# Patient Record
Sex: FEMALE | Race: WHITE | NOT HISPANIC OR LATINO | Employment: OTHER | URBAN - METROPOLITAN AREA
[De-identification: names, ages, dates, MRNs, and addresses within clinical notes are randomized per-mention and may not be internally consistent; named-entity substitution may affect disease eponyms.]

---

## 2017-01-02 DIAGNOSIS — Z79.01 LONG TERM (CURRENT) USE OF ANTICOAGULANTS: ICD-10-CM

## 2017-01-02 LAB
INR PPP: 0.9 (ref 0.86–1.14)
PLATELET # BLD AUTO: 458 10E9/L (ref 150–450)

## 2017-01-13 ENCOUNTER — PRE VISIT (OUTPATIENT)
Dept: ANESTHESIOLOGY | Facility: CLINIC | Age: 82
End: 2017-01-13

## 2017-01-13 NOTE — TELEPHONE ENCOUNTER
1.  Date/reason for appt:1/31/17, Lumbar radiculopathy displacement of lumbar intervertebral   2.  Referring provider:Self  3.  Call to patient (Yes / No - short description):No, records are in epic.   4.  Previous care at / records requested from:    IM- office notes are in [facility].     Ortho- office notes and imaging are in epic.

## 2017-01-18 DIAGNOSIS — I10 ESSENTIAL HYPERTENSION: Primary | ICD-10-CM

## 2017-01-18 RX ORDER — AMLODIPINE BESYLATE 10 MG/1
10 TABLET ORAL DAILY
Qty: 90 TABLET | Refills: 1 | Status: SHIPPED | OUTPATIENT
Start: 2017-01-18 | End: 2017-10-17

## 2017-01-18 NOTE — TELEPHONE ENCOUNTER
amLODIPine (NORVASC) 10 MG tablet  Last Written Prescription Date:  6/20/16  Last Fill Quantity: 90,   # refills: 1  Last Office Visit with G, P or [facility] prescribing provider: 12/21/16  Future Office visit:   4/13/17    BP Readings from Last 3 Encounters:   01/02/17 170/69   12/21/16 129/65   12/20/16 144/69       Routing refill request to provider for review/approval because:   amLODIPine (NORVASC) 10 MG tablet.. Bp> 140/90

## 2017-01-22 ENCOUNTER — HOSPITAL ENCOUNTER (INPATIENT)
Facility: CLINIC | Age: 82
LOS: 1 days | Discharge: HOME | DRG: 390 | End: 2017-01-24
Attending: EMERGENCY MEDICINE | Admitting: INTERNAL MEDICINE
Payer: MEDICARE

## 2017-01-22 DIAGNOSIS — K56.600 PARTIAL BOWEL OBSTRUCTION (H): ICD-10-CM

## 2017-01-22 LAB
ALBUMIN SERPL-MCNC: 4.1 G/DL (ref 3.4–5)
ALP SERPL-CCNC: 82 U/L (ref 40–150)
ALT SERPL W P-5'-P-CCNC: 26 U/L (ref 0–50)
ANION GAP SERPL CALCULATED.3IONS-SCNC: 10 MMOL/L (ref 3–14)
AST SERPL W P-5'-P-CCNC: 21 U/L (ref 0–45)
BASOPHILS # BLD AUTO: 0 10E9/L (ref 0–0.2)
BASOPHILS NFR BLD AUTO: 0.1 %
BILIRUB SERPL-MCNC: 0.6 MG/DL (ref 0.2–1.3)
BUN SERPL-MCNC: 18 MG/DL (ref 7–30)
CALCIUM SERPL-MCNC: 11 MG/DL (ref 8.5–10.1)
CHLORIDE SERPL-SCNC: 97 MMOL/L (ref 94–109)
CO2 BLDCOV-SCNC: 25 MMOL/L (ref 21–28)
CO2 SERPL-SCNC: 26 MMOL/L (ref 20–32)
CREAT SERPL-MCNC: 0.69 MG/DL (ref 0.52–1.04)
DIFFERENTIAL METHOD BLD: ABNORMAL
EOSINOPHIL # BLD AUTO: 0 10E9/L (ref 0–0.7)
EOSINOPHIL NFR BLD AUTO: 0.1 %
ERYTHROCYTE [DISTWIDTH] IN BLOOD BY AUTOMATED COUNT: 14.8 % (ref 10–15)
GFR SERPL CREATININE-BSD FRML MDRD: 82 ML/MIN/1.7M2
GLUCOSE SERPL-MCNC: 146 MG/DL (ref 70–99)
HCT VFR BLD AUTO: 40.7 % (ref 35–47)
HGB BLD-MCNC: 13.7 G/DL (ref 11.7–15.7)
IMM GRANULOCYTES # BLD: 0.1 10E9/L (ref 0–0.4)
IMM GRANULOCYTES NFR BLD: 0.3 %
INR PPP: 0.91 (ref 0.86–1.14)
LACTATE BLD-SCNC: 1.4 MMOL/L (ref 0.7–2.1)
LYMPHOCYTES # BLD AUTO: 0.5 10E9/L (ref 0.8–5.3)
LYMPHOCYTES NFR BLD AUTO: 3.3 %
MCH RBC QN AUTO: 30.4 PG (ref 26.5–33)
MCHC RBC AUTO-ENTMCNC: 33.7 G/DL (ref 31.5–36.5)
MCV RBC AUTO: 90 FL (ref 78–100)
MONOCYTES # BLD AUTO: 0.7 10E9/L (ref 0–1.3)
MONOCYTES NFR BLD AUTO: 4.4 %
NEUTROPHILS # BLD AUTO: 14.9 10E9/L (ref 1.6–8.3)
NEUTROPHILS NFR BLD AUTO: 91.8 %
NRBC # BLD AUTO: 0 10*3/UL
NRBC BLD AUTO-RTO: 0 /100
PCO2 BLDV: 33 MM HG (ref 40–50)
PH BLDV: 7.49 PH (ref 7.32–7.43)
PLATELET # BLD AUTO: 499 10E9/L (ref 150–450)
PO2 BLDV: 23 MM HG (ref 25–47)
POTASSIUM SERPL-SCNC: 3.4 MMOL/L (ref 3.4–5.3)
PROT SERPL-MCNC: 7.2 G/DL (ref 6.8–8.8)
RBC # BLD AUTO: 4.51 10E12/L (ref 3.8–5.2)
SAO2 % BLDV FROM PO2: 47 %
SODIUM SERPL-SCNC: 134 MMOL/L (ref 133–144)
TROPONIN I SERPL-MCNC: 0.04 UG/L (ref 0–0.04)
WBC # BLD AUTO: 16.3 10E9/L (ref 4–11)

## 2017-01-22 PROCEDURE — 83605 ASSAY OF LACTIC ACID: CPT

## 2017-01-22 PROCEDURE — 85610 PROTHROMBIN TIME: CPT | Performed by: EMERGENCY MEDICINE

## 2017-01-22 PROCEDURE — 80053 COMPREHEN METABOLIC PANEL: CPT | Performed by: EMERGENCY MEDICINE

## 2017-01-22 PROCEDURE — 93010 ELECTROCARDIOGRAM REPORT: CPT | Mod: Z6 | Performed by: EMERGENCY MEDICINE

## 2017-01-22 PROCEDURE — 96375 TX/PRO/DX INJ NEW DRUG ADDON: CPT | Performed by: EMERGENCY MEDICINE

## 2017-01-22 PROCEDURE — 93005 ELECTROCARDIOGRAM TRACING: CPT | Performed by: EMERGENCY MEDICINE

## 2017-01-22 PROCEDURE — 99285 EMERGENCY DEPT VISIT HI MDM: CPT | Mod: 25 | Performed by: EMERGENCY MEDICINE

## 2017-01-22 PROCEDURE — 85025 COMPLETE CBC W/AUTO DIFF WBC: CPT | Performed by: EMERGENCY MEDICINE

## 2017-01-22 PROCEDURE — 84484 ASSAY OF TROPONIN QUANT: CPT | Performed by: EMERGENCY MEDICINE

## 2017-01-22 PROCEDURE — 25000125 ZZHC RX 250: Performed by: EMERGENCY MEDICINE

## 2017-01-22 PROCEDURE — 82803 BLOOD GASES ANY COMBINATION: CPT

## 2017-01-22 PROCEDURE — 99285 EMERGENCY DEPT VISIT HI MDM: CPT | Performed by: EMERGENCY MEDICINE

## 2017-01-22 RX ORDER — FENTANYL CITRATE 50 UG/ML
25 INJECTION, SOLUTION INTRAMUSCULAR; INTRAVENOUS ONCE
Status: COMPLETED | OUTPATIENT
Start: 2017-01-22 | End: 2017-01-22

## 2017-01-22 RX ADMIN — FENTANYL CITRATE 25 MCG: 50 INJECTION, SOLUTION INTRAMUSCULAR; INTRAVENOUS at 23:30

## 2017-01-22 ASSESSMENT — ENCOUNTER SYMPTOMS
EYE PAIN: 0
BACK PAIN: 0
BLOOD IN STOOL: 0
TROUBLE SWALLOWING: 0
DYSURIA: 0
NAUSEA: 1
DIZZINESS: 1
VOMITING: 1
COLOR CHANGE: 0
WEAKNESS: 0
SORE THROAT: 0
FREQUENCY: 0
PALPITATIONS: 0
CHILLS: 0
SHORTNESS OF BREATH: 0
POLYDIPSIA: 0
DIARRHEA: 0
NECK PAIN: 0
CONSTIPATION: 0
FEVER: 0
HEADACHES: 0
COUGH: 0
HEMATURIA: 0
ABDOMINAL PAIN: 1

## 2017-01-22 NOTE — LETTER
Transition Communication Hand-off for Care Transitions to Next Level of Care Provider    Name: Lucie Stockton  MRN #: [id number]  Primary Care Provider: Marii Montgomery     Primary Clinic:  [facility] [address] [place] [address]  [facility] [address]     Reason for Hospitalization:  Partial bowel obstruction (H) [K56.69]  Admit Date/Time: 1/22/2017 10:33 PM  Discharge Date: 1/24/2017  Payor Source: Payor: MEDICARE / Plan: MEDICARE / Product Type: Medicare /            Reason for Communication Hand-off Referral: Other continuity of care    Concern for non-adherence with plan of care:   Y/N no      Follow-up plan:  Future Appointments  Date Time Provider Department Center   4/13/2017 8:20 AM Marii Montgomery MD [facility]   6/20/2017 12:45 PM Iona Velez MD [facility]         Anna Pena, RNCC  [phone]    AVS/Discharge Summary is the source of truth; this is a helpful guide for improved communication of patient story

## 2017-01-22 NOTE — IP AVS SNAPSHOT
MRN:[id number]                      After Visit Summary   1/22/2017    Lucie Stockton    MRN: [id number]           Thank you!     Thank you for choosing [facility] for your care. Our goal is always to provide you with excellent care. Hearing back from our patients is one way we can continue to improve our services. Please take a few minutes to complete the written survey that you may receive in the mail after you visit with us. Thank you!        Patient Information     Date Of Birth          1935        About your hospital stay     You were admitted on:  January 23, 2017 You last received care in the:  [address] [facility]    You were discharged on:  January 24, 2017        Reason for your hospital stay       You were admitted with a partial small bowel obstruction.  After managing pain and nausea and correcting dehydration with IV fluids your symptoms improved and you were able to resume a regular diet.                  Who to Call     For medical emergencies, please call 911.  For non-urgent questions about your medical care, please call your primary care provider or clinic, [phone]          Attending Provider     Provider    Nupur Almanza MD Pham, MD Jesse De La Paz Mumtaz, MD Nixon, Hebert Alvarez MD       Primary Care Provider Office Phone # Fax #    Marii MITCHELL Montgomeyr [phone]        [facility] [address] [place] [address] [address] [place] [address]        After Care Instructions     Activity       Your activity upon discharge: activity as tolerated            Diet       Follow this diet upon discharge: Orders Placed This Encounter  Advance Diet as Tolerated: Regular Diet Adult            Monitor and record       If your symptoms of nausea, vomiting return or you have abdominal pain or stop passing gas you should return to the emergency room.                  Follow-up Appointments     Adult [facility] Follow-up and recommended labs and tests       Follow  up with primary care provider, Marii Montgomery, within 7 days to evaluate after recent hospitalization.  No follow up labs or test are needed.      Appointments on [place] and/or [facility] (with [facility] or [facility] provider or service). Call [phone] if you haven't heard regarding these appointments within 7 days of discharge.                  Your next 10 appointments already scheduled     Apr 13, 2017  8:20 AM   (Arrive by 8:05 AM)   Return Visit with Marii Montgomery MD   [facility] ([facility])    [address] [place] [address]  [address] [facility] [address]   [phone]            Jun 20, 2017 12:45 PM   (Arrive by 12:30 PM)   Return Vascular Visit with Iona Velez MD   [facility] ([facility])    [address] [place] [address]  [address] [facility] [address]   [phone]              Pending Results     Date and Time Order Name Status Description    1/22/2017 2311 POC US ABDOMEN LIMITED In process             Statement of Approval     Ordered          01/24/17 1430  I have reviewed and agree with all the recommendations and orders detailed in this document.   EFFECTIVE NOW     Approved and electronically signed by:  Hebert Hunter MD             Admission Information        Provider Department Dept Phone    1/22/2017 Hebert Hunter MD [facility] [phone]      Your Vitals Were     Blood Pressure Pulse Temperature    134/44 mmHg 89 97.6  F (36.4  C) (Oral)    Respirations Height Weight    16 1.524 m (5') 49.351 kg (108 lb 12.8 oz)    BMI (Body Mass Index) Pulse Oximetry       21.25 kg/m2 95%       MyChart Information     [facility] gives you secure access to your electronic health record. If you see a primary care provider, you can also send messages to your care team and make appointments. If you have questions, please call your primary care clinic.  If you do not have a primary care provider, please call  [phone] and they will assist you.        Care EveryWhere ID     This is your Care EveryWhere ID. This could be used by other organizations to access your [facility] medical records  [id number]           Review of your medicines      CONTINUE these medicines which have NOT CHANGED        Dose / Directions    albuterol 108 (90 BASE) MCG/ACT Inhaler   Commonly known as:  VENTOLIN HFA   Used for:  Cough        Dose:  2 puff   Inhale 2 puffs into the lungs every 4 hours as needed for shortness of breath / dyspnea or wheezing   Quantity:  18 Inhaler   Refills:  1       amLODIPine 10 MG tablet   Commonly known as:  NORVASC   Used for:  Essential hypertension        Dose:  10 mg   Take 1 tablet (10 mg) by mouth daily For blood pressure   Quantity:  90 tablet   Refills:  1       atorvastatin 40 MG tablet   Commonly known as:  LIPITOR   Used for:  Hyperlipidemia, unspecified hyperlipidemia type        Dose:  40 mg   Take 1 tablet (40 mg) by mouth daily   Quantity:  90 tablet   Refills:  3       CALCIUM 600 + D PO        Dose:  1 tablet   Take 1 tablet by mouth daily.   Refills:  0       clopidogrel 75 MG tablet   Commonly known as:  PLAVIX   Used for:  Other chronic pulmonary embolism (H), Venous thrombosis of extremity        Dose:  75 mg   Take 1 tablet (75 mg) by mouth daily   Quantity:  90 tablet   Refills:  3       ferrous sulfate 325 (65 FE) MG tablet   Commonly known as:  IRON   Used for:  Other iron deficiency anemias        Dose:  325 mg   Take 1 tablet (325 mg) by mouth daily (with breakfast)   Quantity:  90 tablet   Refills:  3       gabapentin 100 MG capsule   Commonly known as:  NEURONTIN   Used for:  Lumbar radiculopathy        Take 3 capsules at bedtime, also 1 capsule in the morning and mid-day   Quantity:  150 capsule   Refills:  1       hydrochlorothiazide 50 MG tablet   Commonly known as:  HYDRODIURIL   Used for:  Benign essential hypertension        Dose:  50 mg   Take 1 tablet (50 mg) by mouth daily    Quantity:  90 tablet   Refills:  3       levothyroxine 50 MCG tablet   Commonly known as:  SYNTHROID/LEVOTHROID   Used for:  Hypothyroidism, unspecified hypothyroidism type        Dose:  50 mcg   Take 1 tablet (50 mcg) by mouth daily   Quantity:  90 tablet   Refills:  3       lisinopril 40 MG tablet   Commonly known as:  PRINIVIL/ZESTRIL   Used for:  Essential hypertension        Dose:  40 mg   Take 1 tablet (40 mg) by mouth daily For blood pressure   Quantity:  90 tablet   Refills:  3       potassium chloride SA 20 MEQ CR tablet   Commonly known as:  K-DUR/KLOR-CON M   Used for:  Hypopotassemia        Dose:  20 mEq   Take 1 tablet (20 mEq) by mouth daily   Quantity:  90 tablet   Refills:  3       ranitidine 150 MG tablet   Commonly known as:  ZANTAC   Used for:  Gastritis        Dose:  150 mg   Take 1 tablet (150 mg) by mouth At Bedtime   Quantity:  90 tablet   Refills:  3       traMADol 50 MG tablet   Commonly known as:  ULTRAM   Used for:  Foot pain, right        Dose:  50 mg   Take 1 tablet (50 mg) by mouth every 6 hours as needed for moderate pain or severe pain Maximum 270 tablets in 3 months   Quantity:  270 tablet   Refills:  1                Protect others around you: Learn how to safely use, store and throw away your medicines at www.disposemymeds.org.             Medication List: This is a list of all your medications and when to take them. Check marks below indicate your daily home schedule. Keep this list as a reference.      Medications           Morning Afternoon Evening Bedtime As Needed    albuterol 108 (90 BASE) MCG/ACT Inhaler   Commonly known as:  VENTOLIN HFA   Inhale 2 puffs into the lungs every 4 hours as needed for shortness of breath / dyspnea or wheezing                                amLODIPine 10 MG tablet   Commonly known as:  NORVASC   Take 1 tablet (10 mg) by mouth daily For blood pressure   Last time this was given:  10 mg on 1/24/2017  7:55 AM                                 atorvastatin 40 MG tablet   Commonly known as:  LIPITOR   Take 1 tablet (40 mg) by mouth daily   Last time this was given:  40 mg on 1/24/2017  7:55 AM                                CALCIUM 600 + D PO   Take 1 tablet by mouth daily.                                clopidogrel 75 MG tablet   Commonly known as:  PLAVIX   Take 1 tablet (75 mg) by mouth daily   Last time this was given:  75 mg on 1/24/2017  7:55 AM                                ferrous sulfate 325 (65 FE) MG tablet   Commonly known as:  IRON   Take 1 tablet (325 mg) by mouth daily (with breakfast)                                gabapentin 100 MG capsule   Commonly known as:  NEURONTIN   Take 3 capsules at bedtime, also 1 capsule in the morning and mid-day   Last time this was given:  100 mg on 1/23/2017  6:36 PM                                hydrochlorothiazide 50 MG tablet   Commonly known as:  HYDRODIURIL   Take 1 tablet (50 mg) by mouth daily                                levothyroxine 50 MCG tablet   Commonly known as:  SYNTHROID/LEVOTHROID   Take 1 tablet (50 mcg) by mouth daily   Last time this was given:  50 mcg on 1/24/2017  7:55 AM                                lisinopril 40 MG tablet   Commonly known as:  PRINIVIL/ZESTRIL   Take 1 tablet (40 mg) by mouth daily For blood pressure   Last time this was given:  40 mg on 1/24/2017  7:55 AM                                potassium chloride SA 20 MEQ CR tablet   Commonly known as:  K-DUR/KLOR-CON M   Take 1 tablet (20 mEq) by mouth daily                                ranitidine 150 MG tablet   Commonly known as:  ZANTAC   Take 1 tablet (150 mg) by mouth At Bedtime   Last time this was given:  150 mg on 1/23/2017 11:10 PM                                traMADol 50 MG tablet   Commonly known as:  ULTRAM   Take 1 tablet (50 mg) by mouth every 6 hours as needed for moderate pain or severe pain Maximum 270 tablets in 3 months

## 2017-01-22 NOTE — IP AVS SNAPSHOT
[address] [address]    Phone:  [phone]                                       After Visit Summary   1/22/2017    Lucie Stockton    MRN: [id number]           After Visit Summary Signature Page     I have received my discharge instructions, and my questions have been answered. I have discussed any challenges I see with this plan with the nurse or doctor.    ..........................................................................................................................................  Patient/Patient Representative Signature      ..........................................................................................................................................  Patient Representative Print Name and Relationship to Patient    ..................................................               ................................................  Date                                            Time    ..........................................................................................................................................  Reviewed by Signature/Title    ...................................................              ..............................................  Date                                                            Time

## 2017-01-23 ENCOUNTER — APPOINTMENT (OUTPATIENT)
Dept: GENERAL RADIOLOGY | Facility: CLINIC | Age: 82
DRG: 390 | End: 2017-01-23
Attending: INTERNAL MEDICINE
Payer: MEDICARE

## 2017-01-23 ENCOUNTER — APPOINTMENT (OUTPATIENT)
Dept: CT IMAGING | Facility: CLINIC | Age: 82
DRG: 390 | End: 2017-01-23
Attending: EMERGENCY MEDICINE
Payer: MEDICARE

## 2017-01-23 ENCOUNTER — APPOINTMENT (OUTPATIENT)
Dept: GENERAL RADIOLOGY | Facility: CLINIC | Age: 82
DRG: 390 | End: 2017-01-23
Attending: EMERGENCY MEDICINE
Payer: MEDICARE

## 2017-01-23 PROBLEM — K56.609 SBO (SMALL BOWEL OBSTRUCTION) (H): Status: ACTIVE | Noted: 2017-01-23

## 2017-01-23 LAB
ALBUMIN UR-MCNC: NEGATIVE MG/DL
AMORPH CRY #/AREA URNS HPF: ABNORMAL /HPF
APPEARANCE UR: ABNORMAL
BILIRUB UR QL STRIP: NEGATIVE
COLOR UR AUTO: YELLOW
CREAT SERPL-MCNC: 0.59 MG/DL (ref 0.52–1.04)
ERYTHROCYTE [DISTWIDTH] IN BLOOD BY AUTOMATED COUNT: 14.7 % (ref 10–15)
GFR SERPL CREATININE-BSD FRML MDRD: NORMAL ML/MIN/1.7M2
GLUCOSE UR STRIP-MCNC: NEGATIVE MG/DL
HCT VFR BLD AUTO: 35.9 % (ref 35–47)
HGB BLD-MCNC: 11.9 G/DL (ref 11.7–15.7)
HGB UR QL STRIP: NEGATIVE
INTERPRETATION ECG - MUSE: NORMAL
KETONES UR STRIP-MCNC: 10 MG/DL
LEUKOCYTE ESTERASE UR QL STRIP: NEGATIVE
MCH RBC QN AUTO: 29.8 PG (ref 26.5–33)
MCHC RBC AUTO-ENTMCNC: 33.1 G/DL (ref 31.5–36.5)
MCV RBC AUTO: 90 FL (ref 78–100)
MUCOUS THREADS #/AREA URNS LPF: PRESENT /LPF
NITRATE UR QL: NEGATIVE
PH UR STRIP: 7 PH (ref 5–7)
PLATELET # BLD AUTO: 444 10E9/L (ref 150–450)
RADIOLOGIST FLAGS: ABNORMAL
RBC # BLD AUTO: 4 10E12/L (ref 3.8–5.2)
RBC #/AREA URNS AUTO: 0 /HPF (ref 0–2)
SP GR UR STRIP: 1.04 (ref 1–1.03)
SQUAMOUS #/AREA URNS AUTO: 1 /HPF (ref 0–1)
TRANS CELLS #/AREA URNS HPF: <1 /HPF (ref 0–1)
TROPONIN I SERPL-MCNC: 0.03 UG/L (ref 0–0.04)
URN SPEC COLLECT METH UR: ABNORMAL
UROBILINOGEN UR STRIP-MCNC: NORMAL MG/DL (ref 0–2)
WBC # BLD AUTO: 13.6 10E9/L (ref 4–11)
WBC #/AREA URNS AUTO: 1 /HPF (ref 0–2)

## 2017-01-23 PROCEDURE — 96365 THER/PROPH/DIAG IV INF INIT: CPT | Performed by: EMERGENCY MEDICINE

## 2017-01-23 PROCEDURE — 25000132 ZZH RX MED GY IP 250 OP 250 PS 637: Mod: GY | Performed by: INTERNAL MEDICINE

## 2017-01-23 PROCEDURE — 25000125 ZZHC RX 250: Performed by: INTERNAL MEDICINE

## 2017-01-23 PROCEDURE — 82565 ASSAY OF CREATININE: CPT | Performed by: EMERGENCY MEDICINE

## 2017-01-23 PROCEDURE — 85027 COMPLETE CBC AUTOMATED: CPT | Performed by: EMERGENCY MEDICINE

## 2017-01-23 PROCEDURE — A9270 NON-COVERED ITEM OR SERVICE: HCPCS | Mod: GY | Performed by: INTERNAL MEDICINE

## 2017-01-23 PROCEDURE — 74177 CT ABD & PELVIS W/CONTRAST: CPT

## 2017-01-23 PROCEDURE — 84484 ASSAY OF TROPONIN QUANT: CPT | Performed by: EMERGENCY MEDICINE

## 2017-01-23 PROCEDURE — 25500064 ZZH RX 255 OP 636: Performed by: EMERGENCY MEDICINE

## 2017-01-23 PROCEDURE — 40000940 XR ABDOMEN PORT F1 VW

## 2017-01-23 PROCEDURE — 25800025 ZZH RX 258: Performed by: INTERNAL MEDICINE

## 2017-01-23 PROCEDURE — 25000125 ZZHC RX 250: Performed by: EMERGENCY MEDICINE

## 2017-01-23 PROCEDURE — 96366 THER/PROPH/DIAG IV INF ADDON: CPT | Performed by: EMERGENCY MEDICINE

## 2017-01-23 PROCEDURE — 81001 URINALYSIS AUTO W/SCOPE: CPT | Performed by: EMERGENCY MEDICINE

## 2017-01-23 PROCEDURE — 12000001 ZZH R&B MED SURG/OB UMMC

## 2017-01-23 PROCEDURE — 36415 COLL VENOUS BLD VENIPUNCTURE: CPT | Performed by: EMERGENCY MEDICINE

## 2017-01-23 PROCEDURE — 99223 1ST HOSP IP/OBS HIGH 75: CPT | Mod: AI | Performed by: INTERNAL MEDICINE

## 2017-01-23 PROCEDURE — 25800025 ZZH RX 258: Performed by: STUDENT IN AN ORGANIZED HEALTH CARE EDUCATION/TRAINING PROGRAM

## 2017-01-23 PROCEDURE — 25000128 H RX IP 250 OP 636: Performed by: INTERNAL MEDICINE

## 2017-01-23 PROCEDURE — 96376 TX/PRO/DX INJ SAME DRUG ADON: CPT | Performed by: EMERGENCY MEDICINE

## 2017-01-23 RX ORDER — GABAPENTIN 250 MG/5ML
100 SOLUTION ORAL 2 TIMES DAILY
Status: DISCONTINUED | OUTPATIENT
Start: 2017-01-24 | End: 2017-01-24 | Stop reason: HOSPADM

## 2017-01-23 RX ORDER — GABAPENTIN 100 MG/1
100 CAPSULE ORAL 2 TIMES DAILY
Status: DISCONTINUED | OUTPATIENT
Start: 2017-01-23 | End: 2017-01-23

## 2017-01-23 RX ORDER — FENTANYL CITRATE 50 UG/ML
25 INJECTION, SOLUTION INTRAMUSCULAR; INTRAVENOUS ONCE
Status: COMPLETED | OUTPATIENT
Start: 2017-01-23 | End: 2017-01-23

## 2017-01-23 RX ORDER — TRAMADOL HYDROCHLORIDE 50 MG/1
50 TABLET ORAL EVERY 6 HOURS PRN
Status: DISCONTINUED | OUTPATIENT
Start: 2017-01-23 | End: 2017-01-24 | Stop reason: HOSPADM

## 2017-01-23 RX ORDER — FENTANYL CITRATE 50 UG/ML
25 INJECTION, SOLUTION INTRAMUSCULAR; INTRAVENOUS
Status: DISCONTINUED | OUTPATIENT
Start: 2017-01-23 | End: 2017-01-24 | Stop reason: HOSPADM

## 2017-01-23 RX ORDER — LISINOPRIL 20 MG/1
40 TABLET ORAL DAILY
Status: DISCONTINUED | OUTPATIENT
Start: 2017-01-23 | End: 2017-01-24 | Stop reason: HOSPADM

## 2017-01-23 RX ORDER — PROCHLORPERAZINE 25 MG
12.5 SUPPOSITORY, RECTAL RECTAL EVERY 12 HOURS PRN
Status: DISCONTINUED | OUTPATIENT
Start: 2017-01-23 | End: 2017-01-24 | Stop reason: HOSPADM

## 2017-01-23 RX ORDER — ONDANSETRON 4 MG/1
4 TABLET, ORALLY DISINTEGRATING ORAL EVERY 6 HOURS PRN
Status: DISCONTINUED | OUTPATIENT
Start: 2017-01-23 | End: 2017-01-24 | Stop reason: HOSPADM

## 2017-01-23 RX ORDER — GABAPENTIN 300 MG/1
300 CAPSULE ORAL AT BEDTIME
Status: DISCONTINUED | OUTPATIENT
Start: 2017-01-23 | End: 2017-01-23

## 2017-01-23 RX ORDER — ALBUTEROL SULFATE 90 UG/1
2 AEROSOL, METERED RESPIRATORY (INHALATION) EVERY 4 HOURS PRN
Status: DISCONTINUED | OUTPATIENT
Start: 2017-01-23 | End: 2017-01-24 | Stop reason: HOSPADM

## 2017-01-23 RX ORDER — GABAPENTIN 250 MG/5ML
300 SOLUTION ORAL AT BEDTIME
Status: DISCONTINUED | OUTPATIENT
Start: 2017-01-23 | End: 2017-01-24 | Stop reason: HOSPADM

## 2017-01-23 RX ORDER — IOPAMIDOL 755 MG/ML
68 INJECTION, SOLUTION INTRAVASCULAR ONCE
Status: COMPLETED | OUTPATIENT
Start: 2017-01-23 | End: 2017-01-23

## 2017-01-23 RX ORDER — POTASSIUM CHLORIDE 20MEQ/15ML
20 LIQUID (ML) ORAL DAILY
Status: DISCONTINUED | OUTPATIENT
Start: 2017-01-24 | End: 2017-01-24 | Stop reason: HOSPADM

## 2017-01-23 RX ORDER — LIDOCAINE HYDROCHLORIDE 10 MG/ML
INJECTION, SOLUTION INFILTRATION; PERINEURAL
Status: DISCONTINUED
Start: 2017-01-23 | End: 2017-01-23 | Stop reason: HOSPADM

## 2017-01-23 RX ORDER — LEVOTHYROXINE SODIUM 50 UG/1
50 TABLET ORAL DAILY
Status: DISCONTINUED | OUTPATIENT
Start: 2017-01-23 | End: 2017-01-24 | Stop reason: HOSPADM

## 2017-01-23 RX ORDER — POTASSIUM CHLORIDE 750 MG/1
20 TABLET, EXTENDED RELEASE ORAL DAILY
Status: DISCONTINUED | OUTPATIENT
Start: 2017-01-23 | End: 2017-01-23

## 2017-01-23 RX ORDER — POTASSIUM CHLORIDE 7.45 MG/ML
10 INJECTION INTRAVENOUS ONCE
Status: COMPLETED | OUTPATIENT
Start: 2017-01-23 | End: 2017-01-23

## 2017-01-23 RX ORDER — NALOXONE HYDROCHLORIDE 0.4 MG/ML
.1-.4 INJECTION, SOLUTION INTRAMUSCULAR; INTRAVENOUS; SUBCUTANEOUS
Status: DISCONTINUED | OUTPATIENT
Start: 2017-01-23 | End: 2017-01-24 | Stop reason: HOSPADM

## 2017-01-23 RX ORDER — AMLODIPINE BESYLATE 10 MG/1
10 TABLET ORAL DAILY
Status: DISCONTINUED | OUTPATIENT
Start: 2017-01-23 | End: 2017-01-24 | Stop reason: HOSPADM

## 2017-01-23 RX ORDER — PROCHLORPERAZINE MALEATE 5 MG
5 TABLET ORAL EVERY 6 HOURS PRN
Status: DISCONTINUED | OUTPATIENT
Start: 2017-01-23 | End: 2017-01-24 | Stop reason: HOSPADM

## 2017-01-23 RX ORDER — ONDANSETRON 2 MG/ML
4 INJECTION INTRAMUSCULAR; INTRAVENOUS EVERY 6 HOURS PRN
Status: DISCONTINUED | OUTPATIENT
Start: 2017-01-23 | End: 2017-01-24 | Stop reason: HOSPADM

## 2017-01-23 RX ORDER — ATORVASTATIN CALCIUM 40 MG/1
40 TABLET, FILM COATED ORAL DAILY
Status: DISCONTINUED | OUTPATIENT
Start: 2017-01-23 | End: 2017-01-24 | Stop reason: HOSPADM

## 2017-01-23 RX ORDER — CLOPIDOGREL BISULFATE 75 MG/1
75 TABLET ORAL DAILY
Status: DISCONTINUED | OUTPATIENT
Start: 2017-01-23 | End: 2017-01-24 | Stop reason: HOSPADM

## 2017-01-23 RX ADMIN — RANITIDINE HYDROCHLORIDE 150 MG: 150 TABLET, FILM COATED ORAL at 23:10

## 2017-01-23 RX ADMIN — SODIUM CHLORIDE, POTASSIUM CHLORIDE, SODIUM LACTATE AND CALCIUM CHLORIDE 1500 ML: 600; 310; 30; 20 INJECTION, SOLUTION INTRAVENOUS at 05:17

## 2017-01-23 RX ADMIN — GABAPENTIN 300 MG: 250 SOLUTION ORAL at 23:10

## 2017-01-23 RX ADMIN — IOPAMIDOL 68 ML: 755 INJECTION, SOLUTION INTRAVENOUS at 00:41

## 2017-01-23 RX ADMIN — POTASSIUM CHLORIDE 10 MEQ: 7.46 INJECTION, SOLUTION INTRAVENOUS at 05:22

## 2017-01-23 RX ADMIN — CLOPIDOGREL BISULFATE 75 MG: 75 TABLET, FILM COATED ORAL at 10:23

## 2017-01-23 RX ADMIN — SODIUM CHLORIDE 1000 ML: 4.5 INJECTION, SOLUTION INTRAVENOUS at 13:48

## 2017-01-23 RX ADMIN — GABAPENTIN 100 MG: 100 CAPSULE ORAL at 18:36

## 2017-01-23 RX ADMIN — GABAPENTIN 100 MG: 100 CAPSULE ORAL at 10:22

## 2017-01-23 RX ADMIN — LEVOTHYROXINE SODIUM 50 MCG: 50 TABLET ORAL at 10:22

## 2017-01-23 RX ADMIN — ATORVASTATIN CALCIUM 40 MG: 40 TABLET, FILM COATED ORAL at 10:23

## 2017-01-23 RX ADMIN — ENOXAPARIN SODIUM 40 MG: 40 INJECTION SUBCUTANEOUS at 10:22

## 2017-01-23 RX ADMIN — SODIUM CHLORIDE, PRESERVATIVE FREE 68 ML: 5 INJECTION INTRAVENOUS at 00:41

## 2017-01-23 RX ADMIN — AMLODIPINE BESYLATE 10 MG: 10 TABLET ORAL at 10:23

## 2017-01-23 RX ADMIN — POTASSIUM CHLORIDE 10 MEQ: 7.46 INJECTION, SOLUTION INTRAVENOUS at 07:06

## 2017-01-23 RX ADMIN — FENTANYL CITRATE 25 MCG: 50 INJECTION, SOLUTION INTRAMUSCULAR; INTRAVENOUS at 00:16

## 2017-01-23 RX ADMIN — FENTANYL CITRATE 25 MCG: 50 INJECTION, SOLUTION INTRAMUSCULAR; INTRAVENOUS at 03:01

## 2017-01-23 NOTE — H&P
[facility]   General Medicine Service H & P      Patient Name: Lucie Stockton   Date of Admission: 1/22/2017            Summary:     Patient is an 81 year old female with PMH significant for ovarian malignancy s/p TAHBSO 2011, bilateral PE 2010, severe atherosclerosis of aorta (complete occlusion of infrarenal aorta with collaterals from hypogastric artery reconstituting distal flow) who was admitted to internal medicine service on 01/23/2017 with SBO.          Assessment and Plan:     SBO:  Confirmed on CT. Given benign exam and reassuring laboratory evaluation (with lactate of 1.4), will continue with conservative mgmt for now, surgery following along.  -NPO  -NG tube placed 01/23/2017   -Fluid resuscitated (1.5 liters)  -Hold home diuretic  -Surgery following, appreciate recs    Leukocytosis:  Likely stress response, related to SBO. Low suspicion for infection/infarction.  -Repeat this am, if continues to rise, would consider further workup    Vascular disease: clopidogrel  Hypothyroidism: levothyroxine  Asthma: Albuterol PRN  HTN: amlodipine, lisinopril, hold HCTZ  Chronic Pain: Gabapentin, tramadol    Code Status: Full Code  FEN: NPO  PPx: [facility] SQ  Dispo: General Medicine    Antonio Gorman MD  Internal Medicine, PGY-3  Pager: [phone]         HPI:     Patient is an 81 year old female with PMH significant for ovarian malignancy s/p TAHBSO 2011, bilateral PE 2010, severe atherosclerosis of aorta (complete occlusion of infrarenal aorta with collaterals from hypogastric artery reconstituting distal flow) who was admitted to internal medicine service on 01/23/2017 with SBO.    Patient reports developing abdominal pain centered over gastrium fairly abruptly on day of admission. Can identify no clear inciting factors. Tried to lay down on couch over course of afternoon but pain only worsened. Started experiencing severe nausea, vomiting. Denies any diarrhea. Most recent bowel movement was day prior.  "Denies any hematemesis, BRBPR, melena.    Presented to ED for further evaluation. Lab studies essentially all within normal limits. CT, however, demonstrated small bowel obstruction (multiple dilated loops of small bowel with relative decompression of large bowel).          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     Pulmonary embolism (H)      5/24/10 bilateral     Anemia      Grave's disease      Aortic stenosis      abdominal     Atherosclerosis of aorta (H)      \"extensive disease with near occlusion below level of renal arteries\" on CT     Atherosclerosis of arteries of extremities (H)      Hypertension, essential      Intermittent claudication (H)      Iron deficiency      Osteoarthritis      ankle,foot, knee     Osteoporosis      DVT of lower extremity, bilateral (H)      5/24/10 left distal femoral and great saphenous, 7/7/10 left:  extending to cfv, iliac , pop and post tibial, peronial, right:  distal fem and peroneal      Varicose veins      Knee pain      Hypothyroidism      Hyperlipidaemia LDL goal < 70      Insomnia      Back pain      severe multilevel DJD, moderate spinal canal stenosis L4-5, severe L3-4     Degenerative joint disease      Lumbar stenosis with neurogenic claudication               Past Surgical History:        Past Surgical History   Procedure Laterality Date     Hysterectomy total abdominal, bilateral salpingo-oophorectomy, node dissection, combined  2/17/11     SANGEETHA, EMILIA, LN bx, omentectomy, resection of evrian malignancy Dr. JONO Goncalves - Returned BENIGN     Bunionectomy       Colonoscopy       11/10/10 angioectasia     Upper gi endoscopy       11/10/10 erythematous gastropathy, angioectasia     C stomach surgery procedure unlisted       Please see chart              Social History:     Social History   Substance " Use Topics     Smoking status: Former Smoker -- 0.50 packs/day for 15 years     Quit date: 09/13/1993     Smokeless tobacco: Never Used     Alcohol Use: Yes      Comment: social              Family History:     Family History   Problem Relation Age of Onset     Breast Cancer Maternal Aunt 80     C.A.D. Father 54              Immunizations:     Immunization History   Administered Date(s) Administered     Hepatitis A Vac Ped/Adol-2 Dose 10/06/2003, 01/19/2007     IPV 10/06/2003     Influenza (H1N1) 01/09/2010     Influenza (High Dose) 3 valent vaccine 08/30/2015, 09/03/2016     Influenza (IIV3) 10/26/2002, 11/05/2004, 10/11/2005, 11/07/2006, 09/05/2010, 09/04/2011, 08/20/2012, 09/02/2013, 09/19/2014     Pneumococcal (PCV 13) 12/09/2015     Pneumococcal 23 valent 12/31/2003, 02/20/2011     TD (ADULT, 7+) 10/06/2003     TDAP (BOOSTRIX AGES 10-64) 01/06/2014     Typhoid IM 10/06/2003, 01/19/2007     Zoster vaccine, live 02/28/2011              Allergies:     Allergies   Allergen Reactions     Trazodone Fatigue     Felt too groggy              Medications:       lactated ringers  1,500 mL Intravenous Once              Review of Systems:     10 point Review of Systems is negative except as noted in the HPI          Physical Exam:     /72 mmHg  Pulse 89  Temp(Src) 97.9  F (36.6  C) (Oral)  Resp 18  Ht 1.524 m (5')  SpO2 94%    No intake or output data in the 24 hours ending 01/23/17 0359  General: Pleasant, sitting up in bed, appears uncomfortable but no acute distress  HEENT: Neck supple, no adenopathy, no JVD  CV: RRR no MRG  Resp: CTAB  Abdomen: Soft, mild tenderness with deep palpation on epigastrium, non-distended  Extremities: Warm, well perfused, no edema  Skin: No rash, no petechiae  Neuro: Alert and oriented, responds to questions appropriately, follows commands, moving all extremities, no gross cranial nerve deficits          Data:     Results for orders placed or performed during the hospital encounter  of 01/22/17 (from the past 24 hour(s))   CBC with platelets differential   Result Value Ref Range    WBC 16.3 (H) 4.0 - 11.0 10e9/L    RBC Count 4.51 3.8 - 5.2 10e12/L    Hemoglobin 13.7 11.7 - 15.7 g/dL    Hematocrit 40.7 35.0 - 47.0 %    MCV 90 78 - 100 fl    MCH 30.4 26.5 - 33.0 pg    MCHC 33.7 31.5 - 36.5 g/dL    RDW 14.8 10.0 - 15.0 %    Platelet Count 499 (H) 150 - 450 10e9/L    Diff Method Automated Method     % Neutrophils 91.8 %    % Lymphocytes 3.3 %    % Monocytes 4.4 %    % Eosinophils 0.1 %    % Basophils 0.1 %    % Immature Granulocytes 0.3 %    Nucleated RBCs 0 0 /100    Absolute Neutrophil 14.9 (H) 1.6 - 8.3 10e9/L    Absolute Lymphocytes 0.5 (L) 0.8 - 5.3 10e9/L    Absolute Monocytes 0.7 0.0 - 1.3 10e9/L    Absolute Eosinophils 0.0 0.0 - 0.7 10e9/L    Absolute Basophils 0.0 0.0 - 0.2 10e9/L    Abs Immature Granulocytes 0.1 0 - 0.4 10e9/L    Absolute Nucleated RBC 0.0    INR   Result Value Ref Range    INR 0.91 0.86 - 1.14   Comprehensive metabolic panel   Result Value Ref Range    Sodium 134 133 - 144 mmol/L    Potassium 3.4 3.4 - 5.3 mmol/L    Chloride 97 94 - 109 mmol/L    Carbon Dioxide 26 20 - 32 mmol/L    Anion Gap 10 3 - 14 mmol/L    Glucose 146 (H) 70 - 99 mg/dL    Urea Nitrogen 18 7 - 30 mg/dL    Creatinine 0.69 0.52 - 1.04 mg/dL    GFR Estimate 82 >60 mL/min/1.7m2    GFR Estimate If Black >90   GFR Calc   >60 mL/min/1.7m2    Calcium 11.0 (H) 8.5 - 10.1 mg/dL    Bilirubin Total 0.6 0.2 - 1.3 mg/dL    Albumin 4.1 3.4 - 5.0 g/dL    Protein Total 7.2 6.8 - 8.8 g/dL    Alkaline Phosphatase 82 40 - 150 U/L    ALT 26 0 - 50 U/L    AST 21 0 - 45 U/L   Troponin I   Result Value Ref Range    Troponin I ES 0.038 0.000 - 0.045 ug/L   EKG 12-lead, tracing only   Result Value Ref Range    Interpretation ECG Click View Image link to view waveform and result    ISTAT gases lactate vishal POCT   Result Value Ref Range    Ph Venous 7.49 (H) 7.32 - 7.43 pH    PCO2 Venous 33 (L) 40 - 50 mm Hg     PO2 Venous 23 (L) 25 - 47 mm Hg    Bicarbonate Venous 25 21 - 28 mmol/L    O2 Sat Venous 47 %    Lactic Acid 1.4 0.7 - 2.1 mmol/L   CT Abdomen Pelvis w Contrast   Result Value Ref Range    Radiologist flags Small bowel obstruction (Urgent)     Narrative    1. Multiple dilated loops of small bowel with relative decompression  of the large bowel, concerning for at least partial small bowel  obstruction.   2. No significant change in the occlusive infrarenal aortic and iliac  disease with distal reconstitution. No free air in the abdomen.  3. 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 Recommend  follow-up with MRI.    [Urgent Result: Small bowel obstruction]    Finding was identified on 1/23/2017 1:07 AM.     Dr. Almanza was contacted by Dr. Daley at 1/23/2017 1:10 AM and  verbalized understanding of the urgent finding.     *[facility] recommendations for asymptomatic pancreatic  cysts, modified from international consensus guidelines*   Size of largest cyst:   Less than 1 cm: Follow-up imaging in 6 months to 1 year, then lengthen  interval to 2-3 years if no change.  1-2 cm: Follow-up imaging at 6 months, then yearly for 2 years, then  lengthen interval if no change.   The optimal initial study or first follow-up exam is MRI performed  with intravenous gadolinium contrast to allow full cyst  characterization. Once characterized, future follow-up MRIs can be  performed without contrast. If MRI is contraindicated, CT with  contrast is recommended.   GI consultation for possible endoscopic ultrasound is recommended for  cysts with the following features: Size > 2 cm, >20% growth on  followup, wall thickening or enhancement, mural nodule, duct > 5 mm,  or abrupt change in duct caliber with distal atrophy. GI or surgical  consultation is also recommended for symptomatic cysts.   *Reference: International Consensus Guidelines for Management  of IPMN  and MCN of the pancreas. Pancreatology: 12:(2012); 183-197.     XR Abdomen Port 1 View    Narrative    XR ABDOMEN PORT F1 VW  1/23/2017 3:06 AM      HISTORY: ng placement    COMPARISON: 1/23/2017    FINDINGS: Upright frontal view of the abdomen. Gastric tube tip and  side port projecting over the stomach. Partially visualized dilated  gas-filled loops of small bowel. No portal venous gas or free air.  Lung bases are relatively clear.      Impression    IMPRESSION: Gastric tube tip and side port projecting over the  stomach.

## 2017-01-23 NOTE — ED NOTES
Presents with c/o severe epigastric abdominal pain and nausea and vomiting that started early afternoon. Reports normal BM.

## 2017-01-23 NOTE — ED PROVIDER NOTES
"  History     Chief Complaint   Patient presents with     Abdominal Pain     HPI  Lucie Stockton is a 81 year old female who presents to the ED today with abdominal pain. Patient reports onset of severe epigastric abdominal pain at 2 PM this afternoon. She states the pain got progressively worse through out the day. She has not been passing gas very much at all.  She has had looser stools but no blood.  She endorses nausea and vomiting (nonbloody). She states she hasn't been able to keep anything down and it's worse if she tries to take anything by mouth, nothing seems to improve symptoms. She denies any changes in urinary habits. Patient notes she was dizzy this afternoon but denies loss of consciousness.  No respiratory or chest symptoms.  No extremity symptoms.  No rashes or skin changes.  No fever/chills.  Reports feels lightheaded/dizzy with degrees of pain but then improves.  No vertigo.  No syncope or near syncope.  No other new symptoms or complaints.  Please see ROS for further details.    I have reviewed the Medications, Allergies, Past Medical and Surgical History, and Social History in the [facility] system.    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     Pulmonary embolism (H)      5/24/10 bilateral     Anemia      Grave's disease      Aortic stenosis      abdominal     Atherosclerosis of aorta (H)      \"extensive disease with near occlusion below level of renal arteries\" on CT     Atherosclerosis of arteries of extremities (H)      Hypertension, essential      Intermittent claudication (H)      Iron deficiency      Osteoarthritis      ankle,foot, knee     Osteoporosis      DVT of lower extremity, bilateral (H)      5/24/10 left distal femoral and great saphenous, 7/7/10 left:  extending to cfv, iliac , pop and post tibial, peronial, right:  " distal fem and peroneal      Varicose veins      Knee pain      Hypothyroidism      Hyperlipidaemia LDL goal < 70      Insomnia      Back pain      severe multilevel DJD, moderate spinal canal stenosis L4-5, severe L3-4     Degenerative joint disease      Lumbar stenosis with neurogenic claudication        PAST SURGICAL HISTORY:   Past Surgical History   Procedure Laterality Date     Hysterectomy total abdominal, bilateral salpingo-oophorectomy, node dissection, combined  2/17/11     SANGEETHA, EMILIA, LN bx, omentectomy, resection of evrian malignancy Dr. JONO Goncalves - Returned BENIGN     Bunionectomy       Colonoscopy       11/10/10 angioectasia     Upper gi endoscopy       11/10/10 erythematous gastropathy, angioectasia     C stomach surgery procedure unlisted       Please see chart       FAMILY HISTORY:   Family History   Problem Relation Age of Onset     Breast Cancer Maternal Aunt 80     C.A.D. Father 54       SOCIAL HISTORY:   Social History   Substance Use Topics     Smoking status: Former Smoker -- 0.50 packs/day for 15 years     Quit date: 09/13/1993     Smokeless tobacco: Never Used     Alcohol Use: Yes      Comment: social       No current facility-administered medications for this encounter.     Current Outpatient Prescriptions   Medication     amLODIPine (NORVASC) 10 MG tablet     atorvastatin (LIPITOR) 40 MG tablet     gabapentin (NEURONTIN) 100 MG capsule     traMADol (ULTRAM) 50 MG tablet     albuterol (VENTOLIN HFA) 108 (90 BASE) MCG/ACT inhaler     potassium chloride SA (K-DUR,KLOR-CON M) 20 MEQ tablet     levothyroxine (SYNTHROID, LEVOTHROID) 50 MCG tablet     clopidogrel (PLAVIX) 75 MG tablet     hydrochlorothiazide (HYDRODIURIL) 50 MG tablet     ranitidine (ZANTAC) 150 MG tablet     lisinopril (PRINIVIL,ZESTRIL) 40 MG tablet     ferrous sulfate (IRON) 325 (65 FE) MG tablet     [DISCONTINUED] tolterodine (DETROL) 1 MG tablet     Calcium Carbonate-Vitamin D (CALCIUM 600 + D OR)        Allergies   Allergen  Reactions     Trazodone Fatigue     Felt too groggy         Review of Systems   Constitutional: Negative for fever and chills.   HENT: Negative for sore throat and trouble swallowing.    Eyes: Negative for pain and visual disturbance.   Respiratory: Negative for cough and shortness of breath.    Cardiovascular: Negative for chest pain, palpitations and leg swelling.   Gastrointestinal: Positive for nausea, vomiting and abdominal pain. Negative for diarrhea, constipation and blood in stool.   Endocrine: Negative for polydipsia and polyuria.   Genitourinary: Negative for dysuria, frequency and hematuria.   Musculoskeletal: Negative for back pain and neck pain.   Skin: Negative for color change and rash.   Allergic/Immunologic: Negative for food allergies and immunocompromised state.   Neurological: Positive for dizziness. Negative for weakness and headaches.       Physical Exam   BP: 163/72 mmHg  Pulse: 89  Temp: 97.9  F (36.6  C)  Resp: 18  Height: 152.4 cm (5')  SpO2: 94 %  Physical Exam      CONSTITUTIONAL: Well-developed and well-nourished. Awake and alert. Non-toxic appearance. No acute distress.   HENT:   - Head: Normocephalic and atraumatic.   - Ears: Hearing and external ear grossly normal.   - Nose: Nose normal. No rhinorrhea. No epistaxis.   - Mouth/Throat: Oropharynx is clear and moist and mucous membranes are normal.   EYES: Conjunctivae and lids are normal. No scleral icterus.   NECK: Normal range of motion and phonation normal. Neck supple. No tracheal deviation, no stridor. No edema or erythema noted.  CARDIOVASCULAR: Normal rate, regular rhythm   PULMONARY/CHEST: Effort normal. No accessory muscle usage or stridor. No respiratory distress.  No appreciable abnormal breath sounds.  ABDOMEN: Soft, not significantly distended.  In the midabdomen I'm easily able to palpate what I believe to be loops of bowel that are not directly tender to palpation, no overlying skin changes, and this is diffuse enough or  that the likelihood of hernia to be of lower likelihood.. No rigidity, rebound or guarding.   MUSCULOSKELETAL: Extremities warm and seemingly well perfused. No edema or calf tenderness.  NEUROLOGIC: Awake, alert. Not disoriented.  No seizure activity. Coordination normal. GCS 15  SKIN: Skin is warm and dry. No rash noted. No diaphoresis. No pallor.   PSYCHIATRIC: Normal mood and affect. Speech and behavior normal. Thought processes linear. Cognition and memory are normal.     ED Course     Procedures       11:05 PM  The patient was seen and examined by Dr. Almanza in Room 19.          EKG Interpretation:      Interpreted by Dr. Nupur Almanza  Time reviewed: 22:50:09  Symptoms at time of EKG: abdominal pain  Rhythm: normal sinus rhythm with sinus arrythmia  Rate: 79 bpm  Axis: Normal  Ectopy: none  Conduction: normal  ST Segments/ T Waves: Non-specific T wave changes  Q Waves:prolonged QT waves  (QTc 497)  Comparison to prior: KS and QTc are both somewhat increased from previous (200 ms and 497 ms from 184 ms and 452 ms respectively) on 09/2016.   Clinical Impression: No significant changes in morphology              Labs Ordered and Resulted from Time of ED Arrival Up to the Time of Departure from the ED   ISTAT  GASES LACTATE ALBERTO POCT - Abnormal; Notable for the following:     Ph Venous 7.49 (*)     PCO2 Venous 33 (*)     PO2 Venous 23 (*)     All other components within normal limits   CBC WITH PLATELETS DIFFERENTIAL   INR   COMPREHENSIVE METABOLIC PANEL   TROPONIN I   ISTAT CG4 GASES LACTATE ALBERTO NURSING POCT       Assessments & Plan (with Medical Decision Making)       ________________________________________________________________________    IMPRESSION: 81-year-old female, PMH is listed above, presents today for evaluation of abdominal discomfort since 2 PM associated with nausea, nonbloody emesis and some loose stools.  Clinically she appears uncomfortable but nontoxic.  I'm easily able to palpate loops of  bowel but they're so diffuse I think it likely just a thin abdominal wall as opposed to hernia.  No brian peritoneal findings.    DDX includes but is not limited to partial bowel obstruction, infectious enteritis/gastroenteritis, diverticulitis, metabolic derangement, et al.     PLAN: Labs, CT scan, symptomatic management    RESULTS:  - Labs: Leukocytosis with WBC 16.3, normal lactate, troponin 0.038  - Imaging: Called by Radiology with urgent finding of partial SBO, formal report pending    INTERVENTIONS:   - IV fluid, IV fentanyl  - NGT placement ordered but not yet performed.     RE-EVALUATION:  - The patient's symptoms were improved with the doses of fentanyl.    DISCUSSIONS:  - With surgery: They will follow along but as would likely not need immediate surgical management recommend admission to IM.  - With IM: They will admit for further evaluation and management.    DISPOSITION/PLANNING:  - FINAL IMPRESSION: Partial bowel obstruction  - DISPOSITION: Admit to internal medicine  --- Pending: NG tube placement, surgical consult, formal imaging results      ______________________________________________________________________________  - I discussed the care of the patient with IM, surgery, radiology, overnight EM MD should patient have any additional needs before moving to her inpatient service  - I have reviewed the nursing notes.  - I have reviewed the findings, diagnosis, plan with the patient and her . They expressed understanding and agreement with this plan. All questions answered to the best of our ability at this time.           New Prescriptions    No medications on file       Final diagnoses:   None     Aixa BAUER , am serving as a trained medical scribe to document services personally performed by Nupur Almanza MD, based on the provider's statements to me.   Nupur BAUER MD, was physically present and have reviewed and verified the accuracy of this note documented by Aixa SCHWARTZ  Mervat.    1/22/2017   [facility], [place], EMERGENCY DEPARTMENT      Nupur Almanza MD  01/24/17 8262

## 2017-01-23 NOTE — CONSULTS
"[facility] Surgery Consultation    Lucie Stockton MRN# [id number]   Age: 81 year old YOB: 1935     Date of Admission: 1/22/2017    Date of Consult:  01/23/2017    Reason for consult: Small bowel obstruction       Requesting service: ED                   Assessment and Plan:   Assessment:   81 year old woman with PSH of SANGEETHA-BSO, presenting with nausea, vomiting, and abdominal pain of one day's duration. Likely mechanical SBO from adhesive disease. Trial of non-operative management.      Plan:   -Admit to medicine  -NG tube to LIS  -NPO  -Electrolyte optimization  -Surgery will follow    Discussed with staff, Dr. Alvarez            Chief Complaint:   Abdominal pain         History of Present Illness:   81 year old woman who began to have abdominal pain one day ago. This has been associated with intermittent nausea and vomiting. She has been passing stool but not much gas, states that her bowel movements have been looser than normal. She denies fever or chills. She has a past surgical history of SANGEETHA-BSO. PMH significant for prior DVT/PE, for which she takes clopidogrel. She does not take other anticoagulant medications. States that she has a trip to [place] planned for next week.          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     Pulmonary embolism (H)      5/24/10 bilateral     Anemia      Grave's disease      Aortic stenosis      abdominal     Atherosclerosis of aorta (H)      \"extensive disease with near occlusion below level of renal arteries\" on CT     Atherosclerosis of arteries of extremities (H)      Hypertension, essential      Intermittent claudication (H)      Iron deficiency      Osteoarthritis      ankle,foot, knee     Osteoporosis      DVT of lower extremity, bilateral (H)      5/24/10 left distal " "femoral and great saphenous, 7/7/10 left:  extending to cfv, iliac , pop and post tibial, peronial, right:  distal fem and peroneal      Varicose veins      Knee pain      Hypothyroidism      Hyperlipidaemia LDL goal < 70      Insomnia      Back pain      severe multilevel DJD, moderate spinal canal stenosis L4-5, severe L3-4     Degenerative joint disease      Lumbar stenosis with neurogenic claudication              Past Surgical History:   Total abdominal hysterectomy with BSO  Orthopedic procedures         Social History:   Tobacco: denies  EtOH: rare          Family History:     Family History   Problem Relation Age of Onset     Breast Cancer Maternal Aunt 80     C.A.D. Father 54             Allergies:   \"Trazodone\"          Medications:     Current Facility-Administered Medications   Medication     fentaNYL Citrate (PF) (SUBLIMAZE) injection 25 mcg     Current Outpatient Prescriptions   Medication Sig     amLODIPine (NORVASC) 10 MG tablet Take 1 tablet (10 mg) by mouth daily For blood pressure     atorvastatin (LIPITOR) 40 MG tablet Take 1 tablet (40 mg) by mouth daily     gabapentin (NEURONTIN) 100 MG capsule Take 3 capsules at bedtime, also 1 capsule in the morning and mid-day     traMADol (ULTRAM) 50 MG tablet Take 1 tablet (50 mg) by mouth every 6 hours as needed for moderate pain or severe pain Maximum 270 tablets in 3 months     albuterol (VENTOLIN HFA) 108 (90 BASE) MCG/ACT inhaler Inhale 2 puffs into the lungs every 4 hours as needed for shortness of breath / dyspnea or wheezing     potassium chloride SA (K-DUR,KLOR-CON M) 20 MEQ tablet Take 1 tablet (20 mEq) by mouth daily     levothyroxine (SYNTHROID, LEVOTHROID) 50 MCG tablet Take 1 tablet (50 mcg) by mouth daily     clopidogrel (PLAVIX) 75 MG tablet Take 1 tablet (75 mg) by mouth daily     hydrochlorothiazide (HYDRODIURIL) 50 MG tablet Take 1 tablet (50 mg) by mouth daily     ranitidine (ZANTAC) 150 MG tablet Take 1 tablet (150 mg) by mouth At " Bedtime     lisinopril (PRINIVIL,ZESTRIL) 40 MG tablet Take 1 tablet (40 mg) by mouth daily For blood pressure     ferrous sulfate (IRON) 325 (65 FE) MG tablet Take 1 tablet (325 mg) by mouth daily (with breakfast)     [DISCONTINUED] tolterodine (DETROL) 1 MG tablet Take 1-2 tablets by mouth At Bedtime.     Calcium Carbonate-Vitamin D (CALCIUM 600 + D OR) Take 1 tablet by mouth daily.             Review of Systems:   No recent weight loss, fevers, or chills  No jaundice  No headaches or light-headedness, no dizziness  No cough, chest pain, shortness of breath  No pain or burning with urination  No numbness or tingling of the extremities          Physical Exam:   All vitals have been reviewed  Temp:  [97.9  F (36.6  C)] 97.9  F (36.6  C)  Pulse:  [89] 89  Resp:  [18] 18  BP: (163)/(72) 163/72 mmHg  SpO2:  [94 %] 94 %  No intake or output data in the 24 hours ending 01/23/17 0324  Physical Exam:  General: awake, alert, pleasant  HEENT: no neck swelling or tenderness  CV: RRR  Pulm: non-labored breathing on room air  Abd: soft, non-tender, distended, tympanitic, no peritonitis  Neuro: moving all four extremities, speech clear  Extremities: no peripheral edema  Vascular: palpable pulses peripherally  Skin: no rashes or jaundice       Data:   All laboratory data reviewed    Results:  BMP    Recent Labs  Lab 01/22/17 2248      POTASSIUM 3.4   CHLORIDE 97   CO2 26   BUN 18   CR 0.69   *     CBC    Recent Labs  Lab 01/22/17 2248   WBC 16.3*   HGB 13.7   *     LFT    Recent Labs  Lab 01/22/17 2248   AST 21   ALT 26   ALKPHOS 82   BILITOTAL 0.6   ALBUMIN 4.1   INR 0.91       Recent Labs  Lab 01/22/17 2248   *       Imaging: CT scan reviewed. Dilated loops of small bowel down to the pelvis where there is likely adhesive disease from prior surgery. Consistent with mechanical SBO.    Santosh Dawson MD

## 2017-01-23 NOTE — PLAN OF CARE
Problem: Bowel Obstruction (Adult)  Goal: Signs and Symptoms of Listed Potential Problems Will be Absent or Manageable (Bowel Obstruction)  Signs and symptoms of listed potential problems will be absent or manageable by discharge/transition of care (reference Bowel Obstruction (Adult) CPG).  Patient AVSS. Oxygen desats on room air to 88% 2L nasal cannula sats 95-97% Lungs diminished in bases. Denies pain. NGT to LIWS. [place]chran tinged output. UA sent. Patient voided. Hypo bowel sounds. Patient states she feels exhausted, in ER all night, didn't sleep well.      Daughter here. Patient ambulated in hallway. Tolerated good. Daily meds crushed and given down ngt. Gastrograffin contrast 100cc given down NGT, mixed in 50 cc water @ 1430. Clamped for 2 hours. Portable xray ordered for later evening. Patient has had 2 loose brown bm's this afternoon.

## 2017-01-23 NOTE — PROGRESS NOTES
General Surgery Interval note    Patient seen this AM  Minimal pain.  Abdomen soft, minimally tender in lower abdomen    Please obtain gastrograffin/ water soluble contrast study as below:    Please give 100cc of gastrograffin with 50cc water now (1200) through NGT. Clamp NG for 2 hours after giving contrast. Return NG to suction after. Single abdominal Xray to be obtained in 8-24 hours (2000)      Umer Brooks   Surgery PGY2  [phone]

## 2017-01-24 VITALS
BODY MASS INDEX: 21.36 KG/M2 | RESPIRATION RATE: 16 BRPM | HEIGHT: 60 IN | WEIGHT: 108.8 LBS | DIASTOLIC BLOOD PRESSURE: 44 MMHG | SYSTOLIC BLOOD PRESSURE: 134 MMHG | HEART RATE: 89 BPM | OXYGEN SATURATION: 95 % | TEMPERATURE: 97.6 F

## 2017-01-24 PROBLEM — K56.609 SMALL BOWEL OBSTRUCTION (H): Status: ACTIVE | Noted: 2017-01-23

## 2017-01-24 LAB
ANION GAP SERPL CALCULATED.3IONS-SCNC: 8 MMOL/L (ref 3–14)
BUN SERPL-MCNC: 16 MG/DL (ref 7–30)
CALCIUM SERPL-MCNC: 9 MG/DL (ref 8.5–10.1)
CHLORIDE SERPL-SCNC: 104 MMOL/L (ref 94–109)
CO2 SERPL-SCNC: 28 MMOL/L (ref 20–32)
CREAT SERPL-MCNC: 0.56 MG/DL (ref 0.52–1.04)
GFR SERPL CREATININE-BSD FRML MDRD: ABNORMAL ML/MIN/1.7M2
GLUCOSE SERPL-MCNC: 82 MG/DL (ref 70–99)
MAGNESIUM SERPL-MCNC: 2.1 MG/DL (ref 1.6–2.3)
POTASSIUM SERPL-SCNC: 3.1 MMOL/L (ref 3.4–5.3)
SODIUM SERPL-SCNC: 141 MMOL/L (ref 133–144)

## 2017-01-24 PROCEDURE — 80048 BASIC METABOLIC PNL TOTAL CA: CPT | Performed by: SURGERY

## 2017-01-24 PROCEDURE — 25000132 ZZH RX MED GY IP 250 OP 250 PS 637: Mod: GY | Performed by: INTERNAL MEDICINE

## 2017-01-24 PROCEDURE — 36415 COLL VENOUS BLD VENIPUNCTURE: CPT | Performed by: SURGERY

## 2017-01-24 PROCEDURE — A9270 NON-COVERED ITEM OR SERVICE: HCPCS | Mod: GY | Performed by: INTERNAL MEDICINE

## 2017-01-24 PROCEDURE — 83735 ASSAY OF MAGNESIUM: CPT | Performed by: SURGERY

## 2017-01-24 PROCEDURE — 99239 HOSP IP/OBS DSCHRG MGMT >30: CPT | Performed by: INTERNAL MEDICINE

## 2017-01-24 RX ADMIN — Medication 20 MEQ: at 07:54

## 2017-01-24 RX ADMIN — LISINOPRIL 40 MG: 20 TABLET ORAL at 07:55

## 2017-01-24 RX ADMIN — LEVOTHYROXINE SODIUM 50 MCG: 50 TABLET ORAL at 07:55

## 2017-01-24 RX ADMIN — GABAPENTIN 100 MG: 250 SOLUTION ORAL at 08:01

## 2017-01-24 RX ADMIN — CLOPIDOGREL BISULFATE 75 MG: 75 TABLET, FILM COATED ORAL at 07:55

## 2017-01-24 RX ADMIN — AMLODIPINE BESYLATE 10 MG: 10 TABLET ORAL at 07:55

## 2017-01-24 RX ADMIN — ATORVASTATIN CALCIUM 40 MG: 40 TABLET, FILM COATED ORAL at 07:55

## 2017-01-24 RX ADMIN — GABAPENTIN 100 MG: 250 SOLUTION ORAL at 12:40

## 2017-01-24 NOTE — DISCHARGE SUMMARY
[facility] - Internal Medicine Discharge Summary   Date of Service: 1/24/2017    Lucie Stockton MRN# [id number]   YOB: 1935 Age: 81 year old     Date of Admission:  1/22/2017  Date of Discharge:  1/24/2017  Admitting Physician:  Gold Duff MD  Discharge Physician:  Dale  Discharging Service:  Internal Medicine, [place] [address]     Primary Provider: Marii Montgomery         Reason for Admission:   Partial Small Bowel Obstruction          Discharge Diagnosis:   Partial Small Bowel Obstruction - resolved         Procedures & Significant Findings:            Consultations:   General Surgery         Hospital Course by Problem:      Patient is an 81 year old female with PMH significant for ovarian malignancy s/p TAHBSO 2011, bilateral PE 2010, severe atherosclerosis of aorta (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 Given benign exam and reassuring laboratory evaluation (with lactate of 1.4), was started on conservative management with NPO, NG tube placement and IVF.  Initially given 1.5 L NS and then maintenance.  General surgery followed along.  On hospital day 2, IVF were dc'd and pain resolved.  Diet was advanced and she tolerated regular diet.  Gen surg team and primary team assessed and she was dc home on usual diet and cares without any change in meds.      Physical Exam on day of Discharge:  Blood pressure 134/44, pulse 89, temperature 97.6  F (36.4  C), temperature source Oral, resp. rate 16, height 1.524 m (5'), weight 49.351 kg (108 lb 12.8 oz), SpO2 95 %, not currently breastfeeding.  General: well appearing comfortable  Neck: supple NT  Respiratory: CTAB  Heart/CV: RRR without M  Abdomen/GI: soft +BS, NT, ND  Extremities/MSK: No edema  Skin: no rash  Neuro: alert and oriented X 4  Psych: normal mood and affect      Lines/Tubes/Drains:   Peripheral IV 01/22/17 Right Upper forearm (Active)   Site Assessment WDL 1/24/2017  8:03 AM   Line Status Infusing 1/24/2017  8:03 AM   Phlebitis Scale 0-->no symptoms 1/24/2017  8:03 AM   Infiltration Scale 0 1/24/2017  8:03 AM   Number of days:2              Pending Results:   none         Discharge Medications:     Current Discharge Medication List      CONTINUE these medications which have NOT CHANGED    Details   amLODIPine (NORVASC) 10 MG tablet Take 1 tablet (10 mg) by mouth daily For blood pressure  Qty: 90 tablet, Refills: 1    Associated Diagnoses: Essential hypertension      atorvastatin (LIPITOR) 40 MG tablet Take 1 tablet (40 mg) by mouth daily  Qty: 90 tablet, Refills: 3    Associated Diagnoses: Hyperlipidemia, unspecified hyperlipidemia type      gabapentin (NEURONTIN) 100 MG capsule Take 3 capsules at bedtime, also 1 capsule in the morning and mid-day  Qty: 150 capsule, Refills: 1    Associated Diagnoses: Lumbar radiculopathy      traMADol (ULTRAM) 50 MG tablet Take 1 tablet (50 mg) by mouth every 6 hours as needed for moderate pain or severe pain Maximum 270 tablets in 3 months  Qty: 270 tablet, Refills: 1    Associated Diagnoses: Foot pain, right      albuterol (VENTOLIN HFA) 108 (90 BASE) MCG/ACT inhaler Inhale 2 puffs into the lungs every 4 hours as needed for shortness of breath / dyspnea or wheezing  Qty: 18 Inhaler, Refills: 1    Associated Diagnoses: Cough      potassium chloride SA (K-DUR,KLOR-CON M) 20 MEQ tablet Take 1 tablet (20 mEq) by mouth daily  Qty: 90 tablet, Refills: 3    Associated Diagnoses: Hypopotassemia      levothyroxine (SYNTHROID, LEVOTHROID) 50 MCG tablet Take 1 tablet (50 mcg) by mouth daily  Qty: 90 tablet, Refills: 3    Associated Diagnoses: Hypothyroidism, unspecified hypothyroidism type      clopidogrel (PLAVIX) 75 MG tablet Take 1 tablet (75 mg) by mouth daily  Qty: 90 tablet, Refills: 3    Associated Diagnoses: Other chronic pulmonary  embolism (H); Venous thrombosis of extremity      hydrochlorothiazide (HYDRODIURIL) 50 MG tablet Take 1 tablet (50 mg) by mouth daily  Qty: 90 tablet, Refills: 3    Associated Diagnoses: Benign essential hypertension      ranitidine (ZANTAC) 150 MG tablet Take 1 tablet (150 mg) by mouth At Bedtime  Qty: 90 tablet, Refills: 3    Associated Diagnoses: Gastritis      lisinopril (PRINIVIL,ZESTRIL) 40 MG tablet Take 1 tablet (40 mg) by mouth daily For blood pressure  Qty: 90 tablet, Refills: 3    Associated Diagnoses: Essential hypertension      ferrous sulfate (IRON) 325 (65 FE) MG tablet Take 1 tablet (325 mg) by mouth daily (with breakfast)  Qty: 90 tablet, Refills: 3    Associated Diagnoses: Other iron deficiency anemias      Calcium Carbonate-Vitamin D (CALCIUM 600 + D OR) Take 1 tablet by mouth daily.                  Discharge Instructions and Follow-Up:     Discharge Procedure Orders  Reason for your hospital stay   Order Comments: You were admitted with a partial small bowel obstruction.  After managing pain and nausea and correcting dehydration with IV fluids your symptoms improved and you were able to resume a regular diet.     Monitor and record   Order Comments: If your symptoms of nausea, vomiting return or you have abdominal pain or stop passing gas you should return to the emergency room.     Activity   Order Comments: Your activity upon discharge: activity as tolerated   Order Specific Question Answer Comments   Is discharge order? Yes      Adult [facility] Follow-up and recommended labs and tests   Order Comments: Follow up with primary care provider, Marii Montgomery, within 7 days to evaluate after recent hospitalization.  No follow up labs or test are needed.      Appointments [facility] and/or [facility] (with [facility] or [facility] provider or service). Call [phone] if you haven't heard regarding these appointments within 7 days of discharge.     Full Code     Diet   Order Comments: Follow this  diet upon discharge: Orders Placed This Encounter  Advance Diet as Tolerated: Regular Diet Adult   Order Specific Question Answer Comments   Is discharge order? Yes                  Discharge Disposition:   Home         Condition on Discharge:   Discharge condition: Good   Code status on discharge: Full Code        Date of service: 1/24/2017     45 minutes spent in discharge, including >50% in counseling and coordination of care, medication review and plan of care recommended on follow up. Questions were answeredLesraheel Hunter  Internal Medicine Hospitalist & Staff Physician  [facility]

## 2017-01-24 NOTE — PLAN OF CARE
Problem: Goal Outcome Summary  Goal: Goal Outcome Summary  Outcome: Adequate for Discharge Date Met:  01/24/17  Filed Vitals:     01/23/17 2052 01/23/17 2218 01/24/17 0441 01/24/17 0718   BP:   133/47 130/49 134/44   Pulse:           Temp:   96.7  F (35.9  C) 98.7  F (37.1  C) 97.6  F (36.4  C)   TempSrc:   Oral Oral Oral   Resp:   16 16 16   Height:           Weight: 49.351 kg (108 lb 12.8 oz)         SpO2:   94% 98% 95%     AVSS, pt A&O x 4, no complaints of pain. NG clamped around 0800. Pt diet increased to regular, pt tolerating. NG removed this am by MD. Pt Adequate for discharge, instructions given and signed. PIV removed from right arm. Pt discharging to home with spouse.

## 2017-01-24 NOTE — PLAN OF CARE
Problem: Goal Outcome Summary  Goal: Goal Outcome Summary  Outcome: No Change  /49 mmHg  Pulse 89  Temp(Src) 98.7  F (37.1  C) (Oral)  Resp 16  Ht 1.524 m (5')  Wt 49.351 kg (108 lb 12.8 oz)  BMI 21.25 kg/m2  SpO2 98%     Per 12 hours, patient afebrile. VSS. O2 sats high 90s on 2 L/min NC. Patient de-sats to 90-91% on RA. Lung sounds diminished. Encouraged IS and deep breathing and coughing. Reached 750 on IS. MD saw patient, changed orders for NG to gravity. Denies nausea, denies pain. Up to bathroom with SBA, steady gait. Patient had two loose brown BMs. Patient voids, continues to miss hat. Patient hopes to discharge today. Continue POC.

## 2017-01-24 NOTE — PROGRESS NOTES
Surgery Progress note    S:  Patient had 2 BMs after GG challenge. Denies further nausea. Tolerating clears. AXR should contrast passing to the large bowel     O:  Filed Vitals:    01/23/17 2052 01/23/17 2218 01/24/17 0441 01/24/17 0718   BP:  133/47 130/49 134/44   Pulse:       Temp:  96.7  F (35.9  C) 98.7  F (37.1  C) 97.6  F (36.4  C)   TempSrc:  Oral Oral Oral   Resp:  16 16 16   Height:       Weight: 49.351 kg (108 lb 12.8 oz)      SpO2:  94% 98% 95%       A&Ox3, NAD  Breathing non-labored  Soft, NDNT        BMP  Recent Labs  Lab 01/24/17  0632 01/23/17  0638 01/22/17 2248     --  134   POTASSIUM 3.1*  --  3.4   CHLORIDE 104  --  97   CANDIDA 9.0  --  11.0*   CO2 28  --  26   BUN 16  --  18   CR 0.56 0.59 0.69   GLC 82  --  146*     CBC  Recent Labs  Lab 01/23/17  0638 01/22/17 2248   WBC 13.6* 16.3*   RBC 4.00 4.51   HGB 11.9 13.7   HCT 35.9 40.7   MCV 90 90   MCH 29.8 30.4   MCHC 33.1 33.7   RDW 14.7 14.8    499*     INR  Recent Labs  Lab 01/22/17 2248   INR 0.91        Most Recent Imaging Studies Reviewed  AXR: Decreased gaseous distention of the small bowel oral  contrast seen throughout the colon. Findings are most compatible with  resolving/partial small bowel obstruction versus resolving adynamic  ileus.     A/P: Lucie Stockton is a 81 year old woman with PSH of SANGEETHA-BSO. SBO resolved and patient has no further n/v.   ADAT  DC NGT  Stop IVF  Ok for discharge from surgery prospective if tolerating diet with no nausea     Patient seen and discussed with staff, Dr. James Madden MD  PGY-1, General Surgery  [phone]

## 2017-01-25 ENCOUNTER — CARE COORDINATION (OUTPATIENT)
Dept: SURGERY | Facility: CLINIC | Age: 82
End: 2017-01-25

## 2017-01-25 ENCOUNTER — CARE COORDINATION (OUTPATIENT)
Dept: CARDIOLOGY | Facility: CLINIC | Age: 82
End: 2017-01-25

## 2017-01-25 NOTE — PROGRESS NOTES
"[facility]  \"Hello, my name is Christy Fregoso , and I am calling from the [facility].  I want to check in and see how you are doing, after leaving the hospital.  You may also receive a call from your Care Coordinator (care team), but I want to make sure you don t have any urgent needs.  I have a couple questions to review with you:     Post-Discharge Outreach                                                    Lucie Stockton is a 81 year old female     Date of Admission:                    1/22/2017  Date of Discharge:                    1/24/2017  Admitting Physician:                  Gold Duff MD  Discharge Physician:                 Dale  Discharging Service:                  Internal Medicine, [place] [address]     Primary Provider: Marii Montgomery           Reason for Admission:    Partial Small Bowel Obstruction           Discharge Diagnosis:    Partial Small Bowel Obstruction - resolved            Follow-up Appointments      Adult [facility] Follow-up and recommended labs and tests        Follow up with primary care provider, Marii Montgomery, within 7 days to evaluate after recent hospitalization.  No follow up labs or test are needed.        Appointments on [place] and/or [facility] (with [facility] or [facility] provider or service). Call [phone] if you haven't heard regarding these appointments within 7 days of discharge.                       Your next 10 appointments already scheduled      Apr 13, 2017  8:20 AM   (Arrive by 8:05 AM)   Return Visit with Marii Montgomery MD   [facility] ([facility])      [address] [place] [id number]   [phone]               Jun 20, 2017 12:45 PM   (Arrive by 12:30 PM)   Return Vascular Visit with Iona Velez MD   [facility] ([facility] [place])                Care Team:    Patient Care Team       Relationship " Specialty Notifications Start End    Marii Montgomery MD PCP - General Internal Medicine  8/16/11     Phone: [phone] Fax: [phone]          [facility] [address] [address] [facility] [address]    Dalila Carrillo RN Nurse Coordinator Cardiology Admissions 9/24/14     Comment:  ph. [phone], pg. [phone]    Pager: [phone]          -[facility] [[id number] Cardiac Cath Lab [id number]    Marii Montgomery MD MD Internal Medicine  8/3/15     Phone: [phone] Fax: [phone]          [facility] [address] [address] [facility] [address]    Alexandru Fox DO Resident Student in organized health care education/training program  12/1/15     Phone: [phone] Fax: [phone]         [facility] [address] [address]            Transition of Care Review                                                      Candis Mohr, JANNET Signed Candis Mohr RN 1/25/2017 10:03 AM       Progress Notes        Lucie Stockton is a patient of our service who was discharged with a resolved partial small bowel obstruction 1/24.  She is in contact with the clinic at this time for a status update. The patient reports that she is up ad bartolo, tolerating  Po and denies any nausea or vomiting. She denies any fevers or abdominal pain at this time. Lucie Stockton is passing gas and reports having normal bowel movements with normal urination.     All of her questions were answered. She is scheduled for follow up with her PCP Marii Montgomery 1/28 at 8 AM.  She will call this office if she has any further questions and/or concerns.      Pathology reviewed with patient:  N/A                     Plan                                                      Thanks for your time.  Your Care Coordinator will follow-up within the next couple days.  In the meantime if you have questions, concerns or problems call your care team.        Christy Fregoso

## 2017-01-25 NOTE — PROGRESS NOTES
Lucie Stockton is a patient of our service who was discharged with a resolved partial small bowel obstruction 1/24.  She is in contact with the clinic at this time for a status update. The patient reports that she is up ad bartolo, tolerating  Po and denies any nausea or vomiting. She denies any fevers or abdominal pain at this time. Lucie Stockton is passing gas and reports having normal bowel movements with normal urination.     All of her questions were answered. She is scheduled for follow up with her PCP Marii Montgomery 1/28 at 8 AM.  She will call this office if she has any further questions and/or concerns.      Pathology reviewed with patient:  N/A

## 2017-01-28 ENCOUNTER — OFFICE VISIT (OUTPATIENT)
Dept: INTERNAL MEDICINE | Facility: CLINIC | Age: 82
End: 2017-01-28

## 2017-01-28 VITALS
DIASTOLIC BLOOD PRESSURE: 75 MMHG | HEART RATE: 78 BPM | SYSTOLIC BLOOD PRESSURE: 150 MMHG | WEIGHT: 108.1 LBS | BODY MASS INDEX: 21.11 KG/M2

## 2017-01-28 DIAGNOSIS — H57.8A9 SENSATION OF FOREIGN BODY IN EYE: ICD-10-CM

## 2017-01-28 DIAGNOSIS — E61.1 IRON DEFICIENCY: ICD-10-CM

## 2017-01-28 DIAGNOSIS — K59.00 CONSTIPATION, UNSPECIFIED CONSTIPATION TYPE: ICD-10-CM

## 2017-01-28 DIAGNOSIS — G47.00 INSOMNIA, UNSPECIFIED TYPE: ICD-10-CM

## 2017-01-28 DIAGNOSIS — M54.16 LUMBAR RADICULOPATHY: ICD-10-CM

## 2017-01-28 DIAGNOSIS — K56.609 SMALL BOWEL OBSTRUCTION (H): Primary | ICD-10-CM

## 2017-01-28 RX ORDER — NORTRIPTYLINE HCL 10 MG
20 CAPSULE ORAL AT BEDTIME
Qty: 180 CAPSULE | Refills: 1 | Status: SHIPPED | OUTPATIENT
Start: 2017-01-28 | End: 2017-01-30

## 2017-01-28 RX ORDER — FERROUS SULFATE 325(65) MG
1 TABLET ORAL 2 TIMES DAILY
Qty: 90 TABLET | Refills: 1 | Status: ON HOLD | COMMUNITY
Start: 2017-01-28 | End: 2018-01-30

## 2017-01-28 RX ORDER — GABAPENTIN 100 MG/1
CAPSULE ORAL
Qty: 540 CAPSULE | Refills: 2 | Status: SHIPPED | OUTPATIENT
Start: 2017-01-28 | End: 2017-07-05

## 2017-01-28 RX ORDER — POLYETHYLENE GLYCOL 3350 17 G/17G
POWDER, FOR SOLUTION ORAL
Qty: 119 G | Refills: 5 | Status: SHIPPED | OUTPATIENT
Start: 2017-01-28 | End: 2018-08-17

## 2017-01-28 ASSESSMENT — PAIN SCALES - GENERAL: PAINLEVEL: NO PAIN (0)

## 2017-01-28 NOTE — PATIENT INSTRUCTIONS
Primary Care Center Medication Refill Request Information:  * Please contact your pharmacy regarding ANY request for medication refills.  ** [facility] Prescription Fax = [phone]  * Please allow 3 business days for routine medication refills.  * Please allow 5 business days for controlled substance medication refills.     Primary Care Center Test Result notification information:  *You will be notified with in 7-10 days of your appointment day regarding the results of your test.  If you are on MyChart you will be notified as soon as the provider has reviewed the results and signed off on them.      Drink plenty of fluids, at least 8 cups per day  I sent two prescriptions to your pharmacy  Tear Natural, preservative free either in a bottle or individual packets  Go to the eye doctor if you have a sensation that something is in your eye for more than one day, same for a more acute vision loss  Also go to the doctor promptly if you are having abdominal pain  Miralax 1/2 scoop in any liquid you are drinking, once a day (stop if your stools get too loose)  Eat bland foods, small amounts throughout the day  Have fun on your trip to the [facility]!  Dr. Montgomery

## 2017-01-28 NOTE — MR AVS SNAPSHOT
After Visit Summary   1/28/2017    Lucie Stockton    MRN: [id number]           Patient Information     Date Of Birth          1935        Visit Information        Provider Department      1/28/2017 8:00 AM Marii Montgomery MD [facility]        Today's Diagnoses     Lumbar radiculopathy    -  1     Insomnia, unspecified type         Constipation, unspecified constipation type         Iron deficiency           Care Instructions    Primary Care Center Medication Refill Request Information:  * Please contact your pharmacy regarding ANY request for medication refills.  ** [facility] Prescription Fax = [phone]  * Please allow 3 business days for routine medication refills.  * Please allow 5 business days for controlled substance medication refills.     Primary Care Center Test Result notification information:  *You will be notified with in 7-10 days of your appointment day regarding the results of your test.  If you are on MyChart you will be notified as soon as the provider has reviewed the results and signed off on them.      Drink plenty of fluids, at least 8 cups per day  I sent two prescriptions to your pharmacy  Tear Natural, preservative free either in a bottle or individual packets  Go to the eye doctor if you have a sensation that something is in your eye for more than one day, same for a more acute vision loss  Also go to the doctor promptly if you are having abdominal pain  Miralax 1/2 scoop in any liquid you are drinking, once a day (stop if your stools get too loose)  Eat bland foods, small amounts throughout the day  Have fun!  Dr. Montgomery          Follow-ups after your visit        Follow-up notes from your care team     Return for keep upcoming appt with me in April.      Your next 10 appointments already scheduled     Apr 13, 2017  8:20 AM   (Arrive by 8:05 AM)   Return Visit with Marii Montgomery MD   [facility] ([facility])     [address]  [address]  [facility] [id number] [phone]            Jun 20, 2017 12:45 PM   (Arrive by 12:30 PM)   Return Vascular Visit with Iona Velez MD   [facility] ([facility] [place])    [address] [facility] [id number] [phone]              Who to contact     Please call your clinic at [phone] to:    Ask questions about your health    Make or cancel appointments    Discuss your medicines    Learn about your test results    Speak to your doctor   If you have compliments or concerns about an experience at your clinic, or if you wish to file a complaint, please contact [facility] Patient Relations at [phone] or email us at [email or web address]         Additional Information About Your Visit        MyChart Information     [employer] gives you secure access to your electronic health record. If you see a primary care provider, you can also send messages to your care team and make appointments. If you have questions, please call your primary care clinic.  If you do not have a primary care provider, please call [phone] and they will assist you.      [employer] is an electronic gateway that provides easy, online access to your medical records. With [employer], you can request a clinic appointment, read your test results, renew a prescription or communicate with your care team.     To access your existing account, please contact your [facility] Clinic or call [phone] for assistance.        Care EveryWhere ID     This is your Care EveryWhere ID. This could be used by other organizations to access your [place] medical records  [id number]        Your Vitals Were     Pulse Breastfeeding?                78 No           Blood Pressure from Last 3 Encounters:   01/28/17 150/75   01/24/17 134/44   01/02/17 170/69    Weight from Last 3 Encounters:   01/28/17  49.034 kg (108 lb 1.6 oz)   01/23/17 49.351 kg (108 lb 12.8 oz)   12/21/16 50.395 kg (111 lb 1.6 oz)              Today, you had the following     No orders found for display         Today's Medication Changes          These changes are accurate as of: 1/28/17  8:54 AM.  If you have any questions, ask your nurse or doctor.               Start taking these medicines.        Dose/Directions    polyethylene glycol powder   Commonly known as:  MIRALAX   Used for:  Constipation, unspecified constipation type   Started by:  Marii Montgomery MD        Take one-half to one scoop once a day for maintaining soft stools   Quantity:  119 g   Refills:  5         These medicines have changed or have updated prescriptions.        Dose/Directions    gabapentin 100 MG capsule   Commonly known as:  NEURONTIN   This may have changed:  additional instructions   Used for:  Lumbar radiculopathy   Changed by:  Marii Montgomery MD        Take 4 capsules at bedtime, also 1 capsule in the morning and mid-day for pain prevention   Quantity:  540 capsule   Refills:  2       nortriptyline 10 MG capsule   Commonly known as:  PAMELOR   This may have changed:    - how much to take  - additional instructions   Used for:  Insomnia, unspecified type   Changed by:  Marii Montgomery MD        Dose:  20 mg   Take 2 capsules (20 mg) by mouth At Bedtime Take one capsule at bedtime for sleep and pain prevention   Quantity:  180 capsule   Refills:  1            Where to get your medicines      These medications were sent to [id number] - [place], [place] - [address] [place] [address] [address]VE [address] AT [place] [address]  [facility], [facility] [place] [address]     Phone:  [phone]    - gabapentin 100 MG capsule  - nortriptyline 10 MG capsule  - polyethylene glycol powder             Primary Care Provider Office Phone # Fax #    Marii Montgomery [phone]        [facility] [address] [place] [address]  [facility] [address]        Thank  you!     Thank you for choosing [facility]  for your care. Our goal is always to provide you with excellent care. Hearing back from our patients is one way we can continue to improve our services. Please take a few minutes to complete the written survey that you may receive in the mail after your visit with us. Thank you!             Your Updated Medication List - Protect others around you: Learn how to safely use, store and throw away your medicines at www.disposemymeds.org.          This list is accurate as of: 1/28/17  8:54 AM.  Always use your most recent med list.                   Brand Name Dispense Instructions for use    albuterol 108 (90 BASE) MCG/ACT Inhaler    VENTOLIN HFA    18 Inhaler    Inhale 2 puffs into the lungs every 4 hours as needed for shortness of breath / dyspnea or wheezing       amLODIPine 10 MG tablet    NORVASC    90 tablet    Take 1 tablet (10 mg) by mouth daily For blood pressure       atorvastatin 40 MG tablet    LIPITOR    90 tablet    Take 1 tablet (40 mg) by mouth daily       CALCIUM 600 + D PO      Take 1 tablet by mouth daily.       clopidogrel 75 MG tablet    PLAVIX    90 tablet    Take 1 tablet (75 mg) by mouth daily       * ferrous sulfate 325 (65 FE) MG tablet    IRON    90 tablet    Take 1 tablet (325 mg) by mouth daily (with breakfast)       * ferrous sulfate 325 (65 FE) MG tablet    IRON    90 tablet    Take 1 tablet (325 mg) by mouth 2 times daily To maintain iron levels       gabapentin 100 MG capsule    NEURONTIN    540 capsule    Take 4 capsules at bedtime, also 1 capsule in the morning and mid-day for pain prevention       hydrochlorothiazide 50 MG tablet    HYDRODIURIL    90 tablet    Take 1 tablet (50 mg) by mouth daily       levothyroxine 50 MCG tablet    SYNTHROID/LEVOTHROID    90 tablet    Take 1 tablet (50 mcg) by mouth daily       lisinopril 40 MG tablet    PRINIVIL/ZESTRIL    90 tablet    Take 1 tablet (40 mg) by mouth daily For blood  pressure       nortriptyline 10 MG capsule    PAMELOR    180 capsule    Take 2 capsules (20 mg) by mouth At Bedtime Take one capsule at bedtime for sleep and pain prevention       polyethylene glycol powder    MIRALAX    119 g    Take one-half to one scoop once a day for maintaining soft stools       potassium chloride SA 20 MEQ CR tablet    K-DUR/KLOR-CON M    90 tablet    Take 1 tablet (20 mEq) by mouth daily       ranitidine 150 MG tablet    ZANTAC    90 tablet    Take 1 tablet (150 mg) by mouth At Bedtime       traMADol 50 MG tablet    ULTRAM    270 tablet    Take 1 tablet (50 mg) by mouth every 6 hours as needed for moderate pain or severe pain Maximum 270 tablets in 3 months       * Notice:  This list has 2 medication(s) that are the same as other medications prescribed for you. Read the directions carefully, and ask your doctor or other care provider to review them with you.

## 2017-01-28 NOTE — PROGRESS NOTES
CC:  F/u hospitalization for small bowel obstruction    HPI:  Lucie was admitted from 1/22-1/24/17 at [facility] for a spontaneous SBO.  No clear trigger.  Managed conservatively with IVF and NG feedings.  Since that time her pain has resolved and her appetite has slowly improved.  Initially post discharge she was only able to tolerate cereal.  Last night she was able to eat their traditional Friday night shrimp and dogs.  For the first time she was able to get up promptly from bed and has near normal energy today.  She has been drinking 4 cups of fluids on average, preferring tea.  No nausea, vomiting, fevers, chills.  BM's are soft and darker brown but not black, maroon nor bloody.  We talked about the potential etiologies for her SBO including idiopathic, but also adhesions (h/o abdominal surgeries in past), mesenteric ischemia, tumors, constipation/feces.  Last colonoscopy and EGD 2010  We reviewed her medications in detail which took about 30 minutes of her 40 min visit today.  She and her  are travelling to the [place] in two days for a 10 day trip.    For one day she had a sensation of something in her left eye associated with decreased vision.  This spontaneously resolved.  No eye pain or redness, no photophobia.  Is anticipating sheduling a cataract surgery soon.  We also talked about her other chronic arthritis related pain.  Well controlled with Tramadol.  Sometimes she alternates the tramadol with acetaminophen, but her pain responds better to the Tramadol.  She has actually been taking her nortriptyline chronically and so we added this back to the list.  I cautioned her about combining medications and reminded her to limit alcohol.  She will have maximum 1 glass of wine on occasion.    Patient Active Problem List   Diagnosis     Benign ovarian tumor     Hypothyroidism     Peripheral vascular disease (H)     Knee pain     Osteoarthritis     Cervicalgia     Hyperlipidemia with target LDL less than  70     Pulmonary embolism (H)     Anemia     Aortic stenosis     Atherosclerosis of aorta (H)     Atherosclerosis of arteries of extremities (H)     Intermittent claudication (H)     Iron deficiency     Osteoporosis     DVT of lower extremity, bilateral (H)     Varicose veins     Gluteal pain     Insomnia     Back pain     Vitamin D deficiency     Tobacco use disorder     Personal history of other drug therapy     Right knee pain     Other chronic pulmonary embolism (H)     Venous thrombosis of extremity     Benign essential hypertension     Gastritis     Cataract     Acute left-sided low back pain with left-sided sciatica     Lumbar stenosis with neurogenic claudication     SBO (small bowel obstruction) (H)     Small bowel obstruction (H)     Past Surgical History   Procedure Laterality Date     Hysterectomy total abdominal, bilateral salpingo-oophorectomy, node dissection, combined  2/17/11     SANGEETHA, EMILIA, LN bx, omentectomy, resection of evrian malignancy Dr. JONO Goncalves - Returned BENIGN     Bunionectomy       Colonoscopy       11/10/10 angioectasia     Upper gi endoscopy       11/10/10 erythematous gastropathy, angioectasia     C stomach surgery procedure unlisted       Please see chart     Current Outpatient Prescriptions   Medication Sig Dispense Refill     gabapentin (NEURONTIN) 100 MG capsule Take 4 capsules at bedtime, also 1 capsule in the morning and mid-day for pain prevention 540 capsule 2     nortriptyline (PAMELOR) 10 MG capsule Take 2 capsules (20 mg) by mouth At Bedtime Take one capsule at bedtime for sleep and pain prevention 180 capsule 1     polyethylene glycol (MIRALAX) powder Take one-half to one scoop once a day for maintaining soft stools 119 g 5     ferrous sulfate (IRON) 325 (65 FE) MG tablet Take 1 tablet (325 mg) by mouth 2 times daily To maintain iron levels 90 tablet 1     amLODIPine (NORVASC) 10 MG tablet Take 1 tablet (10 mg) by mouth daily For blood pressure 90 tablet 1     atorvastatin  (LIPITOR) 40 MG tablet Take 1 tablet (40 mg) by mouth daily 90 tablet 3     traMADol (ULTRAM) 50 MG tablet Take 1 tablet (50 mg) by mouth every 6 hours as needed for moderate pain or severe pain Maximum 270 tablets in 3 months 270 tablet 1     albuterol (VENTOLIN HFA) 108 (90 BASE) MCG/ACT inhaler Inhale 2 puffs into the lungs every 4 hours as needed for shortness of breath / dyspnea or wheezing 18 Inhaler 1     potassium chloride SA (K-DUR,KLOR-CON M) 20 MEQ tablet Take 1 tablet (20 mEq) by mouth daily 90 tablet 3     levothyroxine (SYNTHROID, LEVOTHROID) 50 MCG tablet Take 1 tablet (50 mcg) by mouth daily 90 tablet 3     clopidogrel (PLAVIX) 75 MG tablet Take 1 tablet (75 mg) by mouth daily 90 tablet 3     hydrochlorothiazide (HYDRODIURIL) 50 MG tablet Take 1 tablet (50 mg) by mouth daily 90 tablet 3     ranitidine (ZANTAC) 150 MG tablet Take 1 tablet (150 mg) by mouth At Bedtime 90 tablet 3     lisinopril (PRINIVIL,ZESTRIL) 40 MG tablet Take 1 tablet (40 mg) by mouth daily For blood pressure 90 tablet 3     ferrous sulfate (IRON) 325 (65 FE) MG tablet Take 1 tablet (325 mg) by mouth daily (with breakfast) 90 tablet 3     [DISCONTINUED] nortriptyline (PAMELOR) 10 MG capsule Take 1-2 capsules (10-20 mg) by mouth At Bedtime Take one capsule at bedtime 180 capsule 3     Calcium Carbonate-Vitamin D (CALCIUM 600 + D OR) Take 1 tablet by mouth daily.       [DISCONTINUED] gabapentin (NEURONTIN) 100 MG capsule Take 3 capsules at bedtime, also 1 capsule in the morning and mid-day 150 capsule 1     [DISCONTINUED] tolterodine (DETROL) 1 MG tablet Take 1-2 tablets by mouth At Bedtime. 180 tablet 3     Allergies   Allergen Reactions     Trazodone Fatigue     Felt too groggy     /75 mmHg  Pulse 78  Wt 49.034 kg (108 lb 1.6 oz)  Breastfeeding? No  BP Readings from Last 6 Encounters:   01/28/17 150/75   01/24/17 134/44   01/02/17 170/69   12/21/16 129/65   12/20/16 144/69   12/19/16 178/63     Wt Readings from Last 5  Encounters:   01/28/17 49.034 kg (108 lb 1.6 oz)   01/23/17 49.351 kg (108 lb 12.8 oz)   12/21/16 50.395 kg (111 lb 1.6 oz)   12/19/16 50.077 kg (110 lb 6.4 oz)   12/01/16 48.988 kg (108 lb)     Physical Examination:  General:  Pleasant, alert, no acute distress  Has kyphosis  Eyes:  No injection, lid swelling  Lungs:  Clear to auscultation throughout, no wheezes, rhonchi or rales.  C/V:  Regular rate and rhythm, no murmurs, rubs or gallops.  No JVD, no carotid bruits.  No peripheral edema.   Abdomen:  Not distended.  Bowel sounds active.  No tenderness, no hepatosplenomegaly or masses.  No CVA tenderness or masses.  Neuro:  Alert and oriented, face symmetric. No tremor.  Walks with caution, but no gait abnormalities otherwise.    Skin:   No rashes or jaundice.  Normal moisture, good skin turgor.  Psych:  Broad range affect.  Not psychomotor slowed.  No signs of anxiety or agitation.    Lucie was seen today for hospital f/u.    Diagnoses and all orders for this visit:    Small bowel obstruction (H), resolved.  We talked about the potential etiologies for her SBO including idiopathic, but also adhesions (h/o abdominal surgeries in past), mesenteric ischemia, tumors, constipation/feces.      Lumbar radiculopathy  -     Refill gabapentin (NEURONTIN) 100 MG capsule; Take 4 capsules at bedtime, also 1 capsule in the morning and mid-day for pain prevention    Insomnia, unspecified type  -    refill nortriptyline (PAMELOR) 10 MG capsule; Take 2 capsules (20 mg) by mouth At Bedtime Take one capsule at bedtime for sleep and pain prevention    Constipation, unspecified constipation type  -     Add polyethylene glycol (MIRALAX) powder; Take one-half to one scoop once a day for maintaining soft stools    Iron deficiency  Added her OTC iron tabs to her medication list    Foreign body sensation left eye, resolved.    The following written instructions were given to the patient:  Drink plenty of fluids, at least 8 cups per day  I  sent two prescriptions to your pharmacy  Tears Natural, preservative free either in a bottle or individual packets  Go to the eye doctor if you have a sensation that something is in your eye for more than one day, same for a more acute vision loss  Also go to the doctor promptly if you are having abdominal pain  Miralax 1/2 scoop in any liquid you are drinking, once a day (stop if your stools get too loose)  Eat bland foods, small amounts throughout the day  Have fun on your trip to the [facility]!  Dr. Montgomery    Keep f/u appt with me in April    Total time spent 40 minutes.  More than 50% of the time spent with Ms. Stockton on counseling / coordinating her care      Marii Montgomery M.D.  Internal Medicine  [facility] Center   pager [phone]

## 2017-01-28 NOTE — NURSING NOTE
Chief Complaint   Patient presents with     Hospital F/U     Patient here for hosptial follow up for 1/22 through 1/24.      Charlotte Manriquez CMA at 7:57 AM on 1/28/2017.

## 2017-01-30 DIAGNOSIS — G47.00 INSOMNIA, UNSPECIFIED TYPE: Primary | ICD-10-CM

## 2017-01-30 RX ORDER — NORTRIPTYLINE HCL 10 MG
20 CAPSULE ORAL AT BEDTIME
Qty: 180 CAPSULE | Refills: 1 | Status: SHIPPED | OUTPATIENT
Start: 2017-01-30 | End: 2017-07-05

## 2017-01-30 NOTE — TELEPHONE ENCOUNTER
Rx corrected and resent to pharmacy. SB            Previous Messages       ----- Message -----      From: Kristal Jurado RN      Sent: 1/30/2017   2:42 PM        To: Marii Montgomery MD   Subject: FW: Nortriptyline clarification                   Requesting clarification on direction for Nortripyline?   Kristal Haro   ----- Message -----      From: Margarita Guerrero RN      Sent: 1/30/2017   1:33 PM        To: Kristal Jurado RN   Subject: Nortriptyline clarification                       Anna from [facility] pharmacy called to get clarification on Rx Nortriptyline direction.\   Anna can be reached at the above #.   Margarita Guerrero RN

## 2017-02-14 DIAGNOSIS — I10 ESSENTIAL HYPERTENSION: ICD-10-CM

## 2017-02-14 NOTE — TELEPHONE ENCOUNTER
lisinopril (PRINIVIL,ZESTRIL) 40 MG tablet      Last Written Prescription Date:  12/19/2015  Last Fill Quantity: 90,   # refills: 3  Last Office Visit : 1/28/2017  Future Office visit:  4/13/2017    Potassium   Date Value Ref Range Status   01/24/2017 3.1 (L) 3.4 - 5.3 mmol/L Final   ]  Creatinine   Date Value Ref Range Status   01/24/2017 0.56 0.52 - 1.04 mg/dL Final   ]  BP Readings from Last 1 Encounters:   01/28/17 150/75       Routing refill request to provider for review/approval because:  BP above standing order parameters.  Also abnormal potassium result done 1/24/2017 = 3.1 low (3.4-5.3)

## 2017-02-16 RX ORDER — LISINOPRIL 40 MG/1
40 TABLET ORAL DAILY
Qty: 90 TABLET | Refills: 3 | Status: SHIPPED | OUTPATIENT
Start: 2017-02-16 | End: 2018-06-02

## 2017-02-16 NOTE — TELEPHONE ENCOUNTER
Okay for refill, however, please have the patient come in for a BMP (lab only) within the next few weeks.  I believe she is out of town at the moment, please leave a message in case she does not answer.  Thank you.  SB    Pt called and plan of care relayed over the phone. Clinic numbers given for question or concerns.  Kristal Jurado RN 11:35 AM on 2/20/2017.

## 2017-03-10 ENCOUNTER — OFFICE VISIT (OUTPATIENT)
Dept: INTERNAL MEDICINE | Facility: CLINIC | Age: 82
End: 2017-03-10

## 2017-03-10 ENCOUNTER — TELEPHONE (OUTPATIENT)
Dept: INTERNAL MEDICINE | Facility: CLINIC | Age: 82
End: 2017-03-10

## 2017-03-10 VITALS
HEART RATE: 66 BPM | RESPIRATION RATE: 16 BRPM | WEIGHT: 114 LBS | DIASTOLIC BLOOD PRESSURE: 65 MMHG | BODY MASS INDEX: 22.26 KG/M2 | SYSTOLIC BLOOD PRESSURE: 150 MMHG

## 2017-03-10 DIAGNOSIS — M25.561 RIGHT KNEE PAIN, UNSPECIFIED CHRONICITY: Primary | ICD-10-CM

## 2017-03-10 DIAGNOSIS — I10 ESSENTIAL HYPERTENSION: ICD-10-CM

## 2017-03-10 DIAGNOSIS — K29.70 GASTRITIS WITHOUT BLEEDING, UNSPECIFIED CHRONICITY, UNSPECIFIED GASTRITIS TYPE: ICD-10-CM

## 2017-03-10 DIAGNOSIS — I10 BENIGN ESSENTIAL HYPERTENSION: ICD-10-CM

## 2017-03-10 DIAGNOSIS — I27.82 OTHER CHRONIC PULMONARY EMBOLISM: ICD-10-CM

## 2017-03-10 DIAGNOSIS — E03.9 HYPOTHYROIDISM, UNSPECIFIED TYPE: ICD-10-CM

## 2017-03-10 DIAGNOSIS — I82.90 VENOUS THROMBOSIS OF EXTREMITY: ICD-10-CM

## 2017-03-10 LAB
ANION GAP SERPL CALCULATED.3IONS-SCNC: 9 MMOL/L (ref 3–14)
BUN SERPL-MCNC: 18 MG/DL (ref 7–30)
CALCIUM SERPL-MCNC: 9.6 MG/DL (ref 8.5–10.1)
CHLORIDE SERPL-SCNC: 102 MMOL/L (ref 94–109)
CO2 SERPL-SCNC: 28 MMOL/L (ref 20–32)
CREAT SERPL-MCNC: 0.58 MG/DL (ref 0.52–1.04)
GFR SERPL CREATININE-BSD FRML MDRD: NORMAL ML/MIN/1.7M2
GLUCOSE SERPL-MCNC: 86 MG/DL (ref 70–99)
POTASSIUM SERPL-SCNC: 4.5 MMOL/L (ref 3.4–5.3)
SODIUM SERPL-SCNC: 139 MMOL/L (ref 133–144)

## 2017-03-10 RX ORDER — LEVOTHYROXINE SODIUM 50 UG/1
50 TABLET ORAL DAILY
Qty: 90 TABLET | Refills: 3 | Status: SHIPPED | OUTPATIENT
Start: 2017-03-10 | End: 2018-06-02

## 2017-03-10 RX ORDER — HYDROCHLOROTHIAZIDE 50 MG/1
50 TABLET ORAL DAILY
Qty: 90 TABLET | Refills: 3 | Status: SHIPPED | OUTPATIENT
Start: 2017-03-10 | End: 2018-06-02

## 2017-03-10 RX ORDER — TRIAMCINOLONE ACETONIDE 40 MG/ML
40 INJECTION, SUSPENSION INTRA-ARTICULAR; INTRAMUSCULAR ONCE
Qty: 1 ML | Refills: 0 | Status: SHIPPED | OUTPATIENT
Start: 2017-03-10 | End: 2017-03-10

## 2017-03-10 RX ORDER — CLOPIDOGREL BISULFATE 75 MG/1
75 TABLET ORAL DAILY
Qty: 90 TABLET | Refills: 3 | Status: SHIPPED | OUTPATIENT
Start: 2017-03-10 | End: 2018-06-02

## 2017-03-10 ASSESSMENT — PAIN SCALES - GENERAL
PAINLEVEL: MODERATE PAIN (5)
PAINLEVEL: SEVERE PAIN (7)

## 2017-03-10 NOTE — PATIENT INSTRUCTIONS
Primary Care Center Medication Refill Request Information:  * Please contact your pharmacy regarding ANY request for medication refills.  ** [facility] Prescription Fax = [phone]  * Please allow 3 business days for routine medication refills.  * Please allow 5 business days for controlled substance medication refills.     Primary [facility] Center Test Result notification information:  *You will be notified with in 7-10 days of your appointment day regarding the results of your test.  If you are on MyChart you will be notified as soon as the provider has reviewed the results and signed off on them.

## 2017-03-10 NOTE — TELEPHONE ENCOUNTER
----- Message from Marii Montgomery MD sent at 3/10/2017  1:20 PM CST -----  Regarding: elevated BP  Kristal, please call Lucie.  Her BP was a bit elevated today which might reflect the fact that she was having significant knee pain.  Have her check some home blood pressures (e.g. Three times a week) and call me in a month with the results.  Thanks.  SB

## 2017-03-10 NOTE — MR AVS SNAPSHOT
After Visit Summary   3/10/2017    Lucie Stockton    MRN: [id number]           Patient Information     Date Of Birth          1935        Visit Information        Provider Department      3/10/2017 8:20 AM Marii Montgomery MD [facility]        Today's Diagnoses     Right knee pain, unspecified chronicity    -  1    Other chronic pulmonary embolism (H)        Venous thrombosis of extremity        Benign essential hypertension        Gastritis without bleeding, unspecified chronicity, unspecified gastritis type        Hypothyroidism, unspecified type        Essential hypertension          Care Instructions    [facility] Center Medication Refill Request Information:  * Please contact your pharmacy regarding ANY request for medication refills.  ** [facility] Prescription Fax = [phone]  * Please allow 3 business days for routine medication refills.  * Please allow 5 business days for controlled substance medication refills.     [facility] Center Test Result notification information:  *You will be notified with in 7-10 days of your appointment day regarding the results of your test.  If you are on MyChart you will be notified as soon as the provider has reviewed the results and signed off on them.          Follow-ups after your visit        Follow-up notes from your care team     Return if symptoms worsen or fail to improve, for preop cataract.      Your next 10 appointments already scheduled     Mar 10, 2017  9:15 AM CST   LAB with  LAB    [facility] ([facility])    [address] [place] [id number] [phone]           Patient must bring picture ID.  Patient should be prepared to give a urine specimen  Please do not eat 10-12 hours before your appointment if you are coming in fasting for labs on lipids, cholesterol, or glucose (sugar).  Pregnant women should follow their Care Team instructions. Water with medications is  okay. Do not drink coffee or other fluids.   If you have concerns about taking  your medications, please ask at office or if scheduling via [employer], send a message by clicking on Secure Messaging, Message Your Care Team.            Mar 30, 2017  7:30 AM CDT   LAB with  LAB   [facility] ([facility])    [address] [place] [id number] [phone]           Patient must bring picture ID.  Patient should be prepared to give a urine specimen  Please do not eat 10-12 hours before your appointment if you are coming in fasting for labs on lipids, cholesterol, or glucose (sugar).  Pregnant women should follow their Care Team instructions. Water with medications is okay. Do not drink coffee or other fluids.   If you have concerns about taking  your medications, please ask at office or if scheduling via [employer], send a message by clicking on Secure Messaging, Message Your Care Team.            Mar 30, 2017  8:00 AM CDT   (Arrive by 7:45 AM)   PRE-OP with Marii Montgomery MD   [facility] ([facility])    [address] [place] [id number] [phone]            Apr 13, 2017  8:20 AM CDT   (Arrive by 8:05 AM)   Return Visit with Marii Montgomery MD   [facility] ([facility])    [address] [place] [id number] [phone]            Jun 20, 2017 12:45 PM CDT   (Arrive by 12:30 PM)   Return Vascular Visit with Iona Velez MD   [facility] ([facility])    [address] [place] [id number] [phone]              Who to contact     Please call your clinic at [phone] to:    Ask questions about your health    Make or cancel appointments    Discuss your medicines    Learn about your test results    Speak to your doctor   If you have compliments or concerns  about an experience at your clinic, or if you wish to file a complaint, please contact [facility] Patient Relations at [phone] or email us at [email or web address]         Additional Information About Your Visit        [employer] Information     [employer] gives you secure access to your electronic health record. If you see a primary care provider, you can also send messages to your care team and make appointments. If you have questions, please call your primary care clinic.  If you do not have a primary care provider, please call [phone] and they will assist you.      [employer] is an electronic gateway that provides easy, online access to your medical records. With [employer], you can request a clinic appointment, read your test results, renew a prescription or communicate with your care team.     To access your existing account, please contact your [facility] or call [phone] for assistance.        Care EveryWhere ID     This is your Care EveryWhere ID. This could be used by other organizations to access your [facility] medical records  [id number]        Your Vitals Were     Pulse Respirations Breastfeeding? BMI (Body Mass Index)          66 16 No 22.26 kg/m2         Blood Pressure from Last 3 Encounters:   03/10/17 150/65   01/28/17 150/75   01/24/17 134/44    Weight from Last 3 Encounters:   03/10/17 51.7 kg (114 lb)   01/28/17 49 kg (108 lb 1.6 oz)   01/23/17 49.4 kg (108 lb 12.8 oz)              We Performed the Following     Basic metabolic panel          Today's Medication Changes          These changes are accurate as of: 3/10/17  9:13 AM.  If you have any questions, ask your nurse or doctor.               Start taking these medicines.        Dose/Directions    lidocaine 1 % Soln   Used for:  Right knee pain, unspecified chronicity   Started by:  Marii Montgomery MD        Dose:  2 mL   2 mLs by INTRA-ARTICULAR route once for 1 dose    Quantity:  2 mL   Refills:  0       triamcinolone acetonide 40 MG/ML injection   Commonly known as:  KENALOG-40   Used for:  Right knee pain, unspecified chronicity   Started by:  Marii Montgomery MD        Dose:  40 mg   1 mL (40 mg) by INTRA-ARTICULAR route once for 1 dose   Quantity:  1 mL   Refills:  0            Where to get your medicines      These medications were sent to [id number] - [place], [place] - [facility] [facility] [address], [facility] [address]     Phone:  [phone]     clopidogrel 75 MG tablet    hydrochlorothiazide 50 MG tablet    levothyroxine 50 MCG tablet    lidocaine 1 % Soln    ranitidine 150 MG tablet    triamcinolone acetonide 40 MG/ML injection                Primary Care Provider Office Phone # Fax #    Marii Montgomery [phone]        [facility] [address] [facility] [address]  [facility] [address]        Thank you!     Thank you for choosing [facility]  for your care. Our goal is always to provide you with excellent care. Hearing back from our patients is one way we can continue to improve our services. Please take a few minutes to complete the written survey that you may receive in the mail after your visit with us. Thank you!             Your Updated Medication List - Protect others around you: Learn how to safely use, store and throw away your medicines at www.disposemymeds.org.          This list is accurate as of: 3/10/17  9:13 AM.  Always use your most recent med list.                   Brand Name Dispense Instructions for use    albuterol 108 (90 BASE) MCG/ACT Inhaler    VENTOLIN HFA    18 Inhaler    Inhale 2 puffs into the lungs every 4 hours as needed for shortness of breath / dyspnea or wheezing       amLODIPine 10 MG tablet    NORVASC    90 tablet    Take 1 tablet (10 mg) by mouth daily For blood pressure       atorvastatin 40 MG tablet    LIPITOR    90 tablet    Take 1 tablet (40 mg) by  mouth daily       CALCIUM 600 + D PO      Take 1 tablet by mouth daily.       clopidogrel 75 MG tablet    PLAVIX    90 tablet    Take 1 tablet (75 mg) by mouth daily       * ferrous sulfate 325 (65 FE) MG tablet    IRON    90 tablet    Take 1 tablet (325 mg) by mouth daily (with breakfast)       * ferrous sulfate 325 (65 FE) MG tablet    IRON    90 tablet    Take 1 tablet (325 mg) by mouth 2 times daily To maintain iron levels       gabapentin 100 MG capsule    NEURONTIN    540 capsule    Take 4 capsules at bedtime, also 1 capsule in the morning and mid-day for pain prevention       hydrochlorothiazide 50 MG tablet    HYDRODIURIL    90 tablet    Take 1 tablet (50 mg) by mouth daily       levothyroxine 50 MCG tablet    SYNTHROID/LEVOTHROID    90 tablet    Take 1 tablet (50 mcg) by mouth daily       lidocaine 1 % Soln     2 mL    2 mLs by INTRA-ARTICULAR route once for 1 dose       lisinopril 40 MG tablet    PRINIVIL/ZESTRIL    90 tablet    Take 1 tablet (40 mg) by mouth daily For blood pressure       nortriptyline 10 MG capsule    PAMELOR    180 capsule    Take 2 capsules (20 mg) by mouth At Bedtime for sleep and pain prevention       polyethylene glycol powder    MIRALAX    119 g    Take one-half to one scoop once a day for maintaining soft stools       potassium chloride SA 20 MEQ CR tablet    K-DUR/KLOR-CON M    90 tablet    Take 1 tablet (20 mEq) by mouth daily       ranitidine 150 MG tablet    ZANTAC    90 tablet    Take 1 tablet (150 mg) by mouth At Bedtime       traMADol 50 MG tablet    ULTRAM    270 tablet    Take 1 tablet (50 mg) by mouth every 6 hours as needed for moderate pain or severe pain Maximum 270 tablets in 3 months       triamcinolone acetonide 40 MG/ML injection    KENALOG-40    1 mL    1 mL (40 mg) by INTRA-ARTICULAR route once for 1 dose       * Notice:  This list has 2 medication(s) that are the same as other medications prescribed for you. Read the directions carefully, and ask your doctor or  other care provider to review them with you.

## 2017-03-10 NOTE — PROGRESS NOTES
CC:  Right knee pain    S:  Lucie is here today for evaluation of right knee pain.  Started shortly after returning from a trip to the [place].  Pain is nagging, deep type pain, moderate on arising from bed also with walking.  Interferes with walking. Better with heating pad, but this does not resolve the problem.  No relief with acupuncture.  No fall/injury, swelling, redness, warmth, bruising.  Has known chronic OA including l spine with radiculopathy.  This is stable.  No new numbness, tingling, weakness of LE's.    I reviewed her medications and provided refills for those due for refills.    Patient Active Problem List   Diagnosis     Benign ovarian tumor     Hypothyroidism     Peripheral vascular disease (H)     Knee pain     Osteoarthritis     Cervicalgia     Hyperlipidemia with target LDL less than 70     Pulmonary embolism (H)     Anemia     Aortic stenosis     Atherosclerosis of aorta (H)     Atherosclerosis of arteries of extremities (H)     Intermittent claudication (H)     Iron deficiency     Osteoporosis     DVT of lower extremity, bilateral (H)     Varicose veins     Gluteal pain     Insomnia     Back pain     Vitamin D deficiency     Tobacco use disorder     Personal history of other drug therapy     Right knee pain     Other chronic pulmonary embolism (H)     Venous thrombosis of extremity     Benign essential hypertension     Gastritis     Cataract     Acute left-sided low back pain with left-sided sciatica     Lumbar stenosis with neurogenic claudication     SBO (small bowel obstruction) (H)     Small bowel obstruction (H)     Current Outpatient Prescriptions   Medication Sig Dispense Refill     lidocaine 1 % SOLN 2 mLs by INTRA-ARTICULAR route once for 1 dose 2 mL 0     triamcinolone acetonide (KENALOG-40) 40 MG/ML injection 1 mL (40 mg) by INTRA-ARTICULAR route once for 1 dose 1 mL 0     levothyroxine (SYNTHROID/LEVOTHROID) 50 MCG tablet Take 1 tablet (50 mcg) by mouth daily 90 tablet 3      clopidogrel (PLAVIX) 75 MG tablet Take 1 tablet (75 mg) by mouth daily 90 tablet 3     hydrochlorothiazide (HYDRODIURIL) 50 MG tablet Take 1 tablet (50 mg) by mouth daily 90 tablet 3     ranitidine (ZANTAC) 150 MG tablet Take 1 tablet (150 mg) by mouth At Bedtime 90 tablet 3     lisinopril (PRINIVIL/ZESTRIL) 40 MG tablet Take 1 tablet (40 mg) by mouth daily For blood pressure 90 tablet 3     nortriptyline (PAMELOR) 10 MG capsule Take 2 capsules (20 mg) by mouth At Bedtime for sleep and pain prevention 180 capsule 1     gabapentin (NEURONTIN) 100 MG capsule Take 4 capsules at bedtime, also 1 capsule in the morning and mid-day for pain prevention 540 capsule 2     polyethylene glycol (MIRALAX) powder Take one-half to one scoop once a day for maintaining soft stools 119 g 5     ferrous sulfate (IRON) 325 (65 FE) MG tablet Take 1 tablet (325 mg) by mouth 2 times daily To maintain iron levels 90 tablet 1     amLODIPine (NORVASC) 10 MG tablet Take 1 tablet (10 mg) by mouth daily For blood pressure 90 tablet 1     atorvastatin (LIPITOR) 40 MG tablet Take 1 tablet (40 mg) by mouth daily 90 tablet 3     traMADol (ULTRAM) 50 MG tablet Take 1 tablet (50 mg) by mouth every 6 hours as needed for moderate pain or severe pain Maximum 270 tablets in 3 months 270 tablet 1     albuterol (VENTOLIN HFA) 108 (90 BASE) MCG/ACT inhaler Inhale 2 puffs into the lungs every 4 hours as needed for shortness of breath / dyspnea or wheezing 18 Inhaler 1     potassium chloride SA (K-DUR,KLOR-CON M) 20 MEQ tablet Take 1 tablet (20 mEq) by mouth daily 90 tablet 3     ferrous sulfate (IRON) 325 (65 FE) MG tablet Take 1 tablet (325 mg) by mouth daily (with breakfast) 90 tablet 3     Calcium Carbonate-Vitamin D (CALCIUM 600 + D OR) Take 1 tablet by mouth daily.       [DISCONTINUED] levothyroxine (SYNTHROID, LEVOTHROID) 50 MCG tablet Take 1 tablet (50 mcg) by mouth daily 90 tablet 3     [DISCONTINUED] clopidogrel (PLAVIX) 75 MG tablet Take 1 tablet  (75 mg) by mouth daily 90 tablet 3     [DISCONTINUED] hydrochlorothiazide (HYDRODIURIL) 50 MG tablet Take 1 tablet (50 mg) by mouth daily 90 tablet 3     [DISCONTINUED] tolterodine (DETROL) 1 MG tablet Take 1-2 tablets by mouth At Bedtime. 180 tablet 3     Allergies   Allergen Reactions     Trazodone Fatigue     Felt too groggy     /65 (BP Location: Right arm, Patient Position: Chair, Cuff Size: Adult Regular)  Pulse 66  Resp 16  Wt 51.7 kg (114 lb)  Breastfeeding? No  BMI 22.26 kg/m2  BP Readings from Last 6 Encounters:   03/10/17 150/65   01/28/17 150/75   01/24/17 134/44   01/02/17 170/69   12/21/16 129/65   12/20/16 144/69     Gen:  NAD  Right knee:  No bruising, swelling, redness, warmth.  Has a superficial varicosity patch inferior to patella, but no varicose veins identified otherwise.  No Baker's cyst.  No tenderness superior to nor over patella.  No lateral joint line tenderness nor tenderness at MCL, LCL attachment sites.  She does have tenderness in the infrapatellar area and milder tenderness extending down lateral anterior lower leg mm's.  Varus, valgus stress and drawer tests negative.    Procedure note:  Name of procedure: steroid injection.  Location: right knee  Indication: shoulder pain.      Patient's consent for the procedure was obtained both verbally and in writing by me.  I conducted the Time Out and re-confirmed the patient s name, procedure, and site/side. (The Joint Commission universal protocol was followed.). Discussed alternatives, potential benefits and risks.  With alcohol prep and no anesthesia, a mixture of 2 cc 1% Lidocaine and 1 cc of 40 mg/cc Kenalog was injected into the knee, below the patella off to right side approach (in area of infrapatellar bursa). Excellent landmarks.      The patient tolerated the procedure well and experienced relief post procedure.  No complications.  Discussed aftercare.    Lucie was seen today for knee pain.    Diagnoses and all orders for this  visit:    Right knee pain.  Most likely infrapatellar bursitis, possibly aggravated by recent trip to the [place] where she was doing a lot of walking.  Bursa injected today with good relief.  -     lidocaine 1 % SOLN; 2 mLs by INTRA-ARTICULAR route once for 1 dose  -     triamcinolone acetonide (KENALOG-40) 40 MG/ML injection; 1 mL (40 mg) by INTRA-ARTICULAR route once for 1 dose    Other chronic pulmonary embolism (H), DVT  -     Refill clopidogrel (PLAVIX) 75 MG tablet; Take 1 tablet (75 mg) by mouth daily    Benign essential hypertension.  Not ideally controlled. Asymptomatic. Difficult to assess in the context of pain today  -     Refill hydrochlorothiazide (HYDRODIURIL) 50 MG tablet; Take 1 tablet (50 mg) by mouth daily  -     Follow home BP's for now, call me with results over the next 4 weeks.    Gastritis without bleeding, unspecified chronicity, unspecified gastritis type  -     Refill ranitidine (ZANTAC) 150 MG tablet; Take 1 tablet (150 mg) by mouth At Bedtime    Hypothyroidism, unspecified type  -    Refill  levothyroxine (SYNTHROID/LEVOTHROID) 50 MCG tablet; Take 1 tablet (50 mcg) by mouth daily    Essential hypertension  -     Basic metabolic panel    Marii Montgomery M.D.  Internal Medicine  [facility]   pager [phone]

## 2017-03-10 NOTE — NURSING NOTE
Chief Complaint   Patient presents with     Knee Pain     Patient is here to discuss right knee pain     Aislinn Clayton CMA 8:25 AM on 3/10/2017.

## 2017-03-21 ENCOUNTER — OFFICE VISIT (OUTPATIENT)
Dept: INTERNAL MEDICINE | Facility: CLINIC | Age: 82
End: 2017-03-21

## 2017-03-21 VITALS
WEIGHT: 109.4 LBS | BODY MASS INDEX: 21.37 KG/M2 | SYSTOLIC BLOOD PRESSURE: 153 MMHG | HEART RATE: 77 BPM | DIASTOLIC BLOOD PRESSURE: 69 MMHG

## 2017-03-21 DIAGNOSIS — R07.81 RIB PAIN ON LEFT SIDE: Primary | ICD-10-CM

## 2017-03-21 DIAGNOSIS — I10 BENIGN ESSENTIAL HYPERTENSION: ICD-10-CM

## 2017-03-21 DIAGNOSIS — K59.00 CONSTIPATION, UNSPECIFIED CONSTIPATION TYPE: ICD-10-CM

## 2017-03-21 DIAGNOSIS — D64.9 ANEMIA, UNSPECIFIED TYPE: ICD-10-CM

## 2017-03-21 RX ORDER — OXYCODONE HYDROCHLORIDE 5 MG/1
5 TABLET ORAL EVERY 6 HOURS PRN
Qty: 10 TABLET | Refills: 0 | Status: SHIPPED | OUTPATIENT
Start: 2017-03-21 | End: 2017-06-01

## 2017-03-21 RX ORDER — BISACODYL 10 MG
10 SUPPOSITORY, RECTAL RECTAL DAILY PRN
Qty: 90 SUPPOSITORY | Refills: 3 | Status: SHIPPED | OUTPATIENT
Start: 2017-03-21 | End: 2018-12-04

## 2017-03-21 ASSESSMENT — PAIN SCALES - GENERAL: PAINLEVEL: SEVERE PAIN (7)

## 2017-03-21 NOTE — PROGRESS NOTES
"Internal Medicine [facility] Progress Note   03/21/2017    Lucie Stockton MRN# [id number]   Age: 81 year old YOB: 1935          Assessment and Plan:       Lucie Stockton was seen today for pain.    Diagnoses and all orders for this visit:    Rib pain on left side: mechanical fall one week ago and hit left side of rib cage on kitchen counter. Pain has been getting gradually better but still fairly bad at times. On exam, is tender, but no step off. Lungs sounds b/l. Will hold on imaging at this time as will likely not . Have patient RTC if pain does not continue to improve.   -     oxyCODONE (ROXICODONE) 5 MG IR tablet; Take 1 tablet (5 mg) by mouth every 6 hours as needed for moderate to severe pain    Anemia, unspecified type  -     Hemoglobin; Future    Benign essential hypertension  -     Basic metabolic panel; Future    Constipation, unspecified constipation type  -     bisacodyl (DULCOLAX) 10 MG Suppository; Place 1 suppository (10 mg) rectally daily as needed for constipation      Patient was seen and discussed with Dr. Ulises Russo  PGY-3    [phone]     Subjective:     Lucie Stockton is a 81 year old y/o F with h/o HTN, DVT/PE, atherosclerosis, hypothyroidism; who presents today for rib pain.     Patient fell one week ago today as she was walking from into the kitchen and fell into the granite counter top hitting the left side of her ribcage. Fall was mechanical in nature as edison changed from carpet to hardwood. Did not faint or pass out. Had \"excrutiating pain on left side.\" No bruising or swelling. Hurts to touch her left side. Has had trouble sleeping. Hurts when coughing, sneezing, or laughing. No shortness of breath. Has been taking ibuprofen and tramadol; which have helped a little bit. Pain has improved overall since last week.     Also, has upcoming pre-op exam in April and wondering if she should make a lab appt as well.     ROS: 4 system ROS was done. " Pertinent positive and negatives noted above.         Physical Exam:   Vitals:  Pulse:  [77] 77  BP: (153)/(69) 153/69      Wt Readings from Last 4 Encounters:   03/21/17 49.6 kg (109 lb 6.4 oz)   03/10/17 51.7 kg (114 lb)   01/28/17 49 kg (108 lb 1.6 oz)   01/23/17 49.4 kg (108 lb 12.8 oz)       Gen: alert, pleasant, elderly female, sitting up in chair; NAD  HEENT: nc/at, no conjunctival icterus  Resp: clear lungs bilaterally, no wheezing or crackles  CV: RRR; nml s1/s2, no m/r/g appreciated  Tender over 5th left anterior rib extending to mid axillary line. No step off. No crepitus. No ecchymoses or swelling  Neurological: alert and oriented x4           Laboratory & Imaging Data:     None    Teaching Physician Note:     I discussed the case with the resident, verified the history with the patient and examined the patient. I agree with the findings and plan of care as documented in the resident's note.    Additional comments:  None.    Marii Montgomery M.D.  Internal Medicine   pager [phone]

## 2017-03-21 NOTE — MR AVS SNAPSHOT
After Visit Summary   3/21/2017    Lucie Stockton    MRN: [id number]           Patient Information     Date Of Birth          1935        Visit Information        Provider Department      3/21/2017 8:45 AM Bella Russo MD [facility]        Today's Diagnoses     Rib pain on left side    -  1    Anemia, unspecified type        Benign essential hypertension        Constipation, unspecified constipation type          Care Instructions    [facility] Center Medication Refill Request Information:  * Please contact your pharmacy regarding ANY request for medication refills.  ** [facility] Prescription Fax = [phone]  * Please allow 3 business days for routine medication refills.  * Please allow 5 business days for controlled substance medication refills.     [facility] Test Result notification information:  *You will be notified with in 7-10 days of your appointment day regarding the results of your test.  If you are on MyChart you will be notified as soon as the provider has reviewed the results and signed off on them.      Primary Care Center   [facility]  [address] [place] [address] (4th Floor )   [place] [address] [phone]  [phone]          Follow-ups after your visit        Your next 10 appointments already scheduled     Apr 14, 2017  7:45 AM CDT   LAB with  LAB   [facility] ([facility])    [address] [place] [id number] [phone]           Patient must bring picture ID.  Patient should be prepared to give a urine specimen  Please do not eat 10-12 hours before your appointment if you are coming in fasting for labs on lipids, cholesterol, or glucose (sugar).  Pregnant women should follow their Care Team instructions. Water with medications is okay. Do not drink coffee or other fluids.   If you have concerns about taking  your medications, please ask at office or if scheduling via [employer], send a  message by clicking on Secure Messaging, Message Your Care Team.            Apr 14, 2017  8:20 AM CDT   (Arrive by 8:05 AM)   PRE-OP with Mairi Montgomery MD   [facility] ([facility])    [address] [place] [address]  4th [facility] [id number] [phone]            Jun 20, 2017 12:45 PM CDT   (Arrive by 12:30 PM)   Return Vascular Visit with Iona Velez MD   [facility] ([facility])    [address] [place] [address]  3rd [facility] [id number] [phone]              Future tests that were ordered for you today     Open Future Orders        Priority Expected Expires Ordered    Hemoglobin Routine 3/21/2017 3/21/2018 3/21/2017    Basic metabolic panel Routine 3/21/2017 4/4/2017 3/21/2017            Who to contact     Please call your clinic at [phone] to:    Ask questions about your health    Make or cancel appointments    Discuss your medicines    Learn about your test results    Speak to your doctor   If you have compliments or concerns about an experience at your clinic, or if you wish to file a complaint, please contact [facility] Patient Relations at [phone] or email us at [email or web address]         Additional Information About Your Visit        [employer] Information     [employer] gives you secure access to your electronic health record. If you see a primary care provider, you can also send messages to your care team and make appointments. If you have questions, please call your primary care clinic.  If you do not have a primary care provider, please call [phone] and they will assist you.      [employer] is an electronic gateway that provides easy, online access to your medical records. With [employer], you can request a clinic appointment, read your test results, renew a prescription or communicate with your care team.     To access your existing account, please  contact your [facility] or call [phone] for assistance.        Care EveryWhere ID     This is your Care EveryWhere ID. This could be used by other organizations to access your [facility] medical records  [id number]        Your Vitals Were     Pulse BMI (Body Mass Index)                77 21.37 kg/m2           Blood Pressure from Last 3 Encounters:   03/21/17 153/69   03/10/17 150/65   01/28/17 150/75    Weight from Last 3 Encounters:   03/21/17 49.6 kg (109 lb 6.4 oz)   03/10/17 51.7 kg (114 lb)   01/28/17 49 kg (108 lb 1.6 oz)                 Today's Medication Changes          These changes are accurate as of: 3/21/17  9:10 AM.  If you have any questions, ask your nurse or doctor.               Start taking these medicines.        Dose/Directions    bisacodyl 10 MG Suppository   Commonly known as:  DULCOLAX   Used for:  Constipation, unspecified constipation type   Started by:  Bella Russo MD        Dose:  10 mg   Place 1 suppository (10 mg) rectally daily as needed for constipation   Quantity:  90 suppository   Refills:  3       oxyCODONE 5 MG IR tablet   Commonly known as:  ROXICODONE   Used for:  Rib pain on left side   Started by:  Bella Russo MD        Dose:  5 mg   Take 1 tablet (5 mg) by mouth every 6 hours as needed for moderate to severe pain   Quantity:  10 tablet   Refills:  0            Where to get your medicines      These medications were sent to [facility] [address] [place] [address] AVE [address] AT [place] [id number], [facility] [address]     Phone:  [phone]     bisacodyl 10 MG Suppository         Some of these will need a paper prescription and others can be bought over the counter.  Ask your nurse if you have questions.     Bring a paper prescription for each of these medications     oxyCODONE 5 MG IR tablet                Primary Care Provider Office Phone # Fax #    Marii Montgomery MD  [phone]        [facility] [address] [facility] [address]  [facility] [address]        Thank you!     Thank you for choosing [facility]  for your care. Our goal is always to provide you with excellent care. Hearing back from our patients is one way we can continue to improve our services. Please take a few minutes to complete the written survey that you may receive in the mail after your visit with us. Thank you!             Your Updated Medication List - Protect others around you: Learn how to safely use, store and throw away your medicines at www.disposemymeds.org.          This list is accurate as of: 3/21/17  9:10 AM.  Always use your most recent med list.                   Brand Name Dispense Instructions for use    albuterol 108 (90 BASE) MCG/ACT Inhaler    VENTOLIN HFA    18 Inhaler    Inhale 2 puffs into the lungs every 4 hours as needed for shortness of breath / dyspnea or wheezing       amLODIPine 10 MG tablet    NORVASC    90 tablet    Take 1 tablet (10 mg) by mouth daily For blood pressure       atorvastatin 40 MG tablet    LIPITOR    90 tablet    Take 1 tablet (40 mg) by mouth daily       bisacodyl 10 MG Suppository    DULCOLAX    90 suppository    Place 1 suppository (10 mg) rectally daily as needed for constipation       CALCIUM 600 + D PO      Take 1 tablet by mouth daily.       clopidogrel 75 MG tablet    PLAVIX    90 tablet    Take 1 tablet (75 mg) by mouth daily       * ferrous sulfate 325 (65 FE) MG tablet    IRON    90 tablet    Take 1 tablet (325 mg) by mouth daily (with breakfast)       * ferrous sulfate 325 (65 FE) MG tablet    IRON    90 tablet    Take 1 tablet (325 mg) by mouth 2 times daily To maintain iron levels       gabapentin 100 MG capsule    NEURONTIN    540 capsule    Take 4 capsules at bedtime, also 1 capsule in the morning and mid-day for pain prevention       hydrochlorothiazide 50 MG tablet    HYDRODIURIL    90 tablet    Take 1 tablet (50 mg) by  mouth daily       levothyroxine 50 MCG tablet    SYNTHROID/LEVOTHROID    90 tablet    Take 1 tablet (50 mcg) by mouth daily       lisinopril 40 MG tablet    PRINIVIL/ZESTRIL    90 tablet    Take 1 tablet (40 mg) by mouth daily For blood pressure       nortriptyline 10 MG capsule    PAMELOR    180 capsule    Take 2 capsules (20 mg) by mouth At Bedtime for sleep and pain prevention       oxyCODONE 5 MG IR tablet    ROXICODONE    10 tablet    Take 1 tablet (5 mg) by mouth every 6 hours as needed for moderate to severe pain       polyethylene glycol powder    MIRALAX    119 g    Take one-half to one scoop once a day for maintaining soft stools       potassium chloride SA 20 MEQ CR tablet    K-DUR/KLOR-CON M    90 tablet    Take 1 tablet (20 mEq) by mouth daily       ranitidine 150 MG tablet    ZANTAC    90 tablet    Take 1 tablet (150 mg) by mouth At Bedtime       traMADol 50 MG tablet    ULTRAM    270 tablet    Take 1 tablet (50 mg) by mouth every 6 hours as needed for moderate pain or severe pain Maximum 270 tablets in 3 months       * Notice:  This list has 2 medication(s) that are the same as other medications prescribed for you. Read the directions carefully, and ask your doctor or other care provider to review them with you.

## 2017-03-21 NOTE — NURSING NOTE
Chief Complaint   Patient presents with     Pain     pt states she fell against granite counter top a week ago today     Mallika Diaz CMA at 8:24 AM on 3/21/2017.

## 2017-03-21 NOTE — PATIENT INSTRUCTIONS
Primary Care Center Medication Refill Request Information:  * Please contact your pharmacy regarding ANY request for medication refills.  ** [facility] Prescription Fax = [phone]  * Please allow 3 business days for routine medication refills.  * Please allow 5 business days for controlled substance medication refills.     [facility] Center Test Result notification information:  *You will be notified with in 7-10 days of your appointment day regarding the results of your test.  If you are on MyChart you will be notified as soon as the provider has reviewed the results and signed off on them.      Primary Care Center   [facility]  [address] [place] [address] (4th Floor )   [place] [address] [phone]  [phone]

## 2017-04-14 ENCOUNTER — OFFICE VISIT (OUTPATIENT)
Dept: INTERNAL MEDICINE | Facility: CLINIC | Age: 82
End: 2017-04-14

## 2017-04-14 VITALS
OXYGEN SATURATION: 98 % | WEIGHT: 108.5 LBS | TEMPERATURE: 97.7 F | HEIGHT: 59 IN | RESPIRATION RATE: 16 BRPM | BODY MASS INDEX: 21.87 KG/M2 | SYSTOLIC BLOOD PRESSURE: 149 MMHG | HEART RATE: 72 BPM | DIASTOLIC BLOOD PRESSURE: 66 MMHG

## 2017-04-14 DIAGNOSIS — L98.9 SKIN LESION: ICD-10-CM

## 2017-04-14 DIAGNOSIS — M54.40 CHRONIC BILATERAL LOW BACK PAIN WITH SCIATICA, SCIATICA LATERALITY UNSPECIFIED: ICD-10-CM

## 2017-04-14 DIAGNOSIS — G89.29 CHRONIC BILATERAL LOW BACK PAIN WITH SCIATICA, SCIATICA LATERALITY UNSPECIFIED: ICD-10-CM

## 2017-04-14 DIAGNOSIS — Z01.810 PRE-OPERATIVE CARDIOVASCULAR EXAMINATION: Primary | ICD-10-CM

## 2017-04-14 DIAGNOSIS — H25.9: ICD-10-CM

## 2017-04-14 DIAGNOSIS — I10 BENIGN ESSENTIAL HYPERTENSION: ICD-10-CM

## 2017-04-14 DIAGNOSIS — M79.671 FOOT PAIN, RIGHT: ICD-10-CM

## 2017-04-14 DIAGNOSIS — Z79.01 LONG TERM CURRENT USE OF ANTICOAGULANT THERAPY: ICD-10-CM

## 2017-04-14 DIAGNOSIS — D64.9 ANEMIA, UNSPECIFIED TYPE: ICD-10-CM

## 2017-04-14 DIAGNOSIS — Z86.711 HISTORY OF PULMONARY EMBOLISM: ICD-10-CM

## 2017-04-14 LAB
ANION GAP SERPL CALCULATED.3IONS-SCNC: 6 MMOL/L (ref 3–14)
BUN SERPL-MCNC: 17 MG/DL (ref 7–30)
CALCIUM SERPL-MCNC: 9.8 MG/DL (ref 8.5–10.1)
CHLORIDE SERPL-SCNC: 104 MMOL/L (ref 94–109)
CO2 SERPL-SCNC: 30 MMOL/L (ref 20–32)
CREAT SERPL-MCNC: 0.64 MG/DL (ref 0.52–1.04)
GFR SERPL CREATININE-BSD FRML MDRD: 89 ML/MIN/1.7M2
GLUCOSE SERPL-MCNC: 96 MG/DL (ref 70–99)
HGB BLD-MCNC: 13.7 G/DL (ref 11.7–15.7)
POTASSIUM SERPL-SCNC: 3.8 MMOL/L (ref 3.4–5.3)
SODIUM SERPL-SCNC: 140 MMOL/L (ref 133–144)

## 2017-04-14 RX ORDER — TRAMADOL HYDROCHLORIDE 50 MG/1
50 TABLET ORAL EVERY 6 HOURS PRN
Qty: 270 TABLET | Refills: 1 | Status: SHIPPED | OUTPATIENT
Start: 2017-05-22 | End: 2017-08-18

## 2017-04-14 ASSESSMENT — PAIN SCALES - GENERAL: PAINLEVEL: MILD PAIN (3)

## 2017-04-14 NOTE — LETTER
"4/14/2017      RE: Lucie Stockton  [address] [facility] [address]       Surgeon (please enter first and last name):  Dr. Blas Ashby  Fax number for Preop Evaluation:  [phone]  Location of Surgery: [facility]  Date of Surgery:  4/25/17  Procedure:  Cataracts left eye  History of reaction to anesthesia?  No        CC:  Preop    HPI:  I am seeing Lucie Stockton at the request of Dr. Blas Ashby for preoperative cardiopulmonary risk assessment.  Lucie had a successful right eye cataract surgery and is not pursuing left eye cataract surgery.  Has had gradual decreased vision.  No eye pain, photophobia, redness, swelling, foreign body sensation.  No headaches or floaters.      Cardiac risks: age  Pulmonary risks: age, has history of COPD but does not require inhalers.  Exercise tolerance: can walk at a strolling pace up to 1.5 miles with a few breaks to rest  Personal history of bleeding concerns/disorders: None, however, she is on Plavix for hx of PE.  Family history of bleeding disorders: None  Personal history of adverse anesthesia reactions:  None  Family history of adverse anesthesia reactions:  None  Personal history of surgical complications: None    ROS:  Although she stated no mental health problems below, she did bring up a concern that her  had regarding possible depression.  Lucie experienced a few weeks where she was not as peppy as her usual self.  Spent more time on the couch, reading and napping.  Felt a bit fatigued.  We reviewed all the criteria for depression and otherwise this was negative.  On a scale from 1-10 she rated her potential depression symptoms as a 3/10.  I asked her at what level would she be more concerned and she said a 4-5/10.  I advised her to return for re-evaluation if this happens and we discussed \"red flags\" regarding when to go to the ED.  She is not suicidal.  She is getting out more now, e.g.played bridge with her friends.  " Looks forward to getting outdoors more.  Lucie is due for a refill of her Tramadol (due 5/22/17).  I gave her a post-dated prescription.  Pain is well managed.  Because of her skin lesions (see below) I reviewed skin cancer prevention with her.  She wears hats and sunscreen.  No family history of melanoma.  Home BP's reviewed.  For the last 2 weeks, measuring 3 times a week, all BP's were at goal except for 159/72, 153/69 and 151/73.  Asymptomatic.    General Symptoms: No     Skin Symptoms: No    HENT Symptoms: No    EYE SYMPTOMS: No    HEART SYMPTOMS: No    LUNG SYMPTOMS: No    INTESTINAL SYMPTOMS: No    URINARY SYMPTOMS: No    GYNECOLOGIC SYMPTOMS: No    BREAST SYMPTOMS: No    SKELETAL SYMPTOMS: No    BLOOD SYMPTOMS: No    NERVOUS SYSTEM SYMPTOMS: No    MENTAL HEALTH SYMPTOMS: No     Patient Active Problem List   Diagnosis     Benign ovarian tumor     Hypothyroidism     Peripheral vascular disease (H)     Knee pain     Osteoarthritis     Cervicalgia     Hyperlipidemia with target LDL less than 70     Pulmonary embolism (H)     Anemia     Aortic stenosis     Atherosclerosis of aorta (H)     Atherosclerosis of arteries of extremities (H)     Intermittent claudication (H)     Iron deficiency     Osteoporosis     DVT of lower extremity, bilateral (H)     Varicose veins     Gluteal pain     Insomnia     Back pain     Vitamin D deficiency     Tobacco use disorder     Personal history of other drug therapy     Right knee pain     Other chronic pulmonary embolism (H)     Venous thrombosis of extremity     Benign essential hypertension     Gastritis     Cataract     Acute left-sided low back pain with left-sided sciatica     Lumbar stenosis with neurogenic claudication     SBO (small bowel obstruction) (H)     Small bowel obstruction (H)     Past Surgical History:   Procedure Laterality Date     BUNIONECTOMY       C STOMACH SURGERY PROCEDURE UNLISTED      Please see chart     COLONOSCOPY      11/10/10 angioectasia      HYSTERECTOMY TOTAL ABDOMINAL, BILATERAL SALPINGO-OOPHORECTOMY, NODE DISSECTION, COMBINED  2/17/11    SANGEETHA, EMILIA, LN bx, omentectomy, resection of evrian malignancy Dr. JONO Goncalves - Returned BENIGN     UPPER GI ENDOSCOPY      11/10/10 erythematous gastropathy, angioectasia     Family History   Problem Relation Age of Onset     Breast Cancer Maternal Aunt 80     C.A.D. Father 54     Social History     Social History     Marital status:      Spouse name: N/A     Number of children: N/A     Years of education: N/A     Occupational History     Not on file.     Social History Main Topics     Smoking status: Former Smoker     Packs/day: 0.50     Years: 15.00     Quit date: 9/13/1993     Smokeless tobacco: Never Used     Alcohol use Yes      Comment: social     Drug use: No     Sexual activity: Yes     Partners: Male     Other Topics Concern     Not on file     Social History Narrative     for 52 years. Retired from [employer]. Frequent world travel.     Current Outpatient Prescriptions   Medication Sig Dispense Refill     oxyCODONE (ROXICODONE) 5 MG IR tablet Take 1 tablet (5 mg) by mouth every 6 hours as needed for moderate to severe pain 10 tablet 0     bisacodyl (DULCOLAX) 10 MG Suppository Place 1 suppository (10 mg) rectally daily as needed for constipation 90 suppository 3     levothyroxine (SYNTHROID/LEVOTHROID) 50 MCG tablet Take 1 tablet (50 mcg) by mouth daily 90 tablet 3     clopidogrel (PLAVIX) 75 MG tablet Take 1 tablet (75 mg) by mouth daily 90 tablet 3     hydrochlorothiazide (HYDRODIURIL) 50 MG tablet Take 1 tablet (50 mg) by mouth daily 90 tablet 3     ranitidine (ZANTAC) 150 MG tablet Take 1 tablet (150 mg) by mouth At Bedtime 90 tablet 3     lisinopril (PRINIVIL/ZESTRIL) 40 MG tablet Take 1 tablet (40 mg) by mouth daily For blood pressure 90 tablet 3     nortriptyline (PAMELOR) 10 MG capsule Take 2 capsules (20 mg) by mouth At Bedtime for sleep and pain prevention 180 capsule 1      "gabapentin (NEURONTIN) 100 MG capsule Take 4 capsules at bedtime, also 1 capsule in the morning and mid-day for pain prevention 540 capsule 2     polyethylene glycol (MIRALAX) powder Take one-half to one scoop once a day for maintaining soft stools 119 g 5     ferrous sulfate (IRON) 325 (65 FE) MG tablet Take 1 tablet (325 mg) by mouth 2 times daily To maintain iron levels 90 tablet 1     amLODIPine (NORVASC) 10 MG tablet Take 1 tablet (10 mg) by mouth daily For blood pressure 90 tablet 1     atorvastatin (LIPITOR) 40 MG tablet Take 1 tablet (40 mg) by mouth daily 90 tablet 3     traMADol (ULTRAM) 50 MG tablet Take 1 tablet (50 mg) by mouth every 6 hours as needed for moderate pain or severe pain Maximum 270 tablets in 3 months 270 tablet 1     albuterol (VENTOLIN HFA) 108 (90 BASE) MCG/ACT inhaler Inhale 2 puffs into the lungs every 4 hours as needed for shortness of breath / dyspnea or wheezing 18 Inhaler 1     potassium chloride SA (K-DUR,KLOR-CON M) 20 MEQ tablet Take 1 tablet (20 mEq) by mouth daily 90 tablet 3     ferrous sulfate (IRON) 325 (65 FE) MG tablet Take 1 tablet (325 mg) by mouth daily (with breakfast) 90 tablet 3     Calcium Carbonate-Vitamin D (CALCIUM 600 + D OR) Take 1 tablet by mouth daily.       [DISCONTINUED] tolterodine (DETROL) 1 MG tablet Take 1-2 tablets by mouth At Bedtime. 180 tablet 3     Allergies   Allergen Reactions     Trazodone Fatigue     Felt too groggy     /66  Pulse 72  Temp 97.7  F (36.5  C) (Oral)  Resp 16  Ht 1.51 m (4' 11.45\")  Wt 49.2 kg (108 lb 8 oz)  SpO2 98%  Breastfeeding? No  BMI 21.58 kg/m2  Physical Examination:  General:  Pleasant, alert, no acute distress  Eyes:  Pupils 2-3 mm, sclera white, EOM's full.    Ears:  TM's normal, EAC's patent, clear of cerumen  Nose:  Nasal passages clear, turbinates not swollen  Throat/Mouth:  No pharyngeal erythema or exudate, oral mucosa and tongue moist, no suspicious oral lesions  Neck:  Full AROM, supple, thyroid " smooth, symmetric, not enlarged, no nodules  Lungs:  Clear to auscultation throughout, no wheezes, rhonchi or rales.  C/V:  Regular rate and rhythm, no murmurs, rubs or gallops.  No JVD, no carotid bruits.  No peripheral edema.   Abdomen:  Not distended.  Bowel sounds active.  No tenderness, no hepatosplenomegaly or masses.  No CVA tenderness or masses.  Lymph:  No cervical lymph nodes.  Neuro:  Alert and oriented, face symmetric. No tremor.  M/S:   No joint deformities noted.  No joint swelling.  Skin:   No rashes or jaundice.  Normal moisture, good skin turgor.  Has an 8 mm oval, soft, slightly raised lesion with darker brown stippling on a pink background.  No irregular borders, scale, heaped up borders.  Also a sandpaper texture lesion approx. 3-4 mm on the left bridge of her nose c/w AK.  Psych:  Broad range affect.  Not psychomotor slowed.  No signs of anxiety or agitation.    Lucie was seen today for pre-op exam.    Diagnoses and all orders for this visit:    Pre-operative cardiovascular examination    Senile cataract, left    History of pulmonary embolism, remote, stable    Long term current use of anticoagulant therapy Plavix    Benign essential hypertension, controlled.  Home BP's reviewed and within goal range.    Chronic bilateral low back pain with sciatica, sciatica laterality unspecified, stable  -     Refill traMADol (ULTRAM) 50 MG tablet; Take 1 tablet (50 mg) by mouth every 6 hours as needed for moderate pain or severe pain Maximum 270 tablets in 3 months    Skin lesion  -     DERMATOLOGY REFERRAL; Future    Routine f/u 6 months, sooner as needed.    Marii Montgomery M.D.  Internal Medicine  Primary Care Center   pager [phone]              Marii Mnotgomery MD

## 2017-04-14 NOTE — MR AVS SNAPSHOT
After Visit Summary   4/14/2017    Lucie Stockton    MRN: [id number]           Patient Information     Date Of Birth          1935        Visit Information        Provider Department      4/14/2017 8:20 AM Marii Montgomery MD [facility]        Today's Diagnoses     Pre-operative cardiovascular examination    -  1    Senile cataract, unspecified laterality        History of pulmonary embolism        Long term current use of anticoagulant therapy        Benign essential hypertension        Chronic bilateral low back pain with sciatica, sciatica laterality unspecified        Foot pain, right        Skin lesion          Care Instructions    Primary Care Center Medication Refill Request Information:  * Please contact your pharmacy regarding ANY request for medication refills.  ** [facility] Prescription Fax = [phone]  * Please allow 3 business days for routine medication refills.  * Please allow 5 business days for controlled substance medication refills.     [facility] Center Test Result notification information:  *You will be notified with in 7-10 days of your appointment day regarding the results of your test.  If you are on MyChart you will be notified as soon as the provider has reviewed the results and signed off on them.    Primary Care Center Phone Number [phone] (Follow up in 6 months)    Dermatology [phone] (3rd Floor [facility]           Follow-ups after your visit        Additional Services     DERMATOLOGY REFERRAL       Evaluate and treat suspicious lesions: left nose, upper abdomen  Skin survey as well.                  Follow-up notes from your care team     Return in about 6 months (around 10/14/2017) for Routine Visit.      Your next 10 appointments already scheduled     Apr 14, 2017  9:30 AM CDT   LAB with  LAB   [facility] ([facility])    [address] [place] [address]  1st Floor  [facility] [address] [phone]            Patient must bring picture ID.  Patient should be prepared to give a urine specimen  Please do not eat 10-12 hours before your appointment if you are coming in fasting for labs on lipids, cholesterol, or glucose (sugar).  Pregnant women should follow their Care Team instructions. Water with medications is okay. Do not drink coffee or other fluids.   If you have concerns about taking  your medications, please ask at office or if scheduling via Always Pre[employer], send a message by clicking on Secure Messaging, Message Your Care Team.            Apr 21, 2017  8:20 AM CDT   (Arrive by 8:05 AM)   PRE-OP with Marii Montgomery MD   [facility] ([facility])    [address] [place] [address]  4th [facility] [address] [phone]            Jun 26, 2017  3:00 PM CDT   (Arrive by 2:45 PM)   Return Vascular Visit with Iona Velez MD   [facility] ([facility])    [address] [place] [address]  3rd [facility] [address] [phone]              Future tests that were ordered for you today     Open Future Orders        Priority Expected Expires Ordered    DERMATOLOGY REFERRAL Routine  4/14/2018 4/14/2017            Who to contact     Please call your clinic at [phone] to:    Ask questions about your health    Make or cancel appointments    Discuss your medicines    Learn about your test results    Speak to your doctor   If you have compliments or concerns about an experience at your clinic, or if you wish to file a complaint, please contact [facility] Patient Relations at [phone] or email us at [email or web address]         Additional Information About Your Visit        GoHomehart Information     Always Prepped gives you secure access to your electronic health record. If you see a primary care provider, you can also send messages to your care team and make appointments. If you have questions, please call  "your primary care clinic.  If you do not have a primary care provider, please call [phone] and they will assist you.      Offerama is an electronic gateway that provides easy, online access to your medical records. With Offerama, you can request a clinic appointment, read your test results, renew a prescription or communicate with your care team.     To access your existing account, please contact your [facility] or call [phone] for assistance.        Care EveryWhere ID     This is your Care EveryWhere ID. This could be used by other organizations to access your [facility] medical records  [id number]        Your Vitals Were     Pulse Temperature Respirations Height Pulse Oximetry Breastfeeding?    72 97.7  F (36.5  C) (Oral) 16 1.51 m (4' 11.45\") 98% No    BMI (Body Mass Index)                   21.58 kg/m2            Blood Pressure from Last 3 Encounters:   04/14/17 149/66   03/21/17 153/69   03/10/17 150/65    Weight from Last 3 Encounters:   04/14/17 49.2 kg (108 lb 8 oz)   03/21/17 49.6 kg (109 lb 6.4 oz)   03/10/17 51.7 kg (114 lb)                 Where to get your medicines      Some of these will need a paper prescription and others can be bought over the counter.  Ask your nurse if you have questions.     Bring a paper prescription for each of these medications     traMADol 50 MG tablet          Primary Care Provider Office Phone # Fax #    Marii Montgomery [phone]        PH[facility]IC[facility]ANS [address] [place] [address]  [facility] [address]        Thank you!     Thank you for choosing [facility]  for your care. Our goal is always to provide you with excellent care. Hearing back from our patients is one way we can continue to improve our services. Please take a few minutes to complete the written survey that you may receive in the mail after your visit with us. Thank you!             Your Updated Medication List - Protect others around you: " Learn how to safely use, store and throw away your medicines at www.disposemymeds.org.          This list is accurate as of: 4/14/17  9:21 AM.  Always use your most recent med list.                   Brand Name Dispense Instructions for use    albuterol 108 (90 BASE) MCG/ACT Inhaler    VENTOLIN HFA    18 Inhaler    Inhale 2 puffs into the lungs every 4 hours as needed for shortness of breath / dyspnea or wheezing       amLODIPine 10 MG tablet    NORVASC    90 tablet    Take 1 tablet (10 mg) by mouth daily For blood pressure       atorvastatin 40 MG tablet    LIPITOR    90 tablet    Take 1 tablet (40 mg) by mouth daily       bisacodyl 10 MG Suppository    DULCOLAX    90 suppository    Place 1 suppository (10 mg) rectally daily as needed for constipation       CALCIUM 600 + D PO      Take 1 tablet by mouth daily.       clopidogrel 75 MG tablet    PLAVIX    90 tablet    Take 1 tablet (75 mg) by mouth daily       * ferrous sulfate 325 (65 FE) MG tablet    IRON    90 tablet    Take 1 tablet (325 mg) by mouth daily (with breakfast)       * ferrous sulfate 325 (65 FE) MG tablet    IRON    90 tablet    Take 1 tablet (325 mg) by mouth 2 times daily To maintain iron levels       gabapentin 100 MG capsule    NEURONTIN    540 capsule    Take 4 capsules at bedtime, also 1 capsule in the morning and mid-day for pain prevention       hydrochlorothiazide 50 MG tablet    HYDRODIURIL    90 tablet    Take 1 tablet (50 mg) by mouth daily       levothyroxine 50 MCG tablet    SYNTHROID/LEVOTHROID    90 tablet    Take 1 tablet (50 mcg) by mouth daily       lisinopril 40 MG tablet    PRINIVIL/ZESTRIL    90 tablet    Take 1 tablet (40 mg) by mouth daily For blood pressure       nortriptyline 10 MG capsule    PAMELOR    180 capsule    Take 2 capsules (20 mg) by mouth At Bedtime for sleep and pain prevention       oxyCODONE 5 MG IR tablet    ROXICODONE    10 tablet    Take 1 tablet (5 mg) by mouth every 6 hours as needed for moderate to  severe pain       polyethylene glycol powder    MIRALAX    119 g    Take one-half to one scoop once a day for maintaining soft stools       potassium chloride SA 20 MEQ CR tablet    K-DUR/KLOR-CON M    90 tablet    Take 1 tablet (20 mEq) by mouth daily       ranitidine 150 MG tablet    ZANTAC    90 tablet    Take 1 tablet (150 mg) by mouth At Bedtime       traMADol 50 MG tablet   Start taking on:  5/22/2017    ULTRAM    270 tablet    Take 1 tablet (50 mg) by mouth every 6 hours as needed for moderate pain or severe pain Maximum 270 tablets in 3 months       * Notice:  This list has 2 medication(s) that are the same as other medications prescribed for you. Read the directions carefully, and ask your doctor or other care provider to review them with you.

## 2017-04-14 NOTE — PATIENT INSTRUCTIONS
Primary Care Center Medication Refill Request Information:  * Please contact your pharmacy regarding ANY request for medication refills.  ** [facility] Prescription Fax = [phone]  * Please allow 3 business days for routine medication refills.  * Please allow 5 business days for controlled substance medication refills.     Primary Care Center Test Result notification information:  *You will be notified with in 7-10 days of your appointment day regarding the results of your test.  If you are on MyChart you will be notified as soon as the provider has reviewed the results and signed off on them.    Primary Care Center Phone Number [phone] (Follow up in 6 months)    Dermatology [phone] (3rd Floor [facility])

## 2017-04-14 NOTE — PROGRESS NOTES
"    CC:  Preop    HPI:  I am seeing Lucie Stockton at the request of Dr. Blas Ashby for preoperative cardiopulmonary risk assessment.  Lucie had a successful right eye cataract surgery and is not pursuing left eye cataract surgery.  Has had gradual decreased vision.  No eye pain, photophobia, redness, swelling, foreign body sensation.  No headaches or floaters.      Cardiac risks: age  Pulmonary risks: age, has history of COPD but does not require inhalers.  Exercise tolerance: can walk at a strolling pace up to 1.5 miles with a few breaks to rest  Personal history of bleeding concerns/disorders: None, however, she is on Plavix for hx of PE.  Family history of bleeding disorders: None  Personal history of adverse anesthesia reactions:  None  Family history of adverse anesthesia reactions:  None  Personal history of surgical complications: None    ROS:  Although she stated no mental health problems below, she did bring up a concern that her  had regarding possible depression.  Lucie experienced a few weeks where she was not as peppy as her usual self.  Spent more time on the couch, reading and napping.  Felt a bit fatigued.  We reviewed all the criteria for depression and otherwise this was negative.  On a scale from 1-10 she rated her potential depression symptoms as a 3/10.  I asked her at what level would she be more concerned and she said a 4-5/10.  I advised her to return for re-evaluation if this happens and we discussed \"red flags\" regarding when to go to the ED.  She is not suicidal.  She is getting out more now, e.g.played bridge with her friends.  Looks forward to getting outdoors more.  Lucie is due for a refill of her Tramadol (due 5/22/17).  I gave her a post-dated prescription.  Pain is well managed.  Because of her skin lesions (see below) I reviewed skin cancer prevention with her.  She wears hats and sunscreen.  No family history of melanoma.  Home BP's reviewed.  For the last 2 weeks, " measuring 3 times a week, all BP's were at goal except for 159/72, 153/69 and 151/73.  Asymptomatic.    General Symptoms: No     Skin Symptoms: No    HENT Symptoms: No    EYE SYMPTOMS: No    HEART SYMPTOMS: No    LUNG SYMPTOMS: No    INTESTINAL SYMPTOMS: No    URINARY SYMPTOMS: No    GYNECOLOGIC SYMPTOMS: No    BREAST SYMPTOMS: No    SKELETAL SYMPTOMS: No    BLOOD SYMPTOMS: No    NERVOUS SYSTEM SYMPTOMS: No    MENTAL HEALTH SYMPTOMS: No     Patient Active Problem List   Diagnosis     Benign ovarian tumor     Hypothyroidism     Peripheral vascular disease (H)     Knee pain     Osteoarthritis     Cervicalgia     Hyperlipidemia with target LDL less than 70     Pulmonary embolism (H)     Anemia     Aortic stenosis     Atherosclerosis of aorta (H)     Atherosclerosis of arteries of extremities (H)     Intermittent claudication (H)     Iron deficiency     Osteoporosis     DVT of lower extremity, bilateral (H)     Varicose veins     Gluteal pain     Insomnia     Back pain     Vitamin D deficiency     Tobacco use disorder     Personal history of other drug therapy     Right knee pain     Other chronic pulmonary embolism (H)     Venous thrombosis of extremity     Benign essential hypertension     Gastritis     Cataract     Acute left-sided low back pain with left-sided sciatica     Lumbar stenosis with neurogenic claudication     SBO (small bowel obstruction) (H)     Small bowel obstruction (H)     Past Surgical History:   Procedure Laterality Date     BUNIONECTOMY       C STOMACH SURGERY PROCEDURE UNLISTED      Please see chart     COLONOSCOPY      11/10/10 angioectasia     HYSTERECTOMY TOTAL ABDOMINAL, BILATERAL SALPINGO-OOPHORECTOMY, NODE DISSECTION, COMBINED  2/17/11    SANGEETHA, EMILIA, LN bx, omentectomy, resection of evrian malignancy Dr. JONO Goncalves - Returned BENIGN     UPPER GI ENDOSCOPY      11/10/10 erythematous gastropathy, angioectasia     Family History   Problem Relation Age of Onset     Breast Cancer Maternal Aunt 80      C.A.D. Father 54     Social History     Social History     Marital status:      Spouse name: N/A     Number of children: N/A     Years of education: N/A     Occupational History     Not on file.     Social History Main Topics     Smoking status: Former Smoker     Packs/day: 0.50     Years: 15.00     Quit date: 9/13/1993     Smokeless tobacco: Never Used     Alcohol use Yes      Comment: social     Drug use: No     Sexual activity: Yes     Partners: Male     Other Topics Concern     Not on file     Social History Narrative     for 52 years. Retired from [employer]. Frequent world travel.     Current Outpatient Prescriptions   Medication Sig Dispense Refill     oxyCODONE (ROXICODONE) 5 MG IR tablet Take 1 tablet (5 mg) by mouth every 6 hours as needed for moderate to severe pain 10 tablet 0     bisacodyl (DULCOLAX) 10 MG Suppository Place 1 suppository (10 mg) rectally daily as needed for constipation 90 suppository 3     levothyroxine (SYNTHROID/LEVOTHROID) 50 MCG tablet Take 1 tablet (50 mcg) by mouth daily 90 tablet 3     clopidogrel (PLAVIX) 75 MG tablet Take 1 tablet (75 mg) by mouth daily 90 tablet 3     hydrochlorothiazide (HYDRODIURIL) 50 MG tablet Take 1 tablet (50 mg) by mouth daily 90 tablet 3     ranitidine (ZANTAC) 150 MG tablet Take 1 tablet (150 mg) by mouth At Bedtime 90 tablet 3     lisinopril (PRINIVIL/ZESTRIL) 40 MG tablet Take 1 tablet (40 mg) by mouth daily For blood pressure 90 tablet 3     nortriptyline (PAMELOR) 10 MG capsule Take 2 capsules (20 mg) by mouth At Bedtime for sleep and pain prevention 180 capsule 1     gabapentin (NEURONTIN) 100 MG capsule Take 4 capsules at bedtime, also 1 capsule in the morning and mid-day for pain prevention 540 capsule 2     polyethylene glycol (MIRALAX) powder Take one-half to one scoop once a day for maintaining soft stools 119 g 5     ferrous sulfate (IRON) 325 (65 FE) MG tablet Take 1 tablet (325 mg) by mouth 2 times daily To  "maintain iron levels 90 tablet 1     amLODIPine (NORVASC) 10 MG tablet Take 1 tablet (10 mg) by mouth daily For blood pressure 90 tablet 1     atorvastatin (LIPITOR) 40 MG tablet Take 1 tablet (40 mg) by mouth daily 90 tablet 3     traMADol (ULTRAM) 50 MG tablet Take 1 tablet (50 mg) by mouth every 6 hours as needed for moderate pain or severe pain Maximum 270 tablets in 3 months 270 tablet 1     albuterol (VENTOLIN HFA) 108 (90 BASE) MCG/ACT inhaler Inhale 2 puffs into the lungs every 4 hours as needed for shortness of breath / dyspnea or wheezing 18 Inhaler 1     potassium chloride SA (K-DUR,KLOR-CON M) 20 MEQ tablet Take 1 tablet (20 mEq) by mouth daily 90 tablet 3     ferrous sulfate (IRON) 325 (65 FE) MG tablet Take 1 tablet (325 mg) by mouth daily (with breakfast) 90 tablet 3     Calcium Carbonate-Vitamin D (CALCIUM 600 + D OR) Take 1 tablet by mouth daily.       [DISCONTINUED] tolterodine (DETROL) 1 MG tablet Take 1-2 tablets by mouth At Bedtime. 180 tablet 3     Allergies   Allergen Reactions     Trazodone Fatigue     Felt too groggy     /66  Pulse 72  Temp 97.7  F (36.5  C) (Oral)  Resp 16  Ht 1.51 m (4' 11.45\")  Wt 49.2 kg (108 lb 8 oz)  SpO2 98%  Breastfeeding? No  BMI 21.58 kg/m2  Physical Examination:  General:  Pleasant, alert, no acute distress  Eyes:  Pupils 2-3 mm, sclera white, EOM's full.    Ears:  TM's normal, EAC's patent, clear of cerumen  Nose:  Nasal passages clear, turbinates not swollen  Throat/Mouth:  No pharyngeal erythema or exudate, oral mucosa and tongue moist, no suspicious oral lesions  Neck:  Full AROM, supple, thyroid smooth, symmetric, not enlarged, no nodules  Lungs:  Clear to auscultation throughout, no wheezes, rhonchi or rales.  C/V:  Regular rate and rhythm, no murmurs, rubs or gallops.  No JVD, no carotid bruits.  No peripheral edema.   Abdomen:  Not distended.  Bowel sounds active.  No tenderness, no hepatosplenomegaly or masses.  No CVA tenderness or " masses.  Lymph:  No cervical lymph nodes.  Neuro:  Alert and oriented, face symmetric. No tremor.  M/S:   No joint deformities noted.  No joint swelling.  Skin:   No rashes or jaundice.  Normal moisture, good skin turgor.  Has an 8 mm oval, soft, slightly raised lesion with darker brown stippling on a pink background.  No irregular borders, scale, heaped up borders.  Also a sandpaper texture lesion approx. 3-4 mm on the left bridge of her nose c/w AK.  Psych:  Broad range affect.  Not psychomotor slowed.  No signs of anxiety or agitation.    Lucie was seen today for pre-op exam.    Diagnoses and all orders for this visit:    Pre-operative cardiovascular examination    Senile cataract, left    History of pulmonary embolism, remote, stable    Long term current use of anticoagulant therapy Plavix    Benign essential hypertension, controlled.  Home BP's reviewed and within goal range.    Chronic bilateral low back pain with sciatica, sciatica laterality unspecified, stable  -     Refill traMADol (ULTRAM) 50 MG tablet; Take 1 tablet (50 mg) by mouth every 6 hours as needed for moderate pain or severe pain Maximum 270 tablets in 3 months    Skin lesion  -     DERMATOLOGY REFERRAL; Future    Routine f/u 6 months, sooner as needed.    Marii Montgomery M.D.  Internal Medicine  [facility]   pager [phone]

## 2017-04-14 NOTE — NURSING NOTE
Chief Complaint   Patient presents with     Pre-Op Exam     Patient is here for a pre-op exam.      Betty Prakash LPN at 8:37 AM on 4/14/2017.

## 2017-04-14 NOTE — PROGRESS NOTES
Surgeon (please enter first and last name):  Dr. Blas Ashby  Fax number for Preop Evaluation:  [phone]  Location of Surgery: [facility]  Date of Surgery:  4/25/17  Procedure:  Cataracts left eye  History of reaction to anesthesia?  No

## 2017-04-17 ENCOUNTER — TELEPHONE (OUTPATIENT)
Dept: DERMATOLOGY | Facility: CLINIC | Age: 82
End: 2017-04-17

## 2017-04-17 NOTE — TELEPHONE ENCOUNTER
----- Message from Dominique Parnell sent at 4/17/2017 10:16 AM CDT -----  Regarding: lesion scheduling- new pt  Contact: [phone]  Pt has new lesions with no hx of lesions. No openings within call center timeframe guidelines.    Pt can be reached at number above.    Thanks!- lel    Please DO NOT send this message and/or reply back to sender.  Call Center Representatives DO NOT respond to messages.

## 2017-04-29 ENCOUNTER — HOSPITAL ENCOUNTER (EMERGENCY)
Facility: CLINIC | Age: 82
Discharge: HOME | End: 2017-04-29
Attending: EMERGENCY MEDICINE | Admitting: EMERGENCY MEDICINE
Payer: MEDICARE

## 2017-04-29 VITALS
HEIGHT: 59 IN | DIASTOLIC BLOOD PRESSURE: 51 MMHG | RESPIRATION RATE: 19 BRPM | OXYGEN SATURATION: 98 % | SYSTOLIC BLOOD PRESSURE: 140 MMHG | TEMPERATURE: 98.1 F

## 2017-04-29 DIAGNOSIS — I44.0 FIRST DEGREE ATRIOVENTRICULAR BLOCK: ICD-10-CM

## 2017-04-29 DIAGNOSIS — R11.10 VOMITING AND DIARRHEA: ICD-10-CM

## 2017-04-29 DIAGNOSIS — M62.81 GENERALIZED MUSCLE WEAKNESS: ICD-10-CM

## 2017-04-29 DIAGNOSIS — R19.7 VOMITING AND DIARRHEA: ICD-10-CM

## 2017-04-29 DIAGNOSIS — E87.6 HYPOKALEMIA: ICD-10-CM

## 2017-04-29 DIAGNOSIS — E86.0 DEHYDRATION: ICD-10-CM

## 2017-04-29 LAB
ALBUMIN SERPL-MCNC: 3.4 G/DL (ref 3.4–5)
ALBUMIN UR-MCNC: 30 MG/DL
ALP SERPL-CCNC: 67 U/L (ref 40–150)
ALT SERPL W P-5'-P-CCNC: 25 U/L (ref 0–50)
ANION GAP SERPL CALCULATED.3IONS-SCNC: 9 MMOL/L (ref 3–14)
APPEARANCE UR: CLEAR
AST SERPL W P-5'-P-CCNC: 28 U/L (ref 0–45)
BASOPHILS # BLD AUTO: 0 10E9/L (ref 0–0.2)
BASOPHILS NFR BLD AUTO: 0.5 %
BILIRUB SERPL-MCNC: 0.3 MG/DL (ref 0.2–1.3)
BILIRUB UR QL STRIP: NEGATIVE
BUN SERPL-MCNC: 38 MG/DL (ref 7–30)
CALCIUM SERPL-MCNC: 9.5 MG/DL (ref 8.5–10.1)
CHLORIDE SERPL-SCNC: 105 MMOL/L (ref 94–109)
CO2 SERPL-SCNC: 23 MMOL/L (ref 20–32)
COLOR UR AUTO: YELLOW
CREAT SERPL-MCNC: 0.69 MG/DL (ref 0.52–1.04)
DIFFERENTIAL METHOD BLD: ABNORMAL
EOSINOPHIL # BLD AUTO: 0 10E9/L (ref 0–0.7)
EOSINOPHIL NFR BLD AUTO: 0.5 %
ERYTHROCYTE [DISTWIDTH] IN BLOOD BY AUTOMATED COUNT: 14.1 % (ref 10–15)
GFR SERPL CREATININE-BSD FRML MDRD: 81 ML/MIN/1.7M2
GLUCOSE SERPL-MCNC: 82 MG/DL (ref 70–99)
GLUCOSE UR STRIP-MCNC: NEGATIVE MG/DL
HCT VFR BLD AUTO: 42.4 % (ref 35–47)
HGB BLD-MCNC: 14.5 G/DL (ref 11.7–15.7)
HGB UR QL STRIP: ABNORMAL
HYALINE CASTS #/AREA URNS LPF: 2 /LPF (ref 0–2)
IMM GRANULOCYTES # BLD: 0 10E9/L (ref 0–0.4)
IMM GRANULOCYTES NFR BLD: 0 %
KETONES UR STRIP-MCNC: 5 MG/DL
LACTATE BLD-SCNC: 0.9 MMOL/L (ref 0.7–2.1)
LEUKOCYTE ESTERASE UR QL STRIP: ABNORMAL
LYMPHOCYTES # BLD AUTO: 1 10E9/L (ref 0.8–5.3)
LYMPHOCYTES NFR BLD AUTO: 25.8 %
MCH RBC QN AUTO: 30.7 PG (ref 26.5–33)
MCHC RBC AUTO-ENTMCNC: 34.2 G/DL (ref 31.5–36.5)
MCV RBC AUTO: 90 FL (ref 78–100)
MONOCYTES # BLD AUTO: 0.9 10E9/L (ref 0–1.3)
MONOCYTES NFR BLD AUTO: 24 %
MUCOUS THREADS #/AREA URNS LPF: PRESENT /LPF
NEUTROPHILS # BLD AUTO: 1.9 10E9/L (ref 1.6–8.3)
NEUTROPHILS NFR BLD AUTO: 49.2 %
NITRATE UR QL: NEGATIVE
NRBC # BLD AUTO: 0 10*3/UL
NRBC BLD AUTO-RTO: 0 /100
PH UR STRIP: 6 PH (ref 5–7)
PLATELET # BLD AUTO: 332 10E9/L (ref 150–450)
POTASSIUM SERPL-SCNC: 3 MMOL/L (ref 3.4–5.3)
PROT SERPL-MCNC: 6.7 G/DL (ref 6.8–8.8)
RBC # BLD AUTO: 4.72 10E12/L (ref 3.8–5.2)
RBC #/AREA URNS AUTO: 14 /HPF (ref 0–2)
SODIUM SERPL-SCNC: 138 MMOL/L (ref 133–144)
SP GR UR STRIP: 1.02 (ref 1–1.03)
TRANS CELLS #/AREA URNS HPF: <1 /HPF (ref 0–1)
TROPONIN I BLD-MCNC: 0.02 UG/L (ref 0–0.1)
TROPONIN I SERPL-MCNC: 0.04 UG/L (ref 0–0.04)
URN SPEC COLLECT METH UR: ABNORMAL
UROBILINOGEN UR STRIP-MCNC: NORMAL MG/DL (ref 0–2)
WBC # BLD AUTO: 3.8 10E9/L (ref 4–11)
WBC #/AREA URNS AUTO: 9 /HPF (ref 0–2)

## 2017-04-29 PROCEDURE — 80053 COMPREHEN METABOLIC PANEL: CPT | Performed by: EMERGENCY MEDICINE

## 2017-04-29 PROCEDURE — 25000128 H RX IP 250 OP 636: Performed by: EMERGENCY MEDICINE

## 2017-04-29 PROCEDURE — 93005 ELECTROCARDIOGRAM TRACING: CPT | Performed by: EMERGENCY MEDICINE

## 2017-04-29 PROCEDURE — 96360 HYDRATION IV INFUSION INIT: CPT | Performed by: EMERGENCY MEDICINE

## 2017-04-29 PROCEDURE — 84484 ASSAY OF TROPONIN QUANT: CPT | Mod: 91

## 2017-04-29 PROCEDURE — A9270 NON-COVERED ITEM OR SERVICE: HCPCS | Mod: GY | Performed by: FAMILY MEDICINE

## 2017-04-29 PROCEDURE — 25000132 ZZH RX MED GY IP 250 OP 250 PS 637: Mod: GY | Performed by: FAMILY MEDICINE

## 2017-04-29 PROCEDURE — 87086 URINE CULTURE/COLONY COUNT: CPT | Performed by: EMERGENCY MEDICINE

## 2017-04-29 PROCEDURE — 81001 URINALYSIS AUTO W/SCOPE: CPT | Performed by: EMERGENCY MEDICINE

## 2017-04-29 PROCEDURE — 96361 HYDRATE IV INFUSION ADD-ON: CPT | Performed by: EMERGENCY MEDICINE

## 2017-04-29 PROCEDURE — 84484 ASSAY OF TROPONIN QUANT: CPT | Performed by: EMERGENCY MEDICINE

## 2017-04-29 PROCEDURE — 99285 EMERGENCY DEPT VISIT HI MDM: CPT | Mod: 25 | Performed by: EMERGENCY MEDICINE

## 2017-04-29 PROCEDURE — 83605 ASSAY OF LACTIC ACID: CPT | Performed by: EMERGENCY MEDICINE

## 2017-04-29 PROCEDURE — 93010 ELECTROCARDIOGRAM REPORT: CPT | Mod: Z6 | Performed by: EMERGENCY MEDICINE

## 2017-04-29 PROCEDURE — 85025 COMPLETE CBC W/AUTO DIFF WBC: CPT | Performed by: EMERGENCY MEDICINE

## 2017-04-29 PROCEDURE — 99284 EMERGENCY DEPT VISIT MOD MDM: CPT | Mod: 25 | Performed by: EMERGENCY MEDICINE

## 2017-04-29 RX ORDER — ONDANSETRON 4 MG/1
4 TABLET, ORALLY DISINTEGRATING ORAL EVERY 6 HOURS PRN
Qty: 10 TABLET | Refills: 0 | Status: SHIPPED | OUTPATIENT
Start: 2017-04-29 | End: 2017-05-01

## 2017-04-29 RX ORDER — MOXIFLOXACIN 5 MG/ML
1 SOLUTION/ DROPS OPHTHALMIC 4 TIMES DAILY
COMMUNITY
End: 2018-02-15

## 2017-04-29 RX ORDER — KETOROLAC TROMETHAMINE 5 MG/ML
1 SOLUTION OPHTHALMIC 4 TIMES DAILY
COMMUNITY
End: 2018-02-15

## 2017-04-29 RX ORDER — PREDNISOLONE ACETATE 10 MG/ML
1 SUSPENSION/ DROPS OPHTHALMIC 4 TIMES DAILY
COMMUNITY
End: 2018-02-15

## 2017-04-29 RX ORDER — POTASSIUM CHLORIDE 20MEQ/15ML
40 LIQUID (ML) ORAL ONCE
Status: COMPLETED | OUTPATIENT
Start: 2017-04-29 | End: 2017-04-29

## 2017-04-29 RX ORDER — ONDANSETRON 2 MG/ML
4 INJECTION INTRAMUSCULAR; INTRAVENOUS EVERY 30 MIN PRN
Status: DISCONTINUED | OUTPATIENT
Start: 2017-04-29 | End: 2017-04-29 | Stop reason: HOSPADM

## 2017-04-29 RX ADMIN — Medication 40 MEQ: at 18:12

## 2017-04-29 RX ADMIN — SODIUM CHLORIDE 1000 ML: 9 INJECTION, SOLUTION INTRAVENOUS at 16:48

## 2017-04-29 ASSESSMENT — ENCOUNTER SYMPTOMS
FATIGUE: 1
PALPITATIONS: 0
SPEECH DIFFICULTY: 0
EYE REDNESS: 0
UNEXPECTED WEIGHT CHANGE: 0
DIARRHEA: 1
APPETITE CHANGE: 1
EYE DISCHARGE: 0
ACTIVITY CHANGE: 0
FACIAL ASYMMETRY: 0
VOMITING: 1
COUGH: 0
MYALGIAS: 0
DIAPHORESIS: 0
CONSTIPATION: 0
NUMBNESS: 0
NAUSEA: 1
ABDOMINAL PAIN: 1
DYSURIA: 0
TROUBLE SWALLOWING: 0
SORE THROAT: 1
ABDOMINAL DISTENTION: 0
FEVER: 0
HEADACHES: 1
HEMATURIA: 0
CHILLS: 0
WHEEZING: 0
WEAKNESS: 1
EYE PAIN: 0
LIGHT-HEADEDNESS: 1
SHORTNESS OF BREATH: 0
BLOOD IN STOOL: 0
FLANK PAIN: 0

## 2017-04-29 NOTE — DISCHARGE INSTRUCTIONS
Take zofran every 6 hours as needed for nausea    Keep drinking fluids and juices.  Stay hydrated as best you can    Take all your medications as directed.    If you are not feeling any better in 24 hours, return to the emergency department.

## 2017-04-29 NOTE — ED AVS SNAPSHOT
[facility], [place], Emergency Department    [address] [place] [id number]    Phone:  [phone]                                       Lucie Stockton   MRN: [id number]    Department:  [facility], Emergency Department   Date of Visit:  4/29/2017           After Visit Summary Signature Page     I have received my discharge instructions, and my questions have been answered. I have discussed any challenges I see with this plan with the nurse or doctor.    ..........................................................................................................................................  Patient/Patient Representative Signature      ..........................................................................................................................................  Patient Representative Print Name and Relationship to Patient    ..................................................               ................................................  Date                                            Time    ..........................................................................................................................................  Reviewed by Signature/Title    ...................................................              ..............................................  Date                                                            Time

## 2017-04-29 NOTE — ED PROVIDER NOTES
"  History     Chief Complaint   Patient presents with     Generalized Weakness     Post-op Problem     left eye cataract surgery on Tuesday     Nausea, Vomiting, & Diarrhea     HPI  Lucie Stockton is a 81 year old female with a h/o hypothyroid, HLD, HTN, gastritis, h/o SBO 1/2017 medically managed and recent left cataract surgery 4/25/17 who presents to the ED with 4 day h/o of nausea, vomiting and diarrhea. She reports associated intermittent headaches and mild, cramping abdominal pain. No acute vision changes, eye pain, hematuria, blood in stool. She states that she has been feeling weak with some lightheadedness when sitting or standing today. Her appetite has been decreased and she has not taken any of her medications in the last 24 hours due to nausea and vomiting. She has peed three times today, made small amount of urine each time.  No CP/SOB.      At baseline, she is able to do all her ADLs, lives with her  and is a very active 82yo.     11/2010 EGD (normal esophagus, Non-bleeding erythematous gastropathy, AVMs in gastrum and duodenum)    11/2010 Colonoscopy (A single non-bleeding colonic angioectasia and internal hemorrhoids).     Past Medical History:   Diagnosis Date     Anemia      Aortic stenosis     abdominal     Atherosclerosis of aorta (H)     \"extensive disease with near occlusion below level of renal arteries\" on CT     Atherosclerosis of arteries of extremities (H)      Back pain     severe multilevel DJD, moderate spinal canal stenosis L4-5, severe L3-4     Degenerative joint disease      DVT of lower extremity, bilateral (H)     5/24/10 left distal femoral and great saphenous, 7/7/10 left:  extending to cfv, iliac , pop and post tibial, peronial, right:  distal fem and peroneal      Grave's disease      Hyperlipidaemia LDL goal < 70      Hypertension, essential      Hypothyroidism      Insomnia      Intermittent claudication (H)      Iron deficiency      Knee pain      Lumbar stenosis with " neurogenic claudication      Osteoarthritis     ankle,foot, knee     Osteoporosis      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 JONO Goncalves - Returned BENIGN     UPPER GI ENDOSCOPY      11/10/10 erythematous gastropathy, angioectasia       Family History   Problem Relation Age of Onset     Breast Cancer Maternal Aunt 80     C.A.D. Father 54       Social History   Substance Use Topics     Smoking status: Former Smoker     Packs/day: 0.50     Years: 15.00     Quit date: 9/13/1993     Smokeless tobacco: Never Used     Alcohol use Yes      Comment: social       I have reviewed the Medications, Allergies, Past Medical and Surgical History, and Social History in the Epic system.    Review of Systems   Constitutional: Positive for appetite change (decreased) and fatigue. Negative for activity change, chills, diaphoresis, fever and unexpected weight change.   HENT: Positive for sore throat (from vomiting). Negative for trouble swallowing.    Eyes: Negative for pain, discharge, redness and visual disturbance.        Left eyelid swelling   Respiratory: Negative for cough, shortness of breath and wheezing.    Cardiovascular: Negative for chest pain, palpitations and leg swelling.   Gastrointestinal: Positive for abdominal pain (diffuse, mild, cramping), diarrhea, nausea and vomiting. Negative for abdominal distention, blood in stool and constipation.   Genitourinary: Negative  "for dysuria, flank pain and hematuria.   Musculoskeletal: Negative for myalgias.   Skin: Negative for pallor and rash.   Neurological: Positive for weakness (generalized), light-headedness and headaches (intermittent, none today). Negative for facial asymmetry, speech difficulty and numbness.   All other systems reviewed and are negative.      Physical Exam   BP: 140/51  Heart Rate: 74  Temp: 98.1  F (36.7  C)  Resp: 19  Height: 149.9 cm (4' 11\")  SpO2: 98 %  Physical Exam   Constitutional: She is oriented to person, place, and time. She appears well-developed and well-nourished. No distress.   Pleasant, cooperative, able to converse easily.    HENT:   Head: Normocephalic and atraumatic.   Nose: Nose normal.   Mouth/Throat: No oropharyngeal exudate.   Mucous membranes tacky.   Eyes: Conjunctivae and EOM are normal. Pupils are equal, round, and reactive to light. Right eye exhibits no discharge. Left eye exhibits no discharge. No scleral icterus.       Neck: Normal range of motion. Neck supple.   Cardiovascular: Normal rate, regular rhythm and normal heart sounds.  Exam reveals no gallop and no friction rub.    No murmur heard.  Pulmonary/Chest: Effort normal and breath sounds normal. No respiratory distress. She has no wheezes. She has no rales.   Abdominal: Soft. Bowel sounds are normal. She exhibits no distension and no mass. There is no tenderness. There is no rebound and no guarding.   Musculoskeletal: She exhibits no edema.   Lymphadenopathy:     She has no cervical adenopathy.   Neurological: She is alert and oriented to person, place, and time.   Skin: Skin is warm. No rash noted. She is not diaphoretic.   Psychiatric: She has a normal mood and affect.       ED Course     ED Course   Value Comment By Time    Assessed patient. Labs ordered. IVF started. Dalila Jackman MD 04/29 1651    EKG sinus kelby with 1st AV block Dalila Jackman MD 04/29 1725   Potassium: (!) 3.0 Ordered 40mEq of PO K " Dalila Jackman MD 04/29 1747    Patient has not taken PTA PO K in last 2 days. Dalila Jackman MD 04/29 1748   MCH: 30.7 (Reviewed) Dalila Jackman MD 04/29 1814    Patient feeling much better and wants to go home. Advised to receive 1 more liter of LR, patient declined. Dalila Jackman MD 04/29 1850     Procedures             EKG Interpretation:      Interpreted by Dalila Jackman  Time reviewed: 1720  Symptoms at time of EKG: weakness   Rhythm: sinus bradycardia and 1 degree AV block  Rate: 50-60  Axis: Normal  Ectopy: none  Conduction: 1st degree AV block  ST Segments/ T Waves: No ST-T wave changes and No acute ischemic changes  Q Waves: none  Comparison to prior: previous EKG 1/22/17 showed sinus rhythm with prolonged Qtc    Clinical Impression: sinus bradycardia      Critical Care time:  none       Labs Ordered and Resulted from Time of ED Arrival Up to the Time of Departure from the ED   CBC WITH PLATELETS DIFFERENTIAL - Abnormal; Notable for the following:        Result Value    WBC 3.8 (*)     All other components within normal limits   COMPREHENSIVE METABOLIC PANEL - Abnormal; Notable for the following:     Potassium 3.0 (*)     Urea Nitrogen 38 (*)     Protein Total 6.7 (*)     All other components within normal limits   LACTIC ACID WHOLE BLOOD   ISTAT TROPONIN NURSING POCT   TROPONIN POCT            Assessments & Plan (with Medical Decision Making)   Lucie Stoctkon is a 81 year old female with a h/o hypothyroid, HLD, HTN gastritis, h/o SBO 1/2017 medically managed and recent left cataract surgery 4/25/17 who presents to the ED with 4 day h/o of nausea, vomiting and diarrhea. DDx include infectious, SBO, intestional ischemia, gastritis, PE, vs cardiac. Sent CMP, CBC, UA/UC, lactic acid, troponin and given 1L IVF bolus.  Doubt SBO as her abdomen is nontender.  Pt also states these symptoms are different than her previous obstruction earlier this year.  No ischemic EKG changes,  normal troponin, doubt firm SOB/CP symptoms doubt atypical ACS. Normal lactate, no abdominal tenderness, doubt mesenteric ischemia.  Unclear what initially caused her nausea, vomiting, and loose stools though may be a viral gastroenteritis.   Labs show elevated BUN, suggestive of dehydration. Her Hgb is also elevated compared to baseline, further suggested dehydration. She has mild hypokalemia, which I think is from GI losses. Repleted in the ED. Her UA is without firm signs of infection, she has no urinary symptoms either. Patient was feeling better after a liter of fluids. She was up ambulated around the emergency department and wanted to go home.  I explained to patient I would feel better if she received one more liter and was reassessed.  I wanted to make sure patient was voiding regularly before discharge.  Patient and  were insistent in leaving despite repeated encouragement to stay to complete another 1L of LR.  They also left before UA results (which were unremarkable)   She was then discharged to home with zofran for nausea and confirmed contact information for pending lab results.   Return to ED precautions/instructions discussed with the patient and/or family.  The patient and/or family verbalized understanding.        I have reviewed the nursing notes.     I have reviewed the findings, diagnosis, plan and need for follow up with the patient.    Discharge Medication List as of 4/29/2017  6:48 PM      START taking these medications    Details   ondansetron (ZOFRAN ODT) 4 MG ODT tab Take 1 tablet (4 mg) by mouth every 6 hours as needed for nausea, Disp-10 tablet, R-0, Local Print             Final diagnoses:   Dehydration   Vomiting and diarrhea   Generalized muscle weakness   Hypokalemia       4/29/2017   [facility], [place], EMERGENCY DEPARTMENT    Dalila Jackman MD  [facility]  [phone]    This data collected with the Resident working in the Emergency Department.  Patient was seen and  evaluated by myself and I repeated the history and physical exam with the patient.  The plan of care was discussed with them.  The key portions of the note including the entire assessment and plan reflect my documentation.       Farhat Edwards MD  04/30/17 0001       Farhat Edwards MD  04/30/17 0001

## 2017-04-29 NOTE — ED NOTES
Pt up and dressed, standing at nursing station and requesting to D/C. MD aware. Plan is to call her if UA is abnormal. Pt declined D/C vitals or instructions. Declined to sign D/C paperwork. Discharged with spouse.

## 2017-04-29 NOTE — ED NOTES
Generalized weakness. Had cataract surgery on the left eye on Tuesday and [facility]. Nausea, vomiting, and diarrhea started on Wednesday. States she has lost 15 pounds since Tuesday. Was able to eat a small amount today without vomiting.

## 2017-04-29 NOTE — ED AVS SNAPSHOT
[facility], Emergency Department    [address] [facility] [address]    Phone:  [phone]                                       Lucie Stockton   MRN: [id number]    Department:  [facility], Emergency Department   Date of Visit:  4/29/2017           Patient Information     Date Of Birth          1935        Your diagnoses for this visit were:     Dehydration     Vomiting and diarrhea     Generalized muscle weakness     Hypokalemia        You were seen by Farhat Edwards MD.      Follow-up Information     Schedule an appointment as soon as possible for a visit with Marii Montgomery MD.    Specialty:  Internal Medicine    Contact information:     PHYSICIANS  [address]  [facility] [address] [phone]          Discharge Instructions       Take zofran every 6 hours as needed for nausea    Keep drinking fluids and juices.  Stay hydrated as best you can    Take all your medications as directed.    If you are not feeling any better in 24 hours, return to the emergency department.    Discharge References/Attachments     VOMITING AND DIARRHEA, SELF-CARE FOR (ENGLISH)      Future Appointments        Provider Department Dept Phone Center    5/10/2017 2:30 PM Sarah Joseph PA-C [facility] [phone] [facility]    6/26/2017 3:00 PM Iona Velez MD [facility] [phone] [facility]      24 Hour Appointment Hotline       To make an appointment at any [facility], call [phone] ([phone]). If you don't have a family doctor or clinic, we will help you find one. [facility] are conveniently located to serve the needs of you and your family.             Review of your medicines      START taking        Dose / Directions Last dose taken    ondansetron 4 MG ODT tab   Commonly known as:  ZOFRAN ODT   Dose:  4 mg   Quantity:  10 tablet        Take 1 tablet (4 mg) by mouth every 6 hours as needed for nausea   Refills:  0          Our records  show that you are taking the medicines listed below. If these are incorrect, please call your family doctor or clinic.        Dose / Directions Last dose taken    albuterol 108 (90 BASE) MCG/ACT Inhaler   Commonly known as:  VENTOLIN HFA   Dose:  2 puff   Quantity:  18 Inhaler        Inhale 2 puffs into the lungs every 4 hours as needed for shortness of breath / dyspnea or wheezing   Refills:  1        amLODIPine 10 MG tablet   Commonly known as:  NORVASC   Dose:  10 mg   Quantity:  90 tablet        Take 1 tablet (10 mg) by mouth daily For blood pressure   Refills:  1        atorvastatin 40 MG tablet   Commonly known as:  LIPITOR   Dose:  40 mg   Quantity:  90 tablet        Take 1 tablet (40 mg) by mouth daily   Refills:  3        bisacodyl 10 MG Suppository   Commonly known as:  DULCOLAX   Dose:  10 mg   Quantity:  90 suppository        Place 1 suppository (10 mg) rectally daily as needed for constipation   Refills:  3        CALCIUM 600 + D PO   Dose:  1 tablet        Take 1 tablet by mouth daily.   Refills:  0        clopidogrel 75 MG tablet   Commonly known as:  PLAVIX   Dose:  75 mg   Quantity:  90 tablet        Take 1 tablet (75 mg) by mouth daily   Refills:  3        * ferrous sulfate 325 (65 FE) MG tablet   Commonly known as:  IRON   Dose:  325 mg   Quantity:  90 tablet        Take 1 tablet (325 mg) by mouth daily (with breakfast)   Refills:  3        * ferrous sulfate 325 (65 FE) MG tablet   Commonly known as:  IRON   Dose:  1 tablet   Quantity:  90 tablet        Take 1 tablet (325 mg) by mouth 2 times daily To maintain iron levels   Refills:  1        gabapentin 100 MG capsule   Commonly known as:  NEURONTIN   Quantity:  540 capsule        Take 4 capsules at bedtime, also 1 capsule in the morning and mid-day for pain prevention   Refills:  2        hydrochlorothiazide 50 MG tablet   Commonly known as:  HYDRODIURIL   Dose:  50 mg   Quantity:  90 tablet        Take 1 tablet (50 mg) by mouth daily   Refills:   3        ketorolac 0.5 % ophthalmic solution   Commonly known as:  ACULAR   Dose:  1 drop        Place 1 drop Into the left eye 4 times daily   Refills:  0        levothyroxine 50 MCG tablet   Commonly known as:  SYNTHROID/LEVOTHROID   Dose:  50 mcg   Quantity:  90 tablet        Take 1 tablet (50 mcg) by mouth daily   Refills:  3        lisinopril 40 MG tablet   Commonly known as:  PRINIVIL/ZESTRIL   Dose:  40 mg   Quantity:  90 tablet        Take 1 tablet (40 mg) by mouth daily For blood pressure   Refills:  3        moxifloxacin 0.5 % ophthalmic solution   Commonly known as:  VIGAMOX   Dose:  1 drop        Place 1 drop Into the left eye 4 times daily   Refills:  0        nortriptyline 10 MG capsule   Commonly known as:  PAMELOR   Dose:  20 mg   Quantity:  180 capsule        Take 2 capsules (20 mg) by mouth At Bedtime for sleep and pain prevention   Refills:  1        oxyCODONE 5 MG IR tablet   Commonly known as:  ROXICODONE   Dose:  5 mg   Quantity:  10 tablet        Take 1 tablet (5 mg) by mouth every 6 hours as needed for moderate to severe pain   Refills:  0        polyethylene glycol powder   Commonly known as:  MIRALAX   Quantity:  119 g        Take one-half to one scoop once a day for maintaining soft stools   Refills:  5        potassium chloride SA 20 MEQ CR tablet   Commonly known as:  K-DUR/KLOR-CON M   Dose:  20 mEq   Quantity:  90 tablet        Take 1 tablet (20 mEq) by mouth daily   Refills:  3        prednisoLONE acetate 1 % ophthalmic susp   Commonly known as:  PRED FORTE   Dose:  1 drop        Place 1 drop Into the left eye 4 times daily   Refills:  0        ranitidine 150 MG tablet   Commonly known as:  ZANTAC   Dose:  150 mg   Quantity:  90 tablet        Take 1 tablet (150 mg) by mouth At Bedtime   Refills:  3        traMADol 50 MG tablet   Commonly known as:  ULTRAM   Dose:  50 mg   Quantity:  270 tablet   Start taking on:  5/22/2017        Take 1 tablet (50 mg) by mouth every 6 hours as needed  for moderate pain or severe pain Maximum 270 tablets in 3 months   Refills:  1        * Notice:  This list has 2 medication(s) that are the same as other medications prescribed for you. Read the directions carefully, and ask your doctor or other care provider to review them with you.            Prescriptions were sent or printed at these locations (1 Prescription)                   Other Prescriptions                Printed at Department/Unit printer (1 of 1)         ondansetron (ZOFRAN ODT) 4 MG ODT tab                Procedures and tests performed during your visit     CBC with platelets differential    Comprehensive metabolic panel    EKG 12-lead, tracing only    ISTAT troponin nursing POCT    Lactic acid    Troponin I    Troponin POCT    UA with Microscopic    Urine Culture      Orders Needing Specimen Collection     None      Pending Results     Date and Time Order Name Status Description    4/29/2017 1659 EKG 12-lead, tracing only Preliminary     4/29/2017 1655 Troponin I In process             Pending Culture Results     No orders found from 4/27/2017 to 4/30/2017.            Thank you for choosing [facility]       Thank you for choosing [facility] for your care. Our goal is always to provide you with excellent care. Hearing back from our patients is one way we can continue to improve our services. Please take a few minutes to complete the written survey that you may receive in the mail after you visit with us. Thank you!        [facility] Information     [facility] gives you secure access to your electronic health record. If you see a primary care provider, you can also send messages to your care team and make appointments. If you have questions, please call your primary care clinic.  If you do not have a primary care provider, please call [phone] and they will assist you.        Care EveryWhere ID     This is your Care EveryWhere ID. This could be used by other organizations to access your [facility]  records  [id number]        After Visit Summary       This is your record. Keep this with you and show to your community pharmacist(s) and doctor(s) at your next visit.

## 2017-04-30 LAB
BACTERIA SPEC CULT: NORMAL
Lab: NORMAL
MICRO REPORT STATUS: NORMAL
SPECIMEN SOURCE: NORMAL

## 2017-05-01 ENCOUNTER — TELEPHONE (OUTPATIENT)
Dept: INTERNAL MEDICINE | Facility: CLINIC | Age: 82
End: 2017-05-01

## 2017-05-01 DIAGNOSIS — R11.0 NAUSEA: Primary | ICD-10-CM

## 2017-05-01 RX ORDER — ONDANSETRON 4 MG/1
4 TABLET, ORALLY DISINTEGRATING ORAL EVERY 6 HOURS PRN
Qty: 10 TABLET | Refills: 0 | Status: SHIPPED | OUTPATIENT
Start: 2017-05-01 | End: 2017-05-22

## 2017-05-01 NOTE — TELEPHONE ENCOUNTER
----- Message from Kirby Steel sent at 5/1/2017  2:44 PM CDT -----  Regarding: Flu like symptoms  Contact: [phone]  Patient states she is still not feeling well and is requesting a call back from her PCP or nurse.     She reports continued upset stomach, headache and flu like symptoms.     Pt states she feels terrible stomach ache headaches. No fever. No diarrhea.   Pt has not tried Zofran. Pt to keep on bland diet, increase fluids, monitor her fluids and her output. Pt to call this writer in morning to report how she feels.  Appt with Elaina Hackett at 2 pm 05/02/2017  Kristal Jurado RN 4:16 PM on 5/1/2017.

## 2017-05-02 LAB — INTERPRETATION ECG - MUSE: NORMAL

## 2017-05-10 ENCOUNTER — TELEPHONE (OUTPATIENT)
Dept: INTERNAL MEDICINE | Facility: CLINIC | Age: 82
End: 2017-05-10

## 2017-05-10 NOTE — TELEPHONE ENCOUNTER
----- Message from Yuli Green sent at 5/10/2017 11:22 AM CDT -----  Regarding: not feeling well  Contact: [phone]  Please call patient, she is not feeling well    Pt called with headache, stomach ache, chills and no fever. Denies congestion or rhinitis. No nausea or vomiting. Off and on for a few days.  Taking fluids and ate oatmeal with out problems. Just feels awful.    Spoke to Dr Montgomery -pt to rest, increase her fluid intake, Zofran for nausea, Ensure as directed. Pt to call back tomorrow if not feeling better. Pt to the ER if she is getting worse. Pt understood this plan of care.  Kristal Jurado RN 1:48 PM on 5/10/2017.

## 2017-05-11 ENCOUNTER — APPOINTMENT (OUTPATIENT)
Dept: GENERAL RADIOLOGY | Facility: CLINIC | Age: 82
End: 2017-05-11
Attending: EMERGENCY MEDICINE
Payer: MEDICARE

## 2017-05-11 ENCOUNTER — APPOINTMENT (OUTPATIENT)
Dept: CT IMAGING | Facility: CLINIC | Age: 82
End: 2017-05-11
Attending: EMERGENCY MEDICINE
Payer: MEDICARE

## 2017-05-11 ENCOUNTER — HOSPITAL ENCOUNTER (EMERGENCY)
Facility: CLINIC | Age: 82
Discharge: HOME | End: 2017-05-11
Attending: EMERGENCY MEDICINE | Admitting: EMERGENCY MEDICINE
Payer: MEDICARE

## 2017-05-11 ENCOUNTER — TELEPHONE (OUTPATIENT)
Dept: INTERNAL MEDICINE | Facility: CLINIC | Age: 82
End: 2017-05-11

## 2017-05-11 VITALS
BODY MASS INDEX: 22.22 KG/M2 | RESPIRATION RATE: 16 BRPM | TEMPERATURE: 98.6 F | DIASTOLIC BLOOD PRESSURE: 66 MMHG | OXYGEN SATURATION: 95 % | SYSTOLIC BLOOD PRESSURE: 155 MMHG | WEIGHT: 110 LBS

## 2017-05-11 DIAGNOSIS — R53.1 WEAKNESS GENERALIZED: ICD-10-CM

## 2017-05-11 DIAGNOSIS — Z86.718 PERSONAL HISTORY OF VENOUS THROMBOSIS AND EMBOLISM: ICD-10-CM

## 2017-05-11 DIAGNOSIS — Z79.01 LONG TERM (CURRENT) USE OF ANTICOAGULANTS: ICD-10-CM

## 2017-05-11 DIAGNOSIS — R53.83 FATIGUE, UNSPECIFIED TYPE: ICD-10-CM

## 2017-05-11 DIAGNOSIS — R51.9 FACIAL PAIN: ICD-10-CM

## 2017-05-11 LAB
ALBUMIN SERPL-MCNC: 3.2 G/DL (ref 3.4–5)
ALBUMIN UR-MCNC: NEGATIVE MG/DL
ALP SERPL-CCNC: 63 U/L (ref 40–150)
ALT SERPL W P-5'-P-CCNC: 16 U/L (ref 0–50)
ANION GAP SERPL CALCULATED.3IONS-SCNC: 7 MMOL/L (ref 3–14)
APPEARANCE UR: CLEAR
AST SERPL W P-5'-P-CCNC: 16 U/L (ref 0–45)
BACTERIA #/AREA URNS HPF: ABNORMAL /HPF
BASOPHILS # BLD AUTO: 0 10E9/L (ref 0–0.2)
BASOPHILS NFR BLD AUTO: 0.4 %
BILIRUB SERPL-MCNC: 0.4 MG/DL (ref 0.2–1.3)
BILIRUB UR QL STRIP: NEGATIVE
BUN SERPL-MCNC: 10 MG/DL (ref 7–30)
CALCIUM SERPL-MCNC: 9.1 MG/DL (ref 8.5–10.1)
CHLORIDE SERPL-SCNC: 106 MMOL/L (ref 94–109)
CO2 SERPL-SCNC: 26 MMOL/L (ref 20–32)
COLOR UR AUTO: ABNORMAL
CREAT SERPL-MCNC: 0.67 MG/DL (ref 0.52–1.04)
DIFFERENTIAL METHOD BLD: NORMAL
EOSINOPHIL # BLD AUTO: 0 10E9/L (ref 0–0.7)
EOSINOPHIL NFR BLD AUTO: 0.5 %
ERYTHROCYTE [DISTWIDTH] IN BLOOD BY AUTOMATED COUNT: 14.1 % (ref 10–15)
GFR SERPL CREATININE-BSD FRML MDRD: 84 ML/MIN/1.7M2
GLUCOSE SERPL-MCNC: 88 MG/DL (ref 70–99)
GLUCOSE UR STRIP-MCNC: NEGATIVE MG/DL
HCT VFR BLD AUTO: 37.5 % (ref 35–47)
HGB BLD-MCNC: 12.2 G/DL (ref 11.7–15.7)
HGB UR QL STRIP: NEGATIVE
HYALINE CASTS #/AREA URNS LPF: 1 /LPF (ref 0–2)
IMM GRANULOCYTES # BLD: 0 10E9/L (ref 0–0.4)
IMM GRANULOCYTES NFR BLD: 0.2 %
KETONES UR STRIP-MCNC: NEGATIVE MG/DL
LEUKOCYTE ESTERASE UR QL STRIP: NEGATIVE
LIPASE SERPL-CCNC: 258 U/L (ref 73–393)
LYMPHOCYTES # BLD AUTO: 1.1 10E9/L (ref 0.8–5.3)
LYMPHOCYTES NFR BLD AUTO: 13.3 %
MCH RBC QN AUTO: 29.9 PG (ref 26.5–33)
MCHC RBC AUTO-ENTMCNC: 32.5 G/DL (ref 31.5–36.5)
MCV RBC AUTO: 92 FL (ref 78–100)
MONOCYTES # BLD AUTO: 0.6 10E9/L (ref 0–1.3)
MONOCYTES NFR BLD AUTO: 8 %
NEUTROPHILS # BLD AUTO: 6.2 10E9/L (ref 1.6–8.3)
NEUTROPHILS NFR BLD AUTO: 77.6 %
NITRATE UR QL: NEGATIVE
NRBC # BLD AUTO: 0 10*3/UL
NRBC BLD AUTO-RTO: 0 /100
PH UR STRIP: 7 PH (ref 5–7)
PLATELET # BLD AUTO: 401 10E9/L (ref 150–450)
POTASSIUM SERPL-SCNC: 3.8 MMOL/L (ref 3.4–5.3)
PROT SERPL-MCNC: 6.2 G/DL (ref 6.8–8.8)
RBC # BLD AUTO: 4.08 10E12/L (ref 3.8–5.2)
RBC #/AREA URNS AUTO: 0 /HPF (ref 0–2)
SODIUM SERPL-SCNC: 139 MMOL/L (ref 133–144)
SP GR UR STRIP: 1 (ref 1–1.03)
URN SPEC COLLECT METH UR: ABNORMAL
UROBILINOGEN UR STRIP-MCNC: NORMAL MG/DL (ref 0–2)
WBC # BLD AUTO: 8 10E9/L (ref 4–11)
WBC #/AREA URNS AUTO: 1 /HPF (ref 0–2)

## 2017-05-11 PROCEDURE — 25800025 ZZH RX 258: Performed by: EMERGENCY MEDICINE

## 2017-05-11 PROCEDURE — 93005 ELECTROCARDIOGRAM TRACING: CPT | Performed by: EMERGENCY MEDICINE

## 2017-05-11 PROCEDURE — 83690 ASSAY OF LIPASE: CPT | Performed by: EMERGENCY MEDICINE

## 2017-05-11 PROCEDURE — 96360 HYDRATION IV INFUSION INIT: CPT | Performed by: EMERGENCY MEDICINE

## 2017-05-11 PROCEDURE — 99285 EMERGENCY DEPT VISIT HI MDM: CPT | Mod: 25 | Performed by: EMERGENCY MEDICINE

## 2017-05-11 PROCEDURE — 71020 XR CHEST 2 VW: CPT

## 2017-05-11 PROCEDURE — 81001 URINALYSIS AUTO W/SCOPE: CPT | Performed by: EMERGENCY MEDICINE

## 2017-05-11 PROCEDURE — 80053 COMPREHEN METABOLIC PANEL: CPT | Performed by: EMERGENCY MEDICINE

## 2017-05-11 PROCEDURE — 85025 COMPLETE CBC W/AUTO DIFF WBC: CPT | Performed by: EMERGENCY MEDICINE

## 2017-05-11 PROCEDURE — 99284 EMERGENCY DEPT VISIT MOD MDM: CPT | Mod: 25 | Performed by: EMERGENCY MEDICINE

## 2017-05-11 PROCEDURE — 93010 ELECTROCARDIOGRAM REPORT: CPT | Mod: Z6 | Performed by: EMERGENCY MEDICINE

## 2017-05-11 PROCEDURE — 70450 CT HEAD/BRAIN W/O DYE: CPT

## 2017-05-11 RX ORDER — SODIUM CHLORIDE, SODIUM LACTATE, POTASSIUM CHLORIDE, CALCIUM CHLORIDE 600; 310; 30; 20 MG/100ML; MG/100ML; MG/100ML; MG/100ML
INJECTION, SOLUTION INTRAVENOUS CONTINUOUS
Status: DISCONTINUED | OUTPATIENT
Start: 2017-05-11 | End: 2017-05-11 | Stop reason: HOSPADM

## 2017-05-11 RX ORDER — ONDANSETRON 2 MG/ML
4 INJECTION INTRAMUSCULAR; INTRAVENOUS EVERY 30 MIN PRN
Status: DISCONTINUED | OUTPATIENT
Start: 2017-05-11 | End: 2017-05-11 | Stop reason: HOSPADM

## 2017-05-11 RX ADMIN — SODIUM CHLORIDE, POTASSIUM CHLORIDE, SODIUM LACTATE AND CALCIUM CHLORIDE: 600; 310; 30; 20 INJECTION, SOLUTION INTRAVENOUS at 14:39

## 2017-05-11 ASSESSMENT — ENCOUNTER SYMPTOMS
DIARRHEA: 1
ABDOMINAL PAIN: 1
FEVER: 0
WEAKNESS: 1
NAUSEA: 1
VOMITING: 0
HEADACHES: 1
SHORTNESS OF BREATH: 0

## 2017-05-11 NOTE — ED PROVIDER NOTES
"  History     Chief Complaint   Patient presents with     Shortness of Breath     Nausea     HPI  Lucie Stockton is a 81 year old female with a history of SBO, DVT, aortic stenosis, hypertension, hyperlipidemia, and hypothyroidism who presents for evaluation of generalized weakness. The patient reports that she underwent cataract surgery about 3 weeks ago, and since then she has felt unwell. She describes vague symptoms of generalized weakness and \"rumbling stomach.\" She does endorse some diarrhea, intermittent nausea, and earlier she did have an episode of abdominal discomfort that is since improved. The patient endorses a mild headache, though she states that headaches a very atypical for her. The patient believes that she underwent a cholecystectomy previously.    I have reviewed the Medications, Allergies, Past Medical and Surgical History, and Social History in the Epic system.    Current Facility-Administered Medications   Medication     lactated ringers infusion     ondansetron (ZOFRAN) injection 4 mg     Current Outpatient Prescriptions   Medication     levothyroxine (SYNTHROID/LEVOTHROID) 50 MCG tablet     clopidogrel (PLAVIX) 75 MG tablet     hydrochlorothiazide (HYDRODIURIL) 50 MG tablet     lisinopril (PRINIVIL/ZESTRIL) 40 MG tablet     gabapentin (NEURONTIN) 100 MG capsule     amLODIPine (NORVASC) 10 MG tablet     atorvastatin (LIPITOR) 40 MG tablet     albuterol (VENTOLIN HFA) 108 (90 BASE) MCG/ACT inhaler     ondansetron (ZOFRAN ODT) 4 MG ODT tab     moxifloxacin (VIGAMOX) 0.5 % ophthalmic solution     prednisoLONE acetate (PRED FORTE) 1 % ophthalmic susp     ketorolac (ACULAR) 0.5 % ophthalmic solution     [START ON 5/22/2017] traMADol (ULTRAM) 50 MG tablet     oxyCODONE (ROXICODONE) 5 MG IR tablet     bisacodyl (DULCOLAX) 10 MG Suppository     ranitidine (ZANTAC) 150 MG tablet     nortriptyline (PAMELOR) 10 MG capsule     polyethylene glycol (MIRALAX) powder     ferrous sulfate (IRON) 325 (65 FE) MG " "tablet     potassium chloride SA (K-DUR,KLOR-CON M) 20 MEQ tablet     ferrous sulfate (IRON) 325 (65 FE) MG tablet     [DISCONTINUED] tolterodine (DETROL) 1 MG tablet     Calcium Carbonate-Vitamin D (CALCIUM 600 + D OR)     Past Medical History:   Diagnosis Date     Anemia      Aortic stenosis     abdominal     Atherosclerosis of aorta (H)     \"extensive disease with near occlusion below level of renal arteries\" on CT     Atherosclerosis of arteries of extremities (H)      Back pain     severe multilevel DJD, moderate spinal canal stenosis L4-5, severe L3-4     Degenerative joint disease      DVT of lower extremity, bilateral (H)     5/24/10 left distal femoral and great saphenous, 7/7/10 left:  extending to cfv, iliac , pop and post tibial, peronial, right:  distal fem and peroneal      Grave's disease      Hyperlipidaemia LDL goal < 70      Hypertension, essential      Hypothyroidism      Insomnia      Intermittent claudication (H)      Iron deficiency      Knee pain      Lumbar stenosis with neurogenic claudication      Osteoarthritis     ankle,foot, knee     Osteoporosis      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 JONO Goncalves - Returned BENIGN     UPPER GI ENDOSCOPY      11/10/10 erythematous gastropathy, angioectasia       Family History   Problem Relation Age of Onset     Breast Cancer Maternal Aunt 80     C.A.D. Father 54       Social History   Substance " Use Topics     Smoking status: Former Smoker     Packs/day: 0.50     Years: 15.00     Quit date: 9/13/1993     Smokeless tobacco: Never Used     Alcohol use Yes      Comment: social        Allergies   Allergen Reactions     Trazodone Fatigue     Felt too groggy       Review of Systems   Constitutional: Negative for fever.   Respiratory: Negative for shortness of breath.    Cardiovascular: Negative for chest pain.   Gastrointestinal: Positive for abdominal pain, diarrhea and nausea. Negative for vomiting.   Skin: Negative for rash.   Neurological: Positive for weakness (generalized) and headaches (mild).   All other systems reviewed and are negative.      Physical Exam   BP: 159/69  Heart Rate: 77  Temp: 98.6  F (37  C)  Resp: 16  Weight: 49.9 kg (110 lb)  SpO2: 93 %  Physical Exam   Constitutional: She is oriented to person, place, and time. She appears well-developed and well-nourished. No distress.   HENT:   Head: Normocephalic and atraumatic.   Eyes: Conjunctivae and EOM are normal. Pupils are equal, round, and reactive to light.   Neck: Neck supple.   Cardiovascular: Normal rate, regular rhythm and normal heart sounds.    Pulmonary/Chest: Effort normal and breath sounds normal. No respiratory distress. She has no wheezes.   Abdominal: Soft. There is no tenderness.   Neurological: She is alert and oriented to person, place, and time.   Skin: Skin is warm and dry.   Psychiatric: She has a normal mood and affect. Her behavior is normal.   Nursing note and vitals reviewed.      ED Course     ED Course     Procedures             EKG Interpretation:      Interpreted by Blas Winslow MD  Time reviewed: 1320  Symptoms at time of EKG: shortness of breath   Rhythm: normal sinus   Rate: normal  Axis: normal  Ectopy: none  Conduction: normal  ST Segments/ T Waves: No ST-T wave changes  Q Waves: none  Comparison to prior: Unchanged     Clinical Impression: normal sinus rhythm               Labs Ordered and Resulted from  Time of ED Arrival Up to the Time of Departure from the ED   COMPREHENSIVE METABOLIC PANEL - Abnormal; Notable for the following:        Result Value    Albumin 3.2 (*)     Protein Total 6.2 (*)     All other components within normal limits   ROUTINE UA WITH MICROSCOPIC - Abnormal; Notable for the following:     Specific Gravity Urine 1.002 (*)     Bacteria Urine Few (*)     All other components within normal limits   CBC WITH PLATELETS DIFFERENTIAL   LIPASE            Assessments & Plan (with Medical Decision Making)   81-year-old female who lives independently with her . She had a clinic appointment today for evaluation of subacute symptoms of fatigue and weakness that have  been going on for couple of weeks. No focal signs or symptoms present.  Here she had complained of a headache so head CT was done which was normal. She had complained of fever and chest x-ray and urinalysis were both negative. She has a normal white count, normal renal function and normal electrolytes. She was given some IV fluids and so she feels much better and would like to be discharged home.  We didn t find any evidence for infection. She has a normal examination. No abdominal pain or tenderness and she ll be discharged home to follow up with her primary care provider or the Emergency department if she is not doing better.  This part of the document was transcribed by Aixa Crowell, Medical Scribe.       I have reviewed the nursing notes.    I have reviewed the findings, diagnosis, plan and need for follow up with the patient.    New Prescriptions    No medications on file       Final diagnoses:   Weakness generalized   Fatigue, unspecified type     I, Paddy Nicholson, am serving as a trained medical scribe to document services personally performed by Blas Winslow MD, based on the provider's statements to me.      I, Blas Winslow MD, was physically present and have reviewed and verified the accuracy of this note  documented by Paddy Nicholson.    5/11/2017   [facility], [place], EMERGENCY DEPARTMENT     Blas Winslow MD  05/11/17 9302

## 2017-05-11 NOTE — ED AVS SNAPSHOT
[facility], [place], Emergency Department    [address] [place] [address]    Phone:  [phone]                                       Lucie Stockton   MRN: [id number]    Department:  [facility], Emergency Department   Date of Visit:  5/11/2017           After Visit Summary Signature Page     I have received my discharge instructions, and my questions have been answered. I have discussed any challenges I see with this plan with the nurse or doctor.    ..........................................................................................................................................  Patient/Patient Representative Signature      ..........................................................................................................................................  Patient Representative Print Name and Relationship to Patient    ..................................................               ................................................  Date                                            Time    ..........................................................................................................................................  Reviewed by Signature/Title    ...................................................              ..............................................  Date                                                            Time

## 2017-05-11 NOTE — ED NOTES
Pt comes in with 2 weeks of ongoing sob, nausea, and diarrhea. Pt has clinic appt at 2:30pm today to discuss these symptoms, but felt that she could not wait for that appt. Pt describes as trouble taking a full breath in, not worse with movement. Pt has tried zofran for nausea with some relief. Alert and oriented, vss.

## 2017-05-11 NOTE — DISCHARGE INSTRUCTIONS
All of your tests are normal  Return to the Emergency Department if you are not doing well, otherwise follow up with your primary care provider

## 2017-05-11 NOTE — ED AVS SNAPSHOT
[facility], Emergency Department    [address] [facility] [address]    Phone:  [phone]                                       Lucie Stockton   MRN: [id number]    Department:  [facility], Emergency Department   Date of Visit:  5/11/2017           Patient Information     Date Of Birth          1935        Your diagnoses for this visit were:     Weakness generalized     Fatigue, unspecified type        You were seen by Blas Winslow MD.        Discharge Instructions       All of your tests are normal  Return to the Emergency Department if you are not doing well, otherwise follow up with your primary care provider    Future Appointments        Provider Department Dept Phone Center    6/26/2017 3:00 PM Iona Velez MD [facility] [phone] [facility]      24 Hour Appointment Hotline       To make an appointment at any [facility], call [phone] ([phone]). If you don't have a family doctor or clinic, we will help you find one. [facility] clinics are conveniently located to serve the needs of you and your family.             Review of your medicines      Our records show that you are taking the medicines listed below. If these are incorrect, please call your family doctor or clinic.        Dose / Directions Last dose taken    albuterol 108 (90 BASE) MCG/ACT Inhaler   Commonly known as:  VENTOLIN HFA   Dose:  2 puff   Quantity:  18 Inhaler        Inhale 2 puffs into the lungs every 4 hours as needed for shortness of breath / dyspnea or wheezing   Refills:  1        amLODIPine 10 MG tablet   Commonly known as:  NORVASC   Dose:  10 mg   Quantity:  90 tablet        Take 1 tablet (10 mg) by mouth daily For blood pressure   Refills:  1        atorvastatin 40 MG tablet   Commonly known as:  LIPITOR   Dose:  40 mg   Quantity:  90 tablet        Take 1 tablet (40 mg) by mouth daily   Refills:  3        bisacodyl 10 MG Suppository   Commonly known as:  DULCOLAX    Dose:  10 mg   Quantity:  90 suppository        Place 1 suppository (10 mg) rectally daily as needed for constipation   Refills:  3        CALCIUM 600 + D PO   Dose:  1 tablet        Take 1 tablet by mouth daily.   Refills:  0        clopidogrel 75 MG tablet   Commonly known as:  PLAVIX   Dose:  75 mg   Quantity:  90 tablet        Take 1 tablet (75 mg) by mouth daily   Refills:  3        * ferrous sulfate 325 (65 FE) MG tablet   Commonly known as:  IRON   Dose:  325 mg   Quantity:  90 tablet        Take 1 tablet (325 mg) by mouth daily (with breakfast)   Refills:  3        * ferrous sulfate 325 (65 FE) MG tablet   Commonly known as:  IRON   Dose:  1 tablet   Quantity:  90 tablet        Take 1 tablet (325 mg) by mouth 2 times daily To maintain iron levels   Refills:  1        gabapentin 100 MG capsule   Commonly known as:  NEURONTIN   Quantity:  540 capsule        Take 4 capsules at bedtime, also 1 capsule in the morning and mid-day for pain prevention   Refills:  2        hydrochlorothiazide 50 MG tablet   Commonly known as:  HYDRODIURIL   Dose:  50 mg   Quantity:  90 tablet        Take 1 tablet (50 mg) by mouth daily   Refills:  3        ketorolac 0.5 % ophthalmic solution   Commonly known as:  ACULAR   Dose:  1 drop        Place 1 drop Into the left eye 4 times daily   Refills:  0        levothyroxine 50 MCG tablet   Commonly known as:  SYNTHROID/LEVOTHROID   Dose:  50 mcg   Quantity:  90 tablet        Take 1 tablet (50 mcg) by mouth daily   Refills:  3        lisinopril 40 MG tablet   Commonly known as:  PRINIVIL/ZESTRIL   Dose:  40 mg   Quantity:  90 tablet        Take 1 tablet (40 mg) by mouth daily For blood pressure   Refills:  3        moxifloxacin 0.5 % ophthalmic solution   Commonly known as:  VIGAMOX   Dose:  1 drop        Place 1 drop Into the left eye 4 times daily   Refills:  0        nortriptyline 10 MG capsule   Commonly known as:  PAMELOR   Dose:  20 mg   Quantity:  180 capsule        Take 2  capsules (20 mg) by mouth At Bedtime for sleep and pain prevention   Refills:  1        ondansetron 4 MG ODT tab   Commonly known as:  ZOFRAN ODT   Dose:  4 mg   Quantity:  10 tablet        Take 1 tablet (4 mg) by mouth every 6 hours as needed for nausea   Refills:  0        oxyCODONE 5 MG IR tablet   Commonly known as:  ROXICODONE   Dose:  5 mg   Quantity:  10 tablet        Take 1 tablet (5 mg) by mouth every 6 hours as needed for moderate to severe pain   Refills:  0        polyethylene glycol powder   Commonly known as:  MIRALAX   Quantity:  119 g        Take one-half to one scoop once a day for maintaining soft stools   Refills:  5        potassium chloride SA 20 MEQ CR tablet   Commonly known as:  K-DUR/KLOR-CON M   Dose:  20 mEq   Quantity:  90 tablet        Take 1 tablet (20 mEq) by mouth daily   Refills:  3        prednisoLONE acetate 1 % ophthalmic susp   Commonly known as:  PRED FORTE   Dose:  1 drop        Place 1 drop Into the left eye 4 times daily   Refills:  0        ranitidine 150 MG tablet   Commonly known as:  ZANTAC   Dose:  150 mg   Quantity:  90 tablet        Take 1 tablet (150 mg) by mouth At Bedtime   Refills:  3        traMADol 50 MG tablet   Commonly known as:  ULTRAM   Dose:  50 mg   Quantity:  270 tablet   Start taking on:  5/22/2017        Take 1 tablet (50 mg) by mouth every 6 hours as needed for moderate pain or severe pain Maximum 270 tablets in 3 months   Refills:  1        * Notice:  This list has 2 medication(s) that are the same as other medications prescribed for you. Read the directions carefully, and ask your doctor or other care provider to review them with you.            Procedures and tests performed during your visit     Procedure/Test Number of Times Performed    CBC with platelets differential 1    CT Head w/o Contrast 1    Comprehensive metabolic panel 1    EKG 12-lead, tracing only 2    Lipase 1    UA with Microscopic 1    XR Chest 2 Views 1      Orders Needing Specimen  Collection     None      Pending Results     Date and Time Order Name Status Description    5/11/2017 1324 XR Chest 2 Views In process     5/11/2017 1313 EKG 12-lead, tracing only Preliminary             Pending Culture Results     No orders found from 5/9/2017 to 5/12/2017.            Pending Results Instructions     If you had any lab results that were not finalized at the time of your Discharge, you can call the ED Lab Result RN at [phone]. You will be contacted by this team for any positive Lab results or changes in treatment. The nurses are available 7 days a week from 10A to 6:30P.  You can leave a message 24 hours per day and they will return your call.        Thank you for choosing [facility]       Thank you for choosing [facility] for your care. Our goal is always to provide you with excellent care. Hearing back from our patients is one way we can continue to improve our services. Please take a few minutes to complete the written survey that you may receive in the mail after you visit with us. Thank you!        [employer] Information     [employer] gives you secure access to your electronic health record. If you see a primary care provider, you can also send messages to your care team and make appointments. If you have questions, please call your primary care clinic.  If you do not have a primary care provider, please call [phone] and they will assist you.        Care EveryWhere ID     This is your Care EveryWhere ID. This could be used by other organizations to access your [facility] medical records  [id number]        After Visit Summary       This is your record. Keep this with you and show to your community pharmacist(s) and doctor(s) at your next visit.

## 2017-05-11 NOTE — TELEPHONE ENCOUNTER
Pt calling and reporting that she is still very nausea. Denies vomiting or diarrhea. Pt has felt ill for 5-7 days. Intake of fluids minimal. Taking Zofran -not helping.  Appt 2:35PM with Dr Baron today.  Kristal Jurado RN 10:19 AM on 5/11/2017.

## 2017-05-11 NOTE — ED NOTES
Pt took zofran at home prior to coming to the ED.  Pt is not complaining of nausea at this time.  Pt also states that she has not had diarrhea for at least a week.

## 2017-05-12 LAB — INTERPRETATION ECG - MUSE: NORMAL

## 2017-05-18 ENCOUNTER — TELEPHONE (OUTPATIENT)
Dept: INTERNAL MEDICINE | Facility: CLINIC | Age: 82
End: 2017-05-18

## 2017-05-18 DIAGNOSIS — R11.0 NAUSEA: ICD-10-CM

## 2017-05-18 NOTE — TELEPHONE ENCOUNTER
PA Initiation    Medication: ondansetron 4 mg ODT  Insurance Company: [employer] - Phone [phone] Fax [phone]  Pharmacy Filling the Rx: [facility] [address] [place] [address] [address] AT [facility] OF [address] [facility]  Filling Pharmacy Phone: [phone]  Filling Pharmacy Fax: [phone]  Start Date: 5/18/2017

## 2017-05-22 RX ORDER — ONDANSETRON 4 MG/1
4 TABLET, ORALLY DISINTEGRATING ORAL EVERY 6 HOURS PRN
Qty: 10 TABLET | Refills: 0 | Status: SHIPPED | OUTPATIENT
Start: 2017-05-22 | End: 2017-08-18

## 2017-05-22 NOTE — TELEPHONE ENCOUNTER
[facility] Prior Authorization Team   Phone: [phone]  Fax: [phone]      PA NOT NEEDED    Medication: ondansetron 4 mg ODT- pa not needed  Insurance Company: [employer] - Phone [phone] Fax [phone]  Pharmacy Filling the Rx: [facility] [address] [place] [address] [address] AT [facility] OF [address] & [facility]  Filling Pharmacy Phone: [phone]  Filling Pharmacy Fax: [phone]  Start Date: 5/18/2017

## 2017-06-01 ENCOUNTER — OFFICE VISIT (OUTPATIENT)
Dept: INTERNAL MEDICINE | Facility: CLINIC | Age: 82
End: 2017-06-01

## 2017-06-01 VITALS
RESPIRATION RATE: 16 BRPM | BODY MASS INDEX: 21.41 KG/M2 | DIASTOLIC BLOOD PRESSURE: 71 MMHG | HEART RATE: 75 BPM | SYSTOLIC BLOOD PRESSURE: 114 MMHG | OXYGEN SATURATION: 97 % | WEIGHT: 106 LBS | TEMPERATURE: 97.8 F

## 2017-06-01 DIAGNOSIS — R51.9 HEADACHE, UNSPECIFIED HEADACHE TYPE: Primary | ICD-10-CM

## 2017-06-01 DIAGNOSIS — R10.9 STOMACH DISCOMFORT: ICD-10-CM

## 2017-06-01 DIAGNOSIS — D50.8 OTHER IRON DEFICIENCY ANEMIAS: ICD-10-CM

## 2017-06-01 DIAGNOSIS — R07.81 RIB PAIN ON LEFT SIDE: ICD-10-CM

## 2017-06-01 RX ORDER — OXYCODONE HYDROCHLORIDE 5 MG/1
5 TABLET ORAL EVERY 6 HOURS PRN
Qty: 10 TABLET | Refills: 0 | Status: SHIPPED | OUTPATIENT
Start: 2017-06-01 | End: 2017-07-29

## 2017-06-01 RX ORDER — PANTOPRAZOLE SODIUM 40 MG/1
40 TABLET, DELAYED RELEASE ORAL DAILY
Qty: 90 TABLET | Refills: 3 | Status: SHIPPED | OUTPATIENT
Start: 2017-06-01 | End: 2018-06-02

## 2017-06-01 ASSESSMENT — PAIN SCALES - GENERAL: PAINLEVEL: SEVERE PAIN (7)

## 2017-06-01 NOTE — MR AVS SNAPSHOT
"              After Visit Summary   6/1/2017    Lucie Stockton    MRN: [id number]           Patient Information     Date Of Birth          1935        Visit Information        Provider Department      6/1/2017 9:00 AM Marii Montgomery MD [facility]        Today's Diagnoses     Other iron deficiency anemias        Rib pain on left side          Care Instructions    Primary Care Center Medication Refill Request Information:  * Please contact your pharmacy regarding ANY request for medication refills.  ** [facility] Prescription Fax = [phone]  * Please allow 3 business days for routine medication refills.  * Please allow 5 business days for controlled substance medication refills.     Primary Care Center Test Result notification information:  *You will be notified with in 7-10 days of your appointment day regarding the results of your test.  If you are on MyChart you will be notified as soon as the provider has reviewed the results and signed off on them.    [facility] [phone]     * Tension Headache    Muscle Tension Headache (also called \"stress headache\") is a very common cause of head pain. Under stress, some people tense the muscles of their shoulder, neck and scalp without knowing it. If this lasts long enough, a headache can occur. These headaches can be very painful and last for hours or even days.  Home Care:    If you were given pain medicine for this headache, do not drive yourself home. Arrange for a ride, instead. When you get home, try to sleep. You should feel much better when you wake up.    Heat to the back of your neck may relieve neck spasm.    Drink only clear liquids or eat a very light diet to avoid nausea/vomiting until symptoms improve.  Preventing Future Headaches    Identify the sources of stress in your life. These may not be obvious! Learn new ways to handle your stress, such as regular exercise, biofeedback, self-hypnosis and meditation. For more " information about this, consult your doctor or go to a local bookstore and review the many books and tapes on this subject.    At the first sign of a tension headache, take time out if possible. Remove yourself from the stressful situation, find a quiet comfortable place to sit or lie down and let yourself relax. Heat and deep massage of the tight areas in the neck and shoulders may help reduce muscle spasm. Medicine, such as ibuprofen (Advil or Motrin) or a prescribed muscle relaxant may be helpful at this point.  Follow Up with your doctor if the headache is not better within the next 24 hours. If you have frequent headaches you should discuss a treatment plan with your primary care doctor. Ask if you can have medicine to take at home the next time you get a bad headache. This may avoid the need for a visit to the emergency department in the future. Poorly controlled chronic headaches may require a referral to a neurologist (headache specialist).  Call your Doctor Or Get Prompt Medical Attention if any of the following occur:    Worsening of your head pain or no improvement within 24 hours    Repeated vomiting (unable to keep liquids down)    Fever of 100.4 F (38.0 C) or higher, or as directed by your healthcare provider    Stiff neck    Extreme drowsiness, confusion or fainting    Dizziness, vertigo (dizziness with spinning sensation)    Weakness of an arm or leg or one side of the face    Difficulty with speech or vision    1758-3905 [place] [address]. All rights reserved. This information is not intended as a substitute for professional medical care. Always follow your healthcare professional's instructions.                Follow-ups after your visit        Follow-up notes from your care team     Return in about 6 weeks (around 7/13/2017) for headache, stomachache.      Your next 10 appointments already scheduled     Jun 26, 2017  3:00 PM CDT   (Arrive by 2:45 PM)    Return Vascular Visit with Iona Velez MD   [facility] ([facility])    [address]  [address] [facility] [address] [phone]              Who to contact     Please call your clinic at [phone] to:    Ask questions about your health    Make or cancel appointments    Discuss your medicines    Learn about your test results    Speak to your doctor   If you have compliments or concerns about an experience at your clinic, or if you wish to file a complaint, please contact [facility] Patient Relations at [phone] or email us at [email or web address]         Additional Information About Your Visit        [employer] Information     [employer] gives you secure access to your electronic health record. If you see a primary care provider, you can also send messages to your care team and make appointments. If you have questions, please call your primary care clinic.  If you do not have a primary care provider, please call [phone] and they will assist you.      [employer] is an electronic gateway that provides easy, online access to your medical records. With [employer], you can request a clinic appointment, read your test results, renew a prescription or communicate with your care team.     To access your existing account, please contact your [facility] or call [phone] for assistance.        Care EveryWhere ID     This is your Care EveryWhere ID. This could be used by other organizations to access your [facility] medical records  [id number]        Your Vitals Were     Pulse Temperature Respirations Pulse Oximetry Breastfeeding? BMI (Body Mass Index)    75 97.8  F (36.6  C) (Oral) 16 97% No 21.41 kg/m2       Blood Pressure from Last 3 Encounters:   06/01/17 114/71   05/11/17 155/66   04/29/17 140/51    Weight from Last 3 Encounters:   06/01/17 48.1 kg (106 lb)   05/11/17 49.9 kg (110 lb)    04/14/17 49.2 kg (108 lb 8 oz)              Today, you had the following     No orders found for display         Today's Medication Changes          These changes are accurate as of: 6/1/17  9:56 AM.  If you have any questions, ask your nurse or doctor.               Start taking these medicines.        Dose/Directions    pantoprazole 40 MG EC tablet   Commonly known as:  PROTONIX   Used for:  Other iron deficiency anemias   Started by:  Marii Montgomery MD        Dose:  40 mg   Take 1 tablet (40 mg) by mouth daily   Quantity:  90 tablet   Refills:  3         These medicines have changed or have updated prescriptions.        Dose/Directions    oxyCODONE 5 MG IR tablet   Commonly known as:  ROXICODONE   This may have changed:  reasons to take this   Used for:  Rib pain on left side   Changed by:  Marii Montgomery MD        Dose:  5 mg   Take 1 tablet (5 mg) by mouth every 6 hours as needed for severe pain   Quantity:  10 tablet   Refills:  0         Stop taking these medicines if you haven't already. Please contact your care team if you have questions.     ranitidine 150 MG tablet   Commonly known as:  ZANTAC   Stopped by:  Marii Montgomery MD                Where to get your medicines      These medications were sent to [facility] [address] [place], [place] [address] AT [place] [address], [facility] [address]     Phone:  [phone]     pantoprazole 40 MG EC tablet         Some of these will need a paper prescription and others can be bought over the counter.  Ask your nurse if you have questions.     Bring a paper prescription for each of these medications     oxyCODONE 5 MG IR tablet                Primary Care Provider Office Phone # Fax #    Marii Montgomery [phone]        [facility] [address] [facility] [address]  [facility] [address]        Thank you!     Thank you for choosing [facility]  for your care. Our goal is always to  provide you with excellent care. Hearing back from our patients is one way we can continue to improve our services. Please take a few minutes to complete the written survey that you may receive in the mail after your visit with us. Thank you!             Your Updated Medication List - Protect others around you: Learn how to safely use, store and throw away your medicines at www.disposemymeds.org.          This list is accurate as of: 6/1/17  9:56 AM.  Always use your most recent med list.                   Brand Name Dispense Instructions for use    albuterol 108 (90 BASE) MCG/ACT Inhaler    VENTOLIN HFA    18 Inhaler    Inhale 2 puffs into the lungs every 4 hours as needed for shortness of breath / dyspnea or wheezing       amLODIPine 10 MG tablet    NORVASC    90 tablet    Take 1 tablet (10 mg) by mouth daily For blood pressure       atorvastatin 40 MG tablet    LIPITOR    90 tablet    Take 1 tablet (40 mg) by mouth daily       bisacodyl 10 MG Suppository    DULCOLAX    90 suppository    Place 1 suppository (10 mg) rectally daily as needed for constipation       CALCIUM 600 + D PO      Take 1 tablet by mouth daily.       clopidogrel 75 MG tablet    PLAVIX    90 tablet    Take 1 tablet (75 mg) by mouth daily       * ferrous sulfate 325 (65 FE) MG tablet    IRON    90 tablet    Take 1 tablet (325 mg) by mouth daily (with breakfast)       * ferrous sulfate 325 (65 FE) MG tablet    IRON    90 tablet    Take 1 tablet (325 mg) by mouth 2 times daily To maintain iron levels       gabapentin 100 MG capsule    NEURONTIN    540 capsule    Take 4 capsules at bedtime, also 1 capsule in the morning and mid-day for pain prevention       hydrochlorothiazide 50 MG tablet    HYDRODIURIL    90 tablet    Take 1 tablet (50 mg) by mouth daily       ketorolac 0.5 % ophthalmic solution    ACULAR     Place 1 drop Into the left eye 4 times daily       levothyroxine 50 MCG tablet    SYNTHROID/LEVOTHROID    90 tablet    Take 1 tablet (50  mcg) by mouth daily       lisinopril 40 MG tablet    PRINIVIL/ZESTRIL    90 tablet    Take 1 tablet (40 mg) by mouth daily For blood pressure       moxifloxacin 0.5 % ophthalmic solution    VIGAMOX     Place 1 drop Into the left eye 4 times daily       nortriptyline 10 MG capsule    PAMELOR    180 capsule    Take 2 capsules (20 mg) by mouth At Bedtime for sleep and pain prevention       ondansetron 4 MG ODT tab    ZOFRAN ODT    10 tablet    Take 1 tablet (4 mg) by mouth every 6 hours as needed for nausea       oxyCODONE 5 MG IR tablet    ROXICODONE    10 tablet    Take 1 tablet (5 mg) by mouth every 6 hours as needed for severe pain       pantoprazole 40 MG EC tablet    PROTONIX    90 tablet    Take 1 tablet (40 mg) by mouth daily       polyethylene glycol powder    MIRALAX    119 g    Take one-half to one scoop once a day for maintaining soft stools       potassium chloride SA 20 MEQ CR tablet    K-DUR/KLOR-CON M    90 tablet    Take 1 tablet (20 mEq) by mouth daily       prednisoLONE acetate 1 % ophthalmic susp    PRED FORTE     Place 1 drop Into the left eye 4 times daily       traMADol 50 MG tablet    ULTRAM    270 tablet    Take 1 tablet (50 mg) by mouth every 6 hours as needed for moderate pain or severe pain Maximum 270 tablets in 3 months       * Notice:  This list has 2 medication(s) that are the same as other medications prescribed for you. Read the directions carefully, and ask your doctor or other care provider to review them with you.

## 2017-06-01 NOTE — PATIENT INSTRUCTIONS
"[facility] Medication Refill Request Information:  * Please contact your pharmacy regarding ANY request for medication refills.  ** [facility] Prescription Fax = [phone]  * Please allow 3 business days for routine medication refills.  * Please allow 5 business days for controlled substance medication refills.     [facility] Test Result notification information:  *You will be notified with in 7-10 days of your appointment day regarding the results of your test.  If you are on MyChart you will be notified as soon as the provider has reviewed the results and signed off on them.    [facility] [phone]     * Tension Headache    Muscle Tension Headache (also called \"stress headache\") is a very common cause of head pain. Under stress, some people tense the muscles of their shoulder, neck and scalp without knowing it. If this lasts long enough, a headache can occur. These headaches can be very painful and last for hours or even days.  Home Care:    If you were given pain medicine for this headache, do not drive yourself home. Arrange for a ride, instead. When you get home, try to sleep. You should feel much better when you wake up.    Heat to the back of your neck may relieve neck spasm.    Drink only clear liquids or eat a very light diet to avoid nausea/vomiting until symptoms improve.  Preventing Future Headaches    Identify the sources of stress in your life. These may not be obvious! Learn new ways to handle your stress, such as regular exercise, biofeedback, self-hypnosis and meditation. For more information about this, consult your doctor or go to a local bookstore and review the many books and tapes on this subject.    At the first sign of a tension headache, take time out if possible. Remove yourself from the stressful situation, find a quiet comfortable place to sit or lie down and let yourself relax. Heat and deep massage of the tight areas in the neck and shoulders may help reduce " muscle spasm. Medicine, such as ibuprofen (Advil or Motrin) or a prescribed muscle relaxant may be helpful at this point.  Follow Up with your doctor if the headache is not better within the next 24 hours. If you have frequent headaches you should discuss a treatment plan with your primary care doctor. Ask if you can have medicine to take at home the next time you get a bad headache. This may avoid the need for a visit to the emergency department in the future. Poorly controlled chronic headaches may require a referral to a neurologist (headache specialist).  Call your Doctor Or Get Prompt Medical Attention if any of the following occur:    Worsening of your head pain or no improvement within 24 hours    Repeated vomiting (unable to keep liquids down)    Fever of 100.4 F (38.0 C) or higher, or as directed by your healthcare provider    Stiff neck    Extreme drowsiness, confusion or fainting    Dizziness, vertigo (dizziness with spinning sensation)    Weakness of an arm or leg or one side of the face    Difficulty with speech or vision    [id number] [address], [place] [address]. All rights reserved. This information is not intended as a substitute for professional medical care. Always follow your healthcare professional's instructions.

## 2017-06-01 NOTE — PROGRESS NOTES
CC:  Headache, abdominal discomfort    CC:  C/O HA off and on since cataract surgery 4/2017  Vague sensation, across forehead, severity up to 6-7/10 intensity, tylenol helps temporarily, lasts several hours, ebbs and flows, gone when wakes up in the morning then returns throughout the day  Does not find anything that makes it worse  No other neurologic symptoms, no sinus symptoms, no fevers,, neck stiffness, no new tinnitus, dizziness, imbalance, hearing loss, etc.  Uses oxycodone one tab bid chronically, no other rebound headache potential.       Continues with some generalized stomach ache, no diarrhea, mainly grumbling, decreased appetite, no vomiting, has some nausea, no dysphagia/odynophagia, no black/maroon/bloody stools,except has occasional small amount of blood when constipated, uses stool softener on occasion (chronic). Discomfort is not related to meals.  Has been off pantoprazole for a while now, takes ranitidine at night.  Weight slowly decreasing  Results of CT, MRI, colonoscopy, EGD reviewed.  States had capsule endoscopy in the past but I could not find results.  No urinary symptoms, no vaginal d/c or bleeding  We did discuss the possibility of ischemic bowel and recurrent SBO and discussed warning signs that would prompt her to go directly to  ED.    Patient Active Problem List   Diagnosis     Benign ovarian tumor     Hypothyroidism     Peripheral vascular disease (H)     Knee pain     Osteoarthritis     Cervicalgia     Hyperlipidemia with target LDL less than 70     Pulmonary embolism (H)     Anemia     Aortic stenosis     Atherosclerosis of aorta (H)     Atherosclerosis of arteries of extremities (H)     Intermittent claudication (H)     Iron deficiency     Osteoporosis     DVT of lower extremity, bilateral (H)     Varicose veins     Gluteal pain     Insomnia     Back pain     Vitamin D deficiency     Tobacco use disorder     Personal history of other drug therapy     Right knee pain     Other  chronic pulmonary embolism (H)     Venous thrombosis of extremity     Benign essential hypertension     Gastritis     Cataract     Acute left-sided low back pain with left-sided sciatica     Lumbar stenosis with neurogenic claudication     SBO (small bowel obstruction) (H)     Small bowel obstruction (H)     Long term current use of anticoagulant therapy     Past Surgical History:   Procedure Laterality Date     BUNIONECTOMY       C STOMACH SURGERY PROCEDURE UNLISTED      Please see chart     COLONOSCOPY      11/10/10 angioectasia     HYSTERECTOMY TOTAL ABDOMINAL, BILATERAL SALPINGO-OOPHORECTOMY, NODE DISSECTION, COMBINED  2/17/11    SANGEETHA, EMILIA, LN bx, omentectomy, resection of evrian malignancy Dr. JONO Goncalves - Returned BENIGN     UPPER GI ENDOSCOPY      11/10/10 erythematous gastropathy, angioectasia     Current Outpatient Prescriptions   Medication Sig Dispense Refill     pantoprazole (PROTONIX) 40 MG EC tablet Take 1 tablet (40 mg) by mouth daily 90 tablet 3     oxyCODONE (ROXICODONE) 5 MG IR tablet Take 1 tablet (5 mg) by mouth every 6 hours as needed for severe pain 10 tablet 0     ondansetron (ZOFRAN ODT) 4 MG ODT tab Take 1 tablet (4 mg) by mouth every 6 hours as needed for nausea 10 tablet 0     moxifloxacin (VIGAMOX) 0.5 % ophthalmic solution Place 1 drop Into the left eye 4 times daily       prednisoLONE acetate (PRED FORTE) 1 % ophthalmic susp Place 1 drop Into the left eye 4 times daily       ketorolac (ACULAR) 0.5 % ophthalmic solution Place 1 drop Into the left eye 4 times daily       traMADol (ULTRAM) 50 MG tablet Take 1 tablet (50 mg) by mouth every 6 hours as needed for moderate pain or severe pain Maximum 270 tablets in 3 months 270 tablet 1     bisacodyl (DULCOLAX) 10 MG Suppository Place 1 suppository (10 mg) rectally daily as needed for constipation 90 suppository 3     levothyroxine (SYNTHROID/LEVOTHROID) 50 MCG tablet Take 1 tablet (50 mcg) by mouth daily 90 tablet 3     clopidogrel (PLAVIX) 75 MG  tablet Take 1 tablet (75 mg) by mouth daily 90 tablet 3     hydrochlorothiazide (HYDRODIURIL) 50 MG tablet Take 1 tablet (50 mg) by mouth daily 90 tablet 3     lisinopril (PRINIVIL/ZESTRIL) 40 MG tablet Take 1 tablet (40 mg) by mouth daily For blood pressure 90 tablet 3     nortriptyline (PAMELOR) 10 MG capsule Take 2 capsules (20 mg) by mouth At Bedtime for sleep and pain prevention 180 capsule 1     gabapentin (NEURONTIN) 100 MG capsule Take 4 capsules at bedtime, also 1 capsule in the morning and mid-day for pain prevention 540 capsule 2     polyethylene glycol (MIRALAX) powder Take one-half to one scoop once a day for maintaining soft stools 119 g 5     ferrous sulfate (IRON) 325 (65 FE) MG tablet Take 1 tablet (325 mg) by mouth 2 times daily To maintain iron levels 90 tablet 1     amLODIPine (NORVASC) 10 MG tablet Take 1 tablet (10 mg) by mouth daily For blood pressure 90 tablet 1     atorvastatin (LIPITOR) 40 MG tablet Take 1 tablet (40 mg) by mouth daily 90 tablet 3     albuterol (VENTOLIN HFA) 108 (90 BASE) MCG/ACT inhaler Inhale 2 puffs into the lungs every 4 hours as needed for shortness of breath / dyspnea or wheezing 18 Inhaler 1     potassium chloride SA (K-DUR,KLOR-CON M) 20 MEQ tablet Take 1 tablet (20 mEq) by mouth daily 90 tablet 3     ferrous sulfate (IRON) 325 (65 FE) MG tablet Take 1 tablet (325 mg) by mouth daily (with breakfast) 90 tablet 3     Calcium Carbonate-Vitamin D (CALCIUM 600 + D OR) Take 1 tablet by mouth daily.       [DISCONTINUED] tolterodine (DETROL) 1 MG tablet Take 1-2 tablets by mouth At Bedtime. 180 tablet 3     Allergies   Allergen Reactions     Trazodone Fatigue     Felt too groggy     /71 (BP Location: Left arm, Patient Position: Chair, Cuff Size: Adult Regular)  Pulse 75  Temp 97.8  F (36.6  C) (Oral)  Resp 16  Wt 48.1 kg (106 lb)  SpO2 97%  Breastfeeding? No  BMI 21.41 kg/m2  Gen:  NAD, in good spirits  Neuro:  CN 2-12 intact, upper and lower ext with normal  strength, no light touch sensation deficits, reflexes 2+ biceps, triceps, BR, patella. Ankle jerks were 1+.  No problems with coordination.  Lungs CTA  C/V:  RRR  Abd:  Normal BS's, not distended. Abd soft, non tender, no organomegaly nor masses were palpated. Liver span normal, no palpable splenomegaly  Skin:  Normal moisture, no rashes  Extrem:  No edema, no rashes.  Radial pulses equal and regular.  Endo:  No thyromegaly or nodules    Lucie was seen today for headache.    Diagnoses and all orders for this visit:    Headache, unspecified headache type.  Suspect tension type headache.  In an 81 year old woman with vascular disease, differential includes stroke, also tumor.  Rebound headache a possibility given chronic analgesic use.    We decided to manage her headaches conservatively for now with stretching, massage, local ice/heat.     Rib pain on left side (chronic)  -     Refill oxyCODONE (ROXICODONE) 5 MG IR tablet; Take 1 tablet (5 mg) by mouth every 6 hours as needed for severe pain    Stomach discomfort.  Has had quite a bit of work up to date.  We did discuss the possibility of ischemic bowel and recurrent SBO and discussed warning signs that would prompt her to go directly to  ED.  Again, we decided to take a conservative approach for now and restart her PPI  -     Discontinue ranitidine, restart pantoprazole (PROTONIX) 40 MG EC tablet; Take 1 tablet (40 mg) by mouth daily    See me in 6 weeks.    Total time spent 40 minutes.  More than 50% of the time spent with Ms. Stockton on counseling / coordinating her care        Marii Montgomery M.D.  Internal Medicine  [facility]   pager [phone]

## 2017-06-01 NOTE — NURSING NOTE
Chief Complaint   Patient presents with     Headache     Patient is here to discuss ongoing abdominal pain and headache for 3 weeks     Aislinn Clayton CMA 9:11 AM on 6/1/2017.

## 2017-06-26 ENCOUNTER — OFFICE VISIT (OUTPATIENT)
Dept: VASCULAR SURGERY | Facility: CLINIC | Age: 82
End: 2017-06-26

## 2017-06-26 VITALS
HEART RATE: 75 BPM | OXYGEN SATURATION: 96 % | DIASTOLIC BLOOD PRESSURE: 61 MMHG | SYSTOLIC BLOOD PRESSURE: 123 MMHG | RESPIRATION RATE: 16 BRPM

## 2017-06-26 DIAGNOSIS — I73.9 PERIPHERAL ARTERY DISEASE (H): Primary | ICD-10-CM

## 2017-06-26 DIAGNOSIS — E78.5 HYPERLIPIDEMIA, UNSPECIFIED HYPERLIPIDEMIA TYPE: ICD-10-CM

## 2017-06-26 RX ORDER — ATORVASTATIN CALCIUM 40 MG/1
80 TABLET, FILM COATED ORAL DAILY
Qty: 90 TABLET | Refills: 3 | Status: SHIPPED | OUTPATIENT
Start: 2017-06-26 | End: 2018-06-02

## 2017-06-26 ASSESSMENT — ENCOUNTER SYMPTOMS
SEIZURES: 0
EYE REDNESS: 0
DOUBLE VISION: 0
LOSS OF CONSCIOUSNESS: 0
EYE WATERING: 0
DIZZINESS: 0
SPEECH CHANGE: 0
TREMORS: 0
HEADACHES: 1
DISTURBANCES IN COORDINATION: 0
MEMORY LOSS: 0
EYE PAIN: 0
NUMBNESS: 0
WEAKNESS: 0
TINGLING: 0
PARALYSIS: 0
EYE IRRITATION: 1

## 2017-06-26 ASSESSMENT — PAIN SCALES - GENERAL: PAINLEVEL: SEVERE PAIN (6)

## 2017-06-26 NOTE — PROGRESS NOTES
VASCULAR SURGERY CLINIC ESTABLISHED PATIENT NOTE    HPI:  Mrs. Stockton is an 81- year old female who presents today for routine follow-up for aorto-biiliac occlusive disease. She was most recently evaluated by Dr. Velez in December 2016 with recommended exercise. She has never had a peripheral intervention.    SUBJECTIVE: Endorses doing well. She states she ismael walk about 2- blocks prior to stopping secondary to leg fatigue. She specifically denies rest pain and tissue loss. She and her  are able to travel extensively despite her claudication.    OBJECTIVE:  Vital signs:  123/61  75  16    Prior to Admission medications    Medication Sig Start Date End Date Taking? Authorizing Provider   atorvastatin (LIPITOR) 40 MG tablet Take 2 tablets (80 mg) by mouth daily 6/26/17  Yes Maria Elena Naik, APRN CNP   pantoprazole (PROTONIX) 40 MG EC tablet Take 1 tablet (40 mg) by mouth daily 6/1/17   Marii Montgomery MD   oxyCODONE (ROXICODONE) 5 MG IR tablet Take 1 tablet (5 mg) by mouth every 6 hours as needed for severe pain 6/1/17   Marii Montgomery MD   ondansetron (ZOFRAN ODT) 4 MG ODT tab Take 1 tablet (4 mg) by mouth every 6 hours as needed for nausea 5/22/17   Marii Montgomery MD   moxifloxacin (VIGAMOX) 0.5 % ophthalmic solution Place 1 drop Into the left eye 4 times daily    Reported, Patient   prednisoLONE acetate (PRED FORTE) 1 % ophthalmic susp Place 1 drop Into the left eye 4 times daily    Reported, Patient   ketorolac (ACULAR) 0.5 % ophthalmic solution Place 1 drop Into the left eye 4 times daily    Reported, Patient   traMADol (ULTRAM) 50 MG tablet Take 1 tablet (50 mg) by mouth every 6 hours as needed for moderate pain or severe pain Maximum 270 tablets in 3 months 5/22/17   Marii Montgomery MD   bisacodyl (DULCOLAX) 10 MG Suppository Place 1 suppository (10 mg) rectally daily as needed for constipation 3/21/17   Marii Montgomery MD   levothyroxine (SYNTHROID/LEVOTHROID) 50 MCG tablet Take 1  tablet (50 mcg) by mouth daily 3/10/17   Marii Montgomery MD   clopidogrel (PLAVIX) 75 MG tablet Take 1 tablet (75 mg) by mouth daily 3/10/17   Marii Montgomery MD   hydrochlorothiazide (HYDRODIURIL) 50 MG tablet Take 1 tablet (50 mg) by mouth daily 3/10/17   Marii Montgomery MD   lisinopril (PRINIVIL/ZESTRIL) 40 MG tablet Take 1 tablet (40 mg) by mouth daily For blood pressure 2/16/17   Marii Montgomery MD   nortriptyline (PAMELOR) 10 MG capsule Take 2 capsules (20 mg) by mouth At Bedtime for sleep and pain prevention 1/30/17   Marii Montgomery MD   gabapentin (NEURONTIN) 100 MG capsule Take 4 capsules at bedtime, also 1 capsule in the morning and mid-day for pain prevention 1/28/17   Marii Montgomery MD   polyethylene glycol (MIRALAX) powder Take one-half to one scoop once a day for maintaining soft stools 1/28/17   Marii Montgomery MD   ferrous sulfate (IRON) 325 (65 FE) MG tablet Take 1 tablet (325 mg) by mouth 2 times daily To maintain iron levels 1/28/17   Marii Montgomery MD   amLODIPine (NORVASC) 10 MG tablet Take 1 tablet (10 mg) by mouth daily For blood pressure 1/18/17   Marii Montgomery MD   albuterol (VENTOLIN HFA) 108 (90 BASE) MCG/ACT inhaler Inhale 2 puffs into the lungs every 4 hours as needed for shortness of breath / dyspnea or wheezing 10/13/16   Marii Montgomery MD   potassium chloride SA (K-DUR,KLOR-CON M) 20 MEQ tablet Take 1 tablet (20 mEq) by mouth daily 10/13/16   Marii Montgomery MD   ferrous sulfate (IRON) 325 (65 FE) MG tablet Take 1 tablet (325 mg) by mouth daily (with breakfast) 8/7/15   Marii Montgomery MD   Calcium Carbonate-Vitamin D (CALCIUM 600 + D OR) Take 1 tablet by mouth daily.    Reported, Patient       PHYSICAL EXAM:  NEURO/PSYCH: The patient is alert and oriented.  Appropriate.  Moves all extremities.   SKIN: Color appropriate for race, warm, dry.  PULMONARY: Does not require supplemental oxygen; no acute distress.   EXTREMITIES: Bilateral lower  extremities are warm and well-perfused. Non-edematous.     12/21/2016 SUMAYA:  Findings:     Right:    Arm: 119 mmHg   PT at ankle: 54 mmHg   DP at foot: 56 mmHg   SUMAYA: 0.46   TBI: Not measured     Right pulse volume recordings or VPR:    High thigh: Mildly abnormal   Lower thigh: Mildly abnormal   Proximal calf: Mildly abnormal   Ankle: Mildly abnormal     Left:   Arm: 123 mmHg   PT at ankle: 60 mmHg   DP at foot: 56 mmHg   SUMAAY: 0.49   TBI: Not measured     Left pulse volume recordings or VPR:    High thigh: Mildly abnormal   Lower thigh: Mildly abnormal   Proximal calf: Mildly abnormal   Ankle: Mildly abnormal    ASSESSMENT/PLAN:  #1 Peripheral artery disease, with short-distance claudication, secondary to atherosclerosis   - PAD rehab referral; she is agreeable to be contacted regarding [facility] studies  - increase atorvastatin from 40 mg daily to 80 mg daily; inbox message sent to Dr. Montgomery, PCP  - highly encouraged to increase walking prior to beginning PAD rehab  - follow-up with Dr. Velez in 6- months with non-invasive studies with exercise prior to the appointment    ANTI-PLATELET: Plavix  ANTI-COAGULANT: Not warranted  STATIN: Atorvastatin- increase from 40 mg to 80 mg  DIABETES MEDICATIONS: Not diabetic  TOBACCO CESSATION: Quit 10 years ago    Please contact Dr. Velez's Vascular Surgery Service with questions or concerns.    ALEKSEY Burkett, CNP  Division of Vascular Surgery  [facility]  Pager: [phone]    I personally examined the patient and agree with the findings above.  I discussed the patient with the resident / fellow and care team, and agree with the assessment and plan of care as documented in the note.  Vascular surgery staff  Has some mild claudication now, whereas before she was asymptomatic  Is still traveling quite a bit, but is happy to try the supervised walking regimen.  Follow up in 6 months with SUMAYA.  Iona Velez MD

## 2017-06-26 NOTE — MR AVS SNAPSHOT
After Visit Summary   6/26/2017    Lucie Stockton    MRN: [id number]           Patient Information     Date Of Birth          1935        Visit Information        Provider Department      6/26/2017 3:00 PM Iona Velez MD [facility]        Today's Diagnoses     Peripheral artery disease (H)    -  1    Hyperlipidemia, unspecified hyperlipidemia type           Follow-ups after your visit        Additional Services     PAD REHAB REFERRAL       Your provider has referred you to: [facility]: [facility] [phone]   [email or web address]    Exercise is limited by claudication: Yes  Any other exercise limitations?  No                  Your next 10 appointments already scheduled     Jul 13, 2017 10:00 AM CDT   (Arrive by 9:45 AM)   New Patient Visit with Luis Khalil MD   [facility] ([facility])    [address] [place] [id number] [phone]            Dec 18, 2017 12:30 PM CST   US SUMAYA DOPPLER WITH EXERCISE with UCUSV1   [facility] US ([facility])    [address] [place] [id number] [phone]           Please bring a list of your medicines (including vitamins, minerals and over-the-counter drugs). Also, tell your doctor about any allergies you may have. Wear comfortable clothes and leave your valuables at home.  No caffeine or tobacco for 1 hour prior to exam.  Please call the Imaging Department at your exam site with any questions.            Dec 18, 2017  1:45 PM CST   (Arrive by 1:30 PM)   Return Vascular Visit with Iona Velez MD   [facility] ([facility])    [address] [place] [id number] [phone]              Future tests that were ordered for you today     Open Future  Orders        Priority Expected Expires Ordered    US SUMAYA Doppler with Exercise Routine 6/26/2017 6/26/2018 6/26/2017            Who to contact     Please call your clinic at [phone] to:    Ask questions about your health    Make or cancel appointments    Discuss your medicines    Learn about your test results    Speak to your doctor   If you have compliments or concerns about an experience at your clinic, or if you wish to file a complaint, please contact [facility] Patient Relations at [phone] or email us at [email or web address]         Additional Information About Your Visit        [employer] Information     [employer] gives you secure access to your electronic health record. If you see a primary care provider, you can also send messages to your care team and make appointments. If you have questions, please call your primary care clinic.  If you do not have a primary care provider, please call [phone] and they will assist you.      [employer] is an electronic gateway that provides easy, online access to your medical records. With [employer], you can request a clinic appointment, read your test results, renew a prescription or communicate with your care team.     To access your existing account, please contact your [facility] or call [phone] for assistance.        Care EveryWhere ID     This is your Care EveryWhere ID. This could be used by other organizations to access your [facility] medical records  [id number]        Your Vitals Were     Pulse Respirations Pulse Oximetry             75 16 96%          Blood Pressure from Last 3 Encounters:   06/26/17 123/61   06/01/17 114/71   05/11/17 155/66    Weight from Last 3 Encounters:   06/01/17 106 lb   05/11/17 110 lb   04/14/17 108 lb 8 oz              We Performed the Following     PAD REHAB REFERRAL          Today's Medication Changes          These changes are accurate as of: 6/26/17   3:43 PM.  If you have any questions, ask your nurse or doctor.               These medicines have changed or have updated prescriptions.        Dose/Directions    atorvastatin 40 MG tablet   Commonly known as:  LIPITOR   This may have changed:  how much to take   Used for:  Hyperlipidemia, unspecified hyperlipidemia type   Changed by:  Iona Velez MD        Dose:  80 mg   Take 2 tablets (80 mg) by mouth daily   Quantity:  90 tablet   Refills:  3            Where to get your medicines      Some of these will need a paper prescription and others can be bought over the counter.  Ask your nurse if you have questions.     Bring a paper prescription for each of these medications     atorvastatin 40 MG tablet                Primary Care Provider Office Phone # Fax #    Marii Montgomery [phone]        [facility] [address] [place] [address] [address] [place] [address]        Equal Access to Services     PAL SALGADO : [employer] page Soalysia, waaxda luqadaha, qaybta kaalmada adeegyabrielle, jackeline bernal . So [facility] [phone].    ATENCIÓN: Si habla español, tiene a schwartz disposición servicios gratuitos de asistencia lingüística. Llame al [phone].    We comply with applicable federal civil rights laws and [place] laws. We do not discriminate on the basis of race, color, national origin, age, disability sex, sexual orientation or gender identity.            Thank you!     Thank you for choosing [facility]  for your care. Our goal is always to provide you with excellent care. Hearing back from our patients is one way we can continue to improve our services. Please take a few minutes to complete the written survey that you may receive in the mail after your visit with us. Thank you!             Your Updated Medication List - Protect others around you: Learn how to safely use, store and throw away your medicines at www.disposemymeds.org.          This list is  accurate as of: 6/26/17  3:43 PM.  Always use your most recent med list.                   Brand Name Dispense Instructions for use Diagnosis    albuterol 108 (90 BASE) MCG/ACT Inhaler    VENTOLIN HFA    18 Inhaler    Inhale 2 puffs into the lungs every 4 hours as needed for shortness of breath / dyspnea or wheezing    Cough       amLODIPine 10 MG tablet    NORVASC    90 tablet    Take 1 tablet (10 mg) by mouth daily For blood pressure    Essential hypertension       atorvastatin 40 MG tablet    LIPITOR    90 tablet    Take 2 tablets (80 mg) by mouth daily    Hyperlipidemia, unspecified hyperlipidemia type       bisacodyl 10 MG Suppository    DULCOLAX    90 suppository    Place 1 suppository (10 mg) rectally daily as needed for constipation    Constipation, unspecified constipation type       CALCIUM 600 + D PO      Take 1 tablet by mouth daily.        clopidogrel 75 MG tablet    PLAVIX    90 tablet    Take 1 tablet (75 mg) by mouth daily    Other chronic pulmonary embolism (H), Venous thrombosis of extremity       * ferrous sulfate 325 (65 FE) MG tablet    IRON    90 tablet    Take 1 tablet (325 mg) by mouth daily (with breakfast)    Other iron deficiency anemias       * ferrous sulfate 325 (65 FE) MG tablet    IRON    90 tablet    Take 1 tablet (325 mg) by mouth 2 times daily To maintain iron levels    Iron deficiency       gabapentin 100 MG capsule    NEURONTIN    540 capsule    Take 4 capsules at bedtime, also 1 capsule in the morning and mid-day for pain prevention    Lumbar radiculopathy       hydrochlorothiazide 50 MG tablet    HYDRODIURIL    90 tablet    Take 1 tablet (50 mg) by mouth daily    Benign essential hypertension       ketorolac 0.5 % ophthalmic solution    ACULAR     Place 1 drop Into the left eye 4 times daily        levothyroxine 50 MCG tablet    SYNTHROID/LEVOTHROID    90 tablet    Take 1 tablet (50 mcg) by mouth daily    Hypothyroidism, unspecified type       lisinopril 40 MG tablet     PRINIVIL/ZESTRIL    90 tablet    Take 1 tablet (40 mg) by mouth daily For blood pressure    Essential hypertension       moxifloxacin 0.5 % ophthalmic solution    VIGAMOX     Place 1 drop Into the left eye 4 times daily        nortriptyline 10 MG capsule    PAMELOR    180 capsule    Take 2 capsules (20 mg) by mouth At Bedtime for sleep and pain prevention    Insomnia, unspecified type       ondansetron 4 MG ODT tab    ZOFRAN ODT    10 tablet    Take 1 tablet (4 mg) by mouth every 6 hours as needed for nausea    Nausea       oxyCODONE 5 MG IR tablet    ROXICODONE    10 tablet    Take 1 tablet (5 mg) by mouth every 6 hours as needed for severe pain    Rib pain on left side       pantoprazole 40 MG EC tablet    PROTONIX    90 tablet    Take 1 tablet (40 mg) by mouth daily    Other iron deficiency anemias       polyethylene glycol powder    MIRALAX    119 g    Take one-half to one scoop once a day for maintaining soft stools    Constipation, unspecified constipation type       potassium chloride SA 20 MEQ CR tablet    K-DUR/KLOR-CON M    90 tablet    Take 1 tablet (20 mEq) by mouth daily    Hypopotassemia       prednisoLONE acetate 1 % ophthalmic susp    PRED FORTE     Place 1 drop Into the left eye 4 times daily        traMADol 50 MG tablet    ULTRAM    270 tablet    Take 1 tablet (50 mg) by mouth every 6 hours as needed for moderate pain or severe pain Maximum 270 tablets in 3 months    Foot pain, right       * Notice:  This list has 2 medication(s) that are the same as other medications prescribed for you. Read the directions carefully, and ask your doctor or other care provider to review them with you.

## 2017-06-26 NOTE — NURSING NOTE
Chief Complaint   Patient presents with     RECHECK     Follow up leg pain/ PAD       Vitals:    06/26/17 1504   BP: 123/61   BP Location: Left arm   Pulse: 75   Resp: 16   SpO2: 96%       There is no height or weight on file to calculate BMI.              Kelly Beauchamp LPN

## 2017-06-26 NOTE — LETTER
6/26/2017       RE: Lucie Stockton  [address] [facility] [address]     Dear Colleague,    Thank you for referring your patient, Lucie Stockton, to the [facility] V[facility]ULAR [facility] at [facility]. Please see a copy of my visit note below.    VASCULAR SURGERY CLINIC ESTABLISHED PATIENT NOTE    HPI:  Mrs. Stockton is an 81- year old female who presents today for routine follow-up for aorto-biiliac occlusive disease. She was most recently evaluated by Dr. Velez in December 2016 with recommended exercise. She has never had a peripheral intervention.    SUBJECTIVE: Endorses doing well. She states she ismael walk about 2- blocks prior to stopping secondary to leg fatigue. She specifically denies rest pain and tissue loss. She and her  are able to travel extensively despite her claudication.    OBJECTIVE:  Vital signs:  123/61  75  16    Prior to Admission medications    Medication Sig Start Date End Date Taking? Authorizing Provider   atorvastatin (LIPITOR) 40 MG tablet Take 2 tablets (80 mg) by mouth daily 6/26/17  Yes Maria Elena Naik APRN CNP   pantoprazole (PROTONIX) 40 MG EC tablet Take 1 tablet (40 mg) by mouth daily 6/1/17   Marii Montgomery MD   oxyCODONE (ROXICODONE) 5 MG IR tablet Take 1 tablet (5 mg) by mouth every 6 hours as needed for severe pain 6/1/17   Marii Montgomery MD   ondansetron (ZOFRAN ODT) 4 MG ODT tab Take 1 tablet (4 mg) by mouth every 6 hours as needed for nausea 5/22/17   Marii Montgomery MD   moxifloxacin (VIGAMOX) 0.5 % ophthalmic solution Place 1 drop Into the left eye 4 times daily    Reported, Patient   prednisoLONE acetate (PRED FORTE) 1 % ophthalmic susp Place 1 drop Into the left eye 4 times daily    Reported, Patient   ketorolac (ACULAR) 0.5 % ophthalmic solution Place 1 drop Into the left eye 4 times daily    Reported, Patient   traMADol (ULTRAM) 50 MG tablet Take 1 tablet (50 mg) by mouth every 6 hours as needed for moderate  pain or severe pain Maximum 270 tablets in 3 months 5/22/17   Marii Montgomery MD   bisacodyl (DULCOLAX) 10 MG Suppository Place 1 suppository (10 mg) rectally daily as needed for constipation 3/21/17   Marii Montgomery MD   levothyroxine (SYNTHROID/LEVOTHROID) 50 MCG tablet Take 1 tablet (50 mcg) by mouth daily 3/10/17   Marii Montgomery MD   clopidogrel (PLAVIX) 75 MG tablet Take 1 tablet (75 mg) by mouth daily 3/10/17   Marii Montgomery MD   hydrochlorothiazide (HYDRODIURIL) 50 MG tablet Take 1 tablet (50 mg) by mouth daily 3/10/17   Marii Montgomery MD   lisinopril (PRINIVIL/ZESTRIL) 40 MG tablet Take 1 tablet (40 mg) by mouth daily For blood pressure 2/16/17   Marii Montgomery MD   nortriptyline (PAMELOR) 10 MG capsule Take 2 capsules (20 mg) by mouth At Bedtime for sleep and pain prevention 1/30/17   Marii Montgomery MD   gabapentin (NEURONTIN) 100 MG capsule Take 4 capsules at bedtime, also 1 capsule in the morning and mid-day for pain prevention 1/28/17   Marii Montgomery MD   polyethylene glycol (MIRALAX) powder Take one-half to one scoop once a day for maintaining soft stools 1/28/17   Marii Montgomery MD   ferrous sulfate (IRON) 325 (65 FE) MG tablet Take 1 tablet (325 mg) by mouth 2 times daily To maintain iron levels 1/28/17   Marii Montgomery MD   amLODIPine (NORVASC) 10 MG tablet Take 1 tablet (10 mg) by mouth daily For blood pressure 1/18/17   Marii Montgomery MD   albuterol (VENTOLIN HFA) 108 (90 BASE) MCG/ACT inhaler Inhale 2 puffs into the lungs every 4 hours as needed for shortness of breath / dyspnea or wheezing 10/13/16   Marii Montgomery MD   potassium chloride SA (K-DUR,KLOR-CON M) 20 MEQ tablet Take 1 tablet (20 mEq) by mouth daily 10/13/16   Marii Montgomery MD   ferrous sulfate (IRON) 325 (65 FE) MG tablet Take 1 tablet (325 mg) by mouth daily (with breakfast) 8/7/15   Marii Montgomery MD   Calcium Carbonate-Vitamin D (CALCIUM 600 + D OR) Take 1 tablet by mouth  daily.    Reported, Patient       PHYSICAL EXAM:  NEURO/PSYCH: The patient is alert and oriented.  Appropriate.  Moves all extremities.   SKIN: Color appropriate for race, warm, dry.  PULMONARY: Does not require supplemental oxygen; no acute distress.   EXTREMITIES: Bilateral lower extremities are warm and well-perfused. Non-edematous.   12/21/2016 SUMAYA:  Findings:     Right:    Arm: 119 mmHg   PT at ankle: 54 mmHg   DP at foot: 56 mmHg   SUMAYA: 0.46   TBI: Not measured     Right pulse volume recordings or VPR:    High thigh: Mildly abnormal   Lower thigh: Mildly abnormal   Proximal calf: Mildly abnormal   Ankle: Mildly abnormal     Left:   Arm: 123 mmHg   PT at ankle: 60 mmHg   DP at foot: 56 mmHg   SUMAYA: 0.49   TBI: Not measured     Left pulse volume recordings or VPR:    High thigh: Mildly abnormal   Lower thigh: Mildly abnormal   Proximal calf: Mildly abnormal   Ankle: Mildly abnormal    ASSESSMENT/PLAN:  #1 Peripheral artery disease, with short-distance claudication, secondary to atherosclerosis   - PAD rehab referral; she is agreeable to be contacted regarding [facility] studies  - increase atorvastatin from 40 mg daily to 80 mg daily; inbox message sent to Dr. Montgomery, PCP  - highly encouraged to increase walking prior to beginning PAD rehab  - follow-up with Dr. Velez in 6- months with non-invasive studies with exercise prior to the appointment    ANTI-PLATELET: Plavix  ANTI-COAGULANT: Not warranted  STATIN: Atorvastatin- increase from 40 mg to 80 mg  DIABETES MEDICATIONS: Not diabetic  TOBACCO CESSATION: Quit 10 years ago    Please contact Dr. Velez's Vascular Surgery Service with questions or concerns.    ALEKSEY Burkett, CNP  Division of Vascular Surgery  [facility]  Pager: [phone]    I personally examined the patient and agree with the findings above.  I discussed the patient with the resident / fellow and care team, and agree with the assessment and plan of care as  documented in the note.  Vascular surgery staff  Has some mild claudication now, whereas before she was asymptomatic  Is still traveling quite a bit, but is happy to try the supervised walking regimen.  Follow up in 6 months with SUMAYA.  Iona Velez MD

## 2017-07-05 ENCOUNTER — OFFICE VISIT (OUTPATIENT)
Dept: INTERNAL MEDICINE | Facility: CLINIC | Age: 82
End: 2017-07-05

## 2017-07-05 VITALS
DIASTOLIC BLOOD PRESSURE: 71 MMHG | WEIGHT: 101.4 LBS | SYSTOLIC BLOOD PRESSURE: 156 MMHG | HEART RATE: 80 BPM | BODY MASS INDEX: 20.48 KG/M2 | RESPIRATION RATE: 16 BRPM

## 2017-07-05 DIAGNOSIS — R45.82 FEELING WORRIED: ICD-10-CM

## 2017-07-05 DIAGNOSIS — M54.16 LUMBAR RADICULOPATHY: ICD-10-CM

## 2017-07-05 DIAGNOSIS — M79.606 PAIN OF LOWER EXTREMITY, UNSPECIFIED LATERALITY: Primary | ICD-10-CM

## 2017-07-05 RX ORDER — GABAPENTIN 100 MG/1
CAPSULE ORAL
Qty: 240 CAPSULE | Refills: 1 | Status: SHIPPED | OUTPATIENT
Start: 2017-07-05 | End: 2017-08-18

## 2017-07-05 RX ORDER — CITALOPRAM HYDROBROMIDE 10 MG/1
10 TABLET ORAL DAILY
Qty: 30 TABLET | Refills: 1 | Status: SHIPPED | OUTPATIENT
Start: 2017-07-05 | End: 2017-08-03

## 2017-07-05 ASSESSMENT — PAIN SCALES - GENERAL: PAINLEVEL: EXTREME PAIN (8)

## 2017-07-05 NOTE — NURSING NOTE
Chief Complaint   Patient presents with     Musculoskeletal Problem     Here for left leg pain x 1 week; no fall or injury     Stanislav Carias CMA at 7:15 AM on 7/5/2017

## 2017-07-05 NOTE — PROGRESS NOTES
CC: left leg pain    S:  Here with .  C/O left leg pain x one week, unable to describe quality exactly (ache? Crampy), up to 8/10 if tries to walk, 2/10 with rest/application of heating pad.  Includes calf, knee, thigh, radiates to left hip area. Pain jumps around.  No fall, injury.  Does have history of PVD and spinal stenosis.  No numbness, tingling, weakness. Tramadol and oxycodone do not help the pain, and she is on gabapentin.  Reports no dizziness or tiredness.   She started but did not complete her PT in Nov.  We talked about the potential causes of her leg pain at length.   mentions, and Lucie agrees, that she has been worrying more over the last few months.  We talked about his at length.  Worries about her first daughter who has been single until recently.  Lucie does not want her to be alone. Also worries if her daughters don't call her every day.  She worries that they are not okay unless they call.  She has been focusing on her own health symptoms as well prompting 2 ED visits and several visits with me. She does think it would be a good idea if she were to take something to help her with anxiety.  We talked about how anxiety can amplify bodily symptoms and vice versa.    Patient Active Problem List   Diagnosis     Benign ovarian tumor     Hypothyroidism     Peripheral vascular disease (H)     Knee pain     Osteoarthritis     Cervicalgia     Hyperlipidemia with target LDL less than 70     Pulmonary embolism (H)     Anemia     Aortic stenosis     Atherosclerosis of aorta (H)     Atherosclerosis of arteries of extremities (H)     Intermittent claudication (H)     Iron deficiency     Osteoporosis     DVT of lower extremity, bilateral (H)     Varicose veins     Gluteal pain     Insomnia     Back pain     Vitamin D deficiency     Tobacco use disorder     Personal history of other drug therapy     Right knee pain     Other chronic pulmonary embolism (H)     Venous thrombosis of extremity      Benign essential hypertension     Gastritis     Cataract     Acute left-sided low back pain with left-sided sciatica     Lumbar stenosis with neurogenic claudication     SBO (small bowel obstruction) (H)     Small bowel obstruction (H)     Long term current use of anticoagulant therapy     Current Outpatient Prescriptions   Medication Sig Dispense Refill     gabapentin (NEURONTIN) 100 MG capsule Take 4 capsules at bedtime, also 2 capsule in the morning and mid-day for pain prevention 240 capsule 1     citalopram (CELEXA) 10 MG tablet Take 1 tablet (10 mg) by mouth daily 30 tablet 1     atorvastatin (LIPITOR) 40 MG tablet Take 2 tablets (80 mg) by mouth daily 90 tablet 3     pantoprazole (PROTONIX) 40 MG EC tablet Take 1 tablet (40 mg) by mouth daily 90 tablet 3     oxyCODONE (ROXICODONE) 5 MG IR tablet Take 1 tablet (5 mg) by mouth every 6 hours as needed for severe pain 10 tablet 0     ondansetron (ZOFRAN ODT) 4 MG ODT tab Take 1 tablet (4 mg) by mouth every 6 hours as needed for nausea 10 tablet 0     moxifloxacin (VIGAMOX) 0.5 % ophthalmic solution Place 1 drop Into the left eye 4 times daily       prednisoLONE acetate (PRED FORTE) 1 % ophthalmic susp Place 1 drop Into the left eye 4 times daily       ketorolac (ACULAR) 0.5 % ophthalmic solution Place 1 drop Into the left eye 4 times daily       traMADol (ULTRAM) 50 MG tablet Take 1 tablet (50 mg) by mouth every 6 hours as needed for moderate pain or severe pain Maximum 270 tablets in 3 months 270 tablet 1     bisacodyl (DULCOLAX) 10 MG Suppository Place 1 suppository (10 mg) rectally daily as needed for constipation 90 suppository 3     levothyroxine (SYNTHROID/LEVOTHROID) 50 MCG tablet Take 1 tablet (50 mcg) by mouth daily 90 tablet 3     clopidogrel (PLAVIX) 75 MG tablet Take 1 tablet (75 mg) by mouth daily 90 tablet 3     hydrochlorothiazide (HYDRODIURIL) 50 MG tablet Take 1 tablet (50 mg) by mouth daily 90 tablet 3     lisinopril (PRINIVIL/ZESTRIL) 40 MG  tablet Take 1 tablet (40 mg) by mouth daily For blood pressure 90 tablet 3     polyethylene glycol (MIRALAX) powder Take one-half to one scoop once a day for maintaining soft stools 119 g 5     ferrous sulfate (IRON) 325 (65 FE) MG tablet Take 1 tablet (325 mg) by mouth 2 times daily To maintain iron levels 90 tablet 1     amLODIPine (NORVASC) 10 MG tablet Take 1 tablet (10 mg) by mouth daily For blood pressure 90 tablet 1     albuterol (VENTOLIN HFA) 108 (90 BASE) MCG/ACT inhaler Inhale 2 puffs into the lungs every 4 hours as needed for shortness of breath / dyspnea or wheezing 18 Inhaler 1     potassium chloride SA (K-DUR,KLOR-CON M) 20 MEQ tablet Take 1 tablet (20 mEq) by mouth daily 90 tablet 3     ferrous sulfate (IRON) 325 (65 FE) MG tablet Take 1 tablet (325 mg) by mouth daily (with breakfast) 90 tablet 3     Calcium Carbonate-Vitamin D (CALCIUM 600 + D OR) Take 1 tablet by mouth daily.       [DISCONTINUED] gabapentin (NEURONTIN) 100 MG capsule Take 4 capsules at bedtime, also 1 capsule in the morning and mid-day for pain prevention 540 capsule 2     [DISCONTINUED] tolterodine (DETROL) 1 MG tablet Take 1-2 tablets by mouth At Bedtime. 180 tablet 3     Allergies   Allergen Reactions     Trazodone Fatigue     Felt too groggy     /71 (BP Location: Right arm, Patient Position: Chair, Cuff Size: Adult Small)  Pulse 80  Resp 16  Wt 46 kg (101 lb 6.4 oz)  Breastfeeding? No  BMI 20.48 kg/m2  BP Readings from Last 6 Encounters:   07/05/17 156/71   06/26/17 123/61   06/01/17 114/71   05/11/17 155/66   04/29/17 140/51   04/14/17 149/66   Gen:  Appears anxious, frequently fidgeting  Gait slightly antalgic.  ROM:  Forward flexion, extension, twisting, right bending all fine.  Bending to the left aggravates pain.  Able to stand on toes and heels.  Tender sciatic notch/buttock area, some posterior thigh and calf  Hyperrefexic patella, 1+ ankle jerks.  Strength 5/5 with flex/ext foot, knee as well as leg abd and  adduction.  Hip flexion aggravates pain.  Light touch sensation intact.  SLR negative.  Hip ROM intact.  Hip flexion and internal rotation aggravates pain and pulling sensation posterior thigh    Lucie was seen today for musculoskeletal problem.    Diagnoses and all orders for this visit:    Pain of lower extremity, unspecified laterality suspect secondary to lumbar radiculopathy.  Unlikely PAD, DVT  -     PHYSICAL THERAPY REFERRAL; Future  -     Increase gabapentin to 200 mg morning and afternoon, 400 mg at hs.  -     Stop Nortriptyline     Feeling worried (anxiety)  -     citalopram (CELEXA) 10 MG tablet; Take 1 tablet (10 mg) by mouth daily    Total time spent 40 minutes.  More than 50% of the time spent with Ms. Stockton on counseling / coordinating her care            Follow up in one month.    Marii Montgomery M.D.  Internal Medicine  [facility]   pager [phone]

## 2017-07-05 NOTE — PATIENT INSTRUCTIONS
[facility] Medication Refill Request Information:  * Please contact your pharmacy regarding ANY request for medication refills.  ** [facility] Prescription Fax = [phone]  * Please allow 3 business days for routine medication refills.  * Please allow 5 business days for controlled substance medication refills.     [facility] Test Result notification information:  *You will be notified with in 7-10 days of your appointment day regarding the results of your test.  If you are on MyChart you will be notified as soon as the provider has reviewed the results and signed off on them.    [facility] [phone]     Physical Therapy [phone] ([facility] [place] [address], [address])

## 2017-07-05 NOTE — MR AVS SNAPSHOT
After Visit Summary   7/5/2017    Lucie Stockton    MRN: [id number]           Patient Information     Date Of Birth          1935        Visit Information        Provider Department      7/5/2017 7:20 AM Marii Montgomery MD [facility]        Today's Diagnoses     Pain of lower extremity, unspecified laterality    -  1    Lumbar radiculopathy        Feeling worried          Care Instructions    Primary Care Center Medication Refill Request Information:  * Please contact your pharmacy regarding ANY request for medication refills.  ** [facility] Prescription Fax = [phone]  * Please allow 3 business days for routine medication refills.  * Please allow 5 business days for controlled substance medication refills.     Primary Care Center Test Result notification information:  *You will be notified with in 7-10 days of your appointment day regarding the results of your test.  If you are on MyChart you will be notified as soon as the provider has reviewed the results and signed off on them.    Primary Care Center [phone]     Physical Therapy [phone] ([address] [place] [address], [address])             Follow-ups after your visit        Additional Services     PHYSICAL THERAPY REFERRAL                 Follow-up notes from your care team     Return in about 4 weeks (around 8/2/2017) for pain.      Your next 10 appointments already scheduled     Jul 13, 2017 10:00 AM CDT   (Arrive by 9:45 AM)   New Patient Visit with Luis Khalil MD   [facility] Dermatology ([facility])    [address] [place] [address] [phone]            Dec 18, 2017 12:30 PM CST   US SUMAYA DOPPLER WITH EXERCISE with UCUSV1   [facility] Imaging Center US ([facility])    [address] [place] [address] [phone]           Please bring a list of your medicines (including vitamins, minerals and  over-the-counter drugs). Also, tell your doctor about any allergies you may have. Wear comfortable clothes and leave your valuables at home.  No caffeine or tobacco for 1 hour prior to exam.  Please call the Imaging Department at your exam site with any questions.            Dec 18, 2017  1:45 PM CST   (Arrive by 1:30 PM)   Return Vascular Visit with Iona Velez MD   [facility] ([facility] [place])    [address] [facility]  [address]rd [facility] [address] [phone]              Future tests that were ordered for you today     Open Future Orders        Priority Expected Expires Ordered    PHYSICAL THERAPY REFERRAL Routine  7/5/2018 7/5/2017            Who to contact     Please call your clinic at [phone] to:    Ask questions about your health    Make or cancel appointments    Discuss your medicines    Learn about your test results    Speak to your doctor   If you have compliments or concerns about an experience at your clinic, or if you wish to file a complaint, please contact [facility] Patient Relations at [phone] or email us at [email or web address]         Additional Information About Your Visit        Librettohart Information     [employer] gives you secure access to your electronic health record. If you see a primary care provider, you can also send messages to your care team and make appointments. If you have questions, please call your primary care clinic.  If you do not have a primary care provider, please call [phone] and they will assist you.      [employer] is an electronic gateway that provides easy, online access to your medical records. With [employer], you can request a clinic appointment, read your test results, renew a prescription or communicate with your care team.     To access your existing account, please contact your [facility] Clinic or call [phone] for assistance.        Care  EveryWhere ID     This is your Care EveryWhere ID. This could be used by other organizations to access your [place] medical records  [id number]        Your Vitals Were     Pulse Respirations Breastfeeding? BMI (Body Mass Index)          80 16 No 20.48 kg/m2         Blood Pressure from Last 3 Encounters:   07/05/17 156/71   06/26/17 123/61   06/01/17 114/71    Weight from Last 3 Encounters:   07/05/17 46 kg (101 lb 6.4 oz)   06/01/17 48.1 kg (106 lb)   05/11/17 49.9 kg (110 lb)                 Today's Medication Changes          These changes are accurate as of: 7/5/17  8:02 AM.  If you have any questions, ask your nurse or doctor.               Start taking these medicines.        Dose/Directions    citalopram 10 MG tablet   Commonly known as:  celeXA   Used for:  Feeling worried   Started by:  Marii Montgomery MD        Dose:  10 mg   Take 1 tablet (10 mg) by mouth daily   Quantity:  30 tablet   Refills:  1         These medicines have changed or have updated prescriptions.        Dose/Directions    gabapentin 100 MG capsule   Commonly known as:  NEURONTIN   This may have changed:  additional instructions   Used for:  Lumbar radiculopathy   Changed by:  Marii Montgomery MD        Take 4 capsules at bedtime, also 2 capsule in the morning and mid-day for pain prevention   Quantity:  240 capsule   Refills:  1         Stop taking these medicines if you haven't already. Please contact your care team if you have questions.     nortriptyline 10 MG capsule   Commonly known as:  PAMELOR   Stopped by:  Marii Montgomery MD                Where to get your medicines      These medications were sent to [facility] [address] [facility] [address] AT [place] [address], [facility] [address]     Phone:  [phone]     citalopram 10 MG tablet    gabapentin 100 MG capsule                Primary Care Provider Office Phone # Fax #    Marii Montgomery [phone]         [facility] [address] [place] [address] [phone]  [facility] [address]        Equal Access to Services     PAL SALGADO : Hadii aad ku hadcorielvin Soalysia, wakyleighda krishna, qaleonelta chulasheilabrielle kirby, jackeline cobbreginataryn izquierdo. So [facility] [phone].    ATENCIÓN: Si habla español, tiene a schwartz disposición servicios gratuitos de asistencia lingüística. Llame al [phone].    We comply with applicable federal civil rights laws and [place] laws. We do not discriminate on the basis of race, color, national origin, age, disability sex, sexual orientation or gender identity.            Thank you!     Thank you for choosing [facility]  for your care. Our goal is always to provide you with excellent care. Hearing back from our patients is one way we can continue to improve our services. Please take a few minutes to complete the written survey that you may receive in the mail after your visit with us. Thank you!             Your Updated Medication List - Protect others around you: Learn how to safely use, store and throw away your medicines at www.disposemymeds.org.          This list is accurate as of: 7/5/17  8:02 AM.  Always use your most recent med list.                   Brand Name Dispense Instructions for use Diagnosis    albuterol 108 (90 BASE) MCG/ACT Inhaler    VENTOLIN HFA    18 Inhaler    Inhale 2 puffs into the lungs every 4 hours as needed for shortness of breath / dyspnea or wheezing    Cough       amLODIPine 10 MG tablet    NORVASC    90 tablet    Take 1 tablet (10 mg) by mouth daily For blood pressure    Essential hypertension       atorvastatin 40 MG tablet    LIPITOR    90 tablet    Take 2 tablets (80 mg) by mouth daily    Hyperlipidemia, unspecified hyperlipidemia type       bisacodyl 10 MG Suppository    DULCOLAX    90 suppository    Place 1 suppository (10 mg) rectally daily as needed for constipation    Constipation, unspecified constipation type       CALCIUM 600 + D PO       Take 1 tablet by mouth daily.        citalopram 10 MG tablet    celeXA    30 tablet    Take 1 tablet (10 mg) by mouth daily    Feeling worried       clopidogrel 75 MG tablet    PLAVIX    90 tablet    Take 1 tablet (75 mg) by mouth daily    Other chronic pulmonary embolism (H), Venous thrombosis of extremity       * ferrous sulfate 325 (65 FE) MG tablet    IRON    90 tablet    Take 1 tablet (325 mg) by mouth daily (with breakfast)    Other iron deficiency anemias       * ferrous sulfate 325 (65 FE) MG tablet    IRON    90 tablet    Take 1 tablet (325 mg) by mouth 2 times daily To maintain iron levels    Iron deficiency       gabapentin 100 MG capsule    NEURONTIN    240 capsule    Take 4 capsules at bedtime, also 2 capsule in the morning and mid-day for pain prevention    Lumbar radiculopathy       hydrochlorothiazide 50 MG tablet    HYDRODIURIL    90 tablet    Take 1 tablet (50 mg) by mouth daily    Benign essential hypertension       ketorolac 0.5 % ophthalmic solution    ACULAR     Place 1 drop Into the left eye 4 times daily        levothyroxine 50 MCG tablet    SYNTHROID/LEVOTHROID    90 tablet    Take 1 tablet (50 mcg) by mouth daily    Hypothyroidism, unspecified type       lisinopril 40 MG tablet    PRINIVIL/ZESTRIL    90 tablet    Take 1 tablet (40 mg) by mouth daily For blood pressure    Essential hypertension       moxifloxacin 0.5 % ophthalmic solution    VIGAMOX     Place 1 drop Into the left eye 4 times daily        ondansetron 4 MG ODT tab    ZOFRAN ODT    10 tablet    Take 1 tablet (4 mg) by mouth every 6 hours as needed for nausea    Nausea       oxyCODONE 5 MG IR tablet    ROXICODONE    10 tablet    Take 1 tablet (5 mg) by mouth every 6 hours as needed for severe pain    Rib pain on left side       pantoprazole 40 MG EC tablet    PROTONIX    90 tablet    Take 1 tablet (40 mg) by mouth daily    Other iron deficiency anemias       polyethylene glycol powder    MIRALAX    119 g    Take one-half to one  scoop once a day for maintaining soft stools    Constipation, unspecified constipation type       potassium chloride SA 20 MEQ CR tablet    K-DUR/KLOR-CON M    90 tablet    Take 1 tablet (20 mEq) by mouth daily    Hypopotassemia       prednisoLONE acetate 1 % ophthalmic susp    PRED FORTE     Place 1 drop Into the left eye 4 times daily        traMADol 50 MG tablet    ULTRAM    270 tablet    Take 1 tablet (50 mg) by mouth every 6 hours as needed for moderate pain or severe pain Maximum 270 tablets in 3 months    Foot pain, right       * Notice:  This list has 2 medication(s) that are the same as other medications prescribed for you. Read the directions carefully, and ask your doctor or other care provider to review them with you.

## 2017-07-06 ENCOUNTER — THERAPY VISIT (OUTPATIENT)
Dept: PHYSICAL THERAPY | Facility: CLINIC | Age: 82
End: 2017-07-06
Payer: MEDICARE

## 2017-07-06 DIAGNOSIS — M54.42 LEFT-SIDED LOW BACK PAIN WITH LEFT-SIDED SCIATICA: Primary | ICD-10-CM

## 2017-07-06 PROCEDURE — 97530 THERAPEUTIC ACTIVITIES: CPT | Mod: GP | Performed by: PHYSICAL THERAPIST

## 2017-07-06 PROCEDURE — 97110 THERAPEUTIC EXERCISES: CPT | Mod: GP | Performed by: PHYSICAL THERAPIST

## 2017-07-06 PROCEDURE — 97161 PT EVAL LOW COMPLEX 20 MIN: CPT | Mod: GP | Performed by: PHYSICAL THERAPIST

## 2017-07-06 PROCEDURE — G8978 MOBILITY CURRENT STATUS: HCPCS | Mod: GP | Performed by: PHYSICAL THERAPIST

## 2017-07-06 PROCEDURE — G8979 MOBILITY GOAL STATUS: HCPCS | Mod: GP | Performed by: PHYSICAL THERAPIST

## 2017-07-06 NOTE — LETTER
DEPARTMENT OF HEALTH AND HUMAN SERVICES  CENTERS FOR MEDICARE & MEDICAID SERVICES    PLAN/UPDATED PLAN OF PROGRESS FOR OUTPATIENT REHABILITATION    PATIENTS NAME:  Lucie Stockton   : 1935    PROVIDER NUMBER:    [id number]  [id number]     PROVIDER NAME: [facility]  MEDICAL RECORD NUMBER: [id number]     START OF CARE DATE:  SOC Date: 17   TYPE:  PT    PRIMARY/TREATMENT DIAGNOSIS: (Pertinent Medical Diagnosis)  Left-sided low back pain with left-sided sciatica    VISITS FROM START OF CARE:    1     [place] [facility] Initial Evaluation -- Lumbar  Date: 2017  Lucie Stockton is a 81 year old female with a L leg paincondition.   Referral: GP  Work mechanical stresses:    Employment status:    Leisure mechanical stresses:   Functional disability score (NEENA/STarT Back):  na  VAS score (0-10): 7-10/10  Patient goals/expectations: walking     HISTORY:  Present symptoms: constant L sacrum post leg pain to heel mostly to mid calf  Pain quality (sharp/shooting/stabbing/aching/burning/cramping):  Dull to  strong  Paresthesia (yes/no): no  Present since (onset date): 7-10 days ago flared- she woke with it MD order 2017  Symptoms (improving/unchanging/worsening): unchanged   Symptoms commenced as a result of: unknown  Condition occurred in the following environment:      Symptoms at onset (back/thigh/leg): LS/sacrum  Constant symptoms (back/thigh/leg): all Sx  Intermittent symptoms (back/thigh/leg):   Symptoms are made worse with the following: Always Rising, Sometimes Standing, Always Walking, Time of day - No effect and Always On the move   Symptoms are made better with the following: Always Sitting, Sometimes Lying and Sometimes When still  Disturbed sleep (yes/no):  Trouble is getting to sleep which can take 1.5 hrs    Sleeping postures (prone/sup/side R/L): R SL  Previous episodes (0/1-5/6-10/11+):  She doesn't recall   Year of first  episode: Rx dates back to  here  Previous history:   Previous treatments: PT 2016 ext responder    PATIENTS NAME:  Lucie Stockton   : 1935  PRIMARY/TREATMENT DIAGNOSIS: (Pertinent Medical Diagnosis)  Left-sided low back pain with left-sided sciatica    Specific Questions:  Cough/Sneeze/Strain (pos/neg): unknown  Bowel/Bladder (normal/abnormal): no  Gait (normal/abnormal): abnormal  Medications (nil/NSAIDS/analg/steroids/anticoag/other):  Narcotics/Opiods and Other - Gabapentin  Medical allergies:  See chart  General health (excellent/good/fair/poor):  good  Pertinent medical history:  Osteoarthritis, Osteoporosis and Anemia  Imaging (NA/Xray/MRI):  Spinal stenosis  Recent or major surgery (yes/no):  no  Night pain (yes/no): no  Accidents (yes/no): no  Unexplained weight loss (yes/no): currently 101#  normally 115-120#  loss of appetite since 2017  Barriers at home: stairs 1 flight that she does 10 x per day  Other red flags: none    EXAMINATION    Posture:   Sitting (good/fair/poor): poor  Standing (good/fair/poor):poor  Lordosis (red/acc/normal): dec  Correction of posture (better/worse/no effect): sitting erecbracing herself t without L roll legs uncrossed is the best  Lateral Shift (right/left/nil): no  Relevant (yes/no):    Other Observations: she gets relief with walking by bending forwards from waist, bracing with hands on thighs    Neurological:  Motor deficit:  No, pain P. With resisted HS curl  Reflexes:  na  Sensory deficit:  no      Dural signs:  wnl    Movement Loss:   Elias Mod Min Nil Pain   Flexion    + fingertips to floor    Extension  +      Side Gliding R  +   L sacral pain post thigh to knee   Side Gliding L    +    Test Movements:   During: produces, abolishes, increases, decreases, no effect, centralizing, peripheralizing   After: better, worse, no better, no worse, no effect, centralized, peripheralized  PATIENTS NAME:  Lucie Stockton   : 1935  PRIMARY/TREATMENT DIAGNOSIS:  (Pertinent Medical Diagnosis)  Left-sided low back pain with left-sided sciatica    Pretest symptoms standing: L sacral pain to post leg to heel   Symptoms During Symptoms After ROM increased ROM decreased No Effect   FIS No Effect leg pain dec sacral pain    No Effect         Rep FIS No Effect   leg pain dec sacral pain No Effect      +   EIS Decreases   leg pain No Better         Rep EIS Decreases leg pain  Better, walking, standing, SG R and inc ROM SG R ext    +     Pretest symptoms lying: na   Symptoms During Symptoms After ROM increased ROM decreased No Effect   SALOME          Rep SALOME          EIL          Rep EIL          If required, pretest symptoms:    Symptoms During Symptoms After ROM increased ROM decreased No Effect   SGIS - R          Rep SGIS - R          SGIS - L          Rep SGIS - L            Static Tests:  Sitting slouched:    Sitting erect:    Standing slouched   Standing erect:    Lying prone in extension:   Long sitting:      Other Tests:     Provisional Classification:  Derangement - Asymmetrical, unilateral, symptoms below knee    Principle of Management:  Education:  Centralization and how flexion helps back but not leg     Equipment provided:    Mechanical therapy (Y/N):  Y   Extension principle:  EIS with forearms on wall 10 x q 2 hrs sit erect with legs uncrossed    Lateral Principle:    Flexion principle:      Other:    PATIENTS NAME:  Lucie Stockton   : 1935  PRIMARY/TREATMENT DIAGNOSIS: (Pertinent Medical Diagnosis)  Left-sided low back pain with left-sided sciatica    ASSESSMENT/PLAN:  Patient is a 81 year old female with lumbar complaints.    Patient has the following significant findings with corresponding treatment plan.                Diagnosis 1:  LBP L leg pain  Pain -  self management, education, directional preference exercise and home program  Decreased ROM/flexibility - therapeutic exercise, therapeutic activity and home program  Decreased joint mobility - therapeutic  exercise, therapeutic activity and home program  Impaired gait - home program  Decreased function - therapeutic activities and home program  Impaired posture - neuro re-education, therapeutic activities and home program    Therapy Evaluation Codes:   1) History comprised of:   Personal factors that impact the plan of care:      Age.    Comorbidity factors that impact the plan of care are:      Osteoarthritis and Smoking.     Medications impacting care: Pain.  2) Examination of Body Systems comprised of:   Body structures and functions that impact the plan of care:      Lumbar spine.   Activity limitations that impact the plan of care are:      Standing and Walking.  3) Clinical presentation characteristics are:   Stable/Uncomplicated.  4) Decision-Making    Low complexity using standardized patient assessment instrument and/or measureable assessment of functional outcome.  Cumulative Therapy Evaluation is: Low complexity.    Previous and current functional limitations:  (See Goal Flow Sheet for this information)    Short term and Long term goals: (See Goal Flow Sheet for this information)   Communication ability:  Patient appears to be able to clearly communicate and understand verbal and written communication and follow directions correctly.  Treatment Explanation - The following has been discussed with the patient:   RX ordered/plan of care, Anticipated outcomes, Possible risks and side effects                          PATIENTS NAME:  Lucie Stockton   : 1935  PRIMARY/TREATMENT DIAGNOSIS: (Pertinent Medical Diagnosis)  Left-sided low back pain with left-sided sciatica    This patient would benefit from PT intervention to resume normal activities.   Rehab potential is good.  Frequency:  1 X week, once daily  Duration:  for 6 weeks  Discharge Plan:  Achieve all LTG.  Independent in home treatment program.  Reach maximal therapeutic benefit.              Caregiver Signature/Credentials  "_____________________________ Date ________         Samantha Agarwal, PT,  Cert MDT     I have reviewed and certified the need for these services and plan of treatment while under my care.        PHYSICIAN'S SIGNATURE:   _________________________________________    Date___________   Marii Montgomery    Certification period:  Beginning of Cert date period: 07/06/17 to  10/04/17     Functional Level Progress Report: Please see attached \"Goal Flow sheet for Functional level.\"    ____X____ Continue Services or       ________ DC Services                Service dates: From  SOC Date: 07/06/17  to present                         "

## 2017-07-06 NOTE — PROGRESS NOTES
[facility] Initial Evaluation -- Lumbar    Date: July 6, 2017  Lucie Stockton is a 81 year old female with a L leg paincondition.   Referral: GP  Work mechanical stresses:    Employment status:    Leisure mechanical stresses:   Functional disability score (NEENA/STarT Back):  na  VAS score (0-10): 7-10/10  Patient goals/expectations: walking     HISTORY:    Present symptoms: constant L sacrum post leg pain to heel mostly to mid calf  Pain quality (sharp/shooting/stabbing/aching/burning/cramping):  Dull to  strong  Paresthesia (yes/no): no    Present since (onset date): 7-10 days ago flared- she woke with it MD order 7/5/2017  Symptoms (improving/unchanging/worsening): unchanged   Symptoms commenced as a result of: unknown  Condition occurred in the following environment:        Symptoms at onset (back/thigh/leg): LS/sacrum  Constant symptoms (back/thigh/leg): all Sx  Intermittent symptoms (back/thigh/leg):     Symptoms are made worse with the following: Always Rising, Sometimes Standing, Always Walking, Time of day - No effect and Always On the move   Symptoms are made better with the following: Always Sitting, Sometimes Lying and Sometimes When still    Disturbed sleep (yes/no):  Trouble is getting to sleep which can take 1.5 hrs   Sleeping postures (prone/sup/side R/L): R SL    Previous episodes (0/1-5/6-10/11+):  She doesn't recall Year of first episode: Rx dates back to 2015 here    Previous history:   Previous treatments: PT 11/2016 ext responder      Specific Questions:  Cough/Sneeze/Strain (pos/neg): unknown  Bowel/Bladder (normal/abnormal): no  Gait (normal/abnormal): abnormal  Medications (nil/NSAIDS/analg/steroids/anticoag/other):  Narcotics/Opiods and Other - Gabapentin  Medical allergies:  See chart  General health (excellent/good/fair/poor):  good  Pertinent medical history:  Osteoarthritis, Osteoporosis and Anemia  Imaging (NA/Xray/MRI):  Spinal stenosis  Recent or major surgery  (yes/no):  no  Night pain (yes/no): no  Accidents (yes/no): no  Unexplained weight loss (yes/no): currently 101#  normally 115-120#  loss of appetite since 1/2017  Barriers at home: stairs 1 flight that she does 10 x per day  Other red flags: none    EXAMINATION    Posture:   Sitting (good/fair/poor): poor  Standing (good/fair/poor):poor  Lordosis (red/acc/normal): dec  Correction of posture (better/worse/no effect): sitting erecbracing herself t without L roll legs uncrossed is the best    Lateral Shift (right/left/nil): no  Relevant (yes/no):    Other Observations: she gets relief with walking by bending forwards from waist, bracing with hands on thighs    Neurological:    Motor deficit:  No, pain P. With resisted HS curl  Reflexes:  na  Sensory deficit:  no  Dural signs:  wnl    Movement Loss:   Elias Mod Min Nil Pain   Flexion    + fingertips to floor    Extension  +      Side Gliding R  +   L sacral pain post thigh to knee   Side Gliding L    +      Test Movements:   During: produces, abolishes, increases, decreases, no effect, centralizing, peripheralizing   After: better, worse, no better, no worse, no effect, centralized, peripheralized    Pretest symptoms standing: L sacral pain to post leg to heel   Symptoms During Symptoms After ROM increased ROM decreased No Effect   FIS No Effect leg pain dec sacral pain    No Effect         Rep FIS No Effect   leg pain dec sacral pain No Effect      +   EIS Decreases   leg pain No Better         Rep EIS Decreases leg pain  Better, walking, standing, SG R and inc ROM SG R ext    +     Pretest symptoms lying: na   Symptoms During Symptoms After ROM increased ROM decreased No Effect   SALOME          Rep SALOME          EIL          Rep EIL          If required, pretest symptoms:    Symptoms During Symptoms After ROM increased ROM decreased No Effect   SGIS - R          Rep SGIS - R          SGIS - L          Rep SGIS - L            Static Tests:  Sitting slouched:    Sitting  erect:    Standing slouched   Standing erect:    Lying prone in extension:   Long sitting:      Other Tests:     Provisional Classification:  Derangement - Asymmetrical, unilateral, symptoms below knee    Principle of Management:  Education:  Centralization and how flexion helps back but not leg    Equipment provided:    Mechanical therapy (Y/N):  Y   Extension principle:  EIS with forearms on wall 10 x q 2 hrs sit erect with legs uncrossed  Lateral Principle:    Flexion principle:    Other:      ASSESSMENT/PLAN:    Patient is a 81 year old female with lumbar complaints.    Patient has the following significant findings with corresponding treatment plan.                Diagnosis 1:  LBP L leg pain  Pain -  self management, education, directional preference exercise and home program  Decreased ROM/flexibility - therapeutic exercise, therapeutic activity and home program  Decreased joint mobility - therapeutic exercise, therapeutic activity and home program  Impaired gait - home program  Decreased function - therapeutic activities and home program  Impaired posture - neuro re-education, therapeutic activities and home program    Therapy Evaluation Codes:   1) History comprised of:   Personal factors that impact the plan of care:      Age.    Comorbidity factors that impact the plan of care are:      Osteoarthritis and Smoking.     Medications impacting care: Pain.  2) Examination of Body Systems comprised of:   Body structures and functions that impact the plan of care:      Lumbar spine.   Activity limitations that impact the plan of care are:      Standing and Walking.  3) Clinical presentation characteristics are:   Stable/Uncomplicated.  4) Decision-Making    Low complexity using standardized patient assessment instrument and/or measureable assessment of functional outcome.  Cumulative Therapy Evaluation is: Low complexity.    Previous and current functional limitations:  (See Goal Flow Sheet for this information)     Short term and Long term goals: (See Goal Flow Sheet for this information)     Communication ability:  Patient appears to be able to clearly communicate and understand verbal and written communication and follow directions correctly.  Treatment Explanation - The following has been discussed with the patient:   RX ordered/plan of care  Anticipated outcomes  Possible risks and side effects  This patient would benefit from PT intervention to resume normal activities.   Rehab potential is good.    Frequency:  1 X week, once daily  Duration:  for 6 weeks  Discharge Plan:  Achieve all LTG.  Independent in home treatment program.  Reach maximal therapeutic benefit.    Please refer to the daily flowsheet for treatment today, total treatment time and time spent performing 1:1 timed codes.       Subjective:    HPI                    Objective:    System    Physical Exam    General     ROS

## 2017-07-07 PROBLEM — M54.42 LEFT-SIDED LOW BACK PAIN WITH LEFT-SIDED SCIATICA: Status: ACTIVE | Noted: 2017-07-07

## 2017-07-10 ENCOUNTER — THERAPY VISIT (OUTPATIENT)
Dept: PHYSICAL THERAPY | Facility: CLINIC | Age: 82
End: 2017-07-10
Payer: MEDICARE

## 2017-07-10 DIAGNOSIS — G89.29 CHRONIC LEFT-SIDED LOW BACK PAIN WITH LEFT-SIDED SCIATICA: ICD-10-CM

## 2017-07-10 DIAGNOSIS — M54.42 CHRONIC LEFT-SIDED LOW BACK PAIN WITH LEFT-SIDED SCIATICA: ICD-10-CM

## 2017-07-10 PROCEDURE — 97110 THERAPEUTIC EXERCISES: CPT | Mod: GP | Performed by: PHYSICAL THERAPIST

## 2017-07-10 PROCEDURE — 97530 THERAPEUTIC ACTIVITIES: CPT | Mod: GP | Performed by: PHYSICAL THERAPIST

## 2017-07-12 ENCOUNTER — TELEPHONE (OUTPATIENT)
Dept: INTERNAL MEDICINE | Facility: CLINIC | Age: 82
End: 2017-07-12

## 2017-07-12 DIAGNOSIS — F41.9 ANXIETY: ICD-10-CM

## 2017-07-12 DIAGNOSIS — F32.1 MODERATE MAJOR DEPRESSION (H): Primary | ICD-10-CM

## 2017-07-12 NOTE — TELEPHONE ENCOUNTER
----- Message from Cindy Genao sent at 7/12/2017 12:39 PM CDT -----  Regarding: Dr. Montgomery, Pt. Spouse request call to discuss depression medication  Hi Kristal,    Patient  Vernon called in regards to his wife and her depression meidcation. He would like a call back to discuss to see if they can increase the dosage. Please call either numbers depending on time of day will be at home phone [phone] for the next 30 minutes and after 1:30 PM will be at work phone [phone], OK to leave a message.     Pt lethargic not eating or sleeping.  requesting increase in her citalopram. Pt on 10 mg at this time.  Kristal Jurado RN 2:51 PM on 7/12/2017.

## 2017-07-13 NOTE — TELEPHONE ENCOUNTER
I am not in favor of adjusting her dose at this time.  She started the rx on 7/5/17. I would recommend that she see one of our health psychologists and will place an order.  If she is having thoughts of self harm, ending her life, is suicidal, etc., she should go to the ER.  This could be a side effect of the rx.  Have her make an appointment with me within one week.  Please call her.  Thanks!  SB    Pt called and she states she is getting better. Not sleeping as much. Plan of care discussed with pt and she is in agreement to plan. Will get pt scheduled with health psychologist.  Clinic numbers given for questions or concerns.  Kristal Jurado RN 12:28 PM on 7/13/2017.

## 2017-07-18 ENCOUNTER — THERAPY VISIT (OUTPATIENT)
Dept: PHYSICAL THERAPY | Facility: CLINIC | Age: 82
End: 2017-07-18
Payer: MEDICARE

## 2017-07-18 DIAGNOSIS — M54.42 CHRONIC LEFT-SIDED LOW BACK PAIN WITH LEFT-SIDED SCIATICA: ICD-10-CM

## 2017-07-18 DIAGNOSIS — G89.29 CHRONIC LEFT-SIDED LOW BACK PAIN WITH LEFT-SIDED SCIATICA: ICD-10-CM

## 2017-07-18 PROCEDURE — 97110 THERAPEUTIC EXERCISES: CPT | Mod: GP | Performed by: PHYSICAL THERAPIST

## 2017-07-20 ENCOUNTER — THERAPY VISIT (OUTPATIENT)
Dept: PHYSICAL THERAPY | Facility: CLINIC | Age: 82
End: 2017-07-20
Payer: MEDICARE

## 2017-07-20 DIAGNOSIS — G89.29 CHRONIC LEFT-SIDED LOW BACK PAIN WITH LEFT-SIDED SCIATICA: ICD-10-CM

## 2017-07-20 DIAGNOSIS — M54.42 CHRONIC LEFT-SIDED LOW BACK PAIN WITH LEFT-SIDED SCIATICA: ICD-10-CM

## 2017-07-20 PROCEDURE — 97110 THERAPEUTIC EXERCISES: CPT | Mod: GP | Performed by: PHYSICAL THERAPIST

## 2017-07-24 ENCOUNTER — THERAPY VISIT (OUTPATIENT)
Dept: PHYSICAL THERAPY | Facility: CLINIC | Age: 82
End: 2017-07-24
Payer: MEDICARE

## 2017-07-24 DIAGNOSIS — M54.42 CHRONIC LEFT-SIDED LOW BACK PAIN WITH LEFT-SIDED SCIATICA: ICD-10-CM

## 2017-07-24 DIAGNOSIS — G89.29 CHRONIC LEFT-SIDED LOW BACK PAIN WITH LEFT-SIDED SCIATICA: ICD-10-CM

## 2017-07-24 PROCEDURE — 97110 THERAPEUTIC EXERCISES: CPT | Mod: GP | Performed by: PHYSICAL THERAPIST

## 2017-07-24 PROCEDURE — 97530 THERAPEUTIC ACTIVITIES: CPT | Mod: GP | Performed by: PHYSICAL THERAPIST

## 2017-07-25 ENCOUNTER — TELEPHONE (OUTPATIENT)
Dept: INTERNAL MEDICINE | Facility: CLINIC | Age: 82
End: 2017-07-25

## 2017-07-25 DIAGNOSIS — M54.42 CHRONIC LEFT-SIDED LOW BACK PAIN WITH LEFT-SIDED SCIATICA: ICD-10-CM

## 2017-07-25 DIAGNOSIS — M79.662 PAIN OF LEFT LOWER LEG: Primary | ICD-10-CM

## 2017-07-25 DIAGNOSIS — G89.29 CHRONIC LEFT-SIDED LOW BACK PAIN WITH LEFT-SIDED SCIATICA: ICD-10-CM

## 2017-07-25 NOTE — TELEPHONE ENCOUNTER
----- Message from Yuli Green sent at 7/24/2017  4:22 PM CDT -----  Regarding: More Pt Visit  Contact: [phone]  Would like to get 6 more visits added to her PT at [facility] [phone].  Samantha is the therapist.  Please ismael patient also  [facility] referral written.  Kristal Jurado RN 11:58 AM on 7/25/2017.

## 2017-07-25 NOTE — TELEPHONE ENCOUNTER
Pt called as I had asked her to and states her foot pain is better, knee hurts but knee pain not as bad as ankle pain. Reminded her to avoid bending, ankle exercise and sitting with legs out straight.

## 2017-07-25 NOTE — TELEPHONE ENCOUNTER
----- Message from Yuli Green sent at 7/24/2017  4:22 PM CDT -----  Regarding: More Pt Visit  Contact: [phone]  Would like to get 6 more visits added to her PT at [facility] [phone].  Samantha is the therapist.  Please ismael patient also  Referral written.  Kristal Jurado RN 10:29 AM on 7/25/2017.

## 2017-07-29 ENCOUNTER — APPOINTMENT (OUTPATIENT)
Dept: GENERAL RADIOLOGY | Facility: CLINIC | Age: 82
End: 2017-07-29
Attending: INTERNAL MEDICINE
Payer: MEDICARE

## 2017-07-29 ENCOUNTER — HOSPITAL ENCOUNTER (EMERGENCY)
Facility: CLINIC | Age: 82
Discharge: HOME | End: 2017-07-29
Attending: INTERNAL MEDICINE | Admitting: INTERNAL MEDICINE
Payer: MEDICARE

## 2017-07-29 VITALS
BODY MASS INDEX: 20.53 KG/M2 | WEIGHT: 101.63 LBS | OXYGEN SATURATION: 95 % | TEMPERATURE: 97.7 F | SYSTOLIC BLOOD PRESSURE: 146 MMHG | HEART RATE: 82 BPM | RESPIRATION RATE: 16 BRPM | DIASTOLIC BLOOD PRESSURE: 85 MMHG

## 2017-07-29 DIAGNOSIS — Z79.01 CHRONIC ANTICOAGULATION: ICD-10-CM

## 2017-07-29 DIAGNOSIS — M25.572 ACUTE LEFT ANKLE PAIN: ICD-10-CM

## 2017-07-29 PROCEDURE — 99283 EMERGENCY DEPT VISIT LOW MDM: CPT

## 2017-07-29 PROCEDURE — 73610 X-RAY EXAM OF ANKLE: CPT | Mod: LT

## 2017-07-29 PROCEDURE — 25000132 ZZH RX MED GY IP 250 OP 250 PS 637: Mod: GY | Performed by: INTERNAL MEDICINE

## 2017-07-29 PROCEDURE — 99284 EMERGENCY DEPT VISIT MOD MDM: CPT | Mod: Z6 | Performed by: INTERNAL MEDICINE

## 2017-07-29 PROCEDURE — A9270 NON-COVERED ITEM OR SERVICE: HCPCS | Mod: GY | Performed by: INTERNAL MEDICINE

## 2017-07-29 RX ORDER — OXYCODONE HYDROCHLORIDE 5 MG/1
5 TABLET ORAL ONCE
Status: COMPLETED | OUTPATIENT
Start: 2017-07-29 | End: 2017-07-29

## 2017-07-29 RX ORDER — OXYCODONE HYDROCHLORIDE 5 MG/1
5 TABLET ORAL EVERY 4 HOURS PRN
Qty: 10 TABLET | Refills: 0 | Status: SHIPPED | OUTPATIENT
Start: 2017-07-29 | End: 2017-08-18

## 2017-07-29 RX ADMIN — OXYCODONE HYDROCHLORIDE 5 MG: 5 TABLET ORAL at 12:47

## 2017-07-29 ASSESSMENT — ENCOUNTER SYMPTOMS
JOINT SWELLING: 0
ARTHRALGIAS: 1
COLOR CHANGE: 1

## 2017-07-29 NOTE — ED NOTES
Arrived to ED d/t c/o left ankle, denies any recent injury, pain has been getting worse over the past week, CMS intact, VSS

## 2017-07-29 NOTE — ED AVS SNAPSHOT
[facility], [place], Emergency Department    [address] [place] [address]    Phone:  [phone]                                       Lucie Stockton   MRN: [id number]    Department:  [facility], Emergency Department   Date of Visit:  7/29/2017           After Visit Summary Signature Page     I have received my discharge instructions, and my questions have been answered. I have discussed any challenges I see with this plan with the nurse or doctor.    ..........................................................................................................................................  Patient/Patient Representative Signature      ..........................................................................................................................................  Patient Representative Print Name and Relationship to Patient    ..................................................               ................................................  Date                                            Time    ..........................................................................................................................................  Reviewed by Signature/Title    ...................................................              ..............................................  Date                                                            Time

## 2017-07-29 NOTE — ED AVS SNAPSHOT
[facility], Emergency Department    [address] [address]    Phone:  [phone]                                       Lucie Stockton   MRN: [id number]    Department:  [facility], Emergency Department   Date of Visit:  7/29/2017           Patient Information     Date Of Birth          1935        Your diagnoses for this visit were:     Acute left ankle pain     Chronic anticoagulation        You were seen by Esha Naylor MD.        Discharge Instructions         Treating Ankle Sprains  Treatment will depend on how bad your sprain is. For a severe sprain, healing may take 3 months or more.  Right after your injury: Use R.I.C.E.    Rest: At first, keep weight off the ankle as much as you can. You may be given crutches to help you walk without putting weight on the ankle.    Ice: Put an ice pack on the ankle for 15 minutes. Remove the pack and wait at least 30 minutes. Repeat for up to 3 days. This helps reduce swelling.    Compression: To reduce swelling and keep the joint stable, you may need to wrap the ankle with an elastic bandage. For more severe sprains, you may need an ankle brace or a cast.    Elevation: To reduce swelling, keep your ankle raised above your heart when you sit or lie down.  Medicine  Your healthcare provider may suggest oral non-steroidal anti-inflammatory medicine (NSAIDs), such as ibuprofen. This relieves the pain and helps reduce any swelling. Be sure to take your medicine as directed.  Contrast baths  After 3 days, soak your ankle in warm water for 30 seconds, then in cool water for 30 seconds. Go back and forth for 5 minutes. Doing this every 2 hours will help keep the swelling down.  Exercises    After about 2 to 3 weeks, you may be given exercises to strengthen the ligaments and muscles in the ankle. Doing these exercises will help prevent another ankle sprain. Exercises may include standing on your toes and then on your heels and doing ankle curls.  Ankle  curls    Sit on the edge of a sturdy table or lie on your back.    Pull your toes toward you. Then point them away from you. Repeat for 2 to 3 minutes.   Date Last Reviewed: 9/28/2015 2000-2017 [employer]. [address] [place] [id number]. All rights reserved. This information is not intended as a substitute for professional medical care. Always follow your healthcare professional's instructions.      Please make an appointment to follow up with Your Primary Care Provider as soon as possible even if entirely better.     Future Appointments        Provider Department Dept Phone Center    8/2/2017 3:00 PM Rambo Mello PT [facility] [phone] [facility]    8/3/2017 8:00 AM Marii Montgomery MD [facility] [place] [address] [phone] [facility]    8/17/2017 9:00 AM Jesenia Clay, PhD [facility] [place] [address] [phone] [facility]    12/18/2017 12:30 PM [facility] VASCULAR ULTRASOUND ROOM 1 [facility] [phone] [facility]    12/18/2017 1:45 PM Iona Velez MD [facility] [phone] [facility]      24 Hour Appointment Hotline       To make an appointment at any [facility], call [phone] ([phone]). If you don't have a family doctor or clinic, we will help you find one. [facility] are conveniently located to serve the needs of you and your family.          ED Discharge Orders     ACE WRAP                    Review of your medicines      CONTINUE these medicines which may have CHANGED, or have new prescriptions. If we are uncertain of the size of tablets/capsules you have at home, strength may be listed as something that might have changed.        Dose / Directions Last dose taken    oxyCODONE 5 MG IR tablet   Commonly known as:  ROXICODONE   Dose:  5 mg   What changed:    - when to take this  - reasons to take this   Quantity:  10 tablet        Take 1 tablet (5 mg) by  mouth every 4 hours as needed for moderate to severe pain   Refills:  0          Our records show that you are taking the medicines listed below. If these are incorrect, please call your family doctor or clinic.        Dose / Directions Last dose taken    albuterol 108 (90 BASE) MCG/ACT Inhaler   Commonly known as:  VENTOLIN HFA   Dose:  2 puff   Quantity:  18 Inhaler        Inhale 2 puffs into the lungs every 4 hours as needed for shortness of breath / dyspnea or wheezing   Refills:  1        amLODIPine 10 MG tablet   Commonly known as:  NORVASC   Dose:  10 mg   Quantity:  90 tablet        Take 1 tablet (10 mg) by mouth daily For blood pressure   Refills:  1        atorvastatin 40 MG tablet   Commonly known as:  LIPITOR   Dose:  80 mg   Quantity:  90 tablet        Take 2 tablets (80 mg) by mouth daily   Refills:  3        bisacodyl 10 MG Suppository   Commonly known as:  DULCOLAX   Dose:  10 mg   Quantity:  90 suppository        Place 1 suppository (10 mg) rectally daily as needed for constipation   Refills:  3        CALCIUM 600 + D PO   Dose:  1 tablet        Take 1 tablet by mouth daily.   Refills:  0        citalopram 10 MG tablet   Commonly known as:  celeXA   Dose:  10 mg   Quantity:  30 tablet        Take 1 tablet (10 mg) by mouth daily   Refills:  1        clopidogrel 75 MG tablet   Commonly known as:  PLAVIX   Dose:  75 mg   Quantity:  90 tablet        Take 1 tablet (75 mg) by mouth daily   Refills:  3        * ferrous sulfate 325 (65 FE) MG tablet   Commonly known as:  IRON   Dose:  325 mg   Quantity:  90 tablet        Take 1 tablet (325 mg) by mouth daily (with breakfast)   Refills:  3        * ferrous sulfate 325 (65 FE) MG tablet   Commonly known as:  IRON   Dose:  1 tablet   Quantity:  90 tablet        Take 1 tablet (325 mg) by mouth 2 times daily To maintain iron levels   Refills:  1        gabapentin 100 MG capsule   Commonly known as:  NEURONTIN   Quantity:  240 capsule        Take 4 capsules at  bedtime, also 2 capsule in the morning and mid-day for pain prevention   Refills:  1        hydrochlorothiazide 50 MG tablet   Commonly known as:  HYDRODIURIL   Dose:  50 mg   Quantity:  90 tablet        Take 1 tablet (50 mg) by mouth daily   Refills:  3        ketorolac 0.5 % ophthalmic solution   Commonly known as:  ACULAR   Dose:  1 drop        Place 1 drop Into the left eye 4 times daily   Refills:  0        levothyroxine 50 MCG tablet   Commonly known as:  SYNTHROID/LEVOTHROID   Dose:  50 mcg   Quantity:  90 tablet        Take 1 tablet (50 mcg) by mouth daily   Refills:  3        lisinopril 40 MG tablet   Commonly known as:  PRINIVIL/ZESTRIL   Dose:  40 mg   Quantity:  90 tablet        Take 1 tablet (40 mg) by mouth daily For blood pressure   Refills:  3        moxifloxacin 0.5 % ophthalmic solution   Commonly known as:  VIGAMOX   Dose:  1 drop        Place 1 drop Into the left eye 4 times daily   Refills:  0        ondansetron 4 MG ODT tab   Commonly known as:  ZOFRAN ODT   Dose:  4 mg   Quantity:  10 tablet        Take 1 tablet (4 mg) by mouth every 6 hours as needed for nausea   Refills:  0        pantoprazole 40 MG EC tablet   Commonly known as:  PROTONIX   Dose:  40 mg   Quantity:  90 tablet        Take 1 tablet (40 mg) by mouth daily   Refills:  3        polyethylene glycol powder   Commonly known as:  MIRALAX   Quantity:  119 g        Take one-half to one scoop once a day for maintaining soft stools   Refills:  5        potassium chloride SA 20 MEQ CR tablet   Commonly known as:  K-DUR/KLOR-CON M   Dose:  20 mEq   Quantity:  90 tablet        Take 1 tablet (20 mEq) by mouth daily   Refills:  3        prednisoLONE acetate 1 % ophthalmic susp   Commonly known as:  PRED FORTE   Dose:  1 drop        Place 1 drop Into the left eye 4 times daily   Refills:  0        traMADol 50 MG tablet   Commonly known as:  ULTRAM   Dose:  50 mg   Quantity:  270 tablet        Take 1 tablet (50 mg) by mouth every 6 hours as  needed for moderate pain or severe pain Maximum 270 tablets in 3 months   Refills:  1        * Notice:  This list has 2 medication(s) that are the same as other medications prescribed for you. Read the directions carefully, and ask your doctor or other care provider to review them with you.            Prescriptions were sent or printed at these locations (1 Prescription)                   Other Prescriptions                Printed at Department/Unit printer (1 of 1)         oxyCODONE (ROXICODONE) 5 MG IR tablet                Procedures and tests performed during your visit     Ankle XR, G/E 3 views, left      Orders Needing Specimen Collection     None      Pending Results     No orders found from 7/27/2017 to 7/30/2017.            Pending Culture Results     No orders found from 7/27/2017 to 7/30/2017.            Pending Results Instructions     If you had any lab results that were not finalized at the time of your Discharge, you can call the ED Lab Result RN at [phone]. You will be contacted by this team for any positive Lab results or changes in treatment. The nurses are available 7 days a week from 10A to 6:30P.  You can leave a message 24 hours per day and they will return your call.        Thank you for choosing [facility]       Thank you for choosing [facility] for your care. Our goal is always to provide you with excellent care. Hearing back from our patients is one way we can continue to improve our services. Please take a few minutes to complete the written survey that you may receive in the mail after you visit with us. Thank you!        [email or web address] Information     [facility] gives you secure access to your electronic health record. If you see a primary care provider, you can also send messages to your care team and make appointments. If you have questions, please call your primary care clinic.  If you do not have a primary care provider, please call [phone] and they will assist you.        Care  EveryWhere ID     This is your Care EveryWhere ID. This could be used by other organizations to access your [facility] medical records  [id number]        Equal Access to Services     PAL SALGADO : Ascencion Gamboa, anu keller, matt kirby, jackeline izquierdo. So [facility] [phone].    ATENCIÓN: Si habla español, tiene a schwartz disposición servicios gratuitos de asistencia lingüística. Llame al [phone].    We comply with applicable federal civil rights laws and [place] laws. We do not discriminate on the basis of race, color, national origin, age, disability sex, sexual orientation or gender identity.            After Visit Summary       This is your record. Keep this with you and show to your community pharmacist(s) and doctor(s) at your next visit.

## 2017-07-29 NOTE — ED PROVIDER NOTES
"  History     Chief Complaint   Patient presents with     Ankle Pain     HPI  Lucie Stockton is a 82 year old female with a history of bilateral PE (2010), DVT (2010), anemia, Grave's disease, HTN, osteoarthritis, and osteoporosis who presents to the Emergency Department for evaluation of left ankle pain. Patient reports that she has had a very sore left ankle which has been worsening over the past 1 week. She is able to ambulate. She does not recall a specific injury causing this pain. She also denies swelling, but has some redness of the left lateral heel extending distally. She describes the pain to be concentrated around the left lateral malleolus. She is anticoagulated on Plavix, but has not taken this today. She did take Tramadol today. She denies knee, hip, or back pain. She denies any recent changes in medications.    Past Medical History:   Diagnosis Date     Anemia      Aortic stenosis     abdominal     Atherosclerosis of aorta (H)     \"extensive disease with near occlusion below level of renal arteries\" on CT     Atherosclerosis of arteries of extremities (H)      Back pain     severe multilevel DJD, moderate spinal canal stenosis L4-5, severe L3-4     Degenerative joint disease      DVT of lower extremity, bilateral (H)     5/24/10 left distal femoral and great saphenous, 7/7/10 left:  extending to cfv, iliac , pop and post tibial, peronial, right:  distal fem and peroneal      Grave's disease      Hyperlipidaemia LDL goal < 70      Hypertension, essential      Hypothyroidism      Insomnia      Intermittent claudication (H)      Iron deficiency      Knee pain      Lumbar stenosis with neurogenic claudication      Osteoarthritis     ankle,foot, knee     Osteoporosis      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 JONO Goncalves - Returned BENIGN     UPPER GI ENDOSCOPY      11/10/10 erythematous gastropathy, angioectasia       Family History   Problem Relation Age of Onset     Breast Cancer Maternal Aunt 80     C.A.D. Father 54       Social History   Substance Use Topics     Smoking status: Former Smoker     Packs/day: 0.50     Years: 15.00     Quit date: 9/13/1993     Smokeless tobacco: Never Used     Alcohol use Yes      Comment: social       No current facility-administered medications for this encounter.      Current Outpatient Prescriptions   Medication     oxyCODONE (ROXICODONE) 5 MG IR tablet     gabapentin (NEURONTIN) 100 MG capsule     citalopram (CELEXA) 10 MG tablet     atorvastatin (LIPITOR) 40 MG tablet     pantoprazole (PROTONIX) 40 MG EC tablet     ondansetron (ZOFRAN ODT) 4 MG ODT tab     moxifloxacin (VIGAMOX) 0.5 % ophthalmic solution     prednisoLONE acetate (PRED FORTE) 1 % ophthalmic susp     ketorolac (ACULAR) 0.5 % ophthalmic solution     traMADol (ULTRAM) 50 MG tablet     bisacodyl (DULCOLAX) 10 MG Suppository     levothyroxine (SYNTHROID/LEVOTHROID) 50 MCG tablet     clopidogrel (PLAVIX) 75 MG tablet     hydrochlorothiazide (HYDRODIURIL) 50 MG tablet     lisinopril (PRINIVIL/ZESTRIL) 40 MG tablet     polyethylene glycol (MIRALAX) powder     ferrous sulfate (IRON) 325 (65 FE) MG tablet     amLODIPine (NORVASC) 10 MG tablet     albuterol (VENTOLIN HFA) 108 (90 BASE) MCG/ACT inhaler     potassium chloride SA (K-DUR,KLOR-CON M) 20 MEQ tablet     ferrous sulfate (IRON) 325 (65 FE) MG tablet     [DISCONTINUED] tolterodine (DETROL) 1 MG tablet      Calcium Carbonate-Vitamin D (CALCIUM 600 + D OR)        Allergies   Allergen Reactions     Trazodone Fatigue     Felt too groggy       I have reviewed the Medications, Allergies, Past Medical and Surgical History, and Social History in the Epic system.    Review of Systems   Musculoskeletal: Positive for arthralgias. Negative for joint swelling.   Skin: Positive for color change (red left ankle).       Physical Exam   BP: 143/67  Pulse: 82  Temp: 97.7  F (36.5  C)  Resp: 16  Weight: 46.1 kg (101 lb 10.1 oz)  SpO2: 94 %  Physical Exam   Constitutional: No distress.   HENT:   Head: Atraumatic.   Mouth/Throat: Oropharynx is clear and moist. No oropharyngeal exudate.   Eyes: Pupils are equal, round, and reactive to light. No scleral icterus.   Cardiovascular: Normal heart sounds and intact distal pulses.    Pulmonary/Chest: Breath sounds normal. No respiratory distress.   Abdominal: Soft. Bowel sounds are normal. There is no tenderness.   Musculoskeletal: She exhibits no edema.        Left ankle: She exhibits decreased range of motion, swelling and ecchymosis. She exhibits no deformity, no laceration and normal pulse. Tenderness. Lateral malleolus and AITFL tenderness found. No medial malleolus, no CF ligament, no posterior TFL, no head of 5th metatarsal and no proximal fibula tenderness found. Achilles tendon normal.        Feet:    Skin: Skin is warm. No rash noted. She is not diaphoretic.       ED Course   12:04 PM  The patient was seen and examined by Esha Naylor MD in Room 7.     ED Course     Procedures        Results for orders placed or performed during the hospital encounter of 07/29/17 (from the past 24 hour(s))   Ankle XR, G/E 3 views, left     Status: None    Collection Time: 07/29/17  1:20 PM    Narrative    Exam: XR ANKLE LT G/E 3 VW  7/29/2017 1:20 PM      History: pain    Comparison: CT lower extremity 1/17/2011    Findings: AP, oblique, and lateral views of the left ankle. Screws  within the proximal  first and second metatarsals. Surgical hardware  appears intact, with no evidence of significant loosening. There is no  acute fracture. Bony alignment is normal. Joint spaces are preserved.  No suspicious lytic or sclerotic bony lesions.      Impression    Impression: No acute fracture.    I have personally reviewed the examination and initial interpretation  and I agree with the findings.    MARCOS DELACRUZ MD             Labs Ordered and Resulted from Time of ED Arrival Up to the Time of Departure from the ED - No data to display         Assessments & Plan (with Medical Decision Making)  Acute left ankle pain in the pt on chronic plavix-not clear what this is for per pt or chart last two years, no sig trauma, XR neg, did ok holding plavix two years ago before Vascular Surg appointment, no suggestion on arterial insufficiency on exam-warm, good capillary refill, will DC with holding plavix, ace wrap, oxycodon prn and follow up with her PMD in one week.       I have reviewed the nursing notes.    I have reviewed the findings, diagnosis, plan and need for follow up with the patient.    Discharge Medication List as of 7/29/2017  2:08 PM          Final diagnoses:   Acute left ankle pain   Chronic anticoagulation   IHeaven, am serving as a trained medical scribe to document services personally performed by Esha Naylor MD, based on the provider's statements to me.      IEsha MD, was physically present and have reviewed and verified the accuracy of this note documented by Heaven Alexandra.       7/29/2017   [facility], [place], EMERGENCY DEPARTMENT     Esha Naylor MD  07/29/17 [id number]

## 2017-07-29 NOTE — DISCHARGE INSTRUCTIONS
Treating Ankle Sprains  Treatment will depend on how bad your sprain is. For a severe sprain, healing may take 3 months or more.  Right after your injury: Use R.I.C.E.    Rest: At first, keep weight off the ankle as much as you can. You may be given crutches to help you walk without putting weight on the ankle.    Ice: Put an ice pack on the ankle for 15 minutes. Remove the pack and wait at least 30 minutes. Repeat for up to 3 days. This helps reduce swelling.    Compression: To reduce swelling and keep the joint stable, you may need to wrap the ankle with an elastic bandage. For more severe sprains, you may need an ankle brace or a cast.    Elevation: To reduce swelling, keep your ankle raised above your heart when you sit or lie down.  Medicine  Your healthcare provider may suggest oral non-steroidal anti-inflammatory medicine (NSAIDs), such as ibuprofen. This relieves the pain and helps reduce any swelling. Be sure to take your medicine as directed.  Contrast baths  After 3 days, soak your ankle in warm water for 30 seconds, then in cool water for 30 seconds. Go back and forth for 5 minutes. Doing this every 2 hours will help keep the swelling down.  Exercises    After about 2 to 3 weeks, you may be given exercises to strengthen the ligaments and muscles in the ankle. Doing these exercises will help prevent another ankle sprain. Exercises may include standing on your toes and then on your heels and doing ankle curls.  Ankle curls    Sit on the edge of a sturdy table or lie on your back.    Pull your toes toward you. Then point them away from you. Repeat for 2 to 3 minutes.   Date Last Reviewed: 9/28/2015 2000-2017 [employer]. [address] [place] [address], [place] [address]. All rights reserved. This information is not intended as a substitute for professional medical care. Always follow your healthcare professional's instructions.      Please make an appointment to follow up with Your Primary Care  Provider as soon as possible even if entirely better.

## 2017-08-02 ENCOUNTER — THERAPY VISIT (OUTPATIENT)
Dept: PHYSICAL THERAPY | Facility: CLINIC | Age: 82
End: 2017-08-02
Payer: MEDICARE

## 2017-08-02 DIAGNOSIS — M54.42 CHRONIC LEFT-SIDED LOW BACK PAIN WITH LEFT-SIDED SCIATICA: ICD-10-CM

## 2017-08-02 DIAGNOSIS — G89.29 CHRONIC LEFT-SIDED LOW BACK PAIN WITH LEFT-SIDED SCIATICA: ICD-10-CM

## 2017-08-02 PROCEDURE — 97110 THERAPEUTIC EXERCISES: CPT | Mod: GP | Performed by: PHYSICAL THERAPIST

## 2017-08-03 ENCOUNTER — OFFICE VISIT (OUTPATIENT)
Dept: INTERNAL MEDICINE | Facility: CLINIC | Age: 82
End: 2017-08-03

## 2017-08-03 VITALS — DIASTOLIC BLOOD PRESSURE: 57 MMHG | HEART RATE: 67 BPM | SYSTOLIC BLOOD PRESSURE: 131 MMHG

## 2017-08-03 DIAGNOSIS — M54.10 BACK PAIN WITH LEFT-SIDED RADICULOPATHY: Primary | ICD-10-CM

## 2017-08-03 DIAGNOSIS — R45.82 FEELING WORRIED: ICD-10-CM

## 2017-08-03 RX ORDER — CITALOPRAM HYDROBROMIDE 10 MG/1
20 TABLET ORAL DAILY
Qty: 60 TABLET | Refills: 1 | Status: SHIPPED | OUTPATIENT
Start: 2017-08-03 | End: 2017-08-18

## 2017-08-03 ASSESSMENT — PAIN SCALES - GENERAL: PAINLEVEL: SEVERE PAIN (6)

## 2017-08-03 NOTE — PROGRESS NOTES
CC:  F/u chronic LBP with  Left leg radicular pain, depression/anxiety  S:  Lucie continues to complain about the above.  We reviewed her most recent ED visit and prior sports ortho notes.  Also most recent MRI and SI joint injection reports.  Restarting PT as of this month.  Analgesics not helpful.  Discussed conservative measures to manage her symptoms.  She is tolerating her citalopram and agrees with increasing the dose.  No escalation of symptoms, no suicidal thoughts/intetions.  Does express her frustration with not being able to physically keep up with her usual activities including interactions with grandchildren.    Patient Active Problem List   Diagnosis     Benign ovarian tumor     Hypothyroidism     Peripheral vascular disease (H)     Knee pain     Osteoarthritis     Cervicalgia     Hyperlipidemia with target LDL less than 70     Pulmonary embolism (H)     Anemia     Aortic stenosis     Atherosclerosis of aorta (H)     Atherosclerosis of arteries of extremities (H)     Intermittent claudication (H)     Iron deficiency     Osteoporosis     DVT of lower extremity, bilateral (H)     Varicose veins     Gluteal pain     Insomnia     Back pain     Vitamin D deficiency     Tobacco use disorder     Personal history of other drug therapy     Right knee pain     Other chronic pulmonary embolism (H)     Venous thrombosis of extremity     Benign essential hypertension     Gastritis     Cataract     Acute left-sided low back pain with left-sided sciatica     Lumbar stenosis with neurogenic claudication     SBO (small bowel obstruction) (H)     Small bowel obstruction (H)     Long term current use of anticoagulant therapy     Left-sided low back pain with left-sided sciatica     Current Outpatient Prescriptions   Medication Sig Dispense Refill     citalopram (CELEXA) 10 MG tablet Take 2 tablets (20 mg) by mouth daily 60 tablet 1     oxyCODONE (ROXICODONE) 5 MG IR tablet Take 1 tablet (5 mg) by mouth every 4 hours as  needed for moderate to severe pain 10 tablet 0     gabapentin (NEURONTIN) 100 MG capsule Take 4 capsules at bedtime, also 2 capsule in the morning and mid-day for pain prevention 240 capsule 1     atorvastatin (LIPITOR) 40 MG tablet Take 2 tablets (80 mg) by mouth daily 90 tablet 3     pantoprazole (PROTONIX) 40 MG EC tablet Take 1 tablet (40 mg) by mouth daily 90 tablet 3     ondansetron (ZOFRAN ODT) 4 MG ODT tab Take 1 tablet (4 mg) by mouth every 6 hours as needed for nausea 10 tablet 0     moxifloxacin (VIGAMOX) 0.5 % ophthalmic solution Place 1 drop Into the left eye 4 times daily       prednisoLONE acetate (PRED FORTE) 1 % ophthalmic susp Place 1 drop Into the left eye 4 times daily       ketorolac (ACULAR) 0.5 % ophthalmic solution Place 1 drop Into the left eye 4 times daily       traMADol (ULTRAM) 50 MG tablet Take 1 tablet (50 mg) by mouth every 6 hours as needed for moderate pain or severe pain Maximum 270 tablets in 3 months 270 tablet 1     bisacodyl (DULCOLAX) 10 MG Suppository Place 1 suppository (10 mg) rectally daily as needed for constipation 90 suppository 3     levothyroxine (SYNTHROID/LEVOTHROID) 50 MCG tablet Take 1 tablet (50 mcg) by mouth daily 90 tablet 3     clopidogrel (PLAVIX) 75 MG tablet Take 1 tablet (75 mg) by mouth daily 90 tablet 3     hydrochlorothiazide (HYDRODIURIL) 50 MG tablet Take 1 tablet (50 mg) by mouth daily 90 tablet 3     lisinopril (PRINIVIL/ZESTRIL) 40 MG tablet Take 1 tablet (40 mg) by mouth daily For blood pressure 90 tablet 3     polyethylene glycol (MIRALAX) powder Take one-half to one scoop once a day for maintaining soft stools 119 g 5     ferrous sulfate (IRON) 325 (65 FE) MG tablet Take 1 tablet (325 mg) by mouth 2 times daily To maintain iron levels 90 tablet 1     amLODIPine (NORVASC) 10 MG tablet Take 1 tablet (10 mg) by mouth daily For blood pressure 90 tablet 1     albuterol (VENTOLIN HFA) 108 (90 BASE) MCG/ACT inhaler Inhale 2 puffs into the lungs every 4  hours as needed for shortness of breath / dyspnea or wheezing 18 Inhaler 1     potassium chloride SA (K-DUR,KLOR-CON M) 20 MEQ tablet Take 1 tablet (20 mEq) by mouth daily 90 tablet 3     ferrous sulfate (IRON) 325 (65 FE) MG tablet Take 1 tablet (325 mg) by mouth daily (with breakfast) 90 tablet 3     Calcium Carbonate-Vitamin D (CALCIUM 600 + D OR) Take 1 tablet by mouth daily.       [DISCONTINUED] citalopram (CELEXA) 10 MG tablet Take 1 tablet (10 mg) by mouth daily 30 tablet 1     [DISCONTINUED] tolterodine (DETROL) 1 MG tablet Take 1-2 tablets by mouth At Bedtime. 180 tablet 3     Allergies   Allergen Reactions     Trazodone Fatigue     Felt too groggy     /57  Pulse 67  Breastfeeding? No      Lucie was seen today for musculoskeletal problem.    Diagnoses and all orders for this visit:    Back pain with left-sided radiculopathy  See  For f/u, consider repeat injection, continue PT, stay active, I also told her that over the next few months we will begin tapering off tramadol as it has not been helpful and may be contributing to central sensitization to pain.    Feeling worried  -     citalopram (CELEXA) 10 MG tablet; increase to 2 tablets (20 mg) by mouth daily    Total time spent 15 minutes.  More than 50% of the time spent with Ms. Stockton on counseling / coordinating her care        See me one month.    Marii Montgomery M.D.  Internal Medicine  [facility]   pager [phone]          .

## 2017-08-03 NOTE — NURSING NOTE
Chief Complaint   Patient presents with     Musculoskeletal Problem     Patient here to check on back problems     Savi Romero LPN at 8:09 AM on 8/3/2017.

## 2017-08-03 NOTE — MR AVS SNAPSHOT
After Visit Summary   8/3/2017    Lucie Stockton    MRN: [id number]           Patient Information     Date Of Birth          1935        Visit Information        Provider Department      8/3/2017 8:00 AM Marii Montgomery MD [facility]        Today's Diagnoses     Feeling worried           Follow-ups after your visit        Follow-up notes from your care team     Return in about 4 weeks (around 8/31/2017), or see me 4 weeks, also needs f/u appt with Dr. Huynh /sports ortho for radicular back pain.      Your next 10 appointments already scheduled     Aug 10, 2017  2:30 PM CDT   (Arrive by 2:15 PM)   Return Visit with Zafar Huynh MD   [facility] ([facility])    [address] [place] [address]  5th [facility] [address]   [phone]            Aug 17, 2017  9:00 AM CDT   (Arrive by 8:45 AM)   New Patient Visit with Jesenia Clay PhD   [facility] ([facility])    [address] [place] [address]  3rd [facility] [address]   [phone]            Aug 31, 2017  8:00 AM CDT   (Arrive by 7:45 AM)   Return Visit with Marii Montgomery MD   [facility] ([facility])    [address] [place] [address]  4th Floor  [facility] [address]   [phone]            Dec 18, 2017 12:30 PM CST   US SUMAYA DOPPLER WITH EXERCISE with UCUSV1   [facility] [place] [facility] ([facility])    [address] [place] [address]  1st Floor  [facility] [address] [phone]           Please bring a list of your medicines (including vitamins, minerals and over-the-counter drugs). Also, tell your doctor about any allergies you may have. Wear comfortable clothes and leave your valuables at home.  No caffeine or tobacco for 1 hour prior to exam.  Please call the Imaging Department at your exam site with any questions.            Dec 18, 2017  1:45 PM CST   (Arrive by  1:30 PM)   Return Vascular Visit with Iona Velez MD   [facility] ([facility])    [address] [facility]  3rd [facility] [address] [phone]              Who to contact     Please call your clinic at [phone] to:    Ask questions about your health    Make or cancel appointments    Discuss your medicines    Learn about your test results    Speak to your doctor   If you have compliments or concerns about an experience at your clinic, or if you wish to file a complaint, please contact [facility] Patient Relations at [phone] or email us at [email or web address]         Additional Information About Your Visit        [employer] Information     [employer] gives you secure access to your electronic health record. If you see a primary care provider, you can also send messages to your care team and make appointments. If you have questions, please call your primary care clinic.  If you do not have a primary care provider, please call [phone] and they will assist you.      [employer] is an electronic gateway that provides easy, online access to your medical records. With [employer], you can request a clinic appointment, read your test results, renew a prescription or communicate with your care team.     To access your existing account, please contact your [facility] or call [phone] for assistance.        Care EveryWhere ID     This is your Care EveryWhere ID. This could be used by other organizations to access your [facility] medical records  [id number]        Your Vitals Were     Pulse Breastfeeding?                67 No           Blood Pressure from Last 3 Encounters:   08/03/17 131/57   07/29/17 146/85   07/05/17 156/71    Weight from Last 3 Encounters:   07/29/17 46.1 kg (101 lb 10.1 oz)   07/05/17 46 kg (101 lb 6.4 oz)   06/01/17 48.1 kg (106 lb)              Today, you had the following      No orders found for display         Today's Medication Changes          These changes are accurate as of: 8/3/17  9:13 AM.  If you have any questions, ask your nurse or doctor.               These medicines have changed or have updated prescriptions.        Dose/Directions    citalopram 10 MG tablet   Commonly known as:  celeXA   This may have changed:  how much to take   Used for:  Feeling worried   Changed by:  Marii Montgomery MD        Dose:  20 mg   Take 2 tablets (20 mg) by mouth daily   Quantity:  60 tablet   Refills:  1            Where to get your medicines      These medications were sent to [facility] - [place] [address] AT [place] [address]  [address], [facility] [address]     Phone:  [phone]     citalopram 10 MG tablet                Primary Care Provider Office Phone # Fax #    Marii Montgomery [phone]        [facility] [address] [place] [address] [address]  [facility] [address]        Equal Access to Services     PAL SALGADO AH: Hadii aad ku hadasho Soomaali, waaxda luqadaha, qaybta kaalmada adeegyada, waxay idiin hayaan allen bernal . So [facility] [phone].    ATENCIÓN: Si habla español, tiene a schwartz disposición servicios gratuitos de asistencia lingüística. [facility] [phone].    We comply with applicable federal civil rights laws and [place] laws. We do not discriminate on the basis of race, color, national origin, age, disability sex, sexual orientation or gender identity.            Thank you!     Thank you for choosing [facility]  for your care. Our goal is always to provide you with excellent care. Hearing back from our patients is one way we can continue to improve our services. Please take a few minutes to complete the written survey that you may receive in the mail after your visit with us. Thank you!             Your Updated Medication List - Protect others around you: Learn how to safely use,  store and throw away your medicines at www.disposemymeds.org.          This list is accurate as of: 8/3/17  9:13 AM.  Always use your most recent med list.                   Brand Name Dispense Instructions for use Diagnosis    albuterol 108 (90 BASE) MCG/ACT Inhaler    VENTOLIN HFA    18 Inhaler    Inhale 2 puffs into the lungs every 4 hours as needed for shortness of breath / dyspnea or wheezing    Cough       amLODIPine 10 MG tablet    NORVASC    90 tablet    Take 1 tablet (10 mg) by mouth daily For blood pressure    Essential hypertension       atorvastatin 40 MG tablet    LIPITOR    90 tablet    Take 2 tablets (80 mg) by mouth daily    Hyperlipidemia, unspecified hyperlipidemia type       bisacodyl 10 MG Suppository    DULCOLAX    90 suppository    Place 1 suppository (10 mg) rectally daily as needed for constipation    Constipation, unspecified constipation type       CALCIUM 600 + D PO      Take 1 tablet by mouth daily.        citalopram 10 MG tablet    celeXA    60 tablet    Take 2 tablets (20 mg) by mouth daily    Feeling worried       clopidogrel 75 MG tablet    PLAVIX    90 tablet    Take 1 tablet (75 mg) by mouth daily    Other chronic pulmonary embolism (H), Venous thrombosis of extremity       * ferrous sulfate 325 (65 FE) MG tablet    IRON    90 tablet    Take 1 tablet (325 mg) by mouth daily (with breakfast)    Other iron deficiency anemias       * ferrous sulfate 325 (65 FE) MG tablet    IRON    90 tablet    Take 1 tablet (325 mg) by mouth 2 times daily To maintain iron levels    Iron deficiency       gabapentin 100 MG capsule    NEURONTIN    240 capsule    Take 4 capsules at bedtime, also 2 capsule in the morning and mid-day for pain prevention    Lumbar radiculopathy       hydrochlorothiazide 50 MG tablet    HYDRODIURIL    90 tablet    Take 1 tablet (50 mg) by mouth daily    Benign essential hypertension       ketorolac 0.5 % ophthalmic solution    ACULAR     Place 1 drop Into the left eye 4 times  daily        levothyroxine 50 MCG tablet    SYNTHROID/LEVOTHROID    90 tablet    Take 1 tablet (50 mcg) by mouth daily    Hypothyroidism, unspecified type       lisinopril 40 MG tablet    PRINIVIL/ZESTRIL    90 tablet    Take 1 tablet (40 mg) by mouth daily For blood pressure    Essential hypertension       moxifloxacin 0.5 % ophthalmic solution    VIGAMOX     Place 1 drop Into the left eye 4 times daily        ondansetron 4 MG ODT tab    ZOFRAN ODT    10 tablet    Take 1 tablet (4 mg) by mouth every 6 hours as needed for nausea    Nausea       oxyCODONE 5 MG IR tablet    ROXICODONE    10 tablet    Take 1 tablet (5 mg) by mouth every 4 hours as needed for moderate to severe pain        pantoprazole 40 MG EC tablet    PROTONIX    90 tablet    Take 1 tablet (40 mg) by mouth daily    Other iron deficiency anemias       polyethylene glycol powder    MIRALAX    119 g    Take one-half to one scoop once a day for maintaining soft stools    Constipation, unspecified constipation type       potassium chloride SA 20 MEQ CR tablet    K-DUR/KLOR-CON M    90 tablet    Take 1 tablet (20 mEq) by mouth daily    Hypopotassemia       prednisoLONE acetate 1 % ophthalmic susp    PRED FORTE     Place 1 drop Into the left eye 4 times daily        traMADol 50 MG tablet    ULTRAM    270 tablet    Take 1 tablet (50 mg) by mouth every 6 hours as needed for moderate pain or severe pain Maximum 270 tablets in 3 months    Foot pain, right       * Notice:  This list has 2 medication(s) that are the same as other medications prescribed for you. Read the directions carefully, and ask your doctor or other care provider to review them with you.

## 2017-08-03 NOTE — PROGRESS NOTES
Subjective:    HPI                    Objective:    System    Physical Exam    General     ROS    Assessment/Plan:      PROGRESS  REPORT    Progress reporting period is from 7.6.17 to 8.3.17.       SUBJECTIVE  Subjective changes noted by patient:  Pt reports still noticing pain in low back and shin/ankle on (L) side.  Has been doing prone lying in extension 4-5 x daily for 5 min and does feel like it relieves leg sx''s for short period afterwards.  Was walking in grocery store today prior to appt and noticed leg pain increasing and difficult to straighten up.      Current Pain level: 7/10.     Initial Pain level: 10/10 (walking constant ant ankle).   Changes in function:  Yes (See Goal flowsheet attached for changes in current functional level)  Adverse reaction to treatment or activity: None    OBJECTIVE  Gait:  SPC flexed posture, pain shin/ant ankle.  L/S AROM:  Flex WNL; Ext mod-hal loss; SG Mod loss (B).       ASSESSMENT/PLAN  Updated problem list and treatment plan: Diagnosis 1:  LBP    Pain -  self management, education, directional preference exercise and home program  Decreased ROM/flexibility - manual therapy, therapeutic exercise and home program  Decreased joint mobility - manual therapy, therapeutic exercise and home program  Impaired gait - gait training and home program  Impaired muscle performance - neuro re-education and home program  Decreased function - therapeutic activities and home program  Impaired posture - neuro re-education and home program  STG/LTGs have been met or progress has been made towards goals:  Yes (See Goal flow sheet completed today.)  Assessment of Progress: The patient's progress has slowed.  Self Management Plans:  Patient has been instructed in a home treatment program.  Patient  has been instructed in self management of symptoms.  I have re-evaluated this patient and find that the nature, scope, duration and intensity of the therapy is appropriate for the medical condition  of the patient.  Lucie continues to require the following intervention to meet STG and LTG's:  PT    Recommendations:  This patient would benefit from continued therapy.     Frequency:  1 X week, once daily  Duration:  for 6 weeks          Please refer to the daily flowsheet for treatment today, total treatment time and time spent performing 1:1 timed codes.

## 2017-08-10 ENCOUNTER — OFFICE VISIT (OUTPATIENT)
Dept: ORTHOPEDICS | Facility: CLINIC | Age: 82
End: 2017-08-10

## 2017-08-10 VITALS — WEIGHT: 101 LBS | BODY MASS INDEX: 20.36 KG/M2 | RESPIRATION RATE: 16 BRPM | HEIGHT: 59 IN

## 2017-08-10 DIAGNOSIS — G89.29 CHRONIC LEFT-SIDED LOW BACK PAIN WITHOUT SCIATICA: ICD-10-CM

## 2017-08-10 DIAGNOSIS — I70.209 ATHEROSCLEROSIS OF ARTERIES OF EXTREMITIES (H): ICD-10-CM

## 2017-08-10 DIAGNOSIS — M25.572 PAIN IN JOINT, ANKLE AND FOOT, LEFT: ICD-10-CM

## 2017-08-10 DIAGNOSIS — G89.29 CHRONIC PAIN OF LEFT KNEE: Primary | ICD-10-CM

## 2017-08-10 DIAGNOSIS — M25.562 CHRONIC PAIN OF LEFT KNEE: Primary | ICD-10-CM

## 2017-08-10 DIAGNOSIS — M54.50 CHRONIC LEFT-SIDED LOW BACK PAIN WITHOUT SCIATICA: ICD-10-CM

## 2017-08-10 NOTE — MR AVS SNAPSHOT
After Visit Summary   8/10/2017    Lucie Stockton    MRN: [id number]           Patient Information     Date Of Birth          1935        Visit Information        Provider Department      8/10/2017 2:30 PM Zafar Huynh MD [facility]        Today's Diagnoses     Chronic pain of left knee    -  1    Pain in joint, ankle and foot, left        Chronic left-sided low back pain without sciatica        Atherosclerosis of arteries of extremities (H)          Care Instructions    Low Back pain  --continue PT  L knee pain  --trial repeat injection today  --trial rolling the L IT band    L ankle pain  --question if related to vasc dissease?  --not improving with injection in back or PT.  --pain not reproduced on exam  --trial lace up ankle brace    Zafar Huynh MD CAQ            Follow-ups after your visit        Your next 10 appointments already scheduled     Aug 17, 2017  9:00 AM CDT   (Arrive by 8:45 AM)   New Patient Visit with Jesenia Clay, PhD   [facility] ([facility])    [address] [place] [address]  [address] [facility] [address]   [phone]            Aug 31, 2017  8:00 AM CDT   (Arrive by 7:45 AM)   Return Visit with Marii Montgomery MD   [facility] ([facility])    [address] [place] [address]  [address] [facility] [id number] [phone]            Dec 18, 2017 12:30 PM CST   US SUMAYA DOPPLER WITH EXERCISE with UCUSV1   [facility] ([facility])    [address] [place] [id number] [phone]           Please bring a list of your medicines (including vitamins, minerals and over-the-counter drugs). Also, tell your doctor about any allergies you may have. Wear comfortable clothes and leave your valuables at home.  No caffeine or tobacco for 1 hour prior to exam.  Please call the Imaging Department at your exam site  "with any questions.            Dec 18, 2017  1:45 PM CST   (Arrive by 1:30 PM)   Return Vascular Visit with Iona Velez MD   [facility] ([facility])    [address] [address] [phone]              Who to contact     Please call your clinic at [phone] to:    Ask questions about your health    Make or cancel appointments    Discuss your medicines    Learn about your test results    Speak to your doctor   If you have compliments or concerns about an experience at your clinic, or if you wish to file a complaint, please contact [facility] Patient Relations at [phone] or email us at [email or web address]         Additional Information About Your Visit        [employer] Information     [employer] gives you secure access to your electronic health record. If you see a primary care provider, you can also send messages to your care team and make appointments. If you have questions, please call your primary care clinic.  If you do not have a primary care provider, please call [phone] and they will assist you.      [employer] is an electronic gateway that provides easy, online access to your medical records. With [employer], you can request a clinic appointment, read your test results, renew a prescription or communicate with your care team.     To access your existing account, please contact your [facility] or call [phone] for assistance.        Care EveryWhere ID     This is your Care EveryWhere ID. This could be used by other organizations to access your [facility] medical records  [id number]        Your Vitals Were     Respirations Height BMI (Body Mass Index)             16 4' 11\" (1.499 m) 20.4 kg/m2          Blood Pressure from Last 3 Encounters:   08/03/17 131/57   07/29/17 146/85   07/05/17 156/71    Weight from Last 3 Encounters:   08/10/17 101 lb (45.8 " kg)   07/29/17 101 lb 10.1 oz (46.1 kg)   07/05/17 101 lb 6.4 oz (46 kg)               Primary Care Provider Office Phone # Fax #    Marii Montgomery [phone]       [address] [place] [address] 7[address]  [facility] [address]        Equal Access to Services     PAL SALGADO : Hadii aad ku hadasho Soomaali, waaxda luqadaha, qaybta kaalmada adeegyada, waxay idiin hayaan adeeg rezareginataryn laflorecita . So [facility] [phone].    ATENCIÓN: Si habla español, tiene a schwartz disposición servicios gratuitos de asistencia lingüística. Llame al [phone].    We comply with applicable federal civil rights laws and [place] laws. We do not discriminate on the basis of race, color, national origin, age, disability sex, sexual orientation or gender identity.            Thank you!     Thank you for choosing [facility]  for your care. Our goal is always to provide you with excellent care. Hearing back from our patients is one way we can continue to improve our services. Please take a few minutes to complete the written survey that you may receive in the mail after your visit with us. Thank you!             Your Updated Medication List - Protect others around you: Learn how to safely use, store and throw away your medicines at www.disposemymeds.org.          This list is accurate as of: 8/10/17  4:11 PM.  Always use your most recent med list.                   Brand Name Dispense Instructions for use Diagnosis    albuterol 108 (90 BASE) MCG/ACT Inhaler    VENTOLIN HFA    18 Inhaler    Inhale 2 puffs into the lungs every 4 hours as needed for shortness of breath / dyspnea or wheezing    Cough       amLODIPine 10 MG tablet    NORVASC    90 tablet    Take 1 tablet (10 mg) by mouth daily For blood pressure    Essential hypertension       atorvastatin 40 MG tablet    LIPITOR    90 tablet    Take 2 tablets (80 mg) by mouth daily    Hyperlipidemia, unspecified hyperlipidemia type       bisacodyl 10 MG Suppository    DULCOLAX     90 suppository    Place 1 suppository (10 mg) rectally daily as needed for constipation    Constipation, unspecified constipation type       CALCIUM 600 + D PO      Take 1 tablet by mouth daily.        citalopram 10 MG tablet    celeXA    60 tablet    Take 2 tablets (20 mg) by mouth daily    Feeling worried       clopidogrel 75 MG tablet    PLAVIX    90 tablet    Take 1 tablet (75 mg) by mouth daily    Other chronic pulmonary embolism (H), Venous thrombosis of extremity       * ferrous sulfate 325 (65 FE) MG tablet    IRON    90 tablet    Take 1 tablet (325 mg) by mouth daily (with breakfast)    Other iron deficiency anemias       * ferrous sulfate 325 (65 FE) MG tablet    IRON    90 tablet    Take 1 tablet (325 mg) by mouth 2 times daily To maintain iron levels    Iron deficiency       gabapentin 100 MG capsule    NEURONTIN    240 capsule    Take 4 capsules at bedtime, also 2 capsule in the morning and mid-day for pain prevention    Lumbar radiculopathy       hydrochlorothiazide 50 MG tablet    HYDRODIURIL    90 tablet    Take 1 tablet (50 mg) by mouth daily    Benign essential hypertension       ketorolac 0.5 % ophthalmic solution    ACULAR     Place 1 drop Into the left eye 4 times daily        levothyroxine 50 MCG tablet    SYNTHROID/LEVOTHROID    90 tablet    Take 1 tablet (50 mcg) by mouth daily    Hypothyroidism, unspecified type       lisinopril 40 MG tablet    PRINIVIL/ZESTRIL    90 tablet    Take 1 tablet (40 mg) by mouth daily For blood pressure    Essential hypertension       moxifloxacin 0.5 % ophthalmic solution    VIGAMOX     Place 1 drop Into the left eye 4 times daily        ondansetron 4 MG ODT tab    ZOFRAN ODT    10 tablet    Take 1 tablet (4 mg) by mouth every 6 hours as needed for nausea    Nausea       oxyCODONE 5 MG IR tablet    ROXICODONE    10 tablet    Take 1 tablet (5 mg) by mouth every 4 hours as needed for moderate to severe pain        pantoprazole 40 MG EC tablet    PROTONIX    90  tablet    Take 1 tablet (40 mg) by mouth daily    Other iron deficiency anemias       polyethylene glycol powder    MIRALAX    119 g    Take one-half to one scoop once a day for maintaining soft stools    Constipation, unspecified constipation type       potassium chloride SA 20 MEQ CR tablet    K-DUR/KLOR-CON M    90 tablet    Take 1 tablet (20 mEq) by mouth daily    Hypopotassemia       prednisoLONE acetate 1 % ophthalmic susp    PRED FORTE     Place 1 drop Into the left eye 4 times daily        traMADol 50 MG tablet    ULTRAM    270 tablet    Take 1 tablet (50 mg) by mouth every 6 hours as needed for moderate pain or severe pain Maximum 270 tablets in 3 months    Foot pain, right       * Notice:  This list has 2 medication(s) that are the same as other medications prescribed for you. Read the directions carefully, and ask your doctor or other care provider to review them with you.

## 2017-08-10 NOTE — PATIENT INSTRUCTIONS
Low Back pain  --continue PT  L knee pain  --trial repeat injection today  --trial rolling the L IT band    L ankle pain  --question if related to vasc dissease?  --not improving with injection in back or PT.  --pain not reproduced on exam  --trial lace up ankle brace    Zafar Huynh MD CAQ

## 2017-08-10 NOTE — PROGRESS NOTES
Subjective:   Lucie Stockton is a 82 year old female with Mod-Severe PVD, history of chronic back pain, knee osteoarthritis, tobacco use, ,  who is here following up on left sided low back pain  Who is here for chronic pain in the leg. She has been gradually having issues with gait, but no acute injury.    Regarding her back pain, she has chronic pain in the low back.  We reviewed this in Dec with question if some could be radicular pain. We reviewed again today and pain in the back and the calf and ankle, as well as the knee. No real numbness or tingling per se, no electric quality to the pain. Also trial of Lumbar SHAE in Jan 2017 helped her low back pain but not her leg pain even temporarily.      Today she had pain in the back and leg for 6 weeks and started PT through her PCP.  This is limited by fatigue for her.    Regarding leg pain, she has pain on the outside of the L ankle that feels like pressure which is better with elevation. No change with sitting. Some pain in the calves with walking.    Also additional  Achier pain in the outside of the L knee and ankle also the low back, but no pain in the thigh. No numbness or tingling in the leg but some tingling at the bottom of the L foot.       Date of injury: NA  Date last seen: Visit date not found  Following Therapeutic Plan: Yes PT on 8/2/17 has 5 visits left  Pain: Unchanged  Function: Unchanged  Interval History:      SUMAYA in Nov 2016  R leg 0.46 and Left 0.49    Ankle Xray 7/29/17 unremarkable       I have personally reviewed the examination and initial interpretation  and I agree with the findings.     RENEE VILLAGOMEZ MD    PAST MEDICAL, SOCIAL, SURGICAL AND FAMILY HISTORY: She  has a past medical history of Anemia; Aortic stenosis; Atherosclerosis of aorta (H); Atherosclerosis of arteries of extremities (H); Back pain; Degenerative joint disease; DVT of lower extremity, bilateral (H); Grave's disease; Hyperlipidaemia LDL goal < 70; Hypertension,  "essential; Hypothyroidism; Insomnia; Intermittent claudication (H); Iron deficiency; Knee pain; Lumbar stenosis with neurogenic claudication; Osteoarthritis; Osteoporosis; 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  She  has a past surgical history that includes Hysterectomy total abdominal, bilateral salpingo-oophorectomy, node dissection, combined (2/17/11); Bunionectomy; colonoscopy; upper gi endoscopy; and STOMACH SURGERY PROCEDURE UNLISTED.  Her family history includes Breast Cancer (age of onset: 80) in her maternal aunt; C.A.D. (age of onset: 54) in her father.  She reports that she quit smoking about 23 years ago. She has a 7.50 pack-year smoking history. She has never used smokeless tobacco. She reports that she drinks alcohol. She reports that she does not use illicit drugs.      ALLERGIES: She is allergic to trazodone.    CURRENT MEDICATIONS: She has a current medication list which includes the following prescription(s): citalopram, oxycodone, gabapentin, atorvastatin, pantoprazole, ondansetron, moxifloxacin, prednisolone acetate, ketorolac, tramadol, bisacodyl, levothyroxine, clopidogrel, hydrochlorothiazide, lisinopril, polyethylene glycol, ferrous sulfate, amlodipine, albuterol, potassium chloride sa, ferrous sulfate, and calcium carb-cholecalciferol.     REVIEW OF SYSTEMS: 3 point review of systems is negative except as noted above.     Exam:   Resp 16  Ht 4' 11\" (1.499 m)  Wt 101 lb (45.8 kg)  BMI 20.4 kg/m2           CONSTITUTIONAL: healthy, alert and no distress  HEAD: Normocephalic. No masses, lesions, tenderness or abnormalities  SKIN: no suspicious lesions or rashes  GAIT: normal  NEUROLOGIC: Non-focal  PSYCHIATRIC: affect normal/bright and mentation appears normal.    MUSCULOSKELETAL:     Low back is tender Bilateral paralumbar. Also SI joint.  Neg SLR, neg gerson  1+ patellar and trace achilles " reflexes B  Nl plantar response.    Left Knee Exam  No joint effusion, No redness;Tenderness medial and  lateral joint line some symptoms with palpation distal it band ;ROM unremarkable with some pain; Strength 4+/5 ext, 4+/5 flexion;No pain or opening with varus or valgus stress test; Neg Lachmans; mildly painful McMurrays; Neg patellar apprehension test, Neg pseudocompression test, hip IR/ER did not cause pain         Assessment/Plan:   L leg pain likely multifactorial including  1. L lateral knee pain--IT friction syndrome  2. Moderate Medial Compartment OA Left knee  3. Back pain--without obvious radicular symptoms today and symptoms in leg not changed with previous LOUISE.  4. Mod-Sever PVD Bilateral  5. L lateral ankle pain  6. Sarcopenia vs. Acute deconditioning    --L knee injection discussed to trial to help with pain and she would like to retry this as previous back injection louise did not help leg symptoms.  -- she is contiuing PT and her back is improving with this. Continue back and ankle exercises  -- can trial rolling IT band  --trial lace up ankle brace L ankle.  --consider vascular f/u if calf pain symptoms with activity become prominent.    A steroid injection was performed at L knee using 4cc 1% plain Lidocaine and 20 mg of Kenalog. This was well tolerated.  No immediate improvement will await result      Zafar Huynh MD CAQ

## 2017-08-10 NOTE — NURSING NOTE
[address] [address]  Dept: [phone]  ______________________________________________________________________________    Patient: Lucie Stockton   : 1935   MRN: [id number]   August 10, 2017    INVASIVE PROCEDURE SAFETY CHECKLIST    Date: 2017   Procedure:Left knee kenalog injection  Patient Name: Lucie Stockton  MRN: [id number]  YOB: 1935    Action: Complete sections as appropriate. Any discrepancy results in a HARD COPY until resolved.     PRE PROCEDURE:  Patient ID verified with 2 identifiers (name and  or MRN): Yes  Procedure and site verified with patient/designee (when able): Yes  Accurate consent documentation in medical record: Yes  H&P (or appropriate assessment) documented in medical record: Yes  H&P must be up to 20 days prior to procedure and updates within 24 hours of procedure as applicable: NA  Relevant diagnostic and radiology test results appropriately labeled and displayed as applicable: Yes  Procedure site(s) marked with provider initials: NA    TIMEOUT:  Time-Out performed immediately prior to starting procedure, including verbal and active participation of all team members addressing the following:Yes  * Correct patient identify  * Confirmed that the correct side and site are marked  * An accurate procedure consent form  * Agreement on the procedure to be done  * Correct patient position  * Relevant images and results are properly labeled and appropriately displayed  * The need to administer antibiotics or fluids for irrigation purposes during the procedure as applicable   * Safety precautions based on patient history or medication use    DURING PROCEDURE: Verification of correct person, site, and procedures any time the responsibility for care of the patient is transferred to another member of the care team.     The following medication was given:     MEDICATION:  Kenalog 40 mg; 4 ml of 1%  lidocaine  ROUTE: Intra-Articular  SITE: Left knee  DOSE: Kenalog 40 mg; 4 ml of 1% lidocaine  LOT #: Kenalog:[id number]; Lidocaine: [id number]  :Kenalog: [employer] Lidocaine: [employer]  EXPIRATION DATE: [place]/19; Lidocaine: 3/21  NDC#: [id number]; Lidocaine: 79336-415-23  Was there drug waste? Yes  Amount of drug waste (mL): 16.  Reason for waste:  As per MD William Tucker, ATC  August 10, 2017

## 2017-08-10 NOTE — LETTER
8/10/2017      RE: Lucie Stockton  [address] [facility] [address]        Subjective:   Lucie Stockton is a 82 year old female with Mod-Severe PVD, history of chronic back pain, knee osteoarthritis, tobacco use, ,  who is here following up on left sided low back pain  Who is here for chronic pain in the leg. She has been gradually having issues with gait, but no acute injury.    Regarding her back pain, she has chronic pain in the low back.  We reviewed this in Dec with question if some could be radicular pain. We reviewed again today and pain in the back and the calf and ankle, as well as the knee. No real numbness or tingling per se, no electric quality to the pain. Also trial of Lumbar SHAE in Jan 2017 helped her low back pain but not her leg pain even temporarily.      Today she had pain in the back and leg for 6 weeks and started PT through her PCP.  This is limited by fatigue for her.    Regarding leg pain, she has pain on the outside of the L ankle that feels like pressure which is better with elevation. No change with sitting. Some pain in the calves with walking.    Also additional  Achier pain in the outside of the L knee and ankle also the low back, but no pain in the thigh. No numbness or tingling in the leg but some tingling at the bottom of the L foot.       Date of injury: NA  Date last seen: Visit date not found  Following Therapeutic Plan: Yes PT on 8/2/17 has 5 visits left  Pain: Unchanged  Function: Unchanged  Interval History:      SUMAYA in Nov 2016  R leg 0.46 and Left 0.49    Ankle Xray 7/29/17 unremarkable       I have personally reviewed the examination and initial interpretation  and I agree with the findings.     RENEE VILLAGOMEZ MD    PAST MEDICAL, SOCIAL, SURGICAL AND FAMILY HISTORY: She  has a past medical history of Anemia; Aortic stenosis; Atherosclerosis of aorta (H); Atherosclerosis of arteries of extremities (H); Back pain; Degenerative joint disease; DVT of lower  "extremity, bilateral (H); Grave's disease; Hyperlipidaemia LDL goal < 70; Hypertension, essential; Hypothyroidism; Insomnia; Intermittent claudication (H); Iron deficiency; Knee pain; Lumbar stenosis with neurogenic claudication; Osteoarthritis; Osteoporosis; 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  She  has a past surgical history that includes Hysterectomy total abdominal, bilateral salpingo-oophorectomy, node dissection, combined (2/17/11); Bunionectomy; colonoscopy; upper gi endoscopy; and STOMACH SURGERY PROCEDURE UNLISTED.  Her family history includes Breast Cancer (age of onset: 80) in her maternal aunt; C.A.D. (age of onset: 54) in her father.  She reports that she quit smoking about 23 years ago. She has a 7.50 pack-year smoking history. She has never used smokeless tobacco. She reports that she drinks alcohol. She reports that she does not use illicit drugs.      ALLERGIES: She is allergic to trazodone.    CURRENT MEDICATIONS: She has a current medication list which includes the following prescription(s): citalopram, oxycodone, gabapentin, atorvastatin, pantoprazole, ondansetron, moxifloxacin, prednisolone acetate, ketorolac, tramadol, bisacodyl, levothyroxine, clopidogrel, hydrochlorothiazide, lisinopril, polyethylene glycol, ferrous sulfate, amlodipine, albuterol, potassium chloride sa, ferrous sulfate, and calcium carb-cholecalciferol.     REVIEW OF SYSTEMS: 3 point review of systems is negative except as noted above.     Exam:   Resp 16  Ht 4' 11\" (1.499 m)  Wt 101 lb (45.8 kg)  BMI 20.4 kg/m2           CONSTITUTIONAL: healthy, alert and no distress  HEAD: Normocephalic. No masses, lesions, tenderness or abnormalities  SKIN: no suspicious lesions or rashes  GAIT: normal  NEUROLOGIC: Non-focal  PSYCHIATRIC: affect normal/bright and mentation appears normal.    MUSCULOSKELETAL:     Low back is tender " Bilateral paralumbar. Also SI joint.  Neg SLR, neg gerson  1+ patellar and trace achilles reflexes B  Nl plantar response.    Left Knee Exam  No joint effusion, No redness;Tenderness medial and  lateral joint line some symptoms with palpation distal it band ;ROM unremarkable with some pain; Strength 4+/5 ext, 4+/5 flexion;No pain or opening with varus or valgus stress test; Neg Lachmans; mildly painful McMurrays; Neg patellar apprehension test, Neg pseudocompression test, hip IR/ER did not cause pain         Assessment/Plan:   L leg pain likely multifactorial including  1. L lateral knee pain--IT friction syndrome  2. Moderate Medial Compartment OA Left knee  3. Back pain--without obvious radicular symptoms today and symptoms in leg not changed with previous LOUISE.  4. Mod-Sever PVD Bilateral  5. L lateral ankle pain  6. Sarcopenia vs. Acute deconditioning    --L knee injection discussed to trial to help with pain and she would like to retry this as previous back injection louise did not help leg symptoms.  -- she is contiuing PT and her back is improving with this. Continue back and ankle exercises  -- can trial rolling IT band  --trial lace up ankle brace L ankle.  --consider vascular f/u if calf pain symptoms with activity become prominent.    A steroid injection was performed at L knee using 4cc 1% plain Lidocaine and 20 mg of Kenalog. This was well tolerated.  No immediate improvement will await result      Zafar Huynh MD CAQ

## 2017-08-14 ENCOUNTER — THERAPY VISIT (OUTPATIENT)
Dept: PHYSICAL THERAPY | Facility: CLINIC | Age: 82
End: 2017-08-14
Payer: MEDICARE

## 2017-08-14 DIAGNOSIS — M54.42 CHRONIC LEFT-SIDED LOW BACK PAIN WITH LEFT-SIDED SCIATICA: ICD-10-CM

## 2017-08-14 DIAGNOSIS — G89.29 CHRONIC LEFT-SIDED LOW BACK PAIN WITH LEFT-SIDED SCIATICA: ICD-10-CM

## 2017-08-14 PROCEDURE — G8979 MOBILITY GOAL STATUS: HCPCS | Mod: GP | Performed by: PHYSICAL THERAPIST

## 2017-08-14 PROCEDURE — 97110 THERAPEUTIC EXERCISES: CPT | Mod: GP | Performed by: PHYSICAL THERAPIST

## 2017-08-14 PROCEDURE — 97140 MANUAL THERAPY 1/> REGIONS: CPT | Mod: GP | Performed by: PHYSICAL THERAPIST

## 2017-08-14 PROCEDURE — G8980 MOBILITY D/C STATUS: HCPCS | Mod: GP | Performed by: PHYSICAL THERAPIST

## 2017-08-14 PROCEDURE — 97530 THERAPEUTIC ACTIVITIES: CPT | Mod: GP | Performed by: PHYSICAL THERAPIST

## 2017-08-14 RX ORDER — TRIAMCINOLONE ACETONIDE 40 MG/ML
40 INJECTION, SUSPENSION INTRA-ARTICULAR; INTRAMUSCULAR ONCE
Qty: 1 ML | Refills: 0 | OUTPATIENT
Start: 2017-08-14 | End: 2017-08-14

## 2017-08-14 NOTE — PROGRESS NOTES
Subjective:    HPI                    Objective:    System    Physical Exam    General     ROS    Assessment/Plan:      DISCHARGE REPORT    Progress reporting period is from 7/1/2017 to 8/14/2017.       SUBJECTIVE  Subjective changes noted by patient:  Pt states her LBP is better from the exercise but leg the same. her leg IS better with bending forwards while walking with L hand on thigh and while sitting with L foot on chair. Tramadol is barely cutting it so not sleeping well and unable to walk without cane in upright fashion.    Current pain level is 10/10 Current Pain level: 10/10.     Previous pain level was  10/10 Initial Pain level: 10/10 (butt let leg to ankle, back is 5/10).   Changes in function:  None  Adverse reaction to treatment or activity: None    OBJECTIVE  Changes noted in objective findings:  None  Objective: gait: amb with SEC flexed from trunk  PRx: L butt lat leg to foot tingling she has to sit with L foot on chair to be comfortable in office today LSROM: flex with inc ankle pain ext max loss with ankle pain SG B produces ankle pain unable to make her leg Sx better in PT she has agrred to test rep flexion in step standing for 3 days to assess the outcome     ASSESSMENT/PLAN  Updated problem list and treatment plan: Diagnosis 1:  mechancically inconclusive radiculopathy ??? stenosis  Pain -  self management, education, directional preference exercise and home program  Decreased ROM/flexibility - home program  Decreased joint mobility - home program  Impaired gait - home program  Decreased function - home program  STG/LTGs have been met or progress has been made towards goals:  None  Assessment of Progress: The patient's condition is unchanged.  Self Management Plans:  Patient is independent in a home treatment program.  Patient is independent in self management of symptoms.    Lucie continues to require the following intervention to meet STG and LTG's:  PT intervention is no longer required to  meet STG/LTG.    Recommendations:  This patient would benefit from further evaluation. Recommend she see a specialist.    Please refer to the daily flowsheet for treatment today, total treatment time and time spent performing 1:1 timed codes.

## 2017-08-15 ENCOUNTER — TELEPHONE (OUTPATIENT)
Dept: INTERNAL MEDICINE | Facility: CLINIC | Age: 82
End: 2017-08-15

## 2017-08-15 NOTE — TELEPHONE ENCOUNTER
Physical therapist appt. today 8/14/17 Pt is in a lot of pain -  needs sooner appt. than 8/31/17 per Pt - will see if the nurse can find some squeezing room for sooner apptointment - Please call Pt to confirm if possible [phone]   Pt called and is fine with the appt on the 31st of August.  Kristal Jurado RN 11:22 AM on 8/15/2017.

## 2017-08-17 ENCOUNTER — OFFICE VISIT (OUTPATIENT)
Dept: PSYCHOLOGY | Facility: CLINIC | Age: 82
End: 2017-08-17

## 2017-08-17 DIAGNOSIS — F43.23 ADJUSTMENT DISORDER WITH MIXED ANXIETY AND DEPRESSED MOOD: Primary | ICD-10-CM

## 2017-08-17 NOTE — MR AVS SNAPSHOT
After Visit Summary   8/17/2017    Lucie Stockton    MRN: [id number]           Patient Information     Date Of Birth          1935        Visit Information        Provider Department      8/17/2017 9:00 AM Jesenia Clay, PhD [facility]        Today's Diagnoses     Adjustment disorder with mixed anxiety and depressed mood    -  1       Follow-ups after your visit        Your next 10 appointments already scheduled     Aug 18, 2017  9:00 AM CDT   (Arrive by 8:45 AM)   ACUTE/CHRONIC SINGLE CONDITION with Marii Montgomery MD   [facility] ([facility])    [address] [place] [id number]   [phone]            Aug 31, 2017  8:00 AM CDT   (Arrive by 7:45 AM)   Return Visit with Marii Montgomery MD   [facility] ([facility])    [address] [place] [id number]   [phone]            Sep 01, 2017  8:00 AM CDT   (Arrive by 7:45 AM)   Return Visit with Jesenia Clay, PhD   [facility] ([facility])    [address] [place] [id number]   [phone]            Sep 15, 2017  8:00 AM CDT   (Arrive by 7:45 AM)   Return Visit with Jesenia Clay, PhD   [facility] ([facility])    [address] [place] [phone]   [phone]            Dec 18, 2017 12:30 PM CST   US SUMAYA DOPPLER WITH EXERCISE with [id number]   [facility] [place] [facility] ([facility])    [address] [place] [id number]   [phone]           Please bring a list of your medicines (including vitamins, minerals and over-the-counter drugs). Also, tell your doctor about any allergies you may have. Wear comfortable clothes and leave your valuables at home.  No caffeine or tobacco for 1 hour prior to exam.   Please call the Imaging Department at your exam site with any questions.            Dec 18, 2017  1:45 PM CST   (Arrive by 1:30 PM)   Return Vascular Visit with Iona Velez MD   [facility] ([facility])    [address] [id number] [phone]              Who to contact     Please call your clinic at [phone] to:    Ask questions about your health    Make or cancel appointments    Discuss your medicines    Learn about your test results    Speak to your doctor   If you have compliments or concerns about an experience at your clinic, or if you wish to file a complaint, please contact [facility] Patient Relations at [phone] or email us at [email or web address]         Additional Information About Your Visit        MyChart Information     [employer] gives you secure access to your electronic health record. If you see a primary care provider, you can also send messages to your care team and make appointments. If you have questions, please call your primary care clinic.  If you do not have a primary care provider, please call [phone] and they will assist you.      CRESCEL is an electronic gateway that provides easy, online access to your medical records. With CRESCEL, you can request a clinic appointment, read your test results, renew a prescription or communicate with your care team.     To access your existing account, please contact your [facility] or call [phone] for assistance.        Care EveryWhere ID     This is your Care EveryWhere ID. This could be used by other organizations to access your [facility] medical records  [id number]         Blood Pressure from Last 3 Encounters:   08/03/17 131/57   07/29/17 146/85   07/05/17 156/71    Weight from Last 3 Encounters:   08/10/17 45.8 kg (101 lb)   07/29/17 46.1 kg (101 lb 10.1 oz)   07/05/17 46 kg (101  lb 6.4 oz)              We Performed the Following     PSYCHOLOGY ([facility] HEALTH PSYCHOLOGY) REFERRAL        Primary Care Provider Office Phone # Fax #    Marii Montgomery [phone]       [address] [place] [id number]        Equal Access to Services     WILLIERHYS DAMIAN : Hadii aad ku hadcorio Soomaali, waaxda luqadaha, qaybta kaalmada adeegyada, waxkrystian ririin haycharlan adejamel alvarado lahaleycelia izquierdo. So [facility] [phone].    ATENCIÓN: Si habla español, tiene a schwartz disposición servicios gratuitos de asistencia lingüística. Llame al [phone].    We comply with applicable federal civil rights laws and [place] laws. We do not discriminate on the basis of race, color, national origin, age, disability sex, sexual orientation or gender identity.            Thank you!     Thank you for choosing [facility]  for your care. Our goal is always to provide you with excellent care. Hearing back from our patients is one way we can continue to improve our services. Please take a few minutes to complete the written survey that you may receive in the mail after your visit with us. Thank you!             Your Updated Medication List - Protect others around you: Learn how to safely use, store and throw away your medicines at www.disposemymeds.org.          This list is accurate as of: 8/17/17  2:15 PM.  Always use your most recent med list.                   Brand Name Dispense Instructions for use Diagnosis    albuterol 108 (90 BASE) MCG/ACT Inhaler    VENTOLIN HFA    18 Inhaler    Inhale 2 puffs into the lungs every 4 hours as needed for shortness of breath / dyspnea or wheezing    Cough       amLODIPine 10 MG tablet    NORVASC    90 tablet    Take 1 tablet (10 mg) by mouth daily For blood pressure    Essential hypertension       atorvastatin 40 MG tablet    LIPITOR    90 tablet    Take 2 tablets (80 mg) by mouth daily    Hyperlipidemia, unspecified hyperlipidemia type       bisacodyl 10 MG  Suppository    DULCOLAX    90 suppository    Place 1 suppository (10 mg) rectally daily as needed for constipation    Constipation, unspecified constipation type       CALCIUM 600 + D PO      Take 1 tablet by mouth daily.        citalopram 10 MG tablet    celeXA    60 tablet    Take 2 tablets (20 mg) by mouth daily    Feeling worried       clopidogrel 75 MG tablet    PLAVIX    90 tablet    Take 1 tablet (75 mg) by mouth daily    Other chronic pulmonary embolism (H), Venous thrombosis of extremity       * ferrous sulfate 325 (65 FE) MG tablet    IRON    90 tablet    Take 1 tablet (325 mg) by mouth daily (with breakfast)    Other iron deficiency anemias       * ferrous sulfate 325 (65 FE) MG tablet    IRON    90 tablet    Take 1 tablet (325 mg) by mouth 2 times daily To maintain iron levels    Iron deficiency       gabapentin 100 MG capsule    NEURONTIN    240 capsule    Take 4 capsules at bedtime, also 2 capsule in the morning and mid-day for pain prevention    Lumbar radiculopathy       hydrochlorothiazide 50 MG tablet    HYDRODIURIL    90 tablet    Take 1 tablet (50 mg) by mouth daily    Benign essential hypertension       ketorolac 0.5 % ophthalmic solution    ACULAR     Place 1 drop Into the left eye 4 times daily        levothyroxine 50 MCG tablet    SYNTHROID/LEVOTHROID    90 tablet    Take 1 tablet (50 mcg) by mouth daily    Hypothyroidism, unspecified type       lisinopril 40 MG tablet    PRINIVIL/ZESTRIL    90 tablet    Take 1 tablet (40 mg) by mouth daily For blood pressure    Essential hypertension       moxifloxacin 0.5 % ophthalmic solution    VIGAMOX     Place 1 drop Into the left eye 4 times daily        ondansetron 4 MG ODT tab    ZOFRAN ODT    10 tablet    Take 1 tablet (4 mg) by mouth every 6 hours as needed for nausea    Nausea       oxyCODONE 5 MG IR tablet    ROXICODONE    10 tablet    Take 1 tablet (5 mg) by mouth every 4 hours as needed for moderate to severe pain        pantoprazole 40 MG EC  tablet    PROTONIX    90 tablet    Take 1 tablet (40 mg) by mouth daily    Other iron deficiency anemias       polyethylene glycol powder    MIRALAX    119 g    Take one-half to one scoop once a day for maintaining soft stools    Constipation, unspecified constipation type       potassium chloride SA 20 MEQ CR tablet    K-DUR/KLOR-CON M    90 tablet    Take 1 tablet (20 mEq) by mouth daily    Hypopotassemia       prednisoLONE acetate 1 % ophthalmic susp    PRED FORTE     Place 1 drop Into the left eye 4 times daily        traMADol 50 MG tablet    ULTRAM    270 tablet    Take 1 tablet (50 mg) by mouth every 6 hours as needed for moderate pain or severe pain Maximum 270 tablets in 3 months    Foot pain, right       * Notice:  This list has 2 medication(s) that are the same as other medications prescribed for you. Read the directions carefully, and ask your doctor or other care provider to review them with you.

## 2017-08-17 NOTE — PROGRESS NOTES
"  Health Psychology                  Clinic    Department of Medicine  Lilliana Bartholomew, PhD, LP [phone]                          Clinics and Surgery Center  [facility] Zana Atwood, PhD, LP [phone]                  3rd Floor  [place] Mail Code 741   Garret Munguia, PhD, ABPP, LP [phone]     [address] [facility], [address] Jesenia Clay,  PhD, LP [phone]            [place] [address] Jesica Wagoner, PhD, LP [phone]     Confidential Summary of Standard Psychodiagnostic Evaluation*    REFERRAL:  Marii Montgomery MD.        REASON FOR REFERRAL:  Symptoms of depression and anxiety and coping with pain.      SOURCES OF INFORMATION:  Patient was seen for a psychodiagnostic evaluation.  Information was obtained from a clinical interview with the patient, administration of psychological assessments, and review of medical record.        PAST MEDICAL HISTORY:  Lucie Stockton is an 82-year-old female presenting with ongoing left leg pain due to unknown etiology that has been present since the end of 01/2017.  She stated she is growing impatient and is at times frustrated with difficulty to diagnosis the cause of this pain as well as experience relief in pain or improvement.  She described the intensity of the pain as a 6 out of 10 (10 is high) and described it as an ache but not a sharp pain.  She stated she has experienced difficulty adapting to this injury.  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  For example, she stated the family was recently in town and she felt quite frustrated when she had difficulty keeping up with her family on outings.  She stated her mood is depressed \"sometimes\" and specifically when she " "is unable to do things.  She described the process of feeling frustrated and also noticing statements in which she believes she should be better by now or not having so much trouble.  She also endorsed difficulty falling asleep, which has been present for 6 months, fatigue, and poor appetite.      PAST MEDICAL HISTORY:  See below list for current medical problems, current medications and past surgical history.  Regarding current health behaviors she has little formal exercise.  She ambulates using a cane at home and for outings.  She endorsed a history of tobacco use, quitting 10 years ago.  She endorsed occasional caffeine use such as having a cup of coffee when out with friends.  She endorsed rare alcohol use, such as a half glass of wine in social settings infrequently.  She denied use of recreational drugs.     Past Medical History:   Diagnosis Date     Anemia      Aortic stenosis     abdominal     Atherosclerosis of aorta (H)     \"extensive disease with near occlusion below level of renal arteries\" on CT     Atherosclerosis of arteries of extremities (H)      Back pain     severe multilevel DJD, moderate spinal canal stenosis L4-5, severe L3-4     Degenerative joint disease      DVT of lower extremity, bilateral (H)     5/24/10 left distal femoral and great saphenous, 7/7/10 left:  extending to cfv, iliac , pop and post tibial, peronial, right:  distal fem and peroneal      Grave's disease      Hyperlipidaemia LDL goal < 70      Hypertension, essential      Hypothyroidism      Insomnia      Intermittent claudication (H)      Iron deficiency      Knee pain      Lumbar stenosis with neurogenic claudication      Osteoarthritis     ankle,foot, knee     Osteoporosis      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 JONO Goncalves - Returned BENIGN     UPPER GI ENDOSCOPY      11/10/10 erythematous gastropathy, angioectasia     Current Outpatient Prescriptions   Medication     citalopram (CELEXA) 10 MG tablet     oxyCODONE (ROXICODONE) 5 MG IR tablet     gabapentin (NEURONTIN) 100 MG capsule     atorvastatin (LIPITOR) 40 MG tablet     pantoprazole (PROTONIX) 40 MG EC tablet     ondansetron (ZOFRAN ODT) 4 MG ODT tab     moxifloxacin (VIGAMOX) 0.5 % ophthalmic solution     prednisoLONE acetate (PRED FORTE) 1 % ophthalmic susp     ketorolac (ACULAR) 0.5 % ophthalmic solution     traMADol (ULTRAM) 50 MG tablet     bisacodyl (DULCOLAX) 10 MG Suppository     levothyroxine (SYNTHROID/LEVOTHROID) 50 MCG tablet     clopidogrel (PLAVIX) 75 MG tablet     hydrochlorothiazide (HYDRODIURIL) 50 MG tablet     lisinopril (PRINIVIL/ZESTRIL) 40 MG tablet     polyethylene glycol (MIRALAX) powder     ferrous sulfate (IRON) 325 (65 FE) MG tablet     amLODIPine (NORVASC) 10 MG tablet     albuterol (VENTOLIN HFA) 108 (90 BASE) MCG/ACT inhaler     potassium chloride SA (K-DUR,KLOR-CON M) 20 MEQ tablet     ferrous sulfate (IRON) 325 (65 FE) MG tablet     [DISCONTINUED] tolterodine (DETROL) 1 MG tablet     Calcium Carbonate-Vitamin D (CALCIUM 600 + D OR)     No current facility-administered medications for this visit.         PSYCHIATRIC HISTORY:  She denied previous history of diagnosis of psychiatric illness, hospitalization, or psychiatric medication management.  She denied a family history of psychiatric illness.  She denied current or past history of suicidal ideation, self-directed violent behaviors or  suicide attempt.      SOCIAL HISTORY:  The patient was born in [place] and raised in [place], [place].  She was the oldest of 3 children.  She is currently  to her  of 55 years, Pradeep, and they live together in [place].  They have 2 children and 2 grandchildren, stating relationships with her family are positive.  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  Her educational history includes 2 master's degrees in English and education.  She denied a history of trauma or abuse.      PSYCHOLOGICAL ASSESSMENT:  The patient completed several self-report questionnaires at this session.  Her score on the WHODAS 2.0 - 12 item was a 25.  She reported a 4 on the Generalized Anxiety Disorder-7 screener indicating minimal symptoms of anxiety.  Her score on the Patient Health Questionnaire-9 was a 10 indicating moderate symptoms of depression, predominantly somatic symptoms of difficulty sleeping, fatigue, and changes in appetite.  She denied all items on the CAGE-AID, indicating no self-reported concerns related to substance use.  She denied all items on the Suicide Behaviors Questionnaire-Revised, indicating no concerns with suicidal thoughts or behaviors.      MENTAL STATUS EXAMINATION:  The patient arrived early, maintained good eye contact and was neatly groomed and dressed.  She was in a wheelchair for this visit due to left leg pain.  She was alert and oriented to person, place, time and situation.  She appeared to respond honestly to all questions about psychosocial functioning and was deemed a reliable historian.  Mood appeared euthymic, although she indicated frustration related to her health conditions.  Speech was clear, coherent and of normal rate, rhythm and volume.  Thoughts were overall logical and organized.  Thought content was appropriate to interview and situation.  Insight and judgment were both good.  She denied  current suicidal or assaultive ideation, plan or intent.      IMPRESSION:  Lucie Stockton is an 82-year-old female experiencing adjustment difficulties following an ongoing left leg injury that has disrupted engagement in physical activities, emotional well-being, and self-care routines.  It also appears that the physical and emotional changes have impacted sleep activity and eating patterns.  She presents with adequate support from family and friends, yet denied the presence of robust internal coping strategies to navigate change.  It also appears that worry and frustration with her health condition exacerbates her feeling and experience of it.      DIAGNOSIS:  Adjustment disorder with mixed anxiety and depressed mood.      RECOMMENDATION AND PLAN:  Recommended brief cognitive behavioral interventions to facilitate patient's adjustment to change in health status.  Provided psychoeducation to the patient regarding rationale and course of care.  We briefly discussed cognitive interventions, encouraging her to recognize thought patterns that contribute to worsening of mood, challenge and appropriately change them.  It also appears she would benefit from cognitive behavioral interventions focused on coping with pain, such as activity pacing or cognitive restructuring related to her leg injury.  It also appears she would benefit from a discussion related to strategies to better facilitate her sleep, as fatigue, some daytime napping, and less activity was reported to make it harder for her to fall asleep at night.  Encouraged the patient to return to clinic within 2-4 weeks.  A treatment plan will be completed at the next session.     Jesenia Clay, PhD,   Clinical Health Psychologist    *In accordance with the Rules of the [place] Board of [employer], it is noted that psychological descriptions and scientific procedures underlying psychological evaluations have limitations.  Absolute predictions cannot be made based on  information in this report.

## 2017-08-18 ENCOUNTER — OFFICE VISIT (OUTPATIENT)
Dept: INTERNAL MEDICINE | Facility: CLINIC | Age: 82
End: 2017-08-18

## 2017-08-18 ENCOUNTER — TELEPHONE (OUTPATIENT)
Dept: INTERNAL MEDICINE | Facility: CLINIC | Age: 82
End: 2017-08-18

## 2017-08-18 VITALS — SYSTOLIC BLOOD PRESSURE: 123 MMHG | RESPIRATION RATE: 16 BRPM | DIASTOLIC BLOOD PRESSURE: 66 MMHG | HEART RATE: 94 BPM

## 2017-08-18 DIAGNOSIS — F32.A DEPRESSION, UNSPECIFIED DEPRESSION TYPE: ICD-10-CM

## 2017-08-18 DIAGNOSIS — G89.29 CHRONIC LOW BACK PAIN, UNSPECIFIED BACK PAIN LATERALITY, WITH SCIATICA PRESENCE UNSPECIFIED: Primary | ICD-10-CM

## 2017-08-18 DIAGNOSIS — M79.662 PAIN OF LEFT LOWER LEG: ICD-10-CM

## 2017-08-18 DIAGNOSIS — M54.5 CHRONIC LOW BACK PAIN, UNSPECIFIED BACK PAIN LATERALITY, WITH SCIATICA PRESENCE UNSPECIFIED: Primary | ICD-10-CM

## 2017-08-18 DIAGNOSIS — I73.9 PERIPHERAL VASCULAR DISEASE (H): ICD-10-CM

## 2017-08-18 RX ORDER — CITALOPRAM HYDROBROMIDE 40 MG/1
40 TABLET ORAL DAILY
Qty: 30 TABLET | Refills: 2 | Status: SHIPPED | OUTPATIENT
Start: 2017-08-18 | End: 2017-08-19

## 2017-08-18 RX ORDER — OXYCODONE HYDROCHLORIDE 5 MG/1
5 TABLET ORAL EVERY 6 HOURS PRN
Qty: 10 TABLET | Refills: 0 | Status: ON HOLD | OUTPATIENT
Start: 2017-08-18 | End: 2018-01-30

## 2017-08-18 RX ORDER — COVID-19 ANTIGEN TEST
220 KIT MISCELLANEOUS 2 TIMES DAILY WITH MEALS
Qty: 60 CAPSULE | Refills: 2 | Status: SHIPPED | OUTPATIENT
Start: 2017-08-18 | End: 2019-10-16

## 2017-08-18 ASSESSMENT — PAIN SCALES - GENERAL: PAINLEVEL: NO PAIN (0)

## 2017-08-18 NOTE — PROGRESS NOTES
"CC:  F/u leg pain    S:  Here for f/u.  Her  Darren did not accompany her today.  Reviewed PT and vascular notes together  Not sure why, but she has not started PAD rehab.    We reviewed her medication list and discussed discontinuing regular use of tramadol and any other meds that have not been helpful and/or put her at risk for excess sedation, dizziness, falls.  She is not sure if she has been taking her citalopram and if so, how many tabs/day.  Did not bring her medication bottles with her today, so we'll contact her later at home so she can report what she is taking and when  I suggested she use a pill box-type organizer.  She is satisfied with her current methods of remembering when to take her meds   Lucie reports frustration with not being able to be as active as she would like to be.  Starting to not care much about e.g. Whether or not she keeps up the washing dishes.  Has hired a .  She states she is coming to terms with the fact that \"I am 82 years old\".  We discussed goals of pain and depression/anxiety management.  Admits that memory can be a problem and requests (as I always have) that I write down all instructions for her today    Patient Active Problem List   Diagnosis     Benign ovarian tumor     Hypothyroidism     Peripheral vascular disease (H)     Knee pain     Osteoarthritis     Cervicalgia     Hyperlipidemia with target LDL less than 70     Pulmonary embolism (H)     Anemia     Aortic stenosis     Atherosclerosis of aorta (H)     Atherosclerosis of arteries of extremities (H)     Intermittent claudication (H)     Iron deficiency     Osteoporosis     DVT of lower extremity, bilateral (H)     Varicose veins     Gluteal pain     Insomnia     Back pain     Vitamin D deficiency     Tobacco use disorder     Personal history of other drug therapy     Right knee pain     Other chronic pulmonary embolism (H)     Venous thrombosis of extremity     Benign essential hypertension     " Gastritis     Cataract     Acute left-sided low back pain with left-sided sciatica     Lumbar stenosis with neurogenic claudication     SBO (small bowel obstruction) (H)     Small bowel obstruction (H)     Long term current use of anticoagulant therapy     Left-sided low back pain with left-sided sciatica     Moderate major depression (H)     Anxiety     Current Outpatient Prescriptions   Medication Sig Dispense Refill     naproxen sodium 220 MG capsule Take 220 mg by mouth 2 times daily (with meals) 60 capsule 2     citalopram (CELEXA) 40 MG tablet Take 1 tablet (40 mg) by mouth daily 30 tablet 2     oxyCODONE (ROXICODONE) 5 MG IR tablet Take 1 tablet (5 mg) by mouth every 6 hours as needed for moderate to severe pain 10 tablet 0     atorvastatin (LIPITOR) 40 MG tablet Take 2 tablets (80 mg) by mouth daily 90 tablet 3     pantoprazole (PROTONIX) 40 MG EC tablet Take 1 tablet (40 mg) by mouth daily 90 tablet 3     moxifloxacin (VIGAMOX) 0.5 % ophthalmic solution Place 1 drop Into the left eye 4 times daily       prednisoLONE acetate (PRED FORTE) 1 % ophthalmic susp Place 1 drop Into the left eye 4 times daily       ketorolac (ACULAR) 0.5 % ophthalmic solution Place 1 drop Into the left eye 4 times daily       bisacodyl (DULCOLAX) 10 MG Suppository Place 1 suppository (10 mg) rectally daily as needed for constipation 90 suppository 3     levothyroxine (SYNTHROID/LEVOTHROID) 50 MCG tablet Take 1 tablet (50 mcg) by mouth daily 90 tablet 3     clopidogrel (PLAVIX) 75 MG tablet Take 1 tablet (75 mg) by mouth daily 90 tablet 3     hydrochlorothiazide (HYDRODIURIL) 50 MG tablet Take 1 tablet (50 mg) by mouth daily 90 tablet 3     lisinopril (PRINIVIL/ZESTRIL) 40 MG tablet Take 1 tablet (40 mg) by mouth daily For blood pressure 90 tablet 3     polyethylene glycol (MIRALAX) powder Take one-half to one scoop once a day for maintaining soft stools 119 g 5     ferrous sulfate (IRON) 325 (65 FE) MG tablet Take 1 tablet (325 mg)  by mouth 2 times daily To maintain iron levels 90 tablet 1     amLODIPine (NORVASC) 10 MG tablet Take 1 tablet (10 mg) by mouth daily For blood pressure 90 tablet 1     albuterol (VENTOLIN HFA) 108 (90 BASE) MCG/ACT inhaler Inhale 2 puffs into the lungs every 4 hours as needed for shortness of breath / dyspnea or wheezing 18 Inhaler 1     potassium chloride SA (K-DUR,KLOR-CON M) 20 MEQ tablet Take 1 tablet (20 mEq) by mouth daily 90 tablet 3     ferrous sulfate (IRON) 325 (65 FE) MG tablet Take 1 tablet (325 mg) by mouth daily (with breakfast) 90 tablet 3     Calcium Carbonate-Vitamin D (CALCIUM 600 + D OR) Take 1 tablet by mouth daily.       [DISCONTINUED] citalopram (CELEXA) 10 MG tablet Take 2 tablets (20 mg) by mouth daily 60 tablet 1     [DISCONTINUED] tolterodine (DETROL) 1 MG tablet Take 1-2 tablets by mouth At Bedtime. 180 tablet 3     Allergies   Allergen Reactions     Trazodone Fatigue     Felt too groggy     /66  Pulse 94  Resp 16  Gen:  RAS Faulkner was seen today for musculoskeletal problem.    Diagnoses and all orders for this visit:    Chronic low back pain, unspecified back pain laterality, with sciatica presence unspecified, left leg pain  -     naproxen sodium 220 MG capsule; Take 220 mg by mouth 2 times daily (with meals)  -     oxyCODONE (ROXICODONE) 5 MG IR tablet; Take 1 tablet (5 mg) by mouth every 6 hours as needed for moderate to severe pain    Peripheral vascular disease (H)    Depression, unspecified depression type  -     Change citalopram 2 x 20 mg daily to citalopram (CELEXA) 40 MG tablet; Take 1 tablet (40 mg) by mouth daily    The following written and verbal instructions were given to the patient:    Plans for today:  1.  We will contact you regarding getting started on a vascular rehab program  2.  You may stop going to [facility].  Continue doing  Your exercises at home.  3.  We will contact you today or Monday to find out if you have been taking one or  "two of your Citalopram (\"Vitamin C\")  4.  I sent a prescription for Aleve (naproxen) to your pharmacy.  Take one tablet up to twice daily as needed for pain.  If you develop any abdominal pain, black stools, bloody stools, stop the Aleve (naproxen)  5.  Continue the pantoprazole medication (it is an antacid).  You do not need to take Ranitidine (Zantac).  6.  Stop Tramadol.  7.  You may take one tablet of oxycodone every 6 hours, but only as needed for severe pain.  No more than 1 or 2 a week.  I printed out a prescription for this medication today for  You to take to your pharmacy.  8.  Stop gabapentin  9.  I took ondansetron off your medication list because you are not needing/taking it currently.  10.  It is okay to continue using the ace-wrap/bandage.  You may stop trying to wear the brace.  11.  Throw out any old pill bottles and their contents.  12.  See me in approximately 6 weeks, after you have completed 4-6 weeks of vascular rehab    Total time spent 30 minutes.  More than 50% of the time spent with Ms. Stockton on counseling / coordinating her care      Marii Montgomery M.D.  Internal Medicine  [facility]   pager [phone]            "

## 2017-08-18 NOTE — TELEPHONE ENCOUNTER
Kristal,        Please call Lucie.  She did not bring her pill bottles in with her today and could not tell me whether or not she has been taking her citalopram.  I made several changes to her medications today and printed out my instructions and the updated med list.  Please make sure she has the correct pill bottles and instructions.  Also, let me know if she has been taking 1 or 2 tabs (or none!) citalopram daily.  Thanks.  YVONNE    08/18/2017 2:51 PM  Called patient per Dr. Montgomery's instruction. Patient reporting that she was taking citalopram 10 mg per day. She has a new bottle of 40 mg tablets that she picked up today. Reviewed the following changes with the patient:    Pantoprazole    Ranitidine    Tramadol    Gabapentin    Ondansetron     Patient has a discard pile with all medication she is not currently taking and will discard accordingly.     Encounter to Dr. Montgomery regarding the findings.

## 2017-08-18 NOTE — MR AVS SNAPSHOT
"              After Visit Summary   8/18/2017    Lucie Stockton    MRN: [id number]           Patient Information     Date Of Birth          1935        Visit Information        Provider Department      8/18/2017 9:00 AM Marii Montgomery MD [facility]        Today's Diagnoses     Chronic low back pain, unspecified back pain laterality, with sciatica presence unspecified    -  1    Pain of left lower leg        Peripheral vascular disease (H)        Depression, unspecified depression type          Care Instructions    [facility] Medication Refill Request Information:  * Please contact your pharmacy regarding ANY request for medication refills.  ** [facility] Prescription Fax = [phone]  * Please allow 3 business days for routine medication refills.  * Please allow 5 business days for controlled substance medication refills.     [facility] Test Result notification information:  *You will be notified with in 7-10 days of your appointment day regarding the results of your test.  If you are on MyChart you will be notified as soon as the provider has reviewed the results and signed off on them.    [facility] Center [phone]     Plans for today:  1.  We will contact you regarding getting started on a vascular rehab program  2.  You may stop going to [facility].  Continue doing  Your exercises at home.  3.  We will contact you today or Monday to find out if you have been taking one or two of your Citalopram (\"Vitamin C\")  4.  I sent a prescription for Aleve (naproxen) to your pharmacy.  Take one tablet up to twice daily as needed for pain.  If you develop any abdominal pain, black stools, bloody stools, stop the Aleve (naproxen)  5.  Continue the pantoprazole medication (it is an antacid).  You do not need to take Ranitidine (Zantac).  6.  Stop Tramadol.  7.  You may take one tablet of oxycodone every 6 hours, but only as needed for severe pain.  No more than 1 " or 2 a week.  I printed out a prescription for this medication today for  You to take to your pharmacy.  8.  Stop gabapentin  9.  I took ondansetron off your medication list because you are not needing/taking it currently.  10.  It is okay to continue using the ace-wrap/bandage.  You may stop trying to wear the brace.  11.  Throw out any old pill bottles and their contents.  12.  See me in approximately 6 weeks, after you have completed 4-6 weeks of vascular rehab            Follow-ups after your visit        Follow-up notes from your care team     Return in about 6 weeks (around 9/29/2017) for see me one month after you have started vascular rehabilitation.      Your next 10 appointments already scheduled     Aug 31, 2017  8:00 AM CDT   (Arrive by 7:45 AM)   Return Visit with Marii Montgomery MD   [facility] ([facility]    [address] [place] [id number]   [phone]            Sep 01, 2017  8:00 AM CDT   (Arrive by 7:45 AM)   Return Visit with Jesenia Clay, PhD   [facility] ([facility])    [address] [place] [address]   [phone]            Sep 15, 2017  8:00 AM CDT   (Arrive by 7:45 AM)   Return Visit with Jesenia Clay, PhD   [facility] ([facility])    [address] [place] [address]   [phone]            Dec 18, 2017 12:30 PM CST   US SUMAYA DOPPLER WITH EXERCISE with [id number]   [facility]  ([facility])    [address] [place] [address]   [phone]           Please bring a list of your medicines (including vitamins, minerals and over-the-counter drugs). Also, tell your doctor about any allergies you may have. Wear comfortable clothes and leave your valuables at home.  No caffeine or tobacco for 1 hour prior to exam.  Please  call the Imaging Department at your exam site with any questions.            Dec 18, 2017  1:45 PM CST   (Arrive by 1:30 PM)   Return Vascular Visit with Iona Velez MD   [facility] ([facility])    [address] [id number] [phone]              Who to contact     Please call your clinic at [phone] to:    Ask questions about your health    Make or cancel appointments    Discuss your medicines    Learn about your test results    Speak to your doctor   If you have compliments or concerns about an experience at your clinic, or if you wish to file a complaint, please contact [facility] Patient Relations at [phone] or email us at [email or web address]         Additional Information About Your Visit        MyChart Information     [employer] gives you secure access to your electronic health record. If you see a primary care provider, you can also send messages to your care team and make appointments. If you have questions, please call your primary care clinic.  If you do not have a primary care provider, please call [phone] and they will assist you.      [employer] is an electronic gateway that provides easy, online access to your medical records. With [employer], you can request a clinic appointment, read your test results, renew a prescription or communicate with your care team.     To access your existing account, please contact your [facility] or call [phone] for assistance.        Care EveryWhere ID     This is your Care EveryWhere ID. This could be used by other organizations to access your [place] medical records  [id number]        Your Vitals Were     Pulse Respirations                94 16           Blood Pressure from Last 3 Encounters:   08/18/17 123/66   08/03/17 131/57   07/29/17 146/85    Weight from Last 3 Encounters:   08/10/17 45.8 kg (101  lb)   07/29/17 46.1 kg (101 lb 10.1 oz)   07/05/17 46 kg (101 lb 6.4 oz)              Today, you had the following     No orders found for display         Today's Medication Changes          These changes are accurate as of: 8/18/17 10:23 AM.  If you have any questions, ask your nurse or doctor.               Start taking these medicines.        Dose/Directions    naproxen sodium 220 MG capsule   Used for:  Chronic low back pain, unspecified back pain laterality, with sciatica presence unspecified, Pain of left lower leg   Started by:  Marii Montgomery MD        Dose:  220 mg   Take 220 mg by mouth 2 times daily (with meals)   Quantity:  60 capsule   Refills:  2         These medicines have changed or have updated prescriptions.        Dose/Directions    citalopram 40 MG tablet   Commonly known as:  celeXA   This may have changed:    - medication strength  - how much to take   Used for:  Depression, unspecified depression type   Changed by:  Marii Montgomery MD        Dose:  40 mg   Take 1 tablet (40 mg) by mouth daily   Quantity:  30 tablet   Refills:  2       oxyCODONE 5 MG IR tablet   Commonly known as:  ROXICODONE   This may have changed:  when to take this   Used for:  Chronic low back pain, unspecified back pain laterality, with sciatica presence unspecified, Pain of left lower leg   Changed by:  Marii Montgomery MD        Dose:  5 mg   Take 1 tablet (5 mg) by mouth every 6 hours as needed for moderate to severe pain   Quantity:  10 tablet   Refills:  0         Stop taking these medicines if you haven't already. Please contact your care team if you have questions.     gabapentin 100 MG capsule   Commonly known as:  NEURONTIN   Stopped by:  Marii Montgomery MD           ondansetron 4 MG ODT tab   Commonly known as:  ZOFRAN ODT   Stopped by:  Marii Montgomery MD           traMADol 50 MG tablet   Commonly known as:  ULTRAM   Stopped by:  Marii Montgomery MD                Where to get your medicines       These medications were sent to [id number] - [place], [place] - [address] AT [facility] [place] [place] [address], [place] [address]     Phone:  [phone]     citalopram 40 MG tablet    naproxen sodium 220 MG capsule         Some of these will need a paper prescription and others can be bought over the counter.  Ask your nurse if you have questions.     Bring a paper prescription for each of these medications     oxyCODONE 5 MG IR tablet                Primary Care Provider Office Phone # Fax #    Marii Montgomery [phone]       [address] [place] [address] [phone]  [facility] [place] [address]        Equal Access to Services     PAL SALGADO : Ascencion nevarezo Soalysia, waaxda luqadaha, qaybta kaalmada adejamelyabrielle, jackeline izquierdo. So [facility] [phone].    ATENCIÓN: Si habla español, tiene a schwartz disposición servicios gratuitos de asistencia lingüística. Llame al [phone].    We comply with applicable federal civil rights laws and [place] laws. We do not discriminate on the basis of race, color, national origin, age, disability sex, sexual orientation or gender identity.            Thank you!     Thank you for choosing [facility]  for your care. Our goal is always to provide you with excellent care. Hearing back from our patients is one way we can continue to improve our services. Please take a few minutes to complete the written survey that you may receive in the mail after your visit with us. Thank you!             Your Updated Medication List - Protect others around you: Learn how to safely use, store and throw away your medicines at www.disposemymeds.org.          This list is accurate as of: 8/18/17 10:23 AM.  Always use your most recent med list.                   Brand Name Dispense Instructions for use Diagnosis    albuterol 108 (90 BASE) MCG/ACT Inhaler    VENTOLIN HFA    18 Inhaler    Inhale 2 puffs into the lungs  every 4 hours as needed for shortness of breath / dyspnea or wheezing    Cough       amLODIPine 10 MG tablet    NORVASC    90 tablet    Take 1 tablet (10 mg) by mouth daily For blood pressure    Essential hypertension       atorvastatin 40 MG tablet    LIPITOR    90 tablet    Take 2 tablets (80 mg) by mouth daily    Hyperlipidemia, unspecified hyperlipidemia type       bisacodyl 10 MG Suppository    DULCOLAX    90 suppository    Place 1 suppository (10 mg) rectally daily as needed for constipation    Constipation, unspecified constipation type       CALCIUM 600 + D PO      Take 1 tablet by mouth daily.        citalopram 40 MG tablet    celeXA    30 tablet    Take 1 tablet (40 mg) by mouth daily    Depression, unspecified depression type       clopidogrel 75 MG tablet    PLAVIX    90 tablet    Take 1 tablet (75 mg) by mouth daily    Other chronic pulmonary embolism (H), Venous thrombosis of extremity       * ferrous sulfate 325 (65 FE) MG tablet    IRON    90 tablet    Take 1 tablet (325 mg) by mouth daily (with breakfast)    Other iron deficiency anemias       * ferrous sulfate 325 (65 FE) MG tablet    IRON    90 tablet    Take 1 tablet (325 mg) by mouth 2 times daily To maintain iron levels    Iron deficiency       hydrochlorothiazide 50 MG tablet    HYDRODIURIL    90 tablet    Take 1 tablet (50 mg) by mouth daily    Benign essential hypertension       ketorolac 0.5 % ophthalmic solution    ACULAR     Place 1 drop Into the left eye 4 times daily        levothyroxine 50 MCG tablet    SYNTHROID/LEVOTHROID    90 tablet    Take 1 tablet (50 mcg) by mouth daily    Hypothyroidism, unspecified type       lisinopril 40 MG tablet    PRINIVIL/ZESTRIL    90 tablet    Take 1 tablet (40 mg) by mouth daily For blood pressure    Essential hypertension       moxifloxacin 0.5 % ophthalmic solution    VIGAMOX     Place 1 drop Into the left eye 4 times daily        naproxen sodium 220 MG capsule     60 capsule    Take 220 mg by mouth  2 times daily (with meals)    Chronic low back pain, unspecified back pain laterality, with sciatica presence unspecified, Pain of left lower leg       oxyCODONE 5 MG IR tablet    ROXICODONE    10 tablet    Take 1 tablet (5 mg) by mouth every 6 hours as needed for moderate to severe pain    Chronic low back pain, unspecified back pain laterality, with sciatica presence unspecified, Pain of left lower leg       pantoprazole 40 MG EC tablet    PROTONIX    90 tablet    Take 1 tablet (40 mg) by mouth daily    Other iron deficiency anemias       polyethylene glycol powder    MIRALAX    119 g    Take one-half to one scoop once a day for maintaining soft stools    Constipation, unspecified constipation type       potassium chloride SA 20 MEQ CR tablet    K-DUR/KLOR-CON M    90 tablet    Take 1 tablet (20 mEq) by mouth daily    Hypopotassemia       prednisoLONE acetate 1 % ophthalmic susp    PRED FORTE     Place 1 drop Into the left eye 4 times daily        * Notice:  This list has 2 medication(s) that are the same as other medications prescribed for you. Read the directions carefully, and ask your doctor or other care provider to review them with you.

## 2017-08-18 NOTE — TELEPHONE ENCOUNTER
Thanks.  Please have her take one-half of the 40 mg citalopram tablet that she received today, ie 20 mg daily.  Thanks!  SB

## 2017-08-18 NOTE — PATIENT INSTRUCTIONS
"[facility] Medication Refill Request Information:  * Please contact your pharmacy regarding ANY request for medication refills.  ** [facility] Prescription Fax = [phone]  * Please allow 3 business days for routine medication refills.  * Please allow 5 business days for controlled substance medication refills.     [facility] Test Result notification information:  *You will be notified with in 7-10 days of your appointment day regarding the results of your test.  If you are on MyChart you will be notified as soon as the provider has reviewed the results and signed off on them.    [facility] [phone]     Plans for today:  1.  We will contact you regarding getting started on a vascular rehab program  2.  You may stop going to [facility].  Continue doing  Your exercises at home.  3.  We will contact you today or Monday to find out if you have been taking one or two of your Citalopram (\"Vitamin C\")  4.  I sent a prescription for Aleve (naproxen) to your pharmacy.  Take one tablet up to twice daily as needed for pain.  If you develop any abdominal pain, black stools, bloody stools, stop the Aleve (naproxen)  5.  Continue the pantoprazole medication (it is an antacid).  You do not need to take Ranitidine (Zantac).  6.  Stop Tramadol.  7.  You may take one tablet of oxycodone every 6 hours, but only as needed for severe pain.  No more than 1 or 2 a week.  I printed out a prescription for this medication today for  You to take to your pharmacy.  8.  Stop gabapentin  9.  I took ondansetron off your medication list because you are not needing/taking it currently.  10.  It is okay to continue using the ace-wrap/bandage.  You may stop trying to wear the brace.  11.  Throw out any old pill bottles and their contents.  12.  See me in approximately 6 weeks, after you have completed 4-6 weeks of vascular rehab    "

## 2017-08-19 RX ORDER — CITALOPRAM HYDROBROMIDE 40 MG/1
20 TABLET ORAL DAILY
Qty: 30 TABLET | Refills: 2
Start: 2017-08-19 | End: 2017-11-27

## 2017-08-19 NOTE — TELEPHONE ENCOUNTER
I spoke to Lucie today and informed her to only take 20mg of citalopram daily by cutting her current 40mg pill in half. She stated she will david her bottle and take the updated dose of only 20mg daily. No further questions at this time. Rx updated in MAR to reflect new dose.    Yola Carpenter RN

## 2017-08-21 ASSESSMENT — ANXIETY QUESTIONNAIRES
5. BEING SO RESTLESS THAT IT IS HARD TO SIT STILL: NOT AT ALL
3. WORRYING TOO MUCH ABOUT DIFFERENT THINGS: NOT AT ALL
GAD7 TOTAL SCORE: 0
2. NOT BEING ABLE TO STOP OR CONTROL WORRYING: NOT AT ALL
7. FEELING AFRAID AS IF SOMETHING AWFUL MIGHT HAPPEN: NOT AT ALL
6. BECOMING EASILY ANNOYED OR IRRITABLE: NOT AT ALL
1. FEELING NERVOUS, ANXIOUS, OR ON EDGE: NOT AT ALL

## 2017-08-21 ASSESSMENT — PATIENT HEALTH QUESTIONNAIRE - PHQ9
SUM OF ALL RESPONSES TO PHQ QUESTIONS 1-9: 9
5. POOR APPETITE OR OVEREATING: NOT AT ALL

## 2017-08-22 ASSESSMENT — ANXIETY QUESTIONNAIRES: GAD7 TOTAL SCORE: 0

## 2017-08-25 ENCOUNTER — RADIANT APPOINTMENT (OUTPATIENT)
Dept: GENERAL RADIOLOGY | Facility: CLINIC | Age: 82
End: 2017-08-25
Attending: INTERNAL MEDICINE
Payer: MEDICARE

## 2017-08-25 ENCOUNTER — OFFICE VISIT (OUTPATIENT)
Dept: FAMILY MEDICINE | Facility: CLINIC | Age: 82
End: 2017-08-25
Payer: MEDICARE

## 2017-08-25 VITALS
DIASTOLIC BLOOD PRESSURE: 71 MMHG | HEART RATE: 78 BPM | SYSTOLIC BLOOD PRESSURE: 136 MMHG | OXYGEN SATURATION: 98 % | TEMPERATURE: 97 F | WEIGHT: 98 LBS | BODY MASS INDEX: 19.79 KG/M2

## 2017-08-25 DIAGNOSIS — M25.572 ACUTE LEFT ANKLE PAIN: ICD-10-CM

## 2017-08-25 DIAGNOSIS — M25.572 ACUTE LEFT ANKLE PAIN: Primary | ICD-10-CM

## 2017-08-25 PROCEDURE — 73610 X-RAY EXAM OF ANKLE: CPT | Mod: LT

## 2017-08-25 PROCEDURE — 99213 OFFICE O/P EST LOW 20 MIN: CPT | Performed by: INTERNAL MEDICINE

## 2017-08-25 NOTE — MR AVS SNAPSHOT
After Visit Summary   8/25/2017    Lucie Stockton    MRN: [id number]           Patient Information     Date Of Birth          1935        Visit Information        Provider Department      8/25/2017 10:20 AM [facility]        Today's Diagnoses     Acute left ankle pain    -  1      Care Instructions    Rest, elevation, ice, tylenol    Change footwear to have more arch support      Therapy will call you  Podiatry will call you              Follow-ups after your visit        Additional Services     [facility] PT, HAND, AND CHIROPRACTIC REFERRAL       **This order will print in the [facility] Scheduling Office**    Physical Therapy, Hand Therapy and Chiropractic Care are available through:    *[facility]  *[facility]  *[facility]    Call one number to schedule at any of the above locations: [phone].    Your provider has referred you to: Physical Therapy at [facility] or [facility]    Indication/Reason for Referral: Ankle Pain  Onset of Illness: 2 days ago  Therapy Orders: Evaluate and Treat  Special Programs: None  Special Request: None    Toy Yeager      Additional Comments for the Therapist or Chiropractor:   Patient xray negative for fx.  There is hardware in left foot from a previous injury, patient does not remember what this was from.     Please be aware that coverage of these services is subject to the terms and limitations of your health insurance plan.  Call member services at your health plan with any benefit or coverage questions.      Please bring the following to your appointment:    *Your personal calendar for scheduling future appointments  *Comfortable clothing            ORTHO  REFERRAL       [facility] is referring you to the Orthopedic  Services at [facility].       The  Representative will assist you in the coordination of your  Orthopedic and Musculoskeletal Care as prescribed by your physician.    The  Representative will call you within 1 business day to help schedule your appointment, or you may contact the  Representative at:    All areas ~ [phone]     Type of Referral : [facility] / Foot & Ankle Surgery       Timeframe requested: 3 - 5 days    Coverage of these services is subject to the terms and limitations of your health insurance plan.  Please call member services at your health plan with any benefit or coverage questions.      If X-rays, CT or MRI's have been performed, please contact the facility where they were done to arrange for , prior to your scheduled appointment.  Please bring this referral request to your appointment and present it to your specialist.                  Follow-up notes from your care team     Return if symptoms worsen or fail to improve.      Your next 10 appointments already scheduled     Aug 28, 2017  8:30 AM CDT   (Arrive by 8:15 AM)   Return Visit with Zafar Huynh MD   [facility] ([facility])    [address] [place] [address]  5th Floor  [facility] [address]   [phone]            Aug 31, 2017  8:00 AM CDT   (Arrive by 7:45 AM)   Return Visit with Marii Montgomery MD   [facility] ([facility])    [address] [place] [address]  4th Floor  [facility] [address]   [phone]            Sep 01, 2017  8:00 AM CDT   (Arrive by 7:45 AM)   Return Visit with Jesenia Clay, PhD   [facility] ([facility])    [address] [place] [address]  3rd [facility] [id number] [phone]            Sep 15, 2017  8:00 AM CDT   (Arrive by 7:45 AM)   Return Visit with Jesenia Clay, PhD   [facility] ([facility])    [address] [place] [address]  3rd [facility] [id number] [phone]            Nov 16, 2017   3:00 PM CST   (Arrive by 2:45 PM)   New Patient Visit with Lorenzo Pena MD   [facility] D[facility]atology ([facility])    [address] [place], [place] [address] [phone]            Dec 18, 2017 12:30 PM CST   US SUMAYA DOPPLER WITH EXERCISE with [id number]   [facility] [place] [facility] ([facility])    [address] [place], [place] [address] [phone]           Please bring a list of your medicines (including vitamins, minerals and over-the-counter drugs). Also, tell your doctor about any allergies you may have. Wear comfortable clothes and leave your valuables at home.  No caffeine or tobacco for 1 hour prior to exam.  Please call the Imaging Department at your exam site with any questions.            Dec 18, 2017  1:45 PM CST   (Arrive by 1:30 PM)   Return Vascular Visit with Iona Velez MD   [facility] ([facility])    [address] [place] [id number] [phone]              Who to contact     If you have questions or need follow up information about today's clinic visit or your schedule please contact [facility] directly at [phone].  Normal or non-critical lab and imaging results will be communicated to you by [employer]hart, letter or phone within 4 business days after the clinic has received the results. If you do not hear from us within 7 days, please contact the clinic through MyChart or phone. If you have a critical or abnormal lab result, we will notify you by phone as soon as possible.  Submit refill requests through [employer] or call your pharmacy and they will forward the refill request to us. Please allow 3 business days for your refill to be completed.          Additional Information About Your Visit        [employer] Information     [employer] gives you secure access to your electronic health record. If you see a primary care  provider, you can also send messages to your care team and make appointments. If you have questions, please call your primary care clinic.  If you do not have a primary care provider, please call [phone] and they will assist you.        Care EveryWhere ID     This is your Care EveryWhere ID. This could be used by other organizations to access your [facility] medical records  [id number]        Your Vitals Were     Pulse Temperature Pulse Oximetry BMI (Body Mass Index)          78 97  F (36.1  C) (Oral) 98% 19.79 kg/m2         Blood Pressure from Last 3 Encounters:   08/25/17 136/71   08/18/17 123/66   08/03/17 131/57    Weight from Last 3 Encounters:   08/25/17 98 lb (44.5 kg)   08/10/17 101 lb (45.8 kg)   07/29/17 101 lb 10.1 oz (46.1 kg)              We Performed the Following     [facility] PT, HAND, AND CHIROPRACTIC REFERRAL     ORTHO  REFERRAL        Primary Care Provider Office Phone # Fax #    Marii Montgomery [phone]       [address] [place] [address] 7[address]  [facility] [address]        Equal Access to Services     [facility] HospitalFUENTES : Hadii aad ku hadasho Soomaali, waaxda luqadaha, qaybta kaalmada adeegyada, jackeline bobbyn allen bernal ah. So [facility] [phone].    ATENCIÓN: Si habla español, tiene a schwartz disposición servicios gratuitos de asistencia lingüística. Llame al [phone].    We comply with applicable federal civil rights laws and [place] laws. We do not discriminate on the basis of race, color, national origin, age, disability sex, sexual orientation or gender identity.            Thank you!     Thank you for choosing [facility]  for your care. Our goal is always to provide you with excellent care. Hearing back from our patients is one way we can continue to improve our services. Please take a few minutes to complete the written survey that you may receive in the mail after your visit with us. Thank you!             Your Updated Medication List -  Protect others around you: Learn how to safely use, store and throw away your medicines at www.disposemymeds.org.          This list is accurate as of: 8/25/17 10:56 AM.  Always use your most recent med list.                   Brand Name Dispense Instructions for use Diagnosis    albuterol 108 (90 BASE) MCG/ACT Inhaler    VENTOLIN HFA    18 Inhaler    Inhale 2 puffs into the lungs every 4 hours as needed for shortness of breath / dyspnea or wheezing    Cough       amLODIPine 10 MG tablet    NORVASC    90 tablet    Take 1 tablet (10 mg) by mouth daily For blood pressure    Essential hypertension       atorvastatin 40 MG tablet    LIPITOR    90 tablet    Take 2 tablets (80 mg) by mouth daily    Hyperlipidemia, unspecified hyperlipidemia type       bisacodyl 10 MG Suppository    DULCOLAX    90 suppository    Place 1 suppository (10 mg) rectally daily as needed for constipation    Constipation, unspecified constipation type       CALCIUM 600 + D PO      Take 1 tablet by mouth daily.        citalopram 40 MG tablet    celeXA    30 tablet    Take 0.5 tablets (20 mg) by mouth daily    Depression, unspecified depression type       clopidogrel 75 MG tablet    PLAVIX    90 tablet    Take 1 tablet (75 mg) by mouth daily    Other chronic pulmonary embolism (H), Venous thrombosis of extremity       * ferrous sulfate 325 (65 FE) MG tablet    IRON    90 tablet    Take 1 tablet (325 mg) by mouth daily (with breakfast)    Other iron deficiency anemias       * ferrous sulfate 325 (65 FE) MG tablet    IRON    90 tablet    Take 1 tablet (325 mg) by mouth 2 times daily To maintain iron levels    Iron deficiency       hydrochlorothiazide 50 MG tablet    HYDRODIURIL    90 tablet    Take 1 tablet (50 mg) by mouth daily    Benign essential hypertension       ketorolac 0.5 % ophthalmic solution    ACULAR     Place 1 drop Into the left eye 4 times daily        levothyroxine 50 MCG tablet    SYNTHROID/LEVOTHROID    90 tablet    Take 1 tablet  (50 mcg) by mouth daily    Hypothyroidism, unspecified type       lisinopril 40 MG tablet    PRINIVIL/ZESTRIL    90 tablet    Take 1 tablet (40 mg) by mouth daily For blood pressure    Essential hypertension       moxifloxacin 0.5 % ophthalmic solution    VIGAMOX     Place 1 drop Into the left eye 4 times daily        naproxen sodium 220 MG capsule     60 capsule    Take 220 mg by mouth 2 times daily (with meals)    Chronic low back pain, unspecified back pain laterality, with sciatica presence unspecified, Pain of left lower leg       oxyCODONE 5 MG IR tablet    ROXICODONE    10 tablet    Take 1 tablet (5 mg) by mouth every 6 hours as needed for moderate to severe pain    Chronic low back pain, unspecified back pain laterality, with sciatica presence unspecified, Pain of left lower leg       pantoprazole 40 MG EC tablet    PROTONIX    90 tablet    Take 1 tablet (40 mg) by mouth daily    Other iron deficiency anemias       polyethylene glycol powder    MIRALAX    119 g    Take one-half to one scoop once a day for maintaining soft stools    Constipation, unspecified constipation type       potassium chloride SA 20 MEQ CR tablet    K-DUR/KLOR-CON M    90 tablet    Take 1 tablet (20 mEq) by mouth daily    Hypopotassemia       prednisoLONE acetate 1 % ophthalmic susp    PRED FORTE     Place 1 drop Into the left eye 4 times daily        * Notice:  This list has 2 medication(s) that are the same as other medications prescribed for you. Read the directions carefully, and ask your doctor or other care provider to review them with you.

## 2017-08-25 NOTE — PROGRESS NOTES
SUBJECTIVE:   Lucie Stockton is a 82 year old female who presents to clinic today for the following health issues:      Joint Pain    Onset: 1 day - awoke with it....     Description:   Location: left ankle, lateral, just below lateral malleolus.  No edema.  No new activities. No known trauma.    Character: Dull ache    Intensity: moderate    Progression of Symptoms: same    Accompanying Signs & Symptoms:  Other symptoms: none    History:   Previous similar pain: no       Precipitating factors:   Trauma or overuse: no     Alleviating factors:  Improved by: nothing    Therapies Tried and outcome: elevating her ankle                   Problem list and histories reviewed & adjusted, as indicated.  Additional history: as documented    Patient Active Problem List   Diagnosis     Benign ovarian tumor     Hypothyroidism     Peripheral vascular disease (H)     Knee pain     Osteoarthritis     Cervicalgia     Hyperlipidemia with target LDL less than 70     Pulmonary embolism (H)     Anemia     Aortic stenosis     Atherosclerosis of aorta (H)     Atherosclerosis of arteries of extremities (H)     Intermittent claudication (H)     Iron deficiency     Osteoporosis     DVT of lower extremity, bilateral (H)     Varicose veins     Gluteal pain     Insomnia     Back pain     Vitamin D deficiency     Tobacco use disorder     Personal history of other drug therapy     Right knee pain     Venous thrombosis of extremity     Benign essential hypertension     Gastritis     Cataract     Acute left-sided low back pain with left-sided sciatica     Lumbar stenosis with neurogenic claudication     SBO (small bowel obstruction) (H)     Small bowel obstruction (H)     Long term current use of anticoagulant therapy     Left-sided low back pain with left-sided sciatica     Moderate major depression (H)     Anxiety     Past Surgical History:   Procedure Laterality Date     BUNIONECTOMY       C STOMACH SURGERY PROCEDURE UNLISTED      Please see  chart     COLONOSCOPY      11/10/10 angioectasia     HYSTERECTOMY TOTAL ABDOMINAL, BILATERAL SALPINGO-OOPHORECTOMY, NODE DISSECTION, COMBINED  2/17/11    SANGEETHA, EMILIA, LN bx, omentectomy, resection of evrian malignancy Dr. JONO Goncalves - Returned BENIGN     UPPER GI ENDOSCOPY      11/10/10 erythematous gastropathy, angioectasia       Social History   Substance Use Topics     Smoking status: Former Smoker     Packs/day: 0.50     Years: 15.00     Quit date: 9/13/1993     Smokeless tobacco: Never Used     Alcohol use Yes      Comment: social     Family History   Problem Relation Age of Onset     Breast Cancer Maternal Aunt 80     C.A.D. Father 54         Current Outpatient Prescriptions   Medication Sig Dispense Refill     citalopram (CELEXA) 40 MG tablet Take 0.5 tablets (20 mg) by mouth daily 30 tablet 2     naproxen sodium 220 MG capsule Take 220 mg by mouth 2 times daily (with meals) 60 capsule 2     oxyCODONE (ROXICODONE) 5 MG IR tablet Take 1 tablet (5 mg) by mouth every 6 hours as needed for moderate to severe pain 10 tablet 0     atorvastatin (LIPITOR) 40 MG tablet Take 2 tablets (80 mg) by mouth daily 90 tablet 3     pantoprazole (PROTONIX) 40 MG EC tablet Take 1 tablet (40 mg) by mouth daily 90 tablet 3     moxifloxacin (VIGAMOX) 0.5 % ophthalmic solution Place 1 drop Into the left eye 4 times daily       prednisoLONE acetate (PRED FORTE) 1 % ophthalmic susp Place 1 drop Into the left eye 4 times daily       ketorolac (ACULAR) 0.5 % ophthalmic solution Place 1 drop Into the left eye 4 times daily       bisacodyl (DULCOLAX) 10 MG Suppository Place 1 suppository (10 mg) rectally daily as needed for constipation 90 suppository 3     levothyroxine (SYNTHROID/LEVOTHROID) 50 MCG tablet Take 1 tablet (50 mcg) by mouth daily 90 tablet 3     clopidogrel (PLAVIX) 75 MG tablet Take 1 tablet (75 mg) by mouth daily 90 tablet 3     hydrochlorothiazide (HYDRODIURIL) 50 MG tablet Take 1 tablet (50 mg) by mouth daily 90 tablet 3      lisinopril (PRINIVIL/ZESTRIL) 40 MG tablet Take 1 tablet (40 mg) by mouth daily For blood pressure 90 tablet 3     polyethylene glycol (MIRALAX) powder Take one-half to one scoop once a day for maintaining soft stools 119 g 5     ferrous sulfate (IRON) 325 (65 FE) MG tablet Take 1 tablet (325 mg) by mouth 2 times daily To maintain iron levels 90 tablet 1     amLODIPine (NORVASC) 10 MG tablet Take 1 tablet (10 mg) by mouth daily For blood pressure 90 tablet 1     albuterol (VENTOLIN HFA) 108 (90 BASE) MCG/ACT inhaler Inhale 2 puffs into the lungs every 4 hours as needed for shortness of breath / dyspnea or wheezing 18 Inhaler 1     potassium chloride SA (K-DUR,KLOR-CON M) 20 MEQ tablet Take 1 tablet (20 mEq) by mouth daily 90 tablet 3     ferrous sulfate (IRON) 325 (65 FE) MG tablet Take 1 tablet (325 mg) by mouth daily (with breakfast) 90 tablet 3     Calcium Carbonate-Vitamin D (CALCIUM 600 + D OR) Take 1 tablet by mouth daily.       [DISCONTINUED] tolterodine (DETROL) 1 MG tablet Take 1-2 tablets by mouth At Bedtime. 180 tablet 3     Allergies   Allergen Reactions     Trazodone Fatigue     Felt too groggy     Recent Labs   Lab Test  05/11/17   1340  04/29/17   1655   01/22/17   2248   10/04/16   0839  09/14/16   1137   08/07/15   1053   03/12/14   0938   LDL   --    --    --    --    --   52   --    --   56   --   56   HDL   --    --    --    --    --   80   --    --   75   --   71   TRIG   --    --    --    --    --   66   --    --   69   --   90   ALT  16  25   --   26   --    --   21   --   17   < >   --    CR  0.67  0.69   < >  0.69   < >   --   0.66   < >  0.63   < >   --    GFRESTIMATED  84  81   < >  82   < >   --   86   < >  >90  Non  GFR Calc     < >   --    GFRESTBLACK  >90   GFR Calc    >90   GFR Calc     < >  >90   GFR Calc     < >   --   >90   GFR Calc     < >  >90   GFR Calc     < >   --    POTASSIUM  3.8   3.0*   < >  3.4   < >   --   4.0   < >  3.8   < >   --    TSH   --    --    --    --    --    --   5.10*   --   4.07*   < >   --     < > = values in this interval not displayed.      BP Readings from Last 3 Encounters:   08/25/17 136/71   08/18/17 123/66   08/03/17 131/57    Wt Readings from Last 3 Encounters:   08/25/17 98 lb (44.5 kg)   08/10/17 101 lb (45.8 kg)   07/29/17 101 lb 10.1 oz (46.1 kg)                  Labs reviewed in EPIC          Reviewed and updated as needed this visit by clinical staffTobacco  Allergies  Med Hx  Surg Hx  Fam Hx  Soc Hx      Reviewed and updated as needed this visit by Provider         ROS:  Constitutional, HEENT, cardiovascular, pulmonary, gi and gu systems are negative, except as otherwise noted.      OBJECTIVE:   /71 (BP Location: Right arm, Patient Position: Chair, Cuff Size: Adult Regular)  Pulse 78  Temp 97  F (36.1  C) (Oral)  Wt 98 lb (44.5 kg)  SpO2 98%  BMI 19.79 kg/m2  Body mass index is 19.79 kg/(m^2).  GENERAL: healthy, alert and no distress  EYES: Eyes grossly normal to inspection, PERRL and conjunctivae and sclerae normal  MS: no gross musculoskeletal defects noted, no edema  MS: point tenderness just below and anterior to left lateral malleolar region. No swelling, redness or crepitus  SKIN: no suspicious lesions or rashes  NEURO: Normal strength and tone, mentation intact and speech normal    Diagnostic Test Results:  Xray left ankle : no hairline fx identified. She has hardware in midfoot, does not remember why...    No results found for this or any previous visit (from the past 24 hour(s)).    ASSESSMENT/PLAN:        ICD-10-CM    1. Acute left ankle pain M25.572 XR Ankle Left G/E 3 Views     ORTHO  REFERRAL     [facility] PT, HAND, AND CHIROPRACTIC REFERRAL        xray negative for fx.  There is hardware in left foot from a previous injury, patient does not remember what this was from.    Conservative treatment without NSAIDs (on  plavix)    Patient Instructions   Rest, elevation, ice, tylenol    Change footwear to have more arch support      Therapy will call you  Podiatry will call you         Rosa Lopez MD  [facility]

## 2017-08-25 NOTE — LETTER
[facility]  [address] [place]  [address]  [phone]        August 25, 2017    Lucie Stockton  [address] [facility] [address]        Dear Lucie,    Here is a copy of your official xray reading.  No fractures identified.     Results for orders placed or performed in visit on 08/25/17   XR Ankle Left G/E 3 Views    Narrative    XR ANKLE LT G/E 3 VW 8/25/2017 10:48 AM    HISTORY: Left ankle pain.    COMPARISON: 7/29/2017    FINDINGS: The ankle mortise joint is congruent. No acute fracture or  malalignment. Postoperative change in the medial foot appears stable.      Impression    IMPRESSION: No specific finding to explain pain.    RADHA MAIER MD       If you have any questions please call the clinic at [phone].    Sincerely,    Rosa ESTRADA

## 2017-08-25 NOTE — PROGRESS NOTES
Lucie Stockton    Here is a copy of your official xray reading.  No fractures identified.     Sincerely,     SIERRA BECERRA M.D.     Send note

## 2017-08-25 NOTE — NURSING NOTE
"Chief Complaint   Patient presents with     Musculoskeletal Problem     left ankle pain      Health Maintenance     fall risk        Initial /71 (BP Location: Right arm, Patient Position: Chair, Cuff Size: Adult Regular)  Pulse 78  Temp 97  F (36.1  C) (Oral)  Wt 98 lb (44.5 kg)  SpO2 98%  BMI 19.79 kg/m2 Estimated body mass index is 19.79 kg/(m^2) as calculated from the following:    Height as of 8/10/17: 4' 11\" (1.499 m).    Weight as of this encounter: 98 lb (44.5 kg).  Medication Reconciliation: complete   Tiffanie Tuttle ma      "

## 2017-08-28 ENCOUNTER — OFFICE VISIT (OUTPATIENT)
Dept: ORTHOPEDICS | Facility: CLINIC | Age: 82
End: 2017-08-28

## 2017-08-28 VITALS
SYSTOLIC BLOOD PRESSURE: 130 MMHG | BODY MASS INDEX: 19.79 KG/M2 | WEIGHT: 98 LBS | DIASTOLIC BLOOD PRESSURE: 49 MMHG | HEART RATE: 70 BPM

## 2017-08-28 DIAGNOSIS — M25.572 PAIN IN JOINT, ANKLE AND FOOT, LEFT: Primary | ICD-10-CM

## 2017-08-28 NOTE — MR AVS SNAPSHOT
After Visit Summary   8/28/2017    Lucie Stockton    MRN: [id number]           Patient Information     Date Of Birth          1935        Visit Information        Provider Department      8/28/2017 8:30 AM Zafar Huynh MD [facility]        Care Instructions      Ankle Dorsiflexion/Plantarflexion (Flexibility)    1. Sit on the floor or in bed with your legs straight in front of you.  2. Point both feet. Then flex both feet.  3. Do this 10 to 30 times in a row.  4. Repeat this exercise 2 times a day, or as instructed.  Date Last Reviewed: 5/1/2016 2000-2017 [employer]. [address] [place] [address]. All rights reserved. This information is not intended as a substitute for professional medical care. Always follow your healthcare professional's instructions.        Ankle Dorsiflexion (Strength)                                   This exercise is for your right ankle. Switch sides for your left ankle.  5. Tie an elastic exercise band or tubing to the bottom part of a table. Tie the other end to your right foot.  6. Sit on the floor with your right leg straight. Sit far enough away from the table so that the elastic band or tubing is pulled tight between your foot and the table leg.  7. Slowly flex your right foot and pull your toes back toward you. Keep your right leg straight. Hold for 5 seconds.  8. Relax your foot.  9. Repeat this exercise 10 times.  Date Last Reviewed: 5/1/2016 2000-2017 [employer]. [address] [place] [address]. All rights reserved. This information is not intended as a substitute for professional medical care. Always follow your healthcare professional's instructions.                Follow-ups after your visit        Your next 10 appointments already scheduled     Aug 31, 2017  8:00 AM CDT   (Arrive by 7:45 AM)   Return Visit with Marii Montgomery MD   [facility] ([facility]  [facility])    [address] [place] [address]  [address] [facility] [address]   [phone]            Sep 01, 2017  8:00 AM CDT   (Arrive by 7:45 AM)   Return Visit with Jesenia Clay, PhD   [facility] ([facility])    [address] [place] [address]  [address] [facility] [address]   [phone]            Sep 15, 2017  8:00 AM CDT   (Arrive by 7:45 AM)   Return Visit with Jesenia Clay, PhD   [facility] ([facility])    [address] [place] [address]   [phone]            Nov 16, 2017  3:00 PM CST   (Arrive by 2:45 PM)   New Patient Visit with Lorenzo Pena MD   [facility] Dermatology ([facility])    [address] [place] [address]   [phone]            Dec 18, 2017 12:30 PM CST   US SUMAYA DOPPLER WITH EXERCISE with [id number]   [facility] [place] [facility] ([facility])    [address] [place] [id number] [phone]           Please bring a list of your medicines (including vitamins, minerals and over-the-counter drugs). Also, tell your doctor about any allergies you may have. Wear comfortable clothes and leave your valuables at home.  No caffeine or tobacco for 1 hour prior to exam.  Please call the Imaging Department at your exam site with any questions.            Dec 18, 2017  1:45 PM CST   (Arrive by 1:30 PM)   Return Vascular Visit with Iona Velez MD   [facility] ([facility])    [address] [place] [id number] [phone]              Who to contact     Please call your clinic at [phone] to:    Ask questions about your health    Make or cancel appointments    Discuss your medicines    Learn about your test results    Speak to your doctor   If you have compliments or concerns about an experience at  your clinic, or if you wish to file a complaint, please contact [facility] Patient Relations at [phone] or email us at [email or web address]         Additional Information About Your Visit        [employer] Information     [employer] gives you secure access to your electronic health record. If you see a primary care provider, you can also send messages to your care team and make appointments. If you have questions, please call your primary care clinic.  If you do not have a primary care provider, please call [phone] and they will assist you.      [employer] is an electronic gateway that provides easy, online access to your medical records. With [employer], you can request a clinic appointment, read your test results, renew a prescription or communicate with your care team.     To access your existing account, please contact your [facility] or call [phone] for assistance.        Care EveryWhere ID     This is your Care EveryWhere ID. This could be used by other organizations to access your [facility] medical records  [id number]        Your Vitals Were     Pulse BMI (Body Mass Index)                70 19.79 kg/m2           Blood Pressure from Last 3 Encounters:   08/28/17 130/49   08/25/17 136/71   08/18/17 123/66    Weight from Last 3 Encounters:   08/28/17 98 lb (44.5 kg)   08/25/17 98 lb (44.5 kg)   08/10/17 101 lb (45.8 kg)              Today, you had the following     No orders found for display       Primary Care Provider Office Phone # Fax #    Marii Montgomery [phone]       [address] [place] [id number]        Equal Access to Services     PAL SALGADO : Hadii reyna Gamboa, wage keller, matt kaaljackeline reynoso. So [facility] [phone].    ATENCIÓN: Si habla español, tiene a schwartz disposición servicios gratuitos de asistencia lingüística. Llame al  [phone].    We comply with applicable federal civil rights laws and [place] laws. We do not discriminate on the basis of race, color, national origin, age, disability sex, sexual orientation or gender identity.            Thank you!     Thank you for choosing [facility] hospital  for your care. Our goal is always to provide you with excellent care. Hearing back from our patients is one way we can continue to improve our services. Please take a few minutes to complete the written survey that you may receive in the mail after your visit with us. Thank you!             Your Updated Medication List - Protect others around you: Learn how to safely use, store and throw away your medicines at www.disposemymeds.org.          This list is accurate as of: 8/28/17  8:50 AM.  Always use your most recent med list.                   Brand Name Dispense Instructions for use Diagnosis    albuterol 108 (90 BASE) MCG/ACT Inhaler    VENTOLIN HFA    18 Inhaler    Inhale 2 puffs into the lungs every 4 hours as needed for shortness of breath / dyspnea or wheezing    Cough       amLODIPine 10 MG tablet    NORVASC    90 tablet    Take 1 tablet (10 mg) by mouth daily For blood pressure    Essential hypertension       atorvastatin 40 MG tablet    LIPITOR    90 tablet    Take 2 tablets (80 mg) by mouth daily    Hyperlipidemia, unspecified hyperlipidemia type       bisacodyl 10 MG Suppository    DULCOLAX    90 suppository    Place 1 suppository (10 mg) rectally daily as needed for constipation    Constipation, unspecified constipation type       CALCIUM 600 + D PO      Take 1 tablet by mouth daily.        citalopram 40 MG tablet    celeXA    30 tablet    Take 0.5 tablets (20 mg) by mouth daily    Depression, unspecified depression type       clopidogrel 75 MG tablet    PLAVIX    90 tablet    Take 1 tablet (75 mg) by mouth daily    Other chronic pulmonary embolism (H), Venous thrombosis of extremity       * ferrous sulfate 325 (65  FE) MG tablet    IRON    90 tablet    Take 1 tablet (325 mg) by mouth daily (with breakfast)    Other iron deficiency anemias       * ferrous sulfate 325 (65 FE) MG tablet    IRON    90 tablet    Take 1 tablet (325 mg) by mouth 2 times daily To maintain iron levels    Iron deficiency       hydrochlorothiazide 50 MG tablet    HYDRODIURIL    90 tablet    Take 1 tablet (50 mg) by mouth daily    Benign essential hypertension       ketorolac 0.5 % ophthalmic solution    ACULAR     Place 1 drop Into the left eye 4 times daily        levothyroxine 50 MCG tablet    SYNTHROID/LEVOTHROID    90 tablet    Take 1 tablet (50 mcg) by mouth daily    Hypothyroidism, unspecified type       lisinopril 40 MG tablet    PRINIVIL/ZESTRIL    90 tablet    Take 1 tablet (40 mg) by mouth daily For blood pressure    Essential hypertension       moxifloxacin 0.5 % ophthalmic solution    VIGAMOX     Place 1 drop Into the left eye 4 times daily        naproxen sodium 220 MG capsule     60 capsule    Take 220 mg by mouth 2 times daily (with meals)    Chronic low back pain, unspecified back pain laterality, with sciatica presence unspecified, Pain of left lower leg       oxyCODONE 5 MG IR tablet    ROXICODONE    10 tablet    Take 1 tablet (5 mg) by mouth every 6 hours as needed for moderate to severe pain    Chronic low back pain, unspecified back pain laterality, with sciatica presence unspecified, Pain of left lower leg       pantoprazole 40 MG EC tablet    PROTONIX    90 tablet    Take 1 tablet (40 mg) by mouth daily    Other iron deficiency anemias       polyethylene glycol powder    MIRALAX    119 g    Take one-half to one scoop once a day for maintaining soft stools    Constipation, unspecified constipation type       potassium chloride SA 20 MEQ CR tablet    K-DUR/KLOR-CON M    90 tablet    Take 1 tablet (20 mEq) by mouth daily    Hypopotassemia       prednisoLONE acetate 1 % ophthalmic susp    PRED FORTE     Place 1 drop Into the left eye 4  times daily        * Notice:  This list has 2 medication(s) that are the same as other medications prescribed for you. Read the directions carefully, and ask your doctor or other care provider to review them with you.

## 2017-08-28 NOTE — PROGRESS NOTES
Subjective:   Lucie Stockton is a 82 year old female who is here complaining of left ankle pain with awakening. Pain is tough to describe but feels like pressure.  No swelling. No particular movement aggravates it, but walking in general aggravates it.    No injury.  Better with elevation of her leg. More pain with walking.   She is not limited but is hesitant with stairs.      XR L ankle 8/25/17 by Dr. Lopez shows no lateral pathology to explain her symptoms, medial midfoot postsurgical changes noted.    Patient Active Problem List   Diagnosis     Benign ovarian tumor     Hypothyroidism     Peripheral vascular disease (H)     Knee pain     Osteoarthritis     Cervicalgia     Hyperlipidemia with target LDL less than 70     Pulmonary embolism (H)     Anemia     Aortic stenosis     Atherosclerosis of aorta (H)     Atherosclerosis of arteries of extremities (H)     Intermittent claudication (H)     Iron deficiency     Osteoporosis     DVT of lower extremity, bilateral (H)     Varicose veins     Gluteal pain     Insomnia     Back pain     Vitamin D deficiency     Tobacco use disorder     Personal history of other drug therapy     Right knee pain     Venous thrombosis of extremity     Benign essential hypertension     Gastritis     Cataract     Acute left-sided low back pain with left-sided sciatica     Lumbar stenosis with neurogenic claudication     SBO (small bowel obstruction) (H)     Small bowel obstruction (H)     Long term current use of anticoagulant therapy     Left-sided low back pain with left-sided sciatica     Moderate major depression (H)     Anxiety         Current Outpatient Prescriptions   Medication Sig Dispense Refill     citalopram (CELEXA) 40 MG tablet Take 0.5 tablets (20 mg) by mouth daily 30 tablet 2     naproxen sodium 220 MG capsule Take 220 mg by mouth 2 times daily (with meals) 60 capsule 2     oxyCODONE (ROXICODONE) 5 MG IR tablet Take 1 tablet (5 mg) by mouth every 6 hours as needed for  moderate to severe pain 10 tablet 0     atorvastatin (LIPITOR) 40 MG tablet Take 2 tablets (80 mg) by mouth daily 90 tablet 3     pantoprazole (PROTONIX) 40 MG EC tablet Take 1 tablet (40 mg) by mouth daily 90 tablet 3     moxifloxacin (VIGAMOX) 0.5 % ophthalmic solution Place 1 drop Into the left eye 4 times daily       prednisoLONE acetate (PRED FORTE) 1 % ophthalmic susp Place 1 drop Into the left eye 4 times daily       ketorolac (ACULAR) 0.5 % ophthalmic solution Place 1 drop Into the left eye 4 times daily       bisacodyl (DULCOLAX) 10 MG Suppository Place 1 suppository (10 mg) rectally daily as needed for constipation 90 suppository 3     levothyroxine (SYNTHROID/LEVOTHROID) 50 MCG tablet Take 1 tablet (50 mcg) by mouth daily 90 tablet 3     clopidogrel (PLAVIX) 75 MG tablet Take 1 tablet (75 mg) by mouth daily 90 tablet 3     hydrochlorothiazide (HYDRODIURIL) 50 MG tablet Take 1 tablet (50 mg) by mouth daily 90 tablet 3     lisinopril (PRINIVIL/ZESTRIL) 40 MG tablet Take 1 tablet (40 mg) by mouth daily For blood pressure 90 tablet 3     polyethylene glycol (MIRALAX) powder Take one-half to one scoop once a day for maintaining soft stools 119 g 5     ferrous sulfate (IRON) 325 (65 FE) MG tablet Take 1 tablet (325 mg) by mouth 2 times daily To maintain iron levels 90 tablet 1     amLODIPine (NORVASC) 10 MG tablet Take 1 tablet (10 mg) by mouth daily For blood pressure 90 tablet 1     albuterol (VENTOLIN HFA) 108 (90 BASE) MCG/ACT inhaler Inhale 2 puffs into the lungs every 4 hours as needed for shortness of breath / dyspnea or wheezing 18 Inhaler 1     potassium chloride SA (K-DUR,KLOR-CON M) 20 MEQ tablet Take 1 tablet (20 mEq) by mouth daily 90 tablet 3     ferrous sulfate (IRON) 325 (65 FE) MG tablet Take 1 tablet (325 mg) by mouth daily (with breakfast) 90 tablet 3     Calcium Carbonate-Vitamin D (CALCIUM 600 + D OR) Take 1 tablet by mouth daily.       [DISCONTINUED] tolterodine (DETROL) 1 MG tablet Take  1-2 tablets by mouth At Bedtime. 180 tablet 3         Background:   Date of injury: NA   Duration of symptoms: 2 weeks  Mechanism of Injury: Insidious Onset; Unknown   Aggravating factors: Walking  Relieving Factors: rest and elevation  Prior Evaluation: X-rays on 8/25/17    PAST MEDICAL, SOCIAL, SURGICAL AND FAMILY HISTORY: She  has a past medical history of Anemia; Aortic stenosis; Atherosclerosis of aorta (H); Atherosclerosis of arteries of extremities (H); Back pain; Degenerative joint disease; DVT of lower extremity, bilateral (H); Grave's disease; Hyperlipidaemia LDL goal < 70; Hypertension, essential; Hypothyroidism; Insomnia; Intermittent claudication (H); Iron deficiency; Knee pain; Lumbar stenosis with neurogenic claudication; Osteoarthritis; Osteoporosis; 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  She  has a past surgical history that includes Hysterectomy total abdominal, bilateral salpingo-oophorectomy, node dissection, combined (2/17/11); Bunionectomy; colonoscopy; upper gi endoscopy; and STOMACH SURGERY PROCEDURE UNLISTED.  Her family history includes Breast Cancer (age of onset: 80) in her maternal aunt; C.A.D. (age of onset: 54) in her father.  She reports that she quit smoking about 23 years ago. She has a 7.50 pack-year smoking history. She has never used smokeless tobacco. She reports that she drinks alcohol. She reports that she does not use illicit drugs.      ALLERGIES: She is allergic to trazodone.    CURRENT MEDICATIONS: She has a current medication list which includes the following prescription(s): citalopram, naproxen sodium, oxycodone, atorvastatin, pantoprazole, moxifloxacin, prednisolone acetate, ketorolac, bisacodyl, levothyroxine, clopidogrel, hydrochlorothiazide, lisinopril, polyethylene glycol, ferrous sulfate, amlodipine, albuterol, potassium chloride sa, ferrous sulfate, and calcium  carb-cholecalciferol.     REVIEW OF SYSTEMS: 3 point review of systems is negative except as noted above.     Exam:   /49 (BP Location: Right arm, Patient Position: Sitting, Cuff Size: Adult Regular)  Pulse 70  Wt 98 lb (44.5 kg)  BMI 19.79 kg/m2       CONSTITUTIONAL: healthy, alert and no distress  HEAD: Normocephalic. No masses, lesions, tenderness or abnormalities  SKIN: no suspicious lesions or rashes  GAIT: normal  NEUROLOGIC: Non-focal  PSYCHIATRIC: affect normal/bright and mentation appears normal.    MUSCULOSKELETAL:   L ankle  --no swelling, no redness  --mild tenderness over ATFL reproduces some but not all of her symptoms, more diffuse ankle and calf pain described.  AROM full without pain  reisisted inversion, eversion, dorsiflexion and plantarflexion are full without pain  Mild pain with talar tilt, neg anterior drawer, neg squeeze and ER stress test  Skin is warm, cap refill at 2 second. No skin changes noted       Assessment/Plan:   Atypical leg pain including L ankle pain without injury  --some tenderness over the ATFL, may suggest lateral ankle sprain.  --some features of pain with activity that is diffuse and tough to describe may be related to her PAD.    Plan  --avoid constriction, so light compression with tubigrip, acetaminophen, AROM and strengthening demonstrated, if not improving in 2-4 weeks f/u.   If foot more cold, ulceration, or continued vague pain with activity then f/u with PCP or Vascular team.    Zafar Huynh MD CA[facility]

## 2017-08-28 NOTE — PATIENT INSTRUCTIONS
Ankle Dorsiflexion/Plantarflexion (Flexibility)    1. Sit on the floor or in bed with your legs straight in front of you.  2. Point both feet. Then flex both feet.  3. Do this 10 to 30 times in a row.  4. Repeat this exercise 2 times a day, or as instructed.  Date Last Reviewed: 5/1/2016 2000-2017 [employer]. [address] [place] [address]. All rights reserved. This information is not intended as a substitute for professional medical care. Always follow your healthcare professional's instructions.        Ankle Dorsiflexion (Strength)                                   This exercise is for your right ankle. Switch sides for your left ankle.  5. Tie an elastic exercise band or tubing to the bottom part of a table. Tie the other end to your right foot.  6. Sit on the floor with your right leg straight. Sit far enough away from the table so that the elastic band or tubing is pulled tight between your foot and the table leg.  7. Slowly flex your right foot and pull your toes back toward you. Keep your right leg straight. Hold for 5 seconds.  8. Relax your foot.  9. Repeat this exercise 10 times.  Date Last Reviewed: 5/1/2016 2000-2017 [employer]. [address] [place] [address]. All rights reserved. This information is not intended as a substitute for professional medical care. Always follow your healthcare professional's instructions.

## 2017-08-28 NOTE — LETTER
8/28/2017      RE: Lucie Stockton  [address] [facility] [address]        Subjective:   Lucie Stockton is a 82 year old female who is here complaining of left ankle pain with awakening. Pain is tough to describe but feels like pressure.  No swelling. No particular movement aggravates it, but walking in general aggravates it.    No injury.  Better with elevation of her leg. More pain with walking.   She is not limited but is hesitant with stairs.      XR L ankle 8/25/17 by Dr. Lopez shows no lateral pathology to explain her symptoms, medial midfoot postsurgical changes noted.    Patient Active Problem List   Diagnosis     Benign ovarian tumor     Hypothyroidism     Peripheral vascular disease (H)     Knee pain     Osteoarthritis     Cervicalgia     Hyperlipidemia with target LDL less than 70     Pulmonary embolism (H)     Anemia     Aortic stenosis     Atherosclerosis of aorta (H)     Atherosclerosis of arteries of extremities (H)     Intermittent claudication (H)     Iron deficiency     Osteoporosis     DVT of lower extremity, bilateral (H)     Varicose veins     Gluteal pain     Insomnia     Back pain     Vitamin D deficiency     Tobacco use disorder     Personal history of other drug therapy     Right knee pain     Venous thrombosis of extremity     Benign essential hypertension     Gastritis     Cataract     Acute left-sided low back pain with left-sided sciatica     Lumbar stenosis with neurogenic claudication     SBO (small bowel obstruction) (H)     Small bowel obstruction (H)     Long term current use of anticoagulant therapy     Left-sided low back pain with left-sided sciatica     Moderate major depression (H)     Anxiety         Current Outpatient Prescriptions   Medication Sig Dispense Refill     citalopram (CELEXA) 40 MG tablet Take 0.5 tablets (20 mg) by mouth daily 30 tablet 2     naproxen sodium 220 MG capsule Take 220 mg by mouth 2 times daily (with meals) 60 capsule 2      oxyCODONE (ROXICODONE) 5 MG IR tablet Take 1 tablet (5 mg) by mouth every 6 hours as needed for moderate to severe pain 10 tablet 0     atorvastatin (LIPITOR) 40 MG tablet Take 2 tablets (80 mg) by mouth daily 90 tablet 3     pantoprazole (PROTONIX) 40 MG EC tablet Take 1 tablet (40 mg) by mouth daily 90 tablet 3     moxifloxacin (VIGAMOX) 0.5 % ophthalmic solution Place 1 drop Into the left eye 4 times daily       prednisoLONE acetate (PRED FORTE) 1 % ophthalmic susp Place 1 drop Into the left eye 4 times daily       ketorolac (ACULAR) 0.5 % ophthalmic solution Place 1 drop Into the left eye 4 times daily       bisacodyl (DULCOLAX) 10 MG Suppository Place 1 suppository (10 mg) rectally daily as needed for constipation 90 suppository 3     levothyroxine (SYNTHROID/LEVOTHROID) 50 MCG tablet Take 1 tablet (50 mcg) by mouth daily 90 tablet 3     clopidogrel (PLAVIX) 75 MG tablet Take 1 tablet (75 mg) by mouth daily 90 tablet 3     hydrochlorothiazide (HYDRODIURIL) 50 MG tablet Take 1 tablet (50 mg) by mouth daily 90 tablet 3     lisinopril (PRINIVIL/ZESTRIL) 40 MG tablet Take 1 tablet (40 mg) by mouth daily For blood pressure 90 tablet 3     polyethylene glycol (MIRALAX) powder Take one-half to one scoop once a day for maintaining soft stools 119 g 5     ferrous sulfate (IRON) 325 (65 FE) MG tablet Take 1 tablet (325 mg) by mouth 2 times daily To maintain iron levels 90 tablet 1     amLODIPine (NORVASC) 10 MG tablet Take 1 tablet (10 mg) by mouth daily For blood pressure 90 tablet 1     albuterol (VENTOLIN HFA) 108 (90 BASE) MCG/ACT inhaler Inhale 2 puffs into the lungs every 4 hours as needed for shortness of breath / dyspnea or wheezing 18 Inhaler 1     potassium chloride SA (K-DUR,KLOR-CON M) 20 MEQ tablet Take 1 tablet (20 mEq) by mouth daily 90 tablet 3     ferrous sulfate (IRON) 325 (65 FE) MG tablet Take 1 tablet (325 mg) by mouth daily (with breakfast) 90 tablet 3     Calcium Carbonate-Vitamin D (CALCIUM 600 +  D OR) Take 1 tablet by mouth daily.       [DISCONTINUED] tolterodine (DETROL) 1 MG tablet Take 1-2 tablets by mouth At Bedtime. 180 tablet 3         Background:   Date of injury: NA   Duration of symptoms: 2 weeks  Mechanism of Injury: Insidious Onset; Unknown   Aggravating factors: Walking  Relieving Factors: rest and elevation  Prior Evaluation: X-rays on 8/25/17    PAST MEDICAL, SOCIAL, SURGICAL AND FAMILY HISTORY: She  has a past medical history of Anemia; Aortic stenosis; Atherosclerosis of aorta (H); Atherosclerosis of arteries of extremities (H); Back pain; Degenerative joint disease; DVT of lower extremity, bilateral (H); Grave's disease; Hyperlipidaemia LDL goal < 70; Hypertension, essential; Hypothyroidism; Insomnia; Intermittent claudication (H); Iron deficiency; Knee pain; Lumbar stenosis with neurogenic claudication; Osteoarthritis; Osteoporosis; 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  She  has a past surgical history that includes Hysterectomy total abdominal, bilateral salpingo-oophorectomy, node dissection, combined (2/17/11); Bunionectomy; colonoscopy; upper gi endoscopy; and STOMACH SURGERY PROCEDURE UNLISTED.  Her family history includes Breast Cancer (age of onset: 80) in her maternal aunt; C.A.D. (age of onset: 54) in her father.  She reports that she quit smoking about 23 years ago. She has a 7.50 pack-year smoking history. She has never used smokeless tobacco. She reports that she drinks alcohol. She reports that she does not use illicit drugs.      ALLERGIES: She is allergic to trazodone.    CURRENT MEDICATIONS: She has a current medication list which includes the following prescription(s): citalopram, naproxen sodium, oxycodone, atorvastatin, pantoprazole, moxifloxacin, prednisolone acetate, ketorolac, bisacodyl, levothyroxine, clopidogrel, hydrochlorothiazide, lisinopril, polyethylene glycol,  ferrous sulfate, amlodipine, albuterol, potassium chloride sa, ferrous sulfate, and calcium carb-cholecalciferol.     REVIEW OF SYSTEMS: 3 point review of systems is negative except as noted above.     Exam:   /49 (BP Location: Right arm, Patient Position: Sitting, Cuff Size: Adult Regular)  Pulse 70  Wt 98 lb (44.5 kg)  BMI 19.79 kg/m2       CONSTITUTIONAL: healthy, alert and no distress  HEAD: Normocephalic. No masses, lesions, tenderness or abnormalities  SKIN: no suspicious lesions or rashes  GAIT: normal  NEUROLOGIC: Non-focal  PSYCHIATRIC: affect normal/bright and mentation appears normal.    MUSCULOSKELETAL:   L ankle  --no swelling, no redness  --mild tenderness over ATFL reproduces some but not all of her symptoms, more diffuse ankle and calf pain described.  AROM full without pain  reisisted inversion, eversion, dorsiflexion and plantarflexion are full without pain  Mild pain with talar tilt, neg anterior drawer, neg squeeze and ER stress test  Skin is warm, cap refill at 2 second. No skin changes noted       Assessment/Plan:   Atypical leg pain including L ankle pain without injury  --some tenderness over the ATFL, may suggest lateral ankle sprain.  --some features of pain with activity that is diffuse and tough to describe may be related to her PAD.    Plan  --avoid constriction, so light compression with tubigrip, acetaminophen, AROM and strengthening demonstrated, if not improving in 2-4 weeks f/u.   If foot more cold, ulceration, or continued vague pain with activity then f/u with PCP or Vascular team.    Zafar Huynh MD CAQ    \

## 2017-08-31 ENCOUNTER — OFFICE VISIT (OUTPATIENT)
Dept: INTERNAL MEDICINE | Facility: CLINIC | Age: 82
End: 2017-08-31

## 2017-08-31 VITALS
RESPIRATION RATE: 20 BRPM | HEART RATE: 50 BPM | OXYGEN SATURATION: 93 % | SYSTOLIC BLOOD PRESSURE: 124 MMHG | DIASTOLIC BLOOD PRESSURE: 63 MMHG

## 2017-08-31 DIAGNOSIS — M79.662 PAIN OF LEFT LOWER LEG: Primary | ICD-10-CM

## 2017-08-31 DIAGNOSIS — M15.9 OSTEOARTHRITIS OF MULTIPLE JOINTS, UNSPECIFIED OSTEOARTHRITIS TYPE: ICD-10-CM

## 2017-08-31 DIAGNOSIS — R41.3 MEMORY LOSS: ICD-10-CM

## 2017-08-31 DIAGNOSIS — I73.9 PERIPHERAL VASCULAR DISEASE (H): ICD-10-CM

## 2017-08-31 DIAGNOSIS — F41.9 ANXIETY: ICD-10-CM

## 2017-08-31 ASSESSMENT — PAIN SCALES - GENERAL: PAINLEVEL: SEVERE PAIN (6)

## 2017-08-31 NOTE — PATIENT INSTRUCTIONS
[facility] Medication Refill Request Information:  * Please contact your pharmacy regarding ANY request for medication refills.  ** [facility] Prescription Fax = [phone]  * Please allow 3 business days for routine medication refills.  * Please allow 5 business days for controlled substance medication refills.     [facility] Test Result notification information:  *You will be notified with in 7-10 days of your appointment day regarding the results of your test.  If you are on MyChart you will be notified as soon as the provider has reviewed the results and signed off on them.    [facility] [phone]

## 2017-08-31 NOTE — NURSING NOTE
"Chief Complaint   Patient presents with     Pain     follow up pain in left ankle \"It's still painful\"   Sherice Beauchamp LPN 7:54 AM on 8/31/2017  "

## 2017-08-31 NOTE — PROGRESS NOTES
"CC:  F/u multiple issues, left ankle pain    S:       Plans from last visit (8/18/17) , with today's comments about whether or not she actually followed instructions:    1.  We will contact you regarding getting started on a vascular rehab program--not sure why this hasn't happened yet, I contact vascular about his (sent message today)    2.  You may stop going to [facility].  Continue doing  Your exercises at home.--See Ortho/Dr. Huynh from 8/25/17..  Note reviewed and appreciated.  However, I think she only did this because she ended up seeing Dr. Lopez 8/25/17 as an urgent visit and she also referred Lucie to ortho.  Lucie wanted to know today if she should still go to [facility] and I reminded her to not do this, based on Dr. Huynh's assessment and my impression that her pain is more likely vascular in nature.    3.  We will contact you today or Monday to find out if you have been taking one or two of your Citalopram (\"Vitamin C\")--called her, she has been taking 20 mg daily.  I reinforced this today, will take for another month and follow up with me.    4.  If you have abdominal pain, bloody stools, stop the Aleve (naproxen)--Has been taking aleve bid and has not had these symptoms.    5.  Continue the pantoprazole medication (it is an antacid).  You do not need to take Ranitidine (Zantac).--we instructed her to do this by phone as well and she has.    6.  Stop Tramadol--we instructed her to do this by phone as well, however, she has been taking it anyhow.  I discarded her Tramadol bottle today.    7.  You may take one tablet of oxycodone every 6 hours, but only as needed for severe pain.  No more than 1 or 2 a week.  I printed out a prescription for this medication today for  You to take to your pharmacy.--we instructed her to do this by phone as well. She occasional takes the oxycodone.    8.  Stop gabapentin--we instructed her to do this by phone as well and she has.    9.  I took " "ondansetron off your medication list because you are not needing/taking it currently.--we instructed her to do this by phone as well and she has    10.  It is okay to continue using the ace-wrap/bandage.  You may stop trying to wear the brace. --she has been doing this.    11.  Throw out any old pill bottles and their contents.--we instructed her to do this by phone as well, though she has not done this yet.  I again advised using a pill box and she reluctantly agreed to do this today.    12.  See me in approximately 6 weeks, after you have completed 4-6 weeks of vascular rehab--however, she has an early appointment with me today secondary to ankle pain.  We looked back at when she started the Citalopram.  He has been on it for a little less than one month.  She does not have a future appointment with me and wishes to call us back today to arrange an appointment in one month.     She did not bring her pill bottles with her today and I asked her to do this every time she sees me.    Taking nutrition shakes bid, like chocolate, only eats dinner at night.  Occasionally has lunch with friends.  Keeps fruits/vegetables and deli meat around but that's about it.  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  I encouraged her to give herself permission to eat out more as this is more appealing to her.    Did not bring her  Darren with her today.  She states he said \"I would only bring up things that she wouldn't want me to\"    Bruises on forearms/hands if bumps them.    Travelling with Darren at the end of this month.  She does use a wheelchair as needed with travelling.  Does not endorse feeling anxious about travelling.    Patient Active Problem List   Diagnosis     Benign ovarian tumor     Hypothyroidism     Peripheral vascular disease (H)     Knee pain     Osteoarthritis     Cervicalgia     Hyperlipidemia with target LDL less than 70     Pulmonary embolism (H)     " Anemia     Aortic stenosis     Atherosclerosis of aorta (H)     Atherosclerosis of arteries of extremities (H)     Intermittent claudication (H)     Iron deficiency     Osteoporosis     DVT of lower extremity, bilateral (H)     Varicose veins     Gluteal pain     Insomnia     Back pain     Vitamin D deficiency     Tobacco use disorder     Personal history of other drug therapy     Right knee pain     Venous thrombosis of extremity     Benign essential hypertension     Gastritis     Cataract     Acute left-sided low back pain with left-sided sciatica     Lumbar stenosis with neurogenic claudication     SBO (small bowel obstruction) (H)     Small bowel obstruction (H)     Long term current use of anticoagulant therapy     Left-sided low back pain with left-sided sciatica     Moderate major depression (H)     Anxiety     Current Outpatient Prescriptions   Medication Sig Dispense Refill     citalopram (CELEXA) 40 MG tablet Take 0.5 tablets (20 mg) by mouth daily 30 tablet 2     naproxen sodium 220 MG capsule Take 220 mg by mouth 2 times daily (with meals) 60 capsule 2     oxyCODONE (ROXICODONE) 5 MG IR tablet Take 1 tablet (5 mg) by mouth every 6 hours as needed for moderate to severe pain 10 tablet 0     atorvastatin (LIPITOR) 40 MG tablet Take 2 tablets (80 mg) by mouth daily 90 tablet 3     pantoprazole (PROTONIX) 40 MG EC tablet Take 1 tablet (40 mg) by mouth daily 90 tablet 3     moxifloxacin (VIGAMOX) 0.5 % ophthalmic solution Place 1 drop Into the left eye 4 times daily       prednisoLONE acetate (PRED FORTE) 1 % ophthalmic susp Place 1 drop Into the left eye 4 times daily       ketorolac (ACULAR) 0.5 % ophthalmic solution Place 1 drop Into the left eye 4 times daily       bisacodyl (DULCOLAX) 10 MG Suppository Place 1 suppository (10 mg) rectally daily as needed for constipation 90 suppository 3     levothyroxine (SYNTHROID/LEVOTHROID) 50 MCG tablet Take 1 tablet (50 mcg) by mouth daily 90 tablet 3      clopidogrel (PLAVIX) 75 MG tablet Take 1 tablet (75 mg) by mouth daily 90 tablet 3     hydrochlorothiazide (HYDRODIURIL) 50 MG tablet Take 1 tablet (50 mg) by mouth daily 90 tablet 3     lisinopril (PRINIVIL/ZESTRIL) 40 MG tablet Take 1 tablet (40 mg) by mouth daily For blood pressure 90 tablet 3     polyethylene glycol (MIRALAX) powder Take one-half to one scoop once a day for maintaining soft stools 119 g 5     ferrous sulfate (IRON) 325 (65 FE) MG tablet Take 1 tablet (325 mg) by mouth 2 times daily To maintain iron levels 90 tablet 1     amLODIPine (NORVASC) 10 MG tablet Take 1 tablet (10 mg) by mouth daily For blood pressure 90 tablet 1     albuterol (VENTOLIN HFA) 108 (90 BASE) MCG/ACT inhaler Inhale 2 puffs into the lungs every 4 hours as needed for shortness of breath / dyspnea or wheezing 18 Inhaler 1     potassium chloride SA (K-DUR,KLOR-CON M) 20 MEQ tablet Take 1 tablet (20 mEq) by mouth daily 90 tablet 3     ferrous sulfate (IRON) 325 (65 FE) MG tablet Take 1 tablet (325 mg) by mouth daily (with breakfast) 90 tablet 3     Calcium Carbonate-Vitamin D (CALCIUM 600 + D OR) Take 1 tablet by mouth daily.       [DISCONTINUED] tolterodine (DETROL) 1 MG tablet Take 1-2 tablets by mouth At Bedtime. 180 tablet 3     Allergies   Allergen Reactions     Trazodone Fatigue     Felt too groggy     /63 (BP Location: Right arm, Patient Position: Chair, Cuff Size: Adult Regular)  Pulse 50  Resp 20  SpO2 93%  Gen:  In wheelchair  Superficial patches of bruising, one on right forearm, one on each dorsal hand.  Right forearm one has a scab from bumping/scratching on a hard surface last week.  No signs of ulceration or infection.    Lucie was seen today for pain.    Diagnoses and all orders for this visit:    Pain of left lower leg.  PVD vs OA vs radiculopathy.  Favor PVD at this time.    Peripheral vascular disease (H)    Osteoarthritis of multiple joints, unspecified osteoarthritis type    Anxiety    Memory  loss    See HPI for details of discussion of issues addressed today and related counselling    Total time spent 30 minutes.  More than 50% of the time spent with Ms. Stockton on counseling / coordinating her care    F/U in one month      Marii Montgomery M.D.  [facility]   pager [phone]

## 2017-08-31 NOTE — MR AVS SNAPSHOT
After Visit Summary   8/31/2017    Lucie Stockton    MRN: [id number]           Patient Information     Date Of Birth          1935        Visit Information        Provider Department      8/31/2017 8:00 AM Marii Montgomery MD [facility]        Care Instructions    Primary [facility] Medication Refill Request Information:  * Please contact your pharmacy regarding ANY request for medication refills.  ** [facility] Prescription Fax = [phone]  * Please allow 3 business days for routine medication refills.  * Please allow 5 business days for controlled substance medication refills.     Primary Care Center Test Result notification information:  *You will be notified with in 7-10 days of your appointment day regarding the results of your test.  If you are on MyChart you will be notified as soon as the provider has reviewed the results and signed off on them.    [facility] [phone]           Follow-ups after your visit        Your next 10 appointments already scheduled     Sep 01, 2017  8:00 AM CDT   (Arrive by 7:45 AM)   Return Visit with Jesenia Clay PhD   [facility] ([facility])    [address] [place] [id number] [phone]            Sep 15, 2017  8:00 AM CDT   (Arrive by 7:45 AM)   Return Visit with Jesenia Clay, PhD   [facility] ([facility])    [address] [place] [id number] [phone]            Nov 16, 2017  3:00 PM CST   (Arrive by 2:45 PM)   New Patient Visit with Lorenzo Pena MD   [facility] ([facility])    [address] [place] [address]   [phone]            Dec 18, 2017 12:30 PM CST   US SUMAYA DOPPLER WITH EXERCISE with UCUSV1   [facility] ([facility])    [address] [place] [address]  [address] [phone]           Please bring a list of your medicines (including vitamins, minerals and over-the-counter drugs). Also, tell your doctor about any allergies you may have. Wear comfortable clothes and leave your valuables at home.  No caffeine or tobacco for 1 hour prior to exam.  Please call the Imaging Department at your exam site with any questions.            Dec 18, 2017  1:45 PM CST   (Arrive by 1:30 PM)   Return Vascular Visit with Iona Velez MD   [facility] ([facility])    [address] [address] [phone]              Who to contact     Please call your clinic at [phone] to:    Ask questions about your health    Make or cancel appointments    Discuss your medicines    Learn about your test results    Speak to your doctor   If you have compliments or concerns about an experience at your clinic, or if you wish to file a complaint, please contact [facility] Patient Relations at [phone] or email us at [email or web address]         Additional Information About Your Visit        ReadyharMetaforic Information     Wistiat gives you secure access to your electronic health record. If you see a primary care provider, you can also send messages to your care team and make appointments. If you have questions, please call your primary care clinic.  If you do not have a primary care provider, please call [phone] and they will assist you.      Wistiat is an electronic gateway that provides easy, online access to your medical records. With Understory, you can request a clinic appointment, read your test results, renew a prescription or communicate with your care team.     To access your existing account, please contact your [facility] or call [phone] for assistance.        Care EveryWhere ID     This is your Care EveryWhere ID. This could be used  by other organizations to access your [facility] medical records  [id number]        Your Vitals Were     Pulse Respirations Pulse Oximetry             50 20 93%          Blood Pressure from Last 3 Encounters:   08/31/17 124/63   08/28/17 130/49   08/25/17 136/71    Weight from Last 3 Encounters:   08/28/17 44.5 kg (98 lb)   08/25/17 44.5 kg (98 lb)   08/10/17 45.8 kg (101 lb)              Today, you had the following     No orders found for display       Primary Care Provider Office Phone # Fax #    Marii Montgomery [phone]       [address] [place] [address] [address] [place] [id number]        Equal Access to Services     PAL SALGADO : Hadii reyna nevarezo Soalysia, waaxda luqadaha, qaybta kaalmada adeegyada, jackeline bernal . So [facility] [phone].    ATENCIÓN: Si habla español, tiene a schwartz disposición servicios gratuitos de asistencia lingüística. [facility] [phone].    We comply with applicable federal civil rights laws and [place] laws. We do not discriminate on the basis of race, color, national origin, age, disability sex, sexual orientation or gender identity.            Thank you!     Thank you for choosing [facility]  for your care. Our goal is always to provide you with excellent care. Hearing back from our patients is one way we can continue to improve our services. Please take a few minutes to complete the written survey that you may receive in the mail after your visit with us. Thank you!             Your Updated Medication List - Protect others around you: Learn how to safely use, store and throw away your medicines at www.disposemymeds.org.          This list is accurate as of: 8/31/17  8:30 AM.  Always use your most recent med list.                   Brand Name Dispense Instructions for use Diagnosis    albuterol 108 (90 BASE) MCG/ACT Inhaler    VENTOLIN HFA    18 Inhaler    Inhale 2 puffs into the lungs every 4 hours as needed for shortness of  breath / dyspnea or wheezing    Cough       amLODIPine 10 MG tablet    NORVASC    90 tablet    Take 1 tablet (10 mg) by mouth daily For blood pressure    Essential hypertension       atorvastatin 40 MG tablet    LIPITOR    90 tablet    Take 2 tablets (80 mg) by mouth daily    Hyperlipidemia, unspecified hyperlipidemia type       bisacodyl 10 MG Suppository    DULCOLAX    90 suppository    Place 1 suppository (10 mg) rectally daily as needed for constipation    Constipation, unspecified constipation type       CALCIUM 600 + D PO      Take 1 tablet by mouth daily.        citalopram 40 MG tablet    celeXA    30 tablet    Take 0.5 tablets (20 mg) by mouth daily    Depression, unspecified depression type       clopidogrel 75 MG tablet    PLAVIX    90 tablet    Take 1 tablet (75 mg) by mouth daily    Other chronic pulmonary embolism (H), Venous thrombosis of extremity       * ferrous sulfate 325 (65 FE) MG tablet    IRON    90 tablet    Take 1 tablet (325 mg) by mouth daily (with breakfast)    Other iron deficiency anemias       * ferrous sulfate 325 (65 FE) MG tablet    IRON    90 tablet    Take 1 tablet (325 mg) by mouth 2 times daily To maintain iron levels    Iron deficiency       hydrochlorothiazide 50 MG tablet    HYDRODIURIL    90 tablet    Take 1 tablet (50 mg) by mouth daily    Benign essential hypertension       ketorolac 0.5 % ophthalmic solution    ACULAR     Place 1 drop Into the left eye 4 times daily        levothyroxine 50 MCG tablet    SYNTHROID/LEVOTHROID    90 tablet    Take 1 tablet (50 mcg) by mouth daily    Hypothyroidism, unspecified type       lisinopril 40 MG tablet    PRINIVIL/ZESTRIL    90 tablet    Take 1 tablet (40 mg) by mouth daily For blood pressure    Essential hypertension       moxifloxacin 0.5 % ophthalmic solution    VIGAMOX     Place 1 drop Into the left eye 4 times daily        naproxen sodium 220 MG capsule     60 capsule    Take 220 mg by mouth 2 times daily (with meals)    Chronic  low back pain, unspecified back pain laterality, with sciatica presence unspecified, Pain of left lower leg       oxyCODONE 5 MG IR tablet    ROXICODONE    10 tablet    Take 1 tablet (5 mg) by mouth every 6 hours as needed for moderate to severe pain    Chronic low back pain, unspecified back pain laterality, with sciatica presence unspecified, Pain of left lower leg       pantoprazole 40 MG EC tablet    PROTONIX    90 tablet    Take 1 tablet (40 mg) by mouth daily    Other iron deficiency anemias       polyethylene glycol powder    MIRALAX    119 g    Take one-half to one scoop once a day for maintaining soft stools    Constipation, unspecified constipation type       potassium chloride SA 20 MEQ CR tablet    K-DUR/KLOR-CON M    90 tablet    Take 1 tablet (20 mEq) by mouth daily    Hypopotassemia       prednisoLONE acetate 1 % ophthalmic susp    PRED FORTE     Place 1 drop Into the left eye 4 times daily        * Notice:  This list has 2 medication(s) that are the same as other medications prescribed for you. Read the directions carefully, and ask your doctor or other care provider to review them with you.

## 2017-09-05 ENCOUNTER — OFFICE VISIT (OUTPATIENT)
Dept: ORTHOPEDICS | Facility: CLINIC | Age: 82
End: 2017-09-05

## 2017-09-05 VITALS — DIASTOLIC BLOOD PRESSURE: 63 MMHG | WEIGHT: 98 LBS | BODY MASS INDEX: 19.79 KG/M2 | SYSTOLIC BLOOD PRESSURE: 124 MMHG

## 2017-09-05 DIAGNOSIS — M25.572 PAIN IN JOINT, ANKLE AND FOOT, LEFT: Primary | ICD-10-CM

## 2017-09-05 DIAGNOSIS — I70.209 ATHEROSCLEROSIS OF ARTERIES OF EXTREMITIES (H): ICD-10-CM

## 2017-09-05 NOTE — PROGRESS NOTES
Subjective:   Lucie Stockton is a 82 year old female who complains of left ankle pain. She last saw Dr. Huynh last week. She states that approximately 2 weeks ago she had onset of left lateral ankle discomfort. She states that it's aggravated by walking and improved with rest. She states that when the foot is in the dependent position, other than during exercise, it feels better. She is on Plavix. She is not taking her naproxen. She is taking Tylenol for pain relief and states that she feels it does not provide any significant relief. She denies any history of trauma. She is here today with a specific question regarding an injection since she previously had an injection of her knee and that made her knee feel much better. She has seen Dr. Huynh as recently as the past 10 days for this problem. She is also seen Dr. Lopez and Dr. Montgomery. She is planning to follow-up with vascular regarding her peripheral arterial disease.    Background:   Date of injury: None   Duration of symptoms: 2 weeks  Mechanism of Injury: Insidious Onset; Unknown   Aggravating factors: Walking   Relieving Factors: Exercise, ice, ibuprofen   Prior Evaluation: Prior Physician Evalutation:  and X-rays    PAST MEDICAL, SOCIAL, SURGICAL AND FAMILY HISTORY: She  has a past medical history of Anemia; Aortic stenosis; Atherosclerosis of aorta (H); Atherosclerosis of arteries of extremities (H); Back pain; Degenerative joint disease; DVT of lower extremity, bilateral (H); Grave's disease; Hyperlipidaemia LDL goal < 70; Hypertension, essential; Hypothyroidism; Insomnia; Intermittent claudication (H); Iron deficiency; Knee pain; Lumbar stenosis with neurogenic claudication; Osteoarthritis; Osteoporosis; 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  She  has a past surgical history that includes Hysterectomy total abdominal, bilateral  salpingo-oophorectomy, node dissection, combined (2/17/11); Bunionectomy; colonoscopy; upper gi endoscopy; and STOMACH SURGERY PROCEDURE UNLISTED.  Her family history includes Breast Cancer (age of onset: 80) in her maternal aunt; C.A.D. (age of onset: 54) in her father.  She reports that she quit smoking about 23 years ago. She has a 7.50 pack-year smoking history. She has never used smokeless tobacco. She reports that she drinks alcohol. She reports that she does not use illicit drugs.    ALLERGIES: She is allergic to trazodone.    CURRENT MEDICATIONS: She has a current medication list which includes the following prescription(s): citalopram, naproxen sodium, oxycodone, atorvastatin, pantoprazole, moxifloxacin, prednisolone acetate, ketorolac, bisacodyl, levothyroxine, clopidogrel, hydrochlorothiazide, lisinopril, polyethylene glycol, ferrous sulfate, amlodipine, albuterol, potassium chloride sa, ferrous sulfate, and calcium carb-cholecalciferol.     REVIEW OF SYSTEMS: 10 point review of systems is negative except as noted above.     Exam:   /63  Wt 98 lb (44.5 kg)  BMI 19.79 kg/m2      CONSTITUTIONAL: healthy, alert and no distress  HEAD: Normocephalic. No masses, lesions, tenderness or abnormalities  SKIN: no suspicious lesions or rashes  GAIT: normal  NEUROLOGIC: Non-focal  PSYCHIATRIC: mentation appears normal.    MUSCULOSKELETAL:   LEFT ANKLE: no swelling, no skin changes, no deformity. FROM in the ankle. Nontender over the deltoid lig, ATFL, or CFL. Achilles intact and nontender. Pulses not palpable. Cap refill 2 sec. No wounds or ulcerations noted. Mildly ttp at the posterior distal fibula (not bone tenderness) in the region of the peroneal tendons but no pain with full passive dorsiflexion or active resisted plantarflexion or eversion. Distal neurological intact.        Assessment/Plan:   Lucie is an 82-year-old female with unclear etiology to    this left ankle discomfort. It does not appear to be  bone or joint related. It is not related to any of the surrounding ligaments. It's possible that she has a small focal peroneal tendinitis however it's quite atypical that a full passive stretch or active contraction are not aggravating to her. The fact that the extremity feels better in a dependent position and worse with exertion certainly does fit with her peripheral arterial disease. I've advised her that there is no indication for an injection. I have offered her a short cam walker which she can utilize for 1 week to determine if this improves her symptoms or not. She should remove it whenever she is non-weight-bearing so that she can inspect the foot and ankle. If she has marked improvement then that certainly would lend some suggestion to the fact that this may be tenderness in origin. She'll follow-up with Dr. Huynh.

## 2017-09-05 NOTE — MR AVS SNAPSHOT
After Visit Summary   9/5/2017    Lucie Stockton    MRN: [id number]           Patient Information     Date Of Birth          1935        Visit Information        Provider Department      9/5/2017 1:00 PM Sage Denney MD [facility]        Today's Diagnoses     Pain in joint, ankle and foot, left    -  1    Atherosclerosis of arteries of extremities (H)           Follow-ups after your visit        Your next 10 appointments already scheduled     Sep 14, 2017  8:30 AM CDT   (Arrive by 8:15 AM)   Return Visit with Zafar Huynh MD   [facility] ([facility])    [address] [place] [address]  [address] [facility] [address]   [phone]            Sep 15, 2017  8:00 AM CDT   (Arrive by 7:45 AM)   Return Visit with Jesenia Clay, PhD   [facility] ([facility])    [address] [place] [address]  [address] [facility] [address]   [phone]            Nov 16, 2017  3:00 PM CST   (Arrive by 2:45 PM)   New Patient Visit with Lorenzo Pena MD   [facility] ([facility])    [address] [place] [address]  [address] [facility] [id number] [phone]            Dec 18, 2017 12:30 PM CST   US SUMAYA DOPPLER WITH EXERCISE with UCUSV1   [facility] [place] ([facility])    [address] [place] [id number] [phone]           Please bring a list of your medicines (including vitamins, minerals and over-the-counter drugs). Also, tell your doctor about any allergies you may have. Wear comfortable clothes and leave your valuables at home.  No caffeine or tobacco for 1 hour prior to exam.  Please call the Imaging Department at your exam site with any questions.            Dec 18, 2017  1:45 PM CST   (Arrive by 1:30 PM)   Return Vascular Visit with Iona Velez MD   [facility] ([facility])    [address] [address] [phone]              Who to contact     Please call your clinic at [phone] to:    Ask questions about your health    Make or cancel appointments    Discuss your medicines    Learn about your test results    Speak to your doctor   If you have compliments or concerns about an experience at your clinic, or if you wish to file a complaint, please contact [facility] Patient Relations at [phone] or email us at [email or web address]         Additional Information About Your Visit        Thuuzhart Information     Quantif[employer]t gives you secure access to your electronic health record. If you see a primary care provider, you can also send messages to your care team and make appointments. If you have questions, please call your primary care clinic.  If you do not have a primary care provider, please call [phone] and they will assist you.      [email or web address] is an electronic gateway that provides easy, online access to your medical records. With [email or web address], you can request a clinic appointment, read your test results, renew a prescription or communicate with your care team.     To access your existing account, please contact your [facility] or call [phone] for assistance.        Care EveryWhere ID     This is your Care EveryWhere ID. This could be used by other organizations to access your [place] medical records  [id number]        Your Vitals Were     BMI (Body Mass Index)                   19.79 kg/m2            Blood Pressure from Last 3 Encounters:   09/05/17 124/63   08/31/17 124/63   08/28/17 130/49    Weight from Last 3 Encounters:   09/05/17 98 lb (44.5 kg)   08/28/17 98 lb (44.5 kg)   08/25/17 98 lb (44.5 kg)              Today, you had the following     No orders found for display       Primary Care Provider Office Phone # Fax #    Marii Montgomery MD  [phone]       [address] [place] [address]  [facility] [address]        Equal Access to Services     PAL SALGADO : Hadii reyna blevins kaylee Gamboa, wakyleighda krishna, qaleonelta kasheilada mirta, jackeline diaz So [facility] [phone].    ATENCIÓN: Si habla español, tiene a schwartz disposición servicios gratuitos de asistencia lingüística. Henny al [phone].    We comply with applicable federal civil rights laws and [place] laws. We do not discriminate on the basis of race, color, national origin, age, disability sex, sexual orientation or gender identity.            Thank you!     Thank you for choosing [facility]  for your care. Our goal is always to provide you with excellent care. Hearing back from our patients is one way we can continue to improve our services. Please take a few minutes to complete the written survey that you may receive in the mail after your visit with us. Thank you!             Your Updated Medication List - Protect others around you: Learn how to safely use, store and throw away your medicines at www.disposemymeds.org.          This list is accurate as of: 9/5/17  1:36 PM.  Always use your most recent med list.                   Brand Name Dispense Instructions for use Diagnosis    albuterol 108 (90 BASE) MCG/ACT Inhaler    VENTOLIN HFA    18 Inhaler    Inhale 2 puffs into the lungs every 4 hours as needed for shortness of breath / dyspnea or wheezing    Cough       amLODIPine 10 MG tablet    NORVASC    90 tablet    Take 1 tablet (10 mg) by mouth daily For blood pressure    Essential hypertension       atorvastatin 40 MG tablet    LIPITOR    90 tablet    Take 2 tablets (80 mg) by mouth daily    Hyperlipidemia, unspecified hyperlipidemia type       bisacodyl 10 MG Suppository    DULCOLAX    90 suppository    Place 1 suppository (10 mg) rectally daily as needed for constipation    Constipation, unspecified constipation type       CALCIUM 600 + D  PO      Take 1 tablet by mouth daily.        citalopram 40 MG tablet    celeXA    30 tablet    Take 0.5 tablets (20 mg) by mouth daily    Depression, unspecified depression type       clopidogrel 75 MG tablet    PLAVIX    90 tablet    Take 1 tablet (75 mg) by mouth daily    Other chronic pulmonary embolism (H), Venous thrombosis of extremity       * ferrous sulfate 325 (65 FE) MG tablet    IRON    90 tablet    Take 1 tablet (325 mg) by mouth daily (with breakfast)    Other iron deficiency anemias       * ferrous sulfate 325 (65 FE) MG tablet    IRON    90 tablet    Take 1 tablet (325 mg) by mouth 2 times daily To maintain iron levels    Iron deficiency       hydrochlorothiazide 50 MG tablet    HYDRODIURIL    90 tablet    Take 1 tablet (50 mg) by mouth daily    Benign essential hypertension       ketorolac 0.5 % ophthalmic solution    ACULAR     Place 1 drop Into the left eye 4 times daily        levothyroxine 50 MCG tablet    SYNTHROID/LEVOTHROID    90 tablet    Take 1 tablet (50 mcg) by mouth daily    Hypothyroidism, unspecified type       lisinopril 40 MG tablet    PRINIVIL/ZESTRIL    90 tablet    Take 1 tablet (40 mg) by mouth daily For blood pressure    Essential hypertension       moxifloxacin 0.5 % ophthalmic solution    VIGAMOX     Place 1 drop Into the left eye 4 times daily        naproxen sodium 220 MG capsule     60 capsule    Take 220 mg by mouth 2 times daily (with meals)    Chronic low back pain, unspecified back pain laterality, with sciatica presence unspecified, Pain of left lower leg       oxyCODONE 5 MG IR tablet    ROXICODONE    10 tablet    Take 1 tablet (5 mg) by mouth every 6 hours as needed for moderate to severe pain    Chronic low back pain, unspecified back pain laterality, with sciatica presence unspecified, Pain of left lower leg       pantoprazole 40 MG EC tablet    PROTONIX    90 tablet    Take 1 tablet (40 mg) by mouth daily    Other iron deficiency anemias       polyethylene glycol  powder    MIRALAX    119 g    Take one-half to one scoop once a day for maintaining soft stools    Constipation, unspecified constipation type       potassium chloride SA 20 MEQ CR tablet    K-DUR/KLOR-CON M    90 tablet    Take 1 tablet (20 mEq) by mouth daily    Hypopotassemia       prednisoLONE acetate 1 % ophthalmic susp    PRED FORTE     Place 1 drop Into the left eye 4 times daily        * Notice:  This list has 2 medication(s) that are the same as other medications prescribed for you. Read the directions carefully, and ask your doctor or other care provider to review them with you.

## 2017-09-05 NOTE — LETTER
9/5/2017      RE: Lucie Stockton  [address] [facility] [address]        Subjective:   Lucie Stockton is a 82 year old female who complains of left ankle pain. She last saw Dr. Huynh last week. She states that approximately 2 weeks ago she had onset of left lateral ankle discomfort. She states that it's aggravated by walking and improved with rest. She states that when the foot is in the dependent position, other than during exercise, it feels better. She is on Plavix. She is not taking her naproxen. She is taking Tylenol for pain relief and states that she feels it does not provide any significant relief. She denies any history of trauma. She is here today with a specific question regarding an injection since she previously had an injection of her knee and that made her knee feel much better. She has seen Dr. Huynh as recently as the past 10 days for this problem. She is also seen Dr. Lopez and Dr. Montgomery. She is planning to follow-up with vascular regarding her peripheral arterial disease.    Background:   Date of injury: None   Duration of symptoms: 2 weeks  Mechanism of Injury: Insidious Onset; Unknown   Aggravating factors: Walking   Relieving Factors: Exercise, ice, ibuprofen   Prior Evaluation: Prior Physician Evalutation:  and X-rays    PAST MEDICAL, SOCIAL, SURGICAL AND FAMILY HISTORY: She  has a past medical history of Anemia; Aortic stenosis; Atherosclerosis of aorta (H); Atherosclerosis of arteries of extremities (H); Back pain; Degenerative joint disease; DVT of lower extremity, bilateral (H); Grave's disease; Hyperlipidaemia LDL goal < 70; Hypertension, essential; Hypothyroidism; Insomnia; Intermittent claudication (H); Iron deficiency; Knee pain; Lumbar stenosis with neurogenic claudication; Osteoarthritis; Osteoporosis; 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  She  has a  past surgical history that includes Hysterectomy total abdominal, bilateral salpingo-oophorectomy, node dissection, combined (2/17/11); Bunionectomy; colonoscopy; upper gi endoscopy; and STOMACH SURGERY PROCEDURE UNLISTED.  Her family history includes Breast Cancer (age of onset: 80) in her maternal aunt; C.A.D. (age of onset: 54) in her father.  She reports that she quit smoking about 23 years ago. She has a 7.50 pack-year smoking history. She has never used smokeless tobacco. She reports that she drinks alcohol. She reports that she does not use illicit drugs.    ALLERGIES: She is allergic to trazodone.    CURRENT MEDICATIONS: She has a current medication list which includes the following prescription(s): citalopram, naproxen sodium, oxycodone, atorvastatin, pantoprazole, moxifloxacin, prednisolone acetate, ketorolac, bisacodyl, levothyroxine, clopidogrel, hydrochlorothiazide, lisinopril, polyethylene glycol, ferrous sulfate, amlodipine, albuterol, potassium chloride sa, ferrous sulfate, and calcium carb-cholecalciferol.     REVIEW OF SYSTEMS: 10 point review of systems is negative except as noted above.     Exam:   /63  Wt 98 lb (44.5 kg)  BMI 19.79 kg/m2      CONSTITUTIONAL: healthy, alert and no distress  HEAD: Normocephalic. No masses, lesions, tenderness or abnormalities  SKIN: no suspicious lesions or rashes  GAIT: normal  NEUROLOGIC: Non-focal  PSYCHIATRIC: mentation appears normal.    MUSCULOSKELETAL:   LEFT ANKLE: no swelling, no skin changes, no deformity. FROM in the ankle. Nontender over the deltoid lig, ATFL, or CFL. Achilles intact and nontender. Pulses not palpable. Cap refill 2 sec. No wounds or ulcerations noted. Mildly ttp at the posterior distal fibula (not bone tenderness) in the region of the peroneal tendons but no pain with full passive dorsiflexion or active resisted plantarflexion or eversion. Distal neurological intact.        Assessment/Plan:   Lucie is an 82-year-old female with  unclear etiology to    this left ankle discomfort. It does not appear to be bone or joint related. It is not related to any of the surrounding ligaments. It's possible that she has a small focal peroneal tendinitis however it's quite atypical that a full passive stretch or active contraction are not aggravating to her. The fact that the extremity feels better in a dependent position and worse with exertion certainly does fit with her peripheral arterial disease. I've advised her that there is no indication for an injection. I have offered her a short cam walker which she can utilize for 1 week to determine if this improves her symptoms or not. She should remove it whenever she is non-weight-bearing so that she can inspect the foot and ankle. If she has marked improvement then that certainly would lend some suggestion to the fact that this may be tenderness in origin. She'll follow-up with Dr. Huynh.    Sage Denney MD

## 2017-09-06 DIAGNOSIS — I35.0 AORTIC VALVE STENOSIS, UNSPECIFIED ETIOLOGY: Primary | ICD-10-CM

## 2017-09-06 DIAGNOSIS — I70.0 ATHEROSCLEROSIS OF AORTA (H): ICD-10-CM

## 2017-09-11 ENCOUNTER — THERAPY VISIT (OUTPATIENT)
Dept: PHYSICAL THERAPY | Facility: CLINIC | Age: 82
End: 2017-09-11
Payer: MEDICARE

## 2017-09-11 DIAGNOSIS — M54.42 CHRONIC LEFT-SIDED LOW BACK PAIN WITH LEFT-SIDED SCIATICA: ICD-10-CM

## 2017-09-11 DIAGNOSIS — G89.29 CHRONIC LEFT-SIDED LOW BACK PAIN WITH LEFT-SIDED SCIATICA: ICD-10-CM

## 2017-09-11 DIAGNOSIS — M54.42 BILATERAL LOW BACK PAIN WITH LEFT-SIDED SCIATICA: Primary | ICD-10-CM

## 2017-09-11 PROCEDURE — 97530 THERAPEUTIC ACTIVITIES: CPT | Mod: GP | Performed by: PHYSICAL THERAPIST

## 2017-09-11 PROCEDURE — G8980 MOBILITY D/C STATUS: HCPCS | Mod: GP | Performed by: PHYSICAL THERAPIST

## 2017-09-11 PROCEDURE — G8979 MOBILITY GOAL STATUS: HCPCS | Mod: GP | Performed by: PHYSICAL THERAPIST

## 2017-09-11 PROCEDURE — 97110 THERAPEUTIC EXERCISES: CPT | Mod: GP | Performed by: PHYSICAL THERAPIST

## 2017-09-11 NOTE — PROGRESS NOTES
Subjective:    HPI                    Objective:    System    Physical Exam    General     ROS    Assessment/Plan:      PROGRESS  REPORT    Progress reporting period is from 8/14/2017 to 9/11/2017.       SUBJECTIVE  Subjective changes noted by patient:  Lucie's pain is getting worse in her ankle. She has been referred back by Dr Denney for ankle pain. MD recommended she use CAM for 1 week and she was complaint with 3 days without effect. She was given ankle exercises by MD and they have had no effect. Her  thinks her pain is coming from her back. She would like to attend a conference out of town in 2 weeks if her ankle gets better. Her pain is 7/10 buttocks lat ankle- constant. Walking, sitting always make her pain worse.       Current pain level is 7/10  .     Previous pain level was  7/10  .   Changes in function:  None  Adverse reaction to treatment or activity: None    OBJECTIVE  Changes noted in objective findings:  None. LSROM: flexion to kness buttocks PDM produced lat knee pain. Extension max loss ERP lat thigh ankle.HOWARD on balance pad 2.5 inches- ankle and thigh better, ankle better N/T as compared to pain. Sitting posture is poor with L knee to chest ER ar hip.        ASSESSMENT/PLAN  Updated problem list and treatment plan: Diagnosis 1:  Suspect LS radiculopathy  Pain -  self management, education, directional preference exercise and home program  Decreased ROM/flexibility - therapeutic exercise, therapeutic activity and home program  Decreased joint mobility - therapeutic exercise, therapeutic activity and home program  Impaired gait - gait training, assistive devices and home program  Decreased function - therapeutic activities and home program  Impaired posture - neuro re-education, therapeutic activities and home program  STG/LTGs have been met or progress has been made towards goals:  None  Assessment of Progress: The patient's condition is unchanged.  Self Management Plans:  Patient is  independent in a home treatment program.  Patient is independent in self management of symptoms.    Lucie continues to require the following intervention to meet STG and LTG's:  PT intervention is no longer required to meet STG/LTG.    Recommendations:  This patient is ready to be discharged from therapy and continue their home treatment program.    Please refer to the daily flowsheet for treatment today, total treatment time and time spent performing 1:1 timed codes.

## 2017-09-11 NOTE — LETTER
DEPARTMENT OF HEALTH AND HUMAN SERVICES  CENTERS FOR MEDICARE & MEDICAID SERVICES    PLAN/UPDATED PLAN OF PROGRESS FOR OUTPATIENT REHABILITATION    PATIENTS NAME:  Lucie Stockton   : 1935    PR[facility] NUMBER:    [id number]  [id number]     PROVIDER NAME: [facility]  MEDICAL RECORD NUMBER: [id number]     START OF CARE DATE:  SOC Date: 17   TYPE:  PT    PRIMARY/TREATMENT DIAGNOSIS: (Pertinent Medical Diagnosis)  Bilateral low back pain with left-sided sciatica  Chronic left-sided low back pain with left-sided sciatica    VISITS FROM START OF CARE:   1    [facility] Initial Evaluation -- Ankle  Evaluation Date: 2018  Lucie Stockton is a 82 year old female with a L ankle condition.   Referral: GP  Work mechanical stresses:    Employment status:   Leisure mechanical stresses:   Functional disability score (NDI):    VAS score (0-10): 7  Patient goals/expectations:  Sit through a meal without pain.    HISTORY:  Present symptoms:  L lat ankle pain- constant..L LS post leg to calf- constant.  Pain quality (sharp/shooting/stabbing/aching/burning/cramping):   Sharp, burning..  Paresthesia (yes/no):  no  Present since (onset date): 2017. MD order 2017.   Symptoms (improving/unchanging/worsening):  Unchanged..  Symptoms commenced as a result of: unknown   Condition occurred in the following environment:    Symptoms at onset (neck/arm/forearm/headache): LS  Constant symptoms (neck/arm/forearm/headache): yes for all  Intermittent symptoms (neck/arm/forearm/headache):   Symptoms are made worse with the following: Always Rising and sometimes standing, always walking, always on the move always sitting.  Symptoms are made better with the following: Always Lying  Disturbed sleep (yes/no): no    Number of pillows:   Sleeping postures (prone/sup/side R/L): R  SL  Previous episodes (0/1-5/6-10/11+): 2   Year of first episode:    Previous history:  and 2016  Previous treatments: extension responder  PATIENTS NAME:  Lucie Stockton   : 1935  PRIMARY/TREATMENT DIAGNOSIS: (Pertinent Medical Diagnosis)    Specific Questions: (as reported by the patient)  Dizziness/Tinnitus/Nausea/Swallowing (pos/neg): no  Gait/Upper Limbs (normal/abnormal): abnormal  Medications (nil/NSAIDS/anlag/steroids/anticoag/other):  Narcotics/Opiods and Other - Gabbapentin  Medical allergies:  See chart  General health (excellent/good/fair/poor):  good  Pertinent medical history:  Osteoarthritis, Osteoporosis and Anemia  Imaging (None/Xray/MRI/Other):  stenosis  Recent or major surgery (yes/no): no  Night pain (yes/no): no  Accidents (yes/no): no  Unexplained weight loss (yes/no): yes, due to pain?  Barriers at home: none  Other red flags: none    EXAMINATION    Posture:   Sitting (good/fair/poor): poor, with knee flexed into chest    Standing (good/fair/poor): poor   Correction of posture(better/worse/no effect): no effect  Other observations:      Neurological:  Motor Deficit:     Reflexes:    Sensory Deficit:     Dural signs:      Movement Loss:   Elias Mod Min Nil Pain   Protrusion        Flexion        Retraction        Extension        Lateral flexion R        Lateral flexion L        Rotation R        Rotation L          Test Movements:   During: produces, abolishes, increases, decreases, no effect, centralizing, peripheralizing  After: better, worse, no better, no worse, no effect, centralized, peripheralized              PATIENTS NAME:  Lucie Stockton   : 1935  PRIMARY/TREATMENT DIAGNOSIS: (Pertinent Medical Diagnosis)      Pretest symptoms sitting:    Symptoms During Symptoms After ROM increased ROM decreased No Effect   PRO          Rep PRO          RET          Rep RET          RET EXT          Rep RET EXT          Pretest symptoms lying:     Symptoms During Symptoms After ROM increased ROM decreased No Effect   RET          Rep RET           RET EXT          Rep RET EXT          If required, pretest symptoms sitting:      Symptoms During Symptoms After ROM increased ROM decreased No Effect   LF-R          Rep LF-R          LF-L          Rep LF-L          ROT-R          Rep ROT-R          ROT-L          Rep ROT-L          FLEX          Rep FLEX          Other Tests: L ankle AROM/ PROM: wnl unable to reproduce pain. MMT: L ankle wnl, pain free. LSROM: flexion to knees, buttocks, lat ankle pain. Extension max loss pain lat thigh, ankle. HOWARD with forearms elevated 2.5 inches - thigh and ankle pain decreased, better.   Provisional Classification:  Derangement - LS assymetrical below knee, no lateral component   Principle of Management:  Education:  LS producing radicular pain in ankle DP centralization of pain proper sitting posture with L roll      Equipment provided:    Mechanical therapy (Y/N):  Y   Extension principle:  HOWARD elevated 2.5 inches up to 5 min q 2-3 hrs   Lateral principle:    Flexion principle:       Other:          PATIENTS NAME:  Lucie Stockton   : 1935  PRIMARY/TREATMENT DIAGNOSIS: (Pertinent Medical Diagnosis)    ASSESSMENT/PLAN:  Patient is a 82 year old female with lumbar complaints.    Patient has the following significant findings with corresponding treatment plan.                Diagnosis 1:  LS radiculaopathy  Pain -  self management, education, directional preference exercise and home program  Decreased ROM/flexibility - therapeutic exercise, therapeutic activity and home program  Decreased joint mobility - therapeutic exercise, therapeutic activity and home program  Impaired gait - home program  Decreased function - therapeutic activities and home program  Impaired posture - neuro re-education, therapeutic activities and home program    Therapy Evaluation Codes:   1) History comprised of:   Personal factors that impact the plan of care:      None.    Comorbidity factors that impact the plan of care are:      High blood  "pressure and Osteoarthritis.     Medications impacting care: Pain.  2) Examination of Body Systems comprised of:   Body structures and functions that impact the plan of care:      Lumbar spine.   Activity limitations that impact the plan of care are:      Sitting, Standing and Walking.  3) Clinical presentation characteristics are:   Stable/Uncomplicated.  4) Decision-Making    Low complexity using standardized patient assessment instrument and/or measureable assessment of functional outcome.  Cumulative Therapy Evaluation is: Low complexity.    Previous and current functional limitations:  (See Goal Flow Sheet for this information)    Short term and Long term goals: (See Goal Flow Sheet for this information)   Communication ability:  Patient appears to be able to clearly communicate and understand verbal and written communication and follow directions correctly.  Treatment Explanation - The following has been discussed with the patient:   RX ordered/plan of care, Anticipated outcomes, Possible risks and side effects                            PATIENTS NAME:  Lucie Stockton   : 1935  PRIMARY/TREATMENT DIAGNOSIS: (Pertinent Medical Diagnosis)      This patient would benefit from PT intervention to resume normal activities.   Rehab potential is good.  Frequency:  1 X week, once daily  Duration:  for 6 weeks  Discharge Plan:  Achieve all LTG.  Independent in home treatment program.  Reach maximal therapeutic benefit.        Caregiver Signature/Credentials _____________________________ Date ________         Samantha Agarwal, PT, Cert MDT     I have reviewed and certified the need for these services and plan of treatment while under my care.        PHYSICIAN'S SIGNATURE:   _________________________________________      Date___________   Rosa Lopez MD    Certification period:  Beginning of Cert date period  2017 to 2017    Functional Level Progress Report: Please see attached \"Goal Flow sheet for " "Functional level.\"    ____X____ Continue Services or       ________ DC Services                Service dates: From  SOC Date: 9/11/2017  to present                         "

## 2017-09-11 NOTE — LETTER
DEPARTMENT OF HEALTH AND HUMAN SERVICES  CENTERS FOR MEDICARE & MEDICAID SERVICES    PLAN/UPDATED PLAN OF PROGRESS FOR OUTPATIENT REHABILITATION    PATIENTS NAME:  Lucie Stockton   : 1935    PROVIDER NUMBER:    [id number]  [facility]:  [id number]     PROVIDER NAME: [facility]  MEDICAL RECORD NUMBER: [id number]     START OF CARE DATE:  SOC Date: 17   TYPE:  PT    PRIMARY/TREATMENT DIAGNOSIS: (Pertinent Medical Diagnosis)  Ankle pain - Left    VISITS FROM START OF CARE:    1     [facility] Initial Evaluation -- Ankle  Evaluation Date: 2017  Lucie Stockton is a 82 year old female with a L ankle condition.   Referral: GP  Work mechanical stresses:    Employment status:   Leisure mechanical stresses:   Functional disability score (NDI):    VAS score (0-10): 7  Patient goals/expectations:  Sit through a meal without pain.    HISTORY:  Present symptoms:  L lat ankle pain- constant..L LS post leg to calf- constant.  Pain quality (sharp/shooting/stabbing/aching/burning/cramping):   Sharp, burning..  Paresthesia (yes/no):  no  Present since (onset date): 2017. MD order 2017.   Symptoms (improving/unchanging/worsening):  Unchanged..  Symptoms commenced as a result of: unknown   Condition occurred in the following environment:    Symptoms at onset (neck/arm/forearm/headache): LS  Constant symptoms (neck/arm/forearm/headache): yes for all  Intermittent symptoms (neck/arm/forearm/headache):   Symptoms are made worse with the following: Always Rising and sometimes standing, always walking, always on the move always sitting.  Symptoms are made better with the following: Always Lying  Disturbed sleep (yes/no): no    Number of pillows:   Sleeping postures (prone/sup/side R/L): R  SL  Previous episodes (0/1-5/6-10/11+): 2   Year of first episode:   Previous history:  and 2016  Previous treatments: extension responder    PATIENTS NAME:   Lucie Stockton   : 1935  PRIMARY/TREATMENT DIAGNOSIS: (Pertinent Medical Diagnosis)  Ankle pain - Left    Specific Questions: (as reported by the patient)  Dizziness/Tinnitus/Nausea/Swallowing (pos/neg): no  Gait/Upper Limbs (normal/abnormal): abnormal  Medications (nil/NSAIDS/anlag/steroids/anticoag/other):  Narcotics/Opiods and Other - Gabbapentin  Medical allergies:  See chart  General health (excellent/good/fair/poor):  good  Pertinent medical history:  Osteoarthritis, Osteoporosis and Anemia  Imaging (None/Xray/MRI/Other):  stenosis  Recent or major surgery (yes/no): no  Night pain (yes/no): no  Accidents (yes/no): no  Unexplained weight loss (yes/no): yes, due to pain?  Barriers at home: none  Other red flags: none    EXAMINATION    Posture:   Sitting (good/fair/poor): poor, with knee flexed into chest    Standing (good/fair/poor): poor   Correction of posture(better/worse/no effect): no effect  Other observations:      Neurological:  Motor Deficit:     Reflexes:    Sensory Deficit:     Dural signs:    Movement Loss:   Elias Mod Min Nil Pain   Protrusion        Flexion        Retraction        Extension        Lateral flexion R        Lateral flexion L        Rotation R        Rotation L          Test Movements:   During: produces, abolishes, increases, decreases, no effect, centralizing, peripheralizing  After: better, worse, no better, no worse, no effect, centralized, peripheralized              PATIENTS NAME:  Lucie Stockton   : 1935  PRIMARY/TREATMENT DIAGNOSIS: (Pertinent Medical Diagnosis)  Ankle pain - Left    Pretest symptoms sitting:    Symptoms During Symptoms After ROM increased ROM decreased No Effect   PRO          Rep PRO          RET          Rep RET          RET EXT          Rep RET EXT          Pretest symptoms lying:     Symptoms During Symptoms After ROM increased ROM decreased No Effect   RET          Rep RET          RET EXT          Rep RET EXT          If required, pretest  symptoms sitting:      Symptoms During Symptoms After ROM increased ROM decreased No Effect   LF-R          Rep LF-R          LF-L          Rep LF-L          ROT-R          Rep ROT-R          ROT-L          Rep ROT-L          FLEX          Rep FLEX          Other Tests: L ankle AROM/ PROM: wnl unable to reproduce pain. MMT: L ankle wnl, pain free. LSROM: flexion to knees, buttocks, lat ankle pain. Extension max loss pain lat thigh, ankle. HOWARD with forearms elevated 2.5 inches - thigh and ankle pain decreased, better.   Provisional Classification:  Derangement - LS assymetrical below knee, no lateral component   Principle of Management:  Education:  LS producing radicular pain in ankle DP centralization of pain proper sitting posture with L roll      Equipment provided:    Mechanical therapy (Y/N):  Y   Extension principle:  HOWARD elevated 2.5 inches up to 5 min q 2-3 hrs   Lateral principle:    Flexion principle:       Other:          PATIENTS NAME:  Lucie Stockton   : 1935  PRIMARY/TREATMENT DIAGNOSIS: (Pertinent Medical Diagnosis)  Ankle pain - Left    ASSESSMENT/PLAN:  Patient is a 82 year old female with ankle pain complaints.    Patient has the following significant findings with corresponding treatment plan.                Diagnosis 1:  LS radiculopathy  Pain -  self management, education, directional preference exercise and home program  Decreased ROM/flexibility - therapeutic exercise, therapeutic activity and home program  Decreased joint mobility - therapeutic exercise, therapeutic activity and home program  Impaired gait - home program  Decreased function - therapeutic activities and home program  Impaired posture - neuro re-education, therapeutic activities and home program    Therapy Evaluation Codes:   1) History comprised of:   Personal factors that impact the plan of care:      None.    Comorbidity factors that impact the plan of care are:      High blood pressure and Osteoarthritis.   "   Medications impacting care: Pain.  2) Examination of Body Systems comprised of:   Body structures and functions that impact the plan of care:      Lumbar spine.   Activity limitations that impact the plan of care are:      Sitting, Standing and Walking.  3) Clinical presentation characteristics are:   Stable/Uncomplicated.  4) Decision-Making    Low complexity using standardized patient assessment instrument and/or measureable assessment of functional outcome.  Cumulative Therapy Evaluation is: Low complexity.    Previous and current functional limitations:  (See Goal Flow Sheet for this information)    Short term and Long term goals: (See Goal Flow Sheet for this information)   Communication ability:  Patient appears to be able to clearly communicate and understand verbal and written communication and follow directions correctly.  Treatment Explanation - The following has been discussed with the patient:   RX ordered/plan of care, Anticipated outcomes, Possible risks and side effects                          PATIENTS NAME:  Lucie Stockton   : 1935  PRIMARY/TREATMENT DIAGNOSIS: (Pertinent Medical Diagnosis)  Ankle pain - Left    This patient would benefit from PT intervention to resume normal activities.   Rehab potential is good.  Frequency:  1 X week, once daily  Duration:  for 6 weeks  Discharge Plan:  Achieve all LTG.  Independent in home treatment program.  Reach maximal therapeutic benefit.            Caregiver Signature/Credentials _____________________________ Date ________         Samantha Agarwal, PT,  Cert MDT     I have reviewed and certified the need for these services and plan of treatment while under my care.        PHYSICIAN'S SIGNATURE:   _________________________________________    Date___________   Rosa Lopez MD    Certification period:  Beginning of Cert date period: 17 to   12/10/17     Functional Level Progress Report: Please see attached \"Goal Flow sheet for Functional " "level.\"    ____X____ Continue Services or       ________ DC Services                Service dates: From  SOC Date: 09/11/17   to present                         "

## 2017-09-11 NOTE — LETTER
DEPARTMENT OF HEALTH AND HUMAN SERVICES  CENTERS FOR MEDICARE & MEDICAID SERVICES    PLAN/UPDATED PLAN OF PROGRESS FOR OUTPATIENT REHABILITATION    PATIENTS NAME:  Lucie Stockton   : 1935    PROVIDER NUMBER:    [id number]  [facility]  [id number]     PROVIDER NAME: [facility]  MEDICAL RECORD NUMBER: [id number]     START OF CARE DATE:  SOC Date: 17   TYPE:  PT    PRIMARY/TREATMENT DIAGNOSIS: (Pertinent Medical Diagnosis)  Bilateral low back pain with left-sided sciatica  Chronic left-sided low back pain with left-sided sciatica    VISITS FROM START OF CARE: 1     [facility] Initial Evaluation -- Ankle  Evaluation Date: 2018  Lucie Stockton is a 82 year old female with a L ankle condition.   Referral: GP  Work mechanical stresses:    Employment status:   Leisure mechanical stresses:   Functional disability score (NDI):    VAS score (0-10): 7  Patient goals/expectations:  Sit through a meal without pain.    HISTORY:  Present symptoms:  L lat ankle pain- constant..L LS post leg to calf- constant.  Pain quality (sharp/shooting/stabbing/aching/burning/cramping):   Sharp, burning..  Paresthesia (yes/no):  no  Present since (onset date): 2017. MD order 2017.   Symptoms (improving/unchanging/worsening):  Unchanged..  Symptoms commenced as a result of: unknown   Condition occurred in the following environment:    Symptoms at onset (neck/arm/forearm/headache): LS  Constant symptoms (neck/arm/forearm/headache): yes for all  Intermittent symptoms (neck/arm/forearm/headache):   Symptoms are made worse with the following: Always Rising and sometimes standing, always walking, always on the move always sitting.  Symptoms are made better with the following: Always Lying  Disturbed sleep (yes/no): no    Number of pillows:   Sleeping postures (prone/sup/side R/L): R  SL  Previous episodes (0/1-5/6-10/11+): 2   Year of first episode:    Previous history: 2015 and 2016  Previous treatments: extension responder  PATIENTS NAME:  Lucie Stockton   : 1935  PRIMARY/TREATMENT DIAGNOSIS: (Pertinent Medical Diagnosis)  Bilateral low back pain with left-sided sciatica  Chronic left-sided low back pain with left-sided sciatica    Specific Questions: (as reported by the patient)  Dizziness/Tinnitus/Nausea/Swallowing (pos/neg): no  Gait/Upper Limbs (normal/abnormal): abnormal  Medications (nil/NSAIDS/anlag/steroids/anticoag/other):  Narcotics/Opiods and Other - Gabbapentin  Medical allergies:  See chart  General health (excellent/good/fair/poor):  good  Pertinent medical history:  Osteoarthritis, Osteoporosis and Anemia  Imaging (None/Xray/MRI/Other):  stenosis  Recent or major surgery (yes/no): no  Night pain (yes/no): no  Accidents (yes/no): no  Unexplained weight loss (yes/no): yes, due to pain?  Barriers at home: none  Other red flags: none    EXAMINATION    Posture:   Sitting (good/fair/poor): poor, with knee flexed into chest    Standing (good/fair/poor): poor   Correction of posture(better/worse/no effect): no effect  Other observations:      Neurological:  Motor Deficit:     Reflexes:    Sensory Deficit:     Dural signs:      Movement Loss:   Elias Mod Min Nil Pain   Protrusion        Flexion        Retraction        Extension        Lateral flexion R        Lateral flexion L        Rotation R        Rotation L          Test Movements:   During: produces, abolishes, increases, decreases, no effect, centralizing, peripheralizing  After: better, worse, no better, no worse, no effect, centralized, peripheralized          PATIENTS NAME:  Lucie Stockton   : 1935  PRIMARY/TREATMENT DIAGNOSIS: (Pertinent Medical Diagnosis)  Bilateral low back pain with left-sided sciatica  Chronic left-sided low back pain with left-sided sciatica    Pretest symptoms sitting:    Symptoms During Symptoms After ROM increased ROM decreased No Effect   PRO           Rep PRO          RET          Rep RET          RET EXT          Rep RET EXT          Pretest symptoms lying:     Symptoms During Symptoms After ROM increased ROM decreased No Effect   RET          Rep RET          RET EXT          Rep RET EXT          If required, pretest symptoms sitting:      Symptoms During Symptoms After ROM increased ROM decreased No Effect   LF-R          Rep LF-R          LF-L          Rep LF-L          ROT-R          Rep ROT-R          ROT-L          Rep ROT-L          FLEX          Rep FLEX              Other Tests: L ankle AROM/ PROM: wnl unable to reproduce pain. MMT: L ankle wnl, pain free. LSROM: flexion to knees, buttocks, lat ankle pain. Extension max loss pain lat thigh, ankle. HOWARD with forearms elevated 2.5 inches - thigh and ankle pain decreased, better.   Provisional Classification:  Derangement - LS assymetrical below knee, no lateral component   Principle of Management:  Education:  LS producing radicular pain in ankle DP centralization of pain proper sitting posture with L roll      Equipment provided:    Mechanical therapy (Y/N):  Y   Extension principle:  HOWARD elevated 2.5 inches up to 5 min q 2-3 hrs   Lateral principle:    Flexion principle:       Other:    PATIENTS NAME:  Lucie Stockton   : 1935  PRIMARY/TREATMENT DIAGNOSIS: (Pertinent Medical Diagnosis)  Bilateral low back pain with left-sided sciatica  Chronic left-sided low back pain with left-sided sciatica    ASSESSMENT/PLAN:  Patient is a 82 year old female with ankle pain complaints.    Patient has the following significant findings with corresponding treatment plan.                Diagnosis 1:  LS radiculopathy  Pain -  self management, education, directional preference exercise and home program  Decreased ROM/flexibility - therapeutic exercise, therapeutic activity and home program  Decreased joint mobility - therapeutic exercise, therapeutic activity and home program  Impaired gait - home  program  Decreased function - therapeutic activities and home program  Impaired posture - neuro re-education, therapeutic activities and home program    Therapy Evaluation Codes:   1) History comprised of:   Personal factors that impact the plan of care:      None.    Comorbidity factors that impact the plan of care are:      High blood pressure and Osteoarthritis.     Medications impacting care: Pain.  2) Examination of Body Systems comprised of:   Body structures and functions that impact the plan of care:      Lumbar spine.   Activity limitations that impact the plan of care are:      Sitting, Standing and Walking.  3) Clinical presentation characteristics are:   Stable/Uncomplicated.  4) Decision-Making    Low complexity using standardized patient assessment instrument and/or measureable assessment of functional outcome.  Cumulative Therapy Evaluation is: Low complexity.    Previous and current functional limitations:  (See Goal Flow Sheet for this information)    Short term and Long term goals: (See Goal Flow Sheet for this information)   Communication ability:  Patient appears to be able to clearly communicate and understand verbal and written communication and follow directions correctly.  Treatment Explanation - The following has been discussed with the patient:   RX ordered/plan of care, Anticipated outcomes, Possible risks and side effects                          PATIENTS NAME:  Lucie Stockton   : 1935  PRIMARY/TREATMENT DIAGNOSIS: (Pertinent Medical Diagnosis)  Bilateral low back pain with left-sided sciatica  Chronic left-sided low back pain with left-sided sciatica      This patient would benefit from PT intervention to resume normal activities.   Rehab potential is good.  Frequency:  1 X week, once daily  Duration:  for 6 weeks  Discharge Plan:  Achieve all LTG.  Independent in home treatment program.  Reach maximal therapeutic benefit.      Caregiver Signature/Credentials  "_____________________________ Date ________         Samantha Agarwal PT,  Cert MDT     I have reviewed and certified the need for these services and plan of treatment while under my care.        PHYSICIAN'S SIGNATURE:   _________________________________________    Date___________   Rosa Lopez MD    Certification period:  Beginning of Cert date period: 09/11/17 to   12/10/17     Functional Level Progress Report: Please see attached \"Goal Flow sheet for Functional level.\"    ____X____ Continue Services or       ________ DC Services                Service dates: From  SOC Date: 09/11/17  to present                         "

## 2017-09-11 NOTE — LETTER
DEPARTMENT OF HEALTH AND HUMAN SERVICES  CENTERS FOR MEDICARE & MEDICAID SERVICES    PLAN/UPDATED PLAN OF PROGRESS FOR OUTPATIENT REHABILITATION    PATIENTS NAME:  Lucie Stockton   : 1935    PROVIDER NUMBER:    [id number]  [id number]     [facility]  MEDICAL RECORD NUMBER: [id number]     START OF CARE DATE:  SOC Date: 2017  TYPE:  PT    PRIMARY/TREATMENT DIAGNOSIS: (Pertinent Medical Diagnosis)  Chronic left-sided low back pain with left-sided sciatica    VISITS FROM START OF CARE:  1    SUBJECTIVE  Subjective changes noted by patient:  Lucie's pain is getting worse in her ankle. She has been referred back by Dr Denney for ankle pain. MD recommended she use CAM for 1 week and she was complaint with 3 days without effect. She was given ankle exercises by MD and they have had no effect. Her  thinks her pain is coming from her back. She would like to attend a conference out of town in 2 weeks if her ankle gets better. Her pain is 7/10 buttocks lat ankle- constant. Walking, sitting always make her pain worse.       Current pain level is 7/10  .     Previous pain level was  7/10  .   Changes in function:  None  Adverse reaction to treatment or activity: None    OBJECTIVE  Changes noted in objective findings:  None. LSROM: flexion to kness buttocks PDM produced lat knee pain. Extension max loss ERP lat thigh ankle.HOWARD on balance pad 2.5 inches- ankle and thigh better, ankle better N/T as compared to pain. Sitting posture is poor with L knee to chest ER ar hip.      ASSESSMENT/PLAN  Updated problem list and treatment plan: Diagnosis 1:  Suspect LS radiculopathy  Pain -  self management, education, directional preference exercise and home program  Decreased ROM/flexibility - therapeutic exercise, therapeutic activity and home program  Decreased joint mobility - therapeutic exercise, therapeutic activity and home program  Impaired gait -  gait training, assistive devices and home program  Decreased function - therapeutic activities and home program  Impaired posture - neuro re-education, therapeutic activities and home program  STG/LTGs have been met or progress has been made towards goals:  None  Assessment of Progress: The patient's condition is unchanged.  Self Management Plans:  Patient is independent in a home treatment program.  Patient is independent in self management of symptoms.  Lucie continues to require the following intervention to meet STG and LTG's:  PT intervention is no longer required to meet STG/LTG.    PATIENTS NAME:  Lucie Stockton   : 1935  PRIMARY/TREATMENT DIAGNOSIS: (Pertinent Medical Diagnosis)  Chronic left-sided low back pain with left-sided sciatica      Recommendations:  This patient is ready to be discharged from therapy and continue their home treatment program.        Caregiver Signature/Credentials _____________________________ Date ________          Samantha Agarwal PT, Cert MDT     I have reviewed and certified the need for these services and plan of treatment while under my care.        PHYSICIAN'S SIGNATURE:   _________________________________________    Date___________   Rosa Lopez MD    Certification period:  Beginning of Cert date period:2017 to 12/10/2017      ________ Continue Services or       _____X___ DC Services                Service dates: From  SOC Date: 17 date to present

## 2017-09-13 ENCOUNTER — THERAPY VISIT (OUTPATIENT)
Dept: PHYSICAL THERAPY | Facility: CLINIC | Age: 82
End: 2017-09-13
Payer: MEDICARE

## 2017-09-13 DIAGNOSIS — M54.42 CHRONIC BILATERAL LOW BACK PAIN WITH LEFT-SIDED SCIATICA: Primary | ICD-10-CM

## 2017-09-13 DIAGNOSIS — G89.29 CHRONIC BILATERAL LOW BACK PAIN WITH LEFT-SIDED SCIATICA: Primary | ICD-10-CM

## 2017-09-13 PROCEDURE — 97530 THERAPEUTIC ACTIVITIES: CPT | Mod: GP | Performed by: PHYSICAL THERAPIST

## 2017-09-13 PROCEDURE — 97110 THERAPEUTIC EXERCISES: CPT | Mod: GP | Performed by: PHYSICAL THERAPIST

## 2017-09-14 ENCOUNTER — OFFICE VISIT (OUTPATIENT)
Dept: ORTHOPEDICS | Facility: CLINIC | Age: 82
End: 2017-09-14

## 2017-09-14 VITALS
WEIGHT: 102 LBS | SYSTOLIC BLOOD PRESSURE: 139 MMHG | HEIGHT: 60 IN | OXYGEN SATURATION: 94 % | RESPIRATION RATE: 18 BRPM | HEART RATE: 83 BPM | BODY MASS INDEX: 20.03 KG/M2 | DIASTOLIC BLOOD PRESSURE: 67 MMHG

## 2017-09-14 DIAGNOSIS — M25.562 ARTHRALGIA OF LEFT LOWER LEG: Primary | ICD-10-CM

## 2017-09-14 RX ORDER — TROLAMINE SALICYLATE 10 G/100G
1 CREAM TOPICAL 3 TIMES DAILY
Qty: 170 G | Refills: 11 | Status: SHIPPED | OUTPATIENT
Start: 2017-09-14 | End: 2018-08-17

## 2017-09-14 NOTE — LETTER
9/14/2017      RE: Lucie Stockton  [address] [facility] [id number]        Subjective:   Lucie Stockton is a 82 year old female who presents with continued left ankle pain.  She states that she has been applying cold and elevating the limb while reading which provides relief.  Ms. Stockton is planning to travel next week to a convention in [place], [place] which will require a great deal of walking.      Date of injury: none  Date last seen: 9/5/2017  Following Therapeutic Plan: Patient tried CAM boot for 4-5 days without noticing an improvement.  She has returned to wrapping the ankle with a compression wrap.  Patient is continuing in physical therapy without benefit.  Pain: Improving  Function: Unchanged  Interval History: unchanged     PAST MEDICAL, SOCIAL, SURGICAL AND FAMILY HISTORY: She  has a past medical history of Anemia; Aortic stenosis; Atherosclerosis of aorta (H); Atherosclerosis of arteries of extremities (H); Back pain; Degenerative joint disease; DVT of lower extremity, bilateral (H); Grave's disease; Hyperlipidaemia LDL goal < 70; Hypertension, essential; Hypothyroidism; Insomnia; Intermittent claudication (H); Iron deficiency; Knee pain; Lumbar stenosis with neurogenic claudication; Osteoarthritis; Osteoporosis; 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  She  has a past surgical history that includes Hysterectomy total abdominal, bilateral salpingo-oophorectomy, node dissection, combined (2/17/11); Bunionectomy; colonoscopy; upper gi endoscopy; and STOMACH SURGERY PROCEDURE UNLISTED.  Her family history includes Breast Cancer (age of onset: 80) in her maternal aunt; C.A.D. (age of onset: 54) in her father.  She reports that she quit smoking about 24 years ago. She has a 7.50 pack-year smoking history. She has never used smokeless tobacco. She reports that she drinks alcohol. She reports that she  does not use illicit drugs.      ALLERGIES: She is allergic to trazodone.    CURRENT MEDICATIONS: She has a current medication list which includes the following prescription(s): citalopram, naproxen sodium, oxycodone, atorvastatin, pantoprazole, moxifloxacin, prednisolone acetate, ketorolac, bisacodyl, levothyroxine, clopidogrel, hydrochlorothiazide, lisinopril, polyethylene glycol, ferrous sulfate, amlodipine, albuterol, potassium chloride sa, ferrous sulfate, and calcium carb-cholecalciferol.     REVIEW OF SYSTEMS: 3 point review of systems is negative except as noted above.     Exam:   /67 (BP Location: Right arm, Patient Position: Chair, Cuff Size: Adult Regular)  Pulse 83  Resp 18  Ht 1.524 m (5')  Wt 46.3 kg (102 lb)  SpO2 94%  BMI 19.92 kg/m2     CONSTITUTIONAL: healthy, alert and no distress. Sitting in w/c    MUSCULOSKELETAL:   L lower leg diffuse tenderness along the lateral lower leg, just lateral to the tibia. Not affected by AROM, PROM or percussion.  AROM knee and hip without pain.  Neg SLR  Cap refill is > 2 sec foot is warm, no ulcerations. Nonpalpable DP pulse     Assessment/Plan:   Atypical L lower leg pain  --unclear etiology, not consistent with tendinopathy. Not improved with cam boot or therapy. Provocative maneuvers for radciular symptoms are negative.--better with stockings.  Goals are improved walking for a conference.  --we discussed MRI LLE to evaluate for occult causes of pain, she declined this for now and would like to trial some topical medication. She will continue pt exercises at home, wear stockings.  --I recommended f/u PCP to discuss additional potential causes of pain outside of the [facility]sk system  --recheck with me 1 month  --if she changes her mind, she can call for noncontrast MRI L tibia to look for stress reaction or occult cause of pain.      Zafar Huynh MD CAQ

## 2017-09-14 NOTE — PATIENT INSTRUCTIONS
Continue the exercises  Wear the compression stockings  Apply trolamine cream three times daily  We discussed MRI L leg today, if not improved, we could obtain to look for stress reaction or other causes as we do not have a clear diagnosis, but treatment with cam boot boot previously did not help.  Fu 1 month  F/u pcp.    Zafar Huynh MD CAQ

## 2017-09-14 NOTE — MR AVS SNAPSHOT
After Visit Summary   9/14/2017    Lucie Stockton    MRN: [id number]           Patient Information     Date Of Birth          1935        Visit Information        Provider Department      9/14/2017 8:30 AM Zafar Huynh MD [facility]        Today's Diagnoses     Arthralgia of left lower leg    -  1      Care Instructions    Continue the exercises  Wear the compression stockings  Apply trolamine cream three times daily  We discussed MRI L leg today, if not improved, we could obtain to look for stress reaction or other causes as we do not have a clear diagnosis, but treatment with cam boot boot previously did not help.  Fu 1 month  F/u pcp.    Zafar Huynh MD CAQ            Follow-ups after your visit        Your next 10 appointments already scheduled     Sep 15, 2017  8:00 AM CDT   (Arrive by 7:45 AM)   Return Visit with Jesenia Clay, PhD   [facility] ([facility])    [address] [place] [id number]   [phone]            Sep 18, 2017  3:20 PM CDT   KATEY Extremity with Samantha Agarwal, PT   [facility] ([facility] [place])    [address]  [place] [address]   [phone]            Sep 22, 2017  8:50 AM CDT   KATEY Extremity with Rambo Mello, PT   [facility] ([facility])    [address]  [place] [address]   [phone]            Sep 26, 2017 12:50 PM CDT   KATEY Extremity with Rambo Mello, PT   [facility] ([facility])    [address]  [place] [address]   [phone]            Sep 29, 2017  9:30 AM CDT   KATEY Extremity with Samantha Agarwal, PT   [facility] ([facility] [place])    [address]  [place] [address]   [phone]            Nov  16, 2017  3:00 PM CST   (Arrive by 2:45 PM)   New Patient Visit with Lorenzo Pena MD   [facility] ([facility])    [address] [place] [address] [phone]            Dec 18, 2017 12:30 PM CST   US SUMAYA DOPPLER WITH EXERCISE with UCUSV1   [facility] ([facility])    [address] [place], [place] [address] [phone]           Please bring a list of your medicines (including vitamins, minerals and over-the-counter drugs). Also, tell your doctor about any allergies you may have. Wear comfortable clothes and leave your valuables at home.  No caffeine or tobacco for 1 hour prior to exam.  Please call the Imaging Department at your exam site with any questions.            Dec 18, 2017  1:45 PM CST   (Arrive by 1:30 PM)   Return Vascular Visit with Iona Velez MD   [facility] ([facility])    [address] [place] [address] [phone]              Who to contact     Please call your clinic at [phone] to:    Ask questions about your health    Make or cancel appointments    Discuss your medicines    Learn about your test results    Speak to your doctor   If you have compliments or concerns about an experience at your clinic, or if you wish to file a complaint, please contact [facility] Patient Relations at [phone] or email us at [email or web address]         Additional Information About Your Visit        Dinahart Information     TagTagCityt gives you secure access to your electronic health record. If you see a primary care provider, you can also send messages to your care team and make appointments. If you have questions, please call your primary care clinic.  If you do not have a primary care provider, please call [phone] and they will assist you.      Soumya is an  electronic gateway that provides easy, online access to your medical records. With YouAppi, you can request a clinic appointment, read your test results, renew a prescription or communicate with your care team.     To access your existing account, please contact your [facility] or call [phone] for assistance.        Care EveryWhere ID     This is your Care EveryWhere ID. This could be used by other organizations to access your [facility] medical records  [id number]        Your Vitals Were     Pulse Respirations Height Pulse Oximetry BMI (Body Mass Index)       83 18 1.524 m (5') 94% 19.92 kg/m2        Blood Pressure from Last 3 Encounters:   09/14/17 139/67   09/05/17 124/63   08/31/17 124/63    Weight from Last 3 Encounters:   09/14/17 46.3 kg (102 lb)   09/05/17 44.5 kg (98 lb)   08/28/17 44.5 kg (98 lb)              Today, you had the following     No orders found for display         Today's Medication Changes          These changes are accurate as of: 9/14/17  8:41 AM.  If you have any questions, ask your nurse or doctor.               Start taking these medicines.        Dose/Directions    trolamine salicylate 10 % cream   Commonly known as:  ASPERCREME   Used for:  Arthralgia of left lower leg        Dose:  1 applicator   Apply 1 g topically 3 times daily   Quantity:  170 g   Refills:  11            Where to get your medicines      These medications were sent to [facility] [address] [place], [place] [address]E [place] AT [address], [facility] [address]     Phone:  [phone]     trolamine salicylate 10 % cream                Primary Care Provider Office Phone # Fax #    Marii Montgomery [phone]       [address] [place] [address] [address] [place] [address]        Equal Access to Services     PAL SALGADO AH: Ascencion nevarezo Soalysia, waaxda luqadaha, qaybta kaalmada mirta, jackeline izquierdo.  So [facility] [place] [phone].    ATENCIÓN: Si shannan briscoe, tiene a schwartz disposición servicios gratuitos de asistencia lingüística. Henny nogueira [phone].    We comply with applicable federal civil rights laws and [place] laws. We do not discriminate on the basis of race, color, national origin, age, disability sex, sexual orientation or gender identity.            Thank you!     Thank you for choosing [facility]  for your care. Our goal is always to provide you with excellent care. Hearing back from our patients is one way we can continue to improve our services. Please take a few minutes to complete the written survey that you may receive in the mail after your visit with us. Thank you!             Your Updated Medication List - Protect others around you: Learn how to safely use, store and throw away your medicines at www.disposemymeds.org.          This list is accurate as of: 9/14/17  8:41 AM.  Always use your most recent med list.                   Brand Name Dispense Instructions for use Diagnosis    albuterol 108 (90 BASE) MCG/ACT Inhaler    VENTOLIN HFA    18 Inhaler    Inhale 2 puffs into the lungs every 4 hours as needed for shortness of breath / dyspnea or wheezing    Cough       amLODIPine 10 MG tablet    NORVASC    90 tablet    Take 1 tablet (10 mg) by mouth daily For blood pressure    Essential hypertension       atorvastatin 40 MG tablet    LIPITOR    90 tablet    Take 2 tablets (80 mg) by mouth daily    Hyperlipidemia, unspecified hyperlipidemia type       bisacodyl 10 MG Suppository    DULCOLAX    90 suppository    Place 1 suppository (10 mg) rectally daily as needed for constipation    Constipation, unspecified constipation type       CALCIUM 600 + D PO      Take 1 tablet by mouth daily.        citalopram 40 MG tablet    celeXA    30 tablet    Take 0.5 tablets (20 mg) by mouth daily    Depression, unspecified depression type       clopidogrel 75 MG tablet    PLAVIX    90 tablet    Take 1  tablet (75 mg) by mouth daily    Other chronic pulmonary embolism (H), Venous thrombosis of extremity       * ferrous sulfate 325 (65 FE) MG tablet    IRON    90 tablet    Take 1 tablet (325 mg) by mouth daily (with breakfast)    Other iron deficiency anemias       * ferrous sulfate 325 (65 FE) MG tablet    IRON    90 tablet    Take 1 tablet (325 mg) by mouth 2 times daily To maintain iron levels    Iron deficiency       hydrochlorothiazide 50 MG tablet    HYDRODIURIL    90 tablet    Take 1 tablet (50 mg) by mouth daily    Benign essential hypertension       ketorolac 0.5 % ophthalmic solution    ACULAR     Place 1 drop Into the left eye 4 times daily        levothyroxine 50 MCG tablet    SYNTHROID/LEVOTHROID    90 tablet    Take 1 tablet (50 mcg) by mouth daily    Hypothyroidism, unspecified type       lisinopril 40 MG tablet    PRINIVIL/ZESTRIL    90 tablet    Take 1 tablet (40 mg) by mouth daily For blood pressure    Essential hypertension       moxifloxacin 0.5 % ophthalmic solution    VIGAMOX     Place 1 drop Into the left eye 4 times daily        naproxen sodium 220 MG capsule     60 capsule    Take 220 mg by mouth 2 times daily (with meals)    Chronic low back pain, unspecified back pain laterality, with sciatica presence unspecified, Pain of left lower leg       oxyCODONE 5 MG IR tablet    ROXICODONE    10 tablet    Take 1 tablet (5 mg) by mouth every 6 hours as needed for moderate to severe pain    Chronic low back pain, unspecified back pain laterality, with sciatica presence unspecified, Pain of left lower leg       pantoprazole 40 MG EC tablet    PROTONIX    90 tablet    Take 1 tablet (40 mg) by mouth daily    Other iron deficiency anemias       polyethylene glycol powder    MIRALAX    119 g    Take one-half to one scoop once a day for maintaining soft stools    Constipation, unspecified constipation type       potassium chloride SA 20 MEQ CR tablet    K-DUR/KLOR-CON M    90 tablet    Take 1 tablet (20  mEq) by mouth daily    Hypopotassemia       prednisoLONE acetate 1 % ophthalmic susp    PRED FORTE     Place 1 drop Into the left eye 4 times daily        trolamine salicylate 10 % cream    ASPERCREME    170 g    Apply 1 g topically 3 times daily    Arthralgia of left lower leg       * Notice:  This list has 2 medication(s) that are the same as other medications prescribed for you. Read the directions carefully, and ask your doctor or other care provider to review them with you.

## 2017-09-14 NOTE — PROGRESS NOTES
Subjective:   Lucie Stockton is a 82 year old female who presents with continued left ankle pain.  She states that she has been applying cold and elevating the limb while reading which provides relief.  Ms. Stockton is planning to travel next week to a convention in [place], [place] which will require a great deal of walking.      Date of injury: none  Date last seen: 9/5/2017  Following Therapeutic Plan: Patient tried CAM boot for 4-5 days without noticing an improvement.  She has returned to wrapping the ankle with a compression wrap.  Patient is continuing in physical therapy without benefit.  Pain: Improving  Function: Unchanged  Interval History: unchanged     PAST MEDICAL, SOCIAL, SURGICAL AND FAMILY HISTORY: She  has a past medical history of Anemia; Aortic stenosis; Atherosclerosis of aorta (H); Atherosclerosis of arteries of extremities (H); Back pain; Degenerative joint disease; DVT of lower extremity, bilateral (H); Grave's disease; Hyperlipidaemia LDL goal < 70; Hypertension, essential; Hypothyroidism; Insomnia; Intermittent claudication (H); Iron deficiency; Knee pain; Lumbar stenosis with neurogenic claudication; Osteoarthritis; Osteoporosis; 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  She  has a past surgical history that includes Hysterectomy total abdominal, bilateral salpingo-oophorectomy, node dissection, combined (2/17/11); Bunionectomy; colonoscopy; upper gi endoscopy; and STOMACH SURGERY PROCEDURE UNLISTED.  Her family history includes Breast Cancer (age of onset: 80) in her maternal aunt; C.A.D. (age of onset: 54) in her father.  She reports that she quit smoking about 24 years ago. She has a 7.50 pack-year smoking history. She has never used smokeless tobacco. She reports that she drinks alcohol. She reports that she does not use illicit drugs.      ALLERGIES: She is allergic to trazodone.    CURRENT  MEDICATIONS: She has a current medication list which includes the following prescription(s): citalopram, naproxen sodium, oxycodone, atorvastatin, pantoprazole, moxifloxacin, prednisolone acetate, ketorolac, bisacodyl, levothyroxine, clopidogrel, hydrochlorothiazide, lisinopril, polyethylene glycol, ferrous sulfate, amlodipine, albuterol, potassium chloride sa, ferrous sulfate, and calcium carb-cholecalciferol.     REVIEW OF SYSTEMS: 3 point review of systems is negative except as noted above.     Exam:   /67 (BP Location: Right arm, Patient Position: Chair, Cuff Size: Adult Regular)  Pulse 83  Resp 18  Ht 1.524 m (5')  Wt 46.3 kg (102 lb)  SpO2 94%  BMI 19.92 kg/m2     CONSTITUTIONAL: healthy, alert and no distress. Sitting in w/c    MUSCULOSKELETAL:   L lower leg diffuse tenderness along the lateral lower leg, just lateral to the tibia. Not affected by AROM, PROM or percussion.  AROM knee and hip without pain.  Neg SLR  Cap refill is > 2 sec foot is warm, no ulcerations. Nonpalpable DP pulse     Assessment/Plan:   Atypical L lower leg pain  --unclear etiology, not consistent with tendinopathy. Not improved with cam boot or therapy. Provocative maneuvers for radciular symptoms are negative.--better with stockings.  Goals are improved walking for a conference.  --we discussed MRI LLE to evaluate for occult causes of pain, she declined this for now and would like to trial some topical medication. She will continue pt exercises at home, wear stockings.  --I recommended f/u PCP to discuss additional potential causes of pain outside of the msk system  --recheck with me 1 month  --if she changes her mind, she can call for noncontrast MRI L tibia to look for stress reaction or occult cause of pain.      Zafar Huynh MD CAQ

## 2017-09-15 ENCOUNTER — TELEPHONE (OUTPATIENT)
Dept: PSYCHOLOGY | Facility: CLINIC | Age: 82
End: 2017-09-15

## 2017-09-15 NOTE — TELEPHONE ENCOUNTER
"Mrs. Stockton called to reschedule a follow-up appt with me. As she has last cancelled the last 2 appointments, I explained that if she was unable to keep this 3rd appointment, I would refer to receive services elsewhere. She agreed to this and was appreciative of the option to reschedule. She stated her \" is making me\" but that she promises to keep the appointment as she wants to give therapy \"a try.\"    Jesenia Clay, PhD,   Clinical Health Psychologist    "

## 2017-09-18 ENCOUNTER — THERAPY VISIT (OUTPATIENT)
Dept: PHYSICAL THERAPY | Facility: CLINIC | Age: 82
End: 2017-09-18
Payer: MEDICARE

## 2017-09-18 DIAGNOSIS — M54.42 CHRONIC BILATERAL LOW BACK PAIN WITH LEFT-SIDED SCIATICA: Primary | ICD-10-CM

## 2017-09-18 DIAGNOSIS — G89.29 CHRONIC BILATERAL LOW BACK PAIN WITH LEFT-SIDED SCIATICA: Primary | ICD-10-CM

## 2017-09-18 PROCEDURE — 97530 THERAPEUTIC ACTIVITIES: CPT | Mod: GP | Performed by: PHYSICAL THERAPIST

## 2017-09-18 PROCEDURE — 97110 THERAPEUTIC EXERCISES: CPT | Mod: GP | Performed by: PHYSICAL THERAPIST

## 2017-09-19 ENCOUNTER — TELEPHONE (OUTPATIENT)
Dept: INTERNAL MEDICINE | Facility: CLINIC | Age: 82
End: 2017-09-19

## 2017-09-19 NOTE — TELEPHONE ENCOUNTER
----- Message from Yoana Brown sent at 9/19/2017 11:54 AM CDT -----  Regarding: Pt would like a referral for the pain clinic - Pt of Dr. Montgomery  Contact: [phone]  Pt would like a referral for the pain clinic - Pt of Dr. Montgomery. Pt can be reached at [phone].    Physical Therapy would like pt seen in pain clinic for low back and left leg.  Kristal Jurado RN 2:22 PM on 9/19/2017.

## 2017-09-21 ENCOUNTER — OFFICE VISIT (OUTPATIENT)
Dept: PSYCHOLOGY | Facility: CLINIC | Age: 82
End: 2017-09-21

## 2017-09-21 DIAGNOSIS — F43.23 ADJUSTMENT DISORDER WITH MIXED ANXIETY AND DEPRESSED MOOD: Primary | ICD-10-CM

## 2017-09-21 DIAGNOSIS — I73.9 PVD (PERIPHERAL VASCULAR DISEASE) (H): ICD-10-CM

## 2017-09-21 DIAGNOSIS — M79.606 PAIN OF LOWER EXTREMITY, UNSPECIFIED LATERALITY: Primary | ICD-10-CM

## 2017-09-21 DIAGNOSIS — M54.5 LOW BACK PAIN, UNSPECIFIED BACK PAIN LATERALITY, UNSPECIFIED CHRONICITY, WITH SCIATICA PRESENCE UNSPECIFIED: ICD-10-CM

## 2017-09-21 NOTE — MR AVS SNAPSHOT
After Visit Summary   9/21/2017    Lucie Stockton    MRN: [id number]           Patient Information     Date Of Birth          1935        Visit Information        Provider Department      9/21/2017 9:00 AM Jesenia Clay, PhD [facility]        Today's Diagnoses     Adjustment disorder with mixed anxiety and depressed mood    -  1       Follow-ups after your visit        Your next 10 appointments already scheduled     Sep 22, 2017  8:50 AM CDT   KATEY Extremity with Rambo Mello, PT   [facility] [place] [facility] ([facility] [place])    [address]  [place] [address]   [phone]            Sep 26, 2017 12:50 PM CDT   KATEY Extremity with Rambo Mello, PT   [facility] ([facility] [place])    [address]  [place] [address]   [phone]            Sep 29, 2017  9:30 AM CDT   KATEY Extremity with Samantha Agarwal, PT   [facility] ([facility] [place])    [address]  [place] [address]   [phone]            Oct 05, 2017 12:00 PM CDT   (Arrive by 11:45 AM)   Return Visit with Jesenia Clay, PhD   [facility] ([facility])    [address] [place] [address]  [address] [facility] [address] [phone]            Oct 12, 2017  9:00 AM CDT   (Arrive by 8:45 AM)   Return Visit with Zafar Huynh MD   [facility] ([facility])    [address] [place] [address]  [address] [facility] [address] [phone]            Nov 16, 2017  3:00 PM CST   (Arrive by 2:45 PM)   New Patient Visit with Lorenzo Pena MD   [facility] D[facility]atology ([facility])    [address] [place] [address]  [address] [facility] [address]   [phone]            Dec 18, 2017 12:30 PM CST   US SUMAYA DOPPLER WITH EXERCISE with UCUSV1   [facility]  ([facility] [place])    [address]  1st Floor  [facility] [id number] [phone]           Please bring a list of your medicines (including vitamins, minerals and over-the-counter drugs). Also, tell your doctor about any allergies you may have. Wear comfortable clothes and leave your valuables at home.  No caffeine or tobacco for 1 hour prior to exam.  Please call the Imaging Department at your exam site with any questions.            Dec 18, 2017  1:45 PM CST   (Arrive by 1:30 PM)   Return Vascular Visit with Iona Velez MD   [facility] ([facility])    [address]  3rd Floor  [facility] [address] [phone]              Future tests that were ordered for you today     Open Future Orders        Priority Expected Expires Ordered    [facility] REFERRAL Routine  9/21/2018 9/21/2017            Who to contact     Please call your clinic at [phone] to:    Ask questions about your health    Make or cancel appointments    Discuss your medicines    Learn about your test results    Speak to your doctor   If you have compliments or concerns about an experience at your clinic, or if you wish to file a complaint, please contact [facility] Patient Relations at [phone] or email us at [email or web address]         Additional Information About Your Visit        MyChart Information     [email or web address] gives you secure access to your electronic health record. If you see a primary care provider, you can also send messages to your care team and make appointments. If you have questions, please call your primary care clinic.  If you do not have a primary care provider, please call [phone] and they will assist you.      [email or web address] is an electronic gateway that provides easy, online access to your medical records. With [email or web address], you can request a clinic appointment, read  your test results, renew a prescription or communicate with your care team.     To access your existing account, please contact your [facility] or call [phone] for assistance.        Care EveryWhere ID     This is your Care EveryWhere ID. This could be used by other organizations to access your [place] medical records  [id number]         Blood Pressure from Last 3 Encounters:   09/14/17 139/67   09/05/17 124/63   08/31/17 124/63    Weight from Last 3 Encounters:   09/14/17 46.3 kg (102 lb)   09/05/17 44.5 kg (98 lb)   08/28/17 44.5 kg (98 lb)              Today, you had the following     No orders found for display       Primary Care Provider Office Phone # Fax #    Marii Montgomery [phone]       [address] [place] [address] 741  [facility] [address]        Equal Access to Services     [facility] HospitalFUENTES : Hadii aad ku hadasho Soalysia, waaxda luqadaha, qaybta kaalmada adeegyada, jackeline lux hayaries bernal . So [facility] [phone].    ATENCIÓN: Si habla español, tiene a schwartz disposición servicios gratuitos de asistencia lingüística. Llame al [phone].    We comply with applicable federal civil rights laws and [place] laws. We do not discriminate on the basis of race, color, national origin, age, disability sex, sexual orientation or gender identity.            Thank you!     Thank you for choosing [facility]  for your care. Our goal is always to provide you with excellent care. Hearing back from our patients is one way we can continue to improve our services. Please take a few minutes to complete the written survey that you may receive in the mail after your visit with us. Thank you!             Your Updated Medication List - Protect others around you: Learn how to safely use, store and throw away your medicines at www.disposemymeds.org.          This list is accurate as of: 9/21/17 10:04 AM.  Always use your most recent med list.                    Brand Name Dispense Instructions for use Diagnosis    albuterol 108 (90 BASE) MCG/ACT Inhaler    VENTOLIN HFA    18 Inhaler    Inhale 2 puffs into the lungs every 4 hours as needed for shortness of breath / dyspnea or wheezing    Cough       amLODIPine 10 MG tablet    NORVASC    90 tablet    Take 1 tablet (10 mg) by mouth daily For blood pressure    Essential hypertension       atorvastatin 40 MG tablet    LIPITOR    90 tablet    Take 2 tablets (80 mg) by mouth daily    Hyperlipidemia, unspecified hyperlipidemia type       bisacodyl 10 MG Suppository    DULCOLAX    90 suppository    Place 1 suppository (10 mg) rectally daily as needed for constipation    Constipation, unspecified constipation type       CALCIUM 600 + D PO      Take 1 tablet by mouth daily.        citalopram 40 MG tablet    celeXA    30 tablet    Take 0.5 tablets (20 mg) by mouth daily    Depression, unspecified depression type       clopidogrel 75 MG tablet    PLAVIX    90 tablet    Take 1 tablet (75 mg) by mouth daily    Other chronic pulmonary embolism (H), Venous thrombosis of extremity       * ferrous sulfate 325 (65 FE) MG tablet    IRON    90 tablet    Take 1 tablet (325 mg) by mouth daily (with breakfast)    Other iron deficiency anemias       * ferrous sulfate 325 (65 FE) MG tablet    IRON    90 tablet    Take 1 tablet (325 mg) by mouth 2 times daily To maintain iron levels    Iron deficiency       hydrochlorothiazide 50 MG tablet    HYDRODIURIL    90 tablet    Take 1 tablet (50 mg) by mouth daily    Benign essential hypertension       ketorolac 0.5 % ophthalmic solution    ACULAR     Place 1 drop Into the left eye 4 times daily        levothyroxine 50 MCG tablet    SYNTHROID/LEVOTHROID    90 tablet    Take 1 tablet (50 mcg) by mouth daily    Hypothyroidism, unspecified type       lisinopril 40 MG tablet    PRINIVIL/ZESTRIL    90 tablet    Take 1 tablet (40 mg) by mouth daily For blood pressure    Essential hypertension        moxifloxacin 0.5 % ophthalmic solution    VIGAMOX     Place 1 drop Into the left eye 4 times daily        naproxen sodium 220 MG capsule     60 capsule    Take 220 mg by mouth 2 times daily (with meals)    Chronic low back pain, unspecified back pain laterality, with sciatica presence unspecified, Pain of left lower leg       oxyCODONE 5 MG IR tablet    ROXICODONE    10 tablet    Take 1 tablet (5 mg) by mouth every 6 hours as needed for moderate to severe pain    Chronic low back pain, unspecified back pain laterality, with sciatica presence unspecified, Pain of left lower leg       pantoprazole 40 MG EC tablet    PROTONIX    90 tablet    Take 1 tablet (40 mg) by mouth daily    Other iron deficiency anemias       polyethylene glycol powder    MIRALAX    119 g    Take one-half to one scoop once a day for maintaining soft stools    Constipation, unspecified constipation type       potassium chloride SA 20 MEQ CR tablet    K-DUR/KLOR-CON M    90 tablet    Take 1 tablet (20 mEq) by mouth daily    Hypopotassemia       prednisoLONE acetate 1 % ophthalmic susp    PRED FORTE     Place 1 drop Into the left eye 4 times daily        trolamine salicylate 10 % cream    ASPERCREME    170 g    Apply 1 g topically 3 times daily    Arthralgia of left lower leg       * Notice:  This list has 2 medication(s) that are the same as other medications prescribed for you. Read the directions carefully, and ask your doctor or other care provider to review them with you.

## 2017-09-21 NOTE — TELEPHONE ENCOUNTER
Referral order done.  Please call her back.  SB    Pt called with phone number to pain clinic.Clinic numbers given for questions or concerns.  Kristal Jurado RN 9:29 AM on 9/21/2017.

## 2017-09-21 NOTE — PROGRESS NOTES
"  Health Psychology                  Clinic    Department of Medicine  Lilliana Bartholomew, PhD, LP [phone]                          Clinics and Surgery Center  [facility] Zana Atwood, PhD, LP [phone]                  3rd Floor  [place] Mail Code 741   Garret Munguia, PhD, ABPP, LP [phone] [address] [facility], [address],  Jesenia Clay,  PhD, LP [phone]            [place] [address] Jesica Wagoner, PhD, LP [phone]    Health Psychology Follow-Up Note    SUBJECTIVE:  Lucie Stockton is an 82-year-old female who was seen for individual psychotherapy for adjustment disorder. A treatment plan was completed in collaboration with the patient in this session. Reported ongoing pain in her left lower leg, which is mitigated by rest, elevation, ice, and physical therapy. She stated that pain worsens when she is trying to do strenuous activities such as taking groceries, frustrated, or feeling helpless. She also indicated boredom negatively impacts pain. Goal for session today was to improve management of pain. Provided psychoeducation about acute versus chronic pain, the gate control theory of pain, and a discussion of the factors that minimize and or enhance her experience of pain. She stated that distraction in reading helps minimize pain. Also encouraged reframing of the belief \"this will never end\" and encouraged her to recognize that while she has not identified the most helpful strategies yet, that there are behavioral and cognitive strategies that can help manage her pain experience. She also plans to pursue intervention at the pain clinic per recommendation of her physical therapist. Homework is for her to create a list of the factors that enhance and minimize her pain experience.    OBJECTIVE:  The patient arrived early, maintained good eye contact and was neatly groomed and dressed.  She was in a wheelchair for this visit due to left " leg pain.  She was alert and oriented to person, place, time and situation.  She appeared to respond honestly to all questions about psychosocial functioning and was deemed a reliable historian.  Mood appeared euthymic, although she indicated frustration related to her health conditions.  Speech was clear, coherent and of normal rate, rhythm and volume.  Thoughts were overall logical and organized.  Thought content was appropriate to interview and situation.  Insight and judgment were both good.  She denied current suicidal or assaultive ideation, plan or intent.     ASSESSMENT:  Lucie Stockton is an 82-year-old female experiencing adjustment difficulties following an ongoing left leg injury that has disrupted engagement in physical activities, emotional well-being, and self-care routines.  It also appears that the physical and emotional changes have impacted sleep activity and eating patterns.  She presents with adequate support from family and friends, yet denied the presence of robust internal coping strategies to navigate change.  It also appears that worry and frustration with her health condition exacerbates her feeling and experience of it.       DIAGNOSIS:  Adjustment disorder with mixed anxiety and depressed mood.     PLAN:  RTC approximately every 2-4 weeks for continuing psychotherapy.     Time in: 9:01  Time out: 9:55  Extended session due to complexity of case and length of interval.    Jesenia Clay, PhD,   Clinical Health Psychologist    Tx plan completed: 9/21/17  Tx plan due:  12/21/17

## 2017-09-26 ENCOUNTER — THERAPY VISIT (OUTPATIENT)
Dept: PHYSICAL THERAPY | Facility: CLINIC | Age: 82
End: 2017-09-26
Payer: MEDICARE

## 2017-09-26 DIAGNOSIS — G89.29 CHRONIC BILATERAL LOW BACK PAIN WITH LEFT-SIDED SCIATICA: Primary | ICD-10-CM

## 2017-09-26 DIAGNOSIS — M54.42 CHRONIC BILATERAL LOW BACK PAIN WITH LEFT-SIDED SCIATICA: Primary | ICD-10-CM

## 2017-09-26 PROCEDURE — 97530 THERAPEUTIC ACTIVITIES: CPT | Mod: GP | Performed by: PHYSICAL THERAPIST

## 2017-09-26 PROCEDURE — 97110 THERAPEUTIC EXERCISES: CPT | Mod: GP | Performed by: PHYSICAL THERAPIST

## 2017-09-29 ENCOUNTER — THERAPY VISIT (OUTPATIENT)
Dept: PHYSICAL THERAPY | Facility: CLINIC | Age: 82
End: 2017-09-29
Payer: MEDICARE

## 2017-09-29 DIAGNOSIS — M54.42 CHRONIC BILATERAL LOW BACK PAIN WITH LEFT-SIDED SCIATICA: ICD-10-CM

## 2017-09-29 DIAGNOSIS — G89.29 CHRONIC BILATERAL LOW BACK PAIN WITH LEFT-SIDED SCIATICA: ICD-10-CM

## 2017-09-29 PROCEDURE — 97530 THERAPEUTIC ACTIVITIES: CPT | Mod: GP | Performed by: PHYSICAL THERAPIST

## 2017-09-29 PROCEDURE — 97110 THERAPEUTIC EXERCISES: CPT | Mod: GP | Performed by: PHYSICAL THERAPIST

## 2017-10-10 ENCOUNTER — THERAPY VISIT (OUTPATIENT)
Dept: PHYSICAL THERAPY | Facility: CLINIC | Age: 82
End: 2017-10-10
Payer: MEDICARE

## 2017-10-10 DIAGNOSIS — M54.42 CHRONIC BILATERAL LOW BACK PAIN WITH LEFT-SIDED SCIATICA: ICD-10-CM

## 2017-10-10 DIAGNOSIS — G89.29 CHRONIC BILATERAL LOW BACK PAIN WITH LEFT-SIDED SCIATICA: ICD-10-CM

## 2017-10-10 PROCEDURE — 97110 THERAPEUTIC EXERCISES: CPT | Mod: GP | Performed by: PHYSICAL THERAPIST

## 2017-10-10 PROCEDURE — 97530 THERAPEUTIC ACTIVITIES: CPT | Mod: GP | Performed by: PHYSICAL THERAPIST

## 2017-10-16 NOTE — TELEPHONE ENCOUNTER
"APPT INFORMATION    Date /Time: 10/20/17 8:05AM    Reason for Appt: chronic leg pain with low back pain    Ref Provider/Clinic: Ulises PRAKASH    Patient Contact (Y/N) & Call Details: No, pt was referred.    Action: None      RECORDS CLINIC NAME  (\"None\" if no records ) RECEIVED RECS & IMG? Y/N   (may include other helpful notes)   Internal Clinics: [facility] Ulises PRAKASH (Referring)  Recs are in Epic / Images in PACS   Referral 9/21/17       [facility]  Pt Notes in Epic     [facility] Sports Medicine Amina PRAKASH  Recs are in Epic /Images in PACS     [facility]  ED Note Kenroy PRAKASH    External Clinics: None             "

## 2017-10-17 DIAGNOSIS — R52 PAIN: Primary | ICD-10-CM

## 2017-10-17 DIAGNOSIS — I10 ESSENTIAL HYPERTENSION: ICD-10-CM

## 2017-10-17 RX ORDER — AMLODIPINE BESYLATE 10 MG/1
10 TABLET ORAL DAILY
Qty: 90 TABLET | Refills: 2 | Status: SHIPPED | OUTPATIENT
Start: 2017-10-17 | End: 2018-06-02

## 2017-10-17 RX ORDER — TRAMADOL HYDROCHLORIDE 50 MG/1
50 TABLET ORAL EVERY 6 HOURS PRN
Qty: 270 TABLET | Refills: 1 | Status: ON HOLD | OUTPATIENT
Start: 2017-10-17 | End: 2018-01-30

## 2017-10-17 NOTE — TELEPHONE ENCOUNTER
Amlodipine 10 mg tabs      Last Written Prescription Date: 1-18-17  Last Fill Quantity: 90, # refills: 1  Last Office Visit : 8-31-17  Next Clinic Visit: none       BP Readings from Last 3 Encounters:   09/14/17 139/67   09/05/17 124/63   08/31/17 124/63     Kathleen M Doege RN

## 2017-10-19 ENCOUNTER — OFFICE VISIT (OUTPATIENT)
Dept: PSYCHOLOGY | Facility: CLINIC | Age: 82
End: 2017-10-19

## 2017-10-19 DIAGNOSIS — F43.23 ADJUSTMENT DISORDER WITH MIXED ANXIETY AND DEPRESSED MOOD: Primary | ICD-10-CM

## 2017-10-19 NOTE — PROGRESS NOTES
Health Psychology                  Clinic    Department of Medicine  Lilliana Bartholomew, PhD, LP [phone]                          Clinics and Surgery Center  [facility] Zana Atwood, PhD, LP [phone]                  3rd Floor  [place] Mail Code 741   Garret Munguia, PhD, ABPP, LP [phone]     [address] [facility],   [address] [facility] Jesenia Clay,  PhD, LP [phone]            [place] [address] Jesica Wagoner, PhD, LP [phone]    Health Psychology Follow-Up Note    SUBJECTIVE:  Lucie Stockton is an 82-year-old female who was seen for individual psychotherapy for adjustment disorder. She reported improvement in ability to ambulate, which has improved her mood. She presented without a wheelchair today. She reported that back and leg pain since the peak of her pain. She also reported benefit physically and emotionally from returning to volunteer activities. She endorsed continued work with PT, with high adherence in order to best prepare for an upcoming international trip.  I provided praise for her strong adherence to her physical therapy exercises, and encouraged her to acknowledge that her active work is helping her to better manage her chronic back pain. She reported increased acceptance about her chronic back pain, and is learning to continue to participate in activities and adapted ways. Discussed activity pacing and modifying expectations for our session content. Provided psychoeducation about activity pacing, encouraging shorter activity and rest cycles as a way to prevent a cycle of activity and long periods of resting. She reported she is already doing this, and we created a plan for how to consider applying this skill for her upcoming vacation. Discussed the use of cognitive reframing in order to manage expectations, including challenging shoulds statements as well as predicting the future beliefs. Encouraged her to continue to set goals  for tasks you would like to increase strength for, as this is helping her immensely become quite active in her rehabilitation.    OBJECTIVE:  The patient arrived early, maintained good eye contact and was neatly groomed and dressed.  She was in a wheelchair for this visit due to left leg pain.  She was alert and oriented to person, place, time and situation.  She appeared to respond honestly to all questions about psychosocial functioning and was deemed a reliable historian.  Mood appeared euthymic, although she indicated frustration related to her health conditions.  Speech was clear, coherent and of normal rate, rhythm and volume.  Thoughts were overall logical and organized.  Thought content was appropriate to interview and situation.  Insight and judgment were both good.  She denied current suicidal or assaultive ideation, plan or intent.     ASSESSMENT:  Lucie Stockton is an 82-year-old female experiencing adjustment difficulties following an ongoing left leg injury that has disrupted engagement in physical activities, emotional well-being, and self-care routines.  It also appears that the physical and emotional changes have impacted sleep activity and eating patterns.  She presents with adequate support from family and friends, yet denied the presence of robust internal coping strategies to navigate change.  It also appears that worry and frustration with her health condition exacerbates her feeling and experience of it. Appears to be benefiting from cognitive behavioral strategies for management of pain and adjustment to change and health status.      DIAGNOSIS:  Adjustment disorder with mixed anxiety and depressed mood.     PLAN:  RTC approximately every 2-4 weeks for continuing psychotherapy.     Time in: 9:00  Time out: 9:58  Extended session due to complexity of case and length of interval.    Jesenia Clay, PhD, LP  Clinical Health Psychologist    Tx plan completed: 9/21/17  Tx plan due:  12/21/17

## 2017-10-19 NOTE — MR AVS SNAPSHOT
After Visit Summary   10/19/2017    Lucie Stockton    MRN: [id number]           Patient Information     Date Of Birth          1935        Visit Information        Provider Department      10/19/2017 9:00 AM Jesenia Clay, PhD [facility]        Today's Diagnoses     Adjustment disorder with mixed anxiety and depressed mood    -  1       Follow-ups after your visit        Your next 10 appointments already scheduled     Oct 20, 2017  8:20 AM CDT   (Arrive by 8:05 AM)   New Patient Visit with Zafar Pena MD   [facility] ([facility])    [address] [place] [address]  4th Floor  [facility] [address]   [phone]            Oct 24, 2017 10:10 AM CDT   KATEY Extremity with Samantha Agarwal, PT   [facility] ([facility])    [address]  [facility] [address]   [phone]            Nov 16, 2017  3:00 PM CST   (Arrive by 2:45 PM)   New Patient Visit with Lorenzo Pena MD   [facility] ([facility])    [address] [place] [address]  3rd Floor  [facility] [address] [phone]            Dec 18, 2017 12:30 PM CST   US SUMAYA DOPPLER WITH EXERCISE BILATERAL with UCUSV1   [facility] [place] [facility] ([facility])    [address] [place] [address]  [address]st [facility] [address] [phone]           Please bring a list of your medicines (including vitamins, minerals and over-the-counter drugs). Also, tell your doctor about any allergies you may have. Wear comfortable clothes and leave your valuables at home.  No caffeine or tobacco for 1 hour prior to exam.  Please call the Imaging Department at your exam site with any questions.            Dec 18, 2017  1:45 PM CST   (Arrive by 1:30 PM)   Return Vascular Visit with Iona Velez MD   [facility] ([facility]  [place])    [address]  3rd Floor  [facility] [address] [phone]              Who to contact     Please call your clinic at [phone] to:    Ask questions about your health    Make or cancel appointments    Discuss your medicines    Learn about your test results    Speak to your doctor   If you have compliments or concerns about an experience at your clinic, or if you wish to file a complaint, please contact [facility] Patient Relations at [phone] or email us at [email or web address]         Additional Information About Your Visit        [employer] Information     [employer] gives you secure access to your electronic health record. If you see a primary care provider, you can also send messages to your care team and make appointments. If you have questions, please call your primary care clinic.  If you do not have a primary care provider, please call [phone] and they will assist you.      [employer] is an electronic gateway that provides easy, online access to your medical records. With [employer], you can request a clinic appointment, read your test results, renew a prescription or communicate with your care team.     To access your existing account, please contact your [facility] or call [phone] for assistance.        Care EveryWhere ID     This is your Care EveryWhere ID. This could be used by other organizations to access your [facility] medical records  [id number]         Blood Pressure from Last 3 Encounters:   09/14/17 139/67   09/05/17 124/63   08/31/17 124/63    Weight from Last 3 Encounters:   09/14/17 46.3 kg (102 lb)   09/05/17 44.5 kg (98 lb)   08/28/17 44.5 kg (98 lb)              Today, you had the following     No orders found for display       Primary Care Provider Office Phone # Fax #    Marii Montgomery [phone]       [address] [place] [address]  [facility] [address]        Equal Access to  Services     [facility]: Hadii reyna blevins kaylee Reeseali, waaxda luqadaha, qaybta kaalmada itzeladielbrielle, jackeline cobbreginataryn bernal . So [facility] [phone].    ATENCIÓN: Si habla luis felipe, tiene a schwartz disposición servicios gratuitos de asistencia lingüística. Llame al [phone].    We comply with applicable federal civil rights laws and [place] laws. We do not discriminate on the basis of race, color, national origin, age, disability, sex, sexual orientation, or gender identity.            Thank you!     Thank you for choosing [facility]  for your care. Our goal is always to provide you with excellent care. Hearing back from our patients is one way we can continue to improve our services. Please take a few minutes to complete the written survey that you may receive in the mail after your visit with us. Thank you!             Your Updated Medication List - Protect others around you: Learn how to safely use, store and throw away your medicines at www.disposemymeds.org.          This list is accurate as of: 10/19/17 10:07 AM.  Always use your most recent med list.                   Brand Name Dispense Instructions for use Diagnosis    albuterol 108 (90 BASE) MCG/ACT Inhaler    VENTOLIN HFA    18 Inhaler    Inhale 2 puffs into the lungs every 4 hours as needed for shortness of breath / dyspnea or wheezing    Cough       amLODIPine 10 MG tablet    NORVASC    90 tablet    Take 1 tablet (10 mg) by mouth daily For blood pressure    Essential hypertension       atorvastatin 40 MG tablet    LIPITOR    90 tablet    Take 2 tablets (80 mg) by mouth daily    Hyperlipidemia, unspecified hyperlipidemia type       bisacodyl 10 MG Suppository    DULCOLAX    90 suppository    Place 1 suppository (10 mg) rectally daily as needed for constipation    Constipation, unspecified constipation type       CALCIUM 600 + D PO      Take 1 tablet by mouth daily.        citalopram 40 MG tablet    celeXA    30 tablet     Take 0.5 tablets (20 mg) by mouth daily    Depression, unspecified depression type       clopidogrel 75 MG tablet    PLAVIX    90 tablet    Take 1 tablet (75 mg) by mouth daily    Other chronic pulmonary embolism, Venous thrombosis of extremity       * ferrous sulfate 325 (65 FE) MG tablet    IRON    90 tablet    Take 1 tablet (325 mg) by mouth daily (with breakfast)    Other iron deficiency anemias       * ferrous sulfate 325 (65 FE) MG tablet    IRON    90 tablet    Take 1 tablet (325 mg) by mouth 2 times daily To maintain iron levels    Iron deficiency       hydrochlorothiazide 50 MG tablet    HYDRODIURIL    90 tablet    Take 1 tablet (50 mg) by mouth daily    Benign essential hypertension       ketorolac 0.5 % ophthalmic solution    ACULAR     Place 1 drop Into the left eye 4 times daily        levothyroxine 50 MCG tablet    SYNTHROID/LEVOTHROID    90 tablet    Take 1 tablet (50 mcg) by mouth daily    Hypothyroidism, unspecified type       lisinopril 40 MG tablet    PRINIVIL/ZESTRIL    90 tablet    Take 1 tablet (40 mg) by mouth daily For blood pressure    Essential hypertension       moxifloxacin 0.5 % ophthalmic solution    VIGAMOX     Place 1 drop Into the left eye 4 times daily        naproxen sodium 220 MG capsule     60 capsule    Take 220 mg by mouth 2 times daily (with meals)    Chronic low back pain, unspecified back pain laterality, with sciatica presence unspecified, Pain of left lower leg       oxyCODONE 5 MG IR tablet    ROXICODONE    10 tablet    Take 1 tablet (5 mg) by mouth every 6 hours as needed for moderate to severe pain    Chronic low back pain, unspecified back pain laterality, with sciatica presence unspecified, Pain of left lower leg       pantoprazole 40 MG EC tablet    PROTONIX    90 tablet    Take 1 tablet (40 mg) by mouth daily    Other iron deficiency anemias       polyethylene glycol powder    MIRALAX    119 g    Take one-half to one scoop once a day for maintaining soft stools     Constipation, unspecified constipation type       potassium chloride SA 20 MEQ CR tablet    K-DUR/KLOR-CON M    90 tablet    Take 1 tablet (20 mEq) by mouth daily    Hypopotassemia       prednisoLONE acetate 1 % ophthalmic susp    PRED FORTE     Place 1 drop Into the left eye 4 times daily        traMADol 50 MG tablet    ULTRAM    270 tablet    Take 1 tablet (50 mg) by mouth every 6 hours as needed for moderate pain (Take 1 tablet (50 mg) by mouth every 6 hours as needed for moderate pain or severe pain Maximum 270 tablets in 3 months)    Pain       trolamine salicylate 10 % cream    ASPERCREME    170 g    Apply 1 g topically 3 times daily    Arthralgia of left lower leg       * Notice:  This list has 2 medication(s) that are the same as other medications prescribed for you. Read the directions carefully, and ask your doctor or other care provider to review them with you.

## 2017-10-20 ENCOUNTER — PRE VISIT (OUTPATIENT)
Dept: ANESTHESIOLOGY | Facility: CLINIC | Age: 82
End: 2017-10-20

## 2017-10-20 ENCOUNTER — OFFICE VISIT (OUTPATIENT)
Dept: ANESTHESIOLOGY | Facility: CLINIC | Age: 82
End: 2017-10-20

## 2017-10-20 VITALS
BODY MASS INDEX: 20.62 KG/M2 | OXYGEN SATURATION: 92 % | WEIGHT: 105 LBS | HEIGHT: 60 IN | DIASTOLIC BLOOD PRESSURE: 62 MMHG | HEART RATE: 75 BPM | SYSTOLIC BLOOD PRESSURE: 144 MMHG

## 2017-10-20 DIAGNOSIS — M54.5 LOW BACK PAIN, UNSPECIFIED BACK PAIN LATERALITY, UNSPECIFIED CHRONICITY, WITH SCIATICA PRESENCE UNSPECIFIED: ICD-10-CM

## 2017-10-20 DIAGNOSIS — M54.16 LUMBAR RADICULOPATHY: ICD-10-CM

## 2017-10-20 DIAGNOSIS — M47.816 LUMBAR SPONDYLOSIS: ICD-10-CM

## 2017-10-20 DIAGNOSIS — I73.9 PVD (PERIPHERAL VASCULAR DISEASE) (H): ICD-10-CM

## 2017-10-20 DIAGNOSIS — M46.1 SACROILIITIS (H): ICD-10-CM

## 2017-10-20 DIAGNOSIS — M48.062 SPINAL STENOSIS OF LUMBAR REGION WITH NEUROGENIC CLAUDICATION: Primary | ICD-10-CM

## 2017-10-20 DIAGNOSIS — M79.606 PAIN OF LOWER EXTREMITY, UNSPECIFIED LATERALITY: ICD-10-CM

## 2017-10-20 ASSESSMENT — ENCOUNTER SYMPTOMS
SEIZURES: 0
STIFFNESS: 0
DIZZINESS: 0
DECREASED CONCENTRATION: 0
INSOMNIA: 1
HEMATURIA: 0
DEPRESSION: 0
ALTERED TEMPERATURE REGULATION: 1
WEAKNESS: 1
TINGLING: 0
SPEECH CHANGE: 0
MYALGIAS: 1
JOINT SWELLING: 0
HEADACHES: 0
DECREASED APPETITE: 1
DYSURIA: 0
NERVOUS/ANXIOUS: 0
WEIGHT LOSS: 1
BACK PAIN: 1
MEMORY LOSS: 0
FLANK PAIN: 0
TREMORS: 0
WEIGHT GAIN: 0
PANIC: 0
NIGHT SWEATS: 0
POLYDIPSIA: 0
FEVER: 0
ARTHRALGIAS: 1
NECK PAIN: 0
POLYPHAGIA: 0
CHILLS: 0
DISTURBANCES IN COORDINATION: 0
FATIGUE: 1
DIFFICULTY URINATING: 0
HALLUCINATIONS: 0
NUMBNESS: 0
MUSCLE CRAMPS: 0
INCREASED ENERGY: 1
LOSS OF CONSCIOUSNESS: 0
MUSCLE WEAKNESS: 0
PARALYSIS: 0

## 2017-10-20 ASSESSMENT — ANXIETY QUESTIONNAIRES
6. BECOMING EASILY ANNOYED OR IRRITABLE: NOT AT ALL
5. BEING SO RESTLESS THAT IT IS HARD TO SIT STILL: NOT AT ALL
3. WORRYING TOO MUCH ABOUT DIFFERENT THINGS: NOT AT ALL
7. FEELING AFRAID AS IF SOMETHING AWFUL MIGHT HAPPEN: NOT AT ALL
GAD7 TOTAL SCORE: 0
1. FEELING NERVOUS, ANXIOUS, OR ON EDGE: NOT AT ALL
2. NOT BEING ABLE TO STOP OR CONTROL WORRYING: NOT AT ALL
GAD7 TOTAL SCORE: 0
4. TROUBLE RELAXING: NOT AT ALL
GAD7 TOTAL SCORE: 0
7. FEELING AFRAID AS IF SOMETHING AWFUL MIGHT HAPPEN: NOT AT ALL

## 2017-10-20 ASSESSMENT — PAIN SCALES - GENERAL: PAINLEVEL: SEVERE PAIN (6)

## 2017-10-20 NOTE — MR AVS SNAPSHOT
After Visit Summary   10/20/2017    Lucie Stockton    MRN: [id number]           Patient Information     Date Of Birth          1935        Visit Information        Provider Department      10/20/2017 8:20 AM Zafar Pena MD [facility]        Today's Diagnoses     Spinal stenosis of lumbar region with neurogenic claudication    -  1    Pain of lower extremity, unspecified laterality        Low back pain, unspecified back pain laterality, unspecified chronicity, with sciatica presence unspecified        PVD (peripheral vascular disease) (H)        Lumbar radiculopathy        Lumbar spondylosis        Sacroiliitis (H)          Care Instructions    1. Please Schedule your Lumbar X Ray.    IMAGING SERVICES HOURS:    All imaging modalities are available from 7 a.m. - 9 p.m. Monday through Friday  X-ray, CT, MRI, and General Ultrasound appointments are available from 7 a.m. -3:30 p.m. on Saturdays  X-ray, CT and MRI appointments are available from 8 a.m. - 4:30 p.m. on Sundays  Please call [phone] to schedule imaging exams    2. Clinic staff will reach out to Dr. Montgomery, and ask if they are agreeable to write orders for you to hold your Plavix.     3. Continue your other medications as prescribed by your Primary Doctor.     4. Schedule your Procedure with Dr. Pena.     Please call Chanda at [phone] to schedule your procedure. She is available Monday - Friday from 9-5:30pm, if she is unavailable to take your call, please leave her a voice message with your name, birth date, and phone number and she will call you back.    Your procedure: Interlaminar Lumbar Epidural Steroid Injection.     On the day of the procedure  1. Arrive 1 hour earlier than your scheduled time, to the Clinics and Surgery Center  Address: [address] [place] [id number]  2. Check in on the 5th floor for your procedure    A nurse will call you 2 weeks  after your procedure to follow up.    If you must reschedule your procedure more than two times, you must follow up in clinic before rescheduling again.      Patient Pre-Procedure Instructions    CAUTION - FAILURE TO FOLLOW THESE PRE-PROCEDURE INSTRUCTIONS WILL RESULT IN YOUR PROCEDURE BEING RESCHEDULED.      Pregnancy  If you are pregnant, or think that you may be pregnant, please notify our staff. This may or may not affect the ability to perform the procedure.     You MUST have a  TO TAKE YOU HOME after your procedure. Transportation by Taxi or Para-transit must have a responsible adult accompany you home (other than the ). Travel by bus or light rail is not acceptable transportation. You must provide your 's full name and contact number at time of check in.   Fasting Protocol You may have NOTHING SOLID TO EAT FOR 8 HOURS prior to arrival at the procedure area.   Broth and candy are considered solid food and require an eight hour fast.   You may have CLEAR LIQUIDS UP TO 2 HOURS prior to arrival at the clinic.   Clear liquids include water, clear fruit juice (no pulp) carbonated beverages, ice, black coffee, black tea (no milk or cream), chewing gum (un-swallowed), and/or clear jello (no fruit or milk). No alcohol containing beverages.   Medications If you take any medications,  DO NOT STOP. Take your medications as usual the morning of your procedure with a sip of water AT LEAST 2 HOURS PRIOR TO ARRIVAL.    Antibiotics If you are currently taking antibiotics, you must complete the entire dose 7 days prior to your scheduled procedure. You must be clear of any signs or symptoms of infection. If you begin antibiotics, please contact our clinic for instructions.   Fever, Chills, or Rash If you experience a fever of higher than 100 degrees, chills, rash, or open wounds during the one week prior to your procedure, please call the clinic.         Medication Hold List  **Patients under  Cardiology/Neurology care should consult their provider prior to the pain procedure to verify pre-procedure medication instructions. The information below contains general guidelines.**    Blood Thinners If you are taking daily ASPIRIN, PLAVIX, OR OTHER BLOOD THINNERS SUCH AS COUMADIN/WARFARIN, we will need your prescribing doctor to sign a release permitting you to stop these medications. Once approved by your prescribing doctor - STOP ALL BLOOD THINNERS BASED ON THE TIME TABLE BELOW PRIOR TO YOUR PROCEDURE. If you have been instructed to stop WARFARIN(COUMADIN), you must have an INR lab drawn the day before your procedure. . Your INR must be within normal limits before we can perform your injection. MEDICATIONS CAN BE RESTARTED AFTER YOUR PROCEDURE.    14 DAY HOLD  Ticlid (ticlopidine)    10 DAY HOLD  Effient (Prasugel)    3 DAY HOLD  Xarelto (rivaroxaban) 7 DAY HOLD  Anacin, Bufferin, Ecotrin, Excedrin, Aggrenox (Aspirin)  Brilinta (ticagrelor)  Coumadin (Warfarin)  Pradexa (Dabigatran)  Elmiron (Pentosan)  Plavix (Clopidogrel Bisulfate)  Pletal (Cilostazol)    24 DAY HOLD  Lovenox (enoxaparin)  Agrylin (Anagrelide)        Non-steroidal Anti-inflammatories (NSAIDs) DO NOT TAKE any non-steroidal anti-inflammatory medications (NSAIDs) listed on the table below. MEDICATIONS CAN BE RESTARTED AFTER YOUR PROCEDURE. Celebrex is OK to take and does not need to be discontinued.     Medications to stop:  3 DAY HOLD  Advil, Motrin (Ibuprofen)  Arthrotec (diciofenac sodium/misoprostol)  Clinoril (Sulindac)  Indocin (Indomethacin)  Lodine (Etodolac)  Toradol (Ketorolac)  Vicoprofen (Hydrocodone and Ibuprofen)  Voltaren (Diclotenac)    14 DAY HOLD  Daypro (Oxaprozin)  Feldene (Piroxicam)   7 DAY HOLD  Aleve (Naproxen sodium)  Darvon compound (contains aspirin)  Naprosyn (Naproxen)  Norgesic Forte (contains aspirin)  Mobic (Meloxicam)  Oruvall (Ketoprofen)  Percodan (contains aspirin)  Relafen  (Nabumetone)  Salsalate  Trilisate  Vitamin E (more than 400 mg per day)  Any medication containing aspirin                To speak with a nurse, schedule/reschedule/cancel a clinic appointment, or request a medication refill call: [phone]     You can also reach us by [employer]: [email or web address]      For refills, please call on Monday, 1 week before your medication runs out. The doctors are not always in clinic, so this gives us time to get your prescriptions ready.  Please let us know the name of the medication you are requesting a refill of.                                     Follow-ups after your visit        Your next 10 appointments already scheduled     Oct 24, 2017 10:10 AM CDT   KATEY Extremity with Samantha Agarwal, PT   [facility] ([facility])    [address]  [facility] [address]   [phone]            Nov 16, 2017  3:00 PM CST   (Arrive by 2:45 PM)   New Patient Visit with Lorenzo Pena MD   [facility] ([facility])    [address] [place] [address] [id number] [phone]            Dec 07, 2017  2:00 PM CST   (Arrive by 1:45 PM)   Return Visit with Jesenia Clay, PhD   [facility] ([facility])    [address] [place] [address] [id number] [phone]            Dec 18, 2017 12:30 PM CST   US SUMAYA DOPPLER WITH EXERCISE BILATERAL with [id number]   [facility] ([facility])    [address] [place] [address] [id number] [phone]           Please bring a list of your medicines (including vitamins, minerals and over-the-counter drugs). Also, tell your doctor about any allergies you may have. Wear comfortable clothes and leave your valuables at home.  No caffeine or tobacco for 1 hour prior to exam.  Please call the Imaging Department at your exam  site with any questions.            Dec 18, 2017  1:45 PM CST   (Arrive by 1:30 PM)   Return Vascular Visit with Iona Velez MD   [facility] ([facility])    [address] [address] [phone]              Future tests that were ordered for you today     Open Future Orders        Priority Expected Expires Ordered    XR Lumbar Spine G/E 4 Views Routine 10/20/2017 10/20/2018 10/20/2017            Who to contact     Please call your clinic at [phone] to:    Ask questions about your health    Make or cancel appointments    Discuss your medicines    Learn about your test results    Speak to your doctor   If you have compliments or concerns about an experience at your clinic, or if you wish to file a complaint, please contact [facility] Patient Relations at [phone] or email us at [email or web address]         Additional Information About Your Visit        [employer] [facility] Information     [employer] gives you secure access to your electronic health record. If you see a primary care provider, you can also send messages to your care team and make appointments. If you have questions, please call your primary care clinic.  If you do not have a primary care provider, please call [phone] and they will assist you.      [employer] is an electronic gateway that provides easy, online access to your medical records. With [employer], you can request a clinic appointment, read your test results, renew a prescription or communicate with your care team.     To access your existing account, please contact your [facility] or call [phone] for assistance.        Care EveryWhere ID     This is your Care EveryWhere ID. This could be used by other organizations to access your [facility] medical records  [id number]        Your Vitals Were     Pulse Height Pulse Oximetry BMI (Body Mass Index)           75 1.524 m (5') 92% 20.51 kg/m2         Blood Pressure from Last 3 Encounters:   10/20/17 144/62   09/14/17 139/67   09/05/17 124/63    Weight from Last 3 Encounters:   10/20/17 47.6 kg (105 lb)   09/14/17 46.3 kg (102 lb)   09/05/17 44.5 kg (98 lb)              We Performed the Following     PAIN MANAGEMENT CENTER ([facility]) REFERRAL     [facility]andra-Operative Worksheet - Lumbar Epidural Steroid Injection        Primary Care Provider Office Phone # Fax #    Marii Montgomery [phone]       [address] [place] [address] [address] [place] [address]        Equal Access to Services     PAL SALGADO : Hadced Gamboa, wage keller, qaybta kaalmada adedeepali, jackeline izquierdo. So [facility] [phone].    ATENCIÓN: Si habla español, tiene a schwartz disposición servicios gratuitos de asistencia lingüística. Llame al [phone].    We comply with applicable federal civil rights laws and [place] laws. We do not discriminate on the basis of race, color, national origin, age, disability, sex, sexual orientation, or gender identity.            Thank you!     Thank you for choosing [facility]  for your care. Our goal is always to provide you with excellent care. Hearing back from our patients is one way we can continue to improve our services. Please take a few minutes to complete the written survey that you may receive in the mail after your visit with us. Thank you!             Your Updated Medication List - Protect others around you: Learn how to safely use, store and throw away your medicines at www.disposemymeds.org.          This list is accurate as of: 10/20/17 10:16 AM.  Always use your most recent med list.                   Brand Name Dispense Instructions for use Diagnosis    albuterol 108 (90 BASE) MCG/ACT Inhaler    VENTOLIN HFA    18 Inhaler    Inhale 2 puffs into the lungs every 4 hours as needed for shortness of breath / dyspnea  or wheezing    Cough       amLODIPine 10 MG tablet    NORVASC    90 tablet    Take 1 tablet (10 mg) by mouth daily For blood pressure    Essential hypertension       atorvastatin 40 MG tablet    LIPITOR    90 tablet    Take 2 tablets (80 mg) by mouth daily    Hyperlipidemia, unspecified hyperlipidemia type       bisacodyl 10 MG Suppository    DULCOLAX    90 suppository    Place 1 suppository (10 mg) rectally daily as needed for constipation    Constipation, unspecified constipation type       CALCIUM 600 + D PO      Take 1 tablet by mouth daily.        citalopram 40 MG tablet    celeXA    30 tablet    Take 0.5 tablets (20 mg) by mouth daily    Depression, unspecified depression type       clopidogrel 75 MG tablet    PLAVIX    90 tablet    Take 1 tablet (75 mg) by mouth daily    Other chronic pulmonary embolism, Venous thrombosis of extremity       * ferrous sulfate 325 (65 FE) MG tablet    IRON    90 tablet    Take 1 tablet (325 mg) by mouth daily (with breakfast)    Other iron deficiency anemias       * ferrous sulfate 325 (65 FE) MG tablet    IRON    90 tablet    Take 1 tablet (325 mg) by mouth 2 times daily To maintain iron levels    Iron deficiency       hydrochlorothiazide 50 MG tablet    HYDRODIURIL    90 tablet    Take 1 tablet (50 mg) by mouth daily    Benign essential hypertension       ketorolac 0.5 % ophthalmic solution    ACULAR     Place 1 drop Into the left eye 4 times daily        levothyroxine 50 MCG tablet    SYNTHROID/LEVOTHROID    90 tablet    Take 1 tablet (50 mcg) by mouth daily    Hypothyroidism, unspecified type       lisinopril 40 MG tablet    PRINIVIL/ZESTRIL    90 tablet    Take 1 tablet (40 mg) by mouth daily For blood pressure    Essential hypertension       moxifloxacin 0.5 % ophthalmic solution    VIGAMOX     Place 1 drop Into the left eye 4 times daily        naproxen sodium 220 MG capsule     60 capsule    Take 220 mg by mouth 2 times daily (with meals)    Chronic low back pain,  unspecified back pain laterality, with sciatica presence unspecified, Pain of left lower leg       oxyCODONE 5 MG IR tablet    ROXICODONE    10 tablet    Take 1 tablet (5 mg) by mouth every 6 hours as needed for moderate to severe pain    Chronic low back pain, unspecified back pain laterality, with sciatica presence unspecified, Pain of left lower leg       pantoprazole 40 MG EC tablet    PROTONIX    90 tablet    Take 1 tablet (40 mg) by mouth daily    Other iron deficiency anemias       polyethylene glycol powder    MIRALAX    119 g    Take one-half to one scoop once a day for maintaining soft stools    Constipation, unspecified constipation type       potassium chloride SA 20 MEQ CR tablet    K-DUR/KLOR-CON M    90 tablet    Take 1 tablet (20 mEq) by mouth daily    Hypopotassemia       prednisoLONE acetate 1 % ophthalmic susp    PRED FORTE     Place 1 drop Into the left eye 4 times daily        traMADol 50 MG tablet    ULTRAM    270 tablet    Take 1 tablet (50 mg) by mouth every 6 hours as needed for moderate pain (Take 1 tablet (50 mg) by mouth every 6 hours as needed for moderate pain or severe pain Maximum 270 tablets in 3 months)    Pain       trolamine salicylate 10 % cream    ASPERCREME    170 g    Apply 1 g topically 3 times daily    Arthralgia of left lower leg       * Notice:  This list has 2 medication(s) that are the same as other medications prescribed for you. Read the directions carefully, and ask your doctor or other care provider to review them with you.

## 2017-10-20 NOTE — PROGRESS NOTES
"Subjective:    82 year old female with past medical history of anemia, anxiety, aortic stenosis, back pain, DVT, HLD, hypothyroidism, depression, iron deficiency, osteoarthritis, osteoporosis, pulmonary embolism, tobacco use and knee pain  presents for evaluation of low back pain and left lower extremity pain. Pain has been present for many years; however, over the past 2 months, the pain has gotten much worse. Pain has gotten worse since that time.     The pain is achy, sharp with movement. The pain is constant. Pain can be severe at times, but waxes and wanes in severity. Average pain is 7/10. Pain is better with rest, medications. Worse with prolonged activity.     Positive shopping cart sign. She can currently walk 3 blocks before her back and legs start hurting her.    The patient denies focal lower extremity weakness. No lower extremity sensory changes. No incontinence of bowel or bladder.      Current treatments include:  Tramadol 50mg Q6H, Naproxen 220mg BID    Previous medication treatments included:    Opioids: oxycodone    Other treatments have included:    Lucie Stockton has been seen at a pain clinic in the past.  physical therapy: yes  Acupuncture: yes  Injections: no    Past Medical History:   Diagnosis Date     Anemia      Aortic stenosis     abdominal     Atherosclerosis of aorta (H)     \"extensive disease with near occlusion below level of renal arteries\" on CT     Atherosclerosis of arteries of extremities (H)      Back pain     severe multilevel DJD, moderate spinal canal stenosis L4-5, severe L3-4     Degenerative joint disease      DVT of lower extremity, bilateral (H)     5/24/10 left distal femoral and great saphenous, 7/7/10 left:  extending to cfv, iliac , pop and post tibial, peronial, right:  distal fem and peroneal      Grave's disease      Hyperlipidaemia LDL goal < 70      Hypertension, essential      Hypothyroidism      Insomnia      Intermittent claudication (H)      Iron deficiency  "     Knee pain      Lumbar stenosis with neurogenic claudication      Osteoarthritis     ankle,foot, knee     Osteoporosis      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   Past Surgical History:   Procedure Laterality Date     BUNIONECTOMY       C STOMACH SURGERY PROCEDURE UNLISTED      Please see chart     COLONOSCOPY      11/10/10 angioectasia     HYSTERECTOMY TOTAL ABDOMINAL, BILATERAL SALPINGO-OOPHORECTOMY, NODE DISSECTION, COMBINED  2/17/11    SANGEETHA, EMILIA, LN bx, omentectomy, resection of evrian malignancy Dr. JONO Goncalves - Returned BENIGN     UPPER GI ENDOSCOPY      11/10/10 erythematous gastropathy, angioectasia    past surgical history reviewed with patient.   Medications:  Current Outpatient Prescriptions   Medication     traMADol (ULTRAM) 50 MG tablet     amLODIPine (NORVASC) 10 MG tablet     trolamine salicylate (ASPERCREME) 10 % cream     citalopram (CELEXA) 40 MG tablet     naproxen sodium 220 MG capsule     oxyCODONE (ROXICODONE) 5 MG IR tablet     atorvastatin (LIPITOR) 40 MG tablet     pantoprazole (PROTONIX) 40 MG EC tablet     moxifloxacin (VIGAMOX) 0.5 % ophthalmic solution     prednisoLONE acetate (PRED FORTE) 1 % ophthalmic susp     ketorolac (ACULAR) 0.5 % ophthalmic solution     bisacodyl (DULCOLAX) 10 MG Suppository     levothyroxine (SYNTHROID/LEVOTHROID) 50 MCG tablet     clopidogrel (PLAVIX) 75 MG tablet     hydrochlorothiazide (HYDRODIURIL) 50 MG tablet     lisinopril (PRINIVIL/ZESTRIL) 40 MG tablet     polyethylene glycol (MIRALAX) powder     ferrous sulfate (IRON) 325 (65 FE) MG tablet     albuterol (VENTOLIN HFA) 108 (90 BASE) MCG/ACT inhaler     potassium chloride SA (K-DUR,KLOR-CON M) 20 MEQ tablet     ferrous sulfate (IRON) 325 (65 FE) MG  tablet     Calcium Carbonate-Vitamin D (CALCIUM 600 + D OR)     [DISCONTINUED] tolterodine (DETROL) 1 MG tablet     No current facility-administered medications for this visit.      [place] and [place] Prescription Monitoring Program reviewed    Allergies:   No Known Allergies  Family History:  family history includes Breast Cancer (age of onset: 80) in her maternal aunt; C.A.D. (age of onset: 54) in her father.    Social history: Smoking: none, quit 2007, 1/2 ppd for 40 years. Alcohol: none. Street drugs: none.   Review Of Systems     14 point ROS negative except per HPI    Objective:   /62  Pulse 75  Ht 1.524 m (5')  Wt 47.6 kg (105 lb)  SpO2 92%  BMI 20.51 kg/m2  Body mass index is 20.51 kg/(m^2).  General: In no apparent distress  Mental status: Normal affect, pleasant  Head: Atraumatic, normocephalic  Eyes: Extra-ocular movements grossly intact, no scleral icterus  Cardiovascular: Regular rate  Respiratory: No respiratory distress  Abdomen: soft, non-distended  Msk: Bilateral hips, knees have normal range of motion. Pain with extension and rotation of lumbar spine  Neuro: AAOx3.   CN II-XII are grossly intact.   Back: tenderness to palpation over the left SI joint, negative facet loading bilaterally, negative SLR bilaterally, positive tenderness to palpation over the lumbar paraspinous musculature  Left Ankle: full ROM, tenderness to palpation over the lateral aspect of the ankle  Skin: No rashes or lesions noted on exposed areas of skin  Lymph: no supraclavicular lymphadenopathy    Imaging: MR LUMBAR SPINE W/O CONTRAST 12/1/2016 7:35 AM     History: Radiculopathy, lumbar region, Other intervertebral disc  displacement, lumbar region, Lesion of sciatic nerve, left lower limb     Comparison: CT lumbar spine 11/17/2016. MRI lumbar spine 6/24/2013.     Technique: Sagittal T1-weighted, sagittal T2-weighted, sagittal STIR,  sagittal diffusion-weighted, axial T2-weighted, and axial gradient  echo images of the  lumbar spine were obtained without the  administration of intravenous contrast.     Findings: Regarding numbering convention, there are 5 lumbar-type  vertebrae assumed for the purposes of this dictation.  The tip of the  conus medullaris is at L1-2. Partial osseous fusion across the  vertebral bodies at T11-12 and multiple bridging syndesmophytes. Grade  1 retrolisthesis of L2 on L3, unchanged. Grade 1 anterolisthesis of L4  on L5, also unchanged. Diffuse disc desiccation. Severe loss of disc  height at at L1-2 and L2-3. Moderate loss of disc height at L3-4. Mild  loss of disc height at L4-5. Multilevel next Modic degenerative  endplate changes including edema most pronounced at T12-L1, L1-2, and  L3-4. Convex left curvature of the lumbar spine with apex at L3-4.  Multiple synovial cysts associated with facet joints on both sides,  none of which extend into the neural foramina or spinal canal.     On a level by level basis:     T12-L1: Mild disc bulge and facet hypertrophy. Mild spinal canal  narrowing. Moderate left neural foraminal narrowing. No right neural  foraminal stenosis.     L1-2: Circumferential disc osteophyte complex and bilateral facet  hypertrophy. Mild spinal canal narrowing. Moderate bilateral neural  foraminal stenosis.     L2-3: Grade 1 retrolisthesis. Circumferential disc osteophyte complex  and bilateral facet hypertrophy. Mild spinal canal narrowing. Severe  right and moderate left neural foraminal stenosis.     L3-4: Posterior disc bulge and bilateral facet hypertrophy. Mild  spinal canal narrowing. Moderate bilateral neural foraminal stenosis.     L4-5: Grade 1 anterolisthesis. Marked bilateral facet hypertrophy.  Bilateral facet joint effusions. Ligamentum flavum thickening. Severe  spinal canal stenosis. Moderate bilateral neural foraminal stenosis.     L5-S1: Posterior disc bulge and bilateral facet hypertrophy. Mild  spinal canal narrowing. Mild right and moderate left neural  foraminal  narrowing.     Paraspinous tissues are within normal limits.         Impression:   Extensive multilevel lumbar degenerative changes including unchanged  severe spinal canal stenosis at L4-5, unchanged since 6/24/2013.  Severe right neural foraminal stenosis at L2-3. Additional multilevel  moderate neural foraminal stenosis.    Assessment:    Lucie is an 82-year-old female who presents to the pain clinic as a new patient, referred by her primary care physician. She has had low back pain for the past 15 years, however, her back pain has worsened over the past 2 months. She is currently undergoing physical therapy, and her physical therapist recommended she be evaluated by pain physician. Based on history, physical exam, review the medical record, laboratory studies and radiographs, the most likely diagnoses are lumbar spinal stenosis, lumbar spondylosis, lumbar radiculopathy, lumbar degenerative disc disease and deconditioning. Therefore, we will obtain updated lumbar flexion and extension x-rays to assess for spondylolisthesis, as well as, I will schedule Lucie for an interlaminar lumbar epidural steroid injection.    Plan:     1. Obtain lumbar flexion and extension x-rays  2. Schedule interlaminar L4-L5 LESI  3. Consider left SI joint injection in the future  4. Consider intra-articular lumbar facet injections with effusion aspiration in the future  5. Unlikely a MILD candidate due to marked facet hypertrophy; however, cannot be completely ruled out.  6. Hold Plavix for 7 days  7. Continue Tramadol 50mg TID and Naproxen 220mg BID

## 2017-10-20 NOTE — NURSING NOTE
Procedure consent signed and sent to scanning.   LPN reviewed AVS with Pt includin. Please Schedule your Lumbar X Ray.    IMAGING SERVICES HOURS:    All imaging modalities are available from 7 a.m. - 9 p.m. Monday through Friday  X-ray, CT, MRI, and General Ultrasound appointments are available from 7 a.m. -3:30 p.m. on   X-ray, CT and MRI appointments are available from 8 a.m. - 4:30 p.m. on Sundays  Please call [phone] to schedule imaging exams    2. Clinic staff will reach out to Dr. Montgomery, and ask if they are agreeable to write orders for you to hold your Plavix.     3. Continue your other medications as prescribed by your Primary Doctor.     4. Schedule your Procedure with Dr. Pena.     Please call Chanda at [phone] to schedule your procedure. She is available Monday - Friday from 9-5:30pm, if she is unavailable to take your call, please leave her a voice message with your name, birth date, and phone number and she will call you back.    Your procedure: Interlaminar Lumbar Epidural Steroid Injection.     LPN read through the Pre-procedure instructions with pt.   Pt verbalized understanding to holding appropriate medication per protocol, and was agreeable to NPO policy and needing a .    Pt verbalized an understanding of information, and was asked to contact clinic with questions.    Rukhsana Aguirre LPN

## 2017-10-20 NOTE — PATIENT INSTRUCTIONS
1. Please Schedule your Lumbar X Ray.    IMAGING SERVICES HOURS:    All imaging modalities are available from 7 a.m. - 9 p.m. Monday through Friday  X-ray, CT, MRI, and General Ultrasound appointments are available from 7 a.m. -3:30 p.m. on Saturdays  X-ray, CT and MRI appointments are available from 8 a.m. - 4:30 p.m. on Sundays  Please call [phone] to schedule imaging exams    2. Clinic staff will reach out to Dr. Montgomery, and ask if they are agreeable to write orders for you to hold your Plavix.     3. Continue your other medications as prescribed by your Primary Doctor.     4. Schedule your Procedure with Dr. Pena.     Please call Chanda at [phone] to schedule your procedure. She is available Monday - Friday from 9-5:30pm, if she is unavailable to take your call, please leave her a voice message with your name, birth date, and phone number and she will call you back.    Your procedure: Interlaminar Lumbar Epidural Steroid Injection.     On the day of the procedure  1. Arrive 1 hour earlier than your scheduled time, to the [facility]  Address: [address] [place] [id number]  2. Check in on the 5th floor for your procedure    A nurse will call you 2 weeks after your procedure to follow up.    If you must reschedule your procedure more than two times, you must follow up in clinic before rescheduling again.      Patient Pre-Procedure Instructions    CAUTION - FAILURE TO FOLLOW THESE PRE-PROCEDURE INSTRUCTIONS WILL RESULT IN YOUR PROCEDURE BEING RESCHEDULED.      Pregnancy  If you are pregnant, or think that you may be pregnant, please notify our staff. This may or may not affect the ability to perform the procedure.     You MUST have a  TO TAKE YOU HOME after your procedure. Transportation by Taxi or Para-transit must have a responsible adult accompany you home (other than the ). Travel by bus or light rail is not acceptable transportation. You must  provide your 's full name and contact number at time of check in.   Fasting Protocol You may have NOTHING SOLID TO EAT FOR 8 HOURS prior to arrival at the procedure area.   Broth and candy are considered solid food and require an eight hour fast.   You may have CLEAR LIQUIDS UP TO 2 HOURS prior to arrival at the clinic.   Clear liquids include water, clear fruit juice (no pulp) carbonated beverages, ice, black coffee, black tea (no milk or cream), chewing gum (un-swallowed), and/or clear jello (no fruit or milk). No alcohol containing beverages.   Medications If you take any medications,  DO NOT STOP. Take your medications as usual the morning of your procedure with a sip of water AT LEAST 2 HOURS PRIOR TO ARRIVAL.    Antibiotics If you are currently taking antibiotics, you must complete the entire dose 7 days prior to your scheduled procedure. You must be clear of any signs or symptoms of infection. If you begin antibiotics, please contact our clinic for instructions.   Fever, Chills, or Rash If you experience a fever of higher than 100 degrees, chills, rash, or open wounds during the one week prior to your procedure, please call the clinic.         Medication Hold List  **Patients under Cardiology/Neurology care should consult their provider prior to the pain procedure to verify pre-procedure medication instructions. The information below contains general guidelines.**    Blood Thinners If you are taking daily ASPIRIN, PLAVIX, OR OTHER BLOOD THINNERS SUCH AS COUMADIN/WARFARIN, we will need your prescribing doctor to sign a release permitting you to stop these medications. Once approved by your prescribing doctor - STOP ALL BLOOD THINNERS BASED ON THE TIME TABLE BELOW PRIOR TO YOUR PROCEDURE. If you have been instructed to stop WARFARIN(COUMADIN), you must have an INR lab drawn the day before your procedure. . Your INR must be within normal limits before we can perform your injection. MEDICATIONS CAN BE  RESTARTED AFTER YOUR PROCEDURE.    14 DAY HOLD  Ticlid (ticlopidine)    10 DAY HOLD  Effient (Prasugel)    3 DAY HOLD  Xarelto (rivaroxaban) 7 DAY HOLD  Anacin, Bufferin, Ecotrin, Excedrin, Aggrenox (Aspirin)  Brilinta (ticagrelor)  Coumadin (Warfarin)  Pradexa (Dabigatran)  Elmiron (Pentosan)  Plavix (Clopidogrel Bisulfate)  Pletal (Cilostazol)    24 DAY HOLD  Lovenox (enoxaparin)  Agrylin (Anagrelide)        Non-steroidal Anti-inflammatories (NSAIDs) DO NOT TAKE any non-steroidal anti-inflammatory medications (NSAIDs) listed on the table below. MEDICATIONS CAN BE RESTARTED AFTER YOUR PROCEDURE. Celebrex is OK to take and does not need to be discontinued.     Medications to stop:  3 DAY HOLD  Advil, Motrin (Ibuprofen)  Arthrotec (diciofenac sodium/misoprostol)  Clinoril (Sulindac)  Indocin (Indomethacin)  Lodine (Etodolac)  Toradol (Ketorolac)  Vicoprofen (Hydrocodone and Ibuprofen)  Voltaren (Diclotenac)    14 DAY HOLD  Daypro (Oxaprozin)  Feldene (Piroxicam)   7 DAY HOLD  Aleve (Naproxen sodium)  Darvon compound (contains aspirin)  Naprosyn (Naproxen)  Norgesic Forte (contains aspirin)  Mobic (Meloxicam)  Oruvall (Ketoprofen)  Percodan (contains aspirin)  Relafen (Nabumetone)  Salsalate  Trilisate  Vitamin E (more than 400 mg per day)  Any medication containing aspirin                To speak with a nurse, schedule/reschedule/cancel a clinic appointment, or request a medication refill call: [phone]     You can also reach us by [employer]: [email or web address]      For refills, please call on Monday, 1 week before your medication runs out. The doctors are not always in clinic, so this gives us time to get your prescriptions ready.  Please let us know the name of the medication you are requesting a refill of.

## 2017-10-20 NOTE — LETTER
"10/20/2017       RE: Lucie Stockton  [address] [facility] [address]     Dear Colleague,    Thank you for referring your patient, Lucie Stockton, to the [facility] at [facility]. Please see a copy of my visit note below.    Subjective:    82 year old female with past medical history of anemia, anxiety, aortic stenosis, back pain, DVT, HLD, hypothyroidism, depression, iron deficiency, osteoarthritis, osteoporosis, pulmonary embolism, tobacco use and knee pain  presents for evaluation of low back pain and left lower extremity pain. Pain has been present for many years; however, over the past 2 months, the pain has gotten much worse. Pain has gotten worse since that time.     The pain is achy, sharp with movement. The pain is constant. Pain can be severe at times, but waxes and wanes in severity. Average pain is 7/10. Pain is better with rest, medications. Worse with prolonged activity.     Positive shopping cart sign. She can currently walk 3 blocks before her back and legs start hurting her.    The patient denies focal lower extremity weakness. No lower extremity sensory changes. No incontinence of bowel or bladder.      Current treatments include:  Tramadol 50mg Q6H, Naproxen 220mg BID    Previous medication treatments included:    Opioids: oxycodone    Other treatments have included:    Lucie Stockton has been seen at a pain clinic in the past.  physical therapy: yes  Acupuncture: yes  Injections: no    Past Medical History:   Diagnosis Date     Anemia      Aortic stenosis     abdominal     Atherosclerosis of aorta (H)     \"extensive disease with near occlusion below level of renal arteries\" on CT     Atherosclerosis of arteries of extremities (H)      Back pain     severe multilevel DJD, moderate spinal canal stenosis L4-5, severe L3-4     Degenerative joint disease      DVT of lower extremity, bilateral (H)     5/24/10 left " distal femoral and great saphenous, 7/7/10 left:  extending to cfv, iliac , pop and post tibial, peronial, right:  distal fem and peroneal      Grave's disease      Hyperlipidaemia LDL goal < 70      Hypertension, essential      Hypothyroidism      Insomnia      Intermittent claudication (H)      Iron deficiency      Knee pain      Lumbar stenosis with neurogenic claudication      Osteoarthritis     ankle,foot, knee     Osteoporosis      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   Past Surgical History:   Procedure Laterality Date     BUNIONECTOMY       C STOMACH SURGERY PROCEDURE UNLISTED      Please see chart     COLONOSCOPY      11/10/10 angioectasia     HYSTERECTOMY TOTAL ABDOMINAL, BILATERAL SALPINGO-OOPHORECTOMY, NODE DISSECTION, COMBINED  2/17/11    SANGEETHA, EMILIA, LN bx, omentectomy, resection of evrian malignancy Dr. JONO Goncalves - Returned BENIGN     UPPER GI ENDOSCOPY      11/10/10 erythematous gastropathy, angioectasia    past surgical history reviewed with patient.   Medications:  Current Outpatient Prescriptions   Medication     traMADol (ULTRAM) 50 MG tablet     amLODIPine (NORVASC) 10 MG tablet     trolamine salicylate (ASPERCREME) 10 % cream     citalopram (CELEXA) 40 MG tablet     naproxen sodium 220 MG capsule     oxyCODONE (ROXICODONE) 5 MG IR tablet     atorvastatin (LIPITOR) 40 MG tablet     pantoprazole (PROTONIX) 40 MG EC tablet     moxifloxacin (VIGAMOX) 0.5 % ophthalmic solution     prednisoLONE acetate (PRED FORTE) 1 % ophthalmic susp     ketorolac (ACULAR) 0.5 % ophthalmic solution     bisacodyl (DULCOLAX) 10 MG Suppository     levothyroxine (SYNTHROID/LEVOTHROID) 50 MCG tablet     clopidogrel (PLAVIX) 75 MG tablet     hydrochlorothiazide  (HYDRODIURIL) 50 MG tablet     lisinopril (PRINIVIL/ZESTRIL) 40 MG tablet     polyethylene glycol (MIRALAX) powder     ferrous sulfate (IRON) 325 (65 FE) MG tablet     albuterol (VENTOLIN HFA) 108 (90 BASE) MCG/ACT inhaler     potassium chloride SA (K-DUR,KLOR-CON M) 20 MEQ tablet     ferrous sulfate (IRON) 325 (65 FE) MG tablet     Calcium Carbonate-Vitamin D (CALCIUM 600 + D OR)     [DISCONTINUED] tolterodine (DETROL) 1 MG tablet     No current facility-administered medications for this visit.      [place] and [place] Prescription Monitoring Program reviewed    Allergies:   No Known Allergies  Family History:  family history includes Breast Cancer (age of onset: 80) in her maternal aunt; C.A.D. (age of onset: 54) in her father.    Social history: Smoking: none, quit 2007, 1/2 ppd for 40 years. Alcohol: none. Street drugs: none.   Review Of Systems     14 point ROS negative except per HPI    Objective:   /62  Pulse 75  Ht 1.524 m (5')  Wt 47.6 kg (105 lb)  SpO2 92%  BMI 20.51 kg/m2  Body mass index is 20.51 kg/(m^2).  General: In no apparent distress  Mental status: Normal affect, pleasant  Head: Atraumatic, normocephalic  Eyes: Extra-ocular movements grossly intact, no scleral icterus  Cardiovascular: Regular rate  Respiratory: No respiratory distress  Abdomen: soft, non-distended  Msk: Bilateral hips, knees have normal range of motion. Pain with extension and rotation of lumbar spine  Neuro: AAOx3.   CN II-XII are grossly intact.   Back: tenderness to palpation over the left SI joint, negative facet loading bilaterally, negative SLR bilaterally, positive tenderness to palpation over the lumbar paraspinous musculature  Left Ankle: full ROM, tenderness to palpation over the lateral aspect of the ankle  Skin: No rashes or lesions noted on exposed areas of skin  Lymph: no supraclavicular lymphadenopathy    Imaging: MR LUMBAR SPINE W/O CONTRAST 12/1/2016 7:35 AM     History: Radiculopathy, lumbar region, Other  intervertebral disc  displacement, lumbar region, Lesion of sciatic nerve, left lower limb     Comparison: CT lumbar spine 11/17/2016. MRI lumbar spine 6/24/2013.     Technique: Sagittal T1-weighted, sagittal T2-weighted, sagittal STIR,  sagittal diffusion-weighted, axial T2-weighted, and axial gradient  echo images of the lumbar spine were obtained without the  administration of intravenous contrast.     Findings: Regarding numbering convention, there are 5 lumbar-type  vertebrae assumed for the purposes of this dictation.  The tip of the  conus medullaris is at L1-2. Partial osseous fusion across the  vertebral bodies at T11-12 and multiple bridging syndesmophytes. Grade  1 retrolisthesis of L2 on L3, unchanged. Grade 1 anterolisthesis of L4  on L5, also unchanged. Diffuse disc desiccation. Severe loss of disc  height at at L1-2 and L2-3. Moderate loss of disc height at L3-4. Mild  loss of disc height at L4-5. Multilevel next Modic degenerative  endplate changes including edema most pronounced at T12-L1, L1-2, and  L3-4. Convex left curvature of the lumbar spine with apex at L3-4.  Multiple synovial cysts associated with facet joints on both sides,  none of which extend into the neural foramina or spinal canal.     On a level by level basis:     T12-L1: Mild disc bulge and facet hypertrophy. Mild spinal canal  narrowing. Moderate left neural foraminal narrowing. No right neural  foraminal stenosis.     L1-2: Circumferential disc osteophyte complex and bilateral facet  hypertrophy. Mild spinal canal narrowing. Moderate bilateral neural  foraminal stenosis.     L2-3: Grade 1 retrolisthesis. Circumferential disc osteophyte complex  and bilateral facet hypertrophy. Mild spinal canal narrowing. Severe  right and moderate left neural foraminal stenosis.     L3-4: Posterior disc bulge and bilateral facet hypertrophy. Mild  spinal canal narrowing. Moderate bilateral neural foraminal stenosis.     L4-5: Grade 1  anterolisthesis. Marked bilateral facet hypertrophy.  Bilateral facet joint effusions. Ligamentum flavum thickening. Severe  spinal canal stenosis. Moderate bilateral neural foraminal stenosis.     L5-S1: Posterior disc bulge and bilateral facet hypertrophy. Mild  spinal canal narrowing. Mild right and moderate left neural foraminal  narrowing.     Paraspinous tissues are within normal limits.         Impression:   Extensive multilevel lumbar degenerative changes including unchanged  severe spinal canal stenosis at L4-5, unchanged since 6/24/2013.  Severe right neural foraminal stenosis at L2-3. Additional multilevel  moderate neural foraminal stenosis.    Assessment:    Lucie is an 82-year-old female who presents to the pain clinic as a new patient, referred by her primary care physician. She has had low back pain for the past 15 years, however, her back pain has worsened over the past 2 months. She is currently undergoing physical therapy, and her physical therapist recommended she be evaluated by pain physician. Based on history, physical exam, review the medical record, laboratory studies and radiographs, the most likely diagnoses are lumbar spinal stenosis, lumbar spondylosis, lumbar radiculopathy, lumbar degenerative disc disease and deconditioning. Therefore, we will obtain updated lumbar flexion and extension x-rays to assess for spondylolisthesis, as well as, I will schedule Lucie for an interlaminar lumbar epidural steroid injection.    Plan:     1. Obtain lumbar flexion and extension x-rays  2. Schedule interlaminar L4-L5 LESI  3. Consider left SI joint injection in the future  4. Consider intra-articular lumbar facet injections with effusion aspiration in the future  5. Unlikely a MILD candidate due to marked facet hypertrophy; however, cannot be completely ruled out.  6. Hold Plavix for 7 days  7. Continue Tramadol 50mg TID and Naproxen 220mg BID      Again, thank you for allowing me to participate in  the care of your patient.      Sincerely,    Zafar Pena MD

## 2017-10-21 ASSESSMENT — ANXIETY QUESTIONNAIRES: GAD7 TOTAL SCORE: 0

## 2017-10-30 ENCOUNTER — THERAPY VISIT (OUTPATIENT)
Dept: PHYSICAL THERAPY | Facility: CLINIC | Age: 82
End: 2017-10-30
Payer: MEDICARE

## 2017-10-30 DIAGNOSIS — M54.42 CHRONIC BILATERAL LOW BACK PAIN WITH LEFT-SIDED SCIATICA: ICD-10-CM

## 2017-10-30 DIAGNOSIS — G89.29 CHRONIC BILATERAL LOW BACK PAIN WITH LEFT-SIDED SCIATICA: ICD-10-CM

## 2017-10-30 PROCEDURE — 97530 THERAPEUTIC ACTIVITIES: CPT | Mod: GP | Performed by: PHYSICAL THERAPIST

## 2017-10-30 PROCEDURE — G8980 MOBILITY D/C STATUS: HCPCS | Mod: GP | Performed by: PHYSICAL THERAPIST

## 2017-10-30 PROCEDURE — G8979 MOBILITY GOAL STATUS: HCPCS | Mod: GP | Performed by: PHYSICAL THERAPIST

## 2017-10-30 PROCEDURE — 97110 THERAPEUTIC EXERCISES: CPT | Mod: GP | Performed by: PHYSICAL THERAPIST

## 2017-10-30 NOTE — PROGRESS NOTES
Subjective:    HPI                    Objective:    System    Physical Exam    General     ROS    Assessment/Plan:      DISCHARGE REPORT    Progress reporting period is from 7/20/2017 to 10/30/2017.       SUBJECTIVE  Subjective changes noted by patient:  she had an SHAE and back is some better 25% leg pain 75% better. upon waking she has some lat ankle pain  but then it works its way out with movement. [place] leaves tomorrow for [place].  she dreads the flight 6-7 hrs.     Current pain level is 2/10  .     Previous pain level was  10/10 Initial Pain level: 10/10 (butt let leg to ankle, back is 5/10).   Changes in function:  Yes (See Goal flowsheet attached for changes in current functional level)  Adverse reaction to treatment or activity: None    OBJECTIVE  Changes noted in objective findings:  Yes, LS flexion wnl extension mod loss.   Objective:  She performs the exercise 3 x day 2 x 3 min HOWARD with forearms on 1 balance pad. Posture is erect now as compared to flexed from waist.    ASSESSMENT/PLAN  Updated problem list and treatment plan: Diagnosis 1:  stenosis  Pain -  self management, education, directional preference exercise and home program  Decreased ROM/flexibility - home program  Decreased joint mobility - home program  STG/LTGs have been met or progress has been made towards goals:  Yes (See Goal flow sheet completed today.)  Assessment of Progress: The patient has met all of their long term goals.  Self Management Plans:  Patient is independent in a home treatment program.  Patient is independent in self management of symptoms.    Lucie continues to require the following intervention to meet STG and LTG's:  PT intervention is no longer required to meet STG/LTG.    Recommendations:  This patient is ready to be discharged from therapy and continue their home treatment program.    Please refer to the daily flowsheet for treatment today, total treatment time and time spent performing 1:1 timed  codes.

## 2017-11-08 DIAGNOSIS — M54.16 LUMBAR RADICULOPATHY: ICD-10-CM

## 2017-11-08 RX ORDER — GABAPENTIN 100 MG/1
CAPSULE ORAL
Qty: 240 CAPSULE | Refills: 1 | Status: CANCELLED | OUTPATIENT
Start: 2017-11-08

## 2017-11-08 NOTE — TELEPHONE ENCOUNTER
gabapentin   Last Written Prescription Date:  Med was dc'd from EMR list  Last Fill Quantity: 240   # refills: 9/28/17 last refill per pharmacy request  Last Office Visit : 8/31/17  Future Office visit:  No future appt    Routing refill request to clinic RN for review/approval because:  Drug not active on patient's medication list  Pt contacted and states she is currently taking this medication. This had been dc'd from med list. Pt is requesting refill

## 2017-11-10 NOTE — TELEPHONE ENCOUNTER
Another refill request was received today and prior to seeing this note a refusal was sent to the pharmacy and a staff message to the provider.    Kathleen M Doege RN

## 2017-11-14 ENCOUNTER — TELEPHONE (OUTPATIENT)
Dept: INTERNAL MEDICINE | Facility: CLINIC | Age: 82
End: 2017-11-14

## 2017-11-14 NOTE — TELEPHONE ENCOUNTER
----- Message from Marii Montgomery MD sent at 11/13/2017  4:03 PM CST -----  Regarding: RE: FYI - Gabapentin (and Tramadol)  I did discontinue them and agree with declining the request for refill. It looks like Ariadna Pablo ok'd the last one.   Please remind her that I told her (including in writing) to discontinue them.  Thank you.  SB    ----- Message -----     From: Doege, Kathleen M, RN     Sent: 11/10/2017  10:00 AM       To: Marii Montgomery MD  Subject: FYI - Gabapentin (and Tramadol)                  FYI - I received a refill request for Gabapentin today - I refused as I see it was discontinued on 8-18-17 along with the Tramadol and other medications.    However the faxed request indicates the patient refilled the gabapentin on 9-28-17 for 240 tabs.  She was also given a refill of her Tramadol on 10-17-17.    I am just wondering if there has been a change in condition since she was last seen in August that she is now back on these medications.    Thanks,  Ariadna Quinn message for pt to call back.  rKistal Jurado RN 10:43 AM on 11/14/2017.

## 2017-11-15 NOTE — TELEPHONE ENCOUNTER
----- Message from Kirby Steel sent at 11/14/2017  3:45 PM CST -----  Regarding: Return call   Contact: [phone]  Pt states she returned your call. Would like a call back.   Pt called and informed she is not to receive Tramadol or Gabapentin in the future.  Kristal Jurado RN 8:58 AM on 11/15/2017.

## 2017-11-16 ENCOUNTER — OFFICE VISIT (OUTPATIENT)
Dept: DERMATOLOGY | Facility: CLINIC | Age: 82
End: 2017-11-16

## 2017-11-16 DIAGNOSIS — D18.01 CHERRY ANGIOMA: ICD-10-CM

## 2017-11-16 DIAGNOSIS — D48.5 NEOPLASM OF UNCERTAIN BEHAVIOR OF SKIN: Primary | ICD-10-CM

## 2017-11-16 DIAGNOSIS — L98.9 SKIN LESION: ICD-10-CM

## 2017-11-16 PROCEDURE — 88305 TISSUE EXAM BY PATHOLOGIST: CPT | Performed by: DERMATOLOGY

## 2017-11-16 RX ORDER — LIDOCAINE HYDROCHLORIDE AND EPINEPHRINE 10; 10 MG/ML; UG/ML
3 INJECTION, SOLUTION INFILTRATION; PERINEURAL ONCE
Qty: 3 ML | Refills: 0 | OUTPATIENT
Start: 2017-11-16 | End: 2017-11-16

## 2017-11-16 ASSESSMENT — PAIN SCALES - GENERAL
PAINLEVEL: NO PAIN (0)
PAINLEVEL: NO PAIN (0)

## 2017-11-16 NOTE — PROGRESS NOTES
CHIEF COMPLAINT:  Dark spot on abdomen.      HISTORY OF PRESENT ILLNESS:  Lucie is a very pleasant 82-year-old female who is a new patient to our clinic, presenting with concerns about 2 dark spots on her abdomen.  Each of these were noticed by her primary care provider.  She is unsure how long they have been there and they are entirely asymptomatic, without itch, pain or bleeding.  She has no personal history of skin cancer.  She has no other specific lesions of concern today.     REVIEW OF SYSTEMS:  No recent fevers.      PHYSICAL EXAMINATION:   GENERAL:  This is a well-appearing, well-nourished, older female with a normal mood and affect who is oriented x3.   SKIN:  A cutaneous exam of the head, neck, bilateral upper extremities and abdomen was performed.  On the left upper abdomen, there is a 9 x 6 mm pink-brown papule with dotted black globular pigment on dermoscopy.  On the upper mid abdomen, there is a violaceous, uniformly purple on dermoscopy, vascular-appearing domed papule.  Exam of her face and shoulders demonstrates scattered benign-appearing lentigines, but no lesions concerning for skin cancer.      ASSESSMENT AND PLAN:   1.  Neoplasm of uncertain behavior on the left abdomen.  The differential diagnosis includes pigmented BCC, melanoma or an irritated seborrheic keratosis.  A biopsy was performed today.  Please see the procedure note below.   2.  Cherry angioma of the abdomen.  Reassurance was provided to the benign nature of this lesion.   3.  She will follow up in our clinic on an as-needed basis pending the result of the biopsy.      PROCEDURE NOTE:  After signed informed consent, the affected area was swabbed with an alcohol pad and injected with 1% lidocaine with epinephrine.  Biopsy via shave technique was taken and sent for histopathology.  Hemostasis was achieved with aluminum chloride 20%, and the defect was covered with petrolatum and a bandage.  The patient tolerated this without  complication.       Lorenzo Pena MD  Dermatology Attending

## 2017-11-16 NOTE — LETTER
11/16/2017       RE: Lucie Stockton  [address] [facility] [address]     Dear Colleague,    Thank you for referring your patient, Lucie Stockton, to the [facility] at [facility]. Please see a copy of my visit note below.    CHIEF COMPLAINT:  Dark spot on abdomen.      HISTORY OF PRESENT ILLNESS:  Lucie is a very pleasant 82-year-old female who is a new patient to our clinic, presenting with concerns about 2 dark spots on her abdomen.  Each of these were noticed by her primary care provider.  She is unsure how long they have been there and they are entirely asymptomatic, without itch, pain or bleeding.  She has no personal history of skin cancer.  She has no other specific lesions of concern today.     REVIEW OF SYSTEMS:  No recent fevers.      PHYSICAL EXAMINATION:   GENERAL:  This is a well-appearing, well-nourished, older female with a normal mood and affect who is oriented x3.   SKIN:  A cutaneous exam of the head, neck, bilateral upper extremities and abdomen was performed.  On the left upper abdomen, there is a 9 x 6 mm pink-brown papule with dotted black globular pigment on dermoscopy.  On the upper mid abdomen, there is a violaceous, uniformly purple on dermoscopy, vascular-appearing domed papule.  Exam of her face and shoulders demonstrates scattered benign-appearing lentigines, but no lesions concerning for skin cancer.      ASSESSMENT AND PLAN:   1.  Neoplasm of uncertain behavior on the left abdomen.  The differential diagnosis includes pigmented BCC, melanoma or an irritated seborrheic keratosis.  A biopsy was performed today.  Please see the procedure note below.   2.  Cherry angioma of the abdomen.  Reassurance was provided to the benign nature of this lesion.   3.  She will follow up in our clinic on an as-needed basis pending the result of the biopsy.      PROCEDURE NOTE:  After signed informed consent, the affected area was swabbed with an  alcohol pad and injected with 1% lidocaine with epinephrine.  Biopsy via shave technique was taken and sent for histopathology.  Hemostasis was achieved with aluminum chloride 20%, and the defect was covered with petrolatum and a bandage.  The patient tolerated this without complication.       Lorenzo Pena MD  Dermatology Attending                  Pictures were placed in Pt's chart today for future reference.

## 2017-11-16 NOTE — NURSING NOTE
"Dermatology Rooming Note    Lucie Stockton's goals for this visit include:   Chief Complaint   Patient presents with     Derm Problem     Lucie is here today for a skin check. Has concerns of \"black spots\" on her abdomen.         Jesenia Howard, KITN  "

## 2017-11-16 NOTE — NURSING NOTE
Lidocaine-1 % injection   1mL once for one use, starting 11/16/2017 ending 11/16/2017,  2mL disp, R-0, injection  Injected by Jesenia Howard LPN

## 2017-11-16 NOTE — PATIENT INSTRUCTIONS

## 2017-11-16 NOTE — MR AVS SNAPSHOT
After Visit Summary   11/16/2017    Lucie Stockton    MRN: [id number]           Patient Information     Date Of Birth          1935        Visit Information        Provider Department      11/16/2017 3:00 PM Lorenzo Pena MD [facility] Dermatology        Today's Diagnoses     Neoplasm of uncertain behavior of skin    -  1    Skin lesion          Care Instructions    Wound Care After a Biopsy    What is a skin biopsy?  A skin biopsy allows the doctor to examine a very small piece of tissue under the microscope to determine the diagnosis and the best treatment for the skin condition. A local anesthetic (numbing medicine)  is injected with a very small needle into the skin area to be tested. A small piece of skin is taken from the area. Sometimes a suture (stitch) is used.     What are the risks of a skin biopsy?  I will experience scar, bleeding, swelling, pain, crusting and redness. I may experience incomplete removal or recurrence. Risks of this procedure are excessive bleeding, bruising, infection, nerve damage, numbness, thick (hypertrophic or keloidal) scar and non-diagnostic biopsy.    How should I care for my wound for the first 24 hours?    Keep the wound dry and covered for 24 hours    If it bleeds, hold direct pressure on the area for 15 minutes. If bleeding does not stop then go to the emergency room    Avoid strenuous exercise the first 1-2 days or as your doctor instructs you    How should I care for the wound after 24 hours?    After 24 hours, remove the bandage    You may bathe or shower as normal    If you had a scalp biopsy, you can shampoo as usual and can use shower water to clean the biopsy site daily    Clean the wound twice a day with gentle soap and water    Do not scrub, be gentle    Apply white petroleum/Vaseline after cleaning the wound with a cotton swab or a clean finger, and keep the site covered with a Bandaid /bandage. Bandages are not necessary with a scalp  biopsy    If you are unable to cover the site with a Bandaid /bandage, re-apply ointment 2-3 times a day to keep the site moist. Moisture will help with healing    Avoid strenuous activity for first 1-2 days    Avoid lakes, rivers, pools, and oceans until the stitches are removed or the site is healed    How do I clean my wound?    Wash hands thoroughly with soap or use hand  before all wound care    Clean the wound with gentle soap and water    Apply white petroleum/Vaseline  to wound after it is clean    Replace the Bandaid /bandage to keep the wound covered for the first few days or as instructed by your doctor    If you had a scalp biopsy, warm shower water to the area on a daily basis should suffice    What should I use to clean my wound?     Cotton-tipped applicators (Qtips )    White petroleum jelly (Vaseline ). Use a clean new container and use Q-tips to apply.    Bandaids   as needed    Gentle soap     How should I care for my wound long term?    Do not get your wound dirty    Keep up with wound care for one week or until the area is healed.    A small scab will form and fall off by itself when the area is completely healed. The area will be red and will become pink in color as it heals. Sun protection is very important for how your scar will turn out. Sunscreen with an SPF 30 or greater is recommended once the area is healed.    If you have stitches, stitches need to be removed in 14 days. You may return to our clinic for this or you may have it done locally at your doctor s office.    You should have some soreness but it should be mild and slowly go away over several days. Talk to your doctor about using tylenol for pain,    When should I call my doctor?  If you have increased:     Pain or swelling    Pus or drainage (clear or slightly yellow drainage is ok)    Temperature over 100F    Spreading redness or warmth around wound    When will I hear about my results?  The biopsy results can take 2-3  weeks to come back. The clinic will call you with the results, send you a mychart message, or have you schedule a follow-up clinic or phone time to discuss the results. Contact our clinics if you do not hear from us in 3 weeks.     Who should I call with questions?    [facility]: [phone]     [facility]: [phone]    For urgent needs outside of business hours call the [facility] at [phone] and ask for the dermatology resident on call              Follow-ups after your visit        Your next 10 appointments already scheduled     Nov 28, 2017   Procedure with Zafar Pena MD   [facility] ([facility] [place])    [address] [place] [address]  5th [facility] [address] [phone]           Located in the Clinics and Surgery Center at [address] [place] [address].   parking is very convenient and highly recommended.  is a $6 flat rate fee.  Both  and self parkers should enter the main arrival plaza from [facility]; parking attendants will direct you based on your parking preference.            Dec 07, 2017  2:00 PM CST   (Arrive by 1:45 PM)   Return Visit with Jesenia Clay, PhD   [facility] ([facility])    [address] [place] [address]  3rd [facility] [address] [phone]            Dec 18, 2017 12:30 PM CST   US SUMAYA DOPPLER WITH EXERCISE BILATERAL with UCUSV1   [facility] [place] US ([facility] [place])    [address] [place] [address]  1st [facility] [address] [phone]           Please bring a list of your medicines (including vitamins, minerals and over-the-counter drugs). Also, tell your doctor about any allergies you may have. Wear comfortable clothes and leave your valuables at home.  No caffeine or tobacco for 1 hour prior to exam.  Please call the  Imaging Department at your exam site with any questions.            Dec 18, 2017  1:45 PM CST   (Arrive by 1:30 PM)   Return Vascular Visit with Iona Velez MD   [facility] ([facility] [place])    [address] [id number] [phone]              Who to contact     Please call your clinic at [phone] to:    Ask questions about your health    Make or cancel appointments    Discuss your medicines    Learn about your test results    Speak to your doctor   If you have compliments or concerns about an experience at your clinic, or if you wish to file a complaint, please contact [facility] Patient Relations at [phone] or email us at [email or web address]         Additional Information About Your Visit        Meepshart Information     [employer] gives you secure access to your electronic health record. If you see a primary care provider, you can also send messages to your care team and make appointments. If you have questions, please call your primary care clinic.  If you do not have a primary care provider, please call [phone] and they will assist you.      [employer] is an electronic gateway that provides easy, online access to your medical records. With [employer], you can request a clinic appointment, read your test results, renew a prescription or communicate with your care team.     To access your existing account, please contact your [facility] or call [phone] for assistance.        Care EveryWhere ID     This is your Care EveryWhere ID. This could be used by other organizations to access your [place] medical records  [id number]         Blood Pressure from Last 3 Encounters:   10/20/17 144/62   09/14/17 139/67   09/05/17 124/63    Weight from Last 3 Encounters:   10/20/17 47.6 kg (105 lb)   09/14/17 46.3 kg (102 lb)   09/05/17 44.5 kg (98 lb)              We  Performed the Following     BIOPSY SKIN/SUBQ/MUC MEM, SINGLE LESION     DERMATOLOGY REFERRAL     Surgical pathology exam          Today's Medication Changes          These changes are accurate as of: 11/16/17  3:42 PM.  If you have any questions, ask your nurse or doctor.               Start taking these medicines.        Dose/Directions    lidocaine 1% with EPINEPHrine 1:100,000 1 %-1:346986 injection   Used for:  Neoplasm of uncertain behavior of skin   Started by:  Lorenzo Pena MD        Dose:  3 mL   Inject 3 mLs into the skin once for 1 dose   Quantity:  3 mL   Refills:  0            Where to get your medicines      Some of these will need a paper prescription and others can be bought over the counter.  Ask your nurse if you have questions.     You don't need a prescription for these medications     lidocaine 1% with EPINEPHrine 1:100,000 1 %-1:578747 injection                Primary Care Provider Office Phone # Fax #    Marii H MD Ulises [phone]       [address] [place] [address] 741  [facility] [address]        Equal Access to Services     RHYS [facility] GrenadaFUENTES AH: Hadii aad ku hadasho Soomaali, waaxda luqadaha, qaybta kaalmada adeegyada, waxay idiin haycharlan allen bernal . So [facility] [phone].    ATENCIÓN: Si habla español, tiene a schwartz disposición servicios gratuitos de asistencia lingüística. Llame al [phone].    We comply with applicable federal civil rights laws and [place] laws. We do not discriminate on the basis of race, color, national origin, age, disability, sex, sexual orientation, or gender identity.            Thank you!     Thank you for choosing [facility] DERMATOLOGY  for your care. Our goal is always to provide you with excellent care. Hearing back from our patients is one way we can continue to improve our services. Please take a few minutes to complete the written survey that you may receive in the mail after your visit with us. Thank you!             Your Updated  Medication List - Protect others around you: Learn how to safely use, store and throw away your medicines at www.disposemymeds.org.          This list is accurate as of: 11/16/17  3:42 PM.  Always use your most recent med list.                   Brand Name Dispense Instructions for use Diagnosis    albuterol 108 (90 BASE) MCG/ACT Inhaler    VENTOLIN HFA    18 Inhaler    Inhale 2 puffs into the lungs every 4 hours as needed for shortness of breath / dyspnea or wheezing    Cough       amLODIPine 10 MG tablet    NORVASC    90 tablet    Take 1 tablet (10 mg) by mouth daily For blood pressure    Essential hypertension       atorvastatin 40 MG tablet    LIPITOR    90 tablet    Take 2 tablets (80 mg) by mouth daily    Hyperlipidemia, unspecified hyperlipidemia type       bisacodyl 10 MG Suppository    DULCOLAX    90 suppository    Place 1 suppository (10 mg) rectally daily as needed for constipation    Constipation, unspecified constipation type       CALCIUM 600 + D PO      Take 1 tablet by mouth daily.        citalopram 40 MG tablet    celeXA    30 tablet    Take 0.5 tablets (20 mg) by mouth daily    Depression, unspecified depression type       clopidogrel 75 MG tablet    PLAVIX    90 tablet    Take 1 tablet (75 mg) by mouth daily    Other chronic pulmonary embolism, Venous thrombosis of extremity       * ferrous sulfate 325 (65 FE) MG tablet    IRON    90 tablet    Take 1 tablet (325 mg) by mouth daily (with breakfast)    Other iron deficiency anemias       * ferrous sulfate 325 (65 FE) MG tablet    IRON    90 tablet    Take 1 tablet (325 mg) by mouth 2 times daily To maintain iron levels    Iron deficiency       hydrochlorothiazide 50 MG tablet    HYDRODIURIL    90 tablet    Take 1 tablet (50 mg) by mouth daily    Benign essential hypertension       ketorolac 0.5 % ophthalmic solution    ACULAR     Place 1 drop Into the left eye 4 times daily        levothyroxine 50 MCG tablet    SYNTHROID/LEVOTHROID    90 tablet     Take 1 tablet (50 mcg) by mouth daily    Hypothyroidism, unspecified type       lidocaine 1% with EPINEPHrine 1:100,000 1 %-1:430637 injection     3 mL    Inject 3 mLs into the skin once for 1 dose    Neoplasm of uncertain behavior of skin       lisinopril 40 MG tablet    PRINIVIL/ZESTRIL    90 tablet    Take 1 tablet (40 mg) by mouth daily For blood pressure    Essential hypertension       moxifloxacin 0.5 % ophthalmic solution    VIGAMOX     Place 1 drop Into the left eye 4 times daily        naproxen sodium 220 MG capsule     60 capsule    Take 220 mg by mouth 2 times daily (with meals)    Chronic low back pain, unspecified back pain laterality, with sciatica presence unspecified, Pain of left lower leg       oxyCODONE IR 5 MG tablet    ROXICODONE    10 tablet    Take 1 tablet (5 mg) by mouth every 6 hours as needed for moderate to severe pain    Chronic low back pain, unspecified back pain laterality, with sciatica presence unspecified, Pain of left lower leg       pantoprazole 40 MG EC tablet    PROTONIX    90 tablet    Take 1 tablet (40 mg) by mouth daily    Other iron deficiency anemias       polyethylene glycol powder    MIRALAX    119 g    Take one-half to one scoop once a day for maintaining soft stools    Constipation, unspecified constipation type       potassium chloride SA 20 MEQ CR tablet    K-DUR/KLOR-CON M    90 tablet    Take 1 tablet (20 mEq) by mouth daily    Hypopotassemia       prednisoLONE acetate 1 % ophthalmic susp    PRED FORTE     Place 1 drop Into the left eye 4 times daily        traMADol 50 MG tablet    ULTRAM    270 tablet    Take 1 tablet (50 mg) by mouth every 6 hours as needed for moderate pain (Take 1 tablet (50 mg) by mouth every 6 hours as needed for moderate pain or severe pain Maximum 270 tablets in 3 months)    Pain       trolamine salicylate 10 % cream    ASPERCREME    170 g    Apply 1 g topically 3 times daily    Arthralgia of left lower leg       * Notice:  This list has 2  medication(s) that are the same as other medications prescribed for you. Read the directions carefully, and ask your doctor or other care provider to review them with you.

## 2017-11-20 LAB — COPATH REPORT: NORMAL

## 2017-11-26 PROBLEM — D48.5 NEOPLASM OF UNCERTAIN BEHAVIOR OF SKIN: Status: ACTIVE | Noted: 2017-11-26

## 2017-11-27 DIAGNOSIS — F32.A DEPRESSION, UNSPECIFIED DEPRESSION TYPE: ICD-10-CM

## 2017-11-27 DIAGNOSIS — E87.6 HYPOPOTASSEMIA: ICD-10-CM

## 2017-11-27 RX ORDER — POTASSIUM CHLORIDE 1500 MG/1
20 TABLET, EXTENDED RELEASE ORAL DAILY
Qty: 90 TABLET | Refills: 1 | Status: SHIPPED | OUTPATIENT
Start: 2017-11-27 | End: 2018-06-02

## 2017-11-27 NOTE — TELEPHONE ENCOUNTER
celexa   Last Written Prescription Date:  8/19/17  Last Fill Quantity: 30,   # refills: 2  kcl     Last Written Prescription Date:  10/13/16  Last Fill Quantity: 90,   # refills: 3  Last Office Visit : 8/31/17  Future Office visit:  No future appt  Potassium   Date Value Ref Range Status   05/11/2017 3.8 3.4 - 5.3 mmol/L Final     Routing refill request to provider for review/approval because:  celexa failed protocol due to score, unable to locate

## 2017-11-28 ENCOUNTER — HOSPITAL ENCOUNTER (OUTPATIENT)
Facility: AMBULATORY SURGERY CENTER | Age: 82
End: 2017-11-28
Attending: ANESTHESIOLOGY

## 2017-11-28 ENCOUNTER — SURGERY (OUTPATIENT)
Age: 82
End: 2017-11-28

## 2017-11-28 VITALS
SYSTOLIC BLOOD PRESSURE: 146 MMHG | DIASTOLIC BLOOD PRESSURE: 68 MMHG | TEMPERATURE: 98.3 F | WEIGHT: 100 LBS | HEIGHT: 60 IN | RESPIRATION RATE: 16 BRPM | BODY MASS INDEX: 19.63 KG/M2 | OXYGEN SATURATION: 96 %

## 2017-11-28 RX ORDER — CITALOPRAM HYDROBROMIDE 40 MG/1
20 TABLET ORAL DAILY
Qty: 45 TABLET | Refills: 1 | Status: SHIPPED | OUTPATIENT
Start: 2017-11-28 | End: 2018-06-02

## 2017-12-07 ENCOUNTER — OFFICE VISIT (OUTPATIENT)
Dept: PSYCHOLOGY | Facility: CLINIC | Age: 82
End: 2017-12-07

## 2017-12-07 DIAGNOSIS — F43.23 ADJUSTMENT DISORDER WITH MIXED ANXIETY AND DEPRESSED MOOD: Primary | ICD-10-CM

## 2017-12-07 NOTE — MR AVS SNAPSHOT
After Visit Summary   12/7/2017    Lucie Stockton    MRN: [id number]           Patient Information     Date Of Birth          1935        Visit Information        Provider Department      12/7/2017 2:00 PM Jesenia Clay, PhD [facility]        Today's Diagnoses     Adjustment disorder with mixed anxiety and depressed mood    -  1       Follow-ups after your visit        Your next 10 appointments already scheduled     Jan 05, 2018   Procedure with Jaylin Valadez MD   [facility] ([facility] [place])    [address] [place] [address]  5th [facility] [id number] [phone]           Located in the Clinics and Surgery Center at [address] [place] [address].   parking is very convenient and highly recommended.  is a $6 flat rate fee.  Both  and self parkers should enter the main arrival plaza from [facility]; parking attendants will direct you based on your parking preference.            Jan 09, 2018 11:00 AM CST   US SUMAYA DOPPLER WITH EXERCISE BILATERAL with UCUSV2   [facility] US ([facility])    [address] [place] [address]  1st [facility] [id number] [phone]           Please bring a list of your medicines (including vitamins, minerals and over-the-counter drugs). Also, tell your doctor about any allergies you may have. Wear comfortable clothes and leave your valuables at home.  No caffeine or tobacco for 1 hour prior to exam.  Please call the Imaging Department at your exam site with any questions.            Jan 09, 2018 12:00 PM CST   (Arrive by 11:45 AM)   Return Vascular Visit with ALEKSEY Ramirez   [facility] Vascular Clinic ([facility] [place])    [address] [place] [address]  3rd [facility] [id number] [phone]            Jan 16, 2018  9:15 AM CST   (Arrive by 9:00 AM)   Return Visit with Lorenzo Pena MD    [facility] Dermatology ([facility] [place])    [address] [address] [phone]              Who to contact     Please call your clinic at [phone] to:    Ask questions about your health    Make or cancel appointments    Discuss your medicines    Learn about your test results    Speak to your doctor   If you have compliments or concerns about an experience at your clinic, or if you wish to file a complaint, please contact [facility] Patient Relations at [phone] or email us at [email or web address]         Additional Information About Your Visit        Keaton [employer]rance Information     [employer] is an electronic gateway that provides easy, online access to your medical records. With [employer], you can request a clinic appointment, read your test results, renew a prescription or communicate with your care team.     To sign up for [employer] visit the website at [email or web address]   You will be asked to enter the access code listed below, as well as some personal information. Please follow the directions to create your username and password.     Your access code is: [email or web address]  Expires: 3/4/2018  6:30 AM     Your access code will  in 90 days. If you need help or a new code, please contact your [facility] or call [phone] for assistance.        Care EveryWhere ID     This is your Care EveryWhere ID. This could be used by other organizations to access your [place] medical records  [id number]         Blood Pressure from Last 3 Encounters:   17 146/68   10/20/17 144/62   17 139/67    Weight from Last 3 Encounters:   17 45.4 kg (100 lb)   10/20/17 47.6 kg (105 lb)   17 46.3 kg (102 lb)              Today, you had the following     No orders found for display       Primary Care Provider Office Phone # Fax #    Marii Montgomery [phone]        [address] [facility] 741  [facility] [address]        Equal Access to Services     PAL SALGADO : Hadii aad ku hadcorielvin Mary Annali, wakyleighda lushlainijustinha, qaleonelta kasheilabrielle villanickibrielle, jackeline cobbreginataryn izquierdo. So [facility] [phone].    ATENCIÓN: Si habla español, tiene a schwartz disposición servicios gratuitos de asistencia lingüística. LlBarnesville Hospital [phone].    We comply with applicable federal civil rights laws and [place] laws. We do not discriminate on the basis of race, color, national origin, age, disability, sex, sexual orientation, or gender identity.            Thank you!     Thank you for choosing [facility] PRIMARY CARE CLINIC  for your care. Our goal is always to provide you with excellent care. Hearing back from our patients is one way we can continue to improve our services. Please take a few minutes to complete the written survey that you may receive in the mail after your visit with us. Thank you!             Your Updated Medication List - Protect others around you: Learn how to safely use, store and throw away your medicines at www.disposemymeds.org.          This list is accurate as of: 12/7/17  2:59 PM.  Always use your most recent med list.                   Brand Name Dispense Instructions for use Diagnosis    albuterol 108 (90 BASE) MCG/ACT Inhaler    VENTOLIN HFA    18 Inhaler    Inhale 2 puffs into the lungs every 4 hours as needed for shortness of breath / dyspnea or wheezing    Cough       amLODIPine 10 MG tablet    NORVASC    90 tablet    Take 1 tablet (10 mg) by mouth daily For blood pressure    Essential hypertension       atorvastatin 40 MG tablet    LIPITOR    90 tablet    Take 2 tablets (80 mg) by mouth daily    Hyperlipidemia, unspecified hyperlipidemia type       bisacodyl 10 MG Suppository    DULCOLAX    90 suppository    Place 1 suppository (10 mg) rectally daily as needed for constipation    Constipation, unspecified constipation type       CALCIUM 600 + D PO      Take 1 tablet by  mouth daily.        citalopram 40 MG tablet    celeXA    45 tablet    Take 0.5 tablets (20 mg) by mouth daily    Depression, unspecified depression type       clopidogrel 75 MG tablet    PLAVIX    90 tablet    Take 1 tablet (75 mg) by mouth daily    Other chronic pulmonary embolism, Venous thrombosis of extremity       * ferrous sulfate 325 (65 FE) MG tablet    IRON    90 tablet    Take 1 tablet (325 mg) by mouth daily (with breakfast)    Other iron deficiency anemias       * ferrous sulfate 325 (65 FE) MG tablet    IRON    90 tablet    Take 1 tablet (325 mg) by mouth 2 times daily To maintain iron levels    Iron deficiency       hydrochlorothiazide 50 MG tablet    HYDRODIURIL    90 tablet    Take 1 tablet (50 mg) by mouth daily    Benign essential hypertension       ketorolac 0.5 % ophthalmic solution    ACULAR     Place 1 drop Into the left eye 4 times daily        levothyroxine 50 MCG tablet    SYNTHROID/LEVOTHROID    90 tablet    Take 1 tablet (50 mcg) by mouth daily    Hypothyroidism, unspecified type       lisinopril 40 MG tablet    PRINIVIL/ZESTRIL    90 tablet    Take 1 tablet (40 mg) by mouth daily For blood pressure    Essential hypertension       moxifloxacin 0.5 % ophthalmic solution    VIGAMOX     Place 1 drop Into the left eye 4 times daily        naproxen sodium 220 MG capsule     60 capsule    Take 220 mg by mouth 2 times daily (with meals)    Chronic low back pain, unspecified back pain laterality, with sciatica presence unspecified, Pain of left lower leg       oxyCODONE IR 5 MG tablet    ROXICODONE    10 tablet    Take 1 tablet (5 mg) by mouth every 6 hours as needed for moderate to severe pain    Chronic low back pain, unspecified back pain laterality, with sciatica presence unspecified, Pain of left lower leg       pantoprazole 40 MG EC tablet    PROTONIX    90 tablet    Take 1 tablet (40 mg) by mouth daily    Other iron deficiency anemias       polyethylene glycol powder    MIRALAX    119 g     Take one-half to one scoop once a day for maintaining soft stools    Constipation, unspecified constipation type       potassium chloride SA 20 MEQ CR tablet    K-DUR/KLOR-CON M    90 tablet    Take 1 tablet (20 mEq) by mouth daily    Hypopotassemia       prednisoLONE acetate 1 % ophthalmic susp    PRED FORTE     Place 1 drop Into the left eye 4 times daily        traMADol 50 MG tablet    ULTRAM    270 tablet    Take 1 tablet (50 mg) by mouth every 6 hours as needed for moderate pain (Take 1 tablet (50 mg) by mouth every 6 hours as needed for moderate pain or severe pain Maximum 270 tablets in 3 months)    Pain       trolamine salicylate 10 % cream    ASPERCREME    170 g    Apply 1 g topically 3 times daily    Arthralgia of left lower leg       * Notice:  This list has 2 medication(s) that are the same as other medications prescribed for you. Read the directions carefully, and ask your doctor or other care provider to review them with you.

## 2017-12-07 NOTE — PROGRESS NOTES
Health Psychology                  Clinic    Department of Medicine  Lilliana Bartholomew, PhD, LP [phone]                          Clinics and Surgery Center  [facility] Zana Atwood, PhD, LP [phone]                  3rd Floor  [place] Mail Code 741   Garret Munguia, PhD, ABPP, LP [phone]     [address] [facility],   [address] [facility],  Jesenia Clay,  PhD, LP [phone]            [place] [address] Jesica Wagoner, PhD, LP [phone]    Health Psychology Follow-Up Note    SUBJECTIVE:  Lucie Stockton is an 82-year-old female who was seen for individual psychotherapy for adjustment disorder. The patient reported continued effective management of mood, pain, and activity. She stated she was able to travel to [place] for a 10 day vacation, and was mindful of activity levels, pacing, rest, and enjoyed this vacation even with moments of less activity than usual on vacation. She reported that her  has also commented that he believes she is more effectively managing her emotional reactions to hurt pain and change in health status. Discussed what she feels has been helpful coping strategies, which include activity pacing, acceptance, flexibility, re-engagement in meaningful activities, and adjusting expectations. She stated she is experiencing stress related to preparing for the holidays, but has lessened her holiday preparation plans. She and fearing disappointing family when being unable to do think she was able to do in the past, and encouraged her to express these feelings as they come up to others. Also encouraged emotional exploration related to loss of functioning. She reported believing she was coping effectively and no longer felt psychotherapy to be needed. Discussed and agreed on termination with her to return to the clinic as needed in the future.    OBJECTIVE:  The patient arrived early, maintained good eye contact and was neatly groomed and  dressed.  She was in a wheelchair for this visit due to left leg pain.  She was alert and oriented to person, place, time and situation.  She appeared to respond honestly to all questions about psychosocial functioning and was deemed a reliable historian.  Mood appeared euthymic, although she indicated frustration related to her health conditions.  Speech was clear, coherent and of normal rate, rhythm and volume.  Thoughts were overall logical and organized.  Thought content was appropriate to interview and situation.  Insight and judgment were both good.  She denied current suicidal or assaultive ideation, plan or intent.     ASSESSMENT:  Lucie Stockton is an 82-year-old female experiencing adjustment difficulties following an ongoing left leg injury that has disrupted engagement in physical activities, emotional well-being, and self-care routines.  It also appears that the physical and emotional changes have impacted sleep activity and eating patterns.  She presents with adequate support from family and friends, yet denied the presence of robust internal coping strategies to navigate change.  It also appears that worry and frustration with her health condition exacerbates her feeling and experience of it. Appears to be benefiting from cognitive behavioral strategies for management of pain and adjustment to change and health status.      DIAGNOSIS:  Adjustment disorder with mixed anxiety and depressed mood.     PLAN:  RTC as needed for psychotherapy.     Time in: 2:12  Time out: 2:52  Extended session due to complexity of case and length of interval.    Jesenia Clay, PhD,   Clinical Health Psychologist    Tx plan completed: 9/21/17  Tx plan due:  12/21/17

## 2018-01-05 ENCOUNTER — SURGERY (OUTPATIENT)
Age: 83
End: 2018-01-05

## 2018-01-05 ENCOUNTER — HOSPITAL ENCOUNTER (OUTPATIENT)
Facility: AMBULATORY SURGERY CENTER | Age: 83
End: 2018-01-05
Attending: ANESTHESIOLOGY
Payer: MEDICARE

## 2018-01-05 ENCOUNTER — RADIANT APPOINTMENT (OUTPATIENT)
Dept: RADIOLOGY | Facility: AMBULATORY SURGERY CENTER | Age: 83
End: 2018-01-05
Payer: MEDICARE

## 2018-01-05 VITALS
WEIGHT: 100 LBS | HEART RATE: 74 BPM | OXYGEN SATURATION: 94 % | BODY MASS INDEX: 19.63 KG/M2 | SYSTOLIC BLOOD PRESSURE: 163 MMHG | DIASTOLIC BLOOD PRESSURE: 72 MMHG | RESPIRATION RATE: 16 BRPM | TEMPERATURE: 97.7 F | HEIGHT: 60 IN

## 2018-01-05 DIAGNOSIS — R52 PAIN: ICD-10-CM

## 2018-01-05 RX ORDER — IOPAMIDOL 408 MG/ML
INJECTION, SOLUTION INTRATHECAL PRN
Status: DISCONTINUED | OUTPATIENT
Start: 2018-01-05 | End: 2018-01-05 | Stop reason: HOSPADM

## 2018-01-05 RX ORDER — LIDOCAINE HYDROCHLORIDE 10 MG/ML
INJECTION, SOLUTION EPIDURAL; INFILTRATION; INTRACAUDAL; PERINEURAL PRN
Status: DISCONTINUED | OUTPATIENT
Start: 2018-01-05 | End: 2018-01-05 | Stop reason: HOSPADM

## 2018-01-05 RX ORDER — TRIAMCINOLONE ACETONIDE 40 MG/ML
INJECTION, SUSPENSION INTRA-ARTICULAR; INTRAMUSCULAR PRN
Status: DISCONTINUED | OUTPATIENT
Start: 2018-01-05 | End: 2018-01-05 | Stop reason: HOSPADM

## 2018-01-05 RX ADMIN — IOPAMIDOL 1 ML: 408 INJECTION, SOLUTION INTRATHECAL at 08:08

## 2018-01-05 RX ADMIN — TRIAMCINOLONE ACETONIDE 40 MG: 40 INJECTION, SUSPENSION INTRA-ARTICULAR; INTRAMUSCULAR at 08:10

## 2018-01-05 RX ADMIN — LIDOCAINE HYDROCHLORIDE 2 ML: 10 INJECTION, SOLUTION EPIDURAL; INFILTRATION; INTRACAUDAL; PERINEURAL at 08:08

## 2018-01-05 NOTE — PROCEDURES
Patient: Lucie Stockton Age: 82 year old   MRN: [id number] Attending: Dr. Valadez     Date of Visit: January 5, 2018      PAIN MEDICINE CLINIC PROCEDURE NOTE    ATTENDING CLINICIAN:    Dr. Allyson MD    ASSISTANT CLINICIAN:  Dr. Bryan DO    PREPROCEDURE DIAGNOSES:  1.  Lumbar radiculopathy  2.  Chronic low back pain     POSTPROCEDURE DIAGNOSES:  1.  Lumbar radiculopathy  2.  Chronic low back pain       PROCEDURE(S) PERFORMED:  1.  Interlaminar lumbar epidural steroid injection   2.  Fluoroscopic guidance for the above-named procedure(s)      ANESTHESIA:  Local.    BLOOD LOSS:  Minimal.    DRAINS AND SPECIMENS:  None.    COMPLICATIONS:  None.    INDICATIONS:  Lucie Stockton is a 82 year old female with a history of  chronic low back and leg pain secondary to lumbosacral radiculopathy .  The patient stated that the patient was in their usual state of health and denied recent anticoagulant use or recent infections.  She had stopped taking her plavix 7 days prior to the procedure. Therefore, the plan is to perform above mentioned procedures.     Procedure Details:  The patient was met in the procedure room, where the patient was identified by name, medical record number and date of birth.  All of the patient s last minute questions were answered. Written informed consent was obtained and saved in the electronic medical record, after the risks, benefits, and alternatives were discussed with the patient.      A formal time-out procedure was performed, as per protocol, including patient name, title of procedure, and site of procedure, and all in the room concurred.  Routine monitors were applied.      The patient was placed in the prone position on the procedure room table.  All pressure points were checked and comfortably padded.  Routine monitors were placed.  Vital signs were stable.    A chlorhexidine prep was completed followed by sterile draping per standard procedure.     The AP fluoroscopic view was optimized  for approach at  L5-S1 interspace.  The skin over the interspace was infiltrated with 2 mL of 1% Lidocaine using a 25 gauge, 1.5 inch needle.  A 20-gauge 3-1/2 inch Tuohy needle was advanced under fluoroscopic guidance with right paramedial approach. Mutiple AP and lateral fluoroscopic images at this time  are taken as Tuohy needle was advanced to the epidural space. The epidural space was identified, without evidence of blood, cerebrospinal fluid, or parasthesia throughout. Needle tip placement within the epidural space was further confirmed with 1 mL of nonionic contrast agent, with the epidural space visualized in the AP and lateral fluoroscopic view(s) with appropriate spread of the agent with fat vacuolization and no intravascular or intrathecal spread noted. Next, 4mL of a 5mL treatment solution containing 4 mL of preservative free normal saline and 1 mg of Kenalog 40 mg/ml . The needle was withdrawn.      Light pressure was held at the puncture site(s) to prevent ecchymosis and oozing.  The patient's skin was cleansed, and hemostasis was confirmed.  Band-aids were applied to the needle injection site(s).      Condition:    The patient remained awake and alert throughout the procedure.  The patient tolerated the procedure well and was monitored for approximately 15 minutes afterward in the post procedure area.  There were no immediate post procedure complications noted.  The patient was then discharged to home as per protocol.      Pre-procedure pain score: 6/10  Post-procedure pain score: 3/10        I was present for the entirety of the procedure and assisted with or performed all major elements as needed.  Jaylin Valadez MD

## 2018-01-05 NOTE — IP AVS SNAPSHOT
MRN:[id number]                      After Visit Summary   1/5/2018    Lucie Stockton    MRN: [id number]           Thank you!     Thank you for choosing [facility] for your care. Our goal is always to provide you with excellent care. Hearing back from our patients is one way we can continue to improve our services. Please take a few minutes to complete the written survey that you may receive in the mail after you visit with us. Thank you!        Patient Information     Date Of Birth          1935        About your hospital stay     You were admitted on:  January 5, 2018 You last received care in the:  [facility]    You were discharged on:  January 5, 2018       Who to Call     For medical emergencies, please call 911.  For non-urgent questions about your medical care, please call your primary care provider or clinic, [phone]  For questions related to your surgery, please call your surgery clinic        Attending Provider     Provider Zafar Dougherty MD Anesthesiology       Primary Care Provider Office Phone # Fax #    Marii Montgomery [phone]      Your next 10 appointments already scheduled     Jan 09, 2018 11:00 AM CST   US SUMAYA DOPPLER WITH EXERCISE BILATERAL with [id number]   [facility] ([facility])    [address] [address] [phone]           Please bring a list of your medicines (including vitamins, minerals and over-the-counter drugs). Also, tell your doctor about any allergies you may have. Wear comfortable clothes and leave your valuables at home.  No caffeine or tobacco for 1 hour prior to exam.  Please call the Imaging Department at your exam site with any questions.            Jan 09, 2018 12:00 PM CST   (Arrive by 11:45 AM)   Return Vascular Visit with ALEKSEY Ramirez [facility] ([facility]  [facility] [place])    [address] [facility]  [address] [facility] [address]   [phone]            Jan 11, 2018  7:40 AM CST   (Arrive by 7:25 AM)   Return Visit with Marii Montgomery MD   [facility] ([facility])    [address] [place] [address]  [address] [facility] [address]   [phone]            Jan 16, 2018  9:15 AM CST   (Arrive by 9:00 AM)   Return Visit with Lorenzo Pena MD   [facility] ([facility])    [address] [place] [address]  [address]rd [facility] [address]   [phone]              Further instructions from your care team       Home Care Instructions after an Epidural Steroid Pain Injection    A lumbar epidural steroid injection delivers steroid medication directly into the area that may be causing your lower back pain and/or leg pain. A cervical or thoracic epidural steroid injection delivers steroids into the epidural space surrounding spinal nerve roots to help alleviate pain in the upper spine/neck.    Activity  -Rest today  -Do not work today  -Resume normal activity tomorrow  -DO NOT shower for 24 hours  -DO NOT remove bandaid for 24 hours    Pain  -You may experience soreness at the injection site for one or two days  -You may use an ice pack for 20 minutes every 2 hours for the first 24 hours  -You may use a heating pad after the first 24 hours  -You may use Tylenol (acetaminophen) every 4 hours or other pain medicines as     directed by your physician    You may experience numbness radiating into your legs or arms (depending on the procedure location). This numbness may last several hours. Until sensation returns to normal; please use caution in walking, climbing stairs, and stepping out of your vehicle, etc.    DID YOU RECEIVE SEDATION TODAY?  No    Safety  Sedation medicine, if given, may remain active for many hours. It is important for the next 24 hours that you do not:  -Drive a  car  -Operate machines or power tools  -Consume alcohol, including beer  -Sign any important papers or legal documents      Common side effects of steroids:  Not everyone will experience corticosteroid side effects. If side effects are experienced, they will gradually subside in the 7-10 day period following an injection. Most common side effects include:  -Flushed face and/or chest  -Feeling of warmth, particularly in the face but could be an overall feeling of warmth  -Increased blood sugar in diabetic patients  -Menstrual irregularities my occur. If taking hormone-based birth control an alternate method of birth control is recommended  -Sleep disturbances and/or mood swings are possible  -Leg cramps    Please contact us if you have:  -Severe pain  -Fever more than 101.5 degrees Fahrenheit  -Signs of infection at the injection site (redness, swelling, or drainage)    If you have questions, please contact our office at [phone] between the hours of 7:00 am and 3:00 pm Monday through Friday. After office hours you can contact the on call provider by dialing [phone]. If you need immediate attention, we recommend that you go to a hospital emergency room or dial 121.      Pending Results     No orders found from 1/3/2018 to 1/6/2018.            Admission Information     Date & Time Provider Department Dept. Phone    1/5/2018 Zafar Pena MD [facility] [phone]      Your Vitals Were     Blood Pressure Pulse Temperature Respirations Height Weight    143/73 74 97.7  F (36.5  C) (Temporal) 16 1.524 m (5') 45.4 kg (100 lb)    Pulse Oximetry BMI (Body Mass Index)                97% 19.53 kg/m2          [employer] Information     [employer] is an electronic gateway that provides easy, online access to your medical records. With [employer], you can request a clinic appointment, read your test results, renew a prescription or communicate with your care team.     To sign up for  Ana Paulat visit the website at [email or web address]   You will be asked to enter the access code listed below, as well as some personal information. Please follow the directions to create your username and password.     Your access code is: [email or web address]  Expires: 3/4/2018  6:30 AM     Your access code will  in 90 days. If you need help or a new code, please contact your [facility] or call [phone] for assistance.        Care EveryWhere ID     This is your Care EveryWhere ID. This could be used by other organizations to access your [facility] medical records  [id number]        Equal Access to Services     PAL SALGADO : Ascencion Gamboa, anu keller, matt kirby, jackeline izquierdo. So [facility] [phone].    ATENCIÓN: Si habla español, tiene a schwartz disposición servicios gratuitos de asistencia lingüística. Llame al [phone].    We comply with applicable federal civil rights laws and [place] laws. We do not discriminate on the basis of race, color, national origin, age, disability, sex, sexual orientation, or gender identity.               Review of your medicines      UNREVIEWED medicines. Ask your doctor about these medicines        Dose / Directions    albuterol 108 (90 BASE) MCG/ACT Inhaler   Commonly known as:  VENTOLIN HFA   Used for:  Cough        Dose:  2 puff   Inhale 2 puffs into the lungs every 4 hours as needed for shortness of breath / dyspnea or wheezing   Quantity:  18 Inhaler   Refills:  1       amLODIPine 10 MG tablet   Commonly known as:  NORVASC   Used for:  Essential hypertension        Dose:  10 mg   Take 1 tablet (10 mg) by mouth daily For blood pressure   Quantity:  90 tablet   Refills:  2       atorvastatin 40 MG tablet   Commonly known as:  LIPITOR   Used for:  Hyperlipidemia, unspecified hyperlipidemia type        Dose:  80 mg   Take 2 tablets (80 mg) by mouth daily   Quantity:  90 tablet    Refills:  3       bisacodyl 10 MG Suppository   Commonly known as:  DULCOLAX   Used for:  Constipation, unspecified constipation type        Dose:  10 mg   Place 1 suppository (10 mg) rectally daily as needed for constipation   Quantity:  90 suppository   Refills:  3       CALCIUM 600 + D PO        Dose:  1 tablet   Take 1 tablet by mouth daily.   Refills:  0       citalopram 40 MG tablet   Commonly known as:  celeXA   Used for:  Depression, unspecified depression type        Dose:  20 mg   Take 0.5 tablets (20 mg) by mouth daily   Quantity:  45 tablet   Refills:  1       clopidogrel 75 MG tablet   Commonly known as:  PLAVIX   Used for:  Other chronic pulmonary embolism, Venous thrombosis of extremity        Dose:  75 mg   Take 1 tablet (75 mg) by mouth daily   Quantity:  90 tablet   Refills:  3       * ferrous sulfate 325 (65 FE) MG tablet   Commonly known as:  IRON   Used for:  Other iron deficiency anemias        Dose:  325 mg   Take 1 tablet (325 mg) by mouth daily (with breakfast)   Quantity:  90 tablet   Refills:  3       * ferrous sulfate 325 (65 FE) MG tablet   Commonly known as:  IRON   Used for:  Iron deficiency        Dose:  1 tablet   Take 1 tablet (325 mg) by mouth 2 times daily To maintain iron levels   Quantity:  90 tablet   Refills:  1       hydrochlorothiazide 50 MG tablet   Commonly known as:  HYDRODIURIL   Used for:  Benign essential hypertension        Dose:  50 mg   Take 1 tablet (50 mg) by mouth daily   Quantity:  90 tablet   Refills:  3       ketorolac 0.5 % ophthalmic solution   Commonly known as:  ACULAR        Dose:  1 drop   Place 1 drop Into the left eye 4 times daily   Refills:  0       levothyroxine 50 MCG tablet   Commonly known as:  SYNTHROID/LEVOTHROID   Used for:  Hypothyroidism, unspecified type        Dose:  50 mcg   Take 1 tablet (50 mcg) by mouth daily   Quantity:  90 tablet   Refills:  3       lisinopril 40 MG tablet   Commonly known as:  PRINIVIL/ZESTRIL   Used for:   Essential hypertension        Dose:  40 mg   Take 1 tablet (40 mg) by mouth daily For blood pressure   Quantity:  90 tablet   Refills:  3       moxifloxacin 0.5 % ophthalmic solution   Commonly known as:  VIGAMOX        Dose:  1 drop   Place 1 drop Into the left eye 4 times daily   Refills:  0       naproxen sodium 220 MG capsule   Used for:  Chronic low back pain, unspecified back pain laterality, with sciatica presence unspecified, Pain of left lower leg        Dose:  220 mg   Take 220 mg by mouth 2 times daily (with meals)   Quantity:  60 capsule   Refills:  2       oxyCODONE IR 5 MG tablet   Commonly known as:  ROXICODONE   Used for:  Chronic low back pain, unspecified back pain laterality, with sciatica presence unspecified, Pain of left lower leg        Dose:  5 mg   Take 1 tablet (5 mg) by mouth every 6 hours as needed for moderate to severe pain   Quantity:  10 tablet   Refills:  0       pantoprazole 40 MG EC tablet   Commonly known as:  PROTONIX   Used for:  Other iron deficiency anemias        Dose:  40 mg   Take 1 tablet (40 mg) by mouth daily   Quantity:  90 tablet   Refills:  3       polyethylene glycol powder   Commonly known as:  MIRALAX   Used for:  Constipation, unspecified constipation type        Take one-half to one scoop once a day for maintaining soft stools   Quantity:  119 g   Refills:  5       potassium chloride SA 20 MEQ CR tablet   Commonly known as:  K-DUR/KLOR-CON M   Used for:  Hypopotassemia        Dose:  20 mEq   Take 1 tablet (20 mEq) by mouth daily   Quantity:  90 tablet   Refills:  1       prednisoLONE acetate 1 % ophthalmic susp   Commonly known as:  PRED FORTE        Dose:  1 drop   Place 1 drop Into the left eye 4 times daily   Refills:  0       traMADol 50 MG tablet   Commonly known as:  ULTRAM   Used for:  Pain        Dose:  50 mg   Take 1 tablet (50 mg) by mouth every 6 hours as needed for moderate pain (Take 1 tablet (50 mg) by mouth every 6 hours as needed for moderate pain  or severe pain Maximum 270 tablets in 3 months)   Quantity:  270 tablet   Refills:  1       trolamine salicylate 10 % cream   Commonly known as:  ASPERCREME   Used for:  Arthralgia of left lower leg        Dose:  1 applicator   Apply 1 g topically 3 times daily   Quantity:  170 g   Refills:  11       * Notice:  This list has 2 medication(s) that are the same as other medications prescribed for you. Read the directions carefully, and ask your doctor or other care provider to review them with you.             Protect others around you: Learn how to safely use, store and throw away your medicines at www.disposemymeds.org.             Medication List: This is a list of all your medications and when to take them. Check marks below indicate your daily home schedule. Keep this list as a reference.      Medications           Morning Afternoon Evening Bedtime As Needed    albuterol 108 (90 BASE) MCG/ACT Inhaler   Commonly known as:  VENTOLIN HFA   Inhale 2 puffs into the lungs every 4 hours as needed for shortness of breath / dyspnea or wheezing                                amLODIPine 10 MG tablet   Commonly known as:  NORVASC   Take 1 tablet (10 mg) by mouth daily For blood pressure                                atorvastatin 40 MG tablet   Commonly known as:  LIPITOR   Take 2 tablets (80 mg) by mouth daily                                bisacodyl 10 MG Suppository   Commonly known as:  DULCOLAX   Place 1 suppository (10 mg) rectally daily as needed for constipation                                CALCIUM 600 + D PO   Take 1 tablet by mouth daily.                                citalopram 40 MG tablet   Commonly known as:  celeXA   Take 0.5 tablets (20 mg) by mouth daily                                clopidogrel 75 MG tablet   Commonly known as:  PLAVIX   Take 1 tablet (75 mg) by mouth daily                                * ferrous sulfate 325 (65 FE) MG tablet   Commonly known as:  IRON   Take 1 tablet (325 mg) by mouth  daily (with breakfast)                                * ferrous sulfate 325 (65 FE) MG tablet   Commonly known as:  IRON   Take 1 tablet (325 mg) by mouth 2 times daily To maintain iron levels                                hydrochlorothiazide 50 MG tablet   Commonly known as:  HYDRODIURIL   Take 1 tablet (50 mg) by mouth daily                                ketorolac 0.5 % ophthalmic solution   Commonly known as:  ACULAR   Place 1 drop Into the left eye 4 times daily                                levothyroxine 50 MCG tablet   Commonly known as:  SYNTHROID/LEVOTHROID   Take 1 tablet (50 mcg) by mouth daily                                lisinopril 40 MG tablet   Commonly known as:  PRINIVIL/ZESTRIL   Take 1 tablet (40 mg) by mouth daily For blood pressure                                moxifloxacin 0.5 % ophthalmic solution   Commonly known as:  VIGAMOX   Place 1 drop Into the left eye 4 times daily                                naproxen sodium 220 MG capsule   Take 220 mg by mouth 2 times daily (with meals)                                oxyCODONE IR 5 MG tablet   Commonly known as:  ROXICODONE   Take 1 tablet (5 mg) by mouth every 6 hours as needed for moderate to severe pain                                pantoprazole 40 MG EC tablet   Commonly known as:  PROTONIX   Take 1 tablet (40 mg) by mouth daily                                polyethylene glycol powder   Commonly known as:  MIRALAX   Take one-half to one scoop once a day for maintaining soft stools                                potassium chloride SA 20 MEQ CR tablet   Commonly known as:  K-DUR/KLOR-CON M   Take 1 tablet (20 mEq) by mouth daily                                prednisoLONE acetate 1 % ophthalmic susp   Commonly known as:  PRED FORTE   Place 1 drop Into the left eye 4 times daily                                traMADol 50 MG tablet   Commonly known as:  ULTRAM   Take 1 tablet (50 mg) by mouth every 6 hours as needed for moderate pain (Take  1 tablet (50 mg) by mouth every 6 hours as needed for moderate pain or severe pain Maximum 270 tablets in 3 months)                                trolamine salicylate 10 % cream   Commonly known as:  ASPERCREME   Apply 1 g topically 3 times daily                                * Notice:  This list has 2 medication(s) that are the same as other medications prescribed for you. Read the directions carefully, and ask your doctor or other care provider to review them with you.

## 2018-01-05 NOTE — IP AVS SNAPSHOT
[facility]    [address] [place] [address]    Phone:  [phone]    Fax:  [phone]                                       After Visit Summary   1/5/2018    Lucie Stockton    MRN: [id number]           After Visit Summary Signature Page     I have received my discharge instructions, and my questions have been answered. I have discussed any challenges I see with this plan with the nurse or doctor.    ..........................................................................................................................................  Patient/Patient Representative Signature      ..........................................................................................................................................  Patient Representative Print Name and Relationship to Patient    ..................................................               ................................................  Date                                            Time    ..........................................................................................................................................  Reviewed by Signature/Title    ...................................................              ..............................................  Date                                                            Time

## 2018-01-05 NOTE — DISCHARGE INSTRUCTIONS
Home Care Instructions after an Epidural Steroid Pain Injection    A lumbar epidural steroid injection delivers steroid medication directly into the area that may be causing your lower back pain and/or leg pain. A cervical or thoracic epidural steroid injection delivers steroids into the epidural space surrounding spinal nerve roots to help alleviate pain in the upper spine/neck.    Activity  -Rest today  -Do not work today  -Resume normal activity tomorrow  -DO NOT shower for 24 hours  -DO NOT remove bandaid for 24 hours    Pain  -You may experience soreness at the injection site for one or two days  -You may use an ice pack for 20 minutes every 2 hours for the first 24 hours  -You may use a heating pad after the first 24 hours  -You may use Tylenol (acetaminophen) every 4 hours or other pain medicines as     directed by your physician    You may experience numbness radiating into your legs or arms (depending on the procedure location). This numbness may last several hours. Until sensation returns to normal; please use caution in walking, climbing stairs, and stepping out of your vehicle, etc.    DID YOU RECEIVE SEDATION TODAY?  No    Safety  Sedation medicine, if given, may remain active for many hours. It is important for the next 24 hours that you do not:  -Drive a car  -Operate machines or power tools  -Consume alcohol, including beer  -Sign any important papers or legal documents      Common side effects of steroids:  Not everyone will experience corticosteroid side effects. If side effects are experienced, they will gradually subside in the 7-10 day period following an injection. Most common side effects include:  -Flushed face and/or chest  -Feeling of warmth, particularly in the face but could be an overall feeling of warmth  -Increased blood sugar in diabetic patients  -Menstrual irregularities my occur. If taking hormone-based birth control an alternate method of birth control is recommended  -Sleep  disturbances and/or mood swings are possible  -Leg cramps    Please contact us if you have:  -Severe pain  -Fever more than 101.5 degrees Fahrenheit  -Signs of infection at the injection site (redness, swelling, or drainage)    If you have questions, please contact our office at [phone] between the hours of 7:00 am and 3:00 pm Monday through Friday. After office hours you can contact the on call provider by dialing [phone]. If you need immediate attention, we recommend that you go to a hospital emergency room or dial [phone].

## 2018-01-09 ENCOUNTER — OFFICE VISIT (OUTPATIENT)
Dept: VASCULAR SURGERY | Facility: CLINIC | Age: 83
End: 2018-01-09
Payer: MEDICARE

## 2018-01-09 ENCOUNTER — RADIANT APPOINTMENT (OUTPATIENT)
Dept: ULTRASOUND IMAGING | Facility: CLINIC | Age: 83
End: 2018-01-09
Attending: NURSE PRACTITIONER
Payer: MEDICARE

## 2018-01-09 VITALS
SYSTOLIC BLOOD PRESSURE: 137 MMHG | DIASTOLIC BLOOD PRESSURE: 65 MMHG | HEART RATE: 67 BPM | OXYGEN SATURATION: 63 % | RESPIRATION RATE: 16 BRPM

## 2018-01-09 DIAGNOSIS — I73.9 PERIPHERAL ARTERY DISEASE (H): ICD-10-CM

## 2018-01-09 DIAGNOSIS — I73.9 PERIPHERAL ARTERY DISEASE (H): Primary | ICD-10-CM

## 2018-01-09 ASSESSMENT — PAIN SCALES - GENERAL: PAINLEVEL: NO PAIN (0)

## 2018-01-09 NOTE — MR AVS SNAPSHOT
After Visit Summary   1/9/2018    Lucie Stockton    MRN: [id number]           Patient Information     Date Of Birth          1935        Visit Information        Provider Department      1/9/2018 12:00 PM Cristal Maki APRN [facility]        Today's Diagnoses     Peripheral artery disease (H)    -  1      Care Instructions    Follow up ABIs and vascular CARLOS in 6 months    With questions, concerns, or to request an appointment, please call either:    Brenda Lowry, Care Coordinator RN, Vascular Surgery  [phone]    Vascular Call Center  [phone]    To contact someone after 5 pm, on a weekend, or on a Holiday, please call:  [facility]  [phone], option 4 to have a member of the Vascular Surgery Service paged.          Follow-ups after your visit        Follow-up notes from your care team     Return in about 6 months (around 7/9/2018).      Your next 10 appointments already scheduled     Jan 11, 2018  7:40 AM CST   (Arrive by 7:25 AM)   Return Visit with Marii Montgomery MD   [facility] ([facility])    [address] [place] [address] [phone]            Jan 16, 2018  9:15 AM CST   (Arrive by 9:00 AM)   Return Visit with Lorenzo Pena MD   [facility] ([facility])    [address] [place] [id number] [phone]              Who to contact     Please call your clinic at [phone] to:    Ask questions about your health    Make or cancel appointments    Discuss your medicines    Learn about your test results    Speak to your doctor   If you have compliments or concerns about an experience at your clinic, or if you wish to file a complaint, please contact [facility] Patient Relations at [phone] or email us at [email or web address]         Additional  Information About Your Visit        Solio Information     Solio is an electronic gateway that provides easy, online access to your medical records. With Solio, you can request a clinic appointment, read your test results, renew a prescription or communicate with your care team.     To sign up for Solio visit the website at [email or web address]   You will be asked to enter the access code listed below, as well as some personal information. Please follow the directions to create your username and password.     Your access code is: [email or web address]  Expires: 3/4/2018  6:30 AM     Your access code will  in 90 days. If you need help or a new code, please contact your [facility] or call [phone] for assistance.        Care EveryWhere ID     This is your Care EveryWhere ID. This could be used by other organizations to access your [place] medical records  [id number]        Your Vitals Were     Pulse Respirations Pulse Oximetry Breastfeeding?          67 16 63% No         Blood Pressure from Last 3 Encounters:   18 137/65   18 163/72   17 146/68    Weight from Last 3 Encounters:   18 45.4 kg (100 lb)   17 45.4 kg (100 lb)   10/20/17 47.6 kg (105 lb)              Today, you had the following     No orders found for display       Primary Care Provider Office Phone # Fax #    Marii Montgomery [phone]       [address] [place] [address]41  [facility] [address]        Equal Access to Services     PAL SALGADO AH: Hadii aad ku hadasho Soomaali, waaxda luqadaha, qaybta kaalmada adeegyada, waxay maci hayaries bernal . So [facility] [phone].    ATENCIÓN: Si habla español, tiene a schwartz disposición servicios gratuitos de asistencia lingüística. Llame al [phone].    We comply with applicable federal civil rights laws and [place] laws. We do not discriminate on the basis of race, color, national origin, age, disability, sex,  sexual orientation, or gender identity.            Thank you!     Thank you for choosing [facility]  for your care. Our goal is always to provide you with excellent care. Hearing back from our patients is one way we can continue to improve our services. Please take a few minutes to complete the written survey that you may receive in the mail after your visit with us. Thank you!             Your Updated Medication List - Protect others around you: Learn how to safely use, store and throw away your medicines at www.disposemymeds.org.          This list is accurate as of: 1/9/18 12:16 PM.  Always use your most recent med list.                   Brand Name Dispense Instructions for use Diagnosis    albuterol 108 (90 BASE) MCG/ACT Inhaler    VENTOLIN HFA    18 Inhaler    Inhale 2 puffs into the lungs every 4 hours as needed for shortness of breath / dyspnea or wheezing    Cough       amLODIPine 10 MG tablet    NORVASC    90 tablet    Take 1 tablet (10 mg) by mouth daily For blood pressure    Essential hypertension       atorvastatin 40 MG tablet    LIPITOR    90 tablet    Take 2 tablets (80 mg) by mouth daily    Hyperlipidemia, unspecified hyperlipidemia type       bisacodyl 10 MG Suppository    DULCOLAX    90 suppository    Place 1 suppository (10 mg) rectally daily as needed for constipation    Constipation, unspecified constipation type       CALCIUM 600 + D PO      Take 1 tablet by mouth daily.        citalopram 40 MG tablet    celeXA    45 tablet    Take 0.5 tablets (20 mg) by mouth daily    Depression, unspecified depression type       clopidogrel 75 MG tablet    PLAVIX    90 tablet    Take 1 tablet (75 mg) by mouth daily    Other chronic pulmonary embolism, Venous thrombosis of extremity       * ferrous sulfate 325 (65 FE) MG tablet    IRON    90 tablet    Take 1 tablet (325 mg) by mouth daily (with breakfast)    Other iron deficiency anemias       * ferrous sulfate 325 (65 FE) MG tablet    IRON     90 tablet    Take 1 tablet (325 mg) by mouth 2 times daily To maintain iron levels    Iron deficiency       hydrochlorothiazide 50 MG tablet    HYDRODIURIL    90 tablet    Take 1 tablet (50 mg) by mouth daily    Benign essential hypertension       ketorolac 0.5 % ophthalmic solution    ACULAR     Place 1 drop Into the left eye 4 times daily        levothyroxine 50 MCG tablet    SYNTHROID/LEVOTHROID    90 tablet    Take 1 tablet (50 mcg) by mouth daily    Hypothyroidism, unspecified type       lisinopril 40 MG tablet    PRINIVIL/ZESTRIL    90 tablet    Take 1 tablet (40 mg) by mouth daily For blood pressure    Essential hypertension       moxifloxacin 0.5 % ophthalmic solution    VIGAMOX     Place 1 drop Into the left eye 4 times daily        naproxen sodium 220 MG capsule     60 capsule    Take 220 mg by mouth 2 times daily (with meals)    Chronic low back pain, unspecified back pain laterality, with sciatica presence unspecified, Pain of left lower leg       oxyCODONE IR 5 MG tablet    ROXICODONE    10 tablet    Take 1 tablet (5 mg) by mouth every 6 hours as needed for moderate to severe pain    Chronic low back pain, unspecified back pain laterality, with sciatica presence unspecified, Pain of left lower leg       pantoprazole 40 MG EC tablet    PROTONIX    90 tablet    Take 1 tablet (40 mg) by mouth daily    Other iron deficiency anemias       polyethylene glycol powder    MIRALAX    119 g    Take one-half to one scoop once a day for maintaining soft stools    Constipation, unspecified constipation type       potassium chloride SA 20 MEQ CR tablet    K-DUR/KLOR-CON M    90 tablet    Take 1 tablet (20 mEq) by mouth daily    Hypopotassemia       prednisoLONE acetate 1 % ophthalmic susp    PRED FORTE     Place 1 drop Into the left eye 4 times daily        traMADol 50 MG tablet    ULTRAM    270 tablet    Take 1 tablet (50 mg) by mouth every 6 hours as needed for moderate pain (Take 1 tablet (50 mg) by mouth every 6  hours as needed for moderate pain or severe pain Maximum 270 tablets in 3 months)    Pain       trolamine salicylate 10 % cream    ASPERCREME    170 g    Apply 1 g topically 3 times daily    Arthralgia of left lower leg       * Notice:  This list has 2 medication(s) that are the same as other medications prescribed for you. Read the directions carefully, and ask your doctor or other care provider to review them with you.

## 2018-01-09 NOTE — LETTER
1/9/2018       RE: Lucie Stockton  [address] [place] [address]     Dear Colleague,    Thank you for referring your patient, Lucie Stockton, to the [facility] at [facility]. Please see a copy of my visit note below.    VASCULAR SURGERY CLINIC ESTABLISHED PATIENT NOTE    HPI:    Mrs. Stockton is an 82 year old female who presents today for routine follow-up for aorto-biiliac occlusive disease. She was most recently evaluated by Dr. Velez in June 2016 with recommended exercise. She has never had a peripheral intervention.  She returns to clinic for PAD surveillance.       SUBJECTIVE:  Lucie reports being able to ambulate 2 blocks before needing to rest due to bilateral leg claudication.  Had an epidural injection last week which has greatly helped her back and leg pain.       OBJECTIVE:  Vital signs:  /65 (BP Location: Left arm)  Pulse 67  Resp 16  SpO2 (!) 63%  Breastfeeding? No      Prior to Admission medications    Medication Sig Start Date End Date Taking? Authorizing Provider   citalopram (CELEXA) 40 MG tablet Take 0.5 tablets (20 mg) by mouth daily 11/28/17   Marii Montgomery MD   potassium chloride SA (K-DUR/KLOR-CON M) 20 MEQ CR tablet Take 1 tablet (20 mEq) by mouth daily 11/27/17   Marii Montgomery MD   traMADol (ULTRAM) 50 MG tablet Take 1 tablet (50 mg) by mouth every 6 hours as needed for moderate pain (Take 1 tablet (50 mg) by mouth every 6 hours as needed for moderate pain or severe pain Maximum 270 tablets in 3 months) 10/17/17   Veronica Shah MD   amLODIPine (NORVASC) 10 MG tablet Take 1 tablet (10 mg) by mouth daily For blood pressure 10/17/17   Marii Montgomery MD   trolamine salicylate (ASPERCREME) 10 % cream Apply 1 g topically 3 times daily 9/14/17   Zafar Huynh MD   naproxen sodium 220 MG capsule Take 220 mg by mouth 2 times daily (with meals) 8/18/17   Marii Montgomery MD   oxyCODONE  (ROXICODONE) 5 MG IR tablet Take 1 tablet (5 mg) by mouth every 6 hours as needed for moderate to severe pain 8/18/17   Marii Montgomery MD   atorvastatin (LIPITOR) 40 MG tablet Take 2 tablets (80 mg) by mouth daily 6/26/17   Maria Elena Naik APRN CNP   pantoprazole (PROTONIX) 40 MG EC tablet Take 1 tablet (40 mg) by mouth daily 6/1/17   Marii Montgomery MD   moxifloxacin (VIGAMOX) 0.5 % ophthalmic solution Place 1 drop Into the left eye 4 times daily    Reported, Patient   prednisoLONE acetate (PRED FORTE) 1 % ophthalmic susp Place 1 drop Into the left eye 4 times daily    Reported, Patient   ketorolac (ACULAR) 0.5 % ophthalmic solution Place 1 drop Into the left eye 4 times daily    Reported, Patient   bisacodyl (DULCOLAX) 10 MG Suppository Place 1 suppository (10 mg) rectally daily as needed for constipation 3/21/17   Marii Montgomery MD   levothyroxine (SYNTHROID/LEVOTHROID) 50 MCG tablet Take 1 tablet (50 mcg) by mouth daily 3/10/17   Marii Montgomery MD   clopidogrel (PLAVIX) 75 MG tablet Take 1 tablet (75 mg) by mouth daily 3/10/17   Marii Montgomery MD   hydrochlorothiazide (HYDRODIURIL) 50 MG tablet Take 1 tablet (50 mg) by mouth daily 3/10/17   Marii Montgomery MD   lisinopril (PRINIVIL/ZESTRIL) 40 MG tablet Take 1 tablet (40 mg) by mouth daily For blood pressure 2/16/17   Marii Montgomery MD   polyethylene glycol (MIRALAX) powder Take one-half to one scoop once a day for maintaining soft stools 1/28/17   Marii Montgomery MD   ferrous sulfate (IRON) 325 (65 FE) MG tablet Take 1 tablet (325 mg) by mouth 2 times daily To maintain iron levels 1/28/17   Marii Montgomery MD   albuterol (VENTOLIN HFA) 108 (90 BASE) MCG/ACT inhaler Inhale 2 puffs into the lungs every 4 hours as needed for shortness of breath / dyspnea or wheezing 10/13/16   Marii Montgomery MD   ferrous sulfate (IRON) 325 (65 FE) MG tablet Take 1 tablet (325 mg) by mouth daily (with breakfast) 8/7/15   Marii Montgomery MD    Calcium Carbonate-Vitamin D (CALCIUM 600 + D OR) Take 1 tablet by mouth daily.    Reported, Patient       PHYSICAL EXAM:  NEURO/PSYCH: The patient is alert and oriented.  Appropriate.  Moves all extremities.   SKIN: warm and dry.  PULMONARY: non-labored breathing  EXTREMITIES:  Warm and well perfused, doppler bilateral PT    SUMAYA RESULTS:  Right le.54, previously 0.46 on 16  Left le.58, previously 0.49 on 16    ASSESSMENT:  Patient Active Problem List   Diagnosis     Benign ovarian tumor     Hypothyroidism     Peripheral vascular disease (H)     Knee pain     Osteoarthritis     Cervicalgia     Hyperlipidemia with target LDL less than 70     Pulmonary embolism (H)     Anemia     Aortic stenosis     Atherosclerosis of aorta (H)     Atherosclerosis of arteries of extremities (H)     Intermittent claudication (H)     Iron deficiency     Osteoporosis     DVT of lower extremity, bilateral (H)     Varicose veins     Gluteal pain     Insomnia     Back pain     Vitamin D deficiency     Tobacco use disorder     Personal history of other drug therapy     Right knee pain     Venous thrombosis of extremity     Benign essential hypertension     Gastritis     Cataract     Acute left-sided low back pain with left-sided sciatica     Lumbar stenosis with neurogenic claudication     SBO (small bowel obstruction)     Small bowel obstruction     Long term current use of anticoagulant therapy     Left-sided low back pain with left-sided sciatica     Moderate major depression (H)     Anxiety     Neoplasm of uncertain behavior of skin     Cherry angioma           PLAN:  - follow up ABIs and office visit with vascular APRN in 6 months  - continue Plavix and Lipitor   - encouraged continued walking      Cristal RYDER, CNS  Division of Vascular Surgery  [facility]  Pager [phone]

## 2018-01-09 NOTE — PATIENT INSTRUCTIONS
Follow up ABIs and vascular CARLOS in 6 months    With questions, concerns, or to request an appointment, please call either:    Brenda Lowry, Care Coordinator RN, Vascular Surgery  [phone]    Vascular Call Center  [phone]    To contact someone after 5 pm, on a weekend, or on a Holiday, please call:  [facility]  [phone], option 4 to have a member of the Vascular Surgery Service paged.

## 2018-01-09 NOTE — NURSING NOTE
Chief Complaint   Patient presents with     RECHECK     Follow up PAD        Vitals:    01/09/18 1200   BP: 137/65   BP Location: Left arm   Pulse: 67   Resp: 16   SpO2: (!) 63%       There is no height or weight on file to calculate BMI.           Kelly Beauchamp LPN

## 2018-01-09 NOTE — PROGRESS NOTES
VASCULAR SURGERY CLINIC ESTABLISHED PATIENT NOTE    HPI:    Mrs. Stockton is an 82 year old female who presents today for routine follow-up for aorto-biiliac occlusive disease. She was most recently evaluated by Dr. Velez in June 2016 with recommended exercise. She has never had a peripheral intervention.  She returns to clinic for PAD surveillance.       SUBJECTIVE:  Lucie reports being able to ambulate 2 blocks before needing to rest due to bilateral leg claudication.  Had an epidural injection last week which has greatly helped her back and leg pain.       OBJECTIVE:  Vital signs:  /65 (BP Location: Left arm)  Pulse 67  Resp 16  SpO2 (!) 63%  Breastfeeding? No      Prior to Admission medications    Medication Sig Start Date End Date Taking? Authorizing Provider   citalopram (CELEXA) 40 MG tablet Take 0.5 tablets (20 mg) by mouth daily 11/28/17   Marii Montgomery MD   potassium chloride SA (K-DUR/KLOR-CON M) 20 MEQ CR tablet Take 1 tablet (20 mEq) by mouth daily 11/27/17   Marii Montgomery MD   traMADol (ULTRAM) 50 MG tablet Take 1 tablet (50 mg) by mouth every 6 hours as needed for moderate pain (Take 1 tablet (50 mg) by mouth every 6 hours as needed for moderate pain or severe pain Maximum 270 tablets in 3 months) 10/17/17   Veronica Shah MD   amLODIPine (NORVASC) 10 MG tablet Take 1 tablet (10 mg) by mouth daily For blood pressure 10/17/17   Marii Montgomery MD   trolamine salicylate (ASPERCREME) 10 % cream Apply 1 g topically 3 times daily 9/14/17   Zafar Huynh MD   naproxen sodium 220 MG capsule Take 220 mg by mouth 2 times daily (with meals) 8/18/17   Marii Montgomery MD   oxyCODONE (ROXICODONE) 5 MG IR tablet Take 1 tablet (5 mg) by mouth every 6 hours as needed for moderate to severe pain 8/18/17   Marii Montgomery MD   atorvastatin (LIPITOR) 40 MG tablet Take 2 tablets (80 mg) by mouth daily 6/26/17   Maria Elena Naik APRN CNP   pantoprazole (PROTONIX) 40 MG EC tablet  Take 1 tablet (40 mg) by mouth daily 6/1/17   Marii Montgomery MD   moxifloxacin (VIGAMOX) 0.5 % ophthalmic solution Place 1 drop Into the left eye 4 times daily    Reported, Patient   prednisoLONE acetate (PRED FORTE) 1 % ophthalmic susp Place 1 drop Into the left eye 4 times daily    Reported, Patient   ketorolac (ACULAR) 0.5 % ophthalmic solution Place 1 drop Into the left eye 4 times daily    Reported, Patient   bisacodyl (DULCOLAX) 10 MG Suppository Place 1 suppository (10 mg) rectally daily as needed for constipation 3/21/17   Marii Montgomery MD   levothyroxine (SYNTHROID/LEVOTHROID) 50 MCG tablet Take 1 tablet (50 mcg) by mouth daily 3/10/17   Marii Montgomery MD   clopidogrel (PLAVIX) 75 MG tablet Take 1 tablet (75 mg) by mouth daily 3/10/17   Marii Montgomery MD   hydrochlorothiazide (HYDRODIURIL) 50 MG tablet Take 1 tablet (50 mg) by mouth daily 3/10/17   Marii Montgomery MD   lisinopril (PRINIVIL/ZESTRIL) 40 MG tablet Take 1 tablet (40 mg) by mouth daily For blood pressure 2/16/17   Marii Montgomery MD   polyethylene glycol (MIRALAX) powder Take one-half to one scoop once a day for maintaining soft stools 1/28/17   Marii Montgomery MD   ferrous sulfate (IRON) 325 (65 FE) MG tablet Take 1 tablet (325 mg) by mouth 2 times daily To maintain iron levels 1/28/17   Marii Montgomery MD   albuterol (VENTOLIN HFA) 108 (90 BASE) MCG/ACT inhaler Inhale 2 puffs into the lungs every 4 hours as needed for shortness of breath / dyspnea or wheezing 10/13/16   Marii Montgomery MD   ferrous sulfate (IRON) 325 (65 FE) MG tablet Take 1 tablet (325 mg) by mouth daily (with breakfast) 8/7/15   Marii Montgomery MD   Calcium Carbonate-Vitamin D (CALCIUM 600 + D OR) Take 1 tablet by mouth daily.    Reported, Patient       PHYSICAL EXAM:  NEURO/PSYCH: The patient is alert and oriented.  Appropriate.  Moves all extremities.   SKIN: warm and dry.  PULMONARY: non-labored breathing  EXTREMITIES:  Warm and well  perfused, doppler bilateral PT    SUMAYA RESULTS:  Right le.54, previously 0.46 on 16  Left le.58, previously 0.49 on 16    ASSESSMENT:  Patient Active Problem List   Diagnosis     Benign ovarian tumor     Hypothyroidism     Peripheral vascular disease (H)     Knee pain     Osteoarthritis     Cervicalgia     Hyperlipidemia with target LDL less than 70     Pulmonary embolism (H)     Anemia     Aortic stenosis     Atherosclerosis of aorta (H)     Atherosclerosis of arteries of extremities (H)     Intermittent claudication (H)     Iron deficiency     Osteoporosis     DVT of lower extremity, bilateral (H)     Varicose veins     Gluteal pain     Insomnia     Back pain     Vitamin D deficiency     Tobacco use disorder     Personal history of other drug therapy     Right knee pain     Venous thrombosis of extremity     Benign essential hypertension     Gastritis     Cataract     Acute left-sided low back pain with left-sided sciatica     Lumbar stenosis with neurogenic claudication     SBO (small bowel obstruction)     Small bowel obstruction     Long term current use of anticoagulant therapy     Left-sided low back pain with left-sided sciatica     Moderate major depression (H)     Anxiety     Neoplasm of uncertain behavior of skin     Cherry angioma           PLAN:  - follow up ABIs and office visit with vascular APRN in 6 months  - continue Plavix and Lipitor   - encouraged continued walking      Cristal RYDER, CNS  Division of Vascular Surgery  [facility]  Pager [phone]

## 2018-01-16 ENCOUNTER — OFFICE VISIT (OUTPATIENT)
Dept: DERMATOLOGY | Facility: CLINIC | Age: 83
End: 2018-01-16
Payer: MEDICARE

## 2018-01-16 VITALS
HEART RATE: 72 BPM | DIASTOLIC BLOOD PRESSURE: 71 MMHG | RESPIRATION RATE: 18 BRPM | SYSTOLIC BLOOD PRESSURE: 180 MMHG | OXYGEN SATURATION: 98 %

## 2018-01-16 DIAGNOSIS — C44.519 BCC (BASAL CELL CARCINOMA), TRUNK: Primary | ICD-10-CM

## 2018-01-16 ASSESSMENT — PAIN SCALES - GENERAL
PAINLEVEL: NO PAIN (1)
PAINLEVEL: NO PAIN (0)

## 2018-01-16 NOTE — NURSING NOTE
Dermatology Rooming Note    Lucie Stockton's goals for this visit include:   Chief Complaint   Patient presents with     Derm Problem     Pan is here today for an ED&C.      Patricia Oakley MA

## 2018-01-16 NOTE — LETTER
1/16/2018       RE: Lucie Stockton  [address] [address]     Dear Colleague,    Thank you for referring your patient, Lucie Stockton, to the [facility] DERMATOLOGY at [facility]. Please see a copy of my visit note below.    CHIEF COMPLAINT:  Follow up on biopsy.      SUBJECTIVE:  Lucie is a very pleasant 82-year-old female who we initially met on 11/16/2017 at which time she presented with a pigmented papule of her abdomen.  A biopsy of this area demonstrated a pigmented basal cell carcinoma, fibroepithelioma of Pinkus type, which focally extended to the lateral margins.  She is here today to discuss treatment options.  The biopsy site has healed well and she has no problems with this area.      REVIEW OF SYSTEMS:  No recent fevers.      PHYSICAL EXAMINATION:   GENERAL:  This is a well-appearing, well-nourished, older female with a normal mood and affect who is oriented x3.   SKIN:  A limited cutaneous exam of the abdomen was performed and demonstrates an oval 7 x 7 mm pink scar on the left mid abdomen.      ASSESSMENT AND PLAN:  Pigmented basal cell carcinoma of the abdomen, proven by biopsy on 11/16/2017.  Today we discussed treatment options including a complete surgical excision, topical therapy such as imiquimod or electrodessication and curettage.  After discussion of the risks and benefits of each, she and I were both in agreement that EDC appears to be the most appropriate treatment, especially given that clinically there is no apparent residuum.  This treatment was performed today.  Please see the procedure note below.  Otherwise, we asked her to follow up in 6 months' time for full body skin exam.      PROCEDURE NOTE:  After signed informed consent, the affected area was swabbed with an alcohol pad and injected with 1% lidocaine.  A #3 curette was used to abrade the lesional tissue to shallow ulcer with a clinical margin of 3 mm circumferentially.   Electrodessication at a level of 14 was then applied to achieve hemostasis.  This process was repeated twice and the lesion was covered with petrolatum and a bandage.  Final defect size was 8 x 8 mm.  The patient tolerated this without complication.         Lorenzo Pena MD  Dermatology Attending

## 2018-01-16 NOTE — PROGRESS NOTES
CHIEF COMPLAINT:  Follow up on biopsy.      SUBJECTIVE:  Lucei is a very pleasant 82-year-old female who we initially met on 11/16/2017 at which time she presented with a pigmented papule of her abdomen.  A biopsy of this area demonstrated a pigmented basal cell carcinoma, fibroepithelioma of Pinkus type, which focally extended to the lateral margins.  She is here today to discuss treatment options.  The biopsy site has healed well and she has no problems with this area.      REVIEW OF SYSTEMS:  No recent fevers.      PHYSICAL EXAMINATION:   GENERAL:  This is a well-appearing, well-nourished, older female with a normal mood and affect who is oriented x3.   SKIN:  A limited cutaneous exam of the abdomen was performed and demonstrates an oval 7 x 7 mm pink scar on the left mid abdomen.      ASSESSMENT AND PLAN:  Pigmented basal cell carcinoma of the abdomen, proven by biopsy on 11/16/2017.  Today we discussed treatment options including a complete surgical excision, topical therapy such as imiquimod or electrodessication and curettage.  After discussion of the risks and benefits of each, she and I were both in agreement that EDC appears to be the most appropriate treatment, especially given that clinically there is no apparent residuum.  This treatment was performed today.  Please see the procedure note below.  Otherwise, we asked her to follow up in 6 months' time for full body skin exam.      PROCEDURE NOTE:  After signed informed consent, the affected area was swabbed with an alcohol pad and injected with 1% lidocaine.  A #3 curette was used to abrade the lesional tissue to shallow ulcer with a clinical margin of 3 mm circumferentially.  Electrodessication at a level of 14 was then applied to achieve hemostasis.  This process was repeated twice and the lesion was covered with petrolatum and a bandage.  Final defect size was 8 x 8 mm.  The patient tolerated this without complication.         Lorenzo Pena,  MD  Dermatology Attending

## 2018-01-16 NOTE — MR AVS SNAPSHOT
After Visit Summary   1/16/2018    Lucie Stockton    MRN: [id number]           Patient Information     Date Of Birth          1935        Visit Information        Provider Department      1/16/2018 9:15 AM Lorenzo Pena MD [facility] Dermatology        Care Instructions    Wound Care:  Electrodesiccation and Curettage     I will experience scar, altered skin color, bleeding, swelling, pain, crusting and redness. I may experience altered sensation. Risks are excessive bleeding, infection, muscle weakness, thick (hypertrophic or keloidal) scar, and recurrence,. A second procedure may be recommended to obtain the best cosmetic or functional result.    What is electrodesiccation and curettage ?    Scraping off tissue (curettage)    Destroy tissue using electric current or cautery (electrodessication)    How do I perform wound care?    Keep dressing in place for two days. You may shower with the dressing in place(do not get wet)    After 2 days, wash hands and remove dressing. Clean wound with cotton-swab  to remove drainage and crust    Put on a thick layer of Vaseline on the wound using a cotton-swab     Cover the wound with a Band-AidTM to protect from dust and tight clothing    If wound is draining before two days, change your dressing as described above sooner    During wound care, do not allow the area to dry out or form a scab    What do I need?        Cotton-swabs     Vaseline or petroleum jelly     Band-AidsTM or dressing supplies as needed     When should I call the doctor?  [facility]: [phone]  [facility]: [phone]  For urgent needs outside of business hours call the [facility] at [phone] and ask for the dermatology resident on call            Follow-ups after your visit        Follow-up notes from your care team     Return in about 6 months (around 7/16/2018).      Who to contact     Please call your  clinic at [phone] to:    Ask questions about your health    Make or cancel appointments    Discuss your medicines    Learn about your test results    Speak to your doctor   If you have compliments or concerns about an experience at your clinic, or if you wish to file a complaint, please contact [facility] Patient Relations at [phone] or email us at [email or web address]         Additional Information About Your Visit        TissuetechharCREATIV Information     TrackDuck is an electronic gateway that provides easy, online access to your medical records. With TrackDuck, you can request a clinic appointment, read your test results, renew a prescription or communicate with your care team.     To sign up for TrackDuck visit the website at [email or web address]   You will be asked to enter the access code listed below, as well as some personal information. Please follow the directions to create your username and password.     Your access code is: [email or web address]  Expires: 3/4/2018  6:30 AM     Your access code will  in 90 days. If you need help or a new code, please contact your [facility] Clinic or call [phone] for assistance.        Care EveryWhere ID     This is your Care EveryWhere ID. This could be used by other organizations to access your [facility] medical records  [id number]        Your Vitals Were     Pulse Respirations Pulse Oximetry             72 18 98%          Blood Pressure from Last 3 Encounters:   18 180/71   18 137/65   18 163/72    Weight from Last 3 Encounters:   18 45.4 kg (100 lb)   17 45.4 kg (100 lb)   10/20/17 47.6 kg (105 lb)              Today, you had the following     No orders found for display       Primary Care Provider Office Phone # Fax #    Marii Montgomery [phone]       [address] [place] [address] [address]  [facility] [address]        Equal Access to Services     PAL SALGADO AH:  Hadii aad ku hadcorio Somarthaali, waaxda luqadaha, qaybta kaalmada mirta, jackeline izquierdo. So [facility] [phone].    ATENCIÓN: Si shannan briscoe, tiene a schwartz disposición servicios gratuitos de asistencia lingüística. Llame al [phone].    We comply with applicable federal civil rights laws and [place] laws. We do not discriminate on the basis of race, color, national origin, age, disability, sex, sexual orientation, or gender identity.            Thank you!     Thank you for choosing [facility] DERMATOLOGY  for your care. Our goal is always to provide you with excellent care. Hearing back from our patients is one way we can continue to improve our services. Please take a few minutes to complete the written survey that you may receive in the mail after your visit with us. Thank you!             Your Updated Medication List - Protect others around you: Learn how to safely use, store and throw away your medicines at www.disposemymeds.org.          This list is accurate as of: 1/16/18  9:57 AM.  Always use your most recent med list.                   Brand Name Dispense Instructions for use Diagnosis    albuterol 108 (90 BASE) MCG/ACT Inhaler    VENTOLIN HFA    18 Inhaler    Inhale 2 puffs into the lungs every 4 hours as needed for shortness of breath / dyspnea or wheezing    Cough       amLODIPine 10 MG tablet    NORVASC    90 tablet    Take 1 tablet (10 mg) by mouth daily For blood pressure    Essential hypertension       atorvastatin 40 MG tablet    LIPITOR    90 tablet    Take 2 tablets (80 mg) by mouth daily    Hyperlipidemia, unspecified hyperlipidemia type       bisacodyl 10 MG Suppository    DULCOLAX    90 suppository    Place 1 suppository (10 mg) rectally daily as needed for constipation    Constipation, unspecified constipation type       CALCIUM 600 + D PO      Take 1 tablet by mouth daily.        citalopram 40 MG tablet    celeXA    45 tablet    Take 0.5 tablets (20 mg) by mouth  daily    Depression, unspecified depression type       clopidogrel 75 MG tablet    PLAVIX    90 tablet    Take 1 tablet (75 mg) by mouth daily    Other chronic pulmonary embolism, Venous thrombosis of extremity       * ferrous sulfate 325 (65 FE) MG tablet    IRON    90 tablet    Take 1 tablet (325 mg) by mouth daily (with breakfast)    Other iron deficiency anemias       * ferrous sulfate 325 (65 FE) MG tablet    IRON    90 tablet    Take 1 tablet (325 mg) by mouth 2 times daily To maintain iron levels    Iron deficiency       hydrochlorothiazide 50 MG tablet    HYDRODIURIL    90 tablet    Take 1 tablet (50 mg) by mouth daily    Benign essential hypertension       ketorolac 0.5 % ophthalmic solution    ACULAR     Place 1 drop Into the left eye 4 times daily        levothyroxine 50 MCG tablet    SYNTHROID/LEVOTHROID    90 tablet    Take 1 tablet (50 mcg) by mouth daily    Hypothyroidism, unspecified type       lisinopril 40 MG tablet    PRINIVIL/ZESTRIL    90 tablet    Take 1 tablet (40 mg) by mouth daily For blood pressure    Essential hypertension       moxifloxacin 0.5 % ophthalmic solution    VIGAMOX     Place 1 drop Into the left eye 4 times daily        naproxen sodium 220 MG capsule     60 capsule    Take 220 mg by mouth 2 times daily (with meals)    Chronic low back pain, unspecified back pain laterality, with sciatica presence unspecified, Pain of left lower leg       oxyCODONE IR 5 MG tablet    ROXICODONE    10 tablet    Take 1 tablet (5 mg) by mouth every 6 hours as needed for moderate to severe pain    Chronic low back pain, unspecified back pain laterality, with sciatica presence unspecified, Pain of left lower leg       pantoprazole 40 MG EC tablet    PROTONIX    90 tablet    Take 1 tablet (40 mg) by mouth daily    Other iron deficiency anemias       polyethylene glycol powder    MIRALAX    119 g    Take one-half to one scoop once a day for maintaining soft stools    Constipation, unspecified  constipation type       potassium chloride SA 20 MEQ CR tablet    K-DUR/KLOR-CON M    90 tablet    Take 1 tablet (20 mEq) by mouth daily    Hypopotassemia       prednisoLONE acetate 1 % ophthalmic susp    PRED FORTE     Place 1 drop Into the left eye 4 times daily        traMADol 50 MG tablet    ULTRAM    270 tablet    Take 1 tablet (50 mg) by mouth every 6 hours as needed for moderate pain (Take 1 tablet (50 mg) by mouth every 6 hours as needed for moderate pain or severe pain Maximum 270 tablets in 3 months)    Pain       trolamine salicylate 10 % cream    ASPERCREME    170 g    Apply 1 g topically 3 times daily    Arthralgia of left lower leg       * Notice:  This list has 2 medication(s) that are the same as other medications prescribed for you. Read the directions carefully, and ask your doctor or other care provider to review them with you.

## 2018-01-16 NOTE — PATIENT INSTRUCTIONS
Wound Care:  Electrodesiccation and Curettage     I will experience scar, altered skin color, bleeding, swelling, pain, crusting and redness. I may experience altered sensation. Risks are excessive bleeding, infection, muscle weakness, thick (hypertrophic or keloidal) scar, and recurrence,. A second procedure may be recommended to obtain the best cosmetic or functional result.    What is electrodesiccation and curettage ?    Scraping off tissue (curettage)    Destroy tissue using electric current or cautery (electrodessication)    How do I perform wound care?    Keep dressing in place for two days. You may shower with the dressing in place(do not get wet)    After 2 days, wash hands and remove dressing. Clean wound with cotton-swab  to remove drainage and crust    Put on a thick layer of Vaseline on the wound using a cotton-swab     Cover the wound with a Band-AidTM to protect from dust and tight clothing    If wound is draining before two days, change your dressing as described above sooner    During wound care, do not allow the area to dry out or form a scab    What do I need?        Cotton-swabs     Vaseline or petroleum jelly     Band-AidsTM or dressing supplies as needed     When should I call the doctor?  [facility]: [phone]  [facility]: [phone]  For urgent needs outside of business hours call the [facility] at [phone] and ask for the dermatology resident on call

## 2018-01-17 PROBLEM — C44.519 BCC (BASAL CELL CARCINOMA), TRUNK: Status: ACTIVE | Noted: 2018-01-17

## 2018-01-23 ENCOUNTER — TELEPHONE (OUTPATIENT)
Dept: INTERNAL MEDICINE | Facility: CLINIC | Age: 83
End: 2018-01-23

## 2018-01-23 NOTE — TELEPHONE ENCOUNTER
----- Message from Teri Porter sent at 1/23/2018  1:35 PM CST -----  Regarding: Ulises // Patient needs to schedule post hospital appt sooner than first available  Good afternoon,    Patient requests to schedule appointment sooner than first available (Feb 1st) with Dr Montgomery only.  She was recently in [place] and hospitalized for 2 nights for what they thought was broken ribs, but ending up only being bruised ribs/area.Please follow up with patient.    Thanks so much!    Appt with Dr Montgomery at 8:40am on the 26th of January. Pt to cough and deep breathe prn while awake.  Kristal Jurado RN 2:04 PM on 1/23/2018.

## 2018-01-23 NOTE — PROGRESS NOTES
[facility] Initial Evaluation -- Ankle    Evaluation Date: 9/11/2017  Lucie Stockton is a 82 year old female with a L ankle condition.   Referral: GP  Work mechanical stresses:    Employment status:   Leisure mechanical stresses:   Functional disability score (NDI):    VAS score (0-10): 7  Patient goals/expectations:  Sit through a meal without pain.    HISTORY:    Present symptoms:  L lat ankle pain- constant..L LS post leg to calf- constant.  Pain quality (sharp/shooting/stabbing/aching/burning/cramping):   Sharp, burning..  Paresthesia (yes/no):  no    Present since (onset date): 7/2017. MD order 8/25/2017.   Symptoms (improving/unchanging/worsening):  Unchanged..    Symptoms commenced as a result of: unknown   Condition occurred in the following environment:      Symptoms at onset (neck/arm/forearm/headache): LS  Constant symptoms (neck/arm/forearm/headache): yes for all  Intermittent symptoms (neck/arm/forearm/headache):     Symptoms are made worse with the following: Always Rising and sometimes standing, always walking, always on the move always sitting.  Symptoms are made better with the following: Always Lying    Disturbed sleep (yes/no): no  Number of pillows:   Sleeping postures (prone/sup/side R/L): R  SL    Previous episodes (0/1-5/6-10/11+): 2 Year of first episode: 2015    Previous history: 2015 and 2016  Previous treatments: extension responder    Specific Questions: (as reported by the patient)  Dizziness/Tinnitus/Nausea/Swallowing (pos/neg): no  Gait/Upper Limbs (normal/abnormal): abnormal  Medications (nil/NSAIDS/anlag/steroids/anticoag/other):  Narcotics/Opiods and Other - Gabbapentin  Medical allergies:  See chart  General health (excellent/good/fair/poor):  good  Pertinent medical history:  Osteoarthritis, Osteoporosis and Anemia  Imaging (None/Xray/MRI/Other):  stenosis  Recent or major surgery (yes/no): no  Night pain (yes/no): no  Accidents (yes/no): no  Unexplained weight  loss (yes/no): yes, due to pain?  Barriers at home: none  Other red flags: none    EXAMINATION    Posture:   Sitting (good/fair/poor): poor, with knee flexed into chest  Standing (good/fair/poor): poor     Correction of posture(better/worse/no effect): no effect  Other observations:      Neurological:    Motor Deficit:     Reflexes:    Sensory Deficit:     Dural signs:      Movement Loss:   Elias Mod Min Nil Pain   Protrusion        Flexion        Retraction        Extension        Lateral flexion R        Lateral flexion L        Rotation R        Rotation L          Test Movements:   During: produces, abolishes, increases, decreases, no effect, centralizing, peripheralizing  After: better, worse, no better, no worse, no effect, centralized, peripheralized    Pretest symptoms sitting:    Symptoms During Symptoms After ROM increased ROM decreased No Effect   PRO          Rep PRO          RET          Rep RET          RET EXT          Rep RET EXT          Pretest symptoms lying:     Symptoms During Symptoms After ROM increased ROM decreased No Effect   RET          Rep RET          RET EXT          Rep RET EXT          If required, pretest symptoms sitting:      Symptoms During Symptoms After ROM increased ROM decreased No Effect   LF-R          Rep LF-R          LF-L          Rep LF-L          ROT-R          Rep ROT-R          ROT-L          Rep ROT-L          FLEX          Rep FLEX              Other Tests: L ankle AROM/ PROM: wnl unable to reproduce pain. MMT: L ankle wnl, pain free. LSROM: flexion to knees, buttocks, lat ankle pain. Extension max loss pain lat thigh, ankle. HOWARD with forearms elevated 2.5 inches - thigh and ankle pain decreased, better.        Provisional Classification:  Derangement - LS assymetrical below knee, no lateral component     Principle of Management:  Education:  LS producing radicular pain in ankle DP centralization of pain proper sitting posture with L roll    Equipment provided:     Mechanical therapy (Y/N):  Y   Extension principle:  HOWARD elevated 2.5 inches up to 5 min q 2-3 hrs   Lateral principle:    Flexion principle:     Other:      ASSESSMENT/PLAN:    Patient is a 82 year old female with ankle pain complaints.    Patient has the following significant findings with corresponding treatment plan.                Diagnosis 1:  LS radiculopathy  Pain -  self management, education, directional preference exercise and home program  Decreased ROM/flexibility - therapeutic exercise, therapeutic activity and home program  Decreased joint mobility - therapeutic exercise, therapeutic activity and home program  Impaired gait - home program  Decreased function - therapeutic activities and home program  Impaired posture - neuro re-education, therapeutic activities and home program    Therapy Evaluation Codes:   1) History comprised of:   Personal factors that impact the plan of care:      None.    Comorbidity factors that impact the plan of care are:      High blood pressure and Osteoarthritis.     Medications impacting care: Pain.  2) Examination of Body Systems comprised of:   Body structures and functions that impact the plan of care:      Lumbar spine.   Activity limitations that impact the plan of care are:      Sitting, Standing and Walking.  3) Clinical presentation characteristics are:   Stable/Uncomplicated.  4) Decision-Making    Low complexity using standardized patient assessment instrument and/or measureable assessment of functional outcome.  Cumulative Therapy Evaluation is: Low complexity.    Previous and current functional limitations:  (See Goal Flow Sheet for this information)    Short term and Long term goals: (See Goal Flow Sheet for this information)     Communication ability:  Patient appears to be able to clearly communicate and understand verbal and written communication and follow directions correctly.  Treatment Explanation - The following has been discussed with the patient:    RX ordered/plan of care  Anticipated outcomes  Possible risks and side effects  This patient would benefit from PT intervention to resume normal activities.   Rehab potential is good.    Frequency:  1 X week, once daily  Duration:  for 6 weeks  Discharge Plan:  Achieve all LTG.  Independent in home treatment program.  Reach maximal therapeutic benefit.    Please refer to the daily flowsheet for treatment today, total treatment time and time spent performing 1:1 timed codes.    [facility] Initial Evaluation  Subjective:  HPI                    Objective:  System    Physical Exam    General     ROS

## 2018-01-26 ENCOUNTER — OFFICE VISIT (OUTPATIENT)
Dept: INTERNAL MEDICINE | Facility: CLINIC | Age: 83
End: 2018-01-26
Payer: MEDICARE

## 2018-01-26 ENCOUNTER — APPOINTMENT (OUTPATIENT)
Dept: GENERAL RADIOLOGY | Facility: CLINIC | Age: 83
DRG: 193 | End: 2018-01-26
Attending: EMERGENCY MEDICINE
Payer: MEDICARE

## 2018-01-26 ENCOUNTER — HOSPITAL ENCOUNTER (INPATIENT)
Facility: CLINIC | Age: 83
LOS: 3 days | Discharge: NURSING, REHAB OR LONG-TERM CARE | DRG: 193 | End: 2018-01-30
Attending: EMERGENCY MEDICINE | Admitting: INTERNAL MEDICINE
Payer: MEDICARE

## 2018-01-26 ENCOUNTER — TELEPHONE (OUTPATIENT)
Dept: INTERNAL MEDICINE | Facility: CLINIC | Age: 83
End: 2018-01-26

## 2018-01-26 ENCOUNTER — APPOINTMENT (OUTPATIENT)
Dept: ULTRASOUND IMAGING | Facility: CLINIC | Age: 83
DRG: 193 | End: 2018-01-26
Attending: EMERGENCY MEDICINE
Payer: MEDICARE

## 2018-01-26 VITALS
RESPIRATION RATE: 20 BRPM | SYSTOLIC BLOOD PRESSURE: 155 MMHG | HEART RATE: 77 BPM | DIASTOLIC BLOOD PRESSURE: 67 MMHG | TEMPERATURE: 97.9 F | BODY MASS INDEX: 19.53 KG/M2 | WEIGHT: 100 LBS | OXYGEN SATURATION: 94 %

## 2018-01-26 DIAGNOSIS — R13.19 ESOPHAGEAL DYSPHAGIA: ICD-10-CM

## 2018-01-26 DIAGNOSIS — G89.29 CHRONIC LOW BACK PAIN, UNSPECIFIED BACK PAIN LATERALITY, WITH SCIATICA PRESENCE UNSPECIFIED: ICD-10-CM

## 2018-01-26 DIAGNOSIS — E87.6 HYPOKALEMIA: ICD-10-CM

## 2018-01-26 DIAGNOSIS — R07.89 RIGHT-SIDED CHEST WALL PAIN: Primary | ICD-10-CM

## 2018-01-26 DIAGNOSIS — M79.662 PAIN OF LEFT LOWER LEG: ICD-10-CM

## 2018-01-26 DIAGNOSIS — S80.01XA CONTUSION OF RIGHT KNEE, INITIAL ENCOUNTER: ICD-10-CM

## 2018-01-26 DIAGNOSIS — J06.9 UPPER RESPIRATORY TRACT INFECTION, UNSPECIFIED TYPE: ICD-10-CM

## 2018-01-26 DIAGNOSIS — M54.5 CHRONIC LOW BACK PAIN, UNSPECIFIED BACK PAIN LATERALITY, WITH SCIATICA PRESENCE UNSPECIFIED: ICD-10-CM

## 2018-01-26 DIAGNOSIS — J10.1 INFLUENZA A: Primary | ICD-10-CM

## 2018-01-26 DIAGNOSIS — S70.01XA CONTUSION OF RIGHT HIP, INITIAL ENCOUNTER: ICD-10-CM

## 2018-01-26 DIAGNOSIS — I10 UNCONTROLLED HYPERTENSION: ICD-10-CM

## 2018-01-26 DIAGNOSIS — I73.9 PVD (PERIPHERAL VASCULAR DISEASE) (H): ICD-10-CM

## 2018-01-26 LAB
ALBUMIN SERPL-MCNC: 3.4 G/DL (ref 3.4–5)
ALP SERPL-CCNC: 69 U/L (ref 40–150)
ALT SERPL W P-5'-P-CCNC: 18 U/L (ref 0–50)
ANION GAP SERPL CALCULATED.3IONS-SCNC: 8 MMOL/L (ref 3–14)
ANION GAP SERPL CALCULATED.3IONS-SCNC: 9 MMOL/L (ref 3–14)
APTT PPP: 35 SEC (ref 22–37)
AST SERPL W P-5'-P-CCNC: 16 U/L (ref 0–45)
BASOPHILS # BLD AUTO: 0 10E9/L (ref 0–0.2)
BASOPHILS NFR BLD AUTO: 0.2 %
BILIRUB SERPL-MCNC: 0.4 MG/DL (ref 0.2–1.3)
BUN SERPL-MCNC: 12 MG/DL (ref 7–30)
BUN SERPL-MCNC: 14 MG/DL (ref 7–30)
CALCIUM SERPL-MCNC: 9.6 MG/DL (ref 8.5–10.1)
CALCIUM SERPL-MCNC: 9.7 MG/DL (ref 8.5–10.1)
CHLORIDE SERPL-SCNC: 100 MMOL/L (ref 94–109)
CHLORIDE SERPL-SCNC: 101 MMOL/L (ref 94–109)
CO2 SERPL-SCNC: 26 MMOL/L (ref 20–32)
CO2 SERPL-SCNC: 27 MMOL/L (ref 20–32)
CREAT SERPL-MCNC: 0.52 MG/DL (ref 0.52–1.04)
CREAT SERPL-MCNC: 0.54 MG/DL (ref 0.52–1.04)
D DIMER PPP FEU-MCNC: 1.3 UG/ML FEU (ref 0–0.5)
DIFFERENTIAL METHOD BLD: ABNORMAL
EOSINOPHIL # BLD AUTO: 0.1 10E9/L (ref 0–0.7)
EOSINOPHIL NFR BLD AUTO: 1.1 %
ERYTHROCYTE [DISTWIDTH] IN BLOOD BY AUTOMATED COUNT: 15.5 % (ref 10–15)
GFR SERPL CREATININE-BSD FRML MDRD: >90 ML/MIN/1.7M2
GFR SERPL CREATININE-BSD FRML MDRD: >90 ML/MIN/1.7M2
GLUCOSE SERPL-MCNC: 82 MG/DL (ref 70–99)
GLUCOSE SERPL-MCNC: 91 MG/DL (ref 70–99)
HCT VFR BLD AUTO: 38.8 % (ref 35–47)
HGB BLD-MCNC: 12.7 G/DL (ref 11.7–15.7)
IMM GRANULOCYTES # BLD: 0 10E9/L (ref 0–0.4)
IMM GRANULOCYTES NFR BLD: 0.2 %
INR PPP: 0.99 (ref 0.86–1.14)
LYMPHOCYTES # BLD AUTO: 1 10E9/L (ref 0.8–5.3)
LYMPHOCYTES NFR BLD AUTO: 10.4 %
MCH RBC QN AUTO: 30 PG (ref 26.5–33)
MCHC RBC AUTO-ENTMCNC: 32.7 G/DL (ref 31.5–36.5)
MCV RBC AUTO: 92 FL (ref 78–100)
MONOCYTES # BLD AUTO: 0.6 10E9/L (ref 0–1.3)
MONOCYTES NFR BLD AUTO: 5.7 %
NEUTROPHILS # BLD AUTO: 8 10E9/L (ref 1.6–8.3)
NEUTROPHILS NFR BLD AUTO: 82.4 %
NRBC # BLD AUTO: 0 10*3/UL
NRBC BLD AUTO-RTO: 0 /100
PLATELET # BLD AUTO: 422 10E9/L (ref 150–450)
POTASSIUM SERPL-SCNC: 3.7 MMOL/L (ref 3.4–5.3)
POTASSIUM SERPL-SCNC: 3.9 MMOL/L (ref 3.4–5.3)
PROT SERPL-MCNC: 6.6 G/DL (ref 6.8–8.8)
RBC # BLD AUTO: 4.23 10E12/L (ref 3.8–5.2)
SODIUM SERPL-SCNC: 136 MMOL/L (ref 133–144)
SODIUM SERPL-SCNC: 136 MMOL/L (ref 133–144)
WBC # BLD AUTO: 9.8 10E9/L (ref 4–11)

## 2018-01-26 PROCEDURE — A9270 NON-COVERED ITEM OR SERVICE: HCPCS | Mod: GY | Performed by: EMERGENCY MEDICINE

## 2018-01-26 PROCEDURE — 93005 ELECTROCARDIOGRAM TRACING: CPT

## 2018-01-26 PROCEDURE — 85025 COMPLETE CBC W/AUTO DIFF WBC: CPT | Performed by: EMERGENCY MEDICINE

## 2018-01-26 PROCEDURE — 85610 PROTHROMBIN TIME: CPT | Performed by: EMERGENCY MEDICINE

## 2018-01-26 PROCEDURE — 25000132 ZZH RX MED GY IP 250 OP 250 PS 637: Mod: GY | Performed by: EMERGENCY MEDICINE

## 2018-01-26 PROCEDURE — 73502 X-RAY EXAM HIP UNI 2-3 VIEWS: CPT

## 2018-01-26 PROCEDURE — 99219 ZZC INITIAL OBSERVATION CARE,LEVL II: CPT | Mod: Z6 | Performed by: PHYSICIAN ASSISTANT

## 2018-01-26 PROCEDURE — 73560 X-RAY EXAM OF KNEE 1 OR 2: CPT | Mod: RT

## 2018-01-26 PROCEDURE — 99285 EMERGENCY DEPT VISIT HI MDM: CPT | Mod: 25

## 2018-01-26 PROCEDURE — 85730 THROMBOPLASTIN TIME PARTIAL: CPT | Performed by: EMERGENCY MEDICINE

## 2018-01-26 PROCEDURE — 80053 COMPREHEN METABOLIC PANEL: CPT | Performed by: EMERGENCY MEDICINE

## 2018-01-26 PROCEDURE — 85379 FIBRIN DEGRADATION QUANT: CPT | Performed by: EMERGENCY MEDICINE

## 2018-01-26 PROCEDURE — 93971 EXTREMITY STUDY: CPT | Mod: RT

## 2018-01-26 RX ORDER — TRAMADOL HYDROCHLORIDE 50 MG/1
50 TABLET ORAL EVERY 6 HOURS PRN
Status: DISCONTINUED | OUTPATIENT
Start: 2018-01-26 | End: 2018-01-30 | Stop reason: HOSPADM

## 2018-01-26 RX ORDER — FERROUS SULFATE 325(65) MG
325 TABLET ORAL
Status: DISCONTINUED | OUTPATIENT
Start: 2018-01-27 | End: 2018-01-30 | Stop reason: HOSPADM

## 2018-01-26 RX ORDER — METOCLOPRAMIDE HYDROCHLORIDE 5 MG/ML
5 INJECTION INTRAMUSCULAR; INTRAVENOUS EVERY 6 HOURS PRN
Status: DISCONTINUED | OUTPATIENT
Start: 2018-01-26 | End: 2018-01-30 | Stop reason: HOSPADM

## 2018-01-26 RX ORDER — CITALOPRAM HYDROBROMIDE 10 MG/1
20 TABLET ORAL DAILY
Status: DISCONTINUED | OUTPATIENT
Start: 2018-01-27 | End: 2018-01-30 | Stop reason: HOSPADM

## 2018-01-26 RX ORDER — NAPROXEN 250 MG/1
250 TABLET ORAL 2 TIMES DAILY WITH MEALS
Status: DISCONTINUED | OUTPATIENT
Start: 2018-01-27 | End: 2018-01-30 | Stop reason: HOSPADM

## 2018-01-26 RX ORDER — PANTOPRAZOLE SODIUM 40 MG/1
40 TABLET, DELAYED RELEASE ORAL
Status: DISCONTINUED | OUTPATIENT
Start: 2018-01-27 | End: 2018-01-30 | Stop reason: HOSPADM

## 2018-01-26 RX ORDER — ACETAMINOPHEN 325 MG/1
650 TABLET ORAL EVERY 4 HOURS PRN
Status: DISCONTINUED | OUTPATIENT
Start: 2018-01-26 | End: 2018-01-30 | Stop reason: HOSPADM

## 2018-01-26 RX ORDER — LEVOTHYROXINE SODIUM 25 UG/1
50 TABLET ORAL
Status: DISCONTINUED | OUTPATIENT
Start: 2018-01-27 | End: 2018-01-30 | Stop reason: HOSPADM

## 2018-01-26 RX ORDER — LIDOCAINE 40 MG/G
CREAM TOPICAL
Status: DISCONTINUED | OUTPATIENT
Start: 2018-01-26 | End: 2018-01-30 | Stop reason: HOSPADM

## 2018-01-26 RX ORDER — ONDANSETRON 2 MG/ML
4 INJECTION INTRAMUSCULAR; INTRAVENOUS EVERY 6 HOURS PRN
Status: DISCONTINUED | OUTPATIENT
Start: 2018-01-26 | End: 2018-01-30 | Stop reason: HOSPADM

## 2018-01-26 RX ORDER — POTASSIUM CHLORIDE 750 MG/1
20 TABLET, EXTENDED RELEASE ORAL DAILY
Status: DISCONTINUED | OUTPATIENT
Start: 2018-01-27 | End: 2018-01-27

## 2018-01-26 RX ORDER — POLYETHYLENE GLYCOL 3350 17 G/17G
17 POWDER, FOR SOLUTION ORAL DAILY PRN
Status: DISCONTINUED | OUTPATIENT
Start: 2018-01-26 | End: 2018-01-30 | Stop reason: HOSPADM

## 2018-01-26 RX ORDER — ATORVASTATIN CALCIUM 40 MG/1
80 TABLET, FILM COATED ORAL AT BEDTIME
Status: DISCONTINUED | OUTPATIENT
Start: 2018-01-26 | End: 2018-01-30 | Stop reason: HOSPADM

## 2018-01-26 RX ORDER — OXYCODONE HYDROCHLORIDE 5 MG/1
5 TABLET ORAL EVERY 6 HOURS PRN
Status: DISCONTINUED | OUTPATIENT
Start: 2018-01-26 | End: 2018-01-30 | Stop reason: HOSPADM

## 2018-01-26 RX ORDER — NALOXONE HYDROCHLORIDE 0.4 MG/ML
.1-.4 INJECTION, SOLUTION INTRAMUSCULAR; INTRAVENOUS; SUBCUTANEOUS
Status: DISCONTINUED | OUTPATIENT
Start: 2018-01-26 | End: 2018-01-30 | Stop reason: HOSPADM

## 2018-01-26 RX ORDER — CLOPIDOGREL BISULFATE 75 MG/1
75 TABLET ORAL DAILY
Status: DISCONTINUED | OUTPATIENT
Start: 2018-01-27 | End: 2018-01-30 | Stop reason: HOSPADM

## 2018-01-26 RX ORDER — OXYCODONE HYDROCHLORIDE 5 MG/1
5 TABLET ORAL ONCE
Status: DISCONTINUED | OUTPATIENT
Start: 2018-01-26 | End: 2018-01-26

## 2018-01-26 RX ORDER — OXYCODONE HYDROCHLORIDE 5 MG/1
5 TABLET ORAL ONCE
Status: COMPLETED | OUTPATIENT
Start: 2018-01-26 | End: 2018-01-26

## 2018-01-26 RX ORDER — AMLODIPINE BESYLATE 10 MG/1
10 TABLET ORAL DAILY
Status: DISCONTINUED | OUTPATIENT
Start: 2018-01-27 | End: 2018-01-30 | Stop reason: HOSPADM

## 2018-01-26 RX ORDER — HYDROCHLOROTHIAZIDE 12.5 MG/1
50 TABLET ORAL DAILY
Status: DISCONTINUED | OUTPATIENT
Start: 2018-01-27 | End: 2018-01-30 | Stop reason: HOSPADM

## 2018-01-26 RX ORDER — LISINOPRIL 20 MG/1
40 TABLET ORAL DAILY
Status: DISCONTINUED | OUTPATIENT
Start: 2018-01-27 | End: 2018-01-30 | Stop reason: HOSPADM

## 2018-01-26 RX ADMIN — OXYCODONE HYDROCHLORIDE 5 MG: 5 TABLET ORAL at 22:39

## 2018-01-26 ASSESSMENT — ENCOUNTER SYMPTOMS
NECK STIFFNESS: 0
HEMATURIA: 0
HEADACHES: 0
CONFUSION: 0
DYSURIA: 0
ABDOMINAL PAIN: 0
EYE REDNESS: 0
NECK PAIN: 0
DIFFICULTY URINATING: 0
SHORTNESS OF BREATH: 0
FEVER: 0
BACK PAIN: 0
ARTHRALGIAS: 1
COLOR CHANGE: 0

## 2018-01-26 ASSESSMENT — ACTIVITIES OF DAILY LIVING (ADL): WHICH_OF_THE_ABOVE_FUNCTIONAL_RISKS_HAD_A_RECENT_ONSET_OR_CHANGE?: AMBULATION;TRANSFERRING;TOILETING

## 2018-01-26 ASSESSMENT — PAIN SCALES - GENERAL: PAINLEVEL: MODERATE PAIN (5)

## 2018-01-26 NOTE — NURSING NOTE
"Chief Complaint   Patient presents with     Hospital F/U     Follow up hospitalization in [place] (1/20/18-1/21/18) had fallen, hit right side of head and right side, \" rib pain\"   Sherice Beauchamp LPN 8:47 AM on 1/26/2018    Rooming Note  Blood Pressure   BP Readings from Last 1 Encounters:   01/26/18 155/67    Single BP recheck started, 8:50 AM (4 minutes)  Sherice Beauchamp LPN 8:50 AM on 1/26/2018      "

## 2018-01-26 NOTE — ED NOTES
Pt arrives to ED for a complaint of R knee pain. PT has a history of DVT and flew back from [place] on Monday, but pt states that this does not feel like she has a DVT. No complaints of shortness of breath

## 2018-01-26 NOTE — TELEPHONE ENCOUNTER
Call from patient on the triage line. She reports that she saw Dr. Montgomery this morning for some right leg pain. Dr. Montgomery did some walking with her, and said that it should be monitored, and she should call if symptoms worsen. She is calling back at this time because now she is unable to stand or walk at all. Whenever she tries to bear weight on that leg, it mik. She denies any swelling noted of extremity. Advised patient to go to ER for evaluation due to significant worsening of symptoms, and inability to bear weight. She verbalized understanding, and is in agreement with going to ER.

## 2018-01-26 NOTE — IP AVS SNAPSHOT
` `     UNIT 6D OBSERVATION [facility]: [phone]                 INTERAGENCY TRANSFER FORM - NOTES (H&P, Discharge Summary, Consults, Procedures, Therapies)   2018                    Hospital Admission Date: 2018  ISAURO GUZMAN   : 1935  Sex: Female        Patient PCP Information     Provider PCP Type    Marii Montgomery MD General         History & Physicals      H&P by Chantell Sanders PA at 2018 12:06 AM     Author:  Chantell Sanders PA Service:  Emergency Medicine Author Type:  Physician Assistant - C    Filed:  2018  4:26 AM Date of Service:  2018 12:06 AM Creation Time:  2018 12:06 AM    Status:  Cosign Needed :  Chantell Sanders PA (Physician Assistant - C)    Cosign Required:  Yes             [facility] ED Observation Admission Note    Chief Complaint   Patient presents with     Leg Pain       Assessment/Plan:  1.[SB1.1] Left Knee Pain: p/t ED with  because patient apparently had right knee buckle on her earlier today and then could not weight-bear on it.  Fell a week ago, and had been ambulatory since that time, but today had her right knee buckle and complains of pain in her right knee and right hip. In ED, HR 70's-80's, 's-160's/30's-70's, RR 18-28, SaO2 92-94% on RA. She was then placed on NC O2 at 3L, Temp 98.9. Labs show normal CMP and CBC. INR normal. D-dimer 1.3. LE US was negative for DVT. Xray of the right hip and knee are both negative for fracture. In ED patient was helped out off bed was able to bear some weight on her right lower extremity but was unable to ambulate. This was then attempted with a walker and the patient was unable to ambulate even with a walker secondary to right knee pain. She is being admitted to the observation unit for PT OT consult in the morning and possible TCU placement. In the ED the patient was given oxycodone 5mg po x 1.   - MRI in AM  - Continue with home oxycodone and tramadol  -  Lidoderm patch to left knee  - PT and SW consult[SB1.2]    2.[SB1.1] Hypoxia: In ED patient was placed on 3L NC but this was not reported. On arrival to the unit, O2 was turned off however continue to desat to high 80's and O2 was placed again up to 3L to maintain sats.  - ABG stat  - CT Chest to r/o PE and PNA or rib fx  - IS    3. Confusion: appears to have intermittent memory problems with basic neuro exam questions.  - CT Head stat  - UA     Chronic Medical Problems:  ## Vascular disease: - Continue with PTA clopidogrel  ## Hypothyroidism: - Continue with PTA levothyroxine  ## Asthma: - Continue with PTA Albuterol PRN  ## HTN: - Continue with PTA amlodipine, lisinopril, hold HCTZ  ## Chronic Pain: - Continue with PTA  Tramadol and oxycodone    Addendum: Was called by Radiologist that CT Chest showed a stable chronic rightt PE; some ground glass opacities that could be an upcoming or improving infection. New mildly displaced right sided rib fractures. ABG unremarkable. UA negative for infection but 10 ketones.   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  The patient apparently fell a week ago, and had been ambulatory since that time, but today had her right knee buckle and complains of pain in her right knee and right hip. She states she feels more weakness than pain. Her history is somewhat inconsistent. I attempted reaching her  without an answer.     In the ED, HR 70's-80's, 's-160's/30's-70's, RR 18-28, SaO2 92-94% on RA. She was then placed  "on NC O2 at 3L, Temp 98.9. Labs show normal CMP and CBC. INR normal. D-dimer 1.3. LE US was negative for DVT. Xray of the right hip and knee are both negative for fracture. Patient was helped out off bed was able to bear some weight on her right lower extremity but was unable to ambulate. This was then attempted with a walker and the patient was unable to ambulate even with a walker secondary to right knee pain. She is being admitted to the observation unit for PT OT consult in the morning and possible TCU placement. In the ED the patient was given oxycodone 5mg po x 1.[SB1.2]     On admission to the observation unit the patient was stable[SB1.1] with still some left hip and knee pain.[SB1.2]     History:    Past Medical History:   Diagnosis Date     Anemia      Aortic stenosis     abdominal     Atherosclerosis of aorta (H)     \"extensive disease with near occlusion below level of renal arteries\" on CT     Atherosclerosis of arteries of extremities (H)      Back pain     severe multilevel DJD, moderate spinal canal stenosis L4-5, severe L3-4     Degenerative joint disease      DVT of lower extremity, bilateral (H)     5/24/10 left distal femoral and great saphenous, 7/7/10 left:  extending to cfv, iliac , pop and post tibial, peronial, right:  distal fem and peroneal      Grave's disease      Hyperlipidaemia LDL goal < 70      Hypertension, essential      Hypothyroidism      Insomnia      Intermittent claudication (H)      Iron deficiency      Knee pain      Lumbar stenosis with neurogenic claudication      Osteoarthritis     ankle,foot, knee     Osteoporosis      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 JONO Goncalves - Returned BENIGN     INJECT EPIDURAL LUMBAR / SACRAL SINGLE N/A 1/5/2018    Procedure: INJECT EPIDURAL LUMBAR / SACRAL SINGLE;  lumbar interlaminar epidural steroid injection;  Surgeon: Jaylin Valadez MD;  Location: [facility] OR     UPPER GI ENDOSCOPY      11/10/10 erythematous gastropathy, angioectasia       Family History   Problem Relation Age of Onset     Breast Cancer Maternal Aunt 80     C.A.D. Father 54       Social History     Social History     Marital status:      Spouse name: N/A     Number of children: N/A     Years of education: N/A     Occupational History     Not on file.     Social History Main Topics     Smoking status: Former Smoker     Packs/day: 0.50     Years: 15.00     Quit date: 9/13/1993     Smokeless tobacco: Never Used     Alcohol use Yes      Comment: social     Drug use: No     Sexual activity: No     Other Topics Concern     Not on file     Social History Narrative     for 52 years. Retired from [employer]. Frequent world travel.         No current facility-administered medications on file prior to encounter.   Current Outpatient Prescriptions on File Prior to Encounter:  citalopram (CELEXA) 40 MG tablet Take 0.5 tablets (20 mg) by mouth daily   potassium chloride SA (K-DUR/KLOR-CON M) 20 MEQ CR tablet Take 1 tablet (20 mEq) by mouth daily   traMADol (ULTRAM) 50 MG tablet Take 1 tablet (50 mg) by mouth every 6 hours as needed for moderate pain (Take 1 tablet (50 mg) by mouth every 6 hours as needed for moderate pain or severe pain Maximum 270 tablets in 3 months)   amLODIPine (NORVASC) 10 MG tablet Take 1 tablet (10 mg) by mouth daily For blood pressure   trolamine salicylate (ASPERCREME) 10 % cream Apply 1 g  topically 3 times daily   naproxen sodium 220 MG capsule Take 220 mg by mouth 2 times daily (with meals)   oxyCODONE (ROXICODONE) 5 MG IR tablet Take 1 tablet (5 mg) by mouth every 6 hours as needed for moderate to severe pain   atorvastatin (LIPITOR) 40 MG tablet Take 2 tablets (80 mg) by mouth daily   pantoprazole (PROTONIX) 40 MG EC tablet Take 1 tablet (40 mg) by mouth daily   moxifloxacin (VIGAMOX) 0.5 % ophthalmic solution Place 1 drop Into the left eye 4 times daily   prednisoLONE acetate (PRED FORTE) 1 % ophthalmic susp Place 1 drop Into the left eye 4 times daily   ketorolac (ACULAR) 0.5 % ophthalmic solution Place 1 drop Into the left eye 4 times daily   bisacodyl (DULCOLAX) 10 MG Suppository Place 1 suppository (10 mg) rectally daily as needed for constipation   levothyroxine (SYNTHROID/LEVOTHROID) 50 MCG tablet Take 1 tablet (50 mcg) by mouth daily   clopidogrel (PLAVIX) 75 MG tablet Take 1 tablet (75 mg) by mouth daily   hydrochlorothiazide (HYDRODIURIL) 50 MG tablet Take 1 tablet (50 mg) by mouth daily   lisinopril (PRINIVIL/ZESTRIL) 40 MG tablet Take 1 tablet (40 mg) by mouth daily For blood pressure   polyethylene glycol (MIRALAX) powder Take one-half to one scoop once a day for maintaining soft stools   ferrous sulfate (IRON) 325 (65 FE) MG tablet Take 1 tablet (325 mg) by mouth 2 times daily To maintain iron levels   albuterol (VENTOLIN HFA) 108 (90 BASE) MCG/ACT inhaler Inhale 2 puffs into the lungs every 4 hours as needed for shortness of breath / dyspnea or wheezing   ferrous sulfate (IRON) 325 (65 FE) MG tablet Take 1 tablet (325 mg) by mouth daily (with breakfast)   [DISCONTINUED] tolterodine (DETROL) 1 MG tablet Take 1-2 tablets by mouth At Bedtime.   Calcium Carbonate-Vitamin D (CALCIUM 600 + D OR) Take 1 tablet by mouth daily.       Data:    Results for orders placed or performed during the hospital encounter of 01/26/18   XR Pelvis w Hip Right G/E 2 Views    Narrative    Exam:  XR PELVIS  AND HIP RIGHT 2 VIEWS, 1/26/2018 8:45 PM    History: fall;     Comparison:  Abdominal radiograph 1/23/2017    Findings:  AP and frog-leg views of the right hip. AP view of the  pelvis. No acute osseous abnormality. Mild degenerative changes of the  hips. Degenerative changes of the visualized thoracic spine and  sacroiliac joints. Bones appear osteopenic. Pelvic phleboliths. No  dilated loops of bowel. Surgical clips over the pelvis and abdomen.      Impression    Impression:  No acute osseous abnormality.    I have personally reviewed the examination and initial interpretation  and I agree with the findings.    ARNOLD HOOKS MD   Knee XR, 1-2 views, right    Narrative    Exam:  XR KNEE RT 1 /2 VW, 1/26/2018 8:48 PM    History: fall;     Comparison:  8/10/2017    Findings:  AP and lateral views of the right knee. No acute osseous  abnormality. Bones appear osteopenic. Moderate medial and lateral  joint space loss. Small joint effusion. Calcification posterior to the  femur was not seen on prior exams and may represent prior ligamentous  injury, vascular calcification, or soft tissue calcification. Soft  tissues are otherwise unremarkable.      Impression    Impression:  No acute osseous abnormality. Moderate degenerative  changes of the knee.    I have personally reviewed the examination and initial interpretation  and I agree with the findings.    ARNOLD HOOKS MD   US Lower Extremity Venous Duplex Right    Narrative    EXAMINATION: DOPPLER VENOUS ULTRASOUND OF THE RIGHT LOWER EXTREMITY,  1/26/2018 9:01 PM     COMPARISON: Ultrasound on 12/6/2010    HISTORY: calf and thigh pain after recent air travel    TECHNIQUE:  Gray-scale evaluation with compression, spectral flow, and  color Doppler assessment of the deep venous system of the right leg  from groin to knee, and then at the ankle.    FINDINGS:  In the right lower extremity, the common femoral, femoral, popliteal  and posterior tibial veins  demonstrate normal compressibility and  blood flow.      Impression    IMPRESSION:  No evidence of right lower extremity deep venous thrombosis.    I have personally reviewed the examination and initial interpretation  and I agree with the findings.    ARNOLD HOOKS MD   D dimer quantitative   Result Value Ref Range    D Dimer 1.3 (H) 0.0 - 0.50 ug/ml FEU   INR   Result Value Ref Range    INR 0.99 0.86 - 1.14   Partial thromboplastin time   Result Value Ref Range    PTT 35 22 - 37 sec   CBC with platelets differential   Result Value Ref Range    WBC 9.8 4.0 - 11.0 10e9/L    RBC Count 4.23 3.8 - 5.2 10e12/L    Hemoglobin 12.7 11.7 - 15.7 g/dL    Hematocrit 38.8 35.0 - 47.0 %    MCV 92 78 - 100 fl    MCH 30.0 26.5 - 33.0 pg    MCHC 32.7 31.5 - 36.5 g/dL    RDW 15.5 (H) 10.0 - 15.0 %    Platelet Count 422 150 - 450 10e9/L    Diff Method Automated Method     % Neutrophils 82.4 %    % Lymphocytes 10.4 %    % Monocytes 5.7 %    % Eosinophils 1.1 %    % Basophils 0.2 %    % Immature Granulocytes 0.2 %    Nucleated RBCs 0 0 /100    Absolute Neutrophil 8.0 1.6 - 8.3 10e9/L    Absolute Lymphocytes 1.0 0.8 - 5.3 10e9/L    Absolute Monocytes 0.6 0.0 - 1.3 10e9/L    Absolute Eosinophils 0.1 0.0 - 0.7 10e9/L    Absolute Basophils 0.0 0.0 - 0.2 10e9/L    Abs Immature Granulocytes 0.0 0 - 0.4 10e9/L    Absolute Nucleated RBC 0.0    Comprehensive metabolic panel   Result Value Ref Range    Sodium 136 133 - 144 mmol/L    Potassium 3.7 3.4 - 5.3 mmol/L    Chloride 100 94 - 109 mmol/L    Carbon Dioxide 26 20 - 32 mmol/L    Anion Gap 9 3 - 14 mmol/L    Glucose 91 70 - 99 mg/dL    Urea Nitrogen 14 7 - 30 mg/dL    Creatinine 0.52 0.52 - 1.04 mg/dL    GFR Estimate >90 >60 mL/min/1.7m2    GFR Estimate If Black >90 >60 mL/min/1.7m2    Calcium 9.7 8.5 - 10.1 mg/dL    Bilirubin Total 0.4 0.2 - 1.3 mg/dL    Albumin 3.4 3.4 - 5.0 g/dL    Protein Total 6.6 (L) 6.8 - 8.8 g/dL    Alkaline Phosphatase 69 40 - 150 U/L    ALT 18 0 - 50 U/L     AST 16 0 - 45 U/L   EKG 12-lead, tracing only   Result Value Ref Range    Interpretation ECG Click View Image link to view waveform and result       ROS:    Review Of Systems  Skin:[SB1.1] negative[SB1.2]  Eyes:[SB1.1] negative for, visual blurring, double vision[SB1.2]  Ears/Nose/Throat:[SB1.1] negative for, nasal congestion, purulent rhinorrhea, earache, tinnitus[SB1.2]  Respiratory:[SB1.1] Cough- mild, non production x few weeks. No shortness of breath, No dyspnea on exertion, No cough and No hemoptysis[SB1.2]  Cardiovascular:[SB1.1] negative[SB1.2]  Gastrointestinal:[SB1.1] negative[SB1.2]  Genitourinary:[SB1.1] negative[SB1.2]  Musculoskeletal:[SB1.1] joint pain[SB1.2]  Neurologic:[SB1.1] negative for, syncope, stroke, seizures, paralysis, local weakness, numbness or tingling of hands, involuntary movements, speech problems and tremor[SB1.2]  Psychiatric:[SB1.1] negative[SB1.2]  Hematologic/Lymphatic/Immunologic:[SB1.1] negative[SB1.2]  Endocrine:[SB1.1] negative[SB1.2]  PCP:[SB1.1] Dr. Montgomery[SB1.2]    10 point ROS negative other than the symptoms noted above.      Exam:  Vitals:  B/P: 147/69, T: 98.1, P: Data Unavailable, R: 18    Constitutional:[SB1.1] healthy, alert, no distress, cooperative and smiling[SB1.2]  Head:[SB1.1] Normocephalic. No masses, lesions, tenderness or abnormalities[SB1.2]  Neck:[SB1.1] Neck supple. No adenopathy. Thyroid symmetric, normal size,, Carotids without bruits.[SB1.2]  ENT:[SB1.1] ENT exam normal, no neck nodes or sinus tenderness[SB1.2]  Cardiovascular:[SB1.1] PMI normal. No lifts, heaves, or thrills. RRR. No murmurs, clicks gallops or rub[SB1.2]  Respiratory:[SB1.1] mild cough recently intermittently. negative, Percussion normal. Good diaphragmatic excursion. Lungs clear[SB1.2]  Gastrointestinal:[SB1.1] Abdomen soft, non-tender. BS normal. No masses, organomegaly[SB1.2]  :[SB1.1] Deferred[SB1.2]  Musculoskeletal:[SB1.1] Mild tenderness on right knee mostly lateral aspect.  Full ROM present. Able to bear weight but not able to amblate . Good strength bilaterally throughout.[SB1.2]   Skin:[SB1.1] no suspicious lesions or rashes[SB1.2]  Neurologic[SB1.1]: Sensation intact bilaterally throughout. Alert to person, place and partial time. CN II- XII intact.[SB1.2]   Psychiatric:[SB1.1] appears to have some memory loss but unclear if this is new or old[SB1.2]  Hematologic/Lymphatic/Immunologic:[SB1.1] normal ant/post cervical, axillary, supraclavicular and inguinal nodes[SB1.2]    Consults:[SB1.1] none[SB1.2]  FEN:[SB1.1] ADAT to regular diet[SB1.2]  DVT prophylaxis:[SB1.1] Lovenox[SB1.2]  GI prophylaxis:[SB1.1] protonix[SB1.2]  BM prophylaxis:[SB1.1] pericolace[SB1.2]  Code Status:[SB1.1] full code[SB1.2]  Disposition:[SB1.1] home once patient returns to baseline. PT to evaluate and determine disposition status[SB1.2]    Signed:[SB1.1]  Chantell Sanders PA-C[SB1.2]   January 27, 2018 at 12:06 AM[SB1.1]       Revision History        User Key Date/Time User Provider Type Action    > SB1.2 1/27/2018  4:26 AM Chantell Sanders PA Physician Assistant - JEM Sign     SB1.1 1/27/2018 12:06 AM Chantell Sanders PA Physician Assistant - C                      Discharge Summaries      Discharge Summaries by Maria Elena Xie PA-C at 1/27/2018  7:58 AM     Author:  Maria Elena Xie PA-C Service:  Blood and Marrow Transplantation Author Type:  Physician Assistant    Filed:  1/27/2018  8:11 AM Date of Service:  1/27/2018  7:58 AM Creation Time:  1/27/2018  7:57 AM    Status:  Attested :  Maria Elena Xie PA-C (Physician Assistant)    Cosigner:  Del Chakraborty MD at 1/27/2018  3:38 PM        Attestation signed by Del Chakraborty MD at 1/27/2018  3:38 PM        Attestation:  Physician Attestation   I, Del Chakraborty, have reviewed and discussed with the advanced practice provider their discharge plan for Lucie Stockton. I did not participate in a shared visit by  interviewing or examining the patient and this should be billed as an advanced practice provider only discharge.    Del Chakraborty  Date of Service (when I saw the patient): I did not personally see this patient today.                               Patient ID:  Lucie Stockton  MRN: [id number]  82 year old  YOB: 1935    Observation Admit Date: 1/26/2018    ED Admitting Attending: Adrian Aveyr MD    Transfer Date and Time: January 27, 2018 at 7:59 AM     Transferring Observation Provider: Maria Elena Xie PA-C    Admission Diagnoses:     1. Contusion of right knee, initial encounter    2. Contusion of right hip, initial encounter        Transfer Diagnoses:  New hypoxia, requiring 3-4L per nc to maintain sats > 92%. DDX includes [place] (age indeterminate from CT chest) vs PNA    Emergency Department and Observation Course:   Patient was admitted from home after sustaining a fall. Had a fall approximately one week ago while in [place]. Then again yesterday, her knees buckled. Xray in ED showed no acute osseous abnormality and she was admitted to ED observation for knee pain and possible placement at TCU. Upon arrival the the floor, she was found to be newly hypoxia requiring 3-4L per nc with no prior history of O2 requirements. No mention of hypoxia in the ED prior to admission to observation unit. A CT scan showed age indeterminate PE and GGO.  She does endorse cough, but no dyspnea. She was given a dose of rocephin. Noted to be somewhat confused overnight, CT scan was negative, didn't appreciate and cognitive deficits this morning, answering questions appropriately. Will admit to medicine service due to hypoxia.     At this time the patient has failed observation management due to hypoxia and will be transferred to inpatient status.    Consults: none    DATA:    CT chest  IMPRESSION:   1. Age-indeterminate, but possibly chronic pulmonary emboli in the  right lower lobe.  2. Mild groundglass opacities  in the posterior right upper lobe,  likely infectious/inflammatory.  3. Moderate apically predominant centrilobular emphysema.  4. Scattered pulmonary nodules measuring up to 5 mm. Recommend  follow-up chest CT in one year per the Fleischner Society criteria.  5. Minimally displaced lateral right seventh and eighth rib fractures.  6. Possible mild distal esophageal wall thickening, suggestive of  esophagitis.  7. Calcifications in the pancreatic head with a rounded associated  cystic focus likely representing a pseudocyst, findings likely related  to chronic pancreatitis, though evaluation is limited. Consider  further dedicated imaging of the pancreas as clinically indicated.       CT head  Impression:   1. No acute intracranial pathology.  2. Changes of chronic small vessel ischemic disease and moderate  generalized parenchymal volume loss.  3. Findings of chronic left maxillary sinusitis.      Transfer Exam:    /63 (BP Location: Right arm)  Temp 98  F (36.7  C) (Oral)  Resp 16  SpO2 93%   Gen: alert, pleasant, NAD  Pulm: On 4L per nc, CTA bilaterally  CV: RRR  Extremities: No edema  Neuro: A&O x 3, answering questions appropriately. No gross cognitive deficits      Current Medications:    Current Outpatient Prescriptions   Medication Sig Dispense Refill     [DISCONTINUED] tolterodine (DETROL) 1 MG tablet Take 1-2 tablets by mouth At Bedtime. 180 tablet 3       Medications Prior to Admission:    Prescriptions Prior to Admission   Medication Sig Dispense Refill Last Dose     citalopram (CELEXA) 40 MG tablet Take 0.5 tablets (20 mg) by mouth daily 45 tablet 1 Taking     potassium chloride SA (K-DUR/KLOR-CON M) 20 MEQ CR tablet Take 1 tablet (20 mEq) by mouth daily 90 tablet 1 Taking     traMADol (ULTRAM) 50 MG tablet Take 1 tablet (50 mg) by mouth every 6 hours as needed for moderate pain (Take 1 tablet (50 mg) by mouth every 6 hours as needed for moderate pain or severe pain Maximum 270 tablets in 3  months) 270 tablet 1 Taking     amLODIPine (NORVASC) 10 MG tablet Take 1 tablet (10 mg) by mouth daily For blood pressure 90 tablet 2 Taking     trolamine salicylate (ASPERCREME) 10 % cream Apply 1 g topically 3 times daily 170 g 11 Taking     naproxen sodium 220 MG capsule Take 220 mg by mouth 2 times daily (with meals) 60 capsule 2 Taking     oxyCODONE (ROXICODONE) 5 MG IR tablet Take 1 tablet (5 mg) by mouth every 6 hours as needed for moderate to severe pain 10 tablet 0 Taking     atorvastatin (LIPITOR) 40 MG tablet Take 2 tablets (80 mg) by mouth daily 90 tablet 3 Taking     pantoprazole (PROTONIX) 40 MG EC tablet Take 1 tablet (40 mg) by mouth daily 90 tablet 3 Taking     moxifloxacin (VIGAMOX) 0.5 % ophthalmic solution Place 1 drop Into the left eye 4 times daily   Taking     prednisoLONE acetate (PRED FORTE) 1 % ophthalmic susp Place 1 drop Into the left eye 4 times daily   Taking     ketorolac (ACULAR) 0.5 % ophthalmic solution Place 1 drop Into the left eye 4 times daily   Taking     bisacodyl (DULCOLAX) 10 MG Suppository Place 1 suppository (10 mg) rectally daily as needed for constipation 90 suppository 3 Taking     levothyroxine (SYNTHROID/LEVOTHROID) 50 MCG tablet Take 1 tablet (50 mcg) by mouth daily 90 tablet 3 Taking     clopidogrel (PLAVIX) 75 MG tablet Take 1 tablet (75 mg) by mouth daily 90 tablet 3 Taking     hydrochlorothiazide (HYDRODIURIL) 50 MG tablet Take 1 tablet (50 mg) by mouth daily 90 tablet 3 Taking     lisinopril (PRINIVIL/ZESTRIL) 40 MG tablet Take 1 tablet (40 mg) by mouth daily For blood pressure 90 tablet 3 Taking     polyethylene glycol (MIRALAX) powder Take one-half to one scoop once a day for maintaining soft stools 119 g 5 Taking     ferrous sulfate (IRON) 325 (65 FE) MG tablet Take 1 tablet (325 mg) by mouth 2 times daily To maintain iron levels 90 tablet 1 Taking     albuterol (VENTOLIN HFA) 108 (90 BASE) MCG/ACT inhaler Inhale 2 puffs into the lungs every 4 hours as needed  for shortness of breath / dyspnea or wheezing 18 Inhaler 1 Taking     ferrous sulfate (IRON) 325 (65 FE) MG tablet Take 1 tablet (325 mg) by mouth daily (with breakfast) 90 tablet 3 Taking     Calcium Carbonate-Vitamin D (CALCIUM 600 + D OR) Take 1 tablet by mouth daily.   Taking       Significant Diagnostic Studies:     Results for orders placed or performed during the hospital encounter of 01/26/18   XR Pelvis w Hip Right G/E 2 Views    Narrative    Exam:  XR PELVIS AND HIP RIGHT 2 VIEWS, 1/26/2018 8:45 PM    History: fall;     Comparison:  Abdominal radiograph 1/23/2017    Findings:  AP and frog-leg views of the right hip. AP view of the  pelvis. No acute osseous abnormality. Mild degenerative changes of the  hips. Degenerative changes of the visualized thoracic spine and  sacroiliac joints. Bones appear osteopenic. Pelvic phleboliths. No  dilated loops of bowel. Surgical clips over the pelvis and abdomen.      Impression    Impression:  No acute osseous abnormality.    I have personally reviewed the examination and initial interpretation  and I agree with the findings.    ARNOLD HOOKS MD   Knee XR, 1-2 views, right    Narrative    Exam:  XR KNEE RT 1 /2 VW, 1/26/2018 8:48 PM    History: fall;     Comparison:  8/10/2017    Findings:  AP and lateral views of the right knee. No acute osseous  abnormality. Bones appear osteopenic. Moderate medial and lateral  joint space loss. Small joint effusion. Calcification posterior to the  femur was not seen on prior exams and may represent prior ligamentous  injury, vascular calcification, or soft tissue calcification. Soft  tissues are otherwise unremarkable.      Impression    Impression:  No acute osseous abnormality. Moderate degenerative  changes of the knee.    I have personally reviewed the examination and initial interpretation  and I agree with the findings.    ARNOLD HOOKS MD   US Lower Extremity Venous Duplex Right    Narrative    EXAMINATION:  DOPPLER VENOUS ULTRASOUND OF THE RIGHT LOWER EXTREMITY,  1/26/2018 9:01 PM     COMPARISON: Ultrasound on 12/6/2010    HISTORY: calf and thigh pain after recent air travel    TECHNIQUE:  Gray-scale evaluation with compression, spectral flow, and  color Doppler assessment of the deep venous system of the right leg  from groin to knee, and then at the ankle.    FINDINGS:  In the right lower extremity, the common femoral, femoral, popliteal  and posterior tibial veins demonstrate normal compressibility and  blood flow.      Impression    IMPRESSION:  No evidence of right lower extremity deep venous thrombosis.    I have personally reviewed the examination and initial interpretation  and I agree with the findings.    ARNOLD HOOKS MD   CT Chest Pulmonary Embolism w Contrast   Result Value Ref Range    Radiologist flags Pulmonary nodules     Narrative    Examination:  CT CHEST PULMONARY EMBOLISM W CONTRAST 1/27/2018 1:46 AM      Comparison: 5/11/2017 chest radiographs; 1/23/2017 CT abdomen/pelvis;  2/20/2011 CT chest    History: hypoxia;     TECHNIQUE: Volumetric helical acquisition of CT images of the chest  from the lung apices to the kidneys were acquired in arterial phase  after the uncomplicated administration of iopamidol (ISOVUE-370)  solution 46 mL   . Three-dimensional (3D) post-processed angiographic  images were reconstructed, archived to PACS and used in the  interpretation of this study.    FINDINGS:  There is adequate opacification of the main and lobar pulmonary  arteries. Eccentric linear filling defects in the posterior right  lower lobe segmental arteries (series 8, images 152 through 163).     Lung parenchyma:   Subpleural consolidative/groundglass opacities in the posterior right  lower lobe atelectasis. Linear atelectasis versus scarring in the left  lung base, lingula, right middle lobe, and lateral right upper lobe.  Adherent secretions in the distal trachea. Subtle groundglass opacity  in  the posterior right upper lobe. Moderate apically predominant  centrilobular emphysema. Diffuse central bronchial wall thickening,  greatest in the lower lobes. Scattered pulmonary nodules, for example  in the medial right upper lobe measuring 5 mm average axial dimension  (series 7, image 53) with mild adjacent focal groundglass opacity  superiorly, and 4 mm nodule in the posterior left upper lobe (series  7, image 44).    Chest:   Cardiomegaly. No pericardial effusion. Normal caliber ascending aorta.  Borderline dilated main pulmonary artery, measuring up to 3.2 cm.  Atherosclerotic calcification of the coronary arteries and thoracic  aorta. Aberrant retroesophageal right subclavian artery. No thoracic  lymphadenopathy. Increased size of a nodule arising from the thyroid  isthmus, now measuring 13 mm, previously 9 mm on 2/20/2011. Mildly  patulous esophagus with possible distal esophageal wall thickening.    Limited evaluation of the upper abdomen. Calcifications in the  pancreatic head with a rounded associated cystic focus, likely a  pseudocyst related to prior pancreatitis. Extensive atherosclerotic  calcification throughout the abdominal aorta. No suspicious bony  lesion. Severe degenerative change of the thoracic spine with  intervertebral fusion from T10 through T12. Minimally displaced  lateral right seventh and eighth rib fractures.      Impression    IMPRESSION:   1. Age-indeterminate, but possibly chronic pulmonary emboli in the  right lower lobe.  2. Mild groundglass opacities in the posterior right upper lobe,  likely infectious/inflammatory.  3. Moderate apically predominant centrilobular emphysema.  4. Scattered pulmonary nodules measuring up to 5 mm. Recommend  follow-up chest CT in one year per the Fleischner Society criteria.  5. Minimally displaced lateral right seventh and eighth rib fractures.  6. Possible mild distal esophageal wall thickening, suggestive of  esophagitis.  7. Calcifications in  the pancreatic head with a rounded associated  cystic focus likely representing a pseudocyst, findings likely related  to chronic pancreatitis, though evaluation is limited. Consider  further dedicated imaging of the pancreas as clinically indicated.    [Consider Follow Up: Pulmonary nodules]    This report will be copied to the [facility] to ensure a  provider acknowledges the finding.     I have personally reviewed the examination and initial interpretation  and I agree with the findings.    NONI CONNELLY   CT Head w/o Contrast    Impression    Impression:   1. No acute intracranial pathology.  2. Changes of chronic small vessel ischemic disease and moderate  generalized parenchymal volume loss.  3. Findings of chronic left maxillary sinusitis.   D dimer quantitative   Result Value Ref Range    D Dimer 1.3 (H) 0.0 - 0.50 ug/ml FEU   INR   Result Value Ref Range    INR 0.99 0.86 - 1.14   Partial thromboplastin time   Result Value Ref Range    PTT 35 22 - 37 sec   CBC with platelets differential   Result Value Ref Range    WBC 9.8 4.0 - 11.0 10e9/L    RBC Count 4.23 3.8 - 5.2 10e12/L    Hemoglobin 12.7 11.7 - 15.7 g/dL    Hematocrit 38.8 35.0 - 47.0 %    MCV 92 78 - 100 fl    MCH 30.0 26.5 - 33.0 pg    MCHC 32.7 31.5 - 36.5 g/dL    RDW 15.5 (H) 10.0 - 15.0 %    Platelet Count 422 150 - 450 10e9/L    Diff Method Automated Method     % Neutrophils 82.4 %    % Lymphocytes 10.4 %    % Monocytes 5.7 %    % Eosinophils 1.1 %    % Basophils 0.2 %    % Immature Granulocytes 0.2 %    Nucleated RBCs 0 0 /100    Absolute Neutrophil 8.0 1.6 - 8.3 10e9/L    Absolute Lymphocytes 1.0 0.8 - 5.3 10e9/L    Absolute Monocytes 0.6 0.0 - 1.3 10e9/L    Absolute Eosinophils 0.1 0.0 - 0.7 10e9/L    Absolute Basophils 0.0 0.0 - 0.2 10e9/L    Abs Immature Granulocytes 0.0 0 - 0.4 10e9/L    Absolute Nucleated RBC 0.0    Comprehensive metabolic panel   Result Value Ref Range    Sodium 136 133 - 144 mmol/L    Potassium 3.7 3.4 -  5.3 mmol/L    Chloride 100 94 - 109 mmol/L    Carbon Dioxide 26 20 - 32 mmol/L    Anion Gap 9 3 - 14 mmol/L    Glucose 91 70 - 99 mg/dL    Urea Nitrogen 14 7 - 30 mg/dL    Creatinine 0.52 0.52 - 1.04 mg/dL    GFR Estimate >90 >60 mL/min/1.7m2    GFR Estimate If Black >90 >60 mL/min/1.7m2    Calcium 9.7 8.5 - 10.1 mg/dL    Bilirubin Total 0.4 0.2 - 1.3 mg/dL    Albumin 3.4 3.4 - 5.0 g/dL    Protein Total 6.6 (L) 6.8 - 8.8 g/dL    Alkaline Phosphatase 69 40 - 150 U/L    ALT 18 0 - 50 U/L    AST 16 0 - 45 U/L   Blood gas arterial   Result Value Ref Range    pH Arterial 7.42 7.35 - 7.45 pH    pCO2 Arterial 42 35 - 45 mm Hg    pO2 Arterial 71 (L) 80 - 105 mm Hg    Bicarbonate Arterial 27 21 - 28 mmol/L    Base Excess Art 2.5 mmol/L    FIO2 3L    UA reflex to Microscopic and Culture   Result Value Ref Range    Color Urine Yellow     Appearance Urine Clear     Glucose Urine Negative NEG^Negative mg/dL    Bilirubin Urine Negative NEG^Negative    Ketones Urine 10 (A) NEG^Negative mg/dL    Specific Gravity Urine 1.028 1.003 - 1.035    Blood Urine Negative NEG^Negative    pH Urine 6.5 5.0 - 7.0 pH    Protein Albumin Urine Negative NEG^Negative mg/dL    Urobilinogen mg/dL Normal 0.0 - 2.0 mg/dL    Nitrite Urine Negative NEG^Negative    Leukocyte Esterase Urine Negative NEG^Negative    Source Midstream Urine    Glucose by meter   Result Value Ref Range    Glucose 82 70 - 99 mg/dL   EKG 12-lead, tracing only   Result Value Ref Range    Interpretation ECG Click View Image link to view waveform and result        Signed:  Maria Elena Xie  January 27, 2018 at 7:59 AM[SW1.1]                 Revision History        User Key Date/Time User Provider Type Action    > SW1.1 1/27/2018  8:11 AM Maria Elena Xie PA-C Physician Assistant Sign                     Consult Notes      Consults by Cristel Dennis MSW at 1/29/2018  2:16 PM     Author:  Cristel Dennis MSW Service:  Social Work Author Type:       Filed:  2018  2:16 PM Date of Service:  2018  2:16 PM Creation Time:  2018  2:11 PM    Status:  Signed :  Cristel Dennis MSW ()     Consult Orders:    1. Social Work IP Consult [[id number]] ordered by Chantell Sanders PA at 18 [id number]                Social Work: Assessment with Discharge Plan    Patient Name:  Lucie Stockton  :  1935  Age:  82 year old  MRN:  [id number]  Risk/Complexity Score:  Filed Complexity Screen Score: 7  Completed assessment with:  Pt, daughter    Presenting Information   Reason for Referral:  Discharge plan  Date of Intake:  2018  Referral Source:  Chart Review  Decision Maker:  Pt when able  Alternate Decision Maker:  Per NOK policy Spouse Pradeep is NOK decision maker  Health Care Directive:  Will bring in copy  Living Situation:  House  Previous Functional Status:  Independent  Patient and family understanding of hospitalization:  Pt states that she is needing rehab placement after the hospital.  Cultural/Language/Spiritual Considerations:  No needs reported this encounter.  Adjustment to Illness:  Pt very pleasant and open to assessment with SW.     Physical Health  Reason for Admission:    1. Contusion of right knee, initial encounter    2. Contusion of right hip, initial encounter      Services Needed/Recommended:  TCU    Mental Health/Chemical Dependency  Diagnosis:  None   Support/Services in Place:  None  Services Needed/Recommended:  None    Support System  Significant relationship at present time:  Daughter at bedside.  Spouse lives with pt at home.  Family of origin is available for support:  Yes  Other support available:  Yes  Gaps in support system:  Pt needing TCU rehab  Patient is caregiver to:  None     Provider Information   Primary Care Physician:  Marii Montgomery   [phone]   Clinic:  [address] [place] [address] 741 / [facility] [address]      :  None    Financial   Income Source:   Retired  Financial Concerns:  Pt does not repot any concerns.  Insurance:    Payor/Plan Subscriber Name Rel Member # Group #   MEDICARE - MEDICARE ISAURO GUZMAN  [id number]       ATTN CLAIMS, [address]       Discharge Plan   Patient and family discharge goal:  TCU  Provided education on discharge plan:  YES  Patient agreeable to discharge plan:  YES  A list of Medicare Certified Facilities was provided to the patient and/or family to encourage patient choice. Patient's choices for facility are:    [facility]  PH: [phone]  F: [phone]    [facility]  PH: [phone]  F: [phone]    [facility]  PH: [phone]  F: [phone]    Will NH provide Skilled rehabilitation or complex medical:  YES  General information regarding anticipated insurance coverage and possible out of pocket cost was discussed. Patient and patient's family are aware patient may incur the cost of transportation to the facility, pending insurance payment: YES  Barriers to discharge:  Medical stability: TCU requires 48 hours on Tamiflu prior to admission (Tamiflu first dose given yesterday 1/28/18 at 9:00 am.  48 hour david is tomorrow 1/30/18 at 9:00 am).    Discharge Recommendations   Anticipated Disposition:  Facility:  TCU  Transportation Needs:  Family:  As able  Name of Transportation Company and Phone:  As able    Additional comments   JIMMY met with pt and family to introduce self and role in consult.  JIMMY provided pt/family with After Your Hospital Stay education brochure detailing optional levels of care/rehab after surgery.  Pt and daughter are in agreement with TCU placement after this hospitalization.  JIMMY started a referral to [facility] as this facility reports openings tomorrow.  Facility reports they are needing pt to be on 48 hours of Tamiflu before pt can be admitted to the facility.  RNCC and MD team updated.  Will follow up with facility if can be accepted  tomorrow.  Pt and family in agreement with the plan.    AURY Daniels APSW  [address]   Phone: [phone]  Pager: [phone]  Unit: [phone][1.1]           Revision History        User Key Date/Time User Provider Type Action    > LH1.1 1/29/2018  2:16 PM Cristel Dennis MSW  Sign                     Progress Notes - Physician (Notes from 01/27/18 through 01/30/18)      Progress Notes by Cristel Dennis MSW at 1/29/2018  4:00 PM     Author:  Cristel Dennis MSW Service:  Social Work Author Type:      Filed:  1/30/2018  8:10 AM Date of Service:  1/29/2018  4:00 PM Creation Time:  1/30/2018  8:08 AM    Status:  Signed :  Cristel Dennis MSW ()         Social Work Services Progress Note     Hospital Day: 2  Date of Initial Social Work Evaluation:  1/29/18  Collaborated with:  Pt, daughter SNF admissions     Data: Pt is a 82 year old female being followed by [facility] for placement to TCU.     Intervention:  SW received a call from [facility] that the pt is accepted for TCU for tomorrow.  Pt will have completed her 48 hour Tamiflu course required by TCU at 9:00 am on 1/30/18.  Pt states her spouse will drive her at 11 am tomorrow.  Will hand off to unit SW in the morning.     - Referrals:    [facility]  PH: [phone]  F: [phone]     Assessment: Pt and daughter updated on discharge disposition.     Plan:    Anticipated Disposition: Facility:  TCU    Barriers to d/c plan: 48 hour coverage with Tamiflu    Follow Up: SW to coordinate with unit SW discharge tomorrow morning with family.     AURY Daniels APSW  [address] Unit   Phone: [phone]  Pager: [phone]  Unit: [phone][LH1.1]       Revision History        User Key Date/Time User Provider Type Action    > LH1.1 1/30/2018  8:10 AM Hartenberg, Cristel I, MSW  Sign            Progress Notes by Tatiana Flores, RN  at 1/30/2018  6:13 AM     Author:  Tatiana Flores RN Service:  (none) Author Type:  Registered Nurse    Filed:  1/30/2018  6:15 AM Date of Service:  1/30/2018  6:13 AM Creation Time:  1/30/2018  6:13 AM    Status:  Signed :  Tatiana Flores RN (Registered Nurse)         Patient a & o x4. VSS. Patient denies pain throughout shift. Patient ambulating in room and to bathroom with walker and stand by assist. Patient continues to require 2L O2 via NC; SaO2 90-95% while sleeping. Patient is tolerating a low sat fat, 2400mg Na diet with no complaints of nausea. Patient is voiding spontaneously. Patient has not had a bowel movement this shift; needs sample sent to lab. Plan to continue to monitor and transfer to TCU today.[KE1.1]     Revision History        User Key Date/Time User Provider Type Action    > KE1.1 1/30/2018  6:15 AM Tatiana Flores RN Registered Nurse Sign            Progress Notes by Jose Em RN at 1/29/2018  6:47 PM     Author:  Jose Em RN Service:  (none) Author Type:  Registered Nurse    Filed:  1/29/2018  6:50 PM Date of Service:  1/29/2018  6:47 PM Creation Time:  1/29/2018  6:47 PM    Status:  Signed :  Jose Em RN (Registered Nurse)         Pt A&Ox4 and VSS. Pain controled with lidocaine patch to right thigh. Pt ambulating STA with walker, per pt improving from yesterday with mobility. Shower this morning with OT. Continues to require NC 2L while resting, SaO2 %. Tolerating 2g sodium diet and voiding spontaneously. No stools today, need sample for c.dif and enteric labs. Will continue to monitor with transfer to TCU tomorrow.[AO1.1]     Revision History        User Key Date/Time User Provider Type Action    > AO1.1 1/29/2018  6:50 PM Jose Em RN Registered Nurse Sign            Progress Notes by Amira Jiang RN at 1/29/2018  3:30 PM     Author:  Amira Jiang RN Service:  (none) Author Type:  Registered Nurse    Filed:  1/29/2018  3:35 PM Date of  Service:  1/29/2018  3:30 PM Creation Time:  1/29/2018  3:30 PM    Status:  Signed :  Amira Jiang RN (Registered Nurse)         Pt is A&Ox4, VSS, and tolerating a low sodium diet. Pt has a R PIV that is saline locked and flushes without pain at the site. Pt ambulates with a walker and an assist of one. Pt is on droplet precautions for positive flu and is also on enteric precautions while we wait for a stool sample. Pt had 2 liquid stools per previous nurse, but had no BM on day shift today. Pain is well managed with lidocaine patch, and denies presence of other pain. Denies SOB, numbness, tingling, N/V, or dizziness upon standing. SW to work on d/c to TCU. Nurse will continue to monitor.[CM1.1]      Revision History        User Key Date/Time User Provider Type Action    > CM1.1 1/29/2018  3:35 PM Amira Jiang RN Registered Nurse Sign            Progress Notes by Kurt Carey RN at 1/29/2018  6:00 AM     Author:  Kurt Carey RN Service:  (none) Author Type:  Registered Nurse    Filed:  1/29/2018  6:50 AM Date of Service:  1/29/2018  6:00 AM Creation Time:  1/29/2018  5:58 AM    Status:  Addendum :  Kurt Carey RN (Registered Nurse)           Admitted for p[HA1.1]ain of the right hip and knee s/p mechanical fall and positive for influenza A. A&O, AVSS. Assist x 1with ambulation and walker. R knee pain well controlled with lidocaine patch. No PO analgesics needed during shift.Minimally displaced lateral R 7th and 8th rib fractures. Denies R side ribcage pain. On 2LPM overnight, SAT ~95%. Independently voiding.[HA1.2] On Tamiflu x5 days.[HA1.1] 2 episode[HA1.2]s[HA1.1] of[HA1.2] liquid stool[HA1.1] noted. Denies abd pain.Team to be notfied for possible sto[HA1.2]o[HA1.1]l cu[HA1.2]lture .[HA1.1]SW to work on d/c to TCU[HA1.2].[HA1.1]  /78 (BP Location: Right arm)  Temp 98.4  F (36.9  C) (Oral)  Resp 16  Wt 44.4 kg (97 lb 12.8 oz)  SpO2 95%  BMI 19.1 kg/m2[HA1.3]        Revision History        User Key Date/Time User Provider Type Action    > [N/A] 1/29/2018  6:50 AM Kurt Carey RN Registered Nurse Addend     HA1.3 1/29/2018  6:50 AM Kurt Carey RN Registered Nurse Sign     HA1.1 1/29/2018  6:48 AM Kurt Carey RN Registered Nurse      HA1.2 1/29/2018  5:58 AM Kurt Carey RN Registered Nurse             Progress Notes by Cecilia Cruz RN at 1/28/2018  7:14 PM     Author:  Cecilia Cruz RN Service:  (none) Author Type:  Registered Nurse    Filed:  1/28/2018  7:14 PM Date of Service:  1/28/2018  7:14 PM Creation Time:  1/28/2018  2:16 PM    Status:  Signed :  Cecilia Cruz RN (Registered Nurse)         Patient A&O, AVSS with exception of O2 Sats decreased occasionally to 85-89%, primarily maintained % on 4L per NC[KT1.1]; weaned to 2L per NC with sats 94-96% at rest,[KT1.2] will attempt to wean as able. Patient tolerating low fat / low sodium diet.  Paint reported to right knee, lidocaine patch applied and remains in place[KT1.1], no further prn pain medications needed this shift[KT1.2]. MD round[KT1.1]ed this am, answered patient's questions[KT1.2] and informed[KT1.1] pt[KT1.2] of 2 rib rib fractures on right side, will allow to heal on their own. OT assisted with am cares. Patient has been up in chair for breakfast. PT walked with patient in hallway, PT to continue further therapy tomorrow.[KT1.1] Patient u[KT1.2]p with assist x 1 with walker and gait belt, ambulating to bathroom and voiding spontaneously.[KT1.1] Patient u[KT1.2]p with assist x 1 with walker and gait belt, ambulating to bathroom and voiding spontaneously. SW to see patient in am.  at bedside[KT1.1] and paster visited this afternoon.[KT1.2] Patient placed on Droplet isolation, positive for Influenza A.[KT1.1]     Revision History        User Key Date/Time User Provider Type Action    > KT1.2 1/28/2018  7:14 PM Cecilia Cruz, RN Registered Nurse Sign     KT1.1  1/28/2018  2:16 PM Cecilia Cruz RN Registered Nurse             Progress Notes by Avril Hoover MD at 1/27/2018  8:06 AM     Author:  Avril Hoover MD Service:  General Medicine Author Type:  Resident    Filed:  1/27/2018  4:38 PM Date of Service:  1/27/2018  8:06 AM Creation Time:  1/27/2018  8:06 AM    Status:  Attested :  Avril Hoover MD (Resident)    Cosigner:  Marcio Mcneil MD at 1/28/2018 12:48 PM        Attestation signed by Marcio Mcneil MD at 1/28/2018 12:48 PM        Attestation:  Physician Attestation   I, Marcio Mcneil, saw this patient with the resident and agree with the resident s findings and plan of care as documented in the resident s note.      I personally reviewed vital signs, medications, labs and imaging.    Key findings: 82 year old female with new hypoxic respiratory failure found to be influenza A positive and recent falls concerning for deconditioning.  Treating flu and PT/OT for placement recommendations.    Marcio Mcneil  Date of Service (when I saw the patient): 1/27/18                               [facility], [place]    Internal Medicine Progress Note - [facility] Service    Main Plans for Today[JS1.1]   Transferred to medicine, admitted to inpatient  Ceftriaxone/azithromycin for CAP  PT/OT  Wean oxygen as ablve  Resp viral panel[JS1.2]    Assessment & Plan   Lucie Stockton is a 82 year old female[JS1.1] with hx of ovarian CA s/p TAHBSO 2011, bilateral provoked PE (off warfarin since 2010 when Pt had GIB while on it), athresclerosis of aorta, HTN, HLD, and hypothyroidism who presented to the ED on 1/26 with ongoing pain of the right hip and knee s/p mechanical fall approx 1 week prior, admitted for pain management and found to be newly hypoxic with no signs of acute PE, but possible pneumonia. Transferred to medicine and switched to inpatient on 1/27.[JS1.2]     #[JS1.1] Acute  "Right Knee and Hip Pain s/p Mechanical Fall   Pt had a mechanical fall 1 week prior to admission while on vacation in [place]. Went to a local hospital there, with no evidence of fractures or displacement. Continued to have pain, but able to ambulate until day of admission, when she felt her \"knee buckle.\" Repeat XRs of the right hip and knee negative for acute osseous changes. Pain persisted and Pt unable to ambulate 2/2 the pain, so she was admitted to observation for pain management and PT eval.  - Continue PTA[JS1.2] naproxen BID,[JS1.3] oxycodone and tramadol[JS1.2] PRN with bowel regimen[JS1.3]  - Lidocaine patch to knee[JS1.2]  - PT/OT, up with assist only for now  - Hold off on further imaging of joints for now    # Acute Hypoxia[JS1.4]  # Acute R 7th and 8th Minimally Displaced Rib Fractures[JS1.3]  Pt found to be hypoxic to high 80s on RA in the ED, started on 3L NC. Pt denies any current symptoms of SOB, but does report mild cough over the last several weeks. Pt with hx of PE in 2010, provoked, has not been on warfarin since 2010 2/2 GIB. ABG showed mild hypoxemia to 72 on 3L NC.[JS1.4] RLE US without signs of DVT.[JS1.5] CT PE on admission shows age-indeterminate, but possible chronic PE in the RLL. Mild GGO in RUL. Mod centrilobular emphysema. Minimally displaced lateral R 7th and 8th rib fractures. Scattered pulmonary nodules up to 5mm. Pt previous smoker, but quit 10+ years ago. Afebrile. No leukocytosis.[JS1.4] Most likely mild resp infection +/- atelectasis from recent rib fractures and shallow breathing.[JS1.5]  - Procal negative, ordered Resp viral panel[JS1.4], droplet precautions[JS1.5]  - Given dose of ceftriaxone and azithromycin prior to transfer[JS1.4], switched to levofloxacin  - No signs of acute PE, will not start AC for this  - Duonebs Q4H PRN, outpt PCP f/u to discuss PFTs and/or possible starting COPD medications  - Will need PCP f/u of incidental findings of pulmonary nodules, mild distal " esophageal wall thickening, and calcifications of the pancreatic head  - Wean oxygen as able to maintain sats >90%  - APAP PRN for pain for rib fractures  - IS 10 times/hr while awake    #[JS1.5]#[JS1.3] Chronic Medical Conditions  # HTN: continue PTA[JS1.5] amlodipine 10 mg daily, HCTZ 50 mg daily, lisinopril 40 mg daily  # HLD: continue PTA atorvastatin 80 mg QHS  # PVD: continue PTA plavix  # Hypothyroidism: continue PTA levothyroxine  # GERD: continue PTA pantoprazole  # Chronic Iron Deficiency Anemia: continue PTA ferrous sulfate[JS1.3]    Diet: Low Saturated Fat Na <2400 mg  Fluids:[JS1.1] none[JS1.2]  DVT Prophylaxis:[JS1.1] Enoxaparin (Lovenox) SQ[JS1.2]  Code Status:[JS1.1] Full Code[JS1.2]    Disposition Plan   Expected discharge:[JS1.1] 2 - 3 days[JS1.2], recommended to[JS1.1] TBD[JS1.2] once[JS1.1] stable on room air, pain well controlled, and evaluated by PT/OT.[JS1.2].     Entered: Avril Hoover 01/27/2018, 8:06 AM   Information in the above section will display in the discharge planner report.      The patient's care was discussed with the[JS1.1] staff attending, Dr. Mcneil, who agrees with the assessment and plan.[JS1.2]    Avril Hoover[JS1.1] , MD  PGY-3, Internal Medicine[JS1.2]  Pager:[JS1.1] [phone][JS1.2]  Please see sticky note for cross cover information    Interval History[JS1.1]   Admitted to the obs unit overnight, but requiring 3-4 L oxygen to maintain sats >92%. Pt still has some right knee and hip pain, but denies any shortness of breath, chest pain, rib pain, abdominal pain, or dizziness/lightheadedness. Pt otherwise has no complaints this AM, and interested in working with PT/OT.    A 4-point ROS was negative except as above.[JS1.2]    Physical Exam   Vital Signs: Temp: 98  F (36.7  C) Temp src: Oral BP: 163/63   Heart Rate: 84 Resp: 16 SpO2: 93 % O2 Device: Nasal cannula Oxygen Delivery: 4 LPM  Weight: 0 lbs 0 oz  General Appearance:[JS1.1] alert, interactive,  NAD[JS1.2]  Respiratory:[JS1.1] non-labored, diminished towards the bases, but no wheezes or crackles  Chest: no tenderness to palpation along entire rib-cage[JS1.2]  Cardiovascular:[JS1.1] RRR, no m/r/g[JS1.2]  GI:[JS1.1] soft, NT, ND, +BS[JS1.2]  Skin:[JS1.1] warm and dry, no rashes on exposed surfaces  MSK[JS1.2]:[JS1.1] mild TTP along right knee, no swelling or erythema of the joint, full ROM, no tenderness of right hip joint, left-sided and RUE without any issues, no LE edema, 2+ peripheral pulses[JS1.2]    Data[JS1.1]   All images, labs, and meds reviewed in Epic.[JS1.2]     Revision History        User Key Date/Time User Provider Type Action    > JS1.3 1/27/2018  4:38 PM Avril Hoover MD Resident Sign     JS1.5 1/27/2018  3:37 PM Avril Hoover MD Resident      JS1.4 1/27/2018  2:56 PM Avril Hoover MD Resident      JS1.2 1/27/2018 12:19 PM Avril Hoover MD Resident      JS1.1 1/27/2018  8:06 AM Avril Hoover MD Resident             Progress Notes by Ceci Santa OT at 1/28/2018 12:05 PM     Author:  Ceci Santa OT Service:  Acute IP Rehab Author Type:  Occupational Therapist    Filed:  1/28/2018 12:05 PM Date of Service:  1/28/2018 12:05 PM Creation Time:  1/28/2018 12:05 PM    Status:  Signed :  Ceci Santa OT (Occupational Therapist)          01/28/18 1100   Quick Adds   Type of Visit Initial Occupational Therapy Evaluation   Living Environment   Lives With spouse   Living Arrangements house   Home Accessibility stairs within home;bed and bath are not on the first floor   Number of Stairs to Enter Home 0   Number of Stairs Within Home 14   Stair Railings at Home inside, present on right side   Transportation Available car;family or friend will provide   Living Environment Comment pt lives with her spouse in a house with all living space on second floor. Pt reports she has a tub and a separate walk-in shower that she uses. Pt reports spouse  "works full-time.   Self-Care   Usual Activity Tolerance good   Current Activity Tolerance fair   Equipment Currently Used at Home none   Activity/Exercise/Self-Care Comment Pt IND at baseline, reports having a cane from a prior surgery   Functional Level Prior   Ambulation 0-->independent   Transferring 0-->independent   Toileting 0-->independent   Bathing 0-->independent   Dressing 0-->independent   Eating 0-->independent   Communication 0-->understands/communicates without difficulty   Swallowing 0-->swallows foods/liquids without difficulty   Cognition 0 - no cognition issues reported   Fall history within last six months yes   Number of times patient has fallen within last six months 1   Which of the above functional risks had a recent onset or change? ambulation;transferring;toileting;bathing;fall history   Prior Functional Level Comment Pt previously independent with all ADLs/IADLs   General Information   Onset of Illness/Injury or Date of Surgery - Date 01/26/18   Referring Physician Avril Hoover MD   Patient/Family Goals Statement To \"feel better\"   Additional Occupational Profile Info/Pertinent History of Current Problem Lucie Stockton is a 82 year old female with hx of ovarian CA s/p TAHBSO 2011, bilateral provoked PE (off warfarin since 2010 when Pt had GIB while on it), athresclerosis of aorta, HTN, HLD, and hypothyroidism who presented to the ED on 1/26 with ongoing pain of the right hip and knee s/p mechanical fall approx 1 week prior, admitted for pain management and found to be newly hypoxic with no signs of acute PE, but found to be Influenza A positive. Transferred to medicine and switched to inpatient on 1/27.    Precautions/Limitations fall precautions   Weight-Bearing Status - LUE full weight-bearing   Weight-Bearing Status - RUE full weight-bearing   Weight-Bearing Status - LLE full weight-bearing   Weight-Bearing Status - RLE full weight-bearing   General Observations Pt supine upon " arrival on 3LO2, pt reporting she is not on oxygen at home   General Info Comments Up with assist   Cognitive Status Examination   Orientation orientation to person, place and time   Level of Consciousness alert   Able to Follow Commands WNL/WFL   Personal Safety (Cognitive) WNL/WFL   Memory intact   Attention No deficits were identified   Visual Perception   Visual Perception No deficits were identified;Wears glasses   Sensory Examination   Sensory Quick Adds No deficits were identified   Pain Assessment   Patient Currently in Pain Yes, see Vital Sign flowsheet   Integumentary/Edema   Integumentary/Edema no deficits were identifed   Posture   Posture forward head position;kyphosis   Range of Motion (ROM)   ROM Quick Adds No deficits were identified   Strength   Strength Comments Pt with decreased strength/activity tolerance pt with increased difficulty with transfers and mobility   Hand Strength   Hand Strength Comments WFL   Muscle Tone Assessment   Muscle Tone Quick Adds No deficits were identified   Coordination   Upper Extremity Coordination No deficits were identified   Mobility   Bed Mobility Comments Min A with increased time due to pain   Transfer Skill: Bed to Chair/Chair to Bed   Level of [place]: Bed to Chair moderate assist (50% patients effort)   Physical Assist/Nonphysical Assist: Bed to Chair set-up required;1 person assist   Weight-Bearing Restrictions full weight-bearing   Transfer Skill: Sit to Stand   Level of [place]: Sit/Stand minimum assist (75% patients effort)   Physical Assist/Nonphysical Assist: Sit/Stand 1 person assist   Upper Body Dressing   Level of [place]: Dress Upper Body minimum assist (75% patients effort)   Grooming   Level of [place]: Grooming stand-by assist   Instrumental Activities of Daily Living (IADL)   Previous Responsibilities meal prep;housekeeping;laundry;shopping;medication management;finances;driving   Activities of Daily Living Analysis  "  Impairments Contributing to Impaired Activities of Daily Living balance impaired;strength decreased;pain   General Therapy Interventions   Planned Therapy Interventions ADL retraining;IADL retraining;ROM;strengthening;transfer training;home program guidelines;progressive activity/exercise;risk factor education   Clinical Impression   Criteria for Skilled Therapeutic Interventions Met yes, treatment indicated   OT Diagnosis Decreased independence in ADLs/IADLs   Influenced by the following impairments pain, decreased strength/activity tolerance, deconditioning, balance deficits   Assessment of Occupational Performance 3-5 Performance Deficits   Identified Performance Deficits bathing, dressing, toilet transfers, shopping   Clinical Decision Making (Complexity) Low complexity   Therapy Frequency 5 times/wk   Predicted Duration of Therapy Intervention (days/wks) 2 weeks   Anticipated Equipment Needs at Discharge (TBD)   Anticipated Discharge Disposition Transitional Care Facility   Risks and Benefits of Treatment have been explained. Yes   Patient, Family & other staff in agreement with plan of care Yes   [facility] TM \"6 Clicks\"   2016, Trustees of [facility], under license to [employer].  All rights reserved.   6 Clicks Short Forms Daily Activity Inpatient Short Form   [facility]  \"6 Clicks\" Daily Activity Inpatient Short Form   1. Putting on and taking off regular lower body clothing? 2 - A Lot   2. Bathing (including washing, rinsing, drying)? 2 - A Lot   3. Toileting, which includes using toilet, bedpan or urinal? 2 - A Lot   4. Putting on and taking off regular upper body clothing? 3 - A Little   5. Taking care of personal grooming such as brushing teeth? 4 - None   6. Eating meals? 4 - None   Daily Activity Raw Score (Score out of 24.Lower scores equate to lower levels of function) 17   Total Evaluation Time   Total Evaluation Time (Minutes) 6[SH1.1]        Revision History "        User Key Date/Time User Provider Type Action    > SH1.1 1/28/2018 12:05 PM Ceci Santa OT Occupational Therapist Sign            Progress Notes by Cecilia Cruz RN at 1/27/2018  7:00 PM     Author:  Cecilia Cruz RN Service:  (none) Author Type:  Registered Nurse    Filed:  1/27/2018  7:54 PM Date of Service:  1/27/2018  7:00 PM Creation Time:  1/27/2018  4:47 PM    Status:  Addendum :  Cecilia Cruz RN (Registered Nurse)         Patient A&O, AVSS with exception of O2 Sats decreased occasionally to 82-89%, primarily maintained 90-96% on 4L per NC.[KT1.1] /58  Temp 97.7  F (36.5  C) (Oral)  Resp 20  SpO2 92%[KT1.2]  Patient tolerating low fat / low sodium diet, voiding per bedpan. Paint reported to right knee, lidocaine patch applied and remains in place. MD rounds and informed of right rib fx. Patient has been in bed most of shift with exception of PT eval this afternoon, pt tolerated standing and took a few steps with walker, PT to continue further therapy tomorrow. SW to see patient in am.  at bedside this afternoon[KT1.1], daughter at bedside at this time.   Patient placed on Droplet isolation, positive for Influenza A.[KT1.3]     Revision History        User Key Date/Time User Provider Type Action    > [N/A] 1/27/2018  7:54 PM Cecilia Cruz RN Registered Nurse Addend     KT1.3 1/27/2018  7:51 PM Cecilia Cruz, RN Registered Nurse Sign     KT1.2 1/27/2018  4:49 PM Cecilia Cruz, RN Registered Nurse      KT1.1 1/27/2018  4:47 PM Cecilia Cruz, RN Registered Nurse             Progress Notes by Rachel Gamino, PT at 1/27/2018  5:05 PM     Author:  Rachel Gamino, PT Service:  (none) Author Type:  Physical Therapist    Filed:  1/27/2018  5:05 PM Date of Service:  1/27/2018  5:05 PM Creation Time:  1/27/2018  5:05 PM    Status:  Signed :  Rachel Gamino, PT (Physical Therapist)          01/27/18 1543   Quick Adds   Type of Visit Initial  PT Evaluation   Living Environment   Lives With spouse   Living Arrangements house   Home Accessibility stairs within home;bed and bath are not on the first floor   Number of Stairs to Enter Home 0   Number of Stairs Within Home 14   Stair Railings at Home inside, present on right side   Transportation Available car;family or friend will provide   Living Environment Comment pt lives with her spouse in a house with all living space on second floor    Self-Care   Usual Activity Tolerance good   Current Activity Tolerance fair   Equipment Currently Used at Home none   Activity/Exercise/Self-Care Comment Pt IND at baseline, reports having a cane from a prior surgery   Functional Level Prior   Ambulation 0-->independent   Transferring 0-->independent   Toileting 0-->independent   Bathing 0-->independent   Dressing 0-->independent   Eating 0-->independent   Communication 0-->understands/communicates without difficulty   Swallowing 0-->swallows foods/liquids without difficulty   Cognition 0 - no cognition issues reported   Fall history within last six months yes   Number of times patient has fallen within last six months 1   Which of the above functional risks had a recent onset or change? 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  The patient apparently fell a week ago, and had been ambulatory since that time, but today had her right knee buckle and complains of pain in her right knee and right hip   Precautions/Limitations fall precautions;oxygen therapy device and L/min   Weight-Bearing Status - RLE weight-bearing as tolerated   Heart Disease Risk Factors Medical history;Age   General Observations Pt alert, agreeable   General Info Comments Activity orders: up with assist    Cognitive Status Examination   Orientation orientation to person, place and time   Level of Consciousness alert   Follows Commands and Answers Questions 100% of the time   Personal Safety and Judgment intact   Memory intact   Pain Assessment   Patient Currently in Pain Yes, see Vital Sign flowsheet   Posture    Posture Kyphosis   Range of Motion (ROM)   ROM Comment WFL   Strength   Strength Comments not overtly tested due to pain    Bed Mobility   Bed Mobility Comments IND with bed rails    Transfer Skills   Transfer Comments min assist for sit to stand wtih FWW   Gait   Gait Comments able to take a few steps in room with min assist and FWW   Balance   Balance Comments needs UE support for balance due to RLE pain    Sensory Examination   Sensory Perception no deficits were identified   General Therapy Interventions   Planned Therapy Interventions balance training;progressive activity/exercise;home program guidelines;risk factor education;transfer training;strengthening;gait training   Clinical Impression   Criteria for Skilled Therapeutic Intervention yes, treatment indicated   PT Diagnosis impaired functional mobility    Influenced by the following impairments impaired gait, balance    Functional limitations due to impairments impaired functional mobility    Clinical Presentation Evolving/Changing   Clinical Presentation Rationale pt with unclear symptoms and progression    Clinical Decision Making (Complexity) Moderate complexity  "  Therapy Frequency` 5 times/week   Predicted Duration of Therapy Intervention (days/wks) 1 week    Anticipated Discharge Disposition Transitional Care Facility   Risk & Benefits of therapy have been explained Yes   Patient, Family & other staff in agreement with plan of care Yes   Clinical Impression Comments Pt with below baseline mobility, currently unable to ambulate    [facility] TM \"6 Clicks\"   2016, Trustees of [facility], under license to [employer].  All rights reserved.   6 Clicks Short Forms Basic Mobility Inpatient Short Form   [facility]  \"6 Clicks\" V.2 Basic Mobility Inpatient Short Form   1. Turning from your back to your side while in a flat bed without using bedrails? 4 - None   2. Moving from lying on your back to sitting on the side of a flat bed without using bedrails? 4 - None   3. Moving to and from a bed to a chair (including a wheelchair)? 2 - A Lot   4. Standing up from a chair using your arms (e.g., wheelchair, or bedside chair)? 3 - A Little   5. To walk in hospital room? 2 - A Lot   6. Climbing 3-5 steps with a railing? 1 - Total   Basic Mobility Raw Score (Score out of 24.Lower scores equate to lower levels of function) 16   Total Evaluation Time   Total Evaluation Time (Minutes) 12[KD1.1]        Revision History        User Key Date/Time User Provider Type Action    > KD1.1 1/27/2018  5:05 PM Rachel Gamino, PT Physical Therapist Sign                  Procedure Notes     No notes of this type exist for this encounter.         Progress Notes - Therapies (Notes from 01/27/18 through 01/30/18)      Progress Notes by Ceci Santa OT at 1/28/2018 12:05 PM     Author:  Ceci Santa OT Service:  Acute IP Rehab Author Type:  Occupational Therapist    Filed:  1/28/2018 12:05 PM Date of Service:  1/28/2018 12:05 PM Creation Time:  1/28/2018 12:05 PM    Status:  Signed :  Ceci Santa OT (Occupational Therapist)          01/28/18 1100   Quick Adds " "  Type of Visit Initial Occupational Therapy Evaluation   Living Environment   Lives With spouse   Living Arrangements house   Home Accessibility stairs within home;bed and bath are not on the first floor   Number of Stairs to Enter Home 0   Number of Stairs Within Home 14   Stair Railings at Home inside, present on right side   Transportation Available car;family or friend will provide   Living Environment Comment pt lives with her spouse in a house with all living space on second floor. Pt reports she has a tub and a separate walk-in shower that she uses. Pt reports spouse works full-time.   Self-Care   Usual Activity Tolerance good   Current Activity Tolerance fair   Equipment Currently Used at Home none   Activity/Exercise/Self-Care Comment Pt IND at baseline, reports having a cane from a prior surgery   Functional Level Prior   Ambulation 0-->independent   Transferring 0-->independent   Toileting 0-->independent   Bathing 0-->independent   Dressing 0-->independent   Eating 0-->independent   Communication 0-->understands/communicates without difficulty   Swallowing 0-->swallows foods/liquids without difficulty   Cognition 0 - no cognition issues reported   Fall history within last six months yes   Number of times patient has fallen within last six months 1   Which of the above functional risks had a recent onset or change? ambulation;transferring;toileting;bathing;fall history   Prior Functional Level Comment Pt previously independent with all ADLs/IADLs   General Information   Onset of Illness/Injury or Date of Surgery - Date 01/26/18   Referring Physician Avril Hoover MD   Patient/Family Goals Statement To \"feel better\"   Additional Occupational Profile Info/Pertinent History of Current Problem Lucie Stockton is a 82 year old female with hx of ovarian CA s/p TAHBSO 2011, bilateral provoked PE (off warfarin since 2010 when Pt had GIB while on it), athresclerosis of aorta, HTN, HLD, and hypothyroidism " who presented to the ED on 1/26 with ongoing pain of the right hip and knee s/p mechanical fall approx 1 week prior, admitted for pain management and found to be newly hypoxic with no signs of acute PE, but found to be Influenza A positive. Transferred to medicine and switched to inpatient on 1/27.    Precautions/Limitations fall precautions   Weight-Bearing Status - LUE full weight-bearing   Weight-Bearing Status - RUE full weight-bearing   Weight-Bearing Status - LLE full weight-bearing   Weight-Bearing Status - RLE full weight-bearing   General Observations Pt supine upon arrival on 3LO2, pt reporting she is not on oxygen at home   General Info Comments Up with assist   Cognitive Status Examination   Orientation orientation to person, place and time   Level of Consciousness alert   Able to Follow Commands WNL/WFL   Personal Safety (Cognitive) WNL/WFL   Memory intact   Attention No deficits were identified   Visual Perception   Visual Perception No deficits were identified;Wears glasses   Sensory Examination   Sensory Quick Adds No deficits were identified   Pain Assessment   Patient Currently in Pain Yes, see Vital Sign flowsheet   Integumentary/Edema   Integumentary/Edema no deficits were identifed   Posture   Posture forward head position;kyphosis   Range of Motion (ROM)   ROM Quick Adds No deficits were identified   Strength   Strength Comments Pt with decreased strength/activity tolerance pt with increased difficulty with transfers and mobility   Hand Strength   Hand Strength Comments WFL   Muscle Tone Assessment   Muscle Tone Quick Adds No deficits were identified   Coordination   Upper Extremity Coordination No deficits were identified   Mobility   Bed Mobility Comments Min A with increased time due to pain   Transfer Skill: Bed to Chair/Chair to Bed   Level of [place]: Bed to Chair moderate assist (50% patients effort)   Physical Assist/Nonphysical Assist: Bed to Chair set-up required;1 person assist  "  Weight-Bearing Restrictions full weight-bearing   Transfer Skill: Sit to Stand   Level of [place]: Sit/Stand minimum assist (75% patients effort)   Physical Assist/Nonphysical Assist: Sit/Stand 1 person assist   Upper Body Dressing   Level of [place]: Dress Upper Body minimum assist (75% patients effort)   Grooming   Level of [place]: Grooming stand-by assist   Instrumental Activities of Daily Living (IADL)   Previous Responsibilities meal prep;housekeeping;laundry;shopping;medication management;finances;driving   Activities of Daily Living Analysis   Impairments Contributing to Impaired Activities of Daily Living balance impaired;strength decreased;pain   General Therapy Interventions   Planned Therapy Interventions ADL retraining;IADL retraining;ROM;strengthening;transfer training;home program guidelines;progressive activity/exercise;risk factor education   Clinical Impression   Criteria for Skilled Therapeutic Interventions Met yes, treatment indicated   OT Diagnosis Decreased independence in ADLs/IADLs   Influenced by the following impairments pain, decreased strength/activity tolerance, deconditioning, balance deficits   Assessment of Occupational Performance 3-5 Performance Deficits   Identified Performance Deficits bathing, dressing, toilet transfers, shopping   Clinical Decision Making (Complexity) Low complexity   Therapy Frequency 5 times/wk   Predicted Duration of Therapy Intervention (days/wks) 2 weeks   Anticipated Equipment Needs at Discharge (TBD)   Anticipated Discharge Disposition Transitional Care Facility   Risks and Benefits of Treatment have been explained. Yes   Patient, Family & other staff in agreement with plan of care Yes   [facility] TM \"6 Clicks\"   2016, Trustees of [facility], under license to [employer].  All rights reserved.   6 Clicks Short Forms Daily Activity Inpatient Short Form   [facility]  \"6 Clicks\" Daily Activity Inpatient " Short Form   1. Putting on and taking off regular lower body clothing? 2 - A Lot   2. Bathing (including washing, rinsing, drying)? 2 - A Lot   3. Toileting, which includes using toilet, bedpan or urinal? 2 - A Lot   4. Putting on and taking off regular upper body clothing? 3 - A Little   5. Taking care of personal grooming such as brushing teeth? 4 - None   6. Eating meals? 4 - None   Daily Activity Raw Score (Score out of 24.Lower scores equate to lower levels of function) 17   Total Evaluation Time   Total Evaluation Time (Minutes) 6[SH1.1]        Revision History        User Key Date/Time User Provider Type Action    > SH1.1 1/28/2018 12:05 PM Ceci Santa OT Occupational Therapist Sign            Progress Notes by Rachel Gamino PT at 1/27/2018  5:05 PM     Author:  Rachel Gamino PT Service:  (none) Author Type:  Physical Therapist    Filed:  1/27/2018  5:05 PM Date of Service:  1/27/2018  5:05 PM Creation Time:  1/27/2018  5:05 PM    Status:  Signed :  Rachel Gamino PT (Physical Therapist)          01/27/18 1543   Quick Adds   Type of Visit Initial PT Evaluation   Living Environment   Lives With spouse   Living Arrangements house   Home Accessibility stairs within home;bed and bath are not on the first floor   Number of Stairs to Enter Home 0   Number of Stairs Within Home 14   Stair Railings at Home inside, present on right side   Transportation Available car;family or friend will provide   Living Environment Comment pt lives with her spouse in a house with all living space on second floor    Self-Care   Usual Activity Tolerance good   Current Activity Tolerance fair   Equipment Currently Used at Home none   Activity/Exercise/Self-Care Comment Pt IND at baseline, reports having a cane from a prior surgery   Functional Level Prior   Ambulation 0-->independent   Transferring 0-->independent   Toileting 0-->independent   Bathing 0-->independent   Dressing 0-->independent   Eating  0-->independent   Communication 0-->understands/communicates without difficulty   Swallowing 0-->swallows foods/liquids without difficulty   Cognition 0 - no cognition issues reported   Fall history within last six months yes   Number of times patient has fallen within last six months 1   Which of the above functional risks had a recent onset or change? 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  The patient apparently fell a week ago, and had been ambulatory since that time, but today had her right knee buckle and complains of pain in her right knee and right hip   Precautions/Limitations fall precautions;oxygen therapy device and L/min   Weight-Bearing Status - RLE weight-bearing as tolerated   Heart Disease Risk Factors Medical history;Age   General Observations Pt alert, agreeable   General Info Comments Activity orders: up with assist    Cognitive Status Examination   Orientation orientation to person, place and time   Level of Consciousness alert   Follows Commands and Answers Questions 100% of the time   Personal Safety and Judgment intact   Memory intact   Pain Assessment   Patient Currently in Pain Yes, see Vital Sign flowsheet   Posture    Posture Kyphosis   Range of Motion (ROM)  "  ROM Comment WFL   Strength   Strength Comments not overtly tested due to pain    Bed Mobility   Bed Mobility Comments IND with bed rails    Transfer Skills   Transfer Comments min assist for sit to stand wtih FWW   Gait   Gait Comments able to take a few steps in room with min assist and FWW   Balance   Balance Comments needs UE support for balance due to RLE pain    Sensory Examination   Sensory Perception no deficits were identified   General Therapy Interventions   Planned Therapy Interventions balance training;progressive activity/exercise;home program guidelines;risk factor education;transfer training;strengthening;gait training   Clinical Impression   Criteria for Skilled Therapeutic Intervention yes, treatment indicated   PT Diagnosis impaired functional mobility    Influenced by the following impairments impaired gait, balance    Functional limitations due to impairments impaired functional mobility    Clinical Presentation Evolving/Changing   Clinical Presentation Rationale pt with unclear symptoms and progression    Clinical Decision Making (Complexity) Moderate complexity   Therapy Frequency` 5 times/week   Predicted Duration of Therapy Intervention (days/wks) 1 week    Anticipated Discharge Disposition Transitional Care Facility   Risk & Benefits of therapy have been explained Yes   Patient, Family & other staff in agreement with plan of care Yes   Clinical Impression Comments Pt with below baseline mobility, currently unable to ambulate    [facility] TM \"6 Clicks\"   2016, Trustees of [facility], under license to [employer].  All rights reserved.   6 Clicks Short Forms Basic Mobility Inpatient Short Form   [facility] AM-PAC  \"6 Clicks\" V.2 Basic Mobility Inpatient Short Form   1. Turning from your back to your side while in a flat bed without using bedrails? 4 - None   2. Moving from lying on your back to sitting on the side of a flat bed without using bedrails? 4 - None "   3. Moving to and from a bed to a chair (including a wheelchair)? 2 - A Lot   4. Standing up from a chair using your arms (e.g., wheelchair, or bedside chair)? 3 - A Little   5. To walk in hospital room? 2 - A Lot   6. Climbing 3-5 steps with a railing? 1 - Total   Basic Mobility Raw Score (Score out of 24.Lower scores equate to lower levels of function) 16   Total Evaluation Time   Total Evaluation Time (Minutes) 12[KD1.1]        Revision History        User Key Date/Time User Provider Type Action    > KD1.1 1/27/2018  5:05 PM Rachel Gamino, PT Physical Therapist Sign

## 2018-01-26 NOTE — IP AVS SNAPSHOT
` `     UNIT 6D OBSERVATION [facility] BANK: [phone]            Medication Administration Report for Lucie Stockton as of 01/30/18 1128   Legend:    Given Hold Not Given Due Canceled Entry Other Actions    Time Time (Time) Time  Time-Action       Inactive    Active    Linked        Medications 01/24/18 01/25/18 01/26/18 01/27/18 01/28/18 01/29/18 01/30/18    acetaminophen (TYLENOL) tablet 650 mg  Dose: 650 mg  Freq: EVERY 4 HOURS PRN Route: PO  PRN Reason: mild pain  Start: 01/26/18 2327   Admin Instructions: Alternate ibuprofen (if ordered) with acetaminophen  Maximum acetaminophen dose from all sources = 75 mg/kg/day not to exceed 4 grams/day.               amLODIPine (NORVASC) tablet 10 mg  Dose: 10 mg  Freq: DAILY Route: PO  Start: 01/27/18 0800       0814 (10 mg)-Given        0823 (10 mg)-Given        0802 (10 mg)-Given        0859 (10 mg)-Given           atorvastatin (LIPITOR) tablet 80 mg  Dose: 80 mg  Freq: AT BEDTIME Route: PO  Start: 01/26/18 2345       0035 (80 mg)-Given       2055 (80 mg)-Given               2309 (80 mg)-Given        2221 (80 mg)-Given        [ ] 2200           citalopram (celeXA) tablet 20 mg  Dose: 20 mg  Freq: DAILY Route: PO  Start: 01/27/18 0800       0814 (20 mg)-Given        0823 (20 mg)-Given        0801 (20 mg)-Given        0859 (20 mg)-Given           clopidogrel (PLAVIX) tablet 75 mg  Dose: 75 mg  Freq: DAILY Route: PO  Start: 01/27/18 0800       0814 (75 mg)-Given        0824 (75 mg)-Given        0801 (75 mg)-Given        0909 (75 mg)-Given           ferrous sulfate (IRON) tablet 325 mg  Dose: 325 mg  Freq: DAILY WITH BREAKFAST Route: PO  Start: 01/27/18 0800   Admin Instructions: Absorbed best on an empty stomach. If stomach upset occurs, can take with meals.        0814 (325 mg)-Given        0823 (325 mg)-Given        0801 (325 mg)-Given        0859 (325 mg)-Given           hydrochlorothiazide tablet 50 mg  Dose: 50 mg  Freq: DAILY Route: PO  Start: 01/27/18 0800        "0815 (50 mg)-Given        0822 (50 mg)-Given        0801 (50 mg)-Given        0859 (50 mg)-Given           ipratropium - albuterol 0.5 mg/2.5 mg/3 mL (DUONEB) neb solution 3 mL  Dose: 3 mL  Freq: 4 TIMES DAILY PRN Route: NEBULIZATION  PRN Reason: wheezing  Start: 01/27/18 1745        1203 (3 mL)-Given             levothyroxine (SYNTHROID/LEVOTHROID) tablet 50 mcg  Dose: 50 mcg  Freq: EVERY MORNING BEFORE BREAKFAST Route: PO  Start: 01/27/18 0730   Admin Instructions: Separate oral administration of iron- or calcium-containing products and levothyroxine by at least 4 hours.        0812 (50 mcg)-Given        0815 (50 mcg)-Given        0802 (50 mcg)-Given        0859 (50 mcg)-Given           Lidocaine (LIDOCARE) 4 % Patch 1 patch  Dose: 1 patch  Freq: EVERY 24 HOURS 0800 Route: TD  Start: 01/27/18 0800   Admin Instructions: Apply patch(s) to right knee. To prevent lidocaine toxicity, patient should be patch free for 12 hrs daily. Patches may be cut to smaller size prior to removing release liner.  NEVER APPLY HEAT OVER PATCH which increases absorption and risk of local anesthetic toxicity. Do not apply over area where liposomal bupivacaine was injected for 96 hours post injection.        0818 (1 patch)-Given        0820 (1 patch)-Given        0759 (1 patch)-Given        0902 (1 patch)-Given           lidocaine (LMX4) kit  Freq: EVERY 1 HOUR PRN Route: Top  PRN Reason: pain  PRN Comment: with VAD insertion or accessing implanted port.  Start: 01/26/18 2327   Admin Instructions: Do NOT give if patient has a history of allergy to any local anesthetic or any \"ar\" product.  Apply 30 minutes prior to VAD insertion or port access. MAX Dose: 2.5 g (  of 5 g tube).               lidocaine 1 % 1 mL  Dose: 1 mL  Freq: EVERY 1 HOUR PRN Route: OTHER  PRN Comment: mild pain with VAD insertion or accessing implanted port  Start: 01/26/18 2327   Admin Instructions: Do NOT give if patient has a history of allergy to any local " "anesthetic or any \"ar\" product. MAX dose 1 mL subcutaneous OR intradermal in divided doses.               lidocaine patch in PLACE  Freq: EVERY 8 HOURS Route: TD  Start: 01/27/18 0800   Admin Instructions: Chart every shift, confirming that patch is still in place on patient (no barcode scan needed). See patch order for dose information.  NEVER APPLY HEAT OVER PATCH which will increase absorption and may lead to risk of local anesthetic toxicity. Do not apply over area where liposomal bupivacaine injected for 96 hours.        0822 ( )-Patch in Place       1625 ( )-Patch in Place        0012 ( )-Patch Removed       0824 ( )-Patch in Place       1634 ( )-Patch in Place       2311 ( )-Patch Removed        (0810)-Not Given [C]       1610 ( )-Negative        (0025)-Not Given [C]       0908 ( )-Patch in Place       [ ] 1600           lidocaine patch REMOVAL  Freq: EVERY 24 HOURS 2000 Route: TD  Start: 01/27/18 2000   Admin Instructions: Patient should have a 12 hour patch free interval        2000 ( )-Patch Removed        2009 ( )-Patch Removed        2150 ( )-Patch Removed        [ ] 2000           lisinopril (PRINIVIL/ZESTRIL) tablet 40 mg  Dose: 40 mg  Freq: DAILY Route: PO  Start: 01/27/18 0800       0814 (40 mg)-Given        0823 (40 mg)-Given        0802 (40 mg)-Given        0859 (40 mg)-Given           metoclopramide (REGLAN) injection 5 mg  Dose: 5 mg  Freq: EVERY 6 HOURS PRN Route: IV  PRN Comment: nausea and vomiting  Start: 01/26/18 8762   Admin Instructions: This is Step 3 of nausea and vomiting protocol.  Give if nausea not resolve 15 minutes after giving prochlorperazine.  Avoid use if patient has full bowel obstruction or perforation. Irritant. For ordered doses up to 10 mg, give IV Push undiluted over 2 minutes.               naloxone (NARCAN) injection 0.1-0.4 mg  Dose: 0.1-0.4 mg  Freq: EVERY 2 MIN PRN Route: IV  PRN Reason: opioid reversal  Start: 01/26/18 2420   Admin Instructions: For respiratory " rate LESS than or EQUAL to 8.  Partial reversal dose:  0.1 mg titrated q 2 minutes for Analgesia Side Effects Monitoring Sedation Level of 3 (frequently drowsy, arousable, drifts to sleep during conversation).Full reversal dose:  0.4 mg bolus for Analgesia Side Effects Monitoring Sedation Level of 4 (somnolent, minimal or no response to stimulation).  For ordered doses up to 2mg give IVP. Give each 0.4mg over 15 seconds in emergency situations. For non-emergent situations further dilute in 9mL of NS to facilitate titration of response.               naproxen (NAPROSYN) tablet 250 mg  Dose: 250 mg  Freq: 2 TIMES DAILY WITH MEALS Route: PO  Start: 01/27/18 0800   Admin Instructions: Automatic Substitution for naproxen 220 mg per [place] [employer]<br>        0826 (250 mg)-Given       1830 (250 mg)-Given        0859 (250 mg)-Given       1839 (250 mg)-Given        0801 (250 mg)-Given       1809 (250 mg)-Given        1013 (250 mg)-Given       [ ] 1800           ondansetron (ZOFRAN) injection 4 mg  Dose: 4 mg  Freq: EVERY 6 HOURS PRN Route: IV  PRN Reasons: nausea,vomiting  Start: 01/26/18 2327   Admin Instructions: This is Step 1 of nausea and vomiting protocol.  If nausea not resolved in 15 minutes, go to Step 2 (Prochlorperazine).  Irritant. For ordered doses up to 4 mg, give IV Push undiluted over 2-5 minutes.               oseltamivir (TAMIFLU) capsule 75 mg  Dose: 75 mg  Freq: 2 TIMES DAILY Route: PO  Indications of Use: INFLUENZA  Start: 01/28/18 0800        0859 (75 mg)-Given       1948 (75 mg)-Given        0801 (75 mg)-Given       1944 (75 mg)-Given        0900 (75 mg)-Given       [ ] 2000           oxyCODONE IR (ROXICODONE) tablet 5 mg  Dose: 5 mg  Freq: EVERY 6 HOURS PRN Route: PO  PRN Reason: moderate to severe pain  Start: 01/26/18 2326       0035 (5 mg)-Given       0600 (5 mg)-Given       1245 (5 mg)-Given              pantoprazole (PROTONIX) EC tablet 40 mg  Dose: 40 mg  Freq: EVERY MORNING BEFORE  BREAKFAST Route: PO  Start: 01/27/18 0730   Admin Instructions: DO NOT CRUSH.        0812 (40 mg)-Given        0815 (40 mg)-Given        0802 (40 mg)-Given        0859 (40 mg)-Given           polyethylene glycol (MIRALAX/GLYCOLAX) Packet 17 g  Dose: 17 g  Freq: DAILY PRN Route: PO  PRN Reason: constipation  Start: 01/26/18 2326   Admin Instructions: 1 Packet = 17 grams. Mixed prescribed dose in 8 ounces of water.  1 Packet = 17 grams. Mixed prescribed dose in 8 ounces of water. Follow with 8 oz. of water.               potassium chloride SA (K-DUR/KLOR-CON M) CR tablet 20 mEq  Dose: 20 mEq  Freq: DAILY Route: PO  Start: 01/28/18 1200   Admin Instructions: DO NOT CRUSH.         1407 (20 mEq)-Given        0801 (20 mEq)-Given        0859 (20 mEq)-Given           prochlorperazine (COMPAZINE) injection 5 mg  Dose: 5 mg  Freq: EVERY 6 HOURS PRN Route: IV  PRN Reasons: nausea,vomiting  Start: 01/26/18 2327   Admin Instructions: This is Step 2 of the nausea and vomiting protocol.   If nausea not resolved in 15 minutes, give Metoclopramide if ordered (step 3 of nausea and vomiting protocol)    For ordered doses up to 10 mg, give IV Push undiluted. Each 5mg over 1 minute.               senna-docusate (SENOKOT-S;PERICOLACE) 8.6-50 MG per tablet 2 tablet  Dose: 2 tablet  Freq: 2 TIMES DAILY PRN Route: PO  PRN Reason: constipation  Start: 01/27/18 0340              sodium chloride (PF) 0.9% PF flush 3 mL  Dose: 3 mL  Freq: EVERY 8 HOURS Route: IK  Start: 01/26/18 2345   Admin Instructions: And Q1H PRN, to lock peripheral IV dormant line.        0039 (3 mL)-Given       (0816)-Not Given       1759 (3 mL)-Given        (0012)-Not Given       0818 (3 mL)-Given       1634 (3 mL)-Given       2311 (3 mL)-Given        (0912)-Not Given       1611 (3 mL)-Given        (0024)-Not Given       (1000)-Not Given [C]       [ ] 1545       [ ] 0208           sodium chloride (PF) 0.9% PF flush 3 mL  Dose: 3 mL  Freq: EVERY 1 HOUR PRN Route: IK  PRN  Reason: line flush  Start: 01/26/18 2327   Admin Instructions: for peripheral IV flush post IV meds               traMADol (ULTRAM) tablet 50 mg  Dose: 50 mg  Freq: EVERY 6 HOURS PRN Route: PO  PRN Reason: moderate pain  PRN Comment: Take 1 tablet (50 mg) by mouth every 6 hours as needed for moderate pain or severe pain  Start: 01/26/18 2327             Discontinued Medications  Medications 01/24/18 01/25/18 01/26/18 01/27/18 01/28/18 01/29/18 01/30/18         Rate: 50 mL/hr   Freq: CONTINUOUS Route: IV  Last Dose: Stopped (01/27/18 1242)  Start: 01/27/18 0345   End: 01/27/18 1230       0407 ( )-New Bag       0525 ( )-Rate/Dose Verify       0600 ( )-Rate/Dose Verify       0808 ( )-Rate/Dose Change       0900 ( )-Rate/Dose Verify       1107 ( )-Rate/Dose Verify       1230-Med Discontinued  1242-Stopped                Dose: 500 mg  Freq: DAILY Route: PO  Indications of Use: COMMUNITY ACQUIRED PNEUMONIA  Start: 01/27/18 0815   End: 01/27/18 1231       0916 (500 mg)-Given       1231-Med Discontinued            Dose: 2 g  Freq: EVERY 24 HOURS Route: IV  Indications of Use: COMMUNITY ACQUIRED PNEUMONIA  Start: 01/27/18 0345   End: 01/27/18 1231   Admin Instructions: Give IVP over 3 minutes        0414 (2 g)-Given       1231-Med Discontinued            Dose: 3 mL  Freq: 4 TIMES DAILY Route: NEBULIZATION  Start: 01/27/18 1715   End: 01/27/18 1730       1730-Med Discontinued  (1731)-Not Given                Dose: 3 mL  Freq: EVERY 4 HOURS PRN Route: NEBULIZATION  PRN Reasons: wheezing,shortness of breath / dyspnea  Start: 01/27/18 1236   End: 01/27/18 1706       1706-Med Discontinued            Dose: 500 mg  Freq: DAILY Route: PO  Indications of Use: COMMUNITY ACQUIRED PNEUMONIA  Start: 01/28/18 0800   End: 01/28/18 0708   Admin Instructions: Administer at least 2 hrs before or 4 hrs after aluminum, calcium, iron, zinc or magnesium containing products.         0708-Med Discontinued           Dose: 20 mEq  Freq: DAILY Route:  PO  Start: 01/27/18 0800   End: 01/27/18 1633   Admin Instructions: DO NOT CRUSH.        0821 (20 mEq)-Given       1633-Med Discontinued       Medications 01/24/18 01/25/18 01/26/18 01/27/18 01/28/18 01/29/18 01/30/18

## 2018-01-26 NOTE — IP AVS SNAPSHOT
Unit 6D Observation [address] [address]    Phone:  [phone]    Fax:  [phone]                                       After Visit Summary   1/26/2018    Lucie Stockton    MRN: [id number]           After Visit Summary Signature Page     I have received my discharge instructions, and my questions have been answered. I have discussed any challenges I see with this plan with the nurse or doctor.    ..........................................................................................................................................  Patient/Patient Representative Signature      ..........................................................................................................................................  Patient Representative Print Name and Relationship to Patient    ..................................................               ................................................  Date                                            Time    ..........................................................................................................................................  Reviewed by Signature/Title    ...................................................              ..............................................  Date                                                            Time

## 2018-01-26 NOTE — IP AVS SNAPSHOT
` ` Patient Information     Patient Name Sex     Lucie Stockton ([id number]) Female 1935       Room Bed    [facility] [address] [address]      Patient Demographics     Address Phone E-mail Address    [address] [facility] [address] [phone] (Home)  [phone] (Mobile) [email or web address]      Patient Ethnicity & Race     Ethnic Group Patient Race    American White      Emergency Contact(s)     Name Relation Home Work Mobile    Pradeep Stockton Spouse [phone]      Documents on File        Status Date Received Description       Documents for the Patient    Insurance Card Received () 12 Medicare & BCBS    Consent Form  06     Waiver - Payment  06     Privacy Notice - [place] Received () 11     Face Sheet Received () 05/24/10     KATEY Face Sheet Received () 10/15/10     Waiver - Payment  10/15/10     Privacy Notice - [place] Received 10/15/10     External Medication Information Consent Accepted 17     Consent for Services - Hospital/Clinic Received () 11     Face Sheet Received () 10/15/10     External Medication Information Consent Accepted () 10/25/11     Consent for Services - Hospital/Clinic Received () 12     Consent for Services - Hospital/Clinic  () 12 GENERAL CONSENT FORM - ENGLISH 12    HIM NOAH Authorization - File Only  12 MYCHART ACCESS    HIM NOAH Authorization - File Only  12 MYCHART ACCESS    HIM NOAH Authorization  07/10/12     Consent for EHR Access  13 Copied from existing Consent for services - C/HOD collected on 2012    [facility] Specified Other       Consent for Services - Hospital/Clinic Received () 13     Consent for Services - [facility] Received 13 [facility] CONSENT    Consent for Services - Hospital/Clinic Received () 13 CONSENT FOR SERVICE (SIGNED ON 13)    Consent for Services - [facility]   13 GENERAL CONSENT FORM: SHARED EHR - ENGLISH, 13    HIM NOAH Authorization  14     HIM NOAH Authorization  14 DR MEL BATRES    HIM NOAH Authorization - File Only  14 DR.SALLY BATRES    [facility] NOAH Authorization - File Only  14 DR. GEOFF BATRES    Consent for Services - Hospital/Clinic Received () 14 CONSENT FOR SERVICE    Physical Therapy Certification Received 14 to 14    Insurance Card Received 14 Medicare    Insurance Card Received () 14 [facility]    Business/Insurance/Care Coordination/Health Form - Patient  01/13/15 MEDICAREBLUE, PRIOR AUTHORIZATION APPROVAL- [place]OL[place], 14    Patient ID Received 17 ON FILE    Insurance Card Received 03/02/15 [facility]    Consent for Services - Hospital/Clinic Received () 06/10/15     Consent for Services - Hospital/Clinic  () 06/11/15 CONSENT FOR SERVICE    Consent for Services/Privacy Notice - Hospital/Clinic Received () 16     Consent for Services/Privacy Notice - Hospital/Clinic-[facility] NOAH Authorization  16 [facility]    Consent for Services/Privacy Notice - Hospital/Clinic Received () 17     Care Everywhere Prospective Auth Received 10/19/17     Insurance Card Received 17 [facility]    Patient ID Received (Deleted) 12     CMS IM for Patient Signature  (Deleted)      Consent for Services - Hospital/Clinic Received (Deleted) 14        Documents for the Encounter    CMS IM for Patient Signature Received 18       Admission Information     Attending Provider Admitting Provider Admission Type Admission Date/Time    David Olson MD Andrisani, Demetrios T, MD Emergency 18  1912    Discharge Date Hospital Service Auth/Cert Status Service Area     Internal Medicine [facility]    Unit Room/Bed Admission Status       UU U6D OBSERVATION 6511/6511-01 Admission (Confirmed)        Admission     Complaint    Knee pain, Weakness      Hospital Account     Name Acct ID Class Status Primary Coverage    JuvenalKayce colonitzel WOMACK [id number] Inpatient Open MEDICARE - MEDICARE            Guarantor Account (for Hospital Account #[id number])     Name Relation to Pt Service Area Active? Acct Type    Lucie Stockton  FCS Yes Personal/Family    Address Phone          [address] [place] [address] [phone](H)              Coverage Information (for Hospital Account #[id number])     F/O Payor/Plan Precert #    MEDICARE/MEDICARE     Subscriber Subscriber #    Lucie Stockton VU [id number]    Address Phone    ATTN CLAIMS  [address]  [facility] [place] [address] [phone]

## 2018-01-26 NOTE — IP AVS SNAPSHOT
MRN:[id number]                      After Visit Summary   1/26/2018    Lucie Stockton    MRN: [id number]           Thank you!     Thank you for choosing [facility] for your care. Our goal is always to provide you with excellent care. Hearing back from our patients is one way we can continue to improve our services. Please take a few minutes to complete the written survey that you may receive in the mail after you visit with us. Thank you!        Patient Information     Date Of Birth          1935        Designated Caregiver       Most Recent Value    Caregiver    Will someone help with your care after discharge? yes    Name of designated caregiver Darren    Phone number of caregiver 76    Caregiver address [phone]      About your hospital stay     You were admitted on:  January 26, 2018 You last received care in the:  [facility]    You were discharged on:  January 30, 2018        Reason for your hospital stay       You were admitted for pain control after a fall. In the hospital, you also were found to have influenza and were treated with medicine to help you.                  Who to Call     For medical emergencies, please call 911.  For non-urgent questions about your medical care, please call your primary care provider or clinic, [phone]          Attending Provider     Provider Specialty    Amado Sharma MD Emergency Medicine    Bennie, Adrian Azar MD Emergency Medicine    Marcio Mcneil MD Internal Medicine    David Olson MD Internal Medicine       Primary Care Provider Office Phone # Fax #    Marii Montgomery [phone]      After Care Instructions     Activity - Up with nursing assistance           Advance Diet as Tolerated       Follow this diet upon discharge: Regular            Fall precautions           General info for SNF       Length of Stay Estimate: Short Term Care: Estimated # of Days  "<30  Condition at Discharge: Improving  Level of care:skilled   Rehabilitation Potential: Good  Admission H&P remains valid and up-to-date: Yes  Recent Chemotherapy: N/A  Use Nursing Home Standing Orders: N/A            Mantoux instructions       Give two-step Mantoux (PPD) Per Facility Policy Yes            Oxygen - Nasal cannula       2 Lpm by nasal cannula to keep O2 sats 92% or greater.                  Follow-up Appointments     Follow Up and recommended labs and tests       Follow up with TCU physician  Will need CT scan lungs in 1 year for nodule f/u                  Additional Services     Occupational Therapy Adult Consult       Evaluate and treat as clinically indicated.    Reason:  Deconditioning            Physical Therapy Adult Consult       Evaluate and treat as clinically indicated.    Reason:  Deconditioning                  Future tests that were ordered for you     Droplet Isolation                 Pending Results     Date and Time Order Name Status Description    1/26/2018 2321 EKG 12-lead, tracing only Preliminary             Statement of Approval     Ordered          01/30/18 1117  I have reviewed and agree with all the recommendations and orders detailed in this document.  EFFECTIVE NOW     Approved and electronically signed by:  Del Hamilton MD             Admission Information     Date & Time Provider Department Dept. Phone    1/26/2018 David Olson MD Unit [facility] Observation [facility] [phone]      Your Vitals Were     Blood Pressure Pulse Temperature Respirations Weight Pulse Oximetry    127/63 85 98.2  F (36.8  C) (Oral) 16 44.4 kg (97 lb 12.8 oz) 94%    BMI (Body Mass Index)                   19.1 kg/m2           MyChart Information     [employer] lets you send messages to your doctor, view your test results, renew your prescriptions, schedule appointments and more. To sign up, go to [email or web address] . Click on \"Log in\" on the left side of the screen, which will " "take you to the Welcome page. Then click on \"Sign up Now\" on the right side of the page.     You will be asked to enter the access code listed below, as well as some personal information. Please follow the directions to create your username and password.     Your access code is: [email or web address]  Expires: 3/4/2018  6:30 AM     Your access code will  in 90 days. If you need help or a new code, please call your [place] clinic or [phone].        Care EveryWhere ID     This is your Care EveryWhere ID. This could be used by other organizations to access your [place] medical records  [id number]        Equal Access to Services     RHYS SALGADO : Ascencion Gamboa, anu keller, matt kirby, jackeline izquierdo. So [facility] [phone].    ATENCIÓN: Si habla español, tiene a schwartz disposición servicios gratuitos de asistencia lingüística. Llame al [phone].    We comply with applicable federal civil rights laws and [place] laws. We do not discriminate on the basis of race, color, national origin, age, disability, sex, sexual orientation, or gender identity.               Review of your medicines      START taking        Dose / Directions    acetaminophen 325 MG tablet   Commonly known as:  TYLENOL   Used for:  Chronic low back pain, unspecified back pain laterality, with sciatica presence unspecified, Contusion of right hip, initial encounter, Contusion of right knee, initial encounter        Dose:  650 mg   Take 2 tablets (650 mg) by mouth every 4 hours as needed for mild pain   Quantity:  100 tablet   Refills:  0       oseltamivir 75 MG capsule   Commonly known as:  TAMIFLU   Indication:  Flu   Used for:  Influenza A        Dose:  75 mg   Take 1 capsule (75 mg) by mouth 2 times daily   Quantity:  10 capsule   Refills:  0         CONTINUE these medicines which may have CHANGED, or have new prescriptions. If we are uncertain of the size of tablets/capsules you " have at home, strength may be listed as something that might have changed.        Dose / Directions    ferrous sulfate 325 (65 FE) MG tablet   Commonly known as:  IRON   This may have changed:  Another medication with the same name was removed. Continue taking this medication, and follow the directions you see here.   Used for:  Other iron deficiency anemias        Dose:  325 mg   Take 1 tablet (325 mg) by mouth daily (with breakfast)   Quantity:  90 tablet   Refills:  3       * oxyCODONE IR 5 MG tablet   Commonly known as:  ROXICODONE   This may have changed:  Another medication with the same name was added. Make sure you understand how and when to take each.   Used for:  Chronic low back pain, unspecified back pain laterality, with sciatica presence unspecified, Pain of left lower leg        Dose:  5 mg   Take 1 tablet (5 mg) by mouth every 6 hours as needed for moderate to severe pain   Quantity:  10 tablet   Refills:  0       * oxyCODONE IR 5 MG tablet   Commonly known as:  ROXICODONE   This may have changed:  You were already taking a medication with the same name, and this prescription was added. Make sure you understand how and when to take each.   Used for:  Chronic low back pain, unspecified back pain laterality, with sciatica presence unspecified, Contusion of right knee, initial encounter, Contusion of right hip, initial encounter        Dose:  5 mg   Take 1 tablet (5 mg) by mouth every 6 hours as needed for moderate to severe pain   Quantity:  10 tablet   Refills:  0       traMADol 50 MG tablet   Commonly known as:  ULTRAM   This may have changed:  reasons to take this   Used for:  Contusion of right knee, initial encounter, Chronic low back pain, unspecified back pain laterality, with sciatica presence unspecified        Dose:  50 mg   Take 1 tablet (50 mg) by mouth every 6 hours as needed for moderate pain (Take 1 tablet (50 mg) by mouth every 6 hours as needed for moderate pain or severe pain)    Quantity:  10 tablet   Refills:  0       * Notice:  This list has 2 medication(s) that are the same as other medications prescribed for you. Read the directions carefully, and ask your doctor or other care provider to review them with you.      CONTINUE these medicines which have NOT CHANGED        Dose / Directions    albuterol 108 (90 BASE) MCG/ACT Inhaler   Commonly known as:  VENTOLIN HFA   Used for:  Cough        Dose:  2 puff   Inhale 2 puffs into the lungs every 4 hours as needed for shortness of breath / dyspnea or wheezing   Quantity:  18 Inhaler   Refills:  1       amLODIPine 10 MG tablet   Commonly known as:  NORVASC   Used for:  Essential hypertension        Dose:  10 mg   Take 1 tablet (10 mg) by mouth daily For blood pressure   Quantity:  90 tablet   Refills:  2       atorvastatin 40 MG tablet   Commonly known as:  LIPITOR   Used for:  Hyperlipidemia, unspecified hyperlipidemia type        Dose:  80 mg   Take 2 tablets (80 mg) by mouth daily   Quantity:  90 tablet   Refills:  3       bisacodyl 10 MG Suppository   Commonly known as:  DULCOLAX   Used for:  Constipation, unspecified constipation type        Dose:  10 mg   Place 1 suppository (10 mg) rectally daily as needed for constipation   Quantity:  90 suppository   Refills:  3       CALCIUM 600 + D PO        Dose:  1 tablet   Take 1 tablet by mouth daily.   Refills:  0       citalopram 40 MG tablet   Commonly known as:  celeXA   Used for:  Depression, unspecified depression type        Dose:  20 mg   Take 0.5 tablets (20 mg) by mouth daily   Quantity:  45 tablet   Refills:  1       clopidogrel 75 MG tablet   Commonly known as:  PLAVIX   Used for:  Other chronic pulmonary embolism, Venous thrombosis of extremity        Dose:  75 mg   Take 1 tablet (75 mg) by mouth daily   Quantity:  90 tablet   Refills:  3       hydrochlorothiazide 50 MG tablet   Commonly known as:  HYDRODIURIL   Used for:  Benign essential hypertension        Dose:  50 mg   Take 1  tablet (50 mg) by mouth daily   Quantity:  90 tablet   Refills:  3       ketorolac 0.5 % ophthalmic solution   Commonly known as:  ACULAR        Dose:  1 drop   Place 1 drop Into the left eye 4 times daily   Refills:  0       levothyroxine 50 MCG tablet   Commonly known as:  SYNTHROID/LEVOTHROID   Used for:  Hypothyroidism, unspecified type        Dose:  50 mcg   Take 1 tablet (50 mcg) by mouth daily   Quantity:  90 tablet   Refills:  3       lisinopril 40 MG tablet   Commonly known as:  PRINIVIL/ZESTRIL   Used for:  Essential hypertension        Dose:  40 mg   Take 1 tablet (40 mg) by mouth daily For blood pressure   Quantity:  90 tablet   Refills:  3       moxifloxacin 0.5 % ophthalmic solution   Commonly known as:  VIGAMOX        Dose:  1 drop   Place 1 drop Into the left eye 4 times daily   Refills:  0       naproxen sodium 220 MG capsule   Used for:  Chronic low back pain, unspecified back pain laterality, with sciatica presence unspecified, Pain of left lower leg        Dose:  220 mg   Take 220 mg by mouth 2 times daily (with meals)   Quantity:  60 capsule   Refills:  2       pantoprazole 40 MG EC tablet   Commonly known as:  PROTONIX   Used for:  Other iron deficiency anemias        Dose:  40 mg   Take 1 tablet (40 mg) by mouth daily   Quantity:  90 tablet   Refills:  3       polyethylene glycol powder   Commonly known as:  MIRALAX   Used for:  Constipation, unspecified constipation type        Take one-half to one scoop once a day for maintaining soft stools   Quantity:  119 g   Refills:  5       potassium chloride SA 20 MEQ CR tablet   Commonly known as:  K-DUR/KLOR-CON M   Used for:  Hypopotassemia        Dose:  20 mEq   Take 1 tablet (20 mEq) by mouth daily   Quantity:  90 tablet   Refills:  1       prednisoLONE acetate 1 % ophthalmic susp   Commonly known as:  PRED FORTE        Dose:  1 drop   Place 1 drop Into the left eye 4 times daily   Refills:  0       trolamine salicylate 10 % cream   Commonly  known as:  ASPERCREME   Used for:  Arthralgia of left lower leg        Dose:  1 applicator   Apply 1 g topically 3 times daily   Quantity:  170 g   Refills:  11            Where to get your medicines      Some of these will need a paper prescription and others can be bought over the counter. Ask your nurse if you have questions.     Bring a paper prescription for each of these medications     oxyCODONE IR 5 MG tablet    oxyCODONE IR 5 MG tablet    traMADol 50 MG tablet       You don't need a prescription for these medications     acetaminophen 325 MG tablet    oseltamivir 75 MG capsule                Protect others around you: Learn how to safely use, store and throw away your medicines at www.disposemymeds.org.        ANTIBIOTIC INSTRUCTION     You've Been Prescribed an Antibiotic - Now What?  Your healthcare team thinks that you or your loved one might have an infection. Some infections can be treated with antibiotics, which are powerful, life-saving drugs. Like all medications, antibiotics have side effects and should only be used when necessary. There are some important things you should know about your antibiotic treatment.      Your healthcare team may run tests before you start taking an antibiotic.    Your team may take samples (e.g., from your blood, urine or other areas) to run tests to look for bacteria. These test can be important to determine if you need an antibiotic at all and, if you do, which antibiotic will work best.      Within a few days, your healthcare team might change or even stop your antibiotic.    Your team may start you on an antibiotic while they are working to find out what is making you sick.    Your team might change your antibiotic because test results show that a different antibiotic would be better to treat your infection.    In some cases, once your team has more information, they learn that you do not need an antibiotic at all. They may find out that you don't have an infection,  or that the antibiotic you're taking won't work against your infection. For example, an infection caused by a virus can't be treated with antibiotics. Staying on an antibiotic when you don't need it is more likely to be harmful than helpful.      You may experience side effects from your antibiotic.    Like all medications, antibiotics have side effects. Some of these can be serious.    Let you healthcare team know if you have any known allergies when you are admitted to the hospital.    One significant side effect of nearly all antibiotics is the risk of severe and sometimes deadly diarrhea caused by Clostridium difficile (C. Difficile). This occurs when a person takes antibiotics because some good germs are destroyed. Antibiotic use allows C. diificile to take over, putting patients at high risk for this serious infection.    As a patient or caregiver, it is important to understand your or your loved one's antibiotic treatment. It is especially important for caregivers to speak up when patients can't speak for themselves. Here are some important questions to ask your healthcare team.    What infection is this antibiotic treating and how do you know I have that infection?    What side effects might occur from this antibiotic?    How long will I need to take this antibiotic?    Is it safe to take this antibiotic with other medications or supplements (e.g., vitamins) that I am taking?     Are there any special directions I need to know about taking this antibiotic? For example, should I take it with food?    How will I be monitored to know whether my infection is responding to the antibiotic?    What tests may help to make sure the right antibiotic is prescribed for me?      Information provided by:  www.cdc.gov/getsmart  U.S. Department of Health and Human Services  Centers for disease Control and Prevention  National Center for Emerging and Zoonotic Infectious Diseases  Division of Healthcare Quality Promotion         Information about OPIOIDS     PRESCRIPTION OPIOIDS: WHAT YOU NEED TO KNOW    Prescription opioids can be used to help relieve moderate to severe pain and are often prescribed following a surgery or injury, or for certain health conditions. These medications can be an important part of treatment but also come with serious risks. It is important to work with your health care provider to make sure you are getting the safest, most effective care.    WHAT ARE THE RISKS AND SIDE EFFECTS OF OPIOID USE?  Prescription opioids carry serious risks of addiction and overdose, especially with prolonged use. An opioid overdose, often marked by slowed breathing can cause sudden death. The use of prescription opioids can have a number of side effects as well, even when taken as directed:      Tolerance - meaning you might need to take more of a medication for the same pain relief    Physical dependence - meaning you have symptoms of withdrawal when a medication is stopped    Increased sensitivity to pain    Constipation    Nausea, vomiting, and dry mouth    Sleepiness and dizziness    Confusion    Depression    Low levels of testosterone that can result in lower sex drive, energy, and strength    Itching and sweating    RISKS ARE GREATER WITH:    History of drug misuse, substance use disorder, or overdose    Mental health conditions (such as depression or anxiety)    Sleep apnea    Older age (65 years or older)    Pregnancy    Avoid alcohol while taking prescription opioids.   Also, unless specifically advised by your health care provider, medications to avoid include:    Benzodiazepines (such as Xanax or Valium)    Muscle relaxants (such as Soma or Flexeril)    Hypnotics (such as Ambien or Lunesta)    Other prescription opioids    KNOW YOUR OPTIONS:  Talk to your health care provider about ways to manage your pain that do not involve prescription opioids. Some of these options may actually work better and have fewer risks and  side effects:    Pain relievers such as acetaminophen, ibuprofen, and naproxen    Some medications that are also used for depression or seizures    Physical therapy and exercise    Cognitive behavioral therapy, a psychological, goal-directed approach, in which patients learn how to modify physical, behavioral, and emotional triggers of pain and stress    IF YOU ARE PRESCRIBED OPIOIDS FOR PAIN:    Never take opioids in greater amounts or more often than prescribed    Follow up with your primary health care provider and work together to create a plan on how to manage your pain.    Talk about ways to help manage your pain that do not involve prescription opioids    Talk about all concerns and side effects    Help prevent misuse and abuse    Never sell or share prescription opioids    Never use another person's prescription opioids    Store prescription opioids in a secure place and out of reach of others (this may include visitors, children, friends, and family)    Visit www.cdc.gov/drugoverdose to learn about risks of opioid abuse and overdose    If you believe you may be struggling with addiction, tell your health care provider and ask for guidance or call [facility]'s National Helpline at 5-092-136-HELP    LEARN MORE / www.cdc.gov/drugoverdose/prescribing/guideline.html    Safely dispose of unused prescription opioids: Find your local drug take-back programs and more information about the importance of safe disposal at www.doseofreality.mn.gov             Medication List: This is a list of all your medications and when to take them. Check marks below indicate your daily home schedule. Keep this list as a reference.      Medications           Morning Afternoon Evening Bedtime As Needed    acetaminophen 325 MG tablet   Commonly known as:  TYLENOL   Take 2 tablets (650 mg) by mouth every 4 hours as needed for mild pain                                albuterol 108 (90 BASE) MCG/ACT Inhaler   Commonly known as:  VENTOLIN HFA    Inhale 2 puffs into the lungs every 4 hours as needed for shortness of breath / dyspnea or wheezing                                amLODIPine 10 MG tablet   Commonly known as:  NORVASC   Take 1 tablet (10 mg) by mouth daily For blood pressure   Last time this was given:  10 mg on 1/30/2018  8:59 AM                                atorvastatin 40 MG tablet   Commonly known as:  LIPITOR   Take 2 tablets (80 mg) by mouth daily   Last time this was given:  80 mg on 1/29/2018 10:21 PM                                bisacodyl 10 MG Suppository   Commonly known as:  DULCOLAX   Place 1 suppository (10 mg) rectally daily as needed for constipation                                CALCIUM 600 + D PO   Take 1 tablet by mouth daily.                                citalopram 40 MG tablet   Commonly known as:  celeXA   Take 0.5 tablets (20 mg) by mouth daily   Last time this was given:  20 mg on 1/30/2018  8:59 AM                                clopidogrel 75 MG tablet   Commonly known as:  PLAVIX   Take 1 tablet (75 mg) by mouth daily   Last time this was given:  75 mg on 1/30/2018  9:09 AM                                ferrous sulfate 325 (65 FE) MG tablet   Commonly known as:  IRON   Take 1 tablet (325 mg) by mouth daily (with breakfast)   Last time this was given:  325 mg on 1/30/2018  8:59 AM                                hydrochlorothiazide 50 MG tablet   Commonly known as:  HYDRODIURIL   Take 1 tablet (50 mg) by mouth daily   Last time this was given:  50 mg on 1/30/2018  8:59 AM                                ketorolac 0.5 % ophthalmic solution   Commonly known as:  ACULAR   Place 1 drop Into the left eye 4 times daily                                levothyroxine 50 MCG tablet   Commonly known as:  SYNTHROID/LEVOTHROID   Take 1 tablet (50 mcg) by mouth daily   Last time this was given:  50 mcg on 1/30/2018  8:59 AM                                lisinopril 40 MG tablet   Commonly known as:  PRINIVIL/ZESTRIL   Take 1  tablet (40 mg) by mouth daily For blood pressure   Last time this was given:  40 mg on 1/30/2018  8:59 AM                                moxifloxacin 0.5 % ophthalmic solution   Commonly known as:  VIGAMOX   Place 1 drop Into the left eye 4 times daily                                naproxen sodium 220 MG capsule   Take 220 mg by mouth 2 times daily (with meals)                                oseltamivir 75 MG capsule   Commonly known as:  TAMIFLU   Take 1 capsule (75 mg) by mouth 2 times daily   Last time this was given:  75 mg on 1/30/2018  9:00 AM                                * oxyCODONE IR 5 MG tablet   Commonly known as:  ROXICODONE   Take 1 tablet (5 mg) by mouth every 6 hours as needed for moderate to severe pain   Last time this was given:  5 mg on 1/27/2018 12:45 PM                                * oxyCODONE IR 5 MG tablet   Commonly known as:  ROXICODONE   Take 1 tablet (5 mg) by mouth every 6 hours as needed for moderate to severe pain   Last time this was given:  5 mg on 1/27/2018 12:45 PM                                pantoprazole 40 MG EC tablet   Commonly known as:  PROTONIX   Take 1 tablet (40 mg) by mouth daily   Last time this was given:  40 mg on 1/30/2018  8:59 AM                                polyethylene glycol powder   Commonly known as:  MIRALAX   Take one-half to one scoop once a day for maintaining soft stools                                potassium chloride SA 20 MEQ CR tablet   Commonly known as:  K-DUR/KLOR-CON M   Take 1 tablet (20 mEq) by mouth daily   Last time this was given:  20 mEq on 1/30/2018  8:59 AM                                prednisoLONE acetate 1 % ophthalmic susp   Commonly known as:  PRED FORTE   Place 1 drop Into the left eye 4 times daily                                traMADol 50 MG tablet   Commonly known as:  ULTRAM   Take 1 tablet (50 mg) by mouth every 6 hours as needed for moderate pain (Take 1 tablet (50 mg) by mouth every 6 hours as needed for moderate  pain or severe pain)                                trolamine salicylate 10 % cream   Commonly known as:  ASPERCREME   Apply 1 g topically 3 times daily                                * Notice:  This list has 2 medication(s) that are the same as other medications prescribed for you. Read the directions carefully, and ask your doctor or other care provider to review them with you.

## 2018-01-26 NOTE — PATIENT INSTRUCTIONS
[facility]: [phone]     [facility] Medication Refill Request Information:  * Please contact your pharmacy regarding ANY request for medication refills.  ** [facility] Prescription Fax = [phone]  * Please allow 3 business days for routine medication refills.  * Please allow 5 business days for controlled substance medication refills.     [facility] Test Result notification information:  *You will be notified with in 7-10 days of your appointment day regarding the results of your test.  If you are on MyChart you will be notified as soon as the provider has reviewed the results and signed off on them.    Metamucil

## 2018-01-26 NOTE — PROGRESS NOTES
CC:  Right chest wall pain, cough, dysphagia    HPI  While in [place] one week ago, Lucie tripped on a bedspread that had fallen off bed, fell onto right side, hit head, got scrape on forehead/temple area  Went to the ER and subsequently was hospitalized for low oxygen, low potassium, IVF, pain management, did do some PT  Since home she has been steadily improving, still has some pain at right waist area,   (as an aside, Lucie reports her Epidural injection in back helped a lot), got earlier this this month, she was was able to ambulatory before her fall)  Also c/o URI started around 4 days ago, NP for the most part, some clear-whit phlegm, better in the daytime, worse at night  No fevers, chills, sweats, headache, dizziness, vertigo, stiff neck, sinus pain/drainage, ear pain, sore throat, SOB, wheezing, chest pain, nausea, vomiting, abd pain, diarrhea, rashes.  Also reports that she had an episode of dysphagia (?last week) when she tried to swallow, choked, (toast, eggs, st).  Stopped eating and waited for food to move down her esophagus.  She could feel the discomfort as she followed the food moving down.  No reflux, regurgitation, trouble swallowing liquids, unexplained weight loss, abd pain, change in bowels, black stools, bloody stools, oral thrush or ulcers.  Nnow cutting foods smaller portions, chewing more and has not had a recurrence.  She has had a few other dysphagia episodes over the last few months.  Reviewed her last EGD (done for iron def anemia) 2010, no esophageal abnormalities.  We discussed potential etiologies including dysmotility and obstruction.  Agrees to conservative approach with swallowing study/esophagram, then EGD if necessary.    ROS:  6 point ROS otherwise negative (see HPI)  Patient Active Problem List   Diagnosis     Benign ovarian tumor     Hypothyroidism     Peripheral vascular disease (H)     Knee pain     Osteoarthritis     Cervicalgia     Hyperlipidemia with target LDL less  than 70     Pulmonary embolism (H)     Anemia     Aortic stenosis     Atherosclerosis of aorta (H)     Atherosclerosis of arteries of extremities (H)     Intermittent claudication (H)     Iron deficiency     Osteoporosis     DVT of lower extremity, bilateral (H)     Varicose veins     Gluteal pain     Insomnia     Back pain     Vitamin D deficiency     Tobacco use disorder     Personal history of other drug therapy     Right knee pain     Venous thrombosis of extremity     Benign essential hypertension     Gastritis     Cataract     Acute left-sided low back pain with left-sided sciatica     Lumbar stenosis with neurogenic claudication     SBO (small bowel obstruction)     Small bowel obstruction     Long term current use of anticoagulant therapy     Left-sided low back pain with left-sided sciatica     Moderate major depression (H)     Anxiety     Neoplasm of uncertain behavior of skin     Cherry angioma     BCC (basal cell carcinoma), trunk     Past Surgical History:   Procedure Laterality Date     BUNIONECTOMY       C STOMACH SURGERY PROCEDURE UNLISTED      Please see chart     COLONOSCOPY      11/10/10 angioectasia     HYSTERECTOMY TOTAL ABDOMINAL, BILATERAL SALPINGO-OOPHORECTOMY, NODE DISSECTION, COMBINED  2/17/11    SANGEETHA, EMILIA, LN bx, omentectomy, resection of evrian malignancy Dr. JONO Goncalves - Returned BENIGN     INJECT EPIDURAL LUMBAR / SACRAL SINGLE N/A 1/5/2018    Procedure: INJECT EPIDURAL LUMBAR / SACRAL SINGLE;  lumbar interlaminar epidural steroid injection;  Surgeon: Jaylin Valadez MD;  Location: [facility] OR     UPPER GI ENDOSCOPY      11/10/10 erythematous gastropathy, angioectasia     .  Family History   Problem Relation Age of Onset     Breast Cancer Maternal Aunt 80     C.A.D. Father 54     Current Outpatient Prescriptions   Medication Sig Dispense Refill     citalopram (CELEXA) 40 MG tablet Take 0.5 tablets (20 mg) by mouth daily 45 tablet 1     potassium chloride SA (K-DUR/KLOR-CON M) 20 MEQ CR  tablet Take 1 tablet (20 mEq) by mouth daily 90 tablet 1     traMADol (ULTRAM) 50 MG tablet Take 1 tablet (50 mg) by mouth every 6 hours as needed for moderate pain (Take 1 tablet (50 mg) by mouth every 6 hours as needed for moderate pain or severe pain Maximum 270 tablets in 3 months) 270 tablet 1     amLODIPine (NORVASC) 10 MG tablet Take 1 tablet (10 mg) by mouth daily For blood pressure 90 tablet 2     trolamine salicylate (ASPERCREME) 10 % cream Apply 1 g topically 3 times daily 170 g 11     naproxen sodium 220 MG capsule Take 220 mg by mouth 2 times daily (with meals) 60 capsule 2     oxyCODONE (ROXICODONE) 5 MG IR tablet Take 1 tablet (5 mg) by mouth every 6 hours as needed for moderate to severe pain 10 tablet 0     atorvastatin (LIPITOR) 40 MG tablet Take 2 tablets (80 mg) by mouth daily 90 tablet 3     pantoprazole (PROTONIX) 40 MG EC tablet Take 1 tablet (40 mg) by mouth daily 90 tablet 3     moxifloxacin (VIGAMOX) 0.5 % ophthalmic solution Place 1 drop Into the left eye 4 times daily       prednisoLONE acetate (PRED FORTE) 1 % ophthalmic susp Place 1 drop Into the left eye 4 times daily       ketorolac (ACULAR) 0.5 % ophthalmic solution Place 1 drop Into the left eye 4 times daily       bisacodyl (DULCOLAX) 10 MG Suppository Place 1 suppository (10 mg) rectally daily as needed for constipation 90 suppository 3     levothyroxine (SYNTHROID/LEVOTHROID) 50 MCG tablet Take 1 tablet (50 mcg) by mouth daily 90 tablet 3     clopidogrel (PLAVIX) 75 MG tablet Take 1 tablet (75 mg) by mouth daily 90 tablet 3     hydrochlorothiazide (HYDRODIURIL) 50 MG tablet Take 1 tablet (50 mg) by mouth daily 90 tablet 3     lisinopril (PRINIVIL/ZESTRIL) 40 MG tablet Take 1 tablet (40 mg) by mouth daily For blood pressure 90 tablet 3     polyethylene glycol (MIRALAX) powder Take one-half to one scoop once a day for maintaining soft stools 119 g 5     ferrous sulfate (IRON) 325 (65 FE) MG tablet Take 1 tablet (325 mg) by mouth 2  times daily To maintain iron levels 90 tablet 1     albuterol (VENTOLIN HFA) 108 (90 BASE) MCG/ACT inhaler Inhale 2 puffs into the lungs every 4 hours as needed for shortness of breath / dyspnea or wheezing 18 Inhaler 1     ferrous sulfate (IRON) 325 (65 FE) MG tablet Take 1 tablet (325 mg) by mouth daily (with breakfast) 90 tablet 3     Calcium Carbonate-Vitamin D (CALCIUM 600 + D OR) Take 1 tablet by mouth daily.       [DISCONTINUED] tolterodine (DETROL) 1 MG tablet Take 1-2 tablets by mouth At Bedtime. 180 tablet 3     No Known Allergies  /67 (BP Location: Right arm, Patient Position: Sitting, Cuff Size: Adult Regular)  Pulse 77  Temp 97.9  F (36.6  C) (Oral)  Resp 20  Wt 45.4 kg (100 lb)  SpO2 94%  BMI 19.53 kg/m2  BP Readings from Last 6 Encounters:   01/26/18 155/67   01/16/18 180/71   01/09/18 137/65   01/05/18 163/72   11/28/17 146/68   10/20/17 144/62     Wt Readings from Last 5 Encounters:   01/26/18 45.4 kg (100 lb)   01/05/18 45.4 kg (100 lb)   11/28/17 45.4 kg (100 lb)   10/20/17 47.6 kg (105 lb)   09/14/17 46.3 kg (102 lb)     Physical Examination:  General:  Pleasant, alert, no acute distress  Eyes:  Pupils 2-3 mm, sclera white, EOM's full.    Ears:  TM's normal, EAC's patent, clear of cerumen  Nose:  Nasal passages clear, turbinates not swollen  Throat/Mouth:  No pharyngeal erythema or exudate, oral mucosa and tongue moist, no suspicious oral lesions  Neck:  Full AROM, supple, thyroid smooth, symmetric, not enlarged, no nodules  Lungs:  Clear to auscultation throughout, no wheezes, rhonchi or rales. Minor tenderness present right latera mid-thorax   C/V:  Regular rate and rhythm, no murmurs, rubs or gallops.  No peripheral edema.   Abdomen:  Not distended.  Bowel sounds active.  No tenderness, no hepatosplenomegaly or masses.  No CVA tenderness or masses.  Lymph:  No cervical lymph nodes.  Neuro:  Alert and oriented, face symmetric. No tremor.  Takes tentative steps, required assistance  of 1 person to get on/off table.  Skin:   No rashes or jaundice.  Normal moisture, good skin turgor. Has well healing 2 cm horizontal laceration present on right face/temple area.  Mild yellow/resolving bruising preauricular area.  No other bruising including over the chest wall.  Psych:  Broad range affect.  Not psychomotor slowed.  No signs of anxiety or agitation.    Lucie was seen today for hospital f/u.    Diagnoses and all orders for this visit:    Right-sided chest wall pain post fall, resolving  Advised to walk, practice deep breathing, topical e.g. IcyHot, lidocaine, acetaminophen prn    Upper respiratory tract infection, unspecified type  Mucinex and supportive cares    Hypokalemia while hospitalized last week  -     Basic metabolic panel; Future    Esophageal dysphagia  -     XR Video Swallow w Esophagram; Future    Uncontrolled hypertension despite 3 drug regimen, hx PVD--will refer to cardiology    F/U 3-4 months and as needed.    Marii Montgomery M.D.  Internal Medicine  Primary Care Center   pager [phone]

## 2018-01-26 NOTE — MR AVS SNAPSHOT
After Visit Summary   1/26/2018    Lucie Stockton    MRN: [id number]           Patient Information     Date Of Birth          1935        Visit Information        Provider Department      1/26/2018 8:40 AM Marii Montgomery MD [facility]        Today's Diagnoses     Right-sided chest wall pain    -  1    Upper respiratory tract infection, unspecified type        Hypokalemia        Esophageal dysphagia          Care Instructions    [facility] Center: [phone]     [facility] Medication Refill Request Information:  * Please contact your pharmacy regarding ANY request for medication refills.  ** [facility] Prescription Fax = [phone]  * Please allow 3 business days for routine medication refills.  * Please allow 5 business days for controlled substance medication refills.     [facility] Test Result notification information:  *You will be notified with in 7-10 days of your appointment day regarding the results of your test.  If you are on MyChart you will be notified as soon as the provider has reviewed the results and signed off on them.    Metamucil          Follow-ups after your visit        Follow-up notes from your care team     Return in about 4 months (around 5/26/2018) for Routine Visit.      Your next 10 appointments already scheduled     Jan 26, 2018 10:45 AM CST   LAB with  LAB   [facility] ([facility])    [address] [place] [id number] [phone]           Please do not eat 10-12 hours before your appointment if you are coming in fasting for labs on lipids, cholesterol, or glucose (sugar). This does not apply to pregnant women. Water, hot tea and black coffee (with nothing added) are okay. Do not drink other fluids, diet soda or chew gum.              Future tests that were ordered for you today     Open Future Orders        Priority Expected Expires Ordered    XR Video Swallow w Esophagram  Routine 2018    Basic metabolic panel Routine 2018            Who to contact     Please call your clinic at [phone] to:    Ask questions about your health    Make or cancel appointments    Discuss your medicines    Learn about your test results    Speak to your doctor   If you have compliments or concerns about an experience at your clinic, or if you wish to file a complaint, please contact [facility] Patient Relations at [phone] or email us at [email or web address]         Additional Information About Your Visit        [facility] Information     [employer] is an electronic gateway that provides easy, online access to your medical records. With [employer], you can request a clinic appointment, read your test results, renew a prescription or communicate with your care team.     To sign up for [employer] visit the website at [email or web address]   You will be asked to enter the access code listed below, as well as some personal information. Please follow the directions to create your username and password.     Your access code is: [email or web address]  Expires: 3/4/2018  6:30 AM     Your access code will  in 90 days. If you need help or a new code, please contact your [facility] or call [phone] for assistance.        Care EveryWhere ID     This is your Care EveryWhere ID. This could be used by other organizations to access your [facility] medical records  [id number]        Your Vitals Were     Pulse Temperature Respirations Pulse Oximetry BMI (Body Mass Index)       77 97.9  F (36.6  C) (Oral) 20 94% 19.53 kg/m2        Blood Pressure from Last 3 Encounters:   18 155/67   18 180/71   18 137/65    Weight from Last 3 Encounters:   18 45.4 kg (100 lb)   18 45.4 kg (100 lb)   17 45.4 kg (100 lb)               Primary Care Provider Office Phone # Fax #    Marii MEDRANO  MD [place] [phone]       [address] [place] [address] [phone]  [facility] [address]        Equal Access to Services     PAL SALGADO : Hadii aad ku hadcorielvin Gamboa, cambrielle montañojustinha, esaucarlee quiroschely kirby, jackeline alvarado laReinaaries izquierdo. So [facility] [phone].    ATENCIÓN: Si habla español, tiene a schwartz disposición servicios gratuitos de asistencia lingüística. Llame al [phone].    We comply with applicable federal civil rights laws and [place] laws. We do not discriminate on the basis of race, color, national origin, age, disability, sex, sexual orientation, or gender identity.            Thank you!     Thank you for choosing [facility]  for your care. Our goal is always to provide you with excellent care. Hearing back from our patients is one way we can continue to improve our services. Please take a few minutes to complete the written survey that you may receive in the mail after your visit with us. Thank you!             Your Updated Medication List - Protect others around you: Learn how to safely use, store and throw away your medicines at www.disposemymeds.org.          This list is accurate as of 1/26/18  9:55 AM.  Always use your most recent med list.                   Brand Name Dispense Instructions for use Diagnosis    albuterol 108 (90 BASE) MCG/ACT Inhaler    VENTOLIN HFA    18 Inhaler    Inhale 2 puffs into the lungs every 4 hours as needed for shortness of breath / dyspnea or wheezing    Cough       amLODIPine 10 MG tablet    NORVASC    90 tablet    Take 1 tablet (10 mg) by mouth daily For blood pressure    Essential hypertension       atorvastatin 40 MG tablet    LIPITOR    90 tablet    Take 2 tablets (80 mg) by mouth daily    Hyperlipidemia, unspecified hyperlipidemia type       bisacodyl 10 MG Suppository    DULCOLAX    90 suppository    Place 1 suppository (10 mg) rectally daily as needed for constipation    Constipation, unspecified constipation type        CALCIUM 600 + D PO      Take 1 tablet by mouth daily.        citalopram 40 MG tablet    celeXA    45 tablet    Take 0.5 tablets (20 mg) by mouth daily    Depression, unspecified depression type       clopidogrel 75 MG tablet    PLAVIX    90 tablet    Take 1 tablet (75 mg) by mouth daily    Other chronic pulmonary embolism, Venous thrombosis of extremity       * ferrous sulfate 325 (65 FE) MG tablet    IRON    90 tablet    Take 1 tablet (325 mg) by mouth daily (with breakfast)    Other iron deficiency anemias       * ferrous sulfate 325 (65 FE) MG tablet    IRON    90 tablet    Take 1 tablet (325 mg) by mouth 2 times daily To maintain iron levels    Iron deficiency       hydrochlorothiazide 50 MG tablet    HYDRODIURIL    90 tablet    Take 1 tablet (50 mg) by mouth daily    Benign essential hypertension       ketorolac 0.5 % ophthalmic solution    ACULAR     Place 1 drop Into the left eye 4 times daily        levothyroxine 50 MCG tablet    SYNTHROID/LEVOTHROID    90 tablet    Take 1 tablet (50 mcg) by mouth daily    Hypothyroidism, unspecified type       lisinopril 40 MG tablet    PRINIVIL/ZESTRIL    90 tablet    Take 1 tablet (40 mg) by mouth daily For blood pressure    Essential hypertension       moxifloxacin 0.5 % ophthalmic solution    VIGAMOX     Place 1 drop Into the left eye 4 times daily        naproxen sodium 220 MG capsule     60 capsule    Take 220 mg by mouth 2 times daily (with meals)    Chronic low back pain, unspecified back pain laterality, with sciatica presence unspecified, Pain of left lower leg       oxyCODONE IR 5 MG tablet    ROXICODONE    10 tablet    Take 1 tablet (5 mg) by mouth every 6 hours as needed for moderate to severe pain    Chronic low back pain, unspecified back pain laterality, with sciatica presence unspecified, Pain of left lower leg       pantoprazole 40 MG EC tablet    PROTONIX    90 tablet    Take 1 tablet (40 mg) by mouth daily    Other iron deficiency anemias        polyethylene glycol powder    MIRALAX    119 g    Take one-half to one scoop once a day for maintaining soft stools    Constipation, unspecified constipation type       potassium chloride SA 20 MEQ CR tablet    K-DUR/KLOR-CON M    90 tablet    Take 1 tablet (20 mEq) by mouth daily    Hypopotassemia       prednisoLONE acetate 1 % ophthalmic susp    PRED FORTE     Place 1 drop Into the left eye 4 times daily        traMADol 50 MG tablet    ULTRAM    270 tablet    Take 1 tablet (50 mg) by mouth every 6 hours as needed for moderate pain (Take 1 tablet (50 mg) by mouth every 6 hours as needed for moderate pain or severe pain Maximum 270 tablets in 3 months)    Pain       trolamine salicylate 10 % cream    ASPERCREME    170 g    Apply 1 g topically 3 times daily    Arthralgia of left lower leg       * Notice:  This list has 2 medication(s) that are the same as other medications prescribed for you. Read the directions carefully, and ask your doctor or other care provider to review them with you.

## 2018-01-26 NOTE — IP AVS SNAPSHOT
UNIT 6D OBSERVATION [facility]: [phone]                                              INTERAGENCY TRANSFER FORM - PHYSICIAN ORDERS   2018                    Hospital Admission Date: 2018  ISAURO GUZMAN   : 1935  Sex: Female        Attending Provider: David Olson MD     Allergies:  No Known Allergies    Infection:  None   Service:  INTERNAL MED    Ht:  1.524 m (5')   Wt:  44.4 kg (97 lb 12.8 oz)   Admission Wt:  44.4 kg (97 lb 12.8 oz)    BMI:  19.1 kg/m 2   BSA:  1.37 m 2            Patient PCP Information     Provider PCP Type    Marii Montgomery MD General      ED Clinical Impression     Diagnosis Description Comment Added By Time Added    Contusion of right knee, initial encounter [S80.01XA] Contusion of right knee, initial encounter [S80.01XA] with inability to ambulate Amado Sharma MD 2018  9:40 PM    Contusion of right hip, initial encounter [S70.01XA] Contusion of right hip, initial encounter [S70.01XA]  Amado Sharma MD 2018  9:40 PM      Hospital Problems as of 2018              Priority Class Noted POA    Knee pain Medium  Unknown Yes    Weakness Medium  2018 Yes      Non-Hospital Problems as of 2018              Priority Class Noted    Benign ovarian tumor Low  2011    Hypothyroidism Medium  3/8/2012    Peripheral vascular disease (H) Medium  2012    Osteoarthritis Medium  Unknown    Cervicalgia Medium  2012    Hyperlipidemia with target LDL less than 70 Medium  Unknown    Pulmonary embolism (H) Medium  Unknown    Anemia Medium  Unknown    Aortic stenosis Medium  Unknown    Atherosclerosis of aorta (H) Medium  Unknown    Atherosclerosis of arteries of extremities (H) Medium  Unknown    Intermittent claudication (H) Medium  Unknown    Iron deficiency Medium  Unknown    Osteoporosis Medium  Unknown    DVT of lower extremity, bilateral (H) Medium  Unknown    Varicose veins Medium  Unknown    Gluteal  pain Medium  4/22/2013    Insomnia Medium  Unknown    Back pain Medium  Unknown    Vitamin D deficiency Medium  10/9/2013    Tobacco use disorder Medium  2/6/2014    Personal history of other drug therapy Medium  8/20/2014    Right knee pain Medium  1/26/2016    Venous thrombosis of extremity Medium  3/4/2016    Benign essential hypertension Medium  3/4/2016    Gastritis Medium  3/4/2016    Cataract Medium  3/4/2016    Acute left-sided low back pain with left-sided sciatica Medium  11/30/2016    Lumbar stenosis with neurogenic claudication Medium  Unknown    SBO (small bowel obstruction) Medium  1/23/2017    Small bowel obstruction Medium  1/23/2017    Long term current use of anticoagulant therapy Medium  4/14/2017    Left-sided low back pain with left-sided sciatica Medium  7/7/2017    Moderate major depression (H) Medium  8/17/2017    Anxiety Medium  8/17/2017    Neoplasm of uncertain behavior of skin Medium  11/26/2017    Cherry angioma Medium  11/26/2017    BCC (basal cell carcinoma), trunk Medium  1/17/2018      Code Status History     Date Active Date Inactive Code Status Order ID Comments User Context    1/30/2018  8:15 AM  Full Code [id number]  Del Hamilton MD Outpatient    1/26/2018 11:30 PM 1/30/2018  8:15 AM Full Code [id number]  Chantell Sanders PA Inpatient    1/24/2017  2:30 PM 1/26/2018 11:30 PM Full Code [id number]  Hebert Hunter MD Outpatient    1/23/2017  9:01 AM 1/24/2017  2:30 PM Full Code [id number]  Antonio Gorman MD Inpatient         Medication Review      START taking        Dose / Directions Comments    acetaminophen 325 MG tablet   Commonly known as:  TYLENOL   Used for:  Chronic low back pain, unspecified back pain laterality, with sciatica presence unspecified, Contusion of right hip, initial encounter, Contusion of right knee, initial encounter        Dose:  650 mg   Take 2 tablets (650 mg) by mouth every 4 hours as needed for mild pain   Quantity:  100 tablet   Refills:  0         oseltamivir 75 MG capsule   Commonly known as:  TAMIFLU   Indication:  Flu   Used for:  Influenza A        Dose:  75 mg   Take 1 capsule (75 mg) by mouth 2 times daily   Quantity:  10 capsule   Refills:  0          CONTINUE these medications which may have CHANGED, or have new prescriptions. If we are uncertain of the size of tablets/capsules you have at home, strength may be listed as something that might have changed.        Dose / Directions Comments    ferrous sulfate 325 (65 FE) MG tablet   Commonly known as:  IRON   This may have changed:  Another medication with the same name was removed. Continue taking this medication, and follow the directions you see here.   Used for:  Other iron deficiency anemias        Dose:  325 mg   Take 1 tablet (325 mg) by mouth daily (with breakfast)   Quantity:  90 tablet   Refills:  3        * oxyCODONE IR 5 MG tablet   Commonly known as:  ROXICODONE   This may have changed:  Another medication with the same name was added. Make sure you understand how and when to take each.   Used for:  Chronic low back pain, unspecified back pain laterality, with sciatica presence unspecified, Pain of left lower leg        Dose:  5 mg   Take 1 tablet (5 mg) by mouth every 6 hours as needed for moderate to severe pain   Quantity:  10 tablet   Refills:  0        * oxyCODONE IR 5 MG tablet   Commonly known as:  ROXICODONE   This may have changed:  You were already taking a medication with the same name, and this prescription was added. Make sure you understand how and when to take each.   Used for:  Chronic low back pain, unspecified back pain laterality, with sciatica presence unspecified, Contusion of right knee, initial encounter, Contusion of right hip, initial encounter        Dose:  5 mg   Take 1 tablet (5 mg) by mouth every 6 hours as needed for moderate to severe pain   Quantity:  10 tablet   Refills:  0        traMADol 50 MG tablet   Commonly known as:  ULTRAM   This may have  changed:  reasons to take this   Used for:  Contusion of right knee, initial encounter, Chronic low back pain, unspecified back pain laterality, with sciatica presence unspecified        Dose:  50 mg   Take 1 tablet (50 mg) by mouth every 6 hours as needed for moderate pain (Take 1 tablet (50 mg) by mouth every 6 hours as needed for moderate pain or severe pain)   Quantity:  10 tablet   Refills:  0        * Notice:  This list has 2 medication(s) that are the same as other medications prescribed for you. Read the directions carefully, and ask your doctor or other care provider to review them with you.      CONTINUE these medications which have NOT CHANGED        Dose / Directions Comments    albuterol 108 (90 BASE) MCG/ACT Inhaler   Commonly known as:  VENTOLIN HFA   Used for:  Cough        Dose:  2 puff   Inhale 2 puffs into the lungs every 4 hours as needed for shortness of breath / dyspnea or wheezing   Quantity:  18 Inhaler   Refills:  1        amLODIPine 10 MG tablet   Commonly known as:  NORVASC   Used for:  Essential hypertension        Dose:  10 mg   Take 1 tablet (10 mg) by mouth daily For blood pressure   Quantity:  90 tablet   Refills:  2        atorvastatin 40 MG tablet   Commonly known as:  LIPITOR   Used for:  Hyperlipidemia, unspecified hyperlipidemia type        Dose:  80 mg   Take 2 tablets (80 mg) by mouth daily   Quantity:  90 tablet   Refills:  3        bisacodyl 10 MG Suppository   Commonly known as:  DULCOLAX   Used for:  Constipation, unspecified constipation type        Dose:  10 mg   Place 1 suppository (10 mg) rectally daily as needed for constipation   Quantity:  90 suppository   Refills:  3        CALCIUM 600 + D PO        Dose:  1 tablet   Take 1 tablet by mouth daily.   Refills:  0        citalopram 40 MG tablet   Commonly known as:  celeXA   Used for:  Depression, unspecified depression type        Dose:  20 mg   Take 0.5 tablets (20 mg) by mouth daily   Quantity:  45 tablet   Refills:   1        clopidogrel 75 MG tablet   Commonly known as:  PLAVIX   Used for:  Other chronic pulmonary embolism, Venous thrombosis of extremity        Dose:  75 mg   Take 1 tablet (75 mg) by mouth daily   Quantity:  90 tablet   Refills:  3        hydrochlorothiazide 50 MG tablet   Commonly known as:  HYDRODIURIL   Used for:  Benign essential hypertension        Dose:  50 mg   Take 1 tablet (50 mg) by mouth daily   Quantity:  90 tablet   Refills:  3        ketorolac 0.5 % ophthalmic solution   Commonly known as:  ACULAR        Dose:  1 drop   Place 1 drop Into the left eye 4 times daily   Refills:  0        levothyroxine 50 MCG tablet   Commonly known as:  SYNTHROID/LEVOTHROID   Used for:  Hypothyroidism, unspecified type        Dose:  50 mcg   Take 1 tablet (50 mcg) by mouth daily   Quantity:  90 tablet   Refills:  3        lisinopril 40 MG tablet   Commonly known as:  PRINIVIL/ZESTRIL   Used for:  Essential hypertension        Dose:  40 mg   Take 1 tablet (40 mg) by mouth daily For blood pressure   Quantity:  90 tablet   Refills:  3        moxifloxacin 0.5 % ophthalmic solution   Commonly known as:  VIGAMOX        Dose:  1 drop   Place 1 drop Into the left eye 4 times daily   Refills:  0        naproxen sodium 220 MG capsule   Used for:  Chronic low back pain, unspecified back pain laterality, with sciatica presence unspecified, Pain of left lower leg        Dose:  220 mg   Take 220 mg by mouth 2 times daily (with meals)   Quantity:  60 capsule   Refills:  2        pantoprazole 40 MG EC tablet   Commonly known as:  PROTONIX   Used for:  Other iron deficiency anemias        Dose:  40 mg   Take 1 tablet (40 mg) by mouth daily   Quantity:  90 tablet   Refills:  3        polyethylene glycol powder   Commonly known as:  MIRALAX   Used for:  Constipation, unspecified constipation type        Take one-half to one scoop once a day for maintaining soft stools   Quantity:  119 g   Refills:  5        potassium chloride SA 20 MEQ  CR tablet   Commonly known as:  K-DUR/KLOR-CON M   Used for:  Hypopotassemia        Dose:  20 mEq   Take 1 tablet (20 mEq) by mouth daily   Quantity:  90 tablet   Refills:  1        prednisoLONE acetate 1 % ophthalmic susp   Commonly known as:  PRED FORTE        Dose:  1 drop   Place 1 drop Into the left eye 4 times daily   Refills:  0        trolamine salicylate 10 % cream   Commonly known as:  ASPERCREME   Used for:  Arthralgia of left lower leg        Dose:  1 applicator   Apply 1 g topically 3 times daily   Quantity:  170 g   Refills:  11                Summary of Visit     Reason for your hospital stay       You were admitted for pain control after a fall. In the hospital, you also were found to have influenza and were treated with medicine to help you.             After Care     Activity - Up with nursing assistance           Advance Diet as Tolerated       Follow this diet upon discharge: Regular       Fall precautions           General info for SNF       Length of Stay Estimate: Short Term Care: Estimated # of Days <30  Condition at Discharge: Improving  Level of care:skilled   Rehabilitation Potential: Good  Admission H&P remains valid and up-to-date: Yes  Recent Chemotherapy: N/A  Use Nursing Home Standing Orders: N/A       Mantoux instructions       Give two-step Mantoux (PPD) Per Facility Policy Yes             Procedures     Oxygen - Nasal cannula       2 Lpm by nasal cannula to keep O2 sats 92% or greater.             Referrals     Occupational Therapy Adult Consult       Evaluate and treat as clinically indicated.    Reason:  Deconditioning       Physical Therapy Adult Consult       Evaluate and treat as clinically indicated.    Reason:  Deconditioning             Other Orders     Droplet Isolation                 Follow-Up Appointment Instructions     Future Labs/Procedures    Follow Up and recommended labs and tests     Comments:    Follow up with TCU physician  Will need CT scan lungs in 1 year for  nodule f/u      Follow-Up Appointment Instructions     Follow Up and recommended labs and tests       Follow up with TCU physician  Will need CT scan lungs in 1 year for nodule f/u             Statement of Approval     Ordered          01/30/18 1117  I have reviewed and agree with all the recommendations and orders detailed in this document.  EFFECTIVE NOW     Approved and electronically signed by:  Del Hamilton MD

## 2018-01-26 NOTE — LETTER
Health Information Management Services               Recipient:  [facility]  PH: [phone]  F: [phone]      Sender:  Cristel AURY Dennis, [employer]  [address]   Phone: [phone]  Pager: [phone]  Unit: [phone]          Date: January 29, 2018  Patient Name:  Lucie Stockton  Routing Message:  Please review for TCU placement for tomorrow please.  Thank you, Cristel          The documents accompanying this notice contain confidential information belonging to the sender.  This information is intended only for the use of the individual or entity named above.  The authorized recipient of this information is prohibited from disclosing this information to any other party and is required to destroy the information after its stated need has been fulfilled, unless otherwise required by state law.      If you are not the intended recipient, you are hereby notified that any disclosure, copying, distribution or action taken in reliance on the contents of these documents is strictly prohibited. If you have received this document in error, please notify [facility] immediately at [phone].  You may return the document via fax ([phone]) or return mail  (Health Information Management, , [address] [place] [address]).

## 2018-01-26 NOTE — IP AVS SNAPSHOT
` `           UNIT 6D OBSERVATION [facility]: [phone]                                              INTERAGENCY TRANSFER FORM - NURSING   2018                    Hospital Admission Date: 2018  ISAURO GUZMAN   : 1935  Sex: Female        Attending Provider: David Olson MD     Allergies:  No Known Allergies    Infection:  None   Service:  INTERNAL MED    Ht:  1.524 m (5')   Wt:  44.4 kg (97 lb 12.8 oz)   Admission Wt:  44.4 kg (97 lb 12.8 oz)    BMI:  19.1 kg/m 2   BSA:  1.37 m 2            Patient PCP Information     Provider PCP Type    Marii Montgomery MD General      Current Code Status     Date Active Code Status Order ID Comments User Context       Prior      Code Status History     Date Active Date Inactive Code Status Order ID Comments User Context    2018  8:15 AM  Full Code [id number]  Del Hamilton MD Outpatient    2018 11:30 PM 2018  8:15 AM Full Code [id number]  Chantell Sanders PA Inpatient    2017  2:30 PM 2018 11:30 PM Full Code [id number]  Hebert Hunter MD Outpatient    2017  9:01 AM 2017  2:30 PM Full Code [id number]  Antonio Gorman MD Inpatient      Advance Directives        Scanned docmt in ACP Activity?           No scanned doc        Hospital Problems as of 2018              Priority Class Noted POA    Knee pain Medium  Unknown Yes    Weakness Medium  2018 Yes      Non-Hospital Problems as of 2018              Priority Class Noted    Benign ovarian tumor Low  2011    Hypothyroidism Medium  3/8/2012    Peripheral vascular disease (H) Medium  2012    Osteoarthritis Medium  Unknown    Cervicalgia Medium  2012    Hyperlipidemia with target LDL less than 70 Medium  Unknown    Pulmonary embolism (H) Medium  Unknown    Anemia Medium  Unknown    Aortic stenosis Medium  Unknown    Atherosclerosis of aorta (H) Medium  Unknown    Atherosclerosis of arteries of extremities (H) Medium  Unknown     Intermittent claudication (H) Medium  Unknown    Iron deficiency Medium  Unknown    Osteoporosis Medium  Unknown    DVT of lower extremity, bilateral (H) Medium  Unknown    Varicose veins Medium  Unknown    Gluteal pain Medium  4/22/2013    Insomnia Medium  Unknown    Back pain Medium  Unknown    Vitamin D deficiency Medium  10/9/2013    Tobacco use disorder Medium  2/6/2014    Personal history of other drug therapy Medium  8/20/2014    Right knee pain Medium  1/26/2016    Venous thrombosis of extremity Medium  3/4/2016    Benign essential hypertension Medium  3/4/2016    Gastritis Medium  3/4/2016    Cataract Medium  3/4/2016    Acute left-sided low back pain with left-sided sciatica Medium  11/30/2016    Lumbar stenosis with neurogenic claudication Medium  Unknown    SBO (small bowel obstruction) Medium  1/23/2017    Small bowel obstruction Medium  1/23/2017    Long term current use of anticoagulant therapy Medium  4/14/2017    Left-sided low back pain with left-sided sciatica Medium  7/7/2017    Moderate major depression (H) Medium  8/17/2017    Anxiety Medium  8/17/2017    Neoplasm of uncertain behavior of skin Medium  11/26/2017    Cherry angioma Medium  11/26/2017    BCC (basal cell carcinoma), trunk Medium  1/17/2018      Immunizations     Name Date      HEPA 01/19/07     HEPA 10/06/03     Influenza (H1N1) 01/09/10     Influenza (High Dose) 3 valent vaccine 09/03/16     Influenza (High Dose) 3 valent vaccine 08/30/15     Influenza (IIV3) PF 09/19/14     Influenza (IIV3) PF 09/02/13     Influenza (IIV3) PF 08/20/12     Influenza (IIV3) PF 09/04/11     Influenza (IIV3) PF 09/05/10     Influenza (IIV3) PF 11/07/06     Influenza (IIV3) PF 10/11/05     Influenza (IIV3) PF 11/05/04     Influenza (IIV3) PF 10/26/02     Pneumo Conj 13-V (2010&after) 12/09/15     Pneumococcal 23 valent 02/20/11     Pneumococcal 23 valent 12/31/03     Poliovirus, inactivated (IPV) 10/06/03     TD (ADULT, 7+) 10/06/03     TDAP Vaccine  "(Boostrix) 01/06/14     Typhoid IM 01/19/07     Typhoid IM 10/06/03     Zoster vaccine, live 02/28/11          END      ASSESSMENT     Discharge Profile Flowsheet     EXPECTED DISCHARGE     Patient's communication style  spoken language (English or Bilingual) 01/26/18 1740    Expected Discharge Date  01/29/18 (TCU) 01/29/18 1043   SKIN      DISCHARGE NEEDS ASSESSMENT     Inspection of bony prominences  Full except (identify areas not inspected) 01/30/18 0950    Equipment Currently Used at Home  none 01/28/18 1152   Full except areas not inspected   Spine;Buttock, left;Buttock, right;Hip, left;Hip, right;Sacrum;Coccyx 01/30/18 0950    Transportation Available  car;family or friend will provide 01/28/18 1152   Inspection under devices  Full 01/30/18 0950    GASTROINTESTINAL (ADULT,PEDIATRIC,OB)     Skin WDL  WDL 01/30/18 0950    GI WDL  WDL 01/30/18 0950   SAFETY      Last Bowel Movement  01/28/18 01/30/18 0950   Safety WDL  WDL 01/30/18 0950    Passing flatus  yes 01/29/18 1041   All Alarms  none present 01/30/18 0950    COMMUNICATION ASSESSMENT                        Assessment WDL (Within Defined Limits) Definitions           Safety WDL     Effective: 09/28/15    Row Information: <b>WDL Definition:</b> Bed in low position, wheels locked; call light in reach; upper side rails up x 2; ID band on<br> <font color=\"gray\"><i>Item=AS safety wdl>>List=AS safety wdl>>Version=F14</i></font>      Skin WDL     Effective: 09/28/15    Row Information: <b>WDL Definition:</b> Warm; dry; intact; elastic; without discoloration; pressure points without redness<br> <font color=\"gray\"><i>Item=AS skin wdl>>List=AS skin wdl>>Version=F14</i></font>      Vitals     Vital Signs Flowsheet     VITAL SIGNS     Pain Control  partially effective 01/29/18 1024    Temp  98.2  F (36.8  C) 01/30/18 0650   Functioning  can do most things, but pain gets in the way of some 01/29/18 1024    Temp src  Oral 01/30/18 0649   Sleep  awake with occasional " pain 01/29/18 1024    Resp  16 01/30/18 0649   ANALGESIA SIDE EFFECTS MONITORING      Pulse  85 01/29/18 1603   Side Effects Monitoring: Respiratory Quality  R 01/29/18 1024    Heart Rate  74 01/30/18 0649   Side Effects Monitoring: Respiratory Depth  N 01/29/18 1024    Pulse/Heart Rate Source  Monitor 01/30/18 0649   Side Effects Monitoring: Sedation Level  1 01/29/18 1024    BP  127/63 01/30/18 0650   HEIGHT AND WEIGHT      BP Location  Right arm 01/29/18 2234   Weight  44.4 kg (97 lb 12.8 oz) 01/29/18 0457    OXYGEN THERAPY     LOLY COMA SCALE      SpO2  94 % 01/30/18 0649   Best Eye Response  4-->(E4) spontaneous 01/29/18 2023    O2 Device  Nasal cannula 01/30/18 0650   Best Motor Response  6-->(M6) obeys commands 01/29/18 2023    Oxygen Delivery  2 LPM 01/30/18 0650   Best Verbal Response  5-->(V5) oriented 01/29/18 2023    PAIN/COMFORT     [place] Coma Scale Score  15 01/29/18 2023    Patient Currently in Pain  sleeping: patient not able to self report 01/30/18 0343   POSITIONING      Preferred Pain Scale  CAPA (Clinically Aligned Pain Assessment) ([facility] Adults Only) 01/30/18 0343   Body Position  independently positioning 01/30/18 0950    0-10 Pain Scale  4 01/27/18 1246   Head of Bed (HOB)  HOB at 60 degrees 01/30/18 0950    Pain Location  Knee 01/29/18 1024   Positioning/Transfer Devices  pillows;in use 01/29/18 1044    Pain Orientation  Right 01/29/18 1024   DAILY CARE      Pain Descriptors  Aching 01/29/18 1024   Activity Management  activity adjusted per tolerance 01/30/18 0950    Pain Intervention(s)  Medication (See eMAR) 01/29/18 1024   Activity Assistance Provided  assistance, 1 person;assistance, stand-by 01/30/18 0950    CLINICALLY ALIGNED PAIN ASSESSMENT (CAPA) ([facility] ADULTS ONLY)     Assistive Device Utilized  walker 01/30/18 0950    Comfort  negligible pain 01/29/18 1024   ECG      Change in Pain  getting better 01/29/18 1024   ECG Rhythm  Sinus rhythm  01/30/18 0744            Patient Lines/Drains/Airways Status    Active LINES/DRAINS/AIRWAYS     Name: Placement date: Placement time: Site: Days: Last dressing change:    Peripheral IV 01/26/18 Left Upper arm 01/26/18   1806   Upper arm   3     Incision/Surgical Site 01/05/18 Lower;Medial Back 01/05/18   0801    25             Patient Lines/Drains/Airways Status    Active PICC/CVC     None            Intake/Output Detail Report     Date Intake     Output Net    Shift P.O. I.V. IV Piggyback Total Urine Total       Day 01/29/18 0000 - 01/29/18 0659 -- -- -- -- -- -- 0    Ananya 01/29/18 0700 - 01/29/18 1459 360 -- -- 360 -- -- 360    Noc 01/29/18 1500 - 01/29/18 2359 240 -- -- 240 -- -- 240    Day 01/30/18 0000 - 01/30/18 0659 -- -- -- -- -- -- 0    Ananya 01/30/18 0700 - 01/30/18 1459 -- -- -- -- -- -- 0      Last Void/BM       Most Recent Value    Urine Occurrence 1 at 01/30/2018 0900    Stool Occurrence 1 at 01/30/2018 0900      Case Management/Discharge Planning     Case Management/Discharge Planning Flowsheet     LIVING ENVIRONMENT     [facility] CAREGIVER      Lives With  spouse 01/28/18 1151   Filed Complexity Screen Score  7 01/29/18 0821    Living Arrangements  house 01/28/18 1151   ABUSE RISK SCREEN      COPING/STRESS     QUESTION TO PATIENT:  Has a member of your family or a partner(now or in the past) intimidated, hurt, manipulated, or controlled you in any way?  no 01/26/18 1746    Major Change/Loss/Stressor  hospitalization 01/26/18 2322   QUESTION TO PATIENT: Do you feel safe going back to the place where you are living?  yes 01/26/18 1746    EXPECTED DISCHARGE     OBSERVATION: Is there reason to believe there has been maltreatment of a vulnerable adult (ie. Physical/Sexual/Emotional abuse, self neglect, lack of adequate food, shelter, medical care, or financial exploitation)?  no 01/26/18 1746    Expected Discharge Date  01/29/18 (TCU) 01/29/18 1043   OTHER      DISCHARGE PLANNING     Are you depressed or being  treated for depression?  No ( denied) 01/26/18 2322    Transportation Available  car;family or friend will provide 01/28/18 1152   HOMICIDE RISK      FINAL RESOURCES     Feels Like Hurting Others  no 01/26/18 2322    Equipment Currently Used at Home  none 01/28/18 1152

## 2018-01-26 NOTE — IP AVS SNAPSHOT
UNIT 6D OBSERVATION [facility]: [phone]                                              INTERAGENCY TRANSFER FORM - LAB / IMAGING / EKG / EMG RESULTS   2018                    Hospital Admission Date: 2018  ISAURO GUZMAN   : 1935  Sex: Female        Attending Provider: David Olson MD     Allergies:  No Known Allergies    Infection:  None   Service:  INTERNAL MED    Ht:  1.524 m (5')   Wt:  44.4 kg (97 lb 12.8 oz)   Admission Wt:  44.4 kg (97 lb 12.8 oz)    BMI:  19.1 kg/m 2   BSA:  1.37 m 2            Patient PCP Information     Provider PCP Type    Marii Montgomery MD General         Lab Results - 3 Days      Basic metabolic panel [[id number]] (Abnormal)  Resulted: 18 0758, Result status: Final result    Ordering provider: Josue Lees MD  18 0001 Resulting lab: [facility]    Specimen Information    Type Source Collected On   Blood  18 0724          Components       Value Reference Range Flag Lab   Sodium 135 133 - 144 mmol/L  51   Potassium 3.8 3.4 - 5.3 mmol/L  51   Chloride 102 94 - 109 mmol/L  51   Carbon Dioxide 26 20 - 32 mmol/L  51   Anion Gap 7 3 - 14 mmol/L  51   Glucose 93 70 - 99 mg/dL  51   Urea Nitrogen 13 7 - 30 mg/dL  51   Creatinine 0.50 0.52 - 1.04 mg/dL L 51   GFR Estimate >90 >60 mL/min/1.7m2  51   Comment:  Non  GFR Calc   GFR Estimate If Black >90 >60 mL/min/1.7m2  51   Comment:  African American GFR Calc   Calcium 9.1 8.5 - 10.1 mg/dL  51            Respiratory Virus Panel by PCR [[id number]] (Abnormal)  Resulted: 18 1405, Result status: Final result    Ordering provider: Marcio Mcneil MD  18 0914 Resulting lab: [facility]    Specimen Information    Type Source Collected On   Nasopharyngeal  18 1627          Components       Value Reference Range Flag Lab   Respiratory Virus Source Nasopharyngeal   75    Influenza A Positive NEG^Negative A 75   Influenza A, H1 Negative NEG^Negative  75   Influenza A, H3 Positive NEG^Negative A 75   Influenza A 2009 H1N1 Negative NEG^Negative  75   Influenza B Negative NEG^Negative  75   Respiratory Syncytial Virus A Negative NEG^Negative  75   Respiratory Syncytial Virus B Negative NEG^Negative  75   Parainfluenza Virus 1 Negative NEG^Negative  75   Parainfluenza Virus 2 Negative NEG^Negative  75   Parainfluenza Virus 3 Negative NEG^Negative  75   Human Metapneumovirus Negative NEG^Negative  75   Human Rhinovirus Negative NEG^Negative  75   Adenovirus Species B/E Negative NEG^Negative  75   Adenovirus Species C Negative NEG^Negative  75   Respiratory Virus Comment --   75   Comment:         The test detects Influenza A (H1 and H3), Influenza A 2009 H1N1, Influenza B,   Respiratory Syncytial Virus A, Respiratory Syncytial Virus B, Parainfluenza   Virus (1, 2 and 3), Human Metapneumovirus, Human Rhinovirus (HRV), Adenovirus   B/E and Adenovirus C.  The assay has received FDA approval for the testing of nasopharyngeal (NP)   swabs only. The [facility] at [facility], [place], has validated the performance   characteristics of the [employer] Respiratory Viral Panel for NP swabs, bronchial   alveolar lavage/wash, nasopharyngeal aspirate/wash and nasal wash.      Result:         The [employer] Respiratory Viral Panel (RVP) is a qualitative, multiplex, diagnostic test for   simultaneous detection and identification of multiple respiratory viral nucleic acids in   individuals exhibiting signs and symptoms of respiratory infection. It uses innovative   eSensor technology to provide sensitive and specific respiratory virus detection.              Basic metabolic panel [[id number]] (Abnormal)  Resulted: 01/28/18 0732, Result status: Final result    Ordering provider: Avril Hoover MD  01/28/18 0000 Resulting lab: [facility]    Specimen Information    Type Source Collected On   Blood  01/28/18 0630          Components       Value Reference Range Flag Lab   Sodium 138 133 - 144 mmol/L  51   Potassium 3.5 3.4 - 5.3 mmol/L  51   Chloride 104 94 - 109 mmol/L  51   Carbon Dioxide 24 20 - 32 mmol/L  51   Anion Gap 10 3 - 14 mmol/L  51   Glucose 92 70 - 99 mg/dL  51   Urea Nitrogen 12 7 - 30 mg/dL  51   Creatinine 0.48 0.52 - 1.04 mg/dL L 51   GFR Estimate >90 >60 mL/min/1.7m2  51   Comment:  Non  GFR Calc   GFR Estimate If Black >90 >60 mL/min/1.7m2  51   Comment:  African American GFR Calc   Calcium 8.6 8.5 - 10.1 mg/dL  51            CBC with platelets [[id number]] (Abnormal)  Resulted: 01/28/18 0711, Result status: Final result    Ordering provider: Avril Hoover MD  01/28/18 0000 Resulting lab: [facility]    Specimen Information    Type Source Collected On   Blood  01/28/18 0630          Components       Value Reference Range Flag Lab   WBC 7.1 4.0 - 11.0 10e9/L  51   RBC Count 4.29 3.8 - 5.2 10e12/L  51   Hemoglobin 12.6 11.7 - 15.7 g/dL  51   Hematocrit 39.2 35.0 - 47.0 %  51   MCV 91 78 - 100 fl  51   MCH 29.4 26.5 - 33.0 pg  51   MCHC 32.1 31.5 - 36.5 g/dL  51   RDW 15.5 10.0 - 15.0 % H 51   Platelet Count 396 150 - 450 10e9/L  51            Influenza A and B and RSV PCR [[id number]] (Abnormal)  Resulted: 01/27/18 1911, Result status: Final result    Ordering provider: Marcio Mcneil MD  01/27/18 0914 Resulting lab: [facility]    Specimen Information    Type Source Collected On   Nasal Nasal Swab 01/27/18 1627          Components       Value Reference Range Flag Lab   Specimen Description Nasal   51   Influenza A PCR Positive NEG^Negative A 75   Comment:         Flu A target RNA detected, presumed POSITIVE for Influenza A.  Critical Value/Significant Value called to and read back by   MAXIMILIANO MCCLURE @1910 1/27/18.  CT     Influenza B PCR Negative NEG^Negative  75   Comment:         Flu B target RNA not detected, presumed negative for Influenza B or the viral   load is below the limit of detection.     Resp Syncytial Virus Negative NEG^Negative  75   Comment:         RSV target RNA not detected, presumed negative for Respiratory Syncitial Virus   or the viral load is below the limit of detection.  FDA approved assay performed using [employer] GeneXpert(R) real-time PCR.              Procalcitonin [[id number]]  Resulted: 01/27/18 1126, Result status: Final result    Ordering provider: Clementina Carrera APRN CNP  01/27/18 0754 Resulting lab: [facility]    Specimen Information    Type Source Collected On   Blood  01/27/18 0936          Components       Value Reference Range Flag Lab   Procalcitonin <0.05 ng/ml  [facility]   Comment:         <0.05 ng/ml  Normal  Recommendation: Very low risk of bacterial infection.   Discourage antibiotics unless strong clinical suspicion for serious infection.              UA reflex to Microscopic and Culture [[id number]] (Abnormal)  Resulted: 01/27/18 0316, Result status: Final result    Ordering provider: Chantell Sanders PA  01/27/18 0007 Resulting lab: [facility]    Specimen Information    Type Source Collected On   Midstream Urine  01/27/18 0300          Components       Value Reference Range Flag Lab   Color Urine Yellow   51   Appearance Urine Clear   51   Glucose Urine Negative NEG^Negative mg/dL  51   Bilirubin Urine Negative NEG^Negative  51   Ketones Urine 10 NEG^Negative mg/dL A 51   Specific Gravity Urine 1.028 1.003 - 1.035  51   Blood Urine Negative NEG^Negative  51   pH Urine 6.5 5.0 - 7.0 pH  51   Protein Albumin Urine Negative NEG^Negative mg/dL  51   Urobilinogen mg/dL Normal 0.0 - 2.0 mg/dL  51   Nitrite Urine Negative NEG^Negative  51   Leukocyte Esterase Urine Negative NEG^Negative  51   Source Midstream Urine   51             Glucose by meter [[id number]]  Resulted: 01/27/18 0055, Result status: Final result    Ordering provider: Adrian Avery MD  01/27/18 0047 Resulting lab: POINT OF CARE TEST, GLUCOSE    Specimen Information    Type Source Collected On     01/27/18 0047          Components       Value Reference Range Flag Lab   Glucose 82 70 - 99 mg/dL  170            Blood gas arterial [[id number]] (Abnormal)  Resulted: 01/27/18 0027, Result status: Final result    Ordering provider: Chantell Sanders PA  01/27/18 0006 Resulting lab: [facility]    Specimen Information    Type Source Collected On   Blood  01/27/18 0020          Components       Value Reference Range Flag Lab   pH Arterial 7.42 7.35 - 7.45 pH  51   pCO2 Arterial 42 35 - 45 mm Hg  51   pO2 Arterial 71 80 - 105 mm Hg L 51   Bicarbonate Arterial 27 21 - 28 mmol/L  51   Base Excess Art 2.5 mmol/L  51   Comment:  Abnormal Result, Ref range: -9.0 to 1.8   FIO2 3L   51            Testing Performed By     Lab - Abbreviation Name Director Address Valid Date Range    22 - [facility] Unknown [address] [place] [address] 05/08/15 1057 - Present    51 - Unknown [facility] Unknown [address] [address] 12/31/14 1010 - Present    75 - Unknown [facility] Unknown [address] [address] 01/15/15 1019 - Present    170 - Unknown POINT OF CARE TEST, GLUCOSE Unknown Unknown 10/31/11 1114 - Present            Unresulted Labs     None         Imaging Results - 3 Days      CT Head w/o Contrast [[id number]]  Resulted: 01/27/18 0846, Result status: Final result    Ordering provider: Chantell Sanders PA  01/27/18 0006 Resulted by: Shar Perez MD Schat, Robben, MD    Performed: 01/27/18 0102 - 01/27/18 0147 Resulting lab: RADIOLOGY RESULTS    Narrative:       CT HEAD W/O CONTRAST 1/27/2018 1:47 AM    Provided  History: intermittent confusion. questionable lower ext  weakness. fall with head trauma \R\ 1 week ago;     Comparison: 8/11/2017.    Technique: Using multidetector thin collimation helical acquisition  technique, axial, coronal and sagittal CT images from the skull base  to the vertex were obtained without intravenous contrast.     Findings:    No intracranial hemorrhage, mass effect, or midline shift. The  ventricles are proportionate to the cerebral sulci. The gray to white  matter differentiation of the cerebral hemispheres is preserved.  Minimal areas of hypoattenuation bilateral parietal periventricular  white matter and a single focus of hypoattenuation in the left frontal  subcortical white matter are unchanged since 5/11/2017 and most  compatible with chronic small vessel ischemic disease related changes.    The basal cisterns are patent. Left basal ganglia mineralization.    Sclerosis and thickening about the left maxillary sinus osseous wall  with a small amount of fluid in the left maxillary sinus. Mild mucosal  thickening in the left ethmoid air cells. The mastoid air cells are  clear.       Impression:       Impression:   1. No acute intracranial pathology.  2. Changes of minimal chronic small vessel ischemic disease and  moderate generalized parenchymal volume loss.  3. Findings of chronic left maxillary sinusitis.    I have personally reviewed the examination and initial interpretation  and I agree with the findings.    ARMIDA SIGALA MD      CT Chest Pulmonary Embolism w Contrast [[id number]]  Resulted: 01/27/18 0731, Result status: Final result    Ordering provider: Chantell Sanders PA  01/26/18 9019 Resulted by: Lorenzo Gallagher MD Schat, Robben, MD    Performed: 01/27/18 0102 - 01/27/18 0146 Resulting lab: RADIOLOGY RESULTS    Narrative:       Examination:  CT CHEST PULMONARY EMBOLISM W CONTRAST 1/27/2018 1:46 AM      Comparison: 5/11/2017 chest radiographs; 1/23/2017 CT  abdomen/pelvis;  2/20/2011 CT chest    History: hypoxia;     TECHNIQUE: Volumetric helical acquisition of CT images of the chest  from the lung apices to the kidneys were acquired in arterial phase  after the uncomplicated administration of iopamidol (ISOVUE-370)  solution 46 mL   . Three-dimensional (3D) post-processed angiographic  images were reconstructed, archived to PACS and used in the  interpretation of this study.    FINDINGS:  There is adequate opacification of the main and lobar pulmonary  arteries. Eccentric linear filling defects in the posterior right  lower lobe segmental arteries (series 8, images 152 through 163).     Lung parenchyma:   Subpleural consolidative/groundglass opacities in the posterior right  lower lobe atelectasis. Linear atelectasis versus scarring in the left  lung base, lingula, right middle lobe, and lateral right upper lobe.  Adherent secretions in the distal trachea. Subtle groundglass opacity  in the posterior right upper lobe. Moderate apically predominant  centrilobular emphysema. Diffuse central bronchial wall thickening,  greatest in the lower lobes. Scattered pulmonary nodules, for example  in the medial right upper lobe measuring 5 mm average axial dimension  (series 7, image 53) with mild adjacent focal groundglass opacity  superiorly, and 4 mm nodule in the posterior left upper lobe (series  7, image 44).    Chest:   Cardiomegaly. No pericardial effusion. Normal caliber ascending aorta.  Borderline dilated main pulmonary artery, measuring up to 3.2 cm.  Atherosclerotic calcification of the coronary arteries and thoracic  aorta. Aberrant retroesophageal right subclavian artery. No thoracic  lymphadenopathy. Increased size of a nodule arising from the thyroid  isthmus, now measuring 13 mm, previously 9 mm on 2/20/2011. Mildly  patulous esophagus with possible distal esophageal wall thickening.    Limited evaluation of the upper abdomen. Calcifications in the  pancreatic  head with a rounded associated cystic focus, likely a  pseudocyst related to prior pancreatitis. Extensive atherosclerotic  calcification throughout the abdominal aorta. No suspicious bony  lesion. Severe degenerative change of the thoracic spine with  intervertebral fusion from T10 through T12. Minimally displaced  lateral right seventh and eighth rib fractures.      Impression:       IMPRESSION:   1. Age-indeterminate, but possibly chronic pulmonary emboli in the  right lower lobe.  2. Mild groundglass opacities in the posterior right upper lobe,  likely infectious/inflammatory.  3. Moderate apically predominant centrilobular emphysema.  4. Scattered pulmonary nodules measuring up to 5 mm. Recommend  follow-up chest CT in one year per the Fleischner Society criteria.  5. Minimally displaced lateral right seventh and eighth rib fractures.  6. Possible mild distal esophageal wall thickening, suggestive of  esophagitis.  7. Calcifications in the pancreatic head with a rounded associated  cystic focus likely representing a pseudocyst, findings likely related  to chronic pancreatitis, though evaluation is limited. Consider  further dedicated imaging of the pancreas as clinically indicated.    [Consider Follow Up: Pulmonary nodules]    This report will be copied to the [facility] to ensure a  provider acknowledges the finding.     I have personally reviewed the examination and initial interpretation  and I agree with the findings.    NONI CONNELLY    Components       Value Reference Range Flag Lab   Radiologist flags Pulmonary nodules               Testing Performed By     Lab - Abbreviation Name Director Address Valid Date Range    104 - Rad [facility] RADIOLOGY RESULTS Unknown Unknown 02/16/05 1553 - Present            Encounter-Level Documents:     There are no encounter-level documents.      Order-Level Documents:     There are no order-level documents.

## 2018-01-27 ENCOUNTER — APPOINTMENT (OUTPATIENT)
Dept: PHYSICAL THERAPY | Facility: CLINIC | Age: 83
DRG: 193 | End: 2018-01-27
Payer: MEDICARE

## 2018-01-27 ENCOUNTER — APPOINTMENT (OUTPATIENT)
Dept: CT IMAGING | Facility: CLINIC | Age: 83
DRG: 193 | End: 2018-01-27
Payer: MEDICARE

## 2018-01-27 PROBLEM — R53.1 WEAKNESS: Status: ACTIVE | Noted: 2018-01-27

## 2018-01-27 LAB
ALBUMIN UR-MCNC: NEGATIVE MG/DL
APPEARANCE UR: CLEAR
BASE EXCESS BLDA CALC-SCNC: 2.5 MMOL/L
BILIRUB UR QL STRIP: NEGATIVE
COLOR UR AUTO: YELLOW
FLUAV+FLUBV RNA SPEC QL NAA+PROBE: NEGATIVE
FLUAV+FLUBV RNA SPEC QL NAA+PROBE: POSITIVE
GLUCOSE BLDC GLUCOMTR-MCNC: 82 MG/DL (ref 70–99)
GLUCOSE UR STRIP-MCNC: NEGATIVE MG/DL
HCO3 BLD-SCNC: 27 MMOL/L (ref 21–28)
HGB UR QL STRIP: NEGATIVE
KETONES UR STRIP-MCNC: 10 MG/DL
LEUKOCYTE ESTERASE UR QL STRIP: NEGATIVE
NITRATE UR QL: NEGATIVE
O2/TOTAL GAS SETTING VFR VENT: ABNORMAL %
PCO2 BLD: 42 MM HG (ref 35–45)
PH BLD: 7.42 PH (ref 7.35–7.45)
PH UR STRIP: 6.5 PH (ref 5–7)
PO2 BLD: 71 MM HG (ref 80–105)
PROCALCITONIN SERPL-MCNC: <0.05 NG/ML
RADIOLOGIST FLAGS: NORMAL
RSV RNA SPEC NAA+PROBE: NEGATIVE
SOURCE: ABNORMAL
SP GR UR STRIP: 1.03 (ref 1–1.03)
SPECIMEN SOURCE: ABNORMAL
UROBILINOGEN UR STRIP-MCNC: NORMAL MG/DL (ref 0–2)

## 2018-01-27 PROCEDURE — 87633 RESP VIRUS 12-25 TARGETS: CPT | Performed by: INTERNAL MEDICINE

## 2018-01-27 PROCEDURE — 40000193 ZZH STATISTIC PT WARD VISIT: Performed by: PHYSICAL THERAPIST

## 2018-01-27 PROCEDURE — 81003 URINALYSIS AUTO W/O SCOPE: CPT | Performed by: PHYSICIAN ASSISTANT

## 2018-01-27 PROCEDURE — 25000128 H RX IP 250 OP 636: Performed by: PHYSICIAN ASSISTANT

## 2018-01-27 PROCEDURE — 25000132 ZZH RX MED GY IP 250 OP 250 PS 637: Mod: GY | Performed by: PHYSICIAN ASSISTANT

## 2018-01-27 PROCEDURE — 27210995 ZZH RX 272: Performed by: PHYSICIAN ASSISTANT

## 2018-01-27 PROCEDURE — 97162 PT EVAL MOD COMPLEX 30 MIN: CPT | Mod: GP | Performed by: PHYSICAL THERAPIST

## 2018-01-27 PROCEDURE — A9270 NON-COVERED ITEM OR SERVICE: HCPCS | Mod: GY | Performed by: STUDENT IN AN ORGANIZED HEALTH CARE EDUCATION/TRAINING PROGRAM

## 2018-01-27 PROCEDURE — 70450 CT HEAD/BRAIN W/O DYE: CPT

## 2018-01-27 PROCEDURE — 25000132 ZZH RX MED GY IP 250 OP 250 PS 637: Mod: GY | Performed by: STUDENT IN AN ORGANIZED HEALTH CARE EDUCATION/TRAINING PROGRAM

## 2018-01-27 PROCEDURE — 00000146 ZZHCL STATISTIC GLUCOSE BY METER IP

## 2018-01-27 PROCEDURE — 96361 HYDRATE IV INFUSION ADD-ON: CPT

## 2018-01-27 PROCEDURE — 87631 RESP VIRUS 3-5 TARGETS: CPT | Performed by: INTERNAL MEDICINE

## 2018-01-27 PROCEDURE — 82803 BLOOD GASES ANY COMBINATION: CPT | Performed by: PHYSICIAN ASSISTANT

## 2018-01-27 PROCEDURE — 40000275 ZZH STATISTIC RCP TIME EA 10 MIN

## 2018-01-27 PROCEDURE — A9270 NON-COVERED ITEM OR SERVICE: HCPCS | Mod: GY | Performed by: PHYSICIAN ASSISTANT

## 2018-01-27 PROCEDURE — 36600 WITHDRAWAL OF ARTERIAL BLOOD: CPT

## 2018-01-27 PROCEDURE — G0378 HOSPITAL OBSERVATION PER HR: HCPCS

## 2018-01-27 PROCEDURE — 99233 SBSQ HOSP IP/OBS HIGH 50: CPT | Mod: GC | Performed by: INTERNAL MEDICINE

## 2018-01-27 PROCEDURE — 36415 COLL VENOUS BLD VENIPUNCTURE: CPT | Performed by: NURSE PRACTITIONER

## 2018-01-27 PROCEDURE — 84145 PROCALCITONIN (PCT): CPT | Performed by: NURSE PRACTITIONER

## 2018-01-27 PROCEDURE — 96374 THER/PROPH/DIAG INJ IV PUSH: CPT

## 2018-01-27 PROCEDURE — 12000001 ZZH R&B MED SURG/OB UMMC

## 2018-01-27 PROCEDURE — 71260 CT THORAX DX C+: CPT

## 2018-01-27 PROCEDURE — 93005 ELECTROCARDIOGRAM TRACING: CPT

## 2018-01-27 PROCEDURE — 84145 PROCALCITONIN (PCT): CPT | Performed by: INTERNAL MEDICINE

## 2018-01-27 PROCEDURE — 25000128 H RX IP 250 OP 636: Performed by: FAMILY MEDICINE

## 2018-01-27 PROCEDURE — 97530 THERAPEUTIC ACTIVITIES: CPT | Mod: GP | Performed by: PHYSICAL THERAPIST

## 2018-01-27 RX ORDER — SODIUM CHLORIDE 9 MG/ML
INJECTION, SOLUTION INTRAVENOUS CONTINUOUS
Status: DISCONTINUED | OUTPATIENT
Start: 2018-01-27 | End: 2018-01-27

## 2018-01-27 RX ORDER — AZITHROMYCIN 500 MG/1
500 TABLET, FILM COATED ORAL DAILY
Status: DISCONTINUED | OUTPATIENT
Start: 2018-01-27 | End: 2018-01-27

## 2018-01-27 RX ORDER — LIDOCAINE 4 G/G
1 PATCH TOPICAL
Status: DISCONTINUED | OUTPATIENT
Start: 2018-01-27 | End: 2018-01-30 | Stop reason: HOSPADM

## 2018-01-27 RX ORDER — IPRATROPIUM BROMIDE AND ALBUTEROL SULFATE 2.5; .5 MG/3ML; MG/3ML
3 SOLUTION RESPIRATORY (INHALATION) EVERY 4 HOURS PRN
Status: DISCONTINUED | OUTPATIENT
Start: 2018-01-27 | End: 2018-01-27

## 2018-01-27 RX ORDER — IOPAMIDOL 755 MG/ML
46 INJECTION, SOLUTION INTRAVASCULAR ONCE
Status: COMPLETED | OUTPATIENT
Start: 2018-01-27 | End: 2018-01-27

## 2018-01-27 RX ORDER — AMOXICILLIN 250 MG
2 CAPSULE ORAL 2 TIMES DAILY PRN
Status: DISCONTINUED | OUTPATIENT
Start: 2018-01-27 | End: 2018-01-30 | Stop reason: HOSPADM

## 2018-01-27 RX ORDER — LEVOFLOXACIN 500 MG/1
500 TABLET, FILM COATED ORAL DAILY
Status: DISCONTINUED | OUTPATIENT
Start: 2018-01-28 | End: 2018-01-28

## 2018-01-27 RX ORDER — IPRATROPIUM BROMIDE AND ALBUTEROL SULFATE 2.5; .5 MG/3ML; MG/3ML
3 SOLUTION RESPIRATORY (INHALATION) 4 TIMES DAILY
Status: DISCONTINUED | OUTPATIENT
Start: 2018-01-27 | End: 2018-01-27

## 2018-01-27 RX ORDER — IPRATROPIUM BROMIDE AND ALBUTEROL SULFATE 2.5; .5 MG/3ML; MG/3ML
3 SOLUTION RESPIRATORY (INHALATION) 4 TIMES DAILY PRN
Status: DISCONTINUED | OUTPATIENT
Start: 2018-01-27 | End: 2018-01-30 | Stop reason: HOSPADM

## 2018-01-27 RX ADMIN — CEFTRIAXONE SODIUM 2 G: 10 INJECTION, POWDER, FOR SOLUTION INTRAVENOUS at 04:14

## 2018-01-27 RX ADMIN — OXYCODONE HYDROCHLORIDE 5 MG: 5 TABLET ORAL at 12:45

## 2018-01-27 RX ADMIN — AZITHROMYCIN MONOHYDRATE 500 MG: 500 TABLET ORAL at 09:16

## 2018-01-27 RX ADMIN — SODIUM CHLORIDE: 9 INJECTION, SOLUTION INTRAVENOUS at 04:07

## 2018-01-27 RX ADMIN — FERROUS SULFATE 325 MG: 325 TABLET, FILM COATED ORAL at 08:14

## 2018-01-27 RX ADMIN — IOPAMIDOL 46 ML: 755 INJECTION, SOLUTION INTRAVENOUS at 01:38

## 2018-01-27 RX ADMIN — CITALOPRAM HYDROBROMIDE 20 MG: 10 TABLET ORAL at 08:14

## 2018-01-27 RX ADMIN — ATORVASTATIN CALCIUM 80 MG: 40 TABLET, FILM COATED ORAL at 00:35

## 2018-01-27 RX ADMIN — CLOPIDOGREL 75 MG: 75 TABLET, FILM COATED ORAL at 08:14

## 2018-01-27 RX ADMIN — NAPROXEN 250 MG: 250 TABLET ORAL at 08:26

## 2018-01-27 RX ADMIN — LIDOCAINE 1 PATCH: 560 PATCH PERCUTANEOUS; TOPICAL; TRANSDERMAL at 08:18

## 2018-01-27 RX ADMIN — POTASSIUM CHLORIDE 20 MEQ: 750 TABLET, EXTENDED RELEASE ORAL at 08:21

## 2018-01-27 RX ADMIN — OXYCODONE HYDROCHLORIDE 5 MG: 5 TABLET ORAL at 06:00

## 2018-01-27 RX ADMIN — LEVOTHYROXINE SODIUM 50 MCG: 25 TABLET ORAL at 08:12

## 2018-01-27 RX ADMIN — PANTOPRAZOLE SODIUM 40 MG: 40 TABLET, DELAYED RELEASE ORAL at 08:12

## 2018-01-27 RX ADMIN — HYDROCHLOROTHIAZIDE 50 MG: 12.5 TABLET ORAL at 08:15

## 2018-01-27 RX ADMIN — NAPROXEN 250 MG: 250 TABLET ORAL at 18:30

## 2018-01-27 RX ADMIN — LISINOPRIL 40 MG: 20 TABLET ORAL at 08:14

## 2018-01-27 RX ADMIN — AMLODIPINE BESYLATE 10 MG: 10 TABLET ORAL at 08:14

## 2018-01-27 RX ADMIN — ATORVASTATIN CALCIUM 80 MG: 40 TABLET, FILM COATED ORAL at 20:55

## 2018-01-27 RX ADMIN — OXYCODONE HYDROCHLORIDE 5 MG: 5 TABLET ORAL at 00:35

## 2018-01-27 ASSESSMENT — PAIN DESCRIPTION - DESCRIPTORS
DESCRIPTORS: ACHING
DESCRIPTORS: ACHING

## 2018-01-27 NOTE — PLAN OF CARE
Problem: Patient Care Overview  Goal: Plan of Care/Patient Progress Review  PT 6D: Physical therapy evaluation completed, treatment initiated.     Discharge Planner PT   Patient plan for discharge: home, but aware of deficits   Current status: pt needing min assist for sit to stand transfers with FWW, able to take a few small steps in room but greatly limited by RLE pain.   Barriers to return to prior living situation: pain, assist for mobility, stairs at home   Recommendations for discharge: TCU  Rationale for recommendations: pt currently far below baseline, needs continued rehab to return to PLOF.       Entered by: Rachel Gamino 01/27/2018 5:10 PM

## 2018-01-27 NOTE — PLAN OF CARE
Problem: Patient Care Overview  Goal: Discharge Needs Assessment  Observation goals PRIOR TO DISCHARGE     Comments: List all goals to be met before discharge home:   - Blood Glucose greater than 70 less than 250 on two consecutive readings.  BS 82    - Ketones absent from urine. Pending    - Tolerating oral intake to maintain hydration : Yes, drank water    - Safe disposition plan has been identified : Pending  - Nurse to notify provider when observation goals have been met and patient is ready for discharge.The  Pt is alert and oriented with some confusion noted. She complained of right knee pain. 5 mg Oxycodone administered.  She was oriented  to room and ismael light within reach.

## 2018-01-27 NOTE — PROGRESS NOTES
01/27/18 1543   Quick Adds   Type of Visit Initial PT Evaluation   Living Environment   Lives With spouse   Living Arrangements house   Home Accessibility stairs within home;bed and bath are not on the first floor   Number of Stairs to Enter Home 0   Number of Stairs Within Home 14   Stair Railings at Home inside, present on right side   Transportation Available car;family or friend will provide   Living Environment Comment pt lives with her spouse in a house with all living space on second floor    Self-Care   Usual Activity Tolerance good   Current Activity Tolerance fair   Equipment Currently Used at Home none   Activity/Exercise/Self-Care Comment Pt IND at baseline, reports having a cane from a prior surgery   Functional Level Prior   Ambulation 0-->independent   Transferring 0-->independent   Toileting 0-->independent   Bathing 0-->independent   Dressing 0-->independent   Eating 0-->independent   Communication 0-->understands/communicates without difficulty   Swallowing 0-->swallows foods/liquids without difficulty   Cognition 0 - no cognition issues reported   Fall history within last six months yes   Number of times patient has fallen within last six months 1   Which of the above functional risks had a recent onset or change? 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  The patient apparently fell a week ago, and had been ambulatory since that time, but today had her right knee buckle and complains of pain in her right knee and right hip   Precautions/Limitations fall precautions;oxygen therapy device and L/min   Weight-Bearing Status - RLE weight-bearing as tolerated   Heart Disease Risk Factors Medical history;Age   General Observations Pt alert, agreeable   General Info Comments Activity orders: up with assist    Cognitive Status Examination   Orientation orientation to person, place and time   Level of Consciousness alert   Follows Commands and Answers Questions 100% of the time   Personal Safety and Judgment intact   Memory intact   Pain Assessment   Patient Currently in Pain Yes, see Vital Sign flowsheet   Posture    Posture Kyphosis   Range of Motion (ROM)   ROM Comment WFL   Strength   Strength Comments not overtly tested due to pain    Bed Mobility   Bed Mobility Comments IND with bed rails    Transfer Skills   Transfer Comments min assist for sit to stand wtih FWW   Gait   Gait Comments able to take a few steps in room with min assist and FWW   Balance   Balance Comments needs UE support for balance due to RLE pain    Sensory Examination   Sensory Perception no deficits were identified   General Therapy Interventions   Planned Therapy Interventions balance training;progressive activity/exercise;home program guidelines;risk factor education;transfer training;strengthening;gait training   Clinical Impression   Criteria for Skilled Therapeutic Intervention yes, treatment indicated   PT Diagnosis impaired functional mobility    Influenced by the following impairments impaired gait, balance    Functional limitations due to impairments impaired functional mobility    Clinical Presentation Evolving/Changing   Clinical Presentation Rationale pt with unclear symptoms and progression    Clinical  "Decision Making (Complexity) Moderate complexity   Therapy Frequency` 5 times/week   Predicted Duration of Therapy Intervention (days/wks) 1 week    Anticipated Discharge Disposition Transitional Care Facility   Risk & Benefits of therapy have been explained Yes   Patient, Family & other staff in agreement with plan of care Yes   Clinical Impression Comments Pt with below baseline mobility, currently unable to ambulate    [facility] AM-PAC TM \"6 Clicks\"   2016, Trustees of [facility], under license to [employer].  All rights reserved.   6 Clicks Short Forms Basic Mobility Inpatient Short Form   [facility] AM-PAC  \"6 Clicks\" V.2 Basic Mobility Inpatient Short Form   1. Turning from your back to your side while in a flat bed without using bedrails? 4 - None   2. Moving from lying on your back to sitting on the side of a flat bed without using bedrails? 4 - None   3. Moving to and from a bed to a chair (including a wheelchair)? 2 - A Lot   4. Standing up from a chair using your arms (e.g., wheelchair, or bedside chair)? 3 - A Little   5. To walk in hospital room? 2 - A Lot   6. Climbing 3-5 steps with a railing? 1 - Total   Basic Mobility Raw Score (Score out of 24.Lower scores equate to lower levels of function) 16   Total Evaluation Time   Total Evaluation Time (Minutes) 12     "

## 2018-01-27 NOTE — DISCHARGE SUMMARY
Patient ID:  Lucie Stockton  MRN: [id number]  82 year old  YOB: 1935    Observation Admit Date: 1/26/2018    ED Admitting Attending: Adrian Avery MD    Transfer Date and Time: January 27, 2018 at 7:59 AM     Transferring Observation Provider: Maria Elena Xie PA-C    Admission Diagnoses:     1. Contusion of right knee, initial encounter    2. Contusion of right hip, initial encounter        Transfer Diagnoses:  New hypoxia, requiring 3-4L per nc to maintain sats > 92%. DDX includes NV (age indeterminate from CT chest) vs PNA    Emergency Department and Observation Course:   Patient was admitted from home after sustaining a fall. Had a fall approximately one week ago while in [place]. Then again yesterday, her knees buckled. Xray in ED showed no acute osseous abnormality and she was admitted to ED observation for knee pain and possible placement at TCU. Upon arrival the the floor, she was found to be newly hypoxia requiring 3-4L per nc with no prior history of O2 requirements. No mention of hypoxia in the ED prior to admission to observation unit. A CT scan showed age indeterminate PE and GGO.  She does endorse cough, but no dyspnea. She was given a dose of rocephin. Noted to be somewhat confused overnight, CT scan was negative, didn't appreciate and cognitive deficits this morning, answering questions appropriately. Will admit to medicine service due to hypoxia.     At this time the patient has failed observation management due to hypoxia and will be transferred to inpatient status.    Consults: none    DATA:    CT chest  IMPRESSION:   1. Age-indeterminate, but possibly chronic pulmonary emboli in the  right lower lobe.  2. Mild groundglass opacities in the posterior right upper lobe,  likely infectious/inflammatory.  3. Moderate apically predominant centrilobular emphysema.  4. Scattered pulmonary nodules measuring up to 5 mm. Recommend  follow-up chest CT in one year per the Fleischner  Society criteria.  5. Minimally displaced lateral right seventh and eighth rib fractures.  6. Possible mild distal esophageal wall thickening, suggestive of  esophagitis.  7. Calcifications in the pancreatic head with a rounded associated  cystic focus likely representing a pseudocyst, findings likely related  to chronic pancreatitis, though evaluation is limited. Consider  further dedicated imaging of the pancreas as clinically indicated.       CT head  Impression:   1. No acute intracranial pathology.  2. Changes of chronic small vessel ischemic disease and moderate  generalized parenchymal volume loss.  3. Findings of chronic left maxillary sinusitis.      Transfer Exam:    /63 (BP Location: Right arm)  Temp 98  F (36.7  C) (Oral)  Resp 16  SpO2 93%   Gen: alert, pleasant, NAD  Pulm: On 4L per nc, CTA bilaterally  CV: RRR  Extremities: No edema  Neuro: A&O x 3, answering questions appropriately. No gross cognitive deficits      Current Medications:    Current Outpatient Prescriptions   Medication Sig Dispense Refill     [DISCONTINUED] tolterodine (DETROL) 1 MG tablet Take 1-2 tablets by mouth At Bedtime. 180 tablet 3       Medications Prior to Admission:    Prescriptions Prior to Admission   Medication Sig Dispense Refill Last Dose     citalopram (CELEXA) 40 MG tablet Take 0.5 tablets (20 mg) by mouth daily 45 tablet 1 Taking     potassium chloride SA (K-DUR/KLOR-CON M) 20 MEQ CR tablet Take 1 tablet (20 mEq) by mouth daily 90 tablet 1 Taking     traMADol (ULTRAM) 50 MG tablet Take 1 tablet (50 mg) by mouth every 6 hours as needed for moderate pain (Take 1 tablet (50 mg) by mouth every 6 hours as needed for moderate pain or severe pain Maximum 270 tablets in 3 months) 270 tablet 1 Taking     amLODIPine (NORVASC) 10 MG tablet Take 1 tablet (10 mg) by mouth daily For blood pressure 90 tablet 2 Taking     trolamine salicylate (ASPERCREME) 10 % cream Apply 1 g topically 3 times daily 170 g 11 Taking      naproxen sodium 220 MG capsule Take 220 mg by mouth 2 times daily (with meals) 60 capsule 2 Taking     oxyCODONE (ROXICODONE) 5 MG IR tablet Take 1 tablet (5 mg) by mouth every 6 hours as needed for moderate to severe pain 10 tablet 0 Taking     atorvastatin (LIPITOR) 40 MG tablet Take 2 tablets (80 mg) by mouth daily 90 tablet 3 Taking     pantoprazole (PROTONIX) 40 MG EC tablet Take 1 tablet (40 mg) by mouth daily 90 tablet 3 Taking     moxifloxacin (VIGAMOX) 0.5 % ophthalmic solution Place 1 drop Into the left eye 4 times daily   Taking     prednisoLONE acetate (PRED FORTE) 1 % ophthalmic susp Place 1 drop Into the left eye 4 times daily   Taking     ketorolac (ACULAR) 0.5 % ophthalmic solution Place 1 drop Into the left eye 4 times daily   Taking     bisacodyl (DULCOLAX) 10 MG Suppository Place 1 suppository (10 mg) rectally daily as needed for constipation 90 suppository 3 Taking     levothyroxine (SYNTHROID/LEVOTHROID) 50 MCG tablet Take 1 tablet (50 mcg) by mouth daily 90 tablet 3 Taking     clopidogrel (PLAVIX) 75 MG tablet Take 1 tablet (75 mg) by mouth daily 90 tablet 3 Taking     hydrochlorothiazide (HYDRODIURIL) 50 MG tablet Take 1 tablet (50 mg) by mouth daily 90 tablet 3 Taking     lisinopril (PRINIVIL/ZESTRIL) 40 MG tablet Take 1 tablet (40 mg) by mouth daily For blood pressure 90 tablet 3 Taking     polyethylene glycol (MIRALAX) powder Take one-half to one scoop once a day for maintaining soft stools 119 g 5 Taking     ferrous sulfate (IRON) 325 (65 FE) MG tablet Take 1 tablet (325 mg) by mouth 2 times daily To maintain iron levels 90 tablet 1 Taking     albuterol (VENTOLIN HFA) 108 (90 BASE) MCG/ACT inhaler Inhale 2 puffs into the lungs every 4 hours as needed for shortness of breath / dyspnea or wheezing 18 Inhaler 1 Taking     ferrous sulfate (IRON) 325 (65 FE) MG tablet Take 1 tablet (325 mg) by mouth daily (with breakfast) 90 tablet 3 Taking     Calcium Carbonate-Vitamin D (CALCIUM 600 + D OR)  Take 1 tablet by mouth daily.   Taking       Significant Diagnostic Studies:     Results for orders placed or performed during the hospital encounter of 01/26/18   XR Pelvis w Hip Right G/E 2 Views    Narrative    Exam:  XR PELVIS AND HIP RIGHT 2 VIEWS, 1/26/2018 8:45 PM    History: fall;     Comparison:  Abdominal radiograph 1/23/2017    Findings:  AP and frog-leg views of the right hip. AP view of the  pelvis. No acute osseous abnormality. Mild degenerative changes of the  hips. Degenerative changes of the visualized thoracic spine and  sacroiliac joints. Bones appear osteopenic. Pelvic phleboliths. No  dilated loops of bowel. Surgical clips over the pelvis and abdomen.      Impression    Impression:  No acute osseous abnormality.    I have personally reviewed the examination and initial interpretation  and I agree with the findings.    ARNOLD HOOKS MD   Knee XR, 1-2 views, right    Narrative    Exam:  XR KNEE RT 1 /2 VW, 1/26/2018 8:48 PM    History: fall;     Comparison:  8/10/2017    Findings:  AP and lateral views of the right knee. No acute osseous  abnormality. Bones appear osteopenic. Moderate medial and lateral  joint space loss. Small joint effusion. Calcification posterior to the  femur was not seen on prior exams and may represent prior ligamentous  injury, vascular calcification, or soft tissue calcification. Soft  tissues are otherwise unremarkable.      Impression    Impression:  No acute osseous abnormality. Moderate degenerative  changes of the knee.    I have personally reviewed the examination and initial interpretation  and I agree with the findings.    ARNOLD HOOKS MD   US Lower Extremity Venous Duplex Right    Narrative    EXAMINATION: DOPPLER VENOUS ULTRASOUND OF THE RIGHT LOWER EXTREMITY,  1/26/2018 9:01 PM     COMPARISON: Ultrasound on 12/6/2010    HISTORY: calf and thigh pain after recent air travel    TECHNIQUE:  Gray-scale evaluation with compression, spectral flow,  and  color Doppler assessment of the deep venous system of the right leg  from groin to knee, and then at the ankle.    FINDINGS:  In the right lower extremity, the common femoral, femoral, popliteal  and posterior tibial veins demonstrate normal compressibility and  blood flow.      Impression    IMPRESSION:  No evidence of right lower extremity deep venous thrombosis.    I have personally reviewed the examination and initial interpretation  and I agree with the findings.    ARNOLD HOOKS MD   CT Chest Pulmonary Embolism w Contrast   Result Value Ref Range    Radiologist flags Pulmonary nodules     Narrative    Examination:  CT CHEST PULMONARY EMBOLISM W CONTRAST 1/27/2018 1:46 AM      Comparison: 5/11/2017 chest radiographs; 1/23/2017 CT abdomen/pelvis;  2/20/2011 CT chest    History: hypoxia;     TECHNIQUE: Volumetric helical acquisition of CT images of the chest  from the lung apices to the kidneys were acquired in arterial phase  after the uncomplicated administration of iopamidol (ISOVUE-370)  solution 46 mL   . Three-dimensional (3D) post-processed angiographic  images were reconstructed, archived to PACS and used in the  interpretation of this study.    FINDINGS:  There is adequate opacification of the main and lobar pulmonary  arteries. Eccentric linear filling defects in the posterior right  lower lobe segmental arteries (series 8, images 152 through 163).     Lung parenchyma:   Subpleural consolidative/groundglass opacities in the posterior right  lower lobe atelectasis. Linear atelectasis versus scarring in the left  lung base, lingula, right middle lobe, and lateral right upper lobe.  Adherent secretions in the distal trachea. Subtle groundglass opacity  in the posterior right upper lobe. Moderate apically predominant  centrilobular emphysema. Diffuse central bronchial wall thickening,  greatest in the lower lobes. Scattered pulmonary nodules, for example  in the medial right upper lobe  measuring 5 mm average axial dimension  (series 7, image 53) with mild adjacent focal groundglass opacity  superiorly, and 4 mm nodule in the posterior left upper lobe (series  7, image 44).    Chest:   Cardiomegaly. No pericardial effusion. Normal caliber ascending aorta.  Borderline dilated main pulmonary artery, measuring up to 3.2 cm.  Atherosclerotic calcification of the coronary arteries and thoracic  aorta. Aberrant retroesophageal right subclavian artery. No thoracic  lymphadenopathy. Increased size of a nodule arising from the thyroid  isthmus, now measuring 13 mm, previously 9 mm on 2/20/2011. Mildly  patulous esophagus with possible distal esophageal wall thickening.    Limited evaluation of the upper abdomen. Calcifications in the  pancreatic head with a rounded associated cystic focus, likely a  pseudocyst related to prior pancreatitis. Extensive atherosclerotic  calcification throughout the abdominal aorta. No suspicious bony  lesion. Severe degenerative change of the thoracic spine with  intervertebral fusion from T10 through T12. Minimally displaced  lateral right seventh and eighth rib fractures.      Impression    IMPRESSION:   1. Age-indeterminate, but possibly chronic pulmonary emboli in the  right lower lobe.  2. Mild groundglass opacities in the posterior right upper lobe,  likely infectious/inflammatory.  3. Moderate apically predominant centrilobular emphysema.  4. Scattered pulmonary nodules measuring up to 5 mm. Recommend  follow-up chest CT in one year per the Fleischner Society criteria.  5. Minimally displaced lateral right seventh and eighth rib fractures.  6. Possible mild distal esophageal wall thickening, suggestive of  esophagitis.  7. Calcifications in the pancreatic head with a rounded associated  cystic focus likely representing a pseudocyst, findings likely related  to chronic pancreatitis, though evaluation is limited. Consider  further dedicated imaging of the pancreas as  clinically indicated.    [Consider Follow Up: Pulmonary nodules]    This report will be copied to the [facility] to ensure a  provider acknowledges the finding.     I have personally reviewed the examination and initial interpretation  and I agree with the findings.    NONI CONNELLY   CT Head w/o Contrast    Impression    Impression:   1. No acute intracranial pathology.  2. Changes of chronic small vessel ischemic disease and moderate  generalized parenchymal volume loss.  3. Findings of chronic left maxillary sinusitis.   D dimer quantitative   Result Value Ref Range    D Dimer 1.3 (H) 0.0 - 0.50 ug/ml FEU   INR   Result Value Ref Range    INR 0.99 0.86 - 1.14   Partial thromboplastin time   Result Value Ref Range    PTT 35 22 - 37 sec   CBC with platelets differential   Result Value Ref Range    WBC 9.8 4.0 - 11.0 10e9/L    RBC Count 4.23 3.8 - 5.2 10e12/L    Hemoglobin 12.7 11.7 - 15.7 g/dL    Hematocrit 38.8 35.0 - 47.0 %    MCV 92 78 - 100 fl    MCH 30.0 26.5 - 33.0 pg    MCHC 32.7 31.5 - 36.5 g/dL    RDW 15.5 (H) 10.0 - 15.0 %    Platelet Count 422 150 - 450 10e9/L    Diff Method Automated Method     % Neutrophils 82.4 %    % Lymphocytes 10.4 %    % Monocytes 5.7 %    % Eosinophils 1.1 %    % Basophils 0.2 %    % Immature Granulocytes 0.2 %    Nucleated RBCs 0 0 /100    Absolute Neutrophil 8.0 1.6 - 8.3 10e9/L    Absolute Lymphocytes 1.0 0.8 - 5.3 10e9/L    Absolute Monocytes 0.6 0.0 - 1.3 10e9/L    Absolute Eosinophils 0.1 0.0 - 0.7 10e9/L    Absolute Basophils 0.0 0.0 - 0.2 10e9/L    Abs Immature Granulocytes 0.0 0 - 0.4 10e9/L    Absolute Nucleated RBC 0.0    Comprehensive metabolic panel   Result Value Ref Range    Sodium 136 133 - 144 mmol/L    Potassium 3.7 3.4 - 5.3 mmol/L    Chloride 100 94 - 109 mmol/L    Carbon Dioxide 26 20 - 32 mmol/L    Anion Gap 9 3 - 14 mmol/L    Glucose 91 70 - 99 mg/dL    Urea Nitrogen 14 7 - 30 mg/dL    Creatinine 0.52 0.52 - 1.04 mg/dL    GFR Estimate >90 >60  mL/min/1.7m2    GFR Estimate If Black >90 >60 mL/min/1.7m2    Calcium 9.7 8.5 - 10.1 mg/dL    Bilirubin Total 0.4 0.2 - 1.3 mg/dL    Albumin 3.4 3.4 - 5.0 g/dL    Protein Total 6.6 (L) 6.8 - 8.8 g/dL    Alkaline Phosphatase 69 40 - 150 U/L    ALT 18 0 - 50 U/L    AST 16 0 - 45 U/L   Blood gas arterial   Result Value Ref Range    pH Arterial 7.42 7.35 - 7.45 pH    pCO2 Arterial 42 35 - 45 mm Hg    pO2 Arterial 71 (L) 80 - 105 mm Hg    Bicarbonate Arterial 27 21 - 28 mmol/L    Base Excess Art 2.5 mmol/L    FIO2 3L    UA reflex to Microscopic and Culture   Result Value Ref Range    Color Urine Yellow     Appearance Urine Clear     Glucose Urine Negative NEG^Negative mg/dL    Bilirubin Urine Negative NEG^Negative    Ketones Urine 10 (A) NEG^Negative mg/dL    Specific Gravity Urine 1.028 1.003 - 1.035    Blood Urine Negative NEG^Negative    pH Urine 6.5 5.0 - 7.0 pH    Protein Albumin Urine Negative NEG^Negative mg/dL    Urobilinogen mg/dL Normal 0.0 - 2.0 mg/dL    Nitrite Urine Negative NEG^Negative    Leukocyte Esterase Urine Negative NEG^Negative    Source Midstream Urine    Glucose by meter   Result Value Ref Range    Glucose 82 70 - 99 mg/dL   EKG 12-lead, tracing only   Result Value Ref Range    Interpretation ECG Click View Image link to view waveform and result        Signed:  Maria Elena Xie  January 27, 2018 at 7:59 AM

## 2018-01-27 NOTE — PROGRESS NOTES
"[facility], [place]    Internal Medicine Progress Note - [place] Service    Main Plans for Today   Transferred to medicine, admitted to inpatient  Ceftriaxone/azithromycin for CAP  PT/OT  Wean oxygen as ablve  Resp viral panel    Assessment & Plan   Lucie Stockton is a 82 year old female with hx of ovarian CA s/p TAHBSO 2011, bilateral provoked PE (off warfarin since 2010 when Pt had GIB while on it), athresclerosis of aorta, HTN, HLD, and hypothyroidism who presented to the ED on 1/26 with ongoing pain of the right hip and knee s/p mechanical fall approx 1 week prior, admitted for pain management and found to be newly hypoxic with no signs of acute PE, but possible pneumonia. Transferred to medicine and switched to inpatient on 1/27.     # Acute Right Knee and Hip Pain s/p Mechanical Fall   Pt had a mechanical fall 1 week prior to admission while on vacation in [place]. Went to a local hospital there, with no evidence of fractures or displacement. Continued to have pain, but able to ambulate until day of admission, when she felt her \"knee buckle.\" Repeat XRs of the right hip and knee negative for acute osseous changes. Pain persisted and Pt unable to ambulate 2/2 the pain, so she was admitted to observation for pain management and PT eval.  - Continue PTA naproxen BID, oxycodone and tramadol PRN with bowel regimen  - Lidocaine patch to knee  - PT/OT, up with assist only for now  - Hold off on further imaging of joints for now    # Acute Hypoxia  # Acute R 7th and 8th Minimally Displaced Rib Fractures  Pt found to be hypoxic to high 80s on RA in the ED, started on 3L NC. Pt denies any current symptoms of SOB, but does report mild cough over the last several weeks. Pt with hx of PE in 2010, provoked, has not been on warfarin since 2010 2/2 GIB. ABG showed mild hypoxemia to 72 on 3L NC. RLE US without signs of DVT. CT PE on admission shows age-indeterminate, but possible chronic PE in the RLL. " Mild GGO in RUL. Mod centrilobular emphysema. Minimally displaced lateral R 7th and 8th rib fractures. Scattered pulmonary nodules up to 5mm. Pt previous smoker, but quit 10+ years ago. Afebrile. No leukocytosis. Most likely mild resp infection +/- atelectasis from recent rib fractures and shallow breathing.  - Procal negative, ordered Resp viral panel, droplet precautions  - Given dose of ceftriaxone and azithromycin prior to transfer, switched to levofloxacin  - No signs of acute PE, will not start AC for this  - Duonebs Q4H PRN, outpt PCP f/u to discuss PFTs and/or possible starting COPD medications  - Will need PCP f/u of incidental findings of pulmonary nodules, mild distal esophageal wall thickening, and calcifications of the pancreatic head  - Wean oxygen as able to maintain sats >90%  - APAP PRN for pain for rib fractures  - IS 10 times/hr while awake    ## Chronic Medical Conditions  # HTN: continue PTA amlodipine 10 mg daily, HCTZ 50 mg daily, lisinopril 40 mg daily  # HLD: continue PTA atorvastatin 80 mg QHS  # PVD: continue PTA plavix  # Hypothyroidism: continue PTA levothyroxine  # GERD: continue PTA pantoprazole  # Chronic Iron Deficiency Anemia: continue PTA ferrous sulfate    Diet: Low Saturated Fat Na <2400 mg  Fluids: none  DVT Prophylaxis: Enoxaparin (Lovenox) SQ  Code Status: Full Code    Disposition Plan   Expected discharge: 2 - 3 days, recommended to TBD once stable on room air, pain well controlled, and evaluated by PT/OT..     Entered: Avril Hoover 01/27/2018, 8:06 AM   Information in the above section will display in the discharge planner report.      The patient's care was discussed with the staff attending, Dr. Mcneil, who agrees with the assessment and plan.    Avril Hoover MD  PGY-3, Internal Medicine  Pager: [phone]  Please see sticky note for cross cover information    Interval History   Admitted to the obs unit overnight, but requiring 3-4 L oxygen to  maintain sats >92%. Pt still has some right knee and hip pain, but denies any shortness of breath, chest pain, rib pain, abdominal pain, or dizziness/lightheadedness. Pt otherwise has no complaints this AM, and interested in working with PT/OT.    A 4-point ROS was negative except as above.    Physical Exam   Vital Signs: Temp: 98  F (36.7  C) Temp src: Oral BP: 163/63   Heart Rate: 84 Resp: 16 SpO2: 93 % O2 Device: Nasal cannula Oxygen Delivery: 4 LPM  Weight: 0 lbs 0 oz  General Appearance: alert, interactive, NAD  Respiratory: non-labored, diminished towards the bases, but no wheezes or crackles  Chest: no tenderness to palpation along entire rib-cage  Cardiovascular: RRR, no m/r/g  GI: soft, NT, ND, +BS  Skin: warm and dry, no rashes on exposed surfaces  MSK: mild TTP along right knee, no swelling or erythema of the joint, full ROM, no tenderness of right hip joint, left-sided and RUE without any issues, no LE edema, 2+ peripheral pulses    Data   All images, labs, and meds reviewed in Epic.

## 2018-01-27 NOTE — ED PROVIDER NOTES
"  History     Chief Complaint   Patient presents with     Leg Pain     HPI  Lucie Stockton is an 82-year-old female who presents the ER with her  after being brought in because the patient apparently had her right knee buckle on her earlier today and then could not weight-bear on it.  The patient apparently fell a week ago, and has been ambulatory since that time, but today had her right knee buckle and complains of pain in her right knee and right hip.  The patient has no head or neck injury, no back pain, and has not been noted to have any incontinence.  Patient apparently has a previous history of DVT and recently flew back from [place] into [facility].        PAST MEDICAL HISTORY:   Past Medical History:   Diagnosis Date     Anemia      Aortic stenosis     abdominal     Atherosclerosis of aorta (H)     \"extensive disease with near occlusion below level of renal arteries\" on CT     Atherosclerosis of arteries of extremities (H)      Back pain     severe multilevel DJD, moderate spinal canal stenosis L4-5, severe L3-4     Degenerative joint disease      DVT of lower extremity, bilateral (H)     5/24/10 left distal femoral and great saphenous, 7/7/10 left:  extending to cfv, iliac , pop and post tibial, peronial, right:  distal fem and peroneal      Grave's disease      Hyperlipidaemia LDL goal < 70      Hypertension, essential      Hypothyroidism      Insomnia      Intermittent claudication (H)      Iron deficiency      Knee pain      Lumbar stenosis with neurogenic claudication      Osteoarthritis     ankle,foot, knee     Osteoporosis      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 JONO Goncalves - Returned BENIGN     INJECT EPIDURAL LUMBAR / SACRAL SINGLE N/A 1/5/2018    Procedure: INJECT EPIDURAL LUMBAR / SACRAL SINGLE;  lumbar interlaminar epidural steroid injection;  Surgeon: Jaylin Valadez MD;  Location: [facility] OR     UPPER GI ENDOSCOPY      11/10/10 erythematous gastropathy, angioectasia       FAMILY HISTORY:   Family History   Problem Relation Age of Onset     Breast Cancer Maternal Aunt 80     C.A.D. Father 54       SOCIAL HISTORY:   Social History   Substance Use Topics     Smoking status: Former Smoker     Packs/day: 0.50     Years: 15.00     Quit date: 9/13/1993     Smokeless tobacco: Never Used     Alcohol use Yes      Comment: social       Patient's Medications   New Prescriptions    No medications on file   Previous Medications    ALBUTEROL (VENTOLIN HFA) 108 (90 BASE) MCG/ACT INHALER    Inhale 2 puffs into the lungs every 4 hours as needed for shortness of breath / dyspnea or wheezing    AMLODIPINE (NORVASC) 10 MG TABLET    Take 1 tablet (10 mg) by mouth daily For blood pressure    ATORVASTATIN (LIPITOR) 40 MG TABLET    Take 2 tablets (80 mg) by mouth daily    BISACODYL (DULCOLAX) 10 MG SUPPOSITORY    Place 1 suppository (10 mg) rectally daily as needed for constipation    CALCIUM CARBONATE-VITAMIN D (CALCIUM 600 + D OR)    Take 1 tablet by mouth daily.    CITALOPRAM (CELEXA) 40 MG TABLET    Take 0.5 tablets (20 mg) by mouth daily    CLOPIDOGREL (PLAVIX) 75 MG TABLET    Take 1 tablet (75 mg) by mouth daily    FERROUS SULFATE (IRON) 325 (65 FE) MG TABLET    Take 1 tablet (325 mg) by mouth daily (with breakfast)    FERROUS SULFATE (IRON) 325 (65 FE) MG TABLET    Take 1 tablet (325 mg) by mouth 2 times daily To maintain iron levels     HYDROCHLOROTHIAZIDE (HYDRODIURIL) 50 MG TABLET    Take 1 tablet (50 mg) by mouth daily    KETOROLAC (ACULAR) 0.5 % OPHTHALMIC SOLUTION    Place 1 drop Into the left eye 4 times daily    LEVOTHYROXINE (SYNTHROID/LEVOTHROID) 50 MCG TABLET    Take 1 tablet (50 mcg) by mouth daily    LISINOPRIL (PRINIVIL/ZESTRIL) 40 MG TABLET    Take 1 tablet (40 mg) by mouth daily For blood pressure    MOXIFLOXACIN (VIGAMOX) 0.5 % OPHTHALMIC SOLUTION    Place 1 drop Into the left eye 4 times daily    NAPROXEN SODIUM 220 MG CAPSULE    Take 220 mg by mouth 2 times daily (with meals)    OXYCODONE (ROXICODONE) 5 MG IR TABLET    Take 1 tablet (5 mg) by mouth every 6 hours as needed for moderate to severe pain    PANTOPRAZOLE (PROTONIX) 40 MG EC TABLET    Take 1 tablet (40 mg) by mouth daily    POLYETHYLENE GLYCOL (MIRALAX) POWDER    Take one-half to one scoop once a day for maintaining soft stools    POTASSIUM CHLORIDE SA (K-DUR/KLOR-CON M) 20 MEQ CR TABLET    Take 1 tablet (20 mEq) by mouth daily    PREDNISOLONE ACETATE (PRED FORTE) 1 % OPHTHALMIC SUSP    Place 1 drop Into the left eye 4 times daily    TRAMADOL (ULTRAM) 50 MG TABLET    Take 1 tablet (50 mg) by mouth every 6 hours as needed for moderate pain (Take 1 tablet (50 mg) by mouth every 6 hours as needed for moderate pain or severe pain Maximum 270 tablets in 3 months)    TROLAMINE SALICYLATE (ASPERCREME) 10 % CREAM    Apply 1 g topically 3 times daily   Modified Medications    No medications on file   Discontinued Medications    No medications on file        No Known Allergies          I have reviewed the Medications, Allergies, Past Medical and Surgical History, and Social History in the Epic system.    Review of Systems   Constitutional: Negative for fever.   HENT: Negative for congestion.    Eyes: Negative for redness.   Respiratory: Negative for shortness of breath.    Cardiovascular: Negative for chest pain.   Gastrointestinal: Negative for abdominal pain.   Genitourinary:  Negative for difficulty urinating, dysuria and hematuria.   Musculoskeletal: Positive for arthralgias and gait problem. Negative for back pain, neck pain and neck stiffness.   Skin: Negative for color change.   Neurological: Negative for headaches.   Psychiatric/Behavioral: Negative for confusion.       Physical Exam   BP: (!) 139/36  Heart Rate: 76  Temp: 98.9  F (37.2  C)  Resp: 28  SpO2: 94 %      Physical Exam   Constitutional: No distress.   Elderly and seems slightly demented   HENT:   Head: Atraumatic.   Eyes: EOM are normal. Pupils are equal, round, and reactive to light.   Neck: Neck supple.   Cardiovascular: Normal heart sounds.    Pulmonary/Chest: Breath sounds normal.   Abdominal: Soft. There is no tenderness.   Musculoskeletal:   Pelvic rock negative but there is some tenderness over the greater trochanter on the right.  There is also some tenderness of the right knee without effusion or deformity.  Distal CMS is intact.   Neurological: She is alert.   Grossly intact and symmetric with strength bilaterally   Skin: Skin is warm.   Psychiatric: She has a normal mood and affect.       ED Course     ED Course     Procedures        Results for orders placed or performed during the hospital encounter of 01/26/18   XR Pelvis w Hip Right G/E 2 Views    Narrative    Exam:  XR PELVIS AND HIP RIGHT 2 VIEWS, 1/26/2018 8:45 PM    History: fall;     Comparison:  Abdominal radiograph 1/23/2017    Findings:  AP and frog-leg views of the right hip. AP view of the  pelvis. No acute osseous abnormality. Mild degenerative changes of the  hips. Degenerative changes of the visualized thoracic spine and  sacroiliac joints. Bones appear osteopenic. Pelvic phleboliths. No  dilated loops of bowel. Surgical clips over the pelvis and abdomen.      Impression    Impression:  No acute osseous abnormality.    I have personally reviewed the examination and initial interpretation  and I agree with the findings.    ARNOLD HOOKS,  MD   Knee XR, 1-2 views, right    Narrative    Preliminary Results. Full dictation to follow.          Impression    Impression:  No acute osseous abnormality. Moderate degenerative  changes of the knee.   US Lower Extremity Venous Duplex Right    Narrative    Preliminary report:  This is a preliminary resident interpretation. Full report to follow.        Impression    IMPRESSION:  No evidence of right lower extremity deep venous thrombosis.     D dimer quantitative   Result Value Ref Range    D Dimer 1.3 (H) 0.0 - 0.50 ug/ml FEU   INR   Result Value Ref Range    INR 0.99 0.86 - 1.14   Partial thromboplastin time   Result Value Ref Range    PTT 35 22 - 37 sec   CBC with platelets differential   Result Value Ref Range    WBC 9.8 4.0 - 11.0 10e9/L    RBC Count 4.23 3.8 - 5.2 10e12/L    Hemoglobin 12.7 11.7 - 15.7 g/dL    Hematocrit 38.8 35.0 - 47.0 %    MCV 92 78 - 100 fl    MCH 30.0 26.5 - 33.0 pg    MCHC 32.7 31.5 - 36.5 g/dL    RDW 15.5 (H) 10.0 - 15.0 %    Platelet Count 422 150 - 450 10e9/L    Diff Method Automated Method     % Neutrophils 82.4 %    % Lymphocytes 10.4 %    % Monocytes 5.7 %    % Eosinophils 1.1 %    % Basophils 0.2 %    % Immature Granulocytes 0.2 %    Nucleated RBCs 0 0 /100    Absolute Neutrophil 8.0 1.6 - 8.3 10e9/L    Absolute Lymphocytes 1.0 0.8 - 5.3 10e9/L    Absolute Monocytes 0.6 0.0 - 1.3 10e9/L    Absolute Eosinophils 0.1 0.0 - 0.7 10e9/L    Absolute Basophils 0.0 0.0 - 0.2 10e9/L    Abs Immature Granulocytes 0.0 0 - 0.4 10e9/L    Absolute Nucleated RBC 0.0    Comprehensive metabolic panel   Result Value Ref Range    Sodium 136 133 - 144 mmol/L    Potassium 3.7 3.4 - 5.3 mmol/L    Chloride 100 94 - 109 mmol/L    Carbon Dioxide 26 20 - 32 mmol/L    Anion Gap 9 3 - 14 mmol/L    Glucose 91 70 - 99 mg/dL    Urea Nitrogen 14 7 - 30 mg/dL    Creatinine 0.52 0.52 - 1.04 mg/dL    GFR Estimate >90 >60 mL/min/1.7m2    GFR Estimate If Black >90 >60 mL/min/1.7m2    Calcium 9.7 8.5 - 10.1 mg/dL     Bilirubin Total 0.4 0.2 - 1.3 mg/dL    Albumin 3.4 3.4 - 5.0 g/dL    Protein Total 6.6 (L) 6.8 - 8.8 g/dL    Alkaline Phosphatase 69 40 - 150 U/L    ALT 18 0 - 50 U/L    AST 16 0 - 45 U/L       Labs Ordered and Resulted from Time of ED Arrival Up to the Time of Departure from the ED   D DIMER QUANTITATIVE - Abnormal; Notable for the following:        Result Value    D Dimer 1.3 (*)     All other components within normal limits   CBC WITH PLATELETS DIFFERENTIAL - Abnormal; Notable for the following:     RDW 15.5 (*)     All other components within normal limits   COMPREHENSIVE METABOLIC PANEL - Abnormal; Notable for the following:     Protein Total 6.6 (*)     All other components within normal limits   INR   PARTIAL THROMBOPLASTIN TIME            Assessments & Plan (with Medical Decision Making)     I have reviewed the nursing notes.    Patient's x-rays were unremarkable and with good strength in her legs bilaterally by exam, I attempted to get the patient up to stand.  Patient was able to bear some weight on her right lower extremity but was unable to ambulate.  This was then attempted with a walker and the patient was unable to ambulate even with a walker secondary to right knee pain.  At this time the patient was placed on a observation unit bed upstairs for PT OT consult in the morning and possible TCU placement.    I have reviewed the findings, diagnosis, and plan with the patient.    Final diagnoses:   Contusion of right knee, initial encounter - with inability to ambulate   Contusion of right hip, initial encounter     Amado Sharma MD    IMatheus, am serving as a trained medical scribe to document services personally performed by Amado Sharma MD, based on the provider's statements to me.      IAmado MD, was physically present and have reviewed and verified the accuracy of this note documented by Matheus Perera.      1/26/2018   [facility], [place], EMERGENCY DEPARTMENT      Amado Sharma MD  01/26/18 [id number]

## 2018-01-27 NOTE — PROGRESS NOTES
Patient A&O, AVSS with exception of O2 Sats decreased occasionally to 82-89%, primarily maintained 90-96% on 4L per NC. /58  Temp 97.7  F (36.5  C) (Oral)  Resp 20  SpO2 92%  Patient tolerating low fat / low sodium diet, voiding per bedpan. Paint reported to right knee, lidocaine patch applied and remains in place. MD rounds and informed of right rib fx. Patient has been in bed most of shift with exception of PT eval this afternoon, pt tolerated standing and took a few steps with walker, PT to continue further therapy tomorrow. SW to see patient in am.  at bedside this afternoon, daughter at bedside at this time.   Patient placed on Droplet isolation, positive for Influenza A.

## 2018-01-27 NOTE — H&P
[facility] ED Observation Admission Note    Chief Complaint   Patient presents with     Leg Pain       Assessment/Plan:  1. Left Knee Pain: p/t ED with  because patient apparently had right knee buckle on her earlier today and then could not weight-bear on it.  Fell a week ago, and had been ambulatory since that time, but today had her right knee buckle and complains of pain in her right knee and right hip. In ED, HR 70's-80's, 's-160's/30's-70's, RR 18-28, SaO2 92-94% on RA. She was then placed on NC O2 at 3L, Temp 98.9. Labs show normal CMP and CBC. INR normal. D-dimer 1.3. LE US was negative for DVT. Xray of the right hip and knee are both negative for fracture. In ED patient was helped out off bed was able to bear some weight on her right lower extremity but was unable to ambulate. This was then attempted with a walker and the patient was unable to ambulate even with a walker secondary to right knee pain. She is being admitted to the observation unit for PT OT consult in the morning and possible TCU placement. In the ED the patient was given oxycodone 5mg po x 1.   - MRI in AM  - Continue with home oxycodone and tramadol  - Lidoderm patch to left knee  - PT and SW consult    2. Hypoxia: In ED patient was placed on 3L NC but this was not reported. On arrival to the unit, O2 was turned off however continue to desat to high 80's and O2 was placed again up to 3L to maintain sats.  - ABG stat  - CT Chest to r/o PE and PNA or rib fx  - IS    3. Confusion: appears to have intermittent memory problems with basic neuro exam questions.  - CT Head stat  - UA     Chronic Medical Problems:  ## Vascular disease: - Continue with PTA clopidogrel  ## Hypothyroidism: - Continue with PTA levothyroxine  ## Asthma: - Continue with PTA Albuterol PRN  ## HTN: - Continue with PTA amlodipine, lisinopril, hold HCTZ  ## Chronic Pain: - Continue with PTA  Tramadol and oxycodone    Addendum: Was called by Radiologist that CT Chest  showed a stable chronic rightt PE; some ground glass opacities that could be an upcoming or improving infection. New mildly displaced right sided rib fractures. ABG unremarkable. UA negative for infection but 10 ketones.   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  The patient apparently fell a week ago, and had been ambulatory since that time, but today had her right knee buckle and complains of pain in her right knee and right hip. She states she feels more weakness than pain. Her history is somewhat inconsistent. I attempted reaching her  without an answer.     In the ED, HR 70's-80's, 's-160's/30's-70's, RR 18-28, SaO2 92-94% on RA. She was then placed on NC O2 at 3L, Temp 98.9. Labs show normal CMP and CBC. INR normal. D-dimer 1.3. LE US was negative for DVT. Xray of the right hip and knee are both negative for fracture. Patient was helped out off bed was able to bear some weight on her right lower extremity but was unable to ambulate. This was then attempted with a walker and the patient was unable to ambulate even with a walker secondary to right knee pain. She is being admitted to the observation unit for PT OT consult in the morning and possible TCU placement. In the ED the patient was given oxycodone 5mg po x 1.     On admission to the observation unit the patient was stable with still some left hip and knee pain.     History:    Past Medical History:   Diagnosis Date     Anemia      Aortic stenosis     abdominal     Atherosclerosis of aorta (H)      "\"extensive disease with near occlusion below level of renal arteries\" on CT     Atherosclerosis of arteries of extremities (H)      Back pain     severe multilevel DJD, moderate spinal canal stenosis L4-5, severe L3-4     Degenerative joint disease      DVT of lower extremity, bilateral (H)     5/24/10 left distal femoral and great saphenous, 7/7/10 left:  extending to cfv, iliac , pop and post tibial, peronial, right:  distal fem and peroneal      Grave's disease      Hyperlipidaemia LDL goal < 70      Hypertension, essential      Hypothyroidism      Insomnia      Intermittent claudication (H)      Iron deficiency      Knee pain      Lumbar stenosis with neurogenic claudication      Osteoarthritis     ankle,foot, knee     Osteoporosis      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 JONO Goncalves - Returned BENIGN     INJECT EPIDURAL LUMBAR / SACRAL SINGLE N/A 1/5/2018    Procedure: INJECT EPIDURAL LUMBAR / SACRAL SINGLE;  lumbar interlaminar epidural steroid injection;  Surgeon: Jaylin Valadez MD;  Location: [facility] OR     UPPER GI ENDOSCOPY      11/10/10 erythematous gastropathy, angioectasia       Family History   Problem Relation Age of Onset     Breast Cancer Maternal Aunt 80     C.A.D. Father 54       Social History     Social History     Marital status:      Spouse name: N/A     Number of children: N/A     Years of education: N/A     Occupational History "     Not on file.     Social History Main Topics     Smoking status: Former Smoker     Packs/day: 0.50     Years: 15.00     Quit date: 9/13/1993     Smokeless tobacco: Never Used     Alcohol use Yes      Comment: social     Drug use: No     Sexual activity: No     Other Topics Concern     Not on file     Social History Narrative     for 52 years. Retired from [employer]. Frequent world travel.         No current facility-administered medications on file prior to encounter.   Current Outpatient Prescriptions on File Prior to Encounter:  citalopram (CELEXA) 40 MG tablet Take 0.5 tablets (20 mg) by mouth daily   potassium chloride SA (K-DUR/KLOR-CON M) 20 MEQ CR tablet Take 1 tablet (20 mEq) by mouth daily   traMADol (ULTRAM) 50 MG tablet Take 1 tablet (50 mg) by mouth every 6 hours as needed for moderate pain (Take 1 tablet (50 mg) by mouth every 6 hours as needed for moderate pain or severe pain Maximum 270 tablets in 3 months)   amLODIPine (NORVASC) 10 MG tablet Take 1 tablet (10 mg) by mouth daily For blood pressure   trolamine salicylate (ASPERCREME) 10 % cream Apply 1 g topically 3 times daily   naproxen sodium 220 MG capsule Take 220 mg by mouth 2 times daily (with meals)   oxyCODONE (ROXICODONE) 5 MG IR tablet Take 1 tablet (5 mg) by mouth every 6 hours as needed for moderate to severe pain   atorvastatin (LIPITOR) 40 MG tablet Take 2 tablets (80 mg) by mouth daily   pantoprazole (PROTONIX) 40 MG EC tablet Take 1 tablet (40 mg) by mouth daily   moxifloxacin (VIGAMOX) 0.5 % ophthalmic solution Place 1 drop Into the left eye 4 times daily   prednisoLONE acetate (PRED FORTE) 1 % ophthalmic susp Place 1 drop Into the left eye 4 times daily   ketorolac (ACULAR) 0.5 % ophthalmic solution Place 1 drop Into the left eye 4 times daily   bisacodyl (DULCOLAX) 10 MG Suppository Place 1 suppository (10 mg) rectally daily as needed for constipation   levothyroxine (SYNTHROID/LEVOTHROID) 50 MCG tablet Take 1  tablet (50 mcg) by mouth daily   clopidogrel (PLAVIX) 75 MG tablet Take 1 tablet (75 mg) by mouth daily   hydrochlorothiazide (HYDRODIURIL) 50 MG tablet Take 1 tablet (50 mg) by mouth daily   lisinopril (PRINIVIL/ZESTRIL) 40 MG tablet Take 1 tablet (40 mg) by mouth daily For blood pressure   polyethylene glycol (MIRALAX) powder Take one-half to one scoop once a day for maintaining soft stools   ferrous sulfate (IRON) 325 (65 FE) MG tablet Take 1 tablet (325 mg) by mouth 2 times daily To maintain iron levels   albuterol (VENTOLIN HFA) 108 (90 BASE) MCG/ACT inhaler Inhale 2 puffs into the lungs every 4 hours as needed for shortness of breath / dyspnea or wheezing   ferrous sulfate (IRON) 325 (65 FE) MG tablet Take 1 tablet (325 mg) by mouth daily (with breakfast)   [DISCONTINUED] tolterodine (DETROL) 1 MG tablet Take 1-2 tablets by mouth At Bedtime.   Calcium Carbonate-Vitamin D (CALCIUM 600 + D OR) Take 1 tablet by mouth daily.       Data:    Results for orders placed or performed during the hospital encounter of 01/26/18   XR Pelvis w Hip Right G/E 2 Views    Narrative    Exam:  XR PELVIS AND HIP RIGHT 2 VIEWS, 1/26/2018 8:45 PM    History: fall;     Comparison:  Abdominal radiograph 1/23/2017    Findings:  AP and frog-leg views of the right hip. AP view of the  pelvis. No acute osseous abnormality. Mild degenerative changes of the  hips. Degenerative changes of the visualized thoracic spine and  sacroiliac joints. Bones appear osteopenic. Pelvic phleboliths. No  dilated loops of bowel. Surgical clips over the pelvis and abdomen.      Impression    Impression:  No acute osseous abnormality.    I have personally reviewed the examination and initial interpretation  and I agree with the findings.    ARNOLD HOOKS MD   Knee XR, 1-2 views, right    Narrative    Exam:  XR KNEE RT 1 /2 VW, 1/26/2018 8:48 PM    History: fall;     Comparison:  8/10/2017    Findings:  AP and lateral views of the right knee. No acute  osseous  abnormality. Bones appear osteopenic. Moderate medial and lateral  joint space loss. Small joint effusion. Calcification posterior to the  femur was not seen on prior exams and may represent prior ligamentous  injury, vascular calcification, or soft tissue calcification. Soft  tissues are otherwise unremarkable.      Impression    Impression:  No acute osseous abnormality. Moderate degenerative  changes of the knee.    I have personally reviewed the examination and initial interpretation  and I agree with the findings.    ARNOLD HOOKS MD   US Lower Extremity Venous Duplex Right    Narrative    EXAMINATION: DOPPLER VENOUS ULTRASOUND OF THE RIGHT LOWER EXTREMITY,  1/26/2018 9:01 PM     COMPARISON: Ultrasound on 12/6/2010    HISTORY: calf and thigh pain after recent air travel    TECHNIQUE:  Gray-scale evaluation with compression, spectral flow, and  color Doppler assessment of the deep venous system of the right leg  from groin to knee, and then at the ankle.    FINDINGS:  In the right lower extremity, the common femoral, femoral, popliteal  and posterior tibial veins demonstrate normal compressibility and  blood flow.      Impression    IMPRESSION:  No evidence of right lower extremity deep venous thrombosis.    I have personally reviewed the examination and initial interpretation  and I agree with the findings.    ARNOLD HOOKS MD   D dimer quantitative   Result Value Ref Range    D Dimer 1.3 (H) 0.0 - 0.50 ug/ml FEU   INR   Result Value Ref Range    INR 0.99 0.86 - 1.14   Partial thromboplastin time   Result Value Ref Range    PTT 35 22 - 37 sec   CBC with platelets differential   Result Value Ref Range    WBC 9.8 4.0 - 11.0 10e9/L    RBC Count 4.23 3.8 - 5.2 10e12/L    Hemoglobin 12.7 11.7 - 15.7 g/dL    Hematocrit 38.8 35.0 - 47.0 %    MCV 92 78 - 100 fl    MCH 30.0 26.5 - 33.0 pg    MCHC 32.7 31.5 - 36.5 g/dL    RDW 15.5 (H) 10.0 - 15.0 %    Platelet Count 422 150 - 450 10e9/L    Diff  Method Automated Method     % Neutrophils 82.4 %    % Lymphocytes 10.4 %    % Monocytes 5.7 %    % Eosinophils 1.1 %    % Basophils 0.2 %    % Immature Granulocytes 0.2 %    Nucleated RBCs 0 0 /100    Absolute Neutrophil 8.0 1.6 - 8.3 10e9/L    Absolute Lymphocytes 1.0 0.8 - 5.3 10e9/L    Absolute Monocytes 0.6 0.0 - 1.3 10e9/L    Absolute Eosinophils 0.1 0.0 - 0.7 10e9/L    Absolute Basophils 0.0 0.0 - 0.2 10e9/L    Abs Immature Granulocytes 0.0 0 - 0.4 10e9/L    Absolute Nucleated RBC 0.0    Comprehensive metabolic panel   Result Value Ref Range    Sodium 136 133 - 144 mmol/L    Potassium 3.7 3.4 - 5.3 mmol/L    Chloride 100 94 - 109 mmol/L    Carbon Dioxide 26 20 - 32 mmol/L    Anion Gap 9 3 - 14 mmol/L    Glucose 91 70 - 99 mg/dL    Urea Nitrogen 14 7 - 30 mg/dL    Creatinine 0.52 0.52 - 1.04 mg/dL    GFR Estimate >90 >60 mL/min/1.7m2    GFR Estimate If Black >90 >60 mL/min/1.7m2    Calcium 9.7 8.5 - 10.1 mg/dL    Bilirubin Total 0.4 0.2 - 1.3 mg/dL    Albumin 3.4 3.4 - 5.0 g/dL    Protein Total 6.6 (L) 6.8 - 8.8 g/dL    Alkaline Phosphatase 69 40 - 150 U/L    ALT 18 0 - 50 U/L    AST 16 0 - 45 U/L   EKG 12-lead, tracing only   Result Value Ref Range    Interpretation ECG Click View Image link to view waveform and result       ROS:    Review Of Systems  Skin: negative  Eyes: negative for, visual blurring, double vision  Ears/Nose/Throat: negative for, nasal congestion, purulent rhinorrhea, earache, tinnitus  Respiratory: Cough- mild, non production x few weeks. No shortness of breath, No dyspnea on exertion, No cough and No hemoptysis  Cardiovascular: negative  Gastrointestinal: negative  Genitourinary: negative  Musculoskeletal: joint pain  Neurologic: negative for, syncope, stroke, seizures, paralysis, local weakness, numbness or tingling of hands, involuntary movements, speech problems and tremor  Psychiatric: negative  Hematologic/Lymphatic/Immunologic: negative  Endocrine: negative  PCP: Dr. Montgomery    10  point ROS negative other than the symptoms noted above.      Exam:  Vitals:  B/P: 147/69, T: 98.1, P: Data Unavailable, R: 18    Constitutional: healthy, alert, no distress, cooperative and smiling  Head: Normocephalic. No masses, lesions, tenderness or abnormalities  Neck: Neck supple. No adenopathy. Thyroid symmetric, normal size,, Carotids without bruits.  ENT: ENT exam normal, no neck nodes or sinus tenderness  Cardiovascular: PMI normal. No lifts, heaves, or thrills. RRR. No murmurs, clicks gallops or rub  Respiratory: mild cough recently intermittently. negative, Percussion normal. Good diaphragmatic excursion. Lungs clear  Gastrointestinal: Abdomen soft, non-tender. BS normal. No masses, organomegaly  : Deferred  Musculoskeletal: Mild tenderness on right knee mostly lateral aspect. Full ROM present. Able to bear weight but not able to amblate . Good strength bilaterally throughout.   Skin: no suspicious lesions or rashes  Neurologic: Sensation intact bilaterally throughout. Alert to person, place and partial time. CN II- XII intact.   Psychiatric: appears to have some memory loss but unclear if this is new or old  Hematologic/Lymphatic/Immunologic: normal ant/post cervical, axillary, supraclavicular and inguinal nodes    Consults: none  FEN: ADAT to regular diet  DVT prophylaxis: Lovenox  GI prophylaxis: protonix  BM prophylaxis: pericolace  Code Status: full code  Disposition: home once patient returns to baseline. PT to evaluate and determine disposition status    Signed:  Chantell Sanders PA-C   January 27, 2018 at 12:06 AM

## 2018-01-27 NOTE — PLAN OF CARE
Problem: Patient Care Overview  Goal: Discharge Needs Assessment  Problem: Patient Care Overview  Goal: Discharge Needs Assessment  Observation goals PRIOR TO DISCHARGE     Comments: List all goals to be met before discharge home:   - Blood Glucose greater than 70 less than 250 on two consecutive readings.  BS 82 on admit    `Ketones absent from urine: Positive ketones, ivf infusing    - Tolerating oral intake to maintain hydration : Yes, drank water    - Safe disposition plan has been identified : SW, PT/OT and  Care coordinator consulted    - Nurse to notify provider when observation goals have been met and patient is ready for discharge.The  Pt is alert and oriented with some confusion noted. She complained of right knee pain. 5 mg Oxycodone administered.  She denies sob, chest pain and coughing.  She is on bed rest, Pt /OT to eval Pt in the AM. Used bed pan, voided x 1. She was oriented  to room and ismael light within reach.IV Rocephin admimistedred Blood pressure 152/63, temperature 98.4  F (36.9  C), temperature source Oral, resp. rate 16, SpO2 93 %, not currently breastfeeding.  B ed alarm in place, will continue to monitor

## 2018-01-28 ENCOUNTER — APPOINTMENT (OUTPATIENT)
Dept: PHYSICAL THERAPY | Facility: CLINIC | Age: 83
DRG: 193 | End: 2018-01-28
Payer: MEDICARE

## 2018-01-28 ENCOUNTER — APPOINTMENT (OUTPATIENT)
Dept: OCCUPATIONAL THERAPY | Facility: CLINIC | Age: 83
DRG: 193 | End: 2018-01-28
Payer: MEDICARE

## 2018-01-28 LAB
ANION GAP SERPL CALCULATED.3IONS-SCNC: 10 MMOL/L (ref 3–14)
BUN SERPL-MCNC: 12 MG/DL (ref 7–30)
CALCIUM SERPL-MCNC: 8.6 MG/DL (ref 8.5–10.1)
CHLORIDE SERPL-SCNC: 104 MMOL/L (ref 94–109)
CO2 SERPL-SCNC: 24 MMOL/L (ref 20–32)
CREAT SERPL-MCNC: 0.48 MG/DL (ref 0.52–1.04)
ERYTHROCYTE [DISTWIDTH] IN BLOOD BY AUTOMATED COUNT: 15.5 % (ref 10–15)
FLUAV H1 2009 PAND RNA SPEC QL NAA+PROBE: NEGATIVE
FLUAV H1 RNA SPEC QL NAA+PROBE: NEGATIVE
FLUAV H3 RNA SPEC QL NAA+PROBE: POSITIVE
FLUAV RNA SPEC QL NAA+PROBE: POSITIVE
FLUBV RNA SPEC QL NAA+PROBE: NEGATIVE
GFR SERPL CREATININE-BSD FRML MDRD: >90 ML/MIN/1.7M2
GLUCOSE SERPL-MCNC: 92 MG/DL (ref 70–99)
HADV DNA SPEC QL NAA+PROBE: NEGATIVE
HADV DNA SPEC QL NAA+PROBE: NEGATIVE
HCT VFR BLD AUTO: 39.2 % (ref 35–47)
HGB BLD-MCNC: 12.6 G/DL (ref 11.7–15.7)
HMPV RNA SPEC QL NAA+PROBE: NEGATIVE
HPIV1 RNA SPEC QL NAA+PROBE: NEGATIVE
HPIV2 RNA SPEC QL NAA+PROBE: NEGATIVE
HPIV3 RNA SPEC QL NAA+PROBE: NEGATIVE
MCH RBC QN AUTO: 29.4 PG (ref 26.5–33)
MCHC RBC AUTO-ENTMCNC: 32.1 G/DL (ref 31.5–36.5)
MCV RBC AUTO: 91 FL (ref 78–100)
MICROBIOLOGIST REVIEW: ABNORMAL
PLATELET # BLD AUTO: 396 10E9/L (ref 150–450)
POTASSIUM SERPL-SCNC: 3.5 MMOL/L (ref 3.4–5.3)
RBC # BLD AUTO: 4.29 10E12/L (ref 3.8–5.2)
RHINOVIRUS RNA SPEC QL NAA+PROBE: NEGATIVE
RSV RNA SPEC QL NAA+PROBE: NEGATIVE
RSV RNA SPEC QL NAA+PROBE: NEGATIVE
SODIUM SERPL-SCNC: 138 MMOL/L (ref 133–144)
SPECIMEN SOURCE: ABNORMAL
WBC # BLD AUTO: 7.1 10E9/L (ref 4–11)

## 2018-01-28 PROCEDURE — 99233 SBSQ HOSP IP/OBS HIGH 50: CPT | Mod: GC | Performed by: INTERNAL MEDICINE

## 2018-01-28 PROCEDURE — A9270 NON-COVERED ITEM OR SERVICE: HCPCS | Mod: GY | Performed by: PHYSICIAN ASSISTANT

## 2018-01-28 PROCEDURE — 12000001 ZZH R&B MED SURG/OB UMMC

## 2018-01-28 PROCEDURE — 97530 THERAPEUTIC ACTIVITIES: CPT | Mod: GP | Performed by: PHYSICAL THERAPIST

## 2018-01-28 PROCEDURE — 80048 BASIC METABOLIC PNL TOTAL CA: CPT | Performed by: STUDENT IN AN ORGANIZED HEALTH CARE EDUCATION/TRAINING PROGRAM

## 2018-01-28 PROCEDURE — 25000132 ZZH RX MED GY IP 250 OP 250 PS 637: Mod: GY | Performed by: STUDENT IN AN ORGANIZED HEALTH CARE EDUCATION/TRAINING PROGRAM

## 2018-01-28 PROCEDURE — 97530 THERAPEUTIC ACTIVITIES: CPT | Mod: GO

## 2018-01-28 PROCEDURE — 97165 OT EVAL LOW COMPLEX 30 MIN: CPT | Mod: GO

## 2018-01-28 PROCEDURE — 97116 GAIT TRAINING THERAPY: CPT | Mod: GP | Performed by: PHYSICAL THERAPIST

## 2018-01-28 PROCEDURE — 25000132 ZZH RX MED GY IP 250 OP 250 PS 637: Mod: GY | Performed by: PHYSICIAN ASSISTANT

## 2018-01-28 PROCEDURE — 94640 AIRWAY INHALATION TREATMENT: CPT

## 2018-01-28 PROCEDURE — 40000133 ZZH STATISTIC OT WARD VISIT

## 2018-01-28 PROCEDURE — A9270 NON-COVERED ITEM OR SERVICE: HCPCS | Mod: GY | Performed by: STUDENT IN AN ORGANIZED HEALTH CARE EDUCATION/TRAINING PROGRAM

## 2018-01-28 PROCEDURE — 36415 COLL VENOUS BLD VENIPUNCTURE: CPT | Performed by: STUDENT IN AN ORGANIZED HEALTH CARE EDUCATION/TRAINING PROGRAM

## 2018-01-28 PROCEDURE — 40000193 ZZH STATISTIC PT WARD VISIT: Performed by: PHYSICAL THERAPIST

## 2018-01-28 PROCEDURE — 97535 SELF CARE MNGMENT TRAINING: CPT | Mod: GO

## 2018-01-28 PROCEDURE — 25000125 ZZHC RX 250: Performed by: INTERNAL MEDICINE

## 2018-01-28 PROCEDURE — 85027 COMPLETE CBC AUTOMATED: CPT | Performed by: STUDENT IN AN ORGANIZED HEALTH CARE EDUCATION/TRAINING PROGRAM

## 2018-01-28 RX ORDER — POTASSIUM CHLORIDE 750 MG/1
20 TABLET, EXTENDED RELEASE ORAL DAILY
Status: DISCONTINUED | OUTPATIENT
Start: 2018-01-28 | End: 2018-01-30 | Stop reason: HOSPADM

## 2018-01-28 RX ORDER — OSELTAMIVIR PHOSPHATE 75 MG/1
75 CAPSULE ORAL 2 TIMES DAILY
Status: DISCONTINUED | OUTPATIENT
Start: 2018-01-28 | End: 2018-01-30 | Stop reason: HOSPADM

## 2018-01-28 RX ADMIN — POTASSIUM CHLORIDE 20 MEQ: 10 TABLET, EXTENDED RELEASE ORAL at 14:07

## 2018-01-28 RX ADMIN — OSELTAMIVIR PHOSPHATE 75 MG: 75 CAPSULE ORAL at 08:59

## 2018-01-28 RX ADMIN — LISINOPRIL 40 MG: 20 TABLET ORAL at 08:23

## 2018-01-28 RX ADMIN — FERROUS SULFATE 325 MG: 325 TABLET, FILM COATED ORAL at 08:23

## 2018-01-28 RX ADMIN — LIDOCAINE 1 PATCH: 560 PATCH PERCUTANEOUS; TOPICAL; TRANSDERMAL at 08:20

## 2018-01-28 RX ADMIN — LEVOTHYROXINE SODIUM 50 MCG: 25 TABLET ORAL at 08:15

## 2018-01-28 RX ADMIN — PANTOPRAZOLE SODIUM 40 MG: 40 TABLET, DELAYED RELEASE ORAL at 08:15

## 2018-01-28 RX ADMIN — NAPROXEN 250 MG: 250 TABLET ORAL at 18:39

## 2018-01-28 RX ADMIN — CLOPIDOGREL 75 MG: 75 TABLET, FILM COATED ORAL at 08:24

## 2018-01-28 RX ADMIN — HYDROCHLOROTHIAZIDE 50 MG: 12.5 TABLET ORAL at 08:22

## 2018-01-28 RX ADMIN — ATORVASTATIN CALCIUM 80 MG: 40 TABLET, FILM COATED ORAL at 23:09

## 2018-01-28 RX ADMIN — NAPROXEN 250 MG: 250 TABLET ORAL at 08:59

## 2018-01-28 RX ADMIN — OSELTAMIVIR PHOSPHATE 75 MG: 75 CAPSULE ORAL at 19:48

## 2018-01-28 RX ADMIN — AMLODIPINE BESYLATE 10 MG: 10 TABLET ORAL at 08:23

## 2018-01-28 RX ADMIN — IPRATROPIUM BROMIDE AND ALBUTEROL SULFATE 3 ML: .5; 3 SOLUTION RESPIRATORY (INHALATION) at 12:03

## 2018-01-28 RX ADMIN — CITALOPRAM HYDROBROMIDE 20 MG: 10 TABLET ORAL at 08:23

## 2018-01-28 ASSESSMENT — ACTIVITIES OF DAILY LIVING (ADL): PREVIOUS_RESPONSIBILITIES: MEAL PREP;HOUSEKEEPING;LAUNDRY;SHOPPING;MEDICATION MANAGEMENT;FINANCES;DRIVING

## 2018-01-28 NOTE — PROGRESS NOTES
" 01/28/18 1100   Quick Adds   Type of Visit Initial Occupational Therapy Evaluation   Living Environment   Lives With spouse   Living Arrangements house   Home Accessibility stairs within home;bed and bath are not on the first floor   Number of Stairs to Enter Home 0   Number of Stairs Within Home 14   Stair Railings at Home inside, present on right side   Transportation Available car;family or friend will provide   Living Environment Comment pt lives with her spouse in a house with all living space on second floor. Pt reports she has a tub and a separate walk-in shower that she uses. Pt reports spouse works full-time.   Self-Care   Usual Activity Tolerance good   Current Activity Tolerance fair   Equipment Currently Used at Home none   Activity/Exercise/Self-Care Comment Pt IND at baseline, reports having a cane from a prior surgery   Functional Level Prior   Ambulation 0-->independent   Transferring 0-->independent   Toileting 0-->independent   Bathing 0-->independent   Dressing 0-->independent   Eating 0-->independent   Communication 0-->understands/communicates without difficulty   Swallowing 0-->swallows foods/liquids without difficulty   Cognition 0 - no cognition issues reported   Fall history within last six months yes   Number of times patient has fallen within last six months 1   Which of the above functional risks had a recent onset or change? ambulation;transferring;toileting;bathing;fall history   Prior Functional Level Comment Pt previously independent with all ADLs/IADLs   General Information   Onset of Illness/Injury or Date of Surgery - Date 01/26/18   Referring Physician Avril Hoover MD   Patient/Family Goals Statement To \"feel better\"   Additional Occupational Profile Info/Pertinent History of Current Problem Lucie Stockton is a 82 year old female with hx of ovarian CA s/p TAHBSO 2011, bilateral provoked PE (off warfarin since 2010 when Pt had GIB while on it), athresclerosis of aorta, " HTN, HLD, and hypothyroidism who presented to the ED on 1/26 with ongoing pain of the right hip and knee s/p mechanical fall approx 1 week prior, admitted for pain management and found to be newly hypoxic with no signs of acute PE, but found to be Influenza A positive. Transferred to medicine and switched to inpatient on 1/27.    Precautions/Limitations fall precautions   Weight-Bearing Status - LUE full weight-bearing   Weight-Bearing Status - RUE full weight-bearing   Weight-Bearing Status - LLE full weight-bearing   Weight-Bearing Status - RLE full weight-bearing   General Observations Pt supine upon arrival on 3LO2, pt reporting she is not on oxygen at home   General Info Comments Up with assist   Cognitive Status Examination   Orientation orientation to person, place and time   Level of Consciousness alert   Able to Follow Commands WNL/WFL   Personal Safety (Cognitive) WNL/WFL   Memory intact   Attention No deficits were identified   Visual Perception   Visual Perception No deficits were identified;Wears glasses   Sensory Examination   Sensory Quick Adds No deficits were identified   Pain Assessment   Patient Currently in Pain Yes, see Vital Sign flowsheet   Integumentary/Edema   Integumentary/Edema no deficits were identifed   Posture   Posture forward head position;kyphosis   Range of Motion (ROM)   ROM Quick Adds No deficits were identified   Strength   Strength Comments Pt with decreased strength/activity tolerance pt with increased difficulty with transfers and mobility   Hand Strength   Hand Strength Comments WFL   Muscle Tone Assessment   Muscle Tone Quick Adds No deficits were identified   Coordination   Upper Extremity Coordination No deficits were identified   Mobility   Bed Mobility Comments Min A with increased time due to pain   Transfer Skill: Bed to Chair/Chair to Bed   Level of [place]: Bed to Chair moderate assist (50% patients effort)   Physical Assist/Nonphysical Assist: Bed to Chair  "set-up required;1 person assist   Weight-Bearing Restrictions full weight-bearing   Transfer Skill: Sit to Stand   Level of [place]: Sit/Stand minimum assist (75% patients effort)   Physical Assist/Nonphysical Assist: Sit/Stand 1 person assist   Upper Body Dressing   Level of [place]: Dress Upper Body minimum assist (75% patients effort)   Grooming   Level of [place]: Grooming stand-by assist   Instrumental Activities of Daily Living (IADL)   Previous Responsibilities meal prep;housekeeping;laundry;shopping;medication management;finances;driving   Activities of Daily Living Analysis   Impairments Contributing to Impaired Activities of Daily Living balance impaired;strength decreased;pain   General Therapy Interventions   Planned Therapy Interventions ADL retraining;IADL retraining;ROM;strengthening;transfer training;home program guidelines;progressive activity/exercise;risk factor education   Clinical Impression   Criteria for Skilled Therapeutic Interventions Met yes, treatment indicated   OT Diagnosis Decreased independence in ADLs/IADLs   Influenced by the following impairments pain, decreased strength/activity tolerance, deconditioning, balance deficits   Assessment of Occupational Performance 3-5 Performance Deficits   Identified Performance Deficits bathing, dressing, toilet transfers, shopping   Clinical Decision Making (Complexity) Low complexity   Therapy Frequency 5 times/wk   Predicted Duration of Therapy Intervention (days/wks) 2 weeks   Anticipated Equipment Needs at Discharge (TBD)   Anticipated Discharge Disposition Transitional Care Facility   Risks and Benefits of Treatment have been explained. Yes   Patient, Family & other staff in agreement with plan of care Yes   [facility] TM \"6 Clicks\"   2016, Trustees of [facility], under license to [employer].  All rights reserved.   6 Clicks Short Forms Daily Activity Inpatient Short Form   [facility]  \"6 " "Clicks\" Daily Activity Inpatient Short Form   1. Putting on and taking off regular lower body clothing? 2 - A Lot   2. Bathing (including washing, rinsing, drying)? 2 - A Lot   3. Toileting, which includes using toilet, bedpan or urinal? 2 - A Lot   4. Putting on and taking off regular upper body clothing? 3 - A Little   5. Taking care of personal grooming such as brushing teeth? 4 - None   6. Eating meals? 4 - None   Daily Activity Raw Score (Score out of 24.Lower scores equate to lower levels of function) 17   Total Evaluation Time   Total Evaluation Time (Minutes) 6     "

## 2018-01-28 NOTE — PROGRESS NOTES
"[facility], [place]    Internal Medicine Progress Note - Maroon Service    Main Plans for Today   Tamiflu started for Influenza A  PT/OT  Wean oxygen as able  Resp viral panel pending    Assessment & Plan   Lucie Stockton is a 82 year old female with hx of ovarian CA s/p TAHBSO 2011, bilateral provoked PE (off warfarin since 2010 when Pt had GIB while on it), athresclerosis of aorta, HTN, HLD, and hypothyroidism who presented to the ED on 1/26 with ongoing pain of the right hip and knee s/p mechanical fall approx 1 week prior, admitted for pain management and found to be newly hypoxic with no signs of acute PE, but found to be Influenza A positive. Transferred to medicine and switched to inpatient on 1/27.     # Acute Right Knee and Hip Pain s/p Mechanical Fall   Pt had a mechanical fall 1 week prior to admission while on vacation in [place]. Went to a local hospital there, with no evidence of fractures or displacement. Continued to have pain, but able to ambulate until day of admission, when she felt her \"knee buckle.\" Repeat XRs of the right hip and knee negative for acute osseous changes. Pain persisted and Pt unable to ambulate 2/2 the pain, so she was admitted to observation for pain management and PT eval.  - Continue PTA naproxen BID, oxycodone and tramadol PRN with bowel regimen  - Lidocaine patch to knee  - PT/OT, up with assist only for now  - Hold off on further imaging of joints for now  - SW to work on d/c to TCU    # Acute Hypoxia, improved  # Influenza A+  # Acute R 7th and 8th Minimally Displaced Rib Fractures  Pt found to be hypoxic to high 80s on RA in the ED, started on 3L NC. Pt denies any current symptoms of SOB, but does report mild cough over the last several weeks. Pt with hx of PE in 2010, provoked, has not been on warfarin since 2010 2/2 GIB. ABG showed mild hypoxemia to 72 on 3L NC. RLE US without signs of DVT. CT PE on admission shows age-indeterminate, but possible " chronic PE in the RLL. Mild GGO in RUL. Mod centrilobular emphysema. Minimally displaced lateral R 7th and 8th rib fractures. Scattered pulmonary nodules up to 5mm. Pt previous smoker, but quit 10+ years ago. Afebrile. No leukocytosis. Most likely mild resp infection +/- atelectasis from recent rib fractures and shallow breathing.  Procal negative. Influenza A positive on rapid antigen test.   - Started tamiflu 75 mg BID for 5-day course on 1/28 AM, droplet precautions  - Respiratory viral panel pending  - Given dose of ceftriaxone and azithromycin prior to transfer, given positive flu and negative procal, will d/c further abx at this time  - No signs of acute PE, will not start AC for this  - Duonebs QID while inpt, outpt PCP f/u to discuss PFTs and/or possible starting COPD medications pending improvement from influenza  - Will need PCP f/u of incidental findings of pulmonary nodules, mild distal esophageal wall thickening, and calcifications of the pancreatic head  - Wean oxygen as able to maintain sats >90%  - APAP PRN for pain for rib fractures  - IS 10 times/hr while awake    ## Chronic Medical Conditions  # HTN: continue PTA amlodipine 10 mg daily, HCTZ 50 mg daily, lisinopril 40 mg daily  # HLD: continue PTA atorvastatin 80 mg QHS  # PVD: continue PTA plavix  # Hypothyroidism: continue PTA levothyroxine  # GERD: continue PTA pantoprazole  # Chronic Iron Deficiency Anemia: continue PTA ferrous sulfate    Diet: Low Saturated Fat Na <2400 mg  Fluids: none  DVT Prophylaxis: Enoxaparin (Lovenox) SQ  Code Status: Full Code    Disposition Plan   Expected discharge: 2 - 3 days, recommended to transitional care unit once stable on room air, pain well controlled, and evaluated by PT/OT.     Entered: Avril Hoover 01/28/2018, 7:14 AM   Information in the above section will display in the discharge planner report.      The patient's care was discussed with the staff attending, Dr. Mcneil, who agrees with the  assessment and plan.    Avril Hoover MD  PGY-3, Internal Medicine  Pager: [phone]  Please see sticky note for cross cover information    Interval History   Influenza A positive, started on tamiflu. Weaned to RA. PT rec'd TCU after seeing how far Pt is below baseline for mobility currently. Pt denies SOB or significant cough this AM. Pain in hip, knee, and ribs well controlled currently. Pt otherwise denies any issues currently.    A 4-point ROS was negative except as above.    Physical Exam   Vital Signs: Temp: 98.4  F (36.9  C) Temp src: Oral BP: 136/57   Heart Rate: 65 Resp: 16 SpO2: 96 % O2 Device: None (Room air) Oxygen Delivery: 4 LPM  Weight: 0 lbs 0 oz  General Appearance: alert, interactive, NAD  Respiratory: non-labored, diminished towards the bases, but no wheezes or crackles  Chest: no tenderness to palpation along entire rib-cage  Cardiovascular: RRR, no m/r/g  GI: soft, NT, ND, +BS  Skin: warm and dry, no rashes on exposed surfaces  MSK: mild TTP along right knee, no swelling or erythema of the joint, full ROM, no tenderness of right hip joint, left-sided and RUE without any issues, no LE edema, 2+ peripheral pulses    Data   All images, labs, and meds reviewed in Epic.

## 2018-01-28 NOTE — PROGRESS NOTES
Patient A&O, AVSS with exception of O2 Sats decreased occasionally to 85-89%, primarily maintained % on 4L per NC; weaned to 2L per NC with sats 94-96% at rest, will attempt to wean as able. Patient tolerating low fat / low sodium diet.  Paint reported to right knee, lidocaine patch applied and remains in place, no further prn pain medications needed this shift. MD rounded this am, answered patient's questions and informed pt of 2 rib rib fractures on right side, will allow to heal on their own. OT assisted with am cares. Patient has been up in chair for breakfast. PT walked with patient in hallway, PT to continue further therapy tomorrow. Patient up with assist x 1 with walker and gait belt, ambulating to bathroom and voiding spontaneously. Patient up with assist x 1 with walker and gait belt, ambulating to bathroom and voiding spontaneously. SW to see patient in am.  at bedside and paster visited this afternoon. Patient placed on Droplet isolation, positive for Influenza A.

## 2018-01-28 NOTE — PLAN OF CARE
Problem: Patient Care Overview  Goal: Plan of Care/Patient Progress Review  Discharge Planner OT   Patient plan for discharge: Did not state.  Current status: Min A bed mobility with increased time due to pain. Min A sit <> stand with assist x 1, mod A to transfer from bed > chair. Min A UB dressing, mod A UB bathing with setup. SBA for basic grooming/hygiene tasks while seated.  Barriers to return to prior living situation: decreased strength/activity tolerance, pain, impaired mobility  Recommendations for discharge: TCU  Rationale for recommendations: Pt is currently below functional baseline for ADLs, transfers, and functional mobility, would benefit from continued therapy to address above deficits and maximize strength/independence in order to return to PLOF.         Entered by: Ceci Santa 01/28/2018 12:05 PM

## 2018-01-28 NOTE — PLAN OF CARE
Problem: Patient Care Overview  Goal: Plan of Care/Patient Progress Review  PT 6C:   Discharge Planner PT   Patient plan for discharge: pt with strong preference to go home  Current status: pt ambulates 20ft x 2 with FWW and CGA, wheelchair follow used. Pt CGA for transfers with FWW, continues to be limited by RLE pain, pt's R knee ace wrapped for pain control, pt reports some improvements.   Barriers to return to prior living situation: pain, assist for mobility, caregiver dependence  Recommendations for discharge: TCU  Rationale for recommendations: pt continues to be below baseline mobility but demonstrates improved mobility from yesterday. At this time requires increased rehab to return to PLOF.       Entered by: Rachel Gamino 01/28/2018 3:23 PM

## 2018-01-28 NOTE — PLAN OF CARE
Problem: Patient Care Overview  Goal: Plan of Care/Patient Progress Review  Patient alert and oriented. Denies pain at this time. Tested positive for influenza. No shortness of breath noted. VS wnl.

## 2018-01-29 ENCOUNTER — APPOINTMENT (OUTPATIENT)
Dept: OCCUPATIONAL THERAPY | Facility: CLINIC | Age: 83
DRG: 193 | End: 2018-01-29
Payer: MEDICARE

## 2018-01-29 PROCEDURE — 25000132 ZZH RX MED GY IP 250 OP 250 PS 637: Mod: GY | Performed by: PHYSICIAN ASSISTANT

## 2018-01-29 PROCEDURE — A9270 NON-COVERED ITEM OR SERVICE: HCPCS | Mod: GY | Performed by: STUDENT IN AN ORGANIZED HEALTH CARE EDUCATION/TRAINING PROGRAM

## 2018-01-29 PROCEDURE — 12000001 ZZH R&B MED SURG/OB UMMC

## 2018-01-29 PROCEDURE — A9270 NON-COVERED ITEM OR SERVICE: HCPCS | Mod: GY | Performed by: PHYSICIAN ASSISTANT

## 2018-01-29 PROCEDURE — 99233 SBSQ HOSP IP/OBS HIGH 50: CPT | Mod: GC | Performed by: INTERNAL MEDICINE

## 2018-01-29 PROCEDURE — 25000132 ZZH RX MED GY IP 250 OP 250 PS 637: Mod: GY | Performed by: STUDENT IN AN ORGANIZED HEALTH CARE EDUCATION/TRAINING PROGRAM

## 2018-01-29 PROCEDURE — 97535 SELF CARE MNGMENT TRAINING: CPT | Mod: GO

## 2018-01-29 PROCEDURE — 40000133 ZZH STATISTIC OT WARD VISIT

## 2018-01-29 RX ADMIN — FERROUS SULFATE 325 MG: 325 TABLET, FILM COATED ORAL at 08:01

## 2018-01-29 RX ADMIN — LIDOCAINE 1 PATCH: 560 PATCH PERCUTANEOUS; TOPICAL; TRANSDERMAL at 07:59

## 2018-01-29 RX ADMIN — POTASSIUM CHLORIDE 20 MEQ: 10 TABLET, EXTENDED RELEASE ORAL at 08:01

## 2018-01-29 RX ADMIN — NAPROXEN 250 MG: 250 TABLET ORAL at 08:01

## 2018-01-29 RX ADMIN — NAPROXEN 250 MG: 250 TABLET ORAL at 18:09

## 2018-01-29 RX ADMIN — AMLODIPINE BESYLATE 10 MG: 10 TABLET ORAL at 08:02

## 2018-01-29 RX ADMIN — HYDROCHLOROTHIAZIDE 50 MG: 12.5 TABLET ORAL at 08:01

## 2018-01-29 RX ADMIN — OSELTAMIVIR PHOSPHATE 75 MG: 75 CAPSULE ORAL at 19:44

## 2018-01-29 RX ADMIN — LEVOTHYROXINE SODIUM 50 MCG: 25 TABLET ORAL at 08:02

## 2018-01-29 RX ADMIN — ATORVASTATIN CALCIUM 80 MG: 40 TABLET, FILM COATED ORAL at 22:21

## 2018-01-29 RX ADMIN — OSELTAMIVIR PHOSPHATE 75 MG: 75 CAPSULE ORAL at 08:01

## 2018-01-29 RX ADMIN — LISINOPRIL 40 MG: 20 TABLET ORAL at 08:02

## 2018-01-29 RX ADMIN — CLOPIDOGREL 75 MG: 75 TABLET, FILM COATED ORAL at 08:01

## 2018-01-29 RX ADMIN — CITALOPRAM HYDROBROMIDE 20 MG: 10 TABLET ORAL at 08:01

## 2018-01-29 RX ADMIN — PANTOPRAZOLE SODIUM 40 MG: 40 TABLET, DELAYED RELEASE ORAL at 08:02

## 2018-01-29 NOTE — PROGRESS NOTES
Admitted for pain of the right hip and knee s/p mechanical fall and positive for influenza A. A&O, AVSS. Assist x 1with ambulation and walker. R knee pain well controlled with lidocaine patch. No PO analgesics needed during shift.Minimally displaced lateral R 7th and 8th rib fractures. Denies R side ribcage pain. On 2LPM overnight, SAT ~95%. Independently voiding. On Tamiflu x5 days. 2 episodes of liquid stool noted. Denies abd pain.Team to be notfied for possible stool culture .SW to work on d/c to TCU.  /78 (BP Location: Right arm)  Temp 98.4  F (36.9  C) (Oral)  Resp 16  Wt 44.4 kg (97 lb 12.8 oz)  SpO2 95%  BMI 19.1 kg/m2

## 2018-01-29 NOTE — PROGRESS NOTES
Pt is A&Ox4, VSS, and tolerating a low sodium diet. Pt has a R PIV that is saline locked and flushes without pain at the site. Pt ambulates with a walker and an assist of one. Pt is on droplet precautions for positive flu and is also on enteric precautions while we wait for a stool sample. Pt had 2 liquid stools per previous nurse, but had no BM on day shift today. Pain is well managed with lidocaine patch, and denies presence of other pain. Denies SOB, numbness, tingling, N/V, or dizziness upon standing. SW to work on d/c to TCU. Nurse will continue to monitor.

## 2018-01-29 NOTE — PROGRESS NOTES
"[facility], [place]    Internal Medicine Progress Note - Maroon Service    Main Plans for Today   Continue oseltamivir for Influenza A; will reach 48 hours on anti-influenza agent 1/30 am  PT/OT  Wean oxygen as able    Assessment & Plan   Lucie Stockton is a 82 year old female with hx of ovarian CA s/p TAHBSO 2011, bilateral provoked PE (off warfarin since 2010 when Pt had GIB while on it), athresclerosis of aorta, HTN, HLD, and hypothyroidism who presented to the ED on 1/26 with ongoing pain of the right hip and knee s/p mechanical fall approx 1 week prior, admitted for pain management and found to be newly hypoxic with no signs of acute PE, but found to be Influenza A positive. Transferred to medicine and switched to inpatient on 1/27.     # Acute Right Knee and Hip Pain s/p Mechanical Fall   Pt had a mechanical fall 1 week prior to admission while on vacation in [place]. Went to a local hospital there, with no evidence of fractures or displacement. Continued to have pain, but able to ambulate until day of admission, when she felt her \"knee buckle.\" Repeat XRs of the right hip and knee negative for acute osseous changes. Pain persisted and Pt unable to ambulate 2/2 the pain, so she was admitted to observation for pain management and PT eval.  - Continue PTA naproxen BID, oxycodone and tramadol PRN with bowel regimen  - Lidocaine patch to knee  - PT/OT, up with assist only for now  - Hold off on further imaging of joints for now  - SW to work on d/c to TCU    # Acute Hypoxia, improved  # Influenza A+  # Acute R 7th and 8th Minimally Displaced Rib Fractures  Pt found to be hypoxic to high 80s on RA in the ED, started on 3L NC. Pt denies any current symptoms of SOB, but does report mild cough over the last several weeks. Pt with hx of PE in 2010, provoked, has not been on warfarin since 2010 2/2 GIB. ABG showed mild hypoxemia to 72 on 3L NC. RLE US without signs of DVT. CT PE on admission shows " age-indeterminate, but possible chronic PE in the RLL. Mild GGO in RUL. Mod centrilobular emphysema. Minimally displaced lateral R 7th and 8th rib fractures. Scattered pulmonary nodules up to 5mm. Pt previous smoker, but quit 10+ years ago. Afebrile. No leukocytosis. Most likely mild resp infection +/- atelectasis from recent rib fractures and shallow breathing. Procal negative. Influenza A positive on rapid antigen test. No other viruses on respiratory panel.   - Started oseltamivir 75 mg BID for 5-day course on 1/28 AM, needs to remain inpatient until 48 hours on oseltamivir prior to discharge to TCU  - Given dose of ceftriaxone and azithromycin prior to transfer, given positive flu and negative procal, will d/c further abx at this time  - No signs of acute PE, will not start AC for this  - Duonebs QID while inpt, outpt PCP f/u to discuss PFTs and/or possible starting COPD medications pending improvement from influenza  - Will need PCP f/u of incidental findings of pulmonary nodules, mild distal esophageal wall thickening, and calcifications of the pancreatic head  - Wean oxygen as able to maintain sats >90%  - APAP PRN for pain for rib fractures  - IS 10 times/hr while awake    ## Chronic Medical Conditions  # HTN: continue PTA amlodipine 10 mg daily, HCTZ 50 mg daily, lisinopril 40 mg daily  # HLD: continue PTA atorvastatin 80 mg QHS  # PVD: continue PTA plavix  # Hypothyroidism: continue PTA levothyroxine  # GERD: continue PTA pantoprazole  # Chronic Iron Deficiency Anemia: continue PTA ferrous sulfate    Diet: Low Saturated Fat Na <2400 mg  Fluids: none  DVT Prophylaxis: Enoxaparin (Lovenox) SQ  Code Status: Full Code    Disposition Plan   Expected discharge: tomorrow, recommended to transitional care unit once stable on room air, pain well controlled, and evaluated by PT/OT.     Entered: Josue Lees 01/29/2018, 7:02 AM   Information in the above section will display in the discharge planner report.       The patient's care was discussed with the staff attending, Dr. Mcneil, who agrees with the assessment and plan.    Josue Lees MD  PGY-1, Internal Medicine  Pager: [phone]  Please see sticky note for cross cover information    Interval History   Continues on oseltamivir. Pt feels okay this morning. No breathing trouble. Agreeable to TCU given mobility at present. Pain in ribs much better. No other concerns.    A 4-point ROS was negative except as above.    Physical Exam   Vital Signs: Temp: 98.4  F (36.9  C) Temp src: Oral BP: 144/78   Heart Rate: 78 Resp: 16 SpO2: 95 % O2 Device: Nasal cannula Oxygen Delivery: 2 LPM  Weight: 97 lbs 12.8 oz  General Appearance: alert, interactive, NAD  Respiratory: non-labored, symmetric breath sounds in anterior lung fields, but no wheezes or crackles  Chest: no tenderness to palpation along entire rib-cage  Cardiovascular: RRR w/ normal S1/S2; no m/r/g  GI: soft, NT, ND, +BS  Skin: warm and dry, no rashes on exposed surfaces  MSK: no LE edema  Neuro: alert and conversational; answers questions appropriately; able to move 4 extremities sponaneously    Data   All images, labs, and meds reviewed in Epic.

## 2018-01-29 NOTE — PLAN OF CARE
Problem: Patient Care Overview  Goal: Plan of Care/Patient Progress Review  Discharge Planner OT   Patient plan for discharge: Home  Current status: CGA bed mobility, CGA sit <> stand with FWW. CGA to walk to bathroom with FWW and min A to step in walk-in shower with heavy reliance on grab bars for shower task. Min A for shower task for washing/drying. Min A UB dressing, SBA LB dressing with setup. Close SBA with grab bars and FWW for toilet transfer, ind with vinh cares.   Barriers to return to prior living situation: pain, decreased strength/activity tolerance, increased dependence for ADLs  Recommendations for discharge: TCU  Rationale for recommendations: Pt is currently below functional baseline for ADLs, transfers, and functional mobility, would benefit from continued therapy to address above deficits and maximize strength/independence in order to return to PLOF.       Entered by: Ceci Santa 01/29/2018 11:10 AM

## 2018-01-29 NOTE — CONSULTS
Social Work: Assessment with Discharge Plan    Patient Name:  Lucie Guzman  :  1935  Age:  82 year old  MRN:  [id number]  Risk/Complexity Score:  Filed Complexity Screen Score: 7  Completed assessment with:  Pt, daughter    Presenting Information   Reason for Referral:  Discharge plan  Date of Intake:  2018  Referral Source:  Chart Review  Decision Maker:  Pt when able  Alternate Decision Maker:  Per [facility] policy Spouse Pradeep is [facility] decision maker  Health Care Directive:  Will bring in copy  Living Situation:  House  Previous Functional Status:  Independent  Patient and family understanding of hospitalization:  Pt states that she is needing rehab placement after the hospital.  Cultural/Language/Spiritual Considerations:  No needs reported this encounter.  Adjustment to Illness:  Pt very pleasant and open to assessment with SW.     Physical Health  Reason for Admission:    1. Contusion of right knee, initial encounter    2. Contusion of right hip, initial encounter      Services Needed/Recommended:  TCU    Mental Health/Chemical Dependency  Diagnosis:  None   Support/Services in Place:  None  Services Needed/Recommended:  None    Support System  Significant relationship at present time:  Daughter at bedside.  Spouse lives with pt at home.  Family of origin is available for support:  Yes  Other support available:  Yes  Gaps in support system:  Pt needing TCU rehab  Patient is caregiver to:  None     Provider Information   Primary Care Physician:  Marii Montgomery   [phone]   Clinic:  [address] [place] [address] / [facility] [address]      :  None    Financial   Income Source:  Retired  Financial Concerns:  Pt does not repot any concerns.  Insurance:    Payor/Plan Subscriber Name Rel Member # Group #   MEDICARE - MEDICARE LUCIE GUZMAN  [id number]       ATTN CLAIMS, [address]       Discharge Plan   Patient and family discharge goal:  TCU  Provided education on discharge plan:   YES  Patient agreeable to discharge plan:  YES  A list of Medicare Certified Facilities was provided to the patient and/or family to encourage patient choice. Patient's choices for facility are:    [facility]  PH: [phone]  F: [phone]    [facility]  PH: [phone]  F: [phone]    [facility]  PH: [phone]  F: [phone]    Will NH provide Skilled rehabilitation or complex medical:  YES  General information regarding anticipated insurance coverage and possible out of pocket cost was discussed. Patient and patient's family are aware patient may incur the cost of transportation to the facility, pending insurance payment: YES  Barriers to discharge:  Medical stability: TCU requires 48 hours on Tamiflu prior to admission (Tamiflu first dose given yesterday 1/28/18 at 9:00 am.  48 hour daivd is tomorrow 1/30/18 at 9:00 am).    Discharge Recommendations   Anticipated Disposition:  Facility:  TCU  Transportation Needs:  Family:  As able  Name of Transportation Company and Phone:  As able    Additional comments   JIMMY met with pt and family to introduce self and role in consult.  SW provided pt/family with After Your Hospital Stay education brochure detailing optional levels of care/rehab after surgery.  Pt and daughter are in agreement with TCU placement after this hospitalization.  SW started a referral to [facility] as this facility reports openings tomorrow.  Facility reports they are needing pt to be on 48 hours of Tamiflu before pt can be admitted to the facility.  RNCC and MD team updated.  Will follow up with facility if can be accepted tomorrow.  Pt and family in agreement with the plan.    AURY Daniels, APSW  6C Unit   Phone: [phone]  Pager: [phone]  Unit: [phone]

## 2018-01-30 VITALS
WEIGHT: 97.8 LBS | BODY MASS INDEX: 19.1 KG/M2 | SYSTOLIC BLOOD PRESSURE: 127 MMHG | TEMPERATURE: 98.2 F | HEART RATE: 85 BPM | RESPIRATION RATE: 16 BRPM | OXYGEN SATURATION: 94 % | DIASTOLIC BLOOD PRESSURE: 63 MMHG

## 2018-01-30 LAB
ANION GAP SERPL CALCULATED.3IONS-SCNC: 7 MMOL/L (ref 3–14)
BUN SERPL-MCNC: 13 MG/DL (ref 7–30)
CALCIUM SERPL-MCNC: 9.1 MG/DL (ref 8.5–10.1)
CHLORIDE SERPL-SCNC: 102 MMOL/L (ref 94–109)
CO2 SERPL-SCNC: 26 MMOL/L (ref 20–32)
CREAT SERPL-MCNC: 0.5 MG/DL (ref 0.52–1.04)
GFR SERPL CREATININE-BSD FRML MDRD: >90 ML/MIN/1.7M2
GLUCOSE SERPL-MCNC: 93 MG/DL (ref 70–99)
POTASSIUM SERPL-SCNC: 3.8 MMOL/L (ref 3.4–5.3)
SODIUM SERPL-SCNC: 135 MMOL/L (ref 133–144)

## 2018-01-30 PROCEDURE — 80048 BASIC METABOLIC PNL TOTAL CA: CPT | Performed by: STUDENT IN AN ORGANIZED HEALTH CARE EDUCATION/TRAINING PROGRAM

## 2018-01-30 PROCEDURE — A9270 NON-COVERED ITEM OR SERVICE: HCPCS | Mod: GY | Performed by: STUDENT IN AN ORGANIZED HEALTH CARE EDUCATION/TRAINING PROGRAM

## 2018-01-30 PROCEDURE — A9270 NON-COVERED ITEM OR SERVICE: HCPCS | Mod: GY | Performed by: PHYSICIAN ASSISTANT

## 2018-01-30 PROCEDURE — 99238 HOSP IP/OBS DSCHRG MGMT 30/<: CPT | Mod: GC | Performed by: STUDENT IN AN ORGANIZED HEALTH CARE EDUCATION/TRAINING PROGRAM

## 2018-01-30 PROCEDURE — 25000132 ZZH RX MED GY IP 250 OP 250 PS 637: Mod: GY | Performed by: PHYSICIAN ASSISTANT

## 2018-01-30 PROCEDURE — 36415 COLL VENOUS BLD VENIPUNCTURE: CPT | Performed by: STUDENT IN AN ORGANIZED HEALTH CARE EDUCATION/TRAINING PROGRAM

## 2018-01-30 PROCEDURE — 25000132 ZZH RX MED GY IP 250 OP 250 PS 637: Mod: GY | Performed by: STUDENT IN AN ORGANIZED HEALTH CARE EDUCATION/TRAINING PROGRAM

## 2018-01-30 RX ORDER — OXYCODONE HYDROCHLORIDE 5 MG/1
5 TABLET ORAL EVERY 6 HOURS PRN
Qty: 10 TABLET | Refills: 0 | Status: SHIPPED | OUTPATIENT
Start: 2018-01-30 | End: 2018-02-15

## 2018-01-30 RX ORDER — TRAMADOL HYDROCHLORIDE 50 MG/1
50 TABLET ORAL EVERY 6 HOURS PRN
Qty: 10 TABLET | Refills: 0 | Status: SHIPPED | OUTPATIENT
Start: 2018-01-30 | End: 2018-02-15

## 2018-01-30 RX ORDER — ACETAMINOPHEN 325 MG/1
650 TABLET ORAL EVERY 4 HOURS PRN
Qty: 100 TABLET | DISCHARGE
Start: 2018-01-30 | End: 2018-02-15

## 2018-01-30 RX ORDER — OSELTAMIVIR PHOSPHATE 75 MG/1
75 CAPSULE ORAL 2 TIMES DAILY
Qty: 10 CAPSULE | Refills: 0 | DISCHARGE
Start: 2018-01-30 | End: 2018-02-15

## 2018-01-30 RX ORDER — OSELTAMIVIR PHOSPHATE 75 MG/1
75 CAPSULE ORAL 2 TIMES DAILY
Qty: 10 CAPSULE | Refills: 0 | Status: SHIPPED | OUTPATIENT
Start: 2018-01-30 | End: 2018-01-30

## 2018-01-30 RX ORDER — OSELTAMIVIR PHOSPHATE 75 MG/1
75 CAPSULE ORAL 2 TIMES DAILY
Qty: 6 CAPSULE | Refills: 0 | DISCHARGE
Start: 2018-01-30 | End: 2018-01-30

## 2018-01-30 RX ADMIN — LIDOCAINE 1 PATCH: 560 PATCH PERCUTANEOUS; TOPICAL; TRANSDERMAL at 09:02

## 2018-01-30 RX ADMIN — PANTOPRAZOLE SODIUM 40 MG: 40 TABLET, DELAYED RELEASE ORAL at 08:59

## 2018-01-30 RX ADMIN — CITALOPRAM HYDROBROMIDE 20 MG: 10 TABLET ORAL at 08:59

## 2018-01-30 RX ADMIN — FERROUS SULFATE 325 MG: 325 TABLET, FILM COATED ORAL at 08:59

## 2018-01-30 RX ADMIN — LEVOTHYROXINE SODIUM 50 MCG: 25 TABLET ORAL at 08:59

## 2018-01-30 RX ADMIN — POTASSIUM CHLORIDE 20 MEQ: 10 TABLET, EXTENDED RELEASE ORAL at 08:59

## 2018-01-30 RX ADMIN — LISINOPRIL 40 MG: 20 TABLET ORAL at 08:59

## 2018-01-30 RX ADMIN — CLOPIDOGREL 75 MG: 75 TABLET, FILM COATED ORAL at 09:09

## 2018-01-30 RX ADMIN — HYDROCHLOROTHIAZIDE 50 MG: 12.5 TABLET ORAL at 08:59

## 2018-01-30 RX ADMIN — NAPROXEN 250 MG: 250 TABLET ORAL at 10:13

## 2018-01-30 RX ADMIN — OSELTAMIVIR PHOSPHATE 75 MG: 75 CAPSULE ORAL at 09:00

## 2018-01-30 RX ADMIN — AMLODIPINE BESYLATE 10 MG: 10 TABLET ORAL at 08:59

## 2018-01-30 NOTE — PROGRESS NOTES
Social Work Services Progress Note     Hospital Day: 2  Date of Initial Social Work Evaluation:  1/29/18  Collaborated with:  Pt, daughter SNF admissions     Data: Pt is a 82 year old female being followed by JIMMY for placement to TCU.     Intervention:  JIMMY received a call from [facility] that the pt is accepted for TCU for tomorrow.  Pt will have completed her 48 hour Tamiflu course required by TCU at 9:00 am on 1/30/18.  Pt states her spouse will drive her at 11 am tomorrow.  Will hand off to unit SW in the morning.     - Referrals:    [facility]  PH: [phone]  F: [phone]     Assessment: Pt and daughter updated on discharge disposition.     Plan:    Anticipated Disposition: Facility:  TCU    Barriers to d/c plan: 48 hour coverage with Tamiflu    Follow Up: SW to coordinate with unit SW discharge tomorrow morning with family.     AURY Daniels, INOCENTEW  6C Unit   Phone: [phone]  Pager: [phone]  Unit: [phone]

## 2018-01-30 NOTE — PROGRESS NOTES
Pt A&Ox4 and VSS. Pain controled with lidocaine patch to right thigh. Pt ambulating STA with walker, per pt improving from yesterday with mobility. Shower this morning with OT. Continues to require NC 2L while resting, SaO2 %. Tolerating 2g sodium diet and voiding spontaneously. No stools today, need sample for c.dif and enteric labs. Will continue to monitor with transfer to TCU tomorrow.

## 2018-01-30 NOTE — DISCHARGE SUMMARY
[facility], [place]    Internal Medicine Discharge Summary- Linda Service    Date of Admission:  1/26/2018  Date of Discharge:  1/30/2018  Discharging Attending Provider: David Olson  Discharge Team: Linda 3    Discharge Diagnoses   Influenza  Acute R knee and R hip pain s/p mechanical fall    Follow-ups Needed After Discharge   - f/u pulmonary nodules found on chest CT; recommend f/u chest CT in 1 year per Fleischner criteria    Hospital Course   Lucie Stockton is a 82-year-old woman with a history of ovarian cancer status post surgery in 2011, provoked pulmonary embolism off warfarin since 2010, hypertension, hyperlipidemia, and hypothyroidism who was originally admitted through the ED on 1/26 and then transferred to medicine and switched to inpatient on 1/27 for management of acute right knee and right hip pain status-post a mechanical fall 1 week prior to admission while on vacation in [place].    # Acute pain status-post mechanical fall: She originally presented to hospital while in [place], and there were no evidence of fractures or displacement there.  She continued to have pain in admission repeat x-rays of the right hip and knee were negative for any acute fracture.  Given her difficulty ambulating secondary to pain, she was admitted for pain management and physical/occupational therapy evaluation.  In the hospital, she was continued on her home naproxen, oxycodone, and tramadol as well as a bowel regimen.  Her pain improved on this regimen, and our therapy services recommended that she go to TCU for short-term rehabilitation.  - Short-term rehabilitation at TCU  - We will continue home pain medications including naproxen, Tylenol, oxycodone    # Influenza A: Her oxygen saturations were found to be in the high 80s on room air in the ED, and she was started on nasal cannula.  She had a history of a provoked pulmonary embolism in 2010 but was no longer on anticoagulation, but  on arrival to our institution a CT pulmonary embolism protocol showed age-indeterminant but possibly chronic pulmonary embolisms in the right lower lobe but no acute large pulmonary embolus.  There were some scattered pulmonary nodules up to 5 mm; the patient was a previous smoker but quit more than 10 years ago.  She was ultimately tested and found to be influenza A positive and started on oseltamivir 75 mg for 5 day course in the morning on 1/28. She was able to be weaned off O2 prior to d/c.  - Continue Tamiflu for 5 day course, last dose will be on evening of 2/1  - No signs of acute pulmonary embolism and anticoagulation was not started  - Follow-up with primary care physician of incidental pulmonary nodules found on chest CT; see above      Consultations This Hospital Stay   Physical therapy  Occupation therapy    Code Status   Full Code     The patient was discussed with Dr. David Olson.    Del Hamilton, PGY-3  [facility]    ______________________________________________________________________    Physical Exam   Vital Signs: Temp: 98.2  F (36.8  C) Temp src: Oral BP: 127/63 Pulse: 85 Heart Rate: 74 Resp: 16 SpO2: 94 % O2 Device: Nasal cannula Oxygen Delivery: 2 LPM  Weight: 97 lbs 12.8 oz    General Appearance: Lying in bed, chatty, nontoxic, no apparent distress  Respiratory: Clear bilaterally without wheezing, on 2 L nasal cannula  Cardiovascular: Distant heart sounds, no murmur appreciated  GI: Soft, non-tender  Skin: No rashes or lesions    Significant Results and Procedures   CT PE protocol 1/27:  IMPRESSION:   1. Age-indeterminate, but possibly chronic pulmonary emboli in the  right lower lobe.  2. Mild groundglass opacities in the posterior right upper lobe,  likely infectious/inflammatory.  3. Moderate apically predominant centrilobular emphysema.  4. Scattered pulmonary nodules measuring up to 5 mm. Recommend  follow-up chest CT in one year per the Fleischner Society  criteria.  5. Minimally displaced lateral right seventh and eighth rib fractures.  6. Possible mild distal esophageal wall thickening, suggestive of  esophagitis.  7. Calcifications in the pancreatic head with a rounded associated  cystic focus likely representing a pseudocyst, findings likely related  to chronic pancreatitis, though evaluation is limited. Consider  further dedicated imaging of the pancreas as clinically indicated.    1/27 influenza PCR positive    Pending Results     Unresulted Labs Ordered in the Past 30 Days of this Admission     No orders found from 11/27/2017 to 1/27/2018.             Primary Care Physician   Marii Montgomery    Discharge Disposition   Discharged to rehabilitation facility  Condition at discharge: Stable    Discharge Orders     General info for SNF   Length of Stay Estimate: Short Term Care: Estimated # of Days <30  Condition at Discharge: Improving  Level of care:skilled   Rehabilitation Potential: Good  Admission H&P remains valid and up-to-date: Yes  Recent Chemotherapy: N/A  Use Nursing Home Standing Orders: N/A     Mantoux instructions   Give two-step Mantoux (PPD) Per Facility Policy Yes     Reason for your hospital stay   You were admitted for pain control after a fall. In the hospital, you also were found to have influenza and were treated with medicine to help you.     Activity - Up with nursing assistance     Full Code     Physical Therapy Adult Consult   Evaluate and treat as clinically indicated.    Reason:  Deconditioning     Occupational Therapy Adult Consult   Evaluate and treat as clinically indicated.    Reason:  Deconditioning     Droplet Isolation     Oxygen - Nasal cannula   2 Lpm by nasal cannula to keep O2 sats 92% or greater.     Fall precautions     Advance Diet as Tolerated   Follow this diet upon discharge: Regular       Discharge Medications   Current Discharge Medication List      START taking these medications    Details   acetaminophen (TYLENOL)  325 MG tablet Take 2 tablets (650 mg) by mouth every 4 hours as needed for mild pain  Qty: 100 tablet    Associated Diagnoses: Chronic low back pain, unspecified back pain laterality, with sciatica presence unspecified; Contusion of right hip, initial encounter; Contusion of right knee, initial encounter      oseltamivir (TAMIFLU) 75 MG capsule Take 1 capsule (75 mg) by mouth 2 times daily for 3 days  Qty: 6 capsule, Refills: 0    Associated Diagnoses: Influenza A         CONTINUE these medications which have CHANGED    Details   !! oxyCODONE IR (ROXICODONE) 5 MG tablet Take 1 tablet (5 mg) by mouth every 6 hours as needed for moderate to severe pain  Qty: 10 tablet, Refills: 0    Associated Diagnoses: Chronic low back pain, unspecified back pain laterality, with sciatica presence unspecified; Pain of left lower leg      !! oxyCODONE IR (ROXICODONE) 5 MG tablet Take 1 tablet (5 mg) by mouth every 6 hours as needed for moderate to severe pain  Qty: 10 tablet, Refills: 0    Associated Diagnoses: Chronic low back pain, unspecified back pain laterality, with sciatica presence unspecified; Contusion of right knee, initial encounter; Contusion of right hip, initial encounter      traMADol (ULTRAM) 50 MG tablet Take 1 tablet (50 mg) by mouth every 6 hours as needed for moderate pain (Take 1 tablet (50 mg) by mouth every 6 hours as needed for moderate pain or severe pain)  Qty: 10 tablet, Refills: 0    Associated Diagnoses: Contusion of right knee, initial encounter; Chronic low back pain, unspecified back pain laterality, with sciatica presence unspecified       !! - Potential duplicate medications found. Please discuss with provider.      CONTINUE these medications which have NOT CHANGED    Details   citalopram (CELEXA) 40 MG tablet Take 0.5 tablets (20 mg) by mouth daily  Qty: 45 tablet, Refills: 1    Associated Diagnoses: Depression, unspecified depression type      potassium chloride SA (K-DUR/KLOR-CON M) 20 MEQ CR  tablet Take 1 tablet (20 mEq) by mouth daily  Qty: 90 tablet, Refills: 1    Associated Diagnoses: Hypopotassemia      amLODIPine (NORVASC) 10 MG tablet Take 1 tablet (10 mg) by mouth daily For blood pressure  Qty: 90 tablet, Refills: 2    Associated Diagnoses: Essential hypertension      trolamine salicylate (ASPERCREME) 10 % cream Apply 1 g topically 3 times daily  Qty: 170 g, Refills: 11    Associated Diagnoses: Arthralgia of left lower leg      naproxen sodium 220 MG capsule Take 220 mg by mouth 2 times daily (with meals)  Qty: 60 capsule, Refills: 2    Associated Diagnoses: Chronic low back pain, unspecified back pain laterality, with sciatica presence unspecified; Pain of left lower leg      atorvastatin (LIPITOR) 40 MG tablet Take 2 tablets (80 mg) by mouth daily  Qty: 90 tablet, Refills: 3    Associated Diagnoses: Hyperlipidemia, unspecified hyperlipidemia type      pantoprazole (PROTONIX) 40 MG EC tablet Take 1 tablet (40 mg) by mouth daily  Qty: 90 tablet, Refills: 3    Associated Diagnoses: Other iron deficiency anemias      moxifloxacin (VIGAMOX) 0.5 % ophthalmic solution Place 1 drop Into the left eye 4 times daily      prednisoLONE acetate (PRED FORTE) 1 % ophthalmic susp Place 1 drop Into the left eye 4 times daily      ketorolac (ACULAR) 0.5 % ophthalmic solution Place 1 drop Into the left eye 4 times daily      bisacodyl (DULCOLAX) 10 MG Suppository Place 1 suppository (10 mg) rectally daily as needed for constipation  Qty: 90 suppository, Refills: 3    Associated Diagnoses: Constipation, unspecified constipation type      levothyroxine (SYNTHROID/LEVOTHROID) 50 MCG tablet Take 1 tablet (50 mcg) by mouth daily  Qty: 90 tablet, Refills: 3    Associated Diagnoses: Hypothyroidism, unspecified type      clopidogrel (PLAVIX) 75 MG tablet Take 1 tablet (75 mg) by mouth daily  Qty: 90 tablet, Refills: 3    Associated Diagnoses: Other chronic pulmonary embolism; Venous thrombosis of extremity       hydrochlorothiazide (HYDRODIURIL) 50 MG tablet Take 1 tablet (50 mg) by mouth daily  Qty: 90 tablet, Refills: 3    Associated Diagnoses: Benign essential hypertension      lisinopril (PRINIVIL/ZESTRIL) 40 MG tablet Take 1 tablet (40 mg) by mouth daily For blood pressure  Qty: 90 tablet, Refills: 3    Associated Diagnoses: Essential hypertension      polyethylene glycol (MIRALAX) powder Take one-half to one scoop once a day for maintaining soft stools  Qty: 119 g, Refills: 5    Associated Diagnoses: Constipation, unspecified constipation type      albuterol (VENTOLIN HFA) 108 (90 BASE) MCG/ACT inhaler Inhale 2 puffs into the lungs every 4 hours as needed for shortness of breath / dyspnea or wheezing  Qty: 18 Inhaler, Refills: 1    Associated Diagnoses: Cough      ferrous sulfate (IRON) 325 (65 FE) MG tablet Take 1 tablet (325 mg) by mouth daily (with breakfast)  Qty: 90 tablet, Refills: 3    Associated Diagnoses: Other iron deficiency anemias      Calcium Carbonate-Vitamin D (CALCIUM 600 + D OR) Take 1 tablet by mouth daily.           Allergies   No Known Allergies

## 2018-01-30 NOTE — PLAN OF CARE
Problem: Patient Care Overview  Goal: Plan of Care/Patient Progress Review  Occupational Therapy Discharge Summary    Reason for therapy discharge:    Discharged to transitional care facility.    Progress towards therapy goal(s). See goals on Care Plan in [facility] electronic health record for goal details.  Goals partially met.  Barriers to achieving goals:   discharge from facility.    Therapy recommendation(s):    Continued therapy is recommended.  Rationale/Recommendations:  to maximize independence and strength to complete ADLs/IADLs.

## 2018-01-30 NOTE — PROGRESS NOTES
Patient a & o x4. VSS. Patient denies pain throughout shift. Patient ambulating in room and to bathroom with walker and stand by assist. Patient continues to require 2L O2 via NC; SaO2 90-95% while sleeping. Patient is tolerating a low sat fat, 2400mg Na diet with no complaints of nausea. Patient is voiding spontaneously. Patient has not had a bowel movement this shift; needs sample sent to lab. Plan to continue to monitor and transfer to TCU today.

## 2018-01-30 NOTE — PROGRESS NOTES
Pt d/c'd to [facility] via private vehicle accompanied by . All personal belongings sent with.

## 2018-01-30 NOTE — PROGRESS NOTES
Social Work Services Discharge Note      Patient Name:  Lucie Stockton     Anticipated Discharge Date:  1/30/2018    Discharge Disposition:   TCU:  [facility]  ([phone] f: [phone])    Following MD:  Per facility      Pre-Admission Screening (PAS) online form has been completed.  The Level of Care (LOC) is:  Determined  Confirmation Code is:  [id number]  Patient/caregiver informed of referral to [facility] for Pre-Admission Screening for skilled nursing facility (SNF) placement and to expect a phone call post discharge from SNF.     Additional Services/Equipment Arranged:  None-pt will provide transport      Patient / Family response to discharge plan:  Pt initially wanted to change d/c plan to home. However, pt's  was not comfortable with this plan and he convinced pt that she should go to TCU. Pt finally agreeable to d/c to TCU.      Persons notified of above discharge plan:  Pt,  (via phone), Primary Team, bedside RN, TCU admissions    Staff Discharge Instructions:  Please fax discharge orders and signed hard scripts for any controlled substances.  Please print a packet and send with patient.     CTS Handoff completed:  NO    Medicare Notice of Rights provided to the patient/family:  YES

## 2018-01-31 ENCOUNTER — TELEPHONE (OUTPATIENT)
Dept: FAMILY MEDICINE | Facility: CLINIC | Age: 83
End: 2018-01-31

## 2018-01-31 NOTE — TELEPHONE ENCOUNTER
This patient was discharged from [facility] on 01/30/2018.    Discharge Diagnosis: Contusion Of Right Knee, Initial Encounter, Influenza A    Follow-up instructions: Follow up with TCU physician  Will need CT scan lungs in 1 year for nodule f/u      A follow-up visit has not been scheduled.      Please follow-up with patient.

## 2018-01-31 NOTE — TELEPHONE ENCOUNTER
ED / Discharge Outreach Protocol    Patient Contact    Attempt # 1    Was call answered?  No.  Left message on voicemail with information to call me back.    Keke Ozuna RN  [facility]

## 2018-02-01 NOTE — TELEPHONE ENCOUNTER
Patient apparently fell while on vacation in [place], was seen in ED for injuries 1/26, inpatient 1/27 and discharged 1/30/18.   Is she in TCU now?    I called [phone].   Spoke to Darren, he states patient is at [facility] and likely going to sign herself out today, Darren not sure this is a good idea but says she is walking okay.    I don't see that [facility] has seen this patient except for one acute visit 8/25/17?   Darren confirms patient's PCP is Dr. Montgomery and he says they will call to schedule follow up with that provider.    Routed to Dr. Montgomery as FREIDA.    Iona Caballero, JANNET  [facility]

## 2018-02-15 ENCOUNTER — OFFICE VISIT (OUTPATIENT)
Dept: INTERNAL MEDICINE | Facility: CLINIC | Age: 83
End: 2018-02-15
Payer: MEDICARE

## 2018-02-15 VITALS
SYSTOLIC BLOOD PRESSURE: 159 MMHG | DIASTOLIC BLOOD PRESSURE: 69 MMHG | RESPIRATION RATE: 20 BRPM | WEIGHT: 100 LBS | TEMPERATURE: 98 F | HEART RATE: 78 BPM | BODY MASS INDEX: 19.53 KG/M2 | OXYGEN SATURATION: 98 %

## 2018-02-15 DIAGNOSIS — M79.605 PAIN IN BOTH LOWER EXTREMITIES: Primary | ICD-10-CM

## 2018-02-15 DIAGNOSIS — M79.604 PAIN IN BOTH LOWER EXTREMITIES: Primary | ICD-10-CM

## 2018-02-15 DIAGNOSIS — S70.01XA CONTUSION OF RIGHT HIP, INITIAL ENCOUNTER: ICD-10-CM

## 2018-02-15 DIAGNOSIS — G89.29 CHRONIC LOW BACK PAIN, UNSPECIFIED BACK PAIN LATERALITY, WITH SCIATICA PRESENCE UNSPECIFIED: ICD-10-CM

## 2018-02-15 DIAGNOSIS — M54.5 CHRONIC LOW BACK PAIN, UNSPECIFIED BACK PAIN LATERALITY, WITH SCIATICA PRESENCE UNSPECIFIED: ICD-10-CM

## 2018-02-15 DIAGNOSIS — S80.01XA CONTUSION OF RIGHT KNEE, INITIAL ENCOUNTER: ICD-10-CM

## 2018-02-15 DIAGNOSIS — R26.9 ABNORMALITY OF GAIT: ICD-10-CM

## 2018-02-15 DIAGNOSIS — G89.29 OTHER CHRONIC PAIN: ICD-10-CM

## 2018-02-15 RX ORDER — ACETAMINOPHEN 325 MG/1
TABLET ORAL
Qty: 100 TABLET | Refills: 3 | COMMUNITY
Start: 2018-02-15 | End: 2018-02-15

## 2018-02-15 RX ORDER — ACETAMINOPHEN 325 MG/1
TABLET ORAL
Qty: 100 TABLET | Refills: 1 | Status: SHIPPED | OUTPATIENT
Start: 2018-02-15 | End: 2018-06-02

## 2018-02-15 ASSESSMENT — PAIN SCALES - GENERAL: PAINLEVEL: SEVERE PAIN (7)

## 2018-02-15 NOTE — MR AVS SNAPSHOT
After Visit Summary   2/15/2018    Lucie Stockton    MRN: [id number]           Patient Information     Date Of Birth          1935        Visit Information        Provider Department      2/15/2018 8:20 AM Marii Montgomery MD [facility]        Today's Diagnoses     Pain in both lower extremities    -  1    Chronic low back pain, unspecified back pain laterality, with sciatica presence unspecified        Contusion of right hip, initial encounter        Contusion of right knee, initial encounter        Abnormality of gait           Follow-ups after your visit        Follow-up notes from your care team     Return in about 4 years (around 2/15/2022) for Routine Visit.      Who to contact     Please call your clinic at [phone] to:    Ask questions about your health    Make or cancel appointments    Discuss your medicines    Learn about your test results    Speak to your doctor            Additional Information About Your Visit        MyChart Information     Lifeblob is an electronic gateway that provides easy, online access to your medical records. With Lifeblob, you can request a clinic appointment, read your test results, renew a prescription or communicate with your care team.     To sign up for [employer] visit the website at [email or web address]   You will be asked to enter the access code listed below, as well as some personal information. Please follow the directions to create your username and password.     Your access code is: [email or web address]  Expires: 3/4/2018  6:30 AM     Your access code will  in 90 days. If you need help or a new code, please contact your [facility] or call [phone] for assistance.        Care EveryWhere ID     This is your Care EveryWhere ID. This could be used by other organizations to access your [place] medical records  [id number]        Your Vitals Were     Pulse Temperature Respirations Pulse Oximetry  BMI (Body Mass Index)       78 98  F (36.7  C) (Oral) 20 98% 19.53 kg/m2        Blood Pressure from Last 3 Encounters:   02/15/18 159/69   01/30/18 127/63   01/26/18 155/67    Weight from Last 3 Encounters:   02/15/18 45.4 kg (100 lb)   01/29/18 44.4 kg (97 lb 12.8 oz)   01/26/18 45.4 kg (100 lb)              Today, you had the following     No orders found for display         Today's Medication Changes          These changes are accurate as of 2/15/18  9:30 AM.  If you have any questions, ask your nurse or doctor.               Start taking these medicines.        Dose/Directions    acetaminophen 325 MG tablet   Commonly known as:  TYLENOL   Used for:  Chronic low back pain, unspecified back pain laterality, with sciatica presence unspecified, Contusion of right hip, initial encounter, Contusion of right knee, initial encounter   Started by:  Marii Montgomery MD        Take 2 tablets every 6 to 8 hours as needed for pain   Quantity:  100 tablet   Refills:  1       order for DME   Used for:  Chronic low back pain, unspecified back pain laterality, with sciatica presence unspecified, Pain in both lower extremities, Abnormality of gait   Started by:  Marii Montgomery MD        Equipment being ordered:  Walker with seat for chronic back and leg pain, general weakness/deconditioning, imbalance   Quantity:  1 Units   Refills:  1         Stop taking these medicines if you haven't already. Please contact your care team if you have questions.     ketorolac 0.5 % ophthalmic solution   Commonly known as:  ACULAR   Stopped by:  Marii Montgomery MD           moxifloxacin 0.5 % ophthalmic solution   Commonly known as:  VIGAMOX   Stopped by:  Marii Montgomery MD           oseltamivir 75 MG capsule   Commonly known as:  TAMIFLU   Stopped by:  Marii Montgomery MD           oxyCODONE IR 5 MG tablet   Commonly known as:  ROXICODONE   Stopped by:  Marii Montgomery MD           prednisoLONE acetate 1 % ophthalmic susp    Commonly known as:  PRED FORTE   Stopped by:  Marii Montgomery MD           traMADol 50 MG tablet   Commonly known as:  ULTRAM   Stopped by:  Marii Montgomery MD                Where to get your medicines      These medications were sent to [id number] - [place], [place] - [address] AT [facility] [address], [facility] [address]     Phone:  [phone]     acetaminophen 325 MG tablet         Some of these will need a paper prescription and others can be bought over the counter.  Ask your nurse if you have questions.     Bring a paper prescription for each of these medications     order for DME                Primary Care Provider Office Phone # Fax #    Marii Montgomery [phone]       [address] [place] [address] [address]  [facility] [address]        Equal Access to Services     PAL SALGADO : Hadii aad ku hadasho Soalysia, waaxda luqadaha, qaybta kaalmada adejamelyada, jackeline izquierdo. So [facility] [phone].    ATENCIÓN: Si habla español, tiene a schwartz disposición servicios gratuitos de asistencia lingüística. [facility] [phone].    We comply with applicable federal civil rights laws and [place] laws. We do not discriminate on the basis of race, color, national origin, age, disability, sex, sexual orientation, or gender identity.            Thank you!     Thank you for choosing [facility]  for your care. Our goal is always to provide you with excellent care. Hearing back from our patients is one way we can continue to improve our services. Please take a few minutes to complete the written survey that you may receive in the mail after your visit with us. Thank you!             Your Updated Medication List - Protect others around you: Learn how to safely use, store and throw away your medicines at www.disposemymeds.org.          This list is accurate as of 2/15/18  9:30 AM.  Always use your most recent med list.                    Brand Name Dispense Instructions for use Diagnosis    acetaminophen 325 MG tablet    TYLENOL    100 tablet    Take 2 tablets every 6 to 8 hours as needed for pain    Chronic low back pain, unspecified back pain laterality, with sciatica presence unspecified, Contusion of right hip, initial encounter, Contusion of right knee, initial encounter       albuterol 108 (90 BASE) MCG/ACT Inhaler    VENTOLIN HFA    18 Inhaler    Inhale 2 puffs into the lungs every 4 hours as needed for shortness of breath / dyspnea or wheezing    Cough       amLODIPine 10 MG tablet    NORVASC    90 tablet    Take 1 tablet (10 mg) by mouth daily For blood pressure    Essential hypertension       atorvastatin 40 MG tablet    LIPITOR    90 tablet    Take 2 tablets (80 mg) by mouth daily    Hyperlipidemia, unspecified hyperlipidemia type       bisacodyl 10 MG Suppository    DULCOLAX    90 suppository    Place 1 suppository (10 mg) rectally daily as needed for constipation    Constipation, unspecified constipation type       CALCIUM 600 + D PO      Take 1 tablet by mouth daily.        citalopram 40 MG tablet    celeXA    45 tablet    Take 0.5 tablets (20 mg) by mouth daily    Depression, unspecified depression type       clopidogrel 75 MG tablet    PLAVIX    90 tablet    Take 1 tablet (75 mg) by mouth daily    Other chronic pulmonary embolism, Venous thrombosis of extremity       ferrous sulfate 325 (65 FE) MG tablet    IRON    90 tablet    Take 1 tablet (325 mg) by mouth daily (with breakfast)    Other iron deficiency anemias       hydrochlorothiazide 50 MG tablet    HYDRODIURIL    90 tablet    Take 1 tablet (50 mg) by mouth daily    Benign essential hypertension       levothyroxine 50 MCG tablet    SYNTHROID/LEVOTHROID    90 tablet    Take 1 tablet (50 mcg) by mouth daily    Hypothyroidism, unspecified type       lisinopril 40 MG tablet    PRINIVIL/ZESTRIL    90 tablet    Take 1 tablet (40 mg) by mouth daily For blood pressure     Essential hypertension       naproxen sodium 220 MG capsule     60 capsule    Take 220 mg by mouth 2 times daily (with meals)    Chronic low back pain, unspecified back pain laterality, with sciatica presence unspecified, Pain of left lower leg       order for DME     1 Units    Equipment being ordered:  Walker with seat for chronic back and leg pain, general weakness/deconditioning, imbalance    Chronic low back pain, unspecified back pain laterality, with sciatica presence unspecified, Pain in both lower extremities, Abnormality of gait       pantoprazole 40 MG EC tablet    PROTONIX    90 tablet    Take 1 tablet (40 mg) by mouth daily    Other iron deficiency anemias       polyethylene glycol powder    MIRALAX    119 g    Take one-half to one scoop once a day for maintaining soft stools    Constipation, unspecified constipation type       potassium chloride SA 20 MEQ CR tablet    K-DUR/KLOR-CON M    90 tablet    Take 1 tablet (20 mEq) by mouth daily    Hypopotassemia       trolamine salicylate 10 % cream    ASPERCREME    170 g    Apply 1 g topically 3 times daily    Arthralgia of left lower leg

## 2018-02-15 NOTE — PATIENT INSTRUCTIONS
[facility]: [phone]     [facility] Medication Refill Request Information:  * Please contact your pharmacy regarding ANY request for medication refills.  ** [facility] Prescription Fax = [phone]  * Please allow 3 business days for routine medication refills.  * Please allow 5 business days for controlled substance medication refills.     [facility] Test Result notification information:  *You will be notified with in 7-10 days of your appointment day regarding the results of your test.  If you are on MyChart you will be notified as soon as the provider has reviewed the results and signed off on them.

## 2018-02-15 NOTE — PROGRESS NOTES
Date of Admission:  1/26/2018  Date of Discharge:  1/30/2018    Influenza  CC:  F/u hospitalization    S:  Lucie was hospitalized at [facility] 1/26-1/30/18 following a fall, c/o right knee and hip pain.  No fractures.  Had influenza A as well.  Hospital discharge summary, pertinent notes, test results reviewed.  Was in rehab x 2 days, left early per patient choice  Still has some generalized body aches, no productive cough or SOB/wheezing, no fevers.  Discussed the natural history of recovery from influenza (expect gradual improvement but it may take up to 4-6 weeks before returning to baseline).  Ambulation improved, has a cane now, but tends to hold onto walls, furniture and her  in order to get around. Does laps around interior of house currently for walking. I encouraged her to transition to a cane and walker as much as possible.  Will provide an rx for a walker, as she does not have one.  Discussed fall prevention strategies.  Still has some difficulty walking  No further falls  Did PT at rehab and continues to do exercises e.g. Leg lifts  I strongly encouraged to walk as much as possible both for promotion of strength, balance and overall conditioning, improve mood, improve pain  Discussed pain management.  Advised scheduled acetaminophen for the next 1-2 weeks, then resume prn.  No opioids, tramadol.  Reminded her that sedating medications increase her risk of falls and associated fractures as well as constipation  Bowels doing fine, not on any medications for bowel regimen    Patient Active Problem List   Diagnosis     Benign ovarian tumor     Hypothyroidism     Peripheral vascular disease (H)     Knee pain     Osteoarthritis     Cervicalgia     Hyperlipidemia with target LDL less than 70     Pulmonary embolism (H)     Anemia     Aortic stenosis     Atherosclerosis of aorta (H)     Atherosclerosis of arteries of extremities (H)     Intermittent claudication (H)     Iron deficiency     Osteoporosis      DVT of lower extremity, bilateral (H)     Varicose veins     Gluteal pain     Insomnia     Back pain     Vitamin D deficiency     Tobacco use disorder     Personal history of other drug therapy     Right knee pain     Venous thrombosis of extremity     Benign essential hypertension     Gastritis     Cataract     Acute left-sided low back pain with left-sided sciatica     Lumbar stenosis with neurogenic claudication     SBO (small bowel obstruction)     Small bowel obstruction     Long term current use of anticoagulant therapy     Left-sided low back pain with left-sided sciatica     Moderate major depression (H)     Anxiety     Neoplasm of uncertain behavior of skin     Cherry angioma     BCC (basal cell carcinoma), trunk     Weakness     Imbalance     Chronic pain     Current Outpatient Prescriptions   Medication Sig Dispense Refill     acetaminophen (TYLENOL) 325 MG tablet Take 2 tablets every 6 to 8 hours as needed for pain 100 tablet 1     order for DME Equipment being ordered:   Walker with seat for chronic back and leg pain, general weakness/deconditioning, imbalance 1 Units 1     citalopram (CELEXA) 40 MG tablet Take 0.5 tablets (20 mg) by mouth daily 45 tablet 1     potassium chloride SA (K-DUR/KLOR-CON M) 20 MEQ CR tablet Take 1 tablet (20 mEq) by mouth daily 90 tablet 1     amLODIPine (NORVASC) 10 MG tablet Take 1 tablet (10 mg) by mouth daily For blood pressure 90 tablet 2     trolamine salicylate (ASPERCREME) 10 % cream Apply 1 g topically 3 times daily 170 g 11     naproxen sodium 220 MG capsule Take 220 mg by mouth 2 times daily (with meals) 60 capsule 2     atorvastatin (LIPITOR) 40 MG tablet Take 2 tablets (80 mg) by mouth daily 90 tablet 3     pantoprazole (PROTONIX) 40 MG EC tablet Take 1 tablet (40 mg) by mouth daily 90 tablet 3     bisacodyl (DULCOLAX) 10 MG Suppository Place 1 suppository (10 mg) rectally daily as needed for constipation 90 suppository 3     levothyroxine (SYNTHROID/LEVOTHROID)  50 MCG tablet Take 1 tablet (50 mcg) by mouth daily 90 tablet 3     clopidogrel (PLAVIX) 75 MG tablet Take 1 tablet (75 mg) by mouth daily 90 tablet 3     hydrochlorothiazide (HYDRODIURIL) 50 MG tablet Take 1 tablet (50 mg) by mouth daily 90 tablet 3     lisinopril (PRINIVIL/ZESTRIL) 40 MG tablet Take 1 tablet (40 mg) by mouth daily For blood pressure 90 tablet 3     polyethylene glycol (MIRALAX) powder Take one-half to one scoop once a day for maintaining soft stools 119 g 5     albuterol (VENTOLIN HFA) 108 (90 BASE) MCG/ACT inhaler Inhale 2 puffs into the lungs every 4 hours as needed for shortness of breath / dyspnea or wheezing 18 Inhaler 1     ferrous sulfate (IRON) 325 (65 FE) MG tablet Take 1 tablet (325 mg) by mouth daily (with breakfast) 90 tablet 3     Calcium Carbonate-Vitamin D (CALCIUM 600 + D OR) Take 1 tablet by mouth daily.       [DISCONTINUED] tolterodine (DETROL) 1 MG tablet Take 1-2 tablets by mouth At Bedtime. 180 tablet 3     No Known Allergies  /69 (BP Location: Right arm, Patient Position: Chair, Cuff Size: Adult Small)  Pulse 78  Temp 98  F (36.7  C) (Oral)  Resp 20  Wt 45.4 kg (100 lb)  SpO2 98%  BMI 19.53 kg/m2  Gen:  In wheelchair, alert, no distress  With my assistance could stand, walk short distance, get on and off exam table  Lungs CTA  C/V:  RRR, no peripheral edema  Abd:  Soft, NT    Lucie was seen today for pain, hospital f/u and cough.    Diagnoses and all orders for this visit:    Pain in both lower extremities  -     order for DME; Equipment being ordered:   Walker with seat for chronic back and leg pain, general weakness/deconditioning, imbalance    Chronic low back pain, unspecified back pain laterality, with sciatica presence unspecified  -     acetaminophen (TYLENOL) 325 MG tablet; Take 2 tablets every 6 to 8 hours as needed for pain  -     order for DME; Equipment being ordered:   Walker with seat for chronic back and leg pain, general weakness/deconditioning,  imbalance    Contusion of right hip, initial encounter  -     acetaminophen (TYLENOL) 325 MG tablet; Take 2 tablets every 6 to 8 hours as needed for pain    Contusion of right knee, initial encounter  Comments:  with inability to ambulate  Orders:  -     acetaminophen (TYLENOL) 325 MG tablet; Take 2 tablets every 6 to 8 hours as needed for pain    Abnormality of gait  -     order for DME; Equipment being ordered:   Walker with seat for chronic back and leg pain, general weakness/deconditioning, imbalance   Continue PT exercises    See HPI for additional details.    Total time spent 40 minutes.  More than 50% of the time spent with Ms. Stockton on counseling / coordinating her care      F/U in 1 months.    Marii Montgomery M.D.  Internal Medicine  [facility]   pager [phone]

## 2018-02-15 NOTE — NURSING NOTE
"Chief Complaint   Patient presents with     Pain     \" I feel awful !\" Hasn't felt well for 2-3 weeks ache all over and pain in back/legs     Hospital F/U     hospital and transitional care follow up     Cough     has had a \"couple of weeks, coughing up clear to white phlegm\"   Sherice Beauchamp LPN 8:41 AM on 2/15/2018    Rooming Note  Blood Pressure   BP Readings from Last 1 Encounters:   02/15/18 164/62    Single BP recheck started, 8:42 AM (4 minutes)    Sherice Beauchamp LPN 8:45 AM on 2/15/2018      "

## 2018-02-23 ENCOUNTER — THERAPY VISIT (OUTPATIENT)
Dept: PHYSICAL THERAPY | Facility: CLINIC | Age: 83
End: 2018-02-23
Payer: MEDICARE

## 2018-02-23 DIAGNOSIS — M79.605 CHRONIC PAIN OF BOTH LOWER EXTREMITIES: ICD-10-CM

## 2018-02-23 DIAGNOSIS — R53.81 PHYSICAL DECONDITIONING: ICD-10-CM

## 2018-02-23 DIAGNOSIS — G89.29 CHRONIC PAIN OF BOTH LOWER EXTREMITIES: ICD-10-CM

## 2018-02-23 DIAGNOSIS — G89.29 CHRONIC PAIN OF BOTH KNEES: ICD-10-CM

## 2018-02-23 DIAGNOSIS — M79.604 CHRONIC PAIN OF BOTH LOWER EXTREMITIES: ICD-10-CM

## 2018-02-23 DIAGNOSIS — M25.562 CHRONIC PAIN OF BOTH KNEES: ICD-10-CM

## 2018-02-23 DIAGNOSIS — G89.29 CHRONIC BILATERAL LOW BACK PAIN, WITH SCIATICA PRESENCE UNSPECIFIED: Primary | ICD-10-CM

## 2018-02-23 DIAGNOSIS — M25.561 CHRONIC PAIN OF BOTH KNEES: ICD-10-CM

## 2018-02-23 DIAGNOSIS — M54.41 BILATERAL LOW BACK PAIN WITH RIGHT-SIDED SCIATICA: Primary | ICD-10-CM

## 2018-02-23 DIAGNOSIS — R26.9 ABNORMALITY OF GAIT: ICD-10-CM

## 2018-02-23 DIAGNOSIS — M54.5 CHRONIC BILATERAL LOW BACK PAIN, WITH SCIATICA PRESENCE UNSPECIFIED: Primary | ICD-10-CM

## 2018-02-23 PROCEDURE — 97110 THERAPEUTIC EXERCISES: CPT | Mod: GP | Performed by: PHYSICAL THERAPIST

## 2018-02-23 PROCEDURE — 97161 PT EVAL LOW COMPLEX 20 MIN: CPT | Mod: GP | Performed by: PHYSICAL THERAPIST

## 2018-02-23 PROCEDURE — G8979 MOBILITY GOAL STATUS: HCPCS | Mod: GP | Performed by: PHYSICAL THERAPIST

## 2018-02-23 PROCEDURE — 97530 THERAPEUTIC ACTIVITIES: CPT | Mod: GP | Performed by: PHYSICAL THERAPIST

## 2018-02-23 PROCEDURE — G8978 MOBILITY CURRENT STATUS: HCPCS | Mod: GP | Performed by: PHYSICAL THERAPIST

## 2018-02-23 NOTE — LETTER
DEPARTMENT OF HEALTH AND HUMAN SERVICES  CENTERS FOR MEDICARE & MEDICAID SERVICES    PLAN/UPDATED PLAN OF PROGRESS FOR OUTPATIENT REHABILITATION    PATIENTS NAME:  Lucie Stockton   : 1935    PROVIDER NUMBER:    [id number]  [facility]:  [id number]     PROVIDER NAME: [facility]  MEDICAL RECORD NUMBER: [id number]     START OF CARE DATE:  SOC Date: 18   TYPE:  PT    PRIMARY/TREATMENT DIAGNOSIS: (Pertinent Medical Diagnosis)  Bilateral low back pain with right-sided sciatica  VISITS FROM START OF CARE:   1     [place] [facility] Initial Evaluation -- Lumbar  Date: 2018  Lucie Stockton is a 82 year old female with a LS condition.   Referral: IM  Work mechanical stresses:    Employment status:    Leisure mechanical stresses:   Functional disability score (NEENA/STarT Back):  4  VAS score (0-10): 7  Patient goals/expectations:  Be able to stand and cook a meal walk without cane    HISTORY:  Present symptoms: B LS, B buttocks,post legs pain to knees.  The butt pain is the worst.  Pain quality (sharp/shooting/stabbing/aching/burning/cramping): sharp   Paresthesia (yes/no):  no  Present since (onset date): MD petty 2018. 2-3 weeks ago she fell forward  hitting a door frame.      Symptoms (improving/unchanging/worsening):  unchanged.   Symptoms commenced as a result of: fall   Condition occurred in the following environment:  While on vacation  Symptoms at onset (back/thigh/leg): back  Constant symptoms (back/thigh/leg): yes for all  Intermittent symptoms (back/thigh/leg):   Symptoms are made worse with the following: Always Standing, Always Walking, Always Lying, Time of day - No effect and Always On the move    Symptoms are made better with the following: Other - ibuprofen, sitting, laying supine with head elevated  Disturbed sleep (yes/no):  2.5 hrs loss  Sleeping postures (prone/sup/side R/L): supine elevated head  Previous episodes  (0/1-5/6-10/11+): chronic   Year of first episode:   Previous history:   Previous treatments: extension responder in past   PATIENTS NAME:  Lucie Stockton   : 1935  PRIMARY/TREATMENT DIAGNOSIS: (Pertinent Medical Diagnosis)  Bilateral low back pain with right-sided sciatica    Specific Questions:  Cough/Sneeze/Strain (pos/neg): hasn't done  Bowel/Bladder (normal/abnormal): normal  Gait (normal/abnormal): abnormal  Medications (nil/NSAIDS/analg/steroids/anticoag/other):  NSAIDS and OTC analgesic  Medical allergies:  none  General health (excellent/good/fair/poor):  good  Pertinent medical history:  High blood pressure and Thyroid problems  Imaging (None/Xray/MRI/Other):  none  Recent or major surgery (yes/no):  no  Night pain (yes/no): yes  Accidents (yes/no): yes  Unexplained weight loss (yes/no): no  Barriers at home: 2 level home  Other red flags: none    EXAMINATION    Posture:   Sitting (good/fair/poor): poor  Standing (good/fair/poor):poor  Lordosis (red/acc/normal):  Dec to none  Correction of posture (better/worse/no effect): NE  Lateral Shift (right/left/nil): no  Relevant (yes/no):    Other Observations:     Neurological:  Motor deficit:    Reflexes:    Sensory deficit:    Dural signs:    Movement Loss:   Elias Mod Min Nil Pain   Flexion    To ankles ERP R buttocks   Extension +    ERP B buttocks   Side Gliding R    +    Side Gliding L  +   R buttocks     Test Movements:   During: produces, abolishes, increases, decreases, no effect, centralizing, peripheralizing   After: better, worse, no better, no worse, no effect, centralized, peripheralized            PATIENTS NAME:  Lucie Stockton   : 1935  PRIMARY/TREATMENT DIAGNOSIS: (Pertinent Medical Diagnosis)  Bilateral low back pain with right-sided sciatica    Pretest symptoms standing: B LS buttocks   Symptoms During Symptoms After ROM increased ROM decreased No Effect   FIS Increases   buttocks RIGHT No Worse         Rep FIS Increases    Worse          EIS Increases   B buttocks ERP No Worse         Rep EIS Increases    No Worse      +   Pretest symptoms lying: R buttocks    Symptoms During Symptoms After ROM increased ROM decreased No Effect   SALOME          Rep SALOME          EIL          Rep EIL          If required, pretest symptoms:    Symptoms During Symptoms After ROM increased ROM decreased No Effect   SGIS - R          Rep SGIS - R          SGIS - L          Rep SGIS - L          Static Tests:  Sitting slouched:    Sitting erect:    Standing slouched   Standing erect:    Lying prone in extension:  Long sitting:   Other Tests: prone lying- R buttocks increases with time  Prone lying with 1 pillow 2 min x 2 reps dec R butt, no better inc ROM ext SG L   Provisional Classification:  Inconclusive/Other - needs further testing  Principle of Management:  Education:  DP and finding it      Equipment provided:    Mechanical therapy (Y/N):  Y   Extension principle:  Prone lying with 1 pillow for 2 min 3-4 x day  Lateral Principle:    Flexion principle:      Other:                    PATIENTS NAME:  Lucie Stockton   : 1935  PRIMARY/TREATMENT DIAGNOSIS: (Pertinent Medical Diagnosis)  Bilateral low back pain with right-sided sciatica    ASSESSMENT/PLAN:  Patient is a 82 year old female with lumbar complaints.    Patient has the following significant findings with corresponding treatment plan.                Diagnosis 1:  LBP  Pain -  self management, education, directional preference exercise and home program  Decreased ROM/flexibility - therapeutic exercise, therapeutic activity and home program  Decreased joint mobility - therapeutic exercise, therapeutic activity and home program  Impaired gait - home program  Decreased function - therapeutic activities and home program  Impaired posture - neuro re-education, therapeutic activities and home program    Therapy Evaluation Codes:   1) History comprised of:   Personal factors that impact the plan of care:       None.    Comorbidity factors that impact the plan of care are:      High blood pressure.     Medications impacting care: Anti-inflammatory, High blood pressure and Pain.  2) Examination of Body Systems comprised of:   Body structures and functions that impact the plan of care:      Lumbar spine.   Activity limitations that impact the plan of care are:      Standing and Walking.  3) Clinical presentation characteristics are:   Stable/Uncomplicated.  4) Decision-Making    Low complexity using standardized patient assessment instrument and/or measureable assessment of functional outcome.  Cumulative Therapy Evaluation is: Low complexity.    Previous and current functional limitations:  (See Goal Flow Sheet for this information)    Short term and Long term goals: (See Goal Flow Sheet for this information)   Communication ability:  Patient appears to be able to clearly communicate and understand verbal and written communication and follow directions correctly.  Treatment Explanation - The following has been discussed with the patient:   RX ordered/plan of care, Anticipated outcomes, Possible risks and side effects                          PATIENTS NAME:  Lucie Stockton   : 1935  PRIMARY/TREATMENT DIAGNOSIS: (Pertinent Medical Diagnosis)  Bilateral low back pain with right-sided sciatica    This patient would benefit from PT intervention to resume normal activities.   Rehab potential is good.  Frequency:  2 X week, once daily  Duration:  for 4 weeks  Discharge Plan:  Achieve all LTG.  Independent in home treatment program.  Reach maximal therapeutic benefit.          Caregiver Signature/Credentials _____________________________ Date ________          Samantha Agarwal, PT, Cert MDT     I have reviewed and certified the need for these services and plan of treatment while under my care.        PHYSICIAN'S SIGNATURE:   _________________________________________     Date___________   Marii Montgomery,  "MD    Certification period:  Beginning of Cert date period: 02/23/18 to  05/23/18     Functional Level Progress Report: Please see attached \"Goal Flow sheet for Functional level.\"    ____X____ Continue Services or       ________ DC Services                Service dates: From  SOC Date: 02/23/18  to present                         "

## 2018-02-23 NOTE — PROGRESS NOTES
[facility] Initial Evaluation -- Lumbar    Date: February 23, 2018  Lucie Stockton is a 82 year old female with a LS condition.   Referral: IM  Work mechanical stresses:    Employment status:    Leisure mechanical stresses:   Functional disability score (NEENA/STarT Back):  4  VAS score (0-10): 7  Patient goals/expectations:  Be able to stand and cook a meal walk without cane    HISTORY:    Present symptoms: B LS, B buttocks,post legs pain to knees.  The butt pain is the worst.  Pain quality (sharp/shooting/stabbing/aching/burning/cramping): sharp   Paresthesia (yes/no):  no    Present since (onset date): MD petty 2.23.2018. 2-3 weeks ago she fell forward  hitting a door frame.      Symptoms (improving/unchanging/worsening):  unchanged.     Symptoms commenced as a result of: fall   Condition occurred in the following environment:  While on vacation    Symptoms at onset (back/thigh/leg): back  Constant symptoms (back/thigh/leg): yes for all  Intermittent symptoms (back/thigh/leg):     Symptoms are made worse with the following: Always Standing, Always Walking, Always Lying, Time of day - No effect and Always On the move    Symptoms are made better with the following: Other - ibuprofen, sitting, laying supine with head elevated    Disturbed sleep (yes/no):  2.5 hrs loss Sleeping postures (prone/sup/side R/L): supine elevated head    Previous episodes (0/1-5/6-10/11+): chronic Year of first episode:     Previous history:   Previous treatments: extension responder in past       Specific Questions:  Cough/Sneeze/Strain (pos/neg): hasn't done  Bowel/Bladder (normal/abnormal): normal  Gait (normal/abnormal): abnormal  Medications (nil/NSAIDS/analg/steroids/anticoag/other):  NSAIDS and OTC analgesic  Medical allergies:  none  General health (excellent/good/fair/poor):  good  Pertinent medical history:  High blood pressure and Thyroid problems  Imaging (None/Xray/MRI/Other):  none  Recent or major surgery  (yes/no):  no  Night pain (yes/no): yes  Accidents (yes/no): yes  Unexplained weight loss (yes/no): no  Barriers at home: 2 level home  Other red flags: none    EXAMINATION    Posture:   Sitting (good/fair/poor): poor  Standing (good/fair/poor):poor  Lordosis (red/acc/normal):  Dec to none  Correction of posture (better/worse/no effect): NE    Lateral Shift (right/left/nil): no  Relevant (yes/no):    Other Observations:     Neurological:    Motor deficit:    Reflexes:    Sensory deficit:    Dural signs:      Movement Loss:   Elias Mod Min Nil Pain   Flexion    To ankles ERP R buttocks   Extension +    ERP B buttocks   Side Gliding R    +    Side Gliding L  +   R buttocks     Test Movements:   During: produces, abolishes, increases, decreases, no effect, centralizing, peripheralizing   After: better, worse, no better, no worse, no effect, centralized, peripheralized    Pretest symptoms standing: B LS buttocks   Symptoms During Symptoms After ROM increased ROM decreased No Effect   FIS Increases   buttocks RIGHT No Worse         Rep FIS Increases    Worse         EIS Increases   B buttocks ERP No Worse         Rep EIS Increases    No Worse      +   Pretest symptoms lying: R buttocks    Symptoms During Symptoms After ROM increased ROM decreased No Effect   SALOME          Rep SALOME          EIL          Rep EIL          If required, pretest symptoms:    Symptoms During Symptoms After ROM increased ROM decreased No Effect   SGIS - R          Rep SGIS - R          SGIS - L          Rep SGIS - L            Static Tests:  Sitting slouched:    Sitting erect:    Standing slouched   Standing erect:    Lying prone in extension:  Long sitting:     Other Tests: prone lying- R buttocks increases with time  Prone lying with 1 pillow 2 min x 2 reps dec R butt, no better inc ROM ext SG L     Provisional Classification:  Inconclusive/Other - needs further testing      Principle of Management:  Education:  DP and finding it    Equipment  provided:    Mechanical therapy (Y/N):  Y   Extension principle:  Prone lying with 1 pillow for 2 min 3-4 x day   Lateral Principle:    Flexion principle:    Other:      ASSESSMENT/PLAN:    Patient is a 82 year old female with lumbar complaints.    Patient has the following significant findings with corresponding treatment plan.                Diagnosis 1:  LBP  Pain -  self management, education, directional preference exercise and home program  Decreased ROM/flexibility - therapeutic exercise, therapeutic activity and home program  Decreased joint mobility - therapeutic exercise, therapeutic activity and home program  Impaired gait - home program  Decreased function - therapeutic activities and home program  Impaired posture - neuro re-education, therapeutic activities and home program    Therapy Evaluation Codes:   1) History comprised of:   Personal factors that impact the plan of care:      None.    Comorbidity factors that impact the plan of care are:      High blood pressure.     Medications impacting care: Anti-inflammatory, High blood pressure and Pain.  2) Examination of Body Systems comprised of:   Body structures and functions that impact the plan of care:      Lumbar spine.   Activity limitations that impact the plan of care are:      Standing and Walking.  3) Clinical presentation characteristics are:   Stable/Uncomplicated.  4) Decision-Making    Low complexity using standardized patient assessment instrument and/or measureable assessment of functional outcome.  Cumulative Therapy Evaluation is: Low complexity.    Previous and current functional limitations:  (See Goal Flow Sheet for this information)    Short term and Long term goals: (See Goal Flow Sheet for this information)     Communication ability:  Patient appears to be able to clearly communicate and understand verbal and written communication and follow directions correctly.  Treatment Explanation - The following has been discussed with the  patient:   RX ordered/plan of care  Anticipated outcomes  Possible risks and side effects  This patient would benefit from PT intervention to resume normal activities.   Rehab potential is good.    Frequency:  2 X week, once daily  Duration:  for 4 weeks  Discharge Plan:  Achieve all LTG.  Independent in home treatment program.  Reach maximal therapeutic benefit.    Please refer to the daily flowsheet for treatment today, total treatment time and time spent performing 1:1 timed codes.       [facility] Initial Evaluation  Subjective:  HPI                    Objective:  System    Physical Exam    General     ROS

## 2018-02-24 LAB — INTERPRETATION ECG - MUSE: NORMAL

## 2018-02-27 ENCOUNTER — TELEPHONE (OUTPATIENT)
Dept: ANESTHESIOLOGY | Facility: CLINIC | Age: 83
End: 2018-02-27

## 2018-02-27 NOTE — TELEPHONE ENCOUNTER
Call received from pt.  Pt reported increased pain shortly after injection 1/5/18.  Pt assisted with scheduling f/u with APRN to discuss additional treatment recommendations.  Pt advised that she will not be getting injection on 3/5/18, as this is a clinic visit to evaluate possible injections in the future.     Ruthy Thomas, RN, BSN

## 2018-03-02 ENCOUNTER — OFFICE VISIT (OUTPATIENT)
Dept: ANESTHESIOLOGY | Facility: CLINIC | Age: 83
End: 2018-03-02
Payer: MEDICARE

## 2018-03-02 ENCOUNTER — TELEPHONE (OUTPATIENT)
Dept: INTERNAL MEDICINE | Facility: CLINIC | Age: 83
End: 2018-03-02

## 2018-03-02 VITALS
DIASTOLIC BLOOD PRESSURE: 72 MMHG | HEART RATE: 76 BPM | BODY MASS INDEX: 20.16 KG/M2 | HEIGHT: 59 IN | SYSTOLIC BLOOD PRESSURE: 139 MMHG | RESPIRATION RATE: 16 BRPM | WEIGHT: 100 LBS

## 2018-03-02 DIAGNOSIS — M53.3 SACROILIAC JOINT PAIN: Primary | ICD-10-CM

## 2018-03-02 DIAGNOSIS — M47.816 FACET ARTHROPATHY, LUMBAR: ICD-10-CM

## 2018-03-02 DIAGNOSIS — M51.369 DDD (DEGENERATIVE DISC DISEASE), LUMBAR: ICD-10-CM

## 2018-03-02 DIAGNOSIS — M54.16 LUMBAR RADICULOPATHY: ICD-10-CM

## 2018-03-02 DIAGNOSIS — M47.817 LUMBOSACRAL SPONDYLOSIS WITHOUT MYELOPATHY: ICD-10-CM

## 2018-03-02 ASSESSMENT — PAIN SCALES - GENERAL: PAINLEVEL: EXTREME PAIN (8)

## 2018-03-02 NOTE — MR AVS SNAPSHOT
After Visit Summary   3/2/2018    Lucie Stockton    MRN: [id number]           Patient Information     Date Of Birth          1935        Visit Information        Provider Department      3/2/2018 7:00 AM Majo Brown APRN [facility] Comprehensive Pain Management        Care Instructions    1. Call to schedule your Procedure.     Follow up: 2-4 weeks after injection in procedure.     Please call [phone] to make your procedure appointment.  Phones are answered Monday - Friday from 7:30 - 4:00pm.  Leave a voicemail with your name, birth date, and phone number if no one is available to take your call.     Your procedure: Right Sacroiliac Joint Injection.     On the day of the procedure  1. Arrive 1 hour earlier than your scheduled time, to the [facility]  Address: [address] [place] [address], [place] [address]  2. Check in on the 5th floor for your procedure      You will need to follow up in clinic in 2-4 weeks after your procedure.    If you must reschedule your procedure more than two times, you must follow up in clinic before rescheduling again.      Patient Pre-Procedure Instructions    CAUTION - FAILURE TO FOLLOW THESE PRE-PROCEDURE INSTRUCTIONS WILL RESULT IN YOUR PROCEDURE BEING RESCHEDULED.            You MUST have a  TO TAKE YOU HOME after your procedure. Transportation by Taxi or Para-transit must have a responsible adult accompany you home (other than the ). Travel by bus or light rail is not acceptable transportation. You must provide your 's full name and contact number at time of check in.   Fasting Protocol You may have NOTHING SOLID TO EAT FOR 8 HOURS prior to arrival at the procedure area.   Broth and candy are considered solid food and require an eight hour fast.   You may have CLEAR LIQUIDS UP TO 2 HOURS prior to arrival at the clinic.   Clear liquids include water, clear fruit juice (no pulp) carbonated beverages,  ice, black coffee, black tea (no milk or cream), chewing gum (un-swallowed), and/or clear jello (no fruit or milk). No alcohol containing beverages.   Medications If you take any medications,  DO NOT STOP. Take your medications as usual the morning of your procedure with a sip of water AT LEAST 2 HOURS PRIOR TO ARRIVAL.    Antibiotics If you are currently taking antibiotics, you must complete the entire dose 7 days prior to your scheduled procedure. You must be clear of any signs or symptoms of infection. If you begin antibiotics, please contact our clinic for instructions.   Fever, Chills, or Rash If you experience a fever of higher than 100 degrees, chills, rash, or open wounds during the one week prior to your procedure, please call the clinic.         Medication Hold List  **Patients under Cardiology/Neurology care should consult their provider prior to the pain procedure to verify pre-procedure medication instructions. The information below contains general guidelines.**    Blood Thinners If you are taking daily ASPIRIN, PLAVIX, OR OTHER BLOOD THINNERS SUCH AS COUMADIN/WARFARIN, we will need your prescribing doctor to sign a release permitting you to stop these medications. Once approved by your prescribing doctor - STOP ALL BLOOD THINNERS BASED ON THE TIME TABLE BELOW PRIOR TO YOUR PROCEDURE. If you have been instructed to stop WARFARIN(COUMADIN), you must have an INR lab drawn the day before your procedure. . Your INR must be within normal limits before we can perform your injection. MEDICATIONS CAN BE RESTARTED AFTER YOUR PROCEDURE.    14 DAY HOLD  Ticlid (ticlopidine)    10 DAY HOLD  Effient (Prasugel)    3 DAY HOLD  Xarelto (rivaroxaban) 7 DAY HOLD  Anacin, Bufferin, Ecotrin, Excedrin, Aggrenox (Aspirin)  Brilinta (ticagrelor)  Coumadin (Warfarin)  Pradexa (Dabigatran)  Elmiron (Pentosan)  Plavix (Clopidogrel Bisulfate)  Pletal (Cilostazol)    24 HOUR HOLD  Lovenox (enoxaparin)  Agrylin (Anagrelide)         Non-steroidal Anti-inflammatories (NSAIDs) DO NOT TAKE any non-steroidal anti-inflammatory medications (NSAIDs) listed on the table below. MEDICATIONS CAN BE RESTARTED AFTER YOUR PROCEDURE. Celebrex is OK to take and does not need to be discontinued.     Medications to stop:  3 DAY HOLD  Advil, Motrin (Ibuprofen)  Arthrotec (diciofenac sodium/misoprostol)  Clinoril (Sulindac)  Indocin (Indomethacin)  Lodine (Etodolac)  Toradol (Ketorolac)  Vicoprofen (Hydrocodone and Ibuprofen)  Voltaren (Diclotenac)    14 DAY HOLD  Daypro (Oxaprozin)  Feldene (Piroxicam)   7 DAY HOLD  Aleve (Naproxen sodium)  Darvon compound (contains aspirin)  Naprosyn (Naproxen)  Norgesic Forte (contains aspirin)  Mobic (Meloxicam)  Oruvall (Ketoprofen)  Percodan (contains aspirin)  Relafen (Nabumetone)  Salsalate  Trilisate  Vitamin E (more than 400 mg per day)  Any medication containing aspirin                To speak with a nurse, schedule/reschedule/cancel a clinic appointment, or request a medication refill call: [phone]     You can also reach us by CloudTags: [email or web address]      For refills, please call on Monday, 1 week before your medication runs out. The doctors are not always in clinic, so this gives us time to get your prescriptions ready.  Please let us know the name of the medication you are requesting a refill of.                                     Follow-ups after your visit        Your next 10 appointments already scheduled     Mar 06, 2018 10:10 AM CST   [facility] with Samantha Agarwal, PT   [facility] ([facility])    [address]   [place] [id number] [phone]              Who to contact     Please call your clinic at [phone] to:    Ask questions about your health    Make or cancel appointments    Discuss your medicines    Learn about your test results    Speak to your doctor            Additional Information About Your  "Visit        [email or web address] Information     [employer] is an electronic gateway that provides easy, online access to your medical records. With [employer], you can request a clinic appointment, read your test results, renew a prescription or communicate with your care team.     To sign up for [employer] visit the website at [email or web address]   You will be asked to enter the access code listed below, as well as some personal information. Please follow the directions to create your username and password.     Your access code is: [email or web address]  Expires: 3/4/2018  6:30 AM     Your access code will  in 90 days. If you need help or a new code, please contact your [facility] or call [phone] for assistance.        Care EveryWhere ID     This is your Care EveryWhere ID. This could be used by other organizations to access your [facility] medical records  [id number]        Your Vitals Were     Pulse Respirations Height BMI (Body Mass Index)          76 16 1.499 m (4' 11\") 20.2 kg/m2         Blood Pressure from Last 3 Encounters:   18 139/72   02/15/18 159/69   18 127/63    Weight from Last 3 Encounters:   18 45.4 kg (100 lb)   02/15/18 45.4 kg (100 lb)   18 44.4 kg (97 lb 12.8 oz)              Today, you had the following     No orders found for display       Primary Care Provider Office Phone # Fax #    Marii Montgomery [phone]       [address] [place] [phone]  [facility] [address]        Equal Access to Services     [facility] HospitalFUENTES : Hadii aad ku hadasho Soomaali, waaxda luqadaha, qaybta kaalmada adedeepali, jackeline izquierdo. So [facility] [phone].    ATENCIÓN: Si habla español, tiene a schwartz disposición servicios gratuitos de asistencia lingüística. Llame al [phone].    We comply with applicable federal civil rights laws and [place] laws. We do not discriminate on the basis of race, color, national origin, age, disability, " sex, sexual orientation, or gender identity.            Thank you!     Thank you for choosing [facility]NAGEMENT  for your care. Our goal is always to provide you with excellent care. Hearing back from our patients is one way we can continue to improve our services. Please take a few minutes to complete the written survey that you may receive in the mail after your visit with us. Thank you!             Your Updated Medication List - Protect others around you: Learn how to safely use, store and throw away your medicines at www.disposemymeds.org.          This list is accurate as of 3/2/18  7:42 AM.  Always use your most recent med list.                   Brand Name Dispense Instructions for use Diagnosis    acetaminophen 325 MG tablet    TYLENOL    100 tablet    Take 2 tablets every 6 to 8 hours as needed for pain    Chronic low back pain, unspecified back pain laterality, with sciatica presence unspecified, Contusion of right hip, initial encounter, Contusion of right knee, initial encounter       albuterol 108 (90 BASE) MCG/ACT Inhaler    VENTOLIN HFA    18 Inhaler    Inhale 2 puffs into the lungs every 4 hours as needed for shortness of breath / dyspnea or wheezing    Cough       amLODIPine 10 MG tablet    NORVASC    90 tablet    Take 1 tablet (10 mg) by mouth daily For blood pressure    Essential hypertension       atorvastatin 40 MG tablet    LIPITOR    90 tablet    Take 2 tablets (80 mg) by mouth daily    Hyperlipidemia, unspecified hyperlipidemia type       bisacodyl 10 MG Suppository    DULCOLAX    90 suppository    Place 1 suppository (10 mg) rectally daily as needed for constipation    Constipation, unspecified constipation type       CALCIUM 600 + D PO      Take 1 tablet by mouth daily.        citalopram 40 MG tablet    celeXA    45 tablet    Take 0.5 tablets (20 mg) by mouth daily    Depression, unspecified depression type       clopidogrel 75 MG tablet    PLAVIX    90  tablet    Take 1 tablet (75 mg) by mouth daily    Other chronic pulmonary embolism, Venous thrombosis of extremity       ferrous sulfate 325 (65 FE) MG tablet    IRON    90 tablet    Take 1 tablet (325 mg) by mouth daily (with breakfast)    Other iron deficiency anemias       hydrochlorothiazide 50 MG tablet    HYDRODIURIL    90 tablet    Take 1 tablet (50 mg) by mouth daily    Benign essential hypertension       levothyroxine 50 MCG tablet    SYNTHROID/LEVOTHROID    90 tablet    Take 1 tablet (50 mcg) by mouth daily    Hypothyroidism, unspecified type       lisinopril 40 MG tablet    PRINIVIL/ZESTRIL    90 tablet    Take 1 tablet (40 mg) by mouth daily For blood pressure    Essential hypertension       naproxen sodium 220 MG capsule     60 capsule    Take 220 mg by mouth 2 times daily (with meals)    Chronic low back pain, unspecified back pain laterality, with sciatica presence unspecified, Pain of left lower leg       order for DME     1 Units    Equipment being ordered:  Walker with seat for chronic back and leg pain, general weakness/deconditioning, imbalance    Chronic low back pain, unspecified back pain laterality, with sciatica presence unspecified, Pain in both lower extremities, Abnormality of gait       pantoprazole 40 MG EC tablet    PROTONIX    90 tablet    Take 1 tablet (40 mg) by mouth daily    Other iron deficiency anemias       polyethylene glycol powder    MIRALAX    119 g    Take one-half to one scoop once a day for maintaining soft stools    Constipation, unspecified constipation type       potassium chloride SA 20 MEQ CR tablet    K-DUR/KLOR-CON M    90 tablet    Take 1 tablet (20 mEq) by mouth daily    Hypopotassemia       trolamine salicylate 10 % cream    ASPERCREME    170 g    Apply 1 g topically 3 times daily    Arthralgia of left lower leg

## 2018-03-02 NOTE — LETTER
3/2/2018       RE: Lucie Stockton  [address] [address]     Dear Colleague,    Thank you for referring your patient, Lucie Stockton, to the [facility] at [facility]. Please see a copy of my visit note below.    Date of visit: 3/2/2018    Chief complaint:   Chief Complaint   Patient presents with     Pain Management     Follow up after injection        Interval history:  Lucie Stockton is a 82 year old female patient of Dr. Zafar Pena last seen by him for initial consult on 10/20/17. Past medical history of anemia, anxiety, aortic stenosis, back pain, DVT, HLD, hypothyroidism, depression, iron deficiency, osteoarthritis, osteoporosis, pulmonary embolism, tobacco use and knee pain.     Recommendations/plan at the last visit included:  1. Obtain lumbar flexion and extension x-rays  2. Schedule interlaminar L4-L5 LESI  3. Consider left SI joint injection in the future  4. Consider intra-articular lumbar facet injections with effusion aspiration in the future  5. Unlikely a MILD candidate due to marked facet hypertrophy; however, cannot be completely ruled out.  6. Hold Plavix for 7 days  7. Continue Tramadol 50mg TID and Naproxen 220mg BID    Since her last visit, Lucie Stockton reports:  -She underwent injection on 1/5/18- interlaminar L4-5. She is unable to positively identify the relief obtained but she eventually reports 50% pain relief. Later in interview, patient does report complete cessation of left lower extremity pain.   -Current complaint today is right low back, with radiation to buttocks, and occasionally across her low back at waistband area. She notes aggravation of pain with lying flat and standing for long periods of time. She notes her pain as severe, which she feels is greater than her baseline pain level.         Medications:  Current Outpatient Prescriptions   Medication Sig Dispense Refill      acetaminophen (TYLENOL) 325 MG tablet Take 2 tablets every 6 to 8 hours as needed for pain 100 tablet 1     order for DME Equipment being ordered:   Walker with seat for chronic back and leg pain, general weakness/deconditioning, imbalance 1 Units 1     citalopram (CELEXA) 40 MG tablet Take 0.5 tablets (20 mg) by mouth daily 45 tablet 1     potassium chloride SA (K-DUR/KLOR-CON M) 20 MEQ CR tablet Take 1 tablet (20 mEq) by mouth daily 90 tablet 1     amLODIPine (NORVASC) 10 MG tablet Take 1 tablet (10 mg) by mouth daily For blood pressure 90 tablet 2     trolamine salicylate (ASPERCREME) 10 % cream Apply 1 g topically 3 times daily 170 g 11     naproxen sodium 220 MG capsule Take 220 mg by mouth 2 times daily (with meals) 60 capsule 2     atorvastatin (LIPITOR) 40 MG tablet Take 2 tablets (80 mg) by mouth daily 90 tablet 3     pantoprazole (PROTONIX) 40 MG EC tablet Take 1 tablet (40 mg) by mouth daily 90 tablet 3     bisacodyl (DULCOLAX) 10 MG Suppository Place 1 suppository (10 mg) rectally daily as needed for constipation 90 suppository 3     levothyroxine (SYNTHROID/LEVOTHROID) 50 MCG tablet Take 1 tablet (50 mcg) by mouth daily 90 tablet 3     clopidogrel (PLAVIX) 75 MG tablet Take 1 tablet (75 mg) by mouth daily 90 tablet 3     hydrochlorothiazide (HYDRODIURIL) 50 MG tablet Take 1 tablet (50 mg) by mouth daily 90 tablet 3     lisinopril (PRINIVIL/ZESTRIL) 40 MG tablet Take 1 tablet (40 mg) by mouth daily For blood pressure 90 tablet 3     polyethylene glycol (MIRALAX) powder Take one-half to one scoop once a day for maintaining soft stools 119 g 5     albuterol (VENTOLIN HFA) 108 (90 BASE) MCG/ACT inhaler Inhale 2 puffs into the lungs every 4 hours as needed for shortness of breath / dyspnea or wheezing 18 Inhaler 1     ferrous sulfate (IRON) 325 (65 FE) MG tablet Take 1 tablet (325 mg) by mouth daily (with breakfast) 90 tablet 3     Calcium Carbonate-Vitamin D (CALCIUM 600 + D OR) Take 1 tablet by mouth  "daily.       [DISCONTINUED] tolterodine (DETROL) 1 MG tablet Take 1-2 tablets by mouth At Bedtime. 180 tablet 3       Medical History: any changes in medical history since they were last seen? No    Review of Systems:  The 14 system ROS was reviewed from the intake questionnaire; results listed at end of note.       Physical Exam:  Blood pressure 139/72, pulse 76, resp. rate 16, height 1.499 m (4' 11\"), weight 45.4 kg (100 lb), not currently breastfeeding.  General: Mild distress; seated in WC  Gait: unsteady  MSK exam: Lumbar Spine:    No focal spinous process tenderness with palpation of L3, L4, L5 vertebrae. Right SI joint tenderness.  Negative straight leg raise. Positive right sided Fabers.   Gait is slow; antalgic, unsteady but symmetrical.      Assessment:   Lucie Stockton is a 82 year old female who is seen at the pain clinic for right sacroiliac joint pain, lumbar spinal stenosis, lumbar spondylosis, lumbar radiculopathy, lumbar degenerative disc disease.    Plan:  1. Interventions:   -Right sacroiliac joint injection ordered. Procedure discussed in depth with patient and her  today. Will schedule at her convenience. Will need to hold Plavix for 7 days prior with PCP approval.   -Consider intra-articular lumbar facet injections with effusion aspiration in the future  2. Medication Management:   -Continue Tramadol 50mg TID and Naproxen 220mg BID  3. Follow up: Return 2-4 weeks following procedure.     Total time spent was 20 minutes, and more than 50% of face to face time was spent in counseling and/or coordination of care regarding plan of care.    ALEKSEY Gan, WIL-BC  Pain Management  Department of Interventional Pain Management and Anesthesiology   [facility]            "

## 2018-03-02 NOTE — PATIENT INSTRUCTIONS
1. Call to schedule your Procedure.     Follow up: 2-4 weeks after injection in procedure.     Please call [phone] to make your procedure appointment.  Phones are answered Monday - Friday from 7:30 - 4:00pm.  Leave a voicemail with your name, birth date, and phone number if no one is available to take your call.     Your procedure: Right Sacroiliac Joint Injection.     On the day of the procedure  1. Arrive 1 hour earlier than your scheduled time, to the [facility]  Address: [address] [place] [id number]  2. Check in on the 5th floor for your procedure      You will need to follow up in clinic in 2-4 weeks after your procedure.    If you must reschedule your procedure more than two times, you must follow up in clinic before rescheduling again.      Patient Pre-Procedure Instructions    CAUTION - FAILURE TO FOLLOW THESE PRE-PROCEDURE INSTRUCTIONS WILL RESULT IN YOUR PROCEDURE BEING RESCHEDULED.            You MUST have a  TO TAKE YOU HOME after your procedure. Transportation by Taxi or Para-transit must have a responsible adult accompany you home (other than the ). Travel by bus or light rail is not acceptable transportation. You must provide your 's full name and contact number at time of check in.   Fasting Protocol You may have NOTHING SOLID TO EAT FOR 8 HOURS prior to arrival at the procedure area.   Broth and candy are considered solid food and require an eight hour fast.   You may have CLEAR LIQUIDS UP TO 2 HOURS prior to arrival at the clinic.   Clear liquids include water, clear fruit juice (no pulp) carbonated beverages, ice, black coffee, black tea (no milk or cream), chewing gum (un-swallowed), and/or clear jello (no fruit or milk). No alcohol containing beverages.   Medications If you take any medications,  DO NOT STOP. Take your medications as usual the morning of your procedure with a sip of water AT LEAST 2 HOURS PRIOR TO ARRIVAL.     Antibiotics If you are currently taking antibiotics, you must complete the entire dose 7 days prior to your scheduled procedure. You must be clear of any signs or symptoms of infection. If you begin antibiotics, please contact our clinic for instructions.   Fever, Chills, or Rash If you experience a fever of higher than 100 degrees, chills, rash, or open wounds during the one week prior to your procedure, please call the clinic.         Medication Hold List  **Patients under Cardiology/Neurology care should consult their provider prior to the pain procedure to verify pre-procedure medication instructions. The information below contains general guidelines.**    Blood Thinners If you are taking daily ASPIRIN, PLAVIX, OR OTHER BLOOD THINNERS SUCH AS COUMADIN/WARFARIN, we will need your prescribing doctor to sign a release permitting you to stop these medications. Once approved by your prescribing doctor - STOP ALL BLOOD THINNERS BASED ON THE TIME TABLE BELOW PRIOR TO YOUR PROCEDURE. If you have been instructed to stop WARFARIN(COUMADIN), you must have an INR lab drawn the day before your procedure. . Your INR must be within normal limits before we can perform your injection. MEDICATIONS CAN BE RESTARTED AFTER YOUR PROCEDURE.    14 DAY HOLD  Ticlid (ticlopidine)    10 DAY HOLD  Effient (Prasugel)    3 DAY HOLD  Xarelto (rivaroxaban) 7 DAY HOLD  Anacin, Bufferin, Ecotrin, Excedrin, Aggrenox (Aspirin)  Brilinta (ticagrelor)  Coumadin (Warfarin)  Pradexa (Dabigatran)  Elmiron (Pentosan)  Plavix (Clopidogrel Bisulfate)  Pletal (Cilostazol)    24 HOUR HOLD  Lovenox (enoxaparin)  Agrylin (Anagrelide)        Non-steroidal Anti-inflammatories (NSAIDs) DO NOT TAKE any non-steroidal anti-inflammatory medications (NSAIDs) listed on the table below. MEDICATIONS CAN BE RESTARTED AFTER YOUR PROCEDURE. Celebrex is OK to take and does not need to be discontinued.     Medications to stop:  3 DAY HOLD  Advil, Motrin  (Ibuprofen)  Arthrotec (diciofenac sodium/misoprostol)  Clinoril (Sulindac)  Indocin (Indomethacin)  Lodine (Etodolac)  Toradol (Ketorolac)  Vicoprofen (Hydrocodone and Ibuprofen)  Voltaren (Diclotenac)    14 DAY HOLD  Daypro (Oxaprozin)  Feldene (Piroxicam)   7 DAY HOLD  Aleve (Naproxen sodium)  Darvon compound (contains aspirin)  Naprosyn (Naproxen)  Norgesic Forte (contains aspirin)  Mobic (Meloxicam)  Oruvall (Ketoprofen)  Percodan (contains aspirin)  Relafen (Nabumetone)  Salsalate  Trilisate  Vitamin E (more than 400 mg per day)  Any medication containing aspirin                To speak with a nurse, schedule/reschedule/cancel a clinic appointment, or request a medication refill call: [phone]     You can also reach us by LikeList: [email or web address]      For refills, please call on Monday, 1 week before your medication runs out. The doctors are not always in clinic, so this gives us time to get your prescriptions ready.  Please let us know the name of the medication you are requesting a refill of.

## 2018-03-02 NOTE — PROGRESS NOTES
Date of visit: 3/2/2018    Chief complaint:   Chief Complaint   Patient presents with     Pain Management     Follow up after injection        Interval history:  Lucie Stockton is a 82 year old female patient of Dr. Zafar Pena last seen by him for initial consult on 10/20/17. Past medical history of anemia, anxiety, aortic stenosis, back pain, DVT, HLD, hypothyroidism, depression, iron deficiency, osteoarthritis, osteoporosis, pulmonary embolism, tobacco use and knee pain.     Recommendations/plan at the last visit included:  1. Obtain lumbar flexion and extension x-rays  2. Schedule interlaminar L4-L5 LESI  3. Consider left SI joint injection in the future  4. Consider intra-articular lumbar facet injections with effusion aspiration in the future  5. Unlikely a MILD candidate due to marked facet hypertrophy; however, cannot be completely ruled out.  6. Hold Plavix for 7 days  7. Continue Tramadol 50mg TID and Naproxen 220mg BID    Since her last visit, Lucie Stockton reports:  -She underwent injection on 1/5/18- interlaminar L4-5. She is unable to positively identify the relief obtained but she eventually reports 50% pain relief. Later in interview, patient does report complete cessation of left lower extremity pain.   -Current complaint today is right low back, with radiation to buttocks, and occasionally across her low back at waistband area. She notes aggravation of pain with lying flat and standing for long periods of time. She notes her pain as severe, which she feels is greater than her baseline pain level.         Medications:  Current Outpatient Prescriptions   Medication Sig Dispense Refill     acetaminophen (TYLENOL) 325 MG tablet Take 2 tablets every 6 to 8 hours as needed for pain 100 tablet 1     order for DME Equipment being ordered:   Walker with seat for chronic back and leg pain, general weakness/deconditioning, imbalance 1 Units 1     citalopram (CELEXA) 40 MG tablet Take 0.5 tablets (20 mg) by  mouth daily 45 tablet 1     potassium chloride SA (K-DUR/KLOR-CON M) 20 MEQ CR tablet Take 1 tablet (20 mEq) by mouth daily 90 tablet 1     amLODIPine (NORVASC) 10 MG tablet Take 1 tablet (10 mg) by mouth daily For blood pressure 90 tablet 2     trolamine salicylate (ASPERCREME) 10 % cream Apply 1 g topically 3 times daily 170 g 11     naproxen sodium 220 MG capsule Take 220 mg by mouth 2 times daily (with meals) 60 capsule 2     atorvastatin (LIPITOR) 40 MG tablet Take 2 tablets (80 mg) by mouth daily 90 tablet 3     pantoprazole (PROTONIX) 40 MG EC tablet Take 1 tablet (40 mg) by mouth daily 90 tablet 3     bisacodyl (DULCOLAX) 10 MG Suppository Place 1 suppository (10 mg) rectally daily as needed for constipation 90 suppository 3     levothyroxine (SYNTHROID/LEVOTHROID) 50 MCG tablet Take 1 tablet (50 mcg) by mouth daily 90 tablet 3     clopidogrel (PLAVIX) 75 MG tablet Take 1 tablet (75 mg) by mouth daily 90 tablet 3     hydrochlorothiazide (HYDRODIURIL) 50 MG tablet Take 1 tablet (50 mg) by mouth daily 90 tablet 3     lisinopril (PRINIVIL/ZESTRIL) 40 MG tablet Take 1 tablet (40 mg) by mouth daily For blood pressure 90 tablet 3     polyethylene glycol (MIRALAX) powder Take one-half to one scoop once a day for maintaining soft stools 119 g 5     albuterol (VENTOLIN HFA) 108 (90 BASE) MCG/ACT inhaler Inhale 2 puffs into the lungs every 4 hours as needed for shortness of breath / dyspnea or wheezing 18 Inhaler 1     ferrous sulfate (IRON) 325 (65 FE) MG tablet Take 1 tablet (325 mg) by mouth daily (with breakfast) 90 tablet 3     Calcium Carbonate-Vitamin D (CALCIUM 600 + D OR) Take 1 tablet by mouth daily.       [DISCONTINUED] tolterodine (DETROL) 1 MG tablet Take 1-2 tablets by mouth At Bedtime. 180 tablet 3       Medical History: any changes in medical history since they were last seen? No    Review of Systems:  The 14 system ROS was reviewed from the intake questionnaire; results listed at end of note.  "      Physical Exam:  Blood pressure 139/72, pulse 76, resp. rate 16, height 1.499 m (4' 11\"), weight 45.4 kg (100 lb), not currently breastfeeding.  General: Mild distress; seated in WC  Gait: unsteady  MSK exam: Lumbar Spine:    No focal spinous process tenderness with palpation of L3, L4, L5 vertebrae. Right SI joint tenderness.  Negative straight leg raise. Positive right sided Fabers.   Gait is slow; antalgic, unsteady but symmetrical.      Assessment:   Lucie Stockton is a 82 year old female who is seen at the pain clinic for right sacroiliac joint pain, lumbar spinal stenosis, lumbar spondylosis, lumbar radiculopathy, lumbar degenerative disc disease.    Plan:  1. Interventions:   -Right sacroiliac joint injection ordered. Procedure discussed in depth with patient and her  today. Will schedule at her convenience. Will need to hold Plavix for 7 days prior with PCP approval.   -Consider intra-articular lumbar facet injections with effusion aspiration in the future  2. Medication Management:   -Continue Tramadol 50mg TID and Naproxen 220mg BID  3. Follow up: Return 2-4 weeks following procedure.     Total time spent was 20 minutes, and more than 50% of face to face time was spent in counseling and/or coordination of care regarding plan of care.    ALEKSEY Gan, WIL-BC  Pain Management  Department of Interventional Pain Management and Anesthesiology   [facility]        "

## 2018-03-02 NOTE — TELEPHONE ENCOUNTER
Dr. Montgomery,     Patient was seen in Pain Clinic today, and is scheduled for an SI joint injection on 3/7/18. She has thought that she was having the injection today, so she started holding her Plavix 2-3 days ago. She is anxious to get this injection done, and so is planning to continue holding the Plavix until after the procedure on 3/7. Please let me know if you have any concerns regarding this.     Yi Nicole RN, Care Coordinator

## 2018-03-05 ENCOUNTER — TELEPHONE (OUTPATIENT)
Dept: ANESTHESIOLOGY | Facility: CLINIC | Age: 83
End: 2018-03-05

## 2018-03-05 ENCOUNTER — TELEPHONE (OUTPATIENT)
Dept: INTERNAL MEDICINE | Facility: CLINIC | Age: 83
End: 2018-03-05

## 2018-03-05 NOTE — TELEPHONE ENCOUNTER
JARRELL spoke with Dr. Song regarding pt holding their Plavix before their procedure with Dr. Harrison.   Dr. Song stated that she was made previously aware or situation, and was agreeable to pt continuing to hold the medication until after their procedure with Dr. Harrison on 3/7/18.    LPN called and updated pt, and reminded them of their procedure and the scheduled time.   Rukhsana Aguirre LPN

## 2018-03-05 NOTE — TELEPHONE ENCOUNTER
Pt ok'd to hold Plavix per Dr Motngomery.  Kristal Jurado RN 3:52 PM on 3/5/2018.    Kristal Jurado RN 3:51 PM on 3/5/2018.

## 2018-03-06 ENCOUNTER — THERAPY VISIT (OUTPATIENT)
Dept: PHYSICAL THERAPY | Facility: CLINIC | Age: 83
End: 2018-03-06
Payer: MEDICARE

## 2018-03-06 DIAGNOSIS — G89.29 CHRONIC LEFT-SIDED LOW BACK PAIN WITH LEFT-SIDED SCIATICA: Primary | ICD-10-CM

## 2018-03-06 DIAGNOSIS — M54.42 CHRONIC LEFT-SIDED LOW BACK PAIN WITH LEFT-SIDED SCIATICA: Primary | ICD-10-CM

## 2018-03-06 PROCEDURE — 97530 THERAPEUTIC ACTIVITIES: CPT | Mod: GP | Performed by: PHYSICAL THERAPIST

## 2018-03-06 PROCEDURE — 97110 THERAPEUTIC EXERCISES: CPT | Mod: GP | Performed by: PHYSICAL THERAPIST

## 2018-03-07 ENCOUNTER — HOSPITAL ENCOUNTER (OUTPATIENT)
Facility: AMBULATORY SURGERY CENTER | Age: 83
End: 2018-03-07
Payer: MEDICARE

## 2018-03-07 ENCOUNTER — SURGERY (OUTPATIENT)
Age: 83
End: 2018-03-07

## 2018-03-07 ENCOUNTER — RADIANT APPOINTMENT (OUTPATIENT)
Dept: RADIOLOGY | Facility: AMBULATORY SURGERY CENTER | Age: 83
End: 2018-03-07
Payer: MEDICARE

## 2018-03-07 VITALS
BODY MASS INDEX: 19.63 KG/M2 | TEMPERATURE: 99 F | DIASTOLIC BLOOD PRESSURE: 103 MMHG | HEART RATE: 75 BPM | RESPIRATION RATE: 15 BRPM | OXYGEN SATURATION: 97 % | WEIGHT: 100 LBS | SYSTOLIC BLOOD PRESSURE: 157 MMHG | HEIGHT: 60 IN

## 2018-03-07 DIAGNOSIS — R52 PAIN: ICD-10-CM

## 2018-03-07 RX ORDER — BUPIVACAINE HYDROCHLORIDE 2.5 MG/ML
INJECTION, SOLUTION EPIDURAL; INFILTRATION; INTRACAUDAL PRN
Status: DISCONTINUED | OUTPATIENT
Start: 2018-03-07 | End: 2018-03-07 | Stop reason: HOSPADM

## 2018-03-07 RX ORDER — LIDOCAINE HYDROCHLORIDE 10 MG/ML
INJECTION, SOLUTION EPIDURAL; INFILTRATION; INTRACAUDAL; PERINEURAL PRN
Status: DISCONTINUED | OUTPATIENT
Start: 2018-03-07 | End: 2018-03-07 | Stop reason: HOSPADM

## 2018-03-07 RX ORDER — TRIAMCINOLONE ACETONIDE 40 MG/ML
INJECTION, SUSPENSION INTRA-ARTICULAR; INTRAMUSCULAR PRN
Status: DISCONTINUED | OUTPATIENT
Start: 2018-03-07 | End: 2018-03-07 | Stop reason: HOSPADM

## 2018-03-07 RX ADMIN — LIDOCAINE HYDROCHLORIDE 1 ML: 10 INJECTION, SOLUTION EPIDURAL; INFILTRATION; INTRACAUDAL; PERINEURAL at 08:21

## 2018-03-07 RX ADMIN — BUPIVACAINE HYDROCHLORIDE 3 ML: 2.5 INJECTION, SOLUTION EPIDURAL; INFILTRATION; INTRACAUDAL at 08:21

## 2018-03-07 RX ADMIN — TRIAMCINOLONE ACETONIDE 40 MG: 40 INJECTION, SUSPENSION INTRA-ARTICULAR; INTRAMUSCULAR at 08:21

## 2018-03-07 NOTE — IP AVS SNAPSHOT
[facility]    [address] [place] [id number]    Phone:  [phone]    Fax:  [phone]                                       After Visit Summary   3/7/2018    Lucie Stockton    MRN: [id number]           After Visit Summary Signature Page     I have received my discharge instructions, and my questions have been answered. I have discussed any challenges I see with this plan with the nurse or doctor.    ..........................................................................................................................................  Patient/Patient Representative Signature      ..........................................................................................................................................  Patient Representative Print Name and Relationship to Patient    ..................................................               ................................................  Date                                            Time    ..........................................................................................................................................  Reviewed by Signature/Title    ...................................................              ..............................................  Date                                                            Time

## 2018-03-07 NOTE — IP AVS SNAPSHOT
MRN:[id number]                      After Visit Summary   3/7/2018    Lucie Stockton    MRN: [id number]           Thank you!     Thank you for choosing [facility] for your care. Our goal is always to provide you with excellent care. Hearing back from our patients is one way we can continue to improve our services. Please take a few minutes to complete the written survey that you may receive in the mail after you visit with us. Thank you!        Patient Information     Date Of Birth          1935        About your hospital stay     You were admitted on:  March 7, 2018 [facility]    You were discharged on:  March 7, 2018       Who to Call     For medical emergencies, please call 911.  For non-urgent questions about your medical care, please call your primary care provider or clinic, [phone]  For questions related to your surgery, please call your surgery clinic        Attending Provider     Provider Specialty    Flavio Harrison MD Anesthesiology       Primary Care Provider Office Phone # Fax #    Marii Montgomery [phone]      Your next 10 appointments already scheduled     Mar 14, 2018  1:50 PM CDT   [facility] with Samantha Agarwal, PT   [place] [facility] ([facility])    [address]  [facility] [id number] [phone]              Further instructions from your care team       Home Care Instructions after a Sacroiliac Joint Injection    The sacroiliac joints lie next to the spine and connect the sacrum(the bottom of the spine) with the hip on both sides. There are two sacroiliac joints, one on the right and one on the left. Joint inflammation and/or dysfunction in this area can cause pain. In a sacroiliac joint injection, a local anesthetic (numbing medicine) is injected in or near the joint space. Steroids are often used to help with the  anti-inflammatory process.     Activity  -You may resume most normal activity levels with the exception of strenuous activity. It is important for us to know if your pain with normal activity is relieved after this injection.  -DO NOT shower for 24 hours  -DO NOT remove bandaid for 24 hours    Pain  -You may experience soreness at the injection site for one or two days  -You may use an ice pack for 20 minutes every 2 hours for the first 24 hours  -You may use a heating pad after the first 24 hours  -You may use Tylenol (acetaminophen) every 4 hours or other pain medicines as     directed by your physician    You may experience numbness radiating into your legs. This numbness may last several hours. Until sensation returns to normal; please use caution in walking, climbing stairs, and stepping out of your vehicle, etc.    DID YOU RECEIVE SEDATION TODAY?  No    DID YOU RECEIVE STEROIDS TODAY?  Yes    Common side effects of steroids:  Not everyone will experience corticosteroid side effects. If side effects are experienced, they will gradually subside in the 7-10 day period following an injection. Most common side effects include:  -Flushed face and/or chest  -Feeling of warmth, particularly in the face but could be an overall feeling of warmth  -Increased blood sugar in diabetic patients  -Menstrual irregularities my occur. If taking hormone-based birth control an alternate method of birth control is recommended  -Sleep disturbances and/or mood swings are possible  -Leg cramps      PLEASE KEEP TRACK OF YOUR SYMPTOMS AND NOTE YOUR IMPROVEMENT FOR YOUR DOCTOR.     Please contact us if you have:  -Severe pain  -Fever more than 101.5 degrees Fahrenheit  -Signs of infection at the injection site (redness, swelling, or drainage)    If you have questions, please contact our office at [phone] between the hours of 7:00 am and 3:00 pm Monday through Friday. After office hours you can contact the on call provider by dialing  [phone]. If you need immediate attention, we recommend that you go to a hospital emergency room or dial 911.      Pending Results     No orders found from 3/5/2018 to 3/8/2018.            Admission Information     Date & Time Provider Department Dept. Phone    3/7/2018 Flavio Harrison MD [facility] [phone]      Your Vitals Were     Blood Pressure Pulse Temperature Respirations Height Weight    141/71 (Cuff Size: Adult Small) 73 97.9  F (36.6  C) (Temporal) 16 1.524 m (5') 45.4 kg (100 lb)    Pulse Oximetry BMI (Body Mass Index)                97% 19.53 kg/m2          MyChart Information     [employer] is an electronic gateway that provides easy, online access to your medical records. With [employer], you can request a clinic appointment, read your test results, renew a prescription or communicate with your care team.     To sign up for [employer] visit the website at [email or web address]   You will be asked to enter the access code listed below, as well as some personal information. Please follow the directions to create your username and password.     Your access code is: [email or web address]  Expires: 2018  8:14 AM     Your access code will  in 90 days. If you need help or a new code, please contact your [facility] or call [phone] for assistance.        Care EveryWhere ID     This is your Care EveryWhere ID. This could be used by other organizations to access your [facility] [place] medical records  [id number]        Equal Access to Services     PAL SALGADO : Hadii reyna blevins hadcorio Soalysia, waaxda luqadaha, qaybta kaalmada mirta, jackeline idiin hayaan adejamel izquierdo. So [facility] [phone].    ATENCIÓN: Si habla español, tiene a schwartz disposición servicios gratuitos de asistencia lingüística. Llame al [phone].    We comply with applicable federal civil rights laws and [place] laws. We do not discriminate on the basis of race, color,  national origin, age, disability, sex, sexual orientation, or gender identity.               Review of your medicines      UNREVIEWED medicines. Ask your doctor about these medicines        Dose / Directions    acetaminophen 325 MG tablet   Commonly known as:  TYLENOL   Used for:  Chronic low back pain, unspecified back pain laterality, with sciatica presence unspecified, Contusion of right hip, initial encounter, Contusion of right knee, initial encounter        Take 2 tablets every 6 to 8 hours as needed for pain   Quantity:  100 tablet   Refills:  1       albuterol 108 (90 BASE) MCG/ACT Inhaler   Commonly known as:  VENTOLIN HFA   Used for:  Cough        Dose:  2 puff   Inhale 2 puffs into the lungs every 4 hours as needed for shortness of breath / dyspnea or wheezing   Quantity:  18 Inhaler   Refills:  1       amLODIPine 10 MG tablet   Commonly known as:  NORVASC   Used for:  Essential hypertension        Dose:  10 mg   Take 1 tablet (10 mg) by mouth daily For blood pressure   Quantity:  90 tablet   Refills:  2       atorvastatin 40 MG tablet   Commonly known as:  LIPITOR   Used for:  Hyperlipidemia, unspecified hyperlipidemia type        Dose:  80 mg   Take 2 tablets (80 mg) by mouth daily   Quantity:  90 tablet   Refills:  3       bisacodyl 10 MG Suppository   Commonly known as:  DULCOLAX   Used for:  Constipation, unspecified constipation type        Dose:  10 mg   Place 1 suppository (10 mg) rectally daily as needed for constipation   Quantity:  90 suppository   Refills:  3       CALCIUM 600 + D PO        Dose:  1 tablet   Take 1 tablet by mouth daily.   Refills:  0       citalopram 40 MG tablet   Commonly known as:  celeXA   Used for:  Depression, unspecified depression type        Dose:  20 mg   Take 0.5 tablets (20 mg) by mouth daily   Quantity:  45 tablet   Refills:  1       clopidogrel 75 MG tablet   Commonly known as:  PLAVIX   Used for:  Other chronic pulmonary embolism, Venous thrombosis of extremity         Dose:  75 mg   Take 1 tablet (75 mg) by mouth daily   Quantity:  90 tablet   Refills:  3       ferrous sulfate 325 (65 FE) MG tablet   Commonly known as:  IRON   Used for:  Other iron deficiency anemias        Dose:  325 mg   Take 1 tablet (325 mg) by mouth daily (with breakfast)   Quantity:  90 tablet   Refills:  3       hydrochlorothiazide 50 MG tablet   Commonly known as:  HYDRODIURIL   Used for:  Benign essential hypertension        Dose:  50 mg   Take 1 tablet (50 mg) by mouth daily   Quantity:  90 tablet   Refills:  3       levothyroxine 50 MCG tablet   Commonly known as:  SYNTHROID/LEVOTHROID   Used for:  Hypothyroidism, unspecified type        Dose:  50 mcg   Take 1 tablet (50 mcg) by mouth daily   Quantity:  90 tablet   Refills:  3       lisinopril 40 MG tablet   Commonly known as:  PRINIVIL/ZESTRIL   Used for:  Essential hypertension        Dose:  40 mg   Take 1 tablet (40 mg) by mouth daily For blood pressure   Quantity:  90 tablet   Refills:  3       naproxen sodium 220 MG capsule   Used for:  Chronic low back pain, unspecified back pain laterality, with sciatica presence unspecified, Pain of left lower leg        Dose:  220 mg   Take 220 mg by mouth 2 times daily (with meals)   Quantity:  60 capsule   Refills:  2       pantoprazole 40 MG EC tablet   Commonly known as:  PROTONIX   Used for:  Other iron deficiency anemias        Dose:  40 mg   Take 1 tablet (40 mg) by mouth daily   Quantity:  90 tablet   Refills:  3       polyethylene glycol powder   Commonly known as:  MIRALAX   Used for:  Constipation, unspecified constipation type        Take one-half to one scoop once a day for maintaining soft stools   Quantity:  119 g   Refills:  5       potassium chloride SA 20 MEQ CR tablet   Commonly known as:  K-DUR/KLOR-CON M   Used for:  Hypopotassemia        Dose:  20 mEq   Take 1 tablet (20 mEq) by mouth daily   Quantity:  90 tablet   Refills:  1       trolamine salicylate 10 % cream   Commonly known  as:  ASPERCREME   Used for:  Arthralgia of left lower leg        Dose:  1 applicator   Apply 1 g topically 3 times daily   Quantity:  170 g   Refills:  11         CONTINUE these medicines which have NOT CHANGED        Dose / Directions    order for DME   Used for:  Chronic low back pain, unspecified back pain laterality, with sciatica presence unspecified, Pain in both lower extremities, Abnormality of gait        Equipment being ordered:  Walker with seat for chronic back and leg pain, general weakness/deconditioning, imbalance   Quantity:  1 Units   Refills:  1                Protect others around you: Learn how to safely use, store and throw away your medicines at www.disposemymeds.org.             Medication List: This is a list of all your medications and when to take them. Check marks below indicate your daily home schedule. Keep this list as a reference.      Medications           Morning Afternoon Evening Bedtime As Needed    acetaminophen 325 MG tablet   Commonly known as:  TYLENOL   Take 2 tablets every 6 to 8 hours as needed for pain                                albuterol 108 (90 BASE) MCG/ACT Inhaler   Commonly known as:  VENTOLIN HFA   Inhale 2 puffs into the lungs every 4 hours as needed for shortness of breath / dyspnea or wheezing                                amLODIPine 10 MG tablet   Commonly known as:  NORVASC   Take 1 tablet (10 mg) by mouth daily For blood pressure                                atorvastatin 40 MG tablet   Commonly known as:  LIPITOR   Take 2 tablets (80 mg) by mouth daily                                bisacodyl 10 MG Suppository   Commonly known as:  DULCOLAX   Place 1 suppository (10 mg) rectally daily as needed for constipation                                CALCIUM 600 + D PO   Take 1 tablet by mouth daily.                                citalopram 40 MG tablet   Commonly known as:  celeXA   Take 0.5 tablets (20 mg) by mouth daily                                 clopidogrel 75 MG tablet   Commonly known as:  PLAVIX   Take 1 tablet (75 mg) by mouth daily                                ferrous sulfate 325 (65 FE) MG tablet   Commonly known as:  IRON   Take 1 tablet (325 mg) by mouth daily (with breakfast)                                hydrochlorothiazide 50 MG tablet   Commonly known as:  HYDRODIURIL   Take 1 tablet (50 mg) by mouth daily                                levothyroxine 50 MCG tablet   Commonly known as:  SYNTHROID/LEVOTHROID   Take 1 tablet (50 mcg) by mouth daily                                lisinopril 40 MG tablet   Commonly known as:  PRINIVIL/ZESTRIL   Take 1 tablet (40 mg) by mouth daily For blood pressure                                naproxen sodium 220 MG capsule   Take 220 mg by mouth 2 times daily (with meals)                                order for DME   Equipment being ordered:  Walker with seat for chronic back and leg pain, general weakness/deconditioning, imbalance                                pantoprazole 40 MG EC tablet   Commonly known as:  PROTONIX   Take 1 tablet (40 mg) by mouth daily                                polyethylene glycol powder   Commonly known as:  MIRALAX   Take one-half to one scoop once a day for maintaining soft stools                                potassium chloride SA 20 MEQ CR tablet   Commonly known as:  K-DUR/KLOR-CON M   Take 1 tablet (20 mEq) by mouth daily                                trolamine salicylate 10 % cream   Commonly known as:  ASPERCREME   Apply 1 g topically 3 times daily

## 2018-03-07 NOTE — PROCEDURES
PROCEDURE: Right Sacroiliac injection under flouroscopy    Dx: Sacroiliac joint dysfunction    CONSENT: I went over the planned procedure with Ms. Lucie Stockton. I went over the benefits and risks of the procedure including the risk of bleeding, infection, nerve damage, increased pain, and joint damage. The patient understood the risks and signed the consent form.    PROCEDURE DESCRIPTION: Time out was performed.    The pt was placed in the prone position. After identifying the right sacroiliac joint fluoroscopically, the skin was draped and prepped in the usual sterile fashion using Chloraprep. The skin and subcutaneous tissue were anesthetized with preservative-free 1% lidocaine. Using a 22G, 3.5 inch spinal needle, the right sacroiliac joint was approached from the inferior aspect of the joint. Negative aspiration was performed. This was confirmed under flouroscopy in both AP and lateral views. Then 40mg of Kenalog with 3ml of 0.25% bupivacaine, total volume 4ml, was injected without complications.  The pt tolerated the procedure well.    After an appropriate amount of observation, the patient was dismissed from the clinic in good condition under their own power.    Pre procedure pain score was 8/10  Post procedure pain score was 5/10.      Thank you for the referral.      I saw and examined the patient with the fellow and agree with the findings, assessment, and the plan as documented above.  I was present for the entire procedure.      Flavio Harrison IV, MD  [facility]

## 2018-03-07 NOTE — DISCHARGE INSTRUCTIONS
Home Care Instructions after a Sacroiliac Joint Injection    The sacroiliac joints lie next to the spine and connect the sacrum(the bottom of the spine) with the hip on both sides. There are two sacroiliac joints, one on the right and one on the left. Joint inflammation and/or dysfunction in this area can cause pain. In a sacroiliac joint injection, a local anesthetic (numbing medicine) is injected in or near the joint space. Steroids are often used to help with the anti-inflammatory process.     Activity  -You may resume most normal activity levels with the exception of strenuous activity. It is important for us to know if your pain with normal activity is relieved after this injection.  -DO NOT shower for 24 hours  -DO NOT remove bandaid for 24 hours    Pain  -You may experience soreness at the injection site for one or two days  -You may use an ice pack for 20 minutes every 2 hours for the first 24 hours  -You may use a heating pad after the first 24 hours  -You may use Tylenol (acetaminophen) every 4 hours or other pain medicines as     directed by your physician    You may experience numbness radiating into your legs. This numbness may last several hours. Until sensation returns to normal; please use caution in walking, climbing stairs, and stepping out of your vehicle, etc.    DID YOU RECEIVE SEDATION TODAY?  No    DID YOU RECEIVE STEROIDS TODAY?  Yes    Common side effects of steroids:  Not everyone will experience corticosteroid side effects. If side effects are experienced, they will gradually subside in the 7-10 day period following an injection. Most common side effects include:  -Flushed face and/or chest  -Feeling of warmth, particularly in the face but could be an overall feeling of warmth  -Increased blood sugar in diabetic patients  -Menstrual irregularities my occur. If taking hormone-based birth control an alternate method of birth control is recommended  -Sleep disturbances and/or mood swings are  possible  -Leg cramps      PLEASE KEEP TRACK OF YOUR SYMPTOMS AND NOTE YOUR IMPROVEMENT FOR YOUR DOCTOR.     Please contact us if you have:  -Severe pain  -Fever more than 101.5 degrees Fahrenheit  -Signs of infection at the injection site (redness, swelling, or drainage)    If you have questions, please contact our office at [phone] between the hours of 7:00 am and 3:00 pm Monday through Friday. After office hours you can contact the on call provider by dialing [phone]. If you need immediate attention, we recommend that you go to a hospital emergency room or dial [phone].

## 2018-03-14 ENCOUNTER — THERAPY VISIT (OUTPATIENT)
Dept: PHYSICAL THERAPY | Facility: CLINIC | Age: 83
End: 2018-03-14
Payer: MEDICARE

## 2018-03-14 DIAGNOSIS — M54.6 BILATERAL THORACIC BACK PAIN, UNSPECIFIED CHRONICITY: Primary | ICD-10-CM

## 2018-03-14 PROCEDURE — 97035 APP MDLTY 1+ULTRASOUND EA 15: CPT | Mod: GP | Performed by: PHYSICAL THERAPIST

## 2018-03-14 PROCEDURE — 97110 THERAPEUTIC EXERCISES: CPT | Mod: GP | Performed by: PHYSICAL THERAPIST

## 2018-03-14 PROCEDURE — 97140 MANUAL THERAPY 1/> REGIONS: CPT | Mod: GP | Performed by: PHYSICAL THERAPIST

## 2018-03-22 ENCOUNTER — THERAPY VISIT (OUTPATIENT)
Dept: PHYSICAL THERAPY | Facility: CLINIC | Age: 83
End: 2018-03-22
Payer: MEDICARE

## 2018-03-22 DIAGNOSIS — M54.6 BILATERAL THORACIC BACK PAIN, UNSPECIFIED CHRONICITY: ICD-10-CM

## 2018-03-22 PROCEDURE — 97110 THERAPEUTIC EXERCISES: CPT | Mod: GP | Performed by: PHYSICAL THERAPIST

## 2018-03-22 PROCEDURE — 97035 APP MDLTY 1+ULTRASOUND EA 15: CPT | Mod: GP | Performed by: PHYSICAL THERAPIST

## 2018-03-28 ENCOUNTER — THERAPY VISIT (OUTPATIENT)
Dept: PHYSICAL THERAPY | Facility: CLINIC | Age: 83
End: 2018-03-28
Payer: MEDICARE

## 2018-03-28 DIAGNOSIS — M54.6 BILATERAL THORACIC BACK PAIN, UNSPECIFIED CHRONICITY: ICD-10-CM

## 2018-03-28 PROCEDURE — 97530 THERAPEUTIC ACTIVITIES: CPT | Mod: GP | Performed by: PHYSICAL THERAPIST

## 2018-03-28 PROCEDURE — 97110 THERAPEUTIC EXERCISES: CPT | Mod: GP | Performed by: PHYSICAL THERAPIST

## 2018-04-04 ENCOUNTER — TELEPHONE (OUTPATIENT)
Dept: FAMILY MEDICINE | Facility: CLINIC | Age: 83
End: 2018-04-04

## 2018-04-04 NOTE — TELEPHONE ENCOUNTER
Forms received from: [facility]   Phone number listed: [phone]   Fax listed: [phone]  Date received: 04/04/2018  Form description: Plan/Updated Plan of Progress for Outpatient Rehabilitation  Once forms are completed, please return to [facility] via fax.  Is patient requesting to be contacted when forms are completed: NA    Form placed: In provider's basket  Samantha Ibarra

## 2018-04-10 ENCOUNTER — THERAPY VISIT (OUTPATIENT)
Dept: PHYSICAL THERAPY | Facility: CLINIC | Age: 83
End: 2018-04-10
Payer: MEDICARE

## 2018-04-10 DIAGNOSIS — M54.6 BILATERAL THORACIC BACK PAIN, UNSPECIFIED CHRONICITY: ICD-10-CM

## 2018-04-10 PROCEDURE — 97530 THERAPEUTIC ACTIVITIES: CPT | Mod: GP | Performed by: PHYSICAL THERAPIST

## 2018-04-10 PROCEDURE — 97110 THERAPEUTIC EXERCISES: CPT | Mod: GP | Performed by: PHYSICAL THERAPIST

## 2018-04-24 ENCOUNTER — THERAPY VISIT (OUTPATIENT)
Dept: PHYSICAL THERAPY | Facility: CLINIC | Age: 83
End: 2018-04-24
Payer: MEDICARE

## 2018-04-24 DIAGNOSIS — M54.6 BILATERAL THORACIC BACK PAIN, UNSPECIFIED CHRONICITY: ICD-10-CM

## 2018-04-24 PROCEDURE — G8979 MOBILITY GOAL STATUS: HCPCS | Mod: GP | Performed by: PHYSICAL THERAPIST

## 2018-04-24 PROCEDURE — 97110 THERAPEUTIC EXERCISES: CPT | Mod: GP | Performed by: PHYSICAL THERAPIST

## 2018-04-24 PROCEDURE — G8980 MOBILITY D/C STATUS: HCPCS | Mod: GP | Performed by: PHYSICAL THERAPIST

## 2018-04-24 NOTE — PROGRESS NOTES
Subjective:  HPI                    Objective:  System    Physical Exam    General     ROS    Assessment/Plan:    DISCHARGE REPORT    Progress reporting period is from 2.23.2018 to 4.23.2018.       SUBJECTIVE  Subjective changes noted by patient:  Pt. is able to walk 1 block with assist from . standing tolerance 5 min. shops with cart. LBP much better with the exercise and she no longer has the leg pain.    Current pain level is 1/10 Current Pain level: 1/10.     Previous pain level was  8/10 Initial Pain level: 8/10.   Changes in function:  Yes (See Goal flowsheet attached for changes in current functional level)  Adverse reaction to treatment or activity: None    OBJECTIVE  Changes noted in objective findings:  Yes,   Objective: LSROM: wnl pain free. rep flexion in sitting decreases pain.      ASSESSMENT/PLAN  Updated problem list and treatment plan: Diagnosis 1:  LS pain with radicular Sx LEFT  Pain -  self management, education, directional preference exercise and home program  STG/LTGs have been met or progress has been made towards goals:  Yes (See Goal flow sheet completed today.)  Assessment of Progress: The patient has met all of their long term goals.  Self Management Plans:  Patient is independent in a home treatment program.  Patient is independent in self management of symptoms.  I have re-evaluated this patient and find that the nature, scope, duration and intensity of the therapy is appropriate for the medical condition of the patient.  Lucie continues to require the following intervention to meet STG and LTG's:  PT intervention is no longer required to meet STG/LTG.    Recommendations:  This patient is ready to be discharged from therapy and continue their home treatment program.    Please refer to the daily flowsheet for treatment today, total treatment time and time spent performing 1:1 timed codes.

## 2018-05-05 RX ORDER — TRAMADOL HYDROCHLORIDE 50 MG/1
TABLET ORAL
Qty: 270 TABLET | Refills: 0 | OUTPATIENT
Start: 2018-05-05

## 2018-05-15 ENCOUNTER — OFFICE VISIT (OUTPATIENT)
Dept: FAMILY MEDICINE | Facility: CLINIC | Age: 83
End: 2018-05-15
Payer: MEDICARE

## 2018-05-15 VITALS
HEART RATE: 74 BPM | OXYGEN SATURATION: 98 % | DIASTOLIC BLOOD PRESSURE: 63 MMHG | WEIGHT: 101 LBS | TEMPERATURE: 97.4 F | SYSTOLIC BLOOD PRESSURE: 136 MMHG | BODY MASS INDEX: 19.73 KG/M2

## 2018-05-15 DIAGNOSIS — R30.0 DYSURIA: Primary | ICD-10-CM

## 2018-05-15 DIAGNOSIS — N30.00 ACUTE CYSTITIS WITHOUT HEMATURIA: ICD-10-CM

## 2018-05-15 LAB
ALBUMIN UR-MCNC: NEGATIVE MG/DL
AMORPH CRY #/AREA URNS HPF: ABNORMAL /HPF
APPEARANCE UR: ABNORMAL
BACTERIA #/AREA URNS HPF: ABNORMAL /HPF
BILIRUB UR QL STRIP: NEGATIVE
COLOR UR AUTO: YELLOW
GLUCOSE UR STRIP-MCNC: NEGATIVE MG/DL
HGB UR QL STRIP: ABNORMAL
KETONES UR STRIP-MCNC: NEGATIVE MG/DL
LEUKOCYTE ESTERASE UR QL STRIP: NEGATIVE
NITRATE UR QL: NEGATIVE
PH UR STRIP: 7 PH (ref 5–7)
RBC #/AREA URNS AUTO: ABNORMAL /HPF
SOURCE: ABNORMAL
SP GR UR STRIP: 1.01 (ref 1–1.03)
UROBILINOGEN UR STRIP-ACNC: 0.2 EU/DL (ref 0.2–1)
WBC #/AREA URNS AUTO: ABNORMAL /HPF

## 2018-05-15 PROCEDURE — 99213 OFFICE O/P EST LOW 20 MIN: CPT | Performed by: NURSE PRACTITIONER

## 2018-05-15 PROCEDURE — 87086 URINE CULTURE/COLONY COUNT: CPT | Performed by: NURSE PRACTITIONER

## 2018-05-15 PROCEDURE — 81001 URINALYSIS AUTO W/SCOPE: CPT | Performed by: NURSE PRACTITIONER

## 2018-05-15 RX ORDER — CIPROFLOXACIN 250 MG/1
250 TABLET, FILM COATED ORAL 2 TIMES DAILY
Qty: 6 TABLET | Refills: 0 | Status: SHIPPED | OUTPATIENT
Start: 2018-05-15 | End: 2018-08-17

## 2018-05-15 ASSESSMENT — PAIN SCALES - GENERAL: PAINLEVEL: SEVERE PAIN (7)

## 2018-05-15 NOTE — MR AVS SNAPSHOT
After Visit Summary   5/15/2018    Lucie Stockton    MRN: [id number]           Patient Information     Date Of Birth          1935        Visit Information        Provider Department      5/15/2018 9:40 AM Jessica Hendricks APRN CNP [facility]        Today's Diagnoses     Dysuria    -  1    Acute cystitis without hematuria          Care Instructions    Do not take the calcium or iron while taking the antibiotic. They can impair absorption of the antibiotic    Make sure you are drinking plenty of fluids          Follow-ups after your visit        Your next 10 appointments already scheduled     Jun 02, 2018  9:10 AM CDT   (Arrive by 8:55 AM)   Return Visit with Marii Montgomery MD   [facility] ([facility])    [address]  4th Floor  [facility] [address] [phone]            Jul 31, 2018 11:45 AM CDT   (Arrive by 11:30 AM)   Return Visit with Lorenzo Pena MD   [facility] ([facility])    [address]  3rd [facility] [address] [phone]              Who to contact     If you have questions or need follow up information about today's clinic visit or your schedule please contact [facility] directly at [phone].  Normal or non-critical lab and imaging results will be communicated to you by MyChart, letter or phone within 4 business days after the clinic has received the results. If you do not hear from us within 7 days, please contact the clinic through MyChart or phone. If you have a critical or abnormal lab result, we will notify you by phone as soon as possible.  Submit refill requests through [employer] or call your pharmacy and they will forward the refill request to us. Please allow 3 business days for your refill to be completed.          Additional Information About Your Visit        MyChart Information     [employer] lets  "you send messages to your doctor, view your test results, renew your prescriptions, schedule appointments and more. To sign up, go to [email or web address] . Click on \"Log in\" on the left side of the screen, which will take you to the Welcome page. Then click on \"Sign up Now\" on the right side of the page.     You will be asked to enter the access code listed below, as well as some personal information. Please follow the directions to create your username and password.     Your access code is: [email or web address]  Expires: 2018  9:14 AM     Your access code will  in 90 days. If you need help or a new code, please call your [place] clinic or [phone].        Care EveryWhere ID     This is your Care EveryWhere ID. This could be used by other organizations to access your [place] medical records  [id number]        Your Vitals Were     Pulse Temperature Pulse Oximetry Breastfeeding? BMI (Body Mass Index)       74 97.4  F (36.3  C) (Oral) 98% No 19.73 kg/m2        Blood Pressure from Last 3 Encounters:   05/15/18 136/63   18 (!) 157/103   18 139/72    Weight from Last 3 Encounters:   05/15/18 101 lb (45.8 kg)   18 100 lb (45.4 kg)   18 100 lb (45.4 kg)              We Performed the Following     UA reflex to Microscopic and Culture     Urine Microscopic          Today's Medication Changes          These changes are accurate as of 5/15/18 10:02 AM.  If you have any questions, ask your nurse or doctor.               Start taking these medicines.        Dose/Directions    ciprofloxacin 250 MG tablet   Commonly known as:  CIPRO   Used for:  Acute cystitis without hematuria   Started by:  Jessica Hendricks APRN CNP        Dose:  250 mg   Take 1 tablet (250 mg) by mouth 2 times daily   Quantity:  6 tablet   Refills:  0            Where to get your medicines      [facility] [address] [place] [address], [place] - [address] AT [address]   " [place], [facility] [address]     Phone:  [phone]     ciprofloxacin 250 MG tablet                Primary Care Provider Office Phone # Fax #    Marii Montgomery [phone]       [address] [place] [address] [address]  [facility] [address]        Equal Access to Services     PAL SALGADO : Hadii aad ku hadasho Soomaali, waaxda luqadaha, qaybta kaalmada adeegyada, waxay idiin hayaan adeeg sritaryn laflorecita izquierdo. So [facility] [phone].    ATENCIÓN: Si habla español, tiene a schwartz disposición servicios gratuitos de asistencia lingüística. Llame al [phone].    We comply with applicable federal civil rights laws and [place] laws. We do not discriminate on the basis of race, color, national origin, age, disability, sex, sexual orientation, or gender identity.            Thank you!     Thank you for choosing [facility]  for your care. Our goal is always to provide you with excellent care. Hearing back from our patients is one way we can continue to improve our services. Please take a few minutes to complete the written survey that you may receive in the mail after your visit with us. Thank you!             Your Updated Medication List - Protect others around you: Learn how to safely use, store and throw away your medicines at www.disposemymeds.org.          This list is accurate as of 5/15/18 10:02 AM.  Always use your most recent med list.                   Brand Name Dispense Instructions for use Diagnosis    acetaminophen 325 MG tablet    TYLENOL    100 tablet    Take 2 tablets every 6 to 8 hours as needed for pain    Chronic low back pain, unspecified back pain laterality, with sciatica presence unspecified, Contusion of right hip, initial encounter, Contusion of right knee, initial encounter       albuterol 108 (90 Base) MCG/ACT Inhaler    VENTOLIN HFA    18 Inhaler    Inhale 2 puffs into the lungs every 4 hours as needed for shortness of breath / dyspnea or wheezing    Cough        amLODIPine 10 MG tablet    NORVASC    90 tablet    Take 1 tablet (10 mg) by mouth daily For blood pressure    Essential hypertension       atorvastatin 40 MG tablet    LIPITOR    90 tablet    Take 2 tablets (80 mg) by mouth daily    Hyperlipidemia, unspecified hyperlipidemia type       bisacodyl 10 MG Suppository    DULCOLAX    90 suppository    Place 1 suppository (10 mg) rectally daily as needed for constipation    Constipation, unspecified constipation type       CALCIUM 600 + D PO      Take 1 tablet by mouth daily.        ciprofloxacin 250 MG tablet    CIPRO    6 tablet    Take 1 tablet (250 mg) by mouth 2 times daily    Acute cystitis without hematuria       citalopram 40 MG tablet    celeXA    45 tablet    Take 0.5 tablets (20 mg) by mouth daily    Depression, unspecified depression type       clopidogrel 75 MG tablet    PLAVIX    90 tablet    Take 1 tablet (75 mg) by mouth daily    Other chronic pulmonary embolism, Venous thrombosis of extremity       ferrous sulfate 325 (65 Fe) MG tablet    IRON    90 tablet    Take 1 tablet (325 mg) by mouth daily (with breakfast)    Other iron deficiency anemias       hydrochlorothiazide 50 MG tablet    HYDRODIURIL    90 tablet    Take 1 tablet (50 mg) by mouth daily    Benign essential hypertension       levothyroxine 50 MCG tablet    SYNTHROID/LEVOTHROID    90 tablet    Take 1 tablet (50 mcg) by mouth daily    Hypothyroidism, unspecified type       lisinopril 40 MG tablet    PRINIVIL/ZESTRIL    90 tablet    Take 1 tablet (40 mg) by mouth daily For blood pressure    Essential hypertension       naproxen sodium 220 MG capsule     60 capsule    Take 220 mg by mouth 2 times daily (with meals)    Chronic low back pain, unspecified back pain laterality, with sciatica presence unspecified, Pain of left lower leg       order for DME     1 Units    Equipment being ordered:  Walker with seat for chronic back and leg pain, general weakness/deconditioning, imbalance    Chronic low  back pain, unspecified back pain laterality, with sciatica presence unspecified, Pain in both lower extremities, Abnormality of gait       pantoprazole 40 MG EC tablet    PROTONIX    90 tablet    Take 1 tablet (40 mg) by mouth daily    Other iron deficiency anemias       polyethylene glycol powder    MIRALAX    119 g    Take one-half to one scoop once a day for maintaining soft stools    Constipation, unspecified constipation type       potassium chloride SA 20 MEQ CR tablet    K-DUR/KLOR-CON M    90 tablet    Take 1 tablet (20 mEq) by mouth daily    Hypopotassemia       trolamine salicylate 10 % cream    ASPERCREME    170 g    Apply 1 g topically 3 times daily    Arthralgia of left lower leg

## 2018-05-15 NOTE — PATIENT INSTRUCTIONS
Do not take the calcium or iron while taking the antibiotic. They can impair absorption of the antibiotic    Make sure you are drinking plenty of fluids

## 2018-05-15 NOTE — PROGRESS NOTES
SUBJECTIVE:   Lucie Stockton is a 82 year old female who presents to clinic today for the following health issues:      URINARY TRACT SYMPTOMS  Onset: a few days now    Description:   Painful urination (Dysuria): YES  Blood in urine (Hematuria): no   Delay in urine (Hesitency): no     Intensity: moderate    Progression of Symptoms:  same    Accompanying Signs & Symptoms:  Fever/chills: no   Flank pain no   Nausea and vomiting: no   Any vaginal symptoms: none  Abdominal/Pelvic Pain: no     History:   History of frequent UTI's: YES  History of kidney stones: no   Sexually Active: no   Possibility of pregnancy: No    Precipitating factors:       Therapies Tried and outcome: Increase fluid intake    Pain and burning with urination and increased urgency x5 days  Denies fever, confusion, weakness      Problem list and histories reviewed & adjusted, as indicated.  Additional history: none    Patient Active Problem List   Diagnosis     Benign ovarian tumor     Hypothyroidism     Peripheral vascular disease (H)     Knee pain     Osteoarthritis     Cervicalgia     Hyperlipidemia with target LDL less than 70     Pulmonary embolism (H)     Anemia     Aortic stenosis     Atherosclerosis of aorta (H)     Atherosclerosis of arteries of extremities (H)     Intermittent claudication (H)     Iron deficiency     Osteoporosis     DVT of lower extremity, bilateral (H)     Varicose veins     Gluteal pain     Insomnia     Back pain     Vitamin D deficiency     Tobacco use disorder     Personal history of other drug therapy     Right knee pain     Venous thrombosis of extremity     Benign essential hypertension     Gastritis     Cataract     Acute left-sided low back pain with left-sided sciatica     Lumbar stenosis with neurogenic claudication     SBO (small bowel obstruction)     Small bowel obstruction     Long term current use of anticoagulant therapy     Left-sided low back pain with left-sided sciatica     Moderate major depression  (H)     Anxiety     Neoplasm of uncertain behavior of skin     Cherry angioma     BCC (basal cell carcinoma), trunk     Weakness     Imbalance     Chronic pain     Past Surgical History:   Procedure Laterality Date     BUNIONECTOMY       C STOMACH SURGERY PROCEDURE UNLISTED      Please see chart     COLONOSCOPY      11/10/10 angioectasia     HYSTERECTOMY TOTAL ABDOMINAL, BILATERAL SALPINGO-OOPHORECTOMY, NODE DISSECTION, COMBINED  2/17/11    SANGEETHA, EMILIA, LN bx, omentectomy, resection of evrian malignancy Dr. JONO Goncalves - Returned BENIGN     INJECT EPIDURAL LUMBAR / SACRAL SINGLE N/A 1/5/2018    Procedure: INJECT EPIDURAL LUMBAR / SACRAL SINGLE;  lumbar interlaminar epidural steroid injection;  Surgeon: Jaylin Valadez MD;  Location: [facility] OR     INJECT SACROILIAC JOINT Right 3/7/2018    Procedure: INJECT SACROILIAC JOINT;  Right Sacroiliac Joint Injection;  Surgeon: Flavio Harrison MD;  Location: [facility] OR     UPPER GI ENDOSCOPY      11/10/10 erythematous gastropathy, angioectasia       Social History   Substance Use Topics     Smoking status: Former Smoker     Packs/day: 0.50     Years: 15.00     Quit date: 9/13/1993     Smokeless tobacco: Never Used     Alcohol use Yes      Comment: social     Family History   Problem Relation Age of Onset     Breast Cancer Maternal Aunt 80     C.A.D. Father 54           Reviewed and updated as needed this visit by clinical staff  Tobacco  Allergies  Meds  Med Hx  Surg Hx  Fam Hx  Soc Hx      Reviewed and updated as needed this visit by Provider         ROS:  Constitutional, HEENT, cardiovascular, pulmonary, gi and gu systems are negative, except as otherwise noted.    OBJECTIVE:     /63 (BP Location: Right arm, Patient Position: Chair, Cuff Size: Adult Small)  Pulse 74  Temp 97.4  F (36.3  C) (Oral)  Wt 101 lb (45.8 kg)  SpO2 98%  Breastfeeding? No  BMI 19.73 kg/m2  Body mass index is 19.73 kg/(m^2).  GENERAL: healthy, alert and no distress  BACK: no CVA  tenderness    Diagnostic Test Results:  Results for orders placed or performed in visit on 05/15/18 (from the past 24 hour(s))   UA reflex to Microscopic and Culture   Result Value Ref Range    Color Urine Yellow     Appearance Urine Slightly Cloudy     Glucose Urine Negative NEG^Negative mg/dL    Bilirubin Urine Negative NEG^Negative    Ketones Urine Negative NEG^Negative mg/dL    Specific Gravity Urine 1.010 1.003 - 1.035    Blood Urine Trace (A) NEG^Negative    pH Urine 7.0 5.0 - 7.0 pH    Protein Albumin Urine Negative NEG^Negative mg/dL    Urobilinogen Urine 0.2 0.2 - 1.0 EU/dL    Nitrite Urine Negative NEG^Negative    Leukocyte Esterase Urine Negative NEG^Negative    Source Midstream Urine    Urine Microscopic   Result Value Ref Range    WBC Urine 0 - 5 OTO5^0 - 5 /HPF    RBC Urine O - 2 OTO2^O - 2 /HPF    Bacteria Urine Few (A) NEG^Negative /HPF    Amorphous Crystals Moderate (A) NEG^Negative /HPF       ASSESSMENT/PLAN:       ICD-10-CM    1. Dysuria R30.0 UA reflex to Microscopic and Culture     Urine Microscopic   2. Acute cystitis without hematuria N30.00 ciprofloxacin (CIPRO) 250 MG tablet     Urine Culture Aerobic Bacterial     Urine rather unremarkable but will treat empirically with short 3 day course of cipro given patient's symptoms which are consistent with acute infectious cystitis    Patient Instructions   Do not take the calcium or iron while taking the antibiotic. They can impair absorption of the antibiotic    Make sure you are drinking plenty of fluids      ALEKSEY Prater [facility]

## 2018-05-16 LAB
BACTERIA SPEC CULT: NORMAL
SPECIMEN SOURCE: NORMAL

## 2018-06-02 ENCOUNTER — OFFICE VISIT (OUTPATIENT)
Dept: INTERNAL MEDICINE | Facility: CLINIC | Age: 83
End: 2018-06-02
Payer: MEDICARE

## 2018-06-02 VITALS
HEART RATE: 68 BPM | DIASTOLIC BLOOD PRESSURE: 75 MMHG | BODY MASS INDEX: 20.55 KG/M2 | WEIGHT: 105.2 LBS | SYSTOLIC BLOOD PRESSURE: 168 MMHG

## 2018-06-02 DIAGNOSIS — I10 BENIGN ESSENTIAL HYPERTENSION: ICD-10-CM

## 2018-06-02 DIAGNOSIS — E03.9 HYPOTHYROIDISM, UNSPECIFIED TYPE: Primary | ICD-10-CM

## 2018-06-02 DIAGNOSIS — S80.01XA CONTUSION OF RIGHT KNEE, INITIAL ENCOUNTER: ICD-10-CM

## 2018-06-02 DIAGNOSIS — E55.9 VITAMIN D DEFICIENCY: ICD-10-CM

## 2018-06-02 DIAGNOSIS — E03.9 HYPOTHYROIDISM, UNSPECIFIED TYPE: ICD-10-CM

## 2018-06-02 DIAGNOSIS — R41.3 MEMORY LOSS: ICD-10-CM

## 2018-06-02 DIAGNOSIS — I73.9 PERIPHERAL VASCULAR DISEASE (H): ICD-10-CM

## 2018-06-02 DIAGNOSIS — I70.209 ATHEROSCLEROSIS OF ARTERIES OF EXTREMITIES (H): ICD-10-CM

## 2018-06-02 DIAGNOSIS — M81.0 OSTEOPOROSIS, UNSPECIFIED OSTEOPOROSIS TYPE, UNSPECIFIED PATHOLOGICAL FRACTURE PRESENCE: ICD-10-CM

## 2018-06-02 DIAGNOSIS — R41.3 MEMORY LOSS: Primary | ICD-10-CM

## 2018-06-02 DIAGNOSIS — F41.9 ANXIETY: ICD-10-CM

## 2018-06-02 DIAGNOSIS — F32.A DEPRESSION, UNSPECIFIED DEPRESSION TYPE: ICD-10-CM

## 2018-06-02 DIAGNOSIS — G89.29 OTHER CHRONIC PAIN: ICD-10-CM

## 2018-06-02 DIAGNOSIS — M54.42 CHRONIC BILATERAL LOW BACK PAIN WITH LEFT-SIDED SCIATICA: ICD-10-CM

## 2018-06-02 DIAGNOSIS — S70.01XA CONTUSION OF RIGHT HIP, INITIAL ENCOUNTER: ICD-10-CM

## 2018-06-02 DIAGNOSIS — R79.89 ELEVATED TSH: ICD-10-CM

## 2018-06-02 DIAGNOSIS — G89.29 CHRONIC LEFT-SIDED LOW BACK PAIN WITH LEFT-SIDED SCIATICA: ICD-10-CM

## 2018-06-02 DIAGNOSIS — E78.5 HYPERLIPIDEMIA WITH TARGET LDL LESS THAN 70: ICD-10-CM

## 2018-06-02 DIAGNOSIS — M48.062 LUMBAR STENOSIS WITH NEUROGENIC CLAUDICATION: ICD-10-CM

## 2018-06-02 DIAGNOSIS — Z23 NEED FOR VACCINATION: ICD-10-CM

## 2018-06-02 DIAGNOSIS — Z79.01 LONG TERM CURRENT USE OF ANTICOAGULANT: Primary | ICD-10-CM

## 2018-06-02 DIAGNOSIS — E78.5 HYPERLIPIDEMIA, UNSPECIFIED HYPERLIPIDEMIA TYPE: ICD-10-CM

## 2018-06-02 DIAGNOSIS — I82.90 VENOUS THROMBOSIS OF EXTREMITY: ICD-10-CM

## 2018-06-02 DIAGNOSIS — M54.42 CHRONIC LEFT-SIDED LOW BACK PAIN WITH LEFT-SIDED SCIATICA: ICD-10-CM

## 2018-06-02 DIAGNOSIS — I10 ESSENTIAL HYPERTENSION: ICD-10-CM

## 2018-06-02 DIAGNOSIS — E78.5 HYPERLIPIDEMIA LDL GOAL <70: ICD-10-CM

## 2018-06-02 DIAGNOSIS — E87.6 HYPOPOTASSEMIA: ICD-10-CM

## 2018-06-02 DIAGNOSIS — F32.1 MODERATE MAJOR DEPRESSION (H): ICD-10-CM

## 2018-06-02 DIAGNOSIS — G89.29 CHRONIC BILATERAL LOW BACK PAIN WITH LEFT-SIDED SCIATICA: ICD-10-CM

## 2018-06-02 DIAGNOSIS — G89.29 CHRONIC LOW BACK PAIN, UNSPECIFIED BACK PAIN LATERALITY, WITH SCIATICA PRESENCE UNSPECIFIED: ICD-10-CM

## 2018-06-02 DIAGNOSIS — R13.10 DYSPHAGIA, UNSPECIFIED TYPE: ICD-10-CM

## 2018-06-02 DIAGNOSIS — M54.5 CHRONIC LOW BACK PAIN, UNSPECIFIED BACK PAIN LATERALITY, WITH SCIATICA PRESENCE UNSPECIFIED: ICD-10-CM

## 2018-06-02 LAB
CHOLEST SERPL-MCNC: 141 MG/DL
HDLC SERPL-MCNC: 78 MG/DL
LDLC SERPL CALC-MCNC: 54 MG/DL
NONHDLC SERPL-MCNC: 63 MG/DL
T4 FREE SERPL-MCNC: 0.86 NG/DL (ref 0.76–1.46)
TRIGL SERPL-MCNC: 46 MG/DL
TSH SERPL DL<=0.005 MIU/L-ACNC: 19.76 MU/L (ref 0.4–4)
VIT B12 SERPL-MCNC: 294 PG/ML (ref 193–986)

## 2018-06-02 PROCEDURE — 82306 VITAMIN D 25 HYDROXY: CPT | Performed by: INTERNAL MEDICINE

## 2018-06-02 RX ORDER — LISINOPRIL 40 MG/1
40 TABLET ORAL DAILY
Qty: 90 TABLET | Refills: 3 | Status: SHIPPED | OUTPATIENT
Start: 2018-06-02 | End: 2019-08-05

## 2018-06-02 RX ORDER — TRAMADOL HYDROCHLORIDE 50 MG/1
TABLET ORAL
Refills: 1 | COMMUNITY
Start: 2018-01-14 | End: 2018-06-02

## 2018-06-02 RX ORDER — ACETAMINOPHEN 325 MG/1
TABLET ORAL
Qty: 100 TABLET | Refills: 1 | Status: SHIPPED | OUTPATIENT
Start: 2018-06-02

## 2018-06-02 RX ORDER — AMLODIPINE BESYLATE 10 MG/1
10 TABLET ORAL DAILY
Qty: 90 TABLET | Refills: 3 | Status: SHIPPED | OUTPATIENT
Start: 2018-06-02 | End: 2019-08-09

## 2018-06-02 RX ORDER — HYDROCHLOROTHIAZIDE 50 MG/1
50 TABLET ORAL DAILY
Qty: 90 TABLET | Refills: 3 | Status: SHIPPED | OUTPATIENT
Start: 2018-06-02 | End: 2019-08-09

## 2018-06-02 RX ORDER — LEVOTHYROXINE SODIUM 50 UG/1
50 TABLET ORAL DAILY
Qty: 90 TABLET | Refills: 3 | Status: SHIPPED | OUTPATIENT
Start: 2018-06-02 | End: 2018-06-02 | Stop reason: DRUGHIGH

## 2018-06-02 RX ORDER — CLOPIDOGREL BISULFATE 75 MG/1
75 TABLET ORAL DAILY
Qty: 90 TABLET | Refills: 3 | Status: SHIPPED | OUTPATIENT
Start: 2018-06-02 | End: 2019-07-16

## 2018-06-02 RX ORDER — VITS A,C,E/LUTEIN/MINERALS 300MCG-200
1 TABLET ORAL DAILY
COMMUNITY
End: 2019-07-10

## 2018-06-02 RX ORDER — CITALOPRAM HYDROBROMIDE 40 MG/1
20 TABLET ORAL DAILY
Qty: 45 TABLET | Refills: 3 | Status: SHIPPED | OUTPATIENT
Start: 2018-06-02 | End: 2018-08-17

## 2018-06-02 RX ORDER — ATORVASTATIN CALCIUM 40 MG/1
80 TABLET, FILM COATED ORAL DAILY
Qty: 90 TABLET | Refills: 3 | Status: SHIPPED | OUTPATIENT
Start: 2018-06-02 | End: 2019-01-10

## 2018-06-02 RX ORDER — PANTOPRAZOLE SODIUM 40 MG/1
40 TABLET, DELAYED RELEASE ORAL DAILY
Qty: 90 TABLET | Refills: 3 | Status: SHIPPED | OUTPATIENT
Start: 2018-06-02 | End: 2019-08-09

## 2018-06-02 RX ORDER — LEVOTHYROXINE SODIUM 75 UG/1
75 TABLET ORAL DAILY
Qty: 90 TABLET | Refills: 1 | Status: SHIPPED | OUTPATIENT
Start: 2018-06-02 | End: 2018-10-17

## 2018-06-02 RX ORDER — GABAPENTIN 100 MG/1
CAPSULE ORAL
Refills: 1 | COMMUNITY
Start: 2017-09-28 | End: 2019-12-05

## 2018-06-02 RX ORDER — POTASSIUM CHLORIDE 1500 MG/1
20 TABLET, EXTENDED RELEASE ORAL DAILY
Qty: 90 TABLET | Refills: 3 | Status: SHIPPED | OUTPATIENT
Start: 2018-06-02 | End: 2018-12-03

## 2018-06-02 ASSESSMENT — ANXIETY QUESTIONNAIRES
IF YOU CHECKED OFF ANY PROBLEMS ON THIS QUESTIONNAIRE, HOW DIFFICULT HAVE THESE PROBLEMS MADE IT FOR YOU TO DO YOUR WORK, TAKE CARE OF THINGS AT HOME, OR GET ALONG WITH OTHER PEOPLE: SOMEWHAT DIFFICULT
1. FEELING NERVOUS, ANXIOUS, OR ON EDGE: SEVERAL DAYS
3. WORRYING TOO MUCH ABOUT DIFFERENT THINGS: SEVERAL DAYS
2. NOT BEING ABLE TO STOP OR CONTROL WORRYING: SEVERAL DAYS
6. BECOMING EASILY ANNOYED OR IRRITABLE: NOT AT ALL
5. BEING SO RESTLESS THAT IT IS HARD TO SIT STILL: NOT AT ALL

## 2018-06-02 ASSESSMENT — PAIN SCALES - GENERAL: PAINLEVEL: MODERATE PAIN (5)

## 2018-06-02 ASSESSMENT — PATIENT HEALTH QUESTIONNAIRE - PHQ9: 5. POOR APPETITE OR OVEREATING: SEVERAL DAYS

## 2018-06-02 NOTE — MR AVS SNAPSHOT
After Visit Summary   6/2/2018    Lucie Stockton    MRN: [id number]           Patient Information     Date Of Birth          1935        Visit Information        Provider Department      6/2/2018 9:00 AM Marii Montgomery MD [facility]        Today's Diagnoses     Memory loss    -  1      Care Instructions    [facility]: [phone]     Primary Care Center Medication Refill Request Information:  * Please contact your pharmacy regarding ANY request for medication refills.  ** [facility] Prescription Fax = [phone]  * Please allow 3 business days for routine medication refills.  * Please allow 5 business days for controlled substance medication refills.     Primary Care Center Test Result notification information:  *You will be notified with in 7-10 days of your appointment day regarding the results of your test.  If you are on MyChart you will be notified as soon as the provider has reviewed the results and signed off on them.    Plan:  Make an appointment with Dr. Clay about depression, anxiety, and sleep problems  Go to the lab today for blood work  Schedule an appointment with neurology about memory  Schedule a bone density scan  Refills of your medications were sent to your pharmacy  Take your tylenol every 8 hours on a scheduled basis  Continue drinking your Ensure  Schedule your swallowing study  Keep your upcoming appointment in dermatology  See me in 2-3 months  Dr. Montgomery              Follow-ups after your visit        Additional Services     NEUROLOGY ADULT REFERRAL       Your provider has referred you for the following: memory loss evaluation    Please be aware that coverage of these services is subject to the terms and limitations of your health insurance plan.  Call member services at your health plan with any benefit or coverage questions.      Please bring the following with you to your appointment:    (1) Any X-Rays, CTs or MRIs which have been  performed.  Contact the facility where they were done to arrange for  prior to your scheduled appointment.    (2) List of current medications  (3) This referral request   (4) Any documents/labs given to you for this referral                  Follow-up notes from your care team     Return in about 3 months (around 9/2/2018) for Routine Visit.      Your next 10 appointments already scheduled     Jul 24, 2018 10:30 AM CDT   US SUMAYA DOPPLER WITH EXERCISE BILATERAL with [id number]   [facility] [place] [facility] ([facility])    [address] [place] [address]  [facility] [id number] [phone]           Please bring a list of your medicines (including vitamins, minerals and over-the-counter drugs). Also, tell your doctor about any allergies you may have. Wear comfortable clothes and leave your valuables at home.  No caffeine or tobacco for 1 hour prior to exam.  Please call the Imaging Department at your exam site with any questions.            Jul 24, 2018 11:30 AM CDT   (Arrive by 11:15 AM)   Return Vascular Visit with ALEKSEY Ramirez   [facility] ([facility])    [address] [place] [id number]   [phone]            Jul 31, 2018 11:45 AM CDT   (Arrive by 11:30 AM)   Return Visit with Lorenzo Pena MD   [facility] ([facility])    [address] [place] [address]   [phone]            Jul 31, 2018  1:00 PM CDT   (Arrive by 12:45 PM)   New Patient Visit with Jesenia Clay, PhD   [facility] ([facility])    [address] [place] [id number]   [phone]              Future tests that were ordered for you today     Open Future Orders        Priority Expected Expires Ordered    Vitamin B12 Routine 6/2/2018 6/2/2019 6/2/2018    Dexa hip/pelvis/spine* Routine   2019    DX Vertebral Fracture Analysis Lat Only Routine  2019    Lipid Profile FASTING Routine 2018    TSH with free T4 reflex Routine 2018    Vitamin D Deficiency Routine 2018            Who to contact     Please call your clinic at [phone] to:    Ask questions about your health    Make or cancel appointments    Discuss your medicines    Learn about your test results    Speak to your doctor            Additional Information About Your Visit        [employer] Information     ePAR is an electronic gateway that provides easy, online access to your medical records. With ePAR, you can request a clinic appointment, read your test results, renew a prescription or communicate with your care team.     To sign up for ePAR visit the website at [email or web address]   You will be asked to enter the access code listed below, as well as some personal information. Please follow the directions to create your username and password.     Your access code is: [email or web address]  Expires: 2018  8:28 AM     Your access code will  in 90 days. If you need help or a new code, please contact your [facility] or call [phone] for assistance.        Care EveryWhere ID     This is your Care EveryWhere ID. This could be used by other organizations to access your [place] medical records  [id number]        Your Vitals Were     Pulse BMI (Body Mass Index)                68 20.55 kg/m2           Blood Pressure from Last 3 Encounters:   18 168/75   05/15/18 136/63   18 (!) 157/103    Weight from Last 3 Encounters:   18 105 lb 3.2 oz   05/15/18 101 lb   18 100 lb              We Performed the Following     NEUROLOGY ADULT REFERRAL          Where to get your medicines      These medications were sent to [facility] [address] [place] [address] [address] AT [facility]  [address] [facility] [address], [facility] [address]     Phone:  [phone]     acetaminophen 325 MG tablet    amLODIPine 10 MG tablet    atorvastatin 40 MG tablet    citalopram 40 MG tablet    clopidogrel 75 MG tablet    hydrochlorothiazide 50 MG tablet    levothyroxine 50 MCG tablet    lisinopril 40 MG tablet    pantoprazole 40 MG EC tablet    potassium chloride SA 20 MEQ CR tablet          Primary Care Provider Office Phone # Fax #    Marii Montgomery [phone]       [address] [place] [address]  [facility] [address]        Equal Access to Services     [facility]: Hadii aad ku hadasho Soomaali, waaxda luqadaha, qaybta kaalmada adeegyada, jackeline bernal . So [facility] [phone].    ATENCIÓN: Si habla español, tiene a schwartz disposición servicios gratuitos de asistencia lingüística. [facility] [phone].    We comply with applicable federal civil rights laws and [place] laws. We do not discriminate on the basis of race, color, national origin, age, disability, sex, sexual orientation, or gender identity.            Thank you!     Thank you for choosing [facility]  for your care. Our goal is always to provide you with excellent care. Hearing back from our patients is one way we can continue to improve our services. Please take a few minutes to complete the written survey that you may receive in the mail after your visit with us. Thank you!             Your Updated Medication List - Protect others around you: Learn how to safely use, store and throw away your medicines at www.disposemymeds.org.          This list is accurate as of 6/2/18 10:14 AM.  Always use your most recent med list.                   Brand Name Dispense Instructions for use Diagnosis    acetaminophen 325 MG tablet    TYLENOL    100 tablet    Take 2 tablets every 6 to 8 hours as needed for pain    Chronic low back pain, unspecified back pain laterality, with sciatica presence  unspecified, Contusion of right hip, initial encounter, Contusion of right knee, initial encounter       albuterol 108 (90 Base) MCG/ACT Inhaler    VENTOLIN HFA    18 Inhaler    Inhale 2 puffs into the lungs every 4 hours as needed for shortness of breath / dyspnea or wheezing    Cough       amLODIPine 10 MG tablet    NORVASC    90 tablet    Take 1 tablet (10 mg) by mouth daily For blood pressure    Essential hypertension       atorvastatin 40 MG tablet    LIPITOR    90 tablet    Take 2 tablets (80 mg) by mouth daily    Hyperlipidemia, unspecified hyperlipidemia type       bisacodyl 10 MG Suppository    DULCOLAX    90 suppository    Place 1 suppository (10 mg) rectally daily as needed for constipation    Constipation, unspecified constipation type       CALCIUM 600 + D PO      Take 1 tablet by mouth daily.        ciprofloxacin 250 MG tablet    CIPRO    6 tablet    Take 1 tablet (250 mg) by mouth 2 times daily    Acute cystitis without hematuria       citalopram 40 MG tablet    celeXA    45 tablet    Take 0.5 tablets (20 mg) by mouth daily    Depression, unspecified depression type       clopidogrel 75 MG tablet    PLAVIX    90 tablet    Take 1 tablet (75 mg) by mouth daily    Venous thrombosis of extremity       ferrous sulfate 325 (65 Fe) MG tablet    IRON    90 tablet    Take 1 tablet (325 mg) by mouth daily (with breakfast)    Other iron deficiency anemias       gabapentin 100 MG capsule    NEURONTIN          hydrochlorothiazide 50 MG tablet    HYDRODIURIL    90 tablet    Take 1 tablet (50 mg) by mouth daily    Benign essential hypertension       levothyroxine 50 MCG tablet    SYNTHROID/LEVOTHROID    90 tablet    Take 1 tablet (50 mcg) by mouth daily    Hypothyroidism, unspecified type       lisinopril 40 MG tablet    PRINIVIL/ZESTRIL    90 tablet    Take 1 tablet (40 mg) by mouth daily For blood pressure    Essential hypertension       multivitamin Tabs tablet      Take 1 tablet by mouth daily        naproxen  sodium 220 MG capsule     60 capsule    Take 220 mg by mouth 2 times daily (with meals)    Chronic low back pain, unspecified back pain laterality, with sciatica presence unspecified, Pain of left lower leg       order for DME     1 Units    Equipment being ordered:  Walker with seat for chronic back and leg pain, general weakness/deconditioning, imbalance    Chronic low back pain, unspecified back pain laterality, with sciatica presence unspecified, Pain in both lower extremities, Abnormality of gait       pantoprazole 40 MG EC tablet    PROTONIX    90 tablet    Take 1 tablet (40 mg) by mouth daily    Long term current use of anticoagulant       polyethylene glycol powder    MIRALAX    119 g    Take one-half to one scoop once a day for maintaining soft stools    Constipation, unspecified constipation type       potassium chloride SA 20 MEQ CR tablet    K-DUR/KLOR-CON M    90 tablet    Take 1 tablet (20 mEq) by mouth daily    Hypopotassemia       trolamine salicylate 10 % cream    ASPERCREME    170 g    Apply 1 g topically 3 times daily    Arthralgia of left lower leg

## 2018-06-02 NOTE — PROGRESS NOTES
Rooming Note  Health Maintenance   Health Maintenance Due   Topic Date Due     DEPRESSION ACTION PLAN Q1 YR  07/26/1953     PHQ-9 Q6 MONTHS  02/18/2018    All health maintenance items UP TO DATE  Blood Pressure   BP Readings from Last 1 Encounters:   06/02/18 169/72    Single BP recheck started, 9:06 AM (4 minutes)      Fall Risk  FALL RISK ASSESSMENT 6/2/2018   Fallen 2 or more times in the past year? No   Any fall with injury in the past year? No

## 2018-06-02 NOTE — PROGRESS NOTES
[facility]  Primary Care Center   Marii Montgomery MD  06/02/2018      Chief Complaint:   Medication Refill     History of Present Illness:   Lucie Stockton is a 82 year old female with a history of PE, chronic pain, PAD, and depression who presents for medication refill.  The patient reports she did physical therapy for her chronic back pain and has continued doing the exercises she was taught twice a day.  She thinks these do help a small amount.  She also had injections in her lumbar spine and SI joint.  Initially she reported that her pain was not much changed from previous.  However, she no longer needs to use a cane for ambulation and today describes her back pain more as an ache.  She does not find Tylenol particularly for her pain and went back to taking Tramadol however was unable to get a refill of this.  When she does take Tylenol, she takes maybe about 2 of the 8-hour formulation per day.  Generally her sleep is not good and she does wake up several times a night although not due to pain.      She has seen Dr. Clay with psychology in the past, most recently about 6 months ago.  She did not initially recall seeing a therapist.  She feels her memory is off because she is tired.  Her  had her write down her concerns to discuss today as he thought she might not remember everything.  She is beginning to feel old, which she never has in the past.  She had a head CT about 5 months ago after a fall which showed minimal chronic small vessel disease and moderate parenchymal volume loss.  KATHARINE 7: 11/21, PHQ-9: 13/27, no SI.    Her weight has been stable.  She sometimes does not have much of an appetite, particularly in the morning, but drinks Ensure 2 - 3 cans per day.  She does tend to have difficulty swallowing at times and has noticed cutting her food into smaller pieces and chewing longer helps.  This is not a new symptom for her and a swallow study was ordered in January however she has not yet done  this.       Review of Systems:   Pertinent items are noted in HPI, remainder of complete ROS is negative.      Active Medications:      acetaminophen (TYLENOL) 325 MG tablet, Take 2 tablets every 6 to 8 hours as needed for pain, Disp: 100 tablet, Rfl: 1     albuterol (VENTOLIN HFA) 108 (90 BASE) MCG/ACT inhaler, Inhale 2 puffs into the lungs every 4 hours as needed for shortness of breath / dyspnea or wheezing (Patient not taking: Reported on 5/15/2018), Disp: 18 Inhaler, Rfl: 1     amLODIPine (NORVASC) 10 MG tablet, Take 1 tablet (10 mg) by mouth daily For blood pressure, Disp: 90 tablet, Rfl: 2     atorvastatin (LIPITOR) 40 MG tablet, Take 2 tablets (80 mg) by mouth daily, Disp: 90 tablet, Rfl: 3     bisacodyl (DULCOLAX) 10 MG Suppository, Place 1 suppository (10 mg) rectally daily as needed for constipation, Disp: 90 suppository, Rfl: 3     Calcium Carbonate-Vitamin D (CALCIUM 600 + D OR), Take 1 tablet by mouth daily., Disp: , Rfl:      citalopram (CELEXA) 40 MG tablet, Take 0.5 tablets (20 mg) by mouth daily, Disp: 45 tablet, Rfl: 1     clopidogrel (PLAVIX) 75 MG tablet, Take 1 tablet (75 mg) by mouth daily, Disp: 90 tablet, Rfl: 3     ferrous sulfate (IRON) 325 (65 FE) MG tablet, Take 1 tablet (325 mg) by mouth daily (with breakfast), Disp: 90 tablet, Rfl: 3     hydrochlorothiazide (HYDRODIURIL) 50 MG tablet, Take 1 tablet (50 mg) by mouth daily, Disp: 90 tablet, Rfl: 3     levothyroxine (SYNTHROID/LEVOTHROID) 50 MCG tablet, Take 1 tablet (50 mcg) by mouth daily, Disp: 90 tablet, Rfl: 3     lisinopril (PRINIVIL/ZESTRIL) 40 MG tablet, Take 1 tablet (40 mg) by mouth daily For blood pressure, Disp: 90 tablet, Rfl: 3     naproxen sodium 220 MG capsule, Take 220 mg by mouth 2 times daily (with meals), Disp: 60 capsule, Rfl: 2     pantoprazole (PROTONIX) 40 MG EC tablet, Take 1 tablet (40 mg) by mouth daily, Disp: 90 tablet, Rfl: 3     polyethylene glycol (MIRALAX) powder, Take one-half to one scoop once a day for  maintaining soft stools (Patient not taking: Reported on 5/15/2018), Disp: 119 g, Rfl: 5     potassium chloride SA (K-DUR/KLOR-CON M) 20 MEQ CR tablet, Take 1 tablet (20 mEq) by mouth daily, Disp: 90 tablet, Rfl: 1     trolamine salicylate (ASPERCREME) 10 % cream, Apply 1 g topically 3 times daily (Patient not taking: Reported on 5/15/2018), Disp: 170 g, Rfl: 11     Allergies:   No known drug allergies.      Past Medical History:  Aortic stenosis  Atherosclerosis of aorta  Peripheral artery disease  Essential hypertension   Deep vein thrombosis   Pulmonary embolism   Varicose veins   Small bowel obstruction   Chronic pain  Lumbar stenosis with neurogenic claudication  Graves' disease   Hypothyroidism   Ovarian tumor, borderline endometrioid with adenofibromatous features   Insomnia   Vitamin D deficiency   Iron deficiency   Anemia   Osteoporosis   Osteoarthritis   Moderate major depression  Anxiety  Basal cell carcinoma, trunk  Cherry angioma     Past Surgical History:  Sacroiliac joint injection, right 3/7/18  Interlaminar epidural steroid injection 1/5/18  Hysterectomy, total abdominal, bilateral salping-oophorectomy, omentectomy 2/17/11  Bunionectomy     Family History:   Coronary artery disease - father  Breast cancer - maternal aunt     Social History:   Marital Status:   Presents to clinic alone  Tobacco Use: Former smoker, 7.5 pack year history, quit 1993.   Alcohol Use: Occasional social alcohol use.      Physical Exam:   /75  Pulse 68  Wt 47.7 kg (105 lb 3.2 oz)  BMI 20.55 kg/m2     Physical Examination:  General:  Pleasant, alert, no acute distress  Eyes:  Pupils 2-3 mm, sclera white, EOM's full.    Ears:  TM's normal, EAC's patent, clear of cerumen  Nose:  Nasal passages clear, turbinates not swollen  Throat/Mouth:  No pharyngeal erythema or exudate, oral mucosa and tongue moist, no suspicious oral lesions  Neck:  Full AROM, supple, thyroid smooth, symmetric, not enlarged, no  nodules  Lungs:  Clear to auscultation throughout, no wheezes, rhonchi or rales.  C/V:  Regular rate and rhythm, no murmurs, rubs or gallops.  No JVD, no carotid bruits.  No peripheral edema.   Abdomen:  Not distended.  Bowel sounds active.  No tenderness, no hepatosplenomegaly or masses.  No CVA tenderness or masses.  Lymph:  No cervical lymph nodes.  Neuro:  Alert and oriented, face symmetric. No tremor.  Gait steady.    M/S:   Kyphosis of the spine.  More prominent spinous process at the lower thoracic spine. No SI tenderness.  Normal tenderness over thoracic, lumbar, or sacral spine.  No joint deformities noted.  No joint swelling.  Skin:   No rashes or jaundice.  Normal moisture, good skin turgor.   Psych:  Broad range affect.  Not psychomotor slowed.  No signs of anxiety or agitation.        Assessment and Plan:  Memory loss  Her memory today seemed significantly changed from previous visits where she did not recall seeing a health psychologist just 6 months ago and also that at an appointment with me just a few months ago she required a cane due to pain and general imbalance.  Time insufficient today to do memory screening exam.  Referral to neurology for further evaluation.  - Vitamin B12  - NEUROLOGY ADULT REFERRAL    Depression, unspecified depression type   Recommended she see Dr. Clay in health psychology again regularly.    Hypothyroidism, unspecified type   Elevated TSH   Routine labs ordered today.  -TSH with free T4     Peripheral vascular disease (H)  Hyperlipidemia LDL goal <70   Currently on Plavix and Lipitor and sees vascular surgery about every 6 months.  Refills of her long-term medications provided.  -Lipid profile    Chronic bilateral low back pain with left-sided sciatica   Recommended scheduled Tylenol for pain, would not recommend opiates given risks with long term use and especially as these have not been effective in the past.  Continue home physical therapy exercises.      Vitamin D  deficiency   -Vitamin D    Osteoporosis, unspecified osteoporosis type, unspecified pathological fracture presence   -Dexa hip/pelvis/spine  -DX vertebral fracture analysis    Need for vaccination   New Shingrix vaccine recommended, she will check on her insurance coverage.  -Shingrix - Zoster vaccine recombinant adjuvanted      Follow-up: Return in about 3 months (around 9/2/2018) for Routine Visit.        Total time spent 40 minutes.  More than 50% of the time spent with Ms. Stockton on counseling / coordinating her care           Scribe Disclosure:   I, Kathy Rico, am serving as a scribe to document services personally performed by Marii Montgomery MD at this visit, based upon the provider's statements to me. All documentation has been reviewed by the aforementioned provider prior to being entered into the official medical record.     Portions of this medical record were completed by a scribe. UPON MY REVIEW AND AUTHENTICATION BY ELECTRONIC SIGNATURE, this confirms (a) I performed the applicable clinical services, and (b) the record is accurate.      Marii Montgomery M.D.  Internal Medicine  Primary Care Center   pager [phone]

## 2018-06-02 NOTE — NURSING NOTE
Chief Complaint   Patient presents with     Medication Request     pt here for med refill       Mallika Diaz CMA at 9:02 AM on 6/2/2018.

## 2018-06-03 ASSESSMENT — PATIENT HEALTH QUESTIONNAIRE - PHQ9: SUM OF ALL RESPONSES TO PHQ QUESTIONS 1-9: 13

## 2018-06-04 LAB — DEPRECATED CALCIDIOL+CALCIFEROL SERPL-MC: 42 UG/L (ref 20–75)

## 2018-06-06 DIAGNOSIS — E78.5 HYPERLIPIDEMIA, UNSPECIFIED HYPERLIPIDEMIA TYPE: ICD-10-CM

## 2018-06-06 NOTE — TELEPHONE ENCOUNTER
FUTURE VISIT INFORMATION      FUTURE VISIT INFORMATION:    Date: 06/12/2018     Time:     Location:   REFERRAL INFORMATION:    Referring provider:  GEOFF BATRES    Referring providers clinic:      Reason for visit/diagnosis          RECORDS STATUS      Internal Records: [facility], Care Everywhere and PACS.

## 2018-06-08 RX ORDER — ATORVASTATIN CALCIUM 40 MG/1
80 TABLET, FILM COATED ORAL DAILY
Qty: 180 TABLET | OUTPATIENT
Start: 2018-06-08

## 2018-06-12 ENCOUNTER — PRE VISIT (OUTPATIENT)
Dept: NEUROLOGY | Facility: CLINIC | Age: 83
End: 2018-06-12

## 2018-06-12 ENCOUNTER — OFFICE VISIT (OUTPATIENT)
Dept: NEUROLOGY | Facility: CLINIC | Age: 83
End: 2018-06-12
Payer: MEDICARE

## 2018-06-12 VITALS
HEIGHT: 60 IN | SYSTOLIC BLOOD PRESSURE: 161 MMHG | HEART RATE: 74 BPM | DIASTOLIC BLOOD PRESSURE: 74 MMHG | WEIGHT: 100.9 LBS | BODY MASS INDEX: 19.81 KG/M2

## 2018-06-12 DIAGNOSIS — Z86.39 HISTORY OF HYPOTHYROIDISM: ICD-10-CM

## 2018-06-12 DIAGNOSIS — Z86.018 HISTORY OF BENIGN OVARIAN TUMOR: ICD-10-CM

## 2018-06-12 DIAGNOSIS — R79.89 ELEVATED TSH: ICD-10-CM

## 2018-06-12 DIAGNOSIS — R41.89 SPELL OF ALTERED COGNITION: Primary | ICD-10-CM

## 2018-06-12 RX ORDER — LEVOTHYROXINE SODIUM 50 UG/1
TABLET ORAL
Refills: 3 | COMMUNITY
Start: 2018-06-02 | End: 2018-07-10

## 2018-06-12 ASSESSMENT — PAIN SCALES - GENERAL: PAINLEVEL: MODERATE PAIN (5)

## 2018-06-12 NOTE — MR AVS SNAPSHOT
After Visit Summary   6/12/2018    Lucie Stockton    MRN: [id number]           Patient Information     Date Of Birth          1935        Visit Information        Provider Department      6/12/2018 9:30 AM Moraima Beauchamp APRN [facility] Neurology        Care Instructions    Plan:  B12 supplements  Follow up with Dr Montgomery about thyroid management  Sleep needs to be improved-sleep hygiene and routine, no TV or any news/readings for about 2-3 hours before sleep, talk to Dr Montgomery about any other ideas to improve sleep.  Stay active and exercise as tolerated and its safe  Do not fall   Return with any re-occurring confusion episodes, worsening memory. Please bring your family to the appointment.   Family can call our Clinic with any concerns with your permission            Follow-ups after your visit        Your next 10 appointments already scheduled     Dl 15, 2018 12:00 PM CDT   DX VERTEBRAL FRACTURE ANALYSIS LAT ONLY with [address] [facility] ([place])    [address] [place] [address] [phone]           Please do not take any of the following 24 hours prior to the day of your exam: vitamins, calcium tablets, antacids.  If possible, please wear clothes without metal (snaps, zippers). A sweatsuit works well.            Dl 15, 2018 12:30 PM CDT   DX HIP/PELVIS/SPINE with [address] [facility] ([place])    [address] [place] [id number] [phone]           Please do not take any of the following 24 hours prior to the day of your exam: vitamins, calcium tablets, antacids.  If possible, please wear clothes without metal (snaps, zippers). A sweatsuit works well.            Jul 10, 2018  9:00 AM CDT   XR VIDEO SPEECH EVALUATION WITH ESOPHAGRAM with UCXR2, UC GIGU RAD   [facility] ([facility]  [place])    [address] [place] [address]  1st [facility] [id number] [phone]           Please bring a list of your current medicines to your exam. (Include vitamins, minerals and over-the-counter medicines.) Leave your valuables at home.  Tell the doctor if there is a chance you could be pregnant.  Do not eat for 4 hours before the exam. Keep drinking clear liquids until 2 hours before the exam.  You may take pain medicine (with a sip of water) up to 4 hours before the exam.  Do not swallow any other medicines unless your doctor tells you to. Talk to your doctor to be sure it s safe to stop your medicines.  Please call the Imaging Department at your exam site with any questions.            Jul 10, 2018  9:00 AM CDT   Video Swallow with SUMMER Guerrier   [facility] ([facility])    [address] [place] [address]  4th [facility] [id number] [phone]           Please check in for this visit in Imaging on the 1st floor.            Jul 24, 2018 10:30 AM CDT   US SUMAYA DOPPLER WITH EXERCISE BILATERAL with UCUSV1   [facility] US ([facility])    [address] [place] [id number] [phone]           Please bring a list of your medicines (including vitamins, minerals and over-the-counter drugs). Also, tell your doctor about any allergies you may have. Wear comfortable clothes and leave your valuables at home.  No caffeine or tobacco for 1 hour prior to exam.  Please call the Imaging Department at your exam site with any questions.            Jul 24, 2018 11:30 AM CDT   (Arrive by 11:15 AM)   Return Vascular Visit with ALEKSEY Ramirez [facility] ([facility])    [address] [place] [address]  3rd [facility] [id number] [phone]            Jul 31, 2018 11:45 AM CDT   (Arrive by 11:30 AM)   Return Visit with Lorenzo Pena MD   [facility] Dermat[facility] ([facility]  [facility])    [address]  3rd Floor  [facility] [address] [phone]            Jul 31, 2018  1:00 PM CDT   (Arrive by 12:45 PM)   New Patient Visit with Jesenia Clay, PhD   [facility] ([facility])    [address]  4th Floor  [facility] [id number] [phone]              Who to contact     Please call your clinic at [phone] to:    Ask questions about your health    Make or cancel appointments    Discuss your medicines    Learn about your test results    Speak to your doctor            Additional Information About Your Visit        [employer] Information     [employer]y gives you secure access to your electronic health record. If you see a primary care provider, you can also send messages to your care team and make appointments. If you have questions, please call your primary care clinic.  If you do not have a primary care provider, please call [phone] and they will assist you.      [employer] is an electronic gateway that provides easy, online access to your medical records. With [employer], you can request a clinic appointment, read your test results, renew a prescription or communicate with your care team.     To access your existing account, please contact your [facility] or call [phone] for assistance.        Care EveryWhere ID     This is your Care EveryWhere ID. This could be used by other organizations to access your [place] medical records  [id number]        Your Vitals Were     Pulse Height BMI (Body Mass Index)             74 1.524 m (5') 19.71 kg/m2          Blood Pressure from Last 3 Encounters:   06/12/18 161/74   06/02/18 168/75   05/15/18 136/63    Weight from Last 3 Encounters:   06/12/18 45.8 kg (100 lb 14.4 oz)   06/02/18 47.7 kg (105 lb 3.2 oz)   05/15/18 45.8 kg (101 lb)              Today, you had the following     No orders found for display        Primary Care Provider Office Phone # Fax #    Marii Montgomery [phone]       [address] [place] [address]  [facility] [address]        Equal Access to Services     WILLIERHYS DAMIAN : Hadii aad ku gerio Somarthaali, waaxda luqadaha, qaybta kaalmada adeadielda, jackeline soloriocelia timbo. So [facility] [phone].    ATENCIÓN: Si habla español, tiene a schwartz disposición servicios gratuitos de asistencia lingüística. Llame al [phone].    We comply with applicable federal civil rights laws and [place] laws. We do not discriminate on the basis of race, color, national origin, age, disability, sex, sexual orientation, or gender identity.            Thank you!     Thank you for choosing [facility] NEUROLOGY  for your care. Our goal is always to provide you with excellent care. Hearing back from our patients is one way we can continue to improve our services. Please take a few minutes to complete the written survey that you may receive in the mail after your visit with us. Thank you!             Your Updated Medication List - Protect others around you: Learn how to safely use, store and throw away your medicines at www.disposemymeds.org.          This list is accurate as of 6/12/18 10:44 AM.  Always use your most recent med list.                   Brand Name Dispense Instructions for use Diagnosis    acetaminophen 325 MG tablet    TYLENOL    100 tablet    Take 2 tablets every 6 to 8 hours as needed for pain    Chronic low back pain, unspecified back pain laterality, with sciatica presence unspecified, Contusion of right hip, initial encounter, Contusion of right knee, initial encounter       albuterol 108 (90 Base) MCG/ACT Inhaler    VENTOLIN HFA    18 Inhaler    Inhale 2 puffs into the lungs every 4 hours as needed for shortness of breath / dyspnea or wheezing    Cough       amLODIPine 10 MG tablet    NORVASC    90 tablet    Take 1 tablet (10 mg) by mouth daily For blood pressure    Essential  hypertension       atorvastatin 40 MG tablet    LIPITOR    90 tablet    Take 2 tablets (80 mg) by mouth daily    Hyperlipidemia, unspecified hyperlipidemia type       bisacodyl 10 MG Suppository    DULCOLAX    90 suppository    Place 1 suppository (10 mg) rectally daily as needed for constipation    Constipation, unspecified constipation type       CALCIUM 600 + D PO      Take 1 tablet by mouth daily.        ciprofloxacin 250 MG tablet    CIPRO    6 tablet    Take 1 tablet (250 mg) by mouth 2 times daily    Acute cystitis without hematuria       citalopram 40 MG tablet    celeXA    45 tablet    Take 0.5 tablets (20 mg) by mouth daily    Depression, unspecified depression type       clopidogrel 75 MG tablet    PLAVIX    90 tablet    Take 1 tablet (75 mg) by mouth daily    Venous thrombosis of extremity       ferrous sulfate 325 (65 Fe) MG tablet    IRON    90 tablet    Take 1 tablet (325 mg) by mouth daily (with breakfast)    Other iron deficiency anemias       gabapentin 100 MG capsule    NEURONTIN          hydrochlorothiazide 50 MG tablet    HYDRODIURIL    90 tablet    Take 1 tablet (50 mg) by mouth daily    Benign essential hypertension       * levothyroxine 75 MCG tablet    SYNTHROID/LEVOTHROID    90 tablet    Take 1 tablet (75 mcg) by mouth daily    Hypothyroidism, unspecified type       * levothyroxine 50 MCG tablet    SYNTHROID/LEVOTHROID          lisinopril 40 MG tablet    PRINIVIL/ZESTRIL    90 tablet    Take 1 tablet (40 mg) by mouth daily For blood pressure    Essential hypertension       multivitamin Tabs tablet      Take 1 tablet by mouth daily        naproxen sodium 220 MG capsule     60 capsule    Take 220 mg by mouth 2 times daily (with meals)    Chronic low back pain, unspecified back pain laterality, with sciatica presence unspecified, Pain of left lower leg       order for DME     1 Units    Equipment being ordered:  Walker with seat for chronic back and leg pain, general weakness/deconditioning,  imbalance    Chronic low back pain, unspecified back pain laterality, with sciatica presence unspecified, Pain in both lower extremities, Abnormality of gait       pantoprazole 40 MG EC tablet    PROTONIX    90 tablet    Take 1 tablet (40 mg) by mouth daily    Long term current use of anticoagulant       polyethylene glycol powder    MIRALAX    119 g    Take one-half to one scoop once a day for maintaining soft stools    Constipation, unspecified constipation type       potassium chloride SA 20 MEQ CR tablet    K-DUR/KLOR-CON M    90 tablet    Take 1 tablet (20 mEq) by mouth daily    Hypopotassemia       trolamine salicylate 10 % cream    ASPERCREME    170 g    Apply 1 g topically 3 times daily    Arthralgia of left lower leg       * Notice:  This list has 2 medication(s) that are the same as other medications prescribed for you. Read the directions carefully, and ask your doctor or other care provider to review them with you.

## 2018-06-12 NOTE — LETTER
"2018       RE: Lucie Stockton  [address] [facility] [address]     Dear Colleague,    Thank you for referring your patient, Lucie Stockton, to the [facility] NEUROLOGY at [facility]. Please see a copy of my visit note below.    Re: Lucie Stockton      MRN# [id number]  YOB: 1935  Date of Visit: 2018    OUTPATIENT NEUROLOGY VISIT NOTE    Chief Complaint:  Spell of confusion evaluation    History of Present Illness  Lucie Stockton is a 82-year-old female presents to the clinic today for a spell of confusion. Patient presents to today's appointment alone.   History of depression, PE, PAD, Grave's disease, hypothyroidism, ovarian tumor, hypertension, chronic back pain, quit smoking 12 years ago.   Patient reports that she got lost in her neighborhood at [facility] about 6 weeks ago because of \"many new buildings\". Lived in her neighborhood for about 50 years. Patient saw Dr Montgomery her PCP.  Patient reports that she drives. 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  got concerned that the patient got \"lost.\"  for 57 years.  is an  and has a private practice.   Patient has two daughter, Dawn who is 45 and  and [employer]  and youngest Yesenia 43 years and lives in [place], [place] and works as a  and grad of [employer] in [place]. Has two grandkids Cassandra 13 and  8 (was born in [place]).   Patient was born in [place]. Father  at the age of 56 from aortic aneurysm and mother lived to  and  of natural causes. No family history of dementia. No personal history of seizures. No head injuries.   Chronic back issues/pain affects her gaits but does not fall.     Denies history of head or neck trauma, dizziness, vertigo, loss of consciousness, seizure, double vision, blurred vision, hearing " "difficulty, speech or swallowing difficulty, weakness or numbness in face, arms or legs, urinary or bowel incontinence, coordination problems or gait difficulty, fever or chills.  Patient reports that she does not sleep very well and her sleep is interrupted. Patient sleeps in the same room with her . Patient reports that tries not to nap and stays very active.   Patient was taking gabapentin 100 mg at bedtime for a while and was stopped by her PCP  Patient takes citalopram 40 mg, patient reports that she needs to make \"sure that she do things\" and volunteering at the [employer] and [employer], no suicidal thoughts   Has seen  Dr Clay in psychology for depression.     Neurodiagnostic Testing  CT reviewed  CT HEAD W/O CONTRAST 1/27/2018 1:47 AM     Provided History: intermittent confusion. questionable lower ext  weakness. fall with head trauma \R\ 1 week ago;      Comparison: 8/11/2017.     Technique: Using multidetector thin collimation helical acquisition  technique, axial, coronal and sagittal CT images from the skull base  to the vertex were obtained without intravenous contrast.      Findings:    No intracranial hemorrhage, mass effect, or midline shift. The  ventricles are proportionate to the cerebral sulci. The gray to white  matter differentiation of the cerebral hemispheres is preserved.  Minimal areas of hypoattenuation bilateral parietal periventricular  white matter and a single focus of hypoattenuation in the left frontal  subcortical white matter are unchanged since 5/11/2017 and most  compatible with chronic small vessel ischemic disease related changes.     The basal cisterns are patent. Left basal ganglia mineralization.     Sclerosis and thickening about the left maxillary sinus osseous wall  with a small amount of fluid in the left maxillary sinus. Mild mucosal  thickening in the left ethmoid air cells. The mastoid air cells are  clear.          Impression:   1. No acute intracranial " "pathology.  2. Changes of minimal chronic small vessel ischemic disease and  moderate generalized parenchymal volume loss.  3. Findings of chronic left maxillary sinusitis.     I have personally reviewed the examination and initial interpretation  and I agree with the findings.     ARMIDA SIGALA MD    Lab reviewed  Results for ISAURO GUZMAN (MRN [id number]) as of 6/12/2018 09:37   Ref. Range 6/2/2018 10:33   TSH Latest Ref Range: 0.40 - 4.00 mU/L 19.76 (H)     Results for ISAURO GUZMAN (MRN [id number]) as of 6/12/2018 10:05   Ref. Range 6/2/2018 10:33   Vitamin B12 Latest Ref Range: 193 - 986 pg/mL 294     Past Medical History reviewed and verified with the patient  Past Medical History:   Diagnosis Date     Anemia      Aortic stenosis     abdominal     Atherosclerosis of aorta (H)     \"extensive disease with near occlusion below level of renal arteries\" on CT     Atherosclerosis of arteries of extremities (H)      Back pain     severe multilevel DJD, moderate spinal canal stenosis L4-5, severe L3-4     Chronic pain     Back, hips, knees, legs     Degenerative joint disease      DVT of lower extremity, bilateral (H)     5/24/10 left distal femoral and great saphenous, 7/7/10 left:  extending to cfv, iliac , pop and post tibial, peronial, right:  distal fem and peroneal      Grave's disease      Hyperlipidaemia LDL goal < 70      Hypertension, essential      Hypothyroidism      Imbalance      Insomnia      Intermittent claudication (H)      Iron deficiency      Knee pain      Lumbar stenosis with neurogenic claudication      Osteoarthritis     ankle,foot, knee     Osteoporosis      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, 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 JONO Goncalves - Returned BENIGN     INJECT EPIDURAL LUMBAR / SACRAL SINGLE N/A 1/5/2018    Procedure: INJECT EPIDURAL LUMBAR / SACRAL SINGLE;  lumbar interlaminar epidural steroid injection;  Surgeon: Jaylin Valadez MD;  Location: [facility] OR     INJECT SACROILIAC JOINT Right 3/7/2018    Procedure: INJECT SACROILIAC JOINT;  Right Sacroiliac Joint Injection;  Surgeon: Flavio Harrison MD;  Location: [facility] OR     UPPER GI ENDOSCOPY      11/10/10 erythematous gastropathy, angioectasia       Family History reviewed and verified with the patient  Family History   Problem Relation Age of Onset     Breast Cancer Maternal Aunt 80     C.A.D. Father 54     Social History:  Reviewed, see HPI  Social History   Substance Use Topics     Smoking status: Former Smoker     Packs/day: 0.50     Years: 15.00     Quit date: 9/13/1993     Smokeless tobacco: Never Used     Alcohol use Yes      Comment: social    reviewed and verified with the patient   No Known Allergies    Current Outpatient Prescriptions   Medication Sig Dispense Refill     acetaminophen (TYLENOL) 325 MG tablet Take 2 tablets every 6 to 8 hours as needed for pain 100 tablet 1     albuterol (VENTOLIN HFA) 108 (90 BASE) MCG/ACT inhaler Inhale 2 puffs into the lungs every 4 hours as needed for shortness of breath / dyspnea or wheezing 18 Inhaler 1     amLODIPine (NORVASC) 10 MG tablet Take 1 tablet (10 mg) by mouth daily For blood pressure 90 tablet 3     atorvastatin (LIPITOR) 40 MG tablet Take 2 tablets (80 mg) by mouth daily 90 tablet 3     bisacodyl (DULCOLAX) 10 MG Suppository Place 1 suppository (10 mg) rectally daily as needed for constipation  90 suppository 3     Calcium Carbonate-Vitamin D (CALCIUM 600 + D OR) Take 1 tablet by mouth daily.       ciprofloxacin (CIPRO) 250 MG tablet Take 1 tablet (250 mg) by mouth 2 times daily 6 tablet 0     citalopram (CELEXA) 40 MG tablet Take 0.5 tablets (20 mg) by mouth daily 45 tablet 3     clopidogrel (PLAVIX) 75 MG tablet Take 1 tablet (75 mg) by mouth daily 90 tablet 3     ferrous sulfate (IRON) 325 (65 FE) MG tablet Take 1 tablet (325 mg) by mouth daily (with breakfast) 90 tablet 3     gabapentin (NEURONTIN) 100 MG capsule   1     hydrochlorothiazide (HYDRODIURIL) 50 MG tablet Take 1 tablet (50 mg) by mouth daily 90 tablet 3     levothyroxine (SYNTHROID/LEVOTHROID) 50 MCG tablet   3     levothyroxine (SYNTHROID/LEVOTHROID) 75 MCG tablet Take 1 tablet (75 mcg) by mouth daily 90 tablet 1     lisinopril (PRINIVIL/ZESTRIL) 40 MG tablet Take 1 tablet (40 mg) by mouth daily For blood pressure 90 tablet 3     multivitamin (OCUVITE) TABS tablet Take 1 tablet by mouth daily       naproxen sodium 220 MG capsule Take 220 mg by mouth 2 times daily (with meals) 60 capsule 2     order for DME Equipment being ordered:   Walker with seat for chronic back and leg pain, general weakness/deconditioning, imbalance 1 Units 1     pantoprazole (PROTONIX) 40 MG EC tablet Take 1 tablet (40 mg) by mouth daily 90 tablet 3     polyethylene glycol (MIRALAX) powder Take one-half to one scoop once a day for maintaining soft stools 119 g 5     potassium chloride SA (K-DUR/KLOR-CON M) 20 MEQ CR tablet Take 1 tablet (20 mEq) by mouth daily 90 tablet 3     trolamine salicylate (ASPERCREME) 10 % cream Apply 1 g topically 3 times daily 170 g 11     [DISCONTINUED] tolterodine (DETROL) 1 MG tablet Take 1-2 tablets by mouth At Bedtime. 180 tablet 3   reviewed and verified with the patient    Review of Systems:  A 12-point ROS including constitutional, eyes, ENT, respiratory, cardiovascular, gastroenterology, genitourinary, integumentary,  musculoskeletal, neurology, hematology and psychiatric were all reviewed with the patient and completed at the [facility] Question iraj and as mentioned in the HPI.     General Exam:   /74 (BP Location: Right arm, Patient Position: Sitting, Cuff Size: Adult Large)  Pulse 74  Ht 1.524 m (5')  Wt 45.8 kg (100 lb 14.4 oz)  BMI 19.71 kg/m2   Vital Signs 5/15/2018 6/2/2018 6/2/2018 6/2/2018 6/12/2018   Weight (LB) 101 lb  105 lb 3.2 oz       Vital Signs 6/12/2018   Weight (LB) 100 lb 14.4 oz     PHQ-2 Score:     PHQ-2 ( 1999 [employer]) 1/9/2018 10/20/2017   Q1: Little interest or pleasure in doing things 0 1   Q2: Feeling down, depressed or hopeless 0 1   PHQ-2 Score 0 2   Q1: Little interest or pleasure in doing things - Several days   Q2: Feeling down, depressed or hopeless - Several days   PHQ-2 Score - 2     PHQ-9 score:    PHQ-9 SCORE 6/2/2018   Total Score -   Total Score 13       GEN: Awake, NAD; good eye contact, responses appropriately, healthy appearing and very friendly, neatly dressed, back pain 5/10  HEENT: Head atraumatic/Normocephalic. Scalp normal. Pupils equally round, 4 mm, reactive to light and accommodation, sclera and conjunctiva normal. Fundoscopic examination reveals normal vessels no papilledema.   Neck: Easily moveable without resistance  Heart: S1/S2 appreciated, RRR, no m/r/g, no carotid bruits  Lungs:Lungs are clear to auscultation bilaterally, no wheezes or crackles.   Neurological Examination:  The patient is alert and oriented times four. Has good attention and concentration. SLUMS score 27/30 with delayed memory recall 2/5.   Speech is fluent without dysarthria.   Cranial nerves:  CN I deferred.   CN II: Intact and full visual fields to confrontation bilaterally. CN III, IV, VI: EOM intact. There is no nystagmus. Has conjugated gaze. Intact direct and consensual pupillary light reflexes.   CN V: Intact and symmetrical to facial sensation in the V1 through V3 bilaterally.    CN VII: Intact and symmetrical eyebrow and lid raise and eyelid closure, smiles and frown.   CN VIII: Intact to finger rub bilaterally.   CN IX and X: The palates elevates symmetrical. The uvula is midline.   CN XII: The tongue protrudes midline with no atrophy or fasciculations.   Motor exam: The patient has a normal bulk and tone throughout. There is no atrophy, fasciculations, clonus. Resting head tremor.   Strength Exam:  5/5 strength at shoulder abduction, elbow flexion or extension, wrist flexion or extension, finger abduction, , hip flexion and extension, knee flexion and extension, and dorsiflexion and plantarflexion bilaterally.   Sensation is intact to light touch  throughout. Reflexes are 2+ and symmetrical at biceps, triceps, brachioradialis, patellar, and Achilles.   Negative Babinski with downgoing toes bilaterally.   Coordination reveals finger-nose-finger, rapid alternating movements, finger tapping, and toe tapping with normal speed and accuracy.   Station and gait is normal. There is no ataxia. Can walk on the toes, heels, and tandem walk without difficulty. Has good postural reflexes.Has no drift and a negative Romberg. Shaq's negative          Assessment and Plan:  Transient neurological deficit/Episode of confusion per patient occur about 6 weeks ago. History of depression, PE, PAD, Grave's disease, hypothyroidism, ovarian tumor, hypertension, chronic back pain, quit smoking 12 years ago.   Patient presents to today's appointment alone so no family input available. Patient appears to be providing a good history today and her cognitive assessment was 27/30 but a delay recall 2/3. Patient's vitamin B12 appears to be normal but somewhat on the lower normal ranges and elevated TSH. Head CT on 1/27/2018 and no acute findings at that time. Some head tremor on the exam today but no other  findings on the exam today to suspect parkinsonism at this time.   Differentials are stroke, seizures,  paraneoplastic, thyroid, dementia  or other etiology.   Plan:  B12 supplement recommended-low normal ranges 294  Sleep needs to be improved-sleep hygiene and routine, no TV or any news/readings for about 2-3 hours before sleep  Stay active and exercise as tolerated and its safe  After my discussion with one of our neurologist, Dr Mena Additional recommendations   Brain MRI for any enhancement given patient's history of ovarian tumor history, question of possible paraneoplastic syndrome and elevated TSH  EEG 3 hour video for seizures  Paraneoplastic panel  Recommended not to drive for 3 months  Follow up in 3 months or sooner if needed     History of Hypothyroidism, elevated TSH . Recommended to follow up with Dr Montgomery    I discussed all my recommendation with Lucie Stockton. The patient verbalizes understanding and comfortable with the plan. The patient has our clinic phone number to call with any questions or concerns. All of the patient's questions were answered from the best of my current knowledge.     Thank you for letting me be a part of the treatment team for Lucie Stockton    Time spent with pt answering questions, discussing findings, counseling and coordinating care was more than 50% the appointment time, 60  minutes.     Again, thank you for allowing me to participate in the care of your patient.      Sincerely,    ALEKSEY Vasquez CNP

## 2018-06-12 NOTE — PROGRESS NOTES
"Re: Lucie Stockton      MRN# [id number]  YOB: 1935  Date of Visit: 2018    OUTPATIENT NEUROLOGY VISIT NOTE    Chief Complaint:  Spell of confusion evaluation    History of Present Illness  Lucie Stockton is a 82-year-old female presents to the clinic today for a spell of confusion. Patient presents to today's appointment alone.   History of depression, PE, PAD, Grave's disease, hypothyroidism, ovarian tumor, hypertension, chronic back pain, quit smoking 12 years ago.   Patient reports that she got lost in her neighborhood at [facility] about 6 weeks ago because of \"many new buildings\". Lived in her neighborhood for about 50 years. Patient saw Dr Montgomery her PCP.  Patient reports that she drives. Patient reports that she tout here at the  for many years at the [employer].  got concerned that the patient got \"lost.\"  for 57 years.  is an  and has a private practice.   Patient has two daughter, Dawn who is 45 and  and [employer]  and youngest Yesenia 43 years and lives in [place], [place] and works as a  and grad of [employer] in [place]. Has two grandkids Cassandra 13 and  8 (was born in [place]).   Patient was born in [place]. Father  at the age of 56 from aortic aneurysm and mother lived to  and  of natural causes. No family history of dementia. No personal history of seizures. No head injuries.   Chronic back issues/pain affects her gaits but does not fall.     Denies history of head or neck trauma, dizziness, vertigo, loss of consciousness, seizure, double vision, blurred vision, hearing difficulty, speech or swallowing difficulty, weakness or numbness in face, arms or legs, urinary or bowel incontinence, coordination problems or gait difficulty, fever or chills.  Patient reports that she does not sleep very well and her sleep is interrupted. Patient sleeps in the same room " "with her . Patient reports that tries not to nap and stays very active.   Patient was taking gabapentin 100 mg at bedtime for a while and was stopped by her PCP  Patient takes citalopram 40 mg, patient reports that she needs to make \"sure that she do things\" and volunteering at the [employer], no suicidal thoughts   Has seen  Dr Clay in psychology for depression.     Neurodiagnostic Testing  CT reviewed  CT HEAD W/O CONTRAST 1/27/2018 1:47 AM     Provided History: intermittent confusion. questionable lower ext  weakness. fall with head trauma \R\ 1 week ago;      Comparison: 8/11/2017.     Technique: Using multidetector thin collimation helical acquisition  technique, axial, coronal and sagittal CT images from the skull base  to the vertex were obtained without intravenous contrast.      Findings:    No intracranial hemorrhage, mass effect, or midline shift. The  ventricles are proportionate to the cerebral sulci. The gray to white  matter differentiation of the cerebral hemispheres is preserved.  Minimal areas of hypoattenuation bilateral parietal periventricular  white matter and a single focus of hypoattenuation in the left frontal  subcortical white matter are unchanged since 5/11/2017 and most  compatible with chronic small vessel ischemic disease related changes.     The basal cisterns are patent. Left basal ganglia mineralization.     Sclerosis and thickening about the left maxillary sinus osseous wall  with a small amount of fluid in the left maxillary sinus. Mild mucosal  thickening in the left ethmoid air cells. The mastoid air cells are  clear.          Impression:   1. No acute intracranial pathology.  2. Changes of minimal chronic small vessel ischemic disease and  moderate generalized parenchymal volume loss.  3. Findings of chronic left maxillary sinusitis.     I have personally reviewed the examination and initial interpretation  and I agree with the findings.     ARMIDA SIGALA, " "MD    Lab reviewed  Results for ISAURO GUZMAN (MRN [id number]) as of 6/12/2018 09:37   Ref. Range 6/2/2018 10:33   TSH Latest Ref Range: 0.40 - 4.00 mU/L 19.76 (H)     Results for ISAURO GUZMAN (MRN [id number]) as of 6/12/2018 10:05   Ref. Range 6/2/2018 10:33   Vitamin B12 Latest Ref Range: 193 - 986 pg/mL 294     Past Medical History reviewed and verified with the patient  Past Medical History:   Diagnosis Date     Anemia      Aortic stenosis     abdominal     Atherosclerosis of aorta (H)     \"extensive disease with near occlusion below level of renal arteries\" on CT     Atherosclerosis of arteries of extremities (H)      Back pain     severe multilevel DJD, moderate spinal canal stenosis L4-5, severe L3-4     Chronic pain     Back, hips, knees, legs     Degenerative joint disease      DVT of lower extremity, bilateral (H)     5/24/10 left distal femoral and great saphenous, 7/7/10 left:  extending to cfv, iliac , pop and post tibial, peronial, right:  distal fem and peroneal      Grave's disease      Hyperlipidaemia LDL goal < 70      Hypertension, essential      Hypothyroidism      Imbalance      Insomnia      Intermittent claudication (H)      Iron deficiency      Knee pain      Lumbar stenosis with neurogenic claudication      Osteoarthritis     ankle,foot, knee     Osteoporosis      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, 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 JONO Goncalves - Returned BENIGN     INJECT EPIDURAL LUMBAR / SACRAL SINGLE N/A 1/5/2018    Procedure: INJECT EPIDURAL LUMBAR / SACRAL SINGLE;  lumbar interlaminar epidural steroid injection;  Surgeon: Jaylin Valadez MD;  Location: [facility] OR     INJECT SACROILIAC JOINT Right 3/7/2018    Procedure: INJECT SACROILIAC JOINT;  Right Sacroiliac Joint Injection;  Surgeon: Flavio Harrison MD;  Location: [facility] OR     UPPER GI ENDOSCOPY      11/10/10 erythematous gastropathy, angioectasia       Family History reviewed and verified with the patient  Family History   Problem Relation Age of Onset     Breast Cancer Maternal Aunt 80     C.A.D. Father 54     Social History:  Reviewed, see HPI  Social History   Substance Use Topics     Smoking status: Former Smoker     Packs/day: 0.50     Years: 15.00     Quit date: 9/13/1993     Smokeless tobacco: Never Used     Alcohol use Yes      Comment: social    reviewed and verified with the patient   No Known Allergies    Current Outpatient Prescriptions   Medication Sig Dispense Refill     acetaminophen (TYLENOL) 325 MG tablet Take 2 tablets every 6 to 8 hours as needed for pain 100 tablet 1     albuterol (VENTOLIN HFA) 108 (90 BASE) MCG/ACT inhaler Inhale 2 puffs into the lungs every 4 hours as needed for shortness of breath / dyspnea or wheezing 18 Inhaler 1     amLODIPine (NORVASC) 10 MG tablet Take 1 tablet (10 mg) by mouth daily For blood pressure 90 tablet 3     atorvastatin (LIPITOR) 40 MG tablet Take 2 tablets (80 mg) by mouth daily 90 tablet 3     bisacodyl (DULCOLAX) 10 MG Suppository Place 1 suppository (10 mg) rectally daily as needed for constipation 90 suppository 3     Calcium Carbonate-Vitamin D (CALCIUM 600 + D OR) Take 1 tablet by mouth daily.       ciprofloxacin (CIPRO) 250 MG tablet Take 1 tablet (250 mg) by mouth 2 times daily 6 tablet 0     citalopram (CELEXA) 40 MG tablet Take 0.5 tablets (20 mg) by mouth daily 45 tablet 3      clopidogrel (PLAVIX) 75 MG tablet Take 1 tablet (75 mg) by mouth daily 90 tablet 3     ferrous sulfate (IRON) 325 (65 FE) MG tablet Take 1 tablet (325 mg) by mouth daily (with breakfast) 90 tablet 3     gabapentin (NEURONTIN) 100 MG capsule   1     hydrochlorothiazide (HYDRODIURIL) 50 MG tablet Take 1 tablet (50 mg) by mouth daily 90 tablet 3     levothyroxine (SYNTHROID/LEVOTHROID) 50 MCG tablet   3     levothyroxine (SYNTHROID/LEVOTHROID) 75 MCG tablet Take 1 tablet (75 mcg) by mouth daily 90 tablet 1     lisinopril (PRINIVIL/ZESTRIL) 40 MG tablet Take 1 tablet (40 mg) by mouth daily For blood pressure 90 tablet 3     multivitamin (OCUVITE) TABS tablet Take 1 tablet by mouth daily       naproxen sodium 220 MG capsule Take 220 mg by mouth 2 times daily (with meals) 60 capsule 2     order for DME Equipment being ordered:   Walker with seat for chronic back and leg pain, general weakness/deconditioning, imbalance 1 Units 1     pantoprazole (PROTONIX) 40 MG EC tablet Take 1 tablet (40 mg) by mouth daily 90 tablet 3     polyethylene glycol (MIRALAX) powder Take one-half to one scoop once a day for maintaining soft stools 119 g 5     potassium chloride SA (K-DUR/KLOR-CON M) 20 MEQ CR tablet Take 1 tablet (20 mEq) by mouth daily 90 tablet 3     trolamine salicylate (ASPERCREME) 10 % cream Apply 1 g topically 3 times daily 170 g 11     [DISCONTINUED] tolterodine (DETROL) 1 MG tablet Take 1-2 tablets by mouth At Bedtime. 180 tablet 3   reviewed and verified with the patient    Review of Systems:  A 12-point ROS including constitutional, eyes, ENT, respiratory, cardiovascular, gastroenterology, genitourinary, integumentary, musculoskeletal, neurology, hematology and psychiatric were all reviewed with the patient and completed at the [facility]aj and as mentioned in the HPI.     General Exam:   /74 (BP Location: Right arm, Patient Position: Sitting, Cuff Size: Adult Large)  Pulse 74  Ht 1.524 m  (5')  Wt 45.8 kg (100 lb 14.4 oz)  BMI 19.71 kg/m2   Vital Signs 5/15/2018 6/2/2018 6/2/2018 6/2/2018 6/12/2018   Weight (LB) 101 lb  105 lb 3.2 oz       Vital Signs 6/12/2018   Weight (LB) 100 lb 14.4 oz     PHQ-2 Score:     PHQ-2 ( 1999 [employer]) 1/9/2018 10/20/2017   Q1: Little interest or pleasure in doing things 0 1   Q2: Feeling down, depressed or hopeless 0 1   PHQ-2 Score 0 2   Q1: Little interest or pleasure in doing things - Several days   Q2: Feeling down, depressed or hopeless - Several days   PHQ-2 Score - 2     PHQ-9 score:    PHQ-9 SCORE 6/2/2018   Total Score -   Total Score 13       GEN: Awake, NAD; good eye contact, responses appropriately, healthy appearing and very friendly, neatly dressed, back pain 5/10  HEENT: Head atraumatic/Normocephalic. Scalp normal. Pupils equally round, 4 mm, reactive to light and accommodation, sclera and conjunctiva normal. Fundoscopic examination reveals normal vessels no papilledema.   Neck: Easily moveable without resistance  Heart: S1/S2 appreciated, RRR, no m/r/g, no carotid bruits  Lungs:Lungs are clear to auscultation bilaterally, no wheezes or crackles.   Neurological Examination:  The patient is alert and oriented times four. Has good attention and concentration. SLUMS score 27/30 with delayed memory recall 2/5.   Speech is fluent without dysarthria.   Cranial nerves:  CN I deferred.   CN II: Intact and full visual fields to confrontation bilaterally. CN III, IV, VI: EOM intact. There is no nystagmus. Has conjugated gaze. Intact direct and consensual pupillary light reflexes.   CN V: Intact and symmetrical to facial sensation in the V1 through V3 bilaterally.   CN VII: Intact and symmetrical eyebrow and lid raise and eyelid closure, smiles and frown.   CN VIII: Intact to finger rub bilaterally.   CN IX and X: The palates elevates symmetrical. The uvula is midline.   CN XII: The tongue protrudes midline with no atrophy or fasciculations.   Motor exam: The  patient has a normal bulk and tone throughout. There is no atrophy, fasciculations, clonus. Resting head tremor.   Strength Exam:  5/5 strength at shoulder abduction, elbow flexion or extension, wrist flexion or extension, finger abduction, , hip flexion and extension, knee flexion and extension, and dorsiflexion and plantarflexion bilaterally.   Sensation is intact to light touch  throughout. Reflexes are 2+ and symmetrical at biceps, triceps, brachioradialis, patellar, and Achilles.   Negative Babinski with downgoing toes bilaterally.   Coordination reveals finger-nose-finger, rapid alternating movements, finger tapping, and toe tapping with normal speed and accuracy.   Station and gait is normal. There is no ataxia. Can walk on the toes, heels, and tandem walk without difficulty. Has good postural reflexes.Has no drift and a negative Romberg. Shaq's negative          Assessment and Plan:  Transient neurological deficit/Episode of confusion per patient occur about 6 weeks ago. History of depression, PE, PAD, Grave's disease, hypothyroidism, ovarian tumor, hypertension, chronic back pain, quit smoking 12 years ago.   Patient presents to today's appointment alone so no family input available. Patient appears to be providing a good history today and her cognitive assessment was 27/30 but a delay recall 2/3. Patient's vitamin B12 appears to be normal but somewhat on the lower normal ranges and elevated TSH. Head CT on 1/27/2018 and no acute findings at that time. Some head tremor on the exam today but no other  findings on the exam today to suspect parkinsonism at this time.   Differentials are stroke, seizures, paraneoplastic, thyroid, dementia  or other etiology.   Plan:  B12 supplement recommended-low normal ranges 294  Sleep needs to be improved-sleep hygiene and routine, no TV or any news/readings for about 2-3 hours before sleep  Stay active and exercise as tolerated and its safe  After my discussion  with one of our neurologist, Dr Mena Additional recommendations   Brain MRI for any enhancement given patient's history of ovarian tumor history, question of possible paraneoplastic syndrome and elevated TSH  EEG 3 hour video for seizures  Paraneoplastic panel  Recommended not to drive for 3 months  Follow up in 3 months or sooner if needed     History of Hypothyroidism, elevated TSH . Recommended to follow up with Dr Montgomery    I discussed all my recommendation with Lucie Stockton. The patient verbalizes understanding and comfortable with the plan. The patient has our clinic phone number to call with any questions or concerns. All of the patient's questions were answered from the best of my current knowledge.     Thank you for letting me be a part of the treatment team for Lucie Stockton      Time spent with pt answering questions, discussing findings, counseling and coordinating care was more than 50% the appointment time, 60  minutes.         ALEKSEY Gillespie, [facility]

## 2018-06-12 NOTE — PATIENT INSTRUCTIONS
Plan:  B12 supplements  Follow up with Dr Montgomery about thyroid management  Sleep needs to be improved-sleep hygiene and routine, no TV or any news/readings for about 2-3 hours before sleep, talk to Dr Montgomery about any other ideas to improve sleep.  Stay active and exercise as tolerated and its safe  Do not fall   Return with any re-occurring confusion episodes, worsening memory. Please bring your family to the appointment.   Family can call our Clinic with any concerns with your permission    After my discussion with one of our neurologist, Dr Mena Additional recommendations   Brain MRI for any enhancement given patient's history of ovarian history and possible paraneoplastic syndrome and elevated TSH  EEG 3 hour video for seizures  Paraneoplastic panel  Recommended not to drive for 3 months  Follow up in 3 months or sooner if needed

## 2018-06-15 ENCOUNTER — RADIANT APPOINTMENT (OUTPATIENT)
Dept: BONE DENSITY | Facility: CLINIC | Age: 83
End: 2018-06-15
Attending: INTERNAL MEDICINE
Payer: MEDICARE

## 2018-06-15 DIAGNOSIS — M81.0 OSTEOPOROSIS, UNSPECIFIED OSTEOPOROSIS TYPE, UNSPECIFIED PATHOLOGICAL FRACTURE PRESENCE: ICD-10-CM

## 2018-06-28 ENCOUNTER — TELEPHONE (OUTPATIENT)
Dept: NEUROLOGY | Facility: CLINIC | Age: 83
End: 2018-06-28

## 2018-06-28 NOTE — TELEPHONE ENCOUNTER
Called and spoke to the patient about additional recommendations after my discussion with one of our neurologist, see visit note for details. Patient is in agreement with the plan.

## 2018-07-10 ENCOUNTER — RADIANT APPOINTMENT (OUTPATIENT)
Dept: GENERAL RADIOLOGY | Facility: CLINIC | Age: 83
End: 2018-07-10
Attending: INTERNAL MEDICINE
Payer: MEDICARE

## 2018-07-10 ENCOUNTER — THERAPY VISIT (OUTPATIENT)
Dept: SPEECH THERAPY | Facility: CLINIC | Age: 83
End: 2018-07-10
Attending: INTERNAL MEDICINE
Payer: MEDICARE

## 2018-07-10 DIAGNOSIS — R13.10 DYSPHAGIA, UNSPECIFIED TYPE: Primary | ICD-10-CM

## 2018-07-10 DIAGNOSIS — R13.19 ESOPHAGEAL DYSPHAGIA: ICD-10-CM

## 2018-07-10 RX ORDER — BARIUM SULFATE 400 MG/ML
25 SUSPENSION ORAL ONCE
Status: COMPLETED | OUTPATIENT
Start: 2018-07-10 | End: 2018-07-10

## 2018-07-10 RX ADMIN — BARIUM SULFATE 25 ML: 400 SUSPENSION ORAL at 09:38

## 2018-07-10 NOTE — PROGRESS NOTES
07/10/18 1000       Present No   General Information   Type Of Visit Initial   Start Of Care Date 07/10/18   Referring Physician Marii Montgomery MD   Orders Evaluate And Treat   Orders Comment Video Swallow Study   Medical Diagnosis Dysphagia, unspecified   Onset Of Illness/injury Or Date Of Surgery 06/05/18  (Date of order)   Precautions/limitations No Known Precautions/limitations   Hearing WFL   Pertinent History of Current Problem/OT: Additional Occupational Profile Info Pt. reports of having difficulty swallowing at times in which she coughs or chokes on food. Pt. reports she has become more concious during eating and takes smaller bites and cuts food into smaller pieces. Pt. reports if she was not taking her smaller bites, the choking and coughing would be occuring on a daily basis. Pt. states that the difficulty occurs on foods more than liquids and has been occuring for approximately 3 months. Pt. reports no conerns with swallowing pills, as she cuts her larger pills in half. Pt. has an ongoing cough during meal and non-meal times,which she attributed to drainage in the back of her throat and stated this has been occuring for a couple months.    Respiratory Status Room air   Patient Role/employment History Retired   Living Environment [place]  (With spouse)   General Observations Pt. pleasant and agreeable to evaluation.    Patient/family Goals To determine the cause of difficulty swallowing and coughing/choking   Clinical Swallow Evaluation   Oral Musculature Comments Informally assessed, but oral musculature appears to be WFL    Additional evaluation(s) completed today Yes   Rationale for completing additional evaluation VFSS indicated to assess oral pharyngeal phases of swallow function to rule out aspiration.    VFSS Evaluation   VFSS Additional Documentation Yes   VFSS Eval: Thin Liquid Texture Trial   Mode of Presentation, Thin Liquid cup;self-fed   Order of Presentation  1, 2, 9   Preparatory Phase WFL   Oral Phase, Thin Liquid WFL   Pharyngeal Phase, Thin Liquid Delayed swallow reflex;Residue in valleculae;Pharyngeal wall coating   Rosenbek's Penetration Aspiration Scale: Thin Liquid Trial Results 7 - contrast passes glottis, visible subglottic residue remains despite patient's response (aspiration)   Response to Aspiration unproductive reflexive involuntary cough/throat clear   Successful Strategies Trialed During Procedure, Thin Liquid chin tuck  (chin tuck esulted in penetration, cued cough)   Diagnostic Statement Penetration to the level of the vocal cords during all trials of thin liquid with eventual aspiration of residue. Pt. produced a delayed cough on 2/3 thin liquid trials. Trial of chin tuck posture in attempt to increase airway protection, but penetration was still observed.  Swallow demonstrates reduced pharyngeal constriction and decreased epiglottic inversion.   Epiglottis noted to be small and round.  Consistent mild residue in valleculae and pyriform sinus with mild delay in swallow initiation observed.    VFSS Eval: Nectar Thick Liquid Texture Trial   Mode of Presentation, Nectar cup;self-fed   Order of Presentation 3, 4, 5, 8, 10, 11, 12   Preparatory Phase WFL   Oral Phase, Nectar WFL   Pharyngeal Phase, Nectar Delayed swallow reflex;Residue in valleculae;Residue in pyriform sinus   Rosenbek's Penetration Aspiration Scale: Nectar-Thick Liquid Trial Results 2 - contrast enters airway, remains above the vocal cords, no residue remains (penetration)   Successful Strategies Trialed During Procedure, Nectar chin tuck  (flash penetration x1)   Diagnostic Statement Inconsistent flash penetration observed with nectar thick liquid on first swallow. Material was immediately cleared from airway and no residue remained from the flash penetration. Mild delay in swallow initiation; reduced pharyngeal constriction and decreased epiglottic inversion observed. Mild residue  observed at level of valleculae with trace residue observed at level of pyriforms. Pt. trialed chin tuck with nectar thick liquids which resulted in flash penetration.  Last trial of barium pill taken with nectar thick liquid was not captured by imaging but pt. was able to swallow the pill without difficulty. Residue from previous thin trials was observed to remain in the upper airway, following swallows of nectar, a cued cough-swallow was initiated which cleared the remaining  reside from the upper airway.   VFSS Eval: Puree Solid Texture Trial   Mode of Presentation, Puree spoon;self-fed   Order of Presentation 6   Preparatory Phase WFL   Oral Phase, Puree WFL   Pharyngeal Phase, Puree Residue in valleculae   Rosenbek's Penetration Aspiration Scale: Puree Food Trial Results 1 - no aspiration, contrast does not enter airway   Diagnostic Statement Mild residue observed in valleculae during trial of puree due to reduced pharyngeal constriction.    VFSS Eval: Semisolid Texture Trial   Mode of Presentation, Semisolid self-fed  (Shortbread cookie)   Order of Presentation 7   Preparatory Phase WFL   Oral Phase, Semisolid WFL   Pharyngeal Phase, Semisolid Residue in valleculae;Delayed swallow reflex   Rosenbek's Penetration Aspiration Scale: Semisolid Food Trial Results 1 - no aspiration, contrast does not enter airway   Diagnostic Statement Mild delay in swallow initiation and mild residue observed at level of valleculae during trial with a shortbread cookie.    Swallow Compensations   Swallow Compensations Chin tuck   Results (see notes above)   Educational Assessment   Barriers to Learning No barriers   Esophageal Phase of Swallow   Patient reports or presents with symptoms of esophageal dysphagia No   Esophageal sweep performed during today s vidofluoroscopic exam  No   General Therapy Interventions   Planned Therapy Interventions Dysphagia Treatment   Dysphagia treatment Oropharyngeal exercise training;Instruction of  safe swallow strategies   Swallow Eval: Clinical Impressions   Skilled Criteria for Therapy Intervention Skilled criteria met.  Treatment indicated.   Functional Assessment Scale (FAS) 3   Dysphagia Outcome Severity Scale (HUGH) Level 3 - HUGH   Treatment Diagnosis Moderate dysphagia   Diet texture recommendations Dysphagia diet level 3;Nectar thick liquids   Recommended Feeding/Eating Techniques alternate between small bites and sips of food/liquid;no straws;small sips/bites   Rehab Potential good, to achieve stated therapy goals   Therapy Frequency (1 time/wk)   Predicted Duration of Therapy Intervention (days/wks) 6-8 weeks   Risks and Benefits of Treatment have been explained. Yes   Patient, family and/or staff in agreement with Plan of Care Yes   Clinical Impression Comments Pt. presents with moderate oropharyngeal dysphagia as evidence by penetration and eventual silent aspiration of residue on thin liquid.  Flash penetration on nectar thick liquids was observed on the first swallow. Pts. epiglottis appears smaller than average, decreased inversion during the swallow,  but creates contact with the posterior pharyngeal wall.  Trials of chin tucks on both thin and nectar thick liquids resulted in penetration and flash penetration. Mild amounts of residue were noted in the valleculae on trials of thin liquid, nectar thick, puree, and solid textures with residue in the pyriform sinus observed on trials of nectar thick liquid. Pt. was able to clear residue when alternating consistencies and completing dry swallows. Clinician provided education and recommendations on the following; dysphagia diet level 3, nectar thick liquids, no straws, alternate bites of food with sips of liquid, take multiple swallows for one bite, take smaller bites/sips of liquid, and chew carefully. Pt. verbalized understanding of these recommendations and stated she would be willing to try them. Speech therapy is recommended at this time to  target swallow strengthening exercises and strategies to prevent penetration and aspiration. Pt should complete additional VFSS in 3-6 months.   Swallow Goals   SLP Swallow Goals 1;2   Swallow Goal 1   Goal Description Pt. will complete swallowing strengthening exercises 1x/day for increased pharyngeal constriction, tongue base retraction, and airway protection 10/10 reps.   Target Date 10/10/18   Swallow Goal 2   Goal Description Pt. will tolerate thin liquids using compensatory swallowing strategies to without overt signs of penetration or aspiration, as judged by the clinician.    Target Date 10/10/18   Total Session Time   Total Session Time 50 minutes   Total Evaluation Time 50 minutes   Therapy Certification   Certification date from 07/10/18   Certification date to 07/10/18   Medical Diagnosis Oropharyngeal dysphagia   Certification I certify the need for these services furnished under this plan of treatment and while under my care.  (Physician co-signature of this document indicates review and certification of the therapy plan).   SLP Medicare Only G-code   G-code Swallowing   Swallowing   Swallowing:  Current Status , Goal , Discharge -Gvfn Only-Modifier the same for all G-codes CJ: 20-39% impairment   Swallowing: Current  & Discharge Modifier Rationale-Eval Only per VFSS; treatment recommended     Completed by Cat Fuller, Graduate SLP student    Therapy services provided with the co-signing licensed therapist guiding and directing the services, and providing the skilled judgement and assessment throughout the session.  Nupur Ortiz, LEIF-SLP

## 2018-07-10 NOTE — MR AVS SNAPSHOT
After Visit Summary   7/10/2018    Lucie Stockton    MRN: [id number]           Patient Information     Date Of Birth          1935        Visit Information        Provider Department      7/10/2018 9:00 AM Nupur Ortiz, [facility]        Today's Diagnoses     Dysphagia, unspecified type    -  1       Follow-ups after your visit        Your next 10 appointments already scheduled     Jul 19, 2018 11:30 AM CDT   LAB with  LAB   [facility] ([facility])    [address] [place] [id number] [phone]           Please do not eat 10-12 hours before your appointment if you are coming in fasting for labs on lipids, cholesterol, or glucose (sugar). This does not apply to pregnant women. Water, hot tea and black coffee (with nothing added) are okay. Do not drink other fluids, diet soda or chew gum.            Jul 19, 2018 12:00 PM CDT   (Arrive by 11:45 AM)   In Lab Video Visit with  EEG TECH 2   [facility] ([facility])    [address] [place] [id number] [phone]            Jul 24, 2018 10:30 AM CDT   US SUMAYA DOPPLER WITH EXERCISE BILATERAL with [id number]   [facility] ([facility])    [address] [place] [address] [phone]           Please bring a list of your medicines (including vitamins, minerals and over-the-counter drugs). Also, tell your doctor about any allergies you may have. Wear comfortable clothes and leave your valuables at home.  No caffeine or tobacco for 1 hour prior to exam.  Please call the Imaging Department at your exam site with any questions.            Jul 24, 2018 11:30 AM CDT   (Arrive by 11:15 AM)   Return Vascular Visit with ALEKSEY Ramirez [facility] ([facility])    [address] [place] [id number]    [phone]            Jul 31, 2018 11:45 AM CDT   (Arrive by 11:30 AM)   Return Visit with Lorenzo Pena MD   [facility] ([facility])    [address] [place] [address]  [address] [facility] [address]   [phone]            Jul 31, 2018  1:00 PM CDT   (Arrive by 12:45 PM)   New Patient Visit with Jesenia Clay, PhD   [facility] ([facility])    [address] [place] [address]  [address] [facility] [address]   [phone]            Aug 09, 2018 10:45 AM CDT   MR BRAIN W/O & W CONTRAST with [id number]   [facility] ([facility])    [address] [place] [address]  [address] [facility] [address]   [phone]           Take your medicines as usual, unless your doctor tells you not to. Bring a list of your current medicines to your exam (including vitamins, minerals and over-the-counter drugs).  You may or may not receive intravenous (IV) contrast for this exam pending the discretion of the Radiologist.  You do not need to do anything special to prepare.  The MRI machine uses a strong magnet. Please wear clothes without metal (snaps, zippers). A sweatsuit works well, or we may give you a hospital gown.  Please remove any body piercings and hair extensions before you arrive. You will also remove watches, jewelry, hairpins, wallets, dentures, partial dental plates and hearing aids. You may wear contact lenses, and you may be able to wear your rings. We have a safe place to keep your personal items, but it is safer to leave them at home.  **IMPORTANT** THE INSTRUCTIONS BELOW ARE ONLY FOR THOSE PATIENTS WHO HAVE BEEN PRESCRIBED SEDATION OR GENERAL ANESTHESIA DURING THEIR MRI PROCEDURE:  IF YOUR DOCTOR PRESCRIBED ORAL SEDATION (take medicine to help you relax during your exam):   You must get the medicine from your doctor (oral medication) before you arrive. Bring the medicine to the exam. Do not take  it at home. You ll be told when to take it upon arriving for your exam.   Arrive one hour early. Bring someone who can take you home after the test. Your medicine will make you sleepy. After the exam, you may not drive, take a bus or take a taxi by yourself.  IF YOUR DOCTOR PRESCRIBED IV SEDATION:   Arrive one hour early. Bring someone who can take you home after the test. Your medicine will make you sleepy. After the exam, you may not drive, take a bus or take a taxi by yourself.   No eating 6 hours before your exam. You may have clear liquids up until 4 hours before your exam. (Clear liquids include water, clear tea, black coffee and fruit juice without pulp.)  IF YOUR DOCTOR PRESCRIBED ANESTHESIA (be asleep for your exam):   Arrive 1 1/2 hours early. Bring someone who can take you home after the test. You may not drive, take a bus or take a taxi by yourself.   No eating 8 hours before your exam. You may have clear liquids up until 4 hours before your exam. (Clear liquids include water, clear tea, black coffee and fruit juice without pulp.)   You will spend four to five hours in the recovery room.  Please call the Imaging Department at your exam site with any questions.            Sep 25, 2018  9:30 AM CDT   (Arrive by 9:15 AM)   Return Visit with ALEKSEY Vasquez Pending sale to [facility] ([facility] [place])    [address] [place] [address] [phone]              Who to contact     Please call your clinic at [phone] to:    Ask questions about your health    Make or cancel appointments    Discuss your medicines    Learn about your test results    Speak to your doctor            Additional Information About Your Visit        [employer] [employer] Information     [email or web address] is an electronic gateway that provides easy, online access to your medical records. With [email or web address], you can request a clinic appointment, read your test results, renew a prescription or  communicate with your care team.     To sign up for [employer] visit the website at [email or web address]   You will be asked to enter the access code listed below, as well as some personal information. Please follow the directions to create your username and password.     Your access code is: [email or web address]  Expires: 10/8/2018 12:58 PM     Your access code will  in 90 days. If you need help or a new code, please contact your [facility] or call [phone] for assistance.        Care EveryWhere ID     This is your Care EveryWhere ID. This could be used by other organizations to access your [facility] medical records  [id number]         Blood Pressure from Last 3 Encounters:   18 161/74   18 168/75   05/15/18 136/63    Weight from Last 3 Encounters:   18 45.8 kg (100 lb 14.4 oz)   18 47.7 kg (105 lb 3.2 oz)   05/15/18 45.8 kg (101 lb)              Today, you had the following     No orders found for display         Today's Medication Changes          These changes are accurate as of 7/10/18 12:58 PM.  If you have any questions, ask your nurse or doctor.               These medicines have changed or have updated prescriptions.        Dose/Directions    levothyroxine 75 MCG tablet   Commonly known as:  SYNTHROID/LEVOTHROID   This may have changed:  Another medication with the same name was removed. Continue taking this medication, and follow the directions you see here.   Used for:  Hypothyroidism, unspecified type   Changed by:  Marii Montgomery MD        Dose:  75 mcg   Take 1 tablet (75 mcg) by mouth daily   Quantity:  90 tablet   Refills:  1                Primary Care Provider Office Phone # Fax #    Marii Montgomery [phone]       [address] [place] [address] [address] [place] [id number]        Equal Access to Services     PAL SALGADO AH: Ascencion Gamboa, anu keller, qaybjackeline zavala  christiano laReinaaacelia ah. So [facility] [phone].    ATENCIÓN: Si shannan briscoe, tiene a schwartz disposición servicios gratuitos de asistencia lingüística. Henny nogueira [phone].    We comply with applicable federal civil rights laws and [place] laws. We do not discriminate on the basis of race, color, national origin, age, disability, sex, sexual orientation, or gender identity.            Thank you!     [facility]. Our goal is always to provide you with excellent care. Hearing back from our patients is one way we can continue to improve our services. Please take a few minutes to complete the written survey that you may receive in the mail after your visit with us. Thank you!             Your Updated Medication List - Protect others around you: Learn how to safely use, store and throw away your medicines at www.disposemymeds.org.          This list is accurate as of 7/10/18 12:58 PM.  Always use your most recent med list.                   Brand Name Dispense Instructions for use Diagnosis    acetaminophen 325 MG tablet    TYLENOL    100 tablet    Take 2 tablets every 6 to 8 hours as needed for pain    Chronic low back pain, unspecified back pain laterality, with sciatica presence unspecified, Contusion of right hip, initial encounter, Contusion of right knee, initial encounter       albuterol 108 (90 Base) MCG/ACT Inhaler    VENTOLIN HFA    18 Inhaler    Inhale 2 puffs into the lungs every 4 hours as needed for shortness of breath / dyspnea or wheezing    Cough       amLODIPine 10 MG tablet    NORVASC    90 tablet    Take 1 tablet (10 mg) by mouth daily For blood pressure    Essential hypertension       atorvastatin 40 MG tablet    LIPITOR    90 tablet    Take 2 tablets (80 mg) by mouth daily    Hyperlipidemia, unspecified hyperlipidemia type       bisacodyl 10 MG Suppository    DULCOLAX    90 suppository    Place 1 suppository (10 mg) rectally daily as needed for constipation     Constipation, unspecified constipation type       CALCIUM 600 + D PO      Take 1 tablet by mouth daily.        ciprofloxacin 250 MG tablet    CIPRO    6 tablet    Take 1 tablet (250 mg) by mouth 2 times daily    Acute cystitis without hematuria       citalopram 40 MG tablet    celeXA    45 tablet    Take 0.5 tablets (20 mg) by mouth daily    Depression, unspecified depression type       clopidogrel 75 MG tablet    PLAVIX    90 tablet    Take 1 tablet (75 mg) by mouth daily    Venous thrombosis of extremity       ferrous sulfate 325 (65 Fe) MG tablet    IRON    90 tablet    Take 1 tablet (325 mg) by mouth daily (with breakfast)    Other iron deficiency anemias       gabapentin 100 MG capsule    NEURONTIN          hydrochlorothiazide 50 MG tablet    HYDRODIURIL    90 tablet    Take 1 tablet (50 mg) by mouth daily    Benign essential hypertension       levothyroxine 75 MCG tablet    SYNTHROID/LEVOTHROID    90 tablet    Take 1 tablet (75 mcg) by mouth daily    Hypothyroidism, unspecified type       lisinopril 40 MG tablet    PRINIVIL/ZESTRIL    90 tablet    Take 1 tablet (40 mg) by mouth daily For blood pressure    Essential hypertension       multivitamin Tabs tablet      Take 1 tablet by mouth daily        naproxen sodium 220 MG capsule     60 capsule    Take 220 mg by mouth 2 times daily (with meals)    Chronic low back pain, unspecified back pain laterality, with sciatica presence unspecified, Pain of left lower leg       order for DME     1 Units    Equipment being ordered:  Walker with seat for chronic back and leg pain, general weakness/deconditioning, imbalance    Chronic low back pain, unspecified back pain laterality, with sciatica presence unspecified, Pain in both lower extremities, Abnormality of gait       pantoprazole 40 MG EC tablet    PROTONIX    90 tablet    Take 1 tablet (40 mg) by mouth daily    Long term current use of anticoagulant       polyethylene glycol powder    MIRALAX    119 g    Take  one-half to one scoop once a day for maintaining soft stools    Constipation, unspecified constipation type       potassium chloride SA 20 MEQ CR tablet    K-DUR/KLOR-CON M    90 tablet    Take 1 tablet (20 mEq) by mouth daily    Hypopotassemia       trolamine salicylate 10 % cream    ASPERCREME    170 g    Apply 1 g topically 3 times daily    Arthralgia of left lower leg

## 2018-07-24 ENCOUNTER — RADIANT APPOINTMENT (OUTPATIENT)
Dept: ULTRASOUND IMAGING | Facility: CLINIC | Age: 83
End: 2018-07-24
Attending: CLINICAL NURSE SPECIALIST
Payer: MEDICARE

## 2018-07-24 ENCOUNTER — OFFICE VISIT (OUTPATIENT)
Dept: VASCULAR SURGERY | Facility: CLINIC | Age: 83
End: 2018-07-24
Payer: MEDICARE

## 2018-07-24 VITALS
HEART RATE: 75 BPM | RESPIRATION RATE: 16 BRPM | OXYGEN SATURATION: 93 % | SYSTOLIC BLOOD PRESSURE: 144 MMHG | DIASTOLIC BLOOD PRESSURE: 68 MMHG

## 2018-07-24 DIAGNOSIS — I73.9 PERIPHERAL ARTERY DISEASE (H): ICD-10-CM

## 2018-07-24 DIAGNOSIS — R41.89 SPELL OF ALTERED COGNITION: ICD-10-CM

## 2018-07-24 DIAGNOSIS — I73.9 PAD (PERIPHERAL ARTERY DISEASE) (H): Primary | ICD-10-CM

## 2018-07-24 ASSESSMENT — PAIN SCALES - GENERAL: PAINLEVEL: NO PAIN (0)

## 2018-07-24 NOTE — PROGRESS NOTES
VASCULAR SURGERY CLINIC ESTABLISHED PATIENT NOTE    HPI:    Lucie Stockton is an 82 year old female with past medical history of hyperlipidemia, HTN and PAD.  She has necer had a peripheral intervention.   She is being seen today in clinic to follow up on her aorto-biiliac occlusive disease.       SUBJECTIVE:  Patient reports she is able to walk 2-3 blocks without any claudication symptoms.  She denies any rest pain.  She denies any abdominal pain, back pain, recent fevers, chills, night sweats or headaches.      OBJECTIVE:  Vital signs:  /68 (BP Location: Left arm)  Pulse 75  Resp 16  SpO2 93%  Breastfeeding? No    Prior to Admission medications    Medication Sig Start Date End Date Taking? Authorizing Provider   acetaminophen (TYLENOL) 325 MG tablet Take 2 tablets every 6 to 8 hours as needed for pain 6/2/18   Marii Montgomery MD   albuterol (VENTOLIN HFA) 108 (90 BASE) MCG/ACT inhaler Inhale 2 puffs into the lungs every 4 hours as needed for shortness of breath / dyspnea or wheezing 10/13/16   Marii Montgomery MD   amLODIPine (NORVASC) 10 MG tablet Take 1 tablet (10 mg) by mouth daily For blood pressure 6/2/18   Marii Montgomery MD   atorvastatin (LIPITOR) 40 MG tablet Take 2 tablets (80 mg) by mouth daily 6/2/18   Marii Montgomery MD   bisacodyl (DULCOLAX) 10 MG Suppository Place 1 suppository (10 mg) rectally daily as needed for constipation 3/21/17   Marii Montgomery MD   Calcium Carbonate-Vitamin D (CALCIUM 600 + D OR) Take 1 tablet by mouth daily.    Reported, Patient   ciprofloxacin (CIPRO) 250 MG tablet Take 1 tablet (250 mg) by mouth 2 times daily 5/15/18   Jessica Hendricks APRN CNP   citalopram (CELEXA) 40 MG tablet Take 0.5 tablets (20 mg) by mouth daily 6/2/18   Marii Montgomery MD   clopidogrel (PLAVIX) 75 MG tablet Take 1 tablet (75 mg) by mouth daily 6/2/18   Marii Montgomery MD   ferrous sulfate (IRON) 325 (65 FE) MG tablet Take 1 tablet (325 mg) by mouth daily (with  breakfast) 8/7/15   Marii Montgomery MD   gabapentin (NEURONTIN) 100 MG capsule  9/28/17   Reported, Patient   hydrochlorothiazide (HYDRODIURIL) 50 MG tablet Take 1 tablet (50 mg) by mouth daily 6/2/18   Marii Montgomery MD   levothyroxine (SYNTHROID/LEVOTHROID) 75 MCG tablet Take 1 tablet (75 mcg) by mouth daily 6/2/18   Marii Montgomery MD   lisinopril (PRINIVIL/ZESTRIL) 40 MG tablet Take 1 tablet (40 mg) by mouth daily For blood pressure 6/2/18   Marii Montgomery MD   multivitamin (OCUVITE) TABS tablet Take 1 tablet by mouth daily    Reported, Patient   naproxen sodium 220 MG capsule Take 220 mg by mouth 2 times daily (with meals) 8/18/17   Marii Montgomery MD   order for DME Equipment being ordered:   Walker with seat for chronic back and leg pain, general weakness/deconditioning, imbalance 2/15/18   Marii Montgomery MD   pantoprazole (PROTONIX) 40 MG EC tablet Take 1 tablet (40 mg) by mouth daily 6/2/18   Marii Montgomery MD   polyethylene glycol (MIRALAX) powder Take one-half to one scoop once a day for maintaining soft stools 1/28/17   Marii Montgomery MD   potassium chloride SA (K-DUR/KLOR-CON M) 20 MEQ CR tablet Take 1 tablet (20 mEq) by mouth daily 6/2/18   Marii Montgomery MD   trolamine salicylate (ASPERCREME) 10 % cream Apply 1 g topically 3 times daily 9/14/17   Zafar Huynh MD       PHYSICAL EXAM:  NEURO/PSYCH: The patient is alert and oriented.  Appropriate.  Moves all extremities.   SKIN:  Warm and dry.  PULMONARY: non-labored breathing  EXTREMITIES: doppler bilateral PT pulses, feet warm and well perfused     SUMAYA RESULTS:  Right leg 0.49, previously 0.54 on 1/9/2018  Left leg 0.58, previously 0.58 on 1/9/2018      ASSESSMENT:  82 year old female with PAD who is doing well and able to ambulate 2-3 blocks without any claudication and stable ABIs      Patient Active Problem List   Diagnosis     Benign ovarian tumor     Hypothyroidism     Peripheral vascular disease (H)      Knee pain     Osteoarthritis     Cervicalgia     Hyperlipidemia with target LDL less than 70     Pulmonary embolism (H)     Anemia     Aortic stenosis     Atherosclerosis of aorta (H)     Atherosclerosis of arteries of extremities (H)     Intermittent claudication (H)     Iron deficiency     Osteoporosis     DVT of lower extremity, bilateral (H)     Varicose veins     Gluteal pain     Insomnia     Back pain     Vitamin D deficiency     Tobacco use disorder     Personal history of other drug therapy     Right knee pain     Venous thrombosis of extremity     Benign essential hypertension     Gastritis     Cataract     Acute left-sided low back pain with left-sided sciatica     Lumbar stenosis with neurogenic claudication     SBO (small bowel obstruction)     Small bowel obstruction     Long term current use of anticoagulant therapy     Left-sided low back pain with left-sided sciatica     Moderate major depression (H)     Anxiety     Neoplasm of uncertain behavior of skin     Cherry angioma     BCC (basal cell carcinoma), trunk     Weakness     Imbalance     Chronic pain         PLAN:  - follow up office visit with vascular CARLOS and exercise ABIs in one year    - continue Plavix and Lipitor    - continued walking strongly encouraged.           Cristal RYDER, CNS  Division of Vascular Surgery  [facility]    Pager [phone]  Office [phone]

## 2018-07-24 NOTE — PATIENT INSTRUCTIONS
Follow up office visit with vascular surgery CARLOS and ABIs in one year     With questions, concerns, or to request an appointment, please call either:    Brenda Lowry, Care Coordinator RN, Vascular Surgery  [phone]    Vascular Call Center  [phone]    To contact someone after 5 pm, on a weekend, or on a Holiday, please call:  [facility]  [phone], option 4 to have a member of the Vascular Surgery Service paged.

## 2018-07-24 NOTE — NURSING NOTE
Chief Complaint   Patient presents with     RECHECK     6 month follow up PAD        Vitals:    07/24/18 1122   BP: 144/68   BP Location: Left arm   Pulse: 75   Resp: 16   SpO2: 93%       There is no height or weight on file to calculate BMI.         Kelly Beauchamp LPN

## 2018-07-24 NOTE — LETTER
7/24/2018       RE: Lucie Stockton  [address] [facility] [address]     Dear Colleague,    Thank you for referring your patient, Lucie Stockton, to the [facility] [place] [facility]ASCULAR [facility] at [facility]. Please see a copy of my visit note below.    VASCULAR SURGERY CLINIC ESTABLISHED PATIENT NOTE    HPI:    Lucie Stockton is an 82 year old female with past medical history of hyperlipidemia, HTN and PAD.  She has necer had a peripheral intervention.   She is being seen today in clinic to follow up on her aorto-biiliac occlusive disease.       SUBJECTIVE:  Patient reports she is able to walk 2-3 blocks without any claudication symptoms.  She denies any rest pain.  She denies any abdominal pain, back pain, recent fevers, chills, night sweats or headaches.      OBJECTIVE:  Vital signs:  /68 (BP Location: Left arm)  Pulse 75  Resp 16  SpO2 93%  Breastfeeding? No    Prior to Admission medications    Medication Sig Start Date End Date Taking? Authorizing Provider   acetaminophen (TYLENOL) 325 MG tablet Take 2 tablets every 6 to 8 hours as needed for pain 6/2/18   Marii Montgomery MD   albuterol (VENTOLIN HFA) 108 (90 BASE) MCG/ACT inhaler Inhale 2 puffs into the lungs every 4 hours as needed for shortness of breath / dyspnea or wheezing 10/13/16   Marii Montgomery MD   amLODIPine (NORVASC) 10 MG tablet Take 1 tablet (10 mg) by mouth daily For blood pressure 6/2/18   Marii Montgomery MD   atorvastatin (LIPITOR) 40 MG tablet Take 2 tablets (80 mg) by mouth daily 6/2/18   Marii Montgomery MD   bisacodyl (DULCOLAX) 10 MG Suppository Place 1 suppository (10 mg) rectally daily as needed for constipation 3/21/17   Marii Montgomery MD   Calcium Carbonate-Vitamin D (CALCIUM 600 + D OR) Take 1 tablet by mouth daily.    Reported, Patient   ciprofloxacin (CIPRO) 250 MG tablet Take 1 tablet (250 mg) by mouth 2 times daily 5/15/18   Jessica Hendricks APRN CNP   citalopram  (CELEXA) 40 MG tablet Take 0.5 tablets (20 mg) by mouth daily 6/2/18   Marii Montgomery MD   clopidogrel (PLAVIX) 75 MG tablet Take 1 tablet (75 mg) by mouth daily 6/2/18   Marii Montgomery MD   ferrous sulfate (IRON) 325 (65 FE) MG tablet Take 1 tablet (325 mg) by mouth daily (with breakfast) 8/7/15   Marii Montgomery MD   gabapentin (NEURONTIN) 100 MG capsule  9/28/17   Reported, Patient   hydrochlorothiazide (HYDRODIURIL) 50 MG tablet Take 1 tablet (50 mg) by mouth daily 6/2/18   aMrii Montgomery MD   levothyroxine (SYNTHROID/LEVOTHROID) 75 MCG tablet Take 1 tablet (75 mcg) by mouth daily 6/2/18   Marii Montgomery MD   lisinopril (PRINIVIL/ZESTRIL) 40 MG tablet Take 1 tablet (40 mg) by mouth daily For blood pressure 6/2/18   Marii Montgomery MD   multivitamin (OCUVITE) TABS tablet Take 1 tablet by mouth daily    Reported, Patient   naproxen sodium 220 MG capsule Take 220 mg by mouth 2 times daily (with meals) 8/18/17   Marii Montgomery MD   order for DME Equipment being ordered:   Walker with seat for chronic back and leg pain, general weakness/deconditioning, imbalance 2/15/18   Marii Montgomery MD   pantoprazole (PROTONIX) 40 MG EC tablet Take 1 tablet (40 mg) by mouth daily 6/2/18   Marii Montgomery MD   polyethylene glycol (MIRALAX) powder Take one-half to one scoop once a day for maintaining soft stools 1/28/17   Marii Montgomery MD   potassium chloride SA (K-DUR/KLOR-CON M) 20 MEQ CR tablet Take 1 tablet (20 mEq) by mouth daily 6/2/18   Marii Montgomery MD   trolamine salicylate (ASPERCREME) 10 % cream Apply 1 g topically 3 times daily 9/14/17   Zafar Huynh MD       PHYSICAL EXAM:  NEURO/PSYCH: The patient is alert and oriented.  Appropriate.  Moves all extremities.   SKIN:  Warm and dry.  PULMONARY: non-labored breathing  EXTREMITIES: doppler bilateral PT pulses, feet warm and well perfused     SUMAYA RESULTS:  Right leg 0.49, previously 0.54 on 1/9/2018  Left leg 0.58,  previously 0.58 on 1/9/2018      ASSESSMENT:  82 year old female with PAD who is doing well and able to ambulate 2-3 blocks without any claudication and stable ABIs      Patient Active Problem List   Diagnosis     Benign ovarian tumor     Hypothyroidism     Peripheral vascular disease (H)     Knee pain     Osteoarthritis     Cervicalgia     Hyperlipidemia with target LDL less than 70     Pulmonary embolism (H)     Anemia     Aortic stenosis     Atherosclerosis of aorta (H)     Atherosclerosis of arteries of extremities (H)     Intermittent claudication (H)     Iron deficiency     Osteoporosis     DVT of lower extremity, bilateral (H)     Varicose veins     Gluteal pain     Insomnia     Back pain     Vitamin D deficiency     Tobacco use disorder     Personal history of other drug therapy     Right knee pain     Venous thrombosis of extremity     Benign essential hypertension     Gastritis     Cataract     Acute left-sided low back pain with left-sided sciatica     Lumbar stenosis with neurogenic claudication     SBO (small bowel obstruction)     Small bowel obstruction     Long term current use of anticoagulant therapy     Left-sided low back pain with left-sided sciatica     Moderate major depression (H)     Anxiety     Neoplasm of uncertain behavior of skin     Cherry angioma     BCC (basal cell carcinoma), trunk     Weakness     Imbalance     Chronic pain         PLAN:  - follow up office visit with vascular CARLOS and exercise ABIs in one year    - continue Plavix and Lipitor    - continued walking strongly encouraged.           Cristal RYDER, CNS  Division of Vascular Surgery  [facility]    Pager [phone]  Office [phone]

## 2018-07-24 NOTE — MR AVS SNAPSHOT
After Visit Summary   7/24/2018    Lucie Stockton    MRN: [id number]           Patient Information     Date Of Birth          1935        Visit Information        Provider Department      7/24/2018 11:30 AM Cristal Maki APRN CNS [facility]        Today's Diagnoses     PAD (peripheral artery disease) (H)    -  1      Care Instructions    Follow up office visit with vascular surgery CARLOS and ABIs in one year     With questions, concerns, or to request an appointment, please call either:    Brenda Lowry, Care Coordinator RN, Vascular Surgery  [phone]    Vascular Call Center  [phone]    To contact someone after 5 pm, on a weekend, or on a Holiday, please call:  [facility]  [phone], option 4 to have a member of the Vascular Surgery Service paged.          Follow-ups after your visit        Follow-up notes from your care team     Return in about 1 year (around 7/24/2019).      Your next 10 appointments already scheduled     Jul 25, 2018  8:30 AM CDT   (Arrive by 8:15 AM)   In Lab Video Visit with  EEG TECH 2   [facility] ([facility])    [address] [place] [address]  3rd [facility] [address]   [phone]            Jul 31, 2018 11:45 AM CDT   (Arrive by 11:30 AM)   Return Visit with Lorenzo Pena MD   [facility] ([facility])    [address] [place] [address]  3rd [facility] [address]   [phone]            Jul 31, 2018  1:00 PM CDT   (Arrive by 12:45 PM)   New Patient Visit with Jesenia Clay, PhD   [facility] ([facility])    [address] [place] [address]  4th [facility] [address]   [phone]            Aug 09, 2018 10:45 AM CDT   MR BRAIN W/O & W CONTRAST with [id number]   [facility] [place] [facility] ([facility])    [address] [place] [address]  [address] [phone]           Take your medicines as usual, unless your doctor tells you not to. Bring a list of your current medicines to your exam (including vitamins, minerals and over-the-counter drugs).  You may or may not receive intravenous (IV) contrast for this exam pending the discretion of the Radiologist.  You do not need to do anything special to prepare.  The MRI machine uses a strong magnet. Please wear clothes without metal (snaps, zippers). A sweatsuit works well, or we may give you a hospital gown.  Please remove any body piercings and hair extensions before you arrive. You will also remove watches, jewelry, hairpins, wallets, dentures, partial dental plates and hearing aids. You may wear contact lenses, and you may be able to wear your rings. We have a safe place to keep your personal items, but it is safer to leave them at home.  **IMPORTANT** THE INSTRUCTIONS BELOW ARE ONLY FOR THOSE PATIENTS WHO HAVE BEEN PRESCRIBED SEDATION OR GENERAL ANESTHESIA DURING THEIR MRI PROCEDURE:  IF YOUR DOCTOR PRESCRIBED ORAL SEDATION (take medicine to help you relax during your exam):   You must get the medicine from your doctor (oral medication) before you arrive. Bring the medicine to the exam. Do not take it at home. You ll be told when to take it upon arriving for your exam.   Arrive one hour early. Bring someone who can take you home after the test. Your medicine will make you sleepy. After the exam, you may not drive, take a bus or take a taxi by yourself.  IF YOUR DOCTOR PRESCRIBED IV SEDATION:   Arrive one hour early. Bring someone who can take you home after the test. Your medicine will make you sleepy. After the exam, you may not drive, take a bus or take a taxi by yourself.   No eating 6 hours before your exam. You may have clear liquids up until 4 hours before your exam. (Clear liquids include water, clear tea, black coffee and fruit juice without pulp.)  IF YOUR DOCTOR PRESCRIBED ANESTHESIA (be  asleep for your exam):   Arrive 1 1/2 hours early. Bring someone who can take you home after the test. You may not drive, take a bus or take a taxi by yourself.   No eating 8 hours before your exam. You may have clear liquids up until 4 hours before your exam. (Clear liquids include water, clear tea, black coffee and fruit juice without pulp.)   You will spend four to five hours in the recovery room.  Please call the Imaging Department at your exam site with any questions.            Sep 25, 2018  9:30 AM CDT   (Arrive by 9:15 AM)   Return Visit with ALEKSEY Vasquez [facility] ([facility])    [address] [place] [address] [phone]            Jul 16, 2019 10:00 AM CDT   US SUMAYA DOPPLER WITH EXERCISE BILATERAL with [id number]   [facility] [place] [facility] ([facility])    [address] [place] [address] [phone]           Please bring a list of your medicines (including vitamins, minerals and over-the-counter drugs). Also, tell your doctor about any allergies you may have. Wear comfortable clothes and leave your valuables at home.  No caffeine or tobacco for 1 hour prior to exam.  Please call the Imaging Department at your exam site with any questions.            Jul 16, 2019 11:00 AM CDT   (Arrive by 10:45 AM)   Return Vascular Visit with ALEKSEY Ramirez [facility] ([facility])    [address] [place] [address] [phone]              Future tests that were ordered for you today     Open Future Orders        Priority Expected Expires Ordered    US SUMAYA Doppler with Exercise Routine 7/24/2018 7/24/2019 7/24/2018            Who to contact     Please call your clinic at [phone] to:    Ask questions about your health    Make or cancel appointments    Discuss your medicines    Learn about  your test results    Speak to your doctor            Additional Information About Your Visit        [employer] Information     [employer] gives you secure access to your electronic health record. If you see a primary care provider, you can also send messages to your care team and make appointments. If you have questions, please call your primary care clinic.  If you do not have a primary care provider, please call [phone] and they will assist you.      [employer] is an electronic gateway that provides easy, online access to your medical records. With [employer], you can request a clinic appointment, read your test results, renew a prescription or communicate with your care team.     To access your existing account, please contact your [facility] or call [phone] for assistance.        Care EveryWhere ID     This is your Care EveryWhere ID. This could be used by other organizations to access your [facility] medical records  [id number]        Your Vitals Were     Pulse Respirations Pulse Oximetry Breastfeeding?          75 16 93% No         Blood Pressure from Last 3 Encounters:   07/24/18 144/68   06/12/18 161/74   06/02/18 168/75    Weight from Last 3 Encounters:   06/12/18 45.8 kg (100 lb 14.4 oz)   06/02/18 47.7 kg (105 lb 3.2 oz)   05/15/18 45.8 kg (101 lb)               Primary Care Provider Office Phone # Fax #    Marii Montgomery [phone]       [address] [place] [address] [address] [place] [address]        Equal Access to Services     PAL SALGADO AH: Hadii reyna nevarezo Soalysia, waaxda luqadaha, qaybta kaalmada adeegyada, jakceline izquierdo. So [facility] [phone].    ATENCIÓN: Si habla español, tiene a schwartz disposición servicios gratuitos de asistencia lingüística. Llame al [phone].    We comply with applicable federal civil rights laws and [place] laws. We do not discriminate on the basis of race, color, national origin, age, disability, sex,  sexual orientation, or gender identity.            Thank you!     Thank you for choosing [facility]  for your care. Our goal is always to provide you with excellent care. Hearing back from our patients is one way we can continue to improve our services. Please take a few minutes to complete the written survey that you may receive in the mail after your visit with us. Thank you!             Your Updated Medication List - Protect others around you: Learn how to safely use, store and throw away your medicines at www.disposemymeds.org.          This list is accurate as of 7/24/18 11:38 AM.  Always use your most recent med list.                   Brand Name Dispense Instructions for use Diagnosis    acetaminophen 325 MG tablet    TYLENOL    100 tablet    Take 2 tablets every 6 to 8 hours as needed for pain    Chronic low back pain, unspecified back pain laterality, with sciatica presence unspecified, Contusion of right hip, initial encounter, Contusion of right knee, initial encounter       albuterol 108 (90 Base) MCG/ACT Inhaler    VENTOLIN HFA    18 Inhaler    Inhale 2 puffs into the lungs every 4 hours as needed for shortness of breath / dyspnea or wheezing    Cough       amLODIPine 10 MG tablet    NORVASC    90 tablet    Take 1 tablet (10 mg) by mouth daily For blood pressure    Essential hypertension       atorvastatin 40 MG tablet    LIPITOR    90 tablet    Take 2 tablets (80 mg) by mouth daily    Hyperlipidemia, unspecified hyperlipidemia type       bisacodyl 10 MG Suppository    DULCOLAX    90 suppository    Place 1 suppository (10 mg) rectally daily as needed for constipation    Constipation, unspecified constipation type       CALCIUM 600 + D PO      Take 1 tablet by mouth daily.        ciprofloxacin 250 MG tablet    CIPRO    6 tablet    Take 1 tablet (250 mg) by mouth 2 times daily    Acute cystitis without hematuria       citalopram 40 MG tablet    celeXA    45 tablet    Take 0.5 tablets (20 mg)  by mouth daily    Depression, unspecified depression type       clopidogrel 75 MG tablet    PLAVIX    90 tablet    Take 1 tablet (75 mg) by mouth daily    Venous thrombosis of extremity       ferrous sulfate 325 (65 Fe) MG tablet    IRON    90 tablet    Take 1 tablet (325 mg) by mouth daily (with breakfast)    Other iron deficiency anemias       gabapentin 100 MG capsule    NEURONTIN          hydrochlorothiazide 50 MG tablet    HYDRODIURIL    90 tablet    Take 1 tablet (50 mg) by mouth daily    Benign essential hypertension       levothyroxine 75 MCG tablet    SYNTHROID/LEVOTHROID    90 tablet    Take 1 tablet (75 mcg) by mouth daily    Hypothyroidism, unspecified type       lisinopril 40 MG tablet    PRINIVIL/ZESTRIL    90 tablet    Take 1 tablet (40 mg) by mouth daily For blood pressure    Essential hypertension       multivitamin Tabs tablet      Take 1 tablet by mouth daily        naproxen sodium 220 MG capsule     60 capsule    Take 220 mg by mouth 2 times daily (with meals)    Chronic low back pain, unspecified back pain laterality, with sciatica presence unspecified, Pain of left lower leg       order for DME     1 Units    Equipment being ordered:  Walker with seat for chronic back and leg pain, general weakness/deconditioning, imbalance    Chronic low back pain, unspecified back pain laterality, with sciatica presence unspecified, Pain in both lower extremities, Abnormality of gait       pantoprazole 40 MG EC tablet    PROTONIX    90 tablet    Take 1 tablet (40 mg) by mouth daily    Long term current use of anticoagulant       polyethylene glycol powder    MIRALAX    119 g    Take one-half to one scoop once a day for maintaining soft stools    Constipation, unspecified constipation type       potassium chloride SA 20 MEQ CR tablet    K-DUR/KLOR-CON M    90 tablet    Take 1 tablet (20 mEq) by mouth daily    Hypopotassemia       trolamine salicylate 10 % cream    ASPERCREME    170 g    Apply 1 g topically 3  times daily    Arthralgia of left lower leg

## 2018-07-25 ENCOUNTER — OFFICE VISIT (OUTPATIENT)
Dept: NEUROLOGY | Facility: CLINIC | Age: 83
End: 2018-07-25
Attending: NURSE PRACTITIONER
Payer: MEDICARE

## 2018-07-25 DIAGNOSIS — R26.89 IMBALANCE: Primary | ICD-10-CM

## 2018-07-25 NOTE — Clinical Note
7/25/2018       RE: Lucie Stockton  [address] [facility] [address]     Dear Colleague,    Thank you for referring your patient, Lucie Stockton, to the EEG [facility]. Please see a copy of my visit note below.    CPT: 91115 mod. 52,   C[facility]/OP/3 hr vEEG  [place] MD Jimenez    Again, thank you for allowing me to participate in the care of your patient.      Sincerely,    EEG Tech

## 2018-07-25 NOTE — MR AVS SNAPSHOT
After Visit Summary   7/25/2018    Lucie Stockton    MRN: [id number]           Patient Information     Date Of Birth          1935        Visit Information        Provider Department      7/25/2018 8:30 AM  EEG TECH 2 EEG [facility] OUTPATIENT        Today's Diagnoses     Imbalance    -  1       Follow-ups after your visit        Your next 10 appointments already scheduled     Aug 09, 2018 10:45 AM CDT   MR BRAIN W/O & W CONTRAST with [id number]   [facility] MRI ([facility] [place])    [address] [address] [phone]           Take your medicines as usual, unless your doctor tells you not to. Bring a list of your current medicines to your exam (including vitamins, minerals and over-the-counter drugs).  You may or may not receive intravenous (IV) contrast for this exam pending the discretion of the Radiologist.  You do not need to do anything special to prepare.  The MRI machine uses a strong magnet. Please wear clothes without metal (snaps, zippers). A sweatsuit works well, or we may give you a hospital gown.  Please remove any body piercings and hair extensions before you arrive. You will also remove watches, jewelry, hairpins, wallets, dentures, partial dental plates and hearing aids. You may wear contact lenses, and you may be able to wear your rings. We have a safe place to keep your personal items, but it is safer to leave them at home.  **IMPORTANT** THE INSTRUCTIONS BELOW ARE ONLY FOR THOSE PATIENTS WHO HAVE BEEN PRESCRIBED SEDATION OR GENERAL ANESTHESIA DURING THEIR MRI PROCEDURE:  IF YOUR DOCTOR PRESCRIBED ORAL SEDATION (take medicine to help you relax during your exam):   You must get the medicine from your doctor (oral medication) before you arrive. Bring the medicine to the exam. Do not take it at home. You ll be told when to take it upon arriving for your exam.   Arrive one hour early. Bring someone who can take you home  after the test. Your medicine will make you sleepy. After the exam, you may not drive, take a bus or take a taxi by yourself.  IF YOUR DOCTOR PRESCRIBED IV SEDATION:   Arrive one hour early. Bring someone who can take you home after the test. Your medicine will make you sleepy. After the exam, you may not drive, take a bus or take a taxi by yourself.   No eating 6 hours before your exam. You may have clear liquids up until 4 hours before your exam. (Clear liquids include water, clear tea, black coffee and fruit juice without pulp.)  IF YOUR DOCTOR PRESCRIBED ANESTHESIA (be asleep for your exam):   Arrive 1 1/2 hours early. Bring someone who can take you home after the test. You may not drive, take a bus or take a taxi by yourself.   No eating 8 hours before your exam. You may have clear liquids up until 4 hours before your exam. (Clear liquids include water, clear tea, black coffee and fruit juice without pulp.)   You will spend four to five hours in the recovery room.  Please call the Imaging Department at your exam site with any questions.            Sep 20, 2018 11:00 AM CDT   (Arrive by 10:45 AM)   Return Visit with Jesenia Clay, PhD   [facility] ([facility])    [address] [place] [address]  [address] [facility] [address]   [phone]            Sep 25, 2018  9:30 AM CDT   (Arrive by 9:15 AM)   Return Visit with ALEKSEY Vasquez [facility] Neurology ([facility])    [address] [place] [address]  [address] [facility] [address]   [phone]            Jul 16, 2019 10:00 AM CDT   US SUMAYA DOPPLER WITH EXERCISE BILATERAL with [id number]   [facility] [place] [facility] ([facility])    [address] [place] [id number] [phone]           Please bring a list of your medicines (including vitamins, minerals and over-the-counter drugs). Also, tell your doctor about any  allergies you may have. Wear comfortable clothes and leave your valuables at home.  No caffeine or tobacco for 1 hour prior to exam.  Please call the Imaging Department at your exam site with any questions.            Jul 16, 2019 11:00 AM CDT   (Arrive by 10:45 AM)   Return Vascular Visit with ALEKSEY Minor [facility] Clinic ([facility] [place])    [address] [facility]  [address]rd [facility] [address] [phone]              Who to contact     Please call your clinic at [phone] to:    Ask questions about your health    Make or cancel appointments    Discuss your medicines    Learn about your test results    Speak to your doctor            Additional Information About Your Visit        [employer] Information     Bring[employer] gives you secure access to your electronic health record. If you see a primary care provider, you can also send messages to your care team and make appointments. If you have questions, please call your primary care clinic.  If you do not have a primary care provider, please call [phone] and they will assist you.      Bringrs is an electronic gateway that provides easy, online access to your medical records. With Bring[employer], you can request a clinic appointment, read your test results, renew a prescription or communicate with your care team.     To access your existing account, please contact your [facility] or call [phone] for assistance.        Care EveryWhere ID     This is your Care EveryWhere ID. This could be used by other organizations to access your [place] medical records  [id number]         Blood Pressure from Last 3 Encounters:   No data found for BP    Weight from Last 3 Encounters:   No data found for Wt              Today, you had the following     No orders found for display       Primary Care Provider Office Phone # Fax #    Marii Montgomery [phone]       [address] [place] [address]  [address]  [facility] [address]        Equal Access to Services     PAL SALGADO : Hadii reyna blevins gerielvin Mary Annali, wakyleighda luqadaha, qaybta kasheilajackeline frankreginataryn izquierdo. So [facility] [phone].    ATENCIÓN: Si habla español, tiene a schwartz disposición servicios gratuitos de asistencia lingüística. Henny al [phone].    We comply with applicable federal civil rights laws and [place] laws. We do not discriminate on the basis of race, color, national origin, age, disability, sex, sexual orientation, or gender identity.            Thank you!     Thank you for choosing [facility]  for your care. Our goal is always to provide you with excellent care. Hearing back from our patients is one way we can continue to improve our services. Please take a few minutes to complete the written survey that you may receive in the mail after your visit with us. Thank you!             Your Updated Medication List - Protect others around you: Learn how to safely use, store and throw away your medicines at www.disposemymeds.org.          This list is accurate as of 7/25/18 11:59 PM.  Always use your most recent med list.                   Brand Name Dispense Instructions for use Diagnosis    acetaminophen 325 MG tablet    TYLENOL    100 tablet    Take 2 tablets every 6 to 8 hours as needed for pain    Chronic low back pain, unspecified back pain laterality, with sciatica presence unspecified, Contusion of right hip, initial encounter, Contusion of right knee, initial encounter       albuterol 108 (90 Base) MCG/ACT Inhaler    VENTOLIN HFA    18 Inhaler    Inhale 2 puffs into the lungs every 4 hours as needed for shortness of breath / dyspnea or wheezing    Cough       amLODIPine 10 MG tablet    NORVASC    90 tablet    Take 1 tablet (10 mg) by mouth daily For blood pressure    Essential hypertension       atorvastatin 40 MG tablet    LIPITOR    90 tablet    Take 2 tablets (80 mg) by mouth daily     Hyperlipidemia, unspecified hyperlipidemia type       bisacodyl 10 MG Suppository    DULCOLAX    90 suppository    Place 1 suppository (10 mg) rectally daily as needed for constipation    Constipation, unspecified constipation type       CALCIUM 600 + D PO      Take 1 tablet by mouth daily.        ciprofloxacin 250 MG tablet    CIPRO    6 tablet    Take 1 tablet (250 mg) by mouth 2 times daily    Acute cystitis without hematuria       citalopram 40 MG tablet    celeXA    45 tablet    Take 0.5 tablets (20 mg) by mouth daily    Depression, unspecified depression type       clopidogrel 75 MG tablet    PLAVIX    90 tablet    Take 1 tablet (75 mg) by mouth daily    Venous thrombosis of extremity       ferrous sulfate 325 (65 Fe) MG tablet    IRON    90 tablet    Take 1 tablet (325 mg) by mouth daily (with breakfast)    Other iron deficiency anemias       gabapentin 100 MG capsule    NEURONTIN          hydrochlorothiazide 50 MG tablet    HYDRODIURIL    90 tablet    Take 1 tablet (50 mg) by mouth daily    Benign essential hypertension       levothyroxine 75 MCG tablet    SYNTHROID/LEVOTHROID    90 tablet    Take 1 tablet (75 mcg) by mouth daily    Hypothyroidism, unspecified type       lisinopril 40 MG tablet    PRINIVIL/ZESTRIL    90 tablet    Take 1 tablet (40 mg) by mouth daily For blood pressure    Essential hypertension       multivitamin Tabs tablet      Take 1 tablet by mouth daily        naproxen sodium 220 MG capsule     60 capsule    Take 220 mg by mouth 2 times daily (with meals)    Chronic low back pain, unspecified back pain laterality, with sciatica presence unspecified, Pain of left lower leg       order for DME     1 Units    Equipment being ordered:  Walker with seat for chronic back and leg pain, general weakness/deconditioning, imbalance    Chronic low back pain, unspecified back pain laterality, with sciatica presence unspecified, Pain in both lower extremities, Abnormality of gait       pantoprazole 40  MG EC tablet    PROTONIX    90 tablet    Take 1 tablet (40 mg) by mouth daily    Long term current use of anticoagulant       polyethylene glycol powder    MIRALAX    119 g    Take one-half to one scoop once a day for maintaining soft stools    Constipation, unspecified constipation type       potassium chloride SA 20 MEQ CR tablet    K-DUR/KLOR-CON M    90 tablet    Take 1 tablet (20 mEq) by mouth daily    Hypopotassemia       trolamine salicylate 10 % cream    ASPERCREME    170 g    Apply 1 g topically 3 times daily    Arthralgia of left lower leg

## 2018-07-25 NOTE — LETTER
Date:August 2, 2018      Patient was self referred, no letter generated. Do not send.        [facility] Health Information

## 2018-07-25 NOTE — Clinical Note
In-Lab Video will be ready for review as of 1100 on 7/25/18. Pt requested to leave early today. She claimed she did not know this was a 3 hour procedure.

## 2018-07-26 NOTE — PROCEDURES
[facility] EEG #   (Out-Patient Video-EEG Monitoring)    Name:     Lucie Stockton   MRN: [id number]   : 1935   Procedure Date: 2018   Duration of Recordin hours, 27 minutes.      CLINICAL SUMMARY:  This diagnostic video-EEG monitoring procedure was performed in evaluation of encephalopathy in Lucie Stockton.  She reportedly was not receiving anti-seizure medication at the time of this recording.      TECHNICAL SUMMARY:  This continuous EEG monitoring procedure was performed with 23 scalp electrodes in 10-20 system placements, and additional scalp, precordial and other surface electrodes used for electrical referencing and artifact detection.  A single channel of EKG was recorded for purposes of analyzing EKG artifacts in the EEG channels.  Video monitoring was utilized and periodically reviewed by EEG technologist and the physician for electroclinical correlation.    INTERICTAL EEG ACTIVITIES:  During maximal waking, there was a symmetric, well-modulated, approximately 10 Hz posterior dominant rhythm, which was attenuated on eye opening.  Lower amplitude faster activities predominated anteriorly.  Drowsiness was manifested by predominance of centrally maximum semirhythmic theta slowing and dropout of the posterior dominant rhythm during deeper drowsiness.  Symmetric sleep spindles were seen during stage II sleep.  There was symmetric bilateral driving in response to photic stimulation.  Hyperventilation was not performed.      No interictal epileptiform abnormalities were recorded.    ICTAL RECORDINGS:  No electrographic seizures and no paroxysmal behavioral events occurred during this procedure.      SUMMARY OF VIDEO-EEG MONITORING:    The interictal recording was normal in waking, drowsiness and stage II sleep.  No pathological slowing, no interictal epileptiform abnormalities, no electrographic seizures and no paroxysmal behavioral events were recorded during the period of monitoring.   Clinical  correlation is recommended.   Ciaran Jimenez M.D., Professor of Neurology           D: 2018   T: 2018   MT: GREGG      Name:     ISAURO GUZMAN   MRN:      [id number]        Account:        [id number]   :      1935           Procedure Date: 2018      Document: [id number]

## 2018-07-30 LAB — PNP ABY SERUM: NORMAL

## 2018-07-31 ENCOUNTER — OFFICE VISIT (OUTPATIENT)
Dept: GASTROENTEROLOGY | Facility: CLINIC | Age: 83
End: 2018-07-31
Payer: MEDICARE

## 2018-07-31 ENCOUNTER — OFFICE VISIT (OUTPATIENT)
Dept: DERMATOLOGY | Facility: CLINIC | Age: 83
End: 2018-07-31
Payer: MEDICARE

## 2018-07-31 DIAGNOSIS — F43.23 ADJUSTMENT DISORDER WITH MIXED ANXIETY AND DEPRESSED MOOD: Primary | ICD-10-CM

## 2018-07-31 DIAGNOSIS — Z85.828 HISTORY OF NONMELANOMA SKIN CANCER: Primary | ICD-10-CM

## 2018-07-31 DIAGNOSIS — D22.9 MULTIPLE MELANOCYTIC NEVI: ICD-10-CM

## 2018-07-31 ASSESSMENT — ANXIETY QUESTIONNAIRES: 7. FEELING AFRAID AS IF SOMETHING AWFUL MIGHT HAPPEN: NOT AT ALL

## 2018-07-31 ASSESSMENT — PAIN SCALES - GENERAL: PAINLEVEL: NO PAIN (0)

## 2018-07-31 NOTE — LETTER
7/31/2018       RE: Lucie Stockton  [address] [facility] [address]     Dear Colleague,    Thank you for referring your patient, Lucie Stockton, to the [facility] GASTROENTEROLOGY AND IBD CLINIC at [facility]. Please see a copy of my visit note below.      Health Psychology                  Clinic    Department of Medicine  Lilliana Bartholomew, PhD, LP [phone]                          Clinics and Surgery Center  [facility] Zana Atwood, PhD, LP [phone]                  3rd Floor  [place] Mail [address]   Garret Munguia, PhD, ABPP, LP [phone]     [address] [facility],   [address] [facility],  Jesenia Clay,  PhD, LP [phone]            [place] [address]  [place] [address] Jesica Wagoner, PhD, LP [phone]    Health Psychology Follow-Up Note    SUBJECTIVE:  Lucie Stockton is an 83 year-old female who was seen for individual psychotherapy for adjustment disorder. She reported that she has been having low back pain, with this pain impacting her ability to shop, walk long distances, and mood. She reported feeling discouraged with her chronic pain at times, but los with this with PT, flexibility with tasks and expectations, and acceptance of the chronicity of low back pain.  She reported she returned to the clinic per Dr. Montgomery's recommendations. Updated treatment plan. Goals remain focused on improving mood, anxiety, and coping will health problems. [place] focused on improving positive experiences in life through gratitude practice.  Provided psychoeducation about creating a gratitude practice and she reported interest in starting to utilize this strategy, especially on days pain is worse.     OBJECTIVE:  The patient arrived early, maintained good eye contact and was neatly groomed and dressed.  She was in a wheelchair for this visit due to left leg pain.  She was alert and oriented to person, place, time and situation.  She  appeared to respond honestly to all questions about psychosocial functioning and was deemed a reliable historian.  Mood appeared euthymic, although she indicated frustration related to her health conditions.  Speech was clear, coherent and of normal rate, rhythm and volume.  Thoughts were overall logical and organized.  Thought content was appropriate to interview and situation.  Insight and judgment were both good.  She denied current suicidal or assaultive ideation, plan or intent.     ASSESSMENT:  Lucie Stockton is an 83 year-old female experiencing adjustment difficulties following an ongoing left leg injury that has disrupted engagement in physical activities, emotional well-being, and self-care routines.  It also appears that the physical and emotional changes have impacted sleep activity and eating patterns.  She presents with adequate support from family and friends, yet denied the presence of robust internal coping strategies to navigate change.  It also appears that worry and frustration with her health condition exacerbates her feeling and experience of it. Appears to be benefiting from cognitive behavioral strategies for management of pain and adjustment to change and health status.    KATHARINE-7 SCORE 8/21/2017 10/20/2017 7/31/2018   Total Score - 0 (minimal anxiety)  (minimal anxiety)   Total Score 0 0 -     PHQ-9 SCORE 8/21/2017 6/2/2018 7/31/2018   Total Score - - -   Total Score 9 13 9       DIAGNOSIS:  Adjustment disorder with mixed anxiety and depressed mood.     PLAN:  RTC every 4-6 weeks for psychotherapy.     Time in: 1:04  Time out: 1:59  Extended session due to complexity of case and length of interval.    Jesenia Clay, PhD,   Clinical Health Psychologist    Tx plan completed: 7/31/18  Tx plan due:  10/31/18

## 2018-07-31 NOTE — MR AVS SNAPSHOT
After Visit Summary   7/31/2018    Lucie Stockton    MRN: [id number]           Patient Information     Date Of Birth          1935        Visit Information        Provider Department      7/31/2018 1:00 PM Jesenia Clay, PhD [facility]        Today's Diagnoses     Adjustment disorder with mixed anxiety and depressed mood    -  1       Follow-ups after your visit        Your next 10 appointments already scheduled     Aug 09, 2018 10:45 AM CDT   MR BRAIN W/O & W CONTRAST with [id number]   [facility] ([facility])    [address] [place] [id number] [phone]           Take your medicines as usual, unless your doctor tells you not to. Bring a list of your current medicines to your exam (including vitamins, minerals and over-the-counter drugs).  You may or may not receive intravenous (IV) contrast for this exam pending the discretion of the Radiologist.  You do not need to do anything special to prepare.  The MRI machine uses a strong magnet. Please wear clothes without metal (snaps, zippers). A sweatsuit works well, or we may give you a hospital gown.  Please remove any body piercings and hair extensions before you arrive. You will also remove watches, jewelry, hairpins, wallets, dentures, partial dental plates and hearing aids. You may wear contact lenses, and you may be able to wear your rings. We have a safe place to keep your personal items, but it is safer to leave them at home.  **IMPORTANT** THE INSTRUCTIONS BELOW ARE ONLY FOR THOSE PATIENTS WHO HAVE BEEN PRESCRIBED SEDATION OR GENERAL ANESTHESIA DURING THEIR MRI PROCEDURE:  IF YOUR DOCTOR PRESCRIBED ORAL SEDATION (take medicine to help you relax during your exam):   You must get the medicine from your doctor (oral medication) before you arrive. Bring the medicine to the exam. Do not take it at home. You ll be told when to take it upon arriving  for your exam.   Arrive one hour early. Bring someone who can take you home after the test. Your medicine will make you sleepy. After the exam, you may not drive, take a bus or take a taxi by yourself.  IF YOUR DOCTOR PRESCRIBED IV SEDATION:   Arrive one hour early. Bring someone who can take you home after the test. Your medicine will make you sleepy. After the exam, you may not drive, take a bus or take a taxi by yourself.   No eating 6 hours before your exam. You may have clear liquids up until 4 hours before your exam. (Clear liquids include water, clear tea, black coffee and fruit juice without pulp.)  IF YOUR DOCTOR PRESCRIBED ANESTHESIA (be asleep for your exam):   Arrive 1 1/2 hours early. Bring someone who can take you home after the test. You may not drive, take a bus or take a taxi by yourself.   No eating 8 hours before your exam. You may have clear liquids up until 4 hours before your exam. (Clear liquids include water, clear tea, black coffee and fruit juice without pulp.)   You will spend four to five hours in the recovery room.  Please call the Imaging Department at your exam site with any questions.            Sep 25, 2018  9:30 AM CDT   (Arrive by 9:15 AM)   Return [facility] with [facility]Y [facility])    [address] [place] [id number] [phone]            Jul 16, 2019 10:00 AM CDT   US SUMAYA DOPPLER WITH EXERCISE BILATERAL with [id number]   [facility] ([facility])    [address] [place] [id number] [phone]           Please bring a list of your medicines (including vitamins, minerals and over-the-counter drugs). Also, tell your doctor about any allergies you may have. Wear comfortable clothes and leave your valuables at home.  No caffeine or tobacco for 1 hour prior to exam.  Please call the Imaging Department at your exam site  with any questions.            Jul 16, 2019 11:00 AM CDT   (Arrive by 10:45 AM)   Return Vascular Visit with ALEKSEY [facility] ([facility])    [address] [facility]  3rd Floor  [facility] [address] [phone]              Who to contact     Please call your clinic at [phone] to:    Ask questions about your health    Make or cancel appointments    Discuss your medicines    Learn about your test results    Speak to your doctor            Additional Information About Your Visit        [employer] Information     [employer] gives you secure access to your electronic health record. If you see a primary care provider, you can also send messages to your care team and make appointments. If you have questions, please call your primary care clinic.  If you do not have a primary care provider, please call [phone] and they will assist you.      [employer] is an electronic gateway that provides easy, online access to your medical records. With [employer], you can request a clinic appointment, read your test results, renew a prescription or communicate with your care team.     To access your existing account, please contact your [facility] or call [phone] for assistance.        Care EveryWhere ID     This is your Care EveryWhere ID. This could be used by other organizations to access your [facility] medical records  [id number]         Blood Pressure from Last 3 Encounters:   07/24/18 144/68   06/12/18 161/74   06/02/18 168/75    Weight from Last 3 Encounters:   06/12/18 45.8 kg (100 lb 14.4 oz)   06/02/18 47.7 kg (105 lb 3.2 oz)   05/15/18 45.8 kg (101 lb)              Today, you had the following     No orders found for display       Primary Care Provider Office Phone # Fax #    Marii Montgomery [phone]       [address] [place] [phone]  [facility] [address]        Equal Access to Services     PAL SALGADO AH:  Hadii aad ku hadcorio Soomaali, waaxda luqadaha, qaybta kaalmada mirta, jackeline izquierdo. So [facility] [phone].    ATENCIÓN: Si shannan briscoe, tiene a schwartz disposición servicios gratuitos de asistencia lingüística. Llame al [phone].    We comply with applicable federal civil rights laws and [place] laws. We do not discriminate on the basis of race, color, national origin, age, disability, sex, sexual orientation, or gender identity.            Thank you!     Thank you for choosing [facility]  for your care. Our goal is always to provide you with excellent care. Hearing back from our patients is one way we can continue to improve our services. Please take a few minutes to complete the written survey that you may receive in the mail after your visit with us. Thank you!             Your Updated Medication List - Protect others around you: Learn how to safely use, store and throw away your medicines at www.disposemymeds.org.          This list is accurate as of 7/31/18  2:02 PM.  Always use your most recent med list.                   Brand Name Dispense Instructions for use Diagnosis    acetaminophen 325 MG tablet    TYLENOL    100 tablet    Take 2 tablets every 6 to 8 hours as needed for pain    Chronic low back pain, unspecified back pain laterality, with sciatica presence unspecified, Contusion of right hip, initial encounter, Contusion of right knee, initial encounter       albuterol 108 (90 Base) MCG/ACT Inhaler    VENTOLIN HFA    18 Inhaler    Inhale 2 puffs into the lungs every 4 hours as needed for shortness of breath / dyspnea or wheezing    Cough       amLODIPine 10 MG tablet    NORVASC    90 tablet    Take 1 tablet (10 mg) by mouth daily For blood pressure    Essential hypertension       atorvastatin 40 MG tablet    LIPITOR    90 tablet    Take 2 tablets (80 mg) by mouth daily    Hyperlipidemia, unspecified hyperlipidemia type       bisacodyl 10  MG Suppository    DULCOLAX    90 suppository    Place 1 suppository (10 mg) rectally daily as needed for constipation    Constipation, unspecified constipation type       CALCIUM 600 + D PO      Take 1 tablet by mouth daily.        ciprofloxacin 250 MG tablet    CIPRO    6 tablet    Take 1 tablet (250 mg) by mouth 2 times daily    Acute cystitis without hematuria       citalopram 40 MG tablet    celeXA    45 tablet    Take 0.5 tablets (20 mg) by mouth daily    Depression, unspecified depression type       clopidogrel 75 MG tablet    PLAVIX    90 tablet    Take 1 tablet (75 mg) by mouth daily    Venous thrombosis of extremity       ferrous sulfate 325 (65 Fe) MG tablet    IRON    90 tablet    Take 1 tablet (325 mg) by mouth daily (with breakfast)    Other iron deficiency anemias       gabapentin 100 MG capsule    NEURONTIN          hydrochlorothiazide 50 MG tablet    HYDRODIURIL    90 tablet    Take 1 tablet (50 mg) by mouth daily    Benign essential hypertension       levothyroxine 75 MCG tablet    SYNTHROID/LEVOTHROID    90 tablet    Take 1 tablet (75 mcg) by mouth daily    Hypothyroidism, unspecified type       lisinopril 40 MG tablet    PRINIVIL/ZESTRIL    90 tablet    Take 1 tablet (40 mg) by mouth daily For blood pressure    Essential hypertension       multivitamin Tabs tablet      Take 1 tablet by mouth daily        naproxen sodium 220 MG capsule     60 capsule    Take 220 mg by mouth 2 times daily (with meals)    Chronic low back pain, unspecified back pain laterality, with sciatica presence unspecified, Pain of left lower leg       order for DME     1 Units    Equipment being ordered:  Walker with seat for chronic back and leg pain, general weakness/deconditioning, imbalance    Chronic low back pain, unspecified back pain laterality, with sciatica presence unspecified, Pain in both lower extremities, Abnormality of gait       pantoprazole 40 MG EC tablet    PROTONIX    90 tablet    Take 1 tablet (40 mg) by  mouth daily    Long term current use of anticoagulant       polyethylene glycol powder    MIRALAX    119 g    Take one-half to one scoop once a day for maintaining soft stools    Constipation, unspecified constipation type       potassium chloride SA 20 MEQ CR tablet    K-DUR/KLOR-CON M    90 tablet    Take 1 tablet (20 mEq) by mouth daily    Hypopotassemia       trolamine salicylate 10 % cream    ASPERCREME    170 g    Apply 1 g topically 3 times daily    Arthralgia of left lower leg

## 2018-07-31 NOTE — PROGRESS NOTES
CHIEF COMPLAINT:  Followup skin check.      SUBJECTIVE:  Lucie is a very pleasant, 83-year-old female whom I met for the first time in 11/2017, presenting with a pigmented lesion on her left abdomen.  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  We subsequently treated this lesion with electrodessication and curettage at a followup on 01/16/2018.  She was asked to return today for a 6 month followup full body skin exam.  Today she reports an open sore on her left dorsal hand, which has been present for 1 week and is traumatic in nature.  She had no skin changes in this area prior to a fall.  She is currently using liquid bandage on the area.  Otherwise, she denies any new or changing moles and has no localized areas of itch, pain or bleeding.  She does notice some occasional peeling on the tip of her nose, but no open sores.  Her scar site has healed well on her abdomen.      REVIEW OF SYSTEMS:  No recent fevers or chills.  No nonhealing oral sores.      PHYSICAL EXAMINATION:   GENERAL:  This is a well-appearing, well-nourished female with a normal mood and affect who is oriented x3.   SKIN:  A cutaneous exam of the head, neck, chest, abdomen, back, bilateral upper and lower extremities and buttocks was performed.  On the left upper mid abdomen, there is an oval, white scar from her prior surgical procedure with no erythema or induration.  On the nasal tip, there is sebaceous hyperplasia as well as a 2 mm, bright-pink, vascular-appearing papule consistent with a fibrous papule.  On the left dorsal hand in the thenar web space, there is a 1 cm ulcer with central crusting.  On the back, there are scattered, pink, domed papules consistent with intradermal nevi or neurofibromas.      ASSESSMENT AND PLAN:   1.  History of pigmented basal cell carcinoma, Pinkus type, on the abdomen, status post EDC in 01/2018.  Clinically, there is no evidence of recurrence today.  Reassurance  was provided.  We reviewed the ABCDEs of melanoma as well as the signs and symptoms of nonmelanoma skin cancer.  I encouraged her to continue practicing sun avoidance.  We will have her follow up in 1 year's time regarding a full body skin exam.   2.  Traumatic ulcer of the left dorsal hand.  We provided her with samples of DuoDERM today, which she can use to speed healing.  Proper use was reviewed with her.   3.  Scattered neurofibromas and benign-appearing nevi.  Reassurance was provided as to the benign nature of these lesions.   4.  Fibrous papule and sebaceous hyperplasia of the nasal tip.  Reassurance was provided that there are no features worrisome for skin cancer in this area.   5.  As noted above, she will follow up in clinic in 1 year's time.       Lorenzo Pena MD  Dermatology Attending

## 2018-07-31 NOTE — PROGRESS NOTES
Health Psychology                  Clinic    Department of Medicine  Lilliana Bartholomew, PhD, LP [phone]                          Clinics and Surgery Center  [facility] Zana Atwood, PhD, LP [phone]                  3rd Floor  [place] Mail [address]41   Garret Munguia, PhD, ABPP, LP [phone]     [address] [facility],   [address] [facility],  Jesenia Clay,  PhD, LP [phone]            [place] [address] Jesica Wagoner, PhD, LP [phone]    Health Psychology Follow-Up Note    SUBJECTIVE:  Lucie Stockton is an 83 year-old female who was seen for individual psychotherapy for adjustment disorder. She reported that she has been having low back pain, with this pain impacting her ability to shop, walk long distances, and mood. She reported feeling discouraged with her chronic pain at times, but los with this with PT, flexibility with tasks and expectations, and acceptance of the chronicity of low back pain.  She reported she returned to the clinic per Dr. Montgomery's recommendations. Updated treatment plan. Goals remain focused on improving mood, anxiety, and coping will health problems. [place] focused on improving positive experiences in life through gratitude practice.  Provided psychoeducation about creating a gratitude practice and she reported interest in starting to utilize this strategy, especially on days pain is worse.     OBJECTIVE:  The patient arrived early, maintained good eye contact and was neatly groomed and dressed.  She was in a wheelchair for this visit due to left leg pain.  She was alert and oriented to person, place, time and situation.  She appeared to respond honestly to all questions about psychosocial functioning and was deemed a reliable historian.  Mood appeared euthymic, although she indicated frustration related to her health conditions.  Speech was clear, coherent and of normal rate, rhythm and volume.  Thoughts were overall  logical and organized.  Thought content was appropriate to interview and situation.  Insight and judgment were both good.  She denied current suicidal or assaultive ideation, plan or intent.     ASSESSMENT:  Lucie Stockton is an 83 year-old female experiencing adjustment difficulties following an ongoing left leg injury that has disrupted engagement in physical activities, emotional well-being, and self-care routines.  It also appears that the physical and emotional changes have impacted sleep activity and eating patterns.  She presents with adequate support from family and friends, yet denied the presence of robust internal coping strategies to navigate change.  It also appears that worry and frustration with her health condition exacerbates her feeling and experience of it. Appears to be benefiting from cognitive behavioral strategies for management of pain and adjustment to change and health status.    KATHARINE-7 SCORE 8/21/2017 10/20/2017 7/31/2018   Total Score - 0 (minimal anxiety)  (minimal anxiety)   Total Score 0 0 -     PHQ-9 SCORE 8/21/2017 6/2/2018 7/31/2018   Total Score - - -   Total Score 9 13 9       DIAGNOSIS:  Adjustment disorder with mixed anxiety and depressed mood.     PLAN:  RTC every 4-6 weeks for psychotherapy.     Time in: 1:04  Time out: 1:59  Extended session due to complexity of case and length of interval.    Jesenia Clay, PhD, LP  Clinical Health Psychologist    Tx plan completed: 7/31/18  Tx plan due:  10/31/18

## 2018-07-31 NOTE — MR AVS SNAPSHOT
After Visit Summary   7/31/2018    Lucie Stockton    MRN: [id number]           Patient Information     Date Of Birth          1935        Visit Information        Provider Department      7/31/2018 11:45 AM Lorenzo Pena MD [facility]        Today's Diagnoses     History of nonmelanoma skin cancer    -  1    Multiple melanocytic nevi           Follow-ups after your visit        Your next 10 appointments already scheduled     Aug 17, 2018  9:45 AM CDT   (Arrive by 9:30 AM)   Return Visit with Marii Montgomery MD   [facility] ([facility])    [address] [place] [address]  [address] [facility] [address]   [phone]            Sep 20, 2018 11:00 AM CDT   (Arrive by 10:45 AM)   Return Visit with Jesenia Clay, PhD   [facility] ([facility])    [address] [place] [address]  [address] [facility] [address]   [phone]            Sep 25, 2018  9:30 AM CDT   (Arrive by 9:15 AM)   Return Visit with ALEKSEY Vasquez [facility] ([facility])    [address] [place] [address]  [address] [facility] [address]   [phone]            Jul 16, 2019 10:00 AM CDT   US SUMAYA DOPPLER WITH EXERCISE BILATERAL with [id number]   [facility] ([facility])    [address] [place] [address]  [address] [facility] [address] [phone]           Please bring a list of your medicines (including vitamins, minerals and over-the-counter drugs). Also, tell your doctor about any allergies you may have. Wear comfortable clothes and leave your valuables at home.  No caffeine or tobacco for 1 hour prior to exam.  Please call the Imaging Department at your exam site with any questions.            Jul 16, 2019 11:00 AM CDT   (Arrive by 10:45 AM)   Return Vascular Visit with ALEKSEY [facility] ([facility]  [facility])    [address]  3rd Floor  [facility] [address] [phone]              Who to contact     Please call your clinic at [phone] to:    Ask questions about your health    Make or cancel appointments    Discuss your medicines    Learn about your test results    Speak to your doctor            Additional Information About Your Visit        MyChart Information     [employer] gives you secure access to your electronic health record. If you see a primary care provider, you can also send messages to your care team and make appointments. If you have questions, please call your primary care clinic.  If you do not have a primary care provider, please call [phone] and they will assist you.      [employer] is an electronic gateway that provides easy, online access to your medical records. With [employer], you can request a clinic appointment, read your test results, renew a prescription or communicate with your care team.     To access your existing account, please contact your [facility] or call [phone] for assistance.        Care EveryWhere ID     This is your Care EveryWhere ID. This could be used by other organizations to access your [facility] medical records  [id number]         Blood Pressure from Last 3 Encounters:   07/24/18 144/68   06/12/18 161/74   06/02/18 168/75    Weight from Last 3 Encounters:   06/12/18 45.8 kg (100 lb 14.4 oz)   06/02/18 47.7 kg (105 lb 3.2 oz)   05/15/18 45.8 kg (101 lb)              Today, you had the following     No orders found for display       Primary Care Provider Office Phone # Fax #    Marii Montgomery [phone]       [address] [place] [phone]  [facility] [id number]        Equal Access to Services     RHYS [facility] : Ascencion Gamboa, anu keller, jackeline garcia. So [facility] [phone].    ATENCIÓN: Si shannan briscoe,  tiene a schwartz disposición servicios gratuitos de asistencia lingüística. Henny nogueira [phone].    We comply with applicable federal civil rights laws and [place] laws. We do not discriminate on the basis of race, color, national origin, age, disability, sex, sexual orientation, or gender identity.            Thank you!     Thank you for choosing [facility]  for your care. Our goal is always to provide you with excellent care. Hearing back from our patients is one way we can continue to improve our services. Please take a few minutes to complete the written survey that you may receive in the mail after your visit with us. Thank you!             Your Updated Medication List - Protect others around you: Learn how to safely use, store and throw away your medicines at www.disposemymeds.org.          This list is accurate as of 7/31/18 11:59 PM.  Always use your most recent med list.                   Brand Name Dispense Instructions for use Diagnosis    acetaminophen 325 MG tablet    TYLENOL    100 tablet    Take 2 tablets every 6 to 8 hours as needed for pain    Chronic low back pain, unspecified back pain laterality, with sciatica presence unspecified, Contusion of right hip, initial encounter, Contusion of right knee, initial encounter       albuterol 108 (90 Base) MCG/ACT inhaler    VENTOLIN HFA    18 Inhaler    Inhale 2 puffs into the lungs every 4 hours as needed for shortness of breath / dyspnea or wheezing    Cough       amLODIPine 10 MG tablet    NORVASC    90 tablet    Take 1 tablet (10 mg) by mouth daily For blood pressure    Essential hypertension       atorvastatin 40 MG tablet    LIPITOR    90 tablet    Take 2 tablets (80 mg) by mouth daily    Hyperlipidemia, unspecified hyperlipidemia type       bisacodyl 10 MG Suppository    DULCOLAX    90 suppository    Place 1 suppository (10 mg) rectally daily as needed for constipation    Constipation, unspecified constipation type       CALCIUM 600 + D PO       Take 1 tablet by mouth daily.        ciprofloxacin 250 MG tablet    CIPRO    6 tablet    Take 1 tablet (250 mg) by mouth 2 times daily    Acute cystitis without hematuria       citalopram 40 MG tablet    celeXA    45 tablet    Take 0.5 tablets (20 mg) by mouth daily    Depression, unspecified depression type       clopidogrel 75 MG tablet    PLAVIX    90 tablet    Take 1 tablet (75 mg) by mouth daily    Venous thrombosis of extremity       ferrous sulfate 325 (65 Fe) MG tablet    IRON    90 tablet    Take 1 tablet (325 mg) by mouth daily (with breakfast)    Other iron deficiency anemias       gabapentin 100 MG capsule    NEURONTIN          hydrochlorothiazide 50 MG tablet    HYDRODIURIL    90 tablet    Take 1 tablet (50 mg) by mouth daily    Benign essential hypertension       levothyroxine 75 MCG tablet    SYNTHROID/LEVOTHROID    90 tablet    Take 1 tablet (75 mcg) by mouth daily    Hypothyroidism, unspecified type       lisinopril 40 MG tablet    PRINIVIL/ZESTRIL    90 tablet    Take 1 tablet (40 mg) by mouth daily For blood pressure    Essential hypertension       multivitamin Tabs tablet      Take 1 tablet by mouth daily        naproxen sodium 220 MG capsule     60 capsule    Take 220 mg by mouth 2 times daily (with meals)    Chronic low back pain, unspecified back pain laterality, with sciatica presence unspecified, Pain of left lower leg       order for DME     1 Units    Equipment being ordered:  Walker with seat for chronic back and leg pain, general weakness/deconditioning, imbalance    Chronic low back pain, unspecified back pain laterality, with sciatica presence unspecified, Pain in both lower extremities, Abnormality of gait       pantoprazole 40 MG EC tablet    PROTONIX    90 tablet    Take 1 tablet (40 mg) by mouth daily    Long term current use of anticoagulant       polyethylene glycol powder    MIRALAX    119 g    Take one-half to one scoop once a day for maintaining soft stools     Constipation, unspecified constipation type       potassium chloride SA 20 MEQ CR tablet    K-DUR/KLOR-CON M    90 tablet    Take 1 tablet (20 mEq) by mouth daily    Hypopotassemia       trolamine salicylate 10 % cream    ASPERCREME    170 g    Apply 1 g topically 3 times daily    Arthralgia of left lower leg

## 2018-07-31 NOTE — NURSING NOTE
Dermatology Rooming Note    Lucie Stockton's goals for this visit include:   Chief Complaint   Patient presents with     Derm Problem     Lucie is here for a skin check, states no concerns.       Jesenia Howard, KITN

## 2018-07-31 NOTE — LETTER
7/31/2018       RE: Lucie Stockton  [address] [facility] [address]     Dear Colleague,    Thank you for referring your patient, Lucie Stockton, to the [facility] at [facility]. Please see a copy of my visit note below.    CHIEF COMPLAINT:  Followup skin check.      SUBJECTIVE:  Lucie is a very pleasant, 83-year-old female whom I met for the first time in 11/2017, presenting with a pigmented lesion on her left abdomen.  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  We subsequently treated this lesion with electrodessication and curettage at a followup on 01/16/2018.  She was asked to return today for a 6 month followup full body skin exam.  Today she reports an open sore on her left dorsal hand, which has been present for 1 week and is traumatic in nature.  She had no skin changes in this area prior to a fall.  She is currently using liquid bandage on the area.  Otherwise, she denies any new or changing moles and has no localized areas of itch, pain or bleeding.  She does notice some occasional peeling on the tip of her nose, but no open sores.  Her scar site has healed well on her abdomen.      REVIEW OF SYSTEMS:  No recent fevers or chills.  No nonhealing oral sores.      PHYSICAL EXAMINATION:   GENERAL:  This is a well-appearing, well-nourished female with a normal mood and affect who is oriented x3.   SKIN:  A cutaneous exam of the head, neck, chest, abdomen, back, bilateral upper and lower extremities and buttocks was performed.  On the left upper mid abdomen, there is an oval, white scar from her prior surgical procedure with no erythema or induration.  On the nasal tip, there is sebaceous hyperplasia as well as a 2 mm, bright-pink, vascular-appearing papule consistent with a fibrous papule.  On the left dorsal hand in the thenar web space, there is a 1 cm ulcer with central crusting.  On the back, there are  scattered, pink, domed papules consistent with intradermal nevi or neurofibromas.      ASSESSMENT AND PLAN:   1.  History of pigmented basal cell carcinoma, Pinkus type, on the abdomen, status post EDC in 01/2018.  Clinically, there is no evidence of recurrence today.  Reassurance was provided.  We reviewed the ABCDEs of melanoma as well as the signs and symptoms of nonmelanoma skin cancer.  I encouraged her to continue practicing sun avoidance.  We will have her follow up in 1 year's time regarding a full body skin exam.   2.  Traumatic ulcer of the left dorsal hand.  We provided her with samples of DuoDERM today, which she can use to speed healing.  Proper use was reviewed with her.   3.  Scattered neurofibromas and benign-appearing nevi.  Reassurance was provided as to the benign nature of these lesions.   4.  Fibrous papule and sebaceous hyperplasia of the nasal tip.  Reassurance was provided that there are no features worrisome for skin cancer in this area.   5.  As noted above, she will follow up in clinic in 1 year's time.     Again, thank you for allowing me to participate in the care of your patient.      Sincerely,    Lorenzo Pena MD

## 2018-08-01 ASSESSMENT — PATIENT HEALTH QUESTIONNAIRE - PHQ9: SUM OF ALL RESPONSES TO PHQ QUESTIONS 1-9: 9

## 2018-08-09 ENCOUNTER — RADIANT APPOINTMENT (OUTPATIENT)
Dept: MRI IMAGING | Facility: CLINIC | Age: 83
End: 2018-08-09
Attending: NURSE PRACTITIONER
Payer: MEDICARE

## 2018-08-09 DIAGNOSIS — R41.89 SPELL OF ALTERED COGNITION: ICD-10-CM

## 2018-08-09 DIAGNOSIS — Z86.39 HISTORY OF HYPOTHYROIDISM: ICD-10-CM

## 2018-08-09 LAB
CREAT BLD-MCNC: 0.7 MG/DL (ref 0.52–1.04)
GFR SERPL CREATININE-BSD FRML MDRD: 80 ML/MIN/1.7M2

## 2018-08-09 RX ORDER — GADOBUTROL 604.72 MG/ML
7.5 INJECTION INTRAVENOUS ONCE
Status: COMPLETED | OUTPATIENT
Start: 2018-08-09 | End: 2018-08-09

## 2018-08-09 RX ADMIN — GADOBUTROL 7.5 ML: 604.72 INJECTION INTRAVENOUS at 11:35

## 2018-08-12 PROBLEM — Z85.828 HISTORY OF NONMELANOMA SKIN CANCER: Status: ACTIVE | Noted: 2018-08-12

## 2018-08-12 PROBLEM — D22.9 MULTIPLE MELANOCYTIC NEVI: Status: ACTIVE | Noted: 2018-08-12

## 2018-08-14 NOTE — PROGRESS NOTES
Dear Lucie,   No acute findings on your recent brain MRI. Please let me know if you have any questions. Please schedule a follow up visit if you would like to review and discuss your images. I'm going to send your EEG results via [employer] as well. Please see additional message.   Sincerely, Moraima

## 2018-08-17 ENCOUNTER — OFFICE VISIT (OUTPATIENT)
Dept: INTERNAL MEDICINE | Facility: CLINIC | Age: 83
End: 2018-08-17
Payer: MEDICARE

## 2018-08-17 VITALS
HEART RATE: 68 BPM | SYSTOLIC BLOOD PRESSURE: 155 MMHG | DIASTOLIC BLOOD PRESSURE: 68 MMHG | RESPIRATION RATE: 22 BRPM | BODY MASS INDEX: 20.55 KG/M2 | WEIGHT: 105.2 LBS

## 2018-08-17 DIAGNOSIS — M54.5 CHRONIC LOW BACK PAIN, UNSPECIFIED BACK PAIN LATERALITY, WITH SCIATICA PRESENCE UNSPECIFIED: ICD-10-CM

## 2018-08-17 DIAGNOSIS — G89.29 CHRONIC LOW BACK PAIN, UNSPECIFIED BACK PAIN LATERALITY, WITH SCIATICA PRESENCE UNSPECIFIED: ICD-10-CM

## 2018-08-17 DIAGNOSIS — R13.12 OROPHARYNGEAL DYSPHAGIA: ICD-10-CM

## 2018-08-17 DIAGNOSIS — F32.1 MODERATE MAJOR DEPRESSION (H): ICD-10-CM

## 2018-08-17 DIAGNOSIS — R40.4 TRANSIENT ALTERATION OF AWARENESS: ICD-10-CM

## 2018-08-17 DIAGNOSIS — S01.01XA LACERATION OF SCALP, INITIAL ENCOUNTER: Primary | ICD-10-CM

## 2018-08-17 DIAGNOSIS — I73.9 PERIPHERAL ARTERIAL DISEASE (H): ICD-10-CM

## 2018-08-17 DIAGNOSIS — E03.9 HYPOTHYROIDISM, UNSPECIFIED TYPE: ICD-10-CM

## 2018-08-17 DIAGNOSIS — F32.A DEPRESSION, UNSPECIFIED DEPRESSION TYPE: ICD-10-CM

## 2018-08-17 DIAGNOSIS — M81.0 AGE-RELATED OSTEOPOROSIS WITHOUT CURRENT PATHOLOGICAL FRACTURE: ICD-10-CM

## 2018-08-17 DIAGNOSIS — F41.9 ANXIETY: ICD-10-CM

## 2018-08-17 DIAGNOSIS — Z23 NEED FOR VACCINATION: ICD-10-CM

## 2018-08-17 PROBLEM — R13.10 DYSPHAGIA, UNSPECIFIED TYPE: Status: RESOLVED | Noted: 2018-08-17 | Resolved: 2018-08-17

## 2018-08-17 PROBLEM — R13.10 DYSPHAGIA, UNSPECIFIED TYPE: Status: ACTIVE | Noted: 2018-08-17

## 2018-08-17 RX ORDER — CITALOPRAM HYDROBROMIDE 40 MG/1
40 TABLET ORAL DAILY
Qty: 90 TABLET | Refills: 3 | COMMUNITY
Start: 2018-08-17 | End: 2018-10-17

## 2018-08-17 ASSESSMENT — PAIN SCALES - GENERAL: PAINLEVEL: SEVERE PAIN (6)

## 2018-08-17 NOTE — NURSING NOTE
Chief Complaint   Patient presents with     Results     Patient is here to follow up on test results       Stanislav Carias CMA (AAMA) at 10:57 AM on 8/17/2018

## 2018-08-17 NOTE — NURSING NOTE
Wound clean and dressing changed was demonstrated to patient.Who acknowledged how to do it and also that  would do the dressing change. Patient was alson shown and gone over with instructions on dressing change on  AVS.Sherice Beauchamp LPN 12:32 PM on 8/17/2018  Shingrix shot was given without problems, patient tolerated procedure well.Sherice Beauchamp LPN 12:33 PM on 8/17/2018    Adjuvant Suspension Component  Lot number:[id number]  Expiration date:02/02/21  NDC 85467-085-88  Sherice Beauchamp LPN 12:35 PM on 8/17/2018

## 2018-08-17 NOTE — PATIENT INSTRUCTIONS
Instructions:    Shingles shot today and in 2 months.  Go to the lab today to check your thyroid level.  See me in 2 months.    Wound care:  You may wash the top and sides of your scalp with shampoo, but not the back of your head.  For the wound, irrigate with sterile water and syringe (without needle).  Apply bacitracin and cover with folded 2 x 2 inch gauze.  Secure with tape.  Do this daily until healed.    Examples of topical creams for joint pain:  Lidocare--contains lidocaine  Other creams without lidocaine which are good for joint pain:  Soppain, Tigerbalm, Novarnica      Here is some advice regarding bone health:  1.  Make sure you are getting enough calcium and vitamin D.    Currently, total diet plus supplement recommended intake for calcium is 1000 mg daily for ages 19-50, 1200 mg for ages 51+.  Recommended Vitamin D intake is 600 international units (IU) daily for ages 18-70 and 800 IU daily for age greater than 70 .At this time, the total diet plus supplement recommended intake for you is [id number] mg daily Vitamin D  800 IU daily.    2.    Do not take more than the recommended amount of Vitamin D without consulting your provider. Vitamin D toxicity can cause non-specific symptoms such as anorexia, weight loss, polyuria, and heart arrhythmias. More seriously, it can also raise blood levels of calcium which leads to vascular and tissue calcification, with subsequent damage to the heart, blood vessels, and kidneys.  3.  Do not take more than the recommended amount of Calcium without consulting your provider.  Excessively high levels of calcium in the blood known as hypercalcemia can cause renal insufficiency, vascular and soft tissue calcification, hypercalciuria (high levels of calcium in the urine) and kidney stones.  4.  Calcium Carbonate should be taken with food.  Calcium Citrate can be taken with or without food.  5.  Physical activity is important. Consider activities such as walking, water  workouts or   raheel chi . Include resistance type exercises.  6.  In order for your skin to manufacture enough Vitamin D, you need some exposure to the sun.  Recommendation is approximately 5-30 minutes of sun exposure between 10 AM and 3 PM at least twice a week to the face, arms, legs, or back without sunscreen.  Do not use tanning beds.  7.  Decrease your risk of falls.  Wear sensible shoes. Review your medications with your provider. See additional recommendations below.   8.  See tables below regarding the content of calcium and Vitamin D in various foods.      Reduce your risk of falls:  Remove home hazards  Take a look around your home. Your living room, kitchen, bedroom, bathroom, hallways and stairways may be filled with hazards. To make your home safer:   Remove boxes, newspapers, electrical cords and phone cords from walkways.   Move coffee tables, magazine racks and plant stands from high-traffic areas.   Secure loose rugs with double-faced tape, tacks or a slip-resistant backing -- or remove loose rugs from your home.   Repair loose, wooden floorboards and carpeting right away.   Store clothing, dishes, food and other necessities within easy reach.   Immediately clean spilled liquids, grease or food.   Use nonskid floor wax.   Use nonslip mats in your bathtub or shower.   Light up your living space  Keep your home brightly lit to avoid tripping on objects that are hard to see. Also:   Place night lights in your bedroom, bathroom and hallways.   Place a lamp within reach of your bed for middle-of-the-night needs.   Make clear paths to light switches that aren't near room entrances. Consider trading traditional switches for glow-in-the-dark or illuminated switches.   Turn on the lights before going up or down stairs.   Store flashlights in easy-to-find places in case of power outages.   Use assistive devices  Your doctor might recommend using a cane or walker to keep you steady. Other assistive devices can  "help, too. For example:   Hand rails for both sides of stairways   Nonslip treads for bare-wood steps   A raised toilet seat or one with armrests   Grab bars for the shower or tub   A sturdy plastic seat for the shower or tub -- plus a hand-held shower nozzle for bathing while sitting down   Source: http://www.[facility]/health/fall-prevention/      Calcium Content of Foods:     Food Milligrams (mg)  per serving     Yogurt, plain, low fat, 8 ounces 415    Orange juice, calcium-fortified, 6 ounces 375    Yogurt, fruit, low fat, 8 ounces 338-384    Mozzarella, part skim, 1.5 ounces 333    Sardines, canned in oil, with bones, 3 ounces 325    Cheddar cheese, 1.5 ounces 307    Milk, nonfat, 8 ounces 299    Milk, reduced-fat (2% milk fat), 8 ounces 293    Milk, buttermilk, 8 ounces 282-350    Milk, whole (3.25% milk fat), 8 ounces 276    Tofu, firm, made with calcium sulfate,   cup 253    Hammond, pink, canned, solids with bone, 3 ounces 181    Cottage cheese, 1% milk fat, 1 cup 138    Tofu, soft, made with calcium sulfate,   cup 138    Instant breakfast drink, various flavors and brands, powder prepared with water, 8 ounces 105-250    Frozen yogurt, vanilla, soft serve,   cup  103    Ready-to-eat cereal, calcium-fortified, 1 cup  100-1,000    Turnip greens, fresh, boiled,   cup  99    Kale, fresh, cooked, 1 cup  94    Kale, raw, chopped, 1 cup  90    Ice cream, vanilla,   cup  84    Soy beverage, calcium-fortified, 8 ounces       Chinese cabbage, bok boyd, raw, shredded, 1 cup  74    Bread, white, 1 slice  73    Pudding, chocolate, ready to eat, refrigerated, 4 ounces  55    Tortilla, corn, ready-to-bake/alvarado, one 6\" diameter 46    Tortilla, flour, ready-to-bake/alvarado, one 6\" diameter  32    Sour cream, reduced fat, cultured, 2 tablespoons  31    Bread, whole-wheat, 1 slice  30    Broccoli, raw,   cup  21    Cheese, cream, regular, 1 tablespoon  14    Source:  http://ods.od.nih.gov/factsheets    Selected Food " Sources of Vitamin D [ Food IUs per serving]:    Cod liver oil, 1 tablespoon 1,360  Swordfish, cooked, 3 ounces 566  Enon (sockeye), cooked, 3 ounces 447  Tuna fish, canned in water, drained, 3 ounces 154   Orange juice fortified with vitamin D, 1 cup (check product labels, as amount of added vitamin D varies) 137   Milk, nonfat, reduced fat, and whole, vitamin D-fortified, 1 cup 115-124   Yogurt, fortified with 20% of the DV for vitamin D, 6 ounces (more heavily fortified yogurts provide more of the DV) 80  Margarine, fortified, 1 tablespoon 60   Sardines, canned in oil, drained, 2 sardines 46  Liver, beef, cooked, 3 ounces 42  Egg, 1 large (vitamin D is found in yolk) 41  Ready-to-eat cereal, fortified with 10% of the DV for vitamin D, 0.75-1 cup (more heavily fortified cereals might provide more of the DV) 40   Cheese, Swiss, 1 ounce.  Source:  Http://ods.od.nih.gov/factsheets

## 2018-08-17 NOTE — MR AVS SNAPSHOT
After Visit Summary   8/17/2018    Lucie Stockton    MRN: [id number]           Patient Information     Date Of Birth          1935        Visit Information        Provider Department      8/17/2018 9:45 AM Marii Montgomery MD [facility]        Today's Diagnoses     Laceration of scalp, initial encounter    -  1    Depression, unspecified depression type        Need for vaccination        Hypothyroidism, unspecified type        Transient alteration of awareness        Anxiety        Moderate major depression (H)        Peripheral arterial disease (H)        Oropharyngeal dysphagia        Age-related osteoporosis without current pathological fracture        Chronic low back pain, unspecified back pain laterality, with sciatica presence unspecified          Care Instructions      Instructions:    Shingles shot today and in 2 months.  Go to the lab today to check your thyroid level.  See me in 2 months.    Wound care:  You may wash the top and sides of your scalp with shampoo, but not the back of your head.  For the wound, irrigate with sterile water and syringe (without needle).  Apply bacitracin and cover with folded 2 x 2 inch gauze.  Secure with tape.  Do this daily until healed.    Examples of topical creams for joint pain:  Lidocare--contains lidocaine  Other creams without lidocaine which are good for joint pain:  Kaylie Bowman Novarnica      Here is some advice regarding bone health:  1.  Make sure you are getting enough calcium and vitamin D.    Currently, total diet plus supplement recommended intake for calcium is 1000 mg daily for ages 19-50, 1200 mg for ages 51+.  Recommended Vitamin D intake is 600 international units (IU) daily for ages 18-70 and 800 IU daily for age greater than 70 .At this time, the total diet plus supplement recommended intake for you is [id number] mg daily Vitamin D  800 IU daily.    2.    Do not take more than the recommended amount of  Vitamin D without consulting your provider. Vitamin D toxicity can cause non-specific symptoms such as anorexia, weight loss, polyuria, and heart arrhythmias. More seriously, it can also raise blood levels of calcium which leads to vascular and tissue calcification, with subsequent damage to the heart, blood vessels, and kidneys.  3.  Do not take more than the recommended amount of Calcium without consulting your provider.  Excessively high levels of calcium in the blood known as hypercalcemia can cause renal insufficiency, vascular and soft tissue calcification, hypercalciuria (high levels of calcium in the urine) and kidney stones.  4.  Calcium Carbonate should be taken with food.  Calcium Citrate can be taken with or without food.  5.  Physical activity is important. Consider activities such as walking, water workouts or   raheel chi . Include resistance type exercises.  6.  In order for your skin to manufacture enough Vitamin D, you need some exposure to the sun.  Recommendation is approximately 5-30 minutes of sun exposure between 10 AM and 3 PM at least twice a week to the face, arms, legs, or back without sunscreen.  Do not use tanning beds.  7.  Decrease your risk of falls.  Wear sensible shoes. Review your medications with your provider. See additional recommendations below.   8.  See tables below regarding the content of calcium and Vitamin D in various foods.      Reduce your risk of falls:  Remove home hazards  Take a look around your home. Your living room, kitchen, bedroom, bathroom, hallways and stairways may be filled with hazards. To make your home safer:   Remove boxes, newspapers, electrical cords and phone cords from walkways.   Move coffee tables, magazine racks and plant stands from high-traffic areas.   Secure loose rugs with double-faced tape, tacks or a slip-resistant backing -- or remove loose rugs from your home.   Repair loose, wooden floorboards and carpeting right away.   Store clothing,  dishes, food and other necessities within easy reach.   Immediately clean spilled liquids, grease or food.   Use nonskid floor wax.   Use nonslip mats in your bathtub or shower.   Light up your living space  Keep your home brightly lit to avoid tripping on objects that are hard to see. Also:   Place night lights in your bedroom, bathroom and hallways.   Place a lamp within reach of your bed for middle-of-the-night needs.   Make clear paths to light switches that aren't near room entrances. Consider trading traditional switches for glow-in-the-dark or illuminated switches.   Turn on the lights before going up or down stairs.   Store flashlights in easy-to-find places in case of power outages.   Use assistive devices  Your doctor might recommend using a cane or walker to keep you steady. Other assistive devices can help, too. For example:   Hand rails for both sides of stairways   Nonslip treads for bare-wood steps   A raised toilet seat or one with armrests   Grab bars for the shower or tub   A sturdy plastic seat for the shower or tub -- plus a hand-held shower nozzle for bathing while sitting down   Source: http://www.[facility]/health/fall-prevention/      Calcium Content of Foods:     Food Milligrams (mg)  per serving     Yogurt, plain, low fat, 8 ounces 415    Orange juice, calcium-fortified, 6 ounces 375    Yogurt, fruit, low fat, 8 ounces 338-384    Mozzarella, part skim, 1.5 ounces 333    Sardines, canned in oil, with bones, 3 ounces 325    Cheddar cheese, 1.5 ounces 307    Milk, nonfat, 8 ounces 299    Milk, reduced-fat (2% milk fat), 8 ounces 293    Milk, buttermilk, 8 ounces 282-350    Milk, whole (3.25% milk fat), 8 ounces 276    Tofu, firm, made with calcium sulfate,   cup 253    Can[place], pink, canned, solids with bone, 3 ounces 181    Cottage cheese, 1% milk fat, 1 cup 138    Tofu, soft, made with calcium sulfate,   cup 138    Instant breakfast drink, various flavors and brands, powder prepared with  "water, 8 ounces 105-250    Frozen yogurt, vanilla, soft serve,   cup  103    Ready-to-eat cereal, calcium-fortified, 1 cup  100-1,000    Turnip greens, fresh, boiled,   cup  99    Kale, fresh, cooked, 1 cup  94    Kale, raw, chopped, 1 cup  90    Ice cream, vanilla,   cup  84    Soy beverage, calcium-fortified, 8 ounces       Chinese cabbage, bok boyd, raw, shredded, 1 cup  74    Bread, white, 1 slice  73    Pudding, chocolate, ready to eat, refrigerated, 4 ounces  55    Tortilla, corn, ready-to-bake/alvarado, one 6\" diameter 46    Tortilla, flour, ready-to-bake/alvarado, one 6\" diameter  32    Sour cream, reduced fat, cultured, 2 tablespoons  31    Bread, whole-wheat, 1 slice  30    Broccoli, raw,   cup  21    Cheese, cream, regular, 1 tablespoon  14    Source:  http://ods.od.nih.gov/factsheets    Selected Food Sources of Vitamin D [ Food IUs per serving]:    Cod liver oil, 1 tablespoon 1,360  Swordfish, cooked, 3 ounces 566  [place] (sockeye), cooked, 3 ounces 447  Tuna fish, canned in water, drained, 3 ounces 154   Orange juice fortified with vitamin D, 1 cup (check product labels, as amount of added vitamin D varies) 137   Milk, nonfat, reduced fat, and whole, vitamin D-fortified, 1 cup 115-124   Yogurt, fortified with 20% of the DV for vitamin D, 6 ounces (more heavily fortified yogurts provide more of the DV) 80  Margarine, fortified, 1 tablespoon 60   Sardines, canned in oil, drained, 2 sardines 46  Liver, beef, cooked, 3 ounces 42  Egg, 1 large (vitamin D is found in yolk) 41  Ready-to-eat cereal, fortified with 10% of the DV for vitamin D, 0.75-1 cup (more heavily fortified cereals might provide more of the DV) 40   Cheese, Swiss, 1 ounce.  Source:  Http://ods.od.nih.gov/factsheets                Follow-ups after your visit        Follow-up notes from your care team     Return in about 2 months (around 10/17/2018) for osteoporsis.      Your next 10 appointments already scheduled     Sep 20, 2018 11:00 AM CDT "   (Arrive by 10:45 AM)   Return Visit with Jesenia Clay, PhD   [facility] ([facility])    [address]  [address] [facility] [address] [phone]            Sep 25, 2018  9:30 AM CDT   (Arrive by 9:15 AM)   Return Visit with ALEKSEY Vasquez [facility] ([facility])    [address] [place] [address]  [address] [facility] [address] [phone]            Jul 16, 2019 10:00 AM CDT   US SUMAYA DOPPLER WITH EXERCISE BILATERAL with [id number]   [facility] [place] [facility] ([facility])    [address] [place] [address]  [address] [facility] [address] [phone]           Please bring a list of your medicines (including vitamins, minerals and over-the-counter drugs). Also, tell your doctor about any allergies you may have. Wear comfortable clothes and leave your valuables at home.  No caffeine or tobacco for 1 hour prior to exam.  Please call the Imaging Department at your exam site with any questions.            Jul 16, 2019 11:00 AM CDT   (Arrive by 10:45 AM)   Return Vascular Visit with ALEKSEY Minor [facility] ([facility])    [address] [place] [address] [phone]              Who to contact     Please call your clinic at [phone] to:    Ask questions about your health    Make or cancel appointments    Discuss your medicines    Learn about your test results    Speak to your doctor            Additional Information About Your Visit        MyChart Information     [employer] gives you secure access to your electronic health record. If you see a primary care provider, you can also send messages to your care team and make appointments. If you have questions, please call your primary care clinic.  If you do not have a primary care provider, please call [phone] and they will assist  you.      [employer] is an electronic gateway that provides easy, online access to your medical records. With [employer], you can request a clinic appointment, read your test results, renew a prescription or communicate with your care team.     To access your existing account, please contact your [facility] or call [phone] for assistance.        Care EveryWhere ID     This is your Care EveryWhere ID. This could be used by other organizations to access your [place] medical records  [id number]        Your Vitals Were     Pulse Respirations Breastfeeding? BMI (Body Mass Index)          68 22 No 20.55 kg/m2         Blood Pressure from Last 3 Encounters:   08/17/18 155/68   07/24/18 144/68   06/12/18 161/74    Weight from Last 3 Encounters:   08/17/18 47.7 kg (105 lb 3.2 oz)   06/12/18 45.8 kg (100 lb 14.4 oz)   06/02/18 47.7 kg (105 lb 3.2 oz)              We Performed the Following     HC ZOSTER VACCINE RECOMBINANT ADJUVANTED IM NJX          Today's Medication Changes          These changes are accurate as of 8/17/18 12:11 PM.  If you have any questions, ask your nurse or doctor.               These medicines have changed or have updated prescriptions.        Dose/Directions    citalopram 40 MG tablet   Commonly known as:  celeXA   This may have changed:  how much to take   Used for:  Depression, unspecified depression type   Changed by:  Marii Montgomery MD        Dose:  40 mg   Take 1 tablet (40 mg) by mouth daily   Quantity:  90 tablet   Refills:  3         Stop taking these medicines if you haven't already. Please contact your care team if you have questions.     albuterol 108 (90 Base) MCG/ACT inhaler   Commonly known as:  VENTOLIN HFA   Stopped by:  Marii Montgomery MD           ciprofloxacin 250 MG tablet   Commonly known as:  CIPRO   Stopped by:  Marii Montgomery MD           order for DME   Stopped by:  Marii Montgomery MD           polyethylene glycol powder   Commonly  known as:  MIRALAX   Stopped by:  Marii Montgomery MD           trolamine salicylate 10 % cream   Commonly known as:  ASPERCREME   Stopped by:  Marii Montgomery MD                    Primary Care Provider Office Phone # Fax #    Marii Montgomery [phone]       [address] [place] [address] [phone]  [facility] [address]        Equal Access to Services     [facility]: Hadii aad ku hadasho Soomaali, waaxda luqadaha, qaybta kaalmada adeegyada, waxay idiin hayaan adeeg kharash la'aan . So [facility] [phone].    ATENCIÓN: Si habla español, tiene a schwartz disposición servicios gratuitos de asistencia lingüística. Llame al [phone].    We comply with applicable federal civil rights laws and [place] laws. We do not discriminate on the basis of race, color, national origin, age, disability, sex, sexual orientation, or gender identity.            Thank you!     Thank you for choosing [facility]  for your care. Our goal is always to provide you with excellent care. Hearing back from our patients is one way we can continue to improve our services. Please take a few minutes to complete the written survey that you may receive in the mail after your visit with us. Thank you!             Your Updated Medication List - Protect others around you: Learn how to safely use, store and throw away your medicines at www.disposemymeds.org.          This list is accurate as of 8/17/18 12:11 PM.  Always use your most recent med list.                   Brand Name Dispense Instructions for use Diagnosis    acetaminophen 325 MG tablet    TYLENOL    100 tablet    Take 2 tablets every 6 to 8 hours as needed for pain    Chronic low back pain, unspecified back pain laterality, with sciatica presence unspecified, Contusion of right hip, initial encounter, Contusion of right knee, initial encounter       amLODIPine 10 MG tablet    NORVASC    90 tablet    Take 1 tablet (10 mg) by mouth daily For blood pressure     Essential hypertension       atorvastatin 40 MG tablet    LIPITOR    90 tablet    Take 2 tablets (80 mg) by mouth daily    Hyperlipidemia, unspecified hyperlipidemia type       bisacodyl 10 MG Suppository    DULCOLAX    90 suppository    Place 1 suppository (10 mg) rectally daily as needed for constipation    Constipation, unspecified constipation type       CALCIUM 600 + D PO      Take 1 tablet by mouth daily.        citalopram 40 MG tablet    celeXA    90 tablet    Take 1 tablet (40 mg) by mouth daily    Depression, unspecified depression type       clopidogrel 75 MG tablet    PLAVIX    90 tablet    Take 1 tablet (75 mg) by mouth daily    Venous thrombosis of extremity       ferrous sulfate 325 (65 Fe) MG tablet    IRON    90 tablet    Take 1 tablet (325 mg) by mouth daily (with breakfast)    Other iron deficiency anemias       gabapentin 100 MG capsule    NEURONTIN          hydrochlorothiazide 50 MG tablet    HYDRODIURIL    90 tablet    Take 1 tablet (50 mg) by mouth daily    Benign essential hypertension       levothyroxine 75 MCG tablet    SYNTHROID/LEVOTHROID    90 tablet    Take 1 tablet (75 mcg) by mouth daily    Hypothyroidism, unspecified type       lisinopril 40 MG tablet    PRINIVIL/ZESTRIL    90 tablet    Take 1 tablet (40 mg) by mouth daily For blood pressure    Essential hypertension       multivitamin Tabs tablet      Take 1 tablet by mouth daily        naproxen sodium 220 MG capsule     60 capsule    Take 220 mg by mouth 2 times daily (with meals)    Chronic low back pain, unspecified back pain laterality, with sciatica presence unspecified, Pain of left lower leg       pantoprazole 40 MG EC tablet    PROTONIX    90 tablet    Take 1 tablet (40 mg) by mouth daily    Long term current use of anticoagulant       potassium chloride SA 20 MEQ CR tablet    K-DUR/KLOR-CON M    90 tablet    Take 1 tablet (20 mEq) by mouth daily    Hypopotassemia       VITAMIN B 12 PO           VITAMIN D3 PO      Take by  mouth daily

## 2018-08-17 NOTE — PROGRESS NOTES
[facility]  Primary Care Center   Marii Montgomery MD  08/17/2018      Chief Complaint:   Results       History of Present Illness:   Lucie Stockton is a 83 year old female with a history of hypothyroidism, chronic pain, osteoarthritis, hyperlipidemia,anemia, iron/vitamin D deficiency, osteoporosis, DVT and long term use of an anticoagulant, who presents for results and evaluation of a laceration.     Osteoarthritis: The patient has a history of osteoarthritis with associated lower back and ankle pain. She has received two cortisone shots in the past, only receiving relief from one of these. She will be going on vacation next week and is concerned if she is healthy enough for travel. She mentions if she does not feel better soon, she may ask for a note to get a vacation refund.     Laceration: Recently, the patient was exiting the bathtub when she slipped and landed on her head. She experienced an appreciable amount of bleeding from a left occipital laceration which she sustained at that time. She was not evaluated in the emergency department for this, but notes that she experiences a lot of pain while she was laying the laceration on a pillow. She denies feeling abnormal since the incident.     Sleep/mood: She has been taking 40 mg citalopram instead of her previous 20 mg. She has not experienced any significant changes.     Other:  1. Last neurology appointment on 07/25/18- EEG performed for encephalopathy    Review of Systems:   Pertinent items are noted in HPI, remainder of complete ROS is negative.      Active Medications:       acetaminophen (TYLENOL) 325 MG tablet, Take 2 tablets every 6 to 8 hours as needed for pain, Disp: 100 tablet, Rfl: 1     amLODIPine (NORVASC) 10 MG tablet, Take 1 tablet (10 mg) by mouth daily For blood pressure, Disp: 90 tablet, Rfl: 3     atorvastatin (LIPITOR) 40 MG tablet, Take 2 tablets (80 mg) by mouth daily, Disp: 90 tablet, Rfl: 3     bisacodyl (DULCOLAX) 10 MG Suppository,  Place 1 suppository (10 mg) rectally daily as needed for constipation, Disp: 90 suppository, Rfl: 3     Calcium Carbonate-Vitamin D (CALCIUM 600 + D OR), Take 1 tablet by mouth daily., Disp: , Rfl:      Cholecalciferol (VITAMIN D3 PO), Take by mouth daily, Disp: , Rfl:      citalopram (CELEXA) 40 MG tablet, Take 1 tablet (40 mg) by mouth daily, Disp: 90 tablet, Rfl: 3     clopidogrel (PLAVIX) 75 MG tablet, Take 1 tablet (75 mg) by mouth daily, Disp: 90 tablet, Rfl: 3     Cyanocobalamin (VITAMIN B 12 PO), , Disp: , Rfl:      ferrous sulfate (IRON) 325 (65 FE) MG tablet, Take 1 tablet (325 mg) by mouth daily (with breakfast), Disp: 90 tablet, Rfl: 3     gabapentin (NEURONTIN) 100 MG capsule, , Disp: , Rfl: 1     hydrochlorothiazide (HYDRODIURIL) 50 MG tablet, Take 1 tablet (50 mg) by mouth daily, Disp: 90 tablet, Rfl: 3     levothyroxine (SYNTHROID/LEVOTHROID) 75 MCG tablet, Take 1 tablet (75 mcg) by mouth daily, Disp: 90 tablet, Rfl: 1     lisinopril (PRINIVIL/ZESTRIL) 40 MG tablet, Take 1 tablet (40 mg) by mouth daily For blood pressure, Disp: 90 tablet, Rfl: 3     multivitamin (OCUVITE) TABS tablet, Take 1 tablet by mouth daily, Disp: , Rfl:      naproxen sodium 220 MG capsule, Take 220 mg by mouth 2 times daily (with meals), Disp: 60 capsule, Rfl: 2     pantoprazole (PROTONIX) 40 MG EC tablet, Take 1 tablet (40 mg) by mouth daily, Disp: 90 tablet, Rfl: 3     potassium chloride SA (K-DUR/KLOR-CON M) 20 MEQ CR tablet, Take 1 tablet (20 mEq) by mouth daily, Disp: 90 tablet, Rfl: 3    Allergies:   Review of patient's allergies indicates no known allergies.      Past Medical History:  Anemia  Aortic stenosis  Atherosclerosis of aorta and arteries of extremities  Back pain   Chronic pain  Degenerative joint disease   DVT of lower extremity  Graves disease   Hyperlipidemia LDL goal <70  Hypertension  Hypothyroidism  Imbalance   Insomnia   Intermittent claudication   Iron deficiency  Knee pain   Lumbar stenosis with  neurogenic claudication  Osteoarthritis  Osteoporosis   Ovarian tumor   Pulmonary embolism   Small bowel obstruction   Varicose veins   Peripheral vascular disease   Cervicalgia   Gluteal Pain   Vitamin D deficiency  Tobacco use disorder    Personal history of drug therapy  Venous thrombosis of extremity   Gastritis   Cataracts   Long term current use of anticoagulant use   Moderate major depression  Anxiety  Neoplasm of uncertain behavior of the skin   Cherry angioma   BCC  Weakness   Non melanoma skin cancer   Multiple melanocytic nevi     Past Surgical History:  Bunionectomy   Stomach surgery procedure unlisted  Total abdominal hysterectomy with bilateral salpingo-oophorectomy and node dissection, combined   Single lumber/sacral epidural injection   Sacroiliac joint injection     Family History:   Breast cancer   CAD (Father)      Social History:   The patient is , a former smoker (quit date: 09/1993), and does not consume alcohol.      Physical Exam:   /68 (BP Location: Right arm, Patient Position: Sitting, Cuff Size: Adult Regular)  Pulse 68  Resp 22  Wt 47.7 kg (105 lb 3.2 oz)  Breastfeeding? No  BMI 20.55 kg/m2     Skin: 1 cm laceration on occipital area, mild amount of swelling, no signs of infection.  No rashes or jaundice.  Normal moisture, good skin turgor.    Assessment and Plan:  1. Chronic low back pain, unspecified back pain laterality, with sciatica presence unspecified  Patient has experienced persist low back pain. She has received some relief with cortisone injections in the past and is interested in completing another. I recommended lidocaine cream use for pain management.     2. Laceration of scalp, initial encounter  Patient recently fell in the bath tub and sustained a left occipital laceration. I recommended daily irrigation with  sterile water, pat dry, apply topical Bacitracin followed by gauze secured by paper tape.  Patient can cover with a bandage until it heals. I  encourage that she stops using shampoo over the area.    3. Depression, unspecified depression type  Patient has increased her citalopram dosage from 20 mg to 40 mg and reports taking this dosage for as long as she can remember. Patient should remain on this dose, reflected as below.    - continue citalopram (CELEXA) 40 MG tablet (switch from 20 mg, two every day to one 40 mg tab daily). Dispense: 90 tablet; Refill: 3    4. Age-related osteoporosis without current pathological fracture  Her vitamin D levels are normal but I will give her a information today regarding calcium, vitamin d intake recommendations, weight bearing exercises and fall prevention.  May consider alendronate in the future..     5. Hypothyroidism, unspecified type  Patient is currently taking 75 mcg levothyroxine. We have discussed and decided that it would be important to have her thyroid checked. We will follow up on her results at our next appointment.     6. Transient alteration of awareness  She has an appointment on 09/25/18 with neurology to receive results from the  antibody that was performed. We will follow up on the results at our next appointment.     7. Need for vaccination  Patient requested Shingrix today. Administered today in clinic.   - HC ZOSTER VACCINE RECOMBINANT ADJUVANTED IM NJX      Follow-up: Return in about 2 months (around 10/17/2018) for osteoporosis.      Total time spent 40 minutes.  More than 50% of the time spent with Ms. Stockton on counseling / coordinating her care           Scribe Disclosure:  We, Renetta Doe and Daiana Teague, are serving as the scribes to document services personally performed by Marii Montgomery MD at this visit, based upon the provider's statements to me. All documentation has been reviewed by the aforementioned provider prior to being entered into the official medical record.     Portions of this medical record were completed by a scribe. UPON MY REVIEW AND AUTHENTICATION BY  ELECTRONIC SIGNATURE, this confirms (a) I performed the applicable clinical services, and (b) the record is accurate.     Marii Montgomery M.D.  Internal Medicine  Primary Care Center   pager [phone]

## 2018-10-17 ENCOUNTER — TELEPHONE (OUTPATIENT)
Dept: INTERNAL MEDICINE | Facility: CLINIC | Age: 83
End: 2018-10-17

## 2018-10-17 ENCOUNTER — OFFICE VISIT (OUTPATIENT)
Dept: INTERNAL MEDICINE | Facility: CLINIC | Age: 83
End: 2018-10-17
Payer: MEDICARE

## 2018-10-17 VITALS
HEART RATE: 79 BPM | OXYGEN SATURATION: 97 % | DIASTOLIC BLOOD PRESSURE: 69 MMHG | WEIGHT: 103.9 LBS | SYSTOLIC BLOOD PRESSURE: 145 MMHG | BODY MASS INDEX: 20.29 KG/M2

## 2018-10-17 DIAGNOSIS — F41.9 ANXIETY: ICD-10-CM

## 2018-10-17 DIAGNOSIS — H61.23 BILATERAL IMPACTED CERUMEN: ICD-10-CM

## 2018-10-17 DIAGNOSIS — Z23 NEED FOR SHINGLES VACCINE: ICD-10-CM

## 2018-10-17 DIAGNOSIS — J30.2 SEASONAL ALLERGIC RHINITIS, UNSPECIFIED TRIGGER: ICD-10-CM

## 2018-10-17 DIAGNOSIS — R05.9 COUGH: Primary | ICD-10-CM

## 2018-10-17 DIAGNOSIS — E53.8 VITAMIN B12 DEFICIENCY (NON ANEMIC): Primary | ICD-10-CM

## 2018-10-17 DIAGNOSIS — G89.29 CHRONIC MIDLINE LOW BACK PAIN WITH BILATERAL SCIATICA: ICD-10-CM

## 2018-10-17 DIAGNOSIS — E03.9 HYPOTHYROIDISM, UNSPECIFIED TYPE: ICD-10-CM

## 2018-10-17 DIAGNOSIS — M54.41 CHRONIC MIDLINE LOW BACK PAIN WITH BILATERAL SCIATICA: ICD-10-CM

## 2018-10-17 DIAGNOSIS — E53.8 VITAMIN B12 DEFICIENCY (NON ANEMIC): ICD-10-CM

## 2018-10-17 DIAGNOSIS — M54.42 CHRONIC MIDLINE LOW BACK PAIN WITH BILATERAL SCIATICA: ICD-10-CM

## 2018-10-17 LAB
TSH SERPL DL<=0.005 MIU/L-ACNC: 1.33 MU/L (ref 0.4–4)
VIT B12 SERPL-MCNC: 471 PG/ML (ref 193–986)

## 2018-10-17 RX ORDER — CITALOPRAM HYDROBROMIDE 20 MG/1
20 TABLET ORAL DAILY
Qty: 90 TABLET | Refills: 3 | Status: SHIPPED | OUTPATIENT
Start: 2018-10-17 | End: 2020-03-16

## 2018-10-17 RX ORDER — FLUTICASONE PROPIONATE 50 MCG
2 SPRAY, SUSPENSION (ML) NASAL DAILY
Qty: 1 BOTTLE | Refills: 1 | Status: SHIPPED | OUTPATIENT
Start: 2018-10-17 | End: 2019-10-16

## 2018-10-17 RX ORDER — LEVOTHYROXINE SODIUM 75 UG/1
75 TABLET ORAL DAILY
Qty: 90 TABLET | Refills: 3 | Status: SHIPPED | OUTPATIENT
Start: 2018-10-17 | End: 2019-12-05

## 2018-10-17 ASSESSMENT — PAIN SCALES - GENERAL: PAINLEVEL: MODERATE PAIN (4)

## 2018-10-17 NOTE — NURSING NOTE
Lucie Stockton      1.  Has the patient received the information for the influenza vaccine? YES    2.  Does the patient have any of the following contraindications?     Allergy to eggs? No     Allergic reaction to previous influenza vaccines? No     Any other problems to previous influenza vaccines? No     Paralyzed by Guillain-Dorr syndrome? No     Currently pregnant? NO     Current moderate or severe illness? No     Allergy to contact lens solution? No    3.  The vaccine has been administered in the usual fashion and the patient was instructed to wait 20 minutes before leaving the building in the event of an allergic reaction: YES    See immunization list for administration details.  Patient tolerated injection well.       Avril Morris at 9:50 AM on 10/17/2018.

## 2018-10-17 NOTE — PATIENT INSTRUCTIONS
[facility] Medication Refill Request Information:  * Please contact your pharmacy regarding ANY request for medication refills.  ** [facility] Prescription Fax = [phone]  * Please allow 3 business days for routine medication refills.  * Please allow 5 business days for controlled substance medication refills.     [facility] Test Result notification information:  *You will be notified with in 7-10 days of your appointment day regarding the results of your test.  If you are on MyChart you will be notified as soon as the provider has reviewed the results and signed off on them.    [facility]: [phone]     Go to the lab today  Make an appointment in the Pain [facility]  Ask your physical therapist if she will also address balance  I'll refill your thyroid medication according to the lab results  Shingles shot today  I sent prescriptions to your pharmacy for a steroid nasal spray and for the new dose of Citalopram (decrease from 40 mg to 20 mg daily)  See me in about 4 months        Here is some advice regarding bone health:  1.  Make sure you are getting enough calcium and vitamin D.    Total diet plus supplement recommended intake for calcium is:    1200 mg daily  Total diet plus supplement recommended intake for Vitamin D is:   800 IU daily (or more depending on your vitamin D levels)  2.    Do not take more than the recommended amount of Vitamin D without consulting your provider. Vitamin D toxicity can cause non-specific symptoms such as anorexia, weight loss, polyuria, and heart arrhythmias. More seriously, it can also raise blood levels of calcium which leads to vascular and tissue calcification, with subsequent damage to the heart, blood vessels, and kidneys.  3.  Do not take more than the recommended amount of Calcium without consulting your provider.  Excessively high levels of calcium in the blood known as hypercalcemia can cause renal insufficiency, vascular and soft tissue  calcification, hypercalciuria (high levels of calcium in the urine) and kidney stones.  4.  Calcium Carbonate should be taken with food.  Calcium Citrate can be taken with or without food.  5.  Physical activity is important. Consider activities such as walking, water workouts or   raheel chi . Include resistance type exercises.  6.  In order for your skin to manufacture enough Vitamin D, you need some exposure to the sun.  Recommendation is approximately 5-30 minutes of sun exposure between 10 AM and 3 PM at least twice a week to the face, arms, legs, or back without sunscreen.  Do not use tanning beds.  7.  Decrease your risk of falls.  Wear sensible shoes. Review your medications with your provider. See additional recommendations below.   8.  See tables below regarding the content of calcium and Vitamin D in various foods.      Reduce your risk of falls:  Remove home hazards  Take a look around your home. Your living room, kitchen, bedroom, bathroom, hallways and stairways may be filled with hazards. To make your home safer:   Remove boxes, newspapers, electrical cords and phone cords from walkways.   Move coffee tables, magazine racks and plant stands from high-traffic areas.   Secure loose rugs with double-faced tape, tacks or a slip-resistant backing -- or remove loose rugs from your home.   Repair loose, wooden floorboards and carpeting right away.   Store clothing, dishes, food and other necessities within easy reach.   Immediately clean spilled liquids, grease or food.   Use nonskid floor wax.   Use nonslip mats in your bathtub or shower.   Light up your living space  Keep your home brightly lit to avoid tripping on objects that are hard to see. Also:   Place night lights in your bedroom, bathroom and hallways.   Place a lamp within reach of your bed for middle-of-the-night needs.   Make clear paths to light switches that aren't near room entrances. Consider trading traditional switches for glow-in-the-dark or  "illuminated switches.   Turn on the lights before going up or down stairs.   Store flashlights in easy-to-find places in case of power outages.   Use assistive devices  Your doctor might recommend using a cane or walker to keep you steady. Other assistive devices can help, too. For example:   Hand rails for both sides of stairways   Nonslip treads for bare-wood steps   A raised toilet seat or one with armrests   Grab bars for the shower or tub   A sturdy plastic seat for the shower or tub -- plus a hand-held shower nozzle for bathing while sitting down   Source: http://www.[employer]/health/fall-prevention/      Calcium Content of Foods:     Food Milligrams (mg)  per serving     Yogurt, plain, low fat, 8 ounces 415    Orange juice, calcium-fortified, 6 ounces 375    Yogurt, fruit, low fat, 8 ounces 338-384    Mozzarella, part skim, 1.5 ounces 333    Sardines, canned in oil, with bones, 3 ounces 325    Cheddar cheese, 1.5 ounces 307    Milk, nonfat, 8 ounces 299    Milk, reduced-fat (2% milk fat), 8 ounces 293    Milk, buttermilk, 8 ounces 282-350    Milk, whole (3.25% milk fat), 8 ounces 276    Tofu, firm, made with calcium sulfate,   cup 253    [place], pink, canned, solids with bone, 3 ounces 181    Cottage cheese, 1% milk fat, 1 cup 138    Tofu, soft, made with calcium sulfate,   cup 138    Instant breakfast drink, various flavors and brands, powder prepared with water, 8 ounces 105-250    Frozen yogurt, vanilla, soft serve,   cup  103    Ready-to-eat cereal, calcium-fortified, 1 cup  100-1,000    Turnip greens, fresh, boiled,   cup  99    Kale, fresh, cooked, 1 cup  94    Kale, raw, chopped, 1 cup  90    Ice cream, vanilla,   cup  84    Soy beverage, calcium-fortified, 8 ounces       Chinese cabbage, bok boyd, raw, shredded, 1 cup  74    Bread, white, 1 slice  73    Pudding, chocolate, ready to eat, refrigerated, 4 ounces  55    Tortilla, corn, ready-to-bake/alvarado, one 6\" diameter 46    Tortilla, flour, " "ready-to-bake/alvarado, one 6\" diameter  32    Sour cream, reduced fat, cultured, 2 tablespoons  31    Bread, whole-wheat, 1 slice  30    Broccoli, raw,   cup  21    Cheese, cream, regular, 1 tablespoon  14    Source:  http://ods.od.nih.gov/factsheets    Selected Food Sources of Vitamin D [ Food IUs per serving]:    Cod liver oil, 1 tablespoon 1,360  Swordfish, cooked, 3 ounces 566  [place] (sockeye), cooked, 3 ounces 447  Tuna fish, canned in water, drained, 3 ounces 154   Orange juice fortified with vitamin D, 1 cup (check product labels, as amount of added vitamin D varies) 137   Milk, nonfat, reduced fat, and whole, vitamin D-fortified, 1 cup 115-124   Yogurt, fortified with 20% of the DV for vitamin D, 6 ounces (more heavily fortified yogurts provide more of the DV) 80  Margarine, fortified, 1 tablespoon 60   Sardines, canned in oil, drained, 2 sardines 46  Liver, beef, cooked, 3 ounces 42  Egg, 1 large (vitamin D is found in yolk) 41  Ready-to-eat cereal, fortified with 10% of the DV for vitamin D, 0.75-1 cup (more heavily fortified cereals might provide more of the DV) 40   Cheese, Swiss, 1 ounce.  Source:  Http://ods.od.nih.gov/factsheets        "

## 2018-10-17 NOTE — TELEPHONE ENCOUNTER
[facility] Call Center    Phone Message    May a detailed message be left on voicemail: yes    Reason for Call: Other: Pt wanted to let dr. Montgomery know she is taking 500 mcg of vitamin B12 and that it was great seeing her again. Dr. Montgomery had asked her how much B12 she was taking.     Action Taken: Message routed to:  [facility] ([facility]): EBONI

## 2018-10-17 NOTE — MR AVS SNAPSHOT
After Visit Summary   10/17/2018    Lucie Stockton    MRN: [id number]           Patient Information     Date Of Birth          1935        Visit Information        Provider Department      10/17/2018 8:30 AM Marii Montgomery MD [facility]        Today's Diagnoses     Cough    -  1    Need for shingles vaccine        Vitamin B12 deficiency (non anemic)        Seasonal allergic rhinitis, unspecified trigger        Anxiety        Chronic midline low back pain with bilateral sciatica        Bilateral impacted cerumen        Hypothyroidism, unspecified type          Care Instructions    Primary Care Center Medication Refill Request Information:  * Please contact your pharmacy regarding ANY request for medication refills.  ** [facility] = [phone]  * Please allow 3 business days for routine medication refills.  * Please allow 5 business days for controlled substance medication refills.     Primary Care Center Test Result notification information:  *You will be notified with in 7-10 days of your appointment day regarding the results of your test.  If you are on MyChart you will be notified as soon as the provider has reviewed the results and signed off on them.    [facility]: [phone]     Go to the lab today  Make an appointment in the Pain Clinic  Ask your physical therapist if she will also address balance  I'll refill your thyroid medication according to the lab results  Shingles shot today  I sent prescriptions to your pharmacy for a steroid nasal spray and for the new dose of Citalopram (decrease from 40 mg to 20 mg daily)  See me in about 4 months        Here is some advice regarding bone health:  1.  Make sure you are getting enough calcium and vitamin D.    Total diet plus supplement recommended intake for calcium is:    1200 mg daily  Total diet plus supplement recommended intake for Vitamin D is:   800 IU daily (or more depending on your vitamin D  levels)  2.    Do not take more than the recommended amount of Vitamin D without consulting your provider. Vitamin D toxicity can cause non-specific symptoms such as anorexia, weight loss, polyuria, and heart arrhythmias. More seriously, it can also raise blood levels of calcium which leads to vascular and tissue calcification, with subsequent damage to the heart, blood vessels, and kidneys.  3.  Do not take more than the recommended amount of Calcium without consulting your provider.  Excessively high levels of calcium in the blood known as hypercalcemia can cause renal insufficiency, vascular and soft tissue calcification, hypercalciuria (high levels of calcium in the urine) and kidney stones.  4.  Calcium Carbonate should be taken with food.  Calcium Citrate can be taken with or without food.  5.  Physical activity is important. Consider activities such as walking, water workouts or   raheel chi . Include resistance type exercises.  6.  In order for your skin to manufacture enough Vitamin D, you need some exposure to the sun.  Recommendation is approximately 5-30 minutes of sun exposure between 10 AM and 3 PM at least twice a week to the face, arms, legs, or back without sunscreen.  Do not use tanning beds.  7.  Decrease your risk of falls.  Wear sensible shoes. Review your medications with your provider. See additional recommendations below.   8.  See tables below regarding the content of calcium and Vitamin D in various foods.      Reduce your risk of falls:  Remove home hazards  Take a look around your home. Your living room, kitchen, bedroom, bathroom, hallways and stairways may be filled with hazards. To make your home safer:   Remove boxes, newspapers, electrical cords and phone cords from walkways.   Move coffee tables, magazine racks and plant stands from high-traffic areas.   Secure loose rugs with double-faced tape, tacks or a slip-resistant backing -- or remove loose rugs from your home.   Repair loose,  wooden floorboards and carpeting right away.   Store clothing, dishes, food and other necessities within easy reach.   Immediately clean spilled liquids, grease or food.   Use nonskid floor wax.   Use nonslip mats in your bathtub or shower.   Light up your living space  Keep your home brightly lit to avoid tripping on objects that are hard to see. Also:   Place night lights in your bedroom, bathroom and hallways.   Place a lamp within reach of your bed for middle-of-the-night needs.   Make clear paths to light switches that aren't near room entrances. Consider trading traditional switches for glow-in-the-dark or illuminated switches.   Turn on the lights before going up or down stairs.   Store flashlights in easy-to-find places in case of power outages.   Use assistive devices  Your doctor might recommend using a cane or walker to keep you steady. Other assistive devices can help, too. For example:   Hand rails for both sides of stairways   Nonslip treads for bare-wood steps   A raised toilet seat or one with armrests   Grab bars for the shower or tub   A sturdy plastic seat for the shower or tub -- plus a hand-held shower nozzle for bathing while sitting down   Source: http://www.[employer]/health/fall-prevention/      Calcium Content of Foods:     Food Milligrams (mg)  per serving     Yogurt, plain, low fat, 8 ounces 415    Orange juice, calcium-fortified, 6 ounces 375    Yogurt, fruit, low fat, 8 ounces 338-384    Mozzarella, part skim, 1.5 ounces 333    Sardines, canned in oil, with bones, 3 ounces 325    Cheddar cheese, 1.5 ounces 307    Milk, nonfat, 8 ounces 299    Milk, reduced-fat (2% milk fat), 8 ounces 293    Milk, buttermilk, 8 ounces 282-350    Milk, whole (3.25% milk fat), 8 ounces 276    Tofu, firm, made with calcium sulfate,   cup 253    [place], pink, canned, solids with bone, 3 ounces 181    Cottage cheese, 1% milk fat, 1 cup 138    Tofu, soft, made with calcium sulfate,   cup 138    Instant  "breakfast drink, various flavors and brands, powder prepared with water, 8 ounces 105-250    Frozen yogurt, vanilla, soft serve,   cup  103    Ready-to-eat cereal, calcium-fortified, 1 cup  100-1,000    Turnip greens, fresh, boiled,   cup  99    Kale, fresh, cooked, 1 cup  94    Kale, raw, chopped, 1 cup  90    Ice cream, vanilla,   cup  84    Soy beverage, calcium-fortified, 8 ounces       Chinese cabbage, bok boyd, raw, shredded, 1 cup  74    Bread, white, 1 slice  73    Pudding, chocolate, ready to eat, refrigerated, 4 ounces  55    Tortilla, corn, ready-to-bake/alvarado, one 6\" diameter 46    Tortilla, flour, ready-to-bake/alvarado, one 6\" diameter  32    Sour cream, reduced fat, cultured, 2 tablespoons  31    Bread, whole-wheat, 1 slice  30    Broccoli, raw,   cup  21    Cheese, cream, regular, 1 tablespoon  14    Source:  http://ods.od.nih.gov/factsheets    Selected Food Sources of Vitamin D [ Food IUs per serving]:    Cod liver oil, 1 tablespoon 1,360  Swordfish, cooked, 3 ounces 566  Donalds (sockeye), cooked, 3 ounces 447  Tuna fish, canned in water, drained, 3 ounces 154   Orange juice fortified with vitamin D, 1 cup (check product labels, as amount of added vitamin D varies) 137   Milk, nonfat, reduced fat, and whole, vitamin D-fortified, 1 cup 115-124   Yogurt, fortified with 20% of the DV for vitamin D, 6 ounces (more heavily fortified yogurts provide more of the DV) 80  Margarine, fortified, 1 tablespoon 60   Sardines, canned in oil, drained, 2 sardines 46  Liver, beef, cooked, 3 ounces 42  Egg, 1 large (vitamin D is found in yolk) 41  Ready-to-eat cereal, fortified with 10% of the DV for vitamin D, 0.75-1 cup (more heavily fortified cereals might provide more of the DV) 40   Cheese, Swiss, 1 ounce.  Source:  Http://ods.od.nih.gov/factsheets                Follow-ups after your visit        Additional Services     PAIN MANAGEMENT REFERRAL       Your provider has referred you to: [facility]: [facility] " [facility] [place] [phone] [email or web address]      Please call [phone] to make an appointment. Clinic is located: [facility] [address] [place] [address] [id number] [place] [address]      Please complete the following questions:    Procedure/Referral: Referral Only -  Comprehensive Evaluation and Management    What is your diagnosis for the patient's pain? See imaging, sequelae of degenerative arthritis    What are your specific questions for the pain specialist? Non-narcotic approaches to chronic pain management    Are there any red flags that may impact the assessment or management of the patient? None         Please note the Pre-Op Pain Consult must be scheduled 2-3 weeks prior to the patient's surgery.  Patient's trying to schedule within 2 weeks of surgery may not be accommodated.     Pre-Op Pain Consults are only good for 30 days.    **ANY DIAGNOSTIC TESTS THAT ARE NOT IN EPIC SHOULD BE SENT TO THE PAIN CENTER**    REGARDING OPIOID MEDICATIONS:  The discussion of opioids management, appropriateness of therapy, and dosing will be discussed in patients being seen for evaluation.  The pain management clinics are not long-term prescribing clinics, with transition of prescribing of medications ultimately going back to the referring provider/PCP.  If prescribing is taken over at the pain clinic, it is in actively involved patients whom are appropriate for opioids, urine drug screening is completed, and long-term prescribing plan has been determined.  Therefore, we will not be automatically taking over prescribing at the patient's first visit.  Is this agreeable to you? agrees.     Please be aware that coverage of these services is subject to the terms and limitations of your health insurance plan.  Call member services at your health plan with any benefit or coverage questions.      Please bring the  following with you to your appointment:    (1) Any X-Rays, CTs or MRIs which have been performed.  Contact the facility where they were done to arrange for  prior to your scheduled appointment.    (2) List of current medications   (3) This referral request   (4) Any documents/labs given to you for this referral                  Your next 10 appointments already scheduled     Oct 17, 2018 11:45 AM CDT   LAB with  LAB   [facility] ([facility])    [address] [place] [address]  [address] [facility] [id number] [phone]           Please do not eat 10-12 hours before your appointment if you are coming in fasting for labs on lipids, cholesterol, or glucose (sugar). This does not apply to pregnant women. Water, hot tea and black coffee (with nothing added) are okay. Do not drink other fluids, diet soda or chew gum.            Oct 19, 2018 10:30 AM CDT   (Arrive by 10:15 AM)   Return Visit with ALEKSEY Vasquez CNP   [facility] ([facility])    [address] [place] [address]  [address] [facility] [address]   [phone]            Nov 07, 2018  2:10 PM CST   (Arrive by 1:55 PM)   Return Visit with ALEKSEY Gan CNP   [facility] ([facility])    [address] [place] [address]  [address] [facility] [address]   [phone]            Jul 16, 2019 10:00 AM CDT   US SUMAYA DOPPLER WITH EXERCISE BILATERAL with [id number]   [facility] ([facility])    [address] [place] [id number] [phone]           How do I prepare for my exam? (Food and drink instructions) No caffeine or tobacco for 1 hour prior to exam.  What should I wear: Wear comfortable clothes.  How long does the exam take: Most ultrasounds take 30 to 60 minutes.  What should I bring: Bring a list of your medicines, including vitamins, minerals and  over-the-counter drugs. It is safest to leave personal items at home.  Do I need a :  No  is needed.  What do I need to tell my doctor: Tell your doctor about any allergies you may have.  What should I do after the exam: No restrictions, You may resume normal activities.  What is this test: An ultrasound uses sound waves to make pictures of the body. Sound waves do not cause pain. The only discomfort may be the pressure of the wand against your skin or full bladder.  Who should I call with questions: If you have any questions, please call the Imaging Department where you will have your exam. Directions, parking instructions, and other information is available on our website, [email or web address].            Jul 16, 2019 11:00 AM CDT   (Arrive by 10:45 AM)   Return Vascular Visit with ALEKSEY Minor [facility] ([facility])    [address] [place] [address] [phone]              Future tests that were ordered for you today     Open Future Orders        Priority Expected Expires Ordered    Vitamin B12 Routine 10/17/2018 10/17/2019 10/17/2018            Who to contact     Please call your clinic at [phone] to:    Ask questions about your health    Make or cancel appointments    Discuss your medicines    Learn about your test results    Speak to your doctor            Additional Information About Your Visit        AMS [employer] Information     [employer] gives you secure access to your electronic health record. If you see a primary care provider, you can also send messages to your care team and make appointments. If you have questions, please call your primary care clinic.  If you do not have a primary care provider, please call [phone] and they will assist you.      [employer] is an electronic gateway that provides easy, online access to your medical records. With [employer], you can request a clinic appointment, read your test  results, renew a prescription or communicate with your care team.     To access your existing account, please contact your [facility] or call [phone] for assistance.        Care EveryWhere ID     This is your Care EveryWhere ID. This could be used by other organizations to access your [place] medical records  [id number]        Your Vitals Were     Pulse Pulse Oximetry Breastfeeding? BMI (Body Mass Index)          79 97% No 20.29 kg/m2         Blood Pressure from Last 3 Encounters:   10/17/18 145/69   08/17/18 155/68   07/24/18 144/68    Weight from Last 3 Encounters:   10/17/18 47.1 kg (103 lb 14.4 oz)   08/17/18 47.7 kg (105 lb 3.2 oz)   06/12/18 45.8 kg (100 lb 14.4 oz)              We Performed the Following     PAIN MANAGEMENT REFERRAL     TSH     ZOSTER VACCINE RECOMBINANT ADJUVANTED IM NJX          Today's Medication Changes          These changes are accurate as of 10/17/18  9:37 AM.  If you have any questions, ask your nurse or doctor.               Start taking these medicines.        Dose/Directions    fluticasone 50 MCG/ACT spray   Commonly known as:  FLONASE   Used for:  Seasonal allergic rhinitis, unspecified trigger   Started by:  Marii Montgomery MD        Dose:  2 spray   Spray 2 sprays into both nostrils daily   Quantity:  1 Bottle   Refills:  1         These medicines have changed or have updated prescriptions.        Dose/Directions    citalopram 20 MG tablet   Commonly known as:  celeXA   This may have changed:    - medication strength  - how much to take   Used for:  Anxiety   Changed by:  Marii Montgomery MD        Dose:  20 mg   Take 1 tablet (20 mg) by mouth daily   Quantity:  90 tablet   Refills:  3            Where to get your medicines      These medications were sent to [id number] - [facility] [address] [facility] [address]  [facility], [facility] [address]     Phone:  [phone]     citalopram 20 MG  tablet    fluticasone 50 MCG/ACT spray                Primary Care Provider Office Phone # Fax #    Marii Montgomery [phone]       [address] [place] [address]        Equal Access to Services     PAL SALGADO : Hadced reyna blevins gerio Soomaali, waaxda luqadaha, qaybta kaalmada adeadielda, jackeline bobbyn allen alvarado laReinaaries izquierdo. So [facility] [phone].    ATENCIÓN: Si habla español, tiene a schwartz disposición servicios gratuitos de asistencia lingüística. [facility] [phone].    We comply with applicable federal civil rights laws and [place] laws. We do not discriminate on the basis of race, color, national origin, age, disability, sex, sexual orientation, or gender identity.            Thank you!     Thank you for choosing [facility]  for your care. Our goal is always to provide you with excellent care. Hearing back from our patients is one way we can continue to improve our services. Please take a few minutes to complete the written survey that you may receive in the mail after your visit with us. Thank you!             Your Updated Medication List - Protect others around you: Learn how to safely use, store and throw away your medicines at www.disposemymeds.org.          This list is accurate as of 10/17/18  9:37 AM.  Always use your most recent med list.                   Brand Name Dispense Instructions for use Diagnosis    acetaminophen 325 MG tablet    TYLENOL    100 tablet    Take 2 tablets every 6 to 8 hours as needed for pain    Chronic low back pain, unspecified back pain laterality, with sciatica presence unspecified, Contusion of right hip, initial encounter, Contusion of right knee, initial encounter       amLODIPine 10 MG tablet    NORVASC    90 tablet    Take 1 tablet (10 mg) by mouth daily For blood pressure    Essential hypertension       atorvastatin 40 MG tablet    LIPITOR    90 tablet    Take 2 tablets (80 mg) by mouth daily    Hyperlipidemia,  unspecified hyperlipidemia type       bisacodyl 10 MG Suppository    DULCOLAX    90 suppository    Place 1 suppository (10 mg) rectally daily as needed for constipation    Constipation, unspecified constipation type       CALCIUM 600 + D PO      Take 1 tablet by mouth daily.        citalopram 20 MG tablet    celeXA    90 tablet    Take 1 tablet (20 mg) by mouth daily    Anxiety       clopidogrel 75 MG tablet    PLAVIX    90 tablet    Take 1 tablet (75 mg) by mouth daily    Venous thrombosis of extremity       ferrous sulfate 325 (65 Fe) MG tablet    IRON    90 tablet    Take 1 tablet (325 mg) by mouth daily (with breakfast)    Other iron deficiency anemias       fluticasone 50 MCG/ACT spray    FLONASE    1 Bottle    Spray 2 sprays into both nostrils daily    Seasonal allergic rhinitis, unspecified trigger       gabapentin 100 MG capsule    NEURONTIN          hydrochlorothiazide 50 MG tablet    HYDRODIURIL    90 tablet    Take 1 tablet (50 mg) by mouth daily    Benign essential hypertension       levothyroxine 75 MCG tablet    SYNTHROID/LEVOTHROID    90 tablet    Take 1 tablet (75 mcg) by mouth daily    Hypothyroidism, unspecified type       lisinopril 40 MG tablet    PRINIVIL/ZESTRIL    90 tablet    Take 1 tablet (40 mg) by mouth daily For blood pressure    Essential hypertension       multivitamin Tabs tablet      Take 1 tablet by mouth daily        naproxen sodium 220 MG capsule     60 capsule    Take 220 mg by mouth 2 times daily (with meals)    Chronic low back pain, unspecified back pain laterality, with sciatica presence unspecified, Pain of left lower leg       pantoprazole 40 MG EC tablet    PROTONIX    90 tablet    Take 1 tablet (40 mg) by mouth daily    Long term current use of anticoagulant       potassium chloride SA 20 MEQ CR tablet    K-DUR/KLOR-CON M    90 tablet    Take 1 tablet (20 mEq) by mouth daily    Hypopotassemia       VITAMIN B 12 PO           VITAMIN D3 PO      Take by mouth daily

## 2018-10-17 NOTE — NURSING NOTE
Chief Complaint   Patient presents with     Cough     Pt is here to discuss a productive cough. Duration a few weeks.      Betty Prakash LPN at 8:33 AM on 10/17/2018.

## 2018-10-17 NOTE — PROGRESS NOTES
[facility]  Primary Care Center   Marii Montgomery MD  10/17/2018      Chief Complaint:   Cough     History of Present Illness:   Lucie Stockton is a 83 year old female with a history of PE, chronic back pain, PAD, and depression who presents for evaluation of a cough.  The patient reports a cough, postnasal drainage, and itchy eyes for about the past 6 weeks.  She denies any sinus pain or congestion.  she does have some popping in her ears at times.  She denies any sore throat, chest pain, shortness of breath, wheezing, or fevers.  These symptoms remind her of seasonal allergies she had when younger.  Typically her allergies were most active in the fall.  She has been taking Vicks cough medication which she is unsure if it provides any relief but she is also hesitant to stop it.  She is also using an OTC nasal spray which she thinks is just saline, one spray at bedtime.    She saw neurology in June for concern about her memory.  Their recommendations included B12 supplement due to her low normal B12 level and improved sleep hygiene.  Additionally recommended were EEG, brain MRI, and paraneoplastic panel all of which returned as normal.  She is taking a Vitamin B12 supplement but is unsure of the dose.  She does not think she takes Calcium or Vitamin D at this time.  She does not have a follow up appointment with neurology scheduled.    She continues to have pain in her low back.  She has tried physical therapy in the past which has helped some.  A steroid injection earlier this year did reduce her pain and she would consider another shot.  She is not walking as much as she does not have the energy.  A friend suggested she see a chiropractor which is not something she has ever tried.  She is taking Tylenol 3 times a day.  She is not sure it helps all that much but she is afraid her pain will be worse if she stops.      She has been more cognizant of her balance recently.  She is does not use a cane but does hold on  her  when walking with him.  She lost her balance in the bathtub 2 months ago, causing her to fall and hit her head.  They do have grab bars in the bathroom and also have a walk in shower in addition to the tub.  She does not have loose rugs or other trip hazards.  Her house is generally well-lit although she does not turn on the light when getting up at night to urinate.    She has still been taking Citalopram 40 mg daily instead of decreasing to 20 mg as it is too hard to cut in half.  She feels this is helping with her mood.    Review of Systems:   Pertinent items are noted in HPI, remainder of complete ROS is negative.      Active Medications:      acetaminophen (TYLENOL) 325 MG tablet, Take 2 tablets every 6 to 8 hours as needed for pain, Disp: 100 tablet, Rfl: 1     amLODIPine (NORVASC) 10 MG tablet, Take 1 tablet (10 mg) by mouth daily For blood pressure, Disp: 90 tablet, Rfl: 3     atorvastatin (LIPITOR) 40 MG tablet, Take 2 tablets (80 mg) by mouth daily, Disp: 90 tablet, Rfl: 3     bisacodyl (DULCOLAX) 10 MG Suppository, Place 1 suppository (10 mg) rectally daily as needed for constipation, Disp: 90 suppository, Rfl: 3     Calcium Carbonate-Vitamin D (CALCIUM 600 + D OR), Take 1 tablet by mouth daily., Disp: , Rfl:      Cholecalciferol (VITAMIN D3 PO), Take by mouth daily, Disp: , Rfl:      citalopram (CELEXA) 40 MG tablet, Take 1 tablet (40 mg) by mouth daily, Disp: 90 tablet, Rfl: 3     clopidogrel (PLAVIX) 75 MG tablet, Take 1 tablet (75 mg) by mouth daily, Disp: 90 tablet, Rfl: 3     Cyanocobalamin (VITAMIN B 12 PO), , Disp: , Rfl:      ferrous sulfate (IRON) 325 (65 FE) MG tablet, Take 1 tablet (325 mg) by mouth daily (with breakfast), Disp: 90 tablet, Rfl: 3     gabapentin (NEURONTIN) 100 MG capsule, , Disp: , Rfl: 1     hydrochlorothiazide (HYDRODIURIL) 50 MG tablet, Take 1 tablet (50 mg) by mouth daily, Disp: 90 tablet, Rfl: 3     levothyroxine (SYNTHROID/LEVOTHROID) 75 MCG tablet, Take 1  tablet (75 mcg) by mouth daily, Disp: 90 tablet, Rfl: 1     lisinopril (PRINIVIL/ZESTRIL) 40 MG tablet, Take 1 tablet (40 mg) by mouth daily For blood pressure, Disp: 90 tablet, Rfl: 3     multivitamin (OCUVITE) TABS tablet, Take 1 tablet by mouth daily, Disp: , Rfl:      naproxen sodium 220 MG capsule, Take 220 mg by mouth 2 times daily (with meals), Disp: 60 capsule, Rfl: 2     pantoprazole (PROTONIX) 40 MG EC tablet, Take 1 tablet (40 mg) by mouth daily, Disp: 90 tablet, Rfl: 3     potassium chloride SA (K-DUR/KLOR-CON M) 20 MEQ CR tablet, Take 1 tablet (20 mEq) by mouth daily, Disp: 90 tablet, Rfl: 3    Allergies:   No known drug allergies.       Past Medical History:  Hyperlipidemia   Essential hypertension   Aortic stenosis   Atherosclerosis of aorta   Peripheral artery disease  Deep vein thrombosis of lower extremity, bilateral   Pulmonary embolism   Varicose veins  Vitamin D deficiency  Small bowel obstruction   Gastritis   Graves' disease  Hypothyroidism   Anemia   Iron deficiency  Osteoporosis   Osteoarthritis   Lumbar stenosis with neurogenic claudication  Chronic pain  Age-related osteoporosis   Ovarian tumor of borderline malignancy  Anxiety   Moderate major depression  Cataracts   Multiple melanocytic nevi  Basal cell carcinoma, trunk  Cherry angioma      Past Surgical History:  SI joint injection, right 3/7/18  Lumbar epidural injection 1/5/18  Hysterectomy, total abdominal, bilateral salping-oophorectomy, omentectomy, lymph node biopsy 2/17/11  Bunionectomy     Family History:   Breast cancer - maternal aunt  Coronary artery disease - father      Social History:   Marital Status:    Presents to clinic alone  Tobacco Use: Former smoker, 7.5 pack year history, quit 1993.   Alcohol Use: Occasional social alcohol use.      Physical Exam:   /69  Pulse 79  Wt 47.1 kg (103 lb 14.4 oz)  SpO2 97%  Breastfeeding? No  BMI 20.29 kg/m2     Physical Examination:  General:  Pleasant, alert, no  acute distress  Eyes:  Pupils 2-3 mm, sclera white, EOM's full.    Ears:  TM's normal, EAC's patent, clear of cerumen  Nose:  Nasal passages clear, turbinates mildly swollen  Throat/Mouth:  No pharyngeal erythema or exudate, oral mucosa and tongue moist, no suspicious oral lesions  Neck:  Full AROM, supple, thyroid smooth, symmetric, not enlarged, no nodules  Lungs:  Clear to auscultation throughout, no wheezes, rhonchi or rales.  C/V:  Regular rate and rhythm, no murmurs, rubs or gallops.  No JVD, no carotid bruits.  No peripheral edema.   Abdomen:  Not distended.  Bowel sounds active.  No tenderness, no hepatosplenomegaly or masses.  No CVA tenderness or masses.  Lymph:  No cervical lymph nodes.  Neuro:  Alert and oriented, face symmetric. No tremor.    Skin:   No rashes or jaundice.  Normal moisture, good skin turgor.  Psych:  Broad range affect.  Not psychomotor slowed.  No signs of anxiety or agitation.      Assessment and Plan:  Need for shingles vaccine  Second dose of Shingrix completed today.  - ZOSTER VACCINE RECOMBINANT ADJUVANTED IM NJX    Chronic right-sided low back pain with bilateral sciatica  Referral back to pain management for further evaluation, possibly repeat steroid injection, and will add balance/gait evaluation to her PT.  Again discussed avoiding narcotics due to potentially dangerous side effects and the fact these have not been helpful in the past.  Continue Tylenol as needed.  - PAIN MANAGEMENT REFERRAL    Vitamin B12 deficiency (non anemic)  Patient instructed to call clinic with her current Vitamin B12 supplement dose.  Will adjust if needed based on lab today.  - Vitamin B12    Cough, suspect secondary to post nasal drip fromSeasonal allergic rhinitis, unspecified trigger  Current symptoms are consistent with seasonal allergies.  Will replace her saline spray with a steroid nasal spray.  - fluticasone (FLONASE) 50 MCG/ACT spray  Dispense: 1 Bottle; Refill: 1    Anxiety  She had no  improvement on Citalopram 40 mg compared to 20 mg therefore we had discussed decreasing the dose to 20 mg.  New prescription of Citalopram 20 mg daily provided as she has been unable to cut her previous pills in half.    - citalopram (CELEXA) 20 MG tablet  Dispense: 90 tablet; Refill: 3     Follow-up: Data Unavailable         Scribe Disclosure:   I, Kathy Rico, am serving as a scribe to document services personally performed by Marii Montgomery MD at this visit, based upon the provider's statements to me. All documentation has been reviewed by the aforementioned provider prior to being entered into the official medical record.     Portions of this medical record were completed by a scribe. UPON MY REVIEW AND AUTHENTICATION BY ELECTRONIC SIGNATURE, this confirms (a) I performed the applicable clinical services, and (b) the record is accurate.     Marii Montgomery M.D.  Internal Medicine  Primary Care Center   pager [phone]

## 2018-10-19 RX ORDER — UREA 10 %
500 LOTION (ML) TOPICAL EVERY MORNING
Qty: 90 TABLET | Refills: 1 | COMMUNITY
Start: 2018-10-19

## 2018-10-23 ENCOUNTER — OFFICE VISIT (OUTPATIENT)
Dept: NEUROLOGY | Facility: CLINIC | Age: 83
End: 2018-10-23
Payer: MEDICARE

## 2018-10-23 VITALS — SYSTOLIC BLOOD PRESSURE: 134 MMHG | OXYGEN SATURATION: 94 % | HEART RATE: 73 BPM | DIASTOLIC BLOOD PRESSURE: 66 MMHG

## 2018-10-23 DIAGNOSIS — R41.89 SPELL OF ALTERED COGNITION: ICD-10-CM

## 2018-10-23 DIAGNOSIS — R25.1 TREMOR: Primary | ICD-10-CM

## 2018-10-23 ASSESSMENT — MONTREAL COGNITIVE ASSESSMENT (MOCA)
13. ORIENTATION SUBSCORE: 5
8. SERIAL SUBTRACTION OF 7S: 3
11. FOR EACH PAIR OF WORDS, WHAT CATEGORY DO THEY BELONG TO (OUT OF 2): 2
VISUOSPATIAL/EXECUTIVE SUBSCORE: 4
6. READ LIST OF DIGITS [FORWARD/BACKWARD]: 2
9. REPEAT EACH SENTENCE: 2
10. [FLUENCY] NAME WORDS STARTING WITH DESIGNATED LETTER: 1
WHAT IS THE TOTAL SCORE (OUT OF 30): 26
4. NAME EACH OF THE THREE ANIMALS SHOWN: 3
12. MEMORY INDEX SCORE: 3
7. [VIGILENCE] TAP WHEN HEARING DESIGNATED LETTER: 1
WHAT LEVEL OF EDUCATION WAS ATTAINED: 0

## 2018-10-23 ASSESSMENT — PAIN SCALES - GENERAL: PAINLEVEL: NO PAIN (0)

## 2018-10-23 NOTE — PROGRESS NOTES
"Re: Lucie Stockton      MRN# [id number]  YOB: 1935  Date of Visit:10/23/2018     OUTPATIENT NEUROLOGY VISIT NOTE    Reason for Visit:  Memory follow up    Interval History:  Lucie Stockton is a 83-year-old female presents to the clinic today for in memory follow up  History of depression, PE, PAD, Grave's disease, hypothyroidism, ovarian tumor, hypertension, chronic back pain, quit smoking 12 years ago.   Patient presents to today's appointment alone. Patient was seen on 6/12/2018 initially for a spell of confusion -\"got lost in the neighborhood.\"   Today patient denies any spells of confusion since her Initial Neurology Clinic visit on 6/12/2018.   Patient reports that she has two daughters -one lives in [place] and other one in [place]. Patient lives with her  of 57 years. Patient reports that her  is an . Patient reports none of her family members said anything of her memory.   Patient has not miss any appointments. Patient reports that she has no problems navigating in the streets in [place]. Patient does not drive too far. Patient reports that she does not drive on the freeway.   Patient reports that she fell in August of 2018 and saw her PCP. Patient reports that she slipped getting out the bathtub. Patient denies LOC. Patient reports that she put on the night gown and went down to her  to show the laceration. Patient just put an ice over it and it \"stopped bleeding.\" She denies any confusion and reports that her  was not concerned. She hit her head and had a laceration and saw her PCP and did not get stitches. Reports that she \"all her life wiped one leg before stepping out bathtub\" and no she wipes when she steps out the bathtub. Patient reports that she prefers bathtub over a shower.   Patient reports that tripped in March of 2018 when she visited her friend's house in [place] and got up to go to the bathroom and tripped on the bedspread. Patient " "reports that she \"cut her self\" on the door way to the bathroom and denies injuries.   Patient reports that she tripped over a hose in backyard and \"landed on her nose\" and \"had a bloody nose.\" Patient reports that she spoke to her  and he \"was not concerned.\" Patient reports that her  is very busy but they spend time together-weekends, evenings. Patient reports that her  sees her in am and evening -they have dinner together and both our involved in activities-group meetings. Patient reports that she is active with S[employer] activities and on the [employer] board for many years.     Denies headache, dizziness, vertigo, loss of consciousness, seizure, double vision, hearing difficulty, speech or swallowing difficulty, weakness or numbness in face, arms or legs, urinary or bowel incontinence.  Patient reports that because of her of lower back pain she walks slower but denies any balance problems.     Past evaluations of spells of confusion:    PNP on 7/24/2018    Paraneoplastic Autoantibody ANGELICA Ziegler    Interpretive Comments            SEE NOTE                            Part of this testing algorithm includes [facility]      "[employer]'  ARBI: Acetylcholine Receptor (Muscle AChR)      Binding Antibody, Serum. This test is temporarily      unavailable; therefore, this result is not incorporated      into the interpretation. * No informative autoantibodies      were detected in the Paraneoplastic Evaluation. However, a      negative result does not exclude neurological autoimmunity      with or without associated neoplasia. Sensitivity and      specificity of antibody testing are enhanced by testing      both serum and CSF.    TANISHA-1, S                        Negative       titer   <1:240      Reflex Added                     None.                                This test was developed and its performance characteristics      determined by [facility] in a manner consistent with CLIA      requirements. " This test has not been cleared or approved by      the U.S. Food and Drug Administration.    TANISHA-2, S                        Negative       titer   <1:240        This test was developed and its performance characteristics      determined by [facility] in a manner consistent with CLIA      requirements. This test has not been cleared or approved by      the U.S. Food and Drug Administration.    TANISHA-3, S                        Negative       titer   <1:240        This test was developed and its performance characteristics      determined by [facility] in a manner consistent with CLIA      requirements. This test has not been cleared or approved by      the U.S. Food and Drug Administration.    AGNA-1, S                        Negative       titer   <1:240        This test was developed and its performance characteristics      determined by [facility] in a manner consistent with CLIA      requirements. This test has not been cleared or approved by      the U.S. Food and Drug Administration.    PCA-1, S                         Negative       titer   <1:240        This test was developed and its performance characteristics      determined by [facility] in a manner consistent with CLIA      requirements. This test has not been cleared or approved by      the U.S. Food and Drug Administration.    PCA-2, S                         Negative       titer   <1:240        This test was developed and its performance characteristics      determined by [facility] in a manner consistent with CLIA      requirements. This test has not been cleared or approved by      the U.S. Food and Drug Administration.    PCA-Tr, S                        Negative       titer   <1:240        This test was developed and its performance characteristics      determined by [facility] in a manner consistent with CLIA      requirements. This test has not been cleared or approved by      the U.S. Food and Drug Administration.    Amphiphysin Ab, S                 Negative       titer   <1:240        This test was developed and its performance characteristics      determined by [facility] in a manner consistent with CLIA      requirements. This test has not been cleared or approved by      the U.S. Food and Drug Administration.    CRMP-5-IgG, S                    Negative       titer   <1:240        This test was developed and its performance characteristics      determined by [facility] in a manner consistent with CLIA      requirements. This test has not been cleared or approved by      the U.S. Food and Drug Administration.    Striational (Striated Muscle)    Negative       titer   <1:120      Ab, S      This test was developed and its performance characteristics      determined by [facility] in a manner consistent with CLIA      requirements. This test has not been cleared or approved by      the U.S. Food and Drug Administration.    P/Q-Type Calcium Channel Ab      0.00           nmol/L  <=0.02        This test was developed and its performance characteristics      determined by [facility] in a manner consistent with CLIA      requirements. This test has not been cleared or approved by      the U.S. Food and Drug Administration.    N-Type Calcium Channel Ab        0.00           nmol/L  <=0.03        This test was developed and its performance characteristics      determined by [facility] in a manner consistent with CLIA      requirements. This test has not been cleared or approved by      the U.S. Food and Drug Administration.    ACh Receptor (Muscle) Binding    SEE NOTE       nmol/L  <=0.02      Ab      Due to reagent unavailability, ARBI: Acetylcholine Receptor      (Muscle AChR) Binding Antibody, Serum, will not be      performed at [facility]. If Acetylcholine      Receptor Binding Antibody testing is desired, please order      FABAB: Acetylcholine Receptor Binding Antibody, which is      performed at another laboratory. Given the  differences in      assay methodologies, direct comparison of quantitative      results is not possible. Interpretation of these results      should be made within the clinical context.  If any      concerns arise, laboratory consultation in regards to these      results is available by calling [phone].      This test was developed and its performance characteristics      determined by [facility] in a manner consistent with CLIA      requirements. This test has not been cleared or approved by      the U.S. Food and Drug Administration.    AChR Ganglionic Neuronal Ab, S   0.00           nmol/L  <=0.02        This test was developed and its performance characteristics      determined by [facility] in a manner consistent with CLIA      requirements. This test has not been cleared or approved by      the U.S. Food and Drug Administration.    Neuronal (V-G) K+ Channel Ab, S  0.00           nmol/L  <=0.02        This test was developed and its performance characteristics      determined by [facility] in a manner consistent with CLIA      requirements. This test has not been cleared or approved by      the U.S. Food and Drug Administration.            Test Performed by:      [place] [address]     Results for ISAURO GUZMAN (MRN [id number]) as of 10/23/2018 10:21   Ref. Range 10/17/2018 10:01 10/17/2018 10:02   TSH Latest Ref Range: 0.40 - 4.00 mU/L 1.33    Vitamin B12 Latest Ref Range: 193 - 986 pg/mL  471     MR BRAIN W/O & W CONTRAST 8/9/2018 11:35 AM     Provided History: ; Spell of altered cognition; History of  hypothyroidism. History of ovarian malignancy.  ICD-10: Spell of altered cognition; History of hypothyroidism     Comparison: CT head 1/27/2018 and before.     Technique: Multiplanar T1-weighted, axial FLAIR, and susceptibility  images were obtained without intravenous contrast. Following  intravenous gadolinium-based  contrast administration, axial  T2-weighted, diffusion, and T1-weighted images (in multiple planes)  were obtained.     Contrast: 7.5 mL of Gadavist intravenous contrast.      Findings:  There is no mass effect, midline shift, or extra-axial fluid  collection. The ventricles are proportionate to the cerebral sulci.  Diffusion-weighted images reveal no reduced diffusion. Stability  weighted imaging reveals no intracranial hemorrhage. Several foci of  T2/FLAIR hyperintensity scattered within the periventricular and  subcortical white matter, which are nonspecific but likely represent  chronic small vessel ischemic disease. Mild cerebral atrophy. Flow  voids within the major intracranial vessels are present.     Postcontrast images demonstrate no abnormal intracranial enhancing  lesions.     No abnormality of the skull marrow signal. The visualized portions of  paranasal sinuses, and mastoid air cells are relatively clear. The  orbits are grossly unremarkable. There is pseudophakia bilaterally.         Impression:      1. Mild Leukoaraiosis and cerebral atrophy.  2. No evidence for metastatic disease.     I have personally reviewed the examination and initial interpretation  and I agree with the findings.     SAMUEL DUMAS MD           7/25/2018  No interictal epileptiform abnormalities were recorded.     ICTAL RECORDINGS:  No electrographic seizures and no paroxysmal behavioral events occurred during this procedure.       SUMMARY OF VIDEO-EEG MONITORING:    The interictal recording was normal in waking, drowsiness and stage II sleep.  No pathological slowing, no interictal epileptiform abnormalities, no electrographic seizures and no paroxysmal behavioral events were recorded during the period of monitoring.   Clinical correlation is recommended.   Ciaran Jimenez M.D., Professor of Neurology     Past Medical History reviewed and verified with the patient  Past Medical History:   Diagnosis Date     Anemia       "Aortic stenosis     abdominal     Atherosclerosis of aorta (H)     \"extensive disease with near occlusion below level of renal arteries\" on CT     Atherosclerosis of arteries of extremities (H)      Back pain     severe multilevel DJD, moderate spinal canal stenosis L4-5, severe L3-4     Chronic pain     Back, hips, knees, legs     Degenerative joint disease      DVT of lower extremity, bilateral (H)     5/24/10 left distal femoral and great saphenous, 7/7/10 left:  extending to cfv, iliac , pop and post tibial, peronial, right:  distal fem and peroneal      Grave's disease      Hyperlipidaemia LDL goal < 70      Hypertension, essential      Hypothyroidism      Imbalance      Insomnia      Intermittent claudication (H)      Iron deficiency      Knee pain      Lumbar stenosis with neurogenic claudication      Osteoarthritis     ankle,foot, knee     Osteoporosis      Ovarian tumor of borderline malignancy 2011    left ovary, 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     Pulmonary embolism (H)     5/24/10 bilateral     Small bowel obstruction (H) 17     Varicose veins      Family History reviewed and verified with the patient  Mother lived 96 and was \"sharp\"  Father  of an aortic aneurysm at the age of 57   No neuro-degenerative illnesses reported  Social History   Substance Use Topics     Smoking status: Former Smoker     Packs/day: 0.50     Years: 15.00     Quit date: 1993     Smokeless tobacco: Never Used     Alcohol use Yes      Comment: social    reviewed and verified with the patient    Current Outpatient Prescriptions   Medication Sig Dispense Refill     acetaminophen (TYLENOL) 325 MG tablet Take 2 tablets every 6 to 8 hours as needed for pain 100 tablet 1     amLODIPine (NORVASC) 10 MG tablet Take 1 tablet (10 mg) by mouth daily For blood pressure 90 tablet 3     atorvastatin (LIPITOR) 40 MG tablet Take 2 tablets (80 mg) by mouth daily 90 tablet 3     " "bisacodyl (DULCOLAX) 10 MG Suppository Place 1 suppository (10 mg) rectally daily as needed for constipation 90 suppository 3     Calcium Carbonate-Vitamin D (CALCIUM 600 + D OR) Take 1 tablet by mouth daily.       Cholecalciferol (VITAMIN D3 PO) Take by mouth daily       citalopram (CELEXA) 20 MG tablet Take 1 tablet (20 mg) by mouth daily 90 tablet 3     clopidogrel (PLAVIX) 75 MG tablet Take 1 tablet (75 mg) by mouth daily 90 tablet 3     cyanocolbalamin (VITAMIN  B-12) 500 MCG tablet Take 1 tablet (500 mcg) by mouth daily 90 tablet 1     ferrous sulfate (IRON) 325 (65 FE) MG tablet Take 1 tablet (325 mg) by mouth daily (with breakfast) 90 tablet 3     fluticasone (FLONASE) 50 MCG/ACT spray Spray 2 sprays into both nostrils daily 1 Bottle 1     gabapentin (NEURONTIN) 100 MG capsule   1     hydrochlorothiazide (HYDRODIURIL) 50 MG tablet Take 1 tablet (50 mg) by mouth daily 90 tablet 3     levothyroxine (SYNTHROID/LEVOTHROID) 75 MCG tablet Take 1 tablet (75 mcg) by mouth daily 90 tablet 3     lisinopril (PRINIVIL/ZESTRIL) 40 MG tablet Take 1 tablet (40 mg) by mouth daily For blood pressure 90 tablet 3     multivitamin (OCUVITE) TABS tablet Take 1 tablet by mouth daily       naproxen sodium 220 MG capsule Take 220 mg by mouth 2 times daily (with meals) 60 capsule 2     pantoprazole (PROTONIX) 40 MG EC tablet Take 1 tablet (40 mg) by mouth daily 90 tablet 3     potassium chloride SA (K-DUR/KLOR-CON M) 20 MEQ CR tablet Take 1 tablet (20 mEq) by mouth daily 90 tablet 3     [DISCONTINUED] tolterodine (DETROL) 1 MG tablet Take 1-2 tablets by mouth At Bedtime. 180 tablet 3   reviewed and verified with the patient    Review of Systems:   A 10-point ROS including constitutional, eyes, respiratory, cardiovascular, gastroenterology, genitourinary, integumentary, musculoskeletal, neurology and psychiatric were reviewed and cough for about 2 months and \"drainage in the back of her throat\" due to allergies since young and " "improves when \"we get frost\",  except as mentioned in the interval history.    General Exam:   /66 (BP Location: Right arm, Patient Position: Chair, Cuff Size: Adult Regular)  Pulse 73  SpO2 94%  Breastfeeding? No   MOCA 10/23/2018   Visuospatial/Executive  4   Naming 3   Attention - Digits 2   Attention - Letters 1   Attention - Subtraction 3   Language - Repeat 2   Language - Fluency  1   Abstraction 2   Delayed Recall 3   Orientation 5   Education 0   MOCA Score 26       GEN: Awake, NAD; good eye contact, responses appropriately, healthy appearing, no facial  bradykinesia, there is some postural hands tremor mild and head tremor \"knotting\" which stops with destruction   HEENT: Head atraumatic/Normocephalic. Scalp normal.  Speech is fluent without dysarthria. EOM intact. There is no nystagmus. Has conjugated gaze. Face is symmetrical. Intact and symmetrical eyebrow and lid raise and eyelid closure, smiles. Hearing Intact to conversation speech. The palates elevates symmetrical. The tongue protrudes midline with no atrophy or fasciculations. Strength intact in the upper and lower extremities bilaterally. Sensation is intact to  touch throughout.  Reflexes brisk but symmetrical at biceps, triceps, brachioradialis, patellar, and Achilles. Negative Babinski with downgoing toes bilaterally. Coordination reveals finger-nose-finger, rapid alternating movements slow but appears accurate. Walks slow and kyphosis    Assessment and Plan:  Memory and confusion episode but denies any reoccurrence since her last neurology clinic visit. Exam findings of postural mild hand tremor and head tremor. No family history or tremor. No bradykinesia or rigidity or any other parkinsonian Recommended to monitor for worsening. Recommended to return to our Clinic with any worsening or any repeated episodes of confusion.    Plan:  Call our Clinic with any new falls or any new symptoms  Please ask your family to call our Clinic if they " have any concerns  Falls prevention and try not to fall.   Follow up in 6-12 months or sooner if needed for memory and balance re-cekalebck     I discussed all my recommendation with Lucie Stockton. The patient verbalizes understanding and comfortable with the plan. The patient has our clinic phone number to call with any questions or concerns. All of the patient's questions were answered from the best of my current knowledge.   Time spent with pt answering questions, discussing findings, counseling and coordinating care was more than 50% the appointment time, 40  minutes.         ALEKSEY Gillespie, CNP  [facility]

## 2018-10-23 NOTE — NURSING NOTE
Chief Complaint   Patient presents with     RECHECK     [facility] RETURN MEMORY LOSS       Nicolasa Morton, EMT

## 2018-10-23 NOTE — MR AVS SNAPSHOT
After Visit Summary   10/23/2018    Lucie Stockton    MRN: [id number]           Patient Information     Date Of Birth          1935        Visit Information        Provider Department      10/23/2018 10:00 AM Moraima Beauchamp APRN CNP [facility]        Care Instructions    Plan:  Call our Clinic with any new falls or any new symptoms  Please ask your family to call our Clinic if they have any concerns  Falls prevention and try not to fall.   Follow up in 6-12 months or sooner if needed for memory and balance re-ceheck                 Follow-ups after your visit        Your next 10 appointments already scheduled     Nov 07, 2018  2:10 PM CST   (Arrive by 1:55 PM)   Return Visit with ALEKSEY Gan CNP [facility] ([facility])    [address] [place] [address]  4th Floor  [facility] [id number] [phone]            Jul 16, 2019 10:00 AM CDT   US SUMAYA DOPPLER WITH EXERCISE BILATERAL with UCUSV2   [facility] [place] [facility] ([facility])    [address] [place] [address]  1st Floor  [facility] [id number] [phone]           How do I prepare for my exam? (Food and drink instructions) No caffeine or tobacco for 1 hour prior to exam.  What should I wear: Wear comfortable clothes.  How long does the exam take: Most ultrasounds take 30 to 60 minutes.  What should I bring: Bring a list of your medicines, including vitamins, minerals and over-the-counter drugs. It is safest to leave personal items at home.  Do I need a :  No  is needed.  What do I need to tell my doctor: Tell your doctor about any allergies you may have.  What should I do after the exam: No restrictions, You may resume normal activities.  What is this test: An ultrasound uses sound waves to make pictures of the body. Sound waves do not cause pain. The only discomfort may be the pressure of the wand against your skin  or full bladder.  Who should I call with questions: If you have any questions, please call the Imaging Department where you will have your exam. Directions, parking instructions, and other information is available on our website, [email or web address].            Jul 16, 2019 11:00 AM CDT   (Arrive by 10:45 AM)   Return Vascular Visit with ALEKSEY [facility]   [facility] ([facility])    [address] [place] [address] [phone]            Jul 23, 2019  9:00 AM CDT   (Arrive by 8:45 AM)   Return Visit with ALEKSEY Vasquez [facility] ([facility])    [address] [place] [address] [phone]              Who to contact     Please call your clinic at [phone] to:    Ask questions about your health    Make or cancel appointments    Discuss your medicines    Learn about your test results    Speak to your doctor            Additional Information About Your Visit        [employer] Information     [employer] gives you secure access to your electronic health record. If you see a primary care provider, you can also send messages to your care team and make appointments. If you have questions, please call your primary care clinic.  If you do not have a primary care provider, please call [phone] and they will assist you.      [employer] is an electronic gateway that provides easy, online access to your medical records. With [employer], you can request a clinic appointment, read your test results, renew a prescription or communicate with your care team.     To access your existing account, please contact your [facility] or call [phone] for assistance.        Care EveryWhere ID     This is your Care EveryWhere ID. This could be used by other organizations to access your [facility] medical records  [id number]        Your Vitals Were      Pulse Pulse Oximetry Breastfeeding?             73 94% No          Blood Pressure from Last 3 Encounters:   10/23/18 134/66   10/17/18 145/69   08/17/18 155/68    Weight from Last 3 Encounters:   10/17/18 47.1 kg (103 lb 14.4 oz)   08/17/18 47.7 kg (105 lb 3.2 oz)   06/12/18 45.8 kg (100 lb 14.4 oz)              Today, you had the following     No orders found for display       Primary Care Provider Office Phone # Fax #    Marii Montgomery [phone]       [address] [facility] [address]  [facility] [address]        Equal Access to Services     PAL SALGADO : Ascencion Gamboa, wage keller, qamike kaalmada mirta, jackeline izquierdo. So [facility] [phone].    ATENCIÓN: Si habla español, tiene a schwartz disposición servicios gratuitos de asistencia lingüística. Llame al [phone].    We comply with applicable federal civil rights laws and [place] laws. We do not discriminate on the basis of race, color, national origin, age, disability, sex, sexual orientation, or gender identity.            Thank you!     Thank you for choosing [facility]  for your care. Our goal is always to provide you with excellent care. Hearing back from our patients is one way we can continue to improve our services. Please take a few minutes to complete the written survey that you may receive in the mail after your visit with us. Thank you!             Your Updated Medication List - Protect others around you: Learn how to safely use, store and throw away your medicines at www.disposemymeds.org.          This list is accurate as of 10/23/18 11:03 AM.  Always use your most recent med list.                   Brand Name Dispense Instructions for use Diagnosis    acetaminophen 325 MG tablet    TYLENOL    100 tablet    Take 2 tablets every 6 to 8 hours as needed for pain    Chronic low back pain, unspecified back pain laterality, with sciatica presence unspecified, Contusion of right  hip, initial encounter, Contusion of right knee, initial encounter       amLODIPine 10 MG tablet    NORVASC    90 tablet    Take 1 tablet (10 mg) by mouth daily For blood pressure    Essential hypertension       atorvastatin 40 MG tablet    LIPITOR    90 tablet    Take 2 tablets (80 mg) by mouth daily    Hyperlipidemia, unspecified hyperlipidemia type       bisacodyl 10 MG Suppository    DULCOLAX    90 suppository    Place 1 suppository (10 mg) rectally daily as needed for constipation    Constipation, unspecified constipation type       CALCIUM 600 + D PO      Take 1 tablet by mouth daily.        citalopram 20 MG tablet    celeXA    90 tablet    Take 1 tablet (20 mg) by mouth daily    Anxiety       clopidogrel 75 MG tablet    PLAVIX    90 tablet    Take 1 tablet (75 mg) by mouth daily    Venous thrombosis of extremity       cyanocolbalamin 500 MCG tablet    vitamin  B-12    90 tablet    Take 1 tablet (500 mcg) by mouth daily    Vitamin B12 deficiency (non anemic)       ferrous sulfate 325 (65 Fe) MG tablet    IRON    90 tablet    Take 1 tablet (325 mg) by mouth daily (with breakfast)    Other iron deficiency anemias       fluticasone 50 MCG/ACT spray    FLONASE    1 Bottle    Spray 2 sprays into both nostrils daily    Seasonal allergic rhinitis, unspecified trigger       gabapentin 100 MG capsule    NEURONTIN          hydrochlorothiazide 50 MG tablet    HYDRODIURIL    90 tablet    Take 1 tablet (50 mg) by mouth daily    Benign essential hypertension       levothyroxine 75 MCG tablet    SYNTHROID/LEVOTHROID    90 tablet    Take 1 tablet (75 mcg) by mouth daily    Hypothyroidism, unspecified type       lisinopril 40 MG tablet    PRINIVIL/ZESTRIL    90 tablet    Take 1 tablet (40 mg) by mouth daily For blood pressure    Essential hypertension       multivitamin Tabs tablet      Take 1 tablet by mouth daily        naproxen sodium 220 MG capsule     60 capsule    Take 220 mg by mouth 2 times daily (with meals)     Chronic low back pain, unspecified back pain laterality, with sciatica presence unspecified, Pain of left lower leg       pantoprazole 40 MG EC tablet    PROTONIX    90 tablet    Take 1 tablet (40 mg) by mouth daily    Long term current use of anticoagulant       potassium chloride SA 20 MEQ CR tablet    K-DUR/KLOR-CON M    90 tablet    Take 1 tablet (20 mEq) by mouth daily    Hypopotassemia       VITAMIN D3 PO      Take by mouth daily

## 2018-10-23 NOTE — PATIENT INSTRUCTIONS
Plan:  Call our Clinic with any new falls or any new symptoms  Please ask your family to call our Clinic if they have any concerns  Falls prevention and try not to fall.   Follow up in 6-12 months or sooner if needed for memory and balance re-ceheck

## 2018-11-07 ENCOUNTER — OFFICE VISIT (OUTPATIENT)
Dept: ANESTHESIOLOGY | Facility: CLINIC | Age: 83
End: 2018-11-07
Payer: MEDICARE

## 2018-11-07 VITALS
HEART RATE: 76 BPM | SYSTOLIC BLOOD PRESSURE: 119 MMHG | WEIGHT: 103 LBS | HEIGHT: 60 IN | DIASTOLIC BLOOD PRESSURE: 55 MMHG | BODY MASS INDEX: 20.22 KG/M2

## 2018-11-07 DIAGNOSIS — M53.3 SACROILIAC JOINT PAIN: Primary | ICD-10-CM

## 2018-11-07 DIAGNOSIS — M25.572 PAIN IN JOINT INVOLVING ANKLE AND FOOT, LEFT: ICD-10-CM

## 2018-11-07 ASSESSMENT — PAIN SCALES - GENERAL: PAINLEVEL: MODERATE PAIN (5)

## 2018-11-07 NOTE — PROGRESS NOTES
"Date of visit: 11/7/2018    Chief complaint:   Chief Complaint   Patient presents with     Pain Management     Follow up        Interval history:  Lucie Stockton is a 83 year old female last seen by me on 3/2/18 for right sacroiliac joint pain, lumbar spinal stenosis, lumbar spondylosis, lumbar radiculopathy, lumbar degenerative disc disease.      Recommendations/plan at the last visit included:  1. Interventions:   -Right sacroiliac joint injection ordered. Procedure discussed in depth with patient and her  today. Will schedule at her convenience. Will need to hold Plavix for 7 days prior with PCP approval.   -Consider intra-articular lumbar facet injections with effusion aspiration in the future  2. Medication Management:   -Continue Tramadol 50mg TID and Naproxen 220mg BID  3. Follow up: Return 2-4 weeks following procedure.     Since her last visit, Lucie Stockton reports:  She has had return of her low back pain that she now locates bilaterally. She had good relief with prior right SI joint injection 3/7/18. She is interested in repeating this. Pain exacerbates with prolonged walking and standing. She performs PT home exercise program/stretching twice daily.   Patient also presents with left lateral ankle pain that has been \"off and on\" for the past several months; some increased pain with dorsiflexion. Symptoms do not seem to be aggravated by anything in particular. She denies injury. She does have known PAD with some occasional calf pain. She denies swelling, coloration, or temperature changes.       Medications:  Current Outpatient Prescriptions   Medication Sig Dispense Refill     acetaminophen (TYLENOL) 325 MG tablet Take 2 tablets every 6 to 8 hours as needed for pain 100 tablet 1     amLODIPine (NORVASC) 10 MG tablet Take 1 tablet (10 mg) by mouth daily For blood pressure 90 tablet 3     atorvastatin (LIPITOR) 40 MG tablet Take 2 tablets (80 mg) by mouth daily 90 tablet 3     bisacodyl (DULCOLAX) 10 " MG Suppository Place 1 suppository (10 mg) rectally daily as needed for constipation 90 suppository 3     Calcium Carbonate-Vitamin D (CALCIUM 600 + D OR) Take 1 tablet by mouth daily.       Cholecalciferol (VITAMIN D3 PO) Take by mouth daily       citalopram (CELEXA) 20 MG tablet Take 1 tablet (20 mg) by mouth daily 90 tablet 3     clopidogrel (PLAVIX) 75 MG tablet Take 1 tablet (75 mg) by mouth daily 90 tablet 3     cyanocolbalamin (VITAMIN  B-12) 500 MCG tablet Take 1 tablet (500 mcg) by mouth daily 90 tablet 1     ferrous sulfate (IRON) 325 (65 FE) MG tablet Take 1 tablet (325 mg) by mouth daily (with breakfast) 90 tablet 3     fluticasone (FLONASE) 50 MCG/ACT spray Spray 2 sprays into both nostrils daily 1 Bottle 1     gabapentin (NEURONTIN) 100 MG capsule   1     hydrochlorothiazide (HYDRODIURIL) 50 MG tablet Take 1 tablet (50 mg) by mouth daily 90 tablet 3     levothyroxine (SYNTHROID/LEVOTHROID) 75 MCG tablet Take 1 tablet (75 mcg) by mouth daily 90 tablet 3     lisinopril (PRINIVIL/ZESTRIL) 40 MG tablet Take 1 tablet (40 mg) by mouth daily For blood pressure 90 tablet 3     multivitamin (OCUVITE) TABS tablet Take 1 tablet by mouth daily       naproxen sodium 220 MG capsule Take 220 mg by mouth 2 times daily (with meals) 60 capsule 2     pantoprazole (PROTONIX) 40 MG EC tablet Take 1 tablet (40 mg) by mouth daily 90 tablet 3     potassium chloride SA (K-DUR/KLOR-CON M) 20 MEQ CR tablet Take 1 tablet (20 mEq) by mouth daily 90 tablet 3     [DISCONTINUED] tolterodine (DETROL) 1 MG tablet Take 1-2 tablets by mouth At Bedtime. 180 tablet 3       Medical History: any changes in medical history since they were last seen? No    Review of Systems:  The 14 system ROS was reviewed from the intake questionnaire; results listed at end of note.     Physical Exam:  Blood pressure 119/55, pulse 76, height 1.524 m (5'), weight 46.7 kg (103 lb), not currently breastfeeding.  General: NAD  Gait: unsteady  MSK exam: Lumbar  Spine:  Mild focal spinous process tenderness with palpation of L5 vertebrae. B/l SI joint tenderness.  Negative straight leg raise. Positive b/l Fabers.   Gait is slow; antalgic, unsteady but symmetrical.  TTP over left fibula and lateral malleolus. CMS intact. No edema or swelling. No ecchymosis. +1 PT and DP pulses.     Assessment:   Lucie Stockton is a 83 year old female who is seen at the pain clinic for sacroiliac joint pain, lumbar spinal stenosis, lumbar spondylosis, lumbar radiculopathy, lumbar degenerative disc disease. She has demonstrated good relief with previous sacroiliac joint injections. She also presents with left ankle pain; tenderness over fibula. Case briefly discussed with podiatry; recommendation for PCP evaluation prior to specialty consult.     Plan:  1. Interventions:   -Bilateral sacroiliac joint injections ordered today. Expectations and potential risks reviewed. Patient required to hold Plavix 7 days prior.   -Dr. Montgomery messaged in regards to left fibula/lateral malleolus pain and anticoagulant hold.   2. Follow up:   -RTC 4-6 weeks following procedure.     Total time spent was 20 minutes, and more than 50% of face to face time was spent in counseling and/or coordination of care regarding plan of care.    ALEKSEY Gan, WIL-BC  Pain Management  Department of Interventional Pain Management and Anesthesiology   [facility]

## 2018-11-07 NOTE — PATIENT INSTRUCTIONS
1. Call to schedule your procedure.     2. Follow up with your Primary Care to address your ankle concerns.     Hold Plavix 7 days before your procedure.    When calling to schedule your procedure appointment, also make your follow up clinic appointment 4-6 weeks after the procedure.    Please call [phone] to schedule, reschedule, or cancel your procedure appointment.   Phones are answered Monday - Friday from 7:30 - 4:00pm.  Leave a voicemail with your name, birth date, and phone number if no one is available to take your call.     Your procedure: Bilateral SI Joint Injection.     On the day of the procedure  1. Arrive 1 hour earlier than your scheduled time, to the [facility]  Address: [address] [place] [id number]  2. Check in on the 5th floor for your procedure    If you must reschedule your procedure more than two times, you must follow up in clinic before rescheduling again.  Preparing for your procedure    CAUTION - FAILURE TO FOLLOW THESE PRE-PROCEDURE INSTRUCTIONS WILL RESULT IN YOUR PROCEDURE BEING RESCHEDULED.            You must have a  take you home after your procedure. Transportation by taxi or para-transit is okay as long as you have a responsible adult accompany you. You must provide your 's full name and contact number at time of check in.     Fasting Protocol You may have NOTHING SOLID TO EAT 8 HOURS prior to arrival at the procedure area.     You may have CLEAR LIQUIDS UP TO 2 HOURS prior to arrival.    Broth and candy are considered solid food and require an eight hour fast.     Clear liquids include water, clear fruit juice (no pulp), carbonated beverages, ice, black coffee, black tea, clear jello. No alcohol containing beverages.   Medications If you take any medications, DO NOT STOP. Take your medications as usual the day of your procedure with a sip of water AT LEAST 2 HOURS PRIOR TO ARRIVAL.    Antibiotics If you are currently taking  antibiotics, you must complete the entire dose 7 days prior to your scheduled procedure. You must be clear of any signs or symptoms of infection. If you begin antibiotics, please contact our clinic for instructions.     Fever, Chills, or Rash If you experience a fever of higher than 100 degrees, chills, rash, or open wounds during the one week before your procedure, please call the clinic to see if you may proceed with your procedure.      Medication Hold List  **Patients under Cardiology/Neurology care should consult their provider prior to the pain procedure to verify pre-procedure medication instructions. The information below contains general guidelines.**    Blood Thinners If you are taking daily ASPIRIN, PLAVIX, OR OTHER BLOOD THINNERS SUCH AS COUMADIN/WARFARIN, we will need your prescribing doctor to sign a release permitting you to stop these medications. Once approved by your prescribing doctor - STOP ALL BLOOD THINNERS BASED ON THE TIME TABLE BELOW PRIOR TO YOUR PROCEDURE. If you have been instructed to stop WARFARIN(COUMADIN), you must have an INR lab drawn the day before your procedure. . Your INR must be within normal limits before we can perform your injection. MEDICATIONS CAN BE RESTARTED AFTER YOUR PROCEDURE.    14 DAY HOLD  Ticlid (ticlopidine)    10 DAY HOLD  Effient (Prasugel)    3 DAY HOLD  Xarelto (rivaroxaban) 7 DAY HOLD  Anacin, Bufferin, Ecotrin, Excedrin, Aggrenox (Aspirin)  Brilinta (ticagrelor)  Coumadin (Warfarin)  Pradexa (Dabigatran)  Elmiron (Pentosan)  Plavix (Clopidogrel Bisulfate)  Pletal (Cilostazol)    24 HOUR HOLD  Lovenox (enoxaparin)  Agrylin (Anagrelide)        Non-steroidal Anti-inflammatories (NSAIDs) DO NOT TAKE any non-steroidal anti-inflammatory medications (NSAIDs) listed on the table below. MEDICATIONS CAN BE RESTARTED AFTER YOUR PROCEDURE. Celebrex is OK to take and does not need to be discontinued.     Medications to stop:  3 DAY HOLD  Advil, Motrin  (Ibuprofen)  Arthrotec (diciofenac sodium/misoprostol)  Clinoril (Sulindac)  Indocin (Indomethacin)  Lodine (Etodolac)  Toradol (Ketorolac)  Vicoprofen (Hydrocodone and Ibuprofen)  Voltaren (Diclotenac)    14 DAY HOLD  Daypro (Oxaprozin)  Feldene (Piroxicam)   7 DAY HOLD  Aleve (Naproxen sodium)  Darvon compound (contains aspirin)  Naprosyn (Naproxen)  Norgesic Forte (contains aspirin)  Mobic (Meloxicam)  Oruvall (Ketoprofen)  Percodan (contains aspirin)  Relafen (Nabumetone)  Salsalate  Trilisate  Vitamin E (more than 400 mg per day)  Any medication containing aspirin                To speak with a nurse, schedule/reschedule/cancel a clinic appointment, or request a medication refill call: [phone]     You can also reach us by [employer]: [email or web address]    For refills, please call on Monday, 1 week before your medication runs out. The doctors are not always in clinic, so this gives us time to get your prescriptions ready.  Please let us know the name of the medication you are requesting a refill of.

## 2018-11-07 NOTE — NURSING NOTE
LPN read through the instructions with pt for the recommended procedure: Bilateral SI Joint Injection   Pt verbalized understanding to holding appropriate medication per protocol, and was agreeable to NPO policy and needing a .    LPN reviewed AVS with Pt.  Pt verbalized an understanding of information, and was asked to contact clinic with questions.    APRN sent a message to pt's PCP to ask permission for pt to:  Hold Plavix 7 days before procedure.    Rukhsana Aguirre LPN

## 2018-11-07 NOTE — LETTER
"11/7/2018       RE: Lucie Stockton  [address] [address]     Dear Colleague,    Thank you for referring your patient, Lucie Stockton, to the [facility] FOR [facility]HENSIVE PAIN MANAGEMENT at [facility]. Please see a copy of my visit note below.    Date of visit: 11/7/2018    Chief complaint:   Chief Complaint   Patient presents with     Pain Management     Follow up        Interval history:  Lucie Stockton is a 83 year old female last seen by me on 3/2/18 for right sacroiliac joint pain, lumbar spinal stenosis, lumbar spondylosis, lumbar radiculopathy, lumbar degenerative disc disease.      Recommendations/plan at the last visit included:  1. Interventions:   -Right sacroiliac joint injection ordered. Procedure discussed in depth with patient and her  today. Will schedule at her convenience. Will need to hold Plavix for 7 days prior with PCP approval.   -Consider intra-articular lumbar facet injections with effusion aspiration in the future  2. Medication Management:   -Continue Tramadol 50mg TID and Naproxen 220mg BID  3. Follow up: Return 2-4 weeks following procedure.     Since her last visit, Lucie Stockton reports:  She has had return of her low back pain that she now locates bilaterally. She had good relief with prior right SI joint injection 3/7/18. She is interested in repeating this. Pain exacerbates with prolonged walking and standing. She performs PT home exercise program/stretching twice daily.   Patient also presents with left lateral ankle pain that has been \"off and on\" for the past several months; some increased pain with dorsiflexion. Symptoms do not seem to be aggravated by anything in particular. She denies injury. She does have known PAD with some occasional calf pain. She denies swelling, coloration, or temperature changes.       Medications:  Current Outpatient Prescriptions   Medication Sig Dispense Refill     acetaminophen " (TYLENOL) 325 MG tablet Take 2 tablets every 6 to 8 hours as needed for pain 100 tablet 1     amLODIPine (NORVASC) 10 MG tablet Take 1 tablet (10 mg) by mouth daily For blood pressure 90 tablet 3     atorvastatin (LIPITOR) 40 MG tablet Take 2 tablets (80 mg) by mouth daily 90 tablet 3     bisacodyl (DULCOLAX) 10 MG Suppository Place 1 suppository (10 mg) rectally daily as needed for constipation 90 suppository 3     Calcium Carbonate-Vitamin D (CALCIUM 600 + D OR) Take 1 tablet by mouth daily.       Cholecalciferol (VITAMIN D3 PO) Take by mouth daily       citalopram (CELEXA) 20 MG tablet Take 1 tablet (20 mg) by mouth daily 90 tablet 3     clopidogrel (PLAVIX) 75 MG tablet Take 1 tablet (75 mg) by mouth daily 90 tablet 3     cyanocolbalamin (VITAMIN  B-12) 500 MCG tablet Take 1 tablet (500 mcg) by mouth daily 90 tablet 1     ferrous sulfate (IRON) 325 (65 FE) MG tablet Take 1 tablet (325 mg) by mouth daily (with breakfast) 90 tablet 3     fluticasone (FLONASE) 50 MCG/ACT spray Spray 2 sprays into both nostrils daily 1 Bottle 1     gabapentin (NEURONTIN) 100 MG capsule   1     hydrochlorothiazide (HYDRODIURIL) 50 MG tablet Take 1 tablet (50 mg) by mouth daily 90 tablet 3     levothyroxine (SYNTHROID/LEVOTHROID) 75 MCG tablet Take 1 tablet (75 mcg) by mouth daily 90 tablet 3     lisinopril (PRINIVIL/ZESTRIL) 40 MG tablet Take 1 tablet (40 mg) by mouth daily For blood pressure 90 tablet 3     multivitamin (OCUVITE) TABS tablet Take 1 tablet by mouth daily       naproxen sodium 220 MG capsule Take 220 mg by mouth 2 times daily (with meals) 60 capsule 2     pantoprazole (PROTONIX) 40 MG EC tablet Take 1 tablet (40 mg) by mouth daily 90 tablet 3     potassium chloride SA (K-DUR/KLOR-CON M) 20 MEQ CR tablet Take 1 tablet (20 mEq) by mouth daily 90 tablet 3     [DISCONTINUED] tolterodine (DETROL) 1 MG tablet Take 1-2 tablets by mouth At Bedtime. 180 tablet 3       Medical History: any changes in medical history since they  were last seen? No    Review of Systems:  The 14 system ROS was reviewed from the intake questionnaire; results listed at end of note.     Physical Exam:  Blood pressure 119/55, pulse 76, height 1.524 m (5'), weight 46.7 kg (103 lb), not currently breastfeeding.  General: NAD  Gait: unsteady  MSK exam: Lumbar Spine:  Mild focal spinous process tenderness with palpation of L5 vertebrae. B/l SI joint tenderness.  Negative straight leg raise. Positive b/l Fabers.   Gait is slow; antalgic, unsteady but symmetrical.  TTP over left fibula and lateral malleolus. CMS intact. No edema or swelling. No ecchymosis. +1 PT and DP pulses.     Assessment:   Lucie Stockton is a 83 year old female who is seen at the pain clinic for sacroiliac joint pain, lumbar spinal stenosis, lumbar spondylosis, lumbar radiculopathy, lumbar degenerative disc disease. She has demonstrated good relief with previous sacroiliac joint injections. She also presents with left ankle pain; tenderness over fibula. Case briefly discussed with podiatry; recommendation for PCP evaluation prior to specialty consult.     Plan:  1. Interventions:   -Bilateral sacroiliac joint injections ordered today. Expectations and potential risks reviewed. Patient required to hold Plavix 7 days prior.   -Dr. Montgomery messaged in regards to left fibula/lateral malleolus pain and anticoagulant hold.   2. Follow up:   -RTC 4-6 weeks following procedure.     Total time spent was 20 minutes, and more than 50% of face to face time was spent in counseling and/or coordination of care regarding plan of care.    ALEKSEY Gan, WIL-BC  Pain Management  Department of Interventional Pain Management and Anesthesiology   [facility]

## 2018-11-15 ENCOUNTER — OFFICE VISIT (OUTPATIENT)
Dept: INTERNAL MEDICINE | Facility: CLINIC | Age: 83
End: 2018-11-15
Payer: MEDICARE

## 2018-11-15 ENCOUNTER — RADIANT APPOINTMENT (OUTPATIENT)
Dept: CT IMAGING | Facility: CLINIC | Age: 83
End: 2018-11-15
Attending: INTERNAL MEDICINE
Payer: MEDICARE

## 2018-11-15 VITALS
SYSTOLIC BLOOD PRESSURE: 130 MMHG | WEIGHT: 104.2 LBS | RESPIRATION RATE: 20 BRPM | HEART RATE: 84 BPM | DIASTOLIC BLOOD PRESSURE: 66 MMHG | BODY MASS INDEX: 20.35 KG/M2 | OXYGEN SATURATION: 95 %

## 2018-11-15 DIAGNOSIS — R91.8 PULMONARY NODULES: ICD-10-CM

## 2018-11-15 DIAGNOSIS — L98.9 SKIN LESION: ICD-10-CM

## 2018-11-15 DIAGNOSIS — R05.3 CHRONIC COUGH: Primary | ICD-10-CM

## 2018-11-15 DIAGNOSIS — J43.8 OTHER EMPHYSEMA (H): ICD-10-CM

## 2018-11-15 DIAGNOSIS — R05.3 CHRONIC COUGH: ICD-10-CM

## 2018-11-15 LAB
BASOPHILS # BLD AUTO: 0 10E9/L (ref 0–0.2)
BASOPHILS NFR BLD AUTO: 0.3 %
DIFFERENTIAL METHOD BLD: ABNORMAL
EOSINOPHIL # BLD AUTO: 0.2 10E9/L (ref 0–0.7)
EOSINOPHIL NFR BLD AUTO: 1.6 %
ERYTHROCYTE [DISTWIDTH] IN BLOOD BY AUTOMATED COUNT: 13.8 % (ref 10–15)
HCT VFR BLD AUTO: 37.4 % (ref 35–47)
HGB BLD-MCNC: 11.6 G/DL (ref 11.7–15.7)
IMM GRANULOCYTES # BLD: 0.3 10E9/L (ref 0–0.4)
IMM GRANULOCYTES NFR BLD: 2 %
LYMPHOCYTES # BLD AUTO: 1.1 10E9/L (ref 0.8–5.3)
LYMPHOCYTES NFR BLD AUTO: 7.9 %
MCH RBC QN AUTO: 29.1 PG (ref 26.5–33)
MCHC RBC AUTO-ENTMCNC: 31 G/DL (ref 31.5–36.5)
MCV RBC AUTO: 94 FL (ref 78–100)
MONOCYTES # BLD AUTO: 0.8 10E9/L (ref 0–1.3)
MONOCYTES NFR BLD AUTO: 5.5 %
NEUTROPHILS # BLD AUTO: 11.8 10E9/L (ref 1.6–8.3)
NEUTROPHILS NFR BLD AUTO: 82.7 %
NRBC # BLD AUTO: 0 10*3/UL
NRBC BLD AUTO-RTO: 0 /100
PLATELET # BLD AUTO: 597 10E9/L (ref 150–450)
RBC # BLD AUTO: 3.99 10E12/L (ref 3.8–5.2)
WBC # BLD AUTO: 14.2 10E9/L (ref 4–11)

## 2018-11-15 RX ORDER — AMOXICILLIN AND CLAVULANATE POTASSIUM 500; 125 MG/1; MG/1
1 TABLET, FILM COATED ORAL 2 TIMES DAILY
Qty: 20 TABLET | Refills: 0 | Status: SHIPPED | OUTPATIENT
Start: 2018-11-15 | End: 2018-11-21

## 2018-11-15 RX ORDER — BENZONATATE 100 MG/1
100 CAPSULE ORAL 3 TIMES DAILY PRN
Qty: 15 CAPSULE | Refills: 0 | Status: SHIPPED | OUTPATIENT
Start: 2018-11-15 | End: 2018-12-04

## 2018-11-15 ASSESSMENT — PAIN SCALES - GENERAL: PAINLEVEL: MODERATE PAIN (5)

## 2018-11-15 NOTE — PROGRESS NOTES
[facility]   Marii Montgomery MD  11/15/2018      Chief Complaint:   Cough     History of Present Illness:   Lucie Stockton is a 83 year old female with a history of PE, chronic back pain, PAD, and depression who presents for evaluation of cough.     Cough: Patient presented with a persistent 6 week cough on 10/17/2018 with symptoms consistent with allergies including, cough, postnasal drainage, and itchy eyes. She states that she feels like she had been dragging around as it will keep her up at night. She has been taking Delsym currently to help. She endorses dripping down her throat and coughing up some clear phelgm. She denies any wheezing, but reports some shortness of breath. She also denies fever chills, acid reflux, sinus pain or pressure. She has not done any traveling recently or been exposed to anyone sick. She has not noticed any allergies, and does not have any animals in the house since her  has allergies. She states that her and her  have not been smoking. She states that she is using Fluticasone every day. No weight loss, and actually has gained a few pounds.  Prior CT chest reviewed.  Hx pulmonary nodules.  Quit tobacco 1993. 15 pack year hx.    Back pain: She is scheduled to have an injection in her back soon. She states that the first time it helped and the second time it did not help as much, but wants to try it again. She is wondering if there is a limit to the number of times that she can have injections done. She is unsure if there are any steroids in the injection. Further, wants to ensure it is fine for her to stop Plavix for a week in order to get this steroid.        Review of Systems:   Pertinent items are noted in HPI, remainder of complete ROS is negative.      Active Medications:      amLODIPine (NORVASC) 10 MG tablet, Take 1 tablet (10 mg) by mouth daily For blood pressure, Disp: 90 tablet, Rfl: 3     atorvastatin (LIPITOR) 40 MG tablet, Take 2 tablets  (80 mg) by mouth daily, Disp: 90 tablet, Rfl: 3     bisacodyl (DULCOLAX) 10 MG Suppository, Place 1 suppository (10 mg) rectally daily as needed for constipation, Disp: 90 suppository, Rfl: 3     Calcium Carbonate-Vitamin D (CALCIUM 600 + D OR), Take 1 tablet by mouth daily., Disp: , Rfl:      Cholecalciferol (VITAMIN D3 PO), Take by mouth daily, Disp: , Rfl:      citalopram (CELEXA) 20 MG tablet, Take 1 tablet (20 mg) by mouth daily, Disp: 90 tablet, Rfl: 3     clopidogrel (PLAVIX) 75 MG tablet, Take 1 tablet (75 mg) by mouth daily, Disp: 90 tablet, Rfl: 3     cyanocolbalamin (VITAMIN  B-12) 500 MCG tablet, Take 1 tablet (500 mcg) by mouth daily, Disp: 90 tablet, Rfl: 1     ferrous sulfate (IRON) 325 (65 FE) MG tablet, Take 1 tablet (325 mg) by mouth daily (with breakfast), Disp: 90 tablet, Rfl: 3     fluticasone (FLONASE) 50 MCG/ACT spray, Spray 2 sprays into both nostrils daily, Disp: 1 Bottle, Rfl: 1     gabapentin (NEURONTIN) 100 MG capsule, , Disp: , Rfl: 1     hydrochlorothiazide (HYDRODIURIL) 50 MG tablet, Take 1 tablet (50 mg) by mouth daily, Disp: 90 tablet, Rfl: 3     levothyroxine (SYNTHROID/LEVOTHROID) 75 MCG tablet, Take 1 tablet (75 mcg) by mouth daily, Disp: 90 tablet, Rfl: 3     lisinopril (PRINIVIL/ZESTRIL) 40 MG tablet, Take 1 tablet (40 mg) by mouth daily For blood pressure, Disp: 90 tablet, Rfl: 3     multivitamin (OCUVITE) TABS tablet, Take 1 tablet by mouth daily, Disp: , Rfl:      naproxen sodium 220 MG capsule, Take 220 mg by mouth 2 times daily (with meals), Disp: 60 capsule, Rfl: 2     pantoprazole (PROTONIX) 40 MG EC tablet, Take 1 tablet (40 mg) by mouth daily, Disp: 90 tablet, Rfl: 3     potassium chloride SA (K-DUR/KLOR-CON M) 20 MEQ CR tablet, Take 1 tablet (20 mEq) by mouth daily, Disp: 90 tablet, Rfl: 3     [DISCONTINUED] tolterodine (DETROL) 1 MG tablet, Take 1-2 tablets by mouth At Bedtime., Disp: 180 tablet, Rfl: 3      Allergies:   Review of patient's allergies indicates no  known allergies.      Past Medical History:  Anemia  Aortic stenosis  Atherosclerosis of aorta and arteries of extremities  Degenerative disk disease  DVT of lower extremity, bilateral  Grave's disease  Hyperlipidemia  Hypertension  Hypothyroidism  Imbalance  Insomnia  Intermittent claudication  Osteoarthritis  Osteoporosis  Ovarian tumor of borderline malignancy  Pulmonary embolism   Small bowel obstruction  Varicose veins  Peripheral vascular disease  Cervicalgia  Iron deficiency  Gluteal pain  Vitamin D deficiency  Tobacco use disorder  Personal history of other drug therapy  Gastritis  Cataract  Acute left-sided low back pain with left sided sciatica  Lumbar stenosis with neurogenic claudication  Long term use of anticoagulant  Moderate major depression  Neoplasm of uncertain behavior  Basal cell carcinoma of trunk  Chronic pain  Multiple melanocytic nevi    Past Surgical History:  Bunionectomy  Stomach surgery  Colonoscopy  Hysterectomy total abdominal, bilateral, salpingo-oophrectomy, node dissection, combined  Inject epidural lumbar, sacral single  Inject sacroiliac joint  Upper GI endoscopy    Family History:   Breast cancer - maternal aunt  Coronary artery disease - father      Social History:   This patient presented alone.   Smoking status: former smoker of 15 years, 0.5 packs per day, quit 9/13/1993  Smokeless tobacco: never  Alcohol use: socially     Physical Exam:   /66 (BP Location: Right arm, Patient Position: Sitting, Cuff Size: Adult Regular)  Pulse 84  Resp 20  Wt 47.3 kg (104 lb 3.2 oz)  SpO2 95%  BMI 20.35 kg/m2   Wt Readings from Last 5 Encounters:   11/15/18 47.3 kg (104 lb 3.2 oz)   11/07/18 46.7 kg (103 lb)   10/17/18 47.1 kg (103 lb 14.4 oz)   08/17/18 47.7 kg (105 lb 3.2 oz)   06/12/18 45.8 kg (100 lb 14.4 oz)     Physical Examination:  General:  Pleasant, alert, no acute distress  Eyes:  Pupils 2-3 mm, sclera white, EOM's full.    Ears:  TM's normal, EAC's patent, left clear of  cerumen, right full of cerumen  Nose:  Nasal passages clear, turbinates not swollen  Throat/Mouth:  No pharyngeal erythema or exudate, oral mucosa and tongue moist, no suspicious oral lesions  Neck:  Full AROM, supple, thyroid smooth, symmetric, not enlarged, no nodules  Lungs:  Clear to auscultation throughout, no wheezes, rhonchi or rales.  C/V:  Regular rate and rhythm, no murmurs, rubs or gallops.  No JVD, no carotid bruits.  No peripheral edema.   Lymph:  No cervical lymph nodes.  Neuro:  Alert and oriented, face symmetric. No tremor.    M/S:   No joint deformities noted.  No joint swelling.  Skin:   No rashes or jaundice.  Normal moisture, good skin turgor. Pearly, soft papule, some telangiectasia, 4 mm diameter on chest.   Psych:  Broad range affect.  Not psychomotor slowed.  No signs of anxiety or agitation.      Assessment and Plan:  Chronic cough, hx pulmonary nodules  We will have her annual CT scan of the lung done sooner rather than later given her ongoing cough, along with other work up including CBC, pulmonary function test, and PFT lab. In the mean time, will prescribe her with tessalon to help control the cough. Further, she is on lisinopril for blood pressure which can cause a chronic cough. If work up shows no other symptoms, will try to stop this medication.   - CT Chest w/o contrast  - Pulmonary Function Test  - CBC with platelets differential  - benzonatate (TESSALON) 100 MG capsule  Dispense: 15 capsule; Refill: 0    * * * CT abnormal.  Elevated WBC with left shift.  Will rx amp/sb and refer to pulmonary.    Other emphysema (H)  - General PFT Lab (Please always keep checked)  - Pulmonary Function Test    Skin lesion  Potential basal cell on chest, recommended she see dermatology to consider biopsy.   - DERMATOLOGY REFERRAL     Follow-up: Return in about 2 weeks (around 11/29/2018) for cough.         Scribe Disclosure:   JUANCARLOS, Aminata Aguilar, am serving as a scribe to document services personally  performed by Marii Montgomery MD at this visit, based upon the provider's statements to me. All documentation has been reviewed by the aforementioned provider prior to being entered into the official medical record.     Portions of this medical record were completed by a scribe. UPON MY REVIEW AND AUTHENTICATION BY ELECTRONIC SIGNATURE, this confirms (a) I performed the applicable clinical services, and (b) the record is accurate.     Marii Montgomery M.D.  Internal Medicine  [facility] Center   pager [phone]

## 2018-11-15 NOTE — PATIENT INSTRUCTIONS
[facility] Medication Refill Request Information:  * Please contact your pharmacy regarding ANY request for medication refills.  ** [facility] Prescription Fax = [phone]  * Please allow 3 business days for routine medication refills.  * Please allow 5 business days for controlled substance medication refills.     [facility] Test Result notification information:  *You will be notified with in 7-10 days of your appointment day regarding the results of your test.  If you are on MyChart you will be notified as soon as the provider has reviewed the results and signed off on them.    [facility]: [phone]

## 2018-11-15 NOTE — MR AVS SNAPSHOT
After Visit Summary   11/15/2018    Lucie Stockton    MRN: [id number]           Patient Information     Date Of Birth          1935        Visit Information        Provider Department      11/15/2018 9:00 AM Marii Montgomery MD [facility]        Today's Diagnoses     Chronic cough    -  1    Pulmonary nodules        Other emphysema (H)        Skin lesion          Care Instructions    Primary Care Center Medication Refill Request Information:  * Please contact your pharmacy regarding ANY request for medication refills.  ** [facility] Fax = [phone]  * Please allow 3 business days for routine medication refills.  * Please allow 5 business days for controlled substance medication refills.     Primary Care Center Test Result notification information:  *You will be notified with in 7-10 days of your appointment day regarding the results of your test.  If you are on MyChart you will be notified as soon as the provider has reviewed the results and signed off on them.    [facility]: [phone]           Follow-ups after your visit        Additional Services     DERMATOLOGY REFERRAL       Your provider has referred you to: [facility]: Dermatology Clinic [facility] [phone]   [email or web address]     Please be aware that coverage of these services is subject to the terms and limitations of your health insurance plan.  Call member services at your health plan with any benefit or coverage questions.      Please bring the following with you to your appointment:    (1) Any X-Rays, CTs or MRIs which have been performed.  Contact the facility where they were done to arrange for  prior to your scheduled appointment.    (2) List of current medications  (3) This referral request   (4) Any documents/labs given to you for this referral                  Follow-up notes from your care team     Return in about 2 weeks (around 11/29/2018)  for cough.      Your next 10 appointments already scheduled     Nov 15, 2018 10:30 AM CST   LAB with  LAB   [facility] ([facility])    [address] [address] [phone]           Please do not eat 10-12 hours before your appointment if you are coming in fasting for labs on lipids, cholesterol, or glucose (sugar). This does not apply to pregnant women. Water, hot tea and black coffee (with nothing added) are okay. Do not drink other fluids, diet soda or chew gum.            Nov 15, 2018  8:00 PM CST   CT CHEST W/O CONTRAST with UCCT2   [facility] CT ([facility])    [address] [address] [phone]           How do I prepare for my exam? (Food and drink instructions) No Food and Drink Restrictions.  How do I prepare for my exam? (Other instructions) You do not need to do anything special to prepare for this exam. For a sinus scan: Use your nose spray (nasal decongestant spray) as directed.  What should I wear: Please wear loose clothing, such as a sweat suit or jogging clothes. Avoid snaps, zippers and other metal. We may ask you to undress and put on a hospital gown.  How long does the exam take: Most scans take less than 20 minutes.  What should I bring: Please bring any scans or X-rays taken at other hospitals, if similar tests were done. Also bring a list of your medicines, including vitamins, minerals and over-the-counter drugs. It is safest to leave personal items at home.  Do I need a : No  is needed.  What do I need to tell my doctor? Be sure to tell your doctor: * If you have any allergies. * If there s any chance you are pregnant. * If you are breastfeeding.  What should I do after the exam: No restrictions, You may resume normal activities.  What is this test: A CT (computed tomography) scan is a series of pictures that allows us to look inside your  body. The scanner creates images of the body in cross sections, much like slices of bread. This helps us see any problems more clearly.  Who should I call with questions: If you have any questions, please call the Imaging Department where you will have your exam. Directions, parking instructions, and other information is available on our website, [email or web address].            Nov 16, 2018 12:30 PM CST   (Arrive by 12:15 PM)   Return Visit with Debby Lu PA-C   [facility] Dermatology ([facility])    [address] [place] [address]  [address] [place] [id number]   [phone]            Nov 16, 2018  1:00 PM CST   FULL PULMONARY FUNCTION with  PFL B   [facility] Pulmonary Function Testing ([facility])    [address] [place] [address]   [phone]            Dec 03, 2018  8:40 AM CST   (Arrive by 8:25 AM)   ACUTE/CHRONIC SINGLE CONDITION with Marii Montgomery MD   [facility] ([facility])    [address] [place] [address]  [address] [facility] [address]   [phone]            Dec 07, 2018   Procedure with Faviola Cosby MD   [facility] ([facility] [place] [facility])    [address] [place] [address]  [address] [facility] [address]   [phone]           Located in the Clinics and Surgery Center at [address] [place] [address].   parking is very convenient and highly recommended.  is a $6 flat rate fee.  Both  and self parkers should enter the main arrival plaza from [facility]; parking attendants will direct you based on your parking preference.            Jul 16, 2019 10:00 AM CDT   US SUMAYA DOPPLER WITH EXERCISE BILATERAL with UCUSV2   [facility] [place] ([facility])    [address] [place] [address]  [address] [facility] [address] [phone]           How do I prepare for my exam? (Food and drink  instructions) No caffeine or tobacco for 1 hour prior to exam.  What should I wear: Wear comfortable clothes.  How long does the exam take: Most ultrasounds take 30 to 60 minutes.  What should I bring: Bring a list of your medicines, including vitamins, minerals and over-the-counter drugs. It is safest to leave personal items at home.  Do I need a :  No  is needed.  What do I need to tell my doctor: Tell your doctor about any allergies you may have.  What should I do after the exam: No restrictions, You may resume normal activities.  What is this test: An ultrasound uses sound waves to make pictures of the body. Sound waves do not cause pain. The only discomfort may be the pressure of the wand against your skin or full bladder.  Who should I call with questions: If you have any questions, please call the Imaging Department where you will have your exam. Directions, parking instructions, and other information is available on our website, [email or web address].            Jul 16, 2019 11:00 AM CDT   (Arrive by 10:45 AM)   Return Vascular Visit with ALEKSEY Minor [facility] ([facility])    [address] [place] [address]   [phone]            Jul 23, 2019  9:00 AM CDT   (Arrive by 8:45 AM)   Return Visit with ALEKSEY Vasquez [facility])    [address] [place] [address]   [phone]              Future tests that were ordered for you today     Open Future Orders        Priority Expected Expires Ordered    CT Chest w/o contrast Routine  11/15/2019 11/15/2018    General PFT Lab (Please always keep checked) Routine  11/15/2019 11/15/2018    Pulmonary Function Test Routine  11/15/2019 11/15/2018    CBC with platelets differential Routine 11/15/2018 11/29/2018 11/15/2018            Who to contact     Please call your clinic at  [phone] to:    Ask questions about your health    Make or cancel appointments    Discuss your medicines    Learn about your test results    Speak to your doctor            Additional Information About Your Visit        [employer] Information     [facility] gives you secure access to your electronic health record. If you see a primary care provider, you can also send messages to your care team and make appointments. If you have questions, please call your primary care clinic.  If you do not have a primary care provider, please call [phone] and they will assist you.      MyMedMatch is an electronic gateway that provides easy, online access to your medical records. With MyMedMatch, you can request a clinic appointment, read your test results, renew a prescription or communicate with your care team.     To access your existing account, please contact your [facility] or call [phone] for assistance.        Care EveryWhere ID     This is your Care EveryWhere ID. This could be used by other organizations to access your [place] medical records  [id number]        Your Vitals Were     Pulse Respirations Pulse Oximetry BMI (Body Mass Index)          84 20 95% 20.35 kg/m2         Blood Pressure from Last 3 Encounters:   11/15/18 130/66   11/07/18 119/55   10/23/18 134/66    Weight from Last 3 Encounters:   11/15/18 47.3 kg (104 lb 3.2 oz)   11/07/18 46.7 kg (103 lb)   10/17/18 47.1 kg (103 lb 14.4 oz)              We Performed the Following     DERMATOLOGY REFERRAL          Today's Medication Changes          These changes are accurate as of 11/15/18 10:12 AM.  If you have any questions, ask your nurse or doctor.               Start taking these medicines.        Dose/Directions    benzonatate 100 MG capsule   Commonly known as:  TESSALON   Used for:  Chronic cough   Started by:  Marii Montgomery MD        Dose:  100 mg   Take 1 capsule (100 mg) by mouth 3 times daily as needed for cough    Quantity:  15 capsule   Refills:  0            Where to get your medicines      These medications were sent to [facility] - [place], [place] - [facility] AT [facility] [address], [facility] [address]     Phone:  [phone]     benzonatate 100 MG capsule                Primary Care Provider Office Phone # Fax #    Marii Montgomery [phone]       [address] [place] [address] [address] [place] [address]        Equal Access to Services     PAL SALGADO : Hadii aad ku hadasho Soomaali, waaxda luqadaha, qaybta kaalmada adeegyada, waxay idiin hayaan allen bernal . So [facility] [phone].    ATENCIÓN: Si habla español, tiene a schwartz disposición servicios gratuitos de asistencia lingüística. [facility] [phone].    We comply with applicable federal civil rights laws and [place] laws. We do not discriminate on the basis of race, color, national origin, age, disability, sex, sexual orientation, or gender identity.            Thank you!     Thank you for choosing [facility]  for your care. Our goal is always to provide you with excellent care. Hearing back from our patients is one way we can continue to improve our services. Please take a few minutes to complete the written survey that you may receive in the mail after your visit with us. Thank you!             Your Updated Medication List - Protect others around you: Learn how to safely use, store and throw away your medicines at www.disposemymeds.org.          This list is accurate as of 11/15/18 10:12 AM.  Always use your most recent med list.                   Brand Name Dispense Instructions for use Diagnosis    acetaminophen 325 MG tablet    TYLENOL    100 tablet    Take 2 tablets every 6 to 8 hours as needed for pain    Chronic low back pain, unspecified back pain laterality, with sciatica presence unspecified, Contusion of right hip, initial encounter, Contusion of right knee, initial  encounter       amLODIPine 10 MG tablet    NORVASC    90 tablet    Take 1 tablet (10 mg) by mouth daily For blood pressure    Essential hypertension       atorvastatin 40 MG tablet    LIPITOR    90 tablet    Take 2 tablets (80 mg) by mouth daily    Hyperlipidemia, unspecified hyperlipidemia type       benzonatate 100 MG capsule    TESSALON    15 capsule    Take 1 capsule (100 mg) by mouth 3 times daily as needed for cough    Chronic cough       bisacodyl 10 MG Suppository    DULCOLAX    90 suppository    Place 1 suppository (10 mg) rectally daily as needed for constipation    Constipation, unspecified constipation type       CALCIUM 600 + D PO      Take 1 tablet by mouth daily.        citalopram 20 MG tablet    celeXA    90 tablet    Take 1 tablet (20 mg) by mouth daily    Anxiety       clopidogrel 75 MG tablet    PLAVIX    90 tablet    Take 1 tablet (75 mg) by mouth daily    Venous thrombosis of extremity       cyanocolbalamin 500 MCG tablet    vitamin  B-12    90 tablet    Take 1 tablet (500 mcg) by mouth daily    Vitamin B12 deficiency (non anemic)       ferrous sulfate 325 (65 Fe) MG tablet    IRON    90 tablet    Take 1 tablet (325 mg) by mouth daily (with breakfast)    Other iron deficiency anemias       fluticasone 50 MCG/ACT spray    FLONASE    1 Bottle    Spray 2 sprays into both nostrils daily    Seasonal allergic rhinitis, unspecified trigger       gabapentin 100 MG capsule    NEURONTIN          hydrochlorothiazide 50 MG tablet    HYDRODIURIL    90 tablet    Take 1 tablet (50 mg) by mouth daily    Benign essential hypertension       levothyroxine 75 MCG tablet    SYNTHROID/LEVOTHROID    90 tablet    Take 1 tablet (75 mcg) by mouth daily    Hypothyroidism, unspecified type       lisinopril 40 MG tablet    PRINIVIL/ZESTRIL    90 tablet    Take 1 tablet (40 mg) by mouth daily For blood pressure    Essential hypertension       multivitamin Tabs tablet      Take 1 tablet by mouth daily        naproxen sodium  220 MG capsule     60 capsule    Take 220 mg by mouth 2 times daily (with meals)    Chronic low back pain, unspecified back pain laterality, with sciatica presence unspecified, Pain of left lower leg       pantoprazole 40 MG EC tablet    PROTONIX    90 tablet    Take 1 tablet (40 mg) by mouth daily    Long term current use of anticoagulant       potassium chloride SA 20 MEQ CR tablet    K-DUR/KLOR-CON M    90 tablet    Take 1 tablet (20 mEq) by mouth daily    Hypopotassemia       VITAMIN D3 PO      Take by mouth daily

## 2018-11-15 NOTE — NURSING NOTE
Chief Complaint   Patient presents with     Cough     follow up cough ( clear sputum)   Sherice Beauchamp LPN 8:48 AM on 11/15/2018  Rooming Note  Blood Pressure   BP Readings from Last 1 Encounters:   11/15/18 162/69    Single BP recheck started, 8:49 AM (4 minutes)  Sherice Beauchamp LPN 8:49 AM on 11/15/2018

## 2018-11-16 ENCOUNTER — OFFICE VISIT (OUTPATIENT)
Dept: DERMATOLOGY | Facility: CLINIC | Age: 83
End: 2018-11-16
Payer: MEDICARE

## 2018-11-16 DIAGNOSIS — J43.8 OTHER EMPHYSEMA (H): ICD-10-CM

## 2018-11-16 DIAGNOSIS — R05.3 CHRONIC COUGH: ICD-10-CM

## 2018-11-16 DIAGNOSIS — Z85.828 HISTORY OF BASAL CELL CARCINOMA: ICD-10-CM

## 2018-11-16 DIAGNOSIS — D36.10 NEUROFIBROMA: Primary | ICD-10-CM

## 2018-11-16 ASSESSMENT — PAIN SCALES - GENERAL: PAINLEVEL: NO PAIN (0)

## 2018-11-16 NOTE — LETTER
11/16/2018       RE: Lucie Stockton  [address] [facility] [address]     Dear Colleague,    Thank you for referring your patient, Lucie Stockton, to the [facility] DERMATOLOGY at [facility]. Please see a copy of my visit note below.    [facility] Dermatology Note      Dermatology Problem List:  1. 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 Neurofibroma(s)    Encounter Date: Nov 16, 2018    CC:  Chief Complaint   Patient presents with     Skin Check     Lucie is here today to have a spot on her chest looked at.          History of Present Illness:  Ms. Lucie Stockton is a 83 year old female who presents as a follow-up for a lesion of concern. The patient was last seen by Dr. Pena for a full body skin examination on 07/31/18. She has a hx of pigmented BCC on the left abdomen which was removed via ED&C on 1/16/18. Today the patient states that her PCP, Dr. Marii Montgomery,  pointed out a spot on the chest and she would like to have it checked to make sure that it is benign. She has no other concerns today. The patient denies painful, itching, tingling or bleeding lesions unless otherwise noted.      Past Medical History:   Patient Active Problem List   Diagnosis     Benign ovarian tumor     Hypothyroidism     Peripheral vascular disease (H)     Knee pain     Osteoarthritis     Cervicalgia     Hyperlipidemia with target LDL less than 70     Pulmonary embolism (H)     Anemia     Aortic stenosis     Atherosclerosis of aorta (H)     Atherosclerosis of arteries of extremities (H)     Intermittent claudication (H)     Iron deficiency     Osteoporosis     DVT of lower extremity, bilateral (H)     Varicose veins     Gluteal pain     Insomnia     Back pain     Vitamin D deficiency     Tobacco use disorder     Personal history of other drug therapy     Right knee pain     Venous thrombosis of extremity     Benign  "essential hypertension     Gastritis     Cataract     Acute left-sided low back pain with left-sided sciatica     Lumbar stenosis with neurogenic claudication     SBO (small bowel obstruction) (H)     Small bowel obstruction (H)     Long term current use of anticoagulant therapy     Left-sided low back pain with left-sided sciatica     Moderate major depression (H)     Anxiety     Neoplasm of uncertain behavior of skin     Cherry angioma     BCC (basal cell carcinoma), trunk     Weakness     Imbalance     Chronic pain     History of nonmelanoma skin cancer     Multiple melanocytic nevi     Age-related osteoporosis without current pathological fracture     Past Medical History:   Diagnosis Date     Anemia      Aortic stenosis     abdominal     Atherosclerosis of aorta (H)     \"extensive disease with near occlusion below level of renal arteries\" on CT     Atherosclerosis of arteries of extremities (H)      Back pain     severe multilevel DJD, moderate spinal canal stenosis L4-5, severe L3-4     Chronic pain     Back, hips, knees, legs     Degenerative joint disease      DVT of lower extremity, bilateral (H)     5/24/10 left distal femoral and great saphenous, 7/7/10 left:  extending to cfv, iliac , pop and post tibial, peronial, right:  distal fem and peroneal      Grave's disease      Hyperlipidaemia LDL goal < 70      Hypertension, essential      Hypothyroidism      Imbalance      Insomnia      Intermittent claudication (H)      Iron deficiency      Knee pain      Lumbar stenosis with neurogenic claudication      Osteoarthritis     ankle,foot, knee     Osteoporosis      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 JONO Goncalves - Returned BENIGN     INJECT EPIDURAL LUMBAR / SACRAL SINGLE N/A 1/5/2018    Procedure: INJECT EPIDURAL LUMBAR / SACRAL SINGLE;  lumbar interlaminar epidural steroid injection;  Surgeon: Jaylin Valadez MD;  Location: [facility] OR     INJECT SACROILIAC JOINT Right 3/7/2018    Procedure: INJECT SACROILIAC JOINT;  Right Sacroiliac Joint Injection;  Surgeon: Flavio Harrison MD;  Location: [facility] OR     UPPER GI ENDOSCOPY      11/10/10 erythematous gastropathy, angioectasia       Social History:   reports that she quit smoking about 25 years ago. She has a 7.50 pack-year smoking history. She has never used smokeless tobacco. She reports that she drinks alcohol. She reports that she does not use illicit drugs.    Family History:  Family History   Problem Relation Age of Onset     Breast Cancer Maternal Aunt 80     C.A.D. Father 54       Medications:  Current Outpatient Prescriptions   Medication Sig Dispense Refill     acetaminophen (TYLENOL) 325 MG tablet Take 2 tablets every 6 to 8 hours as needed for pain 100 tablet 1     amLODIPine (NORVASC) 10 MG tablet Take 1 tablet (10 mg) by mouth daily For blood pressure 90 tablet 3     amoxicillin-clavulanate (AUGMENTIN) 500-125 MG per tablet Take 1 tablet by mouth 2 times daily for 10 days 20 tablet 0     atorvastatin (LIPITOR) 40 MG tablet Take 2 tablets (80 mg) by mouth daily 90 tablet 3     benzonatate (TESSALON) 100 MG capsule Take 1 capsule (100 mg) by mouth 3 times daily as needed for cough 15 capsule 0     bisacodyl (DULCOLAX) 10 MG Suppository Place 1 suppository (10 mg) rectally daily as needed for constipation 90 suppository 3     Calcium Carbonate-Vitamin D (CALCIUM 600 + D OR) Take 1 tablet by mouth daily.        Cholecalciferol (VITAMIN D3 PO) Take by mouth daily       citalopram (CELEXA) 20 MG tablet Take 1 tablet (20 mg) by mouth daily 90 tablet 3     clopidogrel (PLAVIX) 75 MG tablet Take 1 tablet (75 mg) by mouth daily 90 tablet 3     cyanocolbalamin (VITAMIN  B-12) 500 MCG tablet Take 1 tablet (500 mcg) by mouth daily 90 tablet 1     ferrous sulfate (IRON) 325 (65 FE) MG tablet Take 1 tablet (325 mg) by mouth daily (with breakfast) 90 tablet 3     fluticasone (FLONASE) 50 MCG/ACT spray Spray 2 sprays into both nostrils daily 1 Bottle 1     gabapentin (NEURONTIN) 100 MG capsule   1     hydrochlorothiazide (HYDRODIURIL) 50 MG tablet Take 1 tablet (50 mg) by mouth daily 90 tablet 3     levothyroxine (SYNTHROID/LEVOTHROID) 75 MCG tablet Take 1 tablet (75 mcg) by mouth daily 90 tablet 3     lisinopril (PRINIVIL/ZESTRIL) 40 MG tablet Take 1 tablet (40 mg) by mouth daily For blood pressure 90 tablet 3     multivitamin (OCUVITE) TABS tablet Take 1 tablet by mouth daily       naproxen sodium 220 MG capsule Take 220 mg by mouth 2 times daily (with meals) 60 capsule 2     pantoprazole (PROTONIX) 40 MG EC tablet Take 1 tablet (40 mg) by mouth daily 90 tablet 3     potassium chloride SA (K-DUR/KLOR-CON M) 20 MEQ CR tablet Take 1 tablet (20 mEq) by mouth daily 90 tablet 3     [DISCONTINUED] tolterodine (DETROL) 1 MG tablet Take 1-2 tablets by mouth At Bedtime. 180 tablet 3       No Known Allergies    Review of Systems:  -Constitutional: The patient denies fatigue, fevers, chills, unintended weight loss, and night sweats.  -Skin: As above in HPI. No additional skin concerns.    Physical exam:  Vitals: There were no vitals taken for this visit.  GEN: This is a well developed, well-nourished female in no acute distress, in a pleasant mood.    SKIN: Focused examination of the face, neck and chest  was performed.  - 6 mm fleshy pink papule on the left mid chest, consistent with a neurofibiroma   -No other lesions of concern on areas  examined.       Impression/Plan:  1. History of NMSC - pigmented BCC on abdomen    Discussed having a full body skin check in the next 2 months as her last one was in July 2018    2. Neurofibroma - chest    Reassured the patient that this is benign and harmless. May be removed if becomes symptomatic.    No further intervention required. Patient to report changes.     CC Dr. Marii Montgomery and Dr. Webb on close of this encounter.  Follow-up in 2 months, earlier for new or changing lesions.       Staff Involved:    Scribe Disclosure  I, Donna Bailey, am serving as a scribe to document services personally performed by Debby Lu PA-C, based on data collection and the provider's statements to me.     Provider Disclosure:   The documentation recorded by the scribe accurately reflects the services I personally performed and the decisions made by me.    All risks, benefits and alternatives were discussed with patient.  Patient is in agreement and understands the assessment and plan.  All questions were answered.    Debby Lu PA-C  [facility]: Phone: [phone], Fax: [phone]

## 2018-11-16 NOTE — MR AVS SNAPSHOT
After Visit Summary   11/16/2018    Lucie Stockton    MRN: [id number]           Patient Information     Date Of Birth          1935        Visit Information        Provider Department      11/16/2018 12:30 PM Debby Lu PA-C [facility] Dermatology        Today's Diagnoses     Neurofibroma    -  1    History of basal cell carcinoma           Follow-ups after your visit        Follow-up notes from your care team     Return in about 2 months (around 1/16/2019).      Your next 10 appointments already scheduled     Nov 21, 2018 11:20 AM CST   (Arrive by 11:05 AM)   NEW LUNG NODULE with Bob Randall MD   [facility] ([facility])    [address] [place] [address]  [address]  [facility] [id number] [phone]            Dec 03, 2018  8:40 AM CST   (Arrive by 8:25 AM)   ACUTE/CHRONIC SINGLE CONDITION with Marii Montgomery MD   [facility] ([facility])    [address] [place] [address]  4th Floor  [facility] [id number] [phone]            Dec 07, 2018   Procedure with Faviola Cosby MD   [facility] ([facility])    [address] [place] [address]  5th Floor  [facility] [id number] [phone]           Located in the Clinics and Surgery Center at [address] [place] [address].   parking is very convenient and highly recommended.  is a $6 flat rate fee.  Both  and self parkers should enter the main arrival plaza from [facility]; parking attendants will direct you based on your parking preference.            Jul 16, 2019 10:00 AM CDT   US SUMAYA DOPPLER WITH EXERCISE BILATERAL with UCUSV2   [facility] US ([facility])    [address] [place] [address]  1st Floor  [facility] [id number] [phone]           How do I prepare for my exam? (Food and drink instructions) No caffeine or tobacco for 1 hour prior to  exam.  What should I wear: Wear comfortable clothes.  How long does the exam take: Most ultrasounds take 30 to 60 minutes.  What should I bring: Bring a list of your medicines, including vitamins, minerals and over-the-counter drugs. It is safest to leave personal items at home.  Do I need a :  No  is needed.  What do I need to tell my doctor: Tell your doctor about any allergies you may have.  What should I do after the exam: No restrictions, You may resume normal activities.  What is this test: An ultrasound uses sound waves to make pictures of the body. Sound waves do not cause pain. The only discomfort may be the pressure of the wand against your skin or full bladder.  Who should I call with questions: If you have any questions, please call the Imaging Department where you will have your exam. Directions, parking instructions, and other information is available on our website, [email or web address].            Jul 16, 2019 11:00 AM CDT   (Arrive by 10:45 AM)   Return Vascular Visit with ALEKSEY Minor [facility] ([facility])    [address] [place] [address] [phone]            Jul 23, 2019  9:00 AM CDT   (Arrive by 8:45 AM)   Return Visit with ALEKSEY Vasquez [facility] Neurology ([facility])    [address] [place] [address] [phone]              Who to contact     Please call your clinic at [phone] to:    Ask questions about your health    Make or cancel appointments    Discuss your medicines    Learn about your test results    Speak to your doctor            Additional Information About Your Visit        MyChart Information     [employer] gives you secure access to your electronic health record. If you see a primary care provider, you can also send messages to your care team and make appointments. If you have questions, please  call your primary care clinic.  If you do not have a primary care provider, please call [phone] and they will assist you.      [employer] is an electronic gateway that provides easy, online access to your medical records. With [employer], you can request a clinic appointment, read your test results, renew a prescription or communicate with your care team.     To access your existing account, please contact your [facility] or call [phone] for assistance.        Care EveryWhere ID     This is your Care EveryWhere ID. This could be used by other organizations to access your [place] medical records  [id number]         Blood Pressure from Last 3 Encounters:   11/15/18 130/66   11/07/18 119/55   10/23/18 134/66    Weight from Last 3 Encounters:   11/15/18 47.3 kg (104 lb 3.2 oz)   11/07/18 46.7 kg (103 lb)   10/17/18 47.1 kg (103 lb 14.4 oz)              Today, you had the following     No orders found for display       Primary Care Provider Office Phone # Fax #    Marii Montgomery [phone]       [address] [place] [address] [phone]  [facility] [address]        Equal Access to Services     PAL SALGADO AH: Hadii reyna nevarezo Soalysia, waaxda luqadaha, qaybta kaalmada adeegyada, jackeline izquierdo. So [facility] [phone].    ATENCIÓN: Si habla español, tiene a schwartz disposición servicios gratuitos de asistencia lingüística. Llame al [phone].    We comply with applicable federal civil rights laws and [place] laws. We do not discriminate on the basis of race, color, national origin, age, disability, sex, sexual orientation, or gender identity.            Thank you!     Thank you for choosing [facility]  for your care. Our goal is always to provide you with excellent care. Hearing back from our patients is one way we can continue to improve our services. Please take a few minutes to complete the written survey that you may receive in the mail  after your visit with us. Thank you!             Your Updated Medication List - Protect others around you: Learn how to safely use, store and throw away your medicines at www.disposemymeds.org.          This list is accurate as of 11/16/18 11:59 PM.  Always use your most recent med list.                   Brand Name Dispense Instructions for use Diagnosis    acetaminophen 325 MG tablet    TYLENOL    100 tablet    Take 2 tablets every 6 to 8 hours as needed for pain    Chronic low back pain, unspecified back pain laterality, with sciatica presence unspecified, Contusion of right hip, initial encounter, Contusion of right knee, initial encounter       amLODIPine 10 MG tablet    NORVASC    90 tablet    Take 1 tablet (10 mg) by mouth daily For blood pressure    Essential hypertension       amoxicillin-clavulanate 500-125 MG per tablet    AUGMENTIN    20 tablet    Take 1 tablet by mouth 2 times daily for 10 days    Chronic cough       atorvastatin 40 MG tablet    LIPITOR    90 tablet    Take 2 tablets (80 mg) by mouth daily    Hyperlipidemia, unspecified hyperlipidemia type       benzonatate 100 MG capsule    TESSALON    15 capsule    Take 1 capsule (100 mg) by mouth 3 times daily as needed for cough    Chronic cough       bisacodyl 10 MG Suppository    DULCOLAX    90 suppository    Place 1 suppository (10 mg) rectally daily as needed for constipation    Constipation, unspecified constipation type       CALCIUM 600 + D PO      Take 1 tablet by mouth daily.        citalopram 20 MG tablet    celeXA    90 tablet    Take 1 tablet (20 mg) by mouth daily    Anxiety       clopidogrel 75 MG tablet    PLAVIX    90 tablet    Take 1 tablet (75 mg) by mouth daily    Venous thrombosis of extremity       cyanocolbalamin 500 MCG tablet    vitamin  B-12    90 tablet    Take 1 tablet (500 mcg) by mouth daily    Vitamin B12 deficiency (non anemic)       ferrous sulfate 325 (65 Fe) MG tablet    IRON    90 tablet    Take 1 tablet (325 mg)  by mouth daily (with breakfast)    Other iron deficiency anemias       fluticasone 50 MCG/ACT spray    FLONASE    1 Bottle    Spray 2 sprays into both nostrils daily    Seasonal allergic rhinitis, unspecified trigger       gabapentin 100 MG capsule    NEURONTIN          hydrochlorothiazide 50 MG tablet    HYDRODIURIL    90 tablet    Take 1 tablet (50 mg) by mouth daily    Benign essential hypertension       levothyroxine 75 MCG tablet    SYNTHROID/LEVOTHROID    90 tablet    Take 1 tablet (75 mcg) by mouth daily    Hypothyroidism, unspecified type       lisinopril 40 MG tablet    PRINIVIL/ZESTRIL    90 tablet    Take 1 tablet (40 mg) by mouth daily For blood pressure    Essential hypertension       multivitamin Tabs tablet      Take 1 tablet by mouth daily        naproxen sodium 220 MG capsule     60 capsule    Take 220 mg by mouth 2 times daily (with meals)    Chronic low back pain, unspecified back pain laterality, with sciatica presence unspecified, Pain of left lower leg       pantoprazole 40 MG EC tablet    PROTONIX    90 tablet    Take 1 tablet (40 mg) by mouth daily    Long term current use of anticoagulant       potassium chloride SA 20 MEQ CR tablet    K-DUR/KLOR-CON M    90 tablet    Take 1 tablet (20 mEq) by mouth daily    Hypopotassemia       VITAMIN D3 PO      Take by mouth daily

## 2018-11-16 NOTE — NURSING NOTE
Dermatology Rooming Note    Lucie Stockton's goals for this visit include:   Chief Complaint   Patient presents with     Skin Check     Lucie is here today to have a spot on her chest looked at.      ANGELINA France

## 2018-11-17 NOTE — TELEPHONE ENCOUNTER
RECORDS STATUS - ALL OTHER DIAGNOSIS      RECORDS RECEIVED FROM: IN [facility]   DATE RECEIVED: 11/17/18   NOTES STATUS DETAILS   OFFICE NOTE from referring provider Complete In Epic   OFFICE NOTE from medical oncologist     DISCHARGE SUMMARY from hospital     DISCHARGE REPORT from the ER     OPERATIVE REPORT     MEDICATION LIST     CLINICAL TRIAL TREATMENTS TO DATE     LABS     PATHOLOGY REPORTS     ANYTHING RELATED TO DIAGNOSIS     GENONOMIC TESTING     TYPE:     IMAGING (NEED IMAGES & REPORT)     CT SCANS Complete 11/15/18 In PACS   MRI     MAMMO     ULTRASOUND     PET

## 2018-11-21 ENCOUNTER — PRE VISIT (OUTPATIENT)
Dept: PULMONOLOGY | Facility: CLINIC | Age: 83
End: 2018-11-21

## 2018-11-21 ENCOUNTER — OFFICE VISIT (OUTPATIENT)
Dept: PULMONOLOGY | Facility: CLINIC | Age: 83
End: 2018-11-21
Attending: INTERNAL MEDICINE
Payer: MEDICARE

## 2018-11-21 VITALS
SYSTOLIC BLOOD PRESSURE: 145 MMHG | OXYGEN SATURATION: 96 % | HEART RATE: 76 BPM | HEIGHT: 58 IN | BODY MASS INDEX: 22.25 KG/M2 | DIASTOLIC BLOOD PRESSURE: 67 MMHG | RESPIRATION RATE: 14 BRPM | TEMPERATURE: 97.4 F | WEIGHT: 106 LBS

## 2018-11-21 DIAGNOSIS — R91.8 PULMONARY NODULES: ICD-10-CM

## 2018-11-21 DIAGNOSIS — R05.3 CHRONIC COUGH: ICD-10-CM

## 2018-11-21 PROCEDURE — G0463 HOSPITAL OUTPT CLINIC VISIT: HCPCS | Mod: ZF

## 2018-11-21 RX ORDER — AMOXICILLIN AND CLAVULANATE POTASSIUM 500; 125 MG/1; MG/1
1 TABLET, FILM COATED ORAL 2 TIMES DAILY
Qty: 20 TABLET | Refills: 0 | Status: SHIPPED | OUTPATIENT
Start: 2018-11-21 | End: 2019-10-16

## 2018-11-21 ASSESSMENT — PAIN SCALES - GENERAL: PAINLEVEL: MODERATE PAIN (5)

## 2018-11-21 NOTE — MR AVS SNAPSHOT
After Visit Summary   11/21/2018    Lucie Stockton    MRN: [id number]           Patient Information     Date Of Birth          1935        Visit Information        Provider Department      11/21/2018 11:20 AM Bob Randall MD [facility]        Today's Diagnoses     Chronic cough        Pulmonary nodules           Follow-ups after your visit        Your next 10 appointments already scheduled     Dec 03, 2018  8:40 AM CST   (Arrive by 8:25 AM)   ACUTE/CHRONIC SINGLE CONDITION with Marii Montgomery MD   [facility] ([facility])    [address] [place] [address]  4th [facility] [id number] [phone]            Dec 07, 2018   Procedure with Faviola Cosby MD   [facility] ([facility] [place] [facility])    [address] [place] [address]  5th Floor  [facility] [id number] [phone]           Located in the Clinics and Surgery Center at [address] [place] [address].   parking is very convenient and highly recommended.  is a $6 flat rate fee.  Both  and self parkers should enter the main arrival plaza from [facility]; parking attendants will direct you based on your parking preference.            Jan 09, 2019  8:00 AM CST   CT CHEST W/O CONTRAST with UCCT1   [facility] [place] [facility] ([facility])    [address] [place] [address]  1st Floor  [facility] [id number] [phone]           How do I prepare for my exam? (Food and drink instructions) No Food and Drink Restrictions.  How do I prepare for my exam? (Other instructions) You do not need to do anything special to prepare for this exam. For a sinus scan: Use your nose spray (nasal decongestant spray) as directed.  What should I wear: Please wear loose clothing, such as a sweat suit or jogging clothes. Avoid snaps, zippers and other metal. We may ask you to undress and put on a hospital gown.  How  long does the exam take: Most scans take less than 20 minutes.  What should I bring: Please bring any scans or X-rays taken at other hospitals, if similar tests were done. Also bring a list of your medicines, including vitamins, minerals and over-the-counter drugs. It is safest to leave personal items at home.  Do I need a : No  is needed.  What do I need to tell my doctor? Be sure to tell your doctor: * If you have any allergies. * If there s any chance you are pregnant. * If you are breastfeeding.  What should I do after the exam: No restrictions, You may resume normal activities.  What is this test: A CT (computed tomography) scan is a series of pictures that allows us to look inside your body. The scanner creates images of the body in cross sections, much like slices of bread. This helps us see any problems more clearly.  Who should I call with questions: If you have any questions, please call the Imaging Department where you will have your exam. Directions, parking instructions, and other information is available on our website, [email or web address].            Jan 09, 2019  8:40 AM CST   (Arrive by 8:25 AM)   Return Visit with Bob Randall MD   [facility] ([facility])    [address]  [address]  [facility] [id number] [phone]            Jul 16, 2019 10:00 AM CDT   US SUMAYA DOPPLER WITH EXERCISE BILATERAL with UCUSV2   [facility] ([facility])    [address]  1st Floor  [facility] [phone] [phone]           How do I prepare for my exam? (Food and drink instructions) No caffeine or tobacco for 1 hour prior to exam.  What should I wear: Wear comfortable clothes.  How long does the exam take: Most ultrasounds take 30 to 60 minutes.  What should I bring: Bring a list of your medicines, including vitamins, minerals and over-the-counter drugs. It is safest to leave personal  items at home.  Do I need a :  No  is needed.  What do I need to tell my doctor: Tell your doctor about any allergies you may have.  What should I do after the exam: No restrictions, You may resume normal activities.  What is this test: An ultrasound uses sound waves to make pictures of the body. Sound waves do not cause pain. The only discomfort may be the pressure of the wand against your skin or full bladder.  Who should I call with questions: If you have any questions, please call the Imaging Department where you will have your exam. Directions, parking instructions, and other information is available on our website, [email or web address].            Jul 16, 2019 11:00 AM CDT   (Arrive by 10:45 AM)   Return Vascular Visit with ALEKSEY Minor   [facility] ([facility])    [address] [place] [address] [phone]            Jul 23, 2019  9:00 AM CDT   (Arrive by 8:45 AM)   [facility] ([facility])    [address] [place] [address] [phone]              Future tests that were ordered for you today     Open Future Orders        Priority Expected Expires Ordered    CT Chest w/o Contrast Routine 1/2/2019 11/21/2019 11/21/2018            Who to contact     If you have questions or need follow up information about today's clinic visit or your schedule please contact [facility] directly at [phone].  Normal or non-critical lab and imaging results will be communicated to you by MyChart, letter or phone within 4 business days after the clinic has received the results. If you do not hear from us within 7 days, please contact the clinic through MyChart or phone. If you have a critical or abnormal lab result, we will notify you by phone as soon as possible.  Submit refill requests  "through [employer] or call your pharmacy and they will forward the refill request to us. Please allow 3 business days for your refill to be completed.          Additional Information About Your Visit        [employer] Information     [employer] gives you secure access to your electronic health record. If you see a primary care provider, you can also send messages to your care team and make appointments. If you have questions, please call your primary care clinic.  If you do not have a primary care provider, please call [phone] and they will assist you.        Care EveryWhere ID     This is your Care EveryWhere ID. This could be used by other organizations to access your [place] medical records  [id number]        Your Vitals Were     Pulse Temperature Respirations Height Pulse Oximetry BMI (Body Mass Index)    76 97.4  F (36.3  C) (Oral) 14 1.48 m (4' 10.25\") 96% 21.96 kg/m2       Blood Pressure from Last 3 Encounters:   11/21/18 145/67   11/15/18 130/66   11/07/18 119/55    Weight from Last 3 Encounters:   11/21/18 48.1 kg (106 lb)   11/15/18 47.3 kg (104 lb 3.2 oz)   11/07/18 46.7 kg (103 lb)                 Where to get your medicines      These medications were sent to [facility] [address] [place] [address] [address] AT [place] [address]  [facility], [facility] [address]     Phone:  [phone]     amoxicillin-clavulanate 500-125 MG per tablet          Primary Care Provider Office Phone # Fax #    Marii Montgomery [phone]       [address] [place] [address] [address] [place] [address]        Equal Access to Services     [facility] DAMIAN AH: Hadii reyna Gamboa, anu lushaliniadaha, qaybta kaalmada jackeline kirby. So [facility] [phone].    ATENCIÓN: Si habla español, tiene a schwartz disposición servicios gratuitos de asistencia lingüística. Llame al [phone].    We comply with applicable federal civil rights laws and [place] laws. We do " not discriminate on the basis of race, color, national origin, age, disability, sex, sexual orientation, or gender identity.            Thank you!     Thank you for choosing [facility]  for your care. Our goal is always to provide you with excellent care. Hearing back from our patients is one way we can continue to improve our services. Please take a few minutes to complete the written survey that you may receive in the mail after your visit with us. Thank you!             Your Updated Medication List - Protect others around you: Learn how to safely use, store and throw away your medicines at www.disposemymeds.org.          This list is accurate as of 11/21/18 11:57 AM.  Always use your most recent med list.                   Brand Name Dispense Instructions for use Diagnosis    acetaminophen 325 MG tablet    TYLENOL    100 tablet    Take 2 tablets every 6 to 8 hours as needed for pain    Chronic low back pain, unspecified back pain laterality, with sciatica presence unspecified, Contusion of right hip, initial encounter, Contusion of right knee, initial encounter       amLODIPine 10 MG tablet    NORVASC    90 tablet    Take 1 tablet (10 mg) by mouth daily For blood pressure    Essential hypertension       amoxicillin-clavulanate 500-125 MG per tablet    AUGMENTIN    20 tablet    Take 1 tablet by mouth 2 times daily    Chronic cough       atorvastatin 40 MG tablet    LIPITOR    90 tablet    Take 2 tablets (80 mg) by mouth daily    Hyperlipidemia, unspecified hyperlipidemia type       benzonatate 100 MG capsule    TESSALON    15 capsule    Take 1 capsule (100 mg) by mouth 3 times daily as needed for cough    Chronic cough       bisacodyl 10 MG Suppository    DULCOLAX    90 suppository    Place 1 suppository (10 mg) rectally daily as needed for constipation    Constipation, unspecified constipation type       CALCIUM 600 + D PO      Take 1 tablet by mouth daily.        citalopram 20 MG tablet     celeXA    90 tablet    Take 1 tablet (20 mg) by mouth daily    Anxiety       clopidogrel 75 MG tablet    PLAVIX    90 tablet    Take 1 tablet (75 mg) by mouth daily    Venous thrombosis of extremity       cyanocolbalamin 500 MCG tablet    vitamin  B-12    90 tablet    Take 1 tablet (500 mcg) by mouth daily    Vitamin B12 deficiency (non anemic)       ferrous sulfate 325 (65 Fe) MG tablet    IRON    90 tablet    Take 1 tablet (325 mg) by mouth daily (with breakfast)    Other iron deficiency anemias       fluticasone 50 MCG/ACT spray    FLONASE    1 Bottle    Spray 2 sprays into both nostrils daily    Seasonal allergic rhinitis, unspecified trigger       gabapentin 100 MG capsule    NEURONTIN          hydrochlorothiazide 50 MG tablet    HYDRODIURIL    90 tablet    Take 1 tablet (50 mg) by mouth daily    Benign essential hypertension       levothyroxine 75 MCG tablet    SYNTHROID/LEVOTHROID    90 tablet    Take 1 tablet (75 mcg) by mouth daily    Hypothyroidism, unspecified type       lisinopril 40 MG tablet    PRINIVIL/ZESTRIL    90 tablet    Take 1 tablet (40 mg) by mouth daily For blood pressure    Essential hypertension       multivitamin Tabs tablet      Take 1 tablet by mouth daily        naproxen sodium 220 MG capsule     60 capsule    Take 220 mg by mouth 2 times daily (with meals)    Chronic low back pain, unspecified back pain laterality, with sciatica presence unspecified, Pain of left lower leg       pantoprazole 40 MG EC tablet    PROTONIX    90 tablet    Take 1 tablet (40 mg) by mouth daily    Long term current use of anticoagulant       potassium chloride SA 20 MEQ CR tablet    K-DUR/KLOR-CON M    90 tablet    Take 1 tablet (20 mEq) by mouth daily    Hypopotassemia       VITAMIN D3 PO      Take by mouth daily

## 2018-11-21 NOTE — LETTER
"11/21/2018       RE: Lucie Stockton  [address] [facility] [address]     Dear Colleague,    Thank you for referring your patient, Lucie Stockton, to the [facility] at [facility]. Please see a copy of my visit note below.    [facility]  Lung Nodule Clinic Note  November 21, 2018    Chief complaint:  Lucie Stockton is a 83 year old female seen in the Pulmonary Clinic  for   Chief Complaint   Patient presents with     Oncology Clinic Visit     New patient; Lung Nodule       Assessment and Plan:  #1 new CT findings suggesting infection.  Patient has been on Augmentin for the last 7 days which I will extend the treatment to 20 days total and I prescribed her antibiotic to be picked up today.  We will repeat CT scan of the chest and first week of January 2019 when I will see her.  #2 right upper lobe pulmonary nodule it appears to be slightly enlarged when compared to previous CT scan from January 2018.  This nodule appears to have a slightly spiculated to my review.  In this nodule will require further workup.  #3 former smoker quit 3 years ago after smoking 40+ years pack a day.    History of Present Illness:  This is a 83 years old woman with abnormal CT chest and referred to our clinic for further evaluation.  She is currently treated with Augmentin for her productive cough which is clear mostly.  She denied having fever chills and chest pains.  She is a former smoker that her high risk for lung cancer.  She did have CT scan of the chest before as indicated above revealing spiculated pulmonary nodule measuring 8-9 mm in diameter and there is pleural tenting.     Exposure history: Asbestos;  No , TB;  No , Radiation;   No     No Known Allergies     Past Medical History:   Diagnosis Date     Anemia      Aortic stenosis     abdominal     Atherosclerosis of aorta (H)     \"extensive disease with near occlusion below level of renal arteries\" on CT     " Atherosclerosis of arteries of extremities (H)      Back pain     severe multilevel DJD, moderate spinal canal stenosis L4-5, severe L3-4     Chronic pain     Back, hips, knees, legs     Degenerative joint disease      DVT of lower extremity, bilateral (H)     5/24/10 left distal femoral and great saphenous, 7/7/10 left:  extending to cfv, iliac , pop and post tibial, peronial, right:  distal fem and peroneal      Grave's disease      Hyperlipidaemia LDL goal < 70      Hypertension, essential      Hypothyroidism      Imbalance      Insomnia      Intermittent claudication (H)      Iron deficiency      Knee pain      Lumbar stenosis with neurogenic claudication      Osteoarthritis     ankle,foot, knee     Osteoporosis      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 JONO Goncalves - Returned BENIGN     INJECT EPIDURAL LUMBAR / SACRAL SINGLE N/A 1/5/2018    Procedure: INJECT EPIDURAL LUMBAR / SACRAL SINGLE;  lumbar interlaminar epidural steroid injection;  Surgeon: Jaylin Valadez MD;  Location: [facility] OR     INJECT SACROILIAC JOINT Right 3/7/2018    Procedure: INJECT SACROILIAC JOINT;  Right Sacroiliac Joint Injection;  Surgeon: Flavio Harrison MD;  Location: [facility] OR     UPPER GI ENDOSCOPY      11/10/10 erythematous gastropathy, angioectasia        Social History     Social History     Marital status:      Spouse name: N/A     Number of children: N/A      Years of education: N/A     Occupational History     Not on file.     Social History Main Topics     Smoking status: Former Smoker     Packs/day: 0.50     Years: 15.00     Quit date: 9/13/1993     Smokeless tobacco: Never Used     Alcohol use Yes      Comment: social     Drug use: No     Sexual activity: No     Other Topics Concern     Not on file     Social History Narrative     for 52 years. Retired from [employer]. Frequent world travel.        Family History   Problem Relation Age of Onset     Breast Cancer Maternal Aunt 80     C.A.D. Father 54        Immunization History   Administered Date(s) Administered     HEPA 10/06/2003, 01/19/2007     Influenza (H1N1) 01/09/2010     Influenza (High Dose) 3 valent vaccine 08/18/2012, 09/02/2013, 08/30/2015, 09/03/2016, 09/16/2017, 09/29/2018     Influenza (IIV3) PF 10/26/2002, 11/05/2004, 10/11/2005, 11/07/2006, 09/05/2010, 09/04/2011, 08/20/2012, 09/02/2013, 09/19/2014     Pneumo Conj 13-V (2010&after) 12/09/2015     Pneumococcal 23 valent 12/31/2003, 02/20/2011     Poliovirus, inactivated (IPV) 10/06/2003     TD (ADULT, 7+) 10/06/2003     TDAP Vaccine (Boostrix) 01/06/2014     Typhoid IM 10/06/2003, 01/19/2007     Zoster vaccine recombinant adjuvanted (SHINGRIX) 08/17/2018, 10/17/2018     Zoster vaccine, live 02/28/2011       Current Outpatient Prescriptions   Medication Sig     acetaminophen (TYLENOL) 325 MG tablet Take 2 tablets every 6 to 8 hours as needed for pain     amLODIPine (NORVASC) 10 MG tablet Take 1 tablet (10 mg) by mouth daily For blood pressure     amoxicillin-clavulanate (AUGMENTIN) 500-125 MG per tablet Take 1 tablet by mouth 2 times daily     atorvastatin (LIPITOR) 40 MG tablet Take 2 tablets (80 mg) by mouth daily     benzonatate (TESSALON) 100 MG capsule Take 1 capsule (100 mg) by mouth 3 times daily as needed for cough     Calcium Carbonate-Vitamin D (CALCIUM 600 + D OR) Take 1 tablet by mouth daily.     Cholecalciferol (VITAMIN  D3 PO) Take by mouth daily     citalopram (CELEXA) 20 MG tablet Take 1 tablet (20 mg) by mouth daily     clopidogrel (PLAVIX) 75 MG tablet Take 1 tablet (75 mg) by mouth daily     cyanocolbalamin (VITAMIN  B-12) 500 MCG tablet Take 1 tablet (500 mcg) by mouth daily     ferrous sulfate (IRON) 325 (65 FE) MG tablet Take 1 tablet (325 mg) by mouth daily (with breakfast)     gabapentin (NEURONTIN) 100 MG capsule      hydrochlorothiazide (HYDRODIURIL) 50 MG tablet Take 1 tablet (50 mg) by mouth daily     levothyroxine (SYNTHROID/LEVOTHROID) 75 MCG tablet Take 1 tablet (75 mcg) by mouth daily     lisinopril (PRINIVIL/ZESTRIL) 40 MG tablet Take 1 tablet (40 mg) by mouth daily For blood pressure     multivitamin (OCUVITE) TABS tablet Take 1 tablet by mouth daily     naproxen sodium 220 MG capsule Take 220 mg by mouth 2 times daily (with meals)     pantoprazole (PROTONIX) 40 MG EC tablet Take 1 tablet (40 mg) by mouth daily     potassium chloride SA (K-DUR/KLOR-CON M) 20 MEQ CR tablet Take 1 tablet (20 mEq) by mouth daily     bisacodyl (DULCOLAX) 10 MG Suppository Place 1 suppository (10 mg) rectally daily as needed for constipation (Patient not taking: Reported on 11/21/2018)     fluticasone (FLONASE) 50 MCG/ACT spray Spray 2 sprays into both nostrils daily (Patient not taking: Reported on 11/21/2018)     [DISCONTINUED] tolterodine (DETROL) 1 MG tablet Take 1-2 tablets by mouth At Bedtime.     No current facility-administered medications for this visit.         Review of Systems:  I have done 10 points of review systems and pertinent findings are  ,otherwise negative.    Physical examination  Constitutional: Alert, oriented, not in distress  Vitals: B/P: 145/67, T: 97.4, P: 76, R: 14  Eyes: No icterus, nystagmus, pupils isocoric  ENT/Mouth:  No ear discharge, moist mucosa, no ulceration, tonsillar hypertrophy  Cardiovascular: Normal S1 and S2, no additional heart sounds, murmur, rub, normal peripheral pulses  Respiratory:  Both hemithoraces are symmetrical, normal to palpation, no dullness to percussion, auscultation of lungs revealed decreased bronchovesicular sounds, but no expirium prolongation, wheezing, rhonci and crackles  Gastrointestinal/Rectal: Bowel sounds present, soft, non tender, non distended, no organomegaly, ascitis, mass   Musculoskeletal: Normal muscle mass, no dephormity  Integumentary:  No rash, normal texture and turgor, no edema  Neurological: Alert, orientedx3, no motor deficits, cranial nerves grossly normal  Psychiatric:  Mood and affect are appropriate with insight into his/her medical condition  Hematologic/Immunologic/Lymphatic: No bruise, no lymph node enargement     Data:  Lab Results   Component Value Date    WBC 14.2 11/15/2018     Lab Results   Component Value Date    RBC 3.99 11/15/2018     Lab Results   Component Value Date    HGB 11.6 11/15/2018     Lab Results   Component Value Date    HCT 37.4 11/15/2018     Lab Results   Component Value Date    MCV 94 11/15/2018     Lab Results   Component Value Date    MCH 29.1 11/15/2018     Lab Results   Component Value Date    MCHC 31.0 11/15/2018     Lab Results   Component Value Date    RDW 13.8 11/15/2018     Lab Results   Component Value Date     11/15/2018       Lab Results   Component Value Date     01/30/2018      Lab Results   Component Value Date    POTASSIUM 3.8 01/30/2018     Lab Results   Component Value Date    CHLORIDE 102 01/30/2018     Lab Results   Component Value Date    CANDIDA 9.1 01/30/2018     Lab Results   Component Value Date    CO2 26 01/30/2018     Lab Results   Component Value Date    BUN 13 01/30/2018     Lab Results   Component Value Date    CR 0.50 01/30/2018     Lab Results   Component Value Date    GLC 93 01/30/2018       Thank you for allowing me participate in the care of Lucie MEDRANO. Aidan Randall MD        Again, thank you for allowing me to participate in the care of your patient.       Sincerely,    Bob Randall MD

## 2018-11-21 NOTE — NURSING NOTE
"Oncology Rooming Note    November 21, 2018 11:31 AM   Lucie Stockton is a 83 year old female who presents for:    Chief Complaint   Patient presents with     Oncology Clinic Visit     New patient; Lung Nodule     Initial Vitals: /67  Pulse 76  Temp 97.4  F (36.3  C) (Oral)  Resp 14  Ht 1.48 m (4' 10.25\")  Wt 48.1 kg (106 lb)  SpO2 96%  BMI 21.96 kg/m2 Estimated body mass index is 21.96 kg/(m^2) as calculated from the following:    Height as of this encounter: 1.48 m (4' 10.25\").    Weight as of this encounter: 48.1 kg (106 lb). Body surface area is 1.41 meters squared.  Moderate Pain (5) Comment: lowback   No LMP recorded. Patient has had a hysterectomy.  Allergies reviewed: Yes  Medications reviewed: Yes    Medications: Medication refills not needed today.  Pharmacy name entered into [employer]: [facility] [address] [place] [address] AVE [address] AT [facility]    Clinical concerns: lung nodule     6 minutes for nursing intake (face to face time)     Iona Nelson CMA              "

## 2018-11-21 NOTE — PROGRESS NOTES
"[facility]  Lung Nodule Clinic Note  November 21, 2018    Chief complaint:  Lucie Stockton is a 83 year old female seen in the Pulmonary Clinic  for   Chief Complaint   Patient presents with     Oncology Clinic Visit     New patient; Lung Nodule       Assessment and Plan:  #1 new CT findings suggesting infection.  Patient has been on Augmentin for the last 7 days which I will extend the treatment to 20 days total and I prescribed her antibiotic to be picked up today.  We will repeat CT scan of the chest and first week of January 2019 when I will see her.  #2 right upper lobe pulmonary nodule it appears to be slightly enlarged when compared to previous CT scan from January 2018.  This nodule appears to have a slightly spiculated to my review.  In this nodule will require further workup.  #3 former smoker quit 3 years ago after smoking 40+ years pack a day.    History of Present Illness:  This is a 83 years old woman with abnormal CT chest and referred to our clinic for further evaluation.  She is currently treated with Augmentin for her productive cough which is clear mostly.  She denied having fever chills and chest pains.  She is a former smoker that her high risk for lung cancer.  She did have CT scan of the chest before as indicated above revealing spiculated pulmonary nodule measuring 8-9 mm in diameter and there is pleural tenting.     Exposure history: Asbestos;  No , TB;  No , Radiation;   No     No Known Allergies     Past Medical History:   Diagnosis Date     Anemia      Aortic stenosis     abdominal     Atherosclerosis of aorta (H)     \"extensive disease with near occlusion below level of renal arteries\" on CT     Atherosclerosis of arteries of extremities (H)      Back pain     severe multilevel DJD, moderate spinal canal stenosis L4-5, severe L3-4     Chronic pain     Back, hips, knees, legs     Degenerative joint disease      DVT of lower extremity, bilateral (H)     5/24/10 left distal femoral and great " saphenous, 7/7/10 left:  extending to cfv, iliac , pop and post tibial, peronial, right:  distal fem and peroneal      Grave's disease      Hyperlipidaemia LDL goal < 70      Hypertension, essential      Hypothyroidism      Imbalance      Insomnia      Intermittent claudication (H)      Iron deficiency      Knee pain      Lumbar stenosis with neurogenic claudication      Osteoarthritis     ankle,foot, knee     Osteoporosis      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 JONO Goncalves - Returned BENIGN     INJECT EPIDURAL LUMBAR / SACRAL SINGLE N/A 1/5/2018    Procedure: INJECT EPIDURAL LUMBAR / SACRAL SINGLE;  lumbar interlaminar epidural steroid injection;  Surgeon: Jaylin Valadez MD;  [facility] OR     INJECT SACROILIAC JOINT Right 3/7/2018    Procedure: INJECT SACROILIAC JOINT;  Right Sacroiliac Joint Injection;  Surgeon: Flavio Harrison MD;  Location: [facility] OR     UPPER GI ENDOSCOPY      11/10/10 erythematous gastropathy, angioectasia        Social History     Social History     Marital status:      Spouse name: N/A     Number of children: N/A     Years of education: N/A     Occupational History     Not on file.     Social History Main Topics     Smoking status: Former Smoker     Packs/day: 0.50     Years: 15.00     Quit date: 9/13/1993     Smokeless tobacco: Never Used     Alcohol use Yes      Comment: social     Drug use: No     Sexual  activity: No     Other Topics Concern     Not on file     Social History Narrative     for 52 years. Retired from [employer]. Frequent world travel.        Family History   Problem Relation Age of Onset     Breast Cancer Maternal Aunt 80     C.A.D. Father 54        Immunization History   Administered Date(s) Administered     HEPA 10/06/2003, 01/19/2007     Influenza (H1N1) 01/09/2010     Influenza (High Dose) 3 valent vaccine 08/18/2012, 09/02/2013, 08/30/2015, 09/03/2016, 09/16/2017, 09/29/2018     Influenza (IIV3) PF 10/26/2002, 11/05/2004, 10/11/2005, 11/07/2006, 09/05/2010, 09/04/2011, 08/20/2012, 09/02/2013, 09/19/2014     Pneumo Conj 13-V (2010&after) 12/09/2015     Pneumococcal 23 valent 12/31/2003, 02/20/2011     Poliovirus, inactivated (IPV) 10/06/2003     TD (ADULT, 7+) 10/06/2003     TDAP Vaccine (Boostrix) 01/06/2014     Typhoid IM 10/06/2003, 01/19/2007     Zoster vaccine recombinant adjuvanted (SHINGRIX) 08/17/2018, 10/17/2018     Zoster vaccine, live 02/28/2011       Current Outpatient Prescriptions   Medication Sig     acetaminophen (TYLENOL) 325 MG tablet Take 2 tablets every 6 to 8 hours as needed for pain     amLODIPine (NORVASC) 10 MG tablet Take 1 tablet (10 mg) by mouth daily For blood pressure     amoxicillin-clavulanate (AUGMENTIN) 500-125 MG per tablet Take 1 tablet by mouth 2 times daily     atorvastatin (LIPITOR) 40 MG tablet Take 2 tablets (80 mg) by mouth daily     benzonatate (TESSALON) 100 MG capsule Take 1 capsule (100 mg) by mouth 3 times daily as needed for cough     Calcium Carbonate-Vitamin D (CALCIUM 600 + D OR) Take 1 tablet by mouth daily.     Cholecalciferol (VITAMIN D3 PO) Take by mouth daily     citalopram (CELEXA) 20 MG tablet Take 1 tablet (20 mg) by mouth daily     clopidogrel (PLAVIX) 75 MG tablet Take 1 tablet (75 mg) by mouth daily     cyanocolbalamin (VITAMIN  B-12) 500 MCG tablet Take 1 tablet (500 mcg) by mouth daily     ferrous sulfate (IRON) 325  (65 FE) MG tablet Take 1 tablet (325 mg) by mouth daily (with breakfast)     gabapentin (NEURONTIN) 100 MG capsule      hydrochlorothiazide (HYDRODIURIL) 50 MG tablet Take 1 tablet (50 mg) by mouth daily     levothyroxine (SYNTHROID/LEVOTHROID) 75 MCG tablet Take 1 tablet (75 mcg) by mouth daily     lisinopril (PRINIVIL/ZESTRIL) 40 MG tablet Take 1 tablet (40 mg) by mouth daily For blood pressure     multivitamin (OCUVITE) TABS tablet Take 1 tablet by mouth daily     naproxen sodium 220 MG capsule Take 220 mg by mouth 2 times daily (with meals)     pantoprazole (PROTONIX) 40 MG EC tablet Take 1 tablet (40 mg) by mouth daily     potassium chloride SA (K-DUR/KLOR-CON M) 20 MEQ CR tablet Take 1 tablet (20 mEq) by mouth daily     bisacodyl (DULCOLAX) 10 MG Suppository Place 1 suppository (10 mg) rectally daily as needed for constipation (Patient not taking: Reported on 11/21/2018)     fluticasone (FLONASE) 50 MCG/ACT spray Spray 2 sprays into both nostrils daily (Patient not taking: Reported on 11/21/2018)     [DISCONTINUED] tolterodine (DETROL) 1 MG tablet Take 1-2 tablets by mouth At Bedtime.     No current facility-administered medications for this visit.         Review of Systems:  I have done 10 points of review systems and pertinent findings are  ,otherwise negative.    Physical examination  Constitutional: Alert, oriented, not in distress  Vitals: B/P: 145/67, T: 97.4, P: 76, R: 14  Eyes: No icterus, nystagmus, pupils isocoric  ENT/Mouth:  No ear discharge, moist mucosa, no ulceration, tonsillar hypertrophy  Cardiovascular: Normal S1 and S2, no additional heart sounds, murmur, rub, normal peripheral pulses  Respiratory: Both hemithoraces are symmetrical, normal to palpation, no dullness to percussion, auscultation of lungs revealed decreased bronchovesicular sounds, but no expirium prolongation, wheezing, rhonci and crackles  Gastrointestinal/Rectal: Bowel sounds present, soft, non tender, non distended, no  organomegaly, ascitis, mass   Musculoskeletal: Normal muscle mass, no dephormity  Integumentary:  No rash, normal texture and turgor, no edema  Neurological: Alert, orientedx3, no motor deficits, cranial nerves grossly normal  Psychiatric:  Mood and affect are appropriate with insight into his/her medical condition  Hematologic/Immunologic/Lymphatic: No bruise, no lymph node enargement     Data:  Lab Results   Component Value Date    WBC 14.2 11/15/2018     Lab Results   Component Value Date    RBC 3.99 11/15/2018     Lab Results   Component Value Date    HGB 11.6 11/15/2018     Lab Results   Component Value Date    HCT 37.4 11/15/2018     Lab Results   Component Value Date    MCV 94 11/15/2018     Lab Results   Component Value Date    MCH 29.1 11/15/2018     Lab Results   Component Value Date    MCHC 31.0 11/15/2018     Lab Results   Component Value Date    RDW 13.8 11/15/2018     Lab Results   Component Value Date     11/15/2018       Lab Results   Component Value Date     01/30/2018      Lab Results   Component Value Date    POTASSIUM 3.8 01/30/2018     Lab Results   Component Value Date    CHLORIDE 102 01/30/2018     Lab Results   Component Value Date    CANDIDA 9.1 01/30/2018     Lab Results   Component Value Date    CO2 26 01/30/2018     Lab Results   Component Value Date    BUN 13 01/30/2018     Lab Results   Component Value Date    CR 0.50 01/30/2018     Lab Results   Component Value Date    GLC 93 01/30/2018       Thank you for allowing me participate in the care of Lucie Sanfordyolanda Randall MD

## 2018-11-26 LAB
DLCOCOR-%PRED-PRE: 68 %
DLCOCOR-PRE: 11.51 ML/MIN/MMHG
DLCOUNC-%PRED-PRE: 64 %
DLCOUNC-PRE: 10.82 ML/MIN/MMHG
DLCOUNC-PRED: 16.71 ML/MIN/MMHG
ERV-%PRED-PRE: 73 %
ERV-PRE: 0.43 L
ERV-PRED: 0.58 L
EXPTIME-PRE: 8.75 SEC
FEF2575-%PRED-POST: 53 %
FEF2575-%PRED-PRE: 34 %
FEF2575-POST: 0.69 L/SEC
FEF2575-PRE: 0.45 L/SEC
FEF2575-PRED: 1.3 L/SEC
FEFMAX-%PRED-PRE: 62 %
FEFMAX-PRE: 2.31 L/SEC
FEFMAX-PRED: 3.69 L/SEC
FEV1-%PRED-PRE: 64 %
FEV1-PRE: 0.99 L
FEV1FEV6-PRE: 59 %
FEV1FEV6-PRED: 77 %
FEV1FVC-PRE: 61 %
FEV1FVC-PRED: 73 %
FEV1SVC-PRE: 60 %
FEV1SVC-PRED: 75 %
FIFMAX-PRE: 2.23 L/SEC
FRCPLETH-%PRED-PRE: 205 %
FRCPLETH-PRE: 4.9 L
FRCPLETH-PRED: 2.38 L
FVC-%PRED-PRE: 82 %
FVC-PRE: 1.63 L
FVC-PRED: 1.98 L
IC-%PRED-PRE: 83 %
IC-PRE: 1.22 L
IC-PRED: 1.46 L
RVPLETH-%PRED-PRE: 225 %
RVPLETH-PRE: 4.47 L
RVPLETH-PRED: 1.98 L
TLCPLETH-%PRED-PRE: 155 %
TLCPLETH-PRE: 6.12 L
TLCPLETH-PRED: 3.93 L
VA-%PRED-PRE: 77 %
VA-PRE: 3.17 L
VC-%PRED-PRE: 80 %
VC-PRE: 1.65 L
VC-PRED: 2.04 L

## 2018-12-03 ENCOUNTER — OFFICE VISIT (OUTPATIENT)
Dept: INTERNAL MEDICINE | Facility: CLINIC | Age: 83
End: 2018-12-03
Payer: MEDICARE

## 2018-12-03 VITALS
HEIGHT: 58 IN | BODY MASS INDEX: 21.87 KG/M2 | WEIGHT: 104.2 LBS | SYSTOLIC BLOOD PRESSURE: 142 MMHG | HEART RATE: 71 BPM | OXYGEN SATURATION: 96 % | DIASTOLIC BLOOD PRESSURE: 62 MMHG | RESPIRATION RATE: 20 BRPM

## 2018-12-03 DIAGNOSIS — R05.3 CHRONIC COUGH: ICD-10-CM

## 2018-12-03 DIAGNOSIS — E87.6 HYPOKALEMIA: ICD-10-CM

## 2018-12-03 DIAGNOSIS — R35.1 NOCTURIA: ICD-10-CM

## 2018-12-03 DIAGNOSIS — J44.9 CHRONIC OBSTRUCTIVE PULMONARY DISEASE, UNSPECIFIED COPD TYPE (H): ICD-10-CM

## 2018-12-03 DIAGNOSIS — R91.8 PULMONARY NODULES: ICD-10-CM

## 2018-12-03 DIAGNOSIS — R68.89 COLD INTOLERANCE: ICD-10-CM

## 2018-12-03 DIAGNOSIS — Z00.00 PREVENTATIVE HEALTH CARE: ICD-10-CM

## 2018-12-03 DIAGNOSIS — R05.3 CHRONIC COUGH: Primary | ICD-10-CM

## 2018-12-03 DIAGNOSIS — M48.062 LUMBAR STENOSIS WITH NEUROGENIC CLAUDICATION: ICD-10-CM

## 2018-12-03 DIAGNOSIS — R29.890 LOSS OF HEIGHT: ICD-10-CM

## 2018-12-03 LAB
ALBUMIN UR-MCNC: NEGATIVE MG/DL
APPEARANCE UR: ABNORMAL
BILIRUB UR QL STRIP: NEGATIVE
COLOR UR AUTO: YELLOW
GLUCOSE UR STRIP-MCNC: NEGATIVE MG/DL
HGB UR QL STRIP: NEGATIVE
KETONES UR STRIP-MCNC: NEGATIVE MG/DL
LEUKOCYTE ESTERASE UR QL STRIP: NEGATIVE
MUCOUS THREADS #/AREA URNS LPF: PRESENT /LPF
NITRATE UR QL: NEGATIVE
PH UR STRIP: 7 PH (ref 5–7)
RBC #/AREA URNS AUTO: 1 /HPF (ref 0–2)
SOURCE: ABNORMAL
SP GR UR STRIP: 1.01 (ref 1–1.03)
SQUAMOUS #/AREA URNS AUTO: <1 /HPF (ref 0–1)
UROBILINOGEN UR STRIP-MCNC: 0 MG/DL (ref 0–2)
WBC #/AREA URNS AUTO: 1 /HPF (ref 0–5)

## 2018-12-03 PROCEDURE — 86480 TB TEST CELL IMMUN MEASURE: CPT | Performed by: INTERNAL MEDICINE

## 2018-12-03 RX ORDER — MV-MN/OM3/DHA/EPA/FISH/LUT/ZEA 250-5-1 MG
1 CAPSULE ORAL DAILY
Qty: 90 CAPSULE | Refills: 3 | COMMUNITY
Start: 2018-12-03 | End: 2020-01-14

## 2018-12-03 ASSESSMENT — PAIN SCALES - GENERAL: PAINLEVEL: NO PAIN (0)

## 2018-12-03 NOTE — MR AVS SNAPSHOT
After Visit Summary   12/3/2018    Lucie Stockton    MRN: [id number]           Patient Information     Date Of Birth          1935        Visit Information        Provider Department      12/3/2018 8:40 AM Marii Montgomery MD [facility]        Today's Diagnoses     Chronic cough    -  1    Chronic obstructive pulmonary disease, unspecified COPD type (H)        Pulmonary nodules        Preventative health care        Hypokalemia        Loss of height        Lumbar stenosis with neurogenic claudication        Nocturia        Cold intolerance          Care Instructions    Primary Care Center Medication Refill Request Information:  * Please contact your pharmacy regarding ANY request for medication refills.  ** [facility] Prescription Fax = [phone]  * Please allow 3 business days for routine medication refills.  * Please allow 5 business days for controlled substance medication refills.     Primary Care Center Test Result notification information:  *You will be notified with in 7-10 days of your appointment day regarding the results of your test.  If you are on MyChart you will be notified as soon as the provider has reviewed the results and signed off on them.    [facility]: [phone]           Follow-ups after your visit        Follow-up notes from your care team     Return in about 6 months (around 6/3/2019) for Routine Visit.      Your next 10 appointments already scheduled     Dec 07, 2018   Procedure with Faviola Cosby MD   [facility] ([facility])    [address] [place], [place] [id number] [phone]           Located in the Clinics and Surgery Center at [address] [place] [address].   parking is very convenient and highly recommended.  is a $6 flat rate fee.  Both  and self parkers should enter the main arrival plaza from [facility]; parking attendants will  direct you based on your parking preference.            Jan 09, 2019  8:00 AM CST   CT CHEST W/O CONTRAST with UCCT1   [facility] CT ([facility])    [address] [facility]  1st [facility] [address] [phone]           How do I prepare for my exam? (Food and drink instructions) No Food and Drink Restrictions.  How do I prepare for my exam? (Other instructions) You do not need to do anything special to prepare for this exam. For a sinus scan: Use your nose spray (nasal decongestant spray) as directed.  What should I wear: Please wear loose clothing, such as a sweat suit or jogging clothes. Avoid snaps, zippers and other metal. We may ask you to undress and put on a hospital gown.  How long does the exam take: Most scans take less than 20 minutes.  What should I bring: Please bring any scans or X-rays taken at other hospitals, if similar tests were done. Also bring a list of your medicines, including vitamins, minerals and over-the-counter drugs. It is safest to leave personal items at home.  Do I need a : No  is needed.  What do I need to tell my doctor? Be sure to tell your doctor: * If you have any allergies. * If there s any chance you are pregnant. * If you are breastfeeding.  What should I do after the exam: No restrictions, You may resume normal activities.  What is this test: A CT (computed tomography) scan is a series of pictures that allows us to look inside your body. The scanner creates images of the body in cross sections, much like slices of bread. This helps us see any problems more clearly.  Who should I call with questions: If you have any questions, please call the Imaging Department where you will have your exam. Directions, parking instructions, and other information is available on our website, [email or web address].            Jan 09, 2019  8:40 AM CST   (Arrive by 8:25 AM)   Return Visit with Bob Randall MD   [facility]  [facility] ([facility])    [address] [facility] [address] [address]  [address]  [facility] [id number] [phone]            Jul 16, 2019 10:00 AM CDT   US SUMAYA DOPPLER WITH EXERCISE BILATERAL with UCUSV2   [facility] US ([facility])    [address] [address] [address]  1st Floor  [facility] [id number] [phone]           How do I prepare for my exam? (Food and drink instructions) No caffeine or tobacco for 1 hour prior to exam.  What should I wear: Wear comfortable clothes.  How long does the exam take: Most ultrasounds take 30 to 60 minutes.  What should I bring: Bring a list of your medicines, including vitamins, minerals and over-the-counter drugs. It is safest to leave personal items at home.  Do I need a :  No  is needed.  What do I need to tell my doctor: Tell your doctor about any allergies you may have.  What should I do after the exam: No restrictions, You may resume normal activities.  What is this test: An ultrasound uses sound waves to make pictures of the body. Sound waves do not cause pain. The only discomfort may be the pressure of the wand against your skin or full bladder.  Who should I call with questions: If you have any questions, please call the Imaging Department where you will have your exam. Directions, parking instructions, and other information is available on our website, [email or web address].            Jul 16, 2019 11:00 AM CDT   (Arrive by 10:45 AM)   Return Vascular Visit with ALEKSEY Minor [facility] ([facility])    [address]  3rd [facility] [address]   [phone]            Jul 23, 2019  9:00 AM CDT   (Arrive by 8:45 AM)   Return Visit with ALEKSEY Vasquez [facility] ([facility])    [address]  3rd [facility] [id number] [phone]              [facility]  "tests that were ordered for you today     Open Future Orders        Priority Expected Expires Ordered    UA with Micro reflex to Culture Routine 12/3/2018 12/3/2019 12/3/2018            Who to contact     Please call your clinic at [phone] to:    Ask questions about your health    Make or cancel appointments    Discuss your medicines    Learn about your test results    Speak to your doctor            Additional Information About Your Visit        [employer] Information     [employer] gives you secure access to your electronic health record. If you see a primary care provider, you can also send messages to your care team and make appointments. If you have questions, please call your primary care clinic.  If you do not have a primary care provider, please call [phone] and they will assist you.      [employer] is an electronic gateway that provides easy, online access to your medical records. With [employer], you can request a clinic appointment, read your test results, renew a prescription or communicate with your care team.     To access your existing account, please contact your [facility] or call [phone] for assistance.        Care EveryWhere ID     This is your Care EveryWhere ID. This could be used by other organizations to access your [place] medical records  [id number]        Your Vitals Were     Pulse Respirations Height Pulse Oximetry BMI (Body Mass Index)       71 20 1.473 m (4' 10\") 96% 21.78 kg/m2        Blood Pressure from Last 3 Encounters:   12/03/18 142/62   11/21/18 145/67   11/15/18 130/66    Weight from Last 3 Encounters:   12/03/18 47.3 kg (104 lb 3.2 oz)   11/21/18 48.1 kg (106 lb)   11/15/18 47.3 kg (104 lb 3.2 oz)                 Today's Medication Changes          These changes are accurate as of 12/3/18  9:14 AM.  If you have any questions, ask your nurse or doctor.               Start taking these medicines.        Dose/Directions    Potassium " Bicarb-Citric Acid 20 MEQ Tbef   Used for:  Hypokalemia   Started by:  Marii Montgomery MD        Dose:  20 mg   Take 20 mg by mouth daily   Quantity:  90 tablet   Refills:  3         Stop taking these medicines if you haven't already. Please contact your care team if you have questions.     potassium chloride ER 20 MEQ CR tablet   Commonly known as:  K-DUR/KLOR-CON M   Stopped by:  Marii Montgomery MD                Where to get your medicines      These medications were sent to [facility] [id number] [place], [place] [address] [facility] [address], [facility] [address]     Phone:  [phone]     Potassium Bicarb-Citric Acid 20 MEQ Tbef                Primary Care Provider Office Phone # Fax #    Marii Montgomery [phone]       [address] [place] [address] [address]  [facility] [address]        Equal Access to Services     RHYS SALGADO AH: Hadii reyna blevins hadasho Soomaali, waaxda luqadaha, qaybta kaalmada adeegyada, jackeline menezesin hayaries bernal . So [facility] [phone].    ATENCIÓN: Si habla español, tiene a schwartz disposición servicios gratuitos de asistencia lingüística. Llame al [phone].    We comply with applicable federal civil rights laws and [place] laws. We do not discriminate on the basis of race, color, national origin, age, disability, sex, sexual orientation, or gender identity.            Thank you!     Thank you for choosing [facility]  for your care. Our goal is always to provide you with excellent care. Hearing back from our patients is one way we can continue to improve our services. Please take a few minutes to complete the written survey that you may receive in the mail after your visit with us. Thank you!             Your Updated Medication List - Protect others around you: Learn how to safely use, store and throw away your medicines at www.disposemymeds.org.          This list is accurate as of 12/3/18  9:14 AM.   Always use your most recent med list.                   Brand Name Dispense Instructions for use Diagnosis    acetaminophen 325 MG tablet    TYLENOL    100 tablet    Take 2 tablets every 6 to 8 hours as needed for pain    Chronic low back pain, unspecified back pain laterality, with sciatica presence unspecified, Contusion of right hip, initial encounter, Contusion of right knee, initial encounter       amLODIPine 10 MG tablet    NORVASC    90 tablet    Take 1 tablet (10 mg) by mouth daily For blood pressure    Essential hypertension       amoxicillin-clavulanate 500-125 MG tablet    AUGMENTIN    20 tablet    Take 1 tablet by mouth 2 times daily    Chronic cough       atorvastatin 40 MG tablet    LIPITOR    90 tablet    Take 2 tablets (80 mg) by mouth daily    Hyperlipidemia, unspecified hyperlipidemia type       benzonatate 100 MG capsule    TESSALON    15 capsule    Take 1 capsule (100 mg) by mouth 3 times daily as needed for cough    Chronic cough       bisacodyl 10 MG suppository    DULCOLAX    90 suppository    Place 1 suppository (10 mg) rectally daily as needed for constipation    Constipation, unspecified constipation type       CALCIUM 600 + D PO      Take 1 tablet by mouth daily.        citalopram 20 MG tablet    celeXA    90 tablet    Take 1 tablet (20 mg) by mouth daily    Anxiety       clopidogrel 75 MG tablet    PLAVIX    90 tablet    Take 1 tablet (75 mg) by mouth daily    Venous thrombosis of extremity       ferrous sulfate 325 (65 Fe) MG tablet    FEROSUL    90 tablet    Take 1 tablet (325 mg) by mouth daily (with breakfast)    Other iron deficiency anemias       fluticasone 50 MCG/ACT nasal spray    FLONASE    1 Bottle    Spray 2 sprays into both nostrils daily    Seasonal allergic rhinitis, unspecified trigger       gabapentin 100 MG capsule    NEURONTIN          hydrochlorothiazide 50 MG tablet    HYDRODIURIL    90 tablet    Take 1 tablet (50 mg) by mouth daily    Benign essential hypertension        levothyroxine 75 MCG tablet    SYNTHROID/LEVOTHROID    90 tablet    Take 1 tablet (75 mcg) by mouth daily    Hypothyroidism, unspecified type       lisinopril 40 MG tablet    PRINIVIL/ZESTRIL    90 tablet    Take 1 tablet (40 mg) by mouth daily For blood pressure    Essential hypertension       * multivitamin Tabs tablet      Take 1 tablet by mouth daily        * OCUVITE ADULT 50+ Caps     90 capsule    Take 1 tablet by mouth daily    Preventative health care       naproxen sodium 220 MG capsule     60 capsule    Take 220 mg by mouth 2 times daily (with meals)    Chronic low back pain, unspecified back pain laterality, with sciatica presence unspecified, Pain of left lower leg       pantoprazole 40 MG EC tablet    PROTONIX    90 tablet    Take 1 tablet (40 mg) by mouth daily    Long term current use of anticoagulant       Potassium Bicarb-Citric Acid 20 MEQ Tbef     90 tablet    Take 20 mg by mouth daily    Hypokalemia       vitamin B-12 500 MCG tablet    CYANOCOBALAMIN    90 tablet    Take 1 tablet (500 mcg) by mouth daily    Vitamin B12 deficiency (non anemic)       VITAMIN D3 PO      Take by mouth daily        * Notice:  This list has 2 medication(s) that are the same as other medications prescribed for you. Read the directions carefully, and ask your doctor or other care provider to review them with you.

## 2018-12-03 NOTE — PATIENT INSTRUCTIONS
[facility] Medication Refill Request Information:  * Please contact your pharmacy regarding ANY request for medication refills.  ** [facility] Prescription Fax = [phone]  * Please allow 3 business days for routine medication refills.  * Please allow 5 business days for controlled substance medication refills.     [facility] Test Result notification information:  *You will be notified with in 7-10 days of your appointment day regarding the results of your test.  If you are on MyChart you will be notified as soon as the provider has reviewed the results and signed off on them.    [facility]: [phone]

## 2018-12-03 NOTE — NURSING NOTE
"Chief Complaint   Patient presents with     Results     go over lab results     Cough     \" My cough is much better.\"   Sherice Beauchamp LPN 8:35 AM on 12/3/2018    Rooming Note  Blood Pressure   BP Readings from Last 1 Encounters:   12/03/18 157/61    Single BP recheck started, 8:37 AM (4 minutes)  Sherice Beauchamp LPN 8:37 AM on 12/3/2018    "

## 2018-12-03 NOTE — PROGRESS NOTES
CC: Multiple questions    Reviewed pulmonary consultation, PFT's  Now with NP cough only, no longer at night  CT and pulmonary f/u scheduled for 1/9/19  Dime sized right wrist scab, picking at it, scraped it after lifting trash can, healing  Back injection later this week, chronic back pain, found old tramadol and used a few, I reminded her not to use tramadol/narcotics and she is aware  Nocturia 3 times a night, stops fluids at dinner time, no caffeine, urinates just before sleeping, no other urinary symptoms/signs  Potassium tab too big, even when breaks it in half (it is scored)  Ht loss, hx osteoporosis, explained why people lose height  Taking ocuvite per ophthalmology, added to med list  Occasionally gets chilled, reviewed dif dx, TFT's normal, not significantly anemic, no signs/symptoms of infection, encouraged warm clothing and bedding    Patient Active Problem List   Diagnosis     Benign ovarian tumor     Hypothyroidism     Peripheral vascular disease (H)     Knee pain     Osteoarthritis     Cervicalgia     Hyperlipidemia with target LDL less than 70     Pulmonary embolism (H)     Anemia     Aortic stenosis     Atherosclerosis of aorta (H)     Atherosclerosis of arteries of extremities (H)     Intermittent claudication (H)     Iron deficiency     Osteoporosis     DVT of lower extremity, bilateral (H)     Varicose veins     Gluteal pain     Insomnia     Back pain     Vitamin D deficiency     Tobacco use disorder     Personal history of other drug therapy     Right knee pain     Venous thrombosis of extremity     Benign essential hypertension     Gastritis     Cataract     Acute left-sided low back pain with left-sided sciatica     Lumbar stenosis with neurogenic claudication     SBO (small bowel obstruction) (H)     Small bowel obstruction (H)     Long term current use of anticoagulant therapy     Left-sided low back pain with left-sided sciatica     Moderate major depression (H)     Anxiety     Neoplasm of  uncertain behavior of skin     Cherry angioma     BCC (basal cell carcinoma), trunk     Weakness     Imbalance     Chronic pain     History of nonmelanoma skin cancer     Multiple melanocytic nevi     Age-related osteoporosis without current pathological fracture     Chronic obstructive pulmonary disease, unspecified COPD type (H)     Chronic cough     Pulmonary nodules     Current Outpatient Prescriptions   Medication Sig Dispense Refill     acetaminophen (TYLENOL) 325 MG tablet Take 2 tablets every 6 to 8 hours as needed for pain 100 tablet 1     amLODIPine (NORVASC) 10 MG tablet Take 1 tablet (10 mg) by mouth daily For blood pressure 90 tablet 3     amoxicillin-clavulanate (AUGMENTIN) 500-125 MG per tablet Take 1 tablet by mouth 2 times daily 20 tablet 0     atorvastatin (LIPITOR) 40 MG tablet Take 2 tablets (80 mg) by mouth daily 90 tablet 3     benzonatate (TESSALON) 100 MG capsule Take 1 capsule (100 mg) by mouth 3 times daily as needed for cough 15 capsule 0     bisacodyl (DULCOLAX) 10 MG Suppository Place 1 suppository (10 mg) rectally daily as needed for constipation 90 suppository 3     Calcium Carbonate-Vitamin D (CALCIUM 600 + D OR) Take 1 tablet by mouth daily.       Cholecalciferol (VITAMIN D3 PO) Take by mouth daily       citalopram (CELEXA) 20 MG tablet Take 1 tablet (20 mg) by mouth daily 90 tablet 3     clopidogrel (PLAVIX) 75 MG tablet Take 1 tablet (75 mg) by mouth daily 90 tablet 3     cyanocolbalamin (VITAMIN  B-12) 500 MCG tablet Take 1 tablet (500 mcg) by mouth daily 90 tablet 1     ferrous sulfate (IRON) 325 (65 FE) MG tablet Take 1 tablet (325 mg) by mouth daily (with breakfast) 90 tablet 3     fluticasone (FLONASE) 50 MCG/ACT spray Spray 2 sprays into both nostrils daily 1 Bottle 1     gabapentin (NEURONTIN) 100 MG capsule   1     hydrochlorothiazide (HYDRODIURIL) 50 MG tablet Take 1 tablet (50 mg) by mouth daily 90 tablet 3     levothyroxine (SYNTHROID/LEVOTHROID) 75 MCG tablet Take 1  "tablet (75 mcg) by mouth daily 90 tablet 3     lisinopril (PRINIVIL/ZESTRIL) 40 MG tablet Take 1 tablet (40 mg) by mouth daily For blood pressure 90 tablet 3     Multiple Vitamins-Minerals (OCUVITE ADULT 50+) CAPS Take 1 tablet by mouth daily 90 capsule 3     multivitamin (OCUVITE) TABS tablet Take 1 tablet by mouth daily       naproxen sodium 220 MG capsule Take 220 mg by mouth 2 times daily (with meals) 60 capsule 2     pantoprazole (PROTONIX) 40 MG EC tablet Take 1 tablet (40 mg) by mouth daily 90 tablet 3     Potassium Bicarb-Citric Acid 20 MEQ TBEF Take 20 mg by mouth daily 90 tablet 3     [DISCONTINUED] tolterodine (DETROL) 1 MG tablet Take 1-2 tablets by mouth At Bedtime. 180 tablet 3     No Known Allergies  /62 (BP Location: Right arm, Patient Position: Sitting, Cuff Size: Adult Regular)  Pulse 71  Resp 20  Ht 1.473 m (4' 10\")  Wt 47.3 kg (104 lb 3.2 oz)  SpO2 96%  BMI 21.78 kg/m2    Lucie was seen today for results and cough.    Diagnoses and all orders for this visit:    Chronic cough    Chronic obstructive pulmonary disease, unspecified COPD type (H)    Pulmonary nodules    Preventative health care    Hypokalemia  -     Potassium Bicarb-Citric Acid 20 MEQ TBEF; Take 20 mg by mouth daily    Loss of height    Lumbar stenosis with neurogenic claudication    Nocturia  -     UA with Micro reflex to Culture; Future    Cold intolerance      Total time spent 25 minutes.  More than 50% of the time spent with Ms. Stockton on counseling / coordinating her care      Marii Montgomery M.D.  Internal Medicine  Primary Care Center   pager [phone]    "

## 2018-12-04 LAB
GAMMA INTERFERON BACKGROUND BLD IA-ACNC: 0.02 IU/ML
M TB IFN-G BLD-IMP: NEGATIVE
M TB IFN-G CD4+ BCKGRND COR BLD-ACNC: 5.54 IU/ML
MITOGEN IGNF BCKGRD COR BLD-ACNC: 0.01 IU/ML
MITOGEN IGNF BCKGRD COR BLD-ACNC: 0.02 IU/ML

## 2018-12-05 ENCOUNTER — DOCUMENTATION ONLY (OUTPATIENT)
Dept: ANESTHESIOLOGY | Facility: CLINIC | Age: 83
End: 2018-12-05

## 2018-12-07 ENCOUNTER — RADIANT APPOINTMENT (OUTPATIENT)
Dept: RADIOLOGY | Facility: AMBULATORY SURGERY CENTER | Age: 83
End: 2018-12-07
Attending: ANESTHESIOLOGY
Payer: MEDICARE

## 2018-12-07 ENCOUNTER — HOSPITAL ENCOUNTER (OUTPATIENT)
Facility: AMBULATORY SURGERY CENTER | Age: 83
End: 2018-12-07
Attending: ANESTHESIOLOGY
Payer: MEDICARE

## 2018-12-07 VITALS
TEMPERATURE: 98.6 F | BODY MASS INDEX: 20.99 KG/M2 | WEIGHT: 100 LBS | DIASTOLIC BLOOD PRESSURE: 79 MMHG | HEIGHT: 58 IN | SYSTOLIC BLOOD PRESSURE: 150 MMHG | OXYGEN SATURATION: 96 % | HEART RATE: 71 BPM | RESPIRATION RATE: 18 BRPM

## 2018-12-07 DIAGNOSIS — M53.3 SACROILIAC JOINT PAIN: ICD-10-CM

## 2018-12-07 RX ORDER — BUPIVACAINE HYDROCHLORIDE 2.5 MG/ML
INJECTION, SOLUTION EPIDURAL; INFILTRATION; INTRACAUDAL PRN
Status: DISCONTINUED | OUTPATIENT
Start: 2018-12-07 | End: 2018-12-07 | Stop reason: HOSPADM

## 2018-12-07 RX ORDER — LIDOCAINE HYDROCHLORIDE 10 MG/ML
INJECTION, SOLUTION EPIDURAL; INFILTRATION; INTRACAUDAL; PERINEURAL PRN
Status: DISCONTINUED | OUTPATIENT
Start: 2018-12-07 | End: 2018-12-07 | Stop reason: HOSPADM

## 2018-12-07 RX ORDER — IOPAMIDOL 408 MG/ML
INJECTION, SOLUTION INTRATHECAL PRN
Status: DISCONTINUED | OUTPATIENT
Start: 2018-12-07 | End: 2018-12-07 | Stop reason: HOSPADM

## 2018-12-07 RX ORDER — TRIAMCINOLONE ACETONIDE 40 MG/ML
INJECTION, SUSPENSION INTRA-ARTICULAR; INTRAMUSCULAR PRN
Status: DISCONTINUED | OUTPATIENT
Start: 2018-12-07 | End: 2018-12-07 | Stop reason: HOSPADM

## 2018-12-07 NOTE — DISCHARGE INSTRUCTIONS
Home Care Instructions after a Sacroiliac Joint Injection    The sacroiliac joints lie next to the spine and connect the sacrum(the bottom of the spine) with the hip on both sides. There are two sacroiliac joints, one on the right and one on the left. Joint inflammation and/or dysfunction in this area can cause pain. In a sacroiliac joint injection, a local anesthetic (numbing medicine) is injected in or near the joint space. Steroids are often used to help with the anti-inflammatory process.     Activity  -You may resume most normal activity levels with the exception of strenuous activity. It is important for us to know if your pain with normal activity is relieved after this injection.  -DO NOT shower for 24 hours  -DO NOT remove bandaid for 24 hours    Pain  -You may experience soreness at the injection site for one or two days  -You may use an ice pack for 20 minutes every 2 hours for the first 24 hours  -You may use a heating pad after the first 24 hours  -You may use Tylenol (acetaminophen) every 4 hours or other pain medicines as     directed by your physician    You may experience numbness radiating into your legs. This numbness may last several hours. Until sensation returns to normal; please use caution in walking, climbing stairs, and stepping out of your vehicle, etc.    DID YOU RECEIVE SEDATION TODAY?  No    If you received sedation please follow these additional safety measures.  Sedation medicine, if given, may remain active for many hours. It is important for the next 24 hours that you do not:  -Drive a car  -Operate machines or power tools  -Consume alcohol, including beer  -Sign any important papers or legal documents    DID YOU RECEIVE STEROIDS TODAY?  No    Common side effects of steroids:  Not everyone will experience corticosteroid side effects. If side effects are experienced, they will gradually subside in the 7-10 day period following an injection. Most common side effects include:  -Flushed  face and/or chest  -Feeling of warmth, particularly in the face but could be an overall feeling of warmth  -Increased blood sugar in diabetic patients  -Menstrual irregularities my occur. If taking hormone-based birth control an alternate method of birth control is recommended  -Sleep disturbances and/or mood swings are possible  -Leg cramps      PLEASE KEEP TRACK OF YOUR SYMPTOMS AND NOTE YOUR IMPROVEMENT FOR YOUR DOCTOR.     Please contact us if you have:  -Severe pain  -Fever more than 101.5 degrees Fahrenheit  -Signs of infection at the injection site (redness, swelling, or drainage)    If you have questions, please contact our office at [phone] between the hours of 7:00 am and 3:00 pm Monday through Friday. After office hours you can contact the on call provider by dialing [phone]. If you need immediate attention, we recommend that you go to a hospital emergency room or dial [phone].

## 2018-12-07 NOTE — IP AVS SNAPSHOT
MRN:[id number]                      After Visit Summary   12/7/2018    Lucie Stockton    MRN: [id number]           Thank you!     Thank you for choosing [place] for your care. Our goal is always to provide you with excellent care. Hearing back from our patients is one way we can continue to improve our services. Please take a few minutes to complete the written survey that you may receive in the mail after you visit with us. Thank you!        Patient Information     Date Of Birth          1935        About your hospital stay     You were admitted on:  December 7, 2018 You last received care in the:  [facility]    You were discharged on:  December 7, 2018       Who to Call     For medical emergencies, please call 911.  For non-urgent questions about your medical care, please call your primary care provider or clinic, [phone]  For questions related to your surgery, please call your surgery clinic        Attending Provider     Provider Specialty    Faviola Cosby MD Anesthesiology       Primary Care Provider Office Phone # Fax #    Marii Montgomery [phone]      Your next 10 appointments already scheduled     Dec 17, 2018  2:00 PM CST   (Arrive by 1:45 PM)   Return Visit with Marii Montgomery MD   [facility] ([facility] [place])    [address] [place] [address]  4th [facility] [address] [phone]            Jan 09, 2019  8:00 AM CST   CT CHEST W/O CONTRAST with UCCT1   [facility] [place] [facility] ([facility])    [address] [place] [id number] [phone]           How do I prepare for my exam? (Food and drink instructions) No Food and Drink Restrictions.  How do I prepare for my exam? (Other instructions) You do not need to do anything special to prepare for this exam. For a sinus scan: Use your nose spray (nasal decongestant spray) as directed.   What should I wear: Please wear loose clothing, such as a sweat suit or jogging clothes. Avoid snaps, zippers and other metal. We may ask you to undress and put on a hospital gown.  How long does the exam take: Most scans take less than 20 minutes.  What should I bring: Please bring any scans or X-rays taken at other hospitals, if similar tests were done. Also bring a list of your medicines, including vitamins, minerals and over-the-counter drugs. It is safest to leave personal items at home.  Do I need a : No  is needed.  What do I need to tell my doctor? Be sure to tell your doctor: * If you have any allergies. * If there s any chance you are pregnant. * If you are breastfeeding.  What should I do after the exam: No restrictions, You may resume normal activities.  What is this test: A CT (computed tomography) scan is a series of pictures that allows us to look inside your body. The scanner creates images of the body in cross sections, much like slices of bread. This helps us see any problems more clearly.  Who should I call with questions: If you have any questions, please call the Imaging Department where you will have your exam. Directions, parking instructions, and other information is available on our website, [email or web address].            Jan 09, 2019  8:40 AM CST   (Arrive by 8:25 AM)   Return Visit with Bob Randall MD   [facility] ([facility])    [address]  [address]  [facility] [id number] [phone]            Jul 16, 2019 10:00 AM CDT   US SUMAYA DOPPLER WITH EXERCISE BILATERAL with UCUSV2   [facility]  ([facility])    [address]  1st Floor  [facility] [id number] [phone]           How do I prepare for my exam? (Food and drink instructions) No caffeine or tobacco for 1 hour prior to exam.  What should I wear: Wear comfortable clothes.  How long does the exam  take: Most ultrasounds take 30 to 60 minutes.  What should I bring: Bring a list of your medicines, including vitamins, minerals and over-the-counter drugs. It is safest to leave personal items at home.  Do I need a :  No  is needed.  What do I need to tell my doctor: Tell your doctor about any allergies you may have.  What should I do after the exam: No restrictions, You may resume normal activities.  What is this test: An ultrasound uses sound waves to make pictures of the body. Sound waves do not cause pain. The only discomfort may be the pressure of the wand against your skin or full bladder.  Who should I call with questions: If you have any questions, please call the Imaging Department where you will have your exam. Directions, parking instructions, and other information is available on our website, [email or web address].            Jul 16, 2019 11:00 AM CDT   (Arrive by 10:45 AM)   Return Vascular Visit with ALEKSEY Minor [facility] ([facility])    [address] [place] [address]   [phone]            Jul 23, 2019  9:00 AM CDT   (Arrive by 8:45 AM)   Return Visit with ALEKSEY Vasquez [facility])    [address] [place] [address]   [phone]              Further instructions from your care team       Home Care Instructions after a Sacroiliac Joint Injection    The sacroiliac joints lie next to the spine and connect the sacrum(the bottom of the spine) with the hip on both sides. There are two sacroiliac joints, one on the right and one on the left. Joint inflammation and/or dysfunction in this area can cause pain. In a sacroiliac joint injection, a local anesthetic (numbing medicine) is injected in or near the joint space. Steroids are often used to help with the anti-inflammatory process.     Activity  -You may  resume most normal activity levels with the exception of strenuous activity. It is important for us to know if your pain with normal activity is relieved after this injection.  -DO NOT shower for 24 hours  -DO NOT remove bandaid for 24 hours    Pain  -You may experience soreness at the injection site for one or two days  -You may use an ice pack for 20 minutes every 2 hours for the first 24 hours  -You may use a heating pad after the first 24 hours  -You may use Tylenol (acetaminophen) every 4 hours or other pain medicines as     directed by your physician    You may experience numbness radiating into your legs. This numbness may last several hours. Until sensation returns to normal; please use caution in walking, climbing stairs, and stepping out of your vehicle, etc.    DID YOU RECEIVE SEDATION TODAY?  No    If you received sedation please follow these additional safety measures.  Sedation medicine, if given, may remain active for many hours. It is important for the next 24 hours that you do not:  -Drive a car  -Operate machines or power tools  -Consume alcohol, including beer  -Sign any important papers or legal documents    DID YOU RECEIVE STEROIDS TODAY?  No    Common side effects of steroids:  Not everyone will experience corticosteroid side effects. If side effects are experienced, they will gradually subside in the 7-10 day period following an injection. Most common side effects include:  -Flushed face and/or chest  -Feeling of warmth, particularly in the face but could be an overall feeling of warmth  -Increased blood sugar in diabetic patients  -Menstrual irregularities my occur. If taking hormone-based birth control an alternate method of birth control is recommended  -Sleep disturbances and/or mood swings are possible  -Leg cramps      PLEASE KEEP TRACK OF YOUR SYMPTOMS AND NOTE YOUR IMPROVEMENT FOR YOUR DOCTOR.     Please contact us if you have:  -Severe pain  -Fever more than 101.5 degrees  "Fahrenheit  -Signs of infection at the injection site (redness, swelling, or drainage)    If you have questions, please contact our office at [phone] between the hours of 7:00 am and 3:00 pm Monday through Friday. After office hours you can contact the on call provider by dialing [phone]. If you need immediate attention, we recommend that you go to a hospital emergency room or dial 911.      Pending Results     Date and Time Order Name Status Description    12/7/2018 1027 XR SURGERY FELICIANO FLUORO LESS THAN 5 MIN W STILLS In process             Admission Information     Date & Time Provider Department Dept. Phone    12/7/2018 Faviola Cosby MD [facility] [phone]      Your Vitals Were     Blood Pressure Pulse Temperature Respirations Height Weight    164/77 71 97.1  F (36.2  C) (Temporal) 14 1.473 m (4' 10\") 45.4 kg (100 lb)    Pulse Oximetry BMI (Body Mass Index)                96% 20.9 kg/m2          [employer] Information     [employer] gives you secure access to your electronic health record. If you see a primary care provider, you can also send messages to your care team and make appointments. If you have questions, please call your primary care clinic.  If you do not have a primary care provider, please call [phone] and they will assist you.      [employer] is an electronic gateway that provides easy, online access to your medical records. With [employer], you can request a clinic appointment, read your test results, renew a prescription or communicate with your care team.     To access your existing account, please contact your [facility] or call [phone] for assistance.        Care EveryWhere ID     This is your Care EveryWhere ID. This could be used by other organizations to access your [place] medical records  [id number]        Equal Access to Services     PAL RUELAS: Ascencion Gamboa, anu keller, matt fay " jackeline kirbyjamel spaulding'aan ah. So [facility] [phone].    ATENCIÓN: Si shannan briscoe, tiene a schwartz disposición servicios gratuitos de asistencia lingüística. Henny al [phone].    We comply with applicable federal civil rights laws and [place] laws. We do not discriminate on the basis of race, color, national origin, age, disability, sex, sexual orientation, or gender identity.               Review of your medicines      UNREVIEWED medicines. Ask your doctor about these medicines        Dose / Directions    acetaminophen 325 MG tablet   Commonly known as:  TYLENOL   Used for:  Chronic low back pain, unspecified back pain laterality, with sciatica presence unspecified, Contusion of right hip, initial encounter, Contusion of right knee, initial encounter        Take 2 tablets every 6 to 8 hours as needed for pain   Quantity:  100 tablet   Refills:  1       amLODIPine 10 MG tablet   Commonly known as:  NORVASC   Used for:  Essential hypertension        Dose:  10 mg   Take 1 tablet (10 mg) by mouth daily For blood pressure   Quantity:  90 tablet   Refills:  3       amoxicillin-clavulanate 500-125 MG tablet   Commonly known as:  AUGMENTIN   Used for:  Chronic cough        Dose:  1 tablet   Take 1 tablet by mouth 2 times daily   Quantity:  20 tablet   Refills:  0       atorvastatin 40 MG tablet   Commonly known as:  LIPITOR   Used for:  Hyperlipidemia, unspecified hyperlipidemia type        Dose:  80 mg   Take 2 tablets (80 mg) by mouth daily   Quantity:  90 tablet   Refills:  3       CALCIUM 600 + D PO        Dose:  1 tablet   Take 1 tablet by mouth daily.   Refills:  0       citalopram 20 MG tablet   Commonly known as:  celeXA   Used for:  Anxiety        Dose:  20 mg   Take 1 tablet (20 mg) by mouth daily   Quantity:  90 tablet   Refills:  3       clopidogrel 75 MG tablet   Commonly known as:  PLAVIX   Used for:  Venous thrombosis of extremity        Dose:  75 mg   Take 1 tablet (75 mg) by mouth  daily   Quantity:  90 tablet   Refills:  3       ferrous sulfate 325 (65 Fe) MG tablet   Commonly known as:  FEROSUL   Used for:  Other iron deficiency anemias        Dose:  325 mg   Take 1 tablet (325 mg) by mouth daily (with breakfast)   Quantity:  90 tablet   Refills:  3       fluticasone 50 MCG/ACT nasal spray   Commonly known as:  FLONASE   Used for:  Seasonal allergic rhinitis, unspecified trigger        Dose:  2 spray   Spray 2 sprays into both nostrils daily   Quantity:  1 Bottle   Refills:  1       gabapentin 100 MG capsule   Commonly known as:  NEURONTIN        Refills:  1       hydrochlorothiazide 50 MG tablet   Commonly known as:  HYDRODIURIL   Used for:  Benign essential hypertension        Dose:  50 mg   Take 1 tablet (50 mg) by mouth daily   Quantity:  90 tablet   Refills:  3       levothyroxine 75 MCG tablet   Commonly known as:  SYNTHROID/LEVOTHROID   Used for:  Hypothyroidism, unspecified type        Dose:  75 mcg   Take 1 tablet (75 mcg) by mouth daily   Quantity:  90 tablet   Refills:  3       lisinopril 40 MG tablet   Commonly known as:  PRINIVIL/ZESTRIL   Used for:  Essential hypertension        Dose:  40 mg   Take 1 tablet (40 mg) by mouth daily For blood pressure   Quantity:  90 tablet   Refills:  3       * multivitamin Tabs tablet        Dose:  1 tablet   Take 1 tablet by mouth daily   Refills:  0       * OCUVITE ADULT 50+ Caps   Used for:  Preventative health care        Dose:  1 tablet   Take 1 tablet by mouth daily   Quantity:  90 capsule   Refills:  3       naproxen sodium 220 MG capsule   Used for:  Chronic low back pain, unspecified back pain laterality, with sciatica presence unspecified, Pain of left lower leg        Dose:  220 mg   Take 220 mg by mouth 2 times daily (with meals)   Quantity:  60 capsule   Refills:  2       pantoprazole 40 MG EC tablet   Commonly known as:  PROTONIX   Used for:  Long term current use of anticoagulant        Dose:  40 mg   Take 1 tablet (40 mg) by  mouth daily   Quantity:  90 tablet   Refills:  3       Potassium Bicarb-Citric Acid 20 MEQ Tbef   Used for:  Hypokalemia        Dose:  20 mg   Take 20 mg by mouth daily   Quantity:  90 tablet   Refills:  3       vitamin B-12 500 MCG tablet   Commonly known as:  CYANOCOBALAMIN   Used for:  Vitamin B12 deficiency (non anemic)        Dose:  500 mcg   Take 1 tablet (500 mcg) by mouth daily   Quantity:  90 tablet   Refills:  1       VITAMIN D3 PO        Take by mouth daily   Refills:  0       * Notice:  This list has 2 medication(s) that are the same as other medications prescribed for you. Read the directions carefully, and ask your doctor or other care provider to review them with you.             Protect others around you: Learn how to safely use, store and throw away your medicines at www.disposemymeds.org.             Medication List: This is a list of all your medications and when to take them. Check marks below indicate your daily home schedule. Keep this list as a reference.      Medications           Morning Afternoon Evening Bedtime As Needed    acetaminophen 325 MG tablet   Commonly known as:  TYLENOL   Take 2 tablets every 6 to 8 hours as needed for pain                                amLODIPine 10 MG tablet   Commonly known as:  NORVASC   Take 1 tablet (10 mg) by mouth daily For blood pressure                                amoxicillin-clavulanate 500-125 MG tablet   Commonly known as:  AUGMENTIN   Take 1 tablet by mouth 2 times daily                                atorvastatin 40 MG tablet   Commonly known as:  LIPITOR   Take 2 tablets (80 mg) by mouth daily                                CALCIUM 600 + D PO   Take 1 tablet by mouth daily.                                citalopram 20 MG tablet   Commonly known as:  celeXA   Take 1 tablet (20 mg) by mouth daily                                clopidogrel 75 MG tablet   Commonly known as:  PLAVIX   Take 1 tablet (75 mg) by mouth daily                                 ferrous sulfate 325 (65 Fe) MG tablet   Commonly known as:  FEROSUL   Take 1 tablet (325 mg) by mouth daily (with breakfast)                                fluticasone 50 MCG/ACT nasal spray   Commonly known as:  FLONASE   Spray 2 sprays into both nostrils daily                                gabapentin 100 MG capsule   Commonly known as:  NEURONTIN                                hydrochlorothiazide 50 MG tablet   Commonly known as:  HYDRODIURIL   Take 1 tablet (50 mg) by mouth daily                                levothyroxine 75 MCG tablet   Commonly known as:  SYNTHROID/LEVOTHROID   Take 1 tablet (75 mcg) by mouth daily                                lisinopril 40 MG tablet   Commonly known as:  PRINIVIL/ZESTRIL   Take 1 tablet (40 mg) by mouth daily For blood pressure                                * multivitamin Tabs tablet   Take 1 tablet by mouth daily                                * OCUVITE ADULT 50+ Caps   Take 1 tablet by mouth daily                                naproxen sodium 220 MG capsule   Take 220 mg by mouth 2 times daily (with meals)                                pantoprazole 40 MG EC tablet   Commonly known as:  PROTONIX   Take 1 tablet (40 mg) by mouth daily                                Potassium Bicarb-Citric Acid 20 MEQ Tbef   Take 20 mg by mouth daily                                vitamin B-12 500 MCG tablet   Commonly known as:  CYANOCOBALAMIN   Take 1 tablet (500 mcg) by mouth daily                                VITAMIN D3 PO   Take by mouth daily                                * Notice:  This list has 2 medication(s) that are the same as other medications prescribed for you. Read the directions carefully, and ask your doctor or other care provider to review them with you.

## 2018-12-07 NOTE — OP NOTE
Patient: Lucie Stockton Age: 83 year old   MRN: [id number] Attending: Dr. Cosby     Date of Visit: December 7, 2018      PAIN MEDICINE CLINIC PROCEDURE NOTE    ATTENDING CLINICIAN:    Faviola Cosby MD    ASSISTANT CLINICIAN:  Trinidad Blas MD    PREPROCEDURE DIAGNOSES:  1.  Chronic back pain   2.  Sacroiliitis -     PROCEDURE(S) PERFORMED:  1.  Bilateral sacroiliac joint injection  3.  Fluoroscopic guidance for the above-named procedure(s)      ANESTHESIA:  Local.    BLOOD LOSS:  Minimal.    DRAINS AND SPECIMENS:  None.    COMPLICATIONS:  None.    INDICATIONS:  Lucie Stockton is a 83 year old female with a history of  chronic low back pain secondary to sacrolitis .  The patient stated that the patient was in their usual state of health and denied recent anticoagulant use or recent infections.  Therefore, the plan is to perform above mentioned procedures.     Procedure Details:  The patient was met in the procedure room, where the patient was identified by name, medical record number and date of birth.  All of the patient s last minute questions were answered. Written informed consent was obtained and saved in the electronic medical record, after the risks, benefits, and alternatives were discussed with the patient.      A formal time-out procedure was performed, as per protocol, including patient name, title of procedure, and site of procedure, and all in the room concurred.  Routine monitors were applied.      The patient was placed in the prone position on the procedure room table.  All pressure points were checked and comfortably padded.  Routine monitors were placed.  Vital signs were stable.    A chlorhexidine prep was completed followed by sterile draping per standard procedure.     AP fluoroscopic guidance was used to identify the right SI joint(s), with slight contralateral oblique tilt, until the joint was maximally visualized.   After 1% lidocaine infiltration using a 25 gauge 1.5 inch needle, a 3.5 inch  spinal needle was introduced and advanced through the anesthetized plane and advanced to the joint space.  Depth was confirmed with lateral fluoroscopic guidance. After negative aspiration for heme, we injected 0.5 ml of Isovue contrast into SI joint. Images obtained.      After negative aspiration for heme, 2.5 mL of a treatment mixture containing 1 mL of triamcinolone (40 mg/ml) and 9 mL of bupivacaine 0.25% was injected into the SI joint, then the needle was slightly withdrawn and 2.5 mL of the above treatment solution was injected into the periarticular space.  The needle was then removed.  The same procedure was performed on the left side.    Light pressure was held at the puncture site(s) to prevent ecchymosis and oozing.  The patient's skin was cleansed, and hemostasis was confirmed.  Band-aids were applied to the needle injection site(s).      Condition:    The patient remained awake and alert throughout the procedure.  The patient tolerated the procedure well and was monitored for approximately 15 minutes afterward in the post procedure area.  There were no immediate post procedure complications noted.  The patient was then discharged to home as per protocol.      Pre-procedure pain score: 5/10  Post-procedure pain score: 3/10

## 2018-12-07 NOTE — IP AVS SNAPSHOT
[facility]    [address] [place] [address]    Phone:  [phone]    Fax:  [phone]                                       After Visit Summary   12/7/2018    Lucie Stockton    MRN: [id number]           After Visit Summary Signature Page     I have received my discharge instructions, and my questions have been answered. I have discussed any challenges I see with this plan with the nurse or doctor.    ..........................................................................................................................................  Patient/Patient Representative Signature      ..........................................................................................................................................  Patient Representative Print Name and Relationship to Patient    ..................................................               ................................................  Date                                   Time    ..........................................................................................................................................  Reviewed by Signature/Title    ...................................................              ..............................................  Date                                               Time          22EPIC Rev 08/18

## 2018-12-17 ENCOUNTER — OFFICE VISIT (OUTPATIENT)
Dept: INTERNAL MEDICINE | Facility: CLINIC | Age: 83
End: 2018-12-17
Payer: MEDICARE

## 2018-12-17 VITALS
HEART RATE: 76 BPM | HEIGHT: 58 IN | WEIGHT: 105 LBS | OXYGEN SATURATION: 96 % | SYSTOLIC BLOOD PRESSURE: 129 MMHG | DIASTOLIC BLOOD PRESSURE: 66 MMHG | BODY MASS INDEX: 22.04 KG/M2

## 2018-12-17 DIAGNOSIS — D75.839 THROMBOCYTOSIS: ICD-10-CM

## 2018-12-17 DIAGNOSIS — R35.1 NOCTURIA: ICD-10-CM

## 2018-12-17 DIAGNOSIS — G89.29 CHRONIC LOW BACK PAIN, UNSPECIFIED BACK PAIN LATERALITY, WITH SCIATICA PRESENCE UNSPECIFIED: ICD-10-CM

## 2018-12-17 DIAGNOSIS — D75.839 THROMBOCYTOSIS: Primary | ICD-10-CM

## 2018-12-17 DIAGNOSIS — M79.662 PAIN OF LEFT LOWER LEG: ICD-10-CM

## 2018-12-17 DIAGNOSIS — M54.5 CHRONIC LOW BACK PAIN, UNSPECIFIED BACK PAIN LATERALITY, WITH SCIATICA PRESENCE UNSPECIFIED: ICD-10-CM

## 2018-12-17 DIAGNOSIS — J44.9 CHRONIC OBSTRUCTIVE PULMONARY DISEASE, UNSPECIFIED COPD TYPE (H): ICD-10-CM

## 2018-12-17 DIAGNOSIS — D72.828 OTHER ELEVATED WHITE BLOOD CELL (WBC) COUNT: ICD-10-CM

## 2018-12-17 LAB
BASOPHILS # BLD AUTO: 0 10E9/L (ref 0–0.2)
BASOPHILS NFR BLD AUTO: 0.4 %
DIFFERENTIAL METHOD BLD: ABNORMAL
EOSINOPHIL # BLD AUTO: 0.1 10E9/L (ref 0–0.7)
EOSINOPHIL NFR BLD AUTO: 1.3 %
ERYTHROCYTE [DISTWIDTH] IN BLOOD BY AUTOMATED COUNT: 15.4 % (ref 10–15)
HCT VFR BLD AUTO: 39.4 % (ref 35–47)
HGB BLD-MCNC: 12.4 G/DL (ref 11.7–15.7)
IMM GRANULOCYTES # BLD: 0 10E9/L (ref 0–0.4)
IMM GRANULOCYTES NFR BLD: 0.3 %
LYMPHOCYTES # BLD AUTO: 1.4 10E9/L (ref 0.8–5.3)
LYMPHOCYTES NFR BLD AUTO: 14.6 %
MCH RBC QN AUTO: 29.7 PG (ref 26.5–33)
MCHC RBC AUTO-ENTMCNC: 31.5 G/DL (ref 31.5–36.5)
MCV RBC AUTO: 95 FL (ref 78–100)
MONOCYTES # BLD AUTO: 1 10E9/L (ref 0–1.3)
MONOCYTES NFR BLD AUTO: 10.5 %
NEUTROPHILS # BLD AUTO: 6.8 10E9/L (ref 1.6–8.3)
NEUTROPHILS NFR BLD AUTO: 72.9 %
NRBC # BLD AUTO: 0 10*3/UL
NRBC BLD AUTO-RTO: 0 /100
PLATELET # BLD AUTO: 421 10E9/L (ref 150–450)
RBC # BLD AUTO: 4.17 10E12/L (ref 3.8–5.2)
WBC # BLD AUTO: 9.3 10E9/L (ref 4–11)

## 2018-12-17 RX ORDER — COVID-19 ANTIGEN TEST
220 KIT MISCELLANEOUS 2 TIMES DAILY WITH MEALS
Qty: 60 CAPSULE | Refills: 2 | Status: CANCELLED | OUTPATIENT
Start: 2018-12-17

## 2018-12-17 ASSESSMENT — PAIN SCALES - GENERAL: PAINLEVEL: SEVERE PAIN (7)

## 2018-12-17 ASSESSMENT — MIFFLIN-ST. JEOR: SCORE: 825.9

## 2018-12-17 NOTE — NURSING NOTE
Chief Complaint   Patient presents with     Refill Request     patient following up on labs performed on 12/3. patient requires refills including naproxen      Sara Griffin at 1:56 PM on 12/17/2018.

## 2018-12-17 NOTE — PROGRESS NOTES
[facility]   Marii oMntgomery MD  12/17/2018      Chief Complaint:   Refill Request       History of Present Illness:   Lucie Stockton is a 83 year old female with a history of anemia, deep vein thrombosis, long term use of anticoagulants, hyperlipidemia, hypertension, hypothyroidism, chronic obstructive pulmonary disease, basal cell carcinoma, osteoarthritis and chronic pain who presents for refill request.     On 12/03, Lucie was seen in the clinic for evaluation of a chronic cough, dime sized right wrist scab, chronic back pain and nocturia.     Chronic cough: At the time, her pulmonary consolations were reviewed and she had a CT and pulmonary follow up scheduled for 01/09/2019. There was evidence for COPD on her pulmonology results. Today, she notes that her cough has significantly improved.     Chronic back pain: On 12/03, she noted that she was still experiencing back pain. She had found a few old tramadol tablets, which she took for relief. I reminded her not to take tramadol or narcotics. She was scheduled to have a back injection 12/07/2018. Today, she notes that she has not had any relief from the injection but will try again in a few months. She was interested in whether medical marijuana would be beneficial for her pain. Extensive walking causes her pain and causes her to not do all of the activities she use to do.     Nocturia: On 12/03, she noted that she had experienced nocturia about 3 times per night. I reminded her not to drink fluids after dinner, avoid caffeine, and to urinate before bed time. She had a urinalysis performed which showed no signs of infection. She was requested to come back for a follow up to discuss options for management. Today, she notes that her urination habits have not changed. One night she was able to go through the entire night without going to the bathroom but that was very unusual. She does not drink caffeine at night and has not been drinking fluids  too late at night.     Chills: On 12/03, Lucie noted that she had experienced occasional chills. Thyroid function tests were normal, she was not significantly anemic and there were no signs of infection. She was encouraged to wear warm clothing and bedding.     Infection: Currently, she is on amoxicillin because she had an infection in her implants in her teeth.     Fatigue: She wishes that she had more energy. She denies that she is taking gabapentin.     Today, she wanted to go over labs that were performed on 11/15. Results are included below.      Review of Systems:   Pertinent items are noted in HPI, remainder of complete ROS is negative.      Active Medications:      acetaminophen (TYLENOL) 325 MG tablet, Take 2 tablets every 6 to 8 hours as needed for pain, Disp: 100 tablet, Rfl: 1     amLODIPine (NORVASC) 10 MG tablet, Take 1 tablet (10 mg) by mouth daily For blood pressure, Disp: 90 tablet, Rfl: 3     amoxicillin-clavulanate (AUGMENTIN) 500-125 MG per tablet, Take 1 tablet by mouth 2 times daily, Disp: 20 tablet, Rfl: 0     atorvastatin (LIPITOR) 40 MG tablet, Take 2 tablets (80 mg) by mouth daily, Disp: 90 tablet, Rfl: 3     Calcium Carbonate-Vitamin D (CALCIUM 600 + D OR), Take 1 tablet by mouth daily., Disp: , Rfl:      Cholecalciferol (VITAMIN D3 PO), Take by mouth daily, Disp: , Rfl:      citalopram (CELEXA) 20 MG tablet, Take 1 tablet (20 mg) by mouth daily, Disp: 90 tablet, Rfl: 3     clopidogrel (PLAVIX) 75 MG tablet, Take 1 tablet (75 mg) by mouth daily, Disp: 90 tablet, Rfl: 3     cyanocolbalamin (VITAMIN  B-12) 500 MCG tablet, Take 1 tablet (500 mcg) by mouth daily, Disp: 90 tablet, Rfl: 1     ferrous sulfate (IRON) 325 (65 FE) MG tablet, Take 1 tablet (325 mg) by mouth daily (with breakfast), Disp: 90 tablet, Rfl: 3     fluticasone (FLONASE) 50 MCG/ACT spray, Spray 2 sprays into both nostrils daily, Disp: 1 Bottle, Rfl: 1     gabapentin (NEURONTIN) 100 MG capsule, , Disp: , Rfl: 1      hydrochlorothiazide (HYDRODIURIL) 50 MG tablet, Take 1 tablet (50 mg) by mouth daily, Disp: 90 tablet, Rfl: 3     levothyroxine (SYNTHROID/LEVOTHROID) 75 MCG tablet, Take 1 tablet (75 mcg) by mouth daily, Disp: 90 tablet, Rfl: 3     lisinopril (PRINIVIL/ZESTRIL) 40 MG tablet, Take 1 tablet (40 mg) by mouth daily For blood pressure, Disp: 90 tablet, Rfl: 3     Multiple Vitamins-Minerals (OCUVITE ADULT 50+) CAPS, Take 1 tablet by mouth daily, Disp: 90 capsule, Rfl: 3     multivitamin (OCUVITE) TABS tablet, Take 1 tablet by mouth daily, Disp: , Rfl:      pantoprazole (PROTONIX) 40 MG EC tablet, Take 1 tablet (40 mg) by mouth daily, Disp: 90 tablet, Rfl: 3     Potassium Bicarb-Citric Acid 20 MEQ TBEF, Take 20 mg by mouth daily, Disp: 90 tablet, Rfl: 3     naproxen sodium 220 MG capsule, Take 220 mg by mouth 2 times daily (with meals) (Patient not taking: Reported on 12/17/2018), Disp: 60 capsule, Rfl: 2      Allergies:   Patient has no known allergies.      Past Medical History:  Anemia   Aortic stenosis   Deep vein thrombosis of lower extremities  Intermittent claudication    Pulmonary embolism   Chronic obstructive pulmonary disease   Long term use of anticoagulants   Basal cell carcinoma   Hyperlipidemia   Hypertension   Hypothyroidism   Graves disease   Osteoarthritis   Osteoporosis   Cervicalgia   Chronic pain   Degenerative joint disease    Ovarian tumor   Gastritis  Small bowel obstruction   Varicose veins   Depression   Anxiety   Cataracts     Past Surgical History:  Bunionectomy   Stomach surgery   Hysterectomy total abdominal, bilateral salpingo-oophorectomy, node dissection combined   Lumbar epidural   Sacroiliac joint injection x2     Family History:   Breast cancer   Coronary artery disease      Social History:   The patient was alone  Smoking Status: former; 1/2 pack per day for 15 years    Smokeless Tobacco: never   Alcohol Use: yes      Physical Exam:   /66 (BP Location: Right arm, Patient  "Position: Sitting)   Pulse 76   Ht 1.473 m (4' 9.99\")   Wt 47.6 kg (105 lb)   LMP  (LMP Unknown)   SpO2 96%   Breastfeeding? No   BMI 21.95 kg/m     Physical Examination:  General:  Pleasant, alert, no acute distress  Psych:  Broad range affect.  Not psychomotor slowed.  No signs of anxiety or agitation.     Recent labs:      Component      Latest Ref Rng & Units 11/15/2018   WBC      4.0 - 11.0 10e9/L 14.2 (H)   RBC Count      3.8 - 5.2 10e12/L 3.99   Hemoglobin      11.7 - 15.7 g/dL 11.6 (L)   Hematocrit      35.0 - 47.0 % 37.4   MCV      78 - 100 fl 94   MCH      26.5 - 33.0 pg 29.1   MCHC      31.5 - 36.5 g/dL 31.0 (L)   RDW      10.0 - 15.0 % 13.8   Platelet Count      150 - 450 10e9/L 597 (H)   Diff Method       Automated Method   % Neutrophils      % 82.7   % Lymphocytes      % 7.9   % Monocytes      % 5.5   % Eosinophils      % 1.6   % Basophils      % 0.3   % Immature Granulocytes      % 2.0   Nucleated RBCs      0 /100 0   Absolute Neutrophil      1.6 - 8.3 10e9/L 11.8 (H)   Absolute Lymphocytes      0.8 - 5.3 10e9/L 1.1   Absolute Monocytes      0.0 - 1.3 10e9/L 0.8   Absolute Eosinophils      0.0 - 0.7 10e9/L 0.2   Absolute Basophils      0.0 - 0.2 10e9/L 0.0   Abs Immature Granulocytes      0 - 0.4 10e9/L 0.3   Absolute Nucleated RBC       0.0     Assessment and Plan:  Thrombocytosis (H)  She had abnormal lab results on 11/15, therefore, we will have labs performed today to reevaluate.   -     CBC with platelets differential; Future    Chronic low back pain, unspecified back pain laterality, with sciatica presence unspecified  She does not believe that she is ready for a refill on her naproxen and will contact me when she needs a refill. She asked how I felt about medical marijuana. I think she may be a candidate. We discussed the forms that she would need to complete and that it comes in different forms. If she becomes interested, I can refer her to a doctor that is licensed to prescribe " marijuana. She will think about it.    Nocturia  On 12/03, she noted that she had experienced nocturia about 3 times per night. I reminded her not to drink fluids after dinner, avoid caffeine, and to urinate before bed time. She had a urinalysis performed which showed no signs of infection. Today, she notes that her urination habits have not changed. I do not believe she would be a candidate for surgery, therefore, I will not refer her to urology. I will refer her to physical therapy for further evaluations for management.   -     PHYSICAL THERAPY REFERRAL (Pelvic Floor); Future    Chronic obstructive pulmonary disease, unspecified COPD type (H)  We discussed the results from her PFT on 11/16. She feels like her cough has improved and has a follow up appointment scheduled for 01/19 with pulmonology.      Other orders  -     Cancel: naproxen sodium 220 MG capsule; Take 220 mg by mouth 2 times daily (with meals)      Follow-up: Return in about 4 months (around 4/17/2019) for Routine Visit.         Scribe Disclosure:   I, Daiana Teague, am serving as a scribe to document services personally performed by Marii Montgomery MD at this visit, based upon the provider's statements to me. All documentation has been reviewed by the aforementioned provider prior to being entered into the official medical record.     Portions of this medical record were completed by a scribe. UPON MY REVIEW AND AUTHENTICATION BY ELECTRONIC SIGNATURE, this confirms (a) I performed the applicable clinical services, and (b) the record is accurate.     Marii Montgomery M.D.  Internal Medicine  Primary Care Center   pager [phone]

## 2018-12-17 NOTE — PATIENT INSTRUCTIONS
[facility] Medication Refill Request Information:  * Please contact your pharmacy regarding ANY request for medication refills.  ** [facility] Prescription Fax = [phone]  * Please allow 3 business days for routine medication refills.  * Please allow 5 business days for controlled substance medication refills.     [facility] Test Result notification information:  *You will be notified with in 7-10 days of your appointment day regarding the results of your test.  If you are on MyChart you will be notified as soon as the provider has reviewed the results and signed off on them.    [facility]: [phone]

## 2019-01-07 ENCOUNTER — THERAPY VISIT (OUTPATIENT)
Dept: PHYSICAL THERAPY | Facility: CLINIC | Age: 84
End: 2019-01-07
Payer: MEDICARE

## 2019-01-07 ENCOUNTER — MEDICAL CORRESPONDENCE (OUTPATIENT)
Dept: HEALTH INFORMATION MANAGEMENT | Facility: CLINIC | Age: 84
End: 2019-01-07

## 2019-01-07 DIAGNOSIS — G89.29 CHRONIC LOW BACK PAIN WITHOUT SCIATICA, UNSPECIFIED BACK PAIN LATERALITY: Primary | ICD-10-CM

## 2019-01-07 DIAGNOSIS — R35.1 NOCTURIA: ICD-10-CM

## 2019-01-07 DIAGNOSIS — M54.50 CHRONIC LOW BACK PAIN WITHOUT SCIATICA, UNSPECIFIED BACK PAIN LATERALITY: Primary | ICD-10-CM

## 2019-01-07 PROCEDURE — 97530 THERAPEUTIC ACTIVITIES: CPT | Mod: GP | Performed by: PHYSICAL THERAPIST

## 2019-01-07 PROCEDURE — G8978 MOBILITY CURRENT STATUS: HCPCS | Mod: GP | Performed by: PHYSICAL THERAPIST

## 2019-01-07 PROCEDURE — G8979 MOBILITY GOAL STATUS: HCPCS | Mod: GP | Performed by: PHYSICAL THERAPIST

## 2019-01-07 PROCEDURE — 97110 THERAPEUTIC EXERCISES: CPT | Mod: GP | Performed by: PHYSICAL THERAPIST

## 2019-01-07 PROCEDURE — 97161 PT EVAL LOW COMPLEX 20 MIN: CPT | Mod: GP | Performed by: PHYSICAL THERAPIST

## 2019-01-07 NOTE — LETTER
"DEPARTMENT OF HEALTH AND HUMAN SERVICES  CENTERS FOR MEDICARE & MEDICAID SERVICES    PLAN/UPDATED PLAN OF PROGRESS FOR OUTPATIENT REHABILITATION    PATIENTS NAME:  Lucie Stockton   : 1935    PROVIDER NUMBER:    [id number]  HICN:  [id number]    PROVIDER NAME: [facility] PHYSICAL THERAPY  MEDICAL RECORD NUMBER: [id number]     START OF CARE DATE:  SOC Date: 19   TYPE:  PT    PRIMARY/TREATMENT DIAGNOSIS: (Pertinent Medical Diagnosis)  Chronic low back pain without sciatica, unspecified back pain laterality  Nocturia    VISITS FROM START OF CARE:    1     [facility] Initial Evaluation -- Lumbar  Date: 2019  Lucie Stockton is a 83 year old female with a LS condition.   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  MD order [id number]  Symptoms (improving/unchanging/worsening):  unchanged  Symptoms commenced as a result of: CRISTY   Condition occurred in the following environment:   home   Symptoms at onset (back/thigh/leg): R buttocks  Constant symptoms (back/thigh/leg):   Intermittent symptoms (back/thigh/leg): yes  Symptoms are made worse with the following: Always Rising, Always Standing, Always Walking, Sometimes Lying and Time of day - No effect sitting on hard chair - always  Symptoms are made better with the following: sitting  Disturbed sleep (yes/no):  No        PATIENTS NAME:  Mahin" Lucie   : 1935  PRIMARY/TREATMENT DIAGNOSIS: (Pertinent Medical Diagnosis)  Chronic low back pain without sciatica, unspecified back pain laterality  Nocturia    HISTORY (continued)  Sleeping postures (prone/sup/side R/L): R SL  Previous episodes (0/1-5/6-10/11+): chronic   Year of first episode:   Previous history:   Previous treatments: PT here     Specific Questions:  Cough/Sneeze/Strain (pos/neg): neg  Bowel/Bladder (normal/abnormal): normal  Gait (normal/abnormal): abnormal, less if she bends over  Medications (nil/NSAIDS/analg/steroids/anticoag/other):  OTC analgesic  Medical allergies:  none  General health (excellent/good/fair/poor):    Pertinent medical history:  Smoking and COPD  Imaging (None/Xray/MRI/Other):  none  Recent or major surgery (yes/no):  no  Night pain (yes/no): no  Accidents (yes/no): no  Unexplained weight loss (yes/no): no  Barriers at home: stairs 2 flights half without railing so she holds on to wall  Other red flags: none    EXAMINATION    Posture:   Sitting (good/fair/poor): poor   Standing (good/fair/poor):poor  Lordosis (red/acc/normal): none  Correction of posture (better/worse/no effect): worse  Lateral Shift (right/left/nil): no  Relevant (yes/no):    Other Observations:     Neurological:  Motor deficit:  none  Reflexes:  Na   Sensory deficit:  none Dural signs:  Slump neg B  Movement Loss:   Elias Mod Min Nil Pain   Flexion   +     Extension +    ERP R buttocks   Side Gliding R  +   ERP   Side Gliding L +    ERP   Test Movements:   During: produces, abolishes, increases, decreases, no effect, centralizing, peripheralizing   After: better, worse, no better, no worse, no effect, centralized, peripheralized  PATIENTS NAME:  Lucie Stockton   : 1935  PRIMARY/TREATMENT DIAGNOSIS: (Pertinent Medical Diagnosis)  Chronic low back pain without sciatica, unspecified back pain laterality  Nocturia    Pretest symptoms standing: R buttocks   Symptoms During Symptoms After ROM  increased ROM decreased No Effect   FIS          Rep FIS          EIS Increases    No Worse         Rep EIS Increases    Worse         Pretest symptoms lying:     Symptoms During Symptoms After ROM increased ROM decreased No Effect   SALOME          Rep SALOME          EIL          Rep EIL          If required, pretest symptoms:    Symptoms During Symptoms After ROM increased ROM decreased No Effect   SGIS - R          Rep SGIS - R          SGIS - L          Rep SGIS - L            Static Tests:  Sitting slouched:  better    Sitting erect:  worse  Standing slouched  better   Standing erect:    Lying prone in extension:     Long sitting:      Other Tests:   Provisional Classification:  Derangement - Asymmetrical, unilateral, symptoms above knee  Principle of Management:  Education:  DP sustained works better for her     Equipment provided:    Mechanical therapy (Y/N):  Y   Extension principle:      Lateral Principle:    Flexion principle:  FISIt 20 sec x 4 reps3-4 x day    Other:          PATIENTS NAME:  Lucie Stockton   : 1935  PRIMARY/TREATMENT DIAGNOSIS: (Pertinent Medical Diagnosis)  Chronic low back pain without sciatica, unspecified back pain laterality  Nocturia    ASSESSMENT/PLAN:  Patient is a 83 year old female with lumbar complaints.    Patient has the following significant findings with corresponding treatment plan.                Diagnosis 1:  LBP  Pain -  self management, education, directional preference exercise and home program  Decreased ROM/flexibility - manual therapy, therapeutic exercise, therapeutic activity and home program  Decreased joint mobility - manual therapy, therapeutic exercise, therapeutic activity and home program  Decreased function - therapeutic activities and home program    Therapy Evaluation Codes:   1) History comprised of:   Personal factors that impact the plan of care:      None.    Comorbidity factors that impact the plan of care are:      COPD.     Medications impacting  care: None.  2) Examination of Body Systems comprised of:   Body structures and functions that impact the plan of care:      Lumbar spine.   Activity limitations that impact the plan of care are:      Standing and Walking.  3) Clinical presentation characteristics are:   Stable/Uncomplicated.  4) Decision-Making    Low complexity using standardized patient assessment instrument and/or    measureable assessment of functional outcome.  Cumulative Therapy Evaluation is: Low complexity.    Previous and current functional limitations:  (See Goal Flow Sheet for this information)    Short term and Long term goals: (See Goal Flow Sheet for this information)   Communication ability:  Patient appears to be able to clearly communicate and understand verbal and written communication and follow directions correctly.  Treatment Explanation - The following has been discussed with the patient:   RX ordered/plan of care, Anticipated outcomes, Possible risks and side effects                        PATIENTS NAME:  Lucie Stockton   : 1935  PRIMARY/TREATMENT DIAGNOSIS: (Pertinent Medical Diagnosis)  Chronic low back pain without sciatica, unspecified back pain laterality  Nocturia    This patient would benefit from PT intervention to resume normal activities.   Rehab potential is good.  Frequency:  1 X week, once daily  Duration:  for 6 weeks  Discharge Plan:  Achieve all LTG.  Independent in home treatment program.  Reach maximal therapeutic benefit.            Caregiver Signature/Credentials _____________________________ Date ________         Samantha Agarwal, PT, Cert. MDT    I have reviewed and certified the need for these services and plan of treatment while under my care.        PHYSICIAN'S SIGNATURE:   _________________________________________      Date___________   Marii Montgomery MD    Certification period:  Beginning of Cert date period: 19 to  19     Functional Level Progress Report: Please see attached  "\"Goal Flow sheet for Functional level.\"    ____X____ Continue Services or       ________ DC Services                Service dates: From  SOC Date: 01/07/19  to present                         "

## 2019-01-07 NOTE — PROGRESS NOTES
[facility] Initial Evaluation -- Lumbar    Date: January 7, 2019  Lucie Stockton is a 83 year old female with a *** condition.   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   Symptoms (improving/unchanging/worsening):  unchanged    Symptoms commenced as a result of: CRISTY   Condition occurred in the following environment:   home     Symptoms at onset (back/thigh/leg): R buttocks  Constant symptoms (back/thigh/leg):   Intermittent symptoms (back/thigh/leg): yes    Symptoms are made worse with the following: Always Rising, Always Standing, Always Walking, Sometimes Lying and Time of day - No effect sitting on hard chair - always  Symptoms are made better with the following: sitting    Disturbed sleep (yes/no):  no Sleeping postures (prone/sup/side R/L): R SL    Previous episodes (0/1-5/6-10/11+): chronic Year of first episode:     Previous history:   Previous treatments: PT here       Specific Questions:  Cough/Sneeze/Strain (pos/neg): neg  Bowel/Bladder (normal/abnormal): normal  Gait (normal/abnormal): abnormal, less if she bends over  Medications (nil/NSAIDS/analg/steroids/anticoag/other):  OTC analgesic  Medical allergies:  none  General health (excellent/good/fair/poor):    Pertinent medical history:  {KATEY Past Medical History:812143}  Imaging (None/Xray/MRI/Other):  none  Recent or major surgery (yes/no):  no  Night pain (yes/no): no  Accidents (yes/no): no  Unexplained weight loss (yes/no): no  Barriers  "at home: stairs 2 flights half without railing so she holds on to wall  Other red flags: none    EXAMINATION    Posture:   Sitting (good/fair/poor): poor   Standing (good/fair/poor):poor  Lordosis (red/acc/normal): none  Correction of posture (better/worse/no effect): worse    Lateral Shift (right/left/nil): no  Relevant (yes/no):    Other Observations:     Neurological:    Motor deficit:  none  Reflexes:  Na   Sensory deficit:  ***  Dural signs:  ***    Movement Loss:   Elias Mod Min Nil Pain   Flexion   +  ***   Extension +    ***   Side Gliding R     ***   Side Gliding L     ***     Test Movements:   During: produces, abolishes, increases, decreases, no effect, centralizing, peripheralizing   After: better, worse, no better, no worse, no effect, centralized, peripheralized    Pretest symptoms standing: ***   Symptoms During Symptoms After ROM increased ROM decreased No Effect   FIS {KATEY MDT During Testin::\" \"} {KATEY MDT After Testin::\" \"}      Rep FIS {KATEY MDT During Testin::\" \"} {KATEY MDT After Testin::\" \"}      EIS {KATEY MDT During Testin::\" \"} {KATEY MDT After Testin::\" \"}      Rep EIS {KATEY MDT During Testin::\" \"} {KATEY MDT After Testin::\" \"}      Pretest symptoms lying: ***    Symptoms During Symptoms After ROM increased ROM decreased No Effect   SALOME {KATEY MDT During Testin::\" \"} {KATEY MDT After Testin::\" \"}      Rep SALOME {KATEY MDT During Testin::\" \"} {KATEY MDT After Testin::\" \"}      EIL {KATEY MDT During Testin::\" \"} {KATEY MDT After Testin::\" \"}      Rep EIL {KATEY MDT During Testin::\" \"} {KATEY MDT After Testin::\" \"}      If required, pretest symptoms: ***   Symptoms During Symptoms After ROM increased ROM decreased No Effect   SGIS - R {KATEY MDT During Testin::\" \"} {KATEY MDT After Testin::\" \"}      Rep SGIS - R {KATEY MDT During Testin::\" \"} {KATEY MDT After Testin::\" \"}    " "  SGIS - L {LES MDT During Testin::\" \"} {LES MDT After Testin::\" \"}      Rep SGIS - L {LES MDT During Testin::\" \"} {LES MDT After Testin::\" \"}        Static Tests:  Sitting slouched:  ***  Sitting erect:  ***  Standing slouched ***  Standing erect:  ***  Lying prone in extension:  *** Long sitting:  ***    Other Tests: ***    Provisional Classification:  {les mdt lumbar classification:768766}    Principle of Management:  Education:  ***   Equipment provided:  ***  Mechanical therapy (Y/N):  ***   Extension principle:  ***  Lateral Principle:  ***  Flexion principle:  ***  Other:  ***    ASSESSMENT/PLAN:    {REHAB NOTES:210720}                                                                                                 [id number]          [id number]  "

## 2019-01-07 NOTE — PROGRESS NOTES
"[facility] Initial Evaluation -- Lumbar    Date: January 7, 2019  Lucie Stockton is a 83 year old female with a LS condition.   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  MD order [id number]   Symptoms (improving/unchanging/worsening):  unchanged    Symptoms commenced as a result of: CRISTY   Condition occurred in the following environment:   home     Symptoms at onset (back/thigh/leg): R buttocks  Constant symptoms (back/thigh/leg):   Intermittent symptoms (back/thigh/leg): yes    Symptoms are made worse with the following: Always Rising, Always Standing, Always Walking, Sometimes Lying and Time of day - No effect sitting on hard chair - always  Symptoms are made better with the following: sitting    Disturbed sleep (yes/no):  no Sleeping postures (prone/sup/side R/L): R SL    Previous episodes (0/1-5/6-10/11+): chronic Year of first episode:     Previous history:   Previous treatments: PT here       Specific Questions:  Cough/Sneeze/Strain (pos/neg): neg  Bowel/Bladder (normal/abnormal): normal  Gait (normal/abnormal): abnormal, less if she bends over  Medications (nil/NSAIDS/analg/steroids/anticoag/other):  OTC analgesic  Medical allergies:  none  General health (excellent/good/fair/poor):    Pertinent medical history:  Smoking and COPD  Imaging (None/Xray/MRI/Other):  none  Recent or major surgery (yes/no):  no  Night pain (yes/no): " no  Accidents (yes/no): no  Unexplained weight loss (yes/no): no  Barriers at home: stairs 2 flights half without railing so she holds on to wall  Other red flags: none    EXAMINATION    Posture:   Sitting (good/fair/poor): poor   Standing (good/fair/poor):poor  Lordosis (red/acc/normal): none  Correction of posture (better/worse/no effect): worse    Lateral Shift (right/left/nil): no  Relevant (yes/no):    Other Observations:     Neurological:    Motor deficit:  none  Reflexes:  Na   Sensory deficit:  none Dural signs:  Slump neg B    Movement Loss:   Elias Mod Min Nil Pain   Flexion   +     Extension +    ERP R buttocks   Side Gliding R  +   ERP   Side Gliding L +    ERP     Test Movements:   During: produces, abolishes, increases, decreases, no effect, centralizing, peripheralizing   After: better, worse, no better, no worse, no effect, centralized, peripheralized    Pretest symptoms standing: R buttocks   Symptoms During Symptoms After ROM increased ROM decreased No Effect   FIS          Rep FIS          EIS Increases    No Worse         Rep EIS Increases    Worse         Pretest symptoms lying:     Symptoms During Symptoms After ROM increased ROM decreased No Effect   SALOME          Rep SALOME          EIL          Rep EIL          If required, pretest symptoms:    Symptoms During Symptoms After ROM increased ROM decreased No Effect   SGIS - R          Rep SGIS - R          SGIS - L          Rep SGIS - L            Static Tests:  Sitting slouched:  better  Sitting erect:  worse  Standing slouched  better Standing erect:    Lying prone in extension:   Long sitting:      Other Tests:     Provisional Classification:  Derangement - Asymmetrical, unilateral, symptoms above knee    Principle of Management:  Education:  DP sustained works better for her   Equipment provided:    Mechanical therapy (Y/N):  Y   Extension principle:    Lateral Principle:    Flexion principle:  FISIt 20 sec x 4 reps3-4 x day  Other:       ASSESSMENT/PLAN:    Patient is a 83 year old female with lumbar complaints.    Patient has the following significant findings with corresponding treatment plan.                Diagnosis 1:  LBP  Pain -  self management, education, directional preference exercise and home program  Decreased ROM/flexibility - manual therapy, therapeutic exercise, therapeutic activity and home program  Decreased joint mobility - manual therapy, therapeutic exercise, therapeutic activity and home program  Decreased function - therapeutic activities and home program    Therapy Evaluation Codes:   1) History comprised of:   Personal factors that impact the plan of care:      None.    Comorbidity factors that impact the plan of care are:      COPD.     Medications impacting care: None.  2) Examination of Body Systems comprised of:   Body structures and functions that impact the plan of care:      Lumbar spine.   Activity limitations that impact the plan of care are:      Standing and Walking.  3) Clinical presentation characteristics are:   Stable/Uncomplicated.  4) Decision-Making    Low complexity using standardized patient assessment instrument and/or measureable assessment of functional outcome.  Cumulative Therapy Evaluation is: Low complexity.    Previous and current functional limitations:  (See Goal Flow Sheet for this information)    Short term and Long term goals: (See Goal Flow Sheet for this information)     Communication ability:  Patient appears to be able to clearly communicate and understand verbal and written communication and follow directions correctly.  Treatment Explanation - The following has been discussed with the patient:   RX ordered/plan of care  Anticipated outcomes  Possible risks and side effects  This patient would benefit from PT intervention to resume normal activities.   Rehab potential is good.    Frequency:  1 X week, once daily  Duration:  for 6 weeks  Discharge Plan:  Achieve all LTG.  Independent  in home treatment program.  Reach maximal therapeutic benefit.    Please refer to the daily flowsheet for treatment today, total treatment time and time spent performing 1:1 timed codes.                                                                                                  [id number]          [id number]

## 2019-01-09 ENCOUNTER — OFFICE VISIT (OUTPATIENT)
Dept: SURGERY | Facility: CLINIC | Age: 84
End: 2019-01-09
Attending: INTERNAL MEDICINE
Payer: MEDICARE

## 2019-01-09 ENCOUNTER — ANCILLARY PROCEDURE (OUTPATIENT)
Dept: CT IMAGING | Facility: CLINIC | Age: 84
End: 2019-01-09
Attending: INTERNAL MEDICINE
Payer: MEDICARE

## 2019-01-09 VITALS
TEMPERATURE: 96.7 F | SYSTOLIC BLOOD PRESSURE: 139 MMHG | DIASTOLIC BLOOD PRESSURE: 64 MMHG | HEIGHT: 60 IN | BODY MASS INDEX: 20.42 KG/M2 | RESPIRATION RATE: 16 BRPM | WEIGHT: 104 LBS | OXYGEN SATURATION: 92 % | HEART RATE: 79 BPM

## 2019-01-09 DIAGNOSIS — R91.8 PULMONARY NODULES: ICD-10-CM

## 2019-01-09 DIAGNOSIS — R91.8 PULMONARY NODULES: Primary | ICD-10-CM

## 2019-01-09 PROCEDURE — G0463 HOSPITAL OUTPT CLINIC VISIT: HCPCS | Mod: ZF

## 2019-01-09 RX ORDER — POTASSIUM CHLORIDE 1500 MG/1
TABLET, EXTENDED RELEASE ORAL
Refills: 3 | COMMUNITY
Start: 2018-11-23 | End: 2019-08-30

## 2019-01-09 RX ORDER — LATANOPROST 50 UG/ML
1 SOLUTION/ DROPS OPHTHALMIC AT BEDTIME
Refills: 1 | COMMUNITY
Start: 2019-01-04

## 2019-01-09 ASSESSMENT — PAIN SCALES - GENERAL: PAINLEVEL: MODERATE PAIN (5)

## 2019-01-09 ASSESSMENT — MIFFLIN-ST. JEOR: SCORE: 853.24

## 2019-01-09 NOTE — PROGRESS NOTES
Pulmonary Nodule Clinic        HPI:     Lucie Stockton is a 83 year old female  referred to the Lung Nodule Clinic for f/u lung nodule surveillance      Lucie is an 82 yo female who was last seen by Dr. Randall on 11/21/2018.  At that time she had CT findings suggestive of infection.  In addition, she had a RUL nodule that had enlarged slightly since January 2018.   She was given augmentin Rx and scheduled for a 3 month f/u CT scan and appointment.       Review of Systems:   14 point ROS performed with pertinent +/- noted in the HPI.  The remainder of the ROS was otherwise negative.        Pertinent Medications     Current Outpatient Medications   Medication Sig     acetaminophen (TYLENOL) 325 MG tablet Take 2 tablets every 6 to 8 hours as needed for pain     amLODIPine (NORVASC) 10 MG tablet Take 1 tablet (10 mg) by mouth daily For blood pressure     amoxicillin-clavulanate (AUGMENTIN) 500-125 MG per tablet Take 1 tablet by mouth 2 times daily     atorvastatin (LIPITOR) 40 MG tablet Take 2 tablets (80 mg) by mouth daily     Calcium Carbonate-Vitamin D (CALCIUM 600 + D OR) Take 1 tablet by mouth daily.     Cholecalciferol (VITAMIN D3 PO) Take by mouth daily     citalopram (CELEXA) 20 MG tablet Take 1 tablet (20 mg) by mouth daily     clopidogrel (PLAVIX) 75 MG tablet Take 1 tablet (75 mg) by mouth daily     cyanocolbalamin (VITAMIN  B-12) 500 MCG tablet Take 1 tablet (500 mcg) by mouth daily     ferrous sulfate (IRON) 325 (65 FE) MG tablet Take 1 tablet (325 mg) by mouth daily (with breakfast)     fluticasone (FLONASE) 50 MCG/ACT spray Spray 2 sprays into both nostrils daily     gabapentin (NEURONTIN) 100 MG capsule      hydrochlorothiazide (HYDRODIURIL) 50 MG tablet Take 1 tablet (50 mg) by mouth daily     levothyroxine (SYNTHROID/LEVOTHROID) 75 MCG tablet Take 1 tablet (75 mcg) by mouth daily     lisinopril (PRINIVIL/ZESTRIL) 40 MG tablet Take 1 tablet (40 mg) by mouth daily For blood pressure     Multiple  Vitamins-Minerals (OCUVITE ADULT 50+) CAPS Take 1 tablet by mouth daily     multivitamin (OCUVITE) TABS tablet Take 1 tablet by mouth daily     naproxen sodium 220 MG capsule Take 220 mg by mouth 2 times daily (with meals)     pantoprazole (PROTONIX) 40 MG EC tablet Take 1 tablet (40 mg) by mouth daily     Potassium Bicarb-Citric Acid 20 MEQ TBEF Take 20 mg by mouth daily     latanoprost (XALATAN) 0.005 % ophthalmic solution INT 1 GTT IN OU QD UTD     potassium chloride ER (K-DUR/KLOR-CON M) 20 MEQ CR tablet      No current facility-administered medications for this visit.           Allergies:    No Known Allergies       Past Medical Hx:   Patient Active Problem List    Chronic obstructive pulmonary disease, unspecified COPD type (H)         Priority: Medium [2]         Date Noted: 12/03/2018      Chronic cough         Priority: Medium [2]         Date Noted: 12/03/2018      Pulmonary nodules         Priority: Medium [2]         Date Noted: 12/03/2018      Age-related osteoporosis without current pathological fracture         Priority: Medium [2]         Date Noted: 08/17/2018      History of nonmelanoma skin cancer         Priority: Medium [2]         Date Noted: 08/12/2018      Multiple melanocytic nevi         Priority: Medium [2]         Date Noted: 08/12/2018      Imbalance         Priority: Medium [2]      Chronic pain         Priority: Medium [2]            Back, hips, knees, legs      Weakness         Priority: Medium [2]         Date Noted: 01/27/2018      BCC (basal cell carcinoma), trunk         Priority: Medium [2]         Date Noted: 01/17/2018      Neoplasm of uncertain behavior of skin         Priority: Medium [2]         Date Noted: 11/26/2017      Cherry angioma         Priority: Medium [2]         Date Noted: 11/26/2017      Moderate major depression (H)         Priority: Medium [2]         Date Noted: 08/17/2017      Anxiety         Priority: Medium [2]         Date Noted: 08/17/2017       Left-sided low back pain with left-sided sciatica         Priority: Medium [2]         Date Noted: 07/07/2017      Long term current use of anticoagulant therapy         Priority: Medium [2]         Date Noted: 04/14/2017      SBO (small bowel obstruction) (H)         Priority: Medium [2]         Date Noted: 01/23/2017      Small bowel obstruction (H)         Priority: Medium [2]         Date Noted: 01/23/2017      Lumbar stenosis with neurogenic claudication         Priority: Medium [2]      Acute left-sided low back pain with left-sided sciatica         Priority: Medium [2]         Date Noted: 11/30/2016      Venous thrombosis of extremity         Priority: Medium [2]         Date Noted: 03/04/2016      Benign essential hypertension         Priority: Medium [2]         Date Noted: 03/04/2016      Gastritis         Priority: Medium [2]         Date Noted: 03/04/2016      Cataract         Priority: Medium [2]         Date Noted: 03/04/2016      Right knee pain         Priority: Medium [2]         Date Noted: 01/26/2016      Personal history of other drug therapy         Priority: Medium [2]         Date Noted: 08/20/2014      Tobacco use disorder         Priority: Medium [2]         Date Noted: 02/06/2014      Vitamin D deficiency         Priority: Medium [2]         Date Noted: 10/09/2013            Problem list name updated by automated process.            Provider to review      Insomnia         Priority: Medium [2]      Back pain         Priority: Medium [2]      Gluteal pain         Priority: Medium [2]         Date Noted: 04/22/2013      Hyperlipidemia with target LDL less than 70         Priority: Medium [2]            Diagnosis updated by automated process. Provider            to review and confirm.      Pulmonary embolism (H)         Priority: Medium [2]            5/24/10 bilateral, after a long flight home from            [place].       Anemia         Priority: Medium [2]      Aortic stenosis          "Priority: Medium [2]            abdominal      Atherosclerosis of aorta (H)         Priority: Medium [2]            \"extensive disease with near occlusion below level            of renal arteries\" on             CT      Atherosclerosis of arteries of extremities (H)         Priority: Medium [2]      Intermittent claudication (H)         Priority: Medium [2]      Iron deficiency         Priority: Medium [2]      Osteoporosis         Priority: Medium [2]            Problem list name updated by automated process.            Provider to review and             confirm            Im[employer] Update utility      DVT of lower extremity, bilateral (H)         Priority: Medium [2]            5/24/10 left distal femoral and great saphenous,            7/7/10 left:  extending to cfv, iliac , pop and            post tibial, peronial, right:  distal fem and            peroneal ... After long flight home from [place]            [place].       Varicose veins         Priority: Medium [2]      Cervicalgia         Priority: Medium [2]         Date Noted: 07/31/2012      Knee pain         Priority: Medium [2]      Osteoarthritis         Priority: Medium [2]            ankle,foot, knee      Peripheral vascular disease (H)         Priority: Medium [2]         Date Noted: 06/25/2012            Problem list name updated by automated process.            Provider to review      Hypothyroidism         Priority: Medium [2]         Date Noted: 03/08/2012      Chronic low back pain without sciatica, unspecified back pain laterality         Priority: Medium [2]         Date Noted: 02/21/2012      Benign ovarian tumor         Priority: Low [3]         Date Noted: 09/21/2011           Family Hx:     Family History   Problem Relation Age of Onset     Breast Cancer Maternal Aunt 80     C.A.D. Father 54          Social Hx:     Social History     Tobacco Use     Smoking status: Former Smoker     Packs/day: 0.50     Years: 15.00     Pack years: 7.50     Last " attempt to quit: 1993     Years since quittin.3     Smokeless tobacco: Never Used   Substance Use Topics     Alcohol use: Yes     Comment: social            Objective   Vitals:  /64   Pulse 79   Temp 96.7  F (35.9  C) (Oral)   Resp 16   Ht 1.524 m (5')   Wt 47.2 kg (104 lb)   LMP  (LMP Unknown)   SpO2 92%   Breastfeeding? No   BMI 20.31 kg/m      General:  Adult female;appears stated age; no acute distress; the patient is a good historian  HEENT:  NCAT; EOMI; No icterus; no injection; MMM  Neck: Supple, full range of motion, no lymphadenopathy  Pulm: CTAB, normal to percussion  CV: No assessed  Abdomen: Soft, NT, ND, + BS  Extremities:  No cyanosis or clubbing, no edema  Neuro: Alert and oriented x 3, moves all extremities no obvious weakness  Skin: No skin rashes noted        Labs / Imaging/Studies     CBC RESULTS:   Recent Labs   Lab Test 18  1513 11/15/18  1034   WBC 9.3 14.2*   RBC 4.17 3.99   HGB 12.4 11.6*   HCT 39.4 37.4   MCV 95 94   MCH 29.7 29.1   MCHC 31.5 31.0*   RDW 15.4* 13.8    597*          Lab Results   Component Value Date     2018     2018     2018    Lab Results   Component Value Date    CHLORIDE 102 2018    CHLORIDE 104 2018    CHLORIDE 100 2018    Lab Results   Component Value Date    BUN 13 2018    BUN 12 2018    BUN 14 2018      Lab Results   Component Value Date    POTASSIUM 3.8 2018    POTASSIUM 3.5 2018    POTASSIUM 3.7 2018    Lab Results   Component Value Date    CO2 26 2018    CO2 24 2018    CO2 26 2018    Lab Results   Component Value Date    CR 0.50 2018    CR 0.48 2018    CR 0.52 2018        INR   Date Value Ref Range Status   2018 0.99 0.86 - 1.14 Final   2017 0.91 0.86 - 1.14 Final       Pertinent Cultures / Microbiology:   N/A    Imaging:   Chest CT, results pending:    Pulmonary Function Tests:  N/A          Assessment and Plan:   Assessment:   Patient is a pleasant 83 year old female here for f/u appointment.  She reports no further cough or other URI symptoms.  She has been in good health with no new concerns.         Her CT chest was reviewed in clinic.   The previously seen RUL nodule appears stable when compared to 11/21/18 scan.  The RUL density appears significantly smaller.    Has other scattered findings, will await formal radiology report.         Plan:   Release radiology report to MY Chart when available and let her know recommendations.   Call if questions.  F/U will be determined once radiology report is known.        I spent 30 minutes with direct face to face interaction with this patient and provided at least 50% of this time counseling and coordinating care for lung nodules as noted above in the assessment and plan.      Hayley RYDER, CNS

## 2019-01-09 NOTE — NURSING NOTE
Oncology Rooming Note    January 9, 2019 8:41 AM   Lucie Stockton is a 83 year old female who presents for:    Chief Complaint   Patient presents with     Oncology Clinic Visit     Return: Lung Nodules/Chronic Cough     Initial Vitals: /64   Pulse 79   Temp 96.7  F (35.9  C) (Oral)   Resp 16   Ht 1.524 m (5')   Wt 47.2 kg (104 lb)   LMP  (LMP Unknown)   SpO2 92%   Breastfeeding? No   BMI 20.31 kg/m   Estimated body mass index is 20.31 kg/m  as calculated from the following:    Height as of this encounter: 1.524 m (5').    Weight as of this encounter: 47.2 kg (104 lb). Body surface area is 1.41 meters squared.  Moderate Pain (5) Comment: Data Unavailable   No LMP recorded (lmp unknown). Patient has had a hysterectomy.  Allergies reviewed: Yes  Medications reviewed: Yes    Medications: Medication refills not needed today.  Pharmacy name entered into [facility]: [facility] [address] [place] [address] AT [address]    Clinical concerns: no new concnerns today Hayley Enciso was notified.    10 minutes for nursing intake (face to face time)     Demian Mcclain CMA

## 2019-01-09 NOTE — LETTER
1/9/2019       RE: Lucie Stockton  [address] [facility] [id number]     Dear Colleague,    Thank you for referring your patient, Lucie Stockton, to the [facility]. Please see a copy of my visit note below.        Pulmonary Nodule Clinic        HPI:     Lucie Stockton is a 83 year old female  referred to the Lung Nodule Clinic for f/u lung nodule surveillance      Lucie is an 84 yo female who was last seen by Dr. Randall on 11/21/2018.  At that time she had CT findings suggestive of infection.  In addition, she had a RUL nodule that had enlarged slightly since January 2018.   She was given augmentin Rx and scheduled for a 3 month f/u CT scan and appointment.       Review of Systems:   14 point ROS performed with pertinent +/- noted in the HPI.  The remainder of the ROS was otherwise negative.        Pertinent Medications     Current Outpatient Medications   Medication Sig     acetaminophen (TYLENOL) 325 MG tablet Take 2 tablets every 6 to 8 hours as needed for pain     amLODIPine (NORVASC) 10 MG tablet Take 1 tablet (10 mg) by mouth daily For blood pressure     amoxicillin-clavulanate (AUGMENTIN) 500-125 MG per tablet Take 1 tablet by mouth 2 times daily     atorvastatin (LIPITOR) 40 MG tablet Take 2 tablets (80 mg) by mouth daily     Calcium Carbonate-Vitamin D (CALCIUM 600 + D OR) Take 1 tablet by mouth daily.     Cholecalciferol (VITAMIN D3 PO) Take by mouth daily     citalopram (CELEXA) 20 MG tablet Take 1 tablet (20 mg) by mouth daily     clopidogrel (PLAVIX) 75 MG tablet Take 1 tablet (75 mg) by mouth daily     cyanocolbalamin (VITAMIN  B-12) 500 MCG tablet Take 1 tablet (500 mcg) by mouth daily     ferrous sulfate (IRON) 325 (65 FE) MG tablet Take 1 tablet (325 mg) by mouth daily (with breakfast)     fluticasone (FLONASE) 50 MCG/ACT spray Spray 2 sprays into both nostrils daily     gabapentin (NEURONTIN) 100 MG capsule      hydrochlorothiazide (HYDRODIURIL) 50 MG tablet Take 1  tablet (50 mg) by mouth daily     levothyroxine (SYNTHROID/LEVOTHROID) 75 MCG tablet Take 1 tablet (75 mcg) by mouth daily     lisinopril (PRINIVIL/ZESTRIL) 40 MG tablet Take 1 tablet (40 mg) by mouth daily For blood pressure     Multiple Vitamins-Minerals (OCUVITE ADULT 50+) CAPS Take 1 tablet by mouth daily     multivitamin (OCUVITE) TABS tablet Take 1 tablet by mouth daily     naproxen sodium 220 MG capsule Take 220 mg by mouth 2 times daily (with meals)     pantoprazole (PROTONIX) 40 MG EC tablet Take 1 tablet (40 mg) by mouth daily     Potassium Bicarb-Citric Acid 20 MEQ TBEF Take 20 mg by mouth daily     latanoprost (XALATAN) 0.005 % ophthalmic solution INT 1 GTT IN OU QD UTD     potassium chloride ER (K-DUR/KLOR-CON M) 20 MEQ CR tablet      No current facility-administered medications for this visit.           Allergies:    No Known Allergies       Past Medical Hx:   Patient Active Problem List    Chronic obstructive pulmonary disease, unspecified COPD type (H)         Priority: Medium [2]         Date Noted: 12/03/2018      Chronic cough         Priority: Medium [2]         Date Noted: 12/03/2018      Pulmonary nodules         Priority: Medium [2]         Date Noted: 12/03/2018      Age-related osteoporosis without current pathological fracture         Priority: Medium [2]         Date Noted: 08/17/2018      History of nonmelanoma skin cancer         Priority: Medium [2]         Date Noted: 08/12/2018      Multiple melanocytic nevi         Priority: Medium [2]         Date Noted: 08/12/2018      Imbalance         Priority: Medium [2]      Chronic pain         Priority: Medium [2]            Back, hips, knees, legs      Weakness         Priority: Medium [2]         Date Noted: 01/27/2018      BCC (basal cell carcinoma), trunk         Priority: Medium [2]         Date Noted: 01/17/2018      Neoplasm of uncertain behavior of skin         Priority: Medium [2]         Date Noted: 11/26/2017      Cherry angioma          Priority: Medium [2]         Date Noted: 11/26/2017      Moderate major depression (H)         Priority: Medium [2]         Date Noted: 08/17/2017      Anxiety         Priority: Medium [2]         Date Noted: 08/17/2017      Left-sided low back pain with left-sided sciatica         Priority: Medium [2]         Date Noted: 07/07/2017      Long term current use of anticoagulant therapy         Priority: Medium [2]         Date Noted: 04/14/2017      SBO (small bowel obstruction) (H)         Priority: Medium [2]         Date Noted: 01/23/2017      Small bowel obstruction (H)         Priority: Medium [2]         Date Noted: 01/23/2017      Lumbar stenosis with neurogenic claudication         Priority: Medium [2]      Acute left-sided low back pain with left-sided sciatica         Priority: Medium [2]         Date Noted: 11/30/2016      Venous thrombosis of extremity         Priority: Medium [2]         Date Noted: 03/04/2016      Benign essential hypertension         Priority: Medium [2]         Date Noted: 03/04/2016      Gastritis         Priority: Medium [2]         Date Noted: 03/04/2016      Cataract         Priority: Medium [2]         Date Noted: 03/04/2016      Right knee pain         Priority: Medium [2]         Date Noted: 01/26/2016      Personal history of other drug therapy         Priority: Medium [2]         Date Noted: 08/20/2014      Tobacco use disorder         Priority: Medium [2]         Date Noted: 02/06/2014      Vitamin D deficiency         Priority: Medium [2]         Date Noted: 10/09/2013            Problem list name updated by automated process.            Provider to review      Insomnia         Priority: Medium [2]      Back pain         Priority: Medium [2]      Gluteal pain         Priority: Medium [2]         Date Noted: 04/22/2013      Hyperlipidemia with target LDL less than 70         Priority: Medium [2]            Diagnosis updated by automated process. Provider            to  "review and confirm.      Pulmonary embolism (H)         Priority: Medium [2]            5/24/10 bilateral, after a long flight home from            [place].       Anemia         Priority: Medium [2]      Aortic stenosis         Priority: Medium [2]            abdominal      Atherosclerosis of aorta (H)         Priority: Medium [2]            \"extensive disease with near occlusion below level            of renal arteries\" on             CT      Atherosclerosis of arteries of extremities (H)         Priority: Medium [2]      Intermittent claudication (H)         Priority: Medium [2]      Iron deficiency         Priority: Medium [2]      Osteoporosis         Priority: Medium [2]            Problem list name updated by automated process.            Provider to review and             confirm            Im[employer] Update utility      DVT of lower extremity, bilateral (H)         Priority: Medium [2]            5/24/10 left distal femoral and great saphenous,            7/7/10 left:  extending to cfv, iliac , pop and            post tibial, peronial, right:  distal fem and            peroneal ... After long flight home from [place]            [place].       Varicose veins         Priority: Medium [2]      Cervicalgia         Priority: Medium [2]         Date Noted: 07/31/2012      Knee pain         Priority: Medium [2]      Osteoarthritis         Priority: Medium [2]            ankle,foot, knee      Peripheral vascular disease (H)         Priority: Medium [2]         Date Noted: 06/25/2012            Problem list name updated by automated process.            Provider to review      Hypothyroidism         Priority: Medium [2]         Date Noted: 03/08/2012      Chronic low back pain without sciatica, unspecified back pain laterality         Priority: Medium [2]         Date Noted: 02/21/2012      Benign ovarian tumor         Priority: Low [3]         Date Noted: 09/21/2011           Family Hx:     Family History   Problem Relation " Age of Onset     Breast Cancer Maternal Aunt 80     C.A.D. Father 54          Social Hx:     Social History     Tobacco Use     Smoking status: Former Smoker     Packs/day: 0.50     Years: 15.00     Pack years: 7.50     Last attempt to quit: 1993     Years since quittin.3     Smokeless tobacco: Never Used   Substance Use Topics     Alcohol use: Yes     Comment: social            Objective   Vitals:  /64   Pulse 79   Temp 96.7  F (35.9  C) (Oral)   Resp 16   Ht 1.524 m (5')   Wt 47.2 kg (104 lb)   LMP  (LMP Unknown)   SpO2 92%   Breastfeeding? No   BMI 20.31 kg/m       General:  Adult female;appears stated age; no acute distress; the patient is a good historian  HEENT:  NCAT; EOMI; No icterus; no injection; MMM  Neck: Supple, full range of motion, no lymphadenopathy  Pulm: CTAB, normal to percussion  CV: No assessed  Abdomen: Soft, NT, ND, + BS  Extremities:  No cyanosis or clubbing, no edema  Neuro: Alert and oriented x 3, moves all extremities no obvious weakness  Skin: No skin rashes noted        Labs / Imaging/Studies     CBC RESULTS:   Recent Labs   Lab Test 18  1513 11/15/18  1034   WBC 9.3 14.2*   RBC 4.17 3.99   HGB 12.4 11.6*   HCT 39.4 37.4   MCV 95 94   MCH 29.7 29.1   MCHC 31.5 31.0*   RDW 15.4* 13.8    597*          Lab Results   Component Value Date     2018     2018     2018    Lab Results   Component Value Date    CHLORIDE 102 2018    CHLORIDE 104 2018    CHLORIDE 100 2018    Lab Results   Component Value Date    BUN 13 2018    BUN 12 2018    BUN 14 2018      Lab Results   Component Value Date    POTASSIUM 3.8 2018    POTASSIUM 3.5 2018    POTASSIUM 3.7 2018    Lab Results   Component Value Date    CO2 2018    CO2 24 2018    CO2 26 2018    Lab Results   Component Value Date    CR 0.50 2018    CR 0.48 2018    CR 0.52 2018        INR    Date Value Ref Range Status   01/26/2018 0.99 0.86 - 1.14 Final   01/22/2017 0.91 0.86 - 1.14 Final       Pertinent Cultures / Microbiology:   N/A    Imaging:   Chest CT, results pending:    Pulmonary Function Tests:  N/A         Assessment and Plan:   Assessment:   Patient is a pleasant 83 year old female here for f/u appointment.  She reports no further cough or other URI symptoms.  She has been in good health with no new concerns.         Her CT chest was reviewed in clinic.   The previously seen RUL nodule appears stable when compared to 11/21/18 scan.  The RUL density appears significantly smaller.    Has other scattered findings, will await formal radiology report.         Plan:   Release radiology report to MY Chart when available and let her know recommendations.   Call if questions.  F/U will be determined once radiology report is known.        I spent 30 minutes with direct face to face interaction with this patient and provided at least 50% of this time counseling and coordinating care for lung nodules as noted above in the assessment and plan.      Hayley RYDER, CNS

## 2019-01-09 NOTE — PATIENT INSTRUCTIONS
Results will be released to [facility]  Will make recommendations after CT is formally read; message will be sent.

## 2019-01-10 DIAGNOSIS — E78.5 HYPERLIPIDEMIA, UNSPECIFIED HYPERLIPIDEMIA TYPE: ICD-10-CM

## 2019-01-10 DIAGNOSIS — R93.89 ABNORMAL CHEST CT: Primary | ICD-10-CM

## 2019-01-10 NOTE — PROGRESS NOTES
Patient notified after conference discussion that a 6 month f/u with repeat chest CT was recommended, no need for antibiotics (patient is asymptomatic).     Will arrange and notify schedulers to help set this up in July.

## 2019-01-10 NOTE — PROGRESS NOTES
Pulmonary Nodule Conference      Patient Name: Lucie Stockton    Reason for conference discussion (brief overview): 84 yo female seen yesterday in clinic.  In Nov 2018 had CT changes suggesting infection.   Saw Dr. Randall who gave her extended Rx Augmentin for 4 weeks.   F/u done yesterday shows significant improvement but not complete resolution.   40+ pack/day smoker, quit 3-4 years ago.     Specific Question:  Is a 6 month f/u appropriate (as suggested on radiology report)?   Is there any role for additional antibiotic?   Other opinions.    Pertinent Histology:  None      Referring Physician: ALEKSEY Ramirez, CNS    The patient's case was presented at the multidisciplinary conference for the above noted reason.  There was a consensus recommendation for the following actions:     The group concurs that follow up in 6 months is appropriate. No need to treat with additional antibiotics.          Case Lead:  Anna Kline    Interventional Radiology Staff Present: N/A

## 2019-01-11 RX ORDER — ATORVASTATIN CALCIUM 40 MG/1
80 TABLET, FILM COATED ORAL DAILY
Qty: 180 TABLET | Refills: 3 | Status: SHIPPED | OUTPATIENT
Start: 2019-01-11 | End: 2020-03-04

## 2019-01-15 ENCOUNTER — THERAPY VISIT (OUTPATIENT)
Dept: PHYSICAL THERAPY | Facility: CLINIC | Age: 84
End: 2019-01-15
Payer: MEDICARE

## 2019-01-15 DIAGNOSIS — M54.50 CHRONIC BILATERAL LOW BACK PAIN WITHOUT SCIATICA: Primary | ICD-10-CM

## 2019-01-15 DIAGNOSIS — G89.29 CHRONIC BILATERAL LOW BACK PAIN WITHOUT SCIATICA: Primary | ICD-10-CM

## 2019-01-15 PROCEDURE — 97530 THERAPEUTIC ACTIVITIES: CPT | Mod: GP | Performed by: PHYSICAL THERAPIST

## 2019-01-15 PROCEDURE — 97110 THERAPEUTIC EXERCISES: CPT | Mod: GP | Performed by: PHYSICAL THERAPIST

## 2019-01-22 ENCOUNTER — MEDICAL CORRESPONDENCE (OUTPATIENT)
Dept: HEALTH INFORMATION MANAGEMENT | Facility: CLINIC | Age: 84
End: 2019-01-22

## 2019-01-22 ENCOUNTER — THERAPY VISIT (OUTPATIENT)
Dept: PHYSICAL THERAPY | Facility: CLINIC | Age: 84
End: 2019-01-22
Payer: MEDICARE

## 2019-01-22 DIAGNOSIS — M25.561 ACUTE PAIN OF RIGHT KNEE: Primary | ICD-10-CM

## 2019-01-22 PROCEDURE — 97161 PT EVAL LOW COMPLEX 20 MIN: CPT | Mod: GP | Performed by: PHYSICAL THERAPIST

## 2019-01-22 PROCEDURE — 97110 THERAPEUTIC EXERCISES: CPT | Mod: GP | Performed by: PHYSICAL THERAPIST

## 2019-01-22 NOTE — LETTER
DEPARTMENT OF HEALTH AND HUMAN SERVICES  CENTERS FOR MEDICARE & MEDICAID SERVICES    PLAN/UPDATED PLAN OF PROGRESS FOR OUTPATIENT REHABILITATION    PATIENTS NAME:  Lucie Stockton   : 1935    PROVIDER NUMBER:    [id number]  HICN:  [id number]    PROVIDER NAME: [facility] PHYSICAL THERAPY  MEDICAL RECORD NUMBER: [id number]     START OF CARE DATE:  SOC Date: 19   TYPE:  PT    PRIMARY/TREATMENT DIAGNOSIS: (Pertinent Medical Diagnosis)  Acute pain of right knee  VISITS FROM START OF CARE:  1     [facility] Initial Evaluation -- Lower Extremity  Evaluation Date: 2019  Lucie Stockton is a 83 year old female with a R lat knee condition.   Referral: GP  Work mechanical stresses:   Employment status:   Leisure mechanical stresses: travel  Functional disability score:   VAS score (0-10): 6  Patient goals/expectations:  Go up and down stairs w/o pain    HISTORY:  Present symptoms: R lat knee pain  Pain quality (sharp/shooting/stabbing/aching/burning/cramping):  achy  Present since (onset date): 2-3 weeks ago MD order 2019  Symptoms (improving/unchaning/worsening):  unchanging.    Symptoms commenced as a result of: CRISTY  Condition occurred in the following environment: unknown   Symptoms at onset: as above    Paresthesia (yes/no):    Spinal history: yes     Cough/Sneeze (pos/neg):    Constant symptoms:   Intermittent symptoms: yes  Symptoms are worse with the following: Sometimes Standing, Sometimes Walking and Always Stairs time of day has no effect  Symptoms are better with the following: Other - nothing  Continued use makes the pain (better/worse/no effect): na  Disturbed night (yes/no): no    Pain at rest (yes/no):      Site (back/hip/knee/ankle/foot):    Other questions (swelling/clicking/locking/giving way/falling):  none   Previous episodes: none  Previous treatments:   PATIENTS NAME:  Lucie Stockton   : 1935  PRIMARY/TREATMENT  DIAGNOSIS: (Pertinent Medical Diagnosis)  Acute pain of right knee    Specific Questions:  General health (excellent/good/fair/poor):  See chart  Pertinent medical history includes: see chart  Medications (nil/NSAIDS/analg/steroids/anticoag/other):  Other - see chart  Medical allergies:  See chart  Imaging (none/Xray/MRI/other):  none  Recent or major surgery (yes/no):  no  Night pain (yes/no):    Accidents (yes/no):  no  Unexplained weight loss (yes/no):  na  Barriers at home: 2 flights of stairs in home  Other red flags: none    Sites for physical examination (back/hip/knee/ankle/foot/other): knee    EXAMINATION    Posture:  Sitting (good/fair/poor):     Correction of Posture (better/worse/no effect/NA):   Standing (good/fair/poor):   Other observations:    Neurological: (NA/motor/sensory/reflexes/dural):   Baselines (pain or functional activity):  Walking step up 8 inches  Extremities (Hip / Knee / Ankle / Foot): knee  Movement Loss Elias Mod Min Nil Pain   Flexion  131   +   Extension 3 fingers to table    +   Abduction        Adduction        Internal Rotation        External Rotation        Dorsiflexion        Plantarflexion        Inversion        Eversion        Passive Movement (+/- over pressure)/(PDM/ERP):  Extension 2 fingers to table, pain  Resisted Test Response (pain):  Q/HS limited by pain  Other Tests: palpation + lat joint line quad set good she is able to SLR in supine         PATIENTS NAME:  Lucie Stockton   : 1935  PRIMARY/TREATMENT DIAGNOSIS: (Pertinent Medical Diagnosis)  Acute pain of right knee    Spine:  Movement loss: currently treating her LS also  Effect of repeated movements:   Effect of static positioning:   Spine testing (not relevant/relevant/secondary problem): not relevant  Baseline Symptoms: pain free  Repeated Tests Symptom Response Mechanical Response   Active/Passive movement, resisted test, functional test During -  Produce, Abolish, Increase, Decrease, NE After -  Better,  Worse, NB, NW, NE Effect -   ? or ? ROM, strength or key functional test No   Effect   Stand rep ecc kne ext with BLATB No Effect    No Effect    No change    Supine heel prop 2 x 1 min No Effect    Better    Inc flex to 140 and ext to 2 fingers to table    Effect of static positioning       Provisional Classification (Extremity/Spine):  Extremity - Derangement    Princicple of Management:   Education:  Etiology ROM limited in both directions    Equipment provided:    Exercise and dosage:  Supine knee ext with heel prop 2 x 1 min 3-4 x day avoid sitting with knees bent all the time    ASSESSMENT/PLAN:  Patient is a 83 year old female with right side knee complaints.    Patient has the following significant findings with corresponding treatment plan.                Diagnosis 1:  R knee pain  Pain -  manual therapy, self management, education, directional preference exercise and home program  Decreased ROM/flexibility - manual therapy, therapeutic exercise, therapeutic activity and home program  Decreased joint mobility - manual therapy, therapeutic exercise, therapeutic activity and home program  Decreased strength - therapeutic exercise, therapeutic activities and home program  Decreased function - therapeutic activities and home program          PATIENTS NAME:  Lucie Stockton   : 1935  PRIMARY/TREATMENT DIAGNOSIS: (Pertinent Medical Diagnosis)  Acute pain of right knee    Therapy Evaluation Codes:   1) History comprised of:   Personal factors that impact the plan of care:      None.    Comorbidity factors that impact the plan of care are:      see chart.     Medications impacting care: see chart.  2) Examination of Body Systems comprised of:   Body structures and functions that impact the plan of care:      Knee and Lumbar spine.   Activity limitations that impact the plan of care are:      Stairs and Walking.  3) Clinical presentation characteristics are:   Stable/Uncomplicated.  4) Decision-Making    Low  "complexity using standardized patient assessment instrument and/or   measureable assessment of functional outcome.  Cumulative Therapy Evaluation is: Low complexity.    Previous and current functional limitations:  (See Goal Flow Sheet for this information)    Short term and Long term goals: (See Goal Flow Sheet for this information)   Communication ability:  Patient appears to be able to clearly communicate and understand verbal and written communication and follow directions correctly.  Treatment Explanation - The following has been discussed with the patient:   RX ordered/plan of care, Anticipated outcomes, Possible risks and side effects                                                PATIENTS NAME:  Lucie Stockton   : 1935  PRIMARY/TREATMENT DIAGNOSIS: (Pertinent Medical Diagnosis)  Acute pain of right knee    This patient would benefit from PT intervention to resume normal activities.   Rehab potential is good.  Frequency:  1 X week, once daily  Duration:  for 6 weeks  Discharge Plan:  Achieve all LTG.  Independent in home treatment program.  Reach maximal therapeutic benefit.            Caregiver Signature/Credentials _____________________________ Date ________         Samantha Agarwal, PT, Cert. MDT    I have reviewed and certified the need for these services and plan of treatment while under my care.        PHYSICIAN'S SIGNATURE:   _________________________________________      Date___________   Marii Montgomery MD    Certification period:  Beginning of Cert date period: 19 to  19     Functional Level Progress Report: Please see attached \"Goal Flow sheet for Functional level.\"    ____X____ Continue Services or       ________ DC Services                Service dates: From  SOC Date: 19  to present                         "

## 2019-01-23 NOTE — PROGRESS NOTES
[facility] Initial Evaluation -- Lower Extremity    Evaluation Date: January 23, 2019  Lucie Stockton is a 83 year old female with a R lat knee condition.   Referral: GP  Work mechanical stresses:   Employment status:   Leisure mechanical stresses: travel  Functional disability score:   VAS score (0-10): 6  Patient goals/expectations:  Go up and down stairs w/o pain    HISTORY:    Present symptoms: R lat knee pain  Pain quality (sharp/shooting/stabbing/aching/burning/cramping):  achy    Present since (onset date): 2-3 weeks ago MD order [id number]  Symptoms (improving/unchaning/worsening):  unchanging.      Symptoms commenced as a result of: CRISTY  Condition occurred in the following environment: unknown     Symptoms at onset: as above  Paresthesia (yes/no):    Spinal history: yes   Cough/Sneeze (pos/neg):      Constant symptoms:   Intermittent symptoms: yes    Symptoms are worse with the following: Sometimes Standing, Sometimes Walking and Always Stairs time of day has no effect  Symptoms are better with the following: Other - nothing    Continued use makes the pain (better/worse/no effect): na    Disturbed night (yes/no): no      Pain at rest (yes/no):    Site (back/hip/knee/ankle/foot):      Other questions (swelling/clicking/locking/giving way/falling):  none     Previous episodes: none  Previous treatments:     Specific Questions:  General health (excellent/good/fair/poor):  See chart  Pertinent medical history includes: see chart  Medications (nil/NSAIDS/analg/steroids/anticoag/other):  Other - see chart  Medical allergies:  See chart  Imaging (none/Xray/MRI/other):  none  Recent or major surgery (yes/no):  no  Night pain (yes/no):    Accidents (yes/no):  no  Unexplained weight loss (yes/no):  na  Barriers at home: 2 flights of stairs in home  Other red flags: none    Sites for physical examination (back/hip/knee/ankle/foot/other): knee    EXAMINATION    Posture:  Sitting (good/fair/poor):      Correction of Posture (better/worse/no effect/NA):   Standing (good/fair/poor):   Other observations:      Neurological: (NA/motor/sensory/reflexes/dural):     Baselines (pain or functional activity):  Walking step up 8 inches    Extremities (Hip / Knee / Ankle / Foot): knee    Movement Loss Elias Mod Min Nil Pain   Flexion  131   +   Extension 3 fingers to table    +   Abduction        Adduction        Internal Rotation        External Rotation        Dorsiflexion        Plantarflexion        Inversion        Eversion          Passive Movement (+/- over pressure)/(PDM/ERP):  Extension 2 fingers to table, pain  Resisted Test Response (pain):  Q/HS limited by pain  Other Tests: palpation + lat joint line quad set good she is able to SLR in supine     Spine:  Movement loss: currently treating her LS also  Effect of repeated movements:   Effect of static positioning:   Spine testing (not relevant/relevant/secondary problem): not relevant    Baseline Symptoms: pain free  Repeated Tests Symptom Response Mechanical Response   Active/Passive movement, resisted test, functional test During -  Produce, Abolish, Increase, Decrease, NE After -  Better, Worse, NB, NW, NE Effect -   ? or ? ROM, strength or key functional test No   Effect   Stand rep ecc kne ext with BLATB No Effect    No Effect    No change    Supine heel prop 2 x 1 min No Effect    Better    Inc flex to 140 and ext to 2 fingers to table                      Effect of static positioning                  Provisional Classification (Extremity/Spine):  Extremity - Derangement      Princicple of Management:   Education:  Etiology ROM limited in both directions    Equipment provided:    Exercise and dosage:  Supine knee ext with heel prop 2 x 1 min 3-4 x day avoid sitting with knees bent all the time    ASSESSMENT/PLAN:    Patient is a 83 year old female with right side knee complaints.    Patient has the following significant findings with corresponding treatment  plan.                Diagnosis 1:  R knee pain  Pain -  manual therapy, self management, education, directional preference exercise and home program  Decreased ROM/flexibility - manual therapy, therapeutic exercise, therapeutic activity and home program  Decreased joint mobility - manual therapy, therapeutic exercise, therapeutic activity and home program  Decreased strength - therapeutic exercise, therapeutic activities and home program  Decreased function - therapeutic activities and home program    Therapy Evaluation Codes:   1) History comprised of:   Personal factors that impact the plan of care:      None.    Comorbidity factors that impact the plan of care are:      see chart.     Medications impacting care: see chart.  2) Examination of Body Systems comprised of:   Body structures and functions that impact the plan of care:      Knee and Lumbar spine.   Activity limitations that impact the plan of care are:      Stairs and Walking.  3) Clinical presentation characteristics are:   Stable/Uncomplicated.  4) Decision-Making    Low complexity using standardized patient assessment instrument and/or measureable assessment of functional outcome.  Cumulative Therapy Evaluation is: Low complexity.    Previous and current functional limitations:  (See Goal Flow Sheet for this information)    Short term and Long term goals: (See Goal Flow Sheet for this information)     Communication ability:  Patient appears to be able to clearly communicate and understand verbal and written communication and follow directions correctly.  Treatment Explanation - The following has been discussed with the patient:   RX ordered/plan of care  Anticipated outcomes  Possible risks and side effects  This patient would benefit from PT intervention to resume normal activities.   Rehab potential is good.    Frequency:  1 X week, once daily  Duration:  for 6 weeks  Discharge Plan:  Achieve all LTG.  Independent in home treatment program.  Reach  maximal therapeutic benefit.    Please refer to the daily flowsheet for treatment today, total treatment time and time spent performing 1:1 timed codes.

## 2019-01-25 DIAGNOSIS — M25.569 CHRONIC KNEE PAIN, UNSPECIFIED LATERALITY: Primary | ICD-10-CM

## 2019-01-25 DIAGNOSIS — G89.29 CHRONIC KNEE PAIN, UNSPECIFIED LATERALITY: Primary | ICD-10-CM

## 2019-01-29 ENCOUNTER — THERAPY VISIT (OUTPATIENT)
Dept: PHYSICAL THERAPY | Facility: CLINIC | Age: 84
End: 2019-01-29
Payer: MEDICARE

## 2019-01-29 DIAGNOSIS — M25.561 ACUTE PAIN OF RIGHT KNEE: Primary | ICD-10-CM

## 2019-01-29 PROCEDURE — 97110 THERAPEUTIC EXERCISES: CPT | Mod: GP | Performed by: PHYSICAL THERAPIST

## 2019-01-29 PROCEDURE — 97530 THERAPEUTIC ACTIVITIES: CPT | Mod: GP | Performed by: PHYSICAL THERAPIST

## 2019-02-04 DIAGNOSIS — G89.29 CHRONIC KNEE PAIN, UNSPECIFIED LATERALITY: Primary | ICD-10-CM

## 2019-02-04 DIAGNOSIS — M25.569 CHRONIC KNEE PAIN, UNSPECIFIED LATERALITY: Primary | ICD-10-CM

## 2019-02-05 ENCOUNTER — THERAPY VISIT (OUTPATIENT)
Dept: PHYSICAL THERAPY | Facility: CLINIC | Age: 84
End: 2019-02-05
Payer: MEDICARE

## 2019-02-05 DIAGNOSIS — M25.561 ACUTE PAIN OF RIGHT KNEE: Primary | ICD-10-CM

## 2019-02-05 DIAGNOSIS — G89.29 CHRONIC BILATERAL LOW BACK PAIN WITHOUT SCIATICA: ICD-10-CM

## 2019-02-05 DIAGNOSIS — M54.50 CHRONIC BILATERAL LOW BACK PAIN WITHOUT SCIATICA: ICD-10-CM

## 2019-02-05 PROCEDURE — 97530 THERAPEUTIC ACTIVITIES: CPT | Mod: GP | Performed by: PHYSICAL THERAPIST

## 2019-02-05 PROCEDURE — 97110 THERAPEUTIC EXERCISES: CPT | Mod: GP | Performed by: PHYSICAL THERAPIST

## 2019-02-05 NOTE — PROGRESS NOTES
"DISCHARGE REPORT    Progress reporting period is from [id number] to [id number]    SUBJECTIVE  Subjective changes noted by patient:  Subjective: R lat hip pain is intermiitent with standing and walking. R lateral knee pain mild with ascending stairs. both LS and knee improving.    Current Pain level: 1/10.     Initial Pain level: 2/10.   Changes in function:  Yes (See Goal flowsheet attached for changes in current functional level)  Adverse reaction to treatment or activity: None    OBJECTIVE  Objective: pre Rx\" ROM R knee 3 fingers to table - 132 post:  1/2 finger to table - 136   standing  R LS butt pain LSROM: flexion no loss extension no loss ERP R LS SG no loss        ASSESSMENT/PLAN  Updated problem list and treatment plan:   Diagnosis 1:  LBP knee pain  Pain -  self management, education, directional preference exercise and home program  Decreased ROM/flexibility - home program  Decreased joint mobility - home program  Decreased function - home program  STG/LTGs have been met or progress has been made towards goals:  Yes (See Goal flow sheet completed today.)  Assessment of Progress: The patient's condition is improving.  Self Management Plans:  Patient is independent in a home treatment program.  Patient is independent in self management of symptoms.    Lucie continues to require the following intervention to meet STG and LTG's:  PT intervention is no longer required to meet STG/LTG.    Recommendations:  This patient is ready to be discharged from therapy and continue their home treatment program.  "

## 2019-07-10 ENCOUNTER — ANCILLARY PROCEDURE (OUTPATIENT)
Dept: CT IMAGING | Facility: CLINIC | Age: 84
End: 2019-07-10
Payer: MEDICARE

## 2019-07-10 ENCOUNTER — OFFICE VISIT (OUTPATIENT)
Dept: SURGERY | Facility: CLINIC | Age: 84
End: 2019-07-10
Attending: CLINICAL NURSE SPECIALIST
Payer: MEDICARE

## 2019-07-10 VITALS
SYSTOLIC BLOOD PRESSURE: 152 MMHG | TEMPERATURE: 97.7 F | DIASTOLIC BLOOD PRESSURE: 74 MMHG | HEIGHT: 60 IN | WEIGHT: 107 LBS | OXYGEN SATURATION: 96 % | HEART RATE: 74 BPM | BODY MASS INDEX: 21.01 KG/M2

## 2019-07-10 DIAGNOSIS — R93.89 ABNORMAL CHEST CT: ICD-10-CM

## 2019-07-10 DIAGNOSIS — R91.8 PULMONARY NODULES: Primary | ICD-10-CM

## 2019-07-10 PROCEDURE — G0463 HOSPITAL OUTPT CLINIC VISIT: HCPCS | Mod: ZF

## 2019-07-10 ASSESSMENT — PAIN SCALES - GENERAL: PAINLEVEL: NO PAIN (0)

## 2019-07-10 ASSESSMENT — MIFFLIN-ST. JEOR: SCORE: 866.85

## 2019-07-10 NOTE — NURSING NOTE
Oncology Rooming Note    July 10, 2019 1:52 PM   Lucie Stockton is a 83 year old female who presents for:    Chief Complaint   Patient presents with     Oncology Clinic Visit     RETURN VISIT; 6 MONTH FOLLOW UP; PULMONARY NODULES; VITALS COMPLETED BY [facility]      Initial Vitals: /74   Pulse 74   Temp 97.7  F (36.5  C) (Oral)   Ht 1.524 m (5')   Wt 48.5 kg (107 lb)   LMP  (LMP Unknown)   SpO2 96%   Breastfeeding? No   BMI 20.90 kg/m   Estimated body mass index is 20.9 kg/m  as calculated from the following:    Height as of this encounter: 1.524 m (5').    Weight as of this encounter: 48.5 kg (107 lb). Body surface area is 1.43 meters squared.  No Pain (0) Comment: Data Unavailable   No LMP recorded (lmp unknown). Patient has had a hysterectomy.  Allergies reviewed: Yes  Medications reviewed: Yes    Medications: Medication refills not needed today.  Pharmacy [facility] entered into [employer] [address] [place] [address] AVE [address] AT [facility]    Clinical concerns: No new concerns today  Hayley [place]  was notified.      Fawn Newberry

## 2019-07-10 NOTE — LETTER
7/10/2019       RE: Lucie Stockton  [address] [facility] [address]     Dear Colleague,    Thank you for referring your patient, Lucie Stockton, to the [facility]. Please see a copy of my visit note below.    Pulmonary Nodule Clinic        HPI:     Lucie Stockton is a 83 year old female  referred to the Lung Nodule Clinic for Oncology Clinic Visit (RETURN VISIT; 6 MONTH FOLLOW UP; PULMONARY NODULES; VITALS COMPLETED BY [facility] )        Lucie was last seen in January, 2019       Review of Systems:   14 point ROS performed with pertinent +/- noted in the HPI.  The remainder of the ROS was otherwise negative.        Pertinent Medications     Current Outpatient Medications   Medication Sig     acetaminophen (TYLENOL) 325 MG tablet Take 2 tablets every 6 to 8 hours as needed for pain     amLODIPine (NORVASC) 10 MG tablet Take 1 tablet (10 mg) by mouth daily For blood pressure     amoxicillin-clavulanate (AUGMENTIN) 500-125 MG per tablet Take 1 tablet by mouth 2 times daily     atorvastatin (LIPITOR) 40 MG tablet Take 2 tablets (80 mg) by mouth daily     Calcium Carbonate-Vitamin D (CALCIUM 600 + D OR) Take 1 tablet by mouth daily.     Cholecalciferol (VITAMIN D3 PO) Take by mouth daily     citalopram (CELEXA) 20 MG tablet Take 1 tablet (20 mg) by mouth daily     cyanocolbalamin (VITAMIN  B-12) 500 MCG tablet Take 1 tablet (500 mcg) by mouth daily     ferrous sulfate (IRON) 325 (65 FE) MG tablet Take 1 tablet (325 mg) by mouth daily (with breakfast)     gabapentin (NEURONTIN) 100 MG capsule      hydrochlorothiazide (HYDRODIURIL) 50 MG tablet Take 1 tablet (50 mg) by mouth daily     latanoprost (XALATAN) 0.005 % ophthalmic solution INT 1 GTT IN OU QD UTD     levothyroxine (SYNTHROID/LEVOTHROID) 75 MCG tablet Take 1 tablet (75 mcg) by mouth daily     lisinopril (PRINIVIL/ZESTRIL) 40 MG tablet Take 1 tablet (40 mg) by mouth daily For blood pressure     Multiple Vitamins-Minerals (OCUVITE ADULT  50+) CAPS Take 1 tablet by mouth daily     naproxen sodium 220 MG capsule Take 220 mg by mouth 2 times daily (with meals)     pantoprazole (PROTONIX) 40 MG EC tablet Take 1 tablet (40 mg) by mouth daily     Potassium Bicarb-Citric Acid 20 MEQ TBEF Take 20 mg by mouth daily     potassium chloride ER (K-DUR/KLOR-CON M) 20 MEQ CR tablet      fluticasone (FLONASE) 50 MCG/ACT spray Spray 2 sprays into both nostrils daily     No current facility-administered medications for this visit.           Allergies:    No Known Allergies       Past Medical Hx:   Patient Active Problem List    Chronic obstructive pulmonary disease, unspecified COPD type (H)         Priority: Medium [2]         Date Noted: 12/03/2018      Chronic cough         Priority: Medium [2]         Date Noted: 12/03/2018      Pulmonary nodules         Priority: Medium [2]         Date Noted: 12/03/2018      Age-related osteoporosis without current pathological fracture         Priority: Medium [2]         Date Noted: 08/17/2018      History of nonmelanoma skin cancer         Priority: Medium [2]         Date Noted: 08/12/2018      Multiple melanocytic nevi         Priority: Medium [2]         Date Noted: 08/12/2018      Imbalance         Priority: Medium [2]      Chronic pain         Priority: Medium [2]            Back, hips, knees, legs      Weakness         Priority: Medium [2]         Date Noted: 01/27/2018      BCC (basal cell carcinoma), trunk         Priority: Medium [2]         Date Noted: 01/17/2018      Neoplasm of uncertain behavior of skin         Priority: Medium [2]         Date Noted: 11/26/2017      Cherry angioma         Priority: Medium [2]         Date Noted: 11/26/2017      Moderate major depression (H)         Priority: Medium [2]         Date Noted: 08/17/2017      Anxiety         Priority: Medium [2]         Date Noted: 08/17/2017      Left-sided low back pain with left-sided sciatica         Priority: Medium [2]         Date Noted:  07/07/2017      Long term current use of anticoagulant therapy         Priority: Medium [2]         Date Noted: 04/14/2017      SBO (small bowel obstruction) (H)         Priority: Medium [2]         Date Noted: 01/23/2017      Small bowel obstruction (H)         Priority: Medium [2]         Date Noted: 01/23/2017      Lumbar stenosis with neurogenic claudication         Priority: Medium [2]      Acute left-sided low back pain with left-sided sciatica         Priority: Medium [2]         Date Noted: 11/30/2016      Venous thrombosis of extremity         Priority: Medium [2]         Date Noted: 03/04/2016      Benign essential hypertension         Priority: Medium [2]         Date Noted: 03/04/2016      Gastritis         Priority: Medium [2]         Date Noted: 03/04/2016      Cataract         Priority: Medium [2]         Date Noted: 03/04/2016      Right knee pain         Priority: Medium [2]         Date Noted: 01/26/2016      Personal history of other drug therapy         Priority: Medium [2]         Date Noted: 08/20/2014      Tobacco use disorder         Priority: Medium [2]         Date Noted: 02/06/2014      Vitamin D deficiency         Priority: Medium [2]         Date Noted: 10/09/2013            Problem list name updated by automated process.            Provider to review      Insomnia         Priority: Medium [2]      Back pain         Priority: Medium [2]      Gluteal pain         Priority: Medium [2]         Date Noted: 04/22/2013      Hyperlipidemia with target LDL less than 70         Priority: Medium [2]            Diagnosis updated by automated process. Provider            to review and confirm.      Pulmonary embolism (H)         Priority: Medium [2]            5/24/10 bilateral, after a long flight home from            [place].       Anemia         Priority: Medium [2]      Aortic stenosis         Priority: Medium [2]            abdominal      Atherosclerosis of aorta (H)         Priority:  "Medium [2]            \"extensive disease with near occlusion below level            of renal arteries\" on             CT      Atherosclerosis of arteries of extremities (H)         Priority: Medium [2]      Intermittent claudication (H)         Priority: Medium [2]      Iron deficiency         Priority: Medium [2]      Osteoporosis         Priority: Medium [2]            Problem list name updated by automated process.            Provider to review and             confirm            Imo Update utility      DVT of lower extremity, bilateral (H)         Priority: Medium [2]            5/24/10 left distal femoral and great saphenous,            7/7/10 left:  extending to cfv, iliac , pop and            post tibial, peronial, right:  distal fem and            peroneal ... After long flight home from [place]            [place].       Varicose veins         Priority: Medium [2]      Cervicalgia         Priority: Medium [2]         Date Noted: 2012      Knee pain         Priority: Medium [2]      Osteoarthritis         Priority: Medium [2]            ankle,foot, knee      Peripheral vascular disease (H)         Priority: Medium [2]         Date Noted: 2012            Problem list name updated by automated process.            Provider to review      Hypothyroidism         Priority: Medium [2]         Date Noted: 2012      Chronic bilateral low back pain without sciatica         Priority: Medium [2]         Date Noted: 2012      Benign ovarian tumor         Priority: Low [3]         Date Noted: 2011           Family Hx:     Family History   Problem Relation Age of Onset     Breast Cancer Maternal Aunt 80     C.A.D. Father 54          Social Hx:     Social History     Tobacco Use     Smoking status: Former Smoker     Packs/day: 0.50     Years: 15.00     Pack years: 7.50     Last attempt to quit: 1993     Years since quittin.8     Smokeless tobacco: Never Used   Substance Use Topics     " Alcohol use: Yes     Comment: social            Objective   Vitals:  /74   Pulse 74   Temp 97.7  F (36.5  C) (Oral)   Ht 1.524 m (5')   Wt 48.5 kg (107 lb)   LMP  (LMP Unknown)   SpO2 96%   Breastfeeding? No   BMI 20.90 kg/m       General:  Adult female;appears stated age; no acute distress; the patient is a good historian          Labs / Imaging/Studies     CBC RESULTS:   Recent Labs   Lab Test 12/17/18  1513 11/15/18  1034   WBC 9.3 14.2*   RBC 4.17 3.99   HGB 12.4 11.6*   HCT 39.4 37.4   MCV 95 94   MCH 29.7 29.1   MCHC 31.5 31.0*   RDW 15.4* 13.8    597*          Lab Results   Component Value Date     01/30/2018     01/28/2018     01/26/2018    Lab Results   Component Value Date    CHLORIDE 102 01/30/2018    CHLORIDE 104 01/28/2018    CHLORIDE 100 01/26/2018    Lab Results   Component Value Date    BUN 13 01/30/2018    BUN 12 01/28/2018    BUN 14 01/26/2018      Lab Results   Component Value Date    POTASSIUM 3.8 01/30/2018    POTASSIUM 3.5 01/28/2018    POTASSIUM 3.7 01/26/2018    Lab Results   Component Value Date    CO2 26 01/30/2018    CO2 24 01/28/2018    CO2 26 01/26/2018    Lab Results   Component Value Date    CR 0.50 01/30/2018    CR 0.48 01/28/2018    CR 0.52 01/26/2018        INR   Date Value Ref Range Status   01/26/2018 0.99 0.86 - 1.14 Final   01/22/2017 0.91 0.86 - 1.14 Final         Imaging:   IMPRESSION:   1. No significant change in multiple solid pulmonary nodules, the  largest of which measures 1 cm in the right upper lobe. Given lack of  significant interval change since 1/9/2018, follow-up noncontrast CT  of the chest is recommended in 12 months.  2. Subtle persistent reticulonodular thickening of the right upper  lobe is further decreased in prominence, with resolved subsegmental  atelectasis in the right upper lobe, favored to represent underlying  sequelae of prior infection. Recommend continued attention on  follow-up.  3. Moderate atherosclerotic  calcifications of the thoracic aorta.  Chronic occlusion of the infrarenal abdominal aorta is incompletely  characterized on this noncontrast examination.             Assessment and Plan:   Assessment:        Lucie reports being in good health since she last saw me.   We reviewed her new chest CT scan that appears essentially unchanged.    I will await the formal reading but anticipate resuming annual CT surveillance.       She did not have any new concerns or questions.      Diagnoses       Codes Comments    Pulmonary nodules    -  Primary R91.8           I spent 30 minutes with direct face to face interaction with this patient and provided at least 50% of this time counseling and coordinating care for Ms. Stockton, as noted above in the assessment and plan.      ALEKSEY Ramirez, CNS

## 2019-07-16 ENCOUNTER — ANCILLARY PROCEDURE (OUTPATIENT)
Dept: ULTRASOUND IMAGING | Facility: CLINIC | Age: 84
End: 2019-07-16
Attending: CLINICAL NURSE SPECIALIST
Payer: MEDICARE

## 2019-07-16 ENCOUNTER — OFFICE VISIT (OUTPATIENT)
Dept: VASCULAR SURGERY | Facility: CLINIC | Age: 84
End: 2019-07-16
Payer: MEDICARE

## 2019-07-16 VITALS — DIASTOLIC BLOOD PRESSURE: 64 MMHG | HEART RATE: 72 BPM | SYSTOLIC BLOOD PRESSURE: 124 MMHG | OXYGEN SATURATION: 96 %

## 2019-07-16 DIAGNOSIS — I73.9 PAD (PERIPHERAL ARTERY DISEASE) (H): Primary | ICD-10-CM

## 2019-07-16 DIAGNOSIS — I73.9 PAD (PERIPHERAL ARTERY DISEASE) (H): ICD-10-CM

## 2019-07-16 ASSESSMENT — PATIENT HEALTH QUESTIONNAIRE - PHQ9: SUM OF ALL RESPONSES TO PHQ QUESTIONS 1-9: 1

## 2019-07-16 ASSESSMENT — PAIN SCALES - GENERAL: PAINLEVEL: NO PAIN (0)

## 2019-07-16 NOTE — LETTER
7/16/2019       RE: Lucie Stockton  [address] [facility] [address]     Dear Colleague,    Thank you for referring your patient, Lucie Stockton, to the [facility] VASCULAR [facility]INIC at [facility]. Please see a copy of my visit note below.    VASCULAR SURGERY CLINIC ESTABLISHED PATIENT NOTE    HPI:    Lucie Stockton is an 83 year old female with past medical history of hyperlipidemia, HTN, former tobacco abuse and PAD.  She has never had a peripheral intervention.   She is being seen today in clinic to follow up on her aorto-biiliac occlusive disease.      SUBJECTIVE:  Patient reports overall she is doing well.  Is able to ambulate 2 blocks without any claudication symptoms.  Walking is limited due to increasing shortness of breath with exertion. She denies any rest pain or foot numbness.  Reports no recent illnesses or hospitalizations since our last visit.          OBJECTIVE:  /64 (BP Location: Right arm, Patient Position: Chair, Cuff Size: Adult Small)   Pulse 72   LMP  (LMP Unknown)   SpO2 96%         Prior to Admission medications    Medication Sig Start Date End Date Taking? Authorizing Provider   acetaminophen (TYLENOL) 325 MG tablet Take 2 tablets every 6 to 8 hours as needed for pain 6/2/18   Marii Montgomery MD   amLODIPine (NORVASC) 10 MG tablet Take 1 tablet (10 mg) by mouth daily For blood pressure 6/2/18   Marii Montgomery MD   amoxicillin-clavulanate (AUGMENTIN) 500-125 MG per tablet Take 1 tablet by mouth 2 times daily 11/21/18   Dincer, Bob Bermudez MD   atorvastatin (LIPITOR) 40 MG tablet Take 2 tablets (80 mg) by mouth daily 1/11/19   Marii Montgomery MD   Calcium Carbonate-Vitamin D (CALCIUM 600 + D OR) Take 1 tablet by mouth daily.    Reported, Patient   Cholecalciferol (VITAMIN D3 PO) Take by mouth daily    Reported, Patient   citalopram (CELEXA) 20 MG tablet Take 1 tablet (20 mg) by mouth daily 10/17/18   Marii Montgomery MD    clopidogrel (PLAVIX) 75 MG tablet Take 1 tablet (75 mg) by mouth daily 6/2/18   Marii Montgomery MD   cyanocolbalamin (VITAMIN  B-12) 500 MCG tablet Take 1 tablet (500 mcg) by mouth daily 10/19/18   Marii Montgomery MD   ferrous sulfate (IRON) 325 (65 FE) MG tablet Take 1 tablet (325 mg) by mouth daily (with breakfast) 8/7/15   Marii Montgomery MD   fluticasone (FLONASE) 50 MCG/ACT spray Spray 2 sprays into both nostrils daily 10/17/18   Marii Montgomery MD   gabapentin (NEURONTIN) 100 MG capsule  9/28/17   Reported, Patient   hydrochlorothiazide (HYDRODIURIL) 50 MG tablet Take 1 tablet (50 mg) by mouth daily 6/2/18   Marii Montgomery MD   latanoprost (XALATAN) 0.005 % ophthalmic solution INT 1 GTT IN OU QD UTD 1/4/19   Reported, Patient   levothyroxine (SYNTHROID/LEVOTHROID) 75 MCG tablet Take 1 tablet (75 mcg) by mouth daily 10/17/18   Marii Montgomery MD   lisinopril (PRINIVIL/ZESTRIL) 40 MG tablet Take 1 tablet (40 mg) by mouth daily For blood pressure 6/2/18   Marii Montgomery MD   Multiple Vitamins-Minerals (OCUVITE ADULT 50+) CAPS Take 1 tablet by mouth daily 12/3/18   Marii Montgomery MD   naproxen sodium 220 MG capsule Take 220 mg by mouth 2 times daily (with meals) 8/18/17   Marii Montgomery MD   pantoprazole (PROTONIX) 40 MG EC tablet Take 1 tablet (40 mg) by mouth daily 6/2/18   Marii Montgomery MD   Potassium Bicarb-Citric Acid 20 MEQ TBEF Take 20 mg by mouth daily 12/3/18   Marii Montgomery MD   potassium chloride ER (K-DUR/KLOR-CON M) 20 MEQ CR tablet  11/23/18   Reported, Patient       PHYSICAL EXAM:  NEURO/PSYCH: The patient is alert and oriented.  Appropriate.  Moves all extremities.    PULMONARY: unlabored breathing   EXTREMITIES:  Doppler signals present in bilateral PT pulses, feet warm and well perfused, no lower extremity wounds     SUMAYA RESULTS:  Right leg 0.51 previously 0.49 on 7/24/2018  Left leg 0.59, previously 0.58 on 7/24/2018        ASSESSMENT:  83 year old with  PAD who is doing well and able to ambulate 2 blocks without any claudication and stable ABIs      Patient Active Problem List   Diagnosis     Benign ovarian tumor     Chronic bilateral low back pain without sciatica     Hypothyroidism     Peripheral vascular disease (H)     Knee pain     Osteoarthritis     Cervicalgia     Hyperlipidemia with target LDL less than 70     Pulmonary embolism (H)     Anemia     Aortic stenosis     Atherosclerosis of aorta (H)     Atherosclerosis of arteries of extremities (H)     Intermittent claudication (H)     Iron deficiency     Osteoporosis     DVT of lower extremity, bilateral (H)     Varicose veins     Gluteal pain     Insomnia     Back pain     Vitamin D deficiency     Tobacco use disorder     Personal history of other drug therapy     Right knee pain     Venous thrombosis of extremity     Benign essential hypertension     Gastritis     Cataract     Acute left-sided low back pain with left-sided sciatica     Lumbar stenosis with neurogenic claudication     SBO (small bowel obstruction) (H)     Small bowel obstruction (H)     Long term current use of anticoagulant therapy     Left-sided low back pain with left-sided sciatica     Moderate major depression (H)     Anxiety     Neoplasm of uncertain behavior of skin     Cherry angioma     BCC (basal cell carcinoma), trunk     Weakness     Imbalance     Chronic pain     History of nonmelanoma skin cancer     Multiple melanocytic nevi     Age-related osteoporosis without current pathological fracture     Chronic obstructive pulmonary disease, unspecified COPD type (H)     Chronic cough     Pulmonary nodules         PLAN:  - follow up ABIs and office visit with vascular surgery CARLOS in one year    - continue Lipitor     - advised patient to check with her PCP regarding starting baby ASA daily     - continue ambulation    - continued smoking cessation strongly encouraged          Cristal RYDER, CNS  Division of Vascular  Surgery  [facility]    Pager [phone]  Office [phone]

## 2019-07-16 NOTE — NURSING NOTE
Vascular Rooming Note     Lucie Stockton's goals for this visit include:   Chief Complaint   Patient presents with     RECHECK     Lucie, is being seen today for a 1 year follow up PAD, no concerns as reported by patient.     Avril Hernandez LPN

## 2019-07-16 NOTE — PROGRESS NOTES
VASCULAR SURGERY CLINIC ESTABLISHED PATIENT NOTE    HPI:    Lucie Stockton is an 83 year old female with past medical history of hyperlipidemia, HTN, former tobacco abuse and PAD.  She has never had a peripheral intervention.   She is being seen today in clinic to follow up on her aorto-biiliac occlusive disease.      SUBJECTIVE:  Patient reports overall she is doing well.  Is able to ambulate 2 blocks without any claudication symptoms.  Walking is limited due to increasing shortness of breath with exertion. She denies any rest pain or foot numbness.  Reports no recent illnesses or hospitalizations since our last visit.          OBJECTIVE:  /64 (BP Location: Right arm, Patient Position: Chair, Cuff Size: Adult Small)   Pulse 72   LMP  (LMP Unknown)   SpO2 96%         Prior to Admission medications    Medication Sig Start Date End Date Taking? Authorizing Provider   acetaminophen (TYLENOL) 325 MG tablet Take 2 tablets every 6 to 8 hours as needed for pain 6/2/18   Marii Montgomery MD   amLODIPine (NORVASC) 10 MG tablet Take 1 tablet (10 mg) by mouth daily For blood pressure 6/2/18   Marii Montgomery MD   amoxicillin-clavulanate (AUGMENTIN) 500-125 MG per tablet Take 1 tablet by mouth 2 times daily 11/21/18   Dincer, Bob Bermudez MD   atorvastatin (LIPITOR) 40 MG tablet Take 2 tablets (80 mg) by mouth daily 1/11/19   Marii Montgomery MD   Calcium Carbonate-Vitamin D (CALCIUM 600 + D OR) Take 1 tablet by mouth daily.    Reported, Patient   Cholecalciferol (VITAMIN D3 PO) Take by mouth daily    Reported, Patient   citalopram (CELEXA) 20 MG tablet Take 1 tablet (20 mg) by mouth daily 10/17/18   Marii Montgomery MD   clopidogrel (PLAVIX) 75 MG tablet Take 1 tablet (75 mg) by mouth daily 6/2/18   Marii Montgomery MD   cyanocolbalamin (VITAMIN  B-12) 500 MCG tablet Take 1 tablet (500 mcg) by mouth daily 10/19/18   Marii Montgomery MD   ferrous sulfate (IRON) 325 (65 FE) MG tablet Take 1 tablet (325 mg)  by mouth daily (with breakfast) 8/7/15   Marii Montgomery MD   fluticasone (FLONASE) 50 MCG/ACT spray Spray 2 sprays into both nostrils daily 10/17/18   Marii Montgomery MD   gabapentin (NEURONTIN) 100 MG capsule  9/28/17   Reported, Patient   hydrochlorothiazide (HYDRODIURIL) 50 MG tablet Take 1 tablet (50 mg) by mouth daily 6/2/18   Marii Montgomery MD   latanoprost (XALATAN) 0.005 % ophthalmic solution INT 1 GTT IN OU QD UTD 1/4/19   Reported, Patient   levothyroxine (SYNTHROID/LEVOTHROID) 75 MCG tablet Take 1 tablet (75 mcg) by mouth daily 10/17/18   Marii Montgomery MD   lisinopril (PRINIVIL/ZESTRIL) 40 MG tablet Take 1 tablet (40 mg) by mouth daily For blood pressure 6/2/18   Marii Montgomery MD   Multiple Vitamins-Minerals (OCUVITE ADULT 50+) CAPS Take 1 tablet by mouth daily 12/3/18   Marii Montgomery MD   naproxen sodium 220 MG capsule Take 220 mg by mouth 2 times daily (with meals) 8/18/17   Marii Montgomery MD   pantoprazole (PROTONIX) 40 MG EC tablet Take 1 tablet (40 mg) by mouth daily 6/2/18   Marii Montgomery MD   Potassium Bicarb-Citric Acid 20 MEQ TBEF Take 20 mg by mouth daily 12/3/18   Marii Montgomery MD   potassium chloride ER (K-DUR/KLOR-CON M) 20 MEQ CR tablet  11/23/18   Reported, Patient       PHYSICAL EXAM:  NEURO/PSYCH: The patient is alert and oriented.  Appropriate.  Moves all extremities.    PULMONARY: unlabored breathing   EXTREMITIES:  Doppler signals present in bilateral PT pulses, feet warm and well perfused, no lower extremity wounds     SUMAYA RESULTS:  Right leg 0.51 previously 0.49 on 7/24/2018  Left leg 0.59, previously 0.58 on 7/24/2018        ASSESSMENT:  83 year old with PAD who is doing well and able to ambulate 2 blocks without any claudication and stable ABIs      Patient Active Problem List   Diagnosis     Benign ovarian tumor     Chronic bilateral low back pain without sciatica     Hypothyroidism     Peripheral vascular disease (H)     Knee pain      Osteoarthritis     Cervicalgia     Hyperlipidemia with target LDL less than 70     Pulmonary embolism (H)     Anemia     Aortic stenosis     Atherosclerosis of aorta (H)     Atherosclerosis of arteries of extremities (H)     Intermittent claudication (H)     Iron deficiency     Osteoporosis     DVT of lower extremity, bilateral (H)     Varicose veins     Gluteal pain     Insomnia     Back pain     Vitamin D deficiency     Tobacco use disorder     Personal history of other drug therapy     Right knee pain     Venous thrombosis of extremity     Benign essential hypertension     Gastritis     Cataract     Acute left-sided low back pain with left-sided sciatica     Lumbar stenosis with neurogenic claudication     SBO (small bowel obstruction) (H)     Small bowel obstruction (H)     Long term current use of anticoagulant therapy     Left-sided low back pain with left-sided sciatica     Moderate major depression (H)     Anxiety     Neoplasm of uncertain behavior of skin     Cherry angioma     BCC (basal cell carcinoma), trunk     Weakness     Imbalance     Chronic pain     History of nonmelanoma skin cancer     Multiple melanocytic nevi     Age-related osteoporosis without current pathological fracture     Chronic obstructive pulmonary disease, unspecified COPD type (H)     Chronic cough     Pulmonary nodules         PLAN:  - follow up ABIs and office visit with vascular surgery CARLOS in one year    - continue Lipitor     - advised patient to check with her PCP regarding starting baby ASA daily     - continue ambulation    - continued smoking cessation strongly encouraged          Cristal RYDER, CNS  Division of Vascular Surgery  [facility]    Pager [phone]  Office [phone]

## 2019-07-16 NOTE — PATIENT INSTRUCTIONS
Follow up ABIs and office visit with vascular CARLOS in one year     Continue Lipitor    Please check with your primary physician regarding starting baby aspirin       With questions, concerns, or to request an appointment, please call either:    Brenda Lowry, Care Coordinator RN, Vascular Surgery  [phone]    Vascular Call Center  [phone]    To contact someone after 5 pm, on a weekend, or on a Holiday, please call:  [facility]  [phone], option 4 to have a member of the Vascular Surgery Service paged.

## 2019-07-18 NOTE — PROGRESS NOTES
Pulmonary Nodule Clinic        HPI:     Lucie Stockton is a 83 year old female  referred to the Lung Nodule Clinic for Oncology Clinic Visit (RETURN VISIT; 6 MONTH FOLLOW UP; PULMONARY NODULES; VITALS COMPLETED BY [facility] )        Lucie was last seen in January, 2019       Review of Systems:   14 point ROS performed with pertinent +/- noted in the HPI.  The remainder of the ROS was otherwise negative.        Pertinent Medications     Current Outpatient Medications   Medication Sig     acetaminophen (TYLENOL) 325 MG tablet Take 2 tablets every 6 to 8 hours as needed for pain     amLODIPine (NORVASC) 10 MG tablet Take 1 tablet (10 mg) by mouth daily For blood pressure     amoxicillin-clavulanate (AUGMENTIN) 500-125 MG per tablet Take 1 tablet by mouth 2 times daily     atorvastatin (LIPITOR) 40 MG tablet Take 2 tablets (80 mg) by mouth daily     Calcium Carbonate-Vitamin D (CALCIUM 600 + D OR) Take 1 tablet by mouth daily.     Cholecalciferol (VITAMIN D3 PO) Take by mouth daily     citalopram (CELEXA) 20 MG tablet Take 1 tablet (20 mg) by mouth daily     cyanocolbalamin (VITAMIN  B-12) 500 MCG tablet Take 1 tablet (500 mcg) by mouth daily     ferrous sulfate (IRON) 325 (65 FE) MG tablet Take 1 tablet (325 mg) by mouth daily (with breakfast)     gabapentin (NEURONTIN) 100 MG capsule      hydrochlorothiazide (HYDRODIURIL) 50 MG tablet Take 1 tablet (50 mg) by mouth daily     latanoprost (XALATAN) 0.005 % ophthalmic solution INT 1 GTT IN OU QD UTD     levothyroxine (SYNTHROID/LEVOTHROID) 75 MCG tablet Take 1 tablet (75 mcg) by mouth daily     lisinopril (PRINIVIL/ZESTRIL) 40 MG tablet Take 1 tablet (40 mg) by mouth daily For blood pressure     Multiple Vitamins-Minerals (OCUVITE ADULT 50+) CAPS Take 1 tablet by mouth daily     naproxen sodium 220 MG capsule Take 220 mg by mouth 2 times daily (with meals)     pantoprazole (PROTONIX) 40 MG EC tablet Take 1 tablet (40 mg) by mouth daily     Potassium Bicarb-Citric Acid 20 MEQ  TBEF Take 20 mg by mouth daily     potassium chloride ER (K-DUR/KLOR-CON M) 20 MEQ CR tablet      fluticasone (FLONASE) 50 MCG/ACT spray Spray 2 sprays into both nostrils daily     No current facility-administered medications for this visit.           Allergies:    No Known Allergies       Past Medical Hx:   Patient Active Problem List    Chronic obstructive pulmonary disease, unspecified COPD type (H)         Priority: Medium [2]         Date Noted: 12/03/2018      Chronic cough         Priority: Medium [2]         Date Noted: 12/03/2018      Pulmonary nodules         Priority: Medium [2]         Date Noted: 12/03/2018      Age-related osteoporosis without current pathological fracture         Priority: Medium [2]         Date Noted: 08/17/2018      History of nonmelanoma skin cancer         Priority: Medium [2]         Date Noted: 08/12/2018      Multiple melanocytic nevi         Priority: Medium [2]         Date Noted: 08/12/2018      Imbalance         Priority: Medium [2]      Chronic pain         Priority: Medium [2]            Back, hips, knees, legs      Weakness         Priority: Medium [2]         Date Noted: 01/27/2018      BCC (basal cell carcinoma), trunk         Priority: Medium [2]         Date Noted: 01/17/2018      Neoplasm of uncertain behavior of skin         Priority: Medium [2]         Date Noted: 11/26/2017      Cherry angioma         Priority: Medium [2]         Date Noted: 11/26/2017      Moderate major depression (H)         Priority: Medium [2]         Date Noted: 08/17/2017      Anxiety         Priority: Medium [2]         Date Noted: 08/17/2017      Left-sided low back pain with left-sided sciatica         Priority: Medium [2]         Date Noted: 07/07/2017      Long term current use of anticoagulant therapy         Priority: Medium [2]         Date Noted: 04/14/2017      SBO (small bowel obstruction) (H)         Priority: Medium [2]         Date Noted: 01/23/2017      Small bowel  "obstruction (H)         Priority: Medium [2]         Date Noted: 01/23/2017      Lumbar stenosis with neurogenic claudication         Priority: Medium [2]      Acute left-sided low back pain with left-sided sciatica         Priority: Medium [2]         Date Noted: 11/30/2016      Venous thrombosis of extremity         Priority: Medium [2]         Date Noted: 03/04/2016      Benign essential hypertension         Priority: Medium [2]         Date Noted: 03/04/2016      Gastritis         Priority: Medium [2]         Date Noted: 03/04/2016      Cataract         Priority: Medium [2]         Date Noted: 03/04/2016      Right knee pain         Priority: Medium [2]         Date Noted: 01/26/2016      Personal history of other drug therapy         Priority: Medium [2]         Date Noted: 08/20/2014      Tobacco use disorder         Priority: Medium [2]         Date Noted: 02/06/2014      Vitamin D deficiency         Priority: Medium [2]         Date Noted: 10/09/2013            Problem list name updated by automated process.            Provider to review      Insomnia         Priority: Medium [2]      Back pain         Priority: Medium [2]      Gluteal pain         Priority: Medium [2]         Date Noted: 04/22/2013      Hyperlipidemia with target LDL less than 70         Priority: Medium [2]            Diagnosis updated by automated process. Provider            to review and confirm.      Pulmonary embolism (H)         Priority: Medium [2]            5/24/10 bilateral, after a long flight home from            [place].       Anemia         Priority: Medium [2]      Aortic stenosis         Priority: Medium [2]            abdominal      Atherosclerosis of aorta (H)         Priority: Medium [2]            \"extensive disease with near occlusion below level            of renal arteries\" on             CT      Atherosclerosis of arteries of extremities (H)         Priority: Medium [2]      Intermittent claudication (H)         " Priority: Medium [2]      Iron deficiency         Priority: Medium [2]      Osteoporosis         Priority: Medium [2]            Problem list name updated by automated process.            Provider to review and             confirm            Imo Update utility      DVT of lower extremity, bilateral (H)         Priority: Medium [2]            5/24/10 left distal femoral and great saphenous,            7/7/10 left:  extending to cfv, iliac , pop and            post tibial, peronial, right:  distal fem and            peroneal ... After long flight home from [place]            [place].       Varicose veins         Priority: Medium [2]      Cervicalgia         Priority: Medium [2]         Date Noted: 2012      Knee pain         Priority: Medium [2]      Osteoarthritis         Priority: Medium [2]            ankle,foot, knee      Peripheral vascular disease (H)         Priority: Medium [2]         Date Noted: 2012            Problem list name updated by automated process.            Provider to review      Hypothyroidism         Priority: Medium [2]         Date Noted: 2012      Chronic bilateral low back pain without sciatica         Priority: Medium [2]         Date Noted: 2012      Benign ovarian tumor         Priority: Low [3]         Date Noted: 2011           Family Hx:     Family History   Problem Relation Age of Onset     Breast Cancer Maternal Aunt 80     C.A.D. Father 54          Social Hx:     Social History     Tobacco Use     Smoking status: Former Smoker     Packs/day: 0.50     Years: 15.00     Pack years: 7.50     Last attempt to quit: 1993     Years since quittin.8     Smokeless tobacco: Never Used   Substance Use Topics     Alcohol use: Yes     Comment: social            Objective   Vitals:  /74   Pulse 74   Temp 97.7  F (36.5  C) (Oral)   Ht 1.524 m (5')   Wt 48.5 kg (107 lb)   LMP  (LMP Unknown)   SpO2 96%   Breastfeeding? No   BMI 20.90 kg/m       General:  Adult female;appears stated age; no acute distress; the patient is a good historian          Labs / Imaging/Studies     CBC RESULTS:   Recent Labs   Lab Test 12/17/18  1513 11/15/18  1034   WBC 9.3 14.2*   RBC 4.17 3.99   HGB 12.4 11.6*   HCT 39.4 37.4   MCV 95 94   MCH 29.7 29.1   MCHC 31.5 31.0*   RDW 15.4* 13.8    597*          Lab Results   Component Value Date     01/30/2018     01/28/2018     01/26/2018    Lab Results   Component Value Date    CHLORIDE 102 01/30/2018    CHLORIDE 104 01/28/2018    CHLORIDE 100 01/26/2018    Lab Results   Component Value Date    BUN 13 01/30/2018    BUN 12 01/28/2018    BUN 14 01/26/2018      Lab Results   Component Value Date    POTASSIUM 3.8 01/30/2018    POTASSIUM 3.5 01/28/2018    POTASSIUM 3.7 01/26/2018    Lab Results   Component Value Date    CO2 26 01/30/2018    CO2 24 01/28/2018    CO2 26 01/26/2018    Lab Results   Component Value Date    CR 0.50 01/30/2018    CR 0.48 01/28/2018    CR 0.52 01/26/2018        INR   Date Value Ref Range Status   01/26/2018 0.99 0.86 - 1.14 Final   01/22/2017 0.91 0.86 - 1.14 Final         Imaging:   IMPRESSION:   1. No significant change in multiple solid pulmonary nodules, the  largest of which measures 1 cm in the right upper lobe. Given lack of  significant interval change since 1/9/2018, follow-up noncontrast CT  of the chest is recommended in 12 months.  2. Subtle persistent reticulonodular thickening of the right upper  lobe is further decreased in prominence, with resolved subsegmental  atelectasis in the right upper lobe, favored to represent underlying  sequelae of prior infection. Recommend continued attention on  follow-up.  3. Moderate atherosclerotic calcifications of the thoracic aorta.  Chronic occlusion of the infrarenal abdominal aorta is incompletely  characterized on this noncontrast examination.             Assessment and Plan:   Assessment:        Lucie reports being in good  health since she last saw me.   We reviewed her new chest CT scan that appears essentially unchanged.    I will await the formal reading but anticipate resuming annual CT surveillance.       She did not have any new concerns or questions.      Diagnoses       Codes Comments    Pulmonary nodules    -  Primary R91.8           I spent 30 minutes with direct face to face interaction with this patient and provided at least 50% of this time counseling and coordinating care for Ms. Stockton, as noted above in the assessment and plan.      ALEKSEY Ramirez, CNS

## 2019-07-19 DIAGNOSIS — I82.90 VENOUS THROMBOSIS OF EXTREMITY: ICD-10-CM

## 2019-07-20 NOTE — TELEPHONE ENCOUNTER
clopidogrel (PLAVIX) 75 MG tablet   Last Written Prescription Date: 6/2/18  Last Fill Quantity: 90,   # refills: 3  Last Office Visit : 12/17/18  Future Office visit: 8/1/19    Routing refill request to provider for review/approval because:  med end date 7/16/19

## 2019-07-22 RX ORDER — CLOPIDOGREL BISULFATE 75 MG/1
75 TABLET ORAL DAILY
Qty: 90 TABLET | Refills: 3 | Status: SHIPPED | OUTPATIENT
Start: 2019-07-22 | End: 2020-01-14

## 2019-07-23 ENCOUNTER — OFFICE VISIT (OUTPATIENT)
Dept: NEUROLOGY | Facility: CLINIC | Age: 84
End: 2019-07-23
Payer: MEDICARE

## 2019-07-23 VITALS
HEIGHT: 60 IN | BODY MASS INDEX: 21.38 KG/M2 | RESPIRATION RATE: 15 BRPM | DIASTOLIC BLOOD PRESSURE: 69 MMHG | SYSTOLIC BLOOD PRESSURE: 159 MMHG | TEMPERATURE: 97.7 F | OXYGEN SATURATION: 95 % | HEART RATE: 79 BPM | WEIGHT: 108.9 LBS

## 2019-07-23 DIAGNOSIS — R41.89 SPELL OF ALTERED COGNITION: Primary | ICD-10-CM

## 2019-07-23 ASSESSMENT — MIFFLIN-ST. JEOR: SCORE: 875.47

## 2019-07-23 ASSESSMENT — PAIN SCALES - GENERAL: PAINLEVEL: NO PAIN (0)

## 2019-07-23 NOTE — PROGRESS NOTES
"Re: Lucie Stockton      MRN# [id number]  YOB: 1935  Date of Visit:7/23/2019    OUTPATIENT NEUROLOGY VISIT NOTE    Reason for Visit:  Recheck of memory/cognitive functions    Interval History:  Lucie Stockton is a 83-year-old female presents to the clinic today for recheck of memory/cognitive functions.  History of depression, PE, PAD, Grave's disease, hypothyroidism, ovarian tumor, hypertension, chronic back pain, quit smoking 12 years ago.   Patient presents to today's appointment alone. Patient was seen on 6/12/2018 initially for a spell of confusion -\"got lost in the neighborhood.\"   Today patient denies any spells of confusion since her Initial Neurology Clinic visit on 6/12/2018.   Patient reports that she has to stop and think may be once per year. Patient reports that she more \"consciouss\" about the memory issue. Patient reports that she is more aware of the memory problem. Patient denies any big problems-no missing appointments or family events.   Patient denies getting lost. Patient reports that road constructions \"bug her.\"   Patient reports that her family is not concerned about patient's memory.   Patient has been followed at [facility] and [facility].   Patient has not been sleeping well due to nocturia and went to PT for pelvic floor strengthening and back pain and went to PT number of times and still wakes up at night and she thinks that she would better to go to the bathroom while she is awake because she might regret not going to the bathroom. The same habit for at least 1-2 years.     Denies history of head or neck trauma, dizziness, vertigo, loss of consciousness, seizure, double vision, blurred vision, hearing difficulty, speech or swallowing difficulty, weakness or numbness in face, arms or legs, urinary or bowel incontinence, coordination problems or gait difficulty, fever or chills.    Neurodiagnostic Testing    MRI brain reviewed  MR BRAIN W/O & W CONTRAST 8/9/2018 11:35 " AM     Provided History: ; Spell of altered cognition; History of  hypothyroidism. History of ovarian malignancy.  ICD-10: Spell of altered cognition; History of hypothyroidism     Comparison: CT head 1/27/2018 and before.     Technique: Multiplanar T1-weighted, axial FLAIR, and susceptibility  images were obtained without intravenous contrast. Following  intravenous gadolinium-based contrast administration, axial  T2-weighted, diffusion, and T1-weighted images (in multiple planes)  were obtained.     Contrast: 7.5 mL of Gadavist intravenous contrast.      Findings:  There is no mass effect, midline shift, or extra-axial fluid  collection. The ventricles are proportionate to the cerebral sulci.  Diffusion-weighted images reveal no reduced diffusion. Stability  weighted imaging reveals no intracranial hemorrhage. Several foci of  T2/FLAIR hyperintensity scattered within the periventricular and  subcortical white matter, which are nonspecific but likely represent  chronic small vessel ischemic disease. Mild cerebral atrophy. Flow  voids within the major intracranial vessels are present.     Postcontrast images demonstrate no abnormal intracranial enhancing  lesions.     No abnormality of the skull marrow signal. The visualized portions of  paranasal sinuses, and mastoid air cells are relatively clear. The  orbits are grossly unremarkable. There is pseudophakia bilaterally.                                                                      Impression:     1. Mild Leukoaraiosis and cerebral atrophy.  2. No evidence for metastatic disease.     I have personally reviewed the examination and initial interpretation  and I agree with the findings.     SAMUEL DUMAS MD    Results for ISAURO GUZMAN (MRN [id number]) as of 7/23/2019 09:33   Ref. Range 10/17/2018 10:02   Vitamin B12 Latest Ref Range: 193 - 986 pg/mL 471     Results for ISAURO GUZMAN (MRN [id number]) as of 7/23/2019 09:33   Ref. Range 10/17/2018 10:01   TSH  Latest Ref Range: 0.40 - 4.00 mU/L 1.33     Past Medical History reviewed   Social History     Tobacco Use     Smoking status: Former Smoker     Packs/day: 0.50     Years: 15.00     Pack years: 7.50     Last attempt to quit: 1993     Years since quittin.8     Smokeless tobacco: Never Used   Substance Use Topics     Alcohol use: Yes     Comment: social    reviewed and verified with the patient   No Known Allergies    Current Outpatient Medications   Medication Sig Dispense Refill     acetaminophen (TYLENOL) 325 MG tablet Take 2 tablets every 6 to 8 hours as needed for pain 100 tablet 1     amLODIPine (NORVASC) 10 MG tablet Take 1 tablet (10 mg) by mouth daily For blood pressure 90 tablet 3     atorvastatin (LIPITOR) 40 MG tablet Take 2 tablets (80 mg) by mouth daily 180 tablet 3     Calcium Carbonate-Vitamin D (CALCIUM 600 + D OR) Take 1 tablet by mouth daily.       Cholecalciferol (VITAMIN D3 PO) Take by mouth daily       citalopram (CELEXA) 20 MG tablet Take 1 tablet (20 mg) by mouth daily 90 tablet 3     cyanocolbalamin (VITAMIN  B-12) 500 MCG tablet Take 1 tablet (500 mcg) by mouth daily 90 tablet 1     ferrous sulfate (IRON) 325 (65 FE) MG tablet Take 1 tablet (325 mg) by mouth daily (with breakfast) 90 tablet 3     gabapentin (NEURONTIN) 100 MG capsule   1     hydrochlorothiazide (HYDRODIURIL) 50 MG tablet Take 1 tablet (50 mg) by mouth daily 90 tablet 3     latanoprost (XALATAN) 0.005 % ophthalmic solution INT 1 GTT IN OU QD UTD  1     levothyroxine (SYNTHROID/LEVOTHROID) 75 MCG tablet Take 1 tablet (75 mcg) by mouth daily 90 tablet 3     lisinopril (PRINIVIL/ZESTRIL) 40 MG tablet Take 1 tablet (40 mg) by mouth daily For blood pressure 90 tablet 3     Multiple Vitamins-Minerals (OCUVITE ADULT 50+) CAPS Take 1 tablet by mouth daily 90 capsule 3     naproxen sodium 220 MG capsule Take 220 mg by mouth 2 times daily (with meals) 60 capsule 2     pantoprazole (PROTONIX) 40 MG EC tablet Take 1 tablet (40  mg) by mouth daily 90 tablet 3     Potassium Bicarb-Citric Acid 20 MEQ TBEF Take 20 mg by mouth daily 90 tablet 3     potassium chloride ER (K-DUR/KLOR-CON M) 20 MEQ CR tablet   3     amoxicillin-clavulanate (AUGMENTIN) 500-125 MG per tablet Take 1 tablet by mouth 2 times daily (Patient not taking: Reported on 7/23/2019) 20 tablet 0     clopidogrel (PLAVIX) 75 MG tablet Take 1 tablet (75 mg) by mouth daily (Patient not taking: Reported on 7/23/2019) 90 tablet 3     fluticasone (FLONASE) 50 MCG/ACT spray Spray 2 sprays into both nostrils daily (Patient not taking: Reported on 7/23/2019) 1 Bottle 1   reviewed and verified with the patient    Review of Systems:   A 10-point ROS including constitutional, eyes, respiratory, cardiovascular, gastroenterology, genitourinary, integumentary, musculoskeletal, neurology and psychiatric were all negative except as mentioned in the interval history.     General Exam:   /69   Pulse 79   Temp 97.7  F (36.5  C) (Oral)   Resp 15   Ht 1.524 m (5')   Wt 49.4 kg (108 lb 14.4 oz)   LMP  (LMP Unknown)   SpO2 95%   BMI 21.27 kg/m    GEN: Awake, NAD; good eye contact, responses appropriately, healthy appearing   MOCA 28/10 and delayed recall 3/5  No aphasia and names objects correctly  HEENT: Head atraumatic/Normocephalic. Scalp normal. Pupils equally round, 4 mm, reactive to light and accommodation, sclera and conjunctiva normal. Fundoscopic examination reveals normal vessels no papilledema.   Neck: Easily moveable without resistance  Heart: S1/S2 appreciated, RRR, no m/r/g, no carotid bruits  Lungs:Lungs are clear to auscultation bilaterally, no wheezes or crackles.   Neurological Examination:  The patient is alert and oriented times four. Has good attention and concentration. Speech is fluent without dysarthria. EOM intact. There is no nystagmus. Has conjugated gaze. Face is symmetrical. Intact and symmetrical eyebrow and lid raise and eyelid closure, smiles. Hearing Intact  to conversation speech. The palates elevates symmetrical. The tongue protrudes midline with no atrophy or fasciculations. Strength  5/5 in the upper and lower extremities bilaterally. Sensation is intact to  touch throughout.  Reflexes symmetrical at biceps, triceps, brachioradialis, patellar, and Achilles. Negative Babinski with downgoing toes bilaterally. Coordination reveals finger-nose-finger, rapid alternating movements with normal speed and accuracy. Normal casual gait.      Assessment and Plan:  Memory follow up. No confusion spells since initial visit. MOCA 28/30 on today's exam and non focal exam otherwise.   Plan:  Stay hydrated and decrease caffeine  Exercise as tolerated   Return to Neurology with any new episodes of confusion or any other new symptoms or worsening of memory from your baseline.       I discussed all my recommendation with Lucie Stockton. The patient verbalizes understanding and comfortable with the plan. The patient has our clinic phone number to call with any questions or concerns. All of the patient's questions were answered from the best of my current knowledge.     TTime spent with pt answering questions, discussing findings, counseling and coordinating care was more than 50% the appointment time, 29  minutes.         ALEKSEY Gillespie, CNP  [facility]

## 2019-07-23 NOTE — PATIENT INSTRUCTIONS
Plan:  Stay hydrated and decrease caffeine  Exercise as tolerated   Return to Neurology with any new episodes of confusion or any other new symptoms or worsening of memory from your baseline.

## 2019-07-23 NOTE — NURSING NOTE
Chief Complaint   Patient presents with     Memory Loss     [facility] RETURN MEMORY LOSS F/U       Ravi Brown, EMT

## 2019-07-31 NOTE — PROGRESS NOTES
Subjective  CC: Leg pain    HPI:  Lucie Stockton is an 83 year old female with a history of anemia, DVT on long term AC, PAD, chronic pain, spinal stenosis, HLD, HTN, hypothyroidism, COPD, basal cell carcinoma, and osteoarthritis who is presenting for bilateral leg pain.    Leg/anterior shin pain, worse on the right, first noticed 1 week ago, acute onset, improving now but still bothers her. Characterized as aching. Pain radiates to thighs and right hip. Massaging and elevating helps. Also takes tylenol but doesn't think it helps. She did not do anything out of the ordinary and does not do any regular strenuous exercise. Thinks pain is the same after walking. It is accompanied by tingling in her feet, especially at night, and. She is more conscious of being shaky when getting up and feels as if she has to be very wary of her foot placement. She has not had any falls.    Sees vascular surgery for occlusion of aortic/iliac arteries. Last visit 2 weeks ago with normal exercise test and normal doppler.    Current Outpatient Medications   Medication     acetaminophen (TYLENOL) 325 MG tablet     amLODIPine (NORVASC) 10 MG tablet     atorvastatin (LIPITOR) 40 MG tablet     Calcium Carbonate-Vitamin D (CALCIUM 600 + D OR)     Cholecalciferol (VITAMIN D3 PO)     citalopram (CELEXA) 20 MG tablet     clopidogrel (PLAVIX) 75 MG tablet     cyanocolbalamin (VITAMIN  B-12) 500 MCG tablet     ferrous sulfate (IRON) 325 (65 FE) MG tablet     gabapentin (NEURONTIN) 100 MG capsule     hydrochlorothiazide (HYDRODIURIL) 50 MG tablet     latanoprost (XALATAN) 0.005 % ophthalmic solution     levothyroxine (SYNTHROID/LEVOTHROID) 75 MCG tablet     lisinopril (PRINIVIL/ZESTRIL) 40 MG tablet     Multiple Vitamins-Minerals (OCUVITE ADULT 50+) CAPS     naproxen sodium 220 MG capsule     pantoprazole (PROTONIX) 40 MG EC tablet     Potassium Bicarb-Citric Acid 20 MEQ TBEF     potassium chloride ER (K-DUR/KLOR-CON M) 20 MEQ CR tablet      "amoxicillin-clavulanate (AUGMENTIN) 500-125 MG per tablet     fluticasone (FLONASE) 50 MCG/ACT spray     No current facility-administered medications for this visit.      Patient Active Problem List   Diagnosis     Benign ovarian tumor     Chronic bilateral low back pain without sciatica     Hypothyroidism     Peripheral vascular disease (H)     Knee pain     Osteoarthritis     Cervicalgia     Hyperlipidemia with target LDL less than 70     Pulmonary embolism (H)     Anemia     Aortic stenosis     Atherosclerosis of aorta (H)     Atherosclerosis of arteries of extremities (H)     Intermittent claudication (H)     Iron deficiency     Osteoporosis     DVT of lower extremity, bilateral (H)     Varicose veins     Gluteal pain     Insomnia     Back pain     Vitamin D deficiency     Tobacco use disorder     Personal history of other drug therapy     Right knee pain     Venous thrombosis of extremity     Benign essential hypertension     Gastritis     Cataract     Acute left-sided low back pain with left-sided sciatica     Lumbar stenosis with neurogenic claudication     SBO (small bowel obstruction) (H)     Small bowel obstruction (H)     Long term current use of anticoagulant therapy     Left-sided low back pain with left-sided sciatica     Moderate major depression (H)     Anxiety     Neoplasm of uncertain behavior of skin     Cherry angioma     BCC (basal cell carcinoma), trunk     Weakness     Imbalance     Chronic pain     History of nonmelanoma skin cancer     Multiple melanocytic nevi     Age-related osteoporosis without current pathological fracture     Chronic obstructive pulmonary disease, unspecified COPD type (H)     Chronic cough     Pulmonary nodules       Past Medical History:   Diagnosis Date     Anemia      Aortic stenosis     abdominal     Atherosclerosis of aorta (H)     \"extensive disease with near occlusion below level of renal arteries\" on CT     Atherosclerosis of arteries of extremities (H)      " Back pain     severe multilevel DJD, moderate spinal canal stenosis L4-5, severe L3-4     Chronic pain     Back, hips, knees, legs     Degenerative joint disease      DVT of lower extremity, bilateral (H)     5/24/10 left distal femoral and great saphenous, 7/7/10 left:  extending to cfv, iliac , pop and post tibial, peronial, right:  distal fem and peroneal      Grave's disease      Hyperlipidaemia LDL goal < 70      Hypertension, essential      Hypothyroidism      Imbalance      Insomnia      Intermittent claudication (H)      Iron deficiency      Knee pain      Lumbar stenosis with neurogenic claudication      Osteoarthritis     ankle,foot, knee     Osteoporosis      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 JONO Goncalves - Returned BENIGN     INJECT EPIDURAL LUMBAR / SACRAL SINGLE N/A 1/5/2018    Procedure: INJECT EPIDURAL LUMBAR / SACRAL SINGLE;  lumbar interlaminar epidural steroid injection;  Surgeon: Jaylin Valadez MD;  Location: [facility] OR     INJECT SACROILIAC JOINT Right 3/7/2018    Procedure: INJECT SACROILIAC JOINT;  Right Sacroiliac Joint Injection;  Surgeon: Flavio Harrison MD;  Location: [facility] OR     INJECT SACROILIAC JOINT Bilateral 12/7/2018    Procedure: Bilateral Sacroiliac Joint Injections;  Surgeon: Faviola Cosby MD;  Location: [facility] OR     UPPER GI ENDOSCOPY      11/10/10 erythematous gastropathy, angioectasia     Social history  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 Former smoker.    Objective  Physical Examination:  /63 (BP Location: Right arm, Patient Position: Sitting, Cuff Size: Adult Regular)   Pulse 67   Temp 98.1  F (36.7  C) (Oral)   Wt 47 kg (103 lb 11.2 oz)   LMP  (LMP Unknown)   SpO2 96%   BMI 20.25 kg/m    General:  Conversant, generally healthy appearing, no acute distress  Head: atraumatic  Neuro: Face symmetric. Mild essential tremor. Gait stooped, no foot drop. Monofilament sensation absent to right ankle, reduced on left foot to ankle. Patellar and achilles reflexes intact bilaterally. Strength 5/5 in hip flexion, knee flexion and extension, foot dorsi- and plantarflexion, great toe flexion and extension. Light touch reduced over anterior thighs and legs bilaterally.  M/S:   Kyphosis noted. Negative strait leg raise, full range of motion of hips, knees, ankles  Skin:   Normal temperature, turgor and texture. No rashes, suspicious lesions,  jaundice or ulcers.   Extremities:  Diminished DP and PT pulses. No peripheral edema, no digital cyanosis. No tenderness of shins/thighs. Mild point tenderness over sciatic notch.   Psych:  Alert and oriented to person, place and time. Appropriate affect.  Not psychomotor slowed.  No signs of anxiety or agitation.    Imaging:    MR LUMBAR SPINE W/O CONTRAST 2016 7:35 AM  Impression:   Extensive multilevel lumbar degenerative changes including unchanged  severe spinal canal stenosis at L4-5, unchanged since 2013.  Severe right neural foraminal stenosis at L2-3. Additional multilevel  moderate neural foraminal stenosis.    Bilateral lower extremity ankle-brachial indices (ABIs) with  exercise dated 2019 11:02 AM  Impression:   Right le. Resting SUMAYA is 0.51, not significantly changed from prior 0.49.  Mild to moderately abnormal, 0.41-0.90.   2. Exercise study: Negative  Left le. Resting SUMAYA is 0.59, not significantly changed from prior  0.58.  Mild to moderately abnormal, 0.41-0.90.  2. Exercise study: Negative    Assessment/Plan  Lucie Stockton is an 83 year old female with a history of PAD, chronic pain and spinal stenosis who presents with acute bilateral leg pain and reduced sensation in legs and thighs.    #Spinal Stenosis - Patient with known spinal stenosis complaining of acute onset bilateral leg pain accompanied by tingling and reduced sensation over anterior thighs and legs, correlating with MRI findings from 2016. Claudication from PAD less likely given recent negative exercise test. She has had epidural injections in the past with mixed results and does not request one at this time. She is agreeable to restarting gabapentin, with referral to ortho for epidural corticosteroid injection if symptoms do not continue to improve.  - Gabapentin 100mg 1-4 capsules     Total time spent 40 minutes.  More than 50% of the time spent with Ms. Stockton on counseling / coordinating her care        Jorgito Lora, MS  08/01/19    Teaching Physician  Disclosure:    I was present with the medical student who participated in the service and in the documentation of this note.  I have verified the history and personally performed the physical exam and medical decision making, and have verified the content of the note, which accurately reflects my assessment of the patient and the plan of care    Marii Montgomery M.D.  Internal Medicine   pager [phone]

## 2019-08-01 ENCOUNTER — OFFICE VISIT (OUTPATIENT)
Dept: INTERNAL MEDICINE | Facility: CLINIC | Age: 84
End: 2019-08-01
Payer: MEDICARE

## 2019-08-01 VITALS
OXYGEN SATURATION: 96 % | BODY MASS INDEX: 20.25 KG/M2 | HEART RATE: 67 BPM | WEIGHT: 103.7 LBS | DIASTOLIC BLOOD PRESSURE: 63 MMHG | TEMPERATURE: 98.1 F | SYSTOLIC BLOOD PRESSURE: 131 MMHG

## 2019-08-01 DIAGNOSIS — M54.10 BILATERAL RADIATING LEG PAIN: Primary | ICD-10-CM

## 2019-08-01 RX ORDER — GABAPENTIN 100 MG/1
CAPSULE ORAL
Qty: 180 CAPSULE | Refills: 1 | Status: SHIPPED | OUTPATIENT
Start: 2019-08-01 | End: 2020-03-18

## 2019-08-01 ASSESSMENT — PAIN SCALES - GENERAL: PAINLEVEL: MODERATE PAIN (5)

## 2019-08-01 NOTE — NURSING NOTE
Chief Complaint   Patient presents with     Musculoskeletal Problem     pt here to discuss leg pain       Mallika Diaz CMA at 7:26 AM on 8/1/2019.

## 2019-08-01 NOTE — PATIENT INSTRUCTIONS
[facility] Medication Refill Request Information:  * Please contact your pharmacy regarding ANY request for medication refills.  ** [facility] Prescription Fax = [phone]  * Please allow 3 business days for routine medication refills.  * Please allow 5 business days for controlled substance medication refills.     [facility] Test Result notification information:  *You will be notified with in 7-10 days of your appointment day regarding the results of your test.  If you are on MyChart you will be notified as soon as the provider has reviewed the results and signed off on them.    [facility]: [phone]

## 2019-08-02 DIAGNOSIS — I10 ESSENTIAL HYPERTENSION: ICD-10-CM

## 2019-08-05 RX ORDER — LISINOPRIL 40 MG/1
40 TABLET ORAL DAILY
Qty: 90 TABLET | Refills: 0 | Status: SHIPPED | OUTPATIENT
Start: 2019-08-05 | End: 2019-09-04

## 2019-08-05 NOTE — TELEPHONE ENCOUNTER
Last Clinic Visit: 8/1/2019  [facility]    Lab(s) past due-Potassium.  Sue refill 90 days and FYI to [facility].

## 2019-08-09 DIAGNOSIS — Z79.01 LONG TERM CURRENT USE OF ANTICOAGULANT: ICD-10-CM

## 2019-08-09 DIAGNOSIS — I10 BENIGN ESSENTIAL HYPERTENSION: ICD-10-CM

## 2019-08-09 DIAGNOSIS — I10 ESSENTIAL HYPERTENSION: ICD-10-CM

## 2019-08-09 RX ORDER — HYDROCHLOROTHIAZIDE 50 MG/1
50 TABLET ORAL DAILY
Qty: 90 TABLET | Refills: 0 | Status: SHIPPED | OUTPATIENT
Start: 2019-08-09 | End: 2020-02-06

## 2019-08-09 RX ORDER — AMLODIPINE BESYLATE 10 MG/1
10 TABLET ORAL DAILY
Qty: 90 TABLET | Refills: 3 | Status: SHIPPED | OUTPATIENT
Start: 2019-08-09 | End: 2020-12-14

## 2019-08-09 NOTE — TELEPHONE ENCOUNTER
hydrochlorothiazide (HYDRODIURIL) 50 MG tablet  Last Written Prescription Date:  6/2/18  Last Fill Quantity: 90,   # refills: 3  Last Office Visit : 8/1/19  Future Office visit:  11/14/19    90 day to pharmacy      pantoprazole (PROTONIX) 40 MG EC tablet  Last Written Prescription Date:  6/2/18  Last Fill Quantity: 90,   # refills: 3      Routing refill request to provider for review/approval because: K+, NA  Pantoprazole : dx osteoporosis

## 2019-08-14 RX ORDER — PANTOPRAZOLE SODIUM 40 MG/1
40 TABLET, DELAYED RELEASE ORAL DAILY
Qty: 90 TABLET | Refills: 3 | Status: SHIPPED | OUTPATIENT
Start: 2019-08-14 | End: 2020-01-14

## 2019-08-30 ENCOUNTER — TELEPHONE (OUTPATIENT)
Dept: INTERNAL MEDICINE | Facility: CLINIC | Age: 84
End: 2019-08-30

## 2019-08-30 DIAGNOSIS — E87.6 HYPOPOTASSEMIA: Primary | ICD-10-CM

## 2019-08-30 DIAGNOSIS — I10 ESSENTIAL HYPERTENSION: ICD-10-CM

## 2019-09-02 NOTE — TELEPHONE ENCOUNTER
potassium chloride ER (K-DUR/KLOR-CON M) 20 MEQ CR tablet  Last Written Prescription Date:  unknown  Last Fill Quantity: unknown,   # refills: unknown  Last Office Visit : 8/1/19  Future Office visit:11/14/19      Routing  Because: historical.. Dx?    RX for K-DUR and Lisinopril sent to pt pharmacy.  Kristal Jurado RN 8:52 AM on 9/4/2019.

## 2019-09-04 RX ORDER — POTASSIUM CHLORIDE 1500 MG/1
20 TABLET, EXTENDED RELEASE ORAL DAILY
Qty: 90 TABLET | Refills: 0 | Status: SHIPPED | OUTPATIENT
Start: 2019-09-04 | End: 2021-02-16

## 2019-09-04 RX ORDER — LISINOPRIL 40 MG/1
40 TABLET ORAL DAILY
Qty: 90 TABLET | Refills: 0 | Status: ON HOLD | OUTPATIENT
Start: 2019-09-04 | End: 2021-02-21

## 2019-10-03 ENCOUNTER — HEALTH MAINTENANCE LETTER (OUTPATIENT)
Age: 84
End: 2019-10-03

## 2019-10-11 NOTE — PATIENT INSTRUCTIONS
PT IRP Discharge Note    Primary Rehabilitation Diagnosis: CVA  Expected Discharge Date: 10/11/19  Planned Discharge Destination: Home    SUBJECTIVE: Subjective: Pt partially agreeable to treatment session, \"I want to rest before I go home.\"  (10/11/19 0900)  Subjective/Objective Comments: Chart reviewed. Time spent reviewing written d/c recommendations from primary therapist including supervision with mobility, use of WW, no driving, avoiding stairs to second level, and written HEP. Encouraged patient to review with family present as well. Pt verbalizes understanding. Toileting incorporated into session. Pt ends session supine in bed with all needs met, call light near, alarm intact.  (10/11/19 0900)    OBJECTIVE:  Precautions  Other Precautions: fall risk  (10/06/19 0831)  Precautions Comments: very [place] (10/09/19 1102)    See below for current functional status overview.  See PT flowsheet for full details regarding the PT therapy provided.    ASSESSMENT:   The patient has participated in skilled PT services on IRP and at this time is being discharged to home with family supervision.  The patient’s therapy goals were reassessed this date and the patient has not met the goals of modified independent, currently at supervision level and will have supervision from family.  The patient’s equipment needs were addressed and the patient is being discharged with no equipment needs, owns WW.      Recommendations from PT include supervision with mobility, use of WW, written HEP, and home PT.  The patient would benefit from continued PT to address balance, activity tolerance, coordination, and proximal strength with a fair prognosis to meet future therapy goals. The plan has been discussed with the patient and the patient verbalized agreement.  Discharge inpatient rehabilitation PT.      PT Identified Barriers to Discharge: fall risk     This patient participated in all scheduled physical therapy time with this therapist  Rest, elevation, ice, tylenol    Change footwear to have more arch support      Therapy will call you  Podiatry will call you       today.    EDUCATION:   On this date, education was provided to patient regarding  safe use of equipment, bed mobility, transfers, ambulation, stairs, community reentry and fall prevention  The response to education was/were: Verbalizes understanding    PLAN:   Continue skilled PT, including the following Treatment/Interventions: Functional transfer training;Strengthening;Endurance training;Bed mobility;Gait training;Safety Education (10/09/19 1500)   PT Frequency: (d/c PT) (10/11/19 0900), Frequency Comments: d/c PT 10/11 (10/11/19 0900)    Treatment Plan for Next Session: d/c PT  Additional Plan Considerations: .  Plan Comments: KUMAR completed 10/2    RECOMMENDATIONS FOR DISCHARGE:  Recommendation for Discharge: PT WI: Home therapy, 24 Hour assist    PT/OT Mobility Equipment for Discharge: has 2ww at home (10/10/19 0932)  PT/OT ADL Equipment for Discharge: none (10/11/19 0700)      FUNCTIONAL DATA OVERVIEW LAST 24 HOURS  Bed Mobility   Bed Mobility  Rolling to the Right: Supervision (Supv) (10/11/19 0900)  Rolling to the Left: Supervision (Supv) (10/11/19 0900)  Supine to Sit: Supervision (Supv) (10/11/19 0900)  Sit to Supine: Supervision (Supv) (10/11/19 0900)  Bed Mobility Comments: head of bed flat, use of railing, supervision for safety and verbal cues for sequencing  (10/11/19 0900)    Transfers  Transfers  Sit to Stand: Supervision (Supv) (10/11/19 0900)  Stand to Sit: Supervision (Supv) (10/11/19 0900)  Stand Pivot Transfers: Supervision (Supv) (10/11/19 0900)  Toilet Transfers: Supervision (Supv) (10/11/19 0900)  Car Transfers: Supervision (Supv) (10/11/19 0900)  Assistive Device/: 1 Person;Gait Belt (10/11/19 0900)  Transfer Comments 1: for safety with cues for hand placement and alignment.  (10/11/19 0900)    Gait  Gait  Gait Assistance: Supervision (Supv) (10/11/19 0900)  Assistive Device/: 1 Person;Gait Belt (10/11/19 0900)  Ambulation Distance (Feet): 50 Feet(25) (10/11/19  0900)  Gait Comments 1: for safety, short distances without device, declines further distances due to fatigue.  (10/11/19 0900),      Stairs  Stairs Mobility  1 step (curb): Patient Refused (10/11/19 0900)  4 steps: Patient Refused (10/11/19 0900)  12 steps: Patient Refused (10/11/19 0900)  Stairs Mobility Comments: patient refused due to fatigue  (10/11/19 0900)    Wheelchair Mobility       Balance       Neuromuscular Re-education       Other Therapeutic Interventions

## 2019-10-15 NOTE — PROGRESS NOTES
"CC:  Abdominal Pain pt states she is having stomach pain and diarrhea x 2 weeks     Cough pt has a cough and has a lot of phlem         HPI:    Pertinent medical history:  84 year old woman with a hx of  PSBO (2017), PVD, chronic back pain/lumbar stenosis with neurogenic claudication, anticoagulant use, DVT, anemia, COPD, aortic stenosis, anxiety, also history of \"stomach churns\" and intermittent diarrhea dating back to at least 2012, diarrhea with iron sulfate but not gluconate. Last colonoscopy 2010, at that time had non-bleeding angioectasia, treated with thermal therapy, also had internal hemorrhoids. EGD at that time with non-bleeding erythematous gastropathy, single angioectasia, non bleeding, zapped it.  Similar spot in upper duodenum. On Protonix at that time and still takes 40 mg daily.    She will be travelling in a plane in 2 weeks and is concerned about her ability to travel.    Stomach rumbling intermittently (most days), altered bowel habits--multiple stools, sometimes loose, sometimes firm, up to a couple times a week),  States loose \"is really rare\". Can wake up in the middle of the night and have a BM on occasion.  No fevers, chills, sweats, abdominal pain, no weight change, does have a decreased appetite.  Loose stools (if they occur) typically happen once a day, but avoids leaving house in case she has more loose stools.  Max loose stools three times a day.  No rectal bleeding, black stools. No mucus.  No sick contacts, no trouble with dairy, not associated with food. Currently on ferrous sulfate and takes TUMS prn. If doesn't eat, this helps the rumbling and loose stools.   Imodium--discussed use prn, avoid if excessive constipation following dosing. Discussed switching back to iron gluconate.    Chronic back pain (spinal stenosis), improved with PT, but not resolved.    Discussed goals and expectations.  Taking acetaminophen, reminded her to not take NSAIDS    C/O Hyppopigmentation spots on left " "dorsal hand and dorsal wrist (asymptomatic).  Will try topical steroid for vitiligo.    Cough with phlegm x \"a while\", not today.  Clear, no blood.  No SOB, wheezing, fevers, chills, does have post nasal drip, no acid reflux.  No chest pain, sinus pain.  CT chest July of this year showed pulmonary nodules and subtle persistent reticuluonodulear thickening of RUL, decreased.  Saw Dr. Randall 11/21/18, note reviewed, did not follow up with him as advised.  Has had more allergy symptoms lately. Only using \"tiny bit\" of nasal fluticasone periodically currently.  Discussed proper use/dosing of fluticasone.    Right ear wax history, has some pressure right ear now.    Patient Active Problem List   Diagnosis     Benign ovarian tumor     Chronic bilateral low back pain without sciatica     Hypothyroidism     Peripheral vascular disease (H)     Knee pain     Osteoarthritis     Cervicalgia     Hyperlipidemia with target LDL less than 70     Pulmonary embolism (H)     Anemia     Aortic stenosis     Atherosclerosis of aorta (H)     Atherosclerosis of arteries of extremities (H)     Intermittent claudication (H)     Iron deficiency     Osteoporosis     DVT of lower extremity, bilateral (H)     Varicose veins     Gluteal pain     Insomnia     Back pain     Vitamin D deficiency     Tobacco use disorder     Personal history of other drug therapy     Right knee pain     Venous thrombosis of extremity     Benign essential hypertension     Gastritis     Cataract     Acute left-sided low back pain with left-sided sciatica     Lumbar stenosis with neurogenic claudication     SBO (small bowel obstruction) (H)     Small bowel obstruction (H)     Long term current use of anticoagulant therapy     Left-sided low back pain with left-sided sciatica     Moderate major depression (H)     Anxiety     Neoplasm of uncertain behavior of skin     Cherry angioma     BCC (basal cell carcinoma), trunk     Weakness     Imbalance     Chronic pain     " History of nonmelanoma skin cancer     Multiple melanocytic nevi     Age-related osteoporosis without current pathological fracture     Chronic obstructive pulmonary disease, unspecified COPD type (H)     Chronic cough     Pulmonary nodules     Past Surgical History:   Procedure Laterality Date     BUNIONECTOMY       C STOMACH SURGERY PROCEDURE UNLISTED      Please see chart     COLONOSCOPY      11/10/10 angioectasia     HYSTERECTOMY TOTAL ABDOMINAL, BILATERAL SALPINGO-OOPHORECTOMY, NODE DISSECTION, COMBINED  2/17/11    SANGEETHA, EMILIA, LN bx, omentectomy, resection of evrian malignancy Dr. JONO Goncalves - Returned BENIGN     INJECT EPIDURAL LUMBAR / SACRAL SINGLE N/A 1/5/2018    Procedure: INJECT EPIDURAL LUMBAR / SACRAL SINGLE;  lumbar interlaminar epidural steroid injection;  Surgeon: Jaylin Valadez MD;  Location: [facility] OR     INJECT SACROILIAC JOINT Right 3/7/2018    Procedure: INJECT SACROILIAC JOINT;  Right Sacroiliac Joint Injection;  Surgeon: Flavio Harrison MD;  Location: [facility] OR     INJECT SACROILIAC JOINT Bilateral 12/7/2018    Procedure: Bilateral Sacroiliac Joint Injections;  Surgeon: Faviola Cosby MD;  Location: [facility] OR     UPPER GI ENDOSCOPY      11/10/10 erythematous gastropathy, angioectasia     Current Outpatient Medications   Medication Sig Dispense Refill     acetaminophen (TYLENOL) 325 MG tablet Take 2 tablets every 6 to 8 hours as needed for pain 100 tablet 1     amLODIPine (NORVASC) 10 MG tablet Take 1 tablet (10 mg) by mouth daily For blood pressure 90 tablet 3     amoxicillin-clavulanate (AUGMENTIN) 500-125 MG per tablet Take 1 tablet by mouth 2 times daily (Patient not taking: Reported on 7/23/2019) 20 tablet 0     atorvastatin (LIPITOR) 40 MG tablet Take 2 tablets (80 mg) by mouth daily 180 tablet 3     Calcium Carbonate-Vitamin D (CALCIUM 600 + D OR) Take 1 tablet by mouth daily.       Cholecalciferol (VITAMIN D3 PO) Take by mouth daily       citalopram (CELEXA) 20 MG tablet Take 1 tablet (20  mg) by mouth daily 90 tablet 3     clopidogrel (PLAVIX) 75 MG tablet Take 1 tablet (75 mg) by mouth daily 90 tablet 3     cyanocolbalamin (VITAMIN  B-12) 500 MCG tablet Take 1 tablet (500 mcg) by mouth daily 90 tablet 1     ferrous sulfate (IRON) 325 (65 FE) MG tablet Take 1 tablet (325 mg) by mouth daily (with breakfast) 90 tablet 3     fluticasone (FLONASE) 50 MCG/ACT spray Spray 2 sprays into both nostrils daily (Patient not taking: Reported on 7/23/2019) 1 Bottle 1     gabapentin (NEURONTIN) 100 MG capsule Take 1 to 4 capsules at bedtime as directed. 180 capsule 1     gabapentin (NEURONTIN) 100 MG capsule   1     hydrochlorothiazide (HYDRODIURIL) 50 MG tablet Take 1 tablet (50 mg) by mouth daily 90 tablet 0     latanoprost (XALATAN) 0.005 % ophthalmic solution INT 1 GTT IN OU QD UTD  1     levothyroxine (SYNTHROID/LEVOTHROID) 75 MCG tablet Take 1 tablet (75 mcg) by mouth daily 90 tablet 3     lisinopril (PRINIVIL/ZESTRIL) 40 MG tablet Take 1 tablet (40 mg) by mouth daily For blood pressure 90 tablet 0     Multiple Vitamins-Minerals (OCUVITE ADULT 50+) CAPS Take 1 tablet by mouth daily 90 capsule 3     naproxen sodium 220 MG capsule Take 220 mg by mouth 2 times daily (with meals) 60 capsule 2     pantoprazole (PROTONIX) 40 MG EC tablet Take 1 tablet (40 mg) by mouth daily 90 tablet 3     Potassium Bicarb-Citric Acid 20 MEQ TBEF Take 20 mg by mouth daily 90 tablet 3     potassium chloride ER (K-DUR/KLOR-CON M) 20 MEQ CR tablet Take 1 tablet (20 mEq) by mouth daily 90 tablet 0     No Known Allergies    BP (!) 164/67 (BP Location: Right arm, Patient Position: Sitting, Cuff Size: Adult Small)   Pulse 78   Temp 97.8  F (36.6  C) (Oral)   Wt 47.8 kg (105 lb 4.8 oz)   LMP  (LMP Unknown)   SpO2 96%   BMI 20.56 kg/m    BP Readings from Last 6 Encounters:   10/16/19 (!) 164/67   08/01/19 131/63   07/23/19 159/69   07/16/19 124/64   07/10/19 152/74   01/09/19 139/64     Wt Readings from Last 5 Encounters:   10/16/19  "47.8 kg (105 lb 4.8 oz)   08/01/19 47 kg (103 lb 11.2 oz)   07/23/19 49.4 kg (108 lb 14.4 oz)   07/10/19 48.5 kg (107 lb)   01/09/19 47.2 kg (104 lb)     Physical Examination:    General:  Conversant, generally healthy appearing, no acute distress  Head: atraumatic  Eyes:  Pupils 2-3 mm, sclera white, EOM's full, conjunctiva moist, no periorbital swelling    Ears:  TM's normal, right with partial cerumen impaction, left clear  Nose:  Anterior nasal passages clear, turbinates not swollen or erythematous today  Throat/Mouth:  No pharyngeal erythema, exudate, ulcers, oral mucosa and tongue moist, normal hard and soft palate  Neck:  Trachea midline, Full AROM, supple, thyroid smooth, symmetric, not enlarged, no nodules, no neck lymphadenopathy  Lungs:  Clear to auscultation throughout, no wheezes, rhonchi or rales. Normal respiratory effort and no intercostal retractions.  C/V:  Regular rate and rhythm, no murmurs, rubs or gallops.  No JVD, no carotid bruits. Radial and DP pulses 2+, equal and regular.  Abdomen:  Not distended.  Bowel sounds active.  No tenderness, no hepatosplenomegaly or masses.  No CVA tenderness or masses.  Lymph:  No cervical lymph nodes.  Neuro: Alert and oriented, face symmetric. Able to get on/off exam table with minimal assistance.    Skin:   Normal temperature, turgor and texture. No jaundice.  Hyppopigmentation spots on left dorsal hand and dorsal wrist noted.  Extremities:  No peripheral edema, no digital cyanosis  Psych:  Alert and a bit anxious    Lucie was seen today for loose stools and cough.    Diagnoses and all orders for this visit:    Change in bowel habits, no \"red flags\" as of today.  See HPI.  Seems more of a flare of chronic symptoms, suggesting IBS.  Is anxious about upcoming travel plans. Also has hx of diarrhea with iron sulfate in the past, resolved with switching to iron gluconate.  Not sure how she ended up back on sulfate, but will switch to gluconate now.  RTC if " symptoms persist or escalate.  Imodium prn.  Observe for relationship to any particular foods.  -     CBC with platelets; Future  -     Comprehensive metabolic panel; Future    Chronic cough.  Pulmonary nodules and other findings on CT. See HPI.  -     PULMONARY MEDICINE REFERRAL    Post-nasal drip, advised to restart fluticasone, 2 sprays each nostril nightly.    Skin hypopigmentation c/w vitiligo  -     betamethasone dipropionate (DIPROSONE) 0.05 % external ointment; Apply topically 2 times daily    History of iron deficiency  -     Ferritin; Future    The following verbal and written instructions were given to the patient:  Instructions for today:  Take over the counter Imodium, 1/2 tablet only when you think your rumbling might progress to a loose stool requiring urgent access to a bathroom.  Consider any dietary factors that could contribute to your gastrointestinal symptoms.  Change iron sulfate to iron gluconate and take one tablet of iron gluconate daily  Go to the lab today.  Make an appointment with Dr. Randall, or colleague, to follow up in the [facility], within the next 1-2 months.  Take your nasal fluticasone spray regularly, 2 sprays each nostril, nightly.  I sent a prescription for a steroid ointment to use on your white spots twice daily.  We'll attempt an ear wash today for your wax.  See me in 4-6 weeks.    Total time spent 40 minutes.  More than 50% of the time spent with Ms. Stockton on counseling / coordinating her care      Marii Montgomery M.D.  Internal Medicine  [facility]   pager [phone]

## 2019-10-16 ENCOUNTER — OFFICE VISIT (OUTPATIENT)
Dept: INTERNAL MEDICINE | Facility: CLINIC | Age: 84
End: 2019-10-16
Payer: MEDICARE

## 2019-10-16 VITALS
OXYGEN SATURATION: 96 % | BODY MASS INDEX: 20.56 KG/M2 | DIASTOLIC BLOOD PRESSURE: 67 MMHG | HEART RATE: 78 BPM | WEIGHT: 105.3 LBS | SYSTOLIC BLOOD PRESSURE: 164 MMHG | TEMPERATURE: 97.8 F

## 2019-10-16 DIAGNOSIS — Z86.39 HISTORY OF IRON DEFICIENCY: ICD-10-CM

## 2019-10-16 DIAGNOSIS — L81.6 SKIN HYPOPIGMENTATION: ICD-10-CM

## 2019-10-16 DIAGNOSIS — R05.3 CHRONIC COUGH: ICD-10-CM

## 2019-10-16 DIAGNOSIS — R09.82 POST-NASAL DRIP: ICD-10-CM

## 2019-10-16 DIAGNOSIS — R91.8 PULMONARY NODULES: ICD-10-CM

## 2019-10-16 DIAGNOSIS — R19.4 CHANGE IN BOWEL HABITS: Primary | ICD-10-CM

## 2019-10-16 DIAGNOSIS — R19.5 LOOSE STOOLS: ICD-10-CM

## 2019-10-16 RX ORDER — BETAMETHASONE DIPROPIONATE 0.05 %
OINTMENT (GRAM) TOPICAL 2 TIMES DAILY
Qty: 15 G | Refills: 0 | Status: SHIPPED | OUTPATIENT
Start: 2019-10-16 | End: 2021-01-01

## 2019-10-16 RX ORDER — LOPERAMIDE HYDROCHLORIDE 2 MG/1
1-2 TABLET ORAL DAILY PRN
Qty: 30 TABLET | Refills: 0 | COMMUNITY
Start: 2019-10-16

## 2019-10-16 ASSESSMENT — PAIN SCALES - GENERAL: PAINLEVEL: MODERATE PAIN (5)

## 2019-10-16 ASSESSMENT — ANXIETY QUESTIONNAIRES
3. WORRYING TOO MUCH ABOUT DIFFERENT THINGS: SEVERAL DAYS
7. FEELING AFRAID AS IF SOMETHING AWFUL MIGHT HAPPEN: NOT AT ALL
5. BEING SO RESTLESS THAT IT IS HARD TO SIT STILL: NOT AT ALL
2. NOT BEING ABLE TO STOP OR CONTROL WORRYING: SEVERAL DAYS
1. FEELING NERVOUS, ANXIOUS, OR ON EDGE: SEVERAL DAYS
IF YOU CHECKED OFF ANY PROBLEMS ON THIS QUESTIONNAIRE, HOW DIFFICULT HAVE THESE PROBLEMS MADE IT FOR YOU TO DO YOUR WORK, TAKE CARE OF THINGS AT HOME, OR GET ALONG WITH OTHER PEOPLE: SOMEWHAT DIFFICULT
6. BECOMING EASILY ANNOYED OR IRRITABLE: SEVERAL DAYS
GAD7 TOTAL SCORE: 6

## 2019-10-16 ASSESSMENT — PATIENT HEALTH QUESTIONNAIRE - PHQ9
5. POOR APPETITE OR OVEREATING: MORE THAN HALF THE DAYS
SUM OF ALL RESPONSES TO PHQ QUESTIONS 1-9: 9

## 2019-10-16 NOTE — NURSING NOTE
Chief Complaint   Patient presents with     Abdominal Pain     pt states she is having stomach pain and diarrhea x 2 weeks     Cough     pt has a cough and has a lot of phlem       Mallika Diaz CMA at 6:58 AM on 10/16/2019.

## 2019-10-16 NOTE — PATIENT INSTRUCTIONS
[facility] Medication Refill Request Information:  * Please contact your pharmacy regarding ANY request for medication refills.  ** Psychiatric Prescription Fax = [phone]  * Please allow 3 business days for routine medication refills.  * Please allow 5 business days for controlled substance medication refills.     [facility] Test Result notification information:  *You will be notified with in 7-10 days of your appointment day regarding the results of your test.  If you are on MyChart you will be notified as soon as the provider has reviewed the results and signed off on them.    [facility]: [phone]     Instructions for today:  Take over the counter Imodium, 1/2 tablet only when you think your rumbling might progress to a loose stool requiring urgent access to a bathroom.  Consider any dietary factors that could contribute to your gastrointestinal symptoms.  Change iron sulfate to iron gluconate and take one tablet of iron gluconate daily  Go to the lab today.  Make an appointment with Dr. Randall, or colleague, to follow up in the [facility], within the next 1-2 months.  Take your nasal fluticasone spray regularly, 2 sprays each nostril, nightly.  I sent a prescription for a steroid ointment to use on your white spots twice daily.  We'll attempt an ear wash today for your wax.  See me in 4-6 weeks.  Dr. Montgomery

## 2019-10-17 ASSESSMENT — ANXIETY QUESTIONNAIRES: GAD7 TOTAL SCORE: 6

## 2019-11-13 ENCOUNTER — OFFICE VISIT (OUTPATIENT)
Dept: PULMONOLOGY | Facility: CLINIC | Age: 84
End: 2019-11-13
Attending: INTERNAL MEDICINE
Payer: MEDICARE

## 2019-11-13 ENCOUNTER — ANCILLARY PROCEDURE (OUTPATIENT)
Dept: CT IMAGING | Facility: CLINIC | Age: 84
End: 2019-11-13
Attending: INTERNAL MEDICINE
Payer: MEDICARE

## 2019-11-13 ENCOUNTER — PRE VISIT (OUTPATIENT)
Dept: INTERNAL MEDICINE | Facility: CLINIC | Age: 84
End: 2019-11-13

## 2019-11-13 VITALS
SYSTOLIC BLOOD PRESSURE: 153 MMHG | DIASTOLIC BLOOD PRESSURE: 72 MMHG | HEART RATE: 72 BPM | TEMPERATURE: 98 F | WEIGHT: 105.6 LBS | HEIGHT: 60 IN | OXYGEN SATURATION: 96 % | BODY MASS INDEX: 20.73 KG/M2

## 2019-11-13 DIAGNOSIS — R19.4 CHANGE IN BOWEL HABITS: ICD-10-CM

## 2019-11-13 DIAGNOSIS — R91.8 PULMONARY NODULES: ICD-10-CM

## 2019-11-13 DIAGNOSIS — Z86.39 HISTORY OF IRON DEFICIENCY: ICD-10-CM

## 2019-11-13 DIAGNOSIS — R05.3 CHRONIC COUGH: ICD-10-CM

## 2019-11-13 LAB
ALBUMIN SERPL-MCNC: 3.6 G/DL (ref 3.4–5)
ALP SERPL-CCNC: 80 U/L (ref 40–150)
ALT SERPL W P-5'-P-CCNC: 16 U/L (ref 0–50)
ANION GAP SERPL CALCULATED.3IONS-SCNC: 4 MMOL/L (ref 3–14)
AST SERPL W P-5'-P-CCNC: 16 U/L (ref 0–45)
BILIRUB SERPL-MCNC: 0.3 MG/DL (ref 0.2–1.3)
BUN SERPL-MCNC: 13 MG/DL (ref 7–30)
CALCIUM SERPL-MCNC: 9.9 MG/DL (ref 8.5–10.1)
CHLORIDE SERPL-SCNC: 105 MMOL/L (ref 94–109)
CO2 SERPL-SCNC: 32 MMOL/L (ref 20–32)
CREAT SERPL-MCNC: 0.6 MG/DL (ref 0.52–1.04)
ERYTHROCYTE [DISTWIDTH] IN BLOOD BY AUTOMATED COUNT: 13.8 % (ref 10–15)
FERRITIN SERPL-MCNC: 11 NG/ML (ref 8–252)
GFR SERPL CREATININE-BSD FRML MDRD: 84 ML/MIN/{1.73_M2}
GLUCOSE SERPL-MCNC: 66 MG/DL (ref 70–99)
HCT VFR BLD AUTO: 39.2 % (ref 35–47)
HGB BLD-MCNC: 12.1 G/DL (ref 11.7–15.7)
MCH RBC QN AUTO: 27.8 PG (ref 26.5–33)
MCHC RBC AUTO-ENTMCNC: 30.9 G/DL (ref 31.5–36.5)
MCV RBC AUTO: 90 FL (ref 78–100)
PLATELET # BLD AUTO: 463 10E9/L (ref 150–450)
POTASSIUM SERPL-SCNC: 3.9 MMOL/L (ref 3.4–5.3)
PROT SERPL-MCNC: 7.1 G/DL (ref 6.8–8.8)
RADIOLOGIST FLAGS: NORMAL
RBC # BLD AUTO: 4.36 10E12/L (ref 3.8–5.2)
SODIUM SERPL-SCNC: 141 MMOL/L (ref 133–144)
WBC # BLD AUTO: 7.3 10E9/L (ref 4–11)

## 2019-11-13 PROCEDURE — G0463 HOSPITAL OUTPT CLINIC VISIT: HCPCS | Mod: ZF

## 2019-11-13 ASSESSMENT — PAIN SCALES - GENERAL: PAINLEVEL: SEVERE PAIN (6)

## 2019-11-13 ASSESSMENT — MIFFLIN-ST. JEOR: SCORE: 855.5

## 2019-11-13 NOTE — LETTER
11/13/2019       RE: Lucie Stockton  [address] [facility] [address]     Dear Colleague,    Thank you for referring your patient, Lucie Stockton, to the [facility] at [facility]. Please see a copy of my visit note below.    [facility]  Lung Nodule Clinic Note  November 13, 2019    Chief complaint:  Lucie Stockton is a 84 year old female seen in the Pulmonary Clinic  for   Chief Complaint   Patient presents with     Oncology Clinic Visit     Return visit.  Lung nodules and Chronic cough.        Assessment and Plan:  #1 chronic cough (1 year) bringing up clear phlegm mostly in the morning.  She also has postnasal drainage.  She denied having weight loss, night sweats, fever, chills.  No symptoms suggesting infection.  She is a former smoker and quit 4 years ago after smoking 40 pack years.  2.  Pulmonary nodules that was noted CT scan from January 2018.  I saw her one time in my clinic in November 2018 with plan of repeating CT scan but she did not follow-up with me.  We will do a CT scan of the chest today and I will call her back with the result and further planning.    Repeat CT chest is revealing enlarging RUL nodule from 9mm (2018) to 12 mm now, concerning for lung cancer. Her FEV1 is 1L from last year, mod reduction is DLCO.   Will discuss in the nodule meeting in am.    Billing: REYES Randall MD     History of Present Illness:  84 years old woman former smoker seen by me once in November 2018 as above.  She continues to have cough.  She is high risk for lung cancer.  She also has pulmonary nodules that were enlarging.  She has no symptoms suggesting infection.  She has clear phlegm production mostly in the morning and postnasal drainage.      Exposure history: Asbestos;  No , TB;  No , Radiation;   No     No Known Allergies     Past Medical History:   Diagnosis Date     Anemia      Aortic stenosis     abdominal     Atherosclerosis of  "aorta (H)     \"extensive disease with near occlusion below level of renal arteries\" on CT     Atherosclerosis of arteries of extremities (H)      Back pain     severe multilevel DJD, moderate spinal canal stenosis L4-5, severe L3-4     Chronic pain     Back, hips, knees, legs     Degenerative joint disease      DVT of lower extremity, bilateral (H)     5/24/10 left distal femoral and great saphenous, 7/7/10 left:  extending to cfv, iliac , pop and post tibial, peronial, right:  distal fem and peroneal      Grave's disease      Hyperlipidaemia LDL goal < 70      Hypertension, essential      Hypothyroidism      Imbalance      Insomnia      Intermittent claudication (H)      Iron deficiency      Knee pain      Lumbar stenosis with neurogenic claudication      Osteoarthritis     ankle,foot, knee     Osteoporosis      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 JONO Goncalves - Returned BENIGN     INJECT EPIDURAL LUMBAR / SACRAL SINGLE N/A 1/5/2018    Procedure: INJECT EPIDURAL LUMBAR / SACRAL SINGLE;  lumbar interlaminar epidural steroid injection;  Surgeon: Jaylin Valadez MD;  Location: [facility]     INJECT SACROILIAC JOINT Right 3/7/2018    Procedure: INJECT SACROILIAC JOINT;  Right Sacroiliac Joint Injection;  Surgeon: Flavio Harrison MD;  Location: [facility]     INJECT SACROILIAC JOINT Bilateral 12/7/2018    Procedure: Bilateral Sacroiliac Joint Injections;  " Surgeon: Faviola Cosby MD;  Location: [facility] OR     UPPER GI ENDOSCOPY      11/10/10 erythematous gastropathy, angioectasia        Social History     Socioeconomic History     Marital status:      Spouse name: Not on file     Number of children: Not on file     Years of education: Not on file     Highest education level: Not on file   Occupational History     Not on file   Social Needs     Financial resource strain: Not on file     Food insecurity:     Worry: Not on file     Inability: Not on file     Transportation needs:     Medical: Not on file     Non-medical: Not on file   Tobacco Use     Smoking status: Former Smoker     Packs/day: 0.50     Years: 15.00     Pack years: 7.50     Last attempt to quit: 1993     Years since quittin.1     Smokeless tobacco: Never Used   Substance and Sexual Activity     Alcohol use: Yes     Comment: social     Drug use: No     Sexual activity: Never     Partners: Male   Lifestyle     Physical activity:     Days per week: Not on file     Minutes per session: Not on file     Stress: Not on file   Relationships     Social connections:     Talks on phone: Not on file     Gets together: Not on file     Attends [employer] service: Not on file     Active member of club or organization: Not on file     Attends meetings of clubs or organizations: Not on file     Relationship status: Not on file     Intimate partner violence:     Fear of current or ex partner: Not on file     Emotionally abused: Not on file     Physically abused: Not on file     Forced sexual activity: Not on file   Other Topics Concern     Parent/sibling w/ CABG, MI or angioplasty before 65F 55M? Not Asked   Social History Narrative     for 52 years. Retired from [employer]. Frequent world travel.        Family History   Problem Relation Age of Onset     Breast Cancer Maternal Aunt 80     C.A.D. Father 54        Immunization History   Administered Date(s) Administered     HEPA 10/06/2003,  01/19/2007     Influenza (H1N1) 01/09/2010     Influenza (High Dose) 3 valent vaccine 09/02/2013, 08/30/2015, 09/03/2016, 09/16/2017, 09/29/2018, 09/28/2019     Influenza (IIV3) PF 10/26/2002, 11/05/2004, 10/11/2005, 11/07/2006, 09/05/2010, 09/04/2011, 08/20/2012, 09/02/2013, 09/19/2014     Pneumo Conj 13-V (2010&after) 12/09/2015     Pneumococcal 23 valent 12/31/2003, 02/20/2011     Poliovirus, inactivated (IPV) 10/06/2003     TD (ADULT, 7+) 10/06/2003     TDAP Vaccine (Boostrix) 01/06/2014     Typhoid IM 10/06/2003, 01/19/2007     Zoster vaccine recombinant adjuvanted (SHINGRIX) 08/17/2018, 10/17/2018     Zoster vaccine, live 02/28/2011       Current Outpatient Medications   Medication Sig     acetaminophen (TYLENOL) 325 MG tablet Take 2 tablets every 6 to 8 hours as needed for pain     amLODIPine (NORVASC) 10 MG tablet Take 1 tablet (10 mg) by mouth daily For blood pressure     atorvastatin (LIPITOR) 40 MG tablet Take 2 tablets (80 mg) by mouth daily     betamethasone dipropionate (DIPROSONE) 0.05 % external ointment Apply topically 2 times daily     Calcium Carbonate-Vitamin D (CALCIUM 600 + D OR) Take 1 tablet by mouth daily.     Cholecalciferol (VITAMIN D3 PO) Take by mouth daily     citalopram (CELEXA) 20 MG tablet Take 1 tablet (20 mg) by mouth daily     clopidogrel (PLAVIX) 75 MG tablet Take 1 tablet (75 mg) by mouth daily     cyanocolbalamin (VITAMIN  B-12) 500 MCG tablet Take 1 tablet (500 mcg) by mouth daily     gabapentin (NEURONTIN) 100 MG capsule Take 1 to 4 capsules at bedtime as directed.     gabapentin (NEURONTIN) 100 MG capsule      hydrochlorothiazide (HYDRODIURIL) 50 MG tablet Take 1 tablet (50 mg) by mouth daily     iron (FERROUS GLUCONATE) 256 (28 Fe) MG tablet Take 1 tablet (100 mg) by mouth daily     latanoprost (XALATAN) 0.005 % ophthalmic solution INT 1 GTT IN OU QD UTD     levothyroxine (SYNTHROID/LEVOTHROID) 75 MCG tablet Take 1 tablet (75 mcg) by mouth daily     lisinopril  (PRINIVIL/ZESTRIL) 40 MG tablet Take 1 tablet (40 mg) by mouth daily For blood pressure     loperamide (IMODIUM A-D) 2 MG tablet Take 0.5-1 tablets (1-2 mg) by mouth daily as needed for diarrhea     Multiple Vitamins-Minerals (OCUVITE ADULT 50+) CAPS Take 1 tablet by mouth daily     pantoprazole (PROTONIX) 40 MG EC tablet Take 1 tablet (40 mg) by mouth daily     Potassium Bicarb-Citric Acid 20 MEQ TBEF Take 20 mg by mouth daily     potassium chloride ER (K-DUR/KLOR-CON M) 20 MEQ CR tablet Take 1 tablet (20 mEq) by mouth daily     No current facility-administered medications for this visit.         Review of Systems:  I have done 10 points of review systems and pertinent findings are  ,otherwise negative.    Physical examination  Constitutional: Alert, oriented, not in distress  Vitals: B/P: 153/72, T: 98, P: 72, R: Data Unavailable  Eyes: No icterus, nystagmus, pupils isocoric  ENT/Mouth:  No ear discharge, moist mucosa, no ulceration, tonsillar hypertrophy  Cardiovascular: Normal S1 and S2, no additional heart sounds, murmur, rub, normal peripheral pulses  Respiratory: Both hemithoraces are symmetrical, normal to palpation, no dullness to percussion, auscultation of lungs revealed decreased bronchovesicular sounds, but no expirium prolongation, wheezing, rhonci and crackles  Gastrointestinal/Rectal: Bowel sounds present, soft, non tender, non distended, no organomegaly, ascitis, mass   Musculoskeletal: Normal muscle mass, no dephormity  Integumentary:  No rash, normal texture and turgor, no edema  Neurological: Alert, orientedx3, no motor deficits, cranial nerves grossly normal  Psychiatric:  Mood and affect are appropriate with insight into his/her medical condition  Hematologic/Immunologic/Lymphatic: No bruise, no lymph node enargement     Data:  Lab Results   Component Value Date    WBC 7.3 11/13/2019     Lab Results   Component Value Date    RBC 4.36 11/13/2019     Lab Results   Component Value Date    HGB 12.1  11/13/2019     Lab Results   Component Value Date    HCT 39.2 11/13/2019     Lab Results   Component Value Date    MCV 90 11/13/2019     Lab Results   Component Value Date    MCH 27.8 11/13/2019     Lab Results   Component Value Date    MCHC 30.9 11/13/2019     Lab Results   Component Value Date    RDW 13.8 11/13/2019     Lab Results   Component Value Date     11/13/2019       Lab Results   Component Value Date     11/13/2019      Lab Results   Component Value Date    POTASSIUM 3.9 11/13/2019     Lab Results   Component Value Date    CHLORIDE 105 11/13/2019     Lab Results   Component Value Date    CANDIDA 9.9 11/13/2019     Lab Results   Component Value Date    CO2 32 11/13/2019     Lab Results   Component Value Date    BUN 13 11/13/2019     Lab Results   Component Value Date    CR 0.60 11/13/2019     Lab Results   Component Value Date    GLC 66 11/13/2019       Thank you for allowing me participate in the care of Lucie Sanfordisaiah.    TIKA Randall MD

## 2019-11-13 NOTE — NURSING NOTE
Oncology Rooming Note    November 13, 2019 9:13 AM   Lucie Stockton is a 84 year old female who presents for:    Chief Complaint   Patient presents with     Oncology Clinic Visit     Return visit.  Lung nodules and Chronic cough.      Initial Vitals: BP (!) 156/74   Pulse 72   Temp 98  F (36.7  C) (Oral)   Ht 1.524 m (5')   Wt 47.9 kg (105 lb 9.6 oz)   LMP  (LMP Unknown)   BMI 20.62 kg/m   Estimated body mass index is 20.62 kg/m  as calculated from the following:    Height as of this encounter: 1.524 m (5').    Weight as of this encounter: 47.9 kg (105 lb 9.6 oz). Body surface area is 1.42 meters squared.  Severe Pain (6) Comment: Data Unavailable   No LMP recorded (lmp unknown). Patient has had a hysterectomy.  Allergies reviewed: Yes  Medications reviewed: Yes    Medications: Medication refills not needed today.  Pharmacy name entered into [facility]: [id number] [address] AT [facility] [place] [place] [facility] [address] [facility]    Clinical concerns: No additional concerns.  Dr. Randall was notified.      Kelly Benítez, [facility]

## 2019-11-13 NOTE — PROGRESS NOTES
"[facility]  Lung Nodule Clinic Note  November 13, 2019    Chief complaint:  Lucie Stockton is a 84 year old female seen in the Pulmonary Clinic  for   Chief Complaint   Patient presents with     Oncology Clinic Visit     Return visit.  Lung nodules and Chronic cough.        Assessment and Plan:  #1 chronic cough (1 year) bringing up clear phlegm mostly in the morning.  She also has postnasal drainage.  She denied having weight loss, night sweats, fever, chills.  No symptoms suggesting infection.  She is a former smoker and quit 4 years ago after smoking 40 pack years.  2.  Pulmonary nodules that was noted CT scan from January 2018.  I saw her one time in my clinic in November 2018 with plan of repeating CT scan but she did not follow-up with me.  We will do a CT scan of the chest today and I will call her back with the result and further planning.    Repeat CT chest is revealing enlarging RUL nodule from 9mm (2018) to 12 mm now, concerning for lung cancer. Her FEV1 is 1L from last year, mod reduction is DLCO.   Will discuss in the nodule meeting in am.    Billing: L4      Aidan Randall MD     History of Present Illness:  84 years old woman former smoker seen by me once in November 2018 as above.  She continues to have cough.  She is high risk for lung cancer.  She also has pulmonary nodules that were enlarging.  She has no symptoms suggesting infection.  She has clear phlegm production mostly in the morning and postnasal drainage.      Exposure history: Asbestos;  No , TB;  No , Radiation;   No     No Known Allergies     Past Medical History:   Diagnosis Date     Anemia      Aortic stenosis     abdominal     Atherosclerosis of aorta (H)     \"extensive disease with near occlusion below level of renal arteries\" on CT     Atherosclerosis of arteries of extremities (H)      Back pain     severe multilevel DJD, moderate spinal canal stenosis L4-5, severe L3-4     Chronic pain     Back, hips, knees, legs     Degenerative " joint disease      DVT of lower extremity, bilateral (H)     5/24/10 left distal femoral and great saphenous, 7/7/10 left:  extending to cfv, iliac , pop and post tibial, peronial, right:  distal fem and peroneal      Grave's disease      Hyperlipidaemia LDL goal < 70      Hypertension, essential      Hypothyroidism      Imbalance      Insomnia      Intermittent claudication (H)      Iron deficiency      Knee pain      Lumbar stenosis with neurogenic claudication      Osteoarthritis     ankle,foot, knee     Osteoporosis      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 JONO Goncalves - Returned BENIGN     INJECT EPIDURAL LUMBAR / SACRAL SINGLE N/A 1/5/2018    Procedure: INJECT EPIDURAL LUMBAR / SACRAL SINGLE;  lumbar interlaminar epidural steroid injection;  Surgeon: Jaylin Valadez MD;  [facility]: [facility] OR     INJECT SACROILIAC JOINT Right 3/7/2018    Procedure: INJECT SACROILIAC JOINT;  Right Sacroiliac Joint Injection;  Surgeon: Flavio Harrison MD;  [facility]     INJECT SACROILIAC JOINT Bilateral 12/7/2018    Procedure: Bilateral Sacroiliac Joint Injections;  Surgeon: Faviola Cosby MD;  [facility]     UPPER GI ENDOSCOPY      11/10/10 erythematous gastropathy, angioectasia        Social History     Socioeconomic History     Marital status:      Spouse name: Not on file     Number of children: Not on file     Years of education: Not on file      Highest education level: Not on file   Occupational History     Not on file   Social Needs     Financial resource strain: Not on file     Food insecurity:     Worry: Not on file     Inability: Not on file     Transportation needs:     Medical: Not on file     Non-medical: Not on file   Tobacco Use     Smoking status: Former Smoker     Packs/day: 0.50     Years: 15.00     Pack years: 7.50     Last attempt to quit: 1993     Years since quittin.1     Smokeless tobacco: Never Used   Substance and Sexual Activity     Alcohol use: Yes     Comment: social     Drug use: No     Sexual activity: Never     Partners: Male   Lifestyle     Physical activity:     Days per week: Not on file     Minutes per session: Not on file     Stress: Not on file   Relationships     Social connections:     Talks on phone: Not on file     Gets together: Not on file     Attends [employer] service: Not on file     Active member of club or organization: Not on file     Attends meetings of clubs or organizations: Not on file     Relationship status: Not on file     Intimate partner violence:     Fear of current or ex partner: Not on file     Emotionally abused: Not on file     Physically abused: Not on file     Forced sexual activity: Not on file   Other Topics Concern     Parent/sibling w/ CABG, MI or angioplasty before 65F 55M? Not Asked   Social History Narrative     for 52 years. Retired from [employer]. Frequent world travel.        Family History   Problem Relation Age of Onset     Breast Cancer Maternal Aunt 80     C.A.D. Father 54        Immunization History   Administered Date(s) Administered     HEPA 10/06/2003, 2007     Influenza (H1N1) 2010     Influenza (High Dose) 3 valent vaccine 2013, 2015, 2016, 2017, 2018, 2019     Influenza (IIV3) PF 10/26/2002, 2004, 10/11/2005, 2006, 2010, 2011, 2012, 2013, 2014     Pneumo  Conj 13-V (2010&after) 12/09/2015     Pneumococcal 23 valent 12/31/2003, 02/20/2011     Poliovirus, inactivated (IPV) 10/06/2003     TD (ADULT, 7+) 10/06/2003     TDAP Vaccine (Boostrix) 01/06/2014     Typhoid IM 10/06/2003, 01/19/2007     Zoster vaccine recombinant adjuvanted (SHINGRIX) 08/17/2018, 10/17/2018     Zoster vaccine, live 02/28/2011       Current Outpatient Medications   Medication Sig     acetaminophen (TYLENOL) 325 MG tablet Take 2 tablets every 6 to 8 hours as needed for pain     amLODIPine (NORVASC) 10 MG tablet Take 1 tablet (10 mg) by mouth daily For blood pressure     atorvastatin (LIPITOR) 40 MG tablet Take 2 tablets (80 mg) by mouth daily     betamethasone dipropionate (DIPROSONE) 0.05 % external ointment Apply topically 2 times daily     Calcium Carbonate-Vitamin D (CALCIUM 600 + D OR) Take 1 tablet by mouth daily.     Cholecalciferol (VITAMIN D3 PO) Take by mouth daily     citalopram (CELEXA) 20 MG tablet Take 1 tablet (20 mg) by mouth daily     clopidogrel (PLAVIX) 75 MG tablet Take 1 tablet (75 mg) by mouth daily     cyanocolbalamin (VITAMIN  B-12) 500 MCG tablet Take 1 tablet (500 mcg) by mouth daily     gabapentin (NEURONTIN) 100 MG capsule Take 1 to 4 capsules at bedtime as directed.     gabapentin (NEURONTIN) 100 MG capsule      hydrochlorothiazide (HYDRODIURIL) 50 MG tablet Take 1 tablet (50 mg) by mouth daily     iron (FERROUS GLUCONATE) 256 (28 Fe) MG tablet Take 1 tablet (100 mg) by mouth daily     latanoprost (XALATAN) 0.005 % ophthalmic solution INT 1 GTT IN OU QD UTD     levothyroxine (SYNTHROID/LEVOTHROID) 75 MCG tablet Take 1 tablet (75 mcg) by mouth daily     lisinopril (PRINIVIL/ZESTRIL) 40 MG tablet Take 1 tablet (40 mg) by mouth daily For blood pressure     loperamide (IMODIUM A-D) 2 MG tablet Take 0.5-1 tablets (1-2 mg) by mouth daily as needed for diarrhea     Multiple Vitamins-Minerals (OCUVITE ADULT 50+) CAPS Take 1 tablet by mouth daily     pantoprazole (PROTONIX) 40  MG EC tablet Take 1 tablet (40 mg) by mouth daily     Potassium Bicarb-Citric Acid 20 MEQ TBEF Take 20 mg by mouth daily     potassium chloride ER (K-DUR/KLOR-CON M) 20 MEQ CR tablet Take 1 tablet (20 mEq) by mouth daily     No current facility-administered medications for this visit.         Review of Systems:  I have done 10 points of review systems and pertinent findings are  ,otherwise negative.    Physical examination  Constitutional: Alert, oriented, not in distress  Vitals: B/P: 153/72, T: 98, P: 72, R: Data Unavailable  Eyes: No icterus, nystagmus, pupils isocoric  ENT/Mouth:  No ear discharge, moist mucosa, no ulceration, tonsillar hypertrophy  Cardiovascular: Normal S1 and S2, no additional heart sounds, murmur, rub, normal peripheral pulses  Respiratory: Both hemithoraces are symmetrical, normal to palpation, no dullness to percussion, auscultation of lungs revealed decreased bronchovesicular sounds, but no expirium prolongation, wheezing, rhonci and crackles  Gastrointestinal/Rectal: Bowel sounds present, soft, non tender, non distended, no organomegaly, ascitis, mass   Musculoskeletal: Normal muscle mass, no dephormity  Integumentary:  No rash, normal texture and turgor, no edema  Neurological: Alert, orientedx3, no motor deficits, cranial nerves grossly normal  Psychiatric:  Mood and affect are appropriate with insight into his/her medical condition  Hematologic/Immunologic/Lymphatic: No bruise, no lymph node enargement     Data:  Lab Results   Component Value Date    WBC 7.3 11/13/2019     Lab Results   Component Value Date    RBC 4.36 11/13/2019     Lab Results   Component Value Date    HGB 12.1 11/13/2019     Lab Results   Component Value Date    HCT 39.2 11/13/2019     Lab Results   Component Value Date    MCV 90 11/13/2019     Lab Results   Component Value Date    MCH 27.8 11/13/2019     Lab Results   Component Value Date    MCHC 30.9 11/13/2019     Lab Results   Component Value Date    RDW 13.8  11/13/2019     Lab Results   Component Value Date     11/13/2019       Lab Results   Component Value Date     11/13/2019      Lab Results   Component Value Date    POTASSIUM 3.9 11/13/2019     Lab Results   Component Value Date    CHLORIDE 105 11/13/2019     Lab Results   Component Value Date    CANDIDA 9.9 11/13/2019     Lab Results   Component Value Date    CO2 32 11/13/2019     Lab Results   Component Value Date    BUN 13 11/13/2019     Lab Results   Component Value Date    CR 0.60 11/13/2019     Lab Results   Component Value Date    GLC 66 11/13/2019       Thank you for allowing me participate in the care of Lucie Stockton.    TIKA Randall MD

## 2019-11-14 ENCOUNTER — TELEPHONE (OUTPATIENT)
Dept: ONCOLOGY | Facility: CLINIC | Age: 84
End: 2019-11-14

## 2019-11-14 NOTE — TELEPHONE ENCOUNTER
Incidental results  DATE: 11/14/19   TIME OF RECEIPT FROM LAB:  2:27 pm  LAB TEST:  CT Chest  LAB VALUE:  Recommend Follow Up: Lung nodule  RESULTS PAGED TO (PROVIDER):  Dr Randall  TIME LAB VALUE REPORTED TO PROVIDER: 2:32 pm

## 2019-11-20 ENCOUNTER — OFFICE VISIT (OUTPATIENT)
Dept: INTERNAL MEDICINE | Facility: CLINIC | Age: 84
End: 2019-11-20
Payer: MEDICARE

## 2019-11-20 VITALS
WEIGHT: 105.8 LBS | DIASTOLIC BLOOD PRESSURE: 58 MMHG | HEART RATE: 71 BPM | OXYGEN SATURATION: 95 % | BODY MASS INDEX: 20.66 KG/M2 | SYSTOLIC BLOOD PRESSURE: 132 MMHG

## 2019-11-20 DIAGNOSIS — I27.20 PULMONARY HYPERTENSION (H): ICD-10-CM

## 2019-11-20 DIAGNOSIS — R35.0 URINARY FREQUENCY: ICD-10-CM

## 2019-11-20 DIAGNOSIS — R91.1 RIGHT UPPER LOBE PULMONARY NODULE: Primary | ICD-10-CM

## 2019-11-20 LAB
ALBUMIN UR-MCNC: NEGATIVE MG/DL
APPEARANCE UR: CLEAR
BILIRUB UR QL STRIP: NEGATIVE
COLOR UR AUTO: YELLOW
GLUCOSE UR STRIP-MCNC: NEGATIVE MG/DL
HGB UR QL STRIP: NEGATIVE
HYALINE CASTS #/AREA URNS LPF: 1 /LPF (ref 0–2)
KETONES UR STRIP-MCNC: NEGATIVE MG/DL
LEUKOCYTE ESTERASE UR QL STRIP: NEGATIVE
MUCOUS THREADS #/AREA URNS LPF: PRESENT /LPF
NITRATE UR QL: NEGATIVE
PH UR STRIP: 7 PH (ref 5–7)
RBC #/AREA URNS AUTO: 0 /HPF (ref 0–2)
SOURCE: ABNORMAL
SP GR UR STRIP: 1.01 (ref 1–1.03)
UROBILINOGEN UR STRIP-MCNC: 0 MG/DL (ref 0–2)
WBC #/AREA URNS AUTO: 1 /HPF (ref 0–5)

## 2019-11-20 RX ORDER — OXYBUTYNIN CHLORIDE 5 MG/1
5 TABLET ORAL AT BEDTIME
Qty: 90 TABLET | Refills: 0 | Status: SHIPPED | OUTPATIENT
Start: 2019-11-20

## 2019-11-20 ASSESSMENT — PAIN SCALES - GENERAL: PAINLEVEL: NO PAIN (0)

## 2019-11-20 NOTE — PROGRESS NOTES
"CC:  F/u chronic cough and bowel problems    HPI:    Other health care team members:  Pulmonary/Dr. Randall  Neurology/ NP JONO Haddad  Vasc surg/CNP KAREN Bueno  Pulmonary CNP MICHELLE Enciso  PT/ KAREN Agarwal  Pain/Dr. Cosby  Cardiology/Dr. Odell  Dermatology/PA KIMBERLI Lu  Psychology/Dr. Clay  Dermatology/Dr. Pena  Sleep/Dr. Savage  Surg/Onc Dr. Quiñonez (pending 12/5/19)    Saw me 10/16/19  Hx PSBO (2017), PVD, chronic back pain/lumbar stenosis with neurogenic claudication, anticoagulant use, DVT, anemia, COPD, aortic stenosis, anxiety, also history of \"stomach churns\" and intermittent diarrhea dating back to at least 2012, diarrhea with iron sulfate but not gluconate. Last colonoscopy 2010, at that time had non-bleeding angioectasia, treated with thermal therapy, also had internal hemorrhoids. EGD at that time with non-bleeding erythematous gastropathy, single angioectasia, non bleeding, zapped it.  Similar spot in upper duodenum. On Protonix at that time and still takes 40 mg daily.    Currently she is asymptomatic from a GI standpoint.  Taking iron gluconate.    Chronic cough addressed at our last OV.ough with phlegm x \"a while\", not today.   She did finally make an appointment with Dr. Randall, in the Pul[facility]:    Saw Dr. Randall/Lung nodule clinic 11/13/19:  Repeat CT results:   CT chest 11/13/19:  1. Right upper lobe 11.9 x 9.6 mm solid pulmonary nodule with interval  increase from 2018 at which time measured up to 8.8 mm. Nodule is  concerning for malignancy. Consider PET/CT or tissue biopsy.   2. Right apex, 8 mm mixed density ground glass/solid nodule with  irregular margins. Attention on follow up.   3. Enlarged pulmonary artery measuring 4.3 cm can be seen in pulmonary  hypertension.  4. Redemonstration of chronically occluded infrarenal abdominal aorta.    \"The patient's case was presented at the multidisciplinary conference for the above noted reason.  There was a consensus recommendation for " "the following actions:      IR felt attempting a needle biopsy had a very high risk of complications and they decline to do this.   Thoracic surgery could be consulted; it was felt a segmentectomy could be done for resection.\"    Lucie has an appointment for PFT and to see Dr. Quiñonez on 12/5.    Additional ROS today:  Getting up in the middle of the night to urinate 2-4 times (chronic)  Wishes she had more \"pep\" (chronic complaint)  Concerned about balance when gets out of bed, dizzy if stands up too quickly but goes away if takes her time, no syncope (chronic)  Back doing well, left knee bothers her (not new)  Wonders about eating smaller meals more frequently (I encouraged this)  Easy bruising (on blood thinner)  Hypopigmented spots improved with topical steroid  Skin lesions under left breast (SK's and skin tag on exam today)  Was In [place] [facility] up until 10 days ago, the trip went well and her  was respectful of her physical needs  PA enlargement and aorta findings noted on CT, has not followed up in cardiology and has not completed the echo I ordered    Patient Active Problem List   Diagnosis     Benign ovarian tumor     Chronic bilateral low back pain without sciatica     Hypothyroidism     Peripheral vascular disease (H)     Knee pain     Osteoarthritis     Cervicalgia     Hyperlipidemia with target LDL less than 70     Pulmonary embolism (H)     Anemia     Aortic stenosis     Atherosclerosis of aorta (H)     Atherosclerosis of arteries of extremities (H)     Intermittent claudication (H)     Iron deficiency     Osteoporosis     DVT of lower extremity, bilateral (H)     Varicose veins     Gluteal pain     Insomnia     Back pain     Vitamin D deficiency     Tobacco use disorder     Personal history of other drug therapy     Right knee pain     Venous thrombosis of extremity     Benign essential hypertension     Gastritis     Cataract     Acute left-sided low back pain with left-sided sciatica     " Lumbar stenosis with neurogenic claudication     SBO (small bowel obstruction) (H)     Small bowel obstruction (H)     Long term current use of anticoagulant therapy     Left-sided low back pain with left-sided sciatica     Moderate major depression (H)     Anxiety     Neoplasm of uncertain behavior of skin     Cherry angioma     BCC (basal cell carcinoma), trunk     Weakness     Imbalance     Chronic pain     History of nonmelanoma skin cancer     Multiple melanocytic nevi     Age-related osteoporosis without current pathological fracture     Chronic obstructive pulmonary disease, unspecified COPD type (H)     Chronic cough     Pulmonary nodules     Current Outpatient Medications   Medication Sig Dispense Refill     acetaminophen (TYLENOL) 325 MG tablet Take 2 tablets every 6 to 8 hours as needed for pain 100 tablet 1     amLODIPine (NORVASC) 10 MG tablet Take 1 tablet (10 mg) by mouth daily For blood pressure 90 tablet 3     atorvastatin (LIPITOR) 40 MG tablet Take 2 tablets (80 mg) by mouth daily 180 tablet 3     betamethasone dipropionate (DIPROSONE) 0.05 % external ointment Apply topically 2 times daily 15 g 0     Calcium Carbonate-Vitamin D (CALCIUM 600 + D OR) Take 1 tablet by mouth daily.       Cholecalciferol (VITAMIN D3 PO) Take by mouth daily       citalopram (CELEXA) 20 MG tablet Take 1 tablet (20 mg) by mouth daily 90 tablet 3     clopidogrel (PLAVIX) 75 MG tablet Take 1 tablet (75 mg) by mouth daily 90 tablet 3     cyanocolbalamin (VITAMIN  B-12) 500 MCG tablet Take 1 tablet (500 mcg) by mouth daily 90 tablet 1     gabapentin (NEURONTIN) 100 MG capsule Take 1 to 4 capsules at bedtime as directed. 180 capsule 1     hydrochlorothiazide (HYDRODIURIL) 50 MG tablet Take 1 tablet (50 mg) by mouth daily 90 tablet 0     latanoprost (XALATAN) 0.005 % ophthalmic solution INT 1 GTT IN OU QD UTD  1     levothyroxine (SYNTHROID/LEVOTHROID) 75 MCG tablet Take 1 tablet (75 mcg) by mouth daily 90 tablet 3      lisinopril (PRINIVIL/ZESTRIL) 40 MG tablet Take 1 tablet (40 mg) by mouth daily For blood pressure 90 tablet 0     loperamide (IMODIUM A-D) 2 MG tablet Take 0.5-1 tablets (1-2 mg) by mouth daily as needed for diarrhea 30 tablet 0     Multiple Vitamins-Minerals (OCUVITE ADULT 50+) CAPS Take 1 tablet by mouth daily 90 capsule 3     oxybutynin (DITROPAN) 5 MG tablet Take 1 tablet (5 mg) by mouth At Bedtime For frequent urination 90 tablet 0     pantoprazole (PROTONIX) 40 MG EC tablet Take 1 tablet (40 mg) by mouth daily 90 tablet 3     Potassium Bicarb-Citric Acid 20 MEQ TBEF Take 20 mg by mouth daily 90 tablet 3     potassium chloride ER (K-DUR/KLOR-CON M) 20 MEQ CR tablet Take 1 tablet (20 mEq) by mouth daily 90 tablet 0     gabapentin (NEURONTIN) 100 MG capsule   1     iron (FERROUS GLUCONATE) 256 (28 Fe) MG tablet Take 1 tablet (100 mg) by mouth daily 90 tablet 3     No Known Allergies  /58   Pulse 71   Wt 48 kg (105 lb 12.8 oz)   LMP  (LMP Unknown)   SpO2 95%   Breastfeeding No   BMI 20.66 kg/m    Physical Examination:    General:  Conversant, generally healthy appearing, no acute distress  Head: atraumatic  Eyes:  Pupils 2-3 mm, sclera white, EOM's full, conjunctiva moist, no periorbital swelling    Ears:  Left TM and EAC normal, visualized portion of right TM normal, partial cerumen impaction  Nose:  Nasal passages clear, turbinates not swollen  Throat/Mouth:  No pharyngeal erythema, exudate, ulcers, oral mucosa and tongue moist, normal hard and soft palate  Neck:  Trachea midline, Full AROM, supple, thyroid smooth, symmetric, not enlarged, no nodules, no neck lymphadenopathy  Lungs:  Clear to auscultation throughout, no wheezes, rhonchi or rales. Normal respiratory effort and no intercostal retractions.  C/V:  Regular rate and rhythm, no murmurs appreciated today, no rubs or gallops.  No JVD, no carotid bruits. Radial and DP pulses 2+ and 1+ respectively, equal and regular.  Abdomen:  Not distended.   Bowel sounds active.  No tenderness, no hepatosplenomegaly or masses.  No CVA tenderness or masses.  Lymph:  No cervical lymph nodes.  Neuro: Alert and oriented, face symmetric. Able to get on/off exam table without assistance.  Strength grossly intact. No tremor. No assistive device needed to walk.  M/S:   Kyphotic, no gross hand joint deformities or swelling.  Skin:   Normal temperature., turgor and texture. No rashes, suspicious lesions (only SK's, skin tags), no jaundice or ulcers    Extremities:  No peripheral edema, no digital cyanosis  Psych:  Alert and oriented. Appropriate affect.  Not psychomotor slowed.  No signs of anxiety or agitation.    Lucie was seen today for recheck medication.    Diagnoses and all orders for this visit:    Lucie was seen today for recheck medication.    Diagnoses and all orders for this visit:    Right upper lobe pulmonary nodule    Urinary frequency  -     oxybutynin (DITROPAN) 5 MG tablet; Take 1 tablet (5 mg) by mouth At Bedtime For frequent urination  -     UA with Micro reflex to Culture; Future    Pulmonary hypertension (H)  -     CARDIOLOGY EVAL ADULT REFERRAL      Total time spent 40 minutes.  More than 50% of the time spent with Ms. Stockton on counseling / coordinating her care    F/U 3 months    Marii Montgomery M.D.  Internal Medicine  [facility]   pager [phone]

## 2019-11-20 NOTE — NURSING NOTE
Chief Complaint   Patient presents with     Recheck Medication     Pt here to follow up on medications      Susan Guillen, EMT at 11:46 AM sign on 11/20/2019

## 2019-11-26 ENCOUNTER — THERAPY VISIT (OUTPATIENT)
Dept: PHYSICAL THERAPY | Facility: CLINIC | Age: 84
End: 2019-11-26
Payer: MEDICARE

## 2019-11-26 DIAGNOSIS — M25.561 RIGHT KNEE PAIN, UNSPECIFIED CHRONICITY: ICD-10-CM

## 2019-11-26 DIAGNOSIS — M54.41 ACUTE RIGHT-SIDED LOW BACK PAIN WITH RIGHT-SIDED SCIATICA: Primary | ICD-10-CM

## 2019-11-26 PROCEDURE — 97161 PT EVAL LOW COMPLEX 20 MIN: CPT | Mod: GP | Performed by: PHYSICAL THERAPIST

## 2019-11-26 PROCEDURE — 97110 THERAPEUTIC EXERCISES: CPT | Mod: GP | Performed by: PHYSICAL THERAPIST

## 2019-11-26 NOTE — PROGRESS NOTES
[facility] Initial Evaluation -- Lumbar    Date: November 26, 2019  Lucie Stockton is a 84 year old female with a *** condition.   Referral: ***  Work mechanical stresses:  ***  Employment status:  ***  Leisure mechanical stresses: ***  Functional disability score (NEENA/STarT Back):  ***  VAS score (0-10): ***  Patient goals/expectations:  ***    HISTORY:    Present symptoms: R lat knee tibia lat hip/ thigh B LS   Pain quality (sharp/shooting/stabbing/aching/burning/cramping):  achy  Paresthesia (yes/no):  none    Present since (onset date): 2 weeks ago MD order [id number].     Symptoms (improving/unchanging/worsening):  unchnaged    Symptoms commenced as a result of: CRISTY  Condition occurred in the following environment:  unknown    Symptoms at onset (back/thigh/leg): lat knee  Constant symptoms (back/thigh/leg):   Intermittent symptoms (back/thigh/leg): yes for all    Symptoms are made worse with the following: Always Rising, Always Standing, Always Walking and Time of day - No effect   Symptoms are made better with the following: Other - nothing    Disturbed sleep (yes/no): no Sleeping postures (prone/sup/side R/L): R SL    Previous episodes (0/1-5/6-10/11+): *** Year of first episode: ***    Previous history: ***  Previous treatments: ***      Specific Questions:  Cough/Sneeze/Strain (pos/neg): ***  Bowel/Bladder (normal/abnormal): ***  Gait (normal/abnormal): ***  Medications (nil/NSAIDS/analg/steroids/anticoag/other):  {[facility] Medications:405010}  Medical allergies:  ***  General health (excellent/good/fair/poor):  ***  Pertinent medical history:  {[facility] Past Medical History:190368}  Imaging (None/Xray/MRI/Other):  ***  Recent or major surgery (yes/no):  ***  Night pain (yes/no): ***  Accidents (yes/no): ***  Unexplained weight loss (yes/no): ***  Barriers at home: ***  Other red flags: ***    EXAMINATION    Posture:   Sitting (good/fair/poor): fair  Standing (good/fair/poor): poor  Lordosis  "(red/acc/normal): dec  Correction of posture (better/worse/no effect): no effect     Lateral Shift (right/left/nil): no  Relevant (yes/no):    Other Observations:     Neurological:    Motor deficit:    Reflexes:    Sensory deficit:  none  Dural signs:  Slump + R     Movement Loss:   Elias Mod Min Nil Pain   Flexion  To knees   Decreases knee   Extension +    peripheralizes   Side Gliding R   +     Side Gliding L          Test Movements:   During: produces, abolishes, increases, decreases, no effect, centralizing, peripheralizing   After: better, worse, no better, no worse, no effect, centralized, peripheralized    Pretest symptoms standing: ***   Symptoms During Symptoms After ROM increased ROM decreased No Effect   FIS {KATEY MDT During Testin::\" \"} {KATEY MDT After Testin::\" \"}      Rep FIS {KATEY MDT During Testin::\" \"} {KATEY MDT After Testin::\" \"}      EIS {KATEY MDT During Testin::\" \"} {KATEY MDT After Testin::\" \"}      Rep EIS {KATEY MDT During Testin::\" \"} {KATEY MDT After Testin::\" \"}      Pretest symptoms lying: ***    Symptoms During Symptoms After ROM increased ROM decreased No Effect   SALOME {KATEY MDT During Testin::\" \"} {KATEY MDT After Testin::\" \"}      Rep SALOME {KATEY MDT During Testin::\" \"} {KTAEY MDT After Testin::\" \"}      EIL {KATEY MDT During Testin::\" \"} {KATEY MDT After Testin::\" \"}      Rep EIL {KATEY MDT During Testin::\" \"} {KATEY MDT After Testin::\" \"}      If required, pretest symptoms: ***   Symptoms During Symptoms After ROM increased ROM decreased No Effect   SGIS - R {KATEY MDT During Testin::\" \"} {KATEY MDT After Testin::\" \"}      Rep SGIS - R {KATEY MDT During Testin::\" \"} {KATEY MDT After Testin::\" \"}      SGIS - L {KATEY MDT During Testin::\" \"} {KATEY MDT After Testin::\" \"}      Rep SGIS - L {KATEY MDT During Testin::\" \"} {KATEY MDT After " "Testin::\" \"}        Static Tests:  Sitting slouched:  ***  Sitting erect:  ***  Standing slouched ***  Standing erect:  ***  Lying prone in extension:  *** Long sitting:  ***    Other Tests: ***    Provisional Classification:  {les mdt lumbar classification:889115}    Principle of Management:  Education:  ***   Equipment provided:  ***  Mechanical therapy (Y/N):  ***   Extension principle:  ***  Lateral Principle:  ***  Flexion principle:  ***  Other:  ***    ASSESSMENT/PLAN:    {REHAB NOTES:449112}      "

## 2019-11-26 NOTE — LETTER
DEPARTMENT OF HEALTH AND HUMAN SERVICES  CENTERS FOR MEDICARE & MEDICAID SERVICES    PLAN/UPDATED PLAN OF PROGRESS FOR OUTPATIENT REHABILITATION    PATIENTS NAME:  Lucie Stockton   : 1935    PROVIDER NUMBER:    [id number]  HICN:  [id number]    PROVIDER [facility]: [facility]YS[facility]  MEDICAL RECORD NUMBER: [id number]     START OF CARE DATE:  SOC Date: 19   TYPE:  PT    PRIMARY/TREATMENT DIAGNOSIS: (Pertinent Medical Diagnosis)  Right knee pain, unspecified chronicity  Acute right-sided low back pain with right-sided sciatica    VISITS FROM START OF CARE:    1     [facility] Initial Evaluation -- Lumbar  Date: 2019  Lucie Stockton is a 84 year old female with a R knee condition.   Referral: GP  Work mechanical stresses:   Employment status:    Leisure mechanical stresses:   Functional disability score (NEENA/STarT Back):  See chart  VAS score (0-10): 5  Patient goals/expectations:  Find a way to manage knee pain    HISTORY:  Present symptoms: B LS R lat leg pain to tibia.  Pain quality (sharp/shooting/stabbing/aching/burning/cramping):  achy  Paresthesia (yes/no):  none  Present since (onset date): 2 weeks ago. MD order 2019.     Symptoms (improving/unchanging/worsening):  unchanged  Symptoms commenced as a result of: CRISTY  Condition occurred in the following environment:  unknown  Symptoms at onset (back/thigh/leg): lat knee  Constant symptoms (back/thigh/leg):   Intermittent symptoms (back/thigh/leg): yes for all  Symptoms are made worse with the following: Always Rising, Always Standing, Always Walking and Time of day - No effect   Symptoms are made better with the following: Other - nothing  Disturbed sleep (yes/no): no   Sleeping postures (prone/sup/side R/L): R SL  Previous episodes (0/1-5/6-10/11+): yes for LS radiculopathy R side   Year of first episode:   Previous history:   Previous treatments: yes, flexion  responder    PATIENTS NAME:  Lucie Stockton   : 1935  PRIMARY/TREATMENT DIAGNOSIS: (Pertinent Medical Diagnosis)  Right knee pain, unspecified chronicity  Acute right-sided low back pain with right-sided sciatica    Specific Questions:  Cough/Sneeze/Strain (pos/neg): no  Bowel/Bladder (normal/abnormal): normal  Gait (normal/abnormal): abnormal  Medications (nil/NSAIDS/analg/steroids/anticoag/other):  Other - see chart  Medical allergies:  none  General health (excellent/good/fair/poor):  good  Pertinent medical history:  High blood pressure  Imaging (None/Xray/MRI/Other):  None recently  Recent or major surgery (yes/no):  no  Night pain (yes/no): no  Accidents (yes/no): no  Unexplained weight loss (yes/no): no  Barriers at home: none  Other red flags: none    EXAMINATION    Posture:   Sitting (good/fair/poor): fair  Standing (good/fair/poor): poor  Lordosis (red/acc/normal): dec  Correction of posture (better/worse/no effect): no effect   Lateral Shift (right/left/nil): no  Relevant (yes/no):    Other Observations:     Neurological:  Motor deficit:      Reflexes:    Sensory deficit:  none    Dural signs:  Slump + R     Movement Loss:   Elias Mod Min Nil Pain   Flexion  To knees   Decreases knee   Extension +    Radiates to lat thigh   Side Gliding R   +  Decreases knee   Side Gliding L   +       Test Movements:   During: produces, abolishes, increases, decreases, no effect, centralizing, peripheralizing   After: better, worse, no better, no worse, no effect, centralized, peripheralized      PATIENTS NAME:  Lucie Stockton   : 1935  PRIMARY/TREATMENT DIAGNOSIS: (Pertinent Medical Diagnosis)  Right knee pain, unspecified chronicity  Acute right-sided low back pain with right-sided sciatica    Pretest symptoms standing: lat knee R    Symptoms During Symptoms After ROM increased ROM decreased No Effect   FIS          Rep FIS Decreases  R lateral knee No Better      +   EIS          Rep EIS Increases  R lat knee   No Worse      +   Pretest symptoms lying: na   Symptoms During Symptoms After ROM increased ROM decreased No Effect   SALOME          Rep SALOME          EIL          Rep EIL          If required, pretest symptoms:    Symptoms During Symptoms After ROM increased ROM decreased No Effect   SGIS - R          Rep SGIS - R          SGIS - L          Rep SGIS - L          Static Tests:  Sitting slouched:    Sitting erect:    Standing slouched     Standing erect:    Lying prone in extension:     Long sitting:      Other Tests:   Provisional Classification:  Inconclusive/Other - needs further testing  Principle of Management:  Education:  HEP reviewed 3 times     Equipment provided:    Mechanical therapy (Y/N):  Y   Extension principle:      Lateral Principle:    Flexion principle:  FISit 10 x followed by SGR in doorway 10x  q 2-3 hrs    Other:              PATIENTS NAME:  Lucie Stockton   : 1935  PRIMARY/TREATMENT DIAGNOSIS: (Pertinent Medical Diagnosis)  Right knee pain, unspecified chronicity  Acute right-sided low back pain with right-sided sciatica    ASSESSMENT/PLAN:  Patient is a 84 year old female with lumbar complaints.    Patient has the following significant findings with corresponding treatment plan.                Diagnosis 1:  L LS radiculopathy  Pain -  self management, education, directional preference exercise and home program  Decreased ROM/flexibility - therapeutic exercise, therapeutic activity and home program  Decreased joint mobility - therapeutic exercise, therapeutic activity and home program  Decreased function - therapeutic activities and home program.  Previous and current functional limitations:  (See Goal Flow Sheet for this information)    Short term and Long term goals: (See Goal Flow Sheet for this information)   Communication ability:  Patient appears to be able to clearly communicate and understand verbal and written communication and follow directions correctly.  Treatment Explanation -  "The following has been discussed with the patient:   RX ordered/plan of care, Anticipated outcomes, Possible risks and side effects    This patient would benefit from PT intervention to resume normal activities.   Rehab potential is good.  Frequency:  1 X week, once daily  Duration:  for 6 weeks  Discharge Plan:  Achieve all LTG.  Independent in home treatment program.  Reach maximal therapeutic benefit.            Caregiver Signature/Credentials _____________________________ Date ________         Samantha Agarwal, PT, Cert. MDT    I have reviewed and certified the need for these services and plan of treatment while under my care.        PHYSICIAN'S SIGNATURE:   _________________________________________      Date___________   Marii Montgomery MD    Certification period:  Beginning of Cert date period: 11/26/19 to  02/23/20     Functional Level Progress Report: Please see attached \"Goal Flow sheet for Functional level.\"    ____X____ Continue Services or       ________ DC Services                Service dates: From  SOC Date: 11/26/19  to present                         "

## 2019-11-26 NOTE — PROGRESS NOTES
[facility] Initial Evaluation -- Lumbar    Date: November 26, 2019  Lucie Stockton is a 84 year old female with a R knee condition.   Referral: GP  Work mechanical stresses:   Employment status:    Leisure mechanical stresses:   Functional disability score (NEENA/STarT Back):  See chart  VAS score (0-10): 5  Patient goals/expectations:  Find a way to manage knee pain    HISTORY:    Present symptoms: B LS R lat leg pain to tibia.  Pain quality (sharp/shooting/stabbing/aching/burning/cramping):  achy  Paresthesia (yes/no):  none    Present since (onset date): 2 weeks ago. MD order [id number].     Symptoms (improving/unchanging/worsening):  unchanged  Symptoms commenced as a result of: CRISTY  Condition occurred in the following environment:  unknown    Symptoms at onset (back/thigh/leg): lat knee  Constant symptoms (back/thigh/leg):   Intermittent symptoms (back/thigh/leg): yes for all    Symptoms are made worse with the following: Always Rising, Always Standing, Always Walking and Time of day - No effect   Symptoms are made better with the following: Other - nothing    Disturbed sleep (yes/no): no Sleeping postures (prone/sup/side R/L): R SL    Previous episodes (0/1-5/6-10/11+): yes for LS radiculopathy R side Year of first episode:     Previous history:   Previous treatments: yes, flexion responder      Specific Questions:  Cough/Sneeze/Strain (pos/neg): no  Bowel/Bladder (normal/abnormal): normal  Gait (normal/abnormal): abnormal  Medications (nil/NSAIDS/analg/steroids/anticoag/other):  Other - see chart  Medical allergies:  none  General health (excellent/good/fair/poor):  good  Pertinent medical history:  High blood pressure  Imaging (None/Xray/MRI/Other):  None recently  Recent or major surgery (yes/no):  no  Night pain (yes/no): no  Accidents (yes/no): no  Unexplained weight loss (yes/no): no  Barriers at home: none  Other red flags: none    EXAMINATION    Posture:   Sitting (good/fair/poor):  fair  Standing (good/fair/poor): poor  Lordosis (red/acc/normal): dec  Correction of posture (better/worse/no effect): no effect     Lateral Shift (right/left/nil): no  Relevant (yes/no):    Other Observations:     Neurological:    Motor deficit:    Reflexes:    Sensory deficit:  none  Dural signs:  Slump + R     Movement Loss:   Elias Mod Min Nil Pain   Flexion  To knees   Decreases knee   Extension +    Radiates to lat thigh   Side Gliding R   +  Decreases knee   Side Gliding L   +       Test Movements:   During: produces, abolishes, increases, decreases, no effect, centralizing, peripheralizing   After: better, worse, no better, no worse, no effect, centralized, peripheralized    Pretest symptoms standing: lat knee R    Symptoms During Symptoms After ROM increased ROM decreased No Effect   FIS          Rep FIS Decreases  R lateral knee No Better      +   EIS          Rep EIS Increases  R lat knee  No Worse      +   Pretest symptoms lying: na   Symptoms During Symptoms After ROM increased ROM decreased No Effect   SALOME          Rep SALOME          EIL          Rep EIL          If required, pretest symptoms:    Symptoms During Symptoms After ROM increased ROM decreased No Effect   SGIS - R          Rep SGIS - R          SGIS - L          Rep SGIS - L            Static Tests:  Sitting slouched:      Sitting erect:    Standing slouched   Standing erect:    Lying prone in extension:   Long sitting:      Other Tests:     Provisional Classification:  Inconclusive/Other - needs further testing    Principle of Management:  Education:  HEP reviewed 3 times   Equipment provided:    Mechanical therapy (Y/N):  Y   Extension principle:    Lateral Principle:    Flexion principle:  FISit 10 x followed by SGR in doorway 10x  q 2-3 hrs  Other:      ASSESSMENT/PLAN:    Patient is a 84 year old female with lumbar complaints.    Patient has the following significant findings with corresponding treatment plan.                Diagnosis 1:  L  LS radiculopathy    Pain -  self management, education, directional preference exercise and home program  Decreased ROM/flexibility - therapeutic exercise, therapeutic activity and home program  Decreased joint mobility - therapeutic exercise, therapeutic activity and home program  Decreased function - therapeutic activities and home program.    Previous and current functional limitations:  (See Goal Flow Sheet for this information)    Short term and Long term goals: (See Goal Flow Sheet for this information)     Communication ability:  Patient appears to be able to clearly communicate and understand verbal and written communication and follow directions correctly.  Treatment Explanation - The following has been discussed with the patient:   RX ordered/plan of care  Anticipated outcomes  Possible risks and side effects  This patient would benefit from PT intervention to resume normal activities.   Rehab potential is good.    Frequency:  1 X week, once daily  Duration:  for 6 weeks  Discharge Plan:  Achieve all LTG.  Independent in home treatment program.  Reach maximal therapeutic benefit.    Please refer to the daily flowsheet for treatment today, total treatment time and time spent performing 1:1 timed codes.

## 2019-11-27 PROBLEM — M54.41 ACUTE RIGHT-SIDED LOW BACK PAIN WITH RIGHT-SIDED SCIATICA: Status: ACTIVE | Noted: 2019-11-27

## 2019-11-27 ASSESSMENT — ACTIVITIES OF DAILY LIVING (ADL)
KNEE_ACTIVITY_OF_DAILY_LIVING_SCORE: 81.43
PAIN: THE SYMPTOM AFFECTS MY ACTIVITY SLIGHTLY
HOW_WOULD_YOU_RATE_THE_CURRENT_FUNCTION_OF_YOUR_KNEE_DURING_YOUR_USUAL_DAILY_ACTIVITIES_ON_A_SCALE_FROM_0_TO_100_WITH_100_BEING_YOUR_LEVEL_OF_KNEE_FUNCTION_PRIOR_TO_YOUR_INJURY_AND_0_BEING_THE_INABILITY_TO_PERFORM_ANY_OF_YOUR_USUAL_DAILY_ACTIVITIES?: 50
RISE FROM A CHAIR: ACTIVITY IS MINIMALLY DIFFICULT
WEAKNESS: THE SYMPTOM AFFECTS MY ACTIVITY SLIGHTLY
STIFFNESS: I DO NOT HAVE THE SYMPTOM
HOW_WOULD_YOU_RATE_THE_OVERALL_FUNCTION_OF_YOUR_KNEE_DURING_YOUR_USUAL_DAILY_ACTIVITIES?: NEARLY NORMAL
RAW_SCORE: 57
GIVING WAY, BUCKLING OR SHIFTING OF KNEE: I DO NOT HAVE THE SYMPTOM
KNEE_ACTIVITY_OF_DAILY_LIVING_SUM: 57
SIT WITH YOUR KNEE BENT: ACTIVITY IS MINIMALLY DIFFICULT
KNEEL ON THE FRONT OF YOUR KNEE: ACTIVITY IS SOMEWHAT DIFFICULT
LIMPING: I DO NOT HAVE THE SYMPTOM
SWELLING: I DO NOT HAVE THE SYMPTOM
STAND: ACTIVITY IS MINIMALLY DIFFICULT
WALK: ACTIVITY IS MINIMALLY DIFFICULT
GO DOWN STAIRS: ACTIVITY IS MINIMALLY DIFFICULT
GO UP STAIRS: ACTIVITY IS MINIMALLY DIFFICULT
SQUAT: ACTIVITY IS MINIMALLY DIFFICULT
AS_A_RESULT_OF_YOUR_KNEE_INJURY,_HOW_WOULD_YOU_RATE_YOUR_CURRENT_LEVEL_OF_DAILY_ACTIVITY?: NEARLY NORMAL

## 2019-12-02 NOTE — PROGRESS NOTES
"THORACIC SURGERY - NEW PATIENT OFFICE VISIT      Dear Dr. Montgomery,    I saw Ms. Stockton at Dr. Randall s request in consultation for the evaluation and treatment of a pulmonary nodule.     HPI  Ms. Stockton is a 84 year old female with PMHx significant for abdominal aortic stenosis, chronic pain, bilateral DVT and PE in 2010 (provoked), HTN and hypothyroidism, on plavix for PVD who presents for further discussion of a pulmonary nodules. She is followed in our Pulmonary Nodule clinic. She is a previous smoker, 40+ pack years, having quit 10 years ago.  She denies any weight loss, night sweats, fever, chills, or other concerns. She last saw Dr. Randall on 11/13/19, at which time CT scan showed a concerning RUL nodule. Her case was discussed in multidisciplinary conference, and IR felt needle biopsy would be very high risk and declined this. She therefore presents today for discussion of segmentectomy.     She states she is very active on a daily basis. Her and her  are completely independent and live in their own home. She does her own grocery shopping, active in the [employer] and school, walks stairs in her home multiple times daily without becoming SOB.     Previsit Tests   CT chest 11/13/19    1. Right upper lobe 11.9 x 9.6 mm solid pulmonary nodule with interval  increase from 2018 at which time measured up to 8.8 mm.     PFTs 12/5/2019  FEV1 0.82 (54%)  FEV1/FVC 61%  DLCO 9.32 (62%)    PMH    Past Medical History:   Diagnosis Date     Anemia      Aortic stenosis     abdominal     Atherosclerosis of aorta (H)     \"extensive disease with near occlusion below level of renal arteries\" on CT     Atherosclerosis of arteries of extremities (H)      Back pain     severe multilevel DJD, moderate spinal canal stenosis L4-5, severe L3-4     Chronic pain     Back, hips, knees, legs     Degenerative joint disease      DVT of lower extremity, bilateral (H)     5/24/10 left distal femoral and great saphenous, 7/7/10 left:  " extending to cfv, iliac , pop and post tibial, peronial, right:  distal fem and peroneal      Grave's disease      Hyperlipidaemia LDL goal < 70      Hypertension, essential      Hypothyroidism      Imbalance      Insomnia      Intermittent claudication (H)      Iron deficiency      Knee pain      Lumbar stenosis with neurogenic claudication      Osteoarthritis     ankle,foot, knee     Osteoporosis      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 JONO Goncalves - Returned BENIGN     INJECT EPIDURAL LUMBAR / SACRAL SINGLE N/A 1/5/2018    Procedure: INJECT EPIDURAL LUMBAR / SACRAL SINGLE;  lumbar interlaminar epidural steroid injection;  Surgeon: Jaylin Valadez MD;  Location: [facility]     INJECT SACROILIAC JOINT Right 3/7/2018    Procedure: INJECT SACROILIAC JOINT;  Right Sacroiliac Joint Injection;  Surgeon: Flavio Harrison MD;  Location: [facility]     INJECT SACROILIAC JOINT Bilateral 12/7/2018    Procedure: Bilateral Sacroiliac Joint Injections;  Surgeon: Faviola Cosby MD;  Location: [facility] OR     UPPER GI ENDOSCOPY      11/10/10 erythematous gastropathy, angioectasia        ETOH: couple times per month, 1-2 drinks  TOB: previous smoker, quit 10 years ago    Physical examination  BMI 21.95  Petite, pleasant lady    From a personal perspective, she is a retired [employer] professor. , lives with her , they are both very independent and active in the  [employer] and with activities.     IMPRESSION (R91.1) Lung nodule  (primary encounter diagnosis)        84 year old year-old female with RUL pulmonary nodule, concerning for malignancy.     PLAN  I spent a total of 25 minutes with Ms. Stockton, more than 50% of which were spent in counseling, coordination of care, and face-to-face time. I reviewed the plan as follows:  We will again discuss her case at pulmonary nodule conference.   We discussed that although her functional status is good, her PFTs are concerning for poor residual lung function if we proceeded with surgery. Her co-morbidities also concern me with regards to putting her through a major lung resection.   So, we will further discuss options diagnostic options, including bronchoscopy with biopsy, or possibly a PET scan to evaluate for concerning features. She may also be a candidate to proceed directly to SBRT, in order to avoid surgical risks. We will contact her after further discussion of her case.     All questions were answered and Lucie Stockton and present family were in agreement with the plan.  I appreciate the opportunity to participate in the care of your patient and will keep you updated.  Sincerely,

## 2019-12-03 ENCOUNTER — DOCUMENTATION ONLY (OUTPATIENT)
Dept: CARE COORDINATION | Facility: CLINIC | Age: 84
End: 2019-12-03

## 2019-12-03 ENCOUNTER — THERAPY VISIT (OUTPATIENT)
Dept: PHYSICAL THERAPY | Facility: CLINIC | Age: 84
End: 2019-12-03
Payer: MEDICARE

## 2019-12-03 DIAGNOSIS — M54.41 ACUTE RIGHT-SIDED LOW BACK PAIN WITH RIGHT-SIDED SCIATICA: Primary | ICD-10-CM

## 2019-12-03 PROCEDURE — 97530 THERAPEUTIC ACTIVITIES: CPT | Mod: GP | Performed by: PHYSICAL THERAPIST

## 2019-12-03 PROCEDURE — 97110 THERAPEUTIC EXERCISES: CPT | Mod: GP | Performed by: PHYSICAL THERAPIST

## 2019-12-03 PROCEDURE — 97140 MANUAL THERAPY 1/> REGIONS: CPT | Mod: GP | Performed by: PHYSICAL THERAPIST

## 2019-12-04 DIAGNOSIS — E03.9 HYPOTHYROIDISM, UNSPECIFIED TYPE: ICD-10-CM

## 2019-12-05 ENCOUNTER — OFFICE VISIT (OUTPATIENT)
Dept: SURGERY | Facility: CLINIC | Age: 84
End: 2019-12-05
Attending: STUDENT IN AN ORGANIZED HEALTH CARE EDUCATION/TRAINING PROGRAM
Payer: MEDICARE

## 2019-12-05 VITALS
TEMPERATURE: 97.4 F | HEART RATE: 92 BPM | RESPIRATION RATE: 14 BRPM | HEIGHT: 58 IN | WEIGHT: 105 LBS | OXYGEN SATURATION: 94 % | DIASTOLIC BLOOD PRESSURE: 74 MMHG | SYSTOLIC BLOOD PRESSURE: 138 MMHG | BODY MASS INDEX: 22.04 KG/M2

## 2019-12-05 DIAGNOSIS — R91.1 LUNG NODULE: Primary | ICD-10-CM

## 2019-12-05 DIAGNOSIS — R91.8 PULMONARY NODULES: ICD-10-CM

## 2019-12-05 LAB
DLCOUNC-%PRED-PRE: 62 %
DLCOUNC-PRE: 9.32 ML/MIN/MMHG
DLCOUNC-PRED: 15.03 ML/MIN/MMHG
ERV-%PRED-PRE: 57 %
ERV-PRE: 0.32 L
ERV-PRED: 0.55 L
EXPTIME-PRE: 9.04 SEC
FEF2575-%PRED-PRE: 29 %
FEF2575-PRE: 0.38 L/SEC
FEF2575-PRED: 1.28 L/SEC
FEFMAX-%PRED-PRE: 50 %
FEFMAX-PRE: 1.84 L/SEC
FEFMAX-PRED: 3.62 L/SEC
FEV1-%PRED-PRE: 54 %
FEV1-PRE: 0.82 L
FEV1FEV6-PRE: 58 %
FEV1FEV6-PRED: 77 %
FEV1FVC-PRE: 57 %
FEV1FVC-PRED: 78 %
FEV1SVC-PRE: 53 %
FEV1SVC-PRED: 74 %
FIFMAX-PRE: 2.09 L/SEC
FVC-%PRED-PRE: 73 %
FVC-PRE: 1.44 L
FVC-PRED: 1.97 L
IC-%PRED-PRE: 83 %
IC-PRE: 1.22 L
IC-PRED: 1.47 L
VA-%PRED-PRE: 93 %
VA-PRE: 3.29 L
VC-%PRED-PRE: 76 %
VC-PRE: 1.54 L
VC-PRED: 2.03 L

## 2019-12-05 PROCEDURE — G0463 HOSPITAL OUTPT CLINIC VISIT: HCPCS | Mod: ZF

## 2019-12-05 RX ORDER — LEVOTHYROXINE SODIUM 75 UG/1
75 TABLET ORAL DAILY
Qty: 90 TABLET | Refills: 0 | Status: SHIPPED | OUTPATIENT
Start: 2019-12-05 | End: 2020-02-17

## 2019-12-05 ASSESSMENT — MIFFLIN-ST. JEOR: SCORE: 821.03

## 2019-12-05 ASSESSMENT — PAIN SCALES - GENERAL: PAINLEVEL: NO PAIN (0)

## 2019-12-05 NOTE — TELEPHONE ENCOUNTER
levothyroxine (SYNTHROID/LEVOTHROID) 75 MCG tablet   Last Written Prescription Date:  10/17/2018  Last Fill Quantity: 90,   # refills: 3  Last Office Visit : 11/20/2019  Future Office visit:  2/20/2020    Routing refill request to provider for review/approval because:  Lab Due:  TSH      90 Sue sent to pharmacy 12/5/2019  Kelly Villegas CMA Notified    Chante Puri RN  Central Triage Red Flags/Med Refills

## 2019-12-05 NOTE — LETTER
"12/5/2019       RE: Lucie Stockton  [address] [address]     Dear Colleague,    Thank you for referring your patient, Lucie Stockton, to the [facility]. Please see a copy of my visit note below.    THORACIC SURGERY - NEW PATIENT OFFICE VISIT      Dear Dr. Montgomery,    I saw Ms. Stockton at Dr. Randall s request in consultation for the evaluation and treatment of a pulmonary nodule.     HPI  Ms. Stockton is a 84 year old female with PMHx significant for abdominal aortic stenosis, chronic pain, bilateral DVT and PE in 2010 (provoked), HTN and hypothyroidism, on plavix for PVD who presents for further discussion of a pulmonary nodules. She is followed in our Pulmonary Nodule clinic. She is a previous smoker, 40+ pack years, having quit 10 years ago.  She denies any weight loss, night sweats, fever, chills, or other concerns. She last saw Dr. Randall on 11/13/19, at which time CT scan showed a concerning RUL nodule. Her case was discussed in multidisciplinary conference, and IR felt needle biopsy would be very high risk and declined this. She therefore presents today for discussion of segmentectomy.     She states she is very active on a daily basis. Her and her  are completely independent and live in their own home. She does her own grocery shopping, active in the [employer] and school, walks stairs in her home multiple times daily without becoming SOB.     Previsit Tests   CT chest 11/13/19    1. Right upper lobe 11.9 x 9.6 mm solid pulmonary nodule with interval  increase from 2018 at which time measured up to 8.8 mm.     PFTs 12/5/2019  FEV1 0.82 (54%)  FEV1/FVC 61%  DLCO 9.32 (62%)    PMH    Past Medical History:   Diagnosis Date     Anemia      Aortic stenosis     abdominal     Atherosclerosis of aorta (H)     \"extensive disease with near occlusion below level of renal arteries\" on CT     Atherosclerosis of arteries of extremities (H)      Back pain     severe multilevel " DJD, moderate spinal canal stenosis L4-5, severe L3-4     Chronic pain     Back, hips, knees, legs     Degenerative joint disease      DVT of lower extremity, bilateral (H)     5/24/10 left distal femoral and great saphenous, 7/7/10 left:  extending to cfv, iliac , pop and post tibial, peronial, right:  distal fem and peroneal      Grave's disease      Hyperlipidaemia LDL goal < 70      Hypertension, essential      Hypothyroidism      Imbalance      Insomnia      Intermittent claudication (H)      Iron deficiency      Knee pain      Lumbar stenosis with neurogenic claudication      Osteoarthritis     ankle,foot, knee     Osteoporosis      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 JONO Goncalves - Returned BENIGN     INJECT EPIDURAL LUMBAR / SACRAL SINGLE N/A 1/5/2018    Procedure: INJECT EPIDURAL LUMBAR / SACRAL SINGLE;  lumbar interlaminar epidural steroid injection;  Surgeon: Jaylin Valadez MD;  [facility]     INJECT SACROILIAC JOINT Right 3/7/2018    Procedure: INJECT SACROILIAC JOINT;  Right Sacroiliac Joint Injection;  Surgeon: Flavio Harrison MD;  [facility]: [facility]     INJECT SACROILIAC JOINT Bilateral 12/7/2018    Procedure: Bilateral Sacroiliac Joint Injections;  Surgeon: Faviola Cosby MD;  [facility]: [facility]     UPPER GI ENDOSCOPY      11/10/10 erythematous gastropathy, angioectasia        ETOH: couple times per month, 1-2 drinks  TOB:  previous smoker, quit 10 years ago    Physical examination  BMI 21.95  Petite, pleasant lady    From a personal perspective, she is a retired [employer] professor. , lives with her , they are both very independent and active in the [employer] and with activities.     IMPRESSION (R91.1) Lung nodule  (primary encounter diagnosis)        84 year old year-old female with RUL pulmonary nodule, concerning for malignancy.     PLAN  I spent a total of 25 minutes with Ms. Stockton, more than 50% of which were spent in counseling, coordination of care, and face-to-face time. I reviewed the plan as follows:  We will again discuss her case at pulmonary nodule conference.   We discussed that although her functional status is good, her PFTs are concerning for poor residual lung function if we proceeded with surgery. Her co-morbidities also concern me with regards to putting her through a major lung resection.   So, we will further discuss options diagnostic options, including bronchoscopy with biopsy, or possibly a PET scan to evaluate for concerning features. She may also be a candidate to proceed directly to SBRT, in order to avoid surgical risks. We will contact her after further discussion of her case.     All questions were answered and Lucie Stockton and present family were in agreement with the plan.  I appreciate the opportunity to participate in the care of your patient and will keep you updated.  Noelle Quiñonez MD

## 2019-12-05 NOTE — NURSING NOTE
"Oncology Rooming Note    December 5, 2019 8:55 AM   Lucie Stockton is a 84 year old female who presents for:    Chief Complaint   Patient presents with     Oncology Clinic Visit     Return; Lung Nodules     Initial Vitals: /74   Pulse 92   Temp 97.4  F (36.3  C) (Oral)   Resp 14   Ht 1.473 m (4' 10\")   Wt 47.6 kg (105 lb)   LMP  (LMP Unknown)   SpO2 94%   BMI 21.95 kg/m   Estimated body mass index is 21.95 kg/m  as calculated from the following:    Height as of this encounter: 1.473 m (4' 10\").    Weight as of this encounter: 47.6 kg (105 lb). Body surface area is 1.4 meters squared.  No Pain (0) Comment: Data Unavailable   No LMP recorded (lmp unknown). Patient has had a hysterectomy.  Allergies reviewed: Yes  Medications reviewed: Yes    Medications: Medication refills not needed today.  Pharmacy name entered into [employer]: [id number] [address] AT [place] [address] [facility]    Clinical concerns: Here for review of scan. Dr Quiñonez was notified.      Yenny Santoyo CMA              "

## 2019-12-06 ENCOUNTER — TELEPHONE (OUTPATIENT)
Dept: SURGERY | Facility: CLINIC | Age: 84
End: 2019-12-06

## 2019-12-06 DIAGNOSIS — R91.1 SOLITARY LUNG NODULE: Primary | ICD-10-CM

## 2019-12-06 NOTE — TELEPHONE ENCOUNTER
Call to Lucie to discuss the recommended plan from Nodule Conference. She is in agreement with the plan to get a PET/CT and will await a call from scheduling.

## 2019-12-10 ENCOUNTER — THERAPY VISIT (OUTPATIENT)
Dept: PHYSICAL THERAPY | Facility: CLINIC | Age: 84
End: 2019-12-10
Payer: MEDICARE

## 2019-12-10 DIAGNOSIS — M54.41 ACUTE RIGHT-SIDED LOW BACK PAIN WITH RIGHT-SIDED SCIATICA: Primary | ICD-10-CM

## 2019-12-10 PROCEDURE — 97110 THERAPEUTIC EXERCISES: CPT | Mod: GP | Performed by: PHYSICAL THERAPIST

## 2019-12-10 PROCEDURE — 97530 THERAPEUTIC ACTIVITIES: CPT | Mod: GP | Performed by: PHYSICAL THERAPIST

## 2019-12-11 ENCOUNTER — HOSPITAL ENCOUNTER (OUTPATIENT)
Dept: PET IMAGING | Facility: CLINIC | Age: 84
Discharge: HOME | End: 2019-12-11
Attending: CLINICAL NURSE SPECIALIST | Admitting: CLINICAL NURSE SPECIALIST
Payer: MEDICARE

## 2019-12-11 DIAGNOSIS — R91.1 SOLITARY LUNG NODULE: ICD-10-CM

## 2019-12-11 PROCEDURE — 34300033 ZZH RX 343: Performed by: CLINICAL NURSE SPECIALIST

## 2019-12-11 PROCEDURE — 78816 PET IMAGE W/CT FULL BODY: CPT | Mod: PI

## 2019-12-11 PROCEDURE — A9552 F18 FDG: HCPCS | Performed by: CLINICAL NURSE SPECIALIST

## 2019-12-11 PROCEDURE — 71260 CT THORAX DX C+: CPT

## 2019-12-11 PROCEDURE — 25000128 H RX IP 250 OP 636: Performed by: CLINICAL NURSE SPECIALIST

## 2019-12-11 RX ORDER — IOPAMIDOL 755 MG/ML
10-140 INJECTION, SOLUTION INTRAVASCULAR ONCE
Status: COMPLETED | OUTPATIENT
Start: 2019-12-11 | End: 2019-12-11

## 2019-12-11 RX ADMIN — FLUDEOXYGLUCOSE F-18 10.14 MCI.: 500 INJECTION, SOLUTION INTRAVENOUS at 07:54

## 2019-12-11 RX ADMIN — IOPAMIDOL 65 ML: 755 INJECTION, SOLUTION INTRAVENOUS at 08:52

## 2019-12-17 ENCOUNTER — THERAPY VISIT (OUTPATIENT)
Dept: PHYSICAL THERAPY | Facility: CLINIC | Age: 84
End: 2019-12-17
Payer: MEDICARE

## 2019-12-17 DIAGNOSIS — M54.41 ACUTE RIGHT-SIDED LOW BACK PAIN WITH RIGHT-SIDED SCIATICA: Primary | ICD-10-CM

## 2019-12-17 PROCEDURE — 97530 THERAPEUTIC ACTIVITIES: CPT | Mod: GP | Performed by: PHYSICAL THERAPIST

## 2019-12-17 PROCEDURE — 97140 MANUAL THERAPY 1/> REGIONS: CPT | Mod: GP | Performed by: PHYSICAL THERAPIST

## 2019-12-17 NOTE — PROGRESS NOTES
DISCHARGE REPORT    Progress reporting period is from [id number] to [id number]    SUBJECTIVEPt. states the knee is good and is moving better. She is confident that she will be able to travel without a major problem.     Current Pain level: 0/10.     Initial Pain level: 5/10.   Changes in function:  Yes (See Goal flowsheet attached for changes in current functional level)  Adverse reaction to treatment or activity: None    OBJECTIVE  Objective: patellar mobility is good. ROM: half finger to table extension- 123 flexion. MMT: wnl. gait is wnl.       ASSESSMENT/PLAN  Updated problem list and treatment plan:   Diagnosis 1:  Knee pain   Pain -  home program  Decreased ROM/flexibility - home program  Decreased joint mobility - home program  STG/LTGs have been met or progress has been made towards goals:  Yes (See Goal flow sheet completed today.)  Assessment of Progress: The patient has met all of their long term goals.  Self Management Plans:  Patient is independent in a home treatment program.  Patient is independent in self management of symptoms.    Lucie continues to require the following intervention to meet STG and LTG's:  PT intervention is no longer required to meet STG/LTG.    Recommendations:  This patient is ready to be discharged from therapy and continue their home treatment program.

## 2019-12-18 ENCOUNTER — PREP FOR PROCEDURE (OUTPATIENT)
Dept: SURGERY | Facility: CLINIC | Age: 84
End: 2019-12-18

## 2019-12-18 ENCOUNTER — TELEPHONE (OUTPATIENT)
Dept: SURGERY | Facility: CLINIC | Age: 84
End: 2019-12-18

## 2019-12-18 DIAGNOSIS — R91.8 LUNG NODULES: Primary | ICD-10-CM

## 2019-12-18 DIAGNOSIS — R91.8 PULMONARY NODULES: Primary | ICD-10-CM

## 2019-12-18 NOTE — TELEPHONE ENCOUNTER
Call to Lucie to go over the recommendation from Tumor Board. She is in agreement with the plan for an EBUS, Super D however, she and her  will be out of town for the holidays so she will not be able to do anything until after the 1st of the year.    I will have Diana work on scheduling with her.

## 2019-12-26 ENCOUNTER — TELEPHONE (OUTPATIENT)
Dept: PULMONOLOGY | Facility: CLINIC | Age: 84
End: 2019-12-26

## 2019-12-26 NOTE — TELEPHONE ENCOUNTER
Called patient to schedule procedure with Dr. Gopal Jara, there was no answer.  Left message with my direct line [phone].

## 2020-01-03 PROBLEM — R91.8 LUNG NODULES: Status: ACTIVE | Noted: 2020-01-03

## 2020-01-03 NOTE — TELEPHONE ENCOUNTER
Spoke with patient to schedule procedure with Dr. Gopal Jara   Procedure was scheduled on 01/24 at [facility] OR  Patient will have H&P with PAC  CT Scheduled 01/24 8:00am    Patient is aware a / is needed day of surgery.   Surgery letter was sent via [employer], patient has my direct contact information for any further questions.

## 2020-01-06 ENCOUNTER — PRE VISIT (OUTPATIENT)
Dept: SURGERY | Facility: CLINIC | Age: 85
End: 2020-01-06

## 2020-01-06 NOTE — TELEPHONE ENCOUNTER
FUTURE VISIT INFORMATION      SURGERY INFORMATION:    Date: 20    Location: [facility] OR    Surgeon:  Gopal Jara    Anesthesia Type:  General    RECORDS REQUESTED FROM:       Primary Care Provider:Marii Valentine [facility]    Pertinent Medical History: COPD, Pulmonary nodule, pulmonary embolism, aortic stenosis, hypertension     Most recent EKG+ Tracin18    Most recent ECHO: 17    Most recent PFT's: 19

## 2020-01-07 ENCOUNTER — PRE VISIT (OUTPATIENT)
Dept: CARDIOLOGY | Facility: CLINIC | Age: 85
End: 2020-01-07

## 2020-01-07 ENCOUNTER — OFFICE VISIT (OUTPATIENT)
Dept: FAMILY MEDICINE | Facility: CLINIC | Age: 85
End: 2020-01-07
Payer: MEDICARE

## 2020-01-07 VITALS
HEIGHT: 58 IN | HEART RATE: 82 BPM | WEIGHT: 108 LBS | SYSTOLIC BLOOD PRESSURE: 150 MMHG | TEMPERATURE: 97.7 F | BODY MASS INDEX: 22.67 KG/M2 | OXYGEN SATURATION: 96 % | DIASTOLIC BLOOD PRESSURE: 62 MMHG

## 2020-01-07 DIAGNOSIS — J06.9 UPPER RESPIRATORY TRACT INFECTION, UNSPECIFIED TYPE: Primary | ICD-10-CM

## 2020-01-07 DIAGNOSIS — R05.9 COUGH: ICD-10-CM

## 2020-01-07 DIAGNOSIS — I10 ESSENTIAL HYPERTENSION: ICD-10-CM

## 2020-01-07 PROCEDURE — 99203 OFFICE O/P NEW LOW 30 MIN: CPT | Performed by: FAMILY MEDICINE

## 2020-01-07 RX ORDER — BENZONATATE 200 MG/1
200 CAPSULE ORAL 3 TIMES DAILY PRN
Qty: 21 CAPSULE | Refills: 0 | Status: ON HOLD | OUTPATIENT
Start: 2020-01-07 | End: 2020-01-25

## 2020-01-07 ASSESSMENT — PAIN SCALES - GENERAL: PAINLEVEL: NO PAIN (0)

## 2020-01-07 ASSESSMENT — MIFFLIN-ST. JEOR: SCORE: 832.63

## 2020-01-07 NOTE — TELEPHONE ENCOUNTER
New Pulmonary Hypertension Patient Form   Patient Name: Lucie Stockton   Age: 84     Referring Provider: Dr. Montgomery   MRN: [id number]     Date Test Epic Media/Scan Care Everywhere     RHC ?  ?   ?      Angio/Stress ?  ?   ?      Echo ?  ?   ?     1/26/18 EKG ?   ?   ?      6MWT ?   ?   ?     12/5/19 PFT ?   ?   ?      Sleep Study ?  ?   ?     11/13/19 Chest CT ?  ?   ?      V/Q Scan ?   ?   ?      Abd/Liver US ?   ?   ?      Misc:  ?   ?   ?         ?   ?   ?         ?   ?   ?      Chest CT 11/13/19 - Enlarged pulmonary artery measuring 4.3 cm can be seen in pulmonary hypertension

## 2020-01-07 NOTE — PROGRESS NOTES
Subjective     Lucie Stockton is a 84 year old female who presents to clinic today for the following health issues:    HPI :  Patient reports she has been having cough on and off for the past 2 weeks.  She denies any fever, has no chills, night sweats no night sweats.  She reports her cough mostly dry.  She has noted nasal drainage.  Denies wheezing, denies shortness of breath.  Denies lower extremity edema.  She reports she has been taking Mucinex.    History of hypertension she reports have not been taking her medication today.  Denies chest pain or headache.  She is up to date with her influenza vaccine    Acute Illness   Acute illness concerns: URI  Onset: Two weeks    Fever: no    Chills/Sweats: no    Headache (location?): no    Sinus Pressure:no    Conjunctivitis:  no    Ear Pain: YES: left    Rhinorrhea: no    Congestion: YES    Sore Throat: no     Cough: YES-productive of clear sputum, barking    Wheeze: no    Decreased Appetite: no    Nausea: no    Vomiting: no    Diarrhea:  no    Dysuria/Freq.: no    Fatigue/Achiness: no    Sick/Strep Exposure: no     Therapies Tried and outcome: OTC cough; seem to help a bit        Patient Active Problem List   Diagnosis     Benign ovarian tumor     Chronic bilateral low back pain without sciatica     Hypothyroidism     Peripheral vascular disease (H)     Knee pain     Osteoarthritis     Cervicalgia     Hyperlipidemia with target LDL less than 70     Pulmonary embolism (H)     Anemia     Aortic stenosis     Atherosclerosis of aorta (H)     Atherosclerosis of arteries of extremities (H)     Intermittent claudication (H)     Iron deficiency     Osteoporosis     DVT of lower extremity, bilateral (H)     Varicose veins     Gluteal pain     Insomnia     Back pain     Vitamin D deficiency     Tobacco use disorder     Personal history of other drug therapy     Right knee pain     Venous thrombosis of extremity     Benign essential hypertension     Gastritis     Cataract     Acute  left-sided low back pain with left-sided sciatica     Lumbar stenosis with neurogenic claudication     SBO (small bowel obstruction) (H)     Small bowel obstruction (H)     Long term current use of anticoagulant therapy     Left-sided low back pain with left-sided sciatica     Moderate major depression (H)     Anxiety     Neoplasm of uncertain behavior of skin     Cherry angioma     BCC (basal cell carcinoma), trunk     Weakness     Imbalance     Chronic pain     History of nonmelanoma skin cancer     Multiple melanocytic nevi     Age-related osteoporosis without current pathological fracture     Chronic obstructive pulmonary disease, unspecified COPD type (H)     Chronic cough     Pulmonary nodules     Right upper lobe pulmonary nodule     Pulmonary hypertension (H)     Urinary frequency     Lung nodules     Past Surgical History:   Procedure Laterality Date     BUNIONECTOMY       C STOMACH SURGERY PROCEDURE UNLISTED      Please see chart     COLONOSCOPY      11/10/10 angioectasia     HYSTERECTOMY TOTAL ABDOMINAL, BILATERAL SALPINGO-OOPHORECTOMY, NODE DISSECTION, COMBINED  2/17/11    SANGEETHA, EMILIA, LN bx, omentectomy, resection of evrian malignancy Dr. JONO Goncalves - Returned BENIGN     INJECT EPIDURAL LUMBAR / SACRAL SINGLE N/A 1/5/2018    Procedure: INJECT EPIDURAL LUMBAR / SACRAL SINGLE;  lumbar interlaminar epidural steroid injection;  Surgeon: Jaylin Valadez MD;  Location: [facility]     INJECT SACROILIAC JOINT Right 3/7/2018    Procedure: INJECT SACROILIAC JOINT;  Right Sacroiliac Joint Injection;  Surgeon: Flavio Harrison MD;  Location: [facility]     INJECT SACROILIAC JOINT Bilateral 12/7/2018    Procedure: Bilateral Sacroiliac Joint Injections;  Surgeon: Faviola Cosby MD;  Location: [facility] OR     UPPER GI ENDOSCOPY      11/10/10 erythematous gastropathy, angioectasia       Social History     Tobacco Use     Smoking status: Former Smoker     Packs/day: 0.50     Years: 15.00     Pack years: 7.50     Last attempt to  quit: 1993     Years since quittin.3     Smokeless tobacco: Never Used   Substance Use Topics     Alcohol use: Yes     Comment: social     Family History   Problem Relation Age of Onset     Breast Cancer Maternal Aunt 80     C.A.D. Father 54         Current Outpatient Medications   Medication Sig Dispense Refill     acetaminophen (TYLENOL) 325 MG tablet Take 2 tablets every 6 to 8 hours as needed for pain 100 tablet 1     amLODIPine (NORVASC) 10 MG tablet Take 1 tablet (10 mg) by mouth daily For blood pressure 90 tablet 3     atorvastatin (LIPITOR) 40 MG tablet Take 2 tablets (80 mg) by mouth daily 180 tablet 3     benzonatate (TESSALON) 200 MG capsule Take 1 capsule (200 mg) by mouth 3 times daily as needed for cough 21 capsule 0     betamethasone dipropionate (DIPROSONE) 0.05 % external ointment Apply topically 2 times daily 15 g 0     Calcium Carbonate-Vitamin D (CALCIUM 600 + D OR) Take 1 tablet by mouth daily.       Cholecalciferol (VITAMIN D3 PO) Take by mouth daily       citalopram (CELEXA) 20 MG tablet Take 1 tablet (20 mg) by mouth daily 90 tablet 3     clopidogrel (PLAVIX) 75 MG tablet Take 1 tablet (75 mg) by mouth daily 90 tablet 3     cyanocolbalamin (VITAMIN  B-12) 500 MCG tablet Take 1 tablet (500 mcg) by mouth daily 90 tablet 1     gabapentin (NEURONTIN) 100 MG capsule Take 1 to 4 capsules at bedtime as directed. 180 capsule 1     hydrochlorothiazide (HYDRODIURIL) 50 MG tablet Take 1 tablet (50 mg) by mouth daily 90 tablet 0     latanoprost (XALATAN) 0.005 % ophthalmic solution INT 1 GTT IN OU QD UTD  1     levothyroxine (SYNTHROID/LEVOTHROID) 75 MCG tablet Take 1 tablet (75 mcg) by mouth daily 90 tablet 0     lisinopril (PRINIVIL/ZESTRIL) 40 MG tablet Take 1 tablet (40 mg) by mouth daily For blood pressure 90 tablet 0     loperamide (IMODIUM A-D) 2 MG tablet Take 0.5-1 tablets (1-2 mg) by mouth daily as needed for diarrhea 30 tablet 0     Multiple Vitamins-Minerals (OCUVITE ADULT 50+) CAPS  "Take 1 tablet by mouth daily 90 capsule 3     oxybutynin (DITROPAN) 5 MG tablet Take 1 tablet (5 mg) by mouth At Bedtime For frequent urination 90 tablet 0     pantoprazole (PROTONIX) 40 MG EC tablet Take 1 tablet (40 mg) by mouth daily 90 tablet 3     Potassium Bicarb-Citric Acid 20 MEQ TBEF Take 20 mg by mouth daily 90 tablet 3     potassium chloride ER (K-DUR/KLOR-CON M) 20 MEQ CR tablet Take 1 tablet (20 mEq) by mouth daily 90 tablet 0     No Known Allergies    Reviewed and updated as needed this visit by Provider         Review of Systems   ROS COMP: Constitutional, HEENT, cardiovascular, pulmonary, gi and gu systems are negative, except as otherwise noted.      Objective    BP (!) 190/51 (BP Location: Right arm, Patient Position: Chair, Cuff Size: Adult Small)   Pulse 82   Temp 97.7  F (36.5  C) (Oral)   Ht 1.47 m (4' 9.87\")   Wt 49 kg (108 lb)   LMP  (LMP Unknown)   SpO2 96%   BMI 22.67 kg/m    Body mass index is 22.67 kg/m .  Physical Exam   GENERAL: healthy, alert and no distress  NECK: no adenopathy, no asymmetry, masses, or scars and thyroid normal to palpation  RESP: lungs clear to auscultation - no rales, rhonchi or wheezes  CV: regular rate and rhythm, normal S1 S2, no S3 or S4, no murmur, click or rub, no peripheral edema and peripheral pulses strong  ABDOMEN: soft, nontender, no hepatosplenomegaly, no masses and bowel sounds normal  MS: no gross musculoskeletal defects noted, no edema    Diagnostic Test Results:  Labs reviewed in Epic  none         Assessment & Plan       ICD-10-CM    1. Upper respiratory tract infection, unspecified type J06.9    2. Cough R05 benzonatate (TESSALON) 200 MG capsule   3. Essential hypertension I10      Patient reports she is doing better.  She has no fever, no chest pain, no shortness of breath.  Symptoms likely viral in nature.  She was advised to continue supportive care, give her Tessalon Perles for cough use as been prescribed.    History of hypertension she " reports have not taken her medication today.  She was advised to take her medicine every day not to skip any dosage.    There are no Patient Instructions on file for this visit.    Return in about 4 weeks (around 2/4/2020) for Physical Exam.    Susan Barron MD  [facility]

## 2020-01-14 ENCOUNTER — OFFICE VISIT (OUTPATIENT)
Dept: CARDIOLOGY | Facility: CLINIC | Age: 85
End: 2020-01-14
Attending: INTERNAL MEDICINE
Payer: MEDICARE

## 2020-01-14 ENCOUNTER — OFFICE VISIT (OUTPATIENT)
Dept: SURGERY | Facility: CLINIC | Age: 85
End: 2020-01-14
Payer: MEDICARE

## 2020-01-14 ENCOUNTER — ANESTHESIA EVENT (OUTPATIENT)
Dept: SURGERY | Facility: CLINIC | Age: 85
End: 2020-01-14

## 2020-01-14 VITALS
WEIGHT: 104.1 LBS | HEART RATE: 77 BPM | DIASTOLIC BLOOD PRESSURE: 69 MMHG | HEIGHT: 59 IN | OXYGEN SATURATION: 92 % | BODY MASS INDEX: 20.99 KG/M2 | SYSTOLIC BLOOD PRESSURE: 132 MMHG

## 2020-01-14 VITALS
SYSTOLIC BLOOD PRESSURE: 121 MMHG | OXYGEN SATURATION: 96 % | RESPIRATION RATE: 16 BRPM | BODY MASS INDEX: 22.04 KG/M2 | DIASTOLIC BLOOD PRESSURE: 71 MMHG | HEART RATE: 66 BPM | TEMPERATURE: 97.9 F | HEIGHT: 58 IN | WEIGHT: 105 LBS

## 2020-01-14 DIAGNOSIS — Z11.59 NEED FOR HEPATITIS B SCREENING TEST: ICD-10-CM

## 2020-01-14 DIAGNOSIS — I27.20 PULMONARY HYPERTENSION (H): Primary | ICD-10-CM

## 2020-01-14 DIAGNOSIS — Z01.818 PREOP EXAMINATION: ICD-10-CM

## 2020-01-14 DIAGNOSIS — E78.5 DYSLIPIDEMIA: ICD-10-CM

## 2020-01-14 DIAGNOSIS — E61.1 IRON DEFICIENCY: ICD-10-CM

## 2020-01-14 DIAGNOSIS — Z01.818 PREOP EXAMINATION: Primary | ICD-10-CM

## 2020-01-14 DIAGNOSIS — Z86.711 HISTORY OF PULMONARY EMBOLISM: ICD-10-CM

## 2020-01-14 DIAGNOSIS — I27.20 PULMONARY HYPERTENSION (H): ICD-10-CM

## 2020-01-14 DIAGNOSIS — R06.02 SOB (SHORTNESS OF BREATH): ICD-10-CM

## 2020-01-14 LAB
ALBUMIN SERPL-MCNC: 3.5 G/DL (ref 3.4–5)
ALP SERPL-CCNC: 82 U/L (ref 40–150)
ALT SERPL W P-5'-P-CCNC: 15 U/L (ref 0–50)
ANION GAP SERPL CALCULATED.3IONS-SCNC: 4 MMOL/L (ref 3–14)
AST SERPL W P-5'-P-CCNC: 13 U/L (ref 0–45)
BILIRUB DIRECT SERPL-MCNC: 0.2 MG/DL (ref 0–0.2)
BILIRUB SERPL-MCNC: 0.6 MG/DL (ref 0.2–1.3)
BUN SERPL-MCNC: 14 MG/DL (ref 7–30)
CALCIUM SERPL-MCNC: 9.6 MG/DL (ref 8.5–10.1)
CHLORIDE SERPL-SCNC: 105 MMOL/L (ref 94–109)
CHOLEST SERPL-MCNC: 156 MG/DL
CO2 SERPL-SCNC: 29 MMOL/L (ref 20–32)
CREAT SERPL-MCNC: 0.57 MG/DL (ref 0.52–1.04)
CRP SERPL-MCNC: 5.5 MG/L (ref 0–8)
ERYTHROCYTE [DISTWIDTH] IN BLOOD BY AUTOMATED COUNT: 15.6 % (ref 10–15)
GFR SERPL CREATININE-BSD FRML MDRD: 85 ML/MIN/{1.73_M2}
GLUCOSE SERPL-MCNC: 84 MG/DL (ref 70–99)
HCT VFR BLD AUTO: 34.1 % (ref 35–47)
HDLC SERPL-MCNC: 80 MG/DL
HGB BLD-MCNC: 10.4 G/DL (ref 11.7–15.7)
INR PPP: 0.99 (ref 0.86–1.14)
IRON SATN MFR SERPL: 10 % (ref 15–46)
IRON SERPL-MCNC: 40 UG/DL (ref 35–180)
LDLC SERPL CALC-MCNC: 57 MG/DL
MCH RBC QN AUTO: 25.6 PG (ref 26.5–33)
MCHC RBC AUTO-ENTMCNC: 30.5 G/DL (ref 31.5–36.5)
MCV RBC AUTO: 84 FL (ref 78–100)
NONHDLC SERPL-MCNC: 76 MG/DL
NT-PROBNP SERPL-MCNC: 488 PG/ML (ref 0–450)
PLATELET # BLD AUTO: 580 10E9/L (ref 150–450)
POTASSIUM SERPL-SCNC: 3.6 MMOL/L (ref 3.4–5.3)
PROT SERPL-MCNC: 7.4 G/DL (ref 6.8–8.8)
RBC # BLD AUTO: 4.06 10E12/L (ref 3.8–5.2)
SODIUM SERPL-SCNC: 138 MMOL/L (ref 133–144)
TIBC SERPL-MCNC: 394 UG/DL (ref 240–430)
TRIGL SERPL-MCNC: 96 MG/DL
TSH SERPL DL<=0.005 MIU/L-ACNC: 1.99 MU/L (ref 0.4–4)
WBC # BLD AUTO: 7.8 10E9/L (ref 4–11)

## 2020-01-14 PROCEDURE — 85027 COMPLETE CBC AUTOMATED: CPT | Performed by: INTERNAL MEDICINE

## 2020-01-14 PROCEDURE — 83520 IMMUNOASSAY QUANT NOS NONAB: CPT | Performed by: INTERNAL MEDICINE

## 2020-01-14 PROCEDURE — 87389 HIV-1 AG W/HIV-1&-2 AB AG IA: CPT | Performed by: INTERNAL MEDICINE

## 2020-01-14 PROCEDURE — 99204 OFFICE O/P NEW MOD 45 MIN: CPT | Mod: ZP | Performed by: INTERNAL MEDICINE

## 2020-01-14 PROCEDURE — G0463 HOSPITAL OUTPT CLINIC VISIT: HCPCS | Mod: ZF

## 2020-01-14 PROCEDURE — 86706 HEP B SURFACE ANTIBODY: CPT | Performed by: INTERNAL MEDICINE

## 2020-01-14 PROCEDURE — 85730 THROMBOPLASTIN TIME PARTIAL: CPT | Performed by: INTERNAL MEDICINE

## 2020-01-14 PROCEDURE — 85610 PROTHROMBIN TIME: CPT | Performed by: INTERNAL MEDICINE

## 2020-01-14 PROCEDURE — G0499 HEPB SCREEN HIGH RISK INDIV: HCPCS | Performed by: INTERNAL MEDICINE

## 2020-01-14 PROCEDURE — 84238 ASSAY NONENDOCRINE RECEPTOR: CPT | Performed by: INTERNAL MEDICINE

## 2020-01-14 PROCEDURE — 80048 BASIC METABOLIC PNL TOTAL CA: CPT | Performed by: INTERNAL MEDICINE

## 2020-01-14 PROCEDURE — 83540 ASSAY OF IRON: CPT | Performed by: INTERNAL MEDICINE

## 2020-01-14 PROCEDURE — 84443 ASSAY THYROID STIM HORMONE: CPT | Performed by: INTERNAL MEDICINE

## 2020-01-14 PROCEDURE — 85597 PHOSPHOLIPID PLTLT NEUTRALIZ: CPT | Performed by: INTERNAL MEDICINE

## 2020-01-14 PROCEDURE — 36415 COLL VENOUS BLD VENIPUNCTURE: CPT | Performed by: PHYSICIAN ASSISTANT

## 2020-01-14 PROCEDURE — 00000401 ZZHCL STATISTIC THROMBIN TIME NC: Performed by: INTERNAL MEDICINE

## 2020-01-14 PROCEDURE — 86803 HEPATITIS C AB TEST: CPT | Performed by: INTERNAL MEDICINE

## 2020-01-14 PROCEDURE — 80061 LIPID PANEL: CPT | Performed by: INTERNAL MEDICINE

## 2020-01-14 PROCEDURE — 86704 HEP B CORE ANTIBODY TOTAL: CPT | Performed by: INTERNAL MEDICINE

## 2020-01-14 PROCEDURE — 86140 C-REACTIVE PROTEIN: CPT | Performed by: INTERNAL MEDICINE

## 2020-01-14 PROCEDURE — 80076 HEPATIC FUNCTION PANEL: CPT | Performed by: INTERNAL MEDICINE

## 2020-01-14 PROCEDURE — 86038 ANTINUCLEAR ANTIBODIES: CPT | Performed by: INTERNAL MEDICINE

## 2020-01-14 PROCEDURE — 85613 RUSSELL VIPER VENOM DILUTED: CPT | Performed by: INTERNAL MEDICINE

## 2020-01-14 PROCEDURE — 83880 ASSAY OF NATRIURETIC PEPTIDE: CPT | Performed by: INTERNAL MEDICINE

## 2020-01-14 PROCEDURE — 86431 RHEUMATOID FACTOR QUANT: CPT | Performed by: INTERNAL MEDICINE

## 2020-01-14 PROCEDURE — 83550 IRON BINDING TEST: CPT | Performed by: INTERNAL MEDICINE

## 2020-01-14 RX ORDER — CLOPIDOGREL BISULFATE 75 MG/1
75 TABLET ORAL DAILY
COMMUNITY
End: 2020-09-11

## 2020-01-14 RX ORDER — PANTOPRAZOLE SODIUM 40 MG/1
40 TABLET, DELAYED RELEASE ORAL DAILY
COMMUNITY
End: 2020-09-08

## 2020-01-14 ASSESSMENT — PAIN SCALES - GENERAL
PAINLEVEL: NO PAIN (0)
PAINLEVEL: NO PAIN (0)

## 2020-01-14 ASSESSMENT — MIFFLIN-ST. JEOR
SCORE: 825.28
SCORE: 832.82

## 2020-01-14 ASSESSMENT — LIFESTYLE VARIABLES: TOBACCO_USE: 1

## 2020-01-14 ASSESSMENT — COPD QUESTIONNAIRES
COPD: 1
CAT_SEVERITY: MODERATE

## 2020-01-14 NOTE — PATIENT INSTRUCTIONS
Medication Changes:  No medication changes at this time. Please continue current medication regiment.      Patient Instructions:  1. Continue staying active and eat a heart healthy diet.    2. Please keep current list of medications with you at all times.    3. Remember to weigh yourself daily after voiding and before you consume any food or beverages and log the numbers.  If you have gained 2 pounds overnight or 5 pounds in a week contact us immediately for medication adjustments or further instructions.    4. **Please call us immediately if you have any syncope (fainting or passing out), chest pain, edema (swelling or weight gain), or decline in your functional status (general decline in how you are feeling).      Check-In  Time Check-In Location Estimated Length Procedure   Name         60 minutes Echocardiogram (Echo) w/Bubble study**   Procedure Preparations & Instructions     This is a non-invasive procedure and does NOT require any preparation         Check-In  Time Check-In Location Estimated Length Procedure   Name        [facility]   waiting room 60 minutes VQ/Lung Perfusion Scan**   Procedure Preparations & Instructions     This is a non-invasive procedure and does NOT require any preparation         Check-In  Time Check-In Location Estimated Length Procedure   Name         20 minutes CT Scan w/contrast**   Procedure Preparations & Instructions     This is a non-invasive procedure and does NOT require any preparation         Check-In  Time Check-In Location Estimated Length Procedure   Name        [facility]   3rd Floor 30 minutes Six Minute Walk Test**   Procedure Preparations & Instructions     This is a non-invasive procedure and does NOT require any preparation       Follow up Appointment Information:  -follow up with Dr. Florian after all testing is complete.        We are located on the third floor of the [facility] ([facility]) on the [facility].  Our address is     [address]  [facility]  Clinic on [address]rd [place] [address]    Thank you for allowing us to be a part of your care here at the [facility]    If you have questions or concerns please contact us at:    Allen Treviño RN, BSN   Ariadna Mckeon (Schedule,Prior Auth)  Nurse Coordinator     Clinic   Pulmonary Hypertension   Pulmonary Hypertension  [facility]  [facility]  (Phone)[phone]    (Phone) [phone]        (Fax) [phone]    ** Please note that you will NOT receive a reminder call regarding your scheduled testing, reminder calls are for provider appointments only.  If you are scheduled for testing within the [facility] system you may receive a call regarding pre-registration for billing purposes only.**     Remember to weigh yourself daily after voiding and before you consume any food or beverages and log the numbers.  If you have gained/lost 2 pounds overnight or 5 pounds in a week contact us immediately for medication adjustments or further instructions.   **Please call us immediately if you have any syncope, chest pain, edema, or decline in your functional status.    Support Group:  Pulmonary Hypertension Association  Https://www.phassociation.org/  **Look at the Events Tab** They even have Support Groups that you can call into    [facility] PH Support Group  Second Saturday of the Month from 1-3 PM   Location: [address] [place] [address]  Leader: Taryn Munoz and Rama Sepulveda  Phone: [phone] or [phone]  Email: [email or web address]

## 2020-01-14 NOTE — Clinical Note
"1/14/2020      RE: Lucie Stockton  [address] [id number]       Dear Colleague,    Thank you for the opportunity to participate in the care of your patient, Lucie Stockton, at the [facility]. Please see a copy of my visit note below.    January 14, 2020    Marii Montgomery MD  Internal Medicine  [place], [place]    Dear Dr. Montgomery,    I had the pleasure of seeing your patient Lucie Stockton at the [facility] Pulmonary Hypertension clinic.  As you know, Lucie is a very pleasant 84 year old female with a history of bilateral pulmonary embolism, Grave's disease, DVT, hypertension, and likely metastatic disease with no tissue diagnosis at this time who presents for evaluation for pulmonary hypertension due to enlargement pulmonary artery of CT scan. Lucie had her CT scan to help with her work-up of lung nodules and she recently had a PET scan that showed multiple nodules that were metabolically active. There is concern for a meta-static process, but the tissue diagnosis is lacking.    Lucie states that     Of note, Lucie had a CT scan in January of 2018 which showed evidence of chronic clot. She had not had any work up for pulmonary hypertension since then.     PAST MEDICAL HISTORY:  Past Medical History:   Diagnosis Date     Anemia      Aortic stenosis     abdominal     Atherosclerosis of aorta (H)     \"extensive disease with near occlusion below level of renal arteries\" on CT     Atherosclerosis of arteries of extremities (H)      Back pain     severe multilevel DJD, moderate spinal canal stenosis L4-5, severe L3-4     Chronic pain     Back, hips, knees, legs     Degenerative joint disease      DVT of lower extremity, bilateral (H)     5/24/10 left distal femoral and great saphenous, 7/7/10 left:  extending to cfv, iliac , pop and post tibial, peronial, right:  distal fem and peroneal      Grave's disease      Hyperlipidaemia " LDL goal < 70      Hypertension, essential      Hypothyroidism      Imbalance      Insomnia      Intermittent claudication (H)      Iron deficiency      Knee pain      Lumbar stenosis with neurogenic claudication      Osteoarthritis     ankle,foot, knee     Osteoporosis      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 Father 54       SOCIAL HISTORY:  Social History     Socioeconomic History     Marital status:      Spouse name: Not on file     Number of children: Not on file     Years of education: Not on file     Highest education level: Not on file   Occupational History     Not on file   Social Needs     Financial resource strain: Not on file     Food insecurity:     Worry: Not on file     Inability: Not on file     Transportation needs:     Medical: Not on file     Non-medical: Not on file   Tobacco Use     Smoking status: Former Smoker     Packs/day: 0.50     Years: 15.00     Pack years: 7.50     Last attempt to quit: 1993     Years since quittin.3     Smokeless tobacco: Never Used   Substance and Sexual Activity     Alcohol use: Yes     Comment: social     Drug use: No     Sexual activity: Not Currently     Partners: Male   Lifestyle     Physical activity:     Days per week: Not on file     Minutes per session: Not on file     Stress: Not on file   Relationships     Social connections:     Talks on phone: Not on file     Gets together: Not on file     Attends Jehovah's witness service: Not on file     Active member of club or organization: Not on file     Attends meetings of clubs or organizations: Not on file     Relationship status: Not on file     Intimate partner violence:      Fear of current or ex partner: Not on file     Emotionally abused: Not on file     Physically abused: Not on file     Forced sexual activity: Not on file   Other Topics Concern     Parent/sibling w/ CABG, MI or angioplasty before 65F 55M? Not Asked   Social History Narrative     for 52 years. Retired from [employer]. Frequent world travel.       CURRENT MEDICATIONS:  Current Outpatient Medications   Medication Sig Dispense Refill     acetaminophen (TYLENOL) 325 MG tablet Take 2 tablets every 6 to 8 hours as needed for pain 100 tablet 1     amLODIPine (NORVASC) 10 MG tablet Take 1 tablet (10 mg) by mouth daily For blood pressure 90 tablet 3     atorvastatin (LIPITOR) 40 MG tablet Take 2 tablets (80 mg) by mouth daily 180 tablet 3     benzonatate (TESSALON) 200 MG capsule Take 1 capsule (200 mg) by mouth 3 times daily as needed for cough 21 capsule 0     betamethasone dipropionate (DIPROSONE) 0.05 % external ointment Apply topically 2 times daily 15 g 0     Calcium Carbonate-Vitamin D (CALCIUM 600 + D OR) Take 1 tablet by mouth daily.       Cholecalciferol (VITAMIN D3 PO) Take by mouth daily       citalopram (CELEXA) 20 MG tablet Take 1 tablet (20 mg) by mouth daily 90 tablet 3     clopidogrel (PLAVIX) 75 MG tablet Take 1 tablet (75 mg) by mouth daily 90 tablet 3     cyanocolbalamin (VITAMIN  B-12) 500 MCG tablet Take 1 tablet (500 mcg) by mouth daily 90 tablet 1     gabapentin (NEURONTIN) 100 MG capsule Take 1 to 4 capsules at bedtime as directed. 180 capsule 1     hydrochlorothiazide (HYDRODIURIL) 50 MG tablet Take 1 tablet (50 mg) by mouth daily 90 tablet 0     latanoprost (XALATAN) 0.005 % ophthalmic solution INT 1 GTT IN OU QD UTD  1     levothyroxine (SYNTHROID/LEVOTHROID) 75 MCG tablet Take 1 tablet (75 mcg) by mouth daily 90 tablet 0     lisinopril (PRINIVIL/ZESTRIL) 40 MG tablet Take 1 tablet (40 mg) by mouth daily For blood pressure 90 tablet 0     loperamide (IMODIUM A-D) 2 MG tablet Take  "0.5-1 tablets (1-2 mg) by mouth daily as needed for diarrhea 30 tablet 0     Multiple Vitamins-Minerals (OCUVITE ADULT 50+) CAPS Take 1 tablet by mouth daily 90 capsule 3     oxybutynin (DITROPAN) 5 MG tablet Take 1 tablet (5 mg) by mouth At Bedtime For frequent urination 90 tablet 0     pantoprazole (PROTONIX) 40 MG EC tablet Take 1 tablet (40 mg) by mouth daily 90 tablet 3     Potassium Bicarb-Citric Acid 20 MEQ TBEF Take 20 mg by mouth daily 90 tablet 3     potassium chloride ER (K-DUR/KLOR-CON M) 20 MEQ CR tablet Take 1 tablet (20 mEq) by mouth daily 90 tablet 0       ROS:   10 point ROS negative except HPI    EXAM:  Ht 1.499 m (4' 11\")   Wt 47.2 kg (104 lb 1.6 oz)   LMP  (LMP Unknown)   BMI 21.03 kg/m     General: appears comfortable, alert and articulate  Head: normocephalic, atraumatic  Eyes: anicteric sclera, EOMI  Neck: no adenopathy  Orophyarynx: moist mucosa, no lesions, dentition intact  Heart: regular, S1/S2, no murmur, gallop, rub, estimated JVP ***  Lungs: clear, no rales or wheezing  Abdomen: soft, non-tender, bowel sounds present, no hepatosplenomegaly  Extremities: no clubbing, cyanosis or edema  Neurological: normal speech and affect, no gross motor deficits    Labs:  CBC RESULTS:  Lab Results   Component Value Date    WBC 7.3 11/13/2019    RBC 4.36 11/13/2019    HGB 12.1 11/13/2019    HCT 39.2 11/13/2019    MCV 90 11/13/2019    MCH 27.8 11/13/2019    MCHC 30.9 (L) 11/13/2019    RDW 13.8 11/13/2019     (H) 11/13/2019       CMP RESULTS:  Lab Results   Component Value Date     11/13/2019    POTASSIUM 3.9 11/13/2019    CHLORIDE 105 11/13/2019    CO2 32 11/13/2019    ANIONGAP 4 11/13/2019    GLC 66 (L) 11/13/2019    BUN 13 11/13/2019    CR 0.60 11/13/2019    GFRESTIMATED 84 11/13/2019    GFRESTBLACK >90 11/13/2019    CANDIDA 9.9 11/13/2019    BILITOTAL 0.3 11/13/2019    ALBUMIN 3.6 11/13/2019    ALKPHOS 80 11/13/2019    ALT 16 11/13/2019    AST 16 11/13/2019        CT PE 1/27/18  1. " Age-indeterminate, but possibly chronic pulmonary emboli in the right lower lobe.  2. Mild groundglass opacities in the posterior right upper lobe, likely infectious/inflammatory.  3. Moderate apically predominant centrilobular emphysema.  4. Scattered pulmonary nodules measuring up to 5 mm. Recommend follow-up chest CT in one year per the Fleischner Society criteria.  5. Minimally displaced lateral right seventh and eighth rib fractures.  6. Possible mild distal esophageal wall thickening, suggestive of esophagitis.  7. Calcifications in the pancreatic head with a rounded associated cystic focus likely representing a pseudocyst, findings likely related to chronic pancreatitis, though evaluation is limited. Consider further dedicated imaging of the pancreas as clinically indicated.    Assessment and Plan: Lucie Stockton is an 84 year old female with history of bilateral pulmonary embolism, DVT, hypertension, and possible metastatic disease who presents for evaluation of possible pulmonary hypertension.    1. Possible pulmonary hypertension: Lucie has reasons for     It was a pleasure seeing your patient Lucie Stockton at the [facility] Pulmonary Hypertension clinic.  Please contact us with any questions or concerns that you may have.    Sincerely,    Seven Florian MD, PhD   of Medicine  [facility]  Cardiovascular Division  [facility]                    January 14, 2020    Marii Montgomery MD  Internal Medicine  [place], [place]    Dear Dr. Montgomery,    I had the pleasure of seeing your patient Lucie Stockton at the [facility] Pulmonary Hypertension clinic.  As you know, Lucie is a very pleasant 84 year old female with a history of bilateral pulmonary embolism, peripheral vascular disease, Grave's disease, DVT, hypertension, and likely metastatic disease with no tissue diagnosis at this time who presents for evaluation for pulmonary hypertension  "due to enlargement pulmonary artery of CT scan. Lucie had her CT scan to help with her work-up of lung nodules and she recently had a PET scan that showed multiple nodules that were metabolically active. There is concern for a meta-static process, but the tissue diagnosis is lacking.    Lucie states that her exercise capacity is quite good. She is able to exercise at [employer] for about 30 minutes a day without significant dyspnea, chest pain or pressure, palpitations, or presyncope. She is able to go up a flight of stairs without any limitations at her house. She denies any lower extremity edema, abdominal fullness, or syncope. Overall, I would classify her was WHO FC II.    Of note, Lucie had a CT scan in January of 2018 which showed evidence of chronic clot. She had not had any work up for pulmonary hypertension since then. She is not taking any anticoagulation at this point. She does continue to take clopidogrel for her PAD.    PAST MEDICAL HISTORY:  Past Medical History:   Diagnosis Date     Anemia      Aortic stenosis     abdominal     Atherosclerosis of aorta (H)     \"extensive disease with near occlusion below level of renal arteries\" on CT     Atherosclerosis of arteries of extremities (H)      Back pain     severe multilevel DJD, moderate spinal canal stenosis L4-5, severe L3-4     Chronic pain     Back, hips, knees, legs     Degenerative joint disease      DVT of lower extremity, bilateral (H)     5/24/10 left distal femoral and great saphenous, 7/7/10 left:  extending to cfv, iliac , pop and post tibial, peronial, right:  distal fem and peroneal      Grave's disease      Hyperlipidaemia LDL goal < 70      Hypertension, essential      Hypothyroidism      Imbalance      Insomnia      Intermittent claudication (H)      Iron deficiency      Knee pain      Lumbar stenosis with neurogenic claudication      Osteoarthritis     ankle,foot, knee     Osteoporosis      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 "    left ovary, 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 Father 54       SOCIAL HISTORY:  Social History     Socioeconomic History     Marital status:      Spouse name: Not on file     Number of children: Not on file     Years of education: Not on file     Highest education level: Not on file   Occupational History     Not on file   Social Needs     Financial resource strain: Not on file     Food insecurity:     Worry: Not on file     Inability: Not on file     Transportation needs:     Medical: Not on file     Non-medical: Not on file   Tobacco Use     Smoking status: Former Smoker     Packs/day: 0.50     Years: 15.00     Pack years: 7.50     Last attempt to quit: 1993     Years since quittin.3     Smokeless tobacco: Never Used   Substance and Sexual Activity     Alcohol use: Yes     Comment: social     Drug use: No     Sexual activity: Not Currently     Partners: Male   Lifestyle     Physical activity:     Days per week: Not on file     Minutes per session: Not on file     Stress: Not on file   Relationships     Social connections:     Talks on phone: Not on file     Gets together: Not on file     Attends Islam service: Not on file     Active member of club or organization: Not on file     Attends meetings of clubs or organizations: Not on file     Relationship status: Not on file     Intimate partner violence:     Fear of current or ex partner: Not on file     Emotionally abused: Not on file     Physically abused: Not on file     Forced sexual activity: Not on file   Other Topics Concern     Parent/sibling w/ CABG, MI or angioplasty before 65F 55M? Not Asked   Social History Narrative     for 52 years. 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       CURRENT MEDICATIONS:  Current Outpatient Medications   Medication Sig Dispense Refill     acetaminophen (TYLENOL) 325 MG tablet Take 2 tablets every 6 to 8 hours as needed for pain 100 tablet 1     amLODIPine (NORVASC) 10 MG tablet Take 1 tablet (10 mg) by mouth daily For blood pressure 90 tablet 3     atorvastatin (LIPITOR) 40 MG tablet Take 2 tablets (80 mg) by mouth daily 180 tablet 3     benzonatate (TESSALON) 200 MG capsule Take 1 capsule (200 mg) by mouth 3 times daily as needed for cough 21 capsule 0     betamethasone dipropionate (DIPROSONE) 0.05 % external ointment Apply topically 2 times daily 15 g 0     Calcium Carbonate-Vitamin D (CALCIUM 600 + D OR) Take 1 tablet by mouth daily.       Cholecalciferol (VITAMIN D3 PO) Take by mouth daily       citalopram (CELEXA) 20 MG tablet Take 1 tablet (20 mg) by mouth daily 90 tablet 3     clopidogrel (PLAVIX) 75 MG tablet Take 1 tablet (75 mg) by mouth daily 90 tablet 3     cyanocolbalamin (VITAMIN  B-12) 500 MCG tablet Take 1 tablet (500 mcg) by mouth daily 90 tablet 1     gabapentin (NEURONTIN) 100 MG capsule Take 1 to 4 capsules at bedtime as directed. 180 capsule 1     hydrochlorothiazide (HYDRODIURIL) 50 MG tablet Take 1 tablet (50 mg) by mouth daily 90 tablet 0     latanoprost (XALATAN) 0.005 % ophthalmic solution INT 1 GTT IN OU QD UTD  1     levothyroxine (SYNTHROID/LEVOTHROID) 75 MCG tablet Take 1 tablet (75 mcg) by mouth daily 90 tablet 0     lisinopril (PRINIVIL/ZESTRIL) 40 MG tablet Take 1 tablet (40 mg) by mouth daily For blood pressure 90 tablet 0     loperamide (IMODIUM A-D) 2 MG tablet Take 0.5-1 tablets (1-2 mg) by mouth daily as needed for diarrhea 30 tablet 0     Multiple Vitamins-Minerals (OCUVITE ADULT 50+) CAPS Take 1 tablet by mouth daily 90 capsule 3     oxybutynin (DITROPAN) 5 MG tablet Take 1 tablet (5 mg) by mouth At Bedtime For frequent urination 90 tablet 0     pantoprazole (PROTONIX) 40 MG EC  "tablet Take 1 tablet (40 mg) by mouth daily 90 tablet 3     Potassium Bicarb-Citric Acid 20 MEQ TBEF Take 20 mg by mouth daily 90 tablet 3     potassium chloride ER (K-DUR/KLOR-CON M) 20 MEQ CR tablet Take 1 tablet (20 mEq) by mouth daily 90 tablet 0       ROS:   10 point ROS negative except HPI    EXAM:  /69   Pulse 77   Ht 1.499 m (4' 11\")   Wt 47.2 kg (104 lb 1.6 oz)   LMP  (LMP Unknown)   SpO2 92%   BMI 21.03 kg/m     General: appears comfortable, alert and articulate  Head: normocephalic, atraumatic  Eyes: anicteric sclera, EOMI  Neck: no adenopathy  Orophyarynx: moist mucosa, no lesions, dentition intact  Heart: regular, S1/S2, no murmur, gallop, rub, estimated JVP 3 cm  Lungs: clear, no rales or wheezing  Abdomen: soft, non-tender, bowel sounds present, no hepatosplenomegaly  Extremities: no clubbing, cyanosis or edema  Neurological: normal speech and affect, no gross motor deficits    Labs:  CBC RESULTS:  Lab Results   Component Value Date    WBC 7.3 11/13/2019    RBC 4.36 11/13/2019    HGB 12.1 11/13/2019    HCT 39.2 11/13/2019    MCV 90 11/13/2019    MCH 27.8 11/13/2019    MCHC 30.9 (L) 11/13/2019    RDW 13.8 11/13/2019     (H) 11/13/2019       CMP RESULTS:  Lab Results   Component Value Date     11/13/2019    POTASSIUM 3.9 11/13/2019    CHLORIDE 105 11/13/2019    CO2 32 11/13/2019    ANIONGAP 4 11/13/2019    GLC 66 (L) 11/13/2019    BUN 13 11/13/2019    CR 0.60 11/13/2019    GFRESTIMATED 84 11/13/2019    GFRESTBLACK >90 11/13/2019    CANDIDA 9.9 11/13/2019    BILITOTAL 0.3 11/13/2019    ALBUMIN 3.6 11/13/2019    ALKPHOS 80 11/13/2019    ALT 16 11/13/2019    AST 16 11/13/2019        CT PE 1/27/18  1. Age-indeterminate, but possibly chronic pulmonary emboli in the right lower lobe.  2. Mild groundglass opacities in the posterior right upper lobe, likely infectious/inflammatory.  3. Moderate apically predominant centrilobular emphysema.  4. Scattered pulmonary nodules measuring up to 5 " mm. Recommend follow-up chest CT in one year per the Fleischner Society criteria.  5. Minimally displaced lateral right seventh and eighth rib fractures.  6. Possible mild distal esophageal wall thickening, suggestive of esophagitis.  7. Calcifications in the pancreatic head with a rounded associated cystic focus likely representing a pseudocyst, findings likely related to chronic pancreatitis, though evaluation is limited. Consider further dedicated imaging of the pancreas as clinically indicated.    Assessment and Plan: Lucie Stockton is an 84 year old female with history of bilateral pulmonary embolism, DVT, hypertension, and possible metastatic disease who presents for evaluation of possible pulmonary hypertension.    1. Possible pulmonary hypertension: Lucie may have pulmonary hypertension and thus we need to perform a work up. I would like to proceed with a noninvasive work up before getting an invasive evaluation started. Therefore, we will get an echocardiogram to evaluate her PA pressures and RV function, a VQ scan, a CT with PE protocol, and a 6MWT. She is actually functional class II currently so unless her RV is significantly impaired she should be optimized for her procedure.     2. PAD with atherosclerosis of the abdominal aorta: Continue clopidogrel and statin.     It was a pleasure seeing your patient Lucie Stockton at the [facility] Pulmonary Hypertension clinic.  Please contact us with any questions or concerns that you may have.    Sincerely,    Seven Florian MD, PhD   of Medicine  [facility]ension  Cardiovascular Division  [facility]                    Please do not hesitate to contact me if you have any questions/concerns.     Sincerely,     Seven Florian MD

## 2020-01-14 NOTE — NURSING NOTE
Procedures and/or Testing: Patient given instructions regarding  Echocardiogram with Bubble Study,  6 minute walk test,  Chest CT,. Discussed purpose, preparation, procedure and when to expect results reported back to the patient. Patient demonstrated understanding of this information and agreed to call with further questions or concerns.    Med Reconcile: Reviewed and verified all current medications with the patient. The updated medication list was printed and given to the patient.  Patient was instructed to return for the next laboratory testing today before leaving.     Diet: Patient instructed regarding a heart healthy diet, including discussion of reduced fat and sodium intake. Patient demonstrated understanding of this information and agreed to call with further questions or concerns.    Return Appointment: Patient given instructions regarding scheduling next clinic visit. Patient demonstrated understanding of this information and agreed to call with further questions or concerns.    Patient stated she understood all health information given and agreed to call with further questions or concerns.    Medication Changes:  No medication changes at this time. Please continue current medication regiment.      Patient Instructions:  1. Continue staying active and eat a heart healthy diet.    2. Please keep current list of medications with you at all times.    3. Remember to weigh yourself daily after voiding and before you consume any food or beverages and log the numbers.  If you have gained 2 pounds overnight or 5 pounds in a week contact us immediately for medication adjustments or further instructions.    4. **Please call us immediately if you have any syncope (fainting or passing out), chest pain, edema (swelling or weight gain), or decline in your functional status (general decline in how you are feeling).      Check-In  Time Check-In Location Estimated Length Procedure   Name         60 minutes Echocardiogram  (Echo) w/Bubble study**   Procedure Preparations & Instructions     This is a non-invasive procedure and does NOT require any preparation         Check-In  Time Check-In Location Estimated Length Procedure   Name        [facility]   waiting room 60 minutes VQ/Lung Perfusion Scan**   Procedure Preparations & Instructions     This is a non-invasive procedure and does NOT require any preparation         Check-In  Time Check-In Location Estimated Length Procedure   Name         20 minutes CT Scan w/contrast**   Procedure Preparations & Instructions     This is a non-invasive procedure and does NOT require any preparation         Check-In  Time Check-In Location Estimated Length Procedure   Name        [facility]   3rd Floor 30 minutes Six Minute Walk Test**   Procedure Preparations & Instructions     This is a non-invasive procedure and does NOT require any preparation       Follow up Appointment Information:  -follow up with Dr. Florian after all testing is complete.      **All appt's were made prior to patient leaving.Anna Cedeno RN on 1/14/2020 at 10:24 AM

## 2020-01-14 NOTE — H&P
"  Pre-Operative H & P     CC:  Preoperative exam to assess for increased cardiopulmonary risk while undergoing surgery and anesthesia.    Date of Encounter: 1/14/2020  Primary Care Physician:  Marii Montgomery  Associated Diagnosis: lung nodules    HPI  Lucie Stockton is a 84 year old female who presents for pre-operative H & P in preparation for ENDOBRONCHIAL ULTRASOUND, WITH FLEXIBLE BRONCHOSCOPY, endobronchial and transbronchial biopsies, Super D navigational bronchoscopy with Dr. Jara on 1/24/2020 at [facility]. General Anesthesia.    This is a 84-year-old female with a history of abdominal aortic stenosis, peripheral arterial disease on Plavix, bilateral DVT and PE in 2010 provoked, possible pulmonary hypertension, hypertension, hyperlipidemia, hypothyroidism.  Patient is a previous smoker, she quit 1993.  She has approximate 40-pack-year history of cigarette smoking.  She denies any night sweats, weight loss, fever.  She had a CT scan in November 2019 which did show a right upper lobe nodule.  IR felt that needle biopsy was high risk.  She was then evaluated for possible segmentectomy but due to her medical comorbidities her case was discussed at lung nodule conference.  She had subsequent PET scan which did show uptake in the node in question.  There are several mediastinal lymph nodes that appear larger but are not FDG avid.  Recommendation from the lung nodule conference is the above procedure to obtain a diagnosis and stage the mediastinum.  After that information is obtained they will be able to offer her treatment plan.    History is obtained from the patient.     Past Medical History  Past Medical History:   Diagnosis Date     Anemia      Aortic stenosis     abdominal     Atherosclerosis of aorta (H)     \"extensive disease with near occlusion below level of renal arteries\" on CT     Atherosclerosis of arteries of extremities (H)      Back pain     severe " multilevel DJD, moderate spinal canal stenosis L4-5, severe L3-4     Chronic pain     Back, hips, knees, legs     Degenerative joint disease      DVT of lower extremity, bilateral (H)     5/24/10 left distal femoral and great saphenous, 7/7/10 left:  extending to cfv, iliac , pop and post tibial, peronial, right:  distal fem and peroneal      Grave's disease      Hyperlipidaemia LDL goal < 70      Hypertension, essential      Hypothyroidism      Imbalance      Insomnia      Intermittent claudication (H)      Iron deficiency      Knee pain      Lumbar stenosis with neurogenic claudication      Osteoarthritis     ankle,foot, knee     Osteoporosis      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 JONO Goncalves - Returned BENIGN     INJECT EPIDURAL LUMBAR / SACRAL SINGLE N/A 1/5/2018    Procedure: INJECT EPIDURAL LUMBAR / SACRAL SINGLE;  lumbar interlaminar epidural steroid injection;  Surgeon: Jaylin Valadez MD;  Location: [facility] OR     INJECT SACROILIAC JOINT Right 3/7/2018    Procedure: INJECT SACROILIAC JOINT;  Right Sacroiliac Joint Injection;  Surgeon: Flavio Harrison MD;  Location: [facility] OR     INJECT SACROILIAC JOINT Bilateral 12/7/2018    Procedure: Bilateral Sacroiliac Joint Injections;  Surgeon: Faviola Cosby MD;  Location: [facility] OR     UPPER GI ENDOSCOPY      11/10/10 erythematous gastropathy, angioectasia       Hx of Blood  transfusions/reactions: none     Hx of abnormal bleeding or anti-platelet use: Plavix    Menstrual history: No LMP recorded (lmp unknown). Patient has had a hysterectomy.:     Steroid use in the last year: none    Personal or FH with difficulty with Anesthesia:  none    Prior to Admission Medications  Current Outpatient Medications   Medication Sig Dispense Refill     acetaminophen (TYLENOL) 325 MG tablet Take 2 tablets every 6 to 8 hours as needed for pain (Patient taking differently: Take 650 mg by mouth as needed Take 2 tablets every 6 to 8 hours as needed for pain) 100 tablet 1     amLODIPine (NORVASC) 10 MG tablet Take 1 tablet (10 mg) by mouth daily For blood pressure (Patient taking differently: Take 10 mg by mouth every morning For blood pressure) 90 tablet 3     atorvastatin (LIPITOR) 40 MG tablet Take 2 tablets (80 mg) by mouth daily (Patient taking differently: Take 80 mg by mouth every morning ) 180 tablet 3     benzonatate (TESSALON) 200 MG capsule Take 1 capsule (200 mg) by mouth 3 times daily as needed for cough 21 capsule 0     betamethasone dipropionate (DIPROSONE) 0.05 % external ointment Apply topically 2 times daily (Patient taking differently: Apply 1 g topically as needed ) 15 g 0     Calcium Carbonate-Vitamin D (CALCIUM 600 + D OR) Take 1 tablet by mouth every morning        citalopram (CELEXA) 20 MG tablet Take 1 tablet (20 mg) by mouth daily (Patient taking differently: Take 20 mg by mouth every morning ) 90 tablet 3     clopidogrel (PLAVIX) 75 MG tablet Take 75 mg by mouth daily Takes in the morning       cyanocolbalamin (VITAMIN  B-12) 500 MCG tablet Take 500 mcg by mouth every morning  90 tablet 1     gabapentin (NEURONTIN) 100 MG capsule Take 1 to 4 capsules at bedtime as directed. (Patient taking differently: Take 200 mg by mouth At Bedtime Take 1 to 4 capsules at bedtime as directed.) 180 capsule 1     hydrochlorothiazide (HYDRODIURIL) 50 MG tablet Take 1 tablet (50 mg) by mouth daily  (Patient taking differently: Take 50 mg by mouth every morning ) 90 tablet 0     latanoprost (XALATAN) 0.005 % ophthalmic solution Place 1 drop into both eyes At Bedtime   1     levothyroxine (SYNTHROID/LEVOTHROID) 75 MCG tablet Take 1 tablet (75 mcg) by mouth daily (Patient taking differently: Take 75 mcg by mouth every morning ) 90 tablet 0     lisinopril (PRINIVIL/ZESTRIL) 40 MG tablet Take 1 tablet (40 mg) by mouth daily For blood pressure (Patient taking differently: Take 40 mg by mouth every morning For blood pressure) 90 tablet 0     loperamide (IMODIUM A-D) 2 MG tablet Take 0.5-1 tablets (1-2 mg) by mouth daily as needed for diarrhea 30 tablet 0     oxybutynin (DITROPAN) 5 MG tablet Take 1 tablet (5 mg) by mouth At Bedtime For frequent urination 90 tablet 0     pantoprazole (PROTONIX) 40 MG EC tablet Take 40 mg by mouth daily Takes in the morning.       Potassium Bicarb-Citric Acid 20 MEQ TBEF Take 20 mg by mouth daily (Patient taking differently: Take 20 mg by mouth every morning ) 90 tablet 3     potassium chloride ER (K-DUR/KLOR-CON M) 20 MEQ CR tablet Take 1 tablet (20 mEq) by mouth daily 90 tablet 0       Allergies  No Known Allergies    Social History  Social History     Socioeconomic History     Marital status:      Spouse name: Not on file     Number of children: Not on file     Years of education: Not on file     Highest education level: Not on file   Occupational History     Not on file   Social Needs     Financial resource strain: Not on file     Food insecurity:     Worry: Not on file     Inability: Not on file     Transportation needs:     Medical: Not on file     Non-medical: Not on file   Tobacco Use     Smoking status: Former Smoker     Packs/day: 0.50     Years: 15.00     Pack years: 7.50     Last attempt to quit: 1993     Years since quittin.3     Smokeless tobacco: Never Used   Substance and Sexual Activity     Alcohol use: Yes     Comment: social     Drug use: No      Sexual activity: Not Currently     Partners: Male   Lifestyle     Physical activity:     Days per week: Not on file     Minutes per session: Not on file     Stress: Not on file   Relationships     Social connections:     Talks on phone: Not on file     Gets together: Not on file     Attends Sabianist service: Not on file     Active member of club or organization: Not on file     Attends meetings of clubs or organizations: Not on file     Relationship status: Not on file     Intimate partner violence:     Fear of current or ex partner: Not on file     Emotionally abused: Not on file     Physically abused: Not on file     Forced sexual activity: Not on file   Other Topics Concern     Parent/sibling w/ CABG, MI or angioplasty before 65F 55M? Not Asked   Social History Narrative     for 52 years. Retired from [employer]. Frequent world travel.       Family History  Family History   Problem Relation Age of Onset     Breast Cancer Maternal Aunt 80     C.A.D. Father 54     No Known Problems Mother            Anesthesia Evaluation     . Pt has had prior anesthetic.     No history of anesthetic complications          ROS/MED HX  The complete review of systems is negative other than noted in the HPI or here.   ENT/Pulmonary:     (+)STEFFANIE risk factors hypertension, tobacco use, Past use moderate COPD, , . .    Neurologic:  - neg neurologic ROS     Cardiovascular: Comment: PVD, on Plavix    (+) Dyslipidemia, hypertension----. : . . . :. . Previous cardiac testing date:1/26/2018results:SRStress Testdate:2011 results:1. Normal myocardial SPECT study with a summed stress score of 0. A  summed stress score of <4 is associated with an annual event rate of  0.5% and 0.3% for myocardial infarction and cardiac death,  respectively (Ines. Circulation 1998;98:535-43).  2. No significant perfusion abnormalities.  3. Normal left ventricular systolic function with no regional wall  motion abnormalities.  4. No prior  study available for comparison.   ECG reviewed date: results: date: results:          METS/Exercise Tolerance:  3 - Able to walk 1-2 blocks without stopping   Hematologic:     (+) History of blood clots pt is not anticoagulated, -     (-) History of Transfusion   Musculoskeletal: Comment: Low back pain        GI/Hepatic:     (+) GERD Asymptomatic on medication,       Renal/Genitourinary:  - ROS Renal section negative       Endo:     (+) thyroid problem hypothyroidism, .   (-) chronic steroid usage   Psychiatric:     (+) psychiatric history anxiety      Infectious Disease:  - neg infectious disease ROS       Malignancy:   (+) Malignancy History of Other  Other CA status post Surgery         Other:             PHYSICAL EXAM:   Mental Status/Neuro: A/A/O; Age Appropriate   Airway: Facies: Feasible  Mallampati: IV  Mouth/Opening: Full  TM distance: > 6 cm  Neck ROM: Full   Respiratory: Auscultation: CTAB     Resp. Rate: Normal     Resp. Effort: Normal      CV: Rhythm: Regular  Rate: Age appropriate  Heart: Normal Sounds  Edema: None   Comments:        LABS:  CBC:   Lab Results   Component Value Date    WBC 7.3 11/13/2019    WBC 9.3 12/17/2018    HGB 12.1 11/13/2019    HGB 12.4 12/17/2018    HCT 39.2 11/13/2019    HCT 39.4 12/17/2018     (H) 11/13/2019     12/17/2018     BMP:   Lab Results   Component Value Date     11/13/2019     01/30/2018    POTASSIUM 3.9 11/13/2019    POTASSIUM 3.8 01/30/2018    CHLORIDE 105 11/13/2019    CHLORIDE 102 01/30/2018    CO2 32 11/13/2019    CO2 26 01/30/2018    BUN 13 11/13/2019    BUN 13 01/30/2018    CR 0.60 11/13/2019    CR 0.50 (L) 01/30/2018    GLC 66 (L) 11/13/2019    GLC 93 01/30/2018     COAGS:   Lab Results   Component Value Date    PTT 35 01/26/2018    INR 0.99 01/26/2018     POC:   Lab Results   Component Value Date    BGM 82 01/27/2018     OTHER:   Lab Results   Component Value Date    PH 7.42 01/27/2018    LACT 0.9 04/29/2017    CANDIDA 9.9 11/13/2019     "PHOS 3.5 02/24/2014    MAG 2.1 01/24/2017    ALBUMIN 3.6 11/13/2019    PROTTOTAL 7.1 11/13/2019    ALT 16 11/13/2019    AST 16 11/13/2019    ALKPHOS 80 11/13/2019    BILITOTAL 0.3 11/13/2019    LIPASE 258 05/11/2017    TSH 1.33 10/17/2018    T4 0.86 06/02/2018    T3 181 09/12/2005        Preop Vitals    BP Readings from Last 3 Encounters:   01/14/20 132/69   01/07/20 (!) 150/62   12/05/19 138/74    Pulse Readings from Last 3 Encounters:   01/14/20 77   01/07/20 82   12/05/19 92      Resp Readings from Last 3 Encounters:   12/05/19 14   07/23/19 15   01/09/19 16    SpO2 Readings from Last 3 Encounters:   01/14/20 92%   01/07/20 96%   12/05/19 94%      Temp Readings from Last 1 Encounters:   01/07/20 97.7  F (36.5  C) (Oral)    Ht Readings from Last 1 Encounters:   01/14/20 1.499 m (4' 11\")      Wt Readings from Last 1 Encounters:   01/14/20 47.2 kg (104 lb 1.6 oz)    Estimated body mass index is 21.03 kg/m  as calculated from the following:    Height as of an earlier encounter on 1/14/20: 1.499 m (4' 11\").    Weight as of an earlier encounter on 1/14/20: 47.2 kg (104 lb 1.6 oz).       Temp: 97.9  F (36.6  C) Temp src: Oral BP: 121/71 Pulse: 66   Resp: 16 SpO2: 96 %         105 lbs 0 oz  4' 10.268\"   Body mass index is 21.74 kg/m .       Physical Exam  Constitutional: Awake, alert, cooperative, no apparent distress, and appears stated age.  Eyes: Pupils equal, round and reactive to light, extra ocular muscles intact, sclera clear, conjunctiva normal.  HENT: Normocephalic, oral pharynx with moist mucus membranes, good dentition. No goiter appreciated.   Respiratory: Clear to auscultation bilaterally, no crackles or wheezing.  Cardiovascular: Regular rate and rhythm, normal S1 and S2, and no murmur noted.  Carotids +2, no bruits. No edema. Palpable pulses to radial  DP and PT arteries.   GI: Normal bowel sounds, soft  Lymph/Hematologic: No cervical lymphadenopathy and no supraclavicular lymphadenopathy.  Genitourinary:  " deferred  Skin: Warm and dry.    Musculoskeletal: Full ROM of neck. There is no redness, warmth, or swelling of the joints. Gross motor strength is normal.    Neurologic: Awake, alert, oriented to name, place and time. Cranial nerves II-XII are grossly intact. Gait is normal.   Neuropsychiatric: Calm, cooperative. Normal affect.     Labs: (personally reviewed)  Component      Latest Ref Rng & Units 2020   WBC      4.0 - 11.0 10e9/L 7.8   RBC Count      3.8 - 5.2 10e12/L 4.06   Hemoglobin      11.7 - 15.7 g/dL 10.4 (L)   Hematocrit      35.0 - 47.0 % 34.1 (L)   MCV      78 - 100 fl 84   MCH      26.5 - 33.0 pg 25.6 (L)   MCHC      31.5 - 36.5 g/dL 30.5 (L)   RDW      10.0 - 15.0 % 15.6 (H)   Platelet Count      150 - 450 10e9/L 580 (H)     Component      Latest Ref Rng & Units 2020   Sodium      133 - 144 mmol/L 138   Potassium      3.4 - 5.3 mmol/L 3.6   Chloride      94 - 109 mmol/L 105   Carbon Dioxide      20 - 32 mmol/L 29   Anion Gap      3 - 14 mmol/L 4   Glucose      70 - 99 mg/dL 84   Urea Nitrogen      7 - 30 mg/dL 14   Creatinine      0.52 - 1.04 mg/dL 0.57   GFR Estimate      >60 mL/min/1.73:m2 85   GFR Estimate If Black      >60 mL/min/1.73:m2 >90   Calcium      8.5 - 10.1 mg/dL 9.6       EKG: Personally reviewed but formal cardiology read [id number]  SR    Cardiac echo: scheduled for 2020    Stress test:   1. Normal myocardial SPECT study with a summed stress score of 0. A  summed stress score of <4 is associated with an annual event rate of  0.5% and 0.3% for myocardial infarction and cardiac death,  respectively (Ines. Circulation 1998;98:535-43).  2. No significant perfusion abnormalities.  3. Normal left ventricular systolic function with no regional wall  motion abnormalities.  4. No prior study available for comparison.     PFT's: 2019  FVC-Pred 1.97    L  2019  7:54    FVC-Pre 1.44    L  2019  7:54    FVC-%Pred-Pre 73    %   12/05/2019  7:54    FEV1-Pre 0.82    L  12/05/2019  7:54    FEV1-%Pred-Pre 54    %  12/05/2019  7:54    FEV1FVC-Pred 78    %  12/05/2019  7:54    FEV1FVC-Pre 57    %  12/05/2019  7:54    FEFMax-Pred 3.62    L/sec  12/05/2019  7:54    FEFMax-Pre 1.84    L/sec  12/05/2019  7:54    FEFMax-%Pred-Pre 50    %  12/05/2019  7:54    FEF2575-Pred 1.28    L/sec  12/05/2019  7:54    FEF2575-Pre 0.38    L/sec  12/05/2019  7:54    DVU7030-%Pred-Pre 29    %  12/05/2019  7:54    ExpTime-Pre 9.04    sec  12/05/2019  7:54    FIFMax-Pre 2.09    L/sec  12/05/2019  7:54    VC-Pred 2.03    L  12/05/2019  7:54    VC-Pre 1.54    L  12/05/2019  7:54    VC-%Pred-Pre 76    %  12/05/2019  7:54    IC-Pred 1.47    L  12/05/2019  7:54    IC-Pre 1.22    L  12/05/2019  7:54    IC-%Pred-Pre 83    %  12/05/2019  7:54    ERV-Pred 0.55    L  12/05/2019  7:54    ERV-Pre 0.32    L  12/05/2019  7:54    ERV-%Pred-Pre 57    %  12/05/2019  7:54    FEV1FEV6-Pred 77    %  12/05/2019  7:54    FEV1FEV6-Pre 58    %  12/05/2019  7:54    DLCOunc-Pred 15.03    ml/min/mmHg  12/05/2019  7:54    DLCOunc-Pre 9.32    ml/min/mmHg  12/05/2019  7:54    DLCOunc-%Pred-Pre 62    %  12/05/2019  7:54    VA-Pre 3.29    L  12/05/2019  7:54    VA-%Pred-Pre 93    %  12/05/2019  7:54    FEV1SVC-Pred 74    %  12/05/2019  7:54    FEV1SVC-Pre 53    %  12/05/2019  7:54    Narrative     IMPRESSION:  Moderate Airflow Obstruction   Mild diffusion defect.  The diffusing capacity was not corrected for the patient's hemoglobin.  Compared with testing from 11/16/2018 there has been no significant change in the FEV1 or FVC.         Outside records reviewed from: care everywhere    ASSESSMENT and PLAN  Lucie Stockton is an 84 year old female scheduled for ENDOBRONCHIAL ULTRASOUND, WITH FLEXIBLE BRONCHOSCOPY,  "endobronchial and transbronchial biopsies, Super D navigational bronchoscopy on 1/24/2020 by Dr. Jara in treatment of lung nodules.  PAC referral for risk assessment and optimization for anesthesia with history of bilateral pulmonary embolism, possible pulmonary hypertension, peripheral vascular disease, Grave's disease now hypothyroied, DVT, hypertension, GERD:    Pre-operative considerations:  1.  Cardiac:  Functional status- METS 3-4. Pt can climb her stairs several times per day without cardiopulmonary symptoms.  Low risk surgery with  (RCRI #) risk of major adverse cardiac event.   -denies CP, SOB, HERRERA, palpitations  -h/o hypertension, will take amlodipine DOS, will hold hydrochlorothiazide and lisinopril DOS  -h/o hyperlipidemia, will take Lipitor DOS  -h/o PAD with abdominal aortic stenosis.  On Plavix.  Will hold 5 days prior to DOS  -possible pulmonary hypertension, enlargement of pulmonary artery of CT scan 11/13/2019,  saw Dr. Florian with cardiology today.  Work up for Pulmonary hypertension ongoing.  Echo scheduled prior to DOS.  Per Dr. Florian's note 1/14/2020 \"Therefore, we will get an echocardiogram to evaluate her PA pressures and RV function, a VQ scan, a CT with PE protocol, and a 6MWT. She is actually functional class II currently so unless her RV is significantly impaired she should be optimized for her procedure.\"    2.  Pulm:  Airway feasible.  STEFFANIE risk: low  -former smoker, quit 1993  -COPD, no current therapies  -PFTs 12/5/2019 with FEV1 of 0.82L, 54%.  Moderate airflow obstruction and mild diffusion defect.  -recent bronchitis, afebrile.  Lungs CTA on exam today with good respiratory effort.   -pulmonary nodules, procedure as above    3.  GI:  Risk of PONV score = 2.  If > 2, anti-emetic intervention recommended.  -pantoprazole daily     4.  Endo:  -h/o Grave's disease, now hypothyroid, will take levothyroxine DOS    5.  Musculoskeletal  -h/o low back pain.  Gabapentin for pain at bedtime    6. "  Psych  -h/o anxiety, will take Celexa DOS      VTE risk: 4.5%    NOTE: Echo completed today 1/23/2020.  RV size and function are normal.  May proceed with surgery.    Interpretation Summary  Left ventricular function, chamber size, wall motion, and wall thickness are  normal.The EF is 55-60%. Grade II or moderate diastolic dysfunction.  Normal RV size and function.  Pulmonary artery systolic pressure cannot be assessed.  No significant valvular abnormality.  The atrial septum is intact as assessed by color Doppler and agitated saline  bubble study .  No pericardial effusion.  Patient was discussed with Dr Rico.    Nupur Garrett PA-C  Preoperative Assessment Center  [facility]  Phone: [phone]  Fax: [phone]

## 2020-01-14 NOTE — PROGRESS NOTES
"January 14, 2020    Marii Montgomery MD  Internal Medicine  [place], [place]    Dear Dr. Montgomery,    I had the pleasure of seeing your patient Lucie Stockton at the [facility] Pulmonary Hypertension clinic.  As you know, Lucie is a very pleasant 84 year old female with a history of bilateral pulmonary embolism, peripheral vascular disease, Grave's disease, DVT, hypertension, and likely metastatic disease with no tissue diagnosis at this time who presents for evaluation for pulmonary hypertension due to enlargement pulmonary artery of CT scan. Lucie had her CT scan to help with her work-up of lung nodules and she recently had a PET scan that showed multiple nodules that were metabolically active. There is concern for a meta-static process, but the tissue diagnosis is lacking.    Lucie states that her exercise capacity is quite good. She is able to exercise at [employer] for about 30 minutes a day without significant dyspnea, chest pain or pressure, palpitations, or presyncope. She is able to go up a flight of stairs without any limitations at her house. She denies any lower extremity edema, abdominal fullness, or syncope. Overall, I would classify her was WHO FC II.    Of note, Lucie had a CT scan in January of 2018 which showed evidence of chronic clot. She had not had any work up for pulmonary hypertension since then. She is not taking any anticoagulation at this point. She does continue to take clopidogrel for her PAD.    PAST MEDICAL HISTORY:  Past Medical History:   Diagnosis Date     Anemia      Aortic stenosis     abdominal     Atherosclerosis of aorta (H)     \"extensive disease with near occlusion below level of renal arteries\" on CT     Atherosclerosis of arteries of extremities (H)      Back pain     severe multilevel DJD, moderate spinal canal stenosis L4-5, severe L3-4     Chronic pain     Back, hips, knees, legs     Degenerative joint disease      DVT of lower extremity, bilateral (H)     5/24/10 " left distal femoral and great saphenous, 7/7/10 left:  extending to cfv, iliac , pop and post tibial, peronial, right:  distal fem and peroneal      Grave's disease      Hyperlipidaemia LDL goal < 70      Hypertension, essential      Hypothyroidism      Imbalance      Insomnia      Intermittent claudication (H)      Iron deficiency      Knee pain      Lumbar stenosis with neurogenic claudication      Osteoarthritis     ankle,foot, knee     Osteoporosis      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 Father 54       SOCIAL HISTORY:  Social History     Socioeconomic History     Marital status:      Spouse name: Not on file     Number of children: Not on file     Years of education: Not on file     Highest education level: Not on file   Occupational History     Not on file   Social Needs     Financial resource strain: Not on file     Food insecurity:     Worry: Not on file     Inability: Not on file     Transportation needs:     Medical: Not on file     Non-medical: Not on file   Tobacco Use     Smoking status: Former Smoker     Packs/day: 0.50     Years: 15.00     Pack years: 7.50     Last attempt to quit: 1993     Years since quittin.3     Smokeless tobacco: Never Used   Substance and Sexual Activity     Alcohol use: Yes     Comment: social     Drug use: No     Sexual activity: Not Currently     Partners: Male   Lifestyle     Physical activity:     Days per week: Not on file     Minutes per session: Not on file     Stress: Not on file   Relationships     Social connections:     Talks on phone: Not on file     Gets together: Not on file     Attends Christian service: Not on file      Active member of club or organization: Not on file     Attends meetings of clubs or organizations: Not on file     Relationship status: Not on file     Intimate partner violence:     Fear of current or ex partner: Not on file     Emotionally abused: Not on file     Physically abused: Not on file     Forced sexual activity: Not on file   Other Topics Concern     Parent/sibling w/ CABG, MI or angioplasty before 65F 55M? Not Asked   Social History Narrative     for 52 years. Retired from [employer]. Frequent world travel.       CURRENT MEDICATIONS:  Current Outpatient Medications   Medication Sig Dispense Refill     acetaminophen (TYLENOL) 325 MG tablet Take 2 tablets every 6 to 8 hours as needed for pain 100 tablet 1     amLODIPine (NORVASC) 10 MG tablet Take 1 tablet (10 mg) by mouth daily For blood pressure 90 tablet 3     atorvastatin (LIPITOR) 40 MG tablet Take 2 tablets (80 mg) by mouth daily 180 tablet 3     benzonatate (TESSALON) 200 MG capsule Take 1 capsule (200 mg) by mouth 3 times daily as needed for cough 21 capsule 0     betamethasone dipropionate (DIPROSONE) 0.05 % external ointment Apply topically 2 times daily 15 g 0     Calcium Carbonate-Vitamin D (CALCIUM 600 + D OR) Take 1 tablet by mouth daily.       Cholecalciferol (VITAMIN D3 PO) Take by mouth daily       citalopram (CELEXA) 20 MG tablet Take 1 tablet (20 mg) by mouth daily 90 tablet 3     clopidogrel (PLAVIX) 75 MG tablet Take 1 tablet (75 mg) by mouth daily 90 tablet 3     cyanocolbalamin (VITAMIN  B-12) 500 MCG tablet Take 1 tablet (500 mcg) by mouth daily 90 tablet 1     gabapentin (NEURONTIN) 100 MG capsule Take 1 to 4 capsules at bedtime as directed. 180 capsule 1     hydrochlorothiazide (HYDRODIURIL) 50 MG tablet Take 1 tablet (50 mg) by mouth daily 90 tablet 0     latanoprost (XALATAN) 0.005 % ophthalmic solution INT 1 GTT IN OU QD UTD  1     levothyroxine (SYNTHROID/LEVOTHROID) 75 MCG tablet Take 1 tablet (75 mcg)  "by mouth daily 90 tablet 0     lisinopril (PRINIVIL/ZESTRIL) 40 MG tablet Take 1 tablet (40 mg) by mouth daily For blood pressure 90 tablet 0     loperamide (IMODIUM A-D) 2 MG tablet Take 0.5-1 tablets (1-2 mg) by mouth daily as needed for diarrhea 30 tablet 0     Multiple Vitamins-Minerals (OCUVITE ADULT 50+) CAPS Take 1 tablet by mouth daily 90 capsule 3     oxybutynin (DITROPAN) 5 MG tablet Take 1 tablet (5 mg) by mouth At Bedtime For frequent urination 90 tablet 0     pantoprazole (PROTONIX) 40 MG EC tablet Take 1 tablet (40 mg) by mouth daily 90 tablet 3     Potassium Bicarb-Citric Acid 20 MEQ TBEF Take 20 mg by mouth daily 90 tablet 3     potassium chloride ER (K-DUR/KLOR-CON M) 20 MEQ CR tablet Take 1 tablet (20 mEq) by mouth daily 90 tablet 0       ROS:   10 point ROS negative except HPI    EXAM:  /69   Pulse 77   Ht 1.499 m (4' 11\")   Wt 47.2 kg (104 lb 1.6 oz)   LMP  (LMP Unknown)   SpO2 92%   BMI 21.03 kg/m    General: appears comfortable, alert and articulate  Head: normocephalic, atraumatic  Eyes: anicteric sclera, EOMI  Neck: no adenopathy  Orophyarynx: moist mucosa, no lesions, dentition intact  Heart: regular, S1/S2, no murmur, gallop, rub, estimated JVP 3 cm  Lungs: clear, no rales or wheezing  Abdomen: soft, non-tender, bowel sounds present, no hepatosplenomegaly  Extremities: no clubbing, cyanosis or edema  Neurological: normal speech and affect, no gross motor deficits    Labs:  CBC RESULTS:  Lab Results   Component Value Date    WBC 7.3 11/13/2019    RBC 4.36 11/13/2019    HGB 12.1 11/13/2019    HCT 39.2 11/13/2019    MCV 90 11/13/2019    MCH 27.8 11/13/2019    MCHC 30.9 (L) 11/13/2019    RDW 13.8 11/13/2019     (H) 11/13/2019       CMP RESULTS:  Lab Results   Component Value Date     11/13/2019    POTASSIUM 3.9 11/13/2019    CHLORIDE 105 11/13/2019    CO2 32 11/13/2019    ANIONGAP 4 11/13/2019    GLC 66 (L) 11/13/2019    BUN 13 11/13/2019    CR 0.60 11/13/2019    " GFRESTIMATED 84 11/13/2019    GFRESTBLACK >90 11/13/2019    CANDIDA 9.9 11/13/2019    BILITOTAL 0.3 11/13/2019    ALBUMIN 3.6 11/13/2019    ALKPHOS 80 11/13/2019    ALT 16 11/13/2019    AST 16 11/13/2019        CT PE 1/27/18  1. Age-indeterminate, but possibly chronic pulmonary emboli in the right lower lobe.  2. Mild groundglass opacities in the posterior right upper lobe, likely infectious/inflammatory.  3. Moderate apically predominant centrilobular emphysema.  4. Scattered pulmonary nodules measuring up to 5 mm. Recommend follow-up chest CT in one year per the Fleischner Society criteria.  5. Minimally displaced lateral right seventh and eighth rib fractures.  6. Possible mild distal esophageal wall thickening, suggestive of esophagitis.  7. Calcifications in the pancreatic head with a rounded associated cystic focus likely representing a pseudocyst, findings likely related to chronic pancreatitis, though evaluation is limited. Consider further dedicated imaging of the pancreas as clinically indicated.    Assessment and Plan: Lucie Stockton is an 84 year old female with history of bilateral pulmonary embolism, DVT, hypertension, and possible metastatic disease who presents for evaluation of possible pulmonary hypertension.    1. Possible pulmonary hypertension: Lucie may have pulmonary hypertension and thus we need to perform a work up. I would like to proceed with a noninvasive work up before getting an invasive evaluation started. Therefore, we will get an echocardiogram to evaluate her PA pressures and RV function, a VQ scan, a CT with PE protocol, and a 6MWT. She is actually functional class II currently so unless her RV is significantly impaired she should be optimized for her procedure.     2. PAD with atherosclerosis of the abdominal aorta: Continue clopidogrel and statin.     It was a pleasure seeing your patient Lucie Stockton at the [facility] Pulmonary Hypertension clinic.  Please contact us  with any questions or concerns that you may have.    Sincerely,    Seven Florian MD, PhD   of Medicine  [facility]  Cardiovascular Division  [facility]

## 2020-01-14 NOTE — ANESTHESIA PREPROCEDURE EVALUATION
"Anesthesia Pre-Procedure Evaluation    Patient: Lucie Stockton   MRN:     [id number] Gender:   female   Age:    84 year old :      1935        Preoperative Diagnosis: * No surgery found *        Past Medical History:   Diagnosis Date     Anemia      Aortic stenosis     abdominal     Atherosclerosis of aorta (H)     \"extensive disease with near occlusion below level of renal arteries\" on CT     Atherosclerosis of arteries of extremities (H)      Back pain     severe multilevel DJD, moderate spinal canal stenosis L4-5, severe L3-4     Chronic pain     Back, hips, knees, legs     Degenerative joint disease      DVT of lower extremity, bilateral (H)     5/24/10 left distal femoral and great saphenous, 7/7/10 left:  extending to cfv, iliac , pop and post tibial, peronial, right:  distal fem and peroneal      Grave's disease      Hyperlipidaemia LDL goal < 70      Hypertension, essential      Hypothyroidism      Imbalance      Insomnia      Intermittent claudication (H)      Iron deficiency      Knee pain      Lumbar stenosis with neurogenic claudication      Osteoarthritis     ankle,foot, knee     Osteoporosis      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 JONO Goncalves - Returned BENIGN     INJECT EPIDURAL LUMBAR / SACRAL SINGLE N/A 2018    Procedure: INJECT EPIDURAL LUMBAR / SACRAL SINGLE;  lumbar interlaminar epidural steroid injection;  Surgeon: Allyson, " Jaylin Garcia MD;  Location: [facility] OR     INJECT SACROILIAC JOINT Right 3/7/2018    Procedure: INJECT SACROILIAC JOINT;  Right Sacroiliac Joint Injection;  Surgeon: Flavio Harrison MD;  Location: [facility] OR     INJECT SACROILIAC JOINT Bilateral 12/7/2018    Procedure: Bilateral Sacroiliac Joint Injections;  Surgeon: Faviola Cosby MD;  Location: [facility] OR     UPPER GI ENDOSCOPY      11/10/10 erythematous gastropathy, angioectasia          Anesthesia Evaluation     . Pt has had prior anesthetic.     No history of anesthetic complications          ROS/MED HX    ENT/Pulmonary:     (+)STEFFANIE risk factors hypertension, tobacco use, Past use moderate COPD, , . .    Neurologic:  - neg neurologic ROS     Cardiovascular: Comment: PVD, on Plavix    (+) Dyslipidemia, hypertension----. : . . . :. . Previous cardiac testing date:1/26/2018results:SRStress Testdate:2011 results:1. Normal myocardial SPECT study with a summed stress score of 0. A  summed stress score of <4 is associated with an annual event rate of  0.5% and 0.3% for myocardial infarction and cardiac death,  respectively (Ines. Circulation 1998;98:535-43).  2. No significant perfusion abnormalities.  3. Normal left ventricular systolic function with no regional wall  motion abnormalities.  4. No prior study available for comparison.   ECG reviewed date: results: date: results:          METS/Exercise Tolerance:  3 - Able to walk 1-2 blocks without stopping   Hematologic:     (+) History of blood clots pt is not anticoagulated, -     (-) History of Transfusion   Musculoskeletal: Comment: Low back pain        GI/Hepatic:     (+) GERD Asymptomatic on medication,       Renal/Genitourinary:  - ROS Renal section negative       Endo:     (+) thyroid problem hypothyroidism, .   (-) chronic steroid usage   Psychiatric:     (+) psychiatric history anxiety      Infectious Disease:  - neg infectious disease ROS       Malignancy:   (+) Malignancy History of Other  Other CA status  post Surgery         Other:                         PHYSICAL EXAM:   Mental Status/Neuro: A/A/O; Age Appropriate   Airway: Facies: Feasible  Mallampati: IV  Mouth/Opening: Full  TM distance: > 6 cm  Neck ROM: Full   Respiratory: Auscultation: CTAB     Resp. Rate: Normal     Resp. Effort: Normal      CV: Rhythm: Regular  Rate: Age appropriate  Heart: Normal Sounds  Edema: None   Comments:                      LABS:  CBC:   Lab Results   Component Value Date    WBC 7.3 11/13/2019    WBC 9.3 12/17/2018    HGB 12.1 11/13/2019    HGB 12.4 12/17/2018    HCT 39.2 11/13/2019    HCT 39.4 12/17/2018     (H) 11/13/2019     12/17/2018     BMP:   Lab Results   Component Value Date     11/13/2019     01/30/2018    POTASSIUM 3.9 11/13/2019    POTASSIUM 3.8 01/30/2018    CHLORIDE 105 11/13/2019    CHLORIDE 102 01/30/2018    CO2 32 11/13/2019    CO2 26 01/30/2018    BUN 13 11/13/2019    BUN 13 01/30/2018    CR 0.60 11/13/2019    CR 0.50 (L) 01/30/2018    GLC 66 (L) 11/13/2019    GLC 93 01/30/2018     COAGS:   Lab Results   Component Value Date    PTT 35 01/26/2018    INR 0.99 01/26/2018     POC:   Lab Results   Component Value Date    BGM 82 01/27/2018     OTHER:   Lab Results   Component Value Date    PH 7.42 01/27/2018    LACT 0.9 04/29/2017    CANDIDA 9.9 11/13/2019    PHOS 3.5 02/24/2014    MAG 2.1 01/24/2017    ALBUMIN 3.6 11/13/2019    PROTTOTAL 7.1 11/13/2019    ALT 16 11/13/2019    AST 16 11/13/2019    ALKPHOS 80 11/13/2019    BILITOTAL 0.3 11/13/2019    LIPASE 258 05/11/2017    TSH 1.33 10/17/2018    T4 0.86 06/02/2018    T3 181 09/12/2005        Preop Vitals    BP Readings from Last 3 Encounters:   01/14/20 132/69   01/07/20 (!) 150/62   12/05/19 138/74    Pulse Readings from Last 3 Encounters:   01/14/20 77   01/07/20 82   12/05/19 92      Resp Readings from Last 3 Encounters:   12/05/19 14   07/23/19 15   01/09/19 16    SpO2 Readings from Last 3 Encounters:   01/14/20 92%   01/07/20 96%   12/05/19  "94%      Temp Readings from Last 1 Encounters:   01/07/20 97.7  F (36.5  C) (Oral)    Ht Readings from Last 1 Encounters:   01/14/20 1.499 m (4' 11\")      Wt Readings from Last 1 Encounters:   01/14/20 47.2 kg (104 lb 1.6 oz)    Estimated body mass index is 21.03 kg/m  as calculated from the following:    Height as of an earlier encounter on 1/14/20: 1.499 m (4' 11\").    Weight as of an earlier encounter on 1/14/20: 47.2 kg (104 lb 1.6 oz).     LDA:        JZG FV AN PLAN NO PONV RULE       PAC Discussion and Assessment    ASA Classification: 3  Case is suitable for: [place]  Anesthetic techniques and relevant risks discussed: GA  Invasive monitoring and risk discussed: No  Types:   Possibility and Risk of blood transfusion discussed: Yes  NPO instructions given:   Additional anesthetic preparation and risks discussed:   Needs early admission to pre-op area:   Other:     PAC Resident/NP Anesthesia Assessment:  Lucie Stockton is an 84 year old female scheduled for ENDOBRONCHIAL ULTRASOUND, WITH FLEXIBLE BRONCHOSCOPY, endobronchial and transbronchial biopsies, Super D navigational bronchoscopy on 1/24/2020 by Dr. Jara in treatment of lung nodules.  PAC referral for risk assessment and optimization for anesthesia with history of bilateral pulmonary embolism, possible pulmonary hypertension, peripheral vascular disease, Grave's disease now hypothyroied, DVT, hypertension, GERD:    Pre-operative considerations:  1.  Cardiac:  Functional status- METS 3-4. Pt can climb her stairs several times per day without cardiopulmonary symptoms.  Low risk surgery with  (RCRI #) risk of major adverse cardiac event.   -denies CP, SOB, HERRERA, palpitations  -h/o hypertension, will take amlodipine DOS, will hold hydrochlorothiazide and lisinopril DOS  -h/o hyperlipidemia, will take Lipitor DOS  -h/o PAD with abdominal aortic stenosis.  On Plavix.  Will hold 5 days prior to DOS  -possible pulmonary hypertension, enlargement of pulmonary artery " "of CT scan 11/13/2019,  saw Dr. Florian with cardiology today.  Work up for Pulmonary hypertension ongoing.  Echo scheduled prior to DOS.  Per Dr. Florian's note 1/14/2020 \"Therefore, we will get an echocardiogram to evaluate her PA pressures and RV function, a VQ scan, a CT with PE protocol, and a 6MWT. She is actually functional class II currently so unless her RV is significantly impaired she should be optimized for her procedure.\"    2.  Pulm:  Airway feasible.  STEFFANIE risk: low  -former smoker, quit 1993  -COPD, no current therapies  -PFTs 12/5/2019 with FEV1 of 0.82L, 54%.  Moderate airflow obstruction and mild diffusion defect.  -recent bronchitis, afebrile.  Lungs CTA on exam today with good respiratory effort.   -pulmonary nodules, procedure as above    3.  GI:  Risk of PONV score = 2.  If > 2, anti-emetic intervention recommended.  -pantoprazole daily     4.  Endo:  -h/o Grave's disease, now hypothyroid, will take levothyroxine DOS    5.  Musculoskeletal  -h/o low back pain.  Gabapentin for pain at bedtime    6.  Psych  -h/o anxiety, will take Celexa DOS      VTE risk: 4.5%        **For further details of assessment, testing, and physical exam please see H and P completed on same date.          Nupur Garrett PA-C, [facility]      Reviewed and Signed by PAC Mid-Level Provider/Resident  Mid-Level Provider/Resident: Nupur Garrett  Date: 1/14/2020  Time:     Attending Anesthesiologist Anesthesia Assessment:  84 year old for EBUS in management of lung nodules. As noted, she has a mention of pHTN saw Dr. Florian who would like an echo. I don't think she needs the VQ scan and CT or 6 min walk prior to surgery unless the echo shows significant pHTN, again, due to her good functional class at this point. History of DVT and PE, hence the CT to check for chronic thromboembolic disease.     On plavix for her CAD, should be able to hold prior to surgery.    Patient/case discussed with CARLOS/resident; agree with above assessment. No " need to see patient. Patient is appropriate for the planned procedure without further work-up or medical management.    Gracie Rico MD  .       Anesthesiologist:   Date:   Time:   Pass/Fail:   Disposition:     PAC Pharmacist Assessment:        Pharmacist:   Date:   Time:    Nupur Garrett PA-C

## 2020-01-14 NOTE — LETTER
"   RE: Lucie Stockton  [address] [facility] [address]     January 14, 2020    Marii Montgomery MD  Internal Medicine  [place], [place]    Dear Dr. Montgomery,    I had the pleasure of seeing your patient Lucie Stockton at the [facility] Pulmonary Hypertension clinic.  As you know, Lucie is a very pleasant 84 year old female with a history of bilateral pulmonary embolism, peripheral vascular disease, Grave's disease, DVT, hypertension, and likely metastatic disease with no tissue diagnosis at this time who presents for evaluation for pulmonary hypertension due to enlargement pulmonary artery of CT scan. Lucie had her CT scan to help with her work-up of lung nodules and she recently had a PET scan that showed multiple nodules that were metabolically active. There is concern for a meta-static process, but the tissue diagnosis is lacking.    Luice states that her exercise capacity is quite good. She is able to exercise at [employer] for about 30 minutes a day without significant dyspnea, chest pain or pressure, palpitations, or presyncope. She is able to go up a flight of stairs without any limitations at her house. She denies any lower extremity edema, abdominal fullness, or syncope. Overall, I would classify her was WHO FC II.    Of note, Lucie had a CT scan in January of 2018 which showed evidence of chronic clot. She had not had any work up for pulmonary hypertension since then. She is not taking any anticoagulation at this point. She does continue to take clopidogrel for her PAD.    PAST MEDICAL HISTORY:  Past Medical History:   Diagnosis Date     Anemia      Aortic stenosis     abdominal     Atherosclerosis of aorta (H)     \"extensive disease with near occlusion below level of renal arteries\" on CT     Atherosclerosis of arteries of extremities (H)      Back pain     severe multilevel DJD, moderate spinal canal stenosis L4-5, severe L3-4     Chronic pain     Back, hips, knees, legs     " Degenerative joint disease      DVT of lower extremity, bilateral (H)     5/24/10 left distal femoral and great saphenous, 7/7/10 left:  extending to cfv, iliac , pop and post tibial, peronial, right:  distal fem and peroneal      Grave's disease      Hyperlipidaemia LDL goal < 70      Hypertension, essential      Hypothyroidism      Imbalance      Insomnia      Intermittent claudication (H)      Iron deficiency      Knee pain      Lumbar stenosis with neurogenic claudication      Osteoarthritis     ankle,foot, knee     Osteoporosis      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 Father 54       SOCIAL HISTORY:  Social History     Socioeconomic History     Marital status:      Spouse name: Not on file     Number of children: Not on file     Years of education: Not on file     Highest education level: Not on file   Occupational History     Not on file   Social Needs     Financial resource strain: Not on file     Food insecurity:     Worry: Not on file     Inability: Not on file     Transportation needs:     Medical: Not on file     Non-medical: Not on file   Tobacco Use     Smoking status: Former Smoker     Packs/day: 0.50     Years: 15.00     Pack years: 7.50     Last attempt to quit: 1993     Years since quittin.3     Smokeless tobacco: Never Used   Substance and Sexual Activity     Alcohol use: Yes     Comment: social     Drug use: No     Sexual activity: Not Currently     Partners: Male   Lifestyle     Physical activity:     Days per week: Not on file     Minutes per session: Not on file     Stress: Not on file   Relationships     Social connections:     Talks on phone: Not  on file     Gets together: Not on file     Attends Spiritism service: Not on file     Active member of club or organization: Not on file     Attends meetings of clubs or organizations: Not on file     Relationship status: Not on file     Intimate partner violence:     Fear of current or ex partner: Not on file     Emotionally abused: Not on file     Physically abused: Not on file     Forced sexual activity: Not on file   Other Topics Concern     Parent/sibling w/ CABG, MI or angioplasty before 65F 55M? Not Asked   Social History Narrative     for 52 years. Retired from [employer]. Frequent world travel.       CURRENT MEDICATIONS:  Current Outpatient Medications   Medication Sig Dispense Refill     acetaminophen (TYLENOL) 325 MG tablet Take 2 tablets every 6 to 8 hours as needed for pain 100 tablet 1     amLODIPine (NORVASC) 10 MG tablet Take 1 tablet (10 mg) by mouth daily For blood pressure 90 tablet 3     atorvastatin (LIPITOR) 40 MG tablet Take 2 tablets (80 mg) by mouth daily 180 tablet 3     benzonatate (TESSALON) 200 MG capsule Take 1 capsule (200 mg) by mouth 3 times daily as needed for cough 21 capsule 0     betamethasone dipropionate (DIPROSONE) 0.05 % external ointment Apply topically 2 times daily 15 g 0     Calcium Carbonate-Vitamin D (CALCIUM 600 + D OR) Take 1 tablet by mouth daily.       Cholecalciferol (VITAMIN D3 PO) Take by mouth daily       citalopram (CELEXA) 20 MG tablet Take 1 tablet (20 mg) by mouth daily 90 tablet 3     clopidogrel (PLAVIX) 75 MG tablet Take 1 tablet (75 mg) by mouth daily 90 tablet 3     cyanocolbalamin (VITAMIN  B-12) 500 MCG tablet Take 1 tablet (500 mcg) by mouth daily 90 tablet 1     gabapentin (NEURONTIN) 100 MG capsule Take 1 to 4 capsules at bedtime as directed. 180 capsule 1     hydrochlorothiazide (HYDRODIURIL) 50 MG tablet Take 1 tablet (50 mg) by mouth daily 90 tablet 0     latanoprost (XALATAN) 0.005 % ophthalmic solution INT 1 GTT IN OU QD UTD   "1     levothyroxine (SYNTHROID/LEVOTHROID) 75 MCG tablet Take 1 tablet (75 mcg) by mouth daily 90 tablet 0     lisinopril (PRINIVIL/ZESTRIL) 40 MG tablet Take 1 tablet (40 mg) by mouth daily For blood pressure 90 tablet 0     loperamide (IMODIUM A-D) 2 MG tablet Take 0.5-1 tablets (1-2 mg) by mouth daily as needed for diarrhea 30 tablet 0     Multiple Vitamins-Minerals (OCUVITE ADULT 50+) CAPS Take 1 tablet by mouth daily 90 capsule 3     oxybutynin (DITROPAN) 5 MG tablet Take 1 tablet (5 mg) by mouth At Bedtime For frequent urination 90 tablet 0     pantoprazole (PROTONIX) 40 MG EC tablet Take 1 tablet (40 mg) by mouth daily 90 tablet 3     Potassium Bicarb-Citric Acid 20 MEQ TBEF Take 20 mg by mouth daily 90 tablet 3     potassium chloride ER (K-DUR/KLOR-CON M) 20 MEQ CR tablet Take 1 tablet (20 mEq) by mouth daily 90 tablet 0       ROS:   10 point ROS negative except HPI    EXAM:  /69   Pulse 77   Ht 1.499 m (4' 11\")   Wt 47.2 kg (104 lb 1.6 oz)   LMP  (LMP Unknown)   SpO2 92%   BMI 21.03 kg/m     General: appears comfortable, alert and articulate  Head: normocephalic, atraumatic  Eyes: anicteric sclera, EOMI  Neck: no adenopathy  Orophyarynx: moist mucosa, no lesions, dentition intact  Heart: regular, S1/S2, no murmur, gallop, rub, estimated JVP 3 cm  Lungs: clear, no rales or wheezing  Abdomen: soft, non-tender, bowel sounds present, no hepatosplenomegaly  Extremities: no clubbing, cyanosis or edema  Neurological: normal speech and affect, no gross motor deficits    Labs:  CBC RESULTS:  Lab Results   Component Value Date    WBC 7.3 11/13/2019    RBC 4.36 11/13/2019    HGB 12.1 11/13/2019    HCT 39.2 11/13/2019    MCV 90 11/13/2019    MCH 27.8 11/13/2019    MCHC 30.9 (L) 11/13/2019    RDW 13.8 11/13/2019     (H) 11/13/2019       CMP RESULTS:  Lab Results   Component Value Date     11/13/2019    POTASSIUM 3.9 11/13/2019    CHLORIDE 105 11/13/2019    CO2 32 11/13/2019    ANIONGAP 4 " 11/13/2019    GLC 66 (L) 11/13/2019    BUN 13 11/13/2019    CR 0.60 11/13/2019    GFRESTIMATED 84 11/13/2019    GFRESTBLACK >90 11/13/2019    CANDIDA 9.9 11/13/2019    BILITOTAL 0.3 11/13/2019    ALBUMIN 3.6 11/13/2019    ALKPHOS 80 11/13/2019    ALT 16 11/13/2019    AST 16 11/13/2019        CT PE 1/27/18  1. Age-indeterminate, but possibly chronic pulmonary emboli in the right lower lobe.  2. Mild groundglass opacities in the posterior right upper lobe, likely infectious/inflammatory.  3. Moderate apically predominant centrilobular emphysema.  4. Scattered pulmonary nodules measuring up to 5 mm. Recommend follow-up chest CT in one year per the Fleischner Society criteria.  5. Minimally displaced lateral right seventh and eighth rib fractures.  6. Possible mild distal esophageal wall thickening, suggestive of esophagitis.  7. Calcifications in the pancreatic head with a rounded associated cystic focus likely representing a pseudocyst, findings likely related to chronic pancreatitis, though evaluation is limited. Consider further dedicated imaging of the pancreas as clinically indicated.    Assessment and Plan: Lucie Stockton is an 84 year old female with history of bilateral pulmonary embolism, DVT, hypertension, and possible metastatic disease who presents for evaluation of possible pulmonary hypertension.    1. Possible pulmonary hypertension: Lucie may have pulmonary hypertension and thus we need to perform a work up. I would like to proceed with a noninvasive work up before getting an invasive evaluation started. Therefore, we will get an echocardiogram to evaluate her PA pressures and RV function, a VQ scan, a CT with PE protocol, and a 6MWT. She is actually functional class II currently so unless her RV is significantly impaired she should be optimized for her procedure.     2. PAD with atherosclerosis of the abdominal aorta: Continue clopidogrel and statin.     It was a pleasure seeing your patient Lucie Stockton  at the [facility] Pulmonary Hypertension clinic.  Please contact us with any questions or concerns that you may have.    Sincerely,    Seven Florian MD, PhD   of Medicine  [facility]  Cardiovascular Division  [facility]

## 2020-01-14 NOTE — NURSING NOTE
Chief Complaint   Patient presents with     New Patient     New PH referral from      Vitals were taken and medications were reconciled.   Allison Bonilla  9:05 AM

## 2020-01-14 NOTE — PATIENT INSTRUCTIONS
Preparing for Your Surgery      Name:  Lucie Stockton   MRN:  [id number]   :  1935   Today's Date:  2020     Arriving for surgery:  Surgery date:  20  Arrival time:  7:10 am  Please come to:       [facility] [address]  [address] [place]  [address]    - ? parking is available in front of the hospital      -    Please proceed to [address] on the 3rd floor. [phone]?     - ?If you are in need of directions, wheelchair or escort please stop at the Information Desk in the lobby.  Inform the information person that you are here for surgery; a wheelchair and escort to [address] will be provided.?     What can I eat or drink?  -  You may have solid food or milk products until 8 hours prior to your surgery (1:10 am).  -  You may have water, apple juice or 7up/Sprite until 2 hours prior to your surgery (7:10 am).    Which medicines can I take?  Stop Aspirin, vitamins and supplements one week prior to surgery.  Hold Ibuprofen for 24 hours and/or Naproxen for 48 hours prior to surgery.   Hold Plavix 5 days prior to surgery.    -  Do NOT take these medications in the morning, the day of surgery:  Hydrochlorothiazide (Hydrodiuril)  Lisinopril (Prinivil)  Potassium Bicarb-Citric Acid  Potassium Chloride (K-Dur)    -  Please take these medications the day of surgery:  Acetaminophen (Tylenol)  Amlodipine (Norvasc)  Atorvastatin (Lipitor)  Benzonatate (Tessalon) if needed  Citalopram (Celexa)  Levothyroxine (Synthroid)  Loperamide (Imodium A-D)    How do I prepare myself?  -  Take two showers: one the night before surgery; and one the morning of surgery.         Use Scrubcare or Hibiclens to wash from neck down, leave soap on your skin for up to one minute.  Do not get soap in your eyes or ears.  You may use your own shampoo and conditioner; no other hair products.   -  Do NOT use lotion, powder, deodorant, or antiperspirant the day of your surgery.  -  Do NOT wear  any makeup, fingernail polish or jewelry.  -  Do not bring your own medications to the hospital.  -  Bring your ID and insurance card.    -If you are scheduled to go home the Same Day as surgery you must have a responsible adult as a  and to stay with you overnight the first 24 hours after surgery.     Questions or Concerns:  -If you are scheduled on the East or West campus and have questions or concerns regarding the day of surgery, please call Preadmission Nursing at [phone].     -If you have health changes between today and your surgery please call your surgeon. For questions after surgery please call your surgeons office.     AFTER YOUR SURGERY  Breathing exercises   Breathing exercises help you recover faster. Take deep breaths and let the air out slowly. This will:     Help you wake up after surgery.    Help prevent complications like pneumonia.  Preventing complications will help you go home sooner.   We may give you a breathing device (incentive spirometer) to encourage you to breathe deeply.   Nausea and vomiting   You may feel sick to your stomach after surgery; if so, let your nurse know.    Pain control:  After surgery, you may have pain. Our goal is to help you manage your pain. Pain medicine will help you feel comfortable enough to do activities that will help you heal.  These activities may include breathing exercises, walking and physical therapy.   To help your health care team treat your pain we will ask: 1) If you have pain  2) where it is located 3) describe your pain in your words  Methods of pain control include medications given by mouth, vein or by nerve block for some surgeries.  Sequential Compression Device (SCD):  You may need to wear SCD S (also called pneumo boots)on your legs or feet. These are wraps connected to a machine that pumps in air and releases it. The repeated pumping helps prevent blood clots from forming.

## 2020-01-15 LAB
ANA SER QL IF: NEGATIVE
HBV CORE AB SERPL QL IA: NONREACTIVE
HBV SURFACE AB SERPL IA-ACNC: 2.05 M[IU]/ML
HBV SURFACE AG SERPL QL IA: NONREACTIVE
HCV AB SERPL QL IA: NONREACTIVE
HIV 1+2 AB+HIV1 P24 AG SERPL QL IA: NONREACTIVE
RHEUMATOID FACT SER NEPH-ACNC: <20 IU/ML (ref 0–20)
STFR SERPL-SCNC: 7.5 MG/L (ref 1.9–4.4)

## 2020-01-16 LAB
IL6 SERPL-MCNC: 5.35 PG/ML
LA PPP-IMP: POSITIVE

## 2020-01-23 ENCOUNTER — HOSPITAL ENCOUNTER (OUTPATIENT)
Dept: CARDIOLOGY | Facility: CLINIC | Age: 85
Discharge: HOME | End: 2020-01-23
Attending: INTERNAL MEDICINE | Admitting: INTERNAL MEDICINE
Payer: MEDICARE

## 2020-01-23 DIAGNOSIS — E61.1 IRON DEFICIENCY: ICD-10-CM

## 2020-01-23 DIAGNOSIS — Z86.711 HISTORY OF PULMONARY EMBOLISM: ICD-10-CM

## 2020-01-23 DIAGNOSIS — R06.02 SOB (SHORTNESS OF BREATH): ICD-10-CM

## 2020-01-23 DIAGNOSIS — I27.20 PULMONARY HYPERTENSION (H): ICD-10-CM

## 2020-01-23 DIAGNOSIS — E78.5 DYSLIPIDEMIA: ICD-10-CM

## 2020-01-23 DIAGNOSIS — Z11.59 NEED FOR HEPATITIS B SCREENING TEST: ICD-10-CM

## 2020-01-23 PROCEDURE — 93306 TTE W/DOPPLER COMPLETE: CPT

## 2020-01-23 PROCEDURE — 93306 TTE W/DOPPLER COMPLETE: CPT | Mod: 26 | Performed by: INTERNAL MEDICINE

## 2020-01-23 RX ORDER — ACYCLOVIR 200 MG/1
12 CAPSULE ORAL ONCE
Status: DISCONTINUED | OUTPATIENT
Start: 2020-01-23 | End: 2020-01-24 | Stop reason: HOSPADM

## 2020-01-23 NOTE — OR NURSING
In basket message sent to Nupur Garrett PA-C to have her review pt's ECHO from today and place addendum in chart to say if patient is cleared for surgery.

## 2020-01-24 ENCOUNTER — APPOINTMENT (OUTPATIENT)
Dept: GENERAL RADIOLOGY | Facility: CLINIC | Age: 85
End: 2020-01-24
Attending: PHYSICIAN ASSISTANT
Payer: MEDICARE

## 2020-01-24 ENCOUNTER — HOSPITAL ENCOUNTER (OUTPATIENT)
Dept: CT IMAGING | Facility: CLINIC | Age: 85
End: 2020-01-24
Attending: CLINICAL NURSE SPECIALIST | Admitting: INTERNAL MEDICINE
Payer: MEDICARE

## 2020-01-24 ENCOUNTER — HOSPITAL ENCOUNTER (OUTPATIENT)
Dept: CT IMAGING | Facility: CLINIC | Age: 85
End: 2020-01-24
Attending: INTERNAL MEDICINE | Admitting: INTERNAL MEDICINE
Payer: MEDICARE

## 2020-01-24 ENCOUNTER — ANESTHESIA EVENT (OUTPATIENT)
Dept: SURGERY | Facility: CLINIC | Age: 85
End: 2020-01-24
Payer: MEDICARE

## 2020-01-24 ENCOUNTER — APPOINTMENT (OUTPATIENT)
Dept: GENERAL RADIOLOGY | Facility: CLINIC | Age: 85
End: 2020-01-24
Attending: HOSPITALIST
Payer: MEDICARE

## 2020-01-24 ENCOUNTER — ANESTHESIA (OUTPATIENT)
Dept: SURGERY | Facility: CLINIC | Age: 85
End: 2020-01-24
Payer: MEDICARE

## 2020-01-24 ENCOUNTER — HOSPITAL ENCOUNTER (OUTPATIENT)
Facility: CLINIC | Age: 85
Setting detail: OBSERVATION
Discharge: HOME | End: 2020-01-25
Attending: INTERNAL MEDICINE | Admitting: INTERNAL MEDICINE
Payer: MEDICARE

## 2020-01-24 ENCOUNTER — APPOINTMENT (OUTPATIENT)
Dept: GENERAL RADIOLOGY | Facility: CLINIC | Age: 85
End: 2020-01-24
Attending: INTERNAL MEDICINE
Payer: MEDICARE

## 2020-01-24 DIAGNOSIS — Z86.711 HISTORY OF PULMONARY EMBOLISM: ICD-10-CM

## 2020-01-24 DIAGNOSIS — Z11.59 NEED FOR HEPATITIS B SCREENING TEST: ICD-10-CM

## 2020-01-24 DIAGNOSIS — R91.8 PULMONARY NODULES: ICD-10-CM

## 2020-01-24 DIAGNOSIS — R91.8 LUNG NODULES: ICD-10-CM

## 2020-01-24 DIAGNOSIS — I27.20 PULMONARY HYPERTENSION (H): ICD-10-CM

## 2020-01-24 DIAGNOSIS — E61.1 IRON DEFICIENCY: ICD-10-CM

## 2020-01-24 DIAGNOSIS — J44.9 CHRONIC OBSTRUCTIVE PULMONARY DISEASE, UNSPECIFIED COPD TYPE (H): Primary | ICD-10-CM

## 2020-01-24 DIAGNOSIS — R05.9 COUGH: ICD-10-CM

## 2020-01-24 DIAGNOSIS — R06.02 SOB (SHORTNESS OF BREATH): ICD-10-CM

## 2020-01-24 DIAGNOSIS — E78.5 DYSLIPIDEMIA: ICD-10-CM

## 2020-01-24 PROBLEM — R09.02 HYPOXIA: Status: ACTIVE | Noted: 2020-01-24

## 2020-01-24 LAB
ABO + RH BLD: NORMAL
ABO + RH BLD: NORMAL
APTT PPP: 31 SEC (ref 22–37)
BASE EXCESS BLDV CALC-SCNC: 2.5 MMOL/L
BLD GP AB SCN SERPL QL: NORMAL
BLOOD BANK CMNT PATIENT-IMP: NORMAL
BLOOD BANK CMNT PATIENT-IMP: NORMAL
CREAT SERPL-MCNC: 0.62 MG/DL (ref 0.52–1.04)
GFR SERPL CREATININE-BSD FRML MDRD: 83 ML/MIN/{1.73_M2}
GLUCOSE BLDC GLUCOMTR-MCNC: 98 MG/DL (ref 70–99)
HCO3 BLDV-SCNC: 27 MMOL/L (ref 21–28)
HGB BLD-MCNC: 10.6 G/DL (ref 11.7–15.7)
INR PPP: 0.98 (ref 0.86–1.14)
LMWH PPP CHRO-ACNC: <0.1 IU/ML
O2/TOTAL GAS SETTING VFR VENT: 95 %
PCO2 BLDV: 41 MM HG (ref 40–50)
PH BLDV: 7.43 PH (ref 7.32–7.43)
PO2 BLDV: 76 MM HG (ref 25–47)
POTASSIUM SERPL-SCNC: 3.7 MMOL/L (ref 3.4–5.3)
SPECIMEN EXP DATE BLD: NORMAL

## 2020-01-24 PROCEDURE — 84132 ASSAY OF SERUM POTASSIUM: CPT | Performed by: ANESTHESIOLOGY

## 2020-01-24 PROCEDURE — 71045 X-RAY EXAM CHEST 1 VIEW: CPT

## 2020-01-24 PROCEDURE — 88305 TISSUE EXAM BY PATHOLOGIST: CPT | Performed by: INTERNAL MEDICINE

## 2020-01-24 PROCEDURE — 25000128 H RX IP 250 OP 636: Performed by: ANESTHESIOLOGY

## 2020-01-24 PROCEDURE — 40000986 XR CHEST PORT 1 VW

## 2020-01-24 PROCEDURE — 82565 ASSAY OF CREATININE: CPT | Performed by: ANESTHESIOLOGY

## 2020-01-24 PROCEDURE — 40000275 ZZH STATISTIC RCP TIME EA 10 MIN

## 2020-01-24 PROCEDURE — 25800030 ZZH RX IP 258 OP 636: Performed by: NURSE ANESTHETIST, CERTIFIED REGISTERED

## 2020-01-24 PROCEDURE — 36415 COLL VENOUS BLD VENIPUNCTURE: CPT | Performed by: ANESTHESIOLOGY

## 2020-01-24 PROCEDURE — 40000170 ZZH STATISTIC PRE-PROCEDURE ASSESSMENT II: Performed by: INTERNAL MEDICINE

## 2020-01-24 PROCEDURE — 25000128 H RX IP 250 OP 636: Performed by: NURSE ANESTHETIST, CERTIFIED REGISTERED

## 2020-01-24 PROCEDURE — 25000125 ZZHC RX 250: Performed by: PHYSICIAN ASSISTANT

## 2020-01-24 PROCEDURE — 99207 ZZC APP CREDIT; MD BILLING SHARED VISIT: CPT | Performed by: NURSE PRACTITIONER

## 2020-01-24 PROCEDURE — 25000128 H RX IP 250 OP 636: Performed by: INTERNAL MEDICINE

## 2020-01-24 PROCEDURE — 25000566 ZZH SEVOFLURANE, EA 15 MIN: Performed by: INTERNAL MEDICINE

## 2020-01-24 PROCEDURE — 85610 PROTHROMBIN TIME: CPT | Performed by: ANESTHESIOLOGY

## 2020-01-24 PROCEDURE — 25000131 ZZH RX MED GY IP 250 OP 636 PS 637: Mod: GY | Performed by: PHYSICIAN ASSISTANT

## 2020-01-24 PROCEDURE — 99207 ZZC CDG-HISTORY COMP: MEETS EXP. PROBLEM FOCUSED - DOWN CODED LACK OF HPI: CPT | Performed by: INTERNAL MEDICINE

## 2020-01-24 PROCEDURE — 40000279 XR SURGERY CARM FLUORO GREATER THAN 5 MIN W STILLS: Mod: TC

## 2020-01-24 PROCEDURE — 88172 CYTP DX EVAL FNA 1ST EA SITE: CPT | Performed by: INTERNAL MEDICINE

## 2020-01-24 PROCEDURE — 71275 CT ANGIOGRAPHY CHEST: CPT

## 2020-01-24 PROCEDURE — 36000076 ZZH SURGERY LEVEL 6 EA 15 ADDTL MIN - UMMC: Performed by: INTERNAL MEDICINE

## 2020-01-24 PROCEDURE — 37000008 ZZH ANESTHESIA TECHNICAL FEE, 1ST 30 MIN: Performed by: INTERNAL MEDICINE

## 2020-01-24 PROCEDURE — 88108 CYTOPATH CONCENTRATE TECH: CPT | Mod: XU | Performed by: INTERNAL MEDICINE

## 2020-01-24 PROCEDURE — 85730 THROMBOPLASTIN TIME PARTIAL: CPT | Performed by: ANESTHESIOLOGY

## 2020-01-24 PROCEDURE — 25800030 ZZH RX IP 258 OP 636: Performed by: ANESTHESIOLOGY

## 2020-01-24 PROCEDURE — 36000078 ZZH SURGERY LEVEL 6 W FLUORO 1ST 30 MIN - UMMC: Performed by: INTERNAL MEDICINE

## 2020-01-24 PROCEDURE — 71045 X-RAY EXAM CHEST 1 VIEW: CPT | Mod: 77

## 2020-01-24 PROCEDURE — 85018 HEMOGLOBIN: CPT | Performed by: ANESTHESIOLOGY

## 2020-01-24 PROCEDURE — 99226 ZZC SUBSEQUENT OBSERVATION CARE,LEVEL III: CPT | Performed by: INTERNAL MEDICINE

## 2020-01-24 PROCEDURE — 71000015 ZZH RECOVERY PHASE 1 LEVEL 2 EA ADDTL HR: Performed by: INTERNAL MEDICINE

## 2020-01-24 PROCEDURE — 25000125 ZZHC RX 250: Performed by: ANESTHESIOLOGY

## 2020-01-24 PROCEDURE — 71000014 ZZH RECOVERY PHASE 1 LEVEL 2 FIRST HR: Performed by: INTERNAL MEDICINE

## 2020-01-24 PROCEDURE — G0378 HOSPITAL OBSERVATION PER HR: HCPCS

## 2020-01-24 PROCEDURE — 71250 CT THORAX DX C-: CPT | Mod: XS

## 2020-01-24 PROCEDURE — 94640 AIRWAY INHALATION TREATMENT: CPT

## 2020-01-24 PROCEDURE — 88331 PATH CONSLTJ SURG 1 BLK 1SPC: CPT | Performed by: INTERNAL MEDICINE

## 2020-01-24 PROCEDURE — 25000125 ZZHC RX 250: Performed by: NURSE ANESTHETIST, CERTIFIED REGISTERED

## 2020-01-24 PROCEDURE — 82803 BLOOD GASES ANY COMBINATION: CPT | Performed by: HOSPITALIST

## 2020-01-24 PROCEDURE — 88173 CYTOPATH EVAL FNA REPORT: CPT | Performed by: INTERNAL MEDICINE

## 2020-01-24 PROCEDURE — 27210794 ZZH OR GENERAL SUPPLY STERILE: Performed by: INTERNAL MEDICINE

## 2020-01-24 PROCEDURE — 00000146 ZZHCL STATISTIC GLUCOSE BY METER IP

## 2020-01-24 PROCEDURE — 25800030 ZZH RX IP 258 OP 636: Performed by: INTERNAL MEDICINE

## 2020-01-24 PROCEDURE — 25000128 H RX IP 250 OP 636: Performed by: STUDENT IN AN ORGANIZED HEALTH CARE EDUCATION/TRAINING PROGRAM

## 2020-01-24 PROCEDURE — 37000009 ZZH ANESTHESIA TECHNICAL FEE, EACH ADDTL 15 MIN: Performed by: INTERNAL MEDICINE

## 2020-01-24 PROCEDURE — 36415 COLL VENOUS BLD VENIPUNCTURE: CPT | Performed by: HOSPITALIST

## 2020-01-24 PROCEDURE — 00000155 ZZHCL STATISTIC H-CELL BLOCK W/STAIN: Performed by: INTERNAL MEDICINE

## 2020-01-24 PROCEDURE — 85520 HEPARIN ASSAY: CPT | Performed by: ANESTHESIOLOGY

## 2020-01-24 PROCEDURE — 25000132 ZZH RX MED GY IP 250 OP 250 PS 637: Mod: GY | Performed by: NURSE PRACTITIONER

## 2020-01-24 RX ORDER — SODIUM CHLORIDE, SODIUM LACTATE, POTASSIUM CHLORIDE, CALCIUM CHLORIDE 600; 310; 30; 20 MG/100ML; MG/100ML; MG/100ML; MG/100ML
INJECTION, SOLUTION INTRAVENOUS CONTINUOUS
Status: DISCONTINUED | OUTPATIENT
Start: 2020-01-24 | End: 2020-01-24 | Stop reason: HOSPADM

## 2020-01-24 RX ORDER — POLYETHYLENE GLYCOL 3350 17 G/17G
17 POWDER, FOR SOLUTION ORAL DAILY PRN
Status: DISCONTINUED | OUTPATIENT
Start: 2020-01-24 | End: 2020-01-25 | Stop reason: HOSPADM

## 2020-01-24 RX ORDER — ONDANSETRON 2 MG/ML
INJECTION INTRAMUSCULAR; INTRAVENOUS PRN
Status: DISCONTINUED | OUTPATIENT
Start: 2020-01-24 | End: 2020-01-24

## 2020-01-24 RX ORDER — PROPOFOL 10 MG/ML
INJECTION, EMULSION INTRAVENOUS CONTINUOUS PRN
Status: DISCONTINUED | OUTPATIENT
Start: 2020-01-24 | End: 2020-01-24

## 2020-01-24 RX ORDER — FENTANYL CITRATE 50 UG/ML
INJECTION, SOLUTION INTRAMUSCULAR; INTRAVENOUS PRN
Status: DISCONTINUED | OUTPATIENT
Start: 2020-01-24 | End: 2020-01-24

## 2020-01-24 RX ORDER — LIDOCAINE HYDROCHLORIDE 20 MG/ML
INJECTION, SOLUTION INFILTRATION; PERINEURAL PRN
Status: DISCONTINUED | OUTPATIENT
Start: 2020-01-24 | End: 2020-01-24

## 2020-01-24 RX ORDER — LIDOCAINE 40 MG/G
CREAM TOPICAL
Status: DISCONTINUED | OUTPATIENT
Start: 2020-01-24 | End: 2020-01-24 | Stop reason: HOSPADM

## 2020-01-24 RX ORDER — PREDNISONE 20 MG/1
40 TABLET ORAL DAILY
Status: DISCONTINUED | OUTPATIENT
Start: 2020-01-24 | End: 2020-01-24 | Stop reason: HOSPADM

## 2020-01-24 RX ORDER — ACETAMINOPHEN 325 MG/1
650 TABLET ORAL EVERY 4 HOURS PRN
Status: DISCONTINUED | OUTPATIENT
Start: 2020-01-24 | End: 2020-01-25 | Stop reason: HOSPADM

## 2020-01-24 RX ORDER — ONDANSETRON 2 MG/ML
4 INJECTION INTRAMUSCULAR; INTRAVENOUS EVERY 30 MIN PRN
Status: DISCONTINUED | OUTPATIENT
Start: 2020-01-24 | End: 2020-01-24 | Stop reason: HOSPADM

## 2020-01-24 RX ORDER — BISACODYL 5 MG
5 TABLET, DELAYED RELEASE (ENTERIC COATED) ORAL DAILY PRN
Status: DISCONTINUED | OUTPATIENT
Start: 2020-01-24 | End: 2020-01-25 | Stop reason: HOSPADM

## 2020-01-24 RX ORDER — KETAMINE HYDROCHLORIDE 10 MG/ML
INJECTION, SOLUTION INTRAMUSCULAR; INTRAVENOUS PRN
Status: DISCONTINUED | OUTPATIENT
Start: 2020-01-24 | End: 2020-01-24

## 2020-01-24 RX ORDER — ONDANSETRON 4 MG/1
4 TABLET, ORALLY DISINTEGRATING ORAL EVERY 6 HOURS PRN
Status: DISCONTINUED | OUTPATIENT
Start: 2020-01-24 | End: 2020-01-25 | Stop reason: HOSPADM

## 2020-01-24 RX ORDER — HYDROXYZINE HYDROCHLORIDE 10 MG/1
10 TABLET, FILM COATED ORAL 3 TIMES DAILY PRN
Status: DISCONTINUED | OUTPATIENT
Start: 2020-01-24 | End: 2020-01-25 | Stop reason: HOSPADM

## 2020-01-24 RX ORDER — LEVOFLOXACIN 5 MG/ML
500 INJECTION, SOLUTION INTRAVENOUS EVERY 24 HOURS
Status: DISCONTINUED | OUTPATIENT
Start: 2020-01-24 | End: 2020-01-24 | Stop reason: HOSPADM

## 2020-01-24 RX ORDER — NALOXONE HYDROCHLORIDE 0.4 MG/ML
.1-.4 INJECTION, SOLUTION INTRAMUSCULAR; INTRAVENOUS; SUBCUTANEOUS
Status: DISCONTINUED | OUTPATIENT
Start: 2020-01-24 | End: 2020-01-25 | Stop reason: HOSPADM

## 2020-01-24 RX ORDER — ONDANSETRON 2 MG/ML
4 INJECTION INTRAMUSCULAR; INTRAVENOUS EVERY 6 HOURS PRN
Status: DISCONTINUED | OUTPATIENT
Start: 2020-01-24 | End: 2020-01-25 | Stop reason: HOSPADM

## 2020-01-24 RX ORDER — PROPOFOL 10 MG/ML
INJECTION, EMULSION INTRAVENOUS PRN
Status: DISCONTINUED | OUTPATIENT
Start: 2020-01-24 | End: 2020-01-24

## 2020-01-24 RX ORDER — AZITHROMYCIN 500 MG/1
500 TABLET, FILM COATED ORAL ONCE
Status: COMPLETED | OUTPATIENT
Start: 2020-01-24 | End: 2020-01-24

## 2020-01-24 RX ORDER — ACETAMINOPHEN 650 MG/1
650 SUPPOSITORY RECTAL EVERY 4 HOURS PRN
Status: DISCONTINUED | OUTPATIENT
Start: 2020-01-24 | End: 2020-01-25 | Stop reason: HOSPADM

## 2020-01-24 RX ORDER — IPRATROPIUM BROMIDE AND ALBUTEROL SULFATE 2.5; .5 MG/3ML; MG/3ML
3 SOLUTION RESPIRATORY (INHALATION) ONCE
Status: COMPLETED | OUTPATIENT
Start: 2020-01-24 | End: 2020-01-24

## 2020-01-24 RX ORDER — IPRATROPIUM BROMIDE AND ALBUTEROL SULFATE 2.5; .5 MG/3ML; MG/3ML
3 SOLUTION RESPIRATORY (INHALATION)
Status: DISCONTINUED | OUTPATIENT
Start: 2020-01-24 | End: 2020-01-25

## 2020-01-24 RX ORDER — DEXAMETHASONE SODIUM PHOSPHATE 4 MG/ML
INJECTION, SOLUTION INTRA-ARTICULAR; INTRALESIONAL; INTRAMUSCULAR; INTRAVENOUS; SOFT TISSUE PRN
Status: DISCONTINUED | OUTPATIENT
Start: 2020-01-24 | End: 2020-01-24

## 2020-01-24 RX ORDER — ONDANSETRON 4 MG/1
4 TABLET, ORALLY DISINTEGRATING ORAL EVERY 30 MIN PRN
Status: DISCONTINUED | OUTPATIENT
Start: 2020-01-24 | End: 2020-01-24 | Stop reason: HOSPADM

## 2020-01-24 RX ORDER — AZITHROMYCIN 250 MG/1
250 TABLET, FILM COATED ORAL DAILY
Status: DISCONTINUED | OUTPATIENT
Start: 2020-01-25 | End: 2020-01-25 | Stop reason: HOSPADM

## 2020-01-24 RX ORDER — IOPAMIDOL 755 MG/ML
50 INJECTION, SOLUTION INTRAVASCULAR ONCE
Status: COMPLETED | OUTPATIENT
Start: 2020-01-24 | End: 2020-01-24

## 2020-01-24 RX ORDER — BISACODYL 5 MG
15 TABLET, DELAYED RELEASE (ENTERIC COATED) ORAL DAILY PRN
Status: DISCONTINUED | OUTPATIENT
Start: 2020-01-24 | End: 2020-01-25 | Stop reason: HOSPADM

## 2020-01-24 RX ORDER — ALBUTEROL SULFATE 0.83 MG/ML
2.5 SOLUTION RESPIRATORY (INHALATION) EVERY 4 HOURS PRN
Status: DISCONTINUED | OUTPATIENT
Start: 2020-01-24 | End: 2020-01-25 | Stop reason: HOSPADM

## 2020-01-24 RX ORDER — FENTANYL CITRATE 50 UG/ML
25-50 INJECTION, SOLUTION INTRAMUSCULAR; INTRAVENOUS
Status: DISCONTINUED | OUTPATIENT
Start: 2020-01-24 | End: 2020-01-24 | Stop reason: HOSPADM

## 2020-01-24 RX ORDER — BISACODYL 5 MG
10 TABLET, DELAYED RELEASE (ENTERIC COATED) ORAL DAILY PRN
Status: DISCONTINUED | OUTPATIENT
Start: 2020-01-24 | End: 2020-01-25 | Stop reason: HOSPADM

## 2020-01-24 RX ADMIN — PROPOFOL 90 MG: 10 INJECTION, EMULSION INTRAVENOUS at 09:36

## 2020-01-24 RX ADMIN — ROCURONIUM BROMIDE 10 MG: 10 INJECTION INTRAVENOUS at 11:30

## 2020-01-24 RX ADMIN — IPRATROPIUM BROMIDE AND ALBUTEROL SULFATE 3 ML: .5; 3 SOLUTION RESPIRATORY (INHALATION) at 15:05

## 2020-01-24 RX ADMIN — IPRATROPIUM BROMIDE AND ALBUTEROL SULFATE 3 ML: .5; 3 SOLUTION RESPIRATORY (INHALATION) at 12:42

## 2020-01-24 RX ADMIN — ONDANSETRON 4 MG: 2 INJECTION INTRAMUSCULAR; INTRAVENOUS at 10:11

## 2020-01-24 RX ADMIN — FENTANYL CITRATE 50 MCG: 50 INJECTION, SOLUTION INTRAMUSCULAR; INTRAVENOUS at 12:03

## 2020-01-24 RX ADMIN — IPRATROPIUM BROMIDE AND ALBUTEROL SULFATE 3 ML: .5; 3 SOLUTION RESPIRATORY (INHALATION) at 21:19

## 2020-01-24 RX ADMIN — PHENYLEPHRINE HYDROCHLORIDE 100 MCG: 10 INJECTION INTRAVENOUS at 10:45

## 2020-01-24 RX ADMIN — PROPOFOL 150 MCG/KG/MIN: 10 INJECTION, EMULSION INTRAVENOUS at 09:41

## 2020-01-24 RX ADMIN — IOPAMIDOL 50 ML: 755 INJECTION, SOLUTION INTRAVENOUS at 08:54

## 2020-01-24 RX ADMIN — PHENYLEPHRINE HYDROCHLORIDE 150 MCG: 10 INJECTION INTRAVENOUS at 10:06

## 2020-01-24 RX ADMIN — IPRATROPIUM BROMIDE AND ALBUTEROL SULFATE 3 ML: .5; 3 SOLUTION RESPIRATORY (INHALATION) at 08:38

## 2020-01-24 RX ADMIN — LEVOFLOXACIN 500 MG: 5 INJECTION, SOLUTION INTRAVENOUS at 09:45

## 2020-01-24 RX ADMIN — PREDNISONE 40 MG: 20 TABLET ORAL at 17:55

## 2020-01-24 RX ADMIN — ROCURONIUM BROMIDE 10 MG: 10 INJECTION INTRAVENOUS at 10:28

## 2020-01-24 RX ADMIN — PHENYLEPHRINE HYDROCHLORIDE 150 MCG: 10 INJECTION INTRAVENOUS at 09:44

## 2020-01-24 RX ADMIN — KETAMINE HYDROCHLORIDE 30 MG: 10 INJECTION, SOLUTION INTRAMUSCULAR; INTRAVENOUS at 09:36

## 2020-01-24 RX ADMIN — AZITHROMYCIN 500 MG: 500 TABLET, FILM COATED ORAL at 20:54

## 2020-01-24 RX ADMIN — DEXAMETHASONE SODIUM PHOSPHATE 10 MG: 4 INJECTION, SOLUTION INTRA-ARTICULAR; INTRALESIONAL; INTRAMUSCULAR; INTRAVENOUS; SOFT TISSUE at 09:48

## 2020-01-24 RX ADMIN — HYDROXYZINE HYDROCHLORIDE 10 MG: 10 TABLET, FILM COATED ORAL at 23:51

## 2020-01-24 RX ADMIN — SODIUM CHLORIDE, POTASSIUM CHLORIDE, SODIUM LACTATE AND CALCIUM CHLORIDE: 600; 310; 30; 20 INJECTION, SOLUTION INTRAVENOUS at 09:24

## 2020-01-24 RX ADMIN — SUGAMMADEX 200 MG: 100 INJECTION, SOLUTION INTRAVENOUS at 11:54

## 2020-01-24 RX ADMIN — ROCURONIUM BROMIDE 40 MG: 10 INJECTION INTRAVENOUS at 09:36

## 2020-01-24 RX ADMIN — LIDOCAINE HYDROCHLORIDE 100 MG: 20 INJECTION, SOLUTION INFILTRATION; PERINEURAL at 09:36

## 2020-01-24 ASSESSMENT — COPD QUESTIONNAIRES: COPD: 1

## 2020-01-24 ASSESSMENT — MIFFLIN-ST. JEOR: SCORE: 810.75

## 2020-01-24 NOTE — DISCHARGE INSTRUCTIONS
[facility], [place]  For 24 hours after BRONCHOSCOPY     You may cough up a small amount of blood for a day or two.    Call your doctor  for any of the followin. If you begin to cough up bright red blood, or large clots of blood   2. If you become increasing more short of breath or begin to have chest pains  3. Difficulty swallowing or feeling as though food or liquids are getting stuck   4. Sore throat lasting more than 2 days or pain that has worsened over time  5. Nausea and/or vomiting that is not resolving or has not responded to anti-nausea medications if prescribed to you   6. If you experience a fever above 100.5 F (38 C) for more than 24 hours.   7. It has been over 8 to 10 hours since surgery and you are still not able to urinate (pass water).  Same-Day Surgery   Adult Discharge Orders & Instructions     For 24 hours after surgery    1. Get plenty of rest.  A responsible adult must stay with you for at least 24 hours after you leave the hospital.   2. Do not drive or use heavy equipment.  If you have weakness or tingling, don't drive or use heavy equipment until this feeling goes away.  3. Do not drink alcohol.  4. Avoid strenuous or risky activities.  Ask for help when climbing stairs.   5. You may feel lightheaded.  IF so, sit for a few minutes before standing.  Have someone help you get up.   6. If you have nausea (feel sick to your stomach): Drink only clear liquids such as apple juice, ginger ale, broth or 7-Up.  Rest may also help.  Be sure to drink enough fluids.  Move to a regular diet as you feel able.  7. You may have a slight fever. Call the doctor if your fever is over 100 F (37.7 C) (taken under the tongue) or lasts longer than 24 hours.  8. You may have a dry mouth, a sore throat, muscle aches or trouble sleeping.  These should go away after 24 hours.  9. Do not make important or legal decisions.   Call your doctor for any of the followin.   Signs of infection (fever, growing tenderness at the surgery site, a large amount of drainage or bleeding, severe pain, foul-smelling drainage, redness, swelling).    2. It has been over 8 to 10 hours since surgery and you are still not able to urinate (pass water).    3.  Headache for over 24 hours.    To contact a doctor, call Dr Jara at [phone] or Dr Hamlin at [phone]  or:        [phone] and ask for the resident on call for Pulmonology (answered 24 hours a day)      Emergency Department:    [facility]: [phone]       (TTY for hearing impaired: [phone])    [facility]: [phone]       (TTY for hearing impaired: [phone])

## 2020-01-24 NOTE — H&P
[facility]    History and Physical - Hospitalist Service       Date of Admission:  1/24/2020    Assessment & Plan   Lucie Stockton is a 84 year old female with past medical history significant for HTN, HLD, COPD, hypothyroidism, anxiety, and depression who presented to [facility] for scheduled lung biopsy, became hypoxic post procedure and is being admitted for monitoring and suspected COPD exacerbation.    # Acute hypoxia s/p lung biopsy   # Possible COPD exacerbation  Presented to [facility] today for scheduled lung bx with Pulm and became hypoxic to 80s after, requiring supplemental O2.  Does not use oxygen at home, and did not recall previous diagnosis of COPD.  Denies chest pain and dyspnea.  No recent sick contacts or exposures.  Remote hx tobacco use.   - Agree w/ treating for COPD exacerbation   - Prednisone 40mg x 5 days   - Azithromycin 500mg tonight, then 250mg x 4 days   - Monitor O2   - Wean supplemental O2 as tolerated  - PT, OT consults in AM     # HTN - BPs slightly soft, 100/61.  On Lisinopril 40mg, hydrochlorothiazide 50mg, and Norvasc 10mg daily PTA.   - Hold PTA meds d/t hypotension  - Monitor BPs     # HLD - Continue home statin    # Hypothyroidism - Continue PTA Synthroid 75mcg AM    # Anxiety, depression - Anxious, does not want to stay overnight in the hospital.  On Celexa 20mg daily.  - Continue PTA Celexa   - Offer Hydroxyzine 10mg Q6H PRN for anxiety                 Diet: Regular   DVT Prophylaxis: Pneumatic Compression Devices  Saha Catheter: not present  Code Status: FULL     Disposition Plan   Expected discharge: 1-2 days , recommended to prior living arrangement once at baseline O2 status.  Entered: ALEKSEY Trujillo CNP 01/24/2020, 4:27 PM     The patient's care was discussed with the Attending Physician, Dr. Lorenzo Maurer.    Rita Harris CNP, APRMIGUEL  Internal Medicine [facility]  Pager ([phone])  [phone]    ______________________________________________________________________    Chief Complaint   \"I just want to go home\"     History is obtained from the patient and chart review.     History of Present Illness   Lucie Stockton is a 84 year old female with past medical history significant for HTN, HLD, COPD, hypothyroidism, anxiety, and depression who presented to [facility] for scheduled lung biopsy, became hypoxic post procedure and is being admitted for monitoring and suspected COPD exacerbation.    Lucie is seen in the PACU.  She is very anxious about having to stay in the hospital.  She feels okay, denies feeling short of breath and no chest pain.  Usual state of health and surprised that she is needing oxygen.      Review of Systems    The 10 point Review of Systems is negative other than noted in the HPI or here.     Past Medical History    I have reviewed this patient's medical history and updated it with pertinent information if needed.   Past Medical History:   Diagnosis Date     Anemia      Aortic stenosis     abdominal     Atherosclerosis of aorta (H)     \"extensive disease with near occlusion below level of renal arteries\" on CT     Atherosclerosis of arteries of extremities (H)      Back pain     severe multilevel DJD, moderate spinal canal stenosis L4-5, severe L3-4     Chronic pain     Back, hips, knees, legs     Degenerative joint disease      DVT of lower extremity, bilateral (H)     5/24/10 left distal femoral and great saphenous, 7/7/10 left:  extending to cfv, iliac , pop and post tibial, peronial, right:  distal fem and peroneal      Grave's disease      Hyperlipidaemia LDL goal < 70      Hypertension, essential      Hypothyroidism      Imbalance      Insomnia      Intermittent claudication (H)      Iron deficiency      Knee pain      Lumbar stenosis with neurogenic claudication      Osteoarthritis     ankle,foot, knee     Osteoporosis      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  Past Surgical History:   Procedure Laterality Date     BUNIONECTOMY       C STOMACH SURGERY PROCEDURE UNLISTED      Please see chart     COLONOSCOPY      11/10/10 angioectasia     HYSTERECTOMY TOTAL ABDOMINAL, BILATERAL SALPINGO-OOPHORECTOMY, NODE DISSECTION, COMBINED  11    SANGEETHA, EMILIA, LN bx, omentectomy, resection of evrian malignancy Dr. JONO Goncalves - Returned BENIGN     INJECT EPIDURAL LUMBAR / SACRAL SINGLE N/A 2018    Procedure: INJECT EPIDURAL LUMBAR / SACRAL SINGLE;  lumbar interlaminar epidural steroid injection;  Surgeon: Jaylin Valadez MD;  Location: [facility] OR     INJECT SACROILIAC JOINT Right 3/7/2018    Procedure: INJECT SACROILIAC JOINT;  Right Sacroiliac Joint Injection;  Surgeon: Flavio Harrison MD;  Location: [facility] OR     INJECT SACROILIAC JOINT Bilateral 2018    Procedure: Bilateral Sacroiliac Joint Injections;  Surgeon: Faviola Cosby MD;  Location: [facility] OR     UPPER GI ENDOSCOPY      11/10/10 erythematous gastropathy, angioectasia       Social History   I have reviewed this patient's social history and updated it with pertinent information if needed.  Social History     Tobacco Use     Smoking status: Former Smoker     Packs/day: 0.50     Years: 15.00     Pack years: 7.50     Last attempt to quit: 1993     Years since quittin.3     Smokeless tobacco: Never Used   Substance Use Topics     Alcohol use: Yes     Comment: social     Drug use: No       Family History   I have reviewed this patient's family history and updated it with pertinent information if needed.   Family History   Problem Relation Age of Onset     Breast Cancer Maternal Aunt 80      C.A.D. Father 54     No Known Problems Mother        Prior to Admission Medications   Prior to Admission Medications   Prescriptions Last Dose Informant Patient Reported? Taking?   Calcium Carbonate-Vitamin D (CALCIUM 600 + D OR) 1/23/2020 at 0800  Yes Yes   Sig: Take 1 tablet by mouth every morning    Potassium Bicarb-Citric Acid 20 MEQ TBEF 1/23/2020 at 0800  No Yes   Sig: Take 20 mg by mouth daily   Patient taking differently: Take 20 mg by mouth every morning    acetaminophen (TYLENOL) 325 MG tablet 1/23/2020 at 1800  No Yes   Sig: Take 2 tablets every 6 to 8 hours as needed for pain   Patient taking differently: Take 650 mg by mouth as needed Take 2 tablets every 6 to 8 hours as needed for pain   amLODIPine (NORVASC) 10 MG tablet 1/23/2020 at 0800  No Yes   Sig: Take 1 tablet (10 mg) by mouth daily For blood pressure   Patient taking differently: Take 10 mg by mouth every morning For blood pressure   atorvastatin (LIPITOR) 40 MG tablet 1/23/2020 at 0800  No Yes   Sig: Take 2 tablets (80 mg) by mouth daily   Patient taking differently: Take 80 mg by mouth every morning    benzonatate (TESSALON) 200 MG capsule   No No   Sig: Take 1 capsule (200 mg) by mouth 3 times daily as needed for cough   betamethasone dipropionate (DIPROSONE) 0.05 % external ointment   No No   Sig: Apply topically 2 times daily   Patient taking differently: Apply 1 g topically as needed    citalopram (CELEXA) 20 MG tablet 1/23/2020 at 0800  No Yes   Sig: Take 1 tablet (20 mg) by mouth daily   Patient taking differently: Take 20 mg by mouth every morning    clopidogrel (PLAVIX) 75 MG tablet 1/17/2020  Yes No   Sig: Take 75 mg by mouth daily Takes in the morning   cyanocolbalamin (VITAMIN  B-12) 500 MCG tablet 1/23/2020 at 0800  Yes Yes   Sig: Take 500 mcg by mouth every morning    gabapentin (NEURONTIN) 100 MG capsule 1/23/2020 at 2000  No Yes   Sig: Take 1 to 4 capsules at bedtime as directed.   Patient taking differently: Take 200 mg by  mouth At Bedtime Take 1 to 4 capsules at bedtime as directed.   hydrochlorothiazide (HYDRODIURIL) 50 MG tablet 1/23/2020 at 0800  No Yes   Sig: Take 1 tablet (50 mg) by mouth daily   Patient taking differently: Take 50 mg by mouth every morning    latanoprost (XALATAN) 0.005 % ophthalmic solution 1/23/2020 at 2000  Yes Yes   Sig: Place 1 drop into both eyes At Bedtime    levothyroxine (SYNTHROID/LEVOTHROID) 75 MCG tablet 1/23/2020 at 0800  No Yes   Sig: Take 1 tablet (75 mcg) by mouth daily   Patient taking differently: Take 75 mcg by mouth every morning    lisinopril (PRINIVIL/ZESTRIL) 40 MG tablet 1/23/2020 at 0800  No Yes   Sig: Take 1 tablet (40 mg) by mouth daily For blood pressure   Patient taking differently: Take 40 mg by mouth every morning For blood pressure   loperamide (IMODIUM A-D) 2 MG tablet   Yes No   Sig: Take 0.5-1 tablets (1-2 mg) by mouth daily as needed for diarrhea   oxybutynin (DITROPAN) 5 MG tablet 1/23/2020 at 2000  No Yes   Sig: Take 1 tablet (5 mg) by mouth At Bedtime For frequent urination   pantoprazole (PROTONIX) 40 MG EC tablet 1/23/2020 at 0800  Yes Yes   Sig: Take 40 mg by mouth daily Takes in the morning.   potassium chloride ER (K-DUR/KLOR-CON M) 20 MEQ CR tablet 1/23/2020 at 0800  No Yes   Sig: Take 1 tablet (20 mEq) by mouth daily      Facility-Administered Medications: None     Allergies   No Known Allergies    Physical Exam   Vital Signs: Temp: 98  F (36.7  C) Temp src: Oral BP: 100/61 Pulse: 78 Heart Rate: 78 Resp: 23 SpO2: 90 % O2 Device: Nasal cannula Oxygen Delivery: 2 LPM  Weight: 102 lbs 11.75 oz    GENERAL: Alert and oriented x 3. Petite body habitus.  Well nourished, well developed.  No acute distress.    HEENT: Normocephalic, atraumatic. Anicteric sclera. Mucous membranes moist.   CV: RRR. S1, S2. No murmurs appreciated.   RESPIRATORY: Effort slightly increased on 2L . Lungs CTAB with no wheezing, rales, or rhonchi.   GI: Abdomen soft and non distended, bowel sounds  present x all 4 quadrants. No tenderness, rebound, or guarding.   NEUROLOGICAL: No focal deficits. Follows commands.  Strength equal in upper and lower extremities.   MUSCULOSKELETAL: No joint swelling or tenderness. Moves all extremities.   EXTREMITIES: No gross deformities. No peripheral edema.   SKIN: Grossly warm, dry, and intact. No jaundice. No rashes.       Data   Data reviewed today: I reviewed all medications, new labs and imaging results over the last 24 hours.     Recent Labs   Lab 01/24/20  0806   HGB 10.6*   INR 0.98   POTASSIUM 3.7   CR 0.62     Most Recent 3 CBC's:  Recent Labs   Lab Test 01/24/20  0806 01/14/20  1202 11/13/19  0851 12/17/18  1513   WBC  --  7.8 7.3 9.3   HGB 10.6* 10.4* 12.1 12.4   MCV  --  84 90 95   PLT  --  580* 463* 421     Most Recent 3 BMP's:  Recent Labs   Lab Test 01/24/20  0806 01/14/20  1202 11/13/19  0851 01/30/18  0724   NA  --  138 141 135   POTASSIUM 3.7 3.6 3.9 3.8   CHLORIDE  --  105 105 102   CO2  --  29 32 26   BUN  --  14 13 13   CR 0.62 0.57 0.60 0.50*   ANIONGAP  --  4 4 7   CANDIDA  --  9.6 9.9 9.1   GLC  --  84 66* 93     Most Recent 2 LFT's:  Recent Labs   Lab Test 01/14/20  1202 11/13/19  0851   AST 13 16   ALT 15 16   ALKPHOS 82 80   BILITOTAL 0.6 0.3     Most Recent 3 INR's:  Recent Labs   Lab Test 01/24/20  0806 01/14/20  1202 01/26/18  1802   INR 0.98 0.99 0.99     Recent Results (from the past 24 hour(s))   CT Chest w/o Contrast    Narrative    EXAMINATION: CTA pulmonary angiogram, 1/24/2020 9:14 AM     COMPARISON: 11/13/2019    HISTORY: PE suspected    TECHNIQUE: Volumetric helical acquisition of CT images of the chest  from the lung apices to the kidneys were acquired after the  administration of 80 mL of Isovue-370 IV contrast. Flash technique  with free breathing acquisition.  Post-processed multiplanar and/or  MIP reformations were obtained, archived to PACS and used in  interpretation of this study.     FINDINGS:    Contrast bolus is: suboptimal.  Exam  is negative for acute pulmonary  embolism. Severely diminutive appearance of the right middle lobe  segmental arteries with associated mosaic attenuation. Chronic  appearing pulmonary embolus of the right lower lobe lobar arteries  with diminutive arterial appearance and mosaic attenuation.    The largest right ventricle transaxial diameter is (measured from  endocardium to endocardium): 5.1 cm   The largest left ventricle transaxial diameter is (measured from  endocardium to endocardium): 5.0 cm  RV/LV ratio is: 1.02 (if ratio greater than 1.1 then sign is  suspicious for right heart strain)  Reflux of contrast into the IVC? no  Paradoxical bowing of the interventricular septum to the left? no    Chest: Thyroid gland appears unremarkable. Moderate bronchial wall  thickening. Esophagus appears unremarkable.     Lung nodule(s) described on series 8:  -Unchanged 1.2 cm pulmonary nodule the right upper lobe (Image: 117)  -Stable 4 mm pulmonary nodule the right upper lobe (Image: 60)  -Decreased size of 4 mm pulmonary nodule of the right lower lobe,  previously measuring 5 mm (Image: 121)  -Stable 3 mm pulmonary nodule of the left lower lobe (Image: 163)  -Increased size of 7 mm pulmonary nodule of the left lower lobe  (Image: 117)  -Unchanged 7 mm pulmonary nodule of the left upper lobe (Image: 105)    No evidence of lung infection. No pleural effusion. The pleura appears  unremarkable.. No pneumothorax. Aberrant right subclavian artery. Mild  cardiomegaly. Atherosclerotic calcifications of the coronary arteries.  No significant pericardial effusion..  Enlargement of the pulmonary  artery measuring 3.3 cm.  Eccentric chronic thrombus within the right  lower lobe basilar trunk pulmonary artery. There are no visualized  pathologically enlarged mediastinal, hilar or axillary lymph nodes.     Abdomen: Visualized abdomen is limited. Severe atherosclerotic  calcifications of the aorta. Calcifications of    Bones and Soft  Tissues: No suspicious osseous lesion. No suspicious  mass.  Degenerative changes of the spine with exaggerated lordotic  curvature. Cystic focus of the pancreatic head is poorly visualized  but appears unchanged.       Impression    IMPRESSION:   1. No evidence of acute large PE. No evidence of right heart strain.  2. Diminutive appearance of the right middle and lower lobe arteries  with chronic appearing filling defect of the right lower lobe  pulmonary artery with mosaic attenuation of the lung parenchyma.  Enlargement of the main pulmonary artery. Findings are concerning for  chronic pulmonary emboli resulting in pulmonary hypertension.  3. Multiple pulmonary nodules as detailed above. Some pulmonary  nodules have increased in size and again are concerning for metastatic  disease without known primary  4. Severe atherosclerotic calcifications of the visualized abdominal  aorta.    In the event of a positive result for acute pulmonary embolism  resulting in right heart strain, consider calling the   [facility]  for PERT (Pulmonary Embolism Response Team)  Activation?    PERT -- Pulmonary Embolism Response Team (Multidisciplinary team  including cardiology, interventional radiology, critical care,  hematology)    I have personally reviewed the examination and initial interpretation  and I agree with the findings.    CANDACE FOX MD   CT Chest Pulmonary Embolism w Contrast    Narrative    EXAMINATION: CTA pulmonary angiogram, 1/24/2020 9:14 AM     COMPARISON: 11/13/2019    HISTORY: PE suspected    TECHNIQUE: Volumetric helical acquisition of CT images of the chest  from the lung apices to the kidneys were acquired after the  administration of 80 mL of Isovue-370 IV contrast. Flash technique  with free breathing acquisition.  Post-processed multiplanar and/or  MIP reformations were obtained, archived to PACS and used in  interpretation of this study.     FINDINGS:    Contrast bolus is: suboptimal.   Exam is negative for acute pulmonary  embolism. Severely diminutive appearance of the right middle lobe  segmental arteries with associated mosaic attenuation. Chronic  appearing pulmonary embolus of the right lower lobe lobar arteries  with diminutive arterial appearance and mosaic attenuation.    The largest right ventricle transaxial diameter is (measured from  endocardium to endocardium): 5.1 cm   The largest left ventricle transaxial diameter is (measured from  endocardium to endocardium): 5.0 cm  RV/LV ratio is: 1.02 (if ratio greater than 1.1 then sign is  suspicious for right heart strain)  Reflux of contrast into the IVC? no  Paradoxical bowing of the interventricular septum to the left? no    Chest: Thyroid gland appears unremarkable. Moderate bronchial wall  thickening. Esophagus appears unremarkable.     Lung nodule(s) described on series 8:  -Unchanged 1.2 cm pulmonary nodule the right upper lobe (Image: 117)  -Stable 4 mm pulmonary nodule the right upper lobe (Image: 60)  -Decreased size of 4 mm pulmonary nodule of the right lower lobe,  previously measuring 5 mm (Image: 121)  -Stable 3 mm pulmonary nodule of the left lower lobe (Image: 163)  -Increased size of 7 mm pulmonary nodule of the left lower lobe  (Image: 117)  -Unchanged 7 mm pulmonary nodule of the left upper lobe (Image: 105)    No evidence of lung infection. No pleural effusion. The pleura appears  unremarkable.. No pneumothorax. Aberrant right subclavian artery. Mild  cardiomegaly. Atherosclerotic calcifications of the coronary arteries.  No significant pericardial effusion..  Enlargement of the pulmonary  artery measuring 3.3 cm.  Eccentric chronic thrombus within the right  lower lobe basilar trunk pulmonary artery. There are no visualized  pathologically enlarged mediastinal, hilar or axillary lymph nodes.     Abdomen: Visualized abdomen is limited. Severe atherosclerotic  calcifications of the aorta. Calcifications of    Bones and  Soft Tissues: No suspicious osseous lesion. No suspicious  mass.  Degenerative changes of the spine with exaggerated lordotic  curvature. Cystic focus of the pancreatic head is poorly visualized  but appears unchanged.       Impression    IMPRESSION:   1. No evidence of acute large PE. No evidence of right heart strain.  2. Diminutive appearance of the right middle and lower lobe arteries  with chronic appearing filling defect of the right lower lobe  pulmonary artery with mosaic attenuation of the lung parenchyma.  Enlargement of the main pulmonary artery. Findings are concerning for  chronic pulmonary emboli resulting in pulmonary hypertension.  3. Multiple pulmonary nodules as detailed above. Some pulmonary  nodules have increased in size and again are concerning for metastatic  disease without known primary  4. Severe atherosclerotic calcifications of the visualized abdominal  aorta.    In the event of a positive result for acute pulmonary embolism  resulting in right heart strain, consider calling the   [facility] hospital  for PERT (Pulmonary Embolism Response Team)  Activation?    PERT -- Pulmonary Embolism Response Team (Multidisciplinary team  including cardiology, interventional radiology, critical care,  hematology)    I have personally reviewed the examination and initial interpretation  and I agree with the findings.    CANDACE FOX MD   XR Chest Port 1 View    Narrative    Exam:  XR CHEST PORT 1 VW, 1/24/2020 12:55 PM    History: r/o pneumothorax, s/p RUL transbronchial biopsies    Comparison:  Chest CT 1/24/2020. PET CT 12/11/2019    Findings:  Single AP view of the chest. Stable enlargement of the  cardiac silhouette. No pleural effusion or pneumothorax. No focal  pulmonary opacities. Visualized upper abdomen is unremarkable.      Impression    Impression:    No pneumothorax. No acute airspace disease.    I have personally reviewed the examination and initial interpretation  and I agree with the  findings.    CANDACE FOX MD   XR Surgery FELICIANO Fluoro G/T 5 Min w Stills    Narrative    This exam was marked as non-reportable because it will not be read by a   radiologist or a [facility] non-radiologist provider.             XR Chest Port 1 View    Narrative    EXAM: XR CHEST PORT 1 VW  1/24/2020 3:58 PM     HISTORY:  evaluate for delayed pneumothorax, persistent hypoxemia s/p  RUL transbronchial biopsies       COMPARISON:  1/24/2020    FINDINGS: Single view of the chest. Trachea is midline. The cardiac  and mediastinal silhouette is unchanged. Small left pleural effusion.  Retrocardiac opacities. Scoliotic curvature of the spine. No  pneumothorax appreciated.      Impression    IMPRESSION:   1. No pneumothorax is appreciated.  2. Small left pleural effusion with retrocardiac opacities,  atelectasis versus consolidation.

## 2020-01-24 NOTE — PROCEDURES
INTERVENTIONAL PULMONOLOGY       Procedure(s):    A flexible bronchoscopy  Airway exam  EBUS-TBNA (2 sites)  Electromagnetic navigation bronchoscopy using Super-Dimension or Veran (1 sites)  Radial EBUS Navigation (2 sites)  Transbronchial forceps biopsies (1 site)  Transbronchial needle aspiration (1 site)  Therapeutic suctioning (1 sites)  Transbronchial cryoprobe lung biopsies (1 sites)  Endobronchial biopsy (1 site)    Procedure Date: 1/24/2020    Indication:  Bilateral pulmonary nodules    Attending of Record:  Gopal Jara MD     Interventional Pulmonary Fellow   Roverto Hamlin MD     Trainees Present:   None    Medications:    General Anesthesia - See anesthesia flowsheet for details    Sedation Time:   Per Anesthesia Care Provider    Time Out:  Performed    The patient's medical record has been reviewed.  The indication for the procedure was reviewed.  The necessary history and physical examination was performed and reviewed.  The risks, benefits and alternatives of the procedure were discussed with the the patient in detail and she had the opportunity to ask questions.  I discussed in particular the potential complications including risks of minor or life-threatening bleeding and/or infection, respiratory failure, vocal cord trauma / paralysis, pneumothorax, and discomfort. Sedation risks were also discussed including abnormal heart rhythms, low blood pressure, and respiratory failure. All questions were answered to the best of my ability.  Verbal and written informed consent was obtained.  The proposed procedure and the patient's identification were verified prior to the procedure by the physician and the nurse.    The patient was assessed for the adequacy for the procedure and to receive medications.   Mental Status:  Alert and oriented x 3  Airway examination:  Class II (Complete visualization of uvula)  Pulmonary:  Clear to ausculation bilaterally  CV:  RRR, no murmurs or gallops  ASA Grade:  (III)   Severe systemic disease    After clinical evaluation and reviewing the indication, risks, alternatives and benefits of the procedure the patient was deemed to be in satisfactory condition to undergo the procedure.      A Tuberculosis risk assessment was performed:  The patient has no known RISK of Tuberculosis    The procedure was performed in a negative airflow room: The patient could not be moved to a negative airflow room because of needed OR for the procedure    Maneuvers / Procedure:    A Flexible  bronchoscope was used for the procedure. The patient was orally intubated with a # 8.5 ETT and the flexible scope was passed through.      Airway Examination: A complete airway examination was performed from the distal trachea to the subsegmental level in each lobe of both lungs.  Pertinent findings include normal anatomy, no secretions. There is irregular, thickened, and inflamed appearing mucosa at the os of RB2 which was biopsies (see below). No other endobronchial lesions identified.          Endobronchial cryoprobe biopsies: One Site - The 1.9 mm cryoprobe was inserted.  Topical epinephrine was not administered.  Endobronchial cryobiopsies were obtained from irregular mucosa at RB2 take-off.  A total of 4 biopsies were obtained and sent for frozen.    EBUS-TBNA: The EBUS scope was inserted and biopsies were obtained from:  1. Station 11L with 3 passes, samples obtained with ROMÁN asbcent  2. Station 11R with 4 passes, samples obtained with ROMÁN absent    A combination of suction and no suction was used to obtain the samples. EBUS samples were sent for cytology.  Note, all mediastinal, hilar, lobar and segmental stations are evaluated during the procedure and those not listed were not biopsied due to small lymph node diameter, positive ROMÁN on higher alexa staging, alternative diagnosis or inability to continue with the procedure.     Super-Dimension / Electromagnetic Navigational Bronchoscopy: The bronchoscope  was inserted and after confirmation of the patient the locatable guide was inserted. A 90 Catheter was used based on the location of the lesion.  Appropriate registration and verification of equipment was performed.  The locatable guide was then advanced and navigated to the lesion with appropriate alignment.  A radial ultrasound probe was used to verify approximation to the lesion in the posterior RUL.  Fluoroscopy was then used to verify catheter placement. Epinephrine was not administered and tissue sampling was obtained using 19 gauge needle, needle brush, triple needle brush, biopsy forceps. ROMÁN was present. No lesional tissue identified.     Radial EBUS navigation: The 1.8mm 20MHz radial probe was inserted into the bronchoscope and used to navigate to the lesion. The lesion was concentric by ultrasound and fluoroscopy was used to confirm placement of the probe in a different sub-segment of the posterior RUL then what was identified with Super-Dimension. Epinephrine was not administered and tissue sampling was obtained using 19 gauge needle, biopsy forceps. ROMÁN was not present.     The 1.9 mm cryoprobewas then advanced to the RUL Posterior with fluoroscopic guidance.  The probe was charged between 6 and 12 seconds.  The equipment was not functioning properly and zero biopsies were actually obtained.    Therapeutic suctioning: 15-20 min of operative time was spent clearing out the airway of debris, blood and mucous prior to the intervention.     Any disposable equipment was visually inspected and deemed to be intact immediately post procedure.      Relevant Pictures      Recommendations:     Successful endobronchial cryoprobe biopsies irregular mucosa at the RB2 os    Successful EBUS-TBNA of stations 11L and 11R    Successful Super-D, and radial/fluoroscopy directed TBBx and TBNA of the RUL nodule    Attempted transbronchial cryoprobe biopsies of RUL nodue    Await pending results in the next 3-5 business  days          Roverto Hamlin MD, 1/24/2020, 12:17 PM  Interventional Pulmonology Fellow  Department of Pulmonary, Allergy, Critical Care & Sleep Medicine   Pager: [phone]        I was present for the entire viewing portion of the endoscopy / bronchoscopy procedure for patient Lucie Stockton. This includes the time-out for patient verification and procedure verification.  I was present for the entire procedure from scope insertion to scope withdrawal.  I have reviewed the above report and agree with the findings, interpretation, and recommendations.  Any changes have been made direction in the note above before signing.     Gopal Jara MD   of Medicine  Interventional Pulmonary  Department of Pulmonary, Allergy, Critical Care and Sleep Medicine   [facility], [employer]  Pager: [phone]

## 2020-01-24 NOTE — PROGRESS NOTES
Bathroom visit via wheelchair.  Using oxygen via nasal cannula at 2 liters per minute.  Tolerated walking in to bathroom without shortness of breath.  On return to bay (remains in easy chair, upright) oxygen saturation 82%on room air.  Lungs sounds, more coarse and wheezy left vs. Right.  Alert.  Does not appear air hungry.  Using cell phone.  Oxygen now at 2 liters per minute with saturation approximately 90%.

## 2020-01-24 NOTE — ANESTHESIA PREPROCEDURE EVALUATION
"Anesthesia Pre-Procedure Evaluation    Patient: Lucie Stockton   MRN:     [id number] Gender:   female   Age:    84 year old :      1935        Preoperative Diagnosis: Lung nodules [R91.8]   Procedure(s):  ENDOBRONCHIAL ULTRASOUND, WITH FLEXIBLE BRONCHOSCOPY  endobronchial and transbronchial biopsies  Super D navigational bronchoscopy     Past Medical History:   Diagnosis Date     Anemia      Aortic stenosis     abdominal     Atherosclerosis of aorta (H)     \"extensive disease with near occlusion below level of renal arteries\" on CT     Atherosclerosis of arteries of extremities (H)      Back pain     severe multilevel DJD, moderate spinal canal stenosis L4-5, severe L3-4     Chronic pain     Back, hips, knees, legs     Degenerative joint disease      DVT of lower extremity, bilateral (H)     5/24/10 left distal femoral and great saphenous, 7/7/10 left:  extending to cfv, iliac , pop and post tibial, peronial, right:  distal fem and peroneal      Grave's disease      Hyperlipidaemia LDL goal < 70      Hypertension, essential      Hypothyroidism      Imbalance      Insomnia      Intermittent claudication (H)      Iron deficiency      Knee pain      Lumbar stenosis with neurogenic claudication      Osteoarthritis     ankle,foot, knee     Osteoporosis      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 JONO Goncalves - Returned BENIGN     INJECT EPIDURAL LUMBAR / SACRAL " SINGLE N/A 1/5/2018    Procedure: INJECT EPIDURAL LUMBAR / SACRAL SINGLE;  lumbar interlaminar epidural steroid injection;  Surgeon: Jaylin Valadez MD;  [facility]: [facility]     INJECT SACROILIAC JOINT Right 3/7/2018    Procedure: INJECT SACROILIAC JOINT;  Right Sacroiliac Joint Injection;  Surgeon: Flavio Harrison MD;  Location: [facility]     INJECT SACROILIAC JOINT Bilateral 12/7/2018    Procedure: Bilateral Sacroiliac Joint Injections;  Surgeon: Faviola Cosby MD;  Location: [facility] OR     UPPER GI ENDOSCOPY      11/10/10 erythematous gastropathy, angioectasia          Anesthesia Evaluation     .             ROS/MED HX    ENT/Pulmonary: Comment: Copd, recent bronchitis, viral. No abx or steroids, no fevers. Good appetite, low grade cough, improving 3 weeks ago started.    (+)COPD, , . .    Neurologic:  - neg neurologic ROS     Cardiovascular:     (+) hypertension----. : . . . :. . Previous cardiac testing Echodate:1/2020results:Interpretation Summary  Left ventricular function, chamber size, wall motion, and wall thickness are  normal.The EF is 55-60%. Grade II or moderate diastolic dysfunction.  Normal RV size and function.  Pulmonary artery systolic pressure cannot be assessed.  No significant valvular abnormality.  The atrial septum is intact as assessed by color Doppler and agitated saline  bubble study .  No pericardial effusion.date: results: date: results: date: results:          METS/Exercise Tolerance:  >4 METS   Hematologic: Comments: Reportedly on plavix for hx DVT? No stroke and no AC. Does abd aortic plaques.    (+) History of blood clots pt is not anticoagulated, -      Musculoskeletal:  - neg musculoskeletal ROS       GI/Hepatic:        (-) GERD   Renal/Genitourinary:  - ROS Renal section negative       Endo:     (+) thyroid problem hypothyroidism, .      Psychiatric:  - neg psychiatric ROS       Infectious Disease: Comment: See pulmonary section        Malignancy:   (+) Malignancy History of  Lung          Other:    - neg other ROS                     PHYSICAL EXAM:   Mental Status/Neuro: A/A/O   Airway: Facies: Feasible  Mallampati: II  Mouth/Opening: Full  TM distance: < 6 cm  Neck ROM: Full   Respiratory: Auscultation: CTAB (no obvious wheezing.)      CV: Rhythm: Regular   Comments:                      LABS:  CBC:   Lab Results   Component Value Date    WBC 7.8 01/14/2020    WBC 7.3 11/13/2019    HGB 10.6 (L) 01/24/2020    HGB 10.4 (L) 01/14/2020    HCT 34.1 (L) 01/14/2020    HCT 39.2 11/13/2019     (H) 01/14/2020     (H) 11/13/2019     BMP:   Lab Results   Component Value Date     01/14/2020     11/13/2019    POTASSIUM 3.6 01/14/2020    POTASSIUM 3.9 11/13/2019    CHLORIDE 105 01/14/2020    CHLORIDE 105 11/13/2019    CO2 29 01/14/2020    CO2 32 11/13/2019    BUN 14 01/14/2020    BUN 13 11/13/2019    CR 0.57 01/14/2020    CR 0.60 11/13/2019    GLC 84 01/14/2020    GLC 66 (L) 11/13/2019     COAGS:   Lab Results   Component Value Date    PTT 31 01/24/2020    INR 0.98 01/24/2020     POC:   Lab Results   Component Value Date    BGM 98 01/24/2020     OTHER:   Lab Results   Component Value Date    PH 7.42 01/27/2018    LACT 0.9 04/29/2017    CANDIDA 9.6 01/14/2020    PHOS 3.5 02/24/2014    MAG 2.1 01/24/2017    ALBUMIN 3.5 01/14/2020    PROTTOTAL 7.4 01/14/2020    ALT 15 01/14/2020    AST 13 01/14/2020    ALKPHOS 82 01/14/2020    BILITOTAL 0.6 01/14/2020    LIPASE 258 05/11/2017    TSH 1.99 01/14/2020    T4 0.86 06/02/2018    T3 181 09/12/2005    CRP 5.5 01/14/2020        Preop Vitals    BP Readings from Last 3 Encounters:   01/24/20 (!) 143/110   01/14/20 121/71   01/14/20 132/69    Pulse Readings from Last 3 Encounters:   01/24/20 78   01/14/20 66   01/14/20 77      Resp Readings from Last 3 Encounters:   01/24/20 14   01/14/20 16   12/05/19 14    SpO2 Readings from Last 3 Encounters:   01/24/20 93%   01/14/20 96%   01/14/20 92%      Temp Readings from Last 1 Encounters:   01/24/20  "36.7  C (98.1  F) (Oral)    Ht Readings from Last 1 Encounters:   01/24/20 1.473 m (4' 10\")      Wt Readings from Last 1 Encounters:   01/24/20 46.6 kg (102 lb 11.8 oz)    Estimated body mass index is 21.47 kg/m  as calculated from the following:    Height as of this encounter: 1.473 m (4' 10\").    Weight as of this encounter: 46.6 kg (102 lb 11.8 oz).     LDA:  Peripheral IV Right Lower forearm (Active)   Number of days:         Assessment:   ASA SCORE: 3    H&P: History and physical reviewed and following examination; no interval change.   Smoking Status:  Non-Smoker/Unknown   NPO Status: NPO Appropriate     Plan:   Anes. Type:  General      Induction:  IV (Standard)   Airway: ETT   Access/Monitoring: PIV   Maintenance: TIVA     Postop Plan:   Postop Pain: Opioids  Postop Sedation/Airway: Not planned  Disposition: Inpatient/Admit     PONV Management:   Adult Risk Factors: Female, Non-Smoker, Postop Opioids   Prevention: Ondansetron, Dexamethasone, No Volatiles     CONSENT: Direct conversation   Plan and risks discussed with: Patient          Comments for Plan/Consent:  Preop nebs, decadron, levaquin. Plan for GA with ett, pt understands risk of post op O2, 24 hr obs, need for intubation, given recent illness. Okay per surgeon to proceed.                 David Lujan MD  "

## 2020-01-24 NOTE — ANESTHESIA CARE TRANSFER NOTE
Patient: Lucie Stockton    Procedure(s):  ENDOBRONCHIAL ULTRASOUND, WITH FLEXIBLE BRONCHOSCOPY  endobronchial and transbronchial biopsies  Super D navigational bronchoscopy    Diagnosis: Lung nodules [R91.8]  Diagnosis Additional Information: No value filed.    Anesthesia Type:   General     Note:    Patient transferred to:PACU  Comments: Pt remains stable, monitors on alarms in place, report to PACU RN, no complicationsHandoff Report: Identifed the Patient, Identified the Reponsible Provider, Reviewed the pertinent medical history, Discussed the surgical course, Reviewed Intra-OP anesthesia mangement and issues during anesthesia, Set expectations for post-procedure period and Allowed opportunity for questions and acknowledgement of understanding      Vitals: (Last set prior to Anesthesia Care Transfer)    CRNA VITALS  1/24/2020 1139 - 1/24/2020 1217      1/24/2020             Resp Rate (set):  10                Electronically Signed By: ALEKSEY Garber CRNA  January 24, 2020  12:17 PM

## 2020-01-24 NOTE — OR NURSING
Dr Lujan at bedside in PACU. SIN aware of pt's O2 88-93% on 2L nasal cannula. SIN requesting pt transfers to phase 2, get up in chair, and attempt to wean oxygen further.

## 2020-01-24 NOTE — CONSULTS
"         Interventional Pulmonology          Initial Inpatient Consultation Note                                     January 24, 2020            Patient: Lucie Stockton    Date of Admission: 1/24/2020  Reason for Consultation: Acute hypoxemic respiratory failure post-procedure      Assessment:   Lucie Stockton is a 84 year old woman being admitted on 1/24/2020 for observation from the PACU secondary to acute hypoxemic respiratory failure requiring 2 LPM (92% on r/a pre-procedure). She is immediately post-op from EBUS and complicated transbronchial biopsies to the RUL. No pneumothorax on serial imaging post-operatively. Hemodynamically stable. Not in distress. Wheezing on exam. Opacities on imaging likely representing saline and blood. Depressed FEV1 at baseline (58%). No improvement after several hours in the PACU, IS, ambulation, and bronchodilators.     Plan:    Agree with admission under obs    Continue scheduled bronchodilators    Start Prednisone 40 mg/d x5 days    If back to room air tomorrow, ok to discharge    If continues to require 1-2 LPM tomorrow, consider arranging for home O2    This plan has been discussed with the patient and primary service      Thank you for this consultation, we will continue to follow along. Please see [facility] for the on-call IP attending schedule, or page the fellow at [phone] with any questions. For future Interventional Pulmonology consults, the Interventional Pulmonology consult order is now active within the pulmonary consult order panel.               Chief Complaint: \"I don't want to be admitted\"    History of Present Illness:   POD#0 EBUS and electronavigational transbronchial biopsies. The procedure was uneventful. She is not on supplemental oxygen at baseline. FEV1 is 54% predicted from 12/2019.  History of remote smoking. SPO2 was ~92% on room air pre-procedure. Now requiring 2 LPM low flow nasal cannula post-operatively. SPO2 low 80's on room air, mid 80's on 1 LPM. " "Immediate post-procedure chest radiograph without evidence of pneumothorax, and the same on repeat imaging three hours later. Opacities seen which likely represent combinations of saline and hemorrhage. Hemodynamically stable. End expiratory wheezing on exam. Receiving bronchodilators. Not in distress. Ambulating in PACU with some assistance and using IS.           Data:   All pertinent laboratory and imaging data reviewed.      1/24/19 CXR:  1. No pneumothorax is appreciated.  2. Small left pleural effusion with retrocardiac opacities,  atelectasis versus consolidation.         Past Medical History:     Past Medical History:   Diagnosis Date     Anemia      Aortic stenosis     abdominal     Atherosclerosis of aorta (H)     \"extensive disease with near occlusion below level of renal arteries\" on CT     Atherosclerosis of arteries of extremities (H)      Back pain     severe multilevel DJD, moderate spinal canal stenosis L4-5, severe L3-4     Chronic pain     Back, hips, knees, legs     Degenerative joint disease      DVT of lower extremity, bilateral (H)     5/24/10 left distal femoral and great saphenous, 7/7/10 left:  extending to cfv, iliac , pop and post tibial, peronial, right:  distal fem and peroneal      Grave's disease      Hyperlipidaemia LDL goal < 70      Hypertension, essential      Hypothyroidism      Imbalance      Insomnia      Intermittent claudication (H)      Iron deficiency      Knee pain      Lumbar stenosis with neurogenic claudication      Osteoarthritis     ankle,foot, knee     Osteoporosis      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, 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 JONO Goncalves - Returned BENIGN     INJECT EPIDURAL LUMBAR / SACRAL SINGLE N/A 2018    Procedure: INJECT EPIDURAL LUMBAR / SACRAL SINGLE;  lumbar interlaminar epidural steroid injection;  Surgeon: Jaylin Valadez MD;  [facility]     INJECT SACROILIAC JOINT Right 3/7/2018    Procedure: INJECT SACROILIAC JOINT;  Right Sacroiliac Joint Injection;  Surgeon: Flavio Harrison MD;  [facility]: [facility]     INJECT SACROILIAC JOINT Bilateral 2018    Procedure: Bilateral Sacroiliac Joint Injections;  Surgeon: Faviola Cosby MD;  Location: [facility]     UPPER GI ENDOSCOPY      11/10/10 erythematous gastropathy, angioectasia            Social History:     Social History     Socioeconomic History     Marital status:      Spouse name: Not on file     Number of children: Not on file     Years of education: Not on file     Highest education level: Not on file   Occupational History     Not on file   Social Needs     Financial resource strain: Not on file     Food insecurity:     Worry: Not on file     Inability: Not on file     Transportation needs:     Medical: Not on file     Non-medical: Not on file   Tobacco Use     Smoking status: Former Smoker     Packs/day: 0.50     Years: 15.00     Pack years: 7.50     Last attempt to quit: 1993     Years since quittin.3     Smokeless tobacco: Never Used   Substance and Sexual Activity     Alcohol use: Yes     Comment: social     Drug use: No     Sexual activity: Not Currently     Partners: Male   Lifestyle     Physical activity:     Days per week: Not on file     Minutes per session: Not on file     Stress: Not on file   Relationships     Social connections:     Talks on phone: Not on file     Gets together: Not on file     Attends  L[employer] day service: Not on file     Active member of club or organization: Not on file     Attends meetings of clubs or organizations: Not on file     Relationship status: Not on file     Intimate partner violence:     Fear of current or ex partner: Not on file     Emotionally abused: Not on file     Physically abused: Not on file     Forced sexual activity: Not on file   Other Topics Concern     Parent/sibling w/ CABG, MI or angioplasty before 65F 55M? Not Asked   Social History Narrative     for 52 years. Retired from [employer]. Frequent world travel.          Family History:     Family History   Problem Relation Age of Onset     Breast Cancer Maternal Aunt 80     C.A.D. Father 54     No Known Problems Mother             Allergies:   No Known Allergies         Medications:       ipratropium - albuterol 0.5 mg/2.5 mg/3 mL  3 mL Nebulization 4x daily     predniSONE  40 mg Oral Daily            Review of Systems:   A 12 point review of systems is negative aside for as noted above         Physical Exam:   Temp:  [96.5  F (35.8  C)-98.1  F (36.7  C)] 98  F (36.7  C)  Pulse:  [66-78] 78  Heart Rate:  [66-78] 78  Resp:  [14-26] 23  BP: (100-143)/() 100/61  SpO2:  [87 %-94 %] 90 %  General: Awake, alert and in no apparent distress, on nasal cannula  EENT: mmm  Neck: Trachea supple/midline  Lungs: Bilateral end-expiratory wheezes, crackles, R upper lung field, speaking in 3/4 sentences   Cardiovascular: Normal S1 and S2.  RRR.    Abdomen: Soft, non-tender, non-distended   MSK: No edema, no clubbing   Neurologic: AOx3, moving all extremities   Skin: Warm/dry, no obvious rash        Roverto Hamlin MD, 1/24/2020, 4:33 PM  Interventional Pulmonology Fellow  Department of Pulmonary, Allergy, Critical Care & Sleep Medicine   Pager: [phone]

## 2020-01-24 NOTE — BRIEF OP NOTE
[facility], [place]    Brief Operative Note    Pre-operative diagnosis: Lung nodules [R91.8]  Post-operative diagnosis Same as pre-operative diagnosis    Procedure: Procedure(s):  ENDOBRONCHIAL ULTRASOUND, WITH FLEXIBLE BRONCHOSCOPY  endobronchial and transbronchial biopsies  Super D navigational bronchoscopy  Surgeon: Surgeon(s) and Role:     * Gopal Jara MD - Primary     * Roverto Hamlin MD - Assisting  Anesthesia: General   Estimated blood loss: None  Drains: None  Specimens:   ID Type Source Tests Collected by Time Destination   2 :  Tissue Lung, Right Upper Lobe FINE NEEDLE ASPIRATION Gopal Jara MD 1/24/2020 11:37 AM    A : right upper lobe bronchial lesion Tissue Other SURGICAL PATHOLOGY EXAM Gopal Jara MD 1/24/2020 10:14 AM    B : 11L Tissue Other FINE NEEDLE ASPIRATION Gopal Jara MD 1/24/2020 10:23 AM    C : 11R Tissue Other FINE NEEDLE ASPIRATION Gopal Jara MD 1/24/2020 10:31 AM    D : Right upper lobe nodule, tripple needle brush Tissue Lung, Right Upper Lobe FINE NEEDLE ASPIRATION Gopal Jara MD 1/24/2020 11:17 AM    E : Lung, Right Upper Lobe, Cytology Brush Tissue Lung, Right Upper Lobe FINE NEEDLE ASPIRATION Gopal Jara MD 1/24/2020 11:42 AM    F : Forcep #2 Right Upper Lobe Nodule for FNA Tissue Lung, Right Upper Lobe FINE NEEDLE ASPIRATION Gopal Jara MD 1/24/2020 11:45 AM    G : Lung, Right Upper Lobe Nodule, Needle #2 Tissue Lung, Right Upper Lobe FINE NEEDLE ASPIRATION Gopal Jara MD 1/24/2020 11:52 AM      Findings:   None.  Complications: None.  Implants: * No implants in log *      Roverto Hamlin MD, 1/24/2020, 12:17 PM  Interventional Pulmonology Fellow  Department of Pulmonary, Allergy, Critical Care & Sleep Medicine   Pager: [phone]

## 2020-01-25 VITALS
DIASTOLIC BLOOD PRESSURE: 61 MMHG | HEIGHT: 58 IN | RESPIRATION RATE: 16 BRPM | HEART RATE: 67 BPM | BODY MASS INDEX: 21.57 KG/M2 | TEMPERATURE: 98.4 F | OXYGEN SATURATION: 99 % | SYSTOLIC BLOOD PRESSURE: 129 MMHG | WEIGHT: 102.73 LBS

## 2020-01-25 LAB
ANION GAP SERPL CALCULATED.3IONS-SCNC: 4 MMOL/L (ref 3–14)
BUN SERPL-MCNC: 20 MG/DL (ref 7–30)
CALCIUM SERPL-MCNC: 9.3 MG/DL (ref 8.5–10.1)
CHLORIDE SERPL-SCNC: 107 MMOL/L (ref 94–109)
CO2 SERPL-SCNC: 28 MMOL/L (ref 20–32)
CREAT SERPL-MCNC: 0.56 MG/DL (ref 0.52–1.04)
ERYTHROCYTE [DISTWIDTH] IN BLOOD BY AUTOMATED COUNT: 15.9 % (ref 10–15)
GFR SERPL CREATININE-BSD FRML MDRD: 86 ML/MIN/{1.73_M2}
GLUCOSE SERPL-MCNC: 110 MG/DL (ref 70–99)
HCT VFR BLD AUTO: 30.6 % (ref 35–47)
HGB BLD-MCNC: 9.1 G/DL (ref 11.7–15.7)
MCH RBC QN AUTO: 24.7 PG (ref 26.5–33)
MCHC RBC AUTO-ENTMCNC: 29.7 G/DL (ref 31.5–36.5)
MCV RBC AUTO: 83 FL (ref 78–100)
PLATELET # BLD AUTO: 354 10E9/L (ref 150–450)
POTASSIUM SERPL-SCNC: 4.1 MMOL/L (ref 3.4–5.3)
RBC # BLD AUTO: 3.68 10E12/L (ref 3.8–5.2)
SODIUM SERPL-SCNC: 140 MMOL/L (ref 133–144)
WBC # BLD AUTO: 8.6 10E9/L (ref 4–11)

## 2020-01-25 PROCEDURE — 94640 AIRWAY INHALATION TREATMENT: CPT

## 2020-01-25 PROCEDURE — 25000132 ZZH RX MED GY IP 250 OP 250 PS 637: Mod: GY | Performed by: NURSE PRACTITIONER

## 2020-01-25 PROCEDURE — 80048 BASIC METABOLIC PNL TOTAL CA: CPT | Performed by: NURSE PRACTITIONER

## 2020-01-25 PROCEDURE — 25000125 ZZHC RX 250: Performed by: ANESTHESIOLOGY

## 2020-01-25 PROCEDURE — 40000893 ZZH STATISTIC PT IP EVAL DEFER

## 2020-01-25 PROCEDURE — 36415 COLL VENOUS BLD VENIPUNCTURE: CPT | Performed by: NURSE PRACTITIONER

## 2020-01-25 PROCEDURE — G0378 HOSPITAL OBSERVATION PER HR: HCPCS

## 2020-01-25 PROCEDURE — 25000131 ZZH RX MED GY IP 250 OP 636 PS 637: Mod: GY | Performed by: PEDIATRICS

## 2020-01-25 PROCEDURE — 85027 COMPLETE CBC AUTOMATED: CPT | Performed by: NURSE PRACTITIONER

## 2020-01-25 PROCEDURE — 99217 ZZC OBSERVATION CARE DISCHARGE: CPT | Performed by: PEDIATRICS

## 2020-01-25 PROCEDURE — 40000275 ZZH STATISTIC RCP TIME EA 10 MIN

## 2020-01-25 RX ORDER — PREDNISONE 20 MG/1
40 TABLET ORAL DAILY
Status: DISCONTINUED | OUTPATIENT
Start: 2020-01-25 | End: 2020-01-25 | Stop reason: HOSPADM

## 2020-01-25 RX ORDER — IPRATROPIUM BROMIDE AND ALBUTEROL SULFATE 2.5; .5 MG/3ML; MG/3ML
3 SOLUTION RESPIRATORY (INHALATION) EVERY 4 HOURS PRN
Status: DISCONTINUED | OUTPATIENT
Start: 2020-01-25 | End: 2020-01-25 | Stop reason: HOSPADM

## 2020-01-25 RX ORDER — PREDNISONE 20 MG/1
40 TABLET ORAL DAILY
Qty: 6 TABLET | Refills: 0 | Status: SHIPPED | OUTPATIENT
Start: 2020-01-26 | End: 2020-01-30

## 2020-01-25 RX ORDER — AZITHROMYCIN 250 MG/1
250 TABLET, FILM COATED ORAL DAILY
Qty: 4 TABLET | Refills: 0 | Status: SHIPPED | OUTPATIENT
Start: 2020-01-26 | End: 2020-01-30

## 2020-01-25 RX ADMIN — AZITHROMYCIN MONOHYDRATE 250 MG: 250 TABLET ORAL at 08:21

## 2020-01-25 RX ADMIN — PREDNISONE 40 MG: 20 TABLET ORAL at 09:27

## 2020-01-25 RX ADMIN — IPRATROPIUM BROMIDE AND ALBUTEROL SULFATE 3 ML: .5; 3 SOLUTION RESPIRATORY (INHALATION) at 07:57

## 2020-01-25 NOTE — DISCHARGE SUMMARY
[facility], [place]  Hospitalist Discharge Summary       Date of Admission:  2020  Date of Discharge:  2020 10:30 AM  Discharging Provider: Zafar Ko MD  Discharge Team: Hospitalist Service, Gold 1    Discharge Diagnoses   Acute hypoxic respiratory failure post bronchial biopsy  Wheeze, possible COPD    Follow-ups Needed After Discharge   Follow-up Appointments     Adult [facility] Follow-up and recommended labs and tests      Follow up with primary care provider, Marii Montgomery, within 7 days   for hospital follow- up.  No follow up labs or test are needed.      Appointments on [place] and/or [facility] (with [facility] or [facility]   provider or service). Call [phone] if you haven't heard regarding   these appointments within 7 days of discharge.             Unresulted Labs Ordered in the Past 30 Days of this Admission     Date and Time Order Name Status Description    2020 1024 Fine needle aspiration In process     2020 1014 Surgical pathology exam Preliminary       These results will be followed up by Pulmonary    Discharge Disposition   Discharged to home  Condition at discharge: Fair    Hospital Course   Lucie Stockton is a 84 year old female with past medical history significant for HTN, HLD, COPD, hypothyroidism, anxiety, and depression who presented to [facility] for scheduled lung biopsy, became hypoxic post procedure and is being admitted for monitoring and suspected COPD exacerbation and wheezing with plausible bronchial edema after procedure. Patient was weaned off oxygen this AM. She continued to have some brief hypoxia with walk, but it was self resolving with deep breaths and pausing.  Mrs Stockton was asymptomatic of events.      Ms Stockton was anxious for discharge to make sure she would make a  this afternoon. She understands that her hypoxia is concerning, but she would prefer discharge and will return if she worsened.  Our plan is to  discharge to complete a 5 day course of both prednisone and azithromycin. The reminder of medications are unchanged.      # Acute hypoxia s/p lung biopsy   # Possible COPD exacerbation vs edema afer procedure.   Presented to [facility] today for scheduled lung bx with Pulm and became hypoxic to 80s after, requiring supplemental O2.  Does not use oxygen at home, and did not recall previous diagnosis of COPD.  Denies chest pain and dyspnea.  No recent sick contacts or exposures.  Remote hx tobacco use.   - Agree w/ treating for COPD exacerbation   - Prednisone 40mg x 5 days   - Azithromycin 500mg tonight, then 250mg x 4 days   - Monitor O2   - Wean supplemental O2 as tolerated  - PT, OT consults in AM      # HTN - BPs slightly soft, 100/61.  On Lisinopril 40mg, hydrochlorothiazide 50mg, and Norvasc 10mg daily PTA.   - Hold PTA meds d/t hypotension  - Monitor BPs      # HLD - Continue home statin     # Hypothyroidism - Continue PTA Synthroid 75mcg AM     # Anxiety, depression - Anxious, does not want to stay overnight in the hospital.  On Celexa 20mg daily.  - Continue PTA Celexa   - Offer Hydroxyzine 10mg Q6H PRN for anxiety       Consultations This Hospital Stay   PULMONARY GENERAL ADULT IP CONSULT  PHYSICAL THERAPY ADULT IP CONSULT  OCCUPATIONAL THERAPY ADULT IP CONSULT  SOCIAL WORK IP CONSULT    Code Status   Prior    Time Spent on this Encounter   I, Zafar Ko MD, personally saw the patient today and spent greater than 30 minutes discharging this patient.       Zafar Ko MD  [facility], [place]  ______________________________________________________________________    Physical Exam   Vital Signs: Temp: 98.4  F (36.9  C) Temp src: Oral BP: 129/61 Pulse: 67 Heart Rate: 81 Resp: 16 SpO2: 99 % O2 Device: Nasal cannula Oxygen Delivery: 4 LPM  Weight: 102 lbs 11.75 oz  General Appearance: Up in bed and cog clear. Anxious for discharge. NAD.  Respiratory: clear bilateral with no  significant wheeze or crackles. No increase work of breathing.   Cardiovascular: RRR no MGR  GI: soft - non tender  Skin: no rash or pettechia         Primary Care Physician   Marii Montgomery    Discharge Orders      Reason for your hospital stay    Low oxygen and some wheezing after procedure. You are doing much better overnight and we made sure you were stable off oxygen before discharge     Adult [facility] Follow-up and recommended labs and tests    Follow up with primary care provider, Marii Montgomery, within 7 days for hospital follow- up.  No follow up labs or test are needed.      Appointments on [place] and/or [facility] (with [facility] or [facility] provider or service). Call [phone] if you haven't heard regarding these appointments within 7 days of discharge.     Activity    Your activity upon discharge: activity as tolerated and no driving     When to contact your care team    Call primary if you have any of the following: pain, fevers, difficulty breathing, change in thinking, or any new or concerning symptoms. .     Diet    Follow this diet upon discharge: Orders Placed This Encounter      Regular Diet Adult       Significant Results and Procedures   Most Recent 3 CBC's:  Recent Labs   Lab Test 01/25/20  0644 01/24/20  0806 01/14/20  1202 11/13/19  0851   WBC 8.6  --  7.8 7.3   HGB 9.1* 10.6* 10.4* 12.1   MCV 83  --  84 90     --  580* 463*     Most Recent 3 BMP's:  Recent Labs   Lab Test 01/25/20  0644 01/24/20  0806 01/14/20  1202 11/13/19  0851     --  138 141   POTASSIUM 4.1 3.7 3.6 3.9   CHLORIDE 107  --  105 105   CO2 28  --  29 32   BUN 20  --  14 13   CR 0.56 0.62 0.57 0.60   ANIONGAP 4  --  4 4   CANDIDA 9.3  --  9.6 9.9   *  --  84 66*     Most Recent 2 LFT's:  Recent Labs   Lab Test 01/14/20  1202 11/13/19  0851   AST 13 16   ALT 15 16   ALKPHOS 82 80   BILITOTAL 0.6 0.3     Most Recent 3 INR's:  Recent Labs   Lab Test 01/24/20  0806 01/14/20  1202 01/26/18  1802   INR  0.98 0.99 0.99   ,   Results for orders placed or performed during the hospital encounter of 01/24/20   XR Chest Port 1 View    Narrative    Exam:  XR CHEST PORT 1 VW, 1/24/2020 12:55 PM    History: r/o pneumothorax, s/p RUL transbronchial biopsies    Comparison:  Chest CT 1/24/2020. PET CT 12/11/2019    Findings:  Single AP view of the chest. Stable enlargement of the  cardiac silhouette. No pleural effusion or pneumothorax. No focal  pulmonary opacities. Visualized upper abdomen is unremarkable.      Impression    Impression:    No pneumothorax. No acute airspace disease.    I have personally reviewed the examination and initial interpretation  and I agree with the findings.    CANDACE FOX MD   XR Surgery FELICIANO Fluoro G/T 5 Min w Stills    Narrative    This exam was marked as non-reportable because it will not be read by a   radiologist or a [place] non-radiologist provider.             XR Chest Port 1 View    Narrative    EXAM: XR CHEST PORT 1 VW  1/24/2020 3:58 PM     HISTORY:  evaluate for delayed pneumothorax, persistent hypoxemia s/p  RUL transbronchial biopsies       COMPARISON:  1/24/2020    FINDINGS: Single view of the chest. Trachea is midline. The cardiac  and mediastinal silhouette is unchanged. Small left pleural effusion.  Retrocardiac opacities. Scoliotic curvature of the spine. No  pneumothorax appreciated.      Impression    IMPRESSION:   1. No pneumothorax is appreciated.  2. Small left pleural effusion with retrocardiac opacities,  atelectasis versus consolidation.    I have personally reviewed the examination and initial interpretation  and I agree with the findings.    CANDACE FOX MD   XR Chest Port 1 View    Narrative    Exam: XR CHEST PORT 1 VW, 1/24/2020 11:24 PM    Indication: SOB    Comparison: 1/24/2020    Findings:   Frontal view of the chest. Trachea is midline. Stable cardiomegaly.  Pulmonary vascular congestion. Small left pleural effusion. No  pneumothorax. Prominent  interstitial markings. Patchy retrocardiac  atelectasis.      Impression    Impression:   Small left pleural effusion and interstitial prominence, suggestive of  mild interstitial pulmonary edema.    I have personally reviewed the examination and initial interpretation  and I agree with the findings.    ARNOLD HOOKS MD       Discharge Medications   Discharge Medication List as of 1/25/2020  9:35 AM      START taking these medications    Details   azithromycin (ZITHROMAX) 250 MG tablet Take 1 tablet (250 mg) by mouth daily for 4 days, Disp-4 tablet, R-0, E-Prescribe      predniSONE (DELTASONE) 20 MG tablet Take 2 tablets (40 mg) by mouth daily for 3 days, Disp-6 tablet, R-0, E-Prescribe         CONTINUE these medications which have NOT CHANGED    Details   acetaminophen (TYLENOL) 325 MG tablet Take 2 tablets every 6 to 8 hours as needed for pain, Disp-100 tablet, R-1, E-Prescribe      amLODIPine (NORVASC) 10 MG tablet Take 1 tablet (10 mg) by mouth daily For blood pressure, Disp-90 tablet, R-3, E-Prescribe      atorvastatin (LIPITOR) 40 MG tablet Take 2 tablets (80 mg) by mouth daily, Disp-180 tablet, R-3, E-Prescribe      betamethasone dipropionate (DIPROSONE) 0.05 % external ointment Apply topically 2 times dailyDisp-15 g, [id number]      Calcium Carbonate-Vitamin D (CALCIUM 600 + D OR) Take 1 tablet by mouth every morning , Historical      citalopram (CELEXA) 20 MG tablet Take 1 tablet (20 mg) by mouth daily, Disp-90 tablet, R-3, E-Prescribe      clopidogrel (PLAVIX) 75 MG tablet Take 75 mg by mouth daily Takes in the morning, Historical      cyanocolbalamin (VITAMIN  B-12) 500 MCG tablet Take 500 mcg by mouth every morning , Disp-90 tablet, R-1, Historical      gabapentin (NEURONTIN) 100 MG capsule Take 1 to 4 capsules at bedtime as directed., Disp-180 capsule, R-1, E-Prescribe      hydrochlorothiazide (HYDRODIURIL) 50 MG tablet Take 1 tablet (50 mg) by mouth daily, Disp-90 tablet, R-0, E-Prescribe       latanoprost (XALATAN) 0.005 % ophthalmic solution Place 1 drop into both eyes At Bedtime , R-1, Historical      levothyroxine (SYNTHROID/LEVOTHROID) 75 MCG tablet Take 1 tablet (75 mcg) by mouth daily, Disp-90 tablet, R-0, E-PrescribeLab due, Please call [phone].      lisinopril (PRINIVIL/ZESTRIL) 40 MG tablet Take 1 tablet (40 mg) by mouth daily For blood pressure, Disp-90 tablet, R-0, E-Prescribe      loperamide (IMODIUM A-D) 2 MG tablet Take 0.5-1 tablets (1-2 mg) by mouth daily as needed for diarrhea, Disp-30 tablet, R-0, Historical      oxybutynin (DITROPAN) 5 MG tablet Take 1 tablet (5 mg) by mouth At Bedtime For frequent urination, Disp-90 tablet, R-0, E-Prescribe      pantoprazole (PROTONIX) 40 MG EC tablet Take 40 mg by mouth daily Takes in the morning., Historical      Potassium Bicarb-Citric Acid 20 MEQ TBEF Take 20 mg by mouth daily, Disp-90 tablet, R-3, E-Prescribe      potassium chloride ER (K-DUR/KLOR-CON M) 20 MEQ CR tablet Take 1 tablet (20 mEq) by mouth daily, Disp-90 tablet, R-0, E-Prescribe         STOP taking these medications       benzonatate (TESSALON) 200 MG capsule Comments:   Reason for Stopping:             Allergies   No Known Allergies

## 2020-01-25 NOTE — PROGRESS NOTES
Discharge Planner OT   Defer OT eval  Patient plan for discharge: home  Current status: Per chart review and discussion with PT no skilled OT needs indentified.  Barriers to return to prior living situation: home  Recommendations for discharge: home with A prn  Rationale for recommendations: Anticipate pt will be able to complete ADLs safely with A from family prn at time of discharge.        Entered by: Luis Christopher 01/25/2020 9:10 AM

## 2020-01-25 NOTE — PLAN OF CARE
DC instructions given to pt, verbalized understanding.  All belongings with pt, IV DC'd and documented.

## 2020-01-25 NOTE — PLAN OF CARE
6D PT deferred  Discharge Planner PT   Patient plan for discharge: Home  Current status: PT orders acknowledged and appreciated. Pt demonstrates independence with bed mobility, transfers, and gait in hallway, no significant balance deficits noted. Pt with good family support. No acute skilled PT needs identified, will complete orders  Barriers to return to prior living situation: None per PT  Recommendations for discharge: Home with assist as needed  Rationale for recommendations: See above       Entered by: Betty Solis 01/25/2020 8:22 AM

## 2020-01-25 NOTE — PLAN OF CARE
"Observation Goals:    -diagnostic tests and consults completed and resulted: not met  -vital signs normal or at patient baseline: /52 (BP Location: Left arm)   Pulse 69   Temp 98.6  F (37  C) (Oral)   Resp 16   Ht 1.473 m (4' 10\")   Wt 46.6 kg (102 lb 11.8 oz)   LMP  (LMP Unknown)   SpO2 98%   BMI 21.47 kg/m     -tolerating oral intake to maintain hydration: met  -dyspnea improved and O2 sats greater than 88% on room air or prior home oxygen levels: not met; pt is requiring 4L O2 via nasal cannula; pt has capnography monitoring in place; pt is alert and oriented; pt denies any shortness of breath     -safe disposition plan has been identified: not met     Pt has been alert and oriented during this shift. Pts IPI via capnography has been between 7-10. Pt denies any pain or difficulty breathing. Will continue to monitor.   "

## 2020-01-25 NOTE — PLAN OF CARE
Observation goals      -diagnostic tests and consults completed and resulted: in process  -vital signs normal or at patient baseline: VSSA, still requiring O2  -tolerating oral intake to maintain hydration: yes  -dyspnea improved and O2 sats greater than 88% on room air or prior home oxygen levels: no, pt requiring up to 4L O2 to maintain SpO2 > 90%  -safe disposition plan has been identified: Pt wants to discharge in morning with family        Pt transferred from PACU at 2000 s/p EBUS and biopsies. Capnography on pt reading 3-4, requiring increase in O2 from 2 to 4L. Pt continues with tachypnea, shallow breathing and low IPI on capnography. GOLD notified and new orders for VBG and CXR placed.  No c/o pain. Eating/drinking adequately. Family at bedside throughout shift. Up with SBA to bathroom with adequate UO. Alert and oriented.

## 2020-01-25 NOTE — PROGRESS NOTES
Report from Bert RN @ 1810  Pt tolerating ice chips, clear liquids, and crackers without difficulty   Coughing and deep breathing encouraged    Darren called 3x   (no answer no message left)  Daughter Inga called 3x   (no answer no message left)  Daughter Leola called 2x   (pt speaking to her currently)    Pt very anxious repeatedly stating that she just wants to go home  Makes statements that she is going to get dressed and get into a taxi and leave.   Pt appears very anxious, restless, agitated   pt states that she is frustrated   Pt repeatedly request for me to call family members again and again.     @ 1920  Darren @ bedside     Pt states she is hungry   Appetite is good  Pt given cookies, crackers, saltings, and juice  Pt is given meal chicken with milk     Pt and  deny any knowledge of pt having hx of COPD.

## 2020-01-27 ENCOUNTER — TELEPHONE (OUTPATIENT)
Dept: INTERNAL MEDICINE | Facility: CLINIC | Age: 85
End: 2020-01-27

## 2020-01-27 ENCOUNTER — TELEPHONE (OUTPATIENT)
Dept: FAMILY MEDICINE | Facility: CLINIC | Age: 85
End: 2020-01-27

## 2020-01-27 LAB
COPATH REPORT: NORMAL
COPATH REPORT: NORMAL

## 2020-01-27 NOTE — TELEPHONE ENCOUNTER
[facility] Call Center    Phone Message    May a detailed message be left on voicemail: yes    Reason for Call: Other: Pt calling Kristal back, please call her back. about the appt on Friday.    Action Taken: Message routed to:  [facility] primary care      Appt set up for 10:40am with DR Montgomery-ok'd by Dr Montgomery.  Kristal Jurado RN 4:14 PM on 1/27/2020.

## 2020-01-27 NOTE — TELEPHONE ENCOUNTER
[facility] Call Center    Phone Message    May a detailed message be left on voicemail: yes    Reason for Call: Other: Pt requesting call back to discuss a hopsital f/u appt with Dr. Montgomery. Pt was in the [facility] this past weekend, 1/25-1/26 for pneumonia and was told to f/u with her pcp in 1 week. Dr. Montgomery was not available until March. Pt was offered by the  to see another provider, and the pt declined and requested a message be sent to the clinic     Action Taken: Message routed to:  [facility] pcc     Left message for pt to call back.  Kristal Jurado RN 12:08 PM on 1/27/2020.

## 2020-01-27 NOTE — TELEPHONE ENCOUNTER
This patient was discharged from [facility] on 01/25/2020.    Discharge Diagnosis: Lung Nodules, Hypoxia    Follow-up instructions: Adult [facility] Follow-up and recommended labs and tests      Follow up with primary care provider, Marii Montgomery, within 7 days   for hospital follow- up.  No follow up labs or test are needed.      A follow-up visit has not been scheduled.      Please follow-up with patient.

## 2020-01-28 DIAGNOSIS — R91.8 PULMONARY NODULES: Primary | ICD-10-CM

## 2020-01-30 ENCOUNTER — OFFICE VISIT (OUTPATIENT)
Dept: INTERNAL MEDICINE | Facility: CLINIC | Age: 85
End: 2020-01-30
Payer: MEDICARE

## 2020-01-30 ENCOUNTER — HOSPITAL ENCOUNTER (OUTPATIENT)
Dept: NUCLEAR MEDICINE | Facility: CLINIC | Age: 85
Setting detail: NUCLEAR MEDICINE
Discharge: HOME | End: 2020-01-30
Attending: INTERNAL MEDICINE | Admitting: INTERNAL MEDICINE
Payer: MEDICARE

## 2020-01-30 ENCOUNTER — HOSPITAL ENCOUNTER (OUTPATIENT)
Dept: GENERAL RADIOLOGY | Facility: CLINIC | Age: 85
Discharge: HOME | End: 2020-01-30
Attending: INTERNAL MEDICINE | Admitting: INTERNAL MEDICINE
Payer: MEDICARE

## 2020-01-30 VITALS
HEART RATE: 70 BPM | SYSTOLIC BLOOD PRESSURE: 145 MMHG | DIASTOLIC BLOOD PRESSURE: 63 MMHG | WEIGHT: 98.4 LBS | TEMPERATURE: 97.8 F | OXYGEN SATURATION: 96 % | BODY MASS INDEX: 20.57 KG/M2

## 2020-01-30 DIAGNOSIS — I27.20 PULMONARY HYPERTENSION (H): ICD-10-CM

## 2020-01-30 DIAGNOSIS — R06.02 SOB (SHORTNESS OF BREATH): ICD-10-CM

## 2020-01-30 DIAGNOSIS — Z11.59 NEED FOR HEPATITIS B SCREENING TEST: ICD-10-CM

## 2020-01-30 DIAGNOSIS — E61.1 IRON DEFICIENCY: ICD-10-CM

## 2020-01-30 DIAGNOSIS — J44.9 CHRONIC OBSTRUCTIVE PULMONARY DISEASE, UNSPECIFIED COPD TYPE (H): ICD-10-CM

## 2020-01-30 DIAGNOSIS — R91.8 PULMONARY NODULES: Primary | ICD-10-CM

## 2020-01-30 DIAGNOSIS — Z86.711 HISTORY OF PULMONARY EMBOLISM: ICD-10-CM

## 2020-01-30 DIAGNOSIS — E78.5 DYSLIPIDEMIA: ICD-10-CM

## 2020-01-30 PROCEDURE — 34300033 ZZH RX 343: Performed by: INTERNAL MEDICINE

## 2020-01-30 PROCEDURE — 71046 X-RAY EXAM CHEST 2 VIEWS: CPT

## 2020-01-30 PROCEDURE — A9567 TECHNETIUM TC-99M AEROSOL: HCPCS | Performed by: INTERNAL MEDICINE

## 2020-01-30 PROCEDURE — A9540 TC99M MAA: HCPCS | Performed by: INTERNAL MEDICINE

## 2020-01-30 PROCEDURE — 78582 LUNG VENTILAT&PERFUS IMAGING: CPT

## 2020-01-30 RX ORDER — BENZONATATE 200 MG/1
CAPSULE ORAL
COMMUNITY
Start: 2020-01-07 | End: 2021-02-16

## 2020-01-30 RX ORDER — PREDNISONE 20 MG/1
20 TABLET ORAL DAILY
COMMUNITY
End: 2021-02-16

## 2020-01-30 RX ADMIN — KIT FOR THE PREPARATION OF TECHNETIUM TC 99M PENTETATE 2 MILLICURIE: 20 INJECTION, POWDER, LYOPHILIZED, FOR SOLUTION INTRAVENOUS; RESPIRATORY (INHALATION) at 12:53

## 2020-01-30 RX ADMIN — KIT FOR THE PREPARATION OF TECHNETIUM TC 99M ALBUMIN AGGREGATED 7 MILLICURIE: 2.5 INJECTION, POWDER, FOR SOLUTION INTRAVENOUS at 12:53

## 2020-01-30 ASSESSMENT — PAIN SCALES - GENERAL: PAINLEVEL: NO PAIN (0)

## 2020-01-30 NOTE — NURSING NOTE
Chief Complaint   Patient presents with     Hospital F/U     pt here following a hospital visit       Mallika Diaz CMA at 11:09 AM on 1/30/2020.   [General Appearance - Alert] : alert [General Appearance - In No Acute Distress] : in no acute distress [General Appearance - Well Nourished] : well nourished [General Appearance - Well Developed] : well developed [Sclera] : the sclera and conjunctiva were normal [PERRL With Normal Accommodation] : pupils were equal in size, round, and reactive to light [Extraocular Movements] : extraocular movements were intact [Outer Ear] : the ears and nose were normal in appearance [Neck Appearance] : the appearance of the neck was normal [Neck Cervical Mass (___cm)] : no neck mass was observed [Jugular Venous Distention Increased] : there was no jugular-venous distention [Thyroid Diffuse Enlargement] : the thyroid was not enlarged [] : no respiratory distress [Respiration, Rhythm And Depth] : normal respiratory rhythm and effort [Exaggerated Use Of Accessory Muscles For Inspiration] : no accessory muscle use [Auscultation Breath Sounds / Voice Sounds] : lungs were clear to auscultation bilaterally [Lungs Percussion] : the lungs were normal to percussion [Apical Impulse] : the apical impulse was normal [Heart Rate And Rhythm] : heart rate was normal and rhythm regular [Heart Sounds Gallop] : no gallops [Heart Sounds] : normal S1 and S2 [Murmurs] : no murmurs [Cervical Lymph Nodes Enlarged Posterior Bilaterally] : posterior cervical [Cervical Lymph Nodes Enlarged Anterior Bilaterally] : anterior cervical [Supraclavicular Lymph Nodes Enlarged Bilaterally] : supraclavicular [Axillary Lymph Nodes Enlarged Bilaterally] : axillary [FreeTextEntry1] : no swelling of turbinates. tender at b frontal sinuses and r maxillary sinus, mild. orophraynx is overall clear.

## 2020-01-30 NOTE — PATIENT INSTRUCTIONS
[facility] Medication Refill Request Information:  * Please contact your pharmacy regarding ANY request for medication refills.  ** [facility] Prescription Fax = [phone]  * Please allow 3 business days for routine medication refills.  * Please allow 5 business days for controlled substance medication refills.     [facility] Test Result notification information:  *You will be notified with in 7-10 days of your appointment day regarding the results of your test.  If you are on MyChart you will be notified as soon as the provider has reviewed the results and signed off on them.    [facility]: [phone]   Patient Education     What is COPD?  COPD stands for chronic obstructive pulmonary disease. It means the airways in your lungs are blocked (obstructed). This makes it hard to breathe. You may have trouble with daily activities because of shortness of breath. Over time the shortness of breath often gets worse. This makes it harder to take care of yourself and take part in activities. Chronic bronchitis and emphysema are 2 common types of COPD.  How did I get COPD?  Most people get COPD from smoking. Cigarette smoke damages lungs. This can develop into COPD over many years.  How COPD affects you  COPD makes you work harder to breathe. Air may get trapped in the lungs. This prevents your lungs from filling completely with fresh oxygen-filled air when you breathe in (inhale). It's harder to take deep breaths, especially when you are active and start breathing faster. Over time your lungs may become enlarged, filled with air that does not transfer oxygen into the blood. These problems lead to shortness of breath (also called dyspnea). Hoarse, whistling breathing (wheezing) and a chronic cough are common. So is feeling tired and worn out (fatigue).   What happens in chronic bronchitis?    The cells in the airways make more mucus than normal. The mucus builds up, narrowing the airways. This means  less air travels into and out of the lungs. The lining of the airways may also become swollen (inflamed). This causes the airways to narrow even more.  What happens in emphysema?    The small airways are damaged and lose their stretchiness. The airways collapse when you exhale. This causes air to get trapped in the air sacs. This means that less oxygen enters the blood vessels. And less oxygen is delivered to all of the cells of your body. This makes it hard to breathe.  Damage to cilia    Cilia are small hairs that line and protect the airways. Smoking damages the cilia. Damaged cilia can t sweep mucus and particles away. Some of the cilia are destroyed. This damage makes COPD worse.  Date Last Reviewed: 8/1/2018 2000-2019 [employer]. [address], [place] [address]. All rights reserved. This information is not intended as a substitute for professional medical care. Always follow your healthcare professional's instructions.

## 2020-01-30 NOTE — ANESTHESIA POSTPROCEDURE EVALUATION
Anesthesia POST Procedure Evaluation    Patient: Lucie Stockton   MRN:     [id number] Gender:   female   Age:    84 year old :      1935        Preoperative Diagnosis: Lung nodules [R91.8]   Procedure(s):  ENDOBRONCHIAL ULTRASOUND, WITH FLEXIBLE BRONCHOSCOPY  endobronchial and transbronchial biopsies  Super D navigational bronchoscopy   Postop Comments: No value filed.       Anesthesia Type:  Not documented  General    Reportable Event: NO     PAIN: Uncomplicated   Sign Out status: Comfortable, Well controlled pain     PONV: No PONV   Sign Out status:  No Nausea or Vomiting     Neuro/Psych: Uneventful perioperative course   Sign Out Status: Preoperative baseline; Age appropriate mentation     Airway/Resp.: Uneventful perioperative course   Sign Out Status: Non labored breathing, age appropriate RR; Resp. Status within EXPECTED Parameters     CV: Uneventful perioperative course   Sign Out status: Appropriate BP and perfusion indices; Appropriate HR/Rhythm     Disposition:   Sign Out in:  PACU  Disposition:  Phase II; Home  Recovery Course: Uneventful  Follow-Up: Not required     Comments/Narrative:  Late entry. Per discussion with IP, the patient was admitted for short stay - decision made before I signed out the patient. Treated for COPD exac and discharged appropriately.           Last Anesthesia Record Vitals:  [facility] VITALS  2020 1139 - 2020 1239      2020             Resp Rate (set):  10          Last PACU Vitals:  Vitals Value Taken Time   /61 2020  6:35 AM   Temp 36.9  C (98.4  F) 2020  6:35 AM   Pulse 67 2020  6:35 AM   Resp 16 2020  6:35 AM   SpO2 99 % 2020  6:35 AM   Temp src     NIBP     Pulse     SpO2     Resp     Temp     Ht Rate     Temp 2           Electronically Signed By: David Lujan MD, 2020, 8:48 AM

## 2020-01-30 NOTE — PROGRESS NOTES
CC:  Follow up    Other team members:  Cardiology Dr. Florian  Thoracic surgery Dr. Quiñonez  Vascular NP JONO Bueno  Pulmonary Dr. Randall  Pulmonary nodule NP G Fernie  Neurology MONICO Beauchamp  Dermatology LUCY Lu  Dermatology   Pain Management CNP VU Kevin  Psychology PsEmmanuel Clay    Future appointments:  Pulmonary/PFT's 2/18/20  Cardiology Dr. Florian 2/18/20  Vascular Surgery LUCY Bueno7/14/20  Surgery Team Conference tomorrow    HPI:    Lucie is a 84-year-old female with a history of abdominal aortic stenosis, peripheral arterial disease on Plavix, bilateral DVT and PE in 2010 provoked, possible pulmonary hypertension, hypertension, hyperlipidemia, hypothyroidism.  Patient is a previous smoker, she quit 1993.  She has approximate 40-pack-year history of cigarette smoking.     She had a CT scan in November 2019 which showed a right upper lobe nodule.  IR felt that needle biopsy was high risk.  She was then evaluated for possible segmentectomy but due to her medical comorbidities her case was discussed at lung nodule conference.  She had subsequent PET scan which did show uptake in the node in question.  There are several mediastinal lymph nodes that appear larger but are not FDG avid.  Recommendation from the lung nodule conference was the above procedure to obtain a diagnosis and stage the mediastinum.    She underwnet endobronchial US with flexible bronchoscopy, endobronchial and transbronchial biopsies with Dr. Jara on 1/24/20  (benign).  Team conference scheduled for 1/31/20.    Presented to [facility], and was hospitalized from 1/24-1/25/20  For acute hypoxic respiratory failure post bronchial biopsy, suspected COPD exacerbation, possible bronchial edema.  Required temporary oxygen.  CT chest ?PE's, CT Pulm Embolism scan w/o evidence of acute, large PE.  No right heart strain.  Possible chronich pulmonary emboli resulting in pulmonary hypertension (RML, MLL arteries with chronic appearing filling defect of  RLLL PA, enlaragemen tof main pulmonary artery).  Also pulmonary nodules, ASCVD abdominal aorta.   Discharged with 5 day course of prednisone and azithromycin.    Echo 1/14/20 EF 55-60%, grade II or mod diast dysf, normal RV size and function, could not assess PAP, bubble study negative.    Today, Lucie is feeling overall well.  A bit tired, but improving.  Cough nearly resolved, non productive.  No wheezing or chest pain, no fevers.  We reviewed her list of questions including: COPD diagnosis, what it is,  reviewed prior PFT's, (repeat PFT's scheduled for today).  V/Q scan and CXR scheduled for today as well.  Reviewed hospitalization, biopsy, specialist notes.  Right wrist bruise, fading now.  No trauma to her knowledge.  States IV's were on her right hand.    Patient Active Problem List   Diagnosis     Benign ovarian tumor     Chronic bilateral low back pain without sciatica     Hypothyroidism     Peripheral vascular disease (H)     Knee pain     Osteoarthritis     Cervicalgia     Hyperlipidemia with target LDL less than 70     Pulmonary embolism (H)     Anemia     Aortic stenosis     Atherosclerosis of aorta (H)     Atherosclerosis of arteries of extremities (H)     Intermittent claudication (H)     Iron deficiency     Osteoporosis     DVT of lower extremity, bilateral (H)     Varicose veins     Gluteal pain     Insomnia     Back pain     Vitamin D deficiency     Tobacco use disorder     Personal history of other drug therapy     Venous thrombosis of extremity     Benign essential hypertension     Gastritis     Cataract     Acute left-sided low back pain with left-sided sciatica     Lumbar stenosis with neurogenic claudication     SBO (small bowel obstruction) (H)     Small bowel obstruction (H)     Long term current use of anticoagulant therapy     Left-sided low back pain with left-sided sciatica     Moderate major depression (H)     Anxiety     Neoplasm of uncertain behavior of skin     Cherry angioma     BCC  (basal cell carcinoma), trunk     Weakness     Imbalance     Chronic pain     History of nonmelanoma skin cancer     Multiple melanocytic nevi     Age-related osteoporosis without current pathological fracture     Chronic obstructive pulmonary disease, unspecified COPD type (H)     Chronic cough     Pulmonary nodules     Right upper lobe pulmonary nodule     Pulmonary hypertension (H)     Urinary frequency     Lung nodules     Hypoxia     Current Outpatient Medications   Medication Sig Dispense Refill     acetaminophen (TYLENOL) 325 MG tablet Take 2 tablets every 6 to 8 hours as needed for pain (Patient taking differently: Take 650 mg by mouth as needed Take 2 tablets every 6 to 8 hours as needed for pain) 100 tablet 1     amLODIPine (NORVASC) 10 MG tablet Take 1 tablet (10 mg) by mouth daily For blood pressure (Patient taking differently: Take 10 mg by mouth every morning For blood pressure) 90 tablet 3     atorvastatin (LIPITOR) 40 MG tablet Take 2 tablets (80 mg) by mouth daily (Patient taking differently: Take 80 mg by mouth every morning ) 180 tablet 3     benzonatate (TESSALON) 200 MG capsule        betamethasone dipropionate (DIPROSONE) 0.05 % external ointment Apply topically 2 times daily (Patient taking differently: Apply 1 g topically as needed ) 15 g 0     Calcium Carbonate-Vitamin D (CALCIUM 600 + D OR) Take 1 tablet by mouth every morning        citalopram (CELEXA) 20 MG tablet Take 1 tablet (20 mg) by mouth daily (Patient taking differently: Take 20 mg by mouth every morning ) 90 tablet 3     clopidogrel (PLAVIX) 75 MG tablet Take 75 mg by mouth daily Takes in the morning       cyanocolbalamin (VITAMIN  B-12) 500 MCG tablet Take 500 mcg by mouth every morning  90 tablet 1     hydrochlorothiazide (HYDRODIURIL) 50 MG tablet Take 1 tablet (50 mg) by mouth daily (Patient taking differently: Take 50 mg by mouth every morning ) 90 tablet 0     latanoprost (XALATAN) 0.005 % ophthalmic solution Place 1 drop  into both eyes At Bedtime   1     levothyroxine (SYNTHROID/LEVOTHROID) 75 MCG tablet Take 1 tablet (75 mcg) by mouth daily (Patient taking differently: Take 75 mcg by mouth every morning ) 90 tablet 0     lisinopril (PRINIVIL/ZESTRIL) 40 MG tablet Take 1 tablet (40 mg) by mouth daily For blood pressure (Patient taking differently: Take 40 mg by mouth every morning For blood pressure) 90 tablet 0     loperamide (IMODIUM A-D) 2 MG tablet Take 0.5-1 tablets (1-2 mg) by mouth daily as needed for diarrhea 30 tablet 0     oxybutynin (DITROPAN) 5 MG tablet Take 1 tablet (5 mg) by mouth At Bedtime For frequent urination 90 tablet 0     pantoprazole (PROTONIX) 40 MG EC tablet Take 40 mg by mouth daily Takes in the morning.       predniSONE (DELTASONE) 20 MG tablet Take 20 mg by mouth daily       gabapentin (NEURONTIN) 100 MG capsule Take 1 to 4 capsules at bedtime as directed. (Patient not taking: Reported on 1/30/2020) 180 capsule 1     Potassium Bicarb-Citric Acid 20 MEQ TBEF Take 20 mg by mouth daily (Patient taking differently: Take 20 mg by mouth every morning ) 90 tablet 3     potassium chloride ER (K-DUR/KLOR-CON M) 20 MEQ CR tablet Take 1 tablet (20 mEq) by mouth daily 90 tablet 0     No Known Allergies  BP (!) 145/63 (BP Location: Right arm, Patient Position: Sitting, Cuff Size: Adult Regular)   Pulse 70   Temp 97.8  F (36.6  C) (Oral)   Wt 44.6 kg (98 lb 6.4 oz)   LMP  (LMP Unknown)   SpO2 96%   BMI 20.57 kg/m     BP Readings from Last 6 Encounters:   01/30/20 (!) 145/63   01/25/20 129/61   01/14/20 121/71   01/14/20 132/69   01/07/20 (!) 150/62   12/05/19 138/74       Physical Examination:    General:  Conversant, no acute distress, in good spirits  Head: atraumatic  Eyes:  Pupils 2-3 mm, sclera white, EOM's full, conjunctiva moist, no periorbital swelling    Ears:  TM's normal, EAC's patent, clear of cerumen  Nose:  Nasal passages clear, turbinates not swollen  Throat/Mouth:  No pharyngeal erythema,  exudate, ulcers, oral mucosa and tongue moist, normal hard and soft palate  Neck:  Trachea midline, Full AROM, supple, thyroid smooth, symmetric, not enlarged, no nodules, no neck lymphadenopathy  Lungs:  Clear to auscultation throughout, no wheezes, rhonchi or rales. Normal respiratory effort and no intercostal retractions.  C/V:  Regular rate and rhythm, no murmurs, rubs or gallops.  No JVD,   Abdomen:  Not distended.  Bowel sounds active.  No tenderness, no hepatosplenomegaly or masses.  No CVA tenderness or masses.  Lymph:  No cervical lymph nodes.  Neuro: Alert and oriented, face symmetric. Able to get on/off exam table without assistance.  Strength grossly intact. No tremor.  M/S:   Hands:No joint deformities noted.  No joint swelling.  Skin:   Normal temperature., turgor and texture. No rashes, suspicious lesions, or jaundice on exposed skin surfaces. There is a resolving bruise over her right wrist w/o suspicious features.  Extremities:  No peripheral edema, no digital cyanosis  Psych:  Alert and oriented. Appropriate affect.  Not psychomotor slowed.  No signs of anxiety or agitation.    Lucie was seen today for hospital f/u.    Diagnoses and all orders for this visit:    Pulmonary nodules    Chronic obstructive pulmonary disease, unspecified COPD type (H)      Await plans and test results as outlined in HPI.    F/u 5 months.  Planning to travel to [place] in a few months with her .    Total time spent 40 minutes.  More than 50% of the time spent with Ms. Stockton on counseling / coordinating her care      Marii Montgomery M.D.  Internal Medicine  [facility]   pager [phone]

## 2020-01-31 ENCOUNTER — TELEPHONE (OUTPATIENT)
Dept: SURGERY | Facility: CLINIC | Age: 85
End: 2020-01-31

## 2020-01-31 NOTE — TELEPHONE ENCOUNTER
Call to Lucie to let her know that the final pathology from her biopsy is negative however, based on the PET and her risk factors, Dr. Jara is concerned this could be a false negative. We discussed her this morning at our nodule conference and the recommendation is for her to get a liquid biopsy, such as Biodesix, to see if there are lung cancer DNA in her bloodstream.    She is agreeable to this and will wait for a call to schedule.

## 2020-02-05 DIAGNOSIS — C80.1 CANCER WITH UNKNOWN PRIMARY SITE (H): Primary | ICD-10-CM

## 2020-02-06 DIAGNOSIS — C80.1 CANCER WITH UNKNOWN PRIMARY SITE (H): Primary | ICD-10-CM

## 2020-02-06 DIAGNOSIS — I10 BENIGN ESSENTIAL HYPERTENSION: ICD-10-CM

## 2020-02-07 RX ORDER — HYDROCHLOROTHIAZIDE 50 MG/1
50 TABLET ORAL DAILY
Qty: 90 TABLET | Refills: 0 | Status: SHIPPED | OUTPATIENT
Start: 2020-02-07 | End: 2020-05-21

## 2020-02-07 NOTE — TELEPHONE ENCOUNTER
Last Clinic Visit: 1/30/2020  [facility]  Next appointment 2-

## 2020-02-08 ENCOUNTER — HEALTH MAINTENANCE LETTER (OUTPATIENT)
Age: 85
End: 2020-02-08

## 2020-02-13 DIAGNOSIS — R06.02 SOB (SHORTNESS OF BREATH): ICD-10-CM

## 2020-02-13 DIAGNOSIS — I27.20 PULMONARY HYPERTENSION (H): Primary | ICD-10-CM

## 2020-02-17 DIAGNOSIS — E03.9 HYPOTHYROIDISM, UNSPECIFIED TYPE: ICD-10-CM

## 2020-02-17 DIAGNOSIS — E87.6 HYPOKALEMIA: ICD-10-CM

## 2020-02-18 ENCOUNTER — TELEPHONE (OUTPATIENT)
Dept: INTERNAL MEDICINE | Facility: CLINIC | Age: 85
End: 2020-02-18

## 2020-02-18 RX ORDER — LEVOTHYROXINE SODIUM 75 UG/1
75 TABLET ORAL DAILY
Qty: 90 TABLET | Refills: 3 | Status: SHIPPED | OUTPATIENT
Start: 2020-02-18 | End: 2021-03-01

## 2020-02-18 NOTE — TELEPHONE ENCOUNTER
[facility] Call Center    Phone Message    May a detailed message be left on voicemail: yes     Reason for Call: Medication Question or concern regarding medication   Prescription Clarification  Name of Medication: Potassium Bicarb-Citric Acid 20 MEQ TBEF  Prescribing Provider:    Pharmacy: [id number] - [facility] [address] AT [address] [phone]   What on the order needs clarification? Pharmacy stated they do not have med in stock, wondering if they can fill potassium only? Please call pharmacy thanks!          Action Taken: Message routed to:  [facility] pcc    Travel Screening: Not Applicable

## 2020-02-18 NOTE — TELEPHONE ENCOUNTER
1/30/2020  [facility]   Marii Montgomery MD   Internal Medicine

## 2020-02-24 NOTE — TELEPHONE ENCOUNTER
Got regular potassium, pharmacist does not even know what the medication ordered is. . Hayley Lux Paramedic on 2/24/2020 at 9:06 AM

## 2020-03-04 DIAGNOSIS — E78.5 HYPERLIPIDEMIA, UNSPECIFIED HYPERLIPIDEMIA TYPE: ICD-10-CM

## 2020-03-06 RX ORDER — ATORVASTATIN CALCIUM 40 MG/1
80 TABLET, FILM COATED ORAL DAILY
Qty: 180 TABLET | Refills: 3 | Status: SHIPPED | OUTPATIENT
Start: 2020-03-06

## 2020-03-09 ENCOUNTER — TELEPHONE (OUTPATIENT)
Dept: INTERNAL MEDICINE | Facility: CLINIC | Age: 85
End: 2020-03-09

## 2020-03-09 NOTE — TELEPHONE ENCOUNTER
Back pain, radiation down legs, Mostly right but both .using  Heating pad which works will she is sitting with it. Tylenol she takes 2 pills 3-4 times a day  Extra strength, more than likely 500 mg but she is insure. Will route to MD for recommendations Hayley Lux Paramedic on 3/9/2020 at 1:48 PM

## 2020-03-09 NOTE — TELEPHONE ENCOUNTER
[facility] Call Center    Phone Message    May a detailed message be left on voicemail: yes     Reason for Call: Other: per pt- stated she was told by  to take tylenol for her low back ache into her legs, but tylenol is not helping at all, what are other recommendations? please call pt thanks     Action Taken: Message routed to:  [facility] pcc    Travel Screening: Not Applicable

## 2020-03-16 DIAGNOSIS — F41.9 ANXIETY: ICD-10-CM

## 2020-03-16 DIAGNOSIS — M54.10 BILATERAL RADIATING LEG PAIN: ICD-10-CM

## 2020-03-18 LAB — Lab: NORMAL

## 2020-03-18 RX ORDER — CITALOPRAM HYDROBROMIDE 20 MG/1
20 TABLET ORAL DAILY
Qty: 90 TABLET | Refills: 1 | Status: SHIPPED | OUTPATIENT
Start: 2020-03-18 | End: 2020-12-21

## 2020-03-18 NOTE — TELEPHONE ENCOUNTER
gabapentin (NEURONTIN) 100 MG capsule       Last Written Prescription Date:  8/1/19  Last Fill Quantity: 180,   # refills: 1  Last Office Visit : 1/30/20  Future Office visit:  None scheduled    Routing refill request to provider for review/approval because:  Drug not on the [facility], [facility] or [facility] refill protocol or controlled substance

## 2020-03-19 RX ORDER — GABAPENTIN 100 MG/1
CAPSULE ORAL
Qty: 180 CAPSULE | Refills: 0 | Status: SHIPPED | OUTPATIENT
Start: 2020-03-19 | End: 2020-12-21

## 2020-04-07 DIAGNOSIS — R91.1 SOLITARY LUNG NODULE: Primary | ICD-10-CM

## 2020-04-13 NOTE — PATIENT INSTRUCTIONS
[facility]: [phone]     [facility] Medication Refill Request Information:  * Please contact your pharmacy regarding ANY request for medication refills.  ** [facility] Prescription Fax = [phone]  * Please allow 3 business days for routine medication refills.  * Please allow 5 business days for controlled substance medication refills.     [facility] Test Result notification information:  *You will be notified with in 7-10 days of your appointment day regarding the results of your test.  If you are on MyChart you will be notified as soon as the provider has reviewed the results and signed off on them.    Plan:  Make an appointment with Dr. Clay about depression, anxiety, and sleep problems  Go to the lab today for blood work  Schedule an appointment with neurology about memory  Schedule a bone density scan  Refills of your medications were sent to your pharmacy  Take your tylenol every 8 hours on a scheduled basis  Continue drinking your Ensure  Schedule your swallowing study  Keep your upcoming appointment in dermatology  See me in 2-3 months  Dr. Montgomery       Any Hypertriglyceridemia?: No Hypertriglyceridemia Monitoring: I explained this is common when taking isotretinoin. We will monitor closely. Retinoid Dermatitis Normal Treatment: I recommended more frequent application of Cetaphil or CeraVe to the areas of dermatitis. Hypertriglyceridemia Monitoring: I explained this is common when taking isotretinoin. If this worsens they will contact us. Retinoid Dermatitis Aggressive Treatment: I recommended more frequent application of Cetaphil or CeraVe to the areas of dermatitis. I also prescribed a topical steroid for twice daily use until the dermatitis resolves. Myalgia Treatment: I explained this is common when taking isotretinoin. If this worsens they will contact us. They may try OTC ibuprofen. Months Of Therapy Completed: 1 Is Retinoid Dermatitis Present?: Yes - Normal Treatment Patient Weight (Optional But Required For Cumulative Dose-Numbers And Decimals Only): 213 Headache Monitoring: I recommended monitoring the headaches for now. There is no evidence of increased intracranial pressure. They were instructed to call if the headaches are worsening. Xerosis Normal Treatment: I recommended application of Cetaphil or CeraVe numerous times a day and before going to bed to all dry areas. Use topical steroids as directed. Cheilitis Normal Treatment: I recommended application of Dr. Meehan, Vaseline or Aquaphor numerous times a day (as often as every hour) and before going to bed. Display Individual Monthly Dosage In The Note (If Yes Will Display All Dosages Which Are Not N/A): yes Nosebleeds Normal Treatment: I explained this is common when taking isotretinoin. I recommended saline mist in each nostril multiple times a day. If this worsens they will contact us. What Is The Patient's Gender: Female Cheilitis Aggressive Treatment: I recommended application of Vaseline or Aquaphor numerous times a day (as often as every hour) and before going to bed. I also prescribed a topical steroid for twice daily use. Counseling Text: I reviewed the side effect in detail. Patient should get monthly blood tests, not donate blood, not drive at night if vision affected, and not share medication. Female Completion Statement: After discussing her treatment course we decided to discontinue isotretinoin therapy at this time. I explained that she would need to continue her birth control methods for at least one month after the last dosage. She should also get a pregnancy test one month after the last dose. She shouldn't donate blood for one month after the last dose. She should call with any new symptoms of depression. Are Labs Available For Review?: No- Labs Deferred This Month Target Cumulative Dosage (In Mg/Kg): 135 Xerosis Aggressive Treatment: I recommended application of Cetaphil or CeraVe numerous times a day going to bed to all dry areas. I also prescribed a topical steroid for twice daily use. Pounds Preamble Statement (Weight Entered In Details Tab): Reported Weight in pounds: Kilograms Preamble Statement (Weight Entered In Details Tab): Reported Weight in kilograms: Detail Level: Zone Weight Units: pounds Xerosis Aggressive Treatment: I recommended application of Cetaphil or CeraVe numerous times a day and before going to bed to all dry areas. I also prescribed a topical steroid for twice daily use. Male Completion Statement: After discussing his treatment course we decided to discontinue isotretinoin therapy at this time. He shouldn't donate blood for one month after the last dose. He should call with any new symptoms of depression. Upper Range (In Mg/Kg): 150 Next Month's Dosage: Continue Current Dosage Dosing Month 1 (Required For Cumulative Dosing): 40mg BID Xerosis Normal Treatment: I recommended application of Cetaphil or CeraVe numerous times a day going to bed to all dry areas. Ipledge Number (Optional): [id number] Female Pregnancy Counseling Text: Female patients should also be on two forms of birth control while taking this medication and for one month after their last dose. Lower Range (In Mg/Kg): 120 Cheilitis Normal Treatment: I recommended application of Vaseline or Aquaphor numerous times a day (as often as every hour) and before going to bed. Use Therapeutic Ranged Or Therapeutic Target: please select Range or Target

## 2020-04-21 ENCOUNTER — TRANSFERRED RECORDS (OUTPATIENT)
Dept: HEALTH INFORMATION MANAGEMENT | Facility: CLINIC | Age: 85
End: 2020-04-21

## 2020-05-19 DIAGNOSIS — I10 BENIGN ESSENTIAL HYPERTENSION: ICD-10-CM

## 2020-05-21 RX ORDER — HYDROCHLOROTHIAZIDE 50 MG/1
50 TABLET ORAL DAILY
Qty: 90 TABLET | Refills: 0 | Status: SHIPPED | OUTPATIENT
Start: 2020-05-21 | End: 2020-09-18

## 2020-05-21 NOTE — TELEPHONE ENCOUNTER
HYDROCHLOROTHIAZIDE 50MG TABLETS   Last Written Prescription Date:  2/7/2020  Last Fill Quantity: 90,   # refills: 0  Last Office Visit : 1/30/2020  Future Office visit:  None  Abnormal Blood Pressure      Per Protocol can refill for 90 days and update Provider.  01/30/20 (!) 145/63       Chante Puri RN  Central Triage Red Flags/Med Refills

## 2020-09-08 DIAGNOSIS — K21.9 GASTROESOPHAGEAL REFLUX DISEASE WITHOUT ESOPHAGITIS: Primary | ICD-10-CM

## 2020-09-10 DIAGNOSIS — I27.82 CHRONIC PULMONARY EMBOLISM WITHOUT ACUTE COR PULMONALE, UNSPECIFIED PULMONARY EMBOLISM TYPE (H): Primary | ICD-10-CM

## 2020-09-10 NOTE — TELEPHONE ENCOUNTER
pantoprazole (PROTONIX) 40 MG EC tablet       Last Written Prescription Date:  5/21/20  Last Fill Quantity: 90,   # refills: 0  Last Office Visit : 1/30/20  Future Office visit:  0    Routing refill request to provider for review/approval because:  Medication is reported/historical  And PPI protocol failed  Pt has diagnosis of osteoporosis

## 2020-09-11 RX ORDER — PANTOPRAZOLE SODIUM 40 MG/1
40 TABLET, DELAYED RELEASE ORAL DAILY
Qty: 90 TABLET | Refills: 3 | Status: SHIPPED | OUTPATIENT
Start: 2020-09-11

## 2020-09-11 RX ORDER — CLOPIDOGREL BISULFATE 75 MG/1
TABLET ORAL
Qty: 90 TABLET | Refills: 3 | Status: SHIPPED | OUTPATIENT
Start: 2020-09-11 | End: 2020-12-01

## 2020-09-15 DIAGNOSIS — I10 BENIGN ESSENTIAL HYPERTENSION: ICD-10-CM

## 2020-09-18 RX ORDER — HYDROCHLOROTHIAZIDE 50 MG/1
50 TABLET ORAL DAILY
Qty: 90 TABLET | Refills: 0 | Status: SHIPPED | OUTPATIENT
Start: 2020-09-18 | End: 2020-12-17

## 2020-09-18 NOTE — TELEPHONE ENCOUNTER
hydrochlorothiazide (HYDRODIURIL) 50 MG tablet  Last Written Prescription Date: 5/21/20  Last Fill Quantity: 90,   # refills: 0  Last Office Visit : 1/30/20  Future Office visit: none    90 day SENT to pharmacy    Route because: bp > 140/90

## 2020-11-02 ENCOUNTER — MYC MEDICAL ADVICE (OUTPATIENT)
Dept: CARDIOLOGY | Facility: CLINIC | Age: 85
End: 2020-11-02

## 2020-11-02 DIAGNOSIS — I27.20 PULMONARY HYPERTENSION (H): Primary | ICD-10-CM

## 2020-11-02 DIAGNOSIS — R06.02 SOB (SHORTNESS OF BREATH): ICD-10-CM

## 2020-11-03 NOTE — TELEPHONE ENCOUNTER
Spoke with patient regarding follow up appt; patient would like to be seen in clinic on Dec 1st versus virtual visits. Scheduled for 9:30 AM with labs prior. Patient did not have any further questions or concerns. Ceci Treviño RN on 11/3/2020 at 9:46 AM

## 2020-11-06 ENCOUNTER — DOCUMENTATION ONLY (OUTPATIENT)
Dept: CARE COORDINATION | Facility: CLINIC | Age: 85
End: 2020-11-06

## 2020-11-30 DIAGNOSIS — I27.20 PULMONARY HYPERTENSION (H): ICD-10-CM

## 2020-11-30 DIAGNOSIS — R06.02 SOB (SHORTNESS OF BREATH): ICD-10-CM

## 2020-11-30 LAB
ERYTHROCYTE [DISTWIDTH] IN BLOOD BY AUTOMATED COUNT: 18.1 % (ref 10–15)
HCT VFR BLD AUTO: 34.3 % (ref 35–47)
HGB BLD-MCNC: 10.6 G/DL (ref 11.7–15.7)
MCH RBC QN AUTO: 26.2 PG (ref 26.5–33)
MCHC RBC AUTO-ENTMCNC: 30.9 G/DL (ref 31.5–36.5)
MCV RBC AUTO: 85 FL (ref 78–100)
NT-PROBNP SERPL-MCNC: 536 PG/ML (ref 0–450)
PLATELET # BLD AUTO: 440 10E9/L (ref 150–450)
RBC # BLD AUTO: 4.05 10E12/L (ref 3.8–5.2)
WBC # BLD AUTO: 8.1 10E9/L (ref 4–11)

## 2020-11-30 PROCEDURE — 36415 COLL VENOUS BLD VENIPUNCTURE: CPT | Performed by: INTERNAL MEDICINE

## 2020-11-30 PROCEDURE — 85027 COMPLETE CBC AUTOMATED: CPT | Performed by: INTERNAL MEDICINE

## 2020-11-30 PROCEDURE — 80048 BASIC METABOLIC PNL TOTAL CA: CPT | Performed by: INTERNAL MEDICINE

## 2020-11-30 PROCEDURE — 83880 ASSAY OF NATRIURETIC PEPTIDE: CPT | Performed by: INTERNAL MEDICINE

## 2020-12-01 ENCOUNTER — HOSPITAL ENCOUNTER (OUTPATIENT)
Facility: CLINIC | Age: 85
End: 2020-12-01
Attending: INTERNAL MEDICINE | Admitting: INTERNAL MEDICINE
Payer: MEDICARE

## 2020-12-01 ENCOUNTER — VIRTUAL VISIT (OUTPATIENT)
Dept: CARDIOLOGY | Facility: CLINIC | Age: 85
End: 2020-12-01
Attending: INTERNAL MEDICINE
Payer: MEDICARE

## 2020-12-01 DIAGNOSIS — I27.20 PULMONARY HYPERTENSION (H): Primary | ICD-10-CM

## 2020-12-01 DIAGNOSIS — I27.24 CTEPH (CHRONIC THROMBOEMBOLIC PULMONARY HYPERTENSION) (H): ICD-10-CM

## 2020-12-01 DIAGNOSIS — I27.20 PULMONARY HYPERTENSION (H): ICD-10-CM

## 2020-12-01 LAB
ANION GAP SERPL CALCULATED.3IONS-SCNC: 6 MMOL/L (ref 3–14)
BUN SERPL-MCNC: 25 MG/DL (ref 7–30)
CALCIUM SERPL-MCNC: 9.8 MG/DL (ref 8.5–10.1)
CHLORIDE SERPL-SCNC: 105 MMOL/L (ref 94–109)
CO2 SERPL-SCNC: 29 MMOL/L (ref 20–32)
CREAT SERPL-MCNC: 0.62 MG/DL (ref 0.52–1.04)
GFR SERPL CREATININE-BSD FRML MDRD: 83 ML/MIN/{1.73_M2}
GLUCOSE SERPL-MCNC: 95 MG/DL (ref 70–99)
POTASSIUM SERPL-SCNC: 3.8 MMOL/L (ref 3.4–5.3)
SODIUM SERPL-SCNC: 140 MMOL/L (ref 133–144)

## 2020-12-01 PROCEDURE — 99442 PR PHYSICIAN TELEPHONE EVALUATION 11-20 MIN: CPT | Mod: 95 | Performed by: INTERNAL MEDICINE

## 2020-12-01 RX ORDER — POTASSIUM CHLORIDE 1500 MG/1
40 TABLET, EXTENDED RELEASE ORAL
Status: CANCELLED | OUTPATIENT
Start: 2020-12-01

## 2020-12-01 RX ORDER — SODIUM CHLORIDE 9 MG/ML
INJECTION, SOLUTION INTRAVENOUS CONTINUOUS
Status: CANCELLED | OUTPATIENT
Start: 2020-12-01

## 2020-12-01 RX ORDER — POTASSIUM CHLORIDE 1500 MG/1
20 TABLET, EXTENDED RELEASE ORAL
Status: CANCELLED | OUTPATIENT
Start: 2020-12-01

## 2020-12-01 RX ORDER — LIDOCAINE 40 MG/G
CREAM TOPICAL
Status: CANCELLED | OUTPATIENT
Start: 2020-12-01

## 2020-12-01 RX ORDER — VITS A,C,E/LUTEIN/MINERALS 300MCG-200
1 TABLET ORAL DAILY
COMMUNITY
End: 2021-01-01

## 2020-12-01 NOTE — PATIENT INSTRUCTIONS
Medication Changes:  Stop Plavix and start Eliquis 5 mg twice daily    Patient Instructions:  1. Continue staying active and eat a heart healthy diet.    2. Please keep current list of medications with you at all times.    3. Remember to weigh yourself daily after voiding and before you consume any food or beverages and log the numbers.  If you have gained 2 pounds overnight or 5 pounds in a week contact us immediately for medication adjustments or further instructions.    4. **Please call us immediately if you have any syncope (fainting or passing out), chest pain, edema (swelling or weight gain), or decline in your functional status (general decline in how you are feeling).    Follow up Appointment Information:  Check-In  Time Check-In Location Estimated Length Procedure   Name        [facility]  waiting room  minutes Right heart catheterization & Invasive Pulmonary Angiogram**     Procedure Preparations & Instructions     This is an invasive procedure that DOES require preparation:    - Nothing to eat for 6 hours prior. You may drink clear liquids up until 2 hours prior to exam (no pop, or carbonated drinks).   - You may have clear liquids up until the time of your procedure  - DO NOT use alcohol, tobacco or food/beverages containing caffeine for at least 12 hours prior to your test (ie. Coffee, tea, soda, chocolate, de-caffeinated beverages, certain medications including Excedrin, Anacin, NoDoz)  - A ride should be arranged for you in the instance you are unable to drive home, however you should be able to function as you normally would after the procedure  - Following the exam you will be on complete bedrest for 2-4 hours.  - When you are discharged you may resume normal activities but no strenuous exercise or lifting for 48 hours.     *For Patients on anticoagulants: ? Stop Eliquis 24 hours prior       We are located on the third floor of [facility] on the [facility].  Our address is     [address] [place] [address] [place] [address] [place] [address]    Thank you for allowing us to be a part of your care here at the [facility]    If you have questions or concerns please contact us at:    Allen Treviño RN, BSN   Ariadna Mckeon (Schedule,Prior Auth)  Nurse Coordinator     Clinic   Pulmonary Hypertension   Pulmonary Hypertension  [facility]  [facility]  (Phone)[phone]    (Phone) [phone]        (Fax) [phone]    ** Please note that you will NOT receive a reminder call regarding your scheduled testing, reminder calls are for provider appointments only.  If you are scheduled for testing within the [facility] system you may receive a call regarding pre-registration for billing purposes only.**     Remember to weigh yourself daily after voiding and before you consume any food or beverages and log the numbers.  If you have gained/lost 2 pounds overnight or 5 pounds in a week contact us immediately for medication adjustments or further instructions.   **Please call us immediately if you have any syncope, chest pain, edema, or decline in your functional status.    Support Group:  Pulmonary Hypertension Association  [email or web address].phassociation.org/  **Look at the Events Tab** They even have Support Groups that you can call into    [facility] PH Support Group  Second Saturday of the Month from 1-3 PM   Location: [address] [place] [id number]  Leader: Taryn Munoz and Rama Sepulveda  Phone: [phone] or [phone]  Email: [email or web address]

## 2020-12-01 NOTE — PROGRESS NOTES
"Lucie Stockton is a 85 year old female who is being evaluated via a billable telephone visit.      The patient has been notified of following:     \"This telephone visit will be conducted via a call between you and your physician/provider. We have found that certain health care needs can be provided without the need for a physical exam.  This service lets us provide the care you need with a short phone conversation.  If a prescription is necessary we can send it directly to your pharmacy.  If lab work is needed we can place an order for that and you can then stop by our lab to have the test done at a later time.    Telephone visits are billed at different rates depending on your insurance coverage. During this emergency period, for some insurers they may be billed the same as an in-person visit.  Please reach out to your insurance provider with any questions.    If during the course of the call the physician/provider feels a telephone visit is not appropriate, you will not be charged for this service.\"    Patient has given verbal consent for Telephone visit?  Yes    What phone number would you like to be contacted at? [phone]      How would you like to obtain your AVS? Mail a copy    Phone call duration: 12 minutes    Maegan Townsend MD      Service Date: 2020    Marii Montgomery MD  Internal Medicine  [place], [place]    RE:  Lucie Stockton   MRN:  [id number]  :  1935      Dear Dr. Montgomery:    We had the pleasure of seeing Lucie Stockton at the [facility]. As you know, Ms. Stockton is a very pleasant 85 year old female with a history of bilateral pulmonary embolism, peripheral vascular disease, Grave's disease, DVT, hypertension, and suspected metastatic disease with no tissue diagnosis at this time who presents for ongoing evaluation for suspected pulmonary hypertension due to an enlarged pulmonary artery seen on CT scan.    She was last seen in our " "clinic on 1/4/20. During that visit, we recommended that she get an echocardiogram, VQ scan, CTA chest PE, and 6MWT. Her echocardiogram showed LVEF 55-60%, normal RV size and function, and PASP could not be assessed. VQ scan showed mismatched perfusion defects involving segments of the right upper, right middle, and right lower lobes. CT PE showed diminutive appearance of the right middle and lower lobe arteries with chronic appearing filling defect of the right lower lobe pulmonary artery, no evidence of acute large PE or right heart strain. She did not get the 6MWT.     Today, she notes that she has dyspnea when ascending and descending 1 flight of stairs and that this has recently mildly worsened. Denies chest pain, lower extremity edema, presyncope, and syncope. We would classify her as WHO functional class II. Her activity has been limited by right leg and hip discomfort. She has not had any hospitalizations or ED visits since we last saw her in clinic.          PAST MEDICAL HISTORY:  Past Medical History:   Diagnosis Date     Anemia      Aortic stenosis     abdominal     Atherosclerosis of aorta (H)     \"extensive disease with near occlusion below level of renal arteries\" on CT     Atherosclerosis of arteries of extremities (H)      Back pain     severe multilevel DJD, moderate spinal canal stenosis L4-5, severe L3-4     Chronic pain     Back, hips, knees, legs     Degenerative joint disease      DVT of lower extremity, bilateral (H)     5/24/10 left distal femoral and great saphenous, 7/7/10 left:  extending to cfv, iliac , pop and post tibial, peronial, right:  distal fem and peroneal      Grave's disease      Hyperlipidaemia LDL goal < 70      Hypertension, essential      Hypothyroidism      Imbalance      Insomnia      Intermittent claudication (H)      Iron deficiency      Knee pain      Lumbar stenosis with neurogenic claudication      Osteoarthritis     ankle,foot, knee     Osteoporosis      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, 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     Pulmonary embolism (H)     5/24/10 bilateral     Small bowel obstruction (H) 17     Varicose veins        PAST SURGICAL HISTORY:  Past Surgical History:   Procedure Laterality Date     BUNIONECTOMY       C STOMACH SURGERY PROCEDURE UNLISTED       COLONOSCOPY       ENDOBRONCHIAL ULTRASOUND FLEXIBLE N/A 2020     HYSTERECTOMY TOTAL ABDOMINAL, BILATERAL SALPINGO-OOPHORECTOMY, NODE DISSECTION, COMBINED  11     INJECT EPIDURAL LUMBAR / SACRAL SINGLE N/A 2018     INJECT SACROILIAC JOINT Right 3/7/2018     INJECT SACROILIAC JOINT Bilateral 2018     OPTICAL TRACKING SYSTEM BRONCHOSCOPY N/A 2020     UPPER GI ENDOSCOPY         FAMILY HISTORY:  Family History   Problem Relation Age of Onset     Breast Cancer Maternal Aunt 80     C.A.D. Father 54     No Known Problems Mother        SOCIAL HISTORY:  Social History     Socioeconomic History     Marital status:      Spouse name: Not on file     Number of children: Not on file     Years of education: Not on file     Highest education level: Not on file   Occupational History     Not on file   Social Needs     Financial resource strain: Not on file     Food insecurity     Worry: Not on file     Inability: Not on file     Transportation needs     Medical: Not on file     Non-medical: Not on file   Tobacco Use     Smoking status: Former Smoker     Packs/day: 0.50     Years: 15.00     Pack years: 7.50     Quit date: 1993     Years since quittin.2     Smokeless tobacco: Never Used   Substance and Sexual Activity     Alcohol use: Yes     Comment: social     Drug use: No     Sexual activity: Not Currently     Partners: Male   Lifestyle     Physical activity     Days per week: Not on file     Minutes per session: Not on file     Stress: Not on file   Relationships     Social connections     Talks on phone:  Not on file     Gets together: Not on file     Attends Anabaptist service: Not on file     Active member of club or organization: Not on file     Attends meetings of clubs or organizations: Not on file     Relationship status: Not on file     Intimate partner violence     Fear of current or ex partner: Not on file     Emotionally abused: Not on file     Physically abused: Not on file     Forced sexual activity: Not on file   Other Topics Concern     Parent/sibling w/ CABG, MI or angioplasty before 65F 55M? Not Asked   Social History Narrative     for 52 years. Retired from [employer]. Frequent world travel.       CURRENT MEDICATIONS:  Current Outpatient Medications   Medication Sig     acetaminophen (TYLENOL) 325 MG tablet Take 2 tablets every 6 to 8 hours as needed for pain (Patient taking differently: Take 650 mg by mouth as needed Take 2 tablets every 6 to 8 hours as needed for pain)     amLODIPine (NORVASC) 10 MG tablet Take 1 tablet (10 mg) by mouth daily For blood pressure (Patient taking differently: Take 10 mg by mouth every morning For blood pressure)     atorvastatin (LIPITOR) 40 MG tablet Take 2 tablets (80 mg) by mouth daily     benzonatate (TESSALON) 200 MG capsule      betamethasone dipropionate (DIPROSONE) 0.05 % external ointment Apply topically 2 times daily (Patient taking differently: Apply 1 g topically as needed )     Calcium Carbonate-Vitamin D (CALCIUM 600 + D OR) Take 1 tablet by mouth every morning      cholecalciferol (VITAMIN D3) 25 mcg (1000 units) capsule Take 1 capsule by mouth daily     clopidogrel (PLAVIX) 75 MG tablet TAKE 1 TABLET(75 MG) BY MOUTH DAILY     cyanocolbalamin (VITAMIN  B-12) 500 MCG tablet Take 500 mcg by mouth every morning      hydrochlorothiazide (HYDRODIURIL) 50 MG tablet Take 1 tablet (50 mg) by mouth daily     latanoprost (XALATAN) 0.005 % ophthalmic solution Place 1 drop into both eyes At Bedtime      levothyroxine (SYNTHROID/LEVOTHROID) 75 MCG  tablet Take 1 tablet (75 mcg) by mouth daily     lisinopril (PRINIVIL/ZESTRIL) 40 MG tablet Take 1 tablet (40 mg) by mouth daily For blood pressure (Patient taking differently: Take 40 mg by mouth every morning For blood pressure)     multivitamin (OCUVITE) TABS tablet Take 1 tablet by mouth daily     pantoprazole (PROTONIX) 40 MG EC tablet Take 1 tablet (40 mg) by mouth daily Takes in the morning.     Potassium Bicarb-Citric Acid 20 MEQ TBEF Take 20 mg by mouth daily     potassium chloride ER (K-DUR/KLOR-CON M) 20 MEQ CR tablet Take 1 tablet (20 mEq) by mouth daily     citalopram (CELEXA) 20 MG tablet Take 1 tablet (20 mg) by mouth daily For additional refills, please schedule a follow-up appointment with Dr Montgomery for June 2020 at [phone] (Patient not taking: Reported on 12/1/2020)     gabapentin (NEURONTIN) 100 MG capsule Take 1 to 4 capsules at bedtime as directed. For additional refills, please schedule a follow-up appointment with Dr Montgomery for June 2020 at [phone]. (Patient not taking: Reported on 12/1/2020)     loperamide (IMODIUM A-D) 2 MG tablet Take 0.5-1 tablets (1-2 mg) by mouth daily as needed for diarrhea     oxybutynin (DITROPAN) 5 MG tablet Take 1 tablet (5 mg) by mouth At Bedtime For frequent urination (Patient not taking: Reported on 12/1/2020)     predniSONE (DELTASONE) 20 MG tablet Take 20 mg by mouth daily     No current facility-administered medications for this visit.        ROS:   10 point ROS negative except as discussed in above HPI.      Labs:  Recent Results (from the past 24 hour(s))   CBC with platelets    Collection Time: 11/30/20  2:42 PM   Result Value Ref Range    WBC 8.1 4.0 - 11.0 10e9/L    RBC Count 4.05 3.8 - 5.2 10e12/L    Hemoglobin 10.6 (L) 11.7 - 15.7 g/dL    Hematocrit 34.3 (L) 35.0 - 47.0 %    MCV 85 78 - 100 fl    MCH 26.2 (L) 26.5 - 33.0 pg    MCHC 30.9 (L) 31.5 - 36.5 g/dL    RDW 18.1 (H) 10.0 - 15.0 %    Platelet Count 440 150 - 450 10e9/L   N terminal  pro BNP outpatient    Collection Time: 11/30/20  2:42 PM   Result Value Ref Range    N-Terminal Pro Bnp 536 (H) 0 - 450 pg/mL   Basic metabolic panel    Collection Time: 11/30/20  2:42 PM   Result Value Ref Range    Sodium 140 133 - 144 mmol/L    Potassium 3.8 3.4 - 5.3 mmol/L    Chloride 105 94 - 109 mmol/L    Carbon Dioxide 29 20 - 32 mmol/L    Anion Gap 6 3 - 14 mmol/L    Glucose 95 70 - 99 mg/dL    Urea Nitrogen 25 7 - 30 mg/dL    Creatinine 0.62 0.52 - 1.04 mg/dL    GFR Estimate 83 >60 mL/min/[1.73_m2]    GFR Estimate If Black >90 >60 mL/min/[1.73_m2]    Calcium 9.8 8.5 - 10.1 mg/dL       Echocardiogram 1/23/20  Left ventricular function, chamber size, wall motion, and wall thickness are  normal.The EF is 55-60%. Grade II or moderate diastolic dysfunction.  Normal RV size and function.  Pulmonary artery systolic pressure cannot be assessed.  No significant valvular abnormality.  The atrial septum is intact as assessed by color Doppler and agitated saline  bubble study .  No pericardial effusion.    V/Q Scan 1/30/20  Mismatched perfusion defects involving segments of the right upper,  right middle, and right lower lobes are compatible with clinical  suspicion of chronic PE in combination with the recent findings on the  CT from 1/24/2020.     CT PE 1/24/20  1. No evidence of acute large PE. No evidence of right heart strain.  2. Diminutive appearance of the right middle and lower lobe arteries  with chronic appearing filling defect of the right lower lobe  pulmonary artery with mosaic attenuation of the lung parenchyma.  Enlargement of the main pulmonary artery. Findings are concerning for  chronic pulmonary emboli resulting in pulmonary hypertension.  3. Multiple pulmonary nodules as detailed above. Some pulmonary  nodules have increased in size and again are concerning for metastatic  disease without known primary  4. Severe atherosclerotic calcifications of the visualized abdominal  Aorta.    CT PE  1/27/18  1. Age-indeterminate, but possibly chronic pulmonary emboli in the right lower lobe.  2. Mild groundglass opacities in the posterior right upper lobe, likely infectious/inflammatory.  3. Moderate apically predominant centrilobular emphysema.  4. Scattered pulmonary nodules measuring up to 5 mm. Recommend follow-up chest CT in one year per the Fleischner Society criteria.  5. Minimally displaced lateral right seventh and eighth rib fractures.  6. Possible mild distal esophageal wall thickening, suggestive of esophagitis.  7. Calcifications in the pancreatic head with a rounded associated cystic focus likely representing a pseudocyst, findings likely related to chronic pancreatitis, though evaluation is limited. Consider further dedicated imaging of the pancreas as clinically indicated.      Assessment and Plan:     Lucie Stockton is a very pleasant 85 year old female with history of bilateral pulmonary embolism, DVT, hypertension, and possible metastatic disease who presents for evaluation of possible pulmonary hypertension.    1. 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 Echocardiogram in January showed normal RV size and function. She appears to be WHO functional class II. NT-proBNP elevated to 536 yesterday, was 488 in 1/14/20.  - We will obtain a right heart catheterization, pulmonary angiogram, and 6MWT  - Start apixaban 5 mg and stop plavix once she starts apixaban    2. PAD with atherosclerosis of the abdominal aorta: Continue statin. We will stop plavix once she starts apixaban    3. Multiple pulmonary nodules: Has a history of pulmonary nodules, thought to be metastatic disease with no  tissue diagnosis. Dr. Jara previously completed an EBUS and it was uncertain whether a nodule was sampled. Path was negative for malignancy (nodule and LNs). She was discussed in interdisciplinary conference in April and recommendation was Guardant 360 (but it is uncertain whether this was completed). It was also advised that she get a repeat chest CT but it appears that this was not completed.   - Contacted Dr. Jara's team to follow-up with patient whether further evaluation is needed    Follow-up after right heart catheterization, pulmonary angiogram, and 6MWT.    It was a pleasure seeing Lucie Stockton at the [facility].  Please contact us with any questions or concerns that you may have.      Patient was seen and discussed with staff attending, Dr. Florian.      Sincerely,      Maegan Townsend MD, PhD  Cardiology Fellow    And    Seven Florian MD, PhD   of Medicine    Staff Attestation:   I have seen and examined this patient on December 1, 2020. I have discussed with Dr. Townsend and agree with the findings and plan in this note. I have personally reviewed vital signs, hemodynamics, medications, laboratory values, and diagnostic testing. She has radiographic findings of CTEPH. We will perform invasive assessment, start anticoagulation, and if there are targets we will work on scheduling for BPA.       Seven Florian MD, PhD  Cardiovascular Division  [facility]

## 2020-12-01 NOTE — PROGRESS NOTES
"Lucie Stockton is a 85 year old female who is being evaluated via a billable telephone visit.      The patient has been notified of following:     \"This telephone visit will be conducted via a call between you and your physician/provider. We have found that certain health care needs can be provided without the need for a physical exam.  This service lets us provide the care you need with a short phone conversation.  If a prescription is necessary we can send it directly to your pharmacy.  If lab work is needed we can place an order for that and you can then stop by our lab to have the test done at a later time.    Telephone visits are billed at different rates depending on your insurance coverage. During this emergency period, for some insurers they may be billed the same as an in-person visit.  Please reach out to your insurance provider with any questions.    If during the course of the call the physician/provider feels a telephone visit is not appropriate, you will not be charged for this service.\"    Patient has given verbal consent for Telephone visit?  Yes    What phone number would you like to be contacted at? [phone]     How would you like to obtain your AVS? Mail a copy    Vitals - Patient Reported  Weight (Patient Reported): 44 kg (97 lb)  Height (Patient Reported): 149.9 cm (4' 11\")  BMI (Based on Pt Reported Ht/Wt): 19.59  Pain Score: No Pain (0)(Has some SOB)  Vitals were taken and medication reconciled.    ADRIANNE Posada  9:15 AM        "

## 2020-12-01 NOTE — LETTER
"12/1/2020      RE: Lucie Stockton  [address] [id number]       Dear Colleague,    Thank you for the opportunity to participate in the care of your patient, Lucie Stockton, at the [facility]. Please see a copy of my visit note below.    Lucie Stockton is a 85 year old female who is being evaluated via a billable telephone visit.      The patient has been notified of following:     \"This telephone visit will be conducted via a call between you and your physician/provider. We have found that certain health care needs can be provided without the need for a physical exam.  This service lets us provide the care you need with a short phone conversation.  If a prescription is necessary we can send it directly to your pharmacy.  If lab work is needed we can place an order for that and you can then stop by our lab to have the test done at a later time.    Telephone visits are billed at different rates depending on your insurance coverage. During this emergency period, for some insurers they may be billed the same as an in-person visit.  Please reach out to your insurance provider with any questions.    If during the course of the call the physician/provider feels a telephone visit is not appropriate, you will not be charged for this service.\"    Patient has given verbal consent for Telephone visit?  Yes    What phone number would you like to be contacted at? [phone]     How would you like to obtain your AVS? Mail a copy    Vitals - Patient Reported  Weight (Patient Reported): 44 kg (97 lb)  Height (Patient Reported): 149.9 cm (4' 11\")  BMI (Based on Pt Reported Ht/Wt): 19.59  Pain Score: No Pain (0)(Has some SOB)  Vitals were taken and medication reconciled.    ADRIANNE Posada  9:15 AM          Lucie Stockton is a 85 year old female who is being evaluated via a billable telephone visit.      The patient has been notified of " "following:     \"This telephone visit will be conducted via a call between you and your physician/provider. We have found that certain health care needs can be provided without the need for a physical exam.  This service lets us provide the care you need with a short phone conversation.  If a prescription is necessary we can send it directly to your pharmacy.  If lab work is needed we can place an order for that and you can then stop by our lab to have the test done at a later time.    Telephone visits are billed at different rates depending on your insurance coverage. During this emergency period, for some insurers they may be billed the same as an in-person visit.  Please reach out to your insurance provider with any questions.    If during the course of the call the physician/provider feels a telephone visit is not appropriate, you will not be charged for this service.\"    Patient has given verbal consent for Telephone visit?  Yes    What phone number would you like to be contacted at? [phone]      How would you like to obtain your AVS? Mail a copy    Phone call duration: 12 minutes    Maegan Townsend MD      Service Date: 2020    Marii Montgomery MD  Internal Medicine  [place], [place]    RE:  Lucie Stockton   MRN:  [id number]  :  1935      Dear Dr. Montgomery:    We had the pleasure of seeing Lucie Stockton at the [facility]. As you know, Ms. Stockton is a very pleasant 85 year old female with a history of bilateral pulmonary embolism, peripheral vascular disease, Grave's disease, DVT, hypertension, and suspected metastatic disease with no tissue diagnosis at this time who presents for ongoing evaluation for suspected pulmonary hypertension due to an enlarged pulmonary artery seen on CT scan.    She was last seen in our clinic on 20. During that visit, we recommended that she get an echocardiogram, VQ scan, CTA chest PE, and 6MWT. Her echocardiogram " "showed LVEF 55-60%, normal RV size and function, and PASP could not be assessed. VQ scan showed mismatched perfusion defects involving segments of the right upper, right middle, and right lower lobes. CT PE showed diminutive appearance of the right middle and lower lobe arteries with chronic appearing filling defect of the right lower lobe pulmonary artery, no evidence of acute large PE or right heart strain. She did not get the 6MWT.     Today, she notes that she has dyspnea when ascending and descending 1 flight of stairs and that this has recently mildly worsened. Denies chest pain, lower extremity edema, presyncope, and syncope. We would classify her as WHO functional class II. Her activity has been limited by right leg and hip discomfort. She has not had any hospitalizations or ED visits since we last saw her in clinic.          PAST MEDICAL HISTORY:  Past Medical History:   Diagnosis Date     Anemia      Aortic stenosis     abdominal     Atherosclerosis of aorta (H)     \"extensive disease with near occlusion below level of renal arteries\" on CT     Atherosclerosis of arteries of extremities (H)      Back pain     severe multilevel DJD, moderate spinal canal stenosis L4-5, severe L3-4     Chronic pain     Back, hips, knees, legs     Degenerative joint disease      DVT of lower extremity, bilateral (H)     5/24/10 left distal femoral and great saphenous, 7/7/10 left:  extending to cfv, iliac , pop and post tibial, peronial, right:  distal fem and peroneal      Grave's disease      Hyperlipidaemia LDL goal < 70      Hypertension, essential      Hypothyroidism      Imbalance      Insomnia      Intermittent claudication (H)      Iron deficiency      Knee pain      Lumbar stenosis with neurogenic claudication      Osteoarthritis     ankle,foot, knee     Osteoporosis      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, 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 "     Pulmonary embolism (H)     5/24/10 bilateral     Small bowel obstruction (H) 17     Varicose veins        PAST SURGICAL HISTORY:  Past Surgical History:   Procedure Laterality Date     BUNIONECTOMY       C STOMACH SURGERY PROCEDURE UNLISTED       COLONOSCOPY       ENDOBRONCHIAL ULTRASOUND FLEXIBLE N/A 2020     HYSTERECTOMY TOTAL ABDOMINAL, BILATERAL SALPINGO-OOPHORECTOMY, NODE DISSECTION, COMBINED  11     INJECT EPIDURAL LUMBAR / SACRAL SINGLE N/A 2018     INJECT SACROILIAC JOINT Right 3/7/2018     INJECT SACROILIAC JOINT Bilateral 2018     OPTICAL TRACKING SYSTEM BRONCHOSCOPY N/A 2020     UPPER GI ENDOSCOPY         FAMILY HISTORY:  Family History   Problem Relation Age of Onset     Breast Cancer Maternal Aunt 80     C.A.D. Father 54     No Known Problems Mother        SOCIAL HISTORY:  Social History     Socioeconomic History     Marital status:      Spouse name: Not on file     Number of children: Not on file     Years of education: Not on file     Highest education level: Not on file   Occupational History     Not on file   Social Needs     Financial resource strain: Not on file     Food insecurity     Worry: Not on file     Inability: Not on file     Transportation needs     Medical: Not on file     Non-medical: Not on file   Tobacco Use     Smoking status: Former Smoker     Packs/day: 0.50     Years: 15.00     Pack years: 7.50     Quit date: 1993     Years since quittin.2     Smokeless tobacco: Never Used   Substance and Sexual Activity     Alcohol use: Yes     Comment: social     Drug use: No     Sexual activity: Not Currently     Partners: Male   Lifestyle     Physical activity     Days per week: Not on file     Minutes per session: Not on file     Stress: Not on file   Relationships     Social connections     Talks on phone: Not on file     Gets together: Not on file     Attends Caodaism service: Not on file     Active member of club or organization: Not on  file     Attends meetings of clubs or organizations: Not on file     Relationship status: Not on file     Intimate partner violence     Fear of current or ex partner: Not on file     Emotionally abused: Not on file     Physically abused: Not on file     Forced sexual activity: Not on file   Other Topics Concern     Parent/sibling w/ CABG, MI or angioplasty before 65F 55M? Not Asked   Social History Narrative     for 52 years. Retired from [employer]. Frequent world travel.       CURRENT MEDICATIONS:  Current Outpatient Medications   Medication Sig     acetaminophen (TYLENOL) 325 MG tablet Take 2 tablets every 6 to 8 hours as needed for pain (Patient taking differently: Take 650 mg by mouth as needed Take 2 tablets every 6 to 8 hours as needed for pain)     amLODIPine (NORVASC) 10 MG tablet Take 1 tablet (10 mg) by mouth daily For blood pressure (Patient taking differently: Take 10 mg by mouth every morning For blood pressure)     atorvastatin (LIPITOR) 40 MG tablet Take 2 tablets (80 mg) by mouth daily     benzonatate (TESSALON) 200 MG capsule      betamethasone dipropionate (DIPROSONE) 0.05 % external ointment Apply topically 2 times daily (Patient taking differently: Apply 1 g topically as needed )     Calcium Carbonate-Vitamin D (CALCIUM 600 + D OR) Take 1 tablet by mouth every morning      cholecalciferol (VITAMIN D3) 25 mcg (1000 units) capsule Take 1 capsule by mouth daily     clopidogrel (PLAVIX) 75 MG tablet TAKE 1 TABLET(75 MG) BY MOUTH DAILY     cyanocolbalamin (VITAMIN  B-12) 500 MCG tablet Take 500 mcg by mouth every morning      hydrochlorothiazide (HYDRODIURIL) 50 MG tablet Take 1 tablet (50 mg) by mouth daily     latanoprost (XALATAN) 0.005 % ophthalmic solution Place 1 drop into both eyes At Bedtime      levothyroxine (SYNTHROID/LEVOTHROID) 75 MCG tablet Take 1 tablet (75 mcg) by mouth daily     lisinopril (PRINIVIL/ZESTRIL) 40 MG tablet Take 1 tablet (40 mg) by mouth daily For  blood pressure (Patient taking differently: Take 40 mg by mouth every morning For blood pressure)     multivitamin (OCUVITE) TABS tablet Take 1 tablet by mouth daily     pantoprazole (PROTONIX) 40 MG EC tablet Take 1 tablet (40 mg) by mouth daily Takes in the morning.     Potassium Bicarb-Citric Acid 20 MEQ TBEF Take 20 mg by mouth daily     potassium chloride ER (K-DUR/KLOR-CON M) 20 MEQ CR tablet Take 1 tablet (20 mEq) by mouth daily     citalopram (CELEXA) 20 MG tablet Take 1 tablet (20 mg) by mouth daily For additional refills, please schedule a follow-up appointment with Dr Montgomery for June 2020 at [phone] (Patient not taking: Reported on 12/1/2020)     gabapentin (NEURONTIN) 100 MG capsule Take 1 to 4 capsules at bedtime as directed. For additional refills, please schedule a follow-up appointment with Dr Montgomery for June 2020 at [phone]. (Patient not taking: Reported on 12/1/2020)     loperamide (IMODIUM A-D) 2 MG tablet Take 0.5-1 tablets (1-2 mg) by mouth daily as needed for diarrhea     oxybutynin (DITROPAN) 5 MG tablet Take 1 tablet (5 mg) by mouth At Bedtime For frequent urination (Patient not taking: Reported on 12/1/2020)     predniSONE (DELTASONE) 20 MG tablet Take 20 mg by mouth daily     No current facility-administered medications for this visit.        ROS:   10 point ROS negative except as discussed in above HPI.      Labs:  Recent Results (from the past 24 hour(s))   CBC with platelets    Collection Time: 11/30/20  2:42 PM   Result Value Ref Range    WBC 8.1 4.0 - 11.0 10e9/L    RBC Count 4.05 3.8 - 5.2 10e12/L    Hemoglobin 10.6 (L) 11.7 - 15.7 g/dL    Hematocrit 34.3 (L) 35.0 - 47.0 %    MCV 85 78 - 100 fl    MCH 26.2 (L) 26.5 - 33.0 pg    MCHC 30.9 (L) 31.5 - 36.5 g/dL    RDW 18.1 (H) 10.0 - 15.0 %    Platelet Count 440 150 - 450 10e9/L   N terminal pro BNP outpatient    Collection Time: 11/30/20  2:42 PM   Result Value Ref Range    N-Terminal Pro Bnp 536 (H) 0 - 450 pg/mL   Basic  metabolic panel    Collection Time: 11/30/20  2:42 PM   Result Value Ref Range    Sodium 140 133 - 144 mmol/L    Potassium 3.8 3.4 - 5.3 mmol/L    Chloride 105 94 - 109 mmol/L    Carbon Dioxide 29 20 - 32 mmol/L    Anion Gap 6 3 - 14 mmol/L    Glucose 95 70 - 99 mg/dL    Urea Nitrogen 25 7 - 30 mg/dL    Creatinine 0.62 0.52 - 1.04 mg/dL    GFR Estimate 83 >60 mL/min/[1.73_m2]    GFR Estimate If Black >90 >60 mL/min/[1.73_m2]    Calcium 9.8 8.5 - 10.1 mg/dL       Echocardiogram 1/23/20  Left ventricular function, chamber size, wall motion, and wall thickness are  normal.The EF is 55-60%. Grade II or moderate diastolic dysfunction.  Normal RV size and function.  Pulmonary artery systolic pressure cannot be assessed.  No significant valvular abnormality.  The atrial septum is intact as assessed by color Doppler and agitated saline  bubble study .  No pericardial effusion.    V/Q Scan 1/30/20  Mismatched perfusion defects involving segments of the right upper,  right middle, and right lower lobes are compatible with clinical  suspicion of chronic PE in combination with the recent findings on the  CT from 1/24/2020.     CT PE 1/24/20  1. No evidence of acute large PE. No evidence of right heart strain.  2. Diminutive appearance of the right middle and lower lobe arteries  with chronic appearing filling defect of the right lower lobe  pulmonary artery with mosaic attenuation of the lung parenchyma.  Enlargement of the main pulmonary artery. Findings are concerning for  chronic pulmonary emboli resulting in pulmonary hypertension.  3. Multiple pulmonary nodules as detailed above. Some pulmonary  nodules have increased in size and again are concerning for metastatic  disease without known primary  4. Severe atherosclerotic calcifications of the visualized abdominal  Aorta.    CT PE 1/27/18  1. Age-indeterminate, but possibly chronic pulmonary emboli in the right lower lobe.  2. Mild groundglass opacities in the posterior  right upper lobe, likely infectious/inflammatory.  3. Moderate apically predominant centrilobular emphysema.  4. Scattered pulmonary nodules measuring up to 5 mm. Recommend follow-up chest CT in one year per the Fleischner Society criteria.  5. Minimally displaced lateral right seventh and eighth rib fractures.  6. Possible mild distal esophageal wall thickening, suggestive of esophagitis.  7. Calcifications in the pancreatic head with a rounded associated cystic focus likely representing a pseudocyst, findings likely related to chronic pancreatitis, though evaluation is limited. Consider further dedicated imaging of the pancreas as clinically indicated.      Assessment and Plan:     Lucie Stockton is a very pleasant 85 year old female with history of bilateral pulmonary embolism, DVT, hypertension, and possible metastatic disease who presents for evaluation of possible pulmonary hypertension.    1. 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 Echocardiogram in January showed normal RV size and function. She appears to be WHO functional class II. NT-proBNP elevated to 536 yesterday, was 488 in 1/14/20.  - We will obtain a right heart catheterization, pulmonary angiogram, and 6MWT  - Start apixaban 5 mg and stop plavix once she starts apixaban    2. PAD with atherosclerosis of the abdominal aorta: Continue statin. We will stop plavix once she starts apixaban    3. Multiple pulmonary nodules: Has a history of pulmonary nodules, thought to be metastatic disease with no tissue diagnosis. Dr. Jara previously completed an EBUS and it was uncertain whether a nodule was sampled. Path was negative for malignancy  (nodule and LNs). She was discussed in interdisciplinary conference in April and recommendation was Guardant 360 (but it is uncertain whether this was completed). It was also advised that she get a repeat chest CT but it appears that this was not completed.   - Contacted Dr. Jara's team to follow-up with patient whether further evaluation is needed    Follow-up after right heart catheterization, pulmonary angiogram, and 6MWT.    It was a pleasure seeing Lucie Stockton at the [facility].  Please contact us with any questions or concerns that you may have.      Patient was seen and discussed with staff attending, Dr. Florian.      Sincerely,      Maegan Townsend MD, PhD  Cardiology Fellow    And    Seven Florian MD, PhD   of Medicine    Staff Attestation:   I have seen and examined this patient on December 1, 2020. I have discussed with Dr. Townsend and agree with the findings and plan in this note. I have personally reviewed vital signs, hemodynamics, medications, laboratory values, and diagnostic testing. She has radiographic findings of CTEPH. We will perform invasive assessment, start anticoagulation, and if there are targets we will work on scheduling for BPA.       Seven Florian MD, PhD  Cardiovascular Division  [facility]       Please do not hesitate to contact me if you have any questions/concerns.     Sincerely,     Seven Florian MD

## 2020-12-02 ENCOUNTER — TELEPHONE (OUTPATIENT)
Dept: SURGERY | Facility: CLINIC | Age: 85
End: 2020-12-02

## 2020-12-02 DIAGNOSIS — R91.8 PULMONARY NODULES: Primary | ICD-10-CM

## 2020-12-02 NOTE — TELEPHONE ENCOUNTER
Call to Lucie to get her back on track for a new chest Ct for a lung nodule that was seen. She had been due for a chest CT in April of 2020 however, the chest CT was scheduled and cancelled (likely due to the COVID situation).    There was no answer however, her demographics note that it is fine to leave a detailed message on her voicemail which I did.    I will place a new chest CT order and ask scheduling to arrange this for her as well as a clinic with Dr. Aidan Randall (she has sen him in the past)  to go over the results and discuss a plan.

## 2020-12-14 DIAGNOSIS — I10 ESSENTIAL HYPERTENSION: ICD-10-CM

## 2020-12-14 DIAGNOSIS — I10 BENIGN ESSENTIAL HYPERTENSION: ICD-10-CM

## 2020-12-16 ENCOUNTER — TELEPHONE (OUTPATIENT)
Dept: INTERNAL MEDICINE | Facility: CLINIC | Age: 85
End: 2020-12-16

## 2020-12-17 RX ORDER — AMLODIPINE BESYLATE 10 MG/1
10 TABLET ORAL DAILY
Qty: 90 TABLET | Refills: 3 | Status: ON HOLD | OUTPATIENT
Start: 2020-12-17 | End: 2021-02-21

## 2020-12-17 RX ORDER — HYDROCHLOROTHIAZIDE 50 MG/1
50 TABLET ORAL DAILY
Qty: 90 TABLET | Refills: 0 | Status: SHIPPED | OUTPATIENT
Start: 2020-12-17

## 2020-12-17 NOTE — TELEPHONE ENCOUNTER
hydrochlorothiazide (HYDRODIURIL) 50 MG tablet   Last Written Prescription Date:  9/18/20  Last Fill Quantity: 90,   # refills: 0  Last Office Visit : 1/30/20  Future Office visit: 12/21/20    90 day SENT to pharmacy      amLODIPine (NORVASC) 10 MG tablet  Last Written Prescription Date:  8/9/19  Last Fill Quantity: 90,   # refills: 3    Routing refill request to provider for review/approval because: amLODIPine gap in med rf.

## 2020-12-21 ENCOUNTER — VIRTUAL VISIT (OUTPATIENT)
Dept: INTERNAL MEDICINE | Facility: CLINIC | Age: 85
End: 2020-12-21
Payer: MEDICARE

## 2020-12-21 DIAGNOSIS — G47.00 INSOMNIA, UNSPECIFIED TYPE: Primary | ICD-10-CM

## 2020-12-21 DIAGNOSIS — M54.10 BILATERAL RADIATING LEG PAIN: ICD-10-CM

## 2020-12-21 DIAGNOSIS — F41.9 ANXIETY: ICD-10-CM

## 2020-12-21 PROCEDURE — 99443 PR PHYSICIAN TELEPHONE EVALUATION 21-30 MIN: CPT | Mod: 95 | Performed by: INTERNAL MEDICINE

## 2020-12-21 RX ORDER — CITALOPRAM HYDROBROMIDE 20 MG/1
20 TABLET ORAL DAILY
Qty: 90 TABLET | Refills: 3 | Status: SHIPPED | OUTPATIENT
Start: 2020-12-21

## 2020-12-21 RX ORDER — GABAPENTIN 100 MG/1
CAPSULE ORAL
Qty: 180 CAPSULE | Refills: 3 | Status: SHIPPED | OUTPATIENT
Start: 2020-12-21 | End: 2021-02-15

## 2020-12-21 NOTE — NURSING NOTE
Chief Complaint   Patient presents with     Sleep Problem     pt is having sleep issues.      Mouth Problem     pt has a dry mouth.      Video Visit Technology for this patient: No video technology available to patient, please call patient over the phone     Betty Prakash LPN at 1:30 PM on 12/21/2020.

## 2020-12-21 NOTE — PROGRESS NOTES
"This patient is being evaluated via a billable telephone visit; THIS VISIT WAS INITIATED BY THE PT, as AN ALTERNATIVE TO IN PERSON VISIT .       The patient has has been notified of following:      \"This billable telephone visit will be conducted via a call between you and your physician/provider. We have found that certain health care needs can be provided without the need for a physical exam.  This service lets us provide the care you need with a short phone conversation.  If a prescription is necessary we can send it directly to your pharmacy.  If lab work is needed we can place an order for that and you can then stop by our lab to have the test done at a later time. We can also place orders for a limited number of imaging tests if they are deemed urgently necessary.     If during the course of the call the physician/provider feels a telephone visit is not appropriate, you will not be charged for this service.\"     Due to efforts to reduce the spread of COVID-19 in the clinic, state, nation, telephone visits are encouraged currently. Patient understands that diagnose and advice is limited by the inability to exam him/her/them face-to-face.    Patient has given verbal consent for the virtual audio visit? Yes  Did patient initiate this virtual visit? Yes    Person spoken to: patient    This was a synchronous virtual visit  Time call initiated:  2:03 pm  Time call ended:  2:30 pm  Total length of call: 27 min    Marii Montgomery M.D.  Internal Medicine  [facility]       Patients: if you have questions or concerns about this progress note, please discuss them with the provider at a future office visit.      "

## 2020-12-21 NOTE — PROGRESS NOTES
Virtual visit    CC: routine follow up, and insomnia    Other team members:  Cardiology Dr. Florian  Thoracic surgery Dr. Quiñonez  Vascular NP JONO Bueno  Pulmonary Dr. Randall  Pulmonary nodule NP ALEX Enciso  Neurology MONICO Beauchamp  Dermatology LUCY Lu  Dermatology   Pain Management MERCEDES Brown  Psychology Anna Clay    HPI:  Lucie is a 84-year-old female with a history of abdominal aortic stenosis, peripheral arterial disease on Plavix, bilateral DVT and PE in 2010 provoked, possible pulmonary hypertension, COPD,  hypertension, hyperlipidemia, hypothyroidism.  Patient is a previous smoker, she quit 1993.  She has approximate 40-pack-year history of cigarette smoking, and hx of lung nodules    Chronic insomnia, frequent middle of the night awakenings, does get up to urinate, but the need to urine is not what causes her to wake up  Duration several months.  Reads in the evening until she is tired, then falls asleep about 10:30 pm.  Then awakes spontaneously about 1:30, then 3:00, then 2 more times, then eventially gets up when her 's alarm clock goes off at 6:30.  Usual wake time is 7:15 pm.  When she wakes up, she frets over how to get back to sleep.  Has soft music going on her clock radio all night, which is soothing to her. Nothing else disturbs her sleep.  Keeps room cool. Only rarely has alcohol, up to one drink on the weekend. Rare caffeine, only one cup of tea, up to once on the weekend.  Eats last meal around 6-6:30.  She notes that she has been losing weight, and weighs less than 100 pounds now, has little appetite.  Typical weight is 120 pounds.  Mood-wise, she feels discouraged, but has no panic attacks, and does not feel particularly depressed.    Regarding citalopram and gabapentin --not taking because she ran out. I advised her to restart them and will send rx's today.  Chronic back and leg pains are ongoing. However, she has not been taking her gabapentin either.  We reviewed   Chiqui' notes  She also has appointments for CT chest, appt with Dr. Randall, and a right heart cath scheduled in January.    Gen:  Lucie sounds alert on the phone, is engaged in conversation, and does not sound drowsy.  She does have some trouble recollecting her health history and details of specialists' visits from the past year.    Lucie was seen today for sleep problem and mouth problem.    Diagnoses and all orders for this visit:    Insomnia, unspecified type    Anxiety  -   resume  citalopram (CELEXA) 20 MG tablet; Take 1 tablet (20 mg) by mouth daily    Bilateral radiating leg pain  -    resume gabapentin (NEURONTIN) 100 MG capsule; Take 1 to 4 capsules at bedtime as directed.      F/U with me in February    Marii Montgomery M.D.  Internal Medicine  [facility]     Patients: if you have questions or concerns about this progress note, please discuss them with the provider at a future office visit.            .

## 2020-12-22 ENCOUNTER — TELEPHONE (OUTPATIENT)
Dept: INTERNAL MEDICINE | Facility: CLINIC | Age: 85
End: 2020-12-22

## 2020-12-22 NOTE — TELEPHONE ENCOUNTER
Left voice message for patient to call to schedule 2 months (around 2/21/2021) for using a phone visit for insomnia per Dr Montgomery last visit 12-21-20.

## 2021-01-01 ENCOUNTER — OFFICE VISIT (OUTPATIENT)
Dept: ORTHOPEDICS | Facility: CLINIC | Age: 86
End: 2021-01-01
Payer: MEDICARE

## 2021-01-01 ENCOUNTER — TELEPHONE (OUTPATIENT)
Dept: CARDIOLOGY | Facility: CLINIC | Age: 86
End: 2021-01-01

## 2021-01-01 ENCOUNTER — OFFICE VISIT (OUTPATIENT)
Dept: INTERNAL MEDICINE | Facility: CLINIC | Age: 86
End: 2021-01-01
Payer: MEDICARE

## 2021-01-01 ENCOUNTER — HEALTH MAINTENANCE LETTER (OUTPATIENT)
Age: 86
End: 2021-01-01

## 2021-01-01 ENCOUNTER — LAB (OUTPATIENT)
Dept: LAB | Facility: CLINIC | Age: 86
End: 2021-01-01
Attending: PHYSICIAN ASSISTANT
Payer: MEDICARE

## 2021-01-01 ENCOUNTER — LAB (OUTPATIENT)
Dept: LAB | Facility: CLINIC | Age: 86
End: 2021-01-01
Payer: MEDICARE

## 2021-01-01 ENCOUNTER — VIRTUAL VISIT (OUTPATIENT)
Dept: CARDIOLOGY | Facility: CLINIC | Age: 86
End: 2021-01-01
Attending: PHYSICIAN ASSISTANT
Payer: MEDICARE

## 2021-01-01 ENCOUNTER — PATIENT OUTREACH (OUTPATIENT)
Dept: PULMONOLOGY | Facility: CLINIC | Age: 86
End: 2021-01-01
Payer: MEDICARE

## 2021-01-01 ENCOUNTER — TELEPHONE (OUTPATIENT)
Dept: FAMILY MEDICINE | Facility: CLINIC | Age: 86
End: 2021-01-01

## 2021-01-01 ENCOUNTER — TEAM CONFERENCE (OUTPATIENT)
Dept: PULMONOLOGY | Facility: CLINIC | Age: 86
End: 2021-01-01

## 2021-01-01 ENCOUNTER — MEDICAL CORRESPONDENCE (OUTPATIENT)
Dept: HEALTH INFORMATION MANAGEMENT | Facility: CLINIC | Age: 86
End: 2021-01-01
Payer: MEDICARE

## 2021-01-01 ENCOUNTER — OFFICE VISIT (OUTPATIENT)
Dept: PULMONOLOGY | Facility: CLINIC | Age: 86
End: 2021-01-01
Attending: STUDENT IN AN ORGANIZED HEALTH CARE EDUCATION/TRAINING PROGRAM
Payer: MEDICARE

## 2021-01-01 ENCOUNTER — ANCILLARY PROCEDURE (OUTPATIENT)
Dept: GENERAL RADIOLOGY | Facility: CLINIC | Age: 86
End: 2021-01-01
Attending: FAMILY MEDICINE
Payer: MEDICARE

## 2021-01-01 ENCOUNTER — PRE VISIT (OUTPATIENT)
Dept: ORTHOPEDICS | Facility: CLINIC | Age: 86
End: 2021-01-01

## 2021-01-01 ENCOUNTER — TRANSFERRED RECORDS (OUTPATIENT)
Dept: HEALTH INFORMATION MANAGEMENT | Facility: CLINIC | Age: 86
End: 2021-01-01

## 2021-01-01 ENCOUNTER — OFFICE VISIT (OUTPATIENT)
Dept: FAMILY MEDICINE | Facility: CLINIC | Age: 86
End: 2021-01-01
Payer: MEDICARE

## 2021-01-01 VITALS
OXYGEN SATURATION: 93 % | HEIGHT: 59 IN | HEART RATE: 96 BPM | DIASTOLIC BLOOD PRESSURE: 86 MMHG | WEIGHT: 93.8 LBS | BODY MASS INDEX: 18.91 KG/M2 | SYSTOLIC BLOOD PRESSURE: 142 MMHG | RESPIRATION RATE: 22 BRPM

## 2021-01-01 VITALS
BODY MASS INDEX: 20.2 KG/M2 | WEIGHT: 100 LBS | SYSTOLIC BLOOD PRESSURE: 137 MMHG | HEART RATE: 81 BPM | OXYGEN SATURATION: 89 % | DIASTOLIC BLOOD PRESSURE: 56 MMHG

## 2021-01-01 VITALS
RESPIRATION RATE: 16 BRPM | HEIGHT: 59 IN | WEIGHT: 89.2 LBS | RESPIRATION RATE: 18 BRPM | BODY MASS INDEX: 18.77 KG/M2 | WEIGHT: 93 LBS | HEART RATE: 73 BPM | DIASTOLIC BLOOD PRESSURE: 66 MMHG | BODY MASS INDEX: 17.98 KG/M2 | OXYGEN SATURATION: 89 % | SYSTOLIC BLOOD PRESSURE: 137 MMHG

## 2021-01-01 VITALS
BODY MASS INDEX: 18.75 KG/M2 | TEMPERATURE: 98.4 F | DIASTOLIC BLOOD PRESSURE: 62 MMHG | HEIGHT: 59 IN | WEIGHT: 93 LBS | OXYGEN SATURATION: 95 % | RESPIRATION RATE: 18 BRPM | SYSTOLIC BLOOD PRESSURE: 176 MMHG

## 2021-01-01 VITALS — OXYGEN SATURATION: 94 % | DIASTOLIC BLOOD PRESSURE: 67 MMHG | HEART RATE: 73 BPM | SYSTOLIC BLOOD PRESSURE: 147 MMHG

## 2021-01-01 DIAGNOSIS — R06.02 SOB (SHORTNESS OF BREATH): ICD-10-CM

## 2021-01-01 DIAGNOSIS — D64.9 ACUTE ANEMIA: ICD-10-CM

## 2021-01-01 DIAGNOSIS — M25.551 HIP PAIN, RIGHT: ICD-10-CM

## 2021-01-01 DIAGNOSIS — E87.70 HYPERVOLEMIA: ICD-10-CM

## 2021-01-01 DIAGNOSIS — E87.0 HYPERNATREMIA: ICD-10-CM

## 2021-01-01 DIAGNOSIS — I27.20 PULMONARY HYPERTENSION (H): Primary | ICD-10-CM

## 2021-01-01 DIAGNOSIS — M54.50 CHRONIC BILATERAL LOW BACK PAIN WITHOUT SCIATICA: ICD-10-CM

## 2021-01-01 DIAGNOSIS — G89.29 CHRONIC BILATERAL LOW BACK PAIN WITHOUT SCIATICA: ICD-10-CM

## 2021-01-01 DIAGNOSIS — Z11.59 ENCOUNTER FOR SCREENING FOR OTHER VIRAL DISEASES: Primary | ICD-10-CM

## 2021-01-01 DIAGNOSIS — F10.920 ALCOHOLIC INTOXICATION WITHOUT COMPLICATION (H): ICD-10-CM

## 2021-01-01 DIAGNOSIS — M25.561 CHRONIC PAIN OF RIGHT KNEE: ICD-10-CM

## 2021-01-01 DIAGNOSIS — R79.9 ABNORMAL FINDING OF BLOOD CHEMISTRY, UNSPECIFIED: ICD-10-CM

## 2021-01-01 DIAGNOSIS — M25.561 ACUTE PAIN OF RIGHT KNEE: Primary | ICD-10-CM

## 2021-01-01 DIAGNOSIS — R35.1 NOCTURIA: ICD-10-CM

## 2021-01-01 DIAGNOSIS — D50.9 IRON DEFICIENCY ANEMIA, UNSPECIFIED IRON DEFICIENCY ANEMIA TYPE: Primary | ICD-10-CM

## 2021-01-01 DIAGNOSIS — S01.01XD LACERATION OF SCALP, SUBSEQUENT ENCOUNTER: ICD-10-CM

## 2021-01-01 DIAGNOSIS — D50.9 IRON DEFICIENCY ANEMIA, UNSPECIFIED IRON DEFICIENCY ANEMIA TYPE: ICD-10-CM

## 2021-01-01 DIAGNOSIS — N64.59 BREAST SYMPTOM: ICD-10-CM

## 2021-01-01 DIAGNOSIS — I27.24 CTEPH (CHRONIC THROMBOEMBOLIC PULMONARY HYPERTENSION) (H): ICD-10-CM

## 2021-01-01 DIAGNOSIS — N63.0 LUMP OR MASS IN BREAST: ICD-10-CM

## 2021-01-01 DIAGNOSIS — D64.9 ACUTE ANEMIA: Primary | ICD-10-CM

## 2021-01-01 DIAGNOSIS — G89.29 CHRONIC PAIN OF RIGHT KNEE: ICD-10-CM

## 2021-01-01 DIAGNOSIS — Z48.02 ENCOUNTER FOR STAPLE REMOVAL: Primary | ICD-10-CM

## 2021-01-01 DIAGNOSIS — R91.1 RIGHT UPPER LOBE PULMONARY NODULE: ICD-10-CM

## 2021-01-01 DIAGNOSIS — M25.561 RIGHT KNEE PAIN: Primary | ICD-10-CM

## 2021-01-01 DIAGNOSIS — R91.8 PULMONARY NODULES: Primary | ICD-10-CM

## 2021-01-01 DIAGNOSIS — R13.12 OROPHARYNGEAL DYSPHAGIA: ICD-10-CM

## 2021-01-01 DIAGNOSIS — E87.6 HYPOKALEMIA: ICD-10-CM

## 2021-01-01 DIAGNOSIS — Z91.81 PERSONAL HISTORY OF FALL: ICD-10-CM

## 2021-01-01 DIAGNOSIS — R41.89 COGNITIVE CHANGES: ICD-10-CM

## 2021-01-01 DIAGNOSIS — I27.24 CTEPH (CHRONIC THROMBOEMBOLIC PULMONARY HYPERTENSION) (H): Primary | ICD-10-CM

## 2021-01-01 DIAGNOSIS — D63.8 ANEMIA IN OTHER CHRONIC DISEASES CLASSIFIED ELSEWHERE: ICD-10-CM

## 2021-01-01 LAB
ALBUMIN SERPL-MCNC: 3.6 G/DL (ref 3.4–5)
ALBUMIN UR-MCNC: 30 MG/DL
ALP SERPL-CCNC: 67 U/L (ref 40–150)
ALT SERPL W P-5'-P-CCNC: 15 U/L (ref 0–50)
ANION GAP SERPL CALCULATED.3IONS-SCNC: 4 MMOL/L (ref 3–14)
ANION GAP SERPL CALCULATED.3IONS-SCNC: 5 MMOL/L (ref 3–14)
APPEARANCE UR: ABNORMAL
AST SERPL W P-5'-P-CCNC: 13 U/L (ref 0–45)
BASOPHILS # BLD AUTO: 0.1 10E3/UL (ref 0–0.2)
BASOPHILS NFR BLD AUTO: 1 %
BILIRUB SERPL-MCNC: 0.3 MG/DL (ref 0.2–1.3)
BILIRUB UR QL STRIP: NEGATIVE
BUN SERPL-MCNC: 17 MG/DL (ref 7–30)
BUN SERPL-MCNC: 18 MG/DL (ref 7–30)
CALCIUM SERPL-MCNC: 9.5 MG/DL (ref 8.5–10.1)
CALCIUM SERPL-MCNC: 9.7 MG/DL (ref 8.5–10.1)
CHLORIDE BLD-SCNC: 105 MMOL/L (ref 94–109)
CHLORIDE BLD-SCNC: 112 MMOL/L (ref 94–109)
CHOLEST SERPL-MCNC: 182 MG/DL
CO2 SERPL-SCNC: 30 MMOL/L (ref 20–32)
CO2 SERPL-SCNC: 33 MMOL/L (ref 20–32)
COLOR UR AUTO: YELLOW
CREAT SERPL-MCNC: 0.59 MG/DL (ref 0.52–1.04)
CREAT SERPL-MCNC: 0.61 MG/DL (ref 0.52–1.04)
EOSINOPHIL # BLD AUTO: 0.1 10E3/UL (ref 0–0.7)
EOSINOPHIL NFR BLD AUTO: 2 %
ERYTHROCYTE [DISTWIDTH] IN BLOOD BY AUTOMATED COUNT: 27.1 % (ref 10–15)
FASTING STATUS PATIENT QL REPORTED: YES
FERRITIN SERPL-MCNC: 14 NG/ML (ref 8–252)
FOLATE SERPL-MCNC: 18 NG/ML
GFR SERPL CREATININE-BSD FRML MDRD: 83 ML/MIN/1.73M2
GFR SERPL CREATININE-BSD FRML MDRD: 84 ML/MIN/1.73M2
GLUCOSE BLD-MCNC: 119 MG/DL (ref 70–99)
GLUCOSE BLD-MCNC: 87 MG/DL (ref 70–99)
GLUCOSE UR STRIP-MCNC: NEGATIVE MG/DL
HCT VFR BLD AUTO: 29.3 % (ref 35–47)
HDLC SERPL-MCNC: 96 MG/DL
HGB BLD-MCNC: 8 G/DL (ref 11.7–15.7)
HGB BLD-MCNC: 8.3 G/DL (ref 11.7–15.7)
HGB BLD-MCNC: 8.8 G/DL (ref 11.7–15.7)
HGB UR QL STRIP: NEGATIVE
IMM GRANULOCYTES # BLD: 0 10E3/UL
IMM GRANULOCYTES NFR BLD: 1 %
KETONES UR STRIP-MCNC: 5 MG/DL
LDLC SERPL CALC-MCNC: 66 MG/DL
LEUKOCYTE ESTERASE UR QL STRIP: ABNORMAL
LYMPHOCYTES # BLD AUTO: 0.8 10E3/UL (ref 0.8–5.3)
LYMPHOCYTES NFR BLD AUTO: 13 %
MCH RBC QN AUTO: 21 PG (ref 26.5–33)
MCHC RBC AUTO-ENTMCNC: 28.3 G/DL (ref 31.5–36.5)
MCV RBC AUTO: 74 FL (ref 78–100)
MONOCYTES # BLD AUTO: 0.5 10E3/UL (ref 0–1.3)
MONOCYTES NFR BLD AUTO: 8 %
MUCOUS THREADS #/AREA URNS LPF: PRESENT /LPF
NEUTROPHILS # BLD AUTO: 4.9 10E3/UL (ref 1.6–8.3)
NEUTROPHILS NFR BLD AUTO: 75 %
NITRATE UR QL: NEGATIVE
NONHDLC SERPL-MCNC: 86 MG/DL
NRBC # BLD AUTO: 0 10E3/UL
NRBC BLD AUTO-RTO: 0 /100
NT-PROBNP SERPL-MCNC: 427 PG/ML (ref 0–450)
NT-PROBNP SERPL-MCNC: 710 PG/ML (ref 0–450)
PH UR STRIP: 5 [PH] (ref 5–7)
PLATELET # BLD AUTO: 325 10E3/UL (ref 150–450)
POTASSIUM BLD-SCNC: 3.7 MMOL/L (ref 3.4–5.3)
POTASSIUM BLD-SCNC: 3.7 MMOL/L (ref 3.4–5.3)
PROT SERPL-MCNC: 6.8 G/DL (ref 6.8–8.8)
RBC # BLD AUTO: 3.95 10E6/UL (ref 3.8–5.2)
RBC URINE: 1 /HPF
RETICS # AUTO: 0.04 10E6/UL (ref 0.03–0.1)
RETICS/RBC NFR AUTO: 0.9 % (ref 0.5–2)
SODIUM SERPL-SCNC: 142 MMOL/L (ref 133–144)
SODIUM SERPL-SCNC: 147 MMOL/L (ref 133–144)
SP GR UR STRIP: 1.02 (ref 1–1.03)
TRIGL SERPL-MCNC: 101 MG/DL
UROBILINOGEN UR STRIP-MCNC: NORMAL MG/DL
VIT B12 SERPL-MCNC: 522 PG/ML (ref 193–986)
WBC # BLD AUTO: 6.5 10E3/UL (ref 4–11)
WBC URINE: 6 /HPF

## 2021-01-01 PROCEDURE — 85025 COMPLETE CBC W/AUTO DIFF WBC: CPT | Performed by: PATHOLOGY

## 2021-01-01 PROCEDURE — 82607 VITAMIN B-12: CPT | Performed by: PATHOLOGY

## 2021-01-01 PROCEDURE — G0463 HOSPITAL OUTPT CLINIC VISIT: HCPCS

## 2021-01-01 PROCEDURE — 82728 ASSAY OF FERRITIN: CPT | Performed by: PATHOLOGY

## 2021-01-01 PROCEDURE — 99203 OFFICE O/P NEW LOW 30 MIN: CPT | Mod: 25 | Performed by: FAMILY MEDICINE

## 2021-01-01 PROCEDURE — 99215 OFFICE O/P EST HI 40 MIN: CPT | Performed by: INTERNAL MEDICINE

## 2021-01-01 PROCEDURE — 99214 OFFICE O/P EST MOD 30 MIN: CPT | Performed by: PHYSICIAN ASSISTANT

## 2021-01-01 PROCEDURE — 80048 BASIC METABOLIC PNL TOTAL CA: CPT | Performed by: PATHOLOGY

## 2021-01-01 PROCEDURE — 85018 HEMOGLOBIN: CPT | Performed by: PATHOLOGY

## 2021-01-01 PROCEDURE — 36415 COLL VENOUS BLD VENIPUNCTURE: CPT | Performed by: PATHOLOGY

## 2021-01-01 PROCEDURE — 83880 ASSAY OF NATRIURETIC PEPTIDE: CPT | Performed by: PATHOLOGY

## 2021-01-01 PROCEDURE — 99214 OFFICE O/P EST MOD 30 MIN: CPT | Performed by: STUDENT IN AN ORGANIZED HEALTH CARE EDUCATION/TRAINING PROGRAM

## 2021-01-01 PROCEDURE — 20610 DRAIN/INJ JOINT/BURSA W/O US: CPT | Mod: RT | Performed by: FAMILY MEDICINE

## 2021-01-01 PROCEDURE — 80061 LIPID PANEL: CPT | Performed by: PATHOLOGY

## 2021-01-01 PROCEDURE — 82746 ASSAY OF FOLIC ACID SERUM: CPT | Performed by: INTERNAL MEDICINE

## 2021-01-01 PROCEDURE — 99441 PR PHYSICIAN TELEPHONE EVALUATION 5-10 MIN: CPT | Mod: 95 | Performed by: PHYSICIAN ASSISTANT

## 2021-01-01 PROCEDURE — 81001 URINALYSIS AUTO W/SCOPE: CPT | Performed by: PATHOLOGY

## 2021-01-01 PROCEDURE — 73562 X-RAY EXAM OF KNEE 3: CPT | Mod: RT | Performed by: RADIOLOGY

## 2021-01-01 PROCEDURE — 99212 OFFICE O/P EST SF 10 MIN: CPT | Performed by: NURSE PRACTITIONER

## 2021-01-01 PROCEDURE — 85045 AUTOMATED RETICULOCYTE COUNT: CPT | Performed by: PATHOLOGY

## 2021-01-01 PROCEDURE — 80053 COMPREHEN METABOLIC PANEL: CPT | Performed by: PATHOLOGY

## 2021-01-01 PROCEDURE — 99213 OFFICE O/P EST LOW 20 MIN: CPT | Performed by: FAMILY MEDICINE

## 2021-01-01 RX ORDER — FERROUS SULFATE 325(65) MG
325 TABLET ORAL
Qty: 90 TABLET | Refills: 3 | COMMUNITY
Start: 2021-01-01

## 2021-01-01 RX ORDER — GABAPENTIN 400 MG/1
400 CAPSULE ORAL AT BEDTIME
Qty: 90 CAPSULE | Refills: 1 | Status: SHIPPED | OUTPATIENT
Start: 2021-01-01

## 2021-01-01 RX ORDER — LIDOCAINE HYDROCHLORIDE 10 MG/ML
4 INJECTION, SOLUTION EPIDURAL; INFILTRATION; INTRACAUDAL; PERINEURAL
Status: DISCONTINUED | OUTPATIENT
Start: 2021-01-01 | End: 2022-01-01

## 2021-01-01 RX ORDER — TRIAMCINOLONE ACETONIDE 40 MG/ML
40 INJECTION, SUSPENSION INTRA-ARTICULAR; INTRAMUSCULAR
Status: DISCONTINUED | OUTPATIENT
Start: 2021-01-01 | End: 2022-01-01

## 2021-01-01 RX ORDER — GABAPENTIN 400 MG/1
400 CAPSULE ORAL AT BEDTIME
Qty: 90 CAPSULE | Refills: 1 | COMMUNITY
Start: 2021-01-01 | End: 2021-01-01

## 2021-01-01 RX ORDER — FUROSEMIDE 20 MG
20 TABLET ORAL DAILY
Qty: 90 TABLET | Refills: 3 | Status: SHIPPED | OUTPATIENT
Start: 2021-01-01

## 2021-01-01 RX ADMIN — LIDOCAINE HYDROCHLORIDE 4 ML: 10 INJECTION, SOLUTION EPIDURAL; INFILTRATION; INTRACAUDAL; PERINEURAL at 09:33

## 2021-01-01 RX ADMIN — TRIAMCINOLONE ACETONIDE 40 MG: 40 INJECTION, SUSPENSION INTRA-ARTICULAR; INTRAMUSCULAR at 09:33

## 2021-01-01 ASSESSMENT — MIFFLIN-ST. JEOR
SCORE: 776.1
SCORE: 772.73
SCORE: 755.24

## 2021-01-01 ASSESSMENT — ANXIETY QUESTIONNAIRES
6. BECOMING EASILY ANNOYED OR IRRITABLE: NOT AT ALL
GAD7 TOTAL SCORE: 0
1. FEELING NERVOUS, ANXIOUS, OR ON EDGE: NOT AT ALL
GAD7 TOTAL SCORE: 0
2. NOT BEING ABLE TO STOP OR CONTROL WORRYING: NOT AT ALL
3. WORRYING TOO MUCH ABOUT DIFFERENT THINGS: NOT AT ALL
7. FEELING AFRAID AS IF SOMETHING AWFUL MIGHT HAPPEN: NOT AT ALL
5. BEING SO RESTLESS THAT IT IS HARD TO SIT STILL: NOT AT ALL

## 2021-01-01 ASSESSMENT — PATIENT HEALTH QUESTIONNAIRE - PHQ9
SUM OF ALL RESPONSES TO PHQ QUESTIONS 1-9: 4
5. POOR APPETITE OR OVEREATING: NOT AT ALL

## 2021-01-01 ASSESSMENT — PAIN SCALES - GENERAL
PAINLEVEL: MODERATE PAIN (4)
PAINLEVEL: NO PAIN (0)
PAINLEVEL: NO PAIN (0)
PAINLEVEL: MODERATE PAIN (5)

## 2021-01-06 ENCOUNTER — VIRTUAL VISIT (OUTPATIENT)
Dept: PULMONOLOGY | Facility: CLINIC | Age: 86
End: 2021-01-06
Attending: INTERNAL MEDICINE
Payer: MEDICARE

## 2021-01-06 DIAGNOSIS — R91.8 PULMONARY NODULES: Primary | ICD-10-CM

## 2021-01-06 PROCEDURE — 99441 PR PHYSICIAN TELEPHONE EVALUATION 5-10 MIN: CPT | Mod: 95 | Performed by: INTERNAL MEDICINE

## 2021-01-06 PROCEDURE — 999N001193 HC VIDEO/TELEPHONE VISIT; NO CHARGE

## 2021-01-06 NOTE — LETTER
"1/6/2021       RE: Lucie Stockton  [address] [facility] [address]     Dear Colleague,    Thank you for referring your patient, Lucie Stockton[facility]. Please see a copy of my visit note below.    Lucie Stockton is a 85 year old female who is being evaluated via a billable telephone visit.      What phone number would you like to be contacted at? [phone]  How would you like to obtain your AVS? Mail a copy     ANGELINA Bueno    Phone visit duration: 5 minutes      [facility]  Interventional Pulmonary Clinic Virtual Visit Note    January 6, 2021    Chief complaint:  Lucie Stockton is a 85 year old female seen for   Chief Complaint   Patient presents with     Telephone     LUNG NODULE       Assessment and Plan:  Indeterminate pulmonary nodules, some are stable some are enlarging based on previous CT scan done in January 2020.  She was scheduled to have a CT scan of the chest yesterday however she canceled it due to a memorial and rescheduled it for next Thursday.  I will see her after her CT scan is done.        Aidan Randall MD      History of Present Illness:  85 years old woman former smoker who was seen by me more than a year ago in the clinic when we followed her pulmonary nodules largest measuring 1.2 cm and other smaller ones.  She missed her CT appointment yesterday.            No Known Allergies     Past Medical History:   Diagnosis Date     Anemia      Aortic stenosis     abdominal     Atherosclerosis of aorta (H)     \"extensive disease with near occlusion below level of renal arteries\" on CT     Atherosclerosis of arteries of extremities (H)      Back pain     severe multilevel DJD, moderate spinal canal stenosis L4-5, severe L3-4     Chronic pain     Back, hips, knees, legs     Degenerative joint disease      DVT of lower extremity, bilateral (H)     5/24/10 left distal femoral and great saphenous, " 7/7/10 left:  extending to cfv, iliac , pop and post tibial, peronial, right:  distal fem and peroneal      Grave's disease      Hyperlipidaemia LDL goal < 70      Hypertension, essential      Hypothyroidism      Imbalance      Insomnia      Intermittent claudication (H)      Iron deficiency      Knee pain      Lumbar stenosis with neurogenic claudication      Osteoarthritis     ankle,foot, knee     Osteoporosis      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, 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     Pulmonary embolism (H)     5/24/10 bilateral     Small bowel obstruction (H) 1/23/17     Varicose veins         Past Surgical History:   Procedure Laterality Date     BUNIONECTOMY       C STOMACH SURGERY PROCEDURE UNLISTED      Please see chart     COLONOSCOPY      11/10/10 angioectasia     ENDOBRONCHIAL ULTRASOUND FLEXIBLE N/A 1/24/2020    Procedure: ENDOBRONCHIAL ULTRASOUND, WITH FLEXIBLE BRONCHOSCOPY;  Surgeon: Gopal Jara MD;  Location: [facility] OR     HYSTERECTOMY TOTAL ABDOMINAL, BILATERAL SALPINGO-OOPHORECTOMY, NODE DISSECTION, COMBINED  2/17/11    SANGEETHA, EMILIA, LN bx, omentectomy, resection of evrian malignancy Dr. JONO Goncalves - Returned BENIGN     INJECT EPIDURAL LUMBAR / SACRAL SINGLE N/A 1/5/2018    Procedure: INJECT EPIDURAL LUMBAR / SACRAL SINGLE;  lumbar interlaminar epidural steroid injection;  Surgeon: Jaylin Valadez MD;  Location[facility]     INJECT SACROILIAC JOINT Right 3/7/2018    Procedure: INJECT SACROILIAC JOINT;  Right Sacroiliac Joint Injection;  Surgeon: Flavio Harrison MD;  [facility]     INJECT SACROILIAC JOINT Bilateral 12/7/2018    Procedure: Bilateral Sacroiliac Joint Injections;  Surgeon: Faviola Cosby MD;  Location: [facility]     OPTICAL TRACKING SYSTEM BRONCHOSCOPY N/A 1/24/2020    Procedure: Super D navigational bronchoscopy;  Surgeon: Gopal Jara MD;  Location: [facility] OR     UPPER GI ENDOSCOPY      11/10/10  erythematous gastropathy, angioectasia        Social History     Socioeconomic History     Marital status:      Spouse name: Not on file     Number of children: Not on file     Years of education: Not on file     Highest education level: Not on file   Occupational History     Not on file   Social Needs     Financial resource strain: Not on file     Food insecurity     Worry: Not on file     Inability: Not on file     Transportation needs     Medical: Not on file     Non-medical: Not on file   Tobacco Use     Smoking status: Former Smoker     Packs/day: 0.50     Years: 15.00     Pack years: 7.50     Quit date: 1993     Years since quittin.3     Smokeless tobacco: Never Used   Substance and Sexual Activity     Alcohol use: Yes     Comment: social     Drug use: No     Sexual activity: Not Currently     Partners: Male   Lifestyle     Physical activity     Days per week: Not on file     Minutes per session: Not on file     Stress: Not on file   Relationships     Social connections     Talks on phone: Not on file     Gets together: Not on file     Attends Mandaen service: Not on file     Active member of club or organization: Not on file     Attends meetings of clubs or organizations: Not on file     Relationship status: Not on file     Intimate partner violence     Fear of current or ex partner: Not on file     Emotionally abused: Not on file     Physically abused: Not on file     Forced sexual activity: Not on file   Other Topics Concern     Parent/sibling w/ CABG, MI or angioplasty before 65F 55M? Not Asked   Social History Narrative     for 52 years. Retired from [employer]. Frequent world travel.        Family History   Problem Relation Age of Onset     Breast Cancer Maternal Aunt 80     C.A.D. Father 54     No Known Problems Mother         Immunization History   Administered Date(s) Administered     HEPA 10/06/2003, 2007     Influenza (H1N1) 2010     Influenza (High  Dose) 3 valent vaccine 09/02/2013, 08/30/2015, 09/03/2016, 09/16/2017, 09/29/2018, 09/28/2019     Influenza (IIV3) PF 10/26/2002, 11/05/2004, 10/11/2005, 11/07/2006, 09/05/2010, 09/04/2011, 08/20/2012, 09/02/2013, 09/19/2014     Pneumo Conj 13-V (2010&after) 12/09/2015     Pneumococcal 23 valent 12/31/2003, 02/20/2011     Poliovirus, inactivated (IPV) 10/06/2003     TD (ADULT, 7+) 10/06/2003     TDAP Vaccine (Boostrix) 01/06/2014     Typhoid IM 10/06/2003, 01/19/2007     Zoster vaccine recombinant adjuvanted (SHINGRIX) 08/17/2018, 10/17/2018     Zoster vaccine, live 02/28/2011       Current Outpatient Medications   Medication Sig     acetaminophen (TYLENOL) 325 MG tablet Take 2 tablets every 6 to 8 hours as needed for pain (Patient taking differently: Take 650 mg by mouth as needed Take 2 tablets every 6 to 8 hours as needed for pain)     amLODIPine (NORVASC) 10 MG tablet Take 1 tablet (10 mg) by mouth daily For blood pressure. Please keep appt 12/21/20     apixaban ANTICOAGULANT (ELIQUIS) 5 MG tablet Take 1 tablet (5 mg) by mouth 2 times daily     atorvastatin (LIPITOR) 40 MG tablet Take 2 tablets (80 mg) by mouth daily     benzonatate (TESSALON) 200 MG capsule      betamethasone dipropionate (DIPROSONE) 0.05 % external ointment Apply topically 2 times daily (Patient taking differently: Apply 1 g topically as needed )     Calcium Carbonate-Vitamin D (CALCIUM 600 + D OR) Take 1 tablet by mouth every morning      cholecalciferol (VITAMIN D3) 25 mcg (1000 units) capsule Take 1 capsule by mouth daily     citalopram (CELEXA) 20 MG tablet Take 1 tablet (20 mg) by mouth daily     cyanocolbalamin (VITAMIN  B-12) 500 MCG tablet Take 500 mcg by mouth every morning      gabapentin (NEURONTIN) 100 MG capsule Take 1 to 4 capsules at bedtime as directed.     hydrochlorothiazide (HYDRODIURIL) 50 MG tablet Take 1 tablet (50 mg) by mouth daily Please keep appt 12/21/20     latanoprost (XALATAN) 0.005 % ophthalmic solution Place 1  drop into both eyes At Bedtime      levothyroxine (SYNTHROID/LEVOTHROID) 75 MCG tablet Take 1 tablet (75 mcg) by mouth daily     lisinopril (PRINIVIL/ZESTRIL) 40 MG tablet Take 1 tablet (40 mg) by mouth daily For blood pressure (Patient taking differently: Take 40 mg by mouth every morning For blood pressure)     loperamide (IMODIUM A-D) 2 MG tablet Take 0.5-1 tablets (1-2 mg) by mouth daily as needed for diarrhea     multivitamin (OCUVITE) TABS tablet Take 1 tablet by mouth daily     oxybutynin (DITROPAN) 5 MG tablet Take 1 tablet (5 mg) by mouth At Bedtime For frequent urination     pantoprazole (PROTONIX) 40 MG EC tablet Take 1 tablet (40 mg) by mouth daily Takes in the morning.     Potassium Bicarb-Citric Acid 20 MEQ TBEF Take 20 mg by mouth daily     potassium chloride ER (K-DUR/KLOR-CON M) 20 MEQ CR tablet Take 1 tablet (20 mEq) by mouth daily     predniSONE (DELTASONE) 20 MG tablet Take 20 mg by mouth daily     No current facility-administered medications for this visit.         Review of Systems:  I have done 10 points of review systems and all negative except for those mentioned in HPI    Physical examination  Constitutional: Oriented, not in distress  Vitals: unavailable  Eyes: No icterus, nystagmus, pupils isocoric  Head and neck: normal posture and movements  Respiratory: Normal tidal breathing, no shortness of breath, no audible wheezing or stridor over the phone or video visit  Musculoskeletal: Normal muscle mass, no deformity on hands/fingers  Integumentary:  No rash on visible skin areas on video visit  Neurological: Alert, orientedx3, no motor deficits  Psychiatric:  Mood and affect are appropriate with insight into his/her medical condition    Data:  Lab Results   Component Value Date    WBC 8.1 11/30/2020     Lab Results   Component Value Date    RBC 4.05 11/30/2020     Lab Results   Component Value Date    HGB 10.6 11/30/2020     Lab Results   Component Value Date    HCT 34.3 11/30/2020     Lab  Results   Component Value Date    MCV 85 11/30/2020     Lab Results   Component Value Date    MCH 26.2 11/30/2020     Lab Results   Component Value Date    MCHC 30.9 11/30/2020     Lab Results   Component Value Date    RDW 18.1 11/30/2020     Lab Results   Component Value Date     11/30/2020       Lab Results   Component Value Date     11/30/2020      Lab Results   Component Value Date    POTASSIUM 3.8 11/30/2020     Lab Results   Component Value Date    CHLORIDE 105 11/30/2020     Lab Results   Component Value Date    CANDIDA 9.8 11/30/2020     Lab Results   Component Value Date    CO2 29 11/30/2020     Lab Results   Component Value Date    BUN 25 11/30/2020     Lab Results   Component Value Date    CR 0.62 11/30/2020     Lab Results   Component Value Date    GLC 95 11/30/2020         TIKA Randall MD

## 2021-01-06 NOTE — PROGRESS NOTES
"Lucie Stockton is a 85 year old female who is being evaluated via a billable telephone visit.      What phone number would you like to be contacted at? [phone]  How would you like to obtain your AVS? Mail a copy     ANGELINA Bueno    Phone visit duration: 5 minutes      [facility]  Interventional Pulmonary Clinic Virtual Visit Note    January 6, 2021    Chief complaint:  Lucie Stockton is a 85 year old female seen for   Chief Complaint   Patient presents with     Telephone     LUNG NODULE       Assessment and Plan:  Indeterminate pulmonary nodules, some are stable some are enlarging based on previous CT scan done in January 2020.  She was scheduled to have a CT scan of the chest yesterday however she canceled it due to a memorial and rescheduled it for next Thursday.  I will see her after her CT scan is done.        Aidan Randall MD      History of Present Illness:  85 years old woman former smoker who was seen by me more than a year ago in the clinic when we followed her pulmonary nodules largest measuring 1.2 cm and other smaller ones.  She missed her CT appointment yesterday.            No Known Allergies     Past Medical History:   Diagnosis Date     Anemia      Aortic stenosis     abdominal     Atherosclerosis of aorta (H)     \"extensive disease with near occlusion below level of renal arteries\" on CT     Atherosclerosis of arteries of extremities (H)      Back pain     severe multilevel DJD, moderate spinal canal stenosis L4-5, severe L3-4     Chronic pain     Back, hips, knees, legs     Degenerative joint disease      DVT of lower extremity, bilateral (H)     5/24/10 left distal femoral and great saphenous, 7/7/10 left:  extending to cfv, iliac , pop and post tibial, peronial, right:  distal fem and peroneal      Grave's disease      Hyperlipidaemia LDL goal < 70      Hypertension, essential      Hypothyroidism      Imbalance      Insomnia      Intermittent claudication (H)      Iron deficiency      Knee " pain      Lumbar stenosis with neurogenic claudication      Osteoarthritis     ankle,foot, knee     Osteoporosis      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, 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     Pulmonary embolism (H)     5/24/10 bilateral     Small bowel obstruction (H) 1/23/17     Varicose veins         Past Surgical History:   Procedure Laterality Date     BUNIONECTOMY       C STOMACH SURGERY PROCEDURE UNLISTED      Please see chart     COLONOSCOPY      11/10/10 angioectasia     ENDOBRONCHIAL ULTRASOUND FLEXIBLE N/A 1/24/2020    Procedure: ENDOBRONCHIAL ULTRASOUND, WITH FLEXIBLE BRONCHOSCOPY;  Surgeon: Gopal Jara MD;  Location: [facility] OR     HYSTERECTOMY TOTAL ABDOMINAL, BILATERAL SALPINGO-OOPHORECTOMY, NODE DISSECTION, COMBINED  2/17/11    SANGEETHA, EMILIA, LN bx, omentectomy, resection of evrian malignancy Dr. JONO Goncalves - Returned BENIGN     INJECT EPIDURAL LUMBAR / SACRAL SINGLE N/A 1/5/2018    Procedure: INJECT EPIDURAL LUMBAR / SACRAL SINGLE;  lumbar interlaminar epidural steroid injection;  Surgeon: Jaylin Valadez MD;  [facility]     INJECT SACROILIAC JOINT Right 3/7/2018    Procedure: INJECT SACROILIAC JOINT;  Right Sacroiliac Joint Injection;  Surgeon: Flavio Harrison MD;  [facility]     INJECT SACROILIAC JOINT Bilateral 12/7/2018    Procedure: Bilateral Sacroiliac Joint Injections;  Surgeon: Faviola Cosby MD;  [facility]     OPTICAL TRACKING SYSTEM BRONCHOSCOPY N/A 1/24/2020    Procedure: Super D navigational bronchoscopy;  Surgeon: Gopal Jara MD;  Location: [facility] OR     UPPER GI ENDOSCOPY      11/10/10 erythematous gastropathy, angioectasia        Social History     Socioeconomic History     Marital status:      Spouse name: Not on file     Number of children: Not on file     Years of education: Not on file     Highest education level: Not on file   Occupational History     Not on file   Social Needs      Financial resource strain: Not on file     Food insecurity     Worry: Not on file     Inability: Not on file     Transportation needs     Medical: Not on file     Non-medical: Not on file   Tobacco Use     Smoking status: Former Smoker     Packs/day: 0.50     Years: 15.00     Pack years: 7.50     Quit date: 1993     Years since quittin.3     Smokeless tobacco: Never Used   Substance and Sexual Activity     Alcohol use: Yes     Comment: social     Drug use: No     Sexual activity: Not Currently     Partners: Male   Lifestyle     Physical activity     Days per week: Not on file     Minutes per session: Not on file     Stress: Not on file   Relationships     Social connections     Talks on phone: Not on file     Gets together: Not on file     Attends Rastafari service: Not on file     Active member of club or organization: Not on file     Attends meetings of clubs or organizations: Not on file     Relationship status: Not on file     Intimate partner violence     Fear of current or ex partner: Not on file     Emotionally abused: Not on file     Physically abused: Not on file     Forced sexual activity: Not on file   Other Topics Concern     Parent/sibling w/ CABG, MI or angioplasty before 65F 55M? Not Asked   Social History Narrative     for 52 years. Retired from [employer]. Frequent world travel.        Family History   Problem Relation Age of Onset     Breast Cancer Maternal Aunt 80     C.A.D. Father 54     No Known Problems Mother         Immunization History   Administered Date(s) Administered     HEPA 10/06/2003, 2007     Influenza (H1N1) 2010     Influenza (High Dose) 3 valent vaccine 2013, 2015, 2016, 2017, 2018, 2019     Influenza (IIV3) PF 10/26/2002, 2004, 10/11/2005, 2006, 2010, 2011, 2012, 2013, 2014     Pneumo Conj 13-V (2010&after) 2015     Pneumococcal 23 valent 2003,  02/20/2011     Poliovirus, inactivated (IPV) 10/06/2003     TD (ADULT, 7+) 10/06/2003     TDAP Vaccine (Boostrix) 01/06/2014     Typhoid IM 10/06/2003, 01/19/2007     Zoster vaccine recombinant adjuvanted (SHINGRIX) 08/17/2018, 10/17/2018     Zoster vaccine, live 02/28/2011       Current Outpatient Medications   Medication Sig     acetaminophen (TYLENOL) 325 MG tablet Take 2 tablets every 6 to 8 hours as needed for pain (Patient taking differently: Take 650 mg by mouth as needed Take 2 tablets every 6 to 8 hours as needed for pain)     amLODIPine (NORVASC) 10 MG tablet Take 1 tablet (10 mg) by mouth daily For blood pressure. Please keep appt 12/21/20     apixaban ANTICOAGULANT (ELIQUIS) 5 MG tablet Take 1 tablet (5 mg) by mouth 2 times daily     atorvastatin (LIPITOR) 40 MG tablet Take 2 tablets (80 mg) by mouth daily     benzonatate (TESSALON) 200 MG capsule      betamethasone dipropionate (DIPROSONE) 0.05 % external ointment Apply topically 2 times daily (Patient taking differently: Apply 1 g topically as needed )     Calcium Carbonate-Vitamin D (CALCIUM 600 + D OR) Take 1 tablet by mouth every morning      cholecalciferol (VITAMIN D3) 25 mcg (1000 units) capsule Take 1 capsule by mouth daily     citalopram (CELEXA) 20 MG tablet Take 1 tablet (20 mg) by mouth daily     cyanocolbalamin (VITAMIN  B-12) 500 MCG tablet Take 500 mcg by mouth every morning      gabapentin (NEURONTIN) 100 MG capsule Take 1 to 4 capsules at bedtime as directed.     hydrochlorothiazide (HYDRODIURIL) 50 MG tablet Take 1 tablet (50 mg) by mouth daily Please keep appt 12/21/20     latanoprost (XALATAN) 0.005 % ophthalmic solution Place 1 drop into both eyes At Bedtime      levothyroxine (SYNTHROID/LEVOTHROID) 75 MCG tablet Take 1 tablet (75 mcg) by mouth daily     lisinopril (PRINIVIL/ZESTRIL) 40 MG tablet Take 1 tablet (40 mg) by mouth daily For blood pressure (Patient taking differently: Take 40 mg by mouth every morning For blood pressure)      loperamide (IMODIUM A-D) 2 MG tablet Take 0.5-1 tablets (1-2 mg) by mouth daily as needed for diarrhea     multivitamin (OCUVITE) TABS tablet Take 1 tablet by mouth daily     oxybutynin (DITROPAN) 5 MG tablet Take 1 tablet (5 mg) by mouth At Bedtime For frequent urination     pantoprazole (PROTONIX) 40 MG EC tablet Take 1 tablet (40 mg) by mouth daily Takes in the morning.     Potassium Bicarb-Citric Acid 20 MEQ TBEF Take 20 mg by mouth daily     potassium chloride ER (K-DUR/KLOR-CON M) 20 MEQ CR tablet Take 1 tablet (20 mEq) by mouth daily     predniSONE (DELTASONE) 20 MG tablet Take 20 mg by mouth daily     No current facility-administered medications for this visit.         Review of Systems:  I have done 10 points of review systems and all negative except for those mentioned in HPI    Physical examination  Constitutional: Oriented, not in distress  Vitals: unavailable  Eyes: No icterus, nystagmus, pupils isocoric  Head and neck: normal posture and movements  Respiratory: Normal tidal breathing, no shortness of breath, no audible wheezing or stridor over the phone or video visit  Musculoskeletal: Normal muscle mass, no deformity on hands/fingers  Integumentary:  No rash on visible skin areas on video visit  Neurological: Alert, orientedx3, no motor deficits  Psychiatric:  Mood and affect are appropriate with insight into his/her medical condition    Data:  Lab Results   Component Value Date    WBC 8.1 11/30/2020     Lab Results   Component Value Date    RBC 4.05 11/30/2020     Lab Results   Component Value Date    HGB 10.6 11/30/2020     Lab Results   Component Value Date    HCT 34.3 11/30/2020     Lab Results   Component Value Date    MCV 85 11/30/2020     Lab Results   Component Value Date    MCH 26.2 11/30/2020     Lab Results   Component Value Date    MCHC 30.9 11/30/2020     Lab Results   Component Value Date    RDW 18.1 11/30/2020     Lab Results   Component Value Date     11/30/2020       Lab  Results   Component Value Date     11/30/2020      Lab Results   Component Value Date    POTASSIUM 3.8 11/30/2020     Lab Results   Component Value Date    CHLORIDE 105 11/30/2020     Lab Results   Component Value Date    CANDIDA 9.8 11/30/2020     Lab Results   Component Value Date    CO2 29 11/30/2020     Lab Results   Component Value Date    BUN 25 11/30/2020     Lab Results   Component Value Date    CR 0.62 11/30/2020     Lab Results   Component Value Date    GLC 95 11/30/2020         TIKA Randall MD

## 2021-01-18 ENCOUNTER — TELEPHONE (OUTPATIENT)
Dept: CARDIOLOGY | Facility: CLINIC | Age: 86
End: 2021-01-18

## 2021-01-18 NOTE — TELEPHONE ENCOUNTER
Spoke with patient who will be coming back next week and will be able to complete testing after that. Staff message sent to Ariadna to follow up next week. Ceci Treviño RN on 1/18/2021 at 12:47 PM    Patient rescheduled for 2/4 and VV follow up with Dr. Florian on 2/10 at 8:30 AM. Ceci Treviño RN on 1/27/2021 at 9:18 AM    ----- Message from Marla Raphael RN sent at 1/18/2021 11:51 AM CST -----  FYI - received a message from Cath Lab team - she said she's heading out of town and needs to reschedule. I let Ny Ackerman our cath lab  know, but I did see she had other appts so just wanted to pass along    Marla Raphael RN BSN PHN

## 2021-01-18 NOTE — TELEPHONE ENCOUNTER
called patient to complete Travel Screen for Cardiac Cath Lab appointment on 1/19 and inform patient of updated Visitor Policy. Pt states she did not know about arrival time and is going out of town. Will contact scheduling to pick a new date per pt request.

## 2021-01-27 ENCOUNTER — HOSPITAL ENCOUNTER (OUTPATIENT)
Facility: CLINIC | Age: 86
End: 2021-01-27
Attending: INTERNAL MEDICINE | Admitting: INTERNAL MEDICINE
Payer: MEDICARE

## 2021-01-27 DIAGNOSIS — I27.20 PULMONARY HYPERTENSION (H): ICD-10-CM

## 2021-01-27 DIAGNOSIS — R06.02 SOB (SHORTNESS OF BREATH): ICD-10-CM

## 2021-01-27 DIAGNOSIS — I27.20 PULMONARY HYPERTENSION (H): Primary | ICD-10-CM

## 2021-01-27 DIAGNOSIS — Z11.59 ENCOUNTER FOR SCREENING FOR OTHER VIRAL DISEASES: Primary | ICD-10-CM

## 2021-01-27 RX ORDER — LIDOCAINE 40 MG/G
CREAM TOPICAL
Status: CANCELLED | OUTPATIENT
Start: 2021-01-27

## 2021-01-27 RX ORDER — SODIUM CHLORIDE 9 MG/ML
INJECTION, SOLUTION INTRAVENOUS CONTINUOUS
Status: CANCELLED | OUTPATIENT
Start: 2021-01-27

## 2021-01-27 RX ORDER — POTASSIUM CHLORIDE 1500 MG/1
40 TABLET, EXTENDED RELEASE ORAL
Status: CANCELLED | OUTPATIENT
Start: 2021-01-27

## 2021-01-27 RX ORDER — POTASSIUM CHLORIDE 1500 MG/1
20 TABLET, EXTENDED RELEASE ORAL
Status: CANCELLED | OUTPATIENT
Start: 2021-01-27

## 2021-02-01 DIAGNOSIS — Z11.59 ENCOUNTER FOR SCREENING FOR OTHER VIRAL DISEASES: ICD-10-CM

## 2021-02-01 LAB
SARS-COV-2 RNA RESP QL NAA+PROBE: NORMAL
SPECIMEN SOURCE: NORMAL

## 2021-02-01 PROCEDURE — U0003 INFECTIOUS AGENT DETECTION BY NUCLEIC ACID (DNA OR RNA); SEVERE ACUTE RESPIRATORY SYNDROME CORONAVIRUS 2 (SARS-COV-2) (CORONAVIRUS DISEASE [COVID-19]), AMPLIFIED PROBE TECHNIQUE, MAKING USE OF HIGH THROUGHPUT TECHNOLOGIES AS DESCRIBED BY CMS-2020-01-R: HCPCS | Performed by: INTERNAL MEDICINE

## 2021-02-01 PROCEDURE — U0005 INFEC AGEN DETEC AMPLI PROBE: HCPCS | Performed by: INTERNAL MEDICINE

## 2021-02-02 ENCOUNTER — IMMUNIZATION (OUTPATIENT)
Dept: PEDIATRICS | Facility: CLINIC | Age: 86
End: 2021-02-02
Payer: MEDICARE

## 2021-02-02 LAB
LABORATORY COMMENT REPORT: NORMAL
SARS-COV-2 RNA RESP QL NAA+PROBE: NEGATIVE
SPECIMEN SOURCE: NORMAL

## 2021-02-02 PROCEDURE — 91300 PR COVID VAC PFIZER DIL RECON 30 MCG/0.3 ML IM: CPT

## 2021-02-02 PROCEDURE — 0001A PR COVID VAC PFIZER DIL RECON 30 MCG/0.3 ML IM: CPT

## 2021-02-03 ENCOUNTER — TELEPHONE (OUTPATIENT)
Dept: CARDIOLOGY | Facility: CLINIC | Age: 86
End: 2021-02-03

## 2021-02-04 ENCOUNTER — TELEPHONE (OUTPATIENT)
Dept: CARDIOLOGY | Facility: CLINIC | Age: 86
End: 2021-02-04

## 2021-02-04 NOTE — TELEPHONE ENCOUNTER
Contacted pt and rescheduled appointment for February 26, 2021. Informed pt that she will need to get Covid tested again 3-4 days before the date of the procedure and provided pt scheduling line phone number.    No further follow-up completed and encounter closed.    Ariadna Mckeon  Clinic   Pulmonary Hypertension  [facility]  (P) [phone]

## 2021-02-04 NOTE — TELEPHONE ENCOUNTER
[facility] Call Center    Phone Message    May a detailed message be left on voicemail: yes     Reason for Call: Other: Pt would like a call back to reschedule her procedure for today as the roads are bad and she would like a cll back asap     Action Taken: Message routed to:  [facility] Cardio    Travel Screening: Not Applicable

## 2021-02-13 DIAGNOSIS — Z11.59 ENCOUNTER FOR SCREENING FOR OTHER VIRAL DISEASES: Primary | ICD-10-CM

## 2021-02-15 ENCOUNTER — VIRTUAL VISIT (OUTPATIENT)
Dept: INTERNAL MEDICINE | Facility: CLINIC | Age: 86
End: 2021-02-15
Payer: MEDICARE

## 2021-02-15 DIAGNOSIS — R91.8 PULMONARY NODULES: ICD-10-CM

## 2021-02-15 DIAGNOSIS — F32.1 MODERATE MAJOR DEPRESSION (H): ICD-10-CM

## 2021-02-15 DIAGNOSIS — R53.83 FATIGUE, UNSPECIFIED TYPE: ICD-10-CM

## 2021-02-15 DIAGNOSIS — G47.00 INSOMNIA, UNSPECIFIED TYPE: Primary | ICD-10-CM

## 2021-02-15 DIAGNOSIS — M48.062 SPINAL STENOSIS OF LUMBAR REGION WITH NEUROGENIC CLAUDICATION: ICD-10-CM

## 2021-02-15 DIAGNOSIS — D63.8 ANEMIA IN OTHER CHRONIC DISEASES CLASSIFIED ELSEWHERE: ICD-10-CM

## 2021-02-15 DIAGNOSIS — R63.0 DECREASED APPETITE: ICD-10-CM

## 2021-02-15 DIAGNOSIS — M54.10 BILATERAL RADIATING LEG PAIN: ICD-10-CM

## 2021-02-15 DIAGNOSIS — F41.9 ANXIETY: ICD-10-CM

## 2021-02-15 PROCEDURE — 99443 PR PHYSICIAN TELEPHONE EVALUATION 21-30 MIN: CPT | Mod: 95 | Performed by: INTERNAL MEDICINE

## 2021-02-15 RX ORDER — GABAPENTIN 100 MG/1
CAPSULE ORAL
Qty: 180 CAPSULE | Refills: 5 | Status: SHIPPED | OUTPATIENT
Start: 2021-02-15 | End: 2021-01-01

## 2021-02-15 ASSESSMENT — ANXIETY QUESTIONNAIRES
1. FEELING NERVOUS, ANXIOUS, OR ON EDGE: SEVERAL DAYS
GAD7 TOTAL SCORE: 8
5. BEING SO RESTLESS THAT IT IS HARD TO SIT STILL: NOT AT ALL
2. NOT BEING ABLE TO STOP OR CONTROL WORRYING: NOT AT ALL
IF YOU CHECKED OFF ANY PROBLEMS ON THIS QUESTIONNAIRE, HOW DIFFICULT HAVE THESE PROBLEMS MADE IT FOR YOU TO DO YOUR WORK, TAKE CARE OF THINGS AT HOME, OR GET ALONG WITH OTHER PEOPLE: VERY DIFFICULT
7. FEELING AFRAID AS IF SOMETHING AWFUL MIGHT HAPPEN: NOT AT ALL
3. WORRYING TOO MUCH ABOUT DIFFERENT THINGS: NEARLY EVERY DAY
6. BECOMING EASILY ANNOYED OR IRRITABLE: MORE THAN HALF THE DAYS

## 2021-02-15 ASSESSMENT — PATIENT HEALTH QUESTIONNAIRE - PHQ9
SUM OF ALL RESPONSES TO PHQ QUESTIONS 1-9: 10
5. POOR APPETITE OR OVEREATING: MORE THAN HALF THE DAYS

## 2021-02-15 NOTE — PROGRESS NOTES
"This patient is being evaluated via a billable telephone visit; THIS VISIT WAS INITIATED BY THE PT, as AN ALTERNATIVE TO IN PERSON VISIT .       The patient has has been notified of following:      \"This billable telephone visit will be conducted via a call between you and your physician/provider. We have found that certain health care needs can be provided without the need for a physical exam.  This service lets us provide the care you need with a short phone conversation.  If a prescription is necessary we can send it directly to your pharmacy.  If lab work is needed we can place an order for that and you can then stop by our lab to have the test done at a later time. We can also place orders for a limited number of imaging tests if they are deemed urgently necessary.     If during the course of the call the physician/provider feels a telephone visit is not appropriate, you will not be charged for this service.\"     Due to efforts to reduce the spread of COVID-19 in the clinic, state, nation, telephone visits are encouraged currently. Patient understands that diagnose and advice is limited by the inability to exam him/her/them face-to-face.    Patient has given verbal consent for the virtual audio visit? Yes  Did patient initiate this virtual visit? Yes    Person spoken to: patient    This was a synchronous virtual visit  Time call initiated:  1:00 pm  Time call ended:  1:30  Total length of call: 30 min    Marii Montgomery M.D.  Internal [facility]  [facility]       Patients: if you have questions or concerns about this progress note, please discuss them with the provider at a future office visit.      "

## 2021-02-15 NOTE — PROGRESS NOTES
Pre charting:  Date of pre chartin/15/21  10:26 a.m.-10:41 a.m.  Total time: 15 min  This effort is essential to preparing for upcoming service directly affecting ongoing primary care management and coordination of care for this patient for the same day office visit.  Person performing pre charting work:  Dr. Montgomery  This non face-to-face clinical work included review/documentation of medical history, notes and test results outside our system (e.g.. CareEverywhere, paper copies), reviewing which specialists are also following the patient, updating problem list, reviewing medications, need for refills, vital sign trends,  flow sheets such as PHQ-9 and KATHARINE-7 questionnaires, hospital discharge summaries, routine health care maintenance, specialist notes, laboratory, procedures, imaging, etc.    Virtual visit    CC:  Insomnia    Other team members:  Cardiology Dr. Florian  Thoracic surgery Dr. Quiñonez  Vascular NP L Myles  Pulmonary Dr. Randall  Pulmonary nodule NP G Fernie  Neurology NP JONO Beauchamp  Dermatology LUCY Lu  Dermatology   Pain Management CNP VU Brown  Psychology [facility] Danna  Sports Medicine:  Dr. Huynh, Dr. Denney  Ortho:  Dr. Zarco     HPI:  Lucie is a 85-year-old female with a history of   Abdominal aortic stenosis,   Peripheral arterial disease  On anticoagulation  Bilateral DVT and PE in ,   Chronic thromboembolic pulmonary hypertension,   COPD,    Hypertension,   Hyperlipidemia,   Hypothyroidism,   Gastric and colonic angioectasia,   Chronic anemia.     Patient is a previous smoker, she quit .  She has approximate 40-pack-year history of cigarette smoking,   Pulmonary nodules  Chronic low back pain, MRI 16: multilevel degenerative changes, severe spinal canal stenosis at L4-5 at least since , right neural foraminal stenosis at L2-3, additional multilevel moderate neural foraminal stenosis  Chronic hip pain, OA  Left ankle pain    History of chronic insomnia was  "addressed in detail on 12/21/20, see my progress note.  She had run out of citalopram and gabapentin at that time. She had also been losing weight.     Unfortunately she had to cancel a CT chest in January for f/u enlarging pulmonary nodules. She has a pulmonary angiogram, right heart cath scheduled for 2/26/21 and appt with Dr. Florian 3/9/21. Colonoscopy and EGD 2010 angioectasias, no masses/polyps. Has chronic anemia. Mammogram  2012 negative. TSH normal 2020.     Currently  Tylenol, gabapentin, citalopram and Eliquis.      Reports ongoing general lack of \"pep\".  Restarting the citalopram and gabapentin helped a little.  On questioning, she reports decreased appetite, basically food is not apealing, does get full easily, chronically and on occasion her food gets stuck when swallowing solids (coughs, drinks water and resolves), no odyophagia, takes her time eating and drinking, chews food throughly, no black or blood stools, 10 days ago had a little vomit/unclear provocation. Otherwise no nausea or vomiting.   Eating prepared meals from [employer], chicken. Drinks tea, water, decaf coffee.  Her  is encouraging her to eat.  Wt is down compared to baseline, but holding steady at around 100 pounds.  No other acute concerns.  PHQ9 and GAD7 were reviewed today:    PHQ-9 ([employer]) 2/15/2021   No Interest In Doing Things    Feeling Depressed    Trouble Sleeping    Tired / No Energy    No appetite or Over-Eating    Feeling Bad about Self    Trouble Concentrating    Moving Slow or Restless    Suicidal Thoughts    Total Score    1.  Little interest or pleasure in doing things 1   2.  Feeling down, depressed, or hopeless 3   3.  Trouble falling or staying asleep, or sleeping too much 0   4.  Feeling tired or having little energy 3   5.  Poor appetite or overeating 3   6.  Feeling bad about yourself 0   7.  Trouble concentrating 0   8.  Moving slowly or restless 0   9.  Suicidal or self-harm thoughts 0   PHQ-9 Total Score " 10   Difficulty at work, home, or with people Very difficult     KATHARINE-7   [employer], 2002; Used with Permission) 2/15/2021   1. Feeling nervous, anxious, or on edge    2. Not being able to stop or control worrying    3. Worrying too much about different things    4. Trouble relaxing    5. Being so restless that it is hard to sit still    6. Becoming easily annoyed or irritable    7. Feeling afraid, as if something awful might happen    KATHARINE 7 TOTAL SCORE    1. Feeling nervous, anxious, or on edge 1   2. Not being able to stop or control worrying 0   3. Worrying too much about different things 3   4. Trouble relaxing 2   5. Being so restless that it is hard to sit still 0   6. Becoming easily annoyed or irritable 2   7. Feeling afraid, as if something awful might happen 0   KATHARINE-7 Total Score 8   If you checked any problems, how difficult have they made it for you to do your work, take care of things at home, or get along with other people? Very difficult       Current Outpatient Medications   Medication Sig Dispense Refill     acetaminophen (TYLENOL) 325 MG tablet Take 2 tablets every 6 to 8 hours as needed for pain (Patient taking differently: Take 650 mg by mouth as needed Take 2 tablets every 6 to 8 hours as needed for pain) 100 tablet 1     amLODIPine (NORVASC) 10 MG tablet Take 1 tablet (10 mg) by mouth daily For blood pressure. Please keep appt 12/21/20 90 tablet 3     apixaban ANTICOAGULANT (ELIQUIS) 5 MG tablet Take 1 tablet (5 mg) by mouth 2 times daily 60 tablet 11     atorvastatin (LIPITOR) 40 MG tablet Take 2 tablets (80 mg) by mouth daily 180 tablet 3     benzonatate (TESSALON) 200 MG capsule        betamethasone dipropionate (DIPROSONE) 0.05 % external ointment Apply topically 2 times daily (Patient taking differently: Apply 1 g topically as needed ) 15 g 0     Calcium Carbonate-Vitamin D (CALCIUM 600 + D OR) Take 1 tablet by mouth every morning        cholecalciferol (VITAMIN D3) 25 mcg (1000 units)  capsule Take 1 capsule by mouth daily       citalopram (CELEXA) 20 MG tablet Take 1 tablet (20 mg) by mouth daily 90 tablet 3     cyanocolbalamin (VITAMIN  B-12) 500 MCG tablet Take 500 mcg by mouth every morning  90 tablet 1     gabapentin (NEURONTIN) 100 MG capsule Take 1 to 4 capsules at bedtime as directed. 180 capsule 3     hydrochlorothiazide (HYDRODIURIL) 50 MG tablet Take 1 tablet (50 mg) by mouth daily Please keep appt 12/21/20 90 tablet 0     latanoprost (XALATAN) 0.005 % ophthalmic solution Place 1 drop into both eyes At Bedtime   1     levothyroxine (SYNTHROID/LEVOTHROID) 75 MCG tablet Take 1 tablet (75 mcg) by mouth daily 90 tablet 3     lisinopril (PRINIVIL/ZESTRIL) 40 MG tablet Take 1 tablet (40 mg) by mouth daily For blood pressure (Patient taking differently: Take 40 mg by mouth every morning For blood pressure) 90 tablet 0     loperamide (IMODIUM A-D) 2 MG tablet Take 0.5-1 tablets (1-2 mg) by mouth daily as needed for diarrhea 30 tablet 0     multivitamin (OCUVITE) TABS tablet Take 1 tablet by mouth daily       oxybutynin (DITROPAN) 5 MG tablet Take 1 tablet (5 mg) by mouth At Bedtime For frequent urination 90 tablet 0     pantoprazole (PROTONIX) 40 MG EC tablet Take 1 tablet (40 mg) by mouth daily Takes in the morning. 90 tablet 3     Potassium Bicarb-Citric Acid 20 MEQ TBEF Take 20 mg by mouth daily 90 tablet 3     potassium chloride ER (K-DUR/KLOR-CON M) 20 MEQ CR tablet Take 1 tablet (20 mEq) by mouth daily 90 tablet 0     predniSONE (DELTASONE) 20 MG tablet Take 20 mg by mouth daily       No Known Allergies    BP Readings from Last 6 Encounters:   01/30/20 (!) 145/63   01/25/20 129/61   01/14/20 121/71   01/14/20 132/69   01/07/20 (!) 150/62   12/05/19 138/74     Wt Readings from Last 5 Encounters:   01/30/20 44.6 kg (98 lb 6.4 oz)   01/24/20 46.6 kg (102 lb 11.8 oz)   01/14/20 47.6 kg (105 lb)   01/14/20 47.2 kg (104 lb 1.6 oz)   01/07/20 49 kg (108 lb)     There is no height or weight on  file to calculate BMI.  States wt. 100 currently at home  Gen:  Alert, actively engaged in conversation. Breathing comfortably, No cough or wheezing.    Lucie was seen today for recheck.    Diagnoses and all orders for this visit:    Insomnia, unspecified type    Anemia in other chronic diseases classified elsewhere  -     Iron and iron binding capacity; Future  -     Ferritin; Future  -     Vitamin B12; Future  -     Folate; Future  -     Iron and iron binding capacity; Future    Anxiety    Moderate major depression (H)    Decreased appetite    Fatigue, unspecified type    Pulmonary nodules  Schedule CT prior to dr. Randall's visit    Spinal stenosis of lumbar region with neurogenic claudication  Increase gabapentin to 100 mg a.m., 100 mg mid day and continue 300-400 mg at hs.    F/U 3 months.    Marii Montgomery M.D.  Internal Medicine  [facility]     Patients: if you have questions or concerns about this progress note, please discuss them with the provider at a future office visit.

## 2021-02-15 NOTE — PATIENT INSTRUCTIONS
"Patient Education     Tips for Sleep Hygiene  \"Sleep hygiene\" means having good sleep habits.Follow these tips to sleep better at night:     Get on a schedule. Go to bed and get up at about the same time every day.    Listen to your body. Only try to sleep when you actually feel tired or sleepy.    Be patient. If you haven't been able to get to sleep after about 30 minutes or more, get up and do something calming or boring until you feel sleepy. Then return to bed and try again.    Don't have caffeine (coffee, tea, cola drinks, chocolate and some medicines), alcohol or nicotine (cigarettes). These can make it harder for you to fall asleep and stay asleep.    Use your bed for sleeping only. That means no TV, computer or homework in bed, especially during the evening and before bedtime.    Don't nap during the day. If you must nap, make sure it is for less than 20 minutes.    Create sleep rituals that remind your body it is time to sleep. Examples include breathing exercises, stretching or reading a book.    Avoid all electronic media (smart phone, computer, tablet) within 2 hours of bed time. The \"blue light\" in these devices activates the part of the brain that keeps you awake.    Dim the lights at night.    Get early morning sources of light (walk in the sunshine) to help set sleep patterns at night.    Try a bath or shower before bed. Having a warm bath 1 to 2 hours before bedtime can help you feel sleepy. Hot baths can make you alert, so be mindful of the temperature.    Don't watch the clock. Checking the clock during the night can wake you up. It can also lead to negative thoughts such as, \"I will never fall asleep,\" which can increase anxiety and sleeplessness.    Use a sleep diary. Track your sleep schedule to know your sleep patterns and to see where you can improve.    Get regular exercise every day. Try not to do heavy exercise in the 4 hours before bedtime.    Eat a healthy, balanced diet.    Try eating a " light, healthy snack before bed, but avoid eating a heavy meal.    Create the right sleeping area. A cool, dark, quiet room is best. If needed, try earplugs, fans and blackout curtains.    Keep your daytime routine the same even if you have a bad night sleep. Avoiding activities the next day can make it harder to sleep.  For informational purposes only. Not to replace the advice of your health care provider.   Copyright   2013 [facility]. All rights reserved. [place] [id number] - 01/16.

## 2021-02-15 NOTE — Clinical Note
Missed CT chest appointment.  Needs to reschedule prior to Dr. Randall's upcoming appt. Schedule labs at same time as CT.  See me in 3 months.  SB

## 2021-02-16 ENCOUNTER — TELEPHONE (OUTPATIENT)
Dept: INTERNAL MEDICINE | Facility: CLINIC | Age: 86
End: 2021-02-16

## 2021-02-16 ENCOUNTER — MYC MEDICAL ADVICE (OUTPATIENT)
Dept: INTERNAL MEDICINE | Facility: CLINIC | Age: 86
End: 2021-02-16

## 2021-02-16 ENCOUNTER — OFFICE VISIT (OUTPATIENT)
Dept: FAMILY MEDICINE | Facility: CLINIC | Age: 86
End: 2021-02-16
Payer: MEDICARE

## 2021-02-16 VITALS
OXYGEN SATURATION: 92 % | DIASTOLIC BLOOD PRESSURE: 52 MMHG | TEMPERATURE: 98 F | WEIGHT: 98 LBS | SYSTOLIC BLOOD PRESSURE: 108 MMHG | HEART RATE: 59 BPM | RESPIRATION RATE: 15 BRPM | BODY MASS INDEX: 20.48 KG/M2

## 2021-02-16 DIAGNOSIS — J44.9 CHRONIC OBSTRUCTIVE PULMONARY DISEASE, UNSPECIFIED COPD TYPE (H): ICD-10-CM

## 2021-02-16 DIAGNOSIS — R42 LIGHTHEADEDNESS: ICD-10-CM

## 2021-02-16 DIAGNOSIS — D75.839 THROMBOCYTOSIS: ICD-10-CM

## 2021-02-16 DIAGNOSIS — R51.9 ACUTE NONINTRACTABLE HEADACHE, UNSPECIFIED HEADACHE TYPE: Primary | ICD-10-CM

## 2021-02-16 DIAGNOSIS — M54.41 ACUTE RIGHT-SIDED LOW BACK PAIN WITH RIGHT-SIDED SCIATICA: ICD-10-CM

## 2021-02-16 DIAGNOSIS — I26.99 PULMONARY EMBOLISM, UNSPECIFIED CHRONICITY, UNSPECIFIED PULMONARY EMBOLISM TYPE, UNSPECIFIED WHETHER ACUTE COR PULMONALE PRESENT (H): ICD-10-CM

## 2021-02-16 DIAGNOSIS — D63.8 ANEMIA IN OTHER CHRONIC DISEASES CLASSIFIED ELSEWHERE: ICD-10-CM

## 2021-02-16 PROBLEM — J96.01 ACUTE RESPIRATORY FAILURE WITH HYPOXIA (H): Status: ACTIVE | Noted: 2021-02-16

## 2021-02-16 LAB
FERRITIN SERPL-MCNC: 8 NG/ML (ref 8–252)
FOLATE SERPL-MCNC: 26.4 NG/ML
IRON SATN MFR SERPL: 2 % (ref 15–46)
IRON SERPL-MCNC: 9 UG/DL (ref 35–180)
TIBC SERPL-MCNC: 454 UG/DL (ref 240–430)
VIT B12 SERPL-MCNC: 479 PG/ML (ref 193–986)

## 2021-02-16 PROCEDURE — 83540 ASSAY OF IRON: CPT | Performed by: INTERNAL MEDICINE

## 2021-02-16 PROCEDURE — 36415 COLL VENOUS BLD VENIPUNCTURE: CPT | Performed by: INTERNAL MEDICINE

## 2021-02-16 PROCEDURE — 82746 ASSAY OF FOLIC ACID SERUM: CPT | Performed by: INTERNAL MEDICINE

## 2021-02-16 PROCEDURE — 83550 IRON BINDING TEST: CPT | Performed by: INTERNAL MEDICINE

## 2021-02-16 PROCEDURE — 99214 OFFICE O/P EST MOD 30 MIN: CPT | Performed by: NURSE PRACTITIONER

## 2021-02-16 PROCEDURE — 82728 ASSAY OF FERRITIN: CPT | Performed by: INTERNAL MEDICINE

## 2021-02-16 PROCEDURE — 82607 VITAMIN B-12: CPT | Performed by: INTERNAL MEDICINE

## 2021-02-16 RX ORDER — SENNOSIDES 8.6 MG
650 CAPSULE ORAL EVERY 8 HOURS PRN
Qty: 30 TABLET | Refills: 3 | Status: CANCELLED | OUTPATIENT
Start: 2021-02-16

## 2021-02-16 ASSESSMENT — ANXIETY QUESTIONNAIRES: GAD7 TOTAL SCORE: 8

## 2021-02-16 NOTE — TELEPHONE ENCOUNTER
Left voice message for patient to call [facility] to schedule 3 months follow up with Dr Montgomery per virtual vsiit 2/15/21. Also to schedule missed CT chest appointment prior to Dr. Randall's upcoming appt also missed in January. Schedule Dr Montgomery's labs at same time as CT that was order by Dr Randall clinic.  No answered. Give [facility] and [facility] to schedule.

## 2021-02-16 NOTE — TELEPHONE ENCOUNTER
[facility] Call Center    Phone Message    May a detailed message be left on voicemail: yes     Reason for Call: Pt calling to let Dr Saraviaman know that she is feeling worse than she did yesterday when she talked to her yesterday. She would like to see her again if that is possible. She did mention that she was very dizzy before we were disconnected. Writer did try number and it was busy. Please call her back to discuss her symptoms, as she did not sound very good. Thank you    Action Taken: Message routed to:  [facility]    Travel Screening: Not Applicable                       Pt states she is foggy, unbalanced. No nausea or vomiting. Headache this morning better now. Increase Gabapentin and it didn't make it any different. Drinking fluids. Was seen by a NP today. Appt with Dr Romo 02/18/221. If pt become symptotic or worse she advised to go to the ER.  Kristal Jurado RN 2:41 PM on 2/16/2021.

## 2021-02-16 NOTE — PATIENT INSTRUCTIONS
"Per your MyChart this morning: Dr Montgomery would like you to schedule a follow up in 3 months. Also to schedule the chest CT (that was order by Dr Randall) and follow up with Dr Randall in the [facility].      To schedule the chest CT, please call our Radiology Department at [phone]. For lab only appointment, please call [phone].     To schedule with Dr Randall, please call the [facility] at [phone].          Patient Education     Self-Care for Headaches  Most headaches aren't serious and can be relieved with self-care. But some headaches may be a sign of another health problem like eye trouble or high blood pressure. To find the best treatment, learn what kind of headaches you get. For tension headaches, self-care will usually help. To treat migraines, ask your healthcare provider for advice. It's also possible to get both tension and migraine headaches. Self-care involves relieving the pain and avoiding headache \"triggers\" if you can.     Ways to reduce pain and tension  Try these steps:    Apply a cold compress or ice pack to the pain site.    Drink fluids. If nausea makes it hard to drink, try sucking on ice.    Rest. Protect yourself from bright light and loud noises.    Calm your emotions by imagining a peaceful scene.    Massage tight neck, shoulder, and head muscles.    To relax muscles, soak in a hot bath or use a hot shower.  Use medicines  Aspirin or other over-the-counter (OTC) pain medicines such as ibuprofen and acetaminophen can relieve headache. Remember: Never give aspirin to anyone 18 years old or younger because of the risk of getting Reye syndrome. Use pain medicines only when needed. Certain prescription and OTC medicines can lead to rebound headaches if they are taken too often. Check with your healthcare provider or pharmacist about your medicines.   Track your headaches  Keeping a headache diary can help you and your healthcare provider identify " "what's causing your headaches:     Note when each headache happens.    Identify your activities and the foods you've eaten 6 to 8 hours before the headache began.    Look for any trends or \"triggers.\"    Bring the information to your next medical appointment.  Signs of tension headache  Any of the following can be signs:     Dull pain or feeling of pressure in a tight band around your head    Pain in your neck or shoulders    Headache without a definite beginning or end    Headache after an activity such as driving or working on a computer  Signs of migraine  Any of the following can be signs:     Throbbing pain on one or both sides of your head    Nausea or vomiting    Extreme sensitivity to light, sound, and smells    Bright spots, flashes, or other visual changes    Pain or nausea so severe that you can't continue your daily activities  When to call your healthcare provider   Call your healthcare provider if any of these occur:     A headache that lingers after a recent injury or bump to the head    A headache that lasts for more than 3 days    Frequent headaches, especially in the morning  Get medical care right away if you have:    A headache along with slurred speech, changes in your vision, or numbness or weakness in your arms or legs    Headaches with seizures    A fever with a stiff neck or pain when you bend your head toward your chest    A headache that you would call \"the worst headache you have ever had\" or a severe, persistent headache that gets worse or doesn't get better with treatment  Sheila last reviewed this educational content on 7/1/2020 2000-2020 [employer]. [address] [place] [address], [place] [address]. All rights reserved. This information is not intended as a substitute for professional medical care. Always follow your healthcare professional's instructions.           "

## 2021-02-16 NOTE — PROGRESS NOTES
"    Assessment & Plan     Acute nonintractable headache, unspecified headache type  No red flags for CVA or acute neurological process today. Likely a tension headache, potentially associated with some bilateral cervical radiculopathy symptoms.  Recommend Tylenol for pain management and heat and/or gentle massage to shoulders. Discussed red flag symptoms that should prompt an ED visit including unilateral weakness, vision changes, confusion, speech changes, \"worst headache of your life\", or thunderclap feeling to the headache.    Pulmonary embolism, unspecified chronicity, unspecified pulmonary embolism type, unspecified whether acute cor pulmonale present (H)  Encouraged follow-up with CT that was recommended by her primary.  Included referral information on today's AVS.    Chronic obstructive pulmonary disease, unspecified COPD type (H)  Managed by pulmonology.  Encouraged she schedule recommended follow-up with Dr. Randall.  Contact information included on AVS.    Thrombocytosis (H)  Will release the recommended labs that were ordered by her primary physician so she can have them drawn today.    Lightheadedness  Likely orthostatic, as it was associated with a position change.  Discussed adequate hydration and taking time to let the body adjust when changing positions.    Acute right-sided low back pain with right-sided sciatica  Counseled on self-care measures including: ice/heat, Tylenol, frequent short walks, comfortable positions, ; and warning signs of when to seek urgent medical care including: sudden change in bowel or bladder, fever, new numbness/tingling/weakness, worsening symptoms.  She has done physical therapy in the past without improvement, and declines further intervention with PT at this time.    Return in about 3 months (around 5/16/2021), or if symptoms worsen or fail to improve, for with PCP.     Yanely Ramos, DNP/FNP Student, [facility]    I very much appreciated the opportunity to " see and assess this patient in the clinic with NP student.  I agree with the above note which summarizes my findings and current recommendations. I have reviewed all diagnostics noted and performed physical exam. Changes were made in the body of the note to achieve one comprehensive document.    ALEKSEY Saucedo CNP  [facility] SEKOU Faulkner is a 85 year old who presents for the following health issues  accompanied by her spouse:    HPI     Chief Complaint   Patient presents with     Headache     Breathing Problem     Pain     hip and knee pain ( right)     Headache  Onset/Duration: This morning  Description  Location: unilateral in the  frontal area   Character: dull pain  Frequency:  n/a  Duration:  Still there, but improving  Intensity:  8/10 this morning, currently 4/10  Progression of Symptoms:  improving  Accompanying signs and symptoms:  Stiff neck: no  Neck or upper back pain: no  Sinus or URI symptoms no   Fever: no  Nausea or vomiting: no  Dizziness: YES  Numbness/tingling: YES - both hands, present over the past week; just the 2nd and 3rd fingers of both hands  Weakness: no  Visual changes: none  History  Head trauma: no  Family history of migraines: no  Daily pain medication use: YES - gabapentin  Previous tests for headaches: no  Neurologist evaluation: no  Precipitating or Alleviating factors (light/sound/sleep/caffeine): No  Therapies tried and outcome: none             She has been having intermittent frontal headaches for the past week.  She describes these as a dull pain.  She has numbness/tingling in her thumb, first and second digit bilaterally which also started during this time.  She has been taking Tylenol for pain relief, but has not taken any today.  The headache was 8/10 when she woke this morning, and is currently 4/10.  Denies vision changes, episodes of confusion, unilateral weakness, garbled speech, or thunderclap symptoms.    She reports feeling  lightheaded when she sat up on the side of the bed this morning.  The feeling passed after a moment and she has not felt any further lightheadedness today.  Her appetite is very poor.  Yesterday she had 2 Ensures and a malt.  She states she drinks a lot of decaf coffee, tea and water.  She states her fluid intake has remained stable despite her poor appetite.     She has also had pain in her lower back, hip and knee that has been present for several months.  She describes it as a dull pain that radiates down the back of her right leg and stops at the knee.  The pain is aggravated with activity and improves with rest.      Denies fever, loss of bowel or bladder control, nausea, vomiting, diarrhea,     Review of Systems   Constitutional, HEENT, cardiovascular, pulmonary, gi and gu systems are negative, except as otherwise noted.      Objective    Pulse 59   Temp 98  F (36.7  C)   Resp 15   Wt 44.5 kg (98 lb)   LMP  (LMP Unknown)   SpO2 92%   BMI 20.48 kg/m    Body mass index is 20.48 kg/m .  Physical Exam   GENERAL: healthy, alert and no distress  EYES: Eyes grossly normal to inspection, PERRL and conjunctivae and sclerae normal  HENT: ear canals and TM's normal, nose and mouth without ulcers or lesions  NECK: no adenopathy, no asymmetry, masses, or scars and thyroid normal to palpation  RESP: lungs clear to auscultation - no rales, rhonchi or wheezes  CV: regular rate and rhythm, normal S1 S2, no S3 or S4, no murmur, click or rub, no peripheral edema and peripheral pulses strong  MS: normal muscle tone, normal range of motion, no edema and tenderness to palpation in right lower back, above the hip  NEURO: Normal strength and tone, sensory exam grossly normal, mentation intact, oriented times 3, speech normal, cranial nerves 2-12 intact and DTR's normal and symmetric (patellar and brachial)  Comprehensive back pain exam:  Tenderness of right lower back, Range of motion not limited by pain, Lower extremity  strength functional and equal on both sides, Lower extremity reflexes within normal limits bilaterally, Lower extremity sensation normal and equal on both sides and Straight leg positive on  right, indicating possible ipsilateral radiculopathy

## 2021-02-18 ENCOUNTER — TELEPHONE (OUTPATIENT)
Dept: INTERNAL MEDICINE | Facility: CLINIC | Age: 86
End: 2021-02-18

## 2021-02-18 ENCOUNTER — APPOINTMENT (OUTPATIENT)
Dept: GENERAL RADIOLOGY | Facility: CLINIC | Age: 86
DRG: 377 | End: 2021-02-18
Attending: PHYSICIAN ASSISTANT
Payer: MEDICARE

## 2021-02-18 ENCOUNTER — HOSPITAL ENCOUNTER (INPATIENT)
Facility: CLINIC | Age: 86
LOS: 4 days | Discharge: HOME | DRG: 377 | End: 2021-02-22
Attending: EMERGENCY MEDICINE | Admitting: INTERNAL MEDICINE
Payer: MEDICARE

## 2021-02-18 ENCOUNTER — OFFICE VISIT (OUTPATIENT)
Dept: INTERNAL MEDICINE | Facility: CLINIC | Age: 86
End: 2021-02-18
Payer: MEDICARE

## 2021-02-18 VITALS
OXYGEN SATURATION: 90 % | SYSTOLIC BLOOD PRESSURE: 127 MMHG | DIASTOLIC BLOOD PRESSURE: 66 MMHG | WEIGHT: 98 LBS | HEART RATE: 80 BPM | BODY MASS INDEX: 20.48 KG/M2

## 2021-02-18 DIAGNOSIS — R53.83 FATIGUE, UNSPECIFIED TYPE: ICD-10-CM

## 2021-02-18 DIAGNOSIS — D62 ANEMIA DUE TO BLOOD LOSS, ACUTE: ICD-10-CM

## 2021-02-18 DIAGNOSIS — I27.20 PULMONARY HYPERTENSION (H): ICD-10-CM

## 2021-02-18 DIAGNOSIS — R06.02 SOB (SHORTNESS OF BREATH): ICD-10-CM

## 2021-02-18 DIAGNOSIS — Z20.822 CONTACT WITH AND (SUSPECTED) EXPOSURE TO COVID-19: ICD-10-CM

## 2021-02-18 DIAGNOSIS — D50.0 IRON DEFICIENCY ANEMIA DUE TO CHRONIC BLOOD LOSS: Primary | ICD-10-CM

## 2021-02-18 DIAGNOSIS — H61.22 IMPACTED CERUMEN OF LEFT EAR: ICD-10-CM

## 2021-02-18 DIAGNOSIS — Z12.10 ENCOUNTER FOR SCREENING FOR MALIGNANT NEOPLASM OF INTESTINAL TRACT, UNSPECIFIED: ICD-10-CM

## 2021-02-18 DIAGNOSIS — R63.0 DECREASED APPETITE: ICD-10-CM

## 2021-02-18 DIAGNOSIS — K92.2 GASTROINTESTINAL HEMORRHAGE, UNSPECIFIED GASTROINTESTINAL HEMORRHAGE TYPE: ICD-10-CM

## 2021-02-18 DIAGNOSIS — I50.33 ACUTE ON CHRONIC CONGESTIVE HEART FAILURE WITH LEFT VENTRICULAR DIASTOLIC DYSFUNCTION (H): ICD-10-CM

## 2021-02-18 DIAGNOSIS — R26.89 IMBALANCE: ICD-10-CM

## 2021-02-18 DIAGNOSIS — R42 LIGHTHEADEDNESS: ICD-10-CM

## 2021-02-18 DIAGNOSIS — R06.09 DYSPNEA ON EXERTION: ICD-10-CM

## 2021-02-18 DIAGNOSIS — R06.09 DYSPNEA ON EXERTION: Primary | ICD-10-CM

## 2021-02-18 DIAGNOSIS — J44.9 CHRONIC OBSTRUCTIVE PULMONARY DISEASE, UNSPECIFIED COPD TYPE (H): ICD-10-CM

## 2021-02-18 DIAGNOSIS — K92.1 MELENA: ICD-10-CM

## 2021-02-18 LAB
ALBUMIN SERPL-MCNC: 3 G/DL (ref 3.4–5)
ALBUMIN SERPL-MCNC: 3.2 G/DL (ref 3.4–5)
ALP SERPL-CCNC: 56 U/L (ref 40–150)
ALP SERPL-CCNC: 60 U/L (ref 40–150)
ALT SERPL W P-5'-P-CCNC: 17 U/L (ref 0–50)
ALT SERPL W P-5'-P-CCNC: 19 U/L (ref 0–50)
ANION GAP SERPL CALCULATED.3IONS-SCNC: 4 MMOL/L (ref 3–14)
ANION GAP SERPL CALCULATED.3IONS-SCNC: 4 MMOL/L (ref 3–14)
ANISOCYTOSIS BLD QL SMEAR: ABNORMAL
APTT PPP: 37 SEC (ref 22–37)
AST SERPL W P-5'-P-CCNC: 19 U/L (ref 0–45)
AST SERPL W P-5'-P-CCNC: 22 U/L (ref 0–45)
BASOPHILS # BLD AUTO: 0 10E9/L (ref 0–0.2)
BASOPHILS # BLD AUTO: 0.1 10E9/L (ref 0–0.2)
BASOPHILS NFR BLD AUTO: 0.4 %
BASOPHILS NFR BLD AUTO: 0.9 %
BILIRUB SERPL-MCNC: 0.2 MG/DL (ref 0.2–1.3)
BILIRUB SERPL-MCNC: 0.3 MG/DL (ref 0.2–1.3)
BLD PROD TYP BPU: NORMAL
BLD UNIT ID BPU: 0
BLOOD PRODUCT CODE: NORMAL
BPU ID: NORMAL
BUN SERPL-MCNC: 28 MG/DL (ref 7–30)
BUN SERPL-MCNC: 29 MG/DL (ref 7–30)
CALCIUM SERPL-MCNC: 9.6 MG/DL (ref 8.5–10.1)
CALCIUM SERPL-MCNC: 9.8 MG/DL (ref 8.5–10.1)
CHLORIDE SERPL-SCNC: 109 MMOL/L (ref 94–109)
CHLORIDE SERPL-SCNC: 109 MMOL/L (ref 94–109)
CO2 SERPL-SCNC: 29 MMOL/L (ref 20–32)
CO2 SERPL-SCNC: 30 MMOL/L (ref 20–32)
CREAT SERPL-MCNC: 0.74 MG/DL (ref 0.52–1.04)
CREAT SERPL-MCNC: 0.77 MG/DL (ref 0.52–1.04)
CRP SERPL-MCNC: 47.7 MG/L (ref 0–8)
DIFFERENTIAL METHOD BLD: ABNORMAL
DIFFERENTIAL METHOD BLD: ABNORMAL
EOSINOPHIL # BLD AUTO: 0 10E9/L (ref 0–0.7)
EOSINOPHIL # BLD AUTO: 0 10E9/L (ref 0–0.7)
EOSINOPHIL NFR BLD AUTO: 0.4 %
EOSINOPHIL NFR BLD AUTO: 0.6 %
ERYTHROCYTE [DISTWIDTH] IN BLOOD BY AUTOMATED COUNT: 18.6 % (ref 10–15)
ERYTHROCYTE [DISTWIDTH] IN BLOOD BY AUTOMATED COUNT: 19.1 % (ref 10–15)
ERYTHROCYTE [DISTWIDTH] IN BLOOD BY AUTOMATED COUNT: 19.2 % (ref 10–15)
FIBRINOGEN PPP-MCNC: 453 MG/DL (ref 200–420)
GFR SERPL CREATININE-BSD FRML MDRD: 71 ML/MIN/{1.73_M2}
GFR SERPL CREATININE-BSD FRML MDRD: 74 ML/MIN/{1.73_M2}
GLUCOSE SERPL-MCNC: 88 MG/DL (ref 70–99)
GLUCOSE SERPL-MCNC: 96 MG/DL (ref 70–99)
HCT VFR BLD AUTO: 19 % (ref 35–47)
HCT VFR BLD AUTO: 20 % (ref 35–47)
HCT VFR BLD AUTO: 23 % (ref 35–47)
HGB BLD-MCNC: 5.5 G/DL (ref 11.7–15.7)
HGB BLD-MCNC: 5.6 G/DL (ref 11.7–15.7)
HGB BLD-MCNC: 6.7 G/DL (ref 11.7–15.7)
HYPOCHROMIA BLD QL: PRESENT
IMM GRANULOCYTES # BLD: 0 10E9/L (ref 0–0.4)
IMM GRANULOCYTES # BLD: 0 10E9/L (ref 0–0.4)
IMM GRANULOCYTES NFR BLD: 0.2 %
IMM GRANULOCYTES NFR BLD: 0.5 %
INR PPP: 1.11 (ref 0.86–1.14)
INTERPRETATION ECG - MUSE: NORMAL
IRON SATN MFR SERPL: 2 % (ref 15–46)
IRON SERPL-MCNC: 8 UG/DL (ref 35–180)
LABORATORY COMMENT REPORT: NORMAL
LDH SERPL L TO P-CCNC: 167 U/L (ref 81–234)
LYMPHOCYTES # BLD AUTO: 0.9 10E9/L (ref 0.8–5.3)
LYMPHOCYTES # BLD AUTO: 0.9 10E9/L (ref 0.8–5.3)
LYMPHOCYTES NFR BLD AUTO: 15.4 %
LYMPHOCYTES NFR BLD AUTO: 17.6 %
MAGNESIUM SERPL-MCNC: 2.2 MG/DL (ref 1.6–2.3)
MAGNESIUM SERPL-MCNC: 2.3 MG/DL (ref 1.6–2.3)
MCH RBC QN AUTO: 22.5 PG (ref 26.5–33)
MCH RBC QN AUTO: 22.6 PG (ref 26.5–33)
MCH RBC QN AUTO: 23.7 PG (ref 26.5–33)
MCHC RBC AUTO-ENTMCNC: 28 G/DL (ref 31.5–36.5)
MCHC RBC AUTO-ENTMCNC: 28.9 G/DL (ref 31.5–36.5)
MCHC RBC AUTO-ENTMCNC: 29.1 G/DL (ref 31.5–36.5)
MCV RBC AUTO: 78 FL (ref 78–100)
MCV RBC AUTO: 80 FL (ref 78–100)
MCV RBC AUTO: 81 FL (ref 78–100)
MONOCYTES # BLD AUTO: 0.6 10E9/L (ref 0–1.3)
MONOCYTES # BLD AUTO: 0.7 10E9/L (ref 0–1.3)
MONOCYTES NFR BLD AUTO: 10.6 %
MONOCYTES NFR BLD AUTO: 13.5 %
NEUTROPHILS # BLD AUTO: 3.5 10E9/L (ref 1.6–8.3)
NEUTROPHILS # BLD AUTO: 4.1 10E9/L (ref 1.6–8.3)
NEUTROPHILS NFR BLD AUTO: 67.2 %
NEUTROPHILS NFR BLD AUTO: 72.7 %
NRBC # BLD AUTO: 0 10*3/UL
NRBC # BLD AUTO: 0.1 10*3/UL
NRBC BLD AUTO-RTO: 1 /100
NRBC BLD AUTO-RTO: 1 /100
NT-PROBNP SERPL-MCNC: 4173 PG/ML (ref 0–1800)
PHOSPHATE SERPL-MCNC: 2.8 MG/DL (ref 2.5–4.5)
PLATELET # BLD AUTO: 293 10E9/L (ref 150–450)
PLATELET # BLD AUTO: 330 10E9/L (ref 150–450)
PLATELET # BLD AUTO: 333 10E9/L (ref 150–450)
PLATELET # BLD EST: ABNORMAL 10*3/UL
POLYCHROMASIA BLD QL SMEAR: ABNORMAL
POTASSIUM SERPL-SCNC: 4.2 MMOL/L (ref 3.4–5.3)
POTASSIUM SERPL-SCNC: 4.2 MMOL/L (ref 3.4–5.3)
PROT SERPL-MCNC: 6.3 G/DL (ref 6.8–8.8)
PROT SERPL-MCNC: 6.4 G/DL (ref 6.8–8.8)
RBC # BLD AUTO: 2.43 10E12/L (ref 3.8–5.2)
RBC # BLD AUTO: 2.49 10E12/L (ref 3.8–5.2)
RBC # BLD AUTO: 2.83 10E12/L (ref 3.8–5.2)
RETICS # AUTO: 84.9 10E9/L (ref 25–95)
RETICS/RBC NFR AUTO: 3 % (ref 0.5–2)
SARS-COV-2 RNA RESP QL NAA+PROBE: NEGATIVE
SODIUM SERPL-SCNC: 142 MMOL/L (ref 133–144)
SODIUM SERPL-SCNC: 142 MMOL/L (ref 133–144)
SPECIMEN SOURCE: NORMAL
TIBC SERPL-MCNC: 396 UG/DL (ref 240–430)
TRANSFUSION STATUS PATIENT QL: NORMAL
TRANSFUSION STATUS PATIENT QL: NORMAL
TROPONIN I SERPL-MCNC: 0.03 UG/L (ref 0–0.04)
TSH SERPL DL<=0.005 MIU/L-ACNC: 3.74 MU/L (ref 0.4–4)
WBC # BLD AUTO: 5.3 10E9/L (ref 4–11)
WBC # BLD AUTO: 5.6 10E9/L (ref 4–11)
WBC # BLD AUTO: 5.7 10E9/L (ref 4–11)

## 2021-02-18 PROCEDURE — 99223 1ST HOSP IP/OBS HIGH 75: CPT | Mod: AI | Performed by: INTERNAL MEDICINE

## 2021-02-18 PROCEDURE — 250N000013 HC RX MED GY IP 250 OP 250 PS 637: Performed by: PHYSICIAN ASSISTANT

## 2021-02-18 PROCEDURE — U0005 INFEC AGEN DETEC AMPLI PROBE: HCPCS | Performed by: EMERGENCY MEDICINE

## 2021-02-18 PROCEDURE — 250N000011 HC RX IP 250 OP 636: Performed by: PHYSICIAN ASSISTANT

## 2021-02-18 PROCEDURE — 83735 ASSAY OF MAGNESIUM: CPT | Performed by: EMERGENCY MEDICINE

## 2021-02-18 PROCEDURE — 258N000003 HC RX IP 258 OP 636: Performed by: EMERGENCY MEDICINE

## 2021-02-18 PROCEDURE — 96374 THER/PROPH/DIAG INJ IV PUSH: CPT | Performed by: EMERGENCY MEDICINE

## 2021-02-18 PROCEDURE — 84100 ASSAY OF PHOSPHORUS: CPT | Performed by: EMERGENCY MEDICINE

## 2021-02-18 PROCEDURE — 83540 ASSAY OF IRON: CPT | Performed by: EMERGENCY MEDICINE

## 2021-02-18 PROCEDURE — 84443 ASSAY THYROID STIM HORMONE: CPT | Performed by: PATHOLOGY

## 2021-02-18 PROCEDURE — 99215 OFFICE O/P EST HI 40 MIN: CPT | Mod: 25 | Performed by: STUDENT IN AN ORGANIZED HEALTH CARE EDUCATION/TRAINING PROGRAM

## 2021-02-18 PROCEDURE — 86850 RBC ANTIBODY SCREEN: CPT | Performed by: EMERGENCY MEDICINE

## 2021-02-18 PROCEDURE — 83880 ASSAY OF NATRIURETIC PEPTIDE: CPT | Performed by: EMERGENCY MEDICINE

## 2021-02-18 PROCEDURE — P9016 RBC LEUKOCYTES REDUCED: HCPCS | Performed by: EMERGENCY MEDICINE

## 2021-02-18 PROCEDURE — 86140 C-REACTIVE PROTEIN: CPT | Performed by: PATHOLOGY

## 2021-02-18 PROCEDURE — 85060 BLOOD SMEAR INTERPRETATION: CPT | Mod: 26 | Performed by: PATHOLOGY

## 2021-02-18 PROCEDURE — C9113 INJ PANTOPRAZOLE SODIUM, VIA: HCPCS | Performed by: PHYSICIAN ASSISTANT

## 2021-02-18 PROCEDURE — 83550 IRON BINDING TEST: CPT | Performed by: EMERGENCY MEDICINE

## 2021-02-18 PROCEDURE — 85730 THROMBOPLASTIN TIME PARTIAL: CPT | Performed by: EMERGENCY MEDICINE

## 2021-02-18 PROCEDURE — 83615 LACTATE (LD) (LDH) ENZYME: CPT | Performed by: EMERGENCY MEDICINE

## 2021-02-18 PROCEDURE — 80053 COMPREHEN METABOLIC PANEL: CPT | Performed by: PATHOLOGY

## 2021-02-18 PROCEDURE — 99417 PROLNG OP E/M EACH 15 MIN: CPT | Performed by: STUDENT IN AN ORGANIZED HEALTH CARE EDUCATION/TRAINING PROGRAM

## 2021-02-18 PROCEDURE — 85025 COMPLETE CBC W/AUTO DIFF WBC: CPT | Performed by: PHYSICIAN ASSISTANT

## 2021-02-18 PROCEDURE — 36415 COLL VENOUS BLD VENIPUNCTURE: CPT | Performed by: PHYSICIAN ASSISTANT

## 2021-02-18 PROCEDURE — 120N000002 HC R&B MED SURG/OB UMMC

## 2021-02-18 PROCEDURE — 85610 PROTHROMBIN TIME: CPT | Performed by: EMERGENCY MEDICINE

## 2021-02-18 PROCEDURE — U0003 INFECTIOUS AGENT DETECTION BY NUCLEIC ACID (DNA OR RNA); SEVERE ACUTE RESPIRATORY SYNDROME CORONAVIRUS 2 (SARS-COV-2) (CORONAVIRUS DISEASE [COVID-19]), AMPLIFIED PROBE TECHNIQUE, MAKING USE OF HIGH THROUGHPUT TECHNOLOGIES AS DESCRIBED BY CMS-2020-01-R: HCPCS | Performed by: EMERGENCY MEDICINE

## 2021-02-18 PROCEDURE — 96361 HYDRATE IV INFUSION ADD-ON: CPT | Performed by: EMERGENCY MEDICINE

## 2021-02-18 PROCEDURE — 86923 COMPATIBILITY TEST ELECTRIC: CPT | Performed by: EMERGENCY MEDICINE

## 2021-02-18 PROCEDURE — 85025 COMPLETE CBC W/AUTO DIFF WBC: CPT | Performed by: EMERGENCY MEDICINE

## 2021-02-18 PROCEDURE — 86900 BLOOD TYPING SEROLOGIC ABO: CPT | Performed by: EMERGENCY MEDICINE

## 2021-02-18 PROCEDURE — 99207 PR APP CREDIT; MD BILLING SHARED VISIT: CPT | Performed by: PHYSICIAN ASSISTANT

## 2021-02-18 PROCEDURE — 36415 COLL VENOUS BLD VENIPUNCTURE: CPT | Performed by: PATHOLOGY

## 2021-02-18 PROCEDURE — 86901 BLOOD TYPING SEROLOGIC RH(D): CPT | Performed by: EMERGENCY MEDICINE

## 2021-02-18 PROCEDURE — 83010 ASSAY OF HAPTOGLOBIN QUANT: CPT | Performed by: EMERGENCY MEDICINE

## 2021-02-18 PROCEDURE — 84484 ASSAY OF TROPONIN QUANT: CPT | Performed by: EMERGENCY MEDICINE

## 2021-02-18 PROCEDURE — 99285 EMERGENCY DEPT VISIT HI MDM: CPT | Mod: 25 | Performed by: EMERGENCY MEDICINE

## 2021-02-18 PROCEDURE — 999N001086 HC STATISTIC MORPHOLOGY W/INTERP HEMEPATH TC 85060: Performed by: PHYSICIAN ASSISTANT

## 2021-02-18 PROCEDURE — 80053 COMPREHEN METABOLIC PANEL: CPT | Performed by: EMERGENCY MEDICINE

## 2021-02-18 PROCEDURE — 71045 X-RAY EXAM CHEST 1 VIEW: CPT | Mod: 26 | Performed by: RADIOLOGY

## 2021-02-18 PROCEDURE — 85384 FIBRINOGEN ACTIVITY: CPT | Performed by: EMERGENCY MEDICINE

## 2021-02-18 PROCEDURE — 83735 ASSAY OF MAGNESIUM: CPT | Performed by: PATHOLOGY

## 2021-02-18 PROCEDURE — 85045 AUTOMATED RETICULOCYTE COUNT: CPT | Performed by: PHYSICIAN ASSISTANT

## 2021-02-18 PROCEDURE — 36430 TRANSFUSION BLD/BLD COMPNT: CPT | Performed by: EMERGENCY MEDICINE

## 2021-02-18 PROCEDURE — 71045 X-RAY EXAM CHEST 1 VIEW: CPT

## 2021-02-18 PROCEDURE — 93005 ELECTROCARDIOGRAM TRACING: CPT | Performed by: STUDENT IN AN ORGANIZED HEALTH CARE EDUCATION/TRAINING PROGRAM

## 2021-02-18 PROCEDURE — 85027 COMPLETE CBC AUTOMATED: CPT | Performed by: PATHOLOGY

## 2021-02-18 PROCEDURE — 99285 EMERGENCY DEPT VISIT HI MDM: CPT | Performed by: EMERGENCY MEDICINE

## 2021-02-18 PROCEDURE — C9803 HOPD COVID-19 SPEC COLLECT: HCPCS | Performed by: EMERGENCY MEDICINE

## 2021-02-18 RX ORDER — ONDANSETRON 2 MG/ML
4 INJECTION INTRAMUSCULAR; INTRAVENOUS EVERY 6 HOURS PRN
Status: DISCONTINUED | OUTPATIENT
Start: 2021-02-18 | End: 2021-02-22 | Stop reason: HOSPADM

## 2021-02-18 RX ORDER — ONDANSETRON 4 MG/1
4 TABLET, ORALLY DISINTEGRATING ORAL EVERY 6 HOURS PRN
Status: DISCONTINUED | OUTPATIENT
Start: 2021-02-18 | End: 2021-02-22 | Stop reason: HOSPADM

## 2021-02-18 RX ORDER — LEVOTHYROXINE SODIUM 75 UG/1
75 TABLET ORAL DAILY
Status: DISCONTINUED | OUTPATIENT
Start: 2021-02-19 | End: 2021-02-22 | Stop reason: HOSPADM

## 2021-02-18 RX ORDER — LIDOCAINE 40 MG/G
CREAM TOPICAL
Status: DISCONTINUED | OUTPATIENT
Start: 2021-02-18 | End: 2021-02-22 | Stop reason: HOSPADM

## 2021-02-18 RX ORDER — OXYBUTYNIN CHLORIDE 5 MG/1
5 TABLET ORAL AT BEDTIME
Status: DISCONTINUED | OUTPATIENT
Start: 2021-02-18 | End: 2021-02-22 | Stop reason: HOSPADM

## 2021-02-18 RX ORDER — SODIUM CHLORIDE 9 MG/ML
INJECTION, SOLUTION INTRAVENOUS CONTINUOUS
Status: DISCONTINUED | OUTPATIENT
Start: 2021-02-18 | End: 2021-02-18

## 2021-02-18 RX ADMIN — SODIUM CHLORIDE 1000 ML: 9 INJECTION, SOLUTION INTRAVENOUS at 17:19

## 2021-02-18 RX ADMIN — OXYBUTYNIN CHLORIDE 5 MG: 5 TABLET ORAL at 22:09

## 2021-02-18 RX ADMIN — PANTOPRAZOLE SODIUM 40 MG: 40 INJECTION, POWDER, FOR SOLUTION INTRAVENOUS at 22:08

## 2021-02-18 ASSESSMENT — ENCOUNTER SYMPTOMS
LIGHT-HEADEDNESS: 1
COUGH: 0
CONFUSION: 0
ADENOPATHY: 0
HEADACHES: 1
COLOR CHANGE: 0
SORE THROAT: 0
VOMITING: 0
SHORTNESS OF BREATH: 1
BLOOD IN STOOL: 1
NAUSEA: 0
DIARRHEA: 0
DYSURIA: 0
FREQUENCY: 0
FATIGUE: 1
WEAKNESS: 0
ABDOMINAL PAIN: 0
FEVER: 0
APPETITE CHANGE: 1
EYE DISCHARGE: 0
CHILLS: 0
MYALGIAS: 0
AGITATION: 0

## 2021-02-18 NOTE — NURSING NOTE
Chief Complaint   Patient presents with     Dizziness     intermittent room spinning dizziness and lightheadedness for a few days with associated headaches, no falls,       Koki Boudreaux EMT at 1:13 PM on 2/18/2021

## 2021-02-18 NOTE — TELEPHONE ENCOUNTER
"Kristal, please call Lucie and ask her whether or not she has been taking her iron supplements. Her lab tests show low iron.  I think she may have not tolerated iron in the past.  Not sure.  If she has had problems in the past, I'd recommend that she try slow release iron \"Slo-Fe\", one tab twice daily.  She can start with one and see how it goes. She would like to have what she calls more \"pep\" (energy), and increasing her iron intake may help with that.  Thanks.YVONNE    Pt has appt in clinic today.  Kristal Jurado RN 9:14 AM on 2/18/2021.    "

## 2021-02-18 NOTE — PROGRESS NOTES
"CC: Dizziness and imbalance      HPI:84 yo F w/ a past medical history of aortic stenosis, COPD, HTN, bilateral LE DVT and iron deficiency anemia (other history documented below). She presents today for evaluation of dizziness and imbalance of 2 weeks duration.    She notes light headeness over the past few weeks, which is worse on standing up. She has had no falls. She has had no palpitations. She also notes worsening dyspnea on mild exertion, she states that she often has to stop for a breath when climbing a flight of stairs-- in the past she has been able to climb the stairs w/o stopping. She has had no cough, no wheezing, no chest pain, and no fevers.      She reports throbbing frontal headaches, associated w/ bilateral eye pain, no diurnal variation in symptoms. No associated lacrimation, no vision abnormalities, no aura. She reports associated photophobia, no nausea, but has had one episode of vomiting since onset. She reports vertigo, but no tinnitus. Per patient's , she has had increased difficulty w/ her hearing and now listens to the TV on higher volumes.    She reports a poor appetite, dry mouth, but no dysgeusia. She reports no sensation of eye dryness. She notes that on a typical day she takes 2 ensure, some oatmeal, soup, and eggs for dinner. She also reports increased BM, notes that she goes several times a day and has to wake up multiple times at night to move her bowels. She denies tenesmus. She reports passage of dark stools, she is however unsure of taking iron supplements.      ROS: 10 point ROS neg other than the symptoms noted above in the HPI.        Past Medical History:   Diagnosis Date     Anemia      Aortic stenosis     abdominal     Atherosclerosis of aorta (H)     \"extensive disease with near occlusion below level of renal arteries\" on CT     Atherosclerosis of arteries of extremities (H)      Back pain     severe multilevel DJD, moderate spinal canal stenosis L4-5, severe L3-4 "     Chronic pain     Back, hips, knees, legs     Degenerative joint disease      DVT of lower extremity, bilateral (H)     5/24/10 left distal femoral and great saphenous, 7/7/10 left:  extending to cfv, iliac , pop and post tibial, peronial, right:  distal fem and peroneal      Grave's disease      Hyperlipidaemia LDL goal < 70      Hypertension, essential      Hypothyroidism      Imbalance      Insomnia      Intermittent claudication (H)      Iron deficiency      Knee pain      Lumbar stenosis with neurogenic claudication      Osteoarthritis     ankle,foot, knee     Osteoporosis      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, 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     Pulmonary embolism (H)     5/24/10 bilateral     Small bowel obstruction (H) 1/23/17     Varicose veins      Past Surgical History:   Procedure Laterality Date     BUNIONECTOMY       C STOMACH SURGERY PROCEDURE UNLISTED      Please see chart     COLONOSCOPY      11/10/10 angioectasia     ENDOBRONCHIAL ULTRASOUND FLEXIBLE N/A 1/24/2020    Procedure: ENDOBRONCHIAL ULTRASOUND, WITH FLEXIBLE BRONCHOSCOPY;  Surgeon: Gopal Jara MD;  Location: [facility] OR     HYSTERECTOMY TOTAL ABDOMINAL, BILATERAL SALPINGO-OOPHORECTOMY, NODE DISSECTION, COMBINED  2/17/11    SANGEETHA, EMILIA, LN bx, omentectomy, resection of evrian malignancy Dr. JONO Goncalves - Returned BENIGN     INJECT EPIDURAL LUMBAR / SACRAL SINGLE N/A 1/5/2018    Procedure: INJECT EPIDURAL LUMBAR / SACRAL SINGLE;  lumbar interlaminar epidural steroid injection;  Surgeon: Jaylin Valadez MD;  Location: [facility] OR     INJECT SACROILIAC JOINT Right 3/7/2018    Procedure: INJECT SACROILIAC JOINT;  Right Sacroiliac Joint Injection;  Surgeon: Flavio Harrison MD;  Location: [facility] OR     INJECT SACROILIAC JOINT Bilateral 12/7/2018    Procedure: Bilateral Sacroiliac Joint Injections;  Surgeon: Faviola Cosby MD;  Location: [facility] OR     OPTICAL TRACKING SYSTEM  BRONCHOSCOPY N/A 2020    Procedure: Super D navigational bronchoscopy;  Surgeon: Gopal Jara MD;  Location: [facility] OR     UPPER GI ENDOSCOPY      11/10/10 erythematous gastropathy, angioectasia     Family History   Problem Relation Age of Onset     Breast Cancer Maternal Aunt 80     C.A.D. Father 54     No Known Problems Mother      Social History     Tobacco Use     Smoking status: Former Smoker     Packs/day: 0.50     Years: 15.00     Pack years: 7.50     Quit date: 1993     Years since quittin.4     Smokeless tobacco: Never Used   Substance Use Topics     Alcohol use: Yes     Comment: social     Drug use: No     Current Outpatient Medications   Medication Sig Dispense Refill     acetaminophen (TYLENOL) 325 MG tablet Take 2 tablets every 6 to 8 hours as needed for pain (Patient taking differently: Take 650 mg by mouth as needed Take 2 tablets every 6 to 8 hours as needed for pain) 100 tablet 1     amLODIPine (NORVASC) 10 MG tablet Take 1 tablet (10 mg) by mouth daily For blood pressure. Please keep appt 20 90 tablet 3     apixaban ANTICOAGULANT (ELIQUIS) 5 MG tablet Take 1 tablet (5 mg) by mouth 2 times daily 60 tablet 11     atorvastatin (LIPITOR) 40 MG tablet Take 2 tablets (80 mg) by mouth daily 180 tablet 3     betamethasone dipropionate (DIPROSONE) 0.05 % external ointment Apply topically 2 times daily (Patient taking differently: Apply 1 g topically as needed ) 15 g 0     Calcium Carbonate-Vitamin D (CALCIUM 600 + D OR) Take 1 tablet by mouth every morning        cholecalciferol (VITAMIN D3) 25 mcg (1000 units) capsule Take 1 capsule by mouth daily       citalopram (CELEXA) 20 MG tablet Take 1 tablet (20 mg) by mouth daily 90 tablet 3     cyanocolbalamin (VITAMIN  B-12) 500 MCG tablet Take 500 mcg by mouth every morning  90 tablet 1     gabapentin (NEURONTIN) 100 MG capsule Take 1 capsule (100 mg) in the morning, 1 capsule (100 mg) mid-day, and 4 capsules (400 mg) at bed time 180  "capsule 5     hydrochlorothiazide (HYDRODIURIL) 50 MG tablet Take 1 tablet (50 mg) by mouth daily Please keep appt 12/21/20 90 tablet 0     latanoprost (XALATAN) 0.005 % ophthalmic solution Place 1 drop into both eyes At Bedtime   1     levothyroxine (SYNTHROID/LEVOTHROID) 75 MCG tablet Take 1 tablet (75 mcg) by mouth daily 90 tablet 3     lisinopril (PRINIVIL/ZESTRIL) 40 MG tablet Take 1 tablet (40 mg) by mouth daily For blood pressure (Patient taking differently: Take 40 mg by mouth every morning For blood pressure) 90 tablet 0     loperamide (IMODIUM A-D) 2 MG tablet Take 0.5-1 tablets (1-2 mg) by mouth daily as needed for diarrhea 30 tablet 0     multivitamin (OCUVITE) TABS tablet Take 1 tablet by mouth daily       oxybutynin (DITROPAN) 5 MG tablet Take 1 tablet (5 mg) by mouth At Bedtime For frequent urination 90 tablet 0     pantoprazole (PROTONIX) 40 MG EC tablet Take 1 tablet (40 mg) by mouth daily Takes in the morning. 90 tablet 3     Potassium Bicarb-Citric Acid 20 MEQ TBEF Take 20 mg by mouth daily 90 tablet 3      No Known Allergies      LMP  (LMP Unknown)    Vital signs:      BP: 127/66 Pulse: 80     SpO2: 90 %       Weight: 44.5 kg (98 lb)  Estimated body mass index is 20.48 kg/m  as calculated from the following:    Height as of 1/24/20: 1.473 m (4' 10\").    Weight as of this encounter: 44.5 kg (98 lb).    Physical Examination:    General:  Conversant, generally healthy appearing, no acute distress, rhythmic bobbing of head (?De Musset's)  HEENT: atraumatic, normocephalic. PERRL, EOMI, anicteric sclerae, mild conjunctival pallor, no periorbital swelling , no perioral cyanosis, EAC's patent, cerumen bilaterally, left TM not visualized d/t cerumen;  No pharyngeal erythema, exudate, ulcers, oral mucosa and tongue moist, normal hard and soft palate  Lungs:  Tachypneic, noisy breathing, vesicular breath sounds w/ no wheezes, rhonchi or rales.   C/V:  Regular rate and rhythm, no murmurs, rubs or gallops.  " Radial and DP pulses 2+, equal and regular.  Abdomen:  Not distended, soft    Lymph:  No palpable cervical or supraclavicular lymph nodes.  Neuro: Alert and oriented, face symmetric. Able to get on/off exam table with assistance.   strength grossly intact, equal bilaterally. No tremor.  Slow gait. No dysdiadochokinesia, no past-pointing, , knee reflexes are brisk and equal, normal vibratory and proprioception  M/S:   Reduced muscle bulk, kyphosis, no joint deformities noted.  No joint swelling.  Skin:   Hypopigmented macules on skin of dorsum of both hands, bruising on lateral aspect of dorsum of left hand--per patient, lesions are old  Extremities:  No peripheral edema, no digital cyanosis  Psych:  Alert and oriented. Appropriate affect.  Not psychomotor slowed.  No signs of anxiety or agitation.      A&P:  84 yo F w/ a past medical history of aortic stenosis, HTN, COPD, bilateral LE DVT, and iron deficiency anemia presenting w/ c/o of dizziness and imbalance of 2 week's duration. Also reports worsening dyspnea on exertion, poor appetite, and passage of dark stools w/ increased night time defecation. Exam was notable for petechiae and hypopigmented macules on skin, tachypnea, low muscle bulk and an unremarkable neurological and cardiovascular exam. Given patient's history and recent complaints, symptoms are possibly 2/2 anemia 2/2 iron deficiency and possible GI losses c/f new colorectal pathology; worsening valvular dysfunction; and malnutrition. She also reports worsening SOB w/o cough, chest exam was negative for wheezing or crackles, c/f possible pulmonary hypertension vs SOB 2/2 anemia. Per chart, she is scheduled for a right heart catheterization on 02/26/21.    Shortness of breath  Light headedness  Gait imbalance  Iron deficiency anemia, c/f possible GI bleed  C/f valvular abnormality   R/o PHTN  R/o arrhythmia  - TSH w/ free T4  - CBC w/ platelets  - CMP  - Magnesium  - 12 lead EKG  (Normal sinus  rhythm, 02/18/21)  - [facility] as scheduled, to follow-up results    Poor appetite  Risk of malnutrition  R/o colorectal ca  Body mass index is 20.48 kg/m .  - discussed increasing ensure intake w/ patient  - FIT  - consider mirtazapine or dronabinol if work-up negative for infection or endocrinopathy        Patient seen and discussed with Dr. Zafar Romo MD  Internal Medicine Resident PGY 1  Pager: [phone]

## 2021-02-18 NOTE — ED PROVIDER NOTES
[facility]ERG[facility] DEPARTMENT ([facility])  February 18, 2021  History     Chief Complaint   Patient presents with     Shortness of Breath     Abnormal Labs     HPI  Lucie Stockton is a 85 year old female with a PMH of intermittent claudication, COPD, chronic cough, ARF with hypoxia, SBO, gastritis, hypothyroid, HLD, PVD, PE, aortic atherosclerosis, extremity atherosclerosis, bilateral DVT of lower extremity, pulmonary HTN, osteoarthritis, osteoporosis, BCC of trunk, anemia, thrombocytosis, and long-term history of anticoagulant therapy who presents to the ED today complaining of shortness of breath and abnormal labs.    Patient was seen at an office visit with internal medicine today, she endorsed lightheadedness, HERRERA, frontal headaches, poor appetite, dry mouth, and dark stools.  She had labs drawn, which came back with a hemoglobin of 5.6.  Her CRP was 47.7.  Comprehensive metabolic profile was unremarkable.    Patient denies cough.  She has no fever or chills.  She has no nausea or vomiting.  She denies dysuria or urinary frequency.    Patient is scheduled to undergo a pulmonary angiogram on 2/26/2021.    Examination:NM LUNG SCAN VENTILATION AND PERFUSION, 1/30/2020 1:21 PM   Indication:  PE suspected, low pretest prob; R/O Chronic PE; Pulmonary  hypertension (H); SOB (shortness of breath); Dyslipidemia; History of  pulmonary embolism; Need for hepatitis B screening test; Iron  deficiency   Comparison: CT 1/24/2020. X-ray 1/24/2020  Impression:  Mismatched perfusion defects involving segments of the right upper,  right middle, and right lower lobes are compatible with clinical  suspicion of chronic PE in combination with the recent findings on the  CT from 1/24/2020.     EXAM: XR CHEST 2 VW  1/30/2020 1:25 PM    HISTORY: PE suspected, low pretest prob; Pulmonary hypertension (H);  SOB (shortness of breath); Dyslipidemia; History of pulmonary  embolism; Need for hepatitis B screening test; Iron  "deficiency  COMPARISON: 1/24/2020, chest CT 1/24/2020  IMPRESSION: No acute cardiopulmonary disease.    I have reviewed the Medications, Allergies, Past Medical and Surgical History, and Social History in the [facility] system.  PAST MEDICAL HISTORY:   Past Medical History:   Diagnosis Date     Anemia      Aortic stenosis     abdominal     Atherosclerosis of aorta (H)     \"extensive disease with near occlusion below level of renal arteries\" on CT     Atherosclerosis of arteries of extremities (H)      Back pain     severe multilevel DJD, moderate spinal canal stenosis L4-5, severe L3-4     Chronic pain     Back, hips, knees, legs     Degenerative joint disease      DVT of lower extremity, bilateral (H)     5/24/10 left distal femoral and great saphenous, 7/7/10 left:  extending to cfv, iliac , pop and post tibial, peronial, right:  distal fem and peroneal      Grave's disease      Hyperlipidaemia LDL goal < 70      Hypertension, essential      Hypothyroidism      Imbalance      Insomnia      Intermittent claudication (H)      Iron deficiency      Knee pain      Lumbar stenosis with neurogenic claudication      Osteoarthritis     ankle,foot, knee     Osteoporosis      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, 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     Pulmonary embolism (H)     5/24/10 bilateral     Small bowel obstruction (H) 1/23/17     Varicose veins        PAST SURGICAL HISTORY:   Past Surgical History:   Procedure Laterality Date     BUNIONECTOMY       C STOMACH SURGERY PROCEDURE UNLISTED      Please see chart     COLONOSCOPY      11/10/10 angioectasia     ENDOBRONCHIAL ULTRASOUND FLEXIBLE N/A 1/24/2020    Procedure: ENDOBRONCHIAL ULTRASOUND, WITH FLEXIBLE BRONCHOSCOPY;  Surgeon: Gopal Jara MD;  Location: [facility] OR     HYSTERECTOMY TOTAL ABDOMINAL, BILATERAL SALPINGO-OOPHORECTOMY, NODE DISSECTION, COMBINED  2/17/11    SANGEETHA, EMILIA, LN bx, omentectomy, " resection of evrian malignancy Dr. JONO Goncalves - Returned BENIGN     INJECT EPIDURAL LUMBAR / SACRAL SINGLE N/A 2018    Procedure: INJECT EPIDURAL LUMBAR / SACRAL SINGLE;  lumbar interlaminar epidural steroid injection;  Surgeon: Jaylin Valadez MD;  Location: [facility] OR     INJECT SACROILIAC JOINT Right 3/7/2018    Procedure: INJECT SACROILIAC JOINT;  Right Sacroiliac Joint Injection;  Surgeon: Flavio Harrison MD;  Location: [facility] OR     INJECT SACROILIAC JOINT Bilateral 2018    Procedure: Bilateral Sacroiliac Joint Injections;  Surgeon: Faviola Cosby MD;  Location: [facility] OR     OPTICAL TRACKING SYSTEM BRONCHOSCOPY N/A 2020    Procedure: Super D navigational bronchoscopy;  Surgeon: Gopal Jara MD;  Location: [facility] OR     UPPER GI ENDOSCOPY      11/10/10 erythematous gastropathy, angioectasia       Past medical history, past surgical history, medications, and allergies were reviewed with the patient. Additional pertinent items: None    FAMILY HISTORY:   Family History   Problem Relation Age of Onset     Breast Cancer Maternal Aunt 80     C.A.D. Father 54     No Known Problems Mother        SOCIAL HISTORY:   Social History     Tobacco Use     Smoking status: Former Smoker     Packs/day: 0.50     Years: 15.00     Pack years: 7.50     Quit date: 1993     Years since quittin.4     Smokeless tobacco: Never Used   Substance Use Topics     Alcohol use: Yes     Comment: social     Social history was reviewed with the patient. Additional pertinent items: None      Patient's Medications   New Prescriptions    No medications on file   Previous Medications    ACETAMINOPHEN (TYLENOL) 325 MG TABLET    Take 2 tablets every 6 to 8 hours as needed for pain    AMLODIPINE (NORVASC) 10 MG TABLET    Take 1 tablet (10 mg) by mouth daily For blood pressure. Please keep appt 20    APIXABAN ANTICOAGULANT (ELIQUIS) 5 MG TABLET    Take 1 tablet (5 mg) by mouth 2 times daily    ATORVASTATIN (LIPITOR) 40 MG TABLET     Take 2 tablets (80 mg) by mouth daily    BETAMETHASONE DIPROPIONATE (DIPROSONE) 0.05 % EXTERNAL OINTMENT    Apply topically 2 times daily    CALCIUM CARBONATE-VITAMIN D (CALCIUM 600 + D OR)    Take 1 tablet by mouth every morning     CHOLECALCIFEROL (VITAMIN D3) 25 MCG (1000 UNITS) CAPSULE    Take 1 capsule by mouth daily    CITALOPRAM (CELEXA) 20 MG TABLET    Take 1 tablet (20 mg) by mouth daily    CYANOCOLBALAMIN (VITAMIN  B-12) 500 MCG TABLET    Take 500 mcg by mouth every morning     GABAPENTIN (NEURONTIN) 100 MG CAPSULE    Take 1 capsule (100 mg) in the morning, 1 capsule (100 mg) mid-day, and 4 capsules (400 mg) at bed time    HYDROCHLOROTHIAZIDE (HYDRODIURIL) 50 MG TABLET    Take 1 tablet (50 mg) by mouth daily Please keep appt 12/21/20    LATANOPROST (XALATAN) 0.005 % OPHTHALMIC SOLUTION    Place 1 drop into both eyes At Bedtime     LEVOTHYROXINE (SYNTHROID/LEVOTHROID) 75 MCG TABLET    Take 1 tablet (75 mcg) by mouth daily    LISINOPRIL (PRINIVIL/ZESTRIL) 40 MG TABLET    Take 1 tablet (40 mg) by mouth daily For blood pressure    LOPERAMIDE (IMODIUM A-D) 2 MG TABLET    Take 0.5-1 tablets (1-2 mg) by mouth daily as needed for diarrhea    MULTIVITAMIN (OCUVITE) TABS TABLET    Take 1 tablet by mouth daily    OXYBUTYNIN (DITROPAN) 5 MG TABLET    Take 1 tablet (5 mg) by mouth At Bedtime For frequent urination    PANTOPRAZOLE (PROTONIX) 40 MG EC TABLET    Take 1 tablet (40 mg) by mouth daily Takes in the morning.    POTASSIUM BICARB-CITRIC ACID 20 MEQ TBEF    Take 20 mg by mouth daily   Modified Medications    No medications on file   Discontinued Medications    No medications on file        No Known Allergies     Review of Systems   Constitutional: Positive for appetite change and fatigue. Negative for chills and fever.   HENT: Negative for congestion and sore throat.    Eyes: Negative for discharge.   Respiratory: Positive for shortness of breath. Negative for cough.    Cardiovascular: Negative for chest pain  "and leg swelling.   Gastrointestinal: Positive for blood in stool (dark). Negative for abdominal pain, diarrhea, nausea and vomiting.   Genitourinary: Negative for dysuria and frequency.   Musculoskeletal: Negative for myalgias.   Skin: Negative for color change and rash.   Neurological: Positive for light-headedness and headaches. Negative for weakness.   Hematological: Negative for adenopathy.   Psychiatric/Behavioral: Negative for agitation, behavioral problems and confusion.     A complete review of systems was performed with pertinent positives and negatives noted in the HPI, and all other systems negative.    Physical Exam   BP: (!) 140/40  Pulse: 85  Temp: 98  F (36.7  C)  Resp: 20  Height: 149.9 cm (4' 11\")  SpO2: 92 %      Physical Exam  Constitutional:       General: She is not in acute distress.     Appearance: She is well-developed.   HENT:      Head: Normocephalic and atraumatic.   Eyes:      Conjunctiva/sclera: Conjunctivae normal.      Pupils: Pupils are equal, round, and reactive to light.   Neck:      Musculoskeletal: Normal range of motion and neck supple.      Thyroid: No thyromegaly.      Trachea: No tracheal deviation.   Cardiovascular:      Rate and Rhythm: Normal rate and regular rhythm.      Heart sounds: Normal heart sounds. No murmur.   Pulmonary:      Effort: Pulmonary effort is normal. No respiratory distress.      Breath sounds: Normal breath sounds. No wheezing.   Chest:      Chest wall: No tenderness.   Abdominal:      General: There is no distension.      Palpations: Abdomen is soft.      Tenderness: There is no abdominal tenderness.   Genitourinary:     Rectum: Guaiac result positive.   Musculoskeletal:         General: No tenderness.   Skin:     General: Skin is warm.      Findings: No rash.   Neurological:      Mental Status: She is alert and oriented to person, place, and time.      Sensory: No sensory deficit.   Psychiatric:         Behavior: Behavior normal.         ED Course "        Procedures             Results for orders placed or performed during the hospital encounter of 02/18/21 (from the past 24 hour(s))   CBC with platelets differential   Result Value Ref Range    WBC 5.6 4.0 - 11.0 10e9/L    RBC Count 2.43 (L) 3.8 - 5.2 10e12/L    Hemoglobin 5.5 (LL) 11.7 - 15.7 g/dL    Hematocrit 19.0 (L) 35.0 - 47.0 %    MCV 78 78 - 100 fl    MCH 22.6 (L) 26.5 - 33.0 pg    MCHC 28.9 (L) 31.5 - 36.5 g/dL    RDW 19.1 (H) 10.0 - 15.0 %    Platelet Count 333 150 - 450 10e9/L    Diff Method Automated Method     % Neutrophils 72.7 %    % Lymphocytes 15.4 %    % Monocytes 10.6 %    % Eosinophils 0.4 %    % Basophils 0.4 %    % Immature Granulocytes 0.5 %    Nucleated RBCs 1 (H) 0 /100    Absolute Neutrophil 4.1 1.6 - 8.3 10e9/L    Absolute Lymphocytes 0.9 0.8 - 5.3 10e9/L    Absolute Monocytes 0.6 0.0 - 1.3 10e9/L    Absolute Eosinophils 0.0 0.0 - 0.7 10e9/L    Absolute Basophils 0.0 0.0 - 0.2 10e9/L    Abs Immature Granulocytes 0.0 0 - 0.4 10e9/L    Absolute Nucleated RBC 0.0    Comprehensive metabolic panel   Result Value Ref Range    Sodium 142 133 - 144 mmol/L    Potassium 4.2 3.4 - 5.3 mmol/L    Chloride 109 94 - 109 mmol/L    Carbon Dioxide 30 20 - 32 mmol/L    Anion Gap 4 3 - 14 mmol/L    Glucose 96 70 - 99 mg/dL    Urea Nitrogen 28 7 - 30 mg/dL    Creatinine 0.74 0.52 - 1.04 mg/dL    GFR Estimate 74 >60 mL/min/[1.73_m2]    GFR Estimate If Black 86 >60 mL/min/[1.73_m2]    Calcium 9.8 8.5 - 10.1 mg/dL    Bilirubin Total 0.3 0.2 - 1.3 mg/dL    Albumin 3.2 (L) 3.4 - 5.0 g/dL    Protein Total 6.4 (L) 6.8 - 8.8 g/dL    Alkaline Phosphatase 56 40 - 150 U/L    ALT 19 0 - 50 U/L    AST 19 0 - 45 U/L   INR   Result Value Ref Range    INR 1.11 0.86 - 1.14   Partial thromboplastin time   Result Value Ref Range    PTT 37 22 - 37 sec   ABO/Rh type and screen   Result Value Ref Range    Units Ordered 1     ABO B     RH(D) Pos     Antibody Screen Neg     Test Valid Only At          [facility]  [facility]    Specimen Expires 02/21/2021     Crossmatch Red Blood Cells    Asymptomatic SARS-CoV-2 COVID-19 Virus (Coronavirus) by PCR    Specimen: Nasopharyngeal   Result Value Ref Range    SARS-CoV-2 Virus Specimen Source Nasopharyngeal     SARS-CoV-2 PCR Result NEGATIVE     SARS-CoV-2 PCR Comment       Testing was performed using the Xpert Xpress SARS-CoV-2 Assay on the Cepheid Gene-Xpert   Instrument Systems. Additional information about this Emergency Use Authorization (EUA)   assay can be found via the Lab Guide.     Blood component   Result Value Ref Range    Unit Number [id number]     Blood Component Type Red Blood Cells Leukocyte Reduced     Division Number 00     Status of Unit Released to care unit 02/18/2021 1814     Blood Product Code [id number]     Unit Status ISS      *Note: Due to a large number of results and/or encounters for the requested time period, some results have not been displayed. A complete set of results can be found in Results Review.     Medications   0.9% sodium chloride BOLUS (1,000 mLs Intravenous New Bag 2/18/21 2654)     Followed by   sodium chloride 0.9% infusion (has no administration in time range)   0.9% sodium chloride BOLUS (has no administration in time range)             Assessments & Plan (with Medical Decision Making)   Patient is an 85-year-old female with a history of COPD, chronic cough, and pulmonary hypertension who presents with symptoms of lightheadedness and dyspnea on exertion.  She was seen at an outside clinic with labs drawn showing a hemoglobin of 5.6.  She denies melena but reports that her stools did seem darker in color.  She denies abdominal pain.  She has no cough.    A stool guaiac was obtained and was positive.  Patient's vital signs were within normal limits on arrival.  Her O2 saturations were 92%.  Her INR is 1.11 showing no evidence of a coagulopathy.  Her hemoglobin here is 5.5.  Patient's albumin and protein are on the  low side.  IV was established, patient was consented for transfusion, and will initially be transfused with 1 unit of packed red blood cells.    I reviewed her records and found her last colonoscopy was in 2010 and at that time showed a nonbleeding colonic angiectasia.  This was treated with thermal therapy and clips.  She also had internal hemorrhoids noted.    Upper GI endoscopy was performed at the same time which showed nonbleeding erythematous gastropathy and a single gastric angiectasia with a few nonbleeding angioma ectasias in the duodenum.  These were all treated with thermal therapy.    Patient remains hemodynamically stable.  She does not have an acute abdomen.  Her shortness of breath is likely related to her degree of anemia.  Her Covid test is negative.  She will be admitted to the hospital for serial hemoglobins, GI consultation, and potential need for continued transfusions.  I spoke to Dr Currie.    I have reviewed the nursing notes.    I have reviewed the findings, diagnosis, plan and need for follow up with the patient.    New Prescriptions    No medications on file       Final diagnoses:   Gastrointestinal hemorrhage, unspecified gastrointestinal hemorrhage type   Anemia due to blood loss, acute       2/18/2021   [facility] EMERGENCY DEPARTMENT     Del Nolan MD  02/18/21 2014

## 2021-02-18 NOTE — PROGRESS NOTES
I, Zafar Hameed MD saw the patient with the resident, and agree with the resident's findings and plan of care as documented in the resident's note.  I personally reviewed vital signs and past record.  /66   Pulse 80   Wt 44.5 kg (98 lb)   LMP  (LMP Unknown)   SpO2 90%   Breastfeeding No   BMI 20.48 kg/m     Key findings: lightheaded/dizziness/vertigo and increased HERRERA without wheezing or CP. Frontal throbbing pain with eye pain.  Appetite is poor, but she is trying additives.  Has dark stools, frequent. Ferritin is chronically low. No recent CBC.  She is about to get a cardiac cath. She is not aware of this, although I see that Ariadna Mckeon documented a call to her, so there may be a memory issue.      ADDENDUM: critically low Hgb, consistent with the concerned acute-on-chronic blood loss. Needs ED possible hospitalization.  Tried home number, no LVM on unIDd machine. Tried mobile, no LVM on unIDd machine. Tried home again, LVM nonspecific asking for pickup. Tried 's work number.     I will try again later today.    ADDENDUM2: reached the cell phone, they are already in the ED. Apparently someone else called about this (. [facility] wasn't sure whom). I reached Dr. Montgomery also, and updated her.    Time note ([phone], 69-83'): The total time (for me, on the date of service) for this service was 70 minutes, including discussion/face-to-face, chart review, interpretation not otherwise reported, documentation, and updating of the computerized record.

## 2021-02-18 NOTE — ED TRIAGE NOTES
Pt c/o SOB that started a few days ago. Also told by clinic that she has a low hbg and needs transfusion.

## 2021-02-18 NOTE — TELEPHONE ENCOUNTER
Update: I am trying to reach patient via her and 's phone numbers.  Saw today, has melena for a few weeks, and a Hgb of 5.6.    Documenting in the visit note

## 2021-02-19 ENCOUNTER — APPOINTMENT (OUTPATIENT)
Dept: CARDIOLOGY | Facility: CLINIC | Age: 86
DRG: 377 | End: 2021-02-19
Attending: PHYSICIAN ASSISTANT
Payer: MEDICARE

## 2021-02-19 PROBLEM — D18.01 CHERRY ANGIOMA: Status: ACTIVE | Noted: 2017-11-26

## 2021-02-19 PROBLEM — F41.9 ANXIETY: Status: ACTIVE | Noted: 2017-08-17

## 2021-02-19 PROBLEM — F32.1 MODERATE MAJOR DEPRESSION (H): Status: ACTIVE | Noted: 2017-08-17

## 2021-02-19 LAB
ABO + RH BLD: NORMAL
ABO + RH BLD: NORMAL
ALBUMIN SERPL-MCNC: 2.8 G/DL (ref 3.4–5)
ALBUMIN SERPL-MCNC: NORMAL G/DL (ref 3.4–5)
ALP SERPL-CCNC: 52 U/L (ref 40–150)
ALP SERPL-CCNC: NORMAL U/L (ref 40–150)
ALT SERPL W P-5'-P-CCNC: 17 U/L (ref 0–50)
ALT SERPL W P-5'-P-CCNC: NORMAL U/L (ref 0–50)
ANION GAP SERPL CALCULATED.3IONS-SCNC: 6 MMOL/L (ref 3–14)
ANION GAP SERPL CALCULATED.3IONS-SCNC: NORMAL MMOL/L (ref 6–17)
AST SERPL W P-5'-P-CCNC: 20 U/L (ref 0–45)
AST SERPL W P-5'-P-CCNC: NORMAL U/L (ref 0–45)
BILIRUB SERPL-MCNC: 0.4 MG/DL (ref 0.2–1.3)
BILIRUB SERPL-MCNC: NORMAL MG/DL (ref 0.2–1.3)
BLD GP AB SCN SERPL QL: NORMAL
BLD PROD TYP BPU: NORMAL
BLD PROD TYP BPU: NORMAL
BLD UNIT ID BPU: 0
BLOOD BANK CMNT PATIENT-IMP: NORMAL
BLOOD PRODUCT CODE: NORMAL
BPU ID: NORMAL
BUN SERPL-MCNC: 22 MG/DL (ref 7–30)
BUN SERPL-MCNC: NORMAL MG/DL (ref 7–30)
CALCIUM SERPL-MCNC: 9.2 MG/DL (ref 8.5–10.1)
CALCIUM SERPL-MCNC: NORMAL MG/DL (ref 8.5–10.1)
CHLORIDE SERPL-SCNC: 112 MMOL/L (ref 94–109)
CHLORIDE SERPL-SCNC: NORMAL MMOL/L (ref 94–109)
CO2 SERPL-SCNC: 26 MMOL/L (ref 20–32)
CO2 SERPL-SCNC: NORMAL MMOL/L (ref 20–32)
COPATH REPORT: NORMAL
CREAT SERPL-MCNC: 0.63 MG/DL (ref 0.52–1.04)
CREAT SERPL-MCNC: NORMAL MG/DL (ref 0.52–1.04)
ERYTHROCYTE [DISTWIDTH] IN BLOOD BY AUTOMATED COUNT: 18.5 % (ref 10–15)
ERYTHROCYTE [DISTWIDTH] IN BLOOD BY AUTOMATED COUNT: NORMAL % (ref 10–15)
GFR SERPL CREATININE-BSD FRML MDRD: 82 ML/MIN/{1.73_M2}
GFR SERPL CREATININE-BSD FRML MDRD: NORMAL ML/MIN/{1.73_M2}
GLUCOSE SERPL-MCNC: 82 MG/DL (ref 70–99)
GLUCOSE SERPL-MCNC: NORMAL MG/DL (ref 70–99)
HAPTOGLOB SERPL-MCNC: 232 MG/DL (ref 32–197)
HCT VFR BLD AUTO: 22.3 % (ref 35–47)
HCT VFR BLD AUTO: NORMAL % (ref 35–47)
HGB BLD-MCNC: 6.4 G/DL (ref 11.7–15.7)
HGB BLD-MCNC: 8.2 G/DL (ref 11.7–15.7)
HGB BLD-MCNC: 8.4 G/DL (ref 11.7–15.7)
HGB BLD-MCNC: NORMAL G/DL (ref 11.7–15.7)
INTERPRETATION ECG - MUSE: NORMAL
MCH RBC QN AUTO: 23.4 PG (ref 26.5–33)
MCH RBC QN AUTO: NORMAL PG (ref 26.5–33)
MCHC RBC AUTO-ENTMCNC: 28.7 G/DL (ref 31.5–36.5)
MCHC RBC AUTO-ENTMCNC: NORMAL G/DL (ref 31.5–36.5)
MCV RBC AUTO: 82 FL (ref 78–100)
MCV RBC AUTO: NORMAL FL (ref 78–100)
NUM BPU REQUESTED: 2
PLATELET # BLD AUTO: 295 10E9/L (ref 150–450)
PLATELET # BLD AUTO: NORMAL 10E9/L (ref 150–450)
POTASSIUM SERPL-SCNC: 3.9 MMOL/L (ref 3.4–5.3)
POTASSIUM SERPL-SCNC: NORMAL MMOL/L (ref 3.4–5.3)
PROT SERPL-MCNC: 5.6 G/DL (ref 6.8–8.8)
PROT SERPL-MCNC: NORMAL G/DL (ref 6.8–8.8)
RBC # BLD AUTO: 2.73 10E12/L (ref 3.8–5.2)
RBC # BLD AUTO: NORMAL 10E12/L (ref 3.8–5.2)
SODIUM SERPL-SCNC: 144 MMOL/L (ref 133–144)
SODIUM SERPL-SCNC: NORMAL MMOL/L (ref 133–144)
SPECIMEN EXP DATE BLD: NORMAL
TRANSFUSION STATUS PATIENT QL: NORMAL
TRANSFUSION STATUS PATIENT QL: NORMAL
WBC # BLD AUTO: 5.1 10E9/L (ref 4–11)
WBC # BLD AUTO: NORMAL 10E9/L (ref 4–11)

## 2021-02-19 PROCEDURE — 85018 HEMOGLOBIN: CPT | Performed by: INTERNAL MEDICINE

## 2021-02-19 PROCEDURE — 250N000013 HC RX MED GY IP 250 OP 250 PS 637: Performed by: NURSE PRACTITIONER

## 2021-02-19 PROCEDURE — 250N000013 HC RX MED GY IP 250 OP 250 PS 637: Performed by: PHYSICIAN ASSISTANT

## 2021-02-19 PROCEDURE — 120N000002 HC R&B MED SURG/OB UMMC

## 2021-02-19 PROCEDURE — 83516 IMMUNOASSAY NONANTIBODY: CPT | Performed by: INTERNAL MEDICINE

## 2021-02-19 PROCEDURE — 85027 COMPLETE CBC AUTOMATED: CPT | Performed by: INTERNAL MEDICINE

## 2021-02-19 PROCEDURE — C9113 INJ PANTOPRAZOLE SODIUM, VIA: HCPCS | Performed by: PHYSICIAN ASSISTANT

## 2021-02-19 PROCEDURE — 93306 TTE W/DOPPLER COMPLETE: CPT

## 2021-02-19 PROCEDURE — 80053 COMPREHEN METABOLIC PANEL: CPT | Performed by: INTERNAL MEDICINE

## 2021-02-19 PROCEDURE — 250N000011 HC RX IP 250 OP 636: Performed by: INTERNAL MEDICINE

## 2021-02-19 PROCEDURE — 93306 TTE W/DOPPLER COMPLETE: CPT | Mod: 26 | Performed by: INTERNAL MEDICINE

## 2021-02-19 PROCEDURE — P9016 RBC LEUKOCYTES REDUCED: HCPCS | Performed by: EMERGENCY MEDICINE

## 2021-02-19 PROCEDURE — 99233 SBSQ HOSP IP/OBS HIGH 50: CPT | Performed by: INTERNAL MEDICINE

## 2021-02-19 PROCEDURE — 258N000003 HC RX IP 258 OP 636: Performed by: INTERNAL MEDICINE

## 2021-02-19 PROCEDURE — 84446 ASSAY OF VITAMIN E: CPT | Performed by: INTERNAL MEDICINE

## 2021-02-19 PROCEDURE — 99223 1ST HOSP IP/OBS HIGH 75: CPT | Performed by: NURSE PRACTITIONER

## 2021-02-19 PROCEDURE — 84590 ASSAY OF VITAMIN A: CPT | Performed by: INTERNAL MEDICINE

## 2021-02-19 PROCEDURE — 82306 VITAMIN D 25 HYDROXY: CPT | Performed by: INTERNAL MEDICINE

## 2021-02-19 PROCEDURE — 250N000011 HC RX IP 250 OP 636: Performed by: PHYSICIAN ASSISTANT

## 2021-02-19 PROCEDURE — 36415 COLL VENOUS BLD VENIPUNCTURE: CPT | Performed by: INTERNAL MEDICINE

## 2021-02-19 RX ORDER — METHYLPREDNISOLONE SODIUM SUCCINATE 125 MG/2ML
125 INJECTION, POWDER, LYOPHILIZED, FOR SOLUTION INTRAMUSCULAR; INTRAVENOUS
Status: DISCONTINUED | OUTPATIENT
Start: 2021-02-19 | End: 2021-02-22 | Stop reason: HOSPADM

## 2021-02-19 RX ORDER — LIDOCAINE 4 G/G
1 PATCH TOPICAL
Status: DISCONTINUED | OUTPATIENT
Start: 2021-02-19 | End: 2021-02-22 | Stop reason: HOSPADM

## 2021-02-19 RX ORDER — SODIUM CHLORIDE 9 MG/ML
INJECTION, SOLUTION INTRAVENOUS CONTINUOUS
Status: DISCONTINUED | OUTPATIENT
Start: 2021-02-19 | End: 2021-02-19

## 2021-02-19 RX ORDER — DIPHENHYDRAMINE HYDROCHLORIDE 50 MG/ML
50 INJECTION INTRAMUSCULAR; INTRAVENOUS
Status: DISCONTINUED | OUTPATIENT
Start: 2021-02-19 | End: 2021-02-22 | Stop reason: HOSPADM

## 2021-02-19 RX ORDER — SODIUM CHLORIDE 9 MG/ML
INJECTION, SOLUTION INTRAVENOUS CONTINUOUS
Status: ACTIVE | OUTPATIENT
Start: 2021-02-20 | End: 2021-02-20

## 2021-02-19 RX ORDER — BISACODYL 5 MG
10 TABLET, DELAYED RELEASE (ENTERIC COATED) ORAL ONCE
Status: COMPLETED | OUTPATIENT
Start: 2021-02-19 | End: 2021-02-19

## 2021-02-19 RX ADMIN — BISACODYL 10 MG: 5 TABLET, COATED ORAL at 15:53

## 2021-02-19 RX ADMIN — PANTOPRAZOLE SODIUM 40 MG: 40 INJECTION, POWDER, FOR SOLUTION INTRAVENOUS at 09:25

## 2021-02-19 RX ADMIN — OXYBUTYNIN CHLORIDE 5 MG: 5 TABLET ORAL at 20:44

## 2021-02-19 RX ADMIN — PANTOPRAZOLE SODIUM 40 MG: 40 INJECTION, POWDER, FOR SOLUTION INTRAVENOUS at 20:41

## 2021-02-19 RX ADMIN — LEVOTHYROXINE SODIUM 75 MCG: 75 TABLET ORAL at 09:25

## 2021-02-19 RX ADMIN — POLYETHYLENE GLYCOL 3350, SODIUM SULFATE ANHYDROUS, SODIUM BICARBONATE, SODIUM CHLORIDE, POTASSIUM CHLORIDE 4000 ML: 236; 22.74; 6.74; 5.86; 2.97 POWDER, FOR SOLUTION ORAL at 17:28

## 2021-02-19 RX ADMIN — IRON SUCROSE 300 MG: 20 INJECTION, SOLUTION INTRAVENOUS at 15:53

## 2021-02-19 RX ADMIN — SODIUM CHLORIDE: 9 INJECTION, SOLUTION INTRAVENOUS at 23:23

## 2021-02-19 ASSESSMENT — ACTIVITIES OF DAILY LIVING (ADL)
ADLS_ACUITY_SCORE: 13
WHICH_OF_THE_ABOVE_FUNCTIONAL_RISKS_HAD_A_RECENT_ONSET_OR_CHANGE?: AMBULATION

## 2021-02-19 ASSESSMENT — MIFFLIN-ST. JEOR: SCORE: 811.63

## 2021-02-19 NOTE — PROGRESS NOTES
"Admission:  Patient arrived from emergency room at 2247 via litter and transport personnel. She is alert and oriented times 4 and pleasant. Arrived on 3 Liters of oxygen. Vitals are stable. When asked the reason for admission she reports, \"I was short of breath.\" Per report taken by the charge RNCarola, patient was anemic and received one unit of PRBC's in the Er today. She was oriented to the call light and notified to call for assistance with call button. Bed alarm activated for safety. Will order commode.   "

## 2021-02-19 NOTE — PROGRESS NOTES
"[facility], [place]    Medicine Progress Note - Hospitalist Service, Gold 11       Date of Admission: 2/18/2021    History leading to present hospitalization is unchanged from: 2/18/2021    Brief Hospital Course  This is a 85-year-old female with past medical history of abdominal aortic stenosis, COPD, hypertension, bilateral PE, enlarged pulmonary artery of unclear significance who presented secondary to dizziness and balance and orthostatics.  Initial concern was for GI bleed however no overt symptoms.  Patient does have known history of AV malformations.  Discussed case with gastroenterology who agrees upper and lower endoscopy tomorrow.     Assessment   Active Problems:    Chronic bilateral low back pain without sciatica    Hypothyroidism    Pulmonary embolism (H)    Anemia    Aortic stenosis    Iron deficiency    Vitamin D deficiency    Benign essential hypertension    Moderate major depression (H)    Anxiety    Cherry angioma    Pulmonary hypertension (H)    SOB (shortness of breath)    Anemia due to blood loss, acute    Gastrointestinal hemorrhage, unspecified gastrointestinal hemorrhage type  All present on admission prior to discharge.     Please note pulmonary HTN is suspected and not confirmed    Plan  Discussed with gastroenterology.  Upper and lower endoscopy planned for tomorrow.  Bowel prep ordered  We will transfuse an additional unit as hemoglobin is ranged 6.7-6.4 last check.  Iron deficiency anemia noted.  Will obtain further malabsorption work-up.  Additionally will start Venofer.   No evidence of intravascular hemolysis noted.  Retake count suggestive of hypo proliferation.  NS at 50ml/hr while NPO  Lidoderm for back pain     Subjective   Chief complaint: \"I feel good and would like to go home\"    No fever or chills  Denies any overt GI bleed  No CP or ab pain  No bowel changes  No Urinary changes  No skin changes or bruising    Review of Systems  4 point ROS " "performed and is negative except as mention above     Physical Exam  Vital signs:  Temp: 96.8  F (36  C) Temp src: (P) Axillary BP: (!) 147/71 Pulse: 74   Resp: 28 SpO2: (P) 97 % O2 Device: (P) Nasal cannula Oxygen Delivery: (P) 3 LPM Height: 149.9 cm (4' 11\")    Estimated body mass index is 19.79 kg/m  as calculated from the following:    Height as of this encounter: 1.499 m (4' 11\").    Weight as of an earlier encounter on 2/18/21: 44.5 kg (98 lb).        Physical Exam  Constitutional:       Appearance: Normal appearance.   HENT:      Head: Normocephalic.      Nose: Nose normal.      Mouth/Throat:      Mouth: Mucous membranes are moist.   Eyes:      Extraocular Movements: Extraocular movements intact.      Pupils: Pupils are equal, round, and reactive to light.   Cardiovascular:      Rate and Rhythm: Normal rate and regular rhythm.      Heart sounds: No murmur. No friction rub. No gallop.    Pulmonary:      Effort: Pulmonary effort is normal. No respiratory distress.      Breath sounds: No stridor. No wheezing.   Abdominal:      General: Abdomen is flat. There is no distension.      Tenderness: There is no abdominal tenderness.   Neurological:      General: No focal deficit present.      Mental Status: She is alert.      Cranial Nerves: No cranial nerve deficit.      Sensory: No sensory deficit.      Motor: No weakness.   Psychiatric:         Behavior: Behavior normal.           Labs  Lab Results   Component Value Date    WBC 5.1 02/19/2021    HGB 6.4 (LL) 02/19/2021    HCT 22.3 (L) 02/19/2021     02/19/2021     02/19/2021    POTASSIUM 3.9 02/19/2021    CHLORIDE 112 (H) 02/19/2021    CO2 26 02/19/2021    BUN 22 02/19/2021    CR 0.63 02/19/2021    GLC 82 02/19/2021    DD 1.3 (H) 01/26/2018    NTBNPI 4,173 (H) 02/18/2021    NTBNP 536 (H) 11/30/2020    TROPONIN 0.02 04/29/2017    TROPI 0.031 02/18/2021    AST 20 02/19/2021    ALT 17 02/19/2021    ALKPHOS 52 02/19/2021    BILITOTAL 0.4 02/19/2021    INR " 1.11 2021         Imaging   Echo Complete    Result Date: 2021  [id number]^DEBBI^RATNA^[facility],[place] [facility] [address] [place] [id number]  Name: ISAURO GUZMAN MRN: [id number] : 1935 Study Date: 2021 07:42 AM Age: 85 yrs Gender: Female Patient Location: [facility] Reason For Study: Heart Failure, Unspecified Ordering Physician: RATNA WERNER Performed By: Gracie Enriquez RDCS  BSA: 1.4 m2 Height: 59 in Weight: 100 lb BP: 145/47 mmHg _____________________________________________________________________________ __   Procedure Complete Portable Echo Adult. _____________________________________________________________________________ __   Interpretation Summary Global and regional left ventricular function is normal with an EF of 60-65%. Global right ventricular function is normal. IVC diameter and respiratory changes fall into an intermediate range suggesting an RA pressure of 8 mmHg. No significant valvular abnormalities were noted. Trivial pericardial effusion is present.  This study was compared with the study from 2020 .There has been no significant change. _____________________________________________________________________________ __   Left Ventricle Left ventricular size is normal. Global and regional left ventricular function is normal with an EF of 60-65%. Relative wall thickness is increased consistent with concentric remodeling. Grade II or moderate diastolic dysfunction.  Right Ventricle The right ventricle is normal size. Global right ventricular function is normal.  Atria The right atria appears normal. Moderate left atrial enlargement is present.   Mitral Valve Mild mitral annular calcification is present. Trace mitral insufficiency is present.  Aortic Valve The aortic valve is tricuspid.  Tricuspid Valve The tricuspid valve is normal. Trace tricuspid insufficiency is present.   Pulmonic Valve The pulmonic valve is normal.  Vessels The aorta root is normal. The thoracic aorta is normal. IVC diameter and respiratory changes fall into an intermediate range suggesting an RA pressure of 8 mmHg.  Pericardium Trivial pericardial effusion is present.   Compared to Previous Study This study was compared with the study from 2020 . There has been no change. _____________________________________________________________________________ __  MMode/2D Measurements & Calculations IVSd: 0.87 cm LVIDd: 4.2 cm LVIDs: 1.9 cm LVPWd: 1.2 cm FS: 54.7 % LV mass(C)d: 144.9 grams LV mass(C)dI: 105.5 grams/m2 Ao root diam: 3.0 cm asc Aorta Diam: 3.0 cm LVOT diam: 1.9 cm LVOT area: 2.8 cm2 LA Volume (BP): 62.6 ml LA Volume Index (BP): 45.7 ml/m2  RWT: 0.58   Doppler Measurements & Calculations MV E max jese: 96.0 cm/sec MV A max jese: 136.0 cm/sec MV E/A: 0.71 MV max P.1 mmHg MV mean P.0 mmHg MV V2 VTI: 36.5 cm MV P1/2t max jese: 113.0 cm/sec MV P1/2t: 76.1 msec MVA(P1/2t): 2.9 cm2 MV dec slope: 435.0 cm/sec2 MV dec time: 0.21 sec Ao V2 max: 129.0 cm/sec Ao max P.0 mmHg E/E' av.1 Lateral E/e': 16.4 Medial E/e': 15.8   _____________________________________________________________________________ __   Report approved by: Anders GUAJARDO 2021 08:40 AM      Xr Chest Port 1 View    Result Date: 2021  Exam: XR CHEST PORT 1 VW, 2021 9:55 PM Indication: Dyspnea Comparison: 2020 Findings: Heart within normal limits. Diffuse interstitial process throughout the lungs. A prominent right hilum.     Impression: Interstitial changes suggesting increased edema. Prominent right hilum suggests possible pulmonary hypertension, not dissimilar to CT of 2020. MD Parveen OROZCO MD on 2021 at 11:18 AM    Time Spent: 25+ minutes

## 2021-02-19 NOTE — PLAN OF CARE
Assumed cares from 2300 to 0700.  A&Ox4, slightly hypertensive (/50) otherwise VSS on 3L O2.  Denies pain.  Dyspnea on exertion.  A-1 to bathroom, denies dizziness.  Voiding painlessly, BMx1.  Tele initiated, shows SR.  PIV x2 SL.  NPO.  Continue to monitor and follow POC.     2 RN skin check for admission (with Aislinn Nguyen RN) - bruising on bilateral hands, dry/flaky feet.  Brace on 2nd toe on R foot removed for skin check.

## 2021-02-19 NOTE — CONSULTS
GASTROENTEROLOGY CONSULTATION      Date of Admission:  2/18/2021          ASSESSMENT AND RECOMMENDATIONS:   Assessment:  Lucie Stockton is a 85 year old female admitted on 2/18/2021. She has PMH of abdominal aortic stenosis, COPD, HTN, Bilateral PE (on eliquis), PVD, Graves disease, JUDY, suspected pulmonary HTN with symptomatic anemia for which GI is consulted.    #Acute on chronic JUDY anemia  #History AVMs  #Aortic stenosis  VSS. Hgb 5.5 >>>>> 6.4 with 1 unit(s) PRBC over ~16 hours. Apparent JUDY. No overt bleeding on exam. History of AVMs previously treated with symptomatic anemia in the setting of aortic stenosis (risk factor) and anticoagulation. Suspect slow oozing bleed of AVM. Lower suspicion UGI/colonic mass, PUD, hemorrhoidal, or diverticular bleeding.       Recommendations  -Continue to monitor stools  -Volume resuscitate as able, transfuse hgb < 7 from GI standpoint  -EGD/colonoscopy +/- pill cam tomorrow   -CLD this evening  -NPO 0000  -Prep with 4 L Golytely starting at 1600 and another 2 L at 0400. If not clear at 0600 administer another 2 L after this (NPO from prep by 0700)    GI will continue to follow. Thank you for involving us in this patient's care. Please do not hesitate to contact the GI service with any questions or concerns.     Pt care plan discussed with Dr. Reddy, GI staff physician.    ALEKSEY Yost CNP  Text page  -------------------------------------------------------------------------------------------------------------------          Chief Complaint:   We were asked by Dr. Alberto of medicine to evaluate this patient with symptomatic anemia and history of AVMs    History is obtained from the patient and the medical record.          History of Present Illness:   Lucie Stockton is a 85 year old female admitted on 2/18/2021. She has PMH of abdominal aortic stenosis, COPD, HTN, Bilateral PE (on eliquis), PVD, Graves disease, JUDY, suspected pulmonary HTN with symptomatic anemia for  "which GI is consulted.    Hgb noted to be 5.5 from base of ~10-11. Two weeks wtih dizziness and orthostasis. Pt transferred from clinic to ED for further evaluation. She has 1-2, usually formed bm daily. This has not changed. She has not seen dark stools or overt blood in stool at home. No BRB with wiping.     Takes Eliquis. No NSAIDs. Doesn't smoke. Hx AVMs of gastric, duodenum, and colon in 2010 with another episode of anemia (some treated with APC, though none bleeding).  She was noted to have internal hemorrhoids and no diverticulosis at that time. She has not had other episodes of bleeding or other endoscopy that she recalls.            Past Medical History:   Reviewed and edited as appropriate  Past Medical History:   Diagnosis Date     Anemia      Aortic stenosis     abdominal     Atherosclerosis of aorta (H)     \"extensive disease with near occlusion below level of renal arteries\" on CT     Atherosclerosis of arteries of extremities (H)      Back pain     severe multilevel DJD, moderate spinal canal stenosis L4-5, severe L3-4     Chronic pain     Back, hips, knees, legs     Degenerative joint disease      DVT of lower extremity, bilateral (H)     5/24/10 left distal femoral and great saphenous, 7/7/10 left:  extending to cfv, iliac , pop and post tibial, peronial, right:  distal fem and peroneal      Grave's disease      Hyperlipidaemia LDL goal < 70      Hypertension, essential      Hypothyroidism      Imbalance      Insomnia      Intermittent claudication (H)      Iron deficiency      Knee pain      Lumbar stenosis with neurogenic claudication      Osteoarthritis     ankle,foot, knee     Osteoporosis      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, 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     Pulmonary embolism (H)     5/24/10 bilateral     Small bowel obstruction (H) 1/23/17     Varicose veins             Past Surgical History:   Reviewed and " edited as appropriate   Past Surgical History:   Procedure Laterality Date     BUNIONECTOMY       C STOMACH SURGERY PROCEDURE UNLISTED      Please see chart     COLONOSCOPY      11/10/10 angioectasia     ENDOBRONCHIAL ULTRASOUND FLEXIBLE N/A 1/24/2020    Procedure: ENDOBRONCHIAL ULTRASOUND, WITH FLEXIBLE BRONCHOSCOPY;  Surgeon: Gopal Jara MD;  Location: [facility] OR     HYSTERECTOMY TOTAL ABDOMINAL, BILATERAL SALPINGO-OOPHORECTOMY, NODE DISSECTION, COMBINED  2/17/11    SANGEETHA, EMILIA, LN bx, omentectomy, resection of evrian malignancy Dr. JONO Goncalves - Returned BENIGN     INJECT EPIDURAL LUMBAR / SACRAL SINGLE N/A 1/5/2018    Procedure: INJECT EPIDURAL LUMBAR / SACRAL SINGLE;  lumbar interlaminar epidural steroid injection;  Surgeon: Jaylin Valadez MD;  Location: [facility] OR     INJECT SACROILIAC JOINT Right 3/7/2018    Procedure: INJECT SACROILIAC JOINT;  Right Sacroiliac Joint Injection;  Surgeon: Flavio Harrison MD;  Location: [facility] OR     INJECT SACROILIAC JOINT Bilateral 12/7/2018    Procedure: Bilateral Sacroiliac Joint Injections;  Surgeon: Faviola Cosby MD;  Location: [facility] OR     OPTICAL TRACKING SYSTEM BRONCHOSCOPY N/A 1/24/2020    Procedure: Super D navigational bronchoscopy;  Surgeon: Gopal Jara MD;  Location: [facility] OR     UPPER GI ENDOSCOPY      11/10/10 erythematous gastropathy, angioectasia            Previous Endoscopy:     Results for orders placed or performed in visit on 11/10/10   COLONOSCOPY   Result Value Ref Range    COLONOSCOPY       [facility]  Endoscopy Department-[facility]  _______________________________________________________________________________  Patient Name: Lucie Stockton             Procedure Date: 11/10/2010 08:14:07 AM      MRN: [id number]                       Account Number: [id number]                  YOB: 1935              Admit Type: Outpatient                      Age: 75                               Room: [facility]                                 Gender: Female                         Note Status: Finalized                      Attending MD: Verito Cabello MD   Pause for the Cause: Pause for the cause   _______________________________________________________________________________     Procedure:           Colonoscopy  Indications:         74 yo female with hx of PEs/DVT, now with iron                        deficiency anemia and Hgb.  Providers:           Verito Cabello MD, Yenny March, RN  Referring MD:        Marii Montgomery MD  Medicines:           Fentanyl  mcgs, Versed IV 3 mgs  Complications:       No immediate complications  _______________________________________________________________________________  Procedure:           Pre-Anesthesia Assessment:                       - Prior to the procedure, a History and Physical was                        performed, and patient medications and allergies were                        reviewed. The patient is competent. The risks and                        benefits of the procedure and the sedation options and                        risks were discussed with the patient. All questions                        were answered and informed consent was obtained. Patient                        identification and proposed procedure were verified by                        the physician in the procedure room. Mental Status                        Examination: alert and oriented. Airway Examination:                        normal oropharyngeal airway and neck mobility.                        Respiratory Examination: clear to auscultation. CV                        Examination: normal. Prophylactic Antibiotics: The                        patient does not require prophylactic antibiotics. Prior                        Anticoagulants: The patient has taken aspirin and                        Lovenox, last doses were 1 day prior to procedure. ASA                        Grade Assessment: III - A patient with severe systemic                         disease. After reviewing the risks and benefits, the                        patient was deemed in satisfactory condition to undergo                        the procedure. The anesthesia plan was to use minimal                        sedation / analgesia (anxiolysis). Immediately prior to                        administration of medications, the patient was                        re-assessed for adequacy to receive sedatives. The heart                        rate, respiratory rate, oxygen saturations, blood                        pressure, adequacy of pulmonary ventilation, and                        response to care were monitored throughout the                        procedure. The physical status of the patient was                        re-assessed after the procedure.                       After obtaining informed consent, the colonoscope was                        passed under direct vision. Throughout the procedure,                        the patient's blood pressure, pulse, and oxygen                        saturations were monitored continuously. The Colonoscope                        was introduced through the anus and advanced to the                        terminal ileum. The colonoscopy was performed without                        difficulty. The patient tolerated the procedure well.                        The quality of the bowel preparation was good.                                                                                   Findings:       The terminal ileum appeared normal. A single small angioectasia with no        bleeding was found in the cecum. Coagulation for tissue destruction        using argon plasma at 0.5 liters/minute and 20 alamo was successful.        There was a post-treatment ulcer (expected) which was treated with 2        clips to minimize the risk of bleeding. Multiple small-mouthed        diverticula were found in the sigmoid colon and in the descending colon.         Internal hemorrhoids were found during retroflexion and were mild.                                                                                   Impression:          - The examined portion of the ileum was normal.                       - A single non-bleeding colonic angioectasia. Treated                        with thermal therapy. Two clips placed to minimize risk                        of bleeding on anticoagulation.                       - Internal hemorrhoids.  Recommendation:      - Perform an upper GI endoscopy today.                                                                                     electronically signed by PAM Cabello  ______________________  Verito Cabello MD  Signed Date: 11/10/2010 11:05:43 AM  Number of Addenda: 0  I was physically present for the entire viewing portion of the exam.      __________________________  Signature of teaching physician    B4c/D4c  Note initiated on 11/10/2010 08:14:07 AM            Social History:   Reviewed and edited as appropriate  Social History     Socioeconomic History     Marital status:      Spouse name: Not on file     Number of children: Not on file     Years of education: Not on file     Highest education level: Not on file   Occupational History     Not on file   Social Needs     Financial resource strain: Not on file     Food insecurity     Worry: Not on file     Inability: Not on file     Transportation needs     Medical: Not on file     Non-medical: Not on file   Tobacco Use     Smoking status: Former Smoker     Packs/day: 0.50     Years: 15.00     Pack years: 7.50     Quit date: 1993     Years since quittin.4     Smokeless tobacco: Never Used   Substance and Sexual Activity     Alcohol use: Yes     Comment: social     Drug use: No     Sexual activity: Not Currently     Partners: Male   Lifestyle     Physical activity     Days per week: Not on file     Minutes per session: Not on file     Stress: Not on file  "  Relationships     Social connections     Talks on phone: Not on file     Gets together: Not on file     Attends Islam service: Not on file     Active member of club or organization: Not on file     Attends meetings of clubs or organizations: Not on file     Relationship status: Not on file     Intimate partner violence     Fear of current or ex partner: Not on file     Emotionally abused: Not on file     Physically abused: Not on file     Forced sexual activity: Not on file   Other Topics Concern     Parent/sibling w/ CABG, MI or angioplasty before 65F 55M? Not Asked   Social History Narrative     for 52 years. Retired from [employer]. Frequent world travel.            Family History:   Reviewed and edited as appropriate  Family History   Problem Relation Age of Onset     Breast Cancer Maternal Aunt 80     C.A.D. Father 54     No Known Problems Mother         No known history of colorectal cancer, liver disease, or inflammatory bowel disease.       Allergies:   Reviewed and edited as appropriate   No Known Allergies         Medications:     Current Facility-Administered Medications   Medication     0.9% sodium chloride BOLUS     levothyroxine (SYNTHROID/LEVOTHROID) tablet 75 mcg     lidocaine (LMX4) cream     lidocaine 1 % 0.1-1 mL     melatonin tablet 1 mg     ondansetron (ZOFRAN-ODT) ODT tab 4 mg    Or     ondansetron (ZOFRAN) injection 4 mg     oxybutynin (DITROPAN) tablet 5 mg     pantoprazole (PROTONIX) IV push injection 40 mg     sodium chloride (PF) 0.9% PF flush 3 mL     sodium chloride (PF) 0.9% PF flush 3 mL             Review of Systems:     A complete review of systems was performed and is negative except as noted in the HPI           Physical Exam:   BP (!) 159/52 (BP Location: Right arm)   Pulse 74   Temp 98.3  F (36.8  C) (Oral)   Resp 18   Ht 1.499 m (4' 11\")   LMP  (LMP Unknown)   SpO2 97%   BMI 19.79 kg/m    Wt:   Wt Readings from Last 2 Encounters:   02/18/21 44.5 kg " (98 lb)   02/16/21 44.5 kg (98 lb)      Constitutional: cooperative, pleasant, not dyspneic/diaphoretic, no acute distress  Eyes: Sclera anicteric/injected  Ears/nose/mouth/throat: Normal oropharynx without ulcers or exudate, mucus membranes moist, hearing intact  Neck: supple without obvious mass  CV: No edema  Respiratory: Unlabored breathing  Lymph: No submandibular, supraclavicular or inguinal lymphadenopathy  Abd:  Nondistended, +bs, no hepatosplenomegaly, nontender, no peritoneal signs  Skin: warm, perfused, no jaundice  Neuro: AAO x 3, No asterixis  Psych: Normal affect  MSK: No gross deformities  Rectal: external hemorrhoids without apparent fissure or mass. Small amount formed, brown stool in rectal vault          Data:   Labs and imaging below were independently reviewed and interpreted    BMP  Recent Labs   Lab 02/18/21 1715 02/18/21  1502    142   POTASSIUM 4.2 4.2   CHLORIDE 109 109   CANDIDA 9.8 9.6   CO2 30 29   BUN 28 29   CR 0.74 0.77   GLC 96 88     CBC  Recent Labs   Lab 02/18/21 2108 02/18/21  1715 02/18/21  1502   WBC 5.3 5.6 5.7   RBC 2.83* 2.43* 2.49*   HGB 6.7* 5.5* 5.6*   HCT 23.0* 19.0* 20.0*   MCV 81 78 80   MCH 23.7* 22.6* 22.5*   MCHC 29.1* 28.9* 28.0*   RDW 18.6* 19.1* 19.2*    333 330     INR  Recent Labs   Lab 02/18/21 1715   INR 1.11     LFTs  Recent Labs   Lab 02/18/21  1715 02/18/21  1502   ALKPHOS 56 60   AST 19 22   ALT 19 17   BILITOTAL 0.3 0.2   PROTTOTAL 6.4* 6.3*   ALBUMIN 3.2* 3.0*      PANCNo lab results found in last 7 days.    Imaging:    @CT-scan of abdomen and pelvis@

## 2021-02-19 NOTE — PROGRESS NOTES
"SPIRITUAL HEALTH SERVICES  SPIRITUAL ASSESSMENT Progress Note  [facility] 5B     REFERRAL SOURCE: pt request    Pt stated that \"her daughter is a Confucianism \" and described how she was hoping that if her daughter put n her collar and a suit she could sneak in here to visit the pt. I gently discouraged this, but shared her delight in scheming and affirmed her desire to have her daughter visit.     Visit was cut short by cares, but assured pt that I will follow-up later.     PLAN: Will follow later per pt request. Spiritual health services remains available for any follow-up or requests    Deysi Solares  Chaplain Resident  Pager: [phone]    "

## 2021-02-19 NOTE — ED NOTES
Report called to 5B. Pt to be admitted to  room 34 bed 2.    Abdomen soft, nontender, nondistended, bowel sounds present in all 4 quadrants.

## 2021-02-19 NOTE — PROVIDER NOTIFICATION
Provider notified (name): Dr. Alberto  Reason for notification: Notified by lab of critical value: Hgb 6.4  Recommendation/request given to provider: Do you want to order any blood products?  Response from provider: Dr. Alberto spoken to in person. Stated he will order 1 unit RBC and IV iron (to be given after RBC).

## 2021-02-19 NOTE — SUMMARY OF CARE
Pt arrive to the unite with the following belonging  flip phone  pant  purse   credit card   debit card  Coat  2sweater   shoes   bra and   $180 cash pt wanna to keep her cash at bedside.

## 2021-02-19 NOTE — PROGRESS NOTES
"CLINICAL NUTRITION SERVICES - ASSESSMENT NOTE     Nutrition Prescription    RECOMMENDATIONS FOR MDs/PROVIDERS TO ORDER:  Diet advancement as tolerated to regular     Malnutrition Status:    Severe malnutrition in the context of acute condition     Recommendations already ordered by Registered Dietitian (RD):  None due to NPO    Future/Additional Recommendations:  Ensure Enlive twice daily, chocolate flavor        REASON FOR ASSESSMENT  Lucie Stockton is a/an 85 year old female assessed by the dietitian for Admission Nutrition Risk Screen for positive and Provider Order - severe protein-calorie malnutrition    Chart review: Past history abdominal aortic stenosis, COPD, hypertension, bilateral PE, enlarged pulmonary artery of unclear significance,    --Presented secondary to dizziness and balance and orthostatics.  Initial concern was for GI bleed however no overt symptoms.  Patient does have known history of AV malformations.  Plan for upper and lower endoscopy today.       NUTRITION HISTORY  Visited with patient today. Patient reports a 2 week history of decrease po and appetite. Patient lives with her spouse. They usually order out dinners. Patient has been utilizing Ensure oral supplements x 2 daily to improve her intake.     Factors effecting PO: Poor appetite     CURRENT NUTRITION ORDERS  Diet: NPO  Intake/Tolerance: Kept NPO since admit yesterday for test today. Patient reports being hungry and would like to eat.     LABS  Labs reviewed    MEDICATIONS  IV Iron  Methyl prednisone     ANTHROPOMETRICS  Height: 149.9 cm (4' 11\")  Most Recent Weight: N/A, 44.5 kg (98 lb) previous wt on 2/16/21    IBW: 44.5 kg  BMI:19.79 kg /m2  Normal BMI  Weight History: 2.7 kg wt loss over the past month ( 5.7% net wt loss in 1 month )  Wt Readings from Last 10 Encounters:   02/18/21 44.5 kg (98 lb)   02/16/21 44.5 kg (98 lb)   01/30/20 44.6 kg (98 lb 6.4 oz)   01/24/20 46.6 kg (102 lb 11.8 oz)   01/14/20 47.6 kg (105 lb) "   01/14/20 47.2 kg (104 lb 1.6 oz)   01/07/20 49 kg (108 lb)   12/05/19 47.6 kg (105 lb)   11/20/19 48 kg (105 lb 12.8 oz)   11/13/19 47.9 kg (105 lb 9.6 oz)       Dosing Weight:  45 kg most recent wt on 2/16/21    ASSESSED NUTRITION NEEDS  Estimated Energy Needs: 1125 - 1350 kcals/day (25 - 30 kcals/kg)  Justification: Maintenance  Estimated Protein Needs: 45 - 54  grams protein/day (1 - 1.2 grams of pro/kg)  Justification: Maintenance  Estimated Fluid Needs:  (1 mL/kcal)   Justification: Maintenance    PHYSICAL FINDINGS  See malnutrition section below    MALNUTRITION  % Intake: </=75% for >/= 1 month (severe)  % Weight Loss: > 5% in 1 month (severe)  Subcutaneous Fat Loss: Facial region:  Mild   Muscle Loss: Moderate Sarcopenia upper arm,   Fluid Accumulation/Edema: None noted  Malnutrition Diagnosis: Severe malnutrition in the context of acute condition     NUTRITION DIAGNOSIS  Inadequate oral intake related to decrease in appetite as evidenced by patients report and a recent wt loss 5.7% over the past month      INTERVENTIONS  Implementation  Nutrition Education: Role of RD in nutrition care and oral supplement availabilities   Medical food supplement therapy     Goals  Patient to consume % of nutritionally adequate meal trays TID, or the equivalent with supplements/snacks.     Monitoring/Evaluation  Progress toward goals will be monitored and evaluated per protocol.    Ivon Currie RD/SHAMIKA  Pager [phone]

## 2021-02-19 NOTE — H&P
[facility]    History and Physical - Hospitalist Service, [facility]        Date of Admission:  2/18/2021    Assessment & Plan   Lucie Stockton is a 85 year old female admitted on 2/18/2021. She has PMH of abdominal aortic stenosis, COPD, HTN, Bilateral PE, PVD, Graves disease, JUDY, suspected pulmonary HTN d/t enlarged pulmonary artery who presents to the ED from clinic for evaluation of ~ 2 week hx of dizziness, imbalance, and orthostatic sx. Patient found to have severe anemia w/ Hgb of 5.5, prompting admission to Medicine for further evaluation and care.       ## Symptomatic Anemia:  thought 2/2 UGIB/LGIB and JUDY:  Pt presenting with ~ 2 weeks of orthostatic sx, fatigue, sob. Though no hx of BRBPR, hematemesis, coffe-ground emesis, melena, hematochezia. NoHistory of previous GIB. Is maintained on daily eliquis. Previous EGD (2010) with Angioectasia, though no mass or polyp. No hx of  NSAID use, Etoh, PUD, H.plori, diverticular dz, or hemorrhoids. On admission: Hemoglobin 5.5 (10.6- 11/2020), platelets 333, INR 1.11, Cr 0.74 BUN 28, AST/ALT: 19/19, TBili 0.3, BL Hgb appears 10-11. Vital signs stable.  Patient received 1 U PRBC and 1L IVF. Possible GIB w/ JUDY. Negative Covid PCR.   - NPO  - Please ensure patient has 2 large bore IV's   - Type and screen  - Consent for transfusion signed and in chart  - Serial hgb's Q8 for now, transfuse if Hgb less than 7.0 or with hemodynamically significant bleed- will hold off on additional PRBC for now w/ hx of Diastolic HF and suspected Pulmonary HTN   - Check Fibrinogen, LDH, haptoglobin  - Peripheral smear prior to PRBC transfusion   - PPI Protonix IV q12h  - Pulse ox  - Telemetry  - GI consult placed. Please discuss with in am   - CBC, CMP in am   - Continue to closely monitor  - Add NT-BNP, EKG, Troponin, CXR  - Add Mg, Phos    ## Severe JUDY: Iron studies from 2/16: Iron 9, Iron saturation index 2, .  - CBC in  am. W/u as above  - Will need Iron repletion    ## Possible Pulmonary HTN  ## Diastolic HF: ECHO 1/2020: EF 55-60% w/ Grade II or moderate diastolic dysfunction. PA systolic pressure cannot be assessed; IVC less than 2.1 cm collapsing > 50 % w/ sniff.  Pulmonary HTN thought s/t CTEPH since VQ scan showed perfusion defects in right middle and lower lobes and CTPE showed diminutive appearance of RML and RLL arteries w/ chronic appearing filing defect of the RLL pulmonary artery. Follows w/ Dr. Florian. Is to have RHC in near future.   - F/u with Dr. Florian  - Judicious use of fluids   - Monitor     ## Pulmonary nodules: Multiple and thought to be metastatic dz w/o tissue dx. Follows w/ Dr. Jara. Hx of EBUS, though unclear if nodule was sampled.   - F/u with Dr. Jara as scheduled    ## Hx of DVT/PE:  PTA Eliquis   - HOLD tonight    ## Severe protein-calorie Malnutrition: Albumin 3.2,Total protein 6.4. Reports of ~ 20 lb weight loss.   - Nutrition consult placed  - Monitor lytes    ## COPD: No PTA medicaton or O2 use.     ## HTN: BP mildly elevated on admission. PTA Norvasc, hydrochlorothiazide, lisinopril.   - Hold tonight    # Depression/Anxiety: Appears to have elevation in sx w/ a/c medical. PTA citalopram.   - Hold tonight    ## Spinal stenosis w/ neurogenic claudication: PTA gabapentin  - Hold tonight     ## Urinary Frequency: PTA ditropan  - Continue    # Hypothyroidism: PTA synthroid  - Continue      Diet:  NPO  DVT Prophylaxis: Pneumatic Compression Devices  Saha Catheter: not present  Code Status:  Full Code         Disposition Plan   Expected discharge: 2 - 3 days, recommended to prior living arrangement once hemoglobin stable.  Entered: Kathryn Soto PA-C 02/18/2021, 8:19 PM     The patient's care was discussed with the Attending Physician, Dr. Malcolm.    Kathryn Soto PA-C  [facility]  Contact information available via [facility] Paging/Directory  Please  "see sign in/sign out for up to date coverage information    ______________________________________________________________________    Chief Complaint   Symptomatic Anemia    History is obtained from the patient    History of Present Illness   Lucie Stockton is a 85 year old female who presents from clinic for evaluation of ~ 2 week hx of progressive fatigue, orthostatic sx and sob. Patient reports that she presented to clinic for evaluation of the aforementioned and recommendation made for ED evaluation as Hgb noted to be 5.6 and patient symptomatic. No reports of melena, hematochezia, hematemesis, hematuria, fever, abdominal pain, hxof previous GIB, etoh use, Chronic NSAID use, trauma or new medications. She is maintained on eliquis. No Home o2 use, cough or CP. We discussed POC as outlined above and patient agreeable.      Currently, patient w/o complaint. She is resting comfortably in ED bed.   Review of Systems    The 10 point Review of Systems is negative other than noted in the HPI or here.     Past Medical History    I have reviewed this patient's medical history and updated it with pertinent information if needed.   Past Medical History:   Diagnosis Date     Anemia      Aortic stenosis     abdominal     Atherosclerosis of aorta (H)     \"extensive disease with near occlusion below level of renal arteries\" on CT     Atherosclerosis of arteries of extremities (H)      Back pain     severe multilevel DJD, moderate spinal canal stenosis L4-5, severe L3-4     Chronic pain     Back, hips, knees, legs     Degenerative joint disease      DVT of lower extremity, bilateral (H)     5/24/10 left distal femoral and great saphenous, 7/7/10 left:  extending to cfv, iliac , pop and post tibial, peronial, right:  distal fem and peroneal      Grave's disease      Hyperlipidaemia LDL goal < 70      Hypertension, essential      Hypothyroidism      Imbalance      Insomnia      Intermittent claudication (H)      Iron deficiency  "     Knee pain      Lumbar stenosis with neurogenic claudication      Osteoarthritis     ankle,foot, knee     Osteoporosis      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  Past Surgical History:   Procedure Laterality Date     BUNIONECTOMY       C STOMACH SURGERY PROCEDURE UNLISTED      Please see chart     COLONOSCOPY      11/10/10 angioectasia     ENDOBRONCHIAL ULTRASOUND FLEXIBLE N/A 1/24/2020    Procedure: ENDOBRONCHIAL ULTRASOUND, WITH FLEXIBLE BRONCHOSCOPY;  Surgeon: Gopal Jara MD;  Location: [facility] OR     HYSTERECTOMY TOTAL ABDOMINAL, BILATERAL SALPINGO-OOPHORECTOMY, NODE DISSECTION, COMBINED  2/17/11    SANGEETHA, EMILIA, LN bx, omentectomy, resection of evrian malignancy Dr. JONO Goncalves - Returned BENIGN     INJECT EPIDURAL LUMBAR / SACRAL SINGLE N/A 1/5/2018    Procedure: INJECT EPIDURAL LUMBAR / SACRAL SINGLE;  lumbar interlaminar epidural steroid injection;  Surgeon: Jaylin Valadez MD;  Location: [facility] OR     INJECT SACROILIAC JOINT Right 3/7/2018    Procedure: INJECT SACROILIAC JOINT;  Right Sacroiliac Joint Injection;  Surgeon: Flavio Harrison MD;  Location: [facility] OR     INJECT SACROILIAC JOINT Bilateral 12/7/2018    Procedure: Bilateral Sacroiliac Joint Injections;  Surgeon: Faviola Cosby MD;  Location: [facility] OR     OPTICAL TRACKING SYSTEM BRONCHOSCOPY N/A 1/24/2020    Procedure: Super D navigational bronchoscopy;  Surgeon: Gopal Jara MD;  Location: [facility] OR     UPPER GI ENDOSCOPY      11/10/10 erythematous gastropathy, angioectasia       Social History   I have reviewed this patient's social history and updated it with pertinent information if needed.  Social History      Tobacco Use     Smoking status: Former Smoker     Packs/day: 0.50     Years: 15.00     Pack years: 7.50     Quit date: 1993     Years since quittin.4     Smokeless tobacco: Never Used   Substance Use Topics     Alcohol use: Yes     Comment: social     Drug use: No       Family History   I have reviewed this patient's family history and updated it with pertinent information if needed.  Family History   Problem Relation Age of Onset     Breast Cancer Maternal Aunt 80     C.A.D. Father 54     No Known Problems Mother        Prior to Admission Medications   Prior to Admission Medications   Prescriptions Last Dose Informant Patient Reported? Taking?   Calcium Carbonate-Vitamin D (CALCIUM 600 + D OR)   Yes No   Sig: Take 1 tablet by mouth every morning    Potassium Bicarb-Citric Acid 20 MEQ TBEF   No No   Sig: Take 20 mg by mouth daily   acetaminophen (TYLENOL) 325 MG tablet   No No   Sig: Take 2 tablets every 6 to 8 hours as needed for pain   Patient taking differently: Take 650 mg by mouth as needed Take 2 tablets every 6 to 8 hours as needed for pain   amLODIPine (NORVASC) 10 MG tablet   No No   Sig: Take 1 tablet (10 mg) by mouth daily For blood pressure. Please keep appt 20   apixaban ANTICOAGULANT (ELIQUIS) 5 MG tablet 2021 at Unknown time  No Yes   Sig: Take 1 tablet (5 mg) by mouth 2 times daily   atorvastatin (LIPITOR) 40 MG tablet   No No   Sig: Take 2 tablets (80 mg) by mouth daily   betamethasone dipropionate (DIPROSONE) 0.05 % external ointment   No No   Sig: Apply topically 2 times daily   Patient taking differently: Apply 1 g topically as needed    cholecalciferol (VITAMIN D3) 25 mcg (1000 units) capsule   Yes No   Sig: Take 1 capsule by mouth daily   citalopram (CELEXA) 20 MG tablet   No No   Sig: Take 1 tablet (20 mg) by mouth daily   cyanocolbalamin (VITAMIN  B-12) 500 MCG tablet   Yes No   Sig: Take 500 mcg by mouth every morning    gabapentin (NEURONTIN) 100 MG capsule   No No    Sig: Take 1 capsule (100 mg) in the morning, 1 capsule (100 mg) mid-day, and 4 capsules (400 mg) at bed time   hydrochlorothiazide (HYDRODIURIL) 50 MG tablet   No No   Sig: Take 1 tablet (50 mg) by mouth daily Please keep appt 12/21/20   latanoprost (XALATAN) 0.005 % ophthalmic solution   Yes No   Sig: Place 1 drop into both eyes At Bedtime    levothyroxine (SYNTHROID/LEVOTHROID) 75 MCG tablet   No No   Sig: Take 1 tablet (75 mcg) by mouth daily   lisinopril (PRINIVIL/ZESTRIL) 40 MG tablet   No No   Sig: Take 1 tablet (40 mg) by mouth daily For blood pressure   Patient taking differently: Take 40 mg by mouth every morning For blood pressure   loperamide (IMODIUM A-D) 2 MG tablet   Yes No   Sig: Take 0.5-1 tablets (1-2 mg) by mouth daily as needed for diarrhea   multivitamin (OCUVITE) TABS tablet   Yes No   Sig: Take 1 tablet by mouth daily   oxybutynin (DITROPAN) 5 MG tablet   No No   Sig: Take 1 tablet (5 mg) by mouth At Bedtime For frequent urination   pantoprazole (PROTONIX) 40 MG EC tablet   No No   Sig: Take 1 tablet (40 mg) by mouth daily Takes in the morning.      Facility-Administered Medications: None     Allergies   No Known Allergies    Physical Exam   Vital Signs: Temp: 98.3  F (36.8  C) Temp src: Oral BP: (!) 162/65 Pulse: 73   Resp: (!) 32 SpO2: 100 % O2 Device: Nasal cannula Oxygen Delivery: 2 LPM  Weight: 0 lbs 0 oz      Physical Exam   Constitutional: Elderly female lying in ED bed. Conversant. Appears anxious, though redirectable.   Well nourished, well developed, resting comfortably   HEENT:   Head: Normocephalic and atraumatic.   Eyes: Conjunctivae are normal. Pupils are equal, round, and reactive to light.  Pharynx has no erythema or exudate, mucous membranes are moist  Neck:   No adenopathy, no bony tenderness  Cardiovascular: Regular rate and rhythm without murmurs or gallops  Pulmonary/Chest: Scant rales bilaterally with no wheezes or retractions. No respiratory distress on 3 L O2 via  NC.  GI: Soft with good bowel sounds.  Non-tender, non-distended, with no guarding, no rebound, no peritoneal signs.   Back:  No bony or CVA tenderness   Musculoskeletal:  No edema or clubbing   Skin: Skin is warm and dry. No rash noted to exposed skin areas.   Neurological: Alert and oriented to person, place, and time. Nonfocal exam  Psychiatric:  Anxious      Data   Data reviewed today: I reviewed all medications, new labs and imaging results over the last 24 hours. I personally reviewed recent imaging impressions,dailylabs, progress notes.     Recent Labs   Lab 02/18/21  1715 02/18/21  1502   WBC 5.6 5.7   HGB 5.5* 5.6*   MCV 78 80    330   INR 1.11  --     142   POTASSIUM 4.2 4.2   CHLORIDE 109 109   CO2 30 29   BUN 28 29   CR 0.74 0.77   ANIONGAP 4 4   CANDIDA 9.8 9.6   GLC 96 88   ALBUMIN 3.2* 3.0*   PROTTOTAL 6.4* 6.3*   BILITOTAL 0.3 0.2   ALKPHOS 56 60   ALT 19 17   AST 19 22

## 2021-02-19 NOTE — PLAN OF CARE
[id number] 1 unit of blood transfused with iron to run after. Pt stable on 3L NC. States she is not SOB but RR in the upper 20's and worsens with excretions. Pattern regular with shallow breaths. Denies chest pain. Pt eager to go home and appears to be down playing symptoms. Plan for Golytely prep with EGD and colonoscopy tomorrow. Clear liquid diet; NPO at midnight. Ambulating with x1 assistance. Pt tearful about having to stay admitted; emotional support given.

## 2021-02-20 LAB
COLONOSCOPY: NORMAL
HGB BLD-MCNC: 7.7 G/DL (ref 11.7–15.7)
HGB BLD-MCNC: 7.8 G/DL (ref 11.7–15.7)
HGB BLD-MCNC: 8.4 G/DL (ref 11.7–15.7)
UPPER GI ENDOSCOPY: NORMAL

## 2021-02-20 PROCEDURE — 0W3P8ZZ CONTROL BLEEDING IN GASTROINTESTINAL TRACT, VIA NATURAL OR ARTIFICIAL OPENING ENDOSCOPIC: ICD-10-PCS | Performed by: INTERNAL MEDICINE

## 2021-02-20 PROCEDURE — 250N000011 HC RX IP 250 OP 636: Performed by: PHYSICIAN ASSISTANT

## 2021-02-20 PROCEDURE — 250N000011 HC RX IP 250 OP 636: Performed by: INTERNAL MEDICINE

## 2021-02-20 PROCEDURE — 999N000127 HC STATISTIC PERIPHERAL IV START W US GUIDANCE

## 2021-02-20 PROCEDURE — 85018 HEMOGLOBIN: CPT | Performed by: INTERNAL MEDICINE

## 2021-02-20 PROCEDURE — 99232 SBSQ HOSP IP/OBS MODERATE 35: CPT | Performed by: INTERNAL MEDICINE

## 2021-02-20 PROCEDURE — 45382 COLONOSCOPY W/CONTROL BLEED: CPT | Performed by: INTERNAL MEDICINE

## 2021-02-20 PROCEDURE — G0500 MOD SEDAT ENDO SERVICE >5YRS: HCPCS | Performed by: INTERNAL MEDICINE

## 2021-02-20 PROCEDURE — 250N000013 HC RX MED GY IP 250 OP 250 PS 637: Performed by: INTERNAL MEDICINE

## 2021-02-20 PROCEDURE — 250N000009 HC RX 250: Performed by: INTERNAL MEDICINE

## 2021-02-20 PROCEDURE — C9113 INJ PANTOPRAZOLE SODIUM, VIA: HCPCS | Performed by: PHYSICIAN ASSISTANT

## 2021-02-20 PROCEDURE — 36415 COLL VENOUS BLD VENIPUNCTURE: CPT | Performed by: INTERNAL MEDICINE

## 2021-02-20 PROCEDURE — 43255 EGD CONTROL BLEEDING ANY: CPT | Performed by: INTERNAL MEDICINE

## 2021-02-20 PROCEDURE — 91110 GI TRC IMG INTRAL ESOPH-ILE: CPT | Performed by: INTERNAL MEDICINE

## 2021-02-20 PROCEDURE — 120N000002 HC R&B MED SURG/OB UMMC

## 2021-02-20 PROCEDURE — 250N000013 HC RX MED GY IP 250 OP 250 PS 637: Performed by: PHYSICIAN ASSISTANT

## 2021-02-20 RX ORDER — HYDROXYZINE HYDROCHLORIDE 25 MG/1
25 TABLET, FILM COATED ORAL EVERY 6 HOURS PRN
Status: DISCONTINUED | OUTPATIENT
Start: 2021-02-20 | End: 2021-02-21

## 2021-02-20 RX ORDER — FENTANYL CITRATE 50 UG/ML
INJECTION, SOLUTION INTRAMUSCULAR; INTRAVENOUS PRN
Status: DISCONTINUED | OUTPATIENT
Start: 2021-02-20 | End: 2021-02-20 | Stop reason: HOSPADM

## 2021-02-20 RX ORDER — HYDROXYZINE HYDROCHLORIDE 25 MG/1
50 TABLET, FILM COATED ORAL EVERY 6 HOURS PRN
Status: DISCONTINUED | OUTPATIENT
Start: 2021-02-20 | End: 2021-02-21

## 2021-02-20 RX ADMIN — APIXABAN 5 MG: 5 TABLET, FILM COATED ORAL at 20:05

## 2021-02-20 RX ADMIN — PANTOPRAZOLE SODIUM 40 MG: 40 INJECTION, POWDER, FOR SOLUTION INTRAVENOUS at 20:05

## 2021-02-20 RX ADMIN — LEVOTHYROXINE SODIUM 75 MCG: 75 TABLET ORAL at 08:23

## 2021-02-20 RX ADMIN — Medication 1 MG: at 22:15

## 2021-02-20 RX ADMIN — PANTOPRAZOLE SODIUM 40 MG: 40 INJECTION, POWDER, FOR SOLUTION INTRAVENOUS at 08:23

## 2021-02-20 RX ADMIN — HYDROXYZINE HYDROCHLORIDE 25 MG: 25 TABLET, FILM COATED ORAL at 22:15

## 2021-02-20 RX ADMIN — ONDANSETRON 4 MG: 4 TABLET, ORALLY DISINTEGRATING ORAL at 09:13

## 2021-02-20 RX ADMIN — POLYETHYLENE GLYCOL 3350, SODIUM SULFATE ANHYDROUS, SODIUM BICARBONATE, SODIUM CHLORIDE, POTASSIUM CHLORIDE 2000 ML: 236; 22.74; 6.74; 5.86; 2.97 POWDER, FOR SOLUTION ORAL at 06:30

## 2021-02-20 RX ADMIN — OXYBUTYNIN CHLORIDE 5 MG: 5 TABLET ORAL at 20:05

## 2021-02-20 ASSESSMENT — ACTIVITIES OF DAILY LIVING (ADL)
ADLS_ACUITY_SCORE: 13

## 2021-02-20 NOTE — PROGRESS NOTES
"Writer has encouraged pt to try and finish remaining 4L prep several times throughout the night,  prior to the next ordered 2L and pt continues to refuse and is adamant that she is not going to drink anymore. Writer provided education on need for prep in order to complete AM procedure. Pt expresses understanding but still continues to refuse and just states \"Its too much\" \"How did I get here\" \"I just want to go home\". Writer will continue to encourage.   "

## 2021-02-20 NOTE — PLAN OF CARE
Patient left for endoscopy.  She refused to take off her jewelry and she insisted on bringing her phone and wearing her glasses to endoscopy.

## 2021-02-20 NOTE — PROGRESS NOTES
Patient is anxious and asking to leave. Explained that she was found have AVM that might have been the cause of her low hemoglobin but it is unclear is the hemoglobin has stabilized. Expressed concern that she may become symptomatic again and fall leading to trauma. Patient voiced concerns. All questions answered. Non addictive anxiolytic given. Patient agreeable to say.

## 2021-02-20 NOTE — PROVIDER NOTIFICATION
Pt returned from endoscopy and was adamant that she was leaving. Pt unwilling to stay in bed and said her  was coming now to pick her. Pt told that her  wasn't here and was not coming. Pt angry and stating that she would walk home. Pt educated that she wasn't wearing clothes, still had her IV's and tele on, and that it was dangerously cold outside. Pt not agreeable to stay. Dr Alberto notified and spoke with pt. Still not agreeable to stay. 1:1 attendant placed. Will continue to monitor.

## 2021-02-20 NOTE — PROVIDER NOTIFICATION
5B [id number] CARLA Hanks 11  Rachel RN [id number]  Pt has finally finished the 4L prep- stool not yet clear- Pt agreeable to try some of the remaining 2l prep, but GI note indicates pt should be NPO from prep at 0700- Should I have the pt continue with the prep? Thanks    Demario GALLAGHER [phone] text paged at [phone].

## 2021-02-20 NOTE — OR NURSING
EGD APC and  Colonoscopy APC completed.  Pt tolerated poorly.  Screaming and agitated most of procedure.  Additional sedation given.  Pts O2 sats would drop.  Report called to Yenny

## 2021-02-20 NOTE — PROVIDER NOTIFICATION
5B [id number] CARLA Ramos RN [id number]  Just another update that pt continues to refuse to finish the initial 4L prep as well as the 2L prep that is due now. education given.  Thanks    Gold CC [phone] text paged at [phone].

## 2021-02-20 NOTE — PROGRESS NOTES
"[facility], [place]    Medicine Progress Note - Hospitalist Service, Gold 11       Date of Admission: 2/18/2021    History leading to present hospitalization is unchanged from: 2/18/2021    Brief Hospital Course  This is a 85-year-old female with past medical history of abdominal aortic stenosis, COPD, hypertension, bilateral PE, enlarged pulmonary artery of unclear significance who presented secondary to dizziness and balance and orthostatics.  Initial concern was for GI bleed however no overt symptoms.  Patient does have known history of AV malformations.  Discussed case with gastroenterology who agrees upper and lower endoscopy tomorrow.     Assessment   Active Problems:    Chronic bilateral low back pain without sciatica    Hypothyroidism    Pulmonary embolism (H)    Anemia    Aortic stenosis    Iron deficiency    Vitamin D deficiency    Benign essential hypertension    Moderate major depression (H)    Anxiety    Cherry angioma    Pulmonary hypertension (H)    SOB (shortness of breath)    Anemia due to blood loss, acute    Gastrointestinal hemorrhage, unspecified gastrointestinal hemorrhage type  All present on admission prior to discharge.     Please note pulmonary HTN is suspected and not confirmed    Plan  H/H greater than 7. No indication for transfusion at this time  Iron studies reivewed. Consistent with JUDY. Continue Venofer  Poor bowel prep. Second prep started. Communicated to GI  Retic count suggestive of hypo proliferation.          Subjective   Chief complaint: \" Would like to go home and see if ill be ok\"    Lucie is having anxiety this am. She continues to have colored stools despite bowel prep that have turn clear with second prep.   Denies any overt GI bleed  No CP or ab pain  No bowel changes  No urinary changes  No skin changes or bruising    Review of Systems  4 point ROS performed and is negative except as mention above     Physical Exam  Vital signs:  Temp: 97.6 " " F (36.4  C) Temp src: Oral BP: (!) 155/65 Pulse: 70   Resp: 18 SpO2: 97 % O2 Device: Nasal cannula Oxygen Delivery: 3 LPM Height: 149.9 cm (4' 11\") Weight: 45.6 kg (100 lb 8.5 oz)  Estimated body mass index is 20.3 kg/m  as calculated from the following:    Height as of this encounter: 1.499 m (4' 11\").    Weight as of this encounter: 45.6 kg (100 lb 8.5 oz).        Physical Exam  Constitutional:       Appearance: Normal appearance.   HENT:      Head: Normocephalic.      Nose: Nose normal.      Mouth/Throat:      Mouth: Mucous membranes are moist.   Eyes:      Extraocular Movements: Extraocular movements intact.      Pupils: Pupils are equal, round, and reactive to light.   Cardiovascular:      Rate and Rhythm: Normal rate and regular rhythm.      Heart sounds: No murmur. No friction rub. No gallop.    Pulmonary:      Effort: Pulmonary effort is normal. No respiratory distress.      Breath sounds: No stridor. No wheezing.   Abdominal:      General: Abdomen is flat. There is no distension.      Tenderness: There is no abdominal tenderness.   Neurological:      General: No focal deficit present.      Mental Status: She is alert.      Cranial Nerves: No cranial nerve deficit.      Sensory: No sensory deficit.      Motor: No weakness.   Psychiatric:         Behavior: Behavior normal.           Labs  Lab Results   Component Value Date    WBC 5.1 02/19/2021    HGB 7.7 (L) 02/20/2021    HCT 22.3 (L) 02/19/2021     02/19/2021     02/19/2021    POTASSIUM 3.9 02/19/2021    CHLORIDE 112 (H) 02/19/2021    CO2 26 02/19/2021    BUN 22 02/19/2021    CR 0.63 02/19/2021    GLC 82 02/19/2021    DD 1.3 (H) 01/26/2018    NTBNPI 4,173 (H) 02/18/2021    NTBNP 536 (H) 11/30/2020    TROPONIN 0.02 04/29/2017    TROPI 0.031 02/18/2021    AST 20 02/19/2021    ALT 17 02/19/2021    ALKPHOS 52 02/19/2021    BILITOTAL 0.4 02/19/2021    INR 1.11 02/18/2021         Imaging   Echo Complete    Result Date: 2/19/2021  [id number] " [id number]^DEBBI^RATNA^[facility],[place] [facility] [address] [place] [address]  Name: ISAURO GUZMAN MRN: [id number] : 1935 Study Date: 2021 07:42 AM Age: 85 yrs Gender: Female Patient Location: [facility] Reason For Study: Heart Failure, Unspecified Ordering Physician: RATNA WERNER Performed By: Gracie Enriquez RDCS  BSA: 1.4 m2 Height: 59 in Weight: 100 lb BP: 145/47 mmHg _____________________________________________________________________________ __   Procedure Complete Portable Echo Adult. _____________________________________________________________________________ __   Interpretation Summary Global and regional left ventricular function is normal with an EF of 60-65%. Global right ventricular function is normal. IVC diameter and respiratory changes fall into an intermediate range suggesting an RA pressure of 8 mmHg. No significant valvular abnormalities were noted. Trivial pericardial effusion is present.  This study was compared with the study from 2020 .There has been no significant change. _____________________________________________________________________________ __   Left Ventricle Left ventricular size is normal. Global and regional left ventricular function is normal with an EF of 60-65%. Relative wall thickness is increased consistent with concentric remodeling. Grade II or moderate diastolic dysfunction.  Right Ventricle The right ventricle is normal size. Global right ventricular function is normal.  Atria The right atria appears normal. Moderate left atrial enlargement is present.   Mitral Valve Mild mitral annular calcification is present. Trace mitral insufficiency is present.  Aortic Valve The aortic valve is tricuspid.  Tricuspid Valve The tricuspid valve is normal. Trace tricuspid insufficiency is present.  Pulmonic Valve The pulmonic valve is normal.  Vessels The aorta root is normal. The thoracic  aorta is normal. IVC diameter and respiratory changes fall into an intermediate range suggesting an RA pressure of 8 mmHg.  Pericardium Trivial pericardial effusion is present.   Compared to Previous Study This study was compared with the study from 2020 . There has been no change. _____________________________________________________________________________ __  MMode/2D Measurements & Calculations IVSd: 0.87 cm LVIDd: 4.2 cm LVIDs: 1.9 cm LVPWd: 1.2 cm FS: 54.7 % LV mass(C)d: 144.9 grams LV mass(C)dI: 105.5 grams/m2 Ao root diam: 3.0 cm asc Aorta Diam: 3.0 cm LVOT diam: 1.9 cm LVOT area: 2.8 cm2 LA Volume (BP): 62.6 ml LA Volume Index (BP): 45.7 ml/m2  RWT: 0.58   Doppler Measurements & Calculations MV E max jese: 96.0 cm/sec MV A max jese: 136.0 cm/sec MV E/A: 0.71 MV max P.1 mmHg MV mean P.0 mmHg MV V2 VTI: 36.5 cm MV P1/2t max jese: 113.0 cm/sec MV P1/2t: 76.1 msec MVA(P1/2t): 2.9 cm2 MV dec slope: 435.0 cm/sec2 MV dec time: 0.21 sec Ao V2 max: 129.0 cm/sec Ao max P.0 mmHg E/E' av.1 Lateral E/e': 16.4 Medial E/e': 15.8   _____________________________________________________________________________ __   Report approved by: Anders GUAJARDO 2021 08:40 AM      Xr Chest Port 1 View    Result Date: 2021  Exam: XR CHEST PORT 1 VW, 2021 9:55 PM Indication: Dyspnea Comparison: 2020 Findings: Heart within normal limits. Diffuse interstitial process throughout the lungs. A prominent right hilum.     Impression: Interstitial changes suggesting increased edema. Prominent right hilum suggests possible pulmonary hypertension, not dissimilar to CT of 2020. MD Parveen OROZCO MD on 2021 at 11:18 AM    Time Spent: 25+ minutes

## 2021-02-20 NOTE — PLAN OF CARE
VSS. Alert and oriented this am before EGD and colonoscopy.  Patient very anxious this am and repeatedly asking if she could leave but was redirectable and cooperative with staying in bed.  Bed alarm on.  Patient returned from endoscopy very confused and not cooperative with staying in her bed or even in her room.  She did not know where she was or what day it was.  MD was notified.  Attendant at bedside for safety.  Patients  arrived and patient appears calm and cooperative with staying in her bed at this time.  Attendant and bed alarm in place for safety.  No signs of bleeding.  Last Hgb this am was 7.7.

## 2021-02-20 NOTE — PROVIDER NOTIFICATION
" [id number] [facility]  Demario 11  Rachel POWER [id number]  Pt refusing to finish 4L prep as well as up coming 2L. Pt stating \"I'm done\". Stool becoming clearer, but not clear yet. Will reproach. Thanks    Demario GALLAGHER [phone] text paged at [phone].   "

## 2021-02-20 NOTE — PROVIDER NOTIFICATION
[id number] [facility] 11  Rachel RN [id number]  Pt working on current 4L Golytley prep, which has been a struggle for pt to complete- However per MD note another 2L is due at 0400, and if still not clear by 0600, than another 2L needed- Do you want to place an order for 2L or 4L so pharm can prep and have avail? Thanks    Demario GALLAGHER [phone] text paged at [phone].     Addendum: MD called back and placed orders for additional  2L prep to start at 0400 and another 2L avail to be given at 0600 if stool still not clear.     Addendum: Pharm called writer to notify that there actually was an order present for the 0400 dose. Writer had called pharm earlier to find out where the next Golytely prep was located and per pharm- only a one time 4L prep was ordered, so writer contacted MD's to have 0400 one ordered, however Doe for pharm called writer at 2350 to notify that duplicate order was placed- Doe from pharm stated that he would discontinue duplicate order and call writer when prep ready for .

## 2021-02-20 NOTE — PLAN OF CARE
"/49   Pulse 66   Temp 96.1  F (35.6  C) (Oral)   Resp 24   Ht 1.499 m (4' 11\")   LMP  (LMP Unknown)   SpO2 97%   BMI 19.79 kg/m       Shift [id number]    A/Ox4. Pt is very anxious this evening. Repeatedly saying \"how did I get here!\" and \"I can't do this!\" when referring to hospitalization and Golytely prep. Required a lot of encouragement and emotional support. Started prep at 1730 d/t delay in Gatorade being delivered. IV iron transfused without complications. Hgb recheck 8.2. Pt to drink  4L golytely this evening and another 2L at 0400, if not clear by 0600 administer another 2L after this. Plan for upper and lower endoscopy tomorrow.   "

## 2021-02-20 NOTE — PLAN OF CARE
"ASSUMED CARES: [id number].  STATUS: Pt admitted 2/18 for Anemia.   NEURO: A/o x 4. Pleasant, but frustrated and irritable this shift r/t bowel prep. Pt continuously asking why she is having to do this and make statements \"[place] have travelled [place] and nobody has ever pushed me to do something like this\" writer attempting to encourage pt to drink prep while also providing emotional support. Pt tearful at one point and stating \"I'm done, and you cant make me\".   VS: VSS on 3L NC. Elevated BP (150/60's)  ACTIVITY: A1 to commode- Frequent trips to commode d/t prep.   PAIN: Denies pain  CARDIAC: No C/o CP.   RESP: +HERRERA. +Coarse LS- Diminished bases.   GI/: Voiding spontaneously. BM- loose and watery, but not yet clear.   DIET: NPO- except Bowel prep up until 0700- after 0700 then strict NPO.   SKIN: +Bruises. Frail dry skin.   LDA'S: L PIV infusing NS 50 ml/hr while NPO. R PIV SL.   LABS: Hgb 7.7 (8.4)- Trending Q8H.   CHANGES THIS SHIFT: Pt very frustrated this shift with bowel prep. Pt tearful. Pt with orders to drink 4L by MN and than another 2L at 0400 and if still not clear than another 2L at 0600- Around 2300 pt had about 4-500 ml remaining of 4L prep and pt stated \"Im done\" \" I can't take it anymore\" \"You cant make me\"- writer suggested a break so pt could rest for awhile- Will re approach after pt able to rest for a bit.   POC: Cont with POC. Plan for upper and lower endo today with possible pill cam. Call light within reach. Bed alarm on for pt safety.     Pt called writer in room at 0515 and demanding to go home, pt continuously stating \"What do I have to do to get out of here?\". Writer explained to pt why she was here and the importance of finishing the bowel prep so she can have her procedure today to determine where the bleeding is occurring and than pt stated \"Well lets get on with it\" writer than handed pt remaining glass of 4L prep and patient asked \"Is this all\" and writer explained that another " "2L was ordered for 0400 and pt stated \"Well I never knew that\" despite writer notifying pt several times throughout shift. Pt repeating statements and very very anxious this AM. Pt called daughter to come and get her while writer was in the room. Writer provided emotional support and sat with the patient for some time. Pt did start to drink prep again around 0520. Slow deep breaths encouraged.     Pt finished the 4L prep at 0645. Additional 2L prep at bedside, however per MD note- Pt to be \"NPO from prep by 0700\".       "

## 2021-02-21 ENCOUNTER — APPOINTMENT (OUTPATIENT)
Dept: GENERAL RADIOLOGY | Facility: CLINIC | Age: 86
DRG: 377 | End: 2021-02-21
Attending: INTERNAL MEDICINE
Payer: MEDICARE

## 2021-02-21 ENCOUNTER — APPOINTMENT (OUTPATIENT)
Dept: GENERAL RADIOLOGY | Facility: CLINIC | Age: 86
DRG: 377 | End: 2021-02-21
Payer: MEDICARE

## 2021-02-21 PROBLEM — E87.71 TACO (TRANSFUSION ASSOCIATED CIRCULATORY OVERLOAD): Status: ACTIVE | Noted: 2021-02-21

## 2021-02-21 PROBLEM — J96.21 ACUTE AND CHRONIC RESPIRATORY FAILURE WITH HYPOXIA (H): Status: ACTIVE | Noted: 2021-02-16

## 2021-02-21 LAB
ANION GAP SERPL CALCULATED.3IONS-SCNC: 1 MMOL/L (ref 3–14)
BASE EXCESS BLDV CALC-SCNC: 2.7 MMOL/L
BASE EXCESS BLDV CALC-SCNC: 4.6 MMOL/L
BASOPHILS # BLD AUTO: 0 10E9/L (ref 0–0.2)
BASOPHILS NFR BLD AUTO: 0.1 %
BUN SERPL-MCNC: 11 MG/DL (ref 7–30)
CALCIUM SERPL-MCNC: 9 MG/DL (ref 8.5–10.1)
CHLORIDE SERPL-SCNC: 113 MMOL/L (ref 94–109)
CO2 SERPL-SCNC: 31 MMOL/L (ref 20–32)
CREAT SERPL-MCNC: 0.71 MG/DL (ref 0.52–1.04)
DIFFERENTIAL METHOD BLD: ABNORMAL
EOSINOPHIL # BLD AUTO: 0.1 10E9/L (ref 0–0.7)
EOSINOPHIL NFR BLD AUTO: 0.7 %
ERYTHROCYTE [DISTWIDTH] IN BLOOD BY AUTOMATED COUNT: 18.7 % (ref 10–15)
GFR SERPL CREATININE-BSD FRML MDRD: 78 ML/MIN/{1.73_M2}
GLUCOSE BLDC GLUCOMTR-MCNC: 112 MG/DL (ref 70–99)
GLUCOSE SERPL-MCNC: 96 MG/DL (ref 70–99)
HCO3 BLDV-SCNC: 30 MMOL/L (ref 21–28)
HCO3 BLDV-SCNC: 32 MMOL/L (ref 21–28)
HCT VFR BLD AUTO: 26.4 % (ref 35–47)
HGB BLD-MCNC: 7.5 G/DL (ref 11.7–15.7)
IMM GRANULOCYTES # BLD: 0.1 10E9/L (ref 0–0.4)
IMM GRANULOCYTES NFR BLD: 0.6 %
LACTATE BLD-SCNC: 1 MMOL/L (ref 0.7–2)
LYMPHOCYTES # BLD AUTO: 0.5 10E9/L (ref 0.8–5.3)
LYMPHOCYTES NFR BLD AUTO: 6.3 %
MCH RBC QN AUTO: 24.3 PG (ref 26.5–33)
MCHC RBC AUTO-ENTMCNC: 28.4 G/DL (ref 31.5–36.5)
MCV RBC AUTO: 85 FL (ref 78–100)
MONOCYTES # BLD AUTO: 0.6 10E9/L (ref 0–1.3)
MONOCYTES NFR BLD AUTO: 7 %
NEUTROPHILS # BLD AUTO: 6.9 10E9/L (ref 1.6–8.3)
NEUTROPHILS NFR BLD AUTO: 85.3 %
NRBC # BLD AUTO: 0.1 10*3/UL
NRBC BLD AUTO-RTO: 1 /100
O2/TOTAL GAS SETTING VFR VENT: ABNORMAL %
O2/TOTAL GAS SETTING VFR VENT: ABNORMAL %
PCO2 BLDV: 59 MM HG (ref 40–50)
PCO2 BLDV: 65 MM HG (ref 40–50)
PH BLDV: 7.3 PH (ref 7.32–7.43)
PH BLDV: 7.31 PH (ref 7.32–7.43)
PLATELET # BLD AUTO: 268 10E9/L (ref 150–450)
PO2 BLDV: 28 MM HG (ref 25–47)
PO2 BLDV: 33 MM HG (ref 25–47)
POTASSIUM SERPL-SCNC: 3.4 MMOL/L (ref 3.4–5.3)
RBC # BLD AUTO: 3.09 10E12/L (ref 3.8–5.2)
SODIUM SERPL-SCNC: 145 MMOL/L (ref 133–144)
WBC # BLD AUTO: 8.1 10E9/L (ref 4–11)

## 2021-02-21 PROCEDURE — 73630 X-RAY EXAM OF FOOT: CPT | Mod: LT

## 2021-02-21 PROCEDURE — 250N000013 HC RX MED GY IP 250 OP 250 PS 637: Performed by: INTERNAL MEDICINE

## 2021-02-21 PROCEDURE — C9113 INJ PANTOPRAZOLE SODIUM, VIA: HCPCS | Performed by: PHYSICIAN ASSISTANT

## 2021-02-21 PROCEDURE — 120N000002 HC R&B MED SURG/OB UMMC

## 2021-02-21 PROCEDURE — 71045 X-RAY EXAM CHEST 1 VIEW: CPT

## 2021-02-21 PROCEDURE — 74018 RADEX ABDOMEN 1 VIEW: CPT | Mod: 26

## 2021-02-21 PROCEDURE — 99232 SBSQ HOSP IP/OBS MODERATE 35: CPT | Performed by: PHYSICIAN ASSISTANT

## 2021-02-21 PROCEDURE — 999N001017 HC STATISTIC GLUCOSE BY METER IP

## 2021-02-21 PROCEDURE — 83605 ASSAY OF LACTIC ACID: CPT | Performed by: HOSPITALIST

## 2021-02-21 PROCEDURE — 80048 BASIC METABOLIC PNL TOTAL CA: CPT | Performed by: HOSPITALIST

## 2021-02-21 PROCEDURE — 82803 BLOOD GASES ANY COMBINATION: CPT | Performed by: HOSPITALIST

## 2021-02-21 PROCEDURE — 71045 X-RAY EXAM CHEST 1 VIEW: CPT | Mod: 26 | Performed by: STUDENT IN AN ORGANIZED HEALTH CARE EDUCATION/TRAINING PROGRAM

## 2021-02-21 PROCEDURE — 74018 RADEX ABDOMEN 1 VIEW: CPT

## 2021-02-21 PROCEDURE — 250N000011 HC RX IP 250 OP 636: Performed by: PHYSICIAN ASSISTANT

## 2021-02-21 PROCEDURE — 250N000011 HC RX IP 250 OP 636: Performed by: INTERNAL MEDICINE

## 2021-02-21 PROCEDURE — 85025 COMPLETE CBC W/AUTO DIFF WBC: CPT | Performed by: HOSPITALIST

## 2021-02-21 PROCEDURE — 99232 SBSQ HOSP IP/OBS MODERATE 35: CPT | Performed by: INTERNAL MEDICINE

## 2021-02-21 PROCEDURE — 73630 X-RAY EXAM OF FOOT: CPT | Mod: 26 | Performed by: RADIOLOGY

## 2021-02-21 PROCEDURE — 36415 COLL VENOUS BLD VENIPUNCTURE: CPT | Performed by: HOSPITALIST

## 2021-02-21 PROCEDURE — 250N000013 HC RX MED GY IP 250 OP 250 PS 637: Performed by: PHYSICIAN ASSISTANT

## 2021-02-21 RX ORDER — FUROSEMIDE 10 MG/ML
10 INJECTION INTRAMUSCULAR; INTRAVENOUS ONCE
Status: COMPLETED | OUTPATIENT
Start: 2021-02-21 | End: 2021-02-21

## 2021-02-21 RX ORDER — ACETAMINOPHEN 325 MG/1
325 TABLET ORAL EVERY 4 HOURS PRN
Status: DISCONTINUED | OUTPATIENT
Start: 2021-02-21 | End: 2021-02-22 | Stop reason: HOSPADM

## 2021-02-21 RX ORDER — OLANZAPINE 5 MG/1
5 TABLET, ORALLY DISINTEGRATING ORAL ONCE
Status: COMPLETED | OUTPATIENT
Start: 2021-02-21 | End: 2021-02-21

## 2021-02-21 RX ADMIN — PANTOPRAZOLE SODIUM 40 MG: 40 INJECTION, POWDER, FOR SOLUTION INTRAVENOUS at 20:47

## 2021-02-21 RX ADMIN — OLANZAPINE 5 MG: 5 TABLET, ORALLY DISINTEGRATING ORAL at 01:28

## 2021-02-21 RX ADMIN — LEVOTHYROXINE SODIUM 75 MCG: 75 TABLET ORAL at 09:44

## 2021-02-21 RX ADMIN — FUROSEMIDE 10 MG: 10 INJECTION, SOLUTION INTRAVENOUS at 12:49

## 2021-02-21 RX ADMIN — ACETAMINOPHEN 325 MG: 325 TABLET, FILM COATED ORAL at 12:54

## 2021-02-21 RX ADMIN — PANTOPRAZOLE SODIUM 40 MG: 40 INJECTION, POWDER, FOR SOLUTION INTRAVENOUS at 09:44

## 2021-02-21 RX ADMIN — APIXABAN 5 MG: 5 TABLET, FILM COATED ORAL at 20:47

## 2021-02-21 RX ADMIN — OXYBUTYNIN CHLORIDE 5 MG: 5 TABLET ORAL at 22:51

## 2021-02-21 RX ADMIN — APIXABAN 5 MG: 5 TABLET, FILM COATED ORAL at 09:44

## 2021-02-21 RX ADMIN — LIDOCAINE 1 PATCH: 560 PATCH PERCUTANEOUS; TOPICAL; TRANSDERMAL at 09:56

## 2021-02-21 ASSESSMENT — ACTIVITIES OF DAILY LIVING (ADL)
ADLS_ACUITY_SCORE: 16
ADLS_ACUITY_SCORE: 15
ADLS_ACUITY_SCORE: 15
ADLS_ACUITY_SCORE: 17
ADLS_ACUITY_SCORE: 17
ADLS_ACUITY_SCORE: 16

## 2021-02-21 ASSESSMENT — MIFFLIN-ST. JEOR: SCORE: 813.63

## 2021-02-21 NOTE — PROVIDER NOTIFICATION
Provider notified (name): Demario Juarez  Reason for notification: Pt sating in the 60% and very lethargic. RRT called  Recommendation/request given to provider: come assess pt   Response from provider: Came assessed pt and ordered labs

## 2021-02-21 NOTE — CODE/RAPID RESPONSE
Rapid Response Team Note    Assessment   In assessment a rapid response was called on Lucie Stockton due to acute hypoxic respiratory failure. This presentation is likely due to COPD vs mild upper airway obstruction c/b sedative medication and worsened by Significant Pulmonary Hypertension  COPD  Significant bleeding this admission (source: GIB).     Plan   -  Markedly improved on 3L O2 via OxyMask. Somnolent but arouses easily, follows all commands appropriately.  -  Will obtain pCXR, CBC, BMP, lactate and VBG to exclude other causes  -  The Internal Medicine primary team was present at the bedside and agreeable with this plan.  -  Disposition: The patient will remain on the current unit. We will continue to monitor this patient closely.  -  Reassessment and plan follow-up will be performed by the primary team      ALEJANDRINA HERNÁNDEZ PA  [facility] Job Code Contact #[phone]    Hospital Course   Brief Summary of events leading to rapid response:   RRT called for acute hypoxia. RN entered room to administer ordered PO Zyprexa 25mg for ongoing restlessness, obtained VS shortly after and pt found to be hypoxic to mid-60s on RA. Oxymask to 15L initiated, w/ improvement to 100% w/in 3-4min and called RRT. Notes pt arousable for brief periods but falls back asleep quickly. Is able to follow directions and answers questions but requires frequent verbal stimulation d/t somnolence. Had received Atarax 25mg ~2hrs prior. She has PMH significant for COPD. She is not known to have STEFFANIE and does not use O2 at home.    Admission Diagnosis:   SOB (shortness of breath) [R06.02]  Pulmonary hypertension (H) [I27.20]  Anemia due to blood loss, acute [D62]  Gastrointestinal hemorrhage, unspecified gastrointestinal hemorrhage type [K92.2]     Physical Exam   Temp: 99.2  F (37.3  C) Temp  Min: 96.2  F (35.7  C)  Max: 99.2  F (37.3  C)  Resp: 20 Resp  Min: 6  Max: 67  SpO2: 95 % SpO2  Min: 85 %  Max: 100 %  Pulse: 77 Pulse  Min:  65  Max: 105    No data recorded  BP: (!) 91/38 Systolic (24hrs), Av , Min:91 , Max:162   Diastolic (24hrs), Av, Min:37, Max:102     I/Os: I/O last 3 completed shifts:  In: 480 [P.O.:480]  Out: 950 [Urine:350; Other:600]     Exam:   General: in no acute distress  Mental Status: altered level of consciousness based on pt is very somnolent, easily arouses to voice but quickly falls back to sleep. She is able to answer questions appropriately and follow commands.  CV: RRR  Resp: Mild snoring and mouth-breathing noted while sleeping. Lungs clear.    Significant Results and Procedures   Lactic Acid:   Recent Labs   Lab Test 17  1728 17  2255 16  1137   LACT 0.9 1.4 1.7     CBC:   Recent Labs   Lab Test 21  2204 21  1537 21  0527 21  0932 21  0932 21  0531 21  2108   WBC  --   --   --   --  5.1 Canceled, Test credited 5.3   HGB 8.4* 7.8* 7.7*   < > 6.4* Canceled, Test credited 6.7*   HCT  --   --   --   --  22.3* Canceled, Test credited 23.0*   PLT  --   --   --   --  295 Canceled, Test credited 293    < > = values in this interval not displayed.        Sepsis Evaluation   The patient is not known to have an infection.  NO EVIDENCE OF SEPSIS at this time.  Vital sign, physical exam, and lab findings are likely due to somnolence 2/2 sedative medication.

## 2021-02-21 NOTE — PROGRESS NOTES
"[facility], [place]    Medicine Progress Note - Hospitalist Service, Gold 11       Date of Admission: 2/18/2021    History leading to present hospitalization is unchanged from: 2/18/2021    Brief Hospital Course  This is a 85-year-old female with past medical history of abdominal aortic stenosis, COPD, hypertension, bilateral PE, enlarged pulmonary artery of unclear significance who presented secondary to dizziness and balance and orthostatics.  Initial concern was for GI bleed however no overt symptoms.  Patient does have known history of AV malformations.  Discussed case with gastroenterology who agrees upper and lower endoscopy tomorrow.     Assessment   Active Problems:    Chronic bilateral low back pain without sciatica    Hypothyroidism    Pulmonary embolism (H)    Anemia    Aortic stenosis    Iron deficiency    Vitamin D deficiency    Benign essential hypertension    Moderate major depression (H)    Anxiety    Cherry angioma    Pulmonary hypertension (H)    SOB (shortness of breath)    Acute and chronic respiratory failure with hypoxia (H)    Anemia due to blood loss, acute    Gastrointestinal hemorrhage, unspecified gastrointestinal hemorrhage type    TACO (transfusion associated circulatory overload)      Please note pulmonary HTN is suspected and not confirmed    Plan  CXR reviewed and is consistent with fluid overload likely from transfusion. gentle diuretics ordered.   Patient appears to have chronic component of respiratory failure related with hypercapnia.   Venofer on discharge in infusion center for 1 additional dose.      Subjective   Chief complaint: \" No complaints\"    No overt bleed per patient  No fever or chills  No CP or SOB (subjectively)  No urinary changes  No skin changes or bruising    Review of Systems  4 point ROS performed and is negative except as mention above     Physical Exam  Vital signs:  Temp: 98.8  F (37.1  C) Temp src: Axillary BP: 118/47 Pulse: 64 " "  Resp: 18 SpO2: 91 % O2 Device: Oxymask Oxygen Delivery: 2 LPM Height: 149.9 cm (4' 11\") Weight: 45.6 kg (100 lb 8.5 oz)  Estimated body mass index is 20.3 kg/m  as calculated from the following:    Height as of this encounter: 1.499 m (4' 11\").    Weight as of this encounter: 45.6 kg (100 lb 8.5 oz).        Physical Exam  Constitutional:       Appearance: Normal appearance.   HENT:      Head: Normocephalic.      Nose: Nose normal.      Mouth/Throat:      Mouth: Mucous membranes are moist.   Eyes:      Extraocular Movements: Extraocular movements intact.      Pupils: Pupils are equal, round, and reactive to light.   Cardiovascular:      Rate and Rhythm: Normal rate and regular rhythm.      Heart sounds: No murmur. No friction rub. No gallop.    Pulmonary:      Effort: Pulmonary effort is normal. No respiratory distress.      Breath sounds: No stridor. No wheezing.   Abdominal:      General: Abdomen is flat. There is no distension.      Tenderness: There is no abdominal tenderness.   Neurological:      General: No focal deficit present.      Mental Status: She is alert.      Cranial Nerves: No cranial nerve deficit.      Sensory: No sensory deficit.      Motor: No weakness.   Psychiatric:         Behavior: Behavior normal.           Labs  Lab Results   Component Value Date    WBC 8.1 02/21/2021    HGB 7.5 (L) 02/21/2021    HCT 26.4 (L) 02/21/2021     02/21/2021     (H) 02/21/2021    POTASSIUM 3.4 02/21/2021    CHLORIDE 113 (H) 02/21/2021    CO2 31 02/21/2021    BUN 11 02/21/2021    CR 0.71 02/21/2021    GLC 96 02/21/2021    DD 1.3 (H) 01/26/2018    NTBNPI 4,173 (H) 02/18/2021    NTBNP 536 (H) 11/30/2020    TROPONIN 0.02 04/29/2017    TROPI 0.031 02/18/2021    AST 20 02/19/2021    ALT 17 02/19/2021    ALKPHOS 52 02/19/2021    BILITOTAL 0.4 02/19/2021    INR 1.11 02/18/2021         Imaging   Echo Complete    Result Date: 2/19/2021  [id number]^DEBBI^RATNA^[facility]" [facility],[place] [facility] [address] [place] [address]  Name: ISAURO GUZMAN MRN: [id number] : 1935 Study Date: 2021 07:42 AM Age: 85 yrs Gender: Female Patient Location: [facility] Reason For Study: Heart Failure, Unspecified Ordering Physician: RATNA WERNER Performed By: Gracie Enriquez RDCS  BSA: 1.4 m2 Height: 59 in Weight: 100 lb BP: 145/47 mmHg _____________________________________________________________________________ __   Procedure Complete Portable Echo Adult. _____________________________________________________________________________ __   Interpretation Summary Global and regional left ventricular function is normal with an EF of 60-65%. Global right ventricular function is normal. IVC diameter and respiratory changes fall into an intermediate range suggesting an RA pressure of 8 mmHg. No significant valvular abnormalities were noted. Trivial pericardial effusion is present.  This study was compared with the study from 2020 .There has been no significant change. _____________________________________________________________________________ __   Left Ventricle Left ventricular size is normal. Global and regional left ventricular function is normal with an EF of 60-65%. Relative wall thickness is increased consistent with concentric remodeling. Grade II or moderate diastolic dysfunction.  Right Ventricle The right ventricle is normal size. Global right ventricular function is normal.  Atria The right atria appears normal. Moderate left atrial enlargement is present.   Mitral Valve Mild mitral annular calcification is present. Trace mitral insufficiency is present.  Aortic Valve The aortic valve is tricuspid.  Tricuspid Valve The tricuspid valve is normal. Trace tricuspid insufficiency is present.  Pulmonic Valve The pulmonic valve is normal.  Vessels The aorta root is normal. The thoracic aorta is normal. IVC diameter and respiratory changes fall  into an intermediate range suggesting an RA pressure of 8 mmHg.  Pericardium Trivial pericardial effusion is present.   Compared to Previous Study This study was compared with the study from 2020 . There has been no change. _____________________________________________________________________________ __  MMode/2D Measurements & Calculations IVSd: 0.87 cm LVIDd: 4.2 cm LVIDs: 1.9 cm LVPWd: 1.2 cm FS: 54.7 % LV mass(C)d: 144.9 grams LV mass(C)dI: 105.5 grams/m2 Ao root diam: 3.0 cm asc Aorta Diam: 3.0 cm LVOT diam: 1.9 cm LVOT area: 2.8 cm2 LA Volume (BP): 62.6 ml LA Volume Index (BP): 45.7 ml/m2  RWT: 0.58   Doppler Measurements & Calculations MV E max jese: 96.0 cm/sec MV A max jese: 136.0 cm/sec MV E/A: 0.71 MV max P.1 mmHg MV mean P.0 mmHg MV V2 VTI: 36.5 cm MV P1/2t max jese: 113.0 cm/sec MV P1/2t: 76.1 msec MVA(P1/2t): 2.9 cm2 MV dec slope: 435.0 cm/sec2 MV dec time: 0.21 sec Ao V2 max: 129.0 cm/sec Ao max P.0 mmHg E/E' av.1 Lateral E/e': 16.4 Medial E/e': 15.8   _____________________________________________________________________________ __   Report approved by: Anders GUAJARDO 2021 08:40 AM      Xr Chest Port 1 View    Result Date: 2021  Exam: XR CHEST PORT 1 VW, 2021 9:55 PM Indication: Dyspnea Comparison: 2020 Findings: Heart within normal limits. Diffuse interstitial process throughout the lungs. A prominent right hilum.     Impression: Interstitial changes suggesting increased edema. Prominent right hilum suggests possible pulmonary hypertension, not dissimilar to CT of 2020. MD Parveen OROZCO MD on 2021 at 11:18 AM    Time Spent: 25+ minutes

## 2021-02-21 NOTE — PLAN OF CARE
"BP (!) 130/37 (BP Location: Right arm)   Pulse 83   Temp 96.9  F (36.1  C) (Oral)   Resp 17   Ht 1.499 m (4' 11\")   Wt 45.6 kg (100 lb 8.5 oz)   LMP  (LMP Unknown)   SpO2 90%   BMI 20.30 kg/m      Shift: 1141-0844  Reason for Admission: Dizziness + AV Malformation with decrease in hemoglobin.   VS: VSS on 3L NC- HTN  Neuros: A/Ox4, able to make needs known.   GI/: No BMs this shift. Voiding adequately   Nutrition: Tolerating regular diet  Drains/Lines: PIV SL  Activity: A1 + GB. Was agitated and wanted to leave AMA after EGD/Colonoscopy. Now has a sitter at bedside for safety and elopement precaution.   Pain/Nausea: Denies both  Respiratory: Sats >90% on 3L NC  Labs: Trending hemoglobin, currently stable.   Social:  visited briefly.   Plan of care: Possible discharge tomorrow or Monday. Will continue to monitor and follow POC.   "

## 2021-02-21 NOTE — PLAN OF CARE
"BP (!) 101/37 (BP Location: Right arm)   Pulse 74   Temp 98.1  F (36.7  C) (Oral)   Resp 18   Ht 1.499 m (4' 11\")   Wt 45.6 kg (100 lb 8.5 oz)   LMP  (LMP Unknown)   SpO2 95%   BMI 20.30 kg/m      Neuro: A&Ox3, forgetful. Sitter d/c today  Cardiac: Soft BP at time 90/50's. MD aware, OVSS.   Respiratory: Desats on RA to 80%. Placed back on O2, currently on 1L Oximask sating 95%.   GI/: Adequate urine output. BM X1  Diet/appetite: Tolerating diet. Eating fair, drinking boost  Activity:  Up with 1 assist.   Pain: Tylenol given x1 for R hip pain and left big toe pain   Skin: No new deficits noted.  LDA's: PIV     Got 10mg IV lasix     Plan: Continue with POC. Notify primary team with changes.        "

## 2021-02-21 NOTE — PROGRESS NOTES
Patient has been assessed for Home Oxygen needs. Oxygen readings:    *Pulse oximetry (SpO2) = 88% on room air at rest while awake.    *SpO2 improved to  94 % on 2 liters/minute at rest.    *SpO2 = 78% on room air during activity/with exercise.    *SpO2 improved to 93% on 2L liters/minute during activity/with exercise.

## 2021-02-21 NOTE — PROGRESS NOTES
RRT:  Pt found to be satting in the mid-60s @ 0130 on RA. Unable to arouse pt for more than 3-4 seconds; pt unable to follow commands or answer orientation questions. Zyprexa given 1 minute before VS taken. 25 mg Atarax and 1 mg Melatonin given @ 2220. 3 LPM NC initiated w/no change in saturations. Oxymask initiated w/15 LPM: sats kathia over 3-4 minuntes to 100%. Slowly weaned down to 0 LPM and pt began to desat to high 80s. Placed back on 3 LPM and pt satting mid-90s. Pt able to follow commands and aroused w/o much difficulty. Hx of COPD and pulmonary HTN. RRT leaning more towards hypoxia and possible sensitivity to medication. Chest x-ray and labs done; PCO2 increased. Pt resting quietly after RRT and seems to be stable. Will continue to monitor.

## 2021-02-21 NOTE — PLAN OF CARE
"Assumed Cares 9628-4397    /47 (BP Location: Right arm)   Pulse 67   Temp 98.8  F (37.1  C) (Axillary)   Resp 18   Ht 1.499 m (4' 11\")   Wt 45.6 kg (100 lb 8.5 oz)   LMP  (LMP Unknown)   SpO2 99%   BMI 20.30 kg/m      Pain: Denies pain.  Vitals: Hypotension, tachypnea, hypoxia, and fever noted @ 0130. RRT called (see note).  Neuro: A&Ox4 upon first assessment. Became lethargic and difficult to arouse. Only opened eyes for 3-4 seconds and did not follow commands. Did not answer questions when asked. A&Ox4 this AM.  Respiratory: On 3 LPM Oxymask satting in the mid-90s. After giving Zyprexa @ 0130, VS taken and pt found to be satting in the mid-60s on RA (see RRT note for interventions taken). Coarse lung sounds.  Cardiac/Neurovascular: On tele; NSR. Denies numbness/tingling.  GI/: Voiding spontaneously w/o difficulty. Last BM 2/20. Denies NV.  Nutrition: Regular diet.  Activity: Assist x1 w/gait belt.  Skin: No significant issues. Generalized bruising mainly from lab/PIV pokes.  Lines: Left PIV saline locked.  Events this shift: Pulled out both right and left PIVs; new left PIV placed. Atarax, melatonin, and Zyprexa given due to increased restlessness and moaning. RRT called at 0130 due to mid-60s oxygen saturations and inability to arouse pt for more than a few seconds (see note). Following RRT, pt satting mid-90s w/o issue. Resting quietly between cares.    Plan: Continue to monitor and notify MD of any changes in pt status. Possible discharge today or Monday.    Fernanda Sharma RN on 2/21/2021 at 7:00 AM  "

## 2021-02-22 ENCOUNTER — PATIENT OUTREACH (OUTPATIENT)
Dept: CARE COORDINATION | Facility: CLINIC | Age: 86
End: 2021-02-22

## 2021-02-22 ENCOUNTER — DOCUMENTATION ONLY (OUTPATIENT)
Dept: ONCOLOGY | Facility: CLINIC | Age: 86
End: 2021-02-22

## 2021-02-22 VITALS
SYSTOLIC BLOOD PRESSURE: 132 MMHG | DIASTOLIC BLOOD PRESSURE: 55 MMHG | WEIGHT: 100.97 LBS | BODY MASS INDEX: 20.36 KG/M2 | OXYGEN SATURATION: 93 % | TEMPERATURE: 96.8 F | RESPIRATION RATE: 27 BRPM | HEART RATE: 76 BPM | HEIGHT: 59 IN

## 2021-02-22 PROBLEM — I50.33: Status: ACTIVE | Noted: 2021-02-22

## 2021-02-22 LAB
A-TOCOPHEROL VIT E SERPL-MCNC: 12.8 MG/L (ref 5.5–18)
ANION GAP SERPL CALCULATED.3IONS-SCNC: 2 MMOL/L (ref 3–14)
ANNOTATION COMMENT IMP: NORMAL
BETA+GAMMA TOCOPHEROL SERPL-MCNC: 0.4 MG/L (ref 0–6)
BUN SERPL-MCNC: 16 MG/DL (ref 7–30)
CALCIUM SERPL-MCNC: 9.2 MG/DL (ref 8.5–10.1)
CHLORIDE SERPL-SCNC: 110 MMOL/L (ref 94–109)
CO2 SERPL-SCNC: 32 MMOL/L (ref 20–32)
CREAT SERPL-MCNC: 0.66 MG/DL (ref 0.52–1.04)
ERYTHROCYTE [DISTWIDTH] IN BLOOD BY AUTOMATED COUNT: 19.8 % (ref 10–15)
GFR SERPL CREATININE-BSD FRML MDRD: 81 ML/MIN/{1.73_M2}
GLUCOSE SERPL-MCNC: 129 MG/DL (ref 70–99)
HCT VFR BLD AUTO: 27.7 % (ref 35–47)
HGB BLD-MCNC: 7.8 G/DL (ref 11.7–15.7)
MCH RBC QN AUTO: 24.2 PG (ref 26.5–33)
MCHC RBC AUTO-ENTMCNC: 28.2 G/DL (ref 31.5–36.5)
MCV RBC AUTO: 86 FL (ref 78–100)
PLATELET # BLD AUTO: 276 10E9/L (ref 150–450)
POTASSIUM SERPL-SCNC: 4.2 MMOL/L (ref 3.4–5.3)
RBC # BLD AUTO: 3.22 10E12/L (ref 3.8–5.2)
RETINYL PALMITATE SERPL-MCNC: <0.02 MG/L (ref 0–0.1)
SODIUM SERPL-SCNC: 144 MMOL/L (ref 133–144)
TTG IGA SER-ACNC: 1 U/ML
TTG IGG SER-ACNC: <1 U/ML
VIT A SERPL-MCNC: 0.37 MG/L (ref 0.3–1.2)
WBC # BLD AUTO: 6 10E9/L (ref 4–11)

## 2021-02-22 PROCEDURE — 99239 HOSP IP/OBS DSCHRG MGMT >30: CPT | Performed by: INTERNAL MEDICINE

## 2021-02-22 PROCEDURE — 250N000011 HC RX IP 250 OP 636: Performed by: PHYSICIAN ASSISTANT

## 2021-02-22 PROCEDURE — C9113 INJ PANTOPRAZOLE SODIUM, VIA: HCPCS | Performed by: PHYSICIAN ASSISTANT

## 2021-02-22 PROCEDURE — 99221 1ST HOSP IP/OBS SF/LOW 40: CPT | Mod: GC | Performed by: INTERNAL MEDICINE

## 2021-02-22 PROCEDURE — 250N000013 HC RX MED GY IP 250 OP 250 PS 637: Performed by: PHYSICIAN ASSISTANT

## 2021-02-22 PROCEDURE — 80048 BASIC METABOLIC PNL TOTAL CA: CPT | Performed by: INTERNAL MEDICINE

## 2021-02-22 PROCEDURE — 250N000013 HC RX MED GY IP 250 OP 250 PS 637: Performed by: INTERNAL MEDICINE

## 2021-02-22 PROCEDURE — 250N000011 HC RX IP 250 OP 636: Performed by: INTERNAL MEDICINE

## 2021-02-22 PROCEDURE — 85027 COMPLETE CBC AUTOMATED: CPT | Performed by: INTERNAL MEDICINE

## 2021-02-22 PROCEDURE — 258N000003 HC RX IP 258 OP 636: Performed by: INTERNAL MEDICINE

## 2021-02-22 PROCEDURE — 36415 COLL VENOUS BLD VENIPUNCTURE: CPT | Performed by: INTERNAL MEDICINE

## 2021-02-22 RX ORDER — FUROSEMIDE 10 MG/ML
20 INJECTION INTRAMUSCULAR; INTRAVENOUS ONCE
Status: COMPLETED | OUTPATIENT
Start: 2021-02-22 | End: 2021-02-22

## 2021-02-22 RX ADMIN — LEVOTHYROXINE SODIUM 75 MCG: 75 TABLET ORAL at 09:03

## 2021-02-22 RX ADMIN — PANTOPRAZOLE SODIUM 40 MG: 40 INJECTION, POWDER, FOR SOLUTION INTRAVENOUS at 09:03

## 2021-02-22 RX ADMIN — IRON SUCROSE 300 MG: 20 INJECTION, SOLUTION INTRAVENOUS at 10:39

## 2021-02-22 RX ADMIN — APIXABAN 5 MG: 5 TABLET, FILM COATED ORAL at 09:03

## 2021-02-22 RX ADMIN — ACETAMINOPHEN 325 MG: 325 TABLET, FILM COATED ORAL at 04:12

## 2021-02-22 RX ADMIN — FUROSEMIDE 20 MG: 10 INJECTION, SOLUTION INTRAMUSCULAR; INTRAVENOUS at 10:39

## 2021-02-22 ASSESSMENT — ACTIVITIES OF DAILY LIVING (ADL)
ADLS_ACUITY_SCORE: 16
ADLS_ACUITY_SCORE: 16
ADLS_ACUITY_SCORE: 17
ADLS_ACUITY_SCORE: 15
ADLS_ACUITY_SCORE: 15

## 2021-02-22 NOTE — PLAN OF CARE
[id number] Pt anxious and upset about still being in the hospital. Education and therapeutic listening provided. A/O x4; forgetful at times. Intake for breakfast and lunch decent. Needing 2-3L NC of O2. On room air pt quickly destats to the 60-70's. Pt denies SOB but is tachypenic and taking shallow breaths. Decompensated with ambulation. Hgb stable; iron infused today. Lasix given with good output. Continue strict I/O's. Incentive spirometer encouraged. PT/OT consult? SBA with movement but grabs at walls for support. States she does not use walker or cane at home.

## 2021-02-22 NOTE — PROGRESS NOTES
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 Would recommend IV iron therapy and periodic monitoring of hgb  -With future suspected bleeding would favor supportive therapy with monitoring, transfusion, and subcutaneous Octreotide given her intolerance of endoscopy, age, and comorbidities without a massive, overt GI bleed.     Inpatient GI consults service will sign off. No further recommendations at this time. If primary team has addition questions, please page consult fellow listed in [place].    Current GI Consult Staff: Dr. Sultan Hever [facility] GI Team  [phone]

## 2021-02-22 NOTE — PROGRESS NOTES
2/22/21  Received a page around 5pm from Samira indicating the patient would like to go home today. We confirmed that the spouse spoke with the attending and they all agreed patient imer go home tonight as long as they can get her oxygen. They also asked about a pulse ox. I explained they are $45 out of pocket. She checked with the patient and spouse and they would like the pulse ox as well.   Told Samira our  will deliver oxygen tonight, before 6:30pm, but probably within the next half hour or so. Confirmed the  will educate patient and family on equipment and be able to answer any questions they have, but will also be given our number in case he has other concerns.

## 2021-02-22 NOTE — DISCHARGE INSTRUCTIONS
Oxygen Provider:  Arranged through [facility], contact number [phone].  If you have any questions or concerns please call the oxygen company directly.

## 2021-02-22 NOTE — PROGRESS NOTES
Received intake call for home oxygen at 2/22 @ 3:22PM. Spoke with Stanislav GALLAGHER informed her I received intake and would review documentation. Stanislav confirmed patient is discharging tomorrow AM but patient is eager to discharge today and it may be better to call patients spouse.   Reviewed patient's chart; Patient qualifies under insurance guidelines and all documentation is in the chart including a good order.   3:28pm- Attempted to call patients Spouse no answer.  3:35PM- Called to offer choice and patient is okay with [facility] setting them up. Discussed equipment with patient and informed them that we would be to bedside with oxygen before discharge. Patient did state that she is discharging today, informed her I would check into discharge and we will deliver before discharge.   3:44PM- Spoke with care coordinator, Stanislav, confirmed we received the order, she confirmed patient will not be discharging today and CC, MD and patients spouse are all aware and on same page. Informed her we will deliver oxygen in AM by 10am.

## 2021-02-22 NOTE — PLAN OF CARE
"Assumed Cares: 1900-0730  Neuro: A&Ox4. Pt was lethargic this shift. Arouses to voice and touch. Falls asleep mid conversation. Around 2300, pt more alert, able to have  a conversation.   Behavior: Calm/cooperative.   GI: No BM this shift.   : Only one urine occurrence this shift. Pt states \"I don't like having to do this. I don't like using the bathroom.\"   Respiratory: Dyspnea on exertion.   Cardiac: On tele; NSR.   Skin: Intact. No concerns.   Lines/Drains: PIV saline locked.   Mobility: Up with assist of 1.   Pain: Reported pain in lower back.   Vital signs:  VSS on 3L O2.    /56 (BP Location: Right arm)   Pulse 64   Temp 96.2  F (35.7  C) (Oral)   Resp 18   Ht 1.499 m (4' 11\")   Wt 45.8 kg (100 lb 15.5 oz)   LMP  (LMP Unknown)   SpO2 99%   BMI 20.39 kg/m       Plan of care: Continue with current plan of care and update provider as needed.      "

## 2021-02-22 NOTE — CONSULTS
Care Management Initial Consult    General Information  Assessment completed with: Patient,    Type of CM/SW Visit: Offer D/C Planning    Primary Care Provider verified and updated as needed:     Readmission within the last 30 days: no previous admission in last 30 days         Advance Care Planning: Advance Care Planning Reviewed: no concerns identified          Communication Assessment  Patient's communication style: spoken language (English or Bilingual)    Hearing Difficulty or Deaf: no   Wear Glasses or Blind: yes    Cognitive  Cognitive/Neuro/Behavioral: WDL  Level of Consciousness: alert  Arousal Level: opens eyes spontaneously  Orientation: oriented x 4  Mood/Behavior: calm  Best Language: 0 - No aphasia  Speech: logical, spontaneous, clear    Living Environment:   People in home: spouse  Pradeep   Current living Arrangements: house      Able to return to prior arrangements:         Family/Social Support:  Care provided by: self  Provides care for: no one  Marital Status:     Pradeep       Description of Support System: Supportive    Support Assessment: Adequate family and caregiver support, Adequate social supports    Current Resources:   Patient receiving home care services:       Community Resources:    Equipment currently used at home: none  Supplies currently used at home:      Employment/Financial:  Employment Status: retired        Financial Concerns: insurance, none           Lifestyle & Psychosocial Needs:        Socioeconomic History     Marital status:      Spouse name: Not on file     Number of children: Not on file     Years of education: Not on file     Highest education level: Not on file     Tobacco Use     Smoking status: Former Smoker     Packs/day: 0.50     Years: 15.00     Pack years: 7.50     Quit date: 1993     Years since quittin.4     Smokeless tobacco: Never Used   Substance and Sexual Activity     Alcohol use: Yes     Comment: social     Drug use: No      Sexual activity: Not Currently     Partners: Male       Functional Status:  Prior to admission patient needed assistance:   Dependent ADLs:: Independent          Mental Health Status:  Mental Health Status: No Current Concerns       Chemical Dependency Status:                Values/Beliefs:  Spiritual, Cultural Beliefs, Restorationism Practices, Values that affect care: no               Additional Information:  This writer spoke with patient at the bedside regarding home oxygen needs. Lucie is aware of the need for the oxygen but also states that she will be leaving today. This writer verified with provider that she will be discharging tomorrow and spouse is aware of this as well. This writer will send orders for home oxygen to [facility], Lucie is in agreement with this.   Walk test was already done by bedside nurse yesterday and provider will provide face to face documentation.     RNCC will be available for any other needs.     [facility]   [address] [place] [address]  (P) [phone]  (F) [phone]      Maria Elena Chris RN  Float RN Care Coordinator  Pager [phone] (unit RNCC pager)     To get in touch with the Weekend & Holiday on call RN Care Coordinator:  Pager:  [phone] OR Care Coordinator job code/pager [phone]     Addendum: referral was sent at 3:15

## 2021-02-22 NOTE — PLAN OF CARE
Assumed cares from 3684-2109. Pt very lethargic this shift. Falling asleep mid conversations. 1-2L face mask. Pt will intermittently pull it off and drop quickly. Lowest reading was 68% on RA. Recovers quickly. Pt declined to eat any food this shift. Strict I&O monitoring. No urine output this shift. MD's notified. Will continue to monitor.

## 2021-02-22 NOTE — PROGRESS NOTES
[facility], [place]    Medicine Progress Note - Hospitalist Service, Gold 11       Date of Admission: 2/18/2021    History leading to present hospitalization is unchanged from: 2/18/2021    Brief Hospital Course  This is a 85-year-old female with past medical history of abdominal aortic stenosis, COPD, hypertension, bilateral PE, enlarged pulmonary artery of unclear significance who presented secondary to dizziness and balance and orthostatics.  Initial concern was for GI bleed however no overt symptoms.  Patient does have known history of AV malformations.  Endoscopy performed showing AVMs on Upper and lower scope. Hemoglobin stabilized however patient became dyspneic and desaturated. CXR showed fluid overload and was diagnosed with TACO. Patient was cautiously diuresed secondary to suspected pulmonary HTN.     Assessment   Active Problems:    Chronic bilateral low back pain without sciatica    Hypothyroidism    Pulmonary embolism (H)    Anemia    Aortic stenosis    Iron deficiency    Vitamin D deficiency    Benign essential hypertension    Moderate major depression (H)    Anxiety    Cherry angioma    Pulmonary hypertension (H)    SOB (shortness of breath)    Acute and chronic respiratory failure with hypoxia (H)    Anemia due to blood loss, acute    Gastrointestinal hemorrhage, unspecified gastrointestinal hemorrhage type    TACO (transfusion associated circulatory overload)    Acute on chronic congestive heart failure with left ventricular diastolic dysfunction (H)  Chronic hypoxic resp failure    Please note pulmonary HTN is suspected and not confirmed        Plan  Home oxygen evaluation showing significant hypoxia on activity necessitating further diuresis.  Lasix 20mg IV once  Wean oxygen as tolerated.   Venofer on discharge in infusion center for 1 additional dose. Transfusion orders placed in care plan.   tCO2 showing evidence of chronic respiratory failure  Cardiology consult  "for further fluid management    I certify that this patient, Lucie Stockton has been under my care (or a nurse practitioner or physican's assistant working with me). This is the face-to-face encounter for oxygen medical necessity.      Lucie Stockton is now in a chronic stable state and continues to require supplemental oxygen. Patient has continued oxygen desaturation due to Chronic Heart Failure I50.    Alternative treatment(s) tried or considered and deemed clinically infective for treatment of Chronic Respiratory Failure with Hypoxia J93.11 include diuretics.  If portability is ordered, is the patient mobile within the home? yes    **Patients who qualify for home O2 coverage under the CMS guidelines require ABG tests or O2 sat readings obtained closest to, but no earlier than 2 days prior to the discharge, as evidence of the need for home oxygen therapy. Testing must be performed while patient is in the chronic stable state. See notes for O2 sats.**      Subjective   Chief complaint: \" SOB\"    Increased energy  No fever or chills  No CP or SOB (subjectively)  No urinary changes  No skin changes or bruising    Review of Systems  4 point ROS performed and is negative except as mention above     Physical Exam  Vital signs:  Temp: 97.9  F (36.6  C) Temp src: Oral BP: 114/41 Pulse: 76   Resp: 28 SpO2: 92 % O2 Device: Nasal cannula Oxygen Delivery: 3 LPM Height: 149.9 cm (4' 11\") Weight: 45.8 kg (100 lb 15.5 oz)  Estimated body mass index is 20.39 kg/m  as calculated from the following:    Height as of this encounter: 1.499 m (4' 11\").    Weight as of this encounter: 45.8 kg (100 lb 15.5 oz).        Physical Exam  Constitutional:       Appearance: Normal appearance.   HENT:      Head: Normocephalic.      Nose: Nose normal.      Mouth/Throat:      Mouth: Mucous membranes are moist.   Eyes:      Extraocular Movements: Extraocular movements intact.      Pupils: Pupils are equal, round, and reactive to light. "   Cardiovascular:      Rate and Rhythm: Normal rate and regular rhythm.      Heart sounds: No murmur. No friction rub. No gallop.    Pulmonary:      Effort: Pulmonary effort is normal. No respiratory distress.      Breath sounds: No stridor. Rales present. No wheezing.   Abdominal:      General: Abdomen is flat. There is no distension.      Tenderness: There is no abdominal tenderness.   Neurological:      General: No focal deficit present.      Mental Status: She is alert.      Cranial Nerves: No cranial nerve deficit.      Sensory: No sensory deficit.      Motor: No weakness.   Psychiatric:         Behavior: Behavior normal.           Labs  Lab Results   Component Value Date    WBC 6.0 2021    HGB 7.8 (L) 2021    HCT 27.7 (L) 2021     2021     2021    POTASSIUM 4.2 2021    CHLORIDE 110 (H) 2021    CO2 32 2021    BUN 16 2021    CR 0.66 2021     (H) 2021    DD 1.3 (H) 2018    NTBNPI 4,173 (H) 2021    NTBNP 536 (H) 2020    TROPONIN 0.02 2017    TROPI 0.031 2021    AST 20 2021    ALT 17 2021    ALKPHOS 52 2021    BILITOTAL 0.4 2021    INR 1.11 2021         Imaging   Echo Complete    Result Date: 2021  [id number]^DEBBI^RATNA^[facility] [address] [place] [id number]  Name: ISAURO GUZMAN MRN: [id number] : 1935 Study Date: 2021 07:42 AM Age: 85 yrs Gender: Female Patient Location: [id number] Reason For Study: Heart Failure, Unspecified Ordering Physician: RATNA WERNER Performed By: Gracie Enriquez RDCS  BSA: 1.4 m2 Height: 59 in Weight: 100 lb BP: 145/47 mmHg _____________________________________________________________________________ __   Procedure Complete Portable Echo Adult. _____________________________________________________________________________  __   Interpretation Summary Global and regional left ventricular function is normal with an EF of 60-65%. Global right ventricular function is normal. IVC diameter and respiratory changes fall into an intermediate range suggesting an RA pressure of 8 mmHg. No significant valvular abnormalities were noted. Trivial pericardial effusion is present.  This study was compared with the study from 1/23/2020 .There has been no significant change. _____________________________________________________________________________ __   Left Ventricle Left ventricular size is normal. Global and regional left ventricular function is normal with an EF of 60-65%. Relative wall thickness is increased consistent with concentric remodeling. Grade II or moderate diastolic dysfunction.  Right Ventricle The right ventricle is normal size. Global right ventricular function is normal.  Atria The right atria appears normal. Moderate left atrial enlargement is present.   Mitral Valve Mild mitral annular calcification is present. Trace mitral insufficiency is present.  Aortic Valve The aortic valve is tricuspid.  Tricuspid Valve The tricuspid valve is normal. Trace tricuspid insufficiency is present.  Pulmonic Valve The pulmonic valve is normal.  Vessels The aorta root is normal. The thoracic aorta is normal. IVC diameter and respiratory changes fall into an intermediate range suggesting an RA pressure of 8 mmHg.  Pericardium Trivial pericardial effusion is present.   Compared to Previous Study This study was compared with the study from 1/23/2020 . There has been no change. _____________________________________________________________________________ __  MMode/2D Measurements & Calculations IVSd: 0.87 cm LVIDd: 4.2 cm LVIDs: 1.9 cm LVPWd: 1.2 cm FS: 54.7 % LV mass(C)d: 144.9 grams LV mass(C)dI: 105.5 grams/m2 Ao root diam: 3.0 cm asc Aorta Diam: 3.0 cm LVOT diam: 1.9 cm LVOT area: 2.8 cm2 LA Volume (BP): 62.6 ml LA Volume Index (BP): 45.7 ml/m2   RWT: 0.58   Doppler Measurements & Calculations MV E max jese: 96.0 cm/sec MV A max jese: 136.0 cm/sec MV E/A: 0.71 MV max P.1 mmHg MV mean P.0 mmHg MV V2 VTI: 36.5 cm MV P1/2t max jese: 113.0 cm/sec MV P1/2t: 76.1 msec MVA(P1/2t): 2.9 cm2 MV dec slope: 435.0 cm/sec2 MV dec time: 0.21 sec Ao V2 max: 129.0 cm/sec Ao max P.0 mmHg E/E' av.1 Lateral E/e': 16.4 Medial E/e': 15.8   _____________________________________________________________________________ __   Report approved by: Anders GUAJARDO 2021 08:40 AM      Xr Chest Port 1 View    Result Date: 2021  Exam: XR CHEST PORT 1 VW, 2021 9:55 PM Indication: Dyspnea Comparison: 2020 Findings: Heart within normal limits. Diffuse interstitial process throughout the lungs. A prominent right hilum.     Impression: Interstitial changes suggesting increased edema. Prominent right hilum suggests possible pulmonary hypertension, not dissimilar to CT of 2020. MD Parveen OROZCO MD on 2021 at 11:18 AM    Time Spent: 25+ minutes

## 2021-02-22 NOTE — CONSULTS
Cardiology Consult                                                               2021  Lucie Stockton MRN: [id number]  Age: 85 year old, : 1935        Reason for consult:      Concern for HF     Assessment and Recommendation:     84 yo female with history  COPD, peripheral vascular disease, hypertension, bilateral PE, with concern for possible CTEPH who cardiology was consulted on for concern of heart failure exacerbation after getting blood products.  She originally presented with symptomatic anemia with concern for GI bleed, possibly due to AVMs. Her echo does show that she has some diastolic dysfunction, however she is not currently in heart failure.  She is not having increased shortness of breath and exam is largely unremarkable.  She is currently undergoing work-up for CTEPH with pulmonary angiogram scheduled for .    From a cardiology standpoint, there is no further management we would recommend.  Recommend continued workup for possible CTEPH as scheduled.     Patient discussed with staff attending, Dr. Moncada and the note reflects our joint plan. Thank you for consulting the cardiovascular services at the [facility]. Please do not hesitate to call with questions or concerns.     Pérez Chow MD MPH  PGY3          History of Present Illness:     From primary team:  This is a 85-year-old female with past medical history of abdominal aortic stenosis, COPD, hypertension, bilateral PE, enlarged pulmonary artery of unclear significance who presented secondary to dizziness and balance and orthostatics.  Initial concern was for GI bleed however no overt symptoms.  Patient does have known history of AV malformations.  Endoscopy performed showing AVMs on Upper and lower scope. Hemoglobin stabilized however patient became dyspneic and desaturated. CXR showed fluid overload and was diagnosed with TACO. Patient was cautiously diuresed  secondary to suspected pulmonary HTN.         Past Medical History:     Patient Active Problem List   Diagnosis     Benign ovarian tumor     Chronic bilateral low back pain without sciatica     Hypothyroidism     Peripheral vascular disease (H)     Knee pain     Osteoarthritis     Cervicalgia     Hyperlipidemia with target LDL less than 70     Pulmonary embolism (H)     Anemia     Aortic stenosis     Atherosclerosis of aorta (H)     Atherosclerosis of arteries of extremities (H)     Intermittent claudication (H)     Iron deficiency     Osteoporosis     DVT of lower extremity, bilateral (H)     Varicose veins     Gluteal pain     Insomnia     Back pain     Vitamin D deficiency     Tobacco use disorder     Personal history of other drug therapy     Venous thrombosis of extremity     Benign essential hypertension     Gastritis     Cataract     Acute left-sided low back pain with left-sided sciatica     Spinal stenosis of lumbar region with neurogenic claudication     SBO (small bowel obstruction) (H)     Small bowel obstruction (H)     Long term current use of anticoagulant therapy     Left-sided low back pain with left-sided sciatica     Moderate major depression (H)     Anxiety     Neoplasm of uncertain behavior of skin     Cherry angioma     BCC (basal cell carcinoma), trunk     Weakness     Imbalance     Chronic pain     History of nonmelanoma skin cancer     Multiple melanocytic nevi     Age-related osteoporosis without current pathological fracture     Chronic obstructive pulmonary disease, unspecified COPD type (H)     Chronic cough     Pulmonary nodules     Right upper lobe pulmonary nodule     Pulmonary hypertension (H)     Urinary frequency     Lung nodules     Hypoxia     SOB (shortness of breath)     Acute and chronic respiratory failure with hypoxia (H)     Thrombocytosis (H)     Anemia due to blood loss, acute     Gastrointestinal hemorrhage, unspecified gastrointestinal hemorrhage type     TACO  (transfusion associated circulatory overload)     Acute on chronic congestive heart failure with left ventricular diastolic dysfunction (H)         Past Surgical History:      Past Surgical History:   Procedure Laterality Date     BUNIONECTOMY       C STOMACH SURGERY PROCEDURE UNLISTED      Please see chart     CAPSULE/PILL CAM ENDOSCOPY N/A 2/20/2021    Procedure: IMAGING PROCEDURE, GI TRACT, INTRALUMINAL, VIA CAPSULE;  Surgeon: Heron Ayala MD;  Location: [facility]     COLONOSCOPY      11/10/10 angioectasia     ENDOBRONCHIAL ULTRASOUND FLEXIBLE N/A 1/24/2020    Procedure: ENDOBRONCHIAL ULTRASOUND, WITH FLEXIBLE BRONCHOSCOPY;  Surgeon: Gopal Jara MD;  Location: [facility] OR     HYSTERECTOMY TOTAL ABDOMINAL, BILATERAL SALPINGO-OOPHORECTOMY, NODE DISSECTION, COMBINED  2/17/11    SANGEETHA, EMLIIA, LN bx, omentectomy, resection of evrian malignancy Dr. JONO Goncalves - Returned BENIGN     INJECT EPIDURAL LUMBAR / SACRAL SINGLE N/A 1/5/2018    Procedure: INJECT EPIDURAL LUMBAR / SACRAL SINGLE;  lumbar interlaminar epidural steroid injection;  Surgeon: Jaylin Valadez MD;  [facility]     INJECT SACROILIAC JOINT Right 3/7/2018    Procedure: INJECT SACROILIAC JOINT;  Right Sacroiliac Joint Injection;  Surgeon: Flavio Harrison MD;  [facility]     INJECT SACROILIAC JOINT Bilateral 12/7/2018    Procedure: Bilateral Sacroiliac Joint Injections;  Surgeon: Faviola Cosby MD;  Location: [facility]     OPTICAL TRACKING SYSTEM BRONCHOSCOPY N/A 1/24/2020    Procedure: Super D navigational bronchoscopy;  Surgeon: Gopal Jara MD;  Location: [facility]     UPPER GI ENDOSCOPY      11/10/10 erythematous gastropathy, angioectasia         Social History:     Social History     Socioeconomic History     Marital status:      Spouse name: Not on file     Number of children: Not on file     Years of education: Not on file     Highest education level: Not on file   Occupational History     Not on file   Social Needs     Financial  resource strain: Not on file     Food insecurity     Worry: Not on file     Inability: Not on file     Transportation needs     Medical: Not on file     Non-medical: Not on file   Tobacco Use     Smoking status: Former Smoker     Packs/day: 0.50     Years: 15.00     Pack years: 7.50     Quit date: 1993     Years since quittin.4     Smokeless tobacco: Never Used   Substance and Sexual Activity     Alcohol use: Yes     Comment: social     Drug use: No     Sexual activity: Not Currently     Partners: Male   Lifestyle     Physical activity     Days per week: Not on file     Minutes per session: Not on file     Stress: Not on file   Relationships     Social connections     Talks on phone: Not on file     Gets together: Not on file     Attends Faith service: Not on file     Active member of club or organization: Not on file     Attends meetings of clubs or organizations: Not on file     Relationship status: Not on file     Intimate partner violence     Fear of current or ex partner: Not on file     Emotionally abused: Not on file     Physically abused: Not on file     Forced sexual activity: Not on file   Other Topics Concern     Parent/sibling w/ CABG, MI or angioplasty before 65F 55M? Not Asked   Social History Narrative     for 52 years. Retired from [employer]. Frequent world travel.         Family History:     Family History   Problem Relation Age of Onset     Breast Cancer Maternal Aunt 80     C.A.D. Father 54     No Known Problems Mother          Allergies:     No Known Allergies      Medications:     No current facility-administered medications on file prior to encounter.   apixaban ANTICOAGULANT (ELIQUIS) 5 MG tablet, Take 1 tablet (5 mg) by mouth 2 times daily  acetaminophen (TYLENOL) 325 MG tablet, Take 2 tablets every 6 to 8 hours as needed for pain (Patient taking differently: Take 650 mg by mouth as needed Take 2 tablets every 6 to 8 hours as needed for pain)  atorvastatin  (LIPITOR) 40 MG tablet, Take 2 tablets (80 mg) by mouth daily  betamethasone dipropionate (DIPROSONE) 0.05 % external ointment, Apply topically 2 times daily (Patient taking differently: Apply 1 g topically as needed )  Calcium Carbonate-Vitamin D (CALCIUM 600 + D OR), Take 1 tablet by mouth every morning   cholecalciferol (VITAMIN D3) 25 mcg (1000 units) capsule, Take 1 capsule by mouth daily  citalopram (CELEXA) 20 MG tablet, Take 1 tablet (20 mg) by mouth daily  cyanocolbalamin (VITAMIN  B-12) 500 MCG tablet, Take 500 mcg by mouth every morning   gabapentin (NEURONTIN) 100 MG capsule, Take 1 capsule (100 mg) in the morning, 1 capsule (100 mg) mid-day, and 4 capsules (400 mg) at bed time  hydrochlorothiazide (HYDRODIURIL) 50 MG tablet, Take 1 tablet (50 mg) by mouth daily Please keep appt 12/21/20  latanoprost (XALATAN) 0.005 % ophthalmic solution, Place 1 drop into both eyes At Bedtime   levothyroxine (SYNTHROID/LEVOTHROID) 75 MCG tablet, Take 1 tablet (75 mcg) by mouth daily  loperamide (IMODIUM A-D) 2 MG tablet, Take 0.5-1 tablets (1-2 mg) by mouth daily as needed for diarrhea  multivitamin (OCUVITE) TABS tablet, Take 1 tablet by mouth daily  oxybutynin (DITROPAN) 5 MG tablet, Take 1 tablet (5 mg) by mouth At Bedtime For frequent urination  pantoprazole (PROTONIX) 40 MG EC tablet, Take 1 tablet (40 mg) by mouth daily Takes in the morning.  Potassium Bicarb-Citric Acid 20 MEQ TBEF, Take 20 mg by mouth daily            Physical Exam:     B/P: 132/55, T: 96.8, P: 76, R: 27    Wt Readings from Last 4 Encounters:   02/21/21 45.8 kg (100 lb 15.5 oz)   02/18/21 44.5 kg (98 lb)   02/16/21 44.5 kg (98 lb)   01/30/20 44.6 kg (98 lb 6.4 oz)         Intake/Output Summary (Last 24 hours) at 2/22/2021 1536  Last data filed at 2/22/2021 1400  Gross per 24 hour   Intake 865 ml   Output 1550 ml   Net -685 ml       Gen: AA&Ox3, no acute distress  HEENT: Mucous membranes pink, moist without plaque or exudate  PULM/THORAX: Non  labored respirations. Clear to auscultation bilaterally, no rales/rhonchi/wheezes  CV: RRR, S1 and S2 appreciated, no extra heart sounds, murmurs or rub auscultated. No JVD.  ABD: Soft, nontender, nondistended. Normoactive bowel sounds.  EXT: No edema, clubbing or cyanosis. No asymmetrical edema or tenderness to palpation in calves bilaterally.  NEURO: Non focal deficits, strength 5/5 throughout.      Data:     Labs Reviewed on Admission    Troponin   Lab Results   Component Value Date    TROPI 0.031 02/18/2021    TROPI 0.040 04/29/2017    TROPI 0.034 01/23/2017    TROPI 0.038 01/22/2017    TROPI 0.027 09/14/2016    TROPONIN 0.02 04/29/2017    TROPONIN 0.00 05/24/2010     BMP  Recent Labs   Lab 02/22/21  1039 02/21/21  0223 02/19/21  0932 02/19/21  0531    145* 144 Canceled, Test credited   POTASSIUM 4.2 3.4 3.9 Canceled, Test credited   CHLORIDE 110* 113* 112* Canceled, Test credited   CANDIDA 9.2 9.0 9.2 Canceled, Test credited   CO2 32 31 26 Canceled, Test credited   BUN 16 11 22 Canceled, Test credited   CR 0.66 0.71 0.63 Canceled, Test credited   * 96 82 Canceled, Test credited     CBC  Recent Labs   Lab 02/22/21  1039 02/21/21  0223 02/20/21  2204 02/20/21  1537 02/19/21  0932 02/19/21  0932 02/19/21  0531   WBC 6.0 8.1  --   --   --  5.1 Canceled, Test credited   RBC 3.22* 3.09*  --   --   --  2.73* Canceled, Test credited   HGB 7.8* 7.5* 8.4* 7.8*   < > 6.4* Canceled, Test credited   HCT 27.7* 26.4*  --   --   --  22.3* Canceled, Test credited   MCV 86 85  --   --   --  82 Canceled, Test credited   MCH 24.2* 24.3*  --   --   --  23.4* Canceled, Test credited   MCHC 28.2* 28.4*  --   --   --  28.7* Canceled, Test credited   RDW 19.8* 18.7*  --   --   --  18.5* Canceled, Test credited    268  --   --   --  295 Canceled, Test credited    < > = values in this interval not displayed.     INR  Recent Labs   Lab 02/18/21  1715   INR 1.11      Hepatic Panel   Lab Results   Component Value Date    AST  20 02/19/2021     Lab Results   Component Value Date    ALT 17 02/19/2021     Lab Results   Component Value Date    BILICONJ 0.0 06/30/2010      Lab Results   Component Value Date    BILITOTAL 0.4 02/19/2021     Lab Results   Component Value Date    ALBUMIN 2.8 02/19/2021     Lab Results   Component Value Date    PROTTOTAL 5.6 02/19/2021      Lab Results   Component Value Date    ALKPHOS 52 02/19/2021           Most Recent Imaging:       Echo:   Interpretation Summary  Global and regional left ventricular function is normal with an EF of 60-65%.  Global right ventricular function is normal.  IVC diameter and respiratory changes fall into an intermediate range  suggesting an RA pressure of 8 mmHg.  No significant valvular abnormalities were noted.  Trivial pericardial effusion is present.     This study was compared with the study from 1/23/2020 .There has been no  significant change.

## 2021-02-22 NOTE — PROVIDER NOTIFICATION
02/21/21 0200   Call Information   Date of Call 02/21/21   Time of Call 0141   Name of person requesting the team Debby   Title of person requesting team RN   RRT Arrival time 0143   Time RRT ended 0200   Reason for call   Type of RRT Adult   Primary reason for call Sudden or unanticipated change in patient condition   Was patient transferred from the ED, ICU, or PACU within last 24 hours prior to RRT call? No   SBAR   Situation Patient had Sats = 66 on room air and difficult to arouse.     Background a 85 year old female admitted on 2/18/2021. She has PMH of abdominal aortic stenosis, COPD, HTN, Bilateral PE, PVD, Graves disease, JUDY, suspected pulmonary HTN d/t enlarged pulmonary artery who presents to the ED from clinic for evaluation of ~ 2 week hx of dizziness, imbalance, and orthostatic sx. Patient found to have severe anemia w/ Hgb of 5.5,   Notable History/Conditions COPD;Hypertension  (PE, Grave's, Pul HTN, )   Assessment Patient's Sats improved with oxygen.  Was able to maintain Sats on 3 LPM.  Heart rate= 70's,  NSR with PJCs.  Arouseable but falls asleep easily.  B/P= 91/34 & 102/40;  Received Zyprexa and Atarax as ordered.       Interventions CXR;Labs   Patient Outcome   Patient Outcome Stabilized on unit   RRT Team   Attending/Primary/Covering Physician Leslie Huynh MD   Date Attending Physician notified 02/21/21   Time Attending Physician notified 0141   Physician(s) Daja Sanchez PA-C   Lead RN Lucie Angeles RN   RN Fernanda Sharma RN   RT Aislinn Ortega RT   Other staff Kalani Sands RN   Post RRT Intervention Assessment   Post RRT Assessment Stable/Improved   Date Follow Up Done 02/21/21   Time Follow Up Done 0520

## 2021-02-23 ENCOUNTER — IMMUNIZATION (OUTPATIENT)
Dept: PEDIATRICS | Facility: CLINIC | Age: 86
End: 2021-02-23
Attending: INTERNAL MEDICINE
Payer: MEDICARE

## 2021-02-23 LAB
DEPRECATED CALCIDIOL+CALCIFEROL SERPL-MC: <56 UG/L (ref 20–75)
VITAMIN D2 SERPL-MCNC: <5 UG/L
VITAMIN D3 SERPL-MCNC: 51 UG/L

## 2021-02-23 PROCEDURE — 91300 PR COVID VAC PFIZER DIL RECON 30 MCG/0.3 ML IM: CPT

## 2021-02-23 PROCEDURE — 0002A PR COVID VAC PFIZER DIL RECON 30 MCG/0.3 ML IM: CPT

## 2021-02-23 NOTE — PROGRESS NOTES
Discharge:  Patient asking over and over if she can go home. , Darren at bedside, and  talked to Dr. Alberto about 5 pm on the phone. They were both in agreement that if home oxygen can be delivered to hospital then patient could go home tonight. See previous note from [facility] that oxygen could be delivered. Home oxygen was delivered and patient's  instructed on how to use. Cheryl helped set up home oximeter. Home paperwork for oximeter and discharge papers reviewed with  and patient. Patient left via wheelchair on oxygen for home at 1845. No scripts needed. They stated have all meds at home.

## 2021-02-23 NOTE — PROGRESS NOTES
[facility]: Post-Discharge Note  SITUATION                                                      Admission:    Admission Date: 02/18/21   Reason for Admission: Shortness of Breath  Discharge:   Discharge Date: 02/22/21  Discharge Diagnosis: Shortness of Breath    BACKGROUND                                                      HPI  Lucie Stockton is a 85 year old female with a PMH of intermittent claudication, COPD, chronic cough, ARF with hypoxia, SBO, gastritis, hypothyroid, HLD, PVD, PE, aortic atherosclerosis, extremity atherosclerosis, bilateral DVT of lower extremity, pulmonary HTN, osteoarthritis, osteoporosis, BCC of trunk, anemia, thrombocytosis, and long-term history of anticoagulant therapy who presents to the ED today complaining of shortness of breath and abnormal labs.    ASSESSMENT      Discharge Assessment  Patient reports symptoms are: Improved  Does the patient have all of their medications?: Yes  Does patient know what their new medications are for?: Yes  Does patient have a follow-up appointment scheduled?: No(calling today)  Does patient have any other questions or concerns?: No    Post-op  Did the patient have surgery or a procedure: No  Fever: No  Chills: No  Eating & Drinking: eating and drinking without complaints/concerns  PO Intake: regular diet  Bowel Function: normal  Urinary Status: voiding without complaint/concerns        PLAN                                                      Outpatient Plan:  Home oxygen evaluation showing significant hypoxia on activity necessitating further diuresis.  Lasix 20mg IV once  Wean oxygen as tolerated.   Venofer on discharge in infusion center for 1 additional dose. Transfusion orders placed in care plan.   tCO2 showing evidence of chronic respiratory failure  Cardiology consult for further fluid management    Future Appointments   Date Time Provider Department Center   2/26/2021 10:30 AM [facility] GOLD WAITING [facility]   2/26/2021 11:00  AM [facility] ROOM 11 [facility] O   3/9/2021  9:00 AM Seven Florian MD [facility]           Risa Garcia CMA

## 2021-02-24 ENCOUNTER — TELEPHONE (OUTPATIENT)
Dept: CARDIOLOGY | Facility: CLINIC | Age: 86
End: 2021-02-24

## 2021-02-24 NOTE — TELEPHONE ENCOUNTER
Reason for Admission  Dizziness, anemia    How are you feeling since you got home?  Patient feeling well overall; does not complain of any episodes of feeling faint or dizzy    And questions about medication? (I.E. Taking, received, new medication)  No    Do you have any questions about the discharge instructions?  No    Do you have any other questions?  No    Has a follow up appointment been made?   YES - Dr. Florian, 3/9 9 AM     Reviewed pre-procedure instructions with patient. Patient did not have any questions or concerns at this time. Ceci Treviño RN on 2/24/2021 at 9:03 AM    ----- Message from Ariadna Mckeon sent at 2/4/2021  9:59 AM CST -----  Regarding: Shiloh RHC/PA  Jassi Villa,    I have her rescheduled for 2/26/2021. She is keeping her 6MWT on 2/9. I moved her follow up to after her RHC/PA with Dr. Florian.     Thank you!  -Ariadna

## 2021-02-24 NOTE — DISCHARGE SUMMARY
[facility]  Hospitalist Discharge Summary      Date of Admission:  2/18/2021  Date of Discharge:  2/22/2021  6:30 PM  Discharging Provider: Parveen Alberto MD  Discharge Team: Hospitalist Service, Gold 11    Discharge Diagnoses   Active Problems:    Chronic bilateral low back pain without sciatica    Hypothyroidism    Pulmonary embolism (H)    Anemia    Aortic stenosis    Iron deficiency    Vitamin D deficiency    Benign essential hypertension    Moderate major depression (H)    Anxiety    Cherry angioma    Pulmonary hypertension (H)    SOB (shortness of breath)    Acute and chronic respiratory failure with hypoxia (H)    Anemia due to blood loss, acute    Gastrointestinal hemorrhage, unspecified gastrointestinal hemorrhage type    TACO (transfusion associated circulatory overload)    Acute on chronic congestive heart failure with left ventricular diastolic dysfunction (H)    Please note Cherry hemangioma is chronic     Follow-ups Needed After Discharge   Follow-up Appointments     Adult [facility] Follow-up and recommended labs and tests      Follow up with primary care provider, Marii Montgomery, within 7 days to   evaluate medication change.  The following labs/tests are recommended:   CBC.      Outpatient infusion center: [facility] on 2/23/21    Appointments on [place] and/or [facility] (with [facility] or [facility]   provider or service). Call [phone] if you haven't heard regarding   these appointments within 7 days of discharge.         Discharge - COVID Patient Oximeter Discharge Instructions      COVID Patient Oximeter Discharge Instructions     Use the oximeter to measure the amount of oxygen in your blood.  Put the   clip on the tip of your pointer finger.  Turn on the device to measure   your oxygen level.  Do this at least 2 times a day. Do it more than 2   times if you feel short of breath.      It should be 90% or higher while you're at rest.  If your oxygen  level is   89% or lower, change the position of the clip on your finger or try   another finger.  After 10 minutes if it's still 89% or lower, call 911/go   to the Emergency Department.       If your shortness of breath may be getting worse but the oximeter still   shows 90% or higher, call your doctor or clinic as soon as possible. If   you can't reach your doctor or clinic, or if your shortness of breath gets   very bad, call 911/go to the Emergency Department.    Note: Don't turn down your oxygen until you get instructions at your   virtual visit.             Unresulted Labs Ordered in the Past 30 Days of this Admission     No orders found from 1/19/2021 to 2/19/2021.      These results will be followed up by PCP    Discharge Disposition   Discharged to home  Condition at discharge: Stable      Hospital Course   This is a 85-year-old female with past medical history of abdominal aortic stenosis, COPD, hypertension, bilateral PE, enlarged pulmonary artery of unclear significance who presented secondary to dizziness and balance and orthostatics.  Initial concern was for GI bleed however no overt symptoms.  Patient does have known history of AV malformations.  Endoscopy performed showing AVMs on Upper and lower scope. Hemoglobin stabilized however patient became dyspneic and desaturated. CXR showed fluid overload and was diagnosed with TACO. Patient was cautiously diuresed secondary to suspected pulmonary HTN. Patients oxygen saturation continued to be low after diuresis. Cardiology consulted. Desaturation was thought to be associated with baseline COPD/CHF oxygen requirements and patients sent on home oxygen. Patient was not subjectively dyspneic during hospital stay.     Consultations This Hospital Stay   GI LUMINAL ADULT IP CONSULT  NUTRITION SERVICES ADULT IP CONSULT  CARDIOLOGY HEART FAILURE (HF) IP CONSULT  VASCULAR ACCESS CARE ADULT IP CONSULT  CARDIOLOGY HEART FAILURE (HF) IP CONSULT  CARE MANAGEMENT /  SOCIAL WORK IP CONSULT  CARDIOLOGY GENERAL ADULT IP CONSULT    Code Status   Full Code    Time Spent on this Encounter   I, Parveen Alberto MD, personally saw the patient today and spent greater than 30 minutes discharging this patient.       Parveen Alberto MD  [facility] [address] [address]  Phone: [phone]  ______________________________________________________________________    Physical Exam   Vital Signs:                    Weight: 100 lbs 15.53 oz  Physical Exam  Constitutional:       Appearance: Normal appearance.   HENT:      Head: Normocephalic.      Mouth/Throat:      Mouth: Mucous membranes are moist.   Eyes:      Pupils: Pupils are equal, round, and reactive to light.   Cardiovascular:      Rate and Rhythm: Normal rate and regular rhythm.      Heart sounds: No murmur. No friction rub. No gallop.    Pulmonary:      Effort: Pulmonary effort is normal.      Breath sounds: No rales.      Comments: Diminished lungs sounds.   Abdominal:      General: Abdomen is flat. There is no distension.      Palpations: There is no mass.      Tenderness: There is no abdominal tenderness.   Skin:     Coloration: Skin is not jaundiced.   Neurological:      General: No focal deficit present.      Mental Status: She is alert and oriented to person, place, and time.      Cranial Nerves: No cranial nerve deficit.      Sensory: No sensory deficit.      Motor: No weakness.   Psychiatric:         Mood and Affect: Mood normal.         Behavior: Behavior normal.              Primary Care Physician   Marii Montgomery    Discharge Orders      CBC with platelets differential    Last Lab Result: Hemoglobin (g/dL)       Date                     Value                 02/21/2021               7.5 (L)          ----------     Reason for your hospital stay    Gastrointestinal AV malformations associated with acute on chronic blood loss anemia     Adult [facility] Follow-up and recommended labs and  tests    Follow up with primary care provider, Marii Montgomery, within 7 days to evaluate medication change.  The following labs/tests are recommended: CBC.      Outpatient infusion center: [facility] on 2/23/21    Appointments on [place] and/or [facility] (with [facility] or [facility] provider or service). Call [phone] if you haven't heard regarding these appointments within 7 days of discharge.     Activity    Your activity upon discharge: activity as tolerated     Discharge - Quarantine/Isolation Instruction    Date of symptom onset:     Date of first positive test:    Stay home and away from others (self-isolate) for at least 20 days from when your symptoms started And...   You've had no fevers and no medicine that reduces fever for 1 full day (24 hours) And...   Your other symptoms have resolved (gotten better).     Discharge - COVID Patient Oximeter Discharge Instructions    COVID Patient Oximeter Discharge Instructions     Use the oximeter to measure the amount of oxygen in your blood.  Put the clip on the tip of your pointer finger.  Turn on the device to measure your oxygen level.  Do this at least 2 times a day. Do it more than 2 times if you feel short of breath.      It should be 90% or higher while you're at rest.  If your oxygen level is 89% or lower, change the position of the clip on your finger or try another finger.  After 10 minutes if it's still 89% or lower, call 911/go to the Emergency Department.       If your shortness of breath may be getting worse but the oximeter still shows 90% or higher, call your doctor or clinic as soon as possible. If you can't reach your doctor or clinic, or if your shortness of breath gets very bad, call 911/go to the Emergency Department.    Note: Don't turn down your oxygen until you get instructions at your virtual visit.     Full Code     Oxygen Adult/Peds    Oxygen Documentation:   I certify that this patient, Lucie Stockton has been under my care (or a nurse  practitioner or physican's assistant working with me). This is the face-to-face encounter for oxygen medical necessity.      Lucie Stockton is now in a chronic stable state and continues to require supplemental oxygen. Patient has continued oxygen desaturation due to Chronic Heart Failure I50.    Alternative treatment(s) tried or considered and deemed clinically infective for treatment of CHF include diuretics.  If portability is ordered, is the patient mobile within the home? yes    **Patients who qualify for home O2 coverage under the CMS guidelines require ABG tests or O2 sat readings obtained closest to, but no earlier than 2 days prior to the discharge, as evidence of the need for home oxygen therapy. Testing must be performed while patient is in the chronic stable state. See notes for O2 sats.**     Diet    Follow this diet upon discharge: Orders Placed This Encounter      Snacks/Supplements Adult: Other; Ensure Enlive; Between Meals      Room Service      Regular Diet Adult       Significant Results and Procedures   Results for orders placed or performed during the hospital encounter of 02/18/21   XR Chest Port 1 View    Narrative    Exam: XR CHEST PORT 1 , 2/18/2021 9:55 PM    Indication: Dyspnea    Comparison: 1/30/2020    Findings:   Heart within normal limits. Diffuse interstitial process throughout  the lungs. A prominent right hilum.      Impression    Impression: Interstitial changes suggesting increased edema. Prominent  right hilum suggests possible pulmonary hypertension, not dissimilar  to CT of 1/24/2020.    ADAM FERRARI MD   XR Chest Port 1 View    Narrative    Exam: XR CHEST PORT 1 , 2/21/2021 2:18 AM    Indication: hypoxia    Comparison: 2/18/2021, chest CT 1/24/2020    Findings:   AP semiupright view of the chest. Midline trachea. Stable enlargement  of the cardiac silhouette. Aortic arch calcifications. Stable right  hilar fullness. No appreciable pneumothorax or pleural  effusion.  Pulmonary vasculature is indistinct. No significant change in the  perihilar predominant hazy opacities. Mild left retrocardiac  opacities. Asymmetric elevation of the right hemidiaphragm.       Impression    Impression:   1. Stable enlarged cardiac silhouette and pulmonary edema. Mild  retrocardiac subsegmental atelectasis and/or consolidation.  2. Redemonstration of a prominent right hilum suggesting possible  pulmonary hypertension similar to CT 1/24/2020, as previously  discussed.    I have personally reviewed the examination and initial interpretation  and I agree with the findings.    MICHAEL CAMACHO MD   XR Abdomen Port 1 View    Narrative    Abdominal radiograph 2/21/2021 6:33 PM    HISTORY: Rule out free air    COMPARISON: Same day chest radiograph    FINDINGS:  Upright view of the abdomen. The cardiac silhouette is similar in  size. Unchanged prominent right hilum. Nonobstructive bowel gas  pattern. No pneumatosis, portal venous gas, or pneumoperitoneum. Mild  blunting of both costophrenic angles.      Impression    IMPRESSION:  Nonobstructive bowel gas pattern with no pneumoperitoneum. Small  pleural effusions.    I have personally reviewed the examination and initial interpretation  and I agree with the findings.    ARNOLD HOOKS MD   XR Foot Port Left 3 Views    Narrative    EXAM: XR FOOT PORT LT 3 VW  LOCATION: [facility]  DATE/TIME: 2/21/2021 3:46 PM    INDICATION: Great toe pain.  COMPARISON: 8/25/2017      Impression    IMPRESSION: Previous first TMT joint arthrodesis with screw fixation, and solid bony bridging across the joint. Screw fixation between the first and second metatarsal shafts with minimal lucency surrounding the screw threads, probably unchanged from   prior. No acute fracture or dislocation. Diffuse bony demineralization. Minimal degenerative arthritis of the first MTP joint.   Echo Complete    Narrative     [id number]  [id number]  [id number]  [id number]^DEBBI^RATNA^[facility]  Echocardiography Laboratory  [address] [place] [address]     Name: ISAURO GUZMAN  MRN: [id number]  : 1935  Study Date: 2021 07:42 AM  Age: 85 yrs  Gender: Female  Patient Location: [facility]  Reason For Study: Heart Failure, Unspecified  Ordering Physician: RATNA WERNER  Performed By: Gracie Enriquez RDCS     BSA: 1.4 m2  Height: 59 in  Weight: 100 lb  BP: 145/47 mmHg  _____________________________________________________________________________  __        Procedure  Complete Portable Echo Adult.  _____________________________________________________________________________  __        Interpretation Summary  Global and regional left ventricular function is normal with an EF of 60-65%.  Global right ventricular function is normal.  IVC diameter and respiratory changes fall into an intermediate range  suggesting an RA pressure of 8 mmHg.  No significant valvular abnormalities were noted.  Trivial pericardial effusion is present.     This study was compared with the study from 2020 .There has been no  significant change.  _____________________________________________________________________________  __        Left Ventricle  Left ventricular size is normal. Global and regional left ventricular function  is normal with an EF of 60-65%. Relative wall thickness is increased  consistent with concentric remodeling. Grade II or moderate diastolic  dysfunction.     Right Ventricle  The right ventricle is normal size. Global right ventricular function is  normal.     Atria  The right atria appears normal. Moderate left atrial enlargement is present.        Mitral Valve  Mild mitral annular calcification is present. Trace mitral insufficiency is  present.     Aortic Valve  The aortic valve is tricuspid.     Tricuspid Valve  The tricuspid valve is normal. Trace tricuspid insufficiency is  present.     Pulmonic Valve  The pulmonic valve is normal.     Vessels  The aorta root is normal. The thoracic aorta is normal. IVC diameter and  respiratory changes fall into an intermediate range suggesting an RA pressure  of 8 mmHg.     Pericardium  Trivial pericardial effusion is present.        Compared to Previous Study  This study was compared with the study from 2020 . There has been no  change.  _____________________________________________________________________________  __     MMode/2D Measurements & Calculations  IVSd: 0.87 cm  LVIDd: 4.2 cm  LVIDs: 1.9 cm  LVPWd: 1.2 cm  FS: 54.7 %  LV mass(C)d: 144.9 grams  LV mass(C)dI: 105.5 grams/m2  Ao root diam: 3.0 cm  asc Aorta Diam: 3.0 cm  LVOT diam: 1.9 cm  LVOT area: 2.8 cm2  LA Volume (BP): 62.6 ml  LA Volume Index (BP): 45.7 ml/m2     RWT: 0.58        Doppler Measurements & Calculations  MV E max jese: 96.0 cm/sec  MV A max jese: 136.0 cm/sec  MV E/A: 0.71  MV max P.1 mmHg  MV mean P.0 mmHg  MV V2 VTI: 36.5 cm  MV P1/2t max jese: 113.0 cm/sec  MV P1/2t: 76.1 msec  MVA(P1/2t): 2.9 cm2  MV dec slope: 435.0 cm/sec2  MV dec time: 0.21 sec  Ao V2 max: 129.0 cm/sec  Ao max P.0 mmHg  E/E' av.1  Lateral E/e': 16.4  Medial E/e': 15.8        _____________________________________________________________________________  __        Report approved by: Anders GUAJARDO 2021 08:40 AM        *Note: Due to a large number of results and/or encounters for the requested time period, some results have not been displayed. A complete set of results can be found in Results Review.       Discharge Medications   Discharge Medication List as of 2021  6:09 PM      CONTINUE these medications which have NOT CHANGED    Details   acetaminophen (TYLENOL) 325 MG tablet Take 2 tablets every 6 to 8 hours as needed for pain, Disp-100 tablet, R-1, E-Prescribe      apixaban ANTICOAGULANT (ELIQUIS) 5 MG tablet Take 1 tablet (5 mg) by mouth 2 times daily, Disp-60  tablet, R-11, E-Prescribe      atorvastatin (LIPITOR) 40 MG tablet Take 2 tablets (80 mg) by mouth daily, Disp-180 tablet,R-3, E-Prescribe      betamethasone dipropionate (DIPROSONE) 0.05 % external ointment Apply topically 2 times dailyDisp-15 g, [id number]      Calcium Carbonate-Vitamin D (CALCIUM 600 + D OR) Take 1 tablet by mouth every morning , Historical      cholecalciferol (VITAMIN D3) 25 mcg (1000 units) capsule Take 1 capsule by mouth daily, Historical      citalopram (CELEXA) 20 MG tablet Take 1 tablet (20 mg) by mouth daily, Disp-90 tablet, R-3, E-Prescribe      cyanocolbalamin (VITAMIN  B-12) 500 MCG tablet Take 500 mcg by mouth every morning , Disp-90 tablet, R-1, Historical      gabapentin (NEURONTIN) 100 MG capsule Take 1 capsule (100 mg) in the morning, 1 capsule (100 mg) mid-day, and 4 capsules (400 mg) at bed time, Disp-180 capsule, R-5, E-Prescribe      hydrochlorothiazide (HYDRODIURIL) 50 MG tablet Take 1 tablet (50 mg) by mouth daily Please keep appt 12/21/20, Disp-90 tablet, R-0, E-Prescribe      latanoprost (XALATAN) 0.005 % ophthalmic solution Place 1 drop into both eyes At Bedtime , R-1, Historical      levothyroxine (SYNTHROID/LEVOTHROID) 75 MCG tablet Take 1 tablet (75 mcg) by mouth daily, Disp-90 tablet, R-3, E-Prescribe      loperamide (IMODIUM A-D) 2 MG tablet Take 0.5-1 tablets (1-2 mg) by mouth daily as needed for diarrhea, Disp-30 tablet, R-0, Historical      multivitamin (OCUVITE) TABS tablet Take 1 tablet by mouth daily, Historical      oxybutynin (DITROPAN) 5 MG tablet Take 1 tablet (5 mg) by mouth At Bedtime For frequent urination, Disp-90 tablet, R-0, E-Prescribe      pantoprazole (PROTONIX) 40 MG EC tablet Take 1 tablet (40 mg) by mouth daily Takes in the morning., Disp-90 tablet,R-3, E-Prescribe      Potassium Bicarb-Citric Acid 20 MEQ TBEF Take 20 mg by mouth daily, Disp-90 tablet, R-3, E-Prescribe         STOP taking these medications       amLODIPine (NORVASC) 10 MG  tablet Comments:   Reason for Stopping:         lisinopril (PRINIVIL/ZESTRIL) 40 MG tablet Comments:   Reason for Stopping:             Allergies   No Known Allergies

## 2021-02-25 ENCOUNTER — TELEPHONE (OUTPATIENT)
Dept: CARDIOLOGY | Facility: CLINIC | Age: 86
End: 2021-02-25

## 2021-02-25 NOTE — TELEPHONE ENCOUNTER
Call complete for pre procedure reminder and updated visitor policy.  COVID test was completed >4 days on 2/18. Will notify clinic for next steps.

## 2021-02-26 ENCOUNTER — APPOINTMENT (OUTPATIENT)
Dept: LAB | Facility: CLINIC | Age: 86
End: 2021-02-26
Attending: INTERNAL MEDICINE
Payer: MEDICARE

## 2021-02-26 ENCOUNTER — HOSPITAL ENCOUNTER (OUTPATIENT)
Facility: CLINIC | Age: 86
Discharge: HOME | End: 2021-02-26
Attending: INTERNAL MEDICINE | Admitting: INTERNAL MEDICINE
Payer: MEDICARE

## 2021-02-26 ENCOUNTER — APPOINTMENT (OUTPATIENT)
Dept: MEDSURG UNIT | Facility: CLINIC | Age: 86
End: 2021-02-26
Payer: MEDICARE

## 2021-02-26 ENCOUNTER — TELEPHONE (OUTPATIENT)
Dept: CARDIOLOGY | Facility: CLINIC | Age: 86
End: 2021-02-26

## 2021-02-26 VITALS
HEART RATE: 64 BPM | OXYGEN SATURATION: 98 % | RESPIRATION RATE: 18 BRPM | BODY MASS INDEX: 20.16 KG/M2 | WEIGHT: 100 LBS | TEMPERATURE: 98.5 F | DIASTOLIC BLOOD PRESSURE: 45 MMHG | HEIGHT: 59 IN | SYSTOLIC BLOOD PRESSURE: 139 MMHG

## 2021-02-26 DIAGNOSIS — R06.02 SOB (SHORTNESS OF BREATH): ICD-10-CM

## 2021-02-26 DIAGNOSIS — I27.20 PULMONARY HYPERTENSION (H): ICD-10-CM

## 2021-02-26 DIAGNOSIS — Z11.59 ENCOUNTER FOR SCREENING FOR OTHER VIRAL DISEASES: ICD-10-CM

## 2021-02-26 LAB
HGB BLD-MCNC: 8.4 G/DL (ref 11.7–15.7)
LABORATORY COMMENT REPORT: NORMAL
OXYHGB MFR BLD: 69 % (ref 92–100)
SARS-COV-2 RNA RESP QL NAA+PROBE: NEGATIVE
SARS-COV-2 RNA RESP QL NAA+PROBE: NORMAL
SPECIMEN SOURCE: NORMAL
SPECIMEN SOURCE: NORMAL

## 2021-02-26 PROCEDURE — U0003 INFECTIOUS AGENT DETECTION BY NUCLEIC ACID (DNA OR RNA); SEVERE ACUTE RESPIRATORY SYNDROME CORONAVIRUS 2 (SARS-COV-2) (CORONAVIRUS DISEASE [COVID-19]), AMPLIFIED PROBE TECHNIQUE, MAKING USE OF HIGH THROUGHPUT TECHNOLOGIES AS DESCRIBED BY CMS-2020-01-R: HCPCS | Performed by: INTERNAL MEDICINE

## 2021-02-26 PROCEDURE — C1894 INTRO/SHEATH, NON-LASER: HCPCS | Performed by: INTERNAL MEDICINE

## 2021-02-26 PROCEDURE — 82810 BLOOD GASES O2 SAT ONLY: CPT

## 2021-02-26 PROCEDURE — 85018 HEMOGLOBIN: CPT

## 2021-02-26 PROCEDURE — 272N000001 HC OR GENERAL SUPPLY STERILE: Performed by: INTERNAL MEDICINE

## 2021-02-26 PROCEDURE — 250N000009 HC RX 250: Performed by: INTERNAL MEDICINE

## 2021-02-26 PROCEDURE — 93451 RIGHT HEART CATH: CPT | Performed by: INTERNAL MEDICINE

## 2021-02-26 PROCEDURE — U0005 INFEC AGEN DETEC AMPLI PROBE: HCPCS | Performed by: INTERNAL MEDICINE

## 2021-02-26 PROCEDURE — 999N000132 HC STATISTIC PP CARE STAGE 1

## 2021-02-26 PROCEDURE — 93568 NJX CAR CTH NSLC P-ART ANGRP: CPT | Performed by: INTERNAL MEDICINE

## 2021-02-26 RX ORDER — SODIUM CHLORIDE 9 MG/ML
INJECTION, SOLUTION INTRAVENOUS CONTINUOUS
Status: DISCONTINUED | OUTPATIENT
Start: 2021-02-26 | End: 2021-02-26 | Stop reason: CLARIF

## 2021-02-26 RX ORDER — CLOPIDOGREL BISULFATE 75 MG/1
75 TABLET ORAL DAILY
COMMUNITY
End: 2021-04-27

## 2021-02-26 RX ORDER — POTASSIUM CHLORIDE 750 MG/1
20 TABLET, EXTENDED RELEASE ORAL
Status: DISCONTINUED | OUTPATIENT
Start: 2021-02-26 | End: 2021-02-26 | Stop reason: HOSPADM

## 2021-02-26 RX ORDER — LIDOCAINE 40 MG/G
CREAM TOPICAL
Status: DISCONTINUED | OUTPATIENT
Start: 2021-02-26 | End: 2021-02-26 | Stop reason: HOSPADM

## 2021-02-26 RX ORDER — POTASSIUM CHLORIDE 750 MG/1
40 TABLET, EXTENDED RELEASE ORAL
Status: DISCONTINUED | OUTPATIENT
Start: 2021-02-26 | End: 2021-02-26 | Stop reason: HOSPADM

## 2021-02-26 RX ADMIN — LIDOCAINE: 40 CREAM TOPICAL at 13:16

## 2021-02-26 ASSESSMENT — MIFFLIN-ST. JEOR: SCORE: 809.23

## 2021-02-26 NOTE — PROGRESS NOTES
Pt on 2A post pulmonary angiogram; right neck site is dry, flat and intact. Pt awake and alert, denies pain. Family at bedside. VSS. Declined food and drink. Clarified pt has discharge instructions. 1500--escorted to lobby with family per wheelchair.

## 2021-02-26 NOTE — IP AVS SNAPSHOT
MRN:[id number]                      After Visit Summary   2/26/2021    Lucie Stockton    MRN: [id number]           Visit Information        Department      2/26/2021 11:06 AM [facility]          Review of your medicines      UNREVIEWED medicines. Ask your doctor about these medicines       Dose / Directions   acetaminophen 325 MG tablet  Commonly known as: TYLENOL  Used for: Chronic low back pain, unspecified back pain laterality, with sciatica presence unspecified, Contusion of right hip, initial encounter, Contusion of right knee, initial encounter      Take 2 tablets every 6 to 8 hours as needed for pain  Quantity: 100 tablet  Refills: 1     apixaban ANTICOAGULANT 5 MG tablet  Commonly known as: ELIQUIS  Used for: Pulmonary hypertension (H), CTEPH (chronic thromboembolic pulmonary hypertension) (H)      Dose: 5 mg  Take 1 tablet (5 mg) by mouth 2 times daily  Quantity: 60 tablet  Refills: 11     atorvastatin 40 MG tablet  Commonly known as: Lipitor  Used for: Hyperlipidemia, unspecified hyperlipidemia type      Dose: 80 mg  Take 2 tablets (80 mg) by mouth daily  Quantity: 180 tablet  Refills: 3     betamethasone dipropionate 0.05 % external ointment  Commonly known as: DIPROSONE  Used for: Skin hypopigmentation      Apply topically 2 times daily  Quantity: 15 g  Refills: 0     CALCIUM 600 + D PO      Dose: 1 tablet  Take 1 tablet by mouth every morning  Refills: 0     cholecalciferol 25 mcg (1000 units) capsule  Commonly known as: VITAMIN D3      Dose: 1 capsule  Take 1 capsule by mouth daily  Refills: 0     citalopram 20 MG tablet  Commonly known as: celeXA  Used for: Anxiety      Dose: 20 mg  Take 1 tablet (20 mg) by mouth daily  Quantity: 90 tablet  Refills: 3     clopidogrel 75 MG tablet  Commonly known as: PLAVIX      Dose: 75 mg  Take 75 mg by mouth daily  Refills: 0     cyanocobalamin 500 MCG tablet  Commonly known as: VITAMIN  B-12  Used for: Vitamin B12 deficiency (non anemic)      Dose: 500 mcg  Take 500 mcg by mouth every morning  Quantity: 90 tablet  Refills: 1     gabapentin 100 MG capsule  Commonly known as: NEURONTIN  Used for: Bilateral radiating leg pain      Take 1 capsule (100 mg) in the morning, 1 capsule (100 mg) mid-day, and 4 capsules (400 mg) at bed time  Quantity: 180 capsule  Refills: 5     hydrochlorothiazide 50 MG tablet  Commonly known as: HYDRODIURIL  Used for: Benign essential hypertension      Dose: 50 mg  Take 1 tablet (50 mg) by mouth daily Please keep appt 12/21/20  Quantity: 90 tablet  Refills: 0     IRON PO      Take by mouth daily  Refills: 0     latanoprost 0.005 % ophthalmic solution  Commonly known as: XALATAN      Dose: 1 drop  Place 1 drop into both eyes At Bedtime  Refills: 1     levothyroxine 75 MCG tablet  Commonly known as: SYNTHROID/LEVOTHROID  Used for: Hypothyroidism, unspecified type      Dose: 75 mcg  Take 1 tablet (75 mcg) by mouth daily  Quantity: 90 tablet  Refills: 3     loperamide 2 MG tablet  Commonly known as: IMODIUM A-D  Used for: Loose stools      Dose: 1-2 mg  Take 0.5-1 tablets (1-2 mg) by mouth daily as needed for diarrhea  Quantity: 30 tablet  Refills: 0     multivitamin Tabs tablet      Dose: 1 tablet  Take 1 tablet by mouth daily  Refills: 0     oxybutynin 5 MG tablet  Commonly known as: DITROPAN  Used for: Urinary frequency      Dose: 5 mg  Take 1 tablet (5 mg) by mouth At Bedtime For frequent urination  Quantity: 90 tablet  Refills: 0     pantoprazole 40 MG EC tablet  Commonly known as: PROTONIX  Used for: Gastroesophageal reflux disease without esophagitis      Dose: 40 mg  Take 1 tablet (40 mg) by mouth daily Takes in the morning.  Quantity: 90 tablet  Refills: 3     Potassium Bicarb-Citric Acid 20 MEQ Tbef  Used for: Hypokalemia      Dose: 20 mg  Take 20 mg by mouth daily  Quantity: 90 tablet  Refills: 3              Protect others around you: Learn how to safely use, store  and throw away your medicines at www.disposemymeds.org.       Follow-ups after your visit       Your next 10 appointments already scheduled    Mar 05, 2021 11:00 AM  (Arrive by 10:45 AM)  Return Visit with ALEKSEY Kelley CNP  [facility] [place] ([facility] ) [address] [place] [address]  4th Floor  [facility] [id number] [phone]   If you are coming in for evaluation of pain: Please note that you may not be prescribed a controlled substance such as pain medication on your first visit.  Your provider will ask for previous medical records, and determine if medications are appropriate.     Mar 09, 2021  9:00 AM  (Arrive by 8:45 AM)  RETURN PRIMARY PULMONARY with Seven Florian MD  [facility] ([facility] ) [address] [place] [id number]  [phone]   Please Note: This is a virtual visit; there is no need to come to the facility.          Care Instructions       Further instructions from your care team       [facility]                        Interventional Cardiology  Discharge Instructions   Post Right Heart Cath and Pulmonary Angiogram      AFTER YOU GO HOME:    DO drink plenty of fluids    DO resume your regular diet and medications unless otherwise instructed by your Primary Physician    Do Not scrub the procedure site vigorously    No lotion or powder to the puncture site for 3 days    CALL YOUR PRIMARY PHYSICIAN IF: You may resume all normal activity.  Monitor neck site for bleeding, swelling, or voice changes. If you notice bleeding or swelling immediately apply pressure to the site and call number below to speak with Cardiology Fellow.  If you experience any changes in your breathing you should call your doctor immediately or come to the closest Emergency Department.  Do not drive yourself.    ADDITIONAL INSTRUCTIONS: Medications:  "You are to resume all home medications including anticoagulation therapy unless otherwise advised by your primary cardiologist or nurse coordinator.    Follow Up: Per your primary cardiology team    If you have any questions or concerns regarding your procedure site please call [phone] at anytime and ask for Cardiology Fellow on call.  They are available 24 hours a day.  You may also contact the Cardiology Clinic after hours number at [phone].                                                       Telephone Numbers [phone] Monday-Friday 8:00 am to 4:30 pm    [phone] After 4:30 pm Monday-Friday, Weekends & Holidays  Ask for Interventional Cardiologist on call. Someone is on call 24 hours/day   [facility] toll free number [phone] Monday-Friday 8:00 am to 4:30 pm   [facility] Emergency Dept [phone]                   Statement of Approval (From admission, onward)     Ordered          02/26/21 1440  I have reviewed and agree with all the recommendations and orders detailed in this document.  EFFECTIVE NOW     Approved and electronically signed by Avril Hoover MD                Additional Information About Your Visit       [employer] Information    [employer] gives you secure access to your electronic health record. If you see a primary care provider, you can also send messages to your care team and make appointments. If you have questions, please call your primary care clinic.  If you do not have a primary care provider, please call [phone] and they will assist you.       Care EveryWhere ID    This is your Care EveryWhere ID. This could be used by other organizations to access your [place] medical records  [id number]       Your Vitals Were  Most recent update: 2/26/2021  1:52 PM    Blood Pressure   139/45 (BP Location: Right arm)          Pulse   64          Temperature   98.5  F (36.9  C) (Oral)          Respirations   18          Height   1.499 m (4' 11\")             Weight "   45.4 kg (100 lb)    Last Period    (LMP Unknown)    Pulse Oximetry   98%    BMI (Body Mass Index)   20.20 kg/m           Primary Care Provider Office Phone # Fax #    Marii Montgomery [phone]      Equal Access to Services    [facility] : Hadii aad ku hadasho Soomaali, waaxda luqadaha, qaybta kaalmada adeegyada, waxay idiin hayaan adeeg khreginash la'charlan ah. So [facility] [phone].    ATENCIÓN: Si habla español, tiene a schwartz disposición servicios gratuitos de asistencia lingüística. Llame al [phone].    We comply with applicable federal and state civil rights laws, including the [place] Human Rights Act. We do not discriminate on the basis of race, color, creed, Buddhist, national origin, marital status, age, disability, sex, sexual orientation, or gender identity.    If you would like an itemization of your charges they will now be available in [employer] 30 days after discharge. To access the itemized statements in [employer] go to billing/billing summary. From there select view account. There will be multiple tabs showing an overview of your account, detail, payments, and communications. From the communications tab you can see your monthly statements, your itemized statements, and any billing letters generated for your account. If you do not have a [employer] account and need help getting access please contact [employer] support at [phone].  If you would prefer to have your itemized statements mailed please contact our automated itemized bill request line at [phone] option  2.       Thank you!    Thank you for choosing [place] for your care. Our goal is always to provide you with excellent care. Hearing back from our patients is one way we can continue to improve our services. Please take a few minutes to complete the written survey that you may receive in the mail after you visit with us. Thank you!            Medication List      ASK your doctor about these medications          Morning  Afternoon Evening Bedtime As Needed    acetaminophen 325 MG tablet  Also known as: TYLENOL  INSTRUCTIONS: Take 2 tablets every 6 to 8 hours as needed for pain                     apixaban ANTICOAGULANT 5 MG tablet  Also known as: ELIQUIS  INSTRUCTIONS: Take 1 tablet (5 mg) by mouth 2 times daily                     atorvastatin 40 MG tablet  Also known as: Lipitor  INSTRUCTIONS: Take 2 tablets (80 mg) by mouth daily                     betamethasone dipropionate 0.05 % external ointment  Also known as: DIPROSONE  INSTRUCTIONS: Apply topically 2 times daily                     CALCIUM 600 + D PO  INSTRUCTIONS: Take 1 tablet by mouth every morning                     cholecalciferol 25 mcg (1000 units) capsule  Also known as: VITAMIN D3  INSTRUCTIONS: Take 1 capsule by mouth daily                     citalopram 20 MG tablet  Also known as: celeXA  INSTRUCTIONS: Take 1 tablet (20 mg) by mouth daily                     clopidogrel 75 MG tablet  Also known as: PLAVIX  INSTRUCTIONS: Take 75 mg by mouth daily                     cyanocobalamin 500 MCG tablet  Also known as: VITAMIN B-12  INSTRUCTIONS: Take 500 mcg by mouth every morning                     gabapentin 100 MG capsule  Also known as: NEURONTIN  INSTRUCTIONS: Take 1 capsule (100 mg) in the morning, 1 capsule (100 mg) mid-day, and 4 capsules (400 mg) at bed time                     hydrochlorothiazide 50 MG tablet  Also known as: HYDRODIURIL  INSTRUCTIONS: Take 1 tablet (50 mg) by mouth daily Please keep appt 12/21/20                     IRON PO  INSTRUCTIONS: Take by mouth daily                     latanoprost 0.005 % ophthalmic solution  Also known as: XALATAN  INSTRUCTIONS: Place 1 drop into both eyes At Bedtime                     levothyroxine 75 MCG tablet  Also known as: SYNTHROID/LEVOTHROID  INSTRUCTIONS: Take 1 tablet (75 mcg) by mouth daily                     loperamide 2 MG tablet  Also known as: IMODIUM A-D  INSTRUCTIONS: Take 0.5-1 tablets (1-2  mg) by mouth daily as needed for diarrhea                     multivitamin Tabs tablet  INSTRUCTIONS: Take 1 tablet by mouth daily                     oxybutynin 5 MG tablet  Also known as: DITROPAN  INSTRUCTIONS: Take 1 tablet (5 mg) by mouth At Bedtime For frequent urination                     pantoprazole 40 MG EC tablet  Also known as: PROTONIX  INSTRUCTIONS: Take 1 tablet (40 mg) by mouth daily Takes in the morning.                     Potassium Bicarb-Citric Acid 20 MEQ Tbef  INSTRUCTIONS: Take 20 mg by mouth daily

## 2021-02-26 NOTE — DISCHARGE INSTRUCTIONS
[facility]                        Interventional Cardiology  Discharge Instructions   Post Right Heart Cath and Pulmonary Angiogram      AFTER YOU GO HOME:    DO drink plenty of fluids    DO resume your regular diet and medications unless otherwise instructed by your Primary Physician    Do Not scrub the procedure site vigorously    No lotion or powder to the puncture site for 3 days    CALL YOUR PRIMARY PHYSICIAN IF: You may resume all normal activity.  Monitor neck site for bleeding, swelling, or voice changes. If you notice bleeding or swelling immediately apply pressure to the site and call number below to speak with Cardiology Fellow.  If you experience any changes in your breathing you should call your doctor immediately or come to the closest Emergency Department.  Do not drive yourself.    ADDITIONAL INSTRUCTIONS: Medications: You are to resume all home medications including anticoagulation therapy unless otherwise advised by your primary cardiologist or nurse coordinator.    Follow Up: Per your primary cardiology team    If you have any questions or concerns regarding your procedure site please call [phone] at anytime and ask for Cardiology Fellow on call.  They are available 24 hours a day.  You may also contact the Cardiology Clinic after hours number at [phone].                                                       Telephone Numbers [phone] Monday-Friday 8:00 am to 4:30 pm    [phone] After 4:30 pm Monday-Friday, Weekends & Holidays  Ask for Interventional Cardiologist on call. Someone is on call 24 hours/day   [facility] toll free number [phone] Monday-Friday 8:00 am to 4:30 pm   [facility] Emergency Dept [phone]

## 2021-02-26 NOTE — TELEPHONE ENCOUNTER
LM regarding oxygen questions. Asked patient to call me back when able. Ceci Trevñio RN on 2/26/2021 at 1:27 PM      ----- Message from Ariadna Mckeon sent at 2/26/2021 10:55 AM CST -----  Regarding: Oxygen  Hi Allen,    Sorry to bug you again, but when you get a chance (doesnt have to be today) - Lucie Stockton's  wants to know why she has to be on 24/7 oxygen and if she needs to continue. Could you follow-up?     Ny is doing her best to see if they can fit her in today for the RHC/PA and trying to do a stat covid test. I'll let you know the outcome.    Thank you,  -Ariadna

## 2021-02-26 NOTE — IP AVS SNAPSHOT
[facility]  [address] [place] [id number]  Phone: [phone]                                    After Visit Summary   2/26/2021    Lucie Stockton    MRN: [id number]           After Visit Summary Signature Page    I have received my discharge instructions, and my questions have been answered. I have discussed any challenges I see with this plan with the nurse or doctor.    ..........................................................................................................................................  Patient/Patient Representative Signature      ..........................................................................................................................................  Patient Representative Print Name and Relationship to Patient    ..................................................               ................................................  Date                                   Time    ..........................................................................................................................................  Reviewed by Signature/Title    ...................................................              ..............................................  Date                                               Time          22EPIC Rev 08/18

## 2021-02-26 NOTE — PROGRESS NOTES
Pt arrives to 2a, with spouse, for RHC and pulmonary angiogram. H&P is up to date. Consent is signed. Labs completed 2/22/21, repeat labs not needed per CCL. Discharge instructions reviewed with pt pre procedure, pt verbalizes understanding.

## 2021-02-27 DIAGNOSIS — E03.9 HYPOTHYROIDISM, UNSPECIFIED TYPE: ICD-10-CM

## 2021-03-01 RX ORDER — LEVOTHYROXINE SODIUM 75 UG/1
75 TABLET ORAL DAILY
Qty: 90 TABLET | Refills: 3 | Status: SHIPPED | OUTPATIENT
Start: 2021-03-01

## 2021-03-01 NOTE — TELEPHONE ENCOUNTER
Last Clinic Visit: 2/18/2021  [facility] Internal Medicine [place]    TSH   Date Value Ref Range Status   02/18/2021 3.74 0.40 - 4.00 mU/L Final

## 2021-03-05 ENCOUNTER — OFFICE VISIT (OUTPATIENT)
Dept: INTERNAL MEDICINE | Facility: CLINIC | Age: 86
End: 2021-03-05
Payer: MEDICARE

## 2021-03-05 VITALS
BODY MASS INDEX: 20.2 KG/M2 | OXYGEN SATURATION: 100 % | DIASTOLIC BLOOD PRESSURE: 61 MMHG | HEART RATE: 73 BPM | SYSTOLIC BLOOD PRESSURE: 160 MMHG | WEIGHT: 100 LBS

## 2021-03-05 DIAGNOSIS — R06.09 DYSPNEA ON EXERTION: ICD-10-CM

## 2021-03-05 DIAGNOSIS — R06.02 SOB (SHORTNESS OF BREATH): ICD-10-CM

## 2021-03-05 DIAGNOSIS — D63.8 ANEMIA IN OTHER CHRONIC DISEASES CLASSIFIED ELSEWHERE: ICD-10-CM

## 2021-03-05 DIAGNOSIS — Z09 HOSPITAL DISCHARGE FOLLOW-UP: Primary | ICD-10-CM

## 2021-03-05 DIAGNOSIS — J44.9 CHRONIC OBSTRUCTIVE PULMONARY DISEASE, UNSPECIFIED COPD TYPE (H): ICD-10-CM

## 2021-03-05 DIAGNOSIS — R09.02 HYPOXIA: ICD-10-CM

## 2021-03-05 DIAGNOSIS — I27.20 PULMONARY HYPERTENSION (H): ICD-10-CM

## 2021-03-05 LAB
ANION GAP SERPL CALCULATED.3IONS-SCNC: 2 MMOL/L (ref 3–14)
BASOPHILS # BLD AUTO: 0 10E9/L (ref 0–0.2)
BASOPHILS NFR BLD AUTO: 0.6 %
BUN SERPL-MCNC: 17 MG/DL (ref 7–30)
CALCIUM SERPL-MCNC: 9.9 MG/DL (ref 8.5–10.1)
CHLORIDE SERPL-SCNC: 108 MMOL/L (ref 94–109)
CO2 SERPL-SCNC: 36 MMOL/L (ref 20–32)
CREAT SERPL-MCNC: 0.52 MG/DL (ref 0.52–1.04)
DIFFERENTIAL METHOD BLD: ABNORMAL
EOSINOPHIL # BLD AUTO: 0.1 10E9/L (ref 0–0.7)
EOSINOPHIL NFR BLD AUTO: 2.2 %
ERYTHROCYTE [DISTWIDTH] IN BLOOD BY AUTOMATED COUNT: 25.3 % (ref 10–15)
GFR SERPL CREATININE-BSD FRML MDRD: 87 ML/MIN/{1.73_M2}
GLUCOSE SERPL-MCNC: 77 MG/DL (ref 70–99)
HCT VFR BLD AUTO: 32.8 % (ref 35–47)
HGB BLD-MCNC: 9.3 G/DL (ref 11.7–15.7)
IMM GRANULOCYTES # BLD: 0 10E9/L (ref 0–0.4)
IMM GRANULOCYTES NFR BLD: 0.3 %
LYMPHOCYTES # BLD AUTO: 0.8 10E9/L (ref 0.8–5.3)
LYMPHOCYTES NFR BLD AUTO: 13.3 %
MCH RBC QN AUTO: 25.7 PG (ref 26.5–33)
MCHC RBC AUTO-ENTMCNC: 28.4 G/DL (ref 31.5–36.5)
MCV RBC AUTO: 91 FL (ref 78–100)
MONOCYTES # BLD AUTO: 0.7 10E9/L (ref 0–1.3)
MONOCYTES NFR BLD AUTO: 10.6 %
NEUTROPHILS # BLD AUTO: 4.5 10E9/L (ref 1.6–8.3)
NEUTROPHILS NFR BLD AUTO: 73 %
NRBC # BLD AUTO: 0 10*3/UL
NRBC BLD AUTO-RTO: 0 /100
NT-PROBNP SERPL-MCNC: 1078 PG/ML (ref 0–450)
PLATELET # BLD AUTO: 431 10E9/L (ref 150–450)
POTASSIUM SERPL-SCNC: 4.4 MMOL/L (ref 3.4–5.3)
RBC # BLD AUTO: 3.62 10E12/L (ref 3.8–5.2)
SODIUM SERPL-SCNC: 146 MMOL/L (ref 133–144)
WBC # BLD AUTO: 6.2 10E9/L (ref 4–11)

## 2021-03-05 PROCEDURE — 36415 COLL VENOUS BLD VENIPUNCTURE: CPT | Performed by: PATHOLOGY

## 2021-03-05 PROCEDURE — 83880 ASSAY OF NATRIURETIC PEPTIDE: CPT | Performed by: PATHOLOGY

## 2021-03-05 PROCEDURE — 80048 BASIC METABOLIC PNL TOTAL CA: CPT | Performed by: PATHOLOGY

## 2021-03-05 PROCEDURE — 99215 OFFICE O/P EST HI 40 MIN: CPT | Performed by: NURSE PRACTITIONER

## 2021-03-05 PROCEDURE — 85025 COMPLETE CBC W/AUTO DIFF WBC: CPT | Performed by: PATHOLOGY

## 2021-03-05 NOTE — PROGRESS NOTES
Assessment & Plan     Hospital discharge follow-up  Stable at home since discharge, without any new concerning symptoms. SBP elevated today, was well-controlled while inpatient, will not adjust medication today but will need to continue to monitor.     Dyspnea on exertion  Chronic obstructive pulmonary disease, unspecified COPD type (H)  Hypoxia  Continues on O2 2 LPM via nasal cannula, with oxygen saturations 92-95% at home per her 's report. They would like to use a [employer] SimplyGo Mini-portable Oxygen concentrator, order sent to [facility]. Rooming staff Nessa spoke with team there, apparently pt has to be out of the hospital for 1 month prior to getting on a wait list and being re-evaluated for the mini portable O2 concentrator.    - Oxygen Order    Pulmonary hypertension (H)  Follow-up with Dr. Florian on 3/9/21 as scheduled.     Anemia in other chronic diseases classified elsewhere  Recheck CBC today. Most recent Hgb 8.4.   - CBC with platelets differential; Future      43 minutes spent on the date of the encounter doing chart review, history and exam, documentation and further activities as noted above     No follow-ups on file.    ALEKSEY Ring [facility] INTERNAL MEDICINE [facility]   Lucie is a 85 year old who presents for the following health issues  accompanied by her  Darren:    \A Chronology of [place] [facility]\""     Hospital discharge follow-up  Pre-chartin-y.o. female with PMH abdominal aortic stenosis, COPD, HTN, bilateral PE, enlarged pulmonary artery, admitted to [facility] -[facility]. Presented with dizziness and imbalance and orthostatic hypotension, with initial concern for GI bleed, but without overt symptoms. Has known hx AV malformations, with endoscopy showing AVMs on upper and lower scopes. Hgb stabilized but pt became dyspneic and desaturated, CXR showed fluid overload concerning for TACO. Underwent gentle diuresis d/t suspected pulmonary HTN. O2  "continue to be low, cardiology was consulted. Desaturation thought to be associated to baseline COPD/CHF, and pt was discharged with home oxygen. She was not subjectively dyspneic during hospital stay.     Today--breathing \"not quite normal but not awful.\" Overall feeling improved since her hospital stay. Continues on 2 liters O2. Checking O2 sats at home, 92-95%. Wonders if can get a portable concentrator, a [employer] SimplyGo Miniportable Oxygen concentrator   Eating and drinking at baseline. Takes O2 off if eating for up to 45 minutes at a time, and feels fine. No urinary or bowel concerns. No black or bloody stool.  Was a little dizzy before eating, felt better afterwards. But otherwise denies dizziness, lightheadedness, CP, palpitations or SOB.  Doesn't sleep well before medical appointments. Otherwise sleeping fine.  Had pulmonary angiogram on 2/26/21, dressing came off this morning in the shower, no problems.   Received both covid vaccines (#2 on 2/23/21), no side effects.        Review of Systems   Constitutional, HEENT, cardiovascular, pulmonary, gi and gu systems are negative, except as otherwise noted.      Objective    BP (!) 160/61 (BP Location: Right arm, Patient Position: Sitting, Cuff Size: Child)   Pulse 73   Wt 45.4 kg (100 lb)   LMP  (LMP Unknown)   SpO2 100%   Breastfeeding No   BMI 20.20 kg/m    Body mass index is 20.2 kg/m .  Physical Exam   GENERAL: healthy, alert and no distress  NECK: no adenopathy, no asymmetry, masses, or scars and thyroid normal to palpation  RESP: lungs clear to auscultation - no rales, rhonchi or wheezes  CV: regular rate and rhythm, normal S1 S2, no S3 or S4, no murmur, click or rub, no peripheral edema and peripheral pulses strong  ABDOMEN: soft, nontender, no hepatosplenomegaly, no masses and bowel sounds normal  MS: sitting in wheelchair, no edema  NEURO: Normal strength and tone, no tremor, speech clear  PSYCH: mentation appears normal, affect " normal/bright                DME (Durable Medical Equipment) Orders and Documentation  Orders Placed This Encounter   Procedures     Oxygen Order      The patient was assessed and it was determined the patient is in need of the following listed DME Supplies/Equipment. Please complete supporting documentation below to demonstrate medical necessity.      Oxygen Use Documentation  I certify that this patient, Lucie Stockton has been under my care and that I, or a nurse practitioner or physician's assistant working with me, had a face-to-face encounter that meets face-to-face encounter requirements with this patient on March 5, 2021.    Lucie Stockton is now in a chronic stable state and continues to require supplemental oxygen due to continued oxygen desaturation.  This patient has been treated in part, or in whole for the following medical condition(s):  COPD J44.9  Pulmonary Hypertension I27.20  Treatments tried and failed or ruled out to treat hypoxemia include diuretics for CHF.  If portability is ordered, is the patient mobile within the home? yes

## 2021-03-05 NOTE — PATIENT INSTRUCTIONS
Spoke with [facility] site, relayed to me that patient must wait for 1 month post-admission (3/26) in order to be eligible to receive Mini-Portable oxygen concentrator.   After 1 month post-admission date, should patient be eligible, [facility] will contact patient regarding a wait list for the equipment. (Expect to hear from [phone])  Once patient has moved in the wait list, you will be contacted regarding quality testing for the oxygen concentrator. If patient meets qualifications, she will receive the oxygen concentrator.     I have faxed over the prescription for the oxygen concentrator, so the [facility] should have it ready to go when all the above requirements have been met.     If you have any questions, you can also contact the [facility] site at the number given above.

## 2021-03-05 NOTE — NURSING NOTE
Chief Complaint   Patient presents with     RECHECK     admission recently, was discharged on 2/20, improved since discharge       Koki Boudreaux EMT at 11:05 AM on 3/5/2021

## 2021-03-09 ENCOUNTER — VIRTUAL VISIT (OUTPATIENT)
Dept: CARDIOLOGY | Facility: CLINIC | Age: 86
End: 2021-03-09
Attending: INTERNAL MEDICINE
Payer: MEDICARE

## 2021-03-09 DIAGNOSIS — I27.20 PULMONARY HYPERTENSION (H): ICD-10-CM

## 2021-03-09 DIAGNOSIS — I27.24 CTEPH (CHRONIC THROMBOEMBOLIC PULMONARY HYPERTENSION) (H): Primary | ICD-10-CM

## 2021-03-09 PROCEDURE — 99443 PR PHYSICIAN TELEPHONE EVALUATION 21-30 MIN: CPT | Mod: 95 | Performed by: INTERNAL MEDICINE

## 2021-03-09 NOTE — PROGRESS NOTES
"Lucie Stockton is a 85 year old female who is being evaluated via a billable telephone visit.      The patient has been notified of following:     \"This telephone visit will be conducted via a call between you and your physician/provider. We have found that certain health care needs can be provided without the need for a physical exam.  This service lets us provide the care you need with a short phone conversation.  If a prescription is necessary we can send it directly to your pharmacy.  If lab work is needed we can place an order for that and you can then stop by our lab to have the test done at a later time.    Telephone visits are billed at different rates depending on your insurance coverage. During this emergency period, for some insurers they may be billed the same as an in-person visit.  Please reach out to your insurance provider with any questions.    If during the course of the call the physician/provider feels a telephone visit is not appropriate, you will not be charged for this service.\"    Patient has given verbal consent for Telephone visit?  Yes    What phone number would you like to be contacted at? [phone]      How would you like to obtain your AVS? Mail a copy    Phone call duration: 11 minutes    Rukhsana Navarro MD      Service Date: 2021    Marii Montgomery MD  Internal Medicine  [place], [place]    RE:  Lucie Stockton   MRN:  [id number]  :  1935      Dear Dr. Montgomery:    We had the pleasure of seeing Lucie Stockton at the [facility]. As you know, Ms. Stockton is a very pleasant 85 year old female with a history of bilateral pulmonary embolism, peripheral vascular disease, Grave's disease, DVT, hypertension, and suspected metastatic disease with no tissue diagnosis at this time who presents for ongoing evaluation for suspected pulmonary hypertension due to an enlarged pulmonary artery seen on CT scan.    She was first seen in our clinic on " "1/4/20. During that visit, we recommended that she get an echocardiogram, VQ scan, CTA chest PE, and 6MWT. Her echocardiogram showed LVEF 55-60%, normal RV size and function, and PASP could not be assessed. VQ scan showed mismatched perfusion defects involving segments of the right upper, right middle, and right lower lobes. CT PE showed diminutive appearance of the right middle and lower lobe arteries with chronic appearing filling defect of the right lower lobe pulmonary artery, no evidence of acute large PE or right heart strain. She did not get the 6MWT. She was last seen 12/1/20, at which time she was started on anticoagulation and due to the possible perfusion defects noted on CT scan, we elected to proceed with a RHC/pulmonary angiogram to assess for PH and clot burden.    Since our last visit, she was unfortunately hospitalized 2/18-2/22 for lightheadedness/orthostasis in setting of a hemoglobin of 5.6 (from 10.6 in 11/2020) with initial c/f GIB. Does have a known history of GI AVMs but EGD/cscope did not reveal culprit lesions. Her resuscitation was complicated by TACO requiring gentle diuresis, and she was discharged on 2L. She then underwent RHC/pulmonary angiogram 2/26/21 which revealed mPA 34, PCW 15, CO 4, PVR 4.8 with significant proximal stenosis in A8 and A10.    Today, she reports feeling back to her baseline. She is still wearing 2L with saturations typically ~90%. Her functionality is stable from last visit, and she can continue to take 1 flight of stairs without stopping. Denies chest pain, edema, weight change, presyncope, or syncope. Denies bloody/tarry stools and continues to take Apixaban.         PAST MEDICAL HISTORY:  Past Medical History:   Diagnosis Date     Anemia      Aortic stenosis     abdominal     Atherosclerosis of aorta (H)     \"extensive disease with near occlusion below level of renal arteries\" on CT     Atherosclerosis of arteries of extremities (H)      Back pain     severe " multilevel DJD, moderate spinal canal stenosis L4-5, severe L3-4     Chronic pain     Back, hips, knees, legs     Degenerative joint disease      DVT of lower extremity, bilateral (H)     5/24/10 left distal femoral and great saphenous, 7/7/10 left:  extending to cfv, iliac , pop and post tibial, peronial, right:  distal fem and peroneal      Grave's disease      Hyperlipidaemia LDL goal < 70      Hypertension, essential      Hypothyroidism      Imbalance      Insomnia      Intermittent claudication (H)      Iron deficiency      Knee pain      Lumbar stenosis with neurogenic claudication      Osteoarthritis     ankle,foot, knee     Osteoporosis      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, 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     Pulmonary embolism (H)     5/24/10 bilateral     Small bowel obstruction (H) 1/23/17     Varicose veins        PAST SURGICAL HISTORY:  Past Surgical History:   Procedure Laterality Date     BUNIONECTOMY       C STOMACH SURGERY PROCEDURE UNLISTED       CAPSULE/PILL CAM ENDOSCOPY N/A 2/20/2021     COLONOSCOPY       CV PULMONARY ANGIOGRAM N/A 2/26/2021     CV RIGHT HEART CATH MEASUREMENTS RECORDED N/A 2/26/2021     ENDOBRONCHIAL ULTRASOUND FLEXIBLE N/A 1/24/2020     HYSTERECTOMY TOTAL ABDOMINAL, BILATERAL SALPINGO-OOPHORECTOMY, NODE DISSECTION, COMBINED  2/17/11     INJECT EPIDURAL LUMBAR / SACRAL SINGLE N/A 1/5/2018     INJECT SACROILIAC JOINT Right 3/7/2018     INJECT SACROILIAC JOINT Bilateral 12/7/2018     OPTICAL TRACKING SYSTEM BRONCHOSCOPY N/A 1/24/2020     UPPER GI ENDOSCOPY         FAMILY HISTORY:  Family History   Problem Relation Age of Onset     Breast Cancer Maternal Aunt 80     C.A.D. Father 54     No Known Problems Mother        SOCIAL HISTORY:  Social History     Socioeconomic History     Marital status:      Spouse name: Not on file     Number of children: Not on file     Years of education: Not on file      Highest education level: Not on file   Occupational History     Not on file   Social Needs     Financial resource strain: Not on file     Food insecurity     Worry: Not on file     Inability: Not on file     Transportation needs     Medical: Not on file     Non-medical: Not on file   Tobacco Use     Smoking status: Former Smoker     Packs/day: 0.50     Years: 15.00     Pack years: 7.50     Quit date: 1993     Years since quittin.5     Smokeless tobacco: Never Used   Substance and Sexual Activity     Alcohol use: Yes     Comment: social     Drug use: No     Sexual activity: Not Currently     Partners: Male   Lifestyle     Physical activity     Days per week: Not on file     Minutes per session: Not on file     Stress: Not on file   Relationships     Social connections     Talks on phone: Not on file     Gets together: Not on file     Attends [employer] service: Not on file     Active member of club or organization: Not on file     Attends meetings of clubs or organizations: Not on file     Relationship status: Not on file     Intimate partner violence     Fear of current or ex partner: Not on file     Emotionally abused: Not on file     Physically abused: Not on file     Forced sexual activity: Not on file   Other Topics Concern     Parent/sibling w/ CABG, MI or angioplasty before 65F 55M? Not Asked   Social History Narrative     for 52 years. Retired from [employer]*. Frequent world travel.       CURRENT MEDICATIONS:  Current Outpatient Medications   Medication Sig     acetaminophen (TYLENOL) 325 MG tablet Take 2 tablets every 6 to 8 hours as needed for pain (Patient taking differently: Take 650 mg by mouth as needed Take 2 tablets every 6 to 8 hours as needed for pain)     apixaban ANTICOAGULANT (ELIQUIS) 5 MG tablet Take 1 tablet (5 mg) by mouth 2 times daily     atorvastatin (LIPITOR) 40 MG tablet Take 2 tablets (80 mg) by mouth daily     betamethasone dipropionate (DIPROSONE) 0.05 %  external ointment Apply topically 2 times daily (Patient taking differently: Apply 1 g topically as needed )     Calcium Carbonate-Vitamin D (CALCIUM 600 + D OR) Take 1 tablet by mouth every morning      cholecalciferol (VITAMIN D3) 25 mcg (1000 units) capsule Take 1 capsule by mouth daily     citalopram (CELEXA) 20 MG tablet Take 1 tablet (20 mg) by mouth daily     clopidogrel (PLAVIX) 75 MG tablet Take 75 mg by mouth daily     cyanocolbalamin (VITAMIN  B-12) 500 MCG tablet Take 500 mcg by mouth every morning      Ferrous Sulfate (IRON PO) Take by mouth daily     gabapentin (NEURONTIN) 100 MG capsule Take 1 capsule (100 mg) in the morning, 1 capsule (100 mg) mid-day, and 4 capsules (400 mg) at bed time     hydrochlorothiazide (HYDRODIURIL) 50 MG tablet Take 1 tablet (50 mg) by mouth daily Please keep appt 12/21/20     latanoprost (XALATAN) 0.005 % ophthalmic solution Place 1 drop into both eyes At Bedtime      levothyroxine (SYNTHROID/LEVOTHROID) 75 MCG tablet Take 1 tablet (75 mcg) by mouth daily     loperamide (IMODIUM A-D) 2 MG tablet Take 0.5-1 tablets (1-2 mg) by mouth daily as needed for diarrhea     multivitamin (OCUVITE) TABS tablet Take 1 tablet by mouth daily     oxybutynin (DITROPAN) 5 MG tablet Take 1 tablet (5 mg) by mouth At Bedtime For frequent urination     pantoprazole (PROTONIX) 40 MG EC tablet Take 1 tablet (40 mg) by mouth daily Takes in the morning.     Potassium Bicarb-Citric Acid 20 MEQ TBEF Take 20 mg by mouth daily     No current facility-administered medications for this visit.        ROS:   10 point ROS negative except as discussed in above HPI.      Labs:  No results found for this or any previous visit (from the past 24 hour(s)).    Echocardiogram 1/23/20  Left ventricular function, chamber size, wall motion, and wall thickness are  normal.The EF is 55-60%. Grade II or moderate diastolic dysfunction.  Normal RV size and function.  Pulmonary artery systolic pressure cannot be  assessed.  No significant valvular abnormality.  The atrial septum is intact as assessed by color Doppler and agitated saline  bubble study .  No pericardial effusion.    V/Q Scan 1/30/20  Mismatched perfusion defects involving segments of the right upper,  right middle, and right lower lobes are compatible with clinical  suspicion of chronic PE in combination with the recent findings on the  CT from 1/24/2020.     CT PE 1/24/20  1. No evidence of acute large PE. No evidence of right heart strain.  2. Diminutive appearance of the right middle and lower lobe arteries  with chronic appearing filling defect of the right lower lobe  pulmonary artery with mosaic attenuation of the lung parenchyma.  Enlargement of the main pulmonary artery. Findings are concerning for  chronic pulmonary emboli resulting in pulmonary hypertension.  3. Multiple pulmonary nodules as detailed above. Some pulmonary  nodules have increased in size and again are concerning for metastatic  disease without known primary  4. Severe atherosclerotic calcifications of the visualized abdominal  Aorta.    CT PE 1/27/18  1. Age-indeterminate, but possibly chronic pulmonary emboli in the right lower lobe.  2. Mild groundglass opacities in the posterior right upper lobe, likely infectious/inflammatory.  3. Moderate apically predominant centrilobular emphysema.  4. Scattered pulmonary nodules measuring up to 5 mm. Recommend follow-up chest CT in one year per the Fleischner Society criteria.  5. Minimally displaced lateral right seventh and eighth rib fractures.  6. Possible mild distal esophageal wall thickening, suggestive of esophagitis.  7. Calcifications in the pancreatic head with a rounded associated cystic focus likely representing a pseudocyst, findings likely related to chronic pancreatitis, though evaluation is limited. Consider further dedicated imaging of the pancreas as clinically indicated.    RHC and pulmonary angiogram 3/9/21  RA 10  RV  65/10  PA 62/22/34  PCWP 15  Víctor CI/CO 2.9/4.0  TD CI/CO 2.9/4.0  PVR 4.8    Angiogram Right Lung  Truncus anterior is patent along with A1-3.  A4 and A5 segments are patent.  Interlobar artery and basal trunk are patent.  A10 and A8 have significant proximal stenoses / defects. A9, A6, and A7 appear patent without defect.  There are associated lower lobe perfusion defects.      Angiogram Left Lung        Assessment and Plan:     Lucie Stockton is a very pleasant 85 year old female with history of bilateral pulmonary embolism, DVT, hypertension, and possible metastatic disease who presents for evaluation of possible pulmonary hypertension.    1. CTEPH  Her VQ scan showed perfusion defects involving segments of the right upper, right middle, and right lower lobes and CT PE showed diminutive appearance of the right middle and lower lobe arteries with chronic appearing filling defect of the right lower lobe pulmonary artery. Echocardiogram in January 2020 showed normal RV size and function. Further evaluation was performed with RHC 2/2021 which confirmed pulmonary hypertension with a mean PA of 34 and angiogram revealed stenosis of A8 and A10. We discussed the options of using medication versus procedural intervention with Lucie, and she elects to pursue medical therapy with riociguat. We did  her on the agent's possible side effect of lowering blood pressure. Additionally, she will continue to take apixaban, but we will reduce to dose to 2.5 mg due to her age and small body size.     2. PAD with atherosclerosis of the abdominal aorta: Continue statin, apixaban.    3. Multiple pulmonary nodules: Has a history of pulmonary nodules, thought to be metastatic disease with no tissue diagnosis. Dr. Jara previously completed an EBUS and it was uncertain whether a nodule was sampled. Path was negative for malignancy (nodule and LNs). She was discussed in interdisciplinary conference in April 2020 and recommendation was  Guardant 360 (but it is uncertain whether this was completed). She saw Dr Randall 1/2021 with recommendation to obtain CT Chest for nodule re-evaluation, but it appears this had to be cancelled. Will need rescheduling - PCP aware per most recent note.      It was a pleasure seeing Lucie Stockton at the [facility].  Please contact us with any questions or concerns that you may have.      Patient was seen and discussed with staff attending, Dr. Florian.      Sincerely,      Rukhsana Navarro MD  Pulmonary & Critical Care Fellow    And    Seven Florian MD, PhD   of Medicine    Staff Attestation:   I had a phone visit with this patient on March 9, 2021. I have discussed with Dr. Navarro and agree with the findings and plan in this note. I have personally reviewed vital signs, hemodynamics, medications, laboratory values, and diagnostic testing. CTEPH with intervenable lesions, but she wants to start with medical therapy using Adempas. We will also cut back her dose of apixaban due to her age and small body size.       Seven Florian MD, PhD  Cardiovascular Division  [facility]

## 2021-03-09 NOTE — PROGRESS NOTES
Lucie is a 85 year old who is being evaluated via a billable telephone visit.      What phone number would you like to be contacted at? [phone]  How would you like to obtain your AVS? Soumya    Vitals - Patient Reported  Weight (Patient Reported): 41.7 kg (92 lb)  Pain Score: No Pain (0)(No SOB more than usual)    Vitals were taken and medications reconciled.     Bon Ray CMA  8:41 AM

## 2021-03-09 NOTE — PATIENT INSTRUCTIONS
"-We will work on getting a prior authorization to start Adempas to treat your high blood pressure in your lungs. You will have a follow up visit with Marcia to determine how titration of your medication is going    - DECREASE apixaban (Eliquis) to 2.5 mg twice a day    -We will refer you to Pulmonary Rehab to work on supervised exercise training    1. The Adempas medication needs to be approved by your insurance company and may take up to 2-3 weeks for approval.  You will not be able to pick it up from your pharmacy today.  2.  This medication can be expensive. If your co-pay is high, call us before you accept shipment of it. Also, express financial need to the Specialty Pharmacy by stating \"this medication is to expensive, I cannot afford this.\"  3. Please call us when you start medication so we can schedule you for your follow up appointments.    IF the copay is high here are the assistance programs available to you (please still call us and let us know that it is high):   [employer] Assistance:    [employer] (PAF):  396.944.5136  https://www.copays.org/diseases/pulmonary-hypertension    Patient Access Network (PAN):  824.444.8128  https://panfoundation.org/index.php/en/patients/assistance-programs/pulmonary-hypertension    The Assistance Fund (TAF):  (258) 651-4361  https://tafcares.org/program-listing/    Good Days:  773.656.3821  https://www.mygooddays.org/patients/diseases-covered/pulmonary-arterial-hypertension     Patient Assistance:    [employer] Patient Assistance (Adempas):  1-621-067-2435      "

## 2021-03-09 NOTE — LETTER
"3/9/2021      RE: Luice Stockton  [address] [facility] [address]       Dear Colleague,    Thank you for the opportunity to participate in the care of your patient, Lucie Stockton, at the [facility] [place] at [facility]. Please see a copy of my visit note below.    Lucie Stockton is a 85 year old female who is being evaluated via a billable telephone visit.      The patient has been notified of following:     \"This telephone visit will be conducted via a call between you and your physician/provider. We have found that certain health care needs can be provided without the need for a physical exam.  This service lets us provide the care you need with a short phone conversation.  If a prescription is necessary we can send it directly to your pharmacy.  If lab work is needed we can place an order for that and you can then stop by our lab to have the test done at a later time.    Telephone visits are billed at different rates depending on your insurance coverage. During this emergency period, for some insurers they may be billed the same as an in-person visit.  Please reach out to your insurance provider with any questions.    If during the course of the call the physician/provider feels a telephone visit is not appropriate, you will not be charged for this service.\"    Patient has given verbal consent for Telephone visit?  Yes    What phone number would you like to be contacted at? [phone]      How would you like to obtain your AVS? Mail a copy    Phone call duration: 11 minutes    Rukhsana Navarro MD      Service Date: 2021    Marii Montgomery MD  Internal Medicine  JESS Flores    RE:  Lucie Stockton   MRN:  [id number]  :  1935      Dear Dr. Montgomery:    We had the pleasure of seeing Lucie Stockton at the [facility]. As you know, Ms. Stockton is a very pleasant 85 year old female with " a history of bilateral pulmonary embolism, peripheral vascular disease, Grave's disease, DVT, hypertension, and suspected metastatic disease with no tissue diagnosis at this time who presents for ongoing evaluation for suspected pulmonary hypertension due to an enlarged pulmonary artery seen on CT scan.    She was first seen in our clinic on 1/4/20. During that visit, we recommended that she get an echocardiogram, VQ scan, CTA chest PE, and 6MWT. Her echocardiogram showed LVEF 55-60%, normal RV size and function, and PASP could not be assessed. VQ scan showed mismatched perfusion defects involving segments of the right upper, right middle, and right lower lobes. CT PE showed diminutive appearance of the right middle and lower lobe arteries with chronic appearing filling defect of the right lower lobe pulmonary artery, no evidence of acute large PE or right heart strain. She did not get the 6MWT. She was last seen 12/1/20, at which time she was started on anticoagulation and due to the possible perfusion defects noted on CT scan, we elected to proceed with a RHC/pulmonary angiogram to assess for PH and clot burden.    Since our last visit, she was unfortunately hospitalized 2/18-2/22 for lightheadedness/orthostasis in setting of a hemoglobin of 5.6 (from 10.6 in 11/2020) with initial c/f GIB. Does have a known history of GI AVMs but EGD/cscope did not reveal culprit lesions. Her resuscitation was complicated by TACO requiring gentle diuresis, and she was discharged on 2L. She then underwent RHC/pulmonary angiogram 2/26/21 which revealed mPA 34, PCW 15, CO 4, PVR 4.8 with significant proximal stenosis in A8 and A10.    Today, she reports feeling back to her baseline. She is still wearing 2L with saturations typically ~90%. Her functionality is stable from last visit, and she can continue to take 1 flight of stairs without stopping. Denies chest pain, edema, weight change, presyncope, or syncope. Denies bloody/tarry  "stools and continues to take Apixaban.         PAST MEDICAL HISTORY:  Past Medical History:   Diagnosis Date     Anemia      Aortic stenosis     abdominal     Atherosclerosis of aorta (H)     \"extensive disease with near occlusion below level of renal arteries\" on CT     Atherosclerosis of arteries of extremities (H)      Back pain     severe multilevel DJD, moderate spinal canal stenosis L4-5, severe L3-4     Chronic pain     Back, hips, knees, legs     Degenerative joint disease      DVT of lower extremity, bilateral (H)     5/24/10 left distal femoral and great saphenous, 7/7/10 left:  extending to cfv, iliac , pop and post tibial, peronial, right:  distal fem and peroneal      Grave's disease      Hyperlipidaemia LDL goal < 70      Hypertension, essential      Hypothyroidism      Imbalance      Insomnia      Intermittent claudication (H)      Iron deficiency      Knee pain      Lumbar stenosis with neurogenic claudication      Osteoarthritis     ankle,foot, knee     Osteoporosis      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, 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     Pulmonary embolism (H)     5/24/10 bilateral     Small bowel obstruction (H) 1/23/17     Varicose veins        PAST SURGICAL HISTORY:  Past Surgical History:   Procedure Laterality Date     BUNIONECTOMY       C STOMACH SURGERY PROCEDURE UNLISTED       CAPSULE/PILL CAM ENDOSCOPY N/A 2/20/2021     COLONOSCOPY       CV PULMONARY ANGIOGRAM N/A 2/26/2021     CV RIGHT HEART CATH MEASUREMENTS RECORDED N/A 2/26/2021     ENDOBRONCHIAL ULTRASOUND FLEXIBLE N/A 1/24/2020     HYSTERECTOMY TOTAL ABDOMINAL, BILATERAL SALPINGO-OOPHORECTOMY, NODE DISSECTION, COMBINED  2/17/11     INJECT EPIDURAL LUMBAR / SACRAL SINGLE N/A 1/5/2018     INJECT SACROILIAC JOINT Right 3/7/2018     INJECT SACROILIAC JOINT Bilateral 12/7/2018     OPTICAL TRACKING SYSTEM BRONCHOSCOPY N/A 1/24/2020     UPPER GI ENDOSCOPY   "       FAMILY HISTORY:  Family History   Problem Relation Age of Onset     Breast Cancer Maternal Aunt 80     C.A.D. Father 54     No Known Problems Mother        SOCIAL HISTORY:  Social History     Socioeconomic History     Marital status:      Spouse name: Not on file     Number of children: Not on file     Years of education: Not on file     Highest education level: Not on file   Occupational History     Not on file   Social Needs     Financial resource strain: Not on file     Food insecurity     Worry: Not on file     Inability: Not on file     Transportation needs     Medical: Not on file     Non-medical: Not on file   Tobacco Use     Smoking status: Former Smoker     Packs/day: 0.50     Years: 15.00     Pack years: 7.50     Quit date: 1993     Years since quittin.5     Smokeless tobacco: Never Used   Substance and Sexual Activity     Alcohol use: Yes     Comment: social     Drug use: No     Sexual activity: Not Currently     Partners: Male   Lifestyle     Physical activity     Days per week: Not on file     Minutes per session: Not on file     Stress: Not on file   Relationships     Social connections     Talks on phone: Not on file     Gets together: Not on file     Attends Rastafari service: Not on file     Active member of club or organization: Not on file     Attends meetings of clubs or organizations: Not on file     Relationship status: Not on file     Intimate partner violence     Fear of current or ex partner: Not on file     Emotionally abused: Not on file     Physically abused: Not on file     Forced sexual activity: Not on file   Other Topics Concern     Parent/sibling w/ CABG, MI or angioplasty before 65F 55M? Not Asked   Social History Narrative     for 52 years. Retired from [employer]. Frequent world travel.       CURRENT MEDICATIONS:  Current Outpatient Medications   Medication Sig     acetaminophen (TYLENOL) 325 MG tablet Take 2 tablets every 6 to 8 hours as  needed for pain (Patient taking differently: Take 650 mg by mouth as needed Take 2 tablets every 6 to 8 hours as needed for pain)     apixaban ANTICOAGULANT (ELIQUIS) 5 MG tablet Take 1 tablet (5 mg) by mouth 2 times daily     atorvastatin (LIPITOR) 40 MG tablet Take 2 tablets (80 mg) by mouth daily     betamethasone dipropionate (DIPROSONE) 0.05 % external ointment Apply topically 2 times daily (Patient taking differently: Apply 1 g topically as needed )     Calcium Carbonate-Vitamin D (CALCIUM 600 + D OR) Take 1 tablet by mouth every morning      cholecalciferol (VITAMIN D3) 25 mcg (1000 units) capsule Take 1 capsule by mouth daily     citalopram (CELEXA) 20 MG tablet Take 1 tablet (20 mg) by mouth daily     clopidogrel (PLAVIX) 75 MG tablet Take 75 mg by mouth daily     cyanocolbalamin (VITAMIN  B-12) 500 MCG tablet Take 500 mcg by mouth every morning      Ferrous Sulfate (IRON PO) Take by mouth daily     gabapentin (NEURONTIN) 100 MG capsule Take 1 capsule (100 mg) in the morning, 1 capsule (100 mg) mid-day, and 4 capsules (400 mg) at bed time     hydrochlorothiazide (HYDRODIURIL) 50 MG tablet Take 1 tablet (50 mg) by mouth daily Please keep appt 12/21/20     latanoprost (XALATAN) 0.005 % ophthalmic solution Place 1 drop into both eyes At Bedtime      levothyroxine (SYNTHROID/LEVOTHROID) 75 MCG tablet Take 1 tablet (75 mcg) by mouth daily     loperamide (IMODIUM A-D) 2 MG tablet Take 0.5-1 tablets (1-2 mg) by mouth daily as needed for diarrhea     multivitamin (OCUVITE) TABS tablet Take 1 tablet by mouth daily     oxybutynin (DITROPAN) 5 MG tablet Take 1 tablet (5 mg) by mouth At Bedtime For frequent urination     pantoprazole (PROTONIX) 40 MG EC tablet Take 1 tablet (40 mg) by mouth daily Takes in the morning.     Potassium Bicarb-Citric Acid 20 MEQ TBEF Take 20 mg by mouth daily     No current facility-administered medications for this visit.        ROS:   10 point ROS negative except as discussed in above  HPI.      Labs:  No results found for this or any previous visit (from the past 24 hour(s)).    Echocardiogram 1/23/20  Left ventricular function, chamber size, wall motion, and wall thickness are  normal.The EF is 55-60%. Grade II or moderate diastolic dysfunction.  Normal RV size and function.  Pulmonary artery systolic pressure cannot be assessed.  No significant valvular abnormality.  The atrial septum is intact as assessed by color Doppler and agitated saline  bubble study .  No pericardial effusion.    V/Q Scan 1/30/20  Mismatched perfusion defects involving segments of the right upper,  right middle, and right lower lobes are compatible with clinical  suspicion of chronic PE in combination with the recent findings on the  CT from 1/24/2020.     CT PE 1/24/20  1. No evidence of acute large PE. No evidence of right heart strain.  2. Diminutive appearance of the right middle and lower lobe arteries  with chronic appearing filling defect of the right lower lobe  pulmonary artery with mosaic attenuation of the lung parenchyma.  Enlargement of the main pulmonary artery. Findings are concerning for  chronic pulmonary emboli resulting in pulmonary hypertension.  3. Multiple pulmonary nodules as detailed above. Some pulmonary  nodules have increased in size and again are concerning for metastatic  disease without known primary  4. Severe atherosclerotic calcifications of the visualized abdominal  Aorta.    CT PE 1/27/18  1. Age-indeterminate, but possibly chronic pulmonary emboli in the right lower lobe.  2. Mild groundglass opacities in the posterior right upper lobe, likely infectious/inflammatory.  3. Moderate apically predominant centrilobular emphysema.  4. Scattered pulmonary nodules measuring up to 5 mm. Recommend follow-up chest CT in one year per the Fleischner Society criteria.  5. Minimally displaced lateral right seventh and eighth rib fractures.  6. Possible mild distal esophageal wall thickening,  suggestive of esophagitis.  7. Calcifications in the pancreatic head with a rounded associated cystic focus likely representing a pseudocyst, findings likely related to chronic pancreatitis, though evaluation is limited. Consider further dedicated imaging of the pancreas as clinically indicated.    RHC and pulmonary angiogram 3/9/21  RA 10  RV 65/10  PA 62/22/34  PCWP 15  Víctor CI/CO 2.9/4.0  TD CI/CO 2.9/4.0  PVR 4.8    Angiogram Right Lung  Truncus anterior is patent along with A1-3.  A4 and A5 segments are patent.  Interlobar artery and basal trunk are patent.  A10 and A8 have significant proximal stenoses / defects. A9, A6, and A7 appear patent without defect.  There are associated lower lobe perfusion defects.      Angiogram Left Lung        Assessment and Plan:     Lucie Stockton is a very pleasant 85 year old female with history of bilateral pulmonary embolism, DVT, hypertension, and possible metastatic disease who presents for evaluation of possible pulmonary hypertension.    1. CTEPH  Her VQ scan showed perfusion defects involving segments of the right upper, right middle, and right lower lobes and CT PE showed diminutive appearance of the right middle and lower lobe arteries with chronic appearing filling defect of the right lower lobe pulmonary artery. Echocardiogram in January 2020 showed normal RV size and function. Further evaluation was performed with RHC 2/2021 which confirmed pulmonary hypertension with a mean PA of 34 and angiogram revealed stenosis of A8 and A10. We discussed the options of using medication versus procedural intervention with Lucie, and she elects to pursue medical therapy with riociguat. We did  her on the agent's possible side effect of lowering blood pressure. Additionally, she will continue to take apixaban, but we will reduce to dose to 2.5 mg due to her age and small body size.     2. PAD with atherosclerosis of the abdominal aorta: Continue statin, apixaban.    3.  Multiple pulmonary nodules: Has a history of pulmonary nodules, thought to be metastatic disease with no tissue diagnosis. Dr. Jara previously completed an EBUS and it was uncertain whether a nodule was sampled. Path was negative for malignancy (nodule and LNs). She was discussed in interdisciplinary conference in April 2020 and recommendation was Guardant 360 (but it is uncertain whether this was completed). She saw Dr Randall 1/2021 with recommendation to obtain CT Chest for nodule re-evaluation, but it appears this had to be cancelled. Will need rescheduling - PCP aware per most recent note.      It was a pleasure seeing Lucie Stockton at the [facility].  Please contact us with any questions or concerns that you may have.      Patient was seen and discussed with staff attending, Dr. Florian.      Sincerely,      Rukhsana Navarro MD  Pulmonary & Critical Care Fellow    And    Seven Florian MD, PhD   of Medicine    Staff Attestation:   I had a phone visit with this patient on March 9, 2021. I have discussed with Dr. Navarro and agree with the findings and plan in this note. I have personally reviewed vital signs, hemodynamics, medications, laboratory values, and diagnostic testing. CTEPH with intervenable lesions, but she wants to start with medical therapy using Adempas. We will also cut back her dose of apixaban due to her age and small body size.       Seven Florian MD, PhD  Cardiovascular Division  [facility]         Lucie is a 85 year old who is being evaluated via a billable telephone visit.      What phone number would you like to be contacted at? [phone]  How would you like to obtain your AVS? MyChart    Vitals - Patient Reported  Weight (Patient Reported): 41.7 kg (92 lb)  Pain Score: No Pain (0)(No SOB more than usual)    Vitals were taken and medications reconciled.     Bon Ray CMA  8:41 AM        Please do not hesitate  to contact me if you have any questions/concerns.     Sincerely,     Seven Florian MD

## 2021-03-11 ENCOUNTER — DOCUMENTATION ONLY (OUTPATIENT)
Dept: SLEEP MEDICINE | Facility: CLINIC | Age: 86
End: 2021-03-11

## 2021-03-11 NOTE — PROGRESS NOTES
Patient with the diagnosis of I50.33, J96.11 qualified for home oxygen with O2 sats of 88% and uses 2 LPM O2 continuous since 2021. [facility] she was accompanied by HER . Patient would like to be evaluated on a pulse dose unit because SHE WOULD LIKE SOMETHING MORE PORTABLE FOR WHEN SHE TRAVELS TO SEE HER GRANDCHILDREN.    Results/Recommendations: PATIENT DOES QUALIFY FOR A PULSE DOSE UNIT.      RESULTS:  Vitals upon arrival - Liter flow 2, SpO2 95%, HR 84  **Each patient determines the amount of exercise during the test**  2 LPM O2 CONT. RESTIN % SPO2, 84 HR    4 LPM O2 PULSE RESTIN % SPO2, 72 HR    4 LPM O2 PULSE ACTIVITY: 97 % SPO2, 78 HR    4 LPM O2 PULSE ACTIVITY: 94 % SPO2, 82 HR    4 LPM O2 PULSE ACTIVITY: 98 % SPO2, 84 HR    PROVIDER CLINIC: [facility]  PROVIDER NAME: TAWNYA ÁLVAREZ  FAX: [phone]    PATIENT WALKED AT A STEADY PACE ON 4L/M PULSE DOSE O2 AND DID NOT DESATURATE BELOW 94%. I EXPLAINED THAT THE PATIENT WILL NOW BE PLACED ON THE WAITLIST BECAUSE THE [facility] CAME FROM ALREADY FAXED OVER AN RX. I DO NEED TO GET A CLARIFIATION, BUT IT'S ENOUGH FOR THE WAITLIST. ISAURO'S  DID HAVE A LOT OF QUESTIONS ABOUT WHEN ISAURO COULD GET A PULSE DOSE UNIT...I TOLD HIM IT COULD TAKE UP TO 2 MONTHS. HE MAY LOOK FOR OTHER PLACES THAT CAN GET UNITS SOONER.    ABRAN YBARRA-JOSE ARMANDO  [phone]

## 2021-03-24 ENCOUNTER — HOSPITAL ENCOUNTER (OUTPATIENT)
Dept: CARDIAC REHAB | Facility: CLINIC | Age: 86
End: 2021-03-24
Attending: INTERNAL MEDICINE
Payer: MEDICARE

## 2021-03-24 DIAGNOSIS — I27.24 CTEPH (CHRONIC THROMBOEMBOLIC PULMONARY HYPERTENSION) (H): ICD-10-CM

## 2021-03-24 PROCEDURE — G0238 OTH RESP PROC, INDIV: HCPCS

## 2021-03-24 PROCEDURE — G0237 THERAPEUTIC PROCD STRG ENDUR: HCPCS

## 2021-03-27 ENCOUNTER — HEALTH MAINTENANCE LETTER (OUTPATIENT)
Age: 86
End: 2021-03-27

## 2021-03-29 ENCOUNTER — HOSPITAL ENCOUNTER (OUTPATIENT)
Dept: CARDIAC REHAB | Facility: CLINIC | Age: 86
End: 2021-03-29
Attending: INTERNAL MEDICINE
Payer: MEDICARE

## 2021-03-29 PROCEDURE — G0238 OTH RESP PROC, INDIV: HCPCS

## 2021-04-01 ENCOUNTER — HOSPITAL ENCOUNTER (OUTPATIENT)
Dept: CARDIAC REHAB | Facility: CLINIC | Age: 86
End: 2021-04-01
Attending: INTERNAL MEDICINE
Payer: MEDICARE

## 2021-04-01 PROCEDURE — G0239 OTH RESP PROC, GROUP: HCPCS

## 2021-04-06 ENCOUNTER — HOSPITAL ENCOUNTER (OUTPATIENT)
Dept: CARDIAC REHAB | Facility: CLINIC | Age: 86
End: 2021-04-06
Attending: INTERNAL MEDICINE
Payer: MEDICARE

## 2021-04-06 PROCEDURE — G0239 OTH RESP PROC, GROUP: HCPCS

## 2021-04-08 ENCOUNTER — HOSPITAL ENCOUNTER (OUTPATIENT)
Dept: CARDIAC REHAB | Facility: CLINIC | Age: 86
End: 2021-04-08
Attending: INTERNAL MEDICINE
Payer: MEDICARE

## 2021-04-08 PROCEDURE — G0239 OTH RESP PROC, GROUP: HCPCS

## 2021-04-13 ENCOUNTER — HOSPITAL ENCOUNTER (OUTPATIENT)
Dept: CARDIAC REHAB | Facility: CLINIC | Age: 86
End: 2021-04-13
Attending: INTERNAL MEDICINE
Payer: MEDICARE

## 2021-04-13 PROCEDURE — G0239 OTH RESP PROC, GROUP: HCPCS

## 2021-04-15 ENCOUNTER — HOSPITAL ENCOUNTER (OUTPATIENT)
Dept: CARDIAC REHAB | Facility: CLINIC | Age: 86
End: 2021-04-15
Attending: INTERNAL MEDICINE
Payer: MEDICARE

## 2021-04-15 PROCEDURE — G0239 OTH RESP PROC, GROUP: HCPCS

## 2021-04-20 ENCOUNTER — HOSPITAL ENCOUNTER (OUTPATIENT)
Dept: CARDIAC REHAB | Facility: CLINIC | Age: 86
End: 2021-04-20
Attending: INTERNAL MEDICINE
Payer: MEDICARE

## 2021-04-20 PROCEDURE — G0239 OTH RESP PROC, GROUP: HCPCS

## 2021-04-22 ENCOUNTER — HOSPITAL ENCOUNTER (OUTPATIENT)
Dept: CARDIAC REHAB | Facility: CLINIC | Age: 86
End: 2021-04-22
Attending: INTERNAL MEDICINE
Payer: MEDICARE

## 2021-04-22 PROCEDURE — G0239 OTH RESP PROC, GROUP: HCPCS

## 2021-04-24 DIAGNOSIS — I70.209 ATHEROSCLEROSIS OF ARTERIES OF EXTREMITIES (H): ICD-10-CM

## 2021-04-24 DIAGNOSIS — I27.82 CHRONIC PULMONARY EMBOLISM WITHOUT ACUTE COR PULMONALE, UNSPECIFIED PULMONARY EMBOLISM TYPE (H): Primary | ICD-10-CM

## 2021-04-24 DIAGNOSIS — I35.0 AORTIC STENOSIS: ICD-10-CM

## 2021-04-27 ENCOUNTER — HOSPITAL ENCOUNTER (OUTPATIENT)
Dept: CARDIAC REHAB | Facility: CLINIC | Age: 86
End: 2021-04-27
Attending: INTERNAL MEDICINE
Payer: MEDICARE

## 2021-04-27 PROCEDURE — G0239 OTH RESP PROC, GROUP: HCPCS

## 2021-04-27 RX ORDER — CLOPIDOGREL BISULFATE 75 MG/1
75 TABLET ORAL DAILY
Qty: 90 TABLET | Refills: 3 | Status: SHIPPED | OUTPATIENT
Start: 2021-04-27

## 2021-04-27 NOTE — TELEPHONE ENCOUNTER
clopidogrel (PLAVIX) 75 MG tablet       Last Written Prescription Date:  9/11/2020  Last Fill Quantity: 90 tab,   # refills: 3  Last Office Visit : 3/5/2021  Future Office visit:  none    Routing refill request to provider for review/approval because:  Currently on med list as historical  - last ordered by PCP 9/11/2021 Disp:90 R:3  - I [facility] and the Rx from 9/11/2020 was made inactive so they are unable to fill.

## 2021-04-29 ENCOUNTER — TELEPHONE (OUTPATIENT)
Dept: CARDIOLOGY | Facility: CLINIC | Age: 86
End: 2021-04-29

## 2021-04-29 ENCOUNTER — HOSPITAL ENCOUNTER (OUTPATIENT)
Dept: CARDIAC REHAB | Facility: CLINIC | Age: 86
End: 2021-04-29
Attending: INTERNAL MEDICINE
Payer: MEDICARE

## 2021-04-29 PROCEDURE — G0239 OTH RESP PROC, GROUP: HCPCS

## 2021-04-29 NOTE — TELEPHONE ENCOUNTER
PA Initiation    Medication: Adempas 0.5mg  Insurance Company: [employer] Part D - Phone [phone] Fax [phone]  Pharmacy Filling the Rx: [facility] - [place], [place] - [facility] [address]  Start Date: 4/26/2021    *Prior authorization form completed and faxed to [place] for expedited review along with right heart catheterization report.

## 2021-04-29 NOTE — TELEPHONE ENCOUNTER
Prior Authorization Approval    Authorization Effective Date: 1/27/2021  Authorization Expiration Date: 4/26/2024  Medication: Adempas 0.5mg - Approved  Approved Dose/Quantity: 90 tablets/30 days  Reference #: [id number]   Insurance Company: [employer] Part D - Phone [phone] Fax [phone]  Which Pharmacy is filling the prescription (Not needed for infusion/clinic administered): [facility] - [place], [place] - [address] [place] [address] COURT  Patient Notified: Yes  ----------------------------  Insurance: Adriana ([facility])  BIN: [id number]  PCN: [id number]  ID#: [id number]  GRP#: N/[id number]

## 2021-04-29 NOTE — TELEPHONE ENCOUNTER
Additional information requested from [facility]. Completed request and faxed back for review.

## 2021-04-29 NOTE — TELEPHONE ENCOUNTER
**Discussed the enrollment form with the pt over the phone on 4/26/2021. Stated [facility] to obtain the pt's signature via email, mail or verbally over the phone. Requested pt sign the forms and return to [facility]. Stated to pt that once the forms have been signed and returned, pt should hear from [facility] to discuss shipment of the medication. Requested pt ask what the co-pay is when the pt is contacted by the specialty pharmacy. Recommended that if co-pay is high, ask the specialty pharmacy for assistance. Also requested that when pt receives the medication in the mail, not to begin therapy until a nursing visit has been scheduled since it's a titrating medication. Pt verbalized understanding of the plan and requested a letter outlining the steps for the medication be mailed. Agreed to plan with pt.    *[facility] enrollment and REMS form [facility] for review along with prior authorization approval on 4/28/2021.    Ariadna Mckeon  Clinic   Pulmonary Hypertension  [facility]  (P) [phone]

## 2021-05-04 ENCOUNTER — HOSPITAL ENCOUNTER (OUTPATIENT)
Dept: CARDIAC REHAB | Facility: CLINIC | Age: 86
End: 2021-05-04
Attending: INTERNAL MEDICINE
Payer: MEDICARE

## 2021-05-04 PROCEDURE — G0239 OTH RESP PROC, GROUP: HCPCS

## 2021-05-06 ENCOUNTER — HOSPITAL ENCOUNTER (OUTPATIENT)
Dept: CARDIAC REHAB | Facility: CLINIC | Age: 86
End: 2021-05-06
Attending: INTERNAL MEDICINE
Payer: MEDICARE

## 2021-05-06 PROCEDURE — G0239 OTH RESP PROC, GROUP: HCPCS

## 2021-05-11 ENCOUNTER — HOSPITAL ENCOUNTER (OUTPATIENT)
Dept: CARDIAC REHAB | Facility: CLINIC | Age: 86
End: 2021-05-11
Attending: INTERNAL MEDICINE
Payer: MEDICARE

## 2021-05-11 PROCEDURE — G0239 OTH RESP PROC, GROUP: HCPCS

## 2021-05-12 NOTE — TELEPHONE ENCOUNTER
Spoke with Mariaelena from [facility], who advised patient's enrollment form is incomplete. Spoke with patient who advised she thought her  faxed the needed paperwork over already but will follow up with him today. I asked that even if it was already faxed, to have him refax it again. Patient verbalized understanding and did not have any further questions. Ceci Treviño RN on 5/12/2021 at 3:23 PM

## 2021-05-13 ENCOUNTER — HOSPITAL ENCOUNTER (OUTPATIENT)
Dept: CARDIAC REHAB | Facility: CLINIC | Age: 86
End: 2021-05-13
Attending: INTERNAL MEDICINE
Payer: MEDICARE

## 2021-05-13 PROCEDURE — G0239 OTH RESP PROC, GROUP: HCPCS

## 2021-05-18 ENCOUNTER — HOSPITAL ENCOUNTER (OUTPATIENT)
Dept: CARDIAC REHAB | Facility: CLINIC | Age: 86
End: 2021-05-18
Attending: INTERNAL MEDICINE
Payer: MEDICARE

## 2021-05-18 PROCEDURE — G0239 OTH RESP PROC, GROUP: HCPCS

## 2021-05-20 ENCOUNTER — HOSPITAL ENCOUNTER (OUTPATIENT)
Dept: CARDIAC REHAB | Facility: CLINIC | Age: 86
End: 2021-05-20
Attending: INTERNAL MEDICINE
Payer: MEDICARE

## 2021-05-20 PROCEDURE — G0239 OTH RESP PROC, GROUP: HCPCS

## 2021-05-25 ENCOUNTER — HOSPITAL ENCOUNTER (OUTPATIENT)
Dept: CARDIAC REHAB | Facility: CLINIC | Age: 86
End: 2021-05-25
Attending: INTERNAL MEDICINE
Payer: MEDICARE

## 2021-05-25 PROCEDURE — G0239 OTH RESP PROC, GROUP: HCPCS

## 2021-05-27 ENCOUNTER — HOSPITAL ENCOUNTER (OUTPATIENT)
Dept: CARDIAC REHAB | Facility: CLINIC | Age: 86
End: 2021-05-27
Attending: INTERNAL MEDICINE
Payer: MEDICARE

## 2021-05-27 PROCEDURE — G0239 OTH RESP PROC, GROUP: HCPCS

## 2021-06-01 ENCOUNTER — HOSPITAL ENCOUNTER (OUTPATIENT)
Dept: CARDIAC REHAB | Facility: CLINIC | Age: 86
End: 2021-06-01
Attending: INTERNAL MEDICINE
Payer: MEDICARE

## 2021-06-01 PROCEDURE — G0239 OTH RESP PROC, GROUP: HCPCS

## 2021-06-07 NOTE — TELEPHONE ENCOUNTER
*[facility] enrollment and REMS form completed and faxed to [facility] REMS hub for expedited review along with prior authorization approval on 5/28/2021.    *Received confirmation from Yolie at [facility] REMS was received and triaged to [facility].      Ariadna Mckeon  Clinic   Pulmonary Hypertension  [facility]  (P) [phone]

## 2021-06-08 ENCOUNTER — HOSPITAL ENCOUNTER (OUTPATIENT)
Dept: CARDIAC REHAB | Facility: CLINIC | Age: 86
End: 2021-06-08
Attending: INTERNAL MEDICINE
Payer: MEDICARE

## 2021-06-08 PROCEDURE — G0238 OTH RESP PROC, INDIV: HCPCS

## 2021-06-09 ENCOUNTER — TELEPHONE (OUTPATIENT)
Dept: CARDIOLOGY | Facility: CLINIC | Age: 86
End: 2021-06-09

## 2021-06-09 NOTE — TELEPHONE ENCOUNTER
Spoke with Lucie regarding Adempas start. Patient advised she is feeling well overall; only complains of some slight back pain she's had for a while. Scheduled patient for follow up with Marcia on June 22, virtual visit, at 9:15 AM. Ceci Treviño RN on 6/9/2021 at 12:45 PM

## 2021-06-09 NOTE — TELEPHONE ENCOUNTER
I saw Lucie Stockton ,  37 today in her home for her Adempas SOC. She started on 0.5mg TID .  Pt had no side effects or complaints. VS monitored for first hour after first dose are as follows  Vitals: BP  HR  RR SPO2  Temp: 118/60, HR 82, RR 24, SPO2 93, Temp 99.1  15min /51 HR 77,  30min /46 HR 78, 45min /42,HR78  60min  /44 HR 78    Another CNSS, Blanca Oliva will follow up with patient on Ines 15, 2021.    Thank you,      Sandra Rosa,RN?Clinical Nursing Services Specialist, PAH, [facility]

## 2021-06-16 NOTE — TELEPHONE ENCOUNTER
I did a Adempas follow up with Lucie Stockton :[birth year].she has been in 0.5mg of Adempas since 21. She states she s had no side effects from Adempas.  No headache, no GI upset/diarrhea, no flushing, no jaw pain, no cyanosis, no muscle pain, or bone pain. They haven t shown any signs of hypotension. They deny ever feeling dizzy or lightheaded. No report of chest pain or racing heartbeat. Her appetite is still fair.  No edema is noted.           Vitals: /76 HR80 RR24 SPO2: 93%     Weight: 93 Lbs

## 2021-06-21 NOTE — PROGRESS NOTES
CARDIOLOGY PH CLINIC TELEPHONE VISIT    Date of virtual visit: 06/22/21      Lucie Stockton is a 85 year old female who is being evaluated via a billable telephone visit.        I have reviewed and updated the patient's Past Medical History, Social History, Family History and Medication List.    MEDICATIONS:  Current Outpatient Medications   Medication Sig Dispense Refill     acetaminophen (TYLENOL) 325 MG tablet Take 2 tablets every 6 to 8 hours as needed for pain (Patient taking differently: Take 650 mg by mouth as needed Take 2 tablets every 6 to 8 hours as needed for pain) 100 tablet 1     apixaban ANTICOAGULANT (ELIQUIS) 2.5 MG tablet Take 1 tablet (2.5 mg) by mouth 2 times daily 180 tablet 3     atorvastatin (LIPITOR) 40 MG tablet Take 2 tablets (80 mg) by mouth daily 180 tablet 3     betamethasone dipropionate (DIPROSONE) 0.05 % external ointment Apply topically 2 times daily (Patient taking differently: Apply 1 g topically as needed ) 15 g 0     Calcium Carbonate-Vitamin D (CALCIUM 600 + D OR) Take 1 tablet by mouth every morning        cholecalciferol (VITAMIN D3) 25 mcg (1000 units) capsule Take 1 capsule by mouth daily       citalopram (CELEXA) 20 MG tablet Take 1 tablet (20 mg) by mouth daily 90 tablet 3     clopidogrel (PLAVIX) 75 MG tablet Take 1 tablet (75 mg) by mouth daily 90 tablet 3     cyanocolbalamin (VITAMIN  B-12) 500 MCG tablet Take 500 mcg by mouth every morning  90 tablet 1     Ferrous Sulfate (IRON PO) Take by mouth daily       gabapentin (NEURONTIN) 100 MG capsule Take 1 capsule (100 mg) in the morning, 1 capsule (100 mg) mid-day, and 4 capsules (400 mg) at bed time 180 capsule 5     hydrochlorothiazide (HYDRODIURIL) 50 MG tablet Take 1 tablet (50 mg) by mouth daily Please keep appt 12/21/20 90 tablet 0     latanoprost (XALATAN) 0.005 % ophthalmic solution Place 1 drop into both eyes At Bedtime   1     levothyroxine (SYNTHROID/LEVOTHROID) 75 MCG tablet Take 1 tablet (75 mcg) by mouth  daily 90 tablet 3     loperamide (IMODIUM A-D) 2 MG tablet Take 0.5-1 tablets (1-2 mg) by mouth daily as needed for diarrhea 30 tablet 0     multivitamin (OCUVITE) TABS tablet Take 1 tablet by mouth daily       oxybutynin (DITROPAN) 5 MG tablet Take 1 tablet (5 mg) by mouth At Bedtime For frequent urination 90 tablet 0     pantoprazole (PROTONIX) 40 MG EC tablet Take 1 tablet (40 mg) by mouth daily Takes in the morning. 90 tablet 3     Potassium Bicarb-Citric Acid 20 MEQ TBEF Take 20 mg by mouth daily 90 tablet 3     riociguat (ADEMPAS) 0.5 MG tablet Take 2 tablets (1 mg) by mouth 3 times daily Goal of 2.5 mg three times a day, with dose increase of 0.5 mg three times a day every 2 weeks (SBP must be >95 mmHg and no signs or symptoms of hypotension to increase)         ALLERGIES  Patient has no known allergies.      Self reported vitals:  Weight: 94 lb  BP: 117/48, 148/58 last few days (fluctuating)    Primary PH cardiologist: Dr. Florian      HPI:  Ms. Stockton is a very pleasant 85 year old female with a PMhx including bilateral pulmonary embolism on anticoagulation, peripheral vascular disease, Grave's disease, DVT, and hypertension. She has multiple pulmonary nodules with a suspected metastatic disease with no tissue diagnosis at this time. 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     She was hospitalized in February of this year with lightheadedness/orthostasis in setting of a hemoglobin of 5.6 (from 10.6 in 11/2020) with initial c/f GIB. Does have a known history of GI AVMs but EGD/cscope did not reveal culprit lesions. Her resuscitation was complicated by TACO requiring diuresis, and she was discharged on 2L. She then underwent RHC/pulmonary angiogram 2/26/21 which revealed mPA 34, PCW 15, CO 4, PVR 4.8 with significant proximal stenosis in A8 and A10. When she followed up with Dr. Florian last in March, she was back to her baseline and was able to remain on apixaban.  "At that visit, he discussed the options of medication versus procedural intervention for her CTEPH, and she opted for medical therapy. Thus, Adempas was recommended. In addition, due to age and body size, her apixaban dose was reduced to 2.5mg.     Today, we are doing a virtual visit to follow up on her progress. About two weeks ago, she started the Adempas at 0.5mg TID, and over the weekend has gone up to 1mg TID. Thus far she has noted no significant change in her breathing and does get winded with things like getting up out of bed to walk. She thinks that since starting Adempas she has very little energy which is a change for her. Also, she feels a bit \"unsteady\" when getting out of bed, which is bothersome since she has to get up frequently at night to urinate. She has an occasional headache but denies N/V/D and has not had any new blood in her stool (confirmed she is on lower dose Eliquis). Her BP has been fluctuating, and actually running higher than usual at times. Weight is up 2 lbs since we met with her last, but she denies any lower extremity edema or feeling bloated.      There were no new labs performed prior to our visit today for formal review.       CURRENT PULMONARY HYPERTENSION REGIMEN:    PAH Rx: Adempas 1mg TID    Diuretics: hydrochlorothiazide 50mg daily     Oxygen: None    Anticoagulation: Eliquis 2.5mg BID (age/weight)  Indication: PE/CTEPH      Assessment/Plan:    1. Chronic thromboembolic pulmonary hypertension.   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 RHC in Feb 2021 confirmed PH with a mean PA of 34 and angiogram revealed stenosis of A8 and A10. Dr. Florian discussed procedural intervention vs medication and she has chosen to pursue medical therapy.    --She has been on Adempas ~3 weeks. Thus far, she notes no significant change in breathing, but notes less energy and at times feeling unsteady on her feet. Home reported BP is not low, and in fact, sometimes " elevated.  She reports no lower extremity edema, but weight is up 2 lbs. There were no new labs today for review. For now, will continue to cautiously titrate, and I will see her back in person in a few weeks for further evaluation with labs. I asked her to call sooner if symptoms worsen.   --Continue anticoagulation with apixaban, at 2.5mg dose given age/body size.     2.  PAD with atherosclerosis of the abdominal aorta.   --Continue monitoring, on statin and apixaban.     Follow up plan: Return to see me in clinic in ~2 weeks with labs prior.       Testing/labs:    Most recent labs:   Results for ISAURO GUZMAN (MRN [id number]) as of 6/21/2021 17:16   Ref. Range 3/5/2021 12:09   Sodium Latest Ref Range: 133 - 144 mmol/L 146 (H)   Potassium Latest Ref Range: 3.4 - 5.3 mmol/L 4.4   Chloride Latest Ref Range: 94 - 109 mmol/L 108   Carbon Dioxide Latest Ref Range: 20 - 32 mmol/L 36 (H)   Urea Nitrogen Latest Ref Range: 7 - 30 mg/dL 17   Creatinine Latest Ref Range: 0.52 - 1.04 mg/dL 0.52   GFR Estimate Latest Ref Range: >60 mL/min/1.73_m2 87   GFR Estimate If Black Latest Ref Range: >60 mL/min/1.73_m2 >90   Calcium Latest Ref Range: 8.5 - 10.1 mg/dL 9.9   Anion Gap Latest Ref Range: 3 - 14 mmol/L 2 (L)   N-Terminal Pro Bnp Latest Ref Range: 0 - 450 pg/mL 1,078 (H)   Glucose Latest Ref Range: 70 - 99 mg/dL 77   WBC Latest Ref Range: 4.0 - 11.0 10e9/L 6.2   Hemoglobin Latest Ref Range: 11.7 - 15.7 g/dL 9.3 (L)   Hematocrit Latest Ref Range: 35.0 - 47.0 % 32.8 (L)   Platelet Count Latest Ref Range: 150 - 450 10e9/L 431         Other recent pertinent testing:    Echocardiogram 1/23/20  Left ventricular function, chamber size, wall motion, and wall thickness are  normal.The EF is 55-60%. Grade II or moderate diastolic dysfunction.  Normal RV size and function.  Pulmonary artery systolic pressure cannot be assessed.  No significant valvular abnormality.  The atrial septum is intact as assessed by color Doppler and agitated  saline  bubble study .  No pericardial effusion.      RHC and pulmonary angiogram 3/9/21  RA 10  RV 65/10  PA 62/22/34  PCWP 15  Víctor CI/CO 2.9/4.0  TD CI/CO 2.9/4.0  PVR 4.8      NYHA Functional Class:  Functional class 3    1--No limitation of activity  2--Slight limitation, ordinary activities may cause symptoms  3--Marked limitation, less than ordinary activities cause symptoms  4--Severe limitation, minimal activity causes symptoms, or symptoms at rest      Phone-Visit Details    Start time: 0904  End time: 0918      Marcia Griggs PA-C  [facility]  Pager [phone]

## 2021-06-22 ENCOUNTER — VIRTUAL VISIT (OUTPATIENT)
Dept: CARDIOLOGY | Facility: CLINIC | Age: 86
End: 2021-06-22
Attending: PHYSICIAN ASSISTANT
Payer: MEDICARE

## 2021-06-22 DIAGNOSIS — R06.02 SOB (SHORTNESS OF BREATH): ICD-10-CM

## 2021-06-22 DIAGNOSIS — I27.24 CTEPH (CHRONIC THROMBOEMBOLIC PULMONARY HYPERTENSION) (H): Primary | ICD-10-CM

## 2021-06-22 PROCEDURE — 99442 PR PHYSICIAN TELEPHONE EVALUATION 11-20 MIN: CPT | Mod: 95 | Performed by: PHYSICIAN ASSISTANT

## 2021-06-22 NOTE — PATIENT INSTRUCTIONS
Thank you for visiting the Pulmonary Hypertension Clinic today.      Today we discussed:   We will continue to titrate up your Adempas as we are. However, if your dizziness gets worse, you notice new swelling, or weight goes up another 2-3 pounds, please contact our office.     Follow up Appointment Information:  Return to see Marcia in clinic in ~2 weeks with labs prior to visit.       Additional Instructions:    1. Continue staying active and eat a heart healthy, low sodium diet.     2. Please keep current list of medications with you at all times.     3. Remember to weigh yourself daily after voiding and write it down on a log. If you have gained/lost 2 pounds overnight or 5 pounds in a week contact us for medication adjustments or further instructions.    4. Please call us immediately if you have syncope (fainting or passing out), chest pain, worsening edema (swelling or weight gain), or general worsening in how you are feeling.     --------------------------------------------------------------------------------------------------------------    If you have questions or concerns please contact us at:    Allen Treviño RN, BSN   Ariadna Mckeon (Schedule,Prior Auth)  Nurse Coordinator     Clinic   Pulmonary Hypertension   Pulmonary Hypertension  [facility]  [facility]  (Phone) [phone]    (Phone) [phone]        (Fax) [phone]      ** Please note that you will NOT receive a reminder call regarding your scheduled testing, reminder calls are for provider appointments only.  If you are scheduled for testing within the [employer] system you may receive a call regarding pre-registration for billing purposes only.**     --------------------------------------------------------------------------------------------------------------    Interested in joining a support group?    Pulmonary Hypertension Association  Https://www.phassociation.org/  **Look  at the Events Tab** They even have Support Groups that you can call into    [facility] Support Group  Second Saturday of the Month from 1-3 PM   Location: [address] [place] [id number]  Leader: Taryn Munoz and Rama Sepulveda  Phone: [phone] or [phone]  Email: [email or web address]

## 2021-06-22 NOTE — NURSING NOTE
"I'd like to watcher her closely while we titrate Adempas. Weight up 2 lbs and she's having less energy and feeling mildly unsteady, though reports no low BPs.   For now we will keep cautiously titrating, but low threshold to hold where she is if more issues.   I'd like to see her back in 2 weeks in person with BMP, BNP, hemoglobin prior to visit please.     Thx   Marcia   ____________________________________    Orders placed and patient marked \"ready for checkout.\" Ceci Treviño RN on 6/22/2021 at 9:33 AM  "

## 2021-06-22 NOTE — LETTER
6/22/2021      RE: Lucie Stockton  [address] [id number]       Dear Colleague,    Thank you for the opportunity to participate in the care of your patient, Lucie Stockton, at the [facility]. Please see a copy of my visit note below.          CARDIOLOGY PH CLINIC TELEPHONE VISIT    Date of virtual visit: 06/22/21      Lucie Stockton is a 85 year old female who is being evaluated via a billable telephone visit.        I have reviewed and updated the patient's Past Medical History, Social History, Family History and Medication List.    MEDICATIONS:  Current Outpatient Medications   Medication Sig Dispense Refill     acetaminophen (TYLENOL) 325 MG tablet Take 2 tablets every 6 to 8 hours as needed for pain (Patient taking differently: Take 650 mg by mouth as needed Take 2 tablets every 6 to 8 hours as needed for pain) 100 tablet 1     apixaban ANTICOAGULANT (ELIQUIS) 2.5 MG tablet Take 1 tablet (2.5 mg) by mouth 2 times daily 180 tablet 3     atorvastatin (LIPITOR) 40 MG tablet Take 2 tablets (80 mg) by mouth daily 180 tablet 3     betamethasone dipropionate (DIPROSONE) 0.05 % external ointment Apply topically 2 times daily (Patient taking differently: Apply 1 g topically as needed ) 15 g 0     Calcium Carbonate-Vitamin D (CALCIUM 600 + D OR) Take 1 tablet by mouth every morning        cholecalciferol (VITAMIN D3) 25 mcg (1000 units) capsule Take 1 capsule by mouth daily       citalopram (CELEXA) 20 MG tablet Take 1 tablet (20 mg) by mouth daily 90 tablet 3     clopidogrel (PLAVIX) 75 MG tablet Take 1 tablet (75 mg) by mouth daily 90 tablet 3     cyanocolbalamin (VITAMIN  B-12) 500 MCG tablet Take 500 mcg by mouth every morning  90 tablet 1     Ferrous Sulfate (IRON PO) Take by mouth daily       gabapentin (NEURONTIN) 100 MG capsule Take 1 capsule (100 mg) in the morning, 1 capsule (100 mg) mid-day, and 4 capsules  (400 mg) at bed time 180 capsule 5     hydrochlorothiazide (HYDRODIURIL) 50 MG tablet Take 1 tablet (50 mg) by mouth daily Please keep appt 12/21/20 90 tablet 0     latanoprost (XALATAN) 0.005 % ophthalmic solution Place 1 drop into both eyes At Bedtime   1     levothyroxine (SYNTHROID/LEVOTHROID) 75 MCG tablet Take 1 tablet (75 mcg) by mouth daily 90 tablet 3     loperamide (IMODIUM A-D) 2 MG tablet Take 0.5-1 tablets (1-2 mg) by mouth daily as needed for diarrhea 30 tablet 0     multivitamin (OCUVITE) TABS tablet Take 1 tablet by mouth daily       oxybutynin (DITROPAN) 5 MG tablet Take 1 tablet (5 mg) by mouth At Bedtime For frequent urination 90 tablet 0     pantoprazole (PROTONIX) 40 MG EC tablet Take 1 tablet (40 mg) by mouth daily Takes in the morning. 90 tablet 3     Potassium Bicarb-Citric Acid 20 MEQ TBEF Take 20 mg by mouth daily 90 tablet 3     riociguat (ADEMPAS) 0.5 MG tablet Take 2 tablets (1 mg) by mouth 3 times daily Goal of 2.5 mg three times a day, with dose increase of 0.5 mg three times a day every 2 weeks (SBP must be >95 mmHg and no signs or symptoms of hypotension to increase)         ALLERGIES  Patient has no known allergies.      Self reported vitals:  Weight: 94 lb  BP: 117/48, 148/58 last few days (fluctuating)    Primary PH cardiologist: Dr. Florian      HPI:  Ms. Stockton is a very pleasant 85 year old female with a PMhx including bilateral pulmonary embolism on anticoagulation, peripheral vascular disease, Grave's disease, DVT, and hypertension. She has multiple pulmonary nodules with a suspected metastatic disease with no tissue diagnosis at this time. 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     She was hospitalized in February of this year with lightheadedness/orthostasis in setting of a hemoglobin of 5.6 (from 10.6 in 11/2020) with initial c/f GIB. Does have a known history of GI AVMs but EGD/cscope did not reveal culprit lesions. Her  "resuscitation was complicated by TACO requiring diuresis, and she was discharged on 2L. She then underwent RHC/pulmonary angiogram 2/26/21 which revealed mPA 34, PCW 15, CO 4, PVR 4.8 with significant proximal stenosis in A8 and A10. When she followed up with Dr. Chiqui mauro in March, she was back to her baseline and was able to remain on apixaban. At that visit, he discussed the options of medication versus procedural intervention for her CTEPH, and she opted for medical therapy. Thus, Adempas was recommended. In addition, due to age and body size, her apixaban dose was reduced to 2.5mg.     Today, we are doing a virtual visit to follow up on her progress. About two weeks ago, she started the Adempas at 0.5mg TID, and over the weekend has gone up to 1mg TID. Thus far she has noted no significant change in her breathing and does get winded with things like getting up out of bed to walk. She thinks that since starting Adempas she has very little energy which is a change for her. Also, she feels a bit \"unsteady\" when getting out of bed, which is bothersome since she has to get up frequently at night to urinate. She has an occasional headache but denies N/V/D and has not had any new blood in her stool (confirmed she is on lower dose Eliquis). Her BP has been fluctuating, and actually running higher than usual at times. Weight is up 2 lbs since we met with her last, but she denies any lower extremity edema or feeling bloated.      There were no new labs performed prior to our visit today for formal review.       CURRENT PULMONARY HYPERTENSION REGIMEN:    PAH Rx: Adempas 1mg TID    Diuretics: hydrochlorothiazide 50mg daily     Oxygen: None    Anticoagulation: Eliquis 2.5mg BID (age/weight)  Indication: PE/CTEPH      Assessment/Plan:    1. Chronic thromboembolic pulmonary hypertension.   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 RHC in Feb 2021 confirmed PH with a mean PA of 34 and angiogram " revealed stenosis of A8 and A10. Dr. Florian discussed procedural intervention vs medication and she has chosen to pursue medical therapy.    --She has been on Adempas ~3 weeks. Thus far, she notes no significant change in breathing, but notes less energy and at times feeling unsteady on her feet. Home reported BP is not low, and in fact, sometimes elevated.  She reports no lower extremity edema, but weight is up 2 lbs. There were no new labs today for review. For now, will continue to cautiously titrate, and I will see her back in person in a few weeks for further evaluation with labs. I asked her to call sooner if symptoms worsen.   --Continue anticoagulation with apixaban, at 2.5mg dose given age/body size.     2.  PAD with atherosclerosis of the abdominal aorta.   --Continue monitoring, on statin and apixaban.     Follow up plan: Return to see me in clinic in ~2 weeks with labs prior.       Testing/labs:    Most recent labs:   Results for ISAURO GUZMAN (MRN [id number]) as of 6/21/2021 17:16   Ref. Range 3/5/2021 12:09   Sodium Latest Ref Range: 133 - 144 mmol/L 146 (H)   Potassium Latest Ref Range: 3.4 - 5.3 mmol/L 4.4   Chloride Latest Ref Range: 94 - 109 mmol/L 108   Carbon Dioxide Latest Ref Range: 20 - 32 mmol/L 36 (H)   Urea Nitrogen Latest Ref Range: 7 - 30 mg/dL 17   Creatinine Latest Ref Range: 0.52 - 1.04 mg/dL 0.52   GFR Estimate Latest Ref Range: >60 mL/min/1.73_m2 87   GFR Estimate If Black Latest Ref Range: >60 mL/min/1.73_m2 >90   Calcium Latest Ref Range: 8.5 - 10.1 mg/dL 9.9   Anion Gap Latest Ref Range: 3 - 14 mmol/L 2 (L)   N-Terminal Pro Bnp Latest Ref Range: 0 - 450 pg/mL 1,078 (H)   Glucose Latest Ref Range: 70 - 99 mg/dL 77   WBC Latest Ref Range: 4.0 - 11.0 10e9/L 6.2   Hemoglobin Latest Ref Range: 11.7 - 15.7 g/dL 9.3 (L)   Hematocrit Latest Ref Range: 35.0 - 47.0 % 32.8 (L)   Platelet Count Latest Ref Range: 150 - 450 10e9/L 431         Other recent pertinent testing:    Echocardiogram  "1/23/20  Left ventricular function, chamber size, wall motion, and wall thickness are  normal.The EF is 55-60%. Grade II or moderate diastolic dysfunction.  Normal RV size and function.  Pulmonary artery systolic pressure cannot be assessed.  No significant valvular abnormality.  The atrial septum is intact as assessed by color Doppler and agitated saline  bubble study .  No pericardial effusion.      RHC and pulmonary angiogram 3/9/21  RA 10  RV 65/10  PA 62/22/34  PCWP 15  Víctor CI/CO 2.9/4.0  TD CI/CO 2.9/4.0  PVR 4.8      NYHA Functional Class:  Functional class 3    1--No limitation of activity  2--Slight limitation, ordinary activities may cause symptoms  3--Marked limitation, less than ordinary activities cause symptoms  4--Severe limitation, minimal activity causes symptoms, or symptoms at rest      Phone-Visit Details    Start time: 0904  End time: 0918      Marcia Griggs PA-C  [facility]  Pager [phone]      Lucie is a 85 year old who is being evaluated via a billable telephone visit.      What phone number would you like to be contacted at? [phone]  How would you like to obtain your AVS? MyChart    Vitals - Patient Reported  Weight (Patient Reported): 42.6 kg (94 lb)  Height (Patient Reported): 149.9 cm (4' 11\")  BMI (Based on Pt Reported Ht/Wt): 18.99  Pain Score: No Pain (0)  Pain Loc: Chest      Please do not hesitate to contact me if you have any questions/concerns.     Sincerely,     LUCY Anne    "

## 2021-06-30 NOTE — TELEPHONE ENCOUNTER
I saw Lucie Stockton today. She is doing well on Adempas 1mg TID with c/o mild intermittent headache relieved with Tylenol and is set to increase to 1.5mg TID starting on 7/4/21  Of note, she had an SPO2 of 88% on RA and hasn t been wearing oxygen. I recommended her to wear 2L with activities and trial wearing at night again. She said she has felt more fatigued the last couple of months. She agreed to try.    Date and Location of Visit: 6/08/21 Patients Home  Visit#3 , week 4 IN PERSON     Patient Name: Lucie Stockton     Date of Birth:[birth year]     Medication/Treatment Ordered:Adempas    Current Dose: 1.0mg TID     Titration Orders:  Titration will be 0.5MG PO TID every two weeks to a goal dose of 2.5MG PO TID or until tolerated. Hold titration if the Patient has any negative/intolerable side effects, hold titration if the Patient has signs/symptoms of hypotension, hold titration if the Patient systolic is <95mmhg.     Side Effects/Complaints: pt complains of an intermittent headache rated at 3 that Tylenol relieves. She also states feeling increased fatigue over the last couple of months.    Physical Assessment: Alert & oriented x4. LUNGS: Clear upper lobes, crackles to bases noted. No edema noted at this time. Denies fever, chills, and problems urinating. Patient reports having a poor to fair appetite, is drinking ensure plus and  Darren encourages intake. Patient currently does not require oxygen but noted resting SPO2 88% . RN encouraged use, especially during active times and at night when her breathing is likely more shallow. Pt agreed to try this . Patient states they currently experiences dyspnea  after walking > 100 feet.  They have  no complaints of pain. They are currently independent with all activities of daily living with some house cleaning assistance, but can only tolerated them in small increments.     Vitals: BP  140/60. HR  87 RR 18 SPO2  88% RA , 95% 2L Temp: 98.4    Encouraged and recommended  that patient wears oxygen at 2L , especially while active during the day and at night which in turn may make her better. She agrees to try.       Weight:Lbs 93.6 lbs     Psychosocial Concerns/Changes: None.  They are currently independent with all health regiments and state they are compliant with all medications prescribed to them and knows what each medication is prescribed for. Reinforcement teaching on PAH physiology, Adempas purpose given. They were prepared for CNSS visit.     Significant Others/Caregivers Present: , Darren not present     Home Environment Update: Patient currently resides In  a two story home with their spouse . It has  stairs to the second level where the patient s bedroom is locate. Home has electricity, heat, and running water. Their home is clean and  kept.     Plans for d/c from nursing and/or next RN visit and by whom: Follow up appointment by CNSS July 14,2021.       Sandra Rosa RN?Clinical Nursing Services Specialist, PAH, [facility]  c [phone]?f [phone]

## 2021-07-15 NOTE — TELEPHONE ENCOUNTER
I saw Lucie Stockton  1935 today for an Adempas follow up titration visit. Currently on 1.5mg PO TID with no reported side effects  and a    BP of 120/50 with last increase 2 weeks ago. I will notify the pharmacy to increase her dose to 2.0mg PO TID which will start on 7/18/2021 as per the [facility] Adempas titration protocol.  My next visit will be on 7/28/2021.    Date and Location of Visit: 7/14/21 Patients Home  Visit#4, week 5 IN PERSON     Patient Name: Lucie Stockton     Date of Birth:[birth year]     Medication/Treatment Ordered:Adempas    Current Dose: 1.5mg PO TID (started dose on 7/4/2021)     Titration Orders:  Titration will be 0.5MG PO TID every two weeks to a goal dose of 2.5MG PO TID or until tolerated. Hold titration if the Patient has any negative/intolerable side effects, hold titration if the Patient has signs/symptoms of hypotension, hold titration if the Patient systolic is <95mmhg.     Side Effects/Complaints: Pt denies any side effects. Denies any further headaches. Feeling less fatigued this visit.    Physical Assessment: Alert & oriented x4. LUNGS: Clear lung sound, bases are diminished, R>L. to . No edema noted at this time. Denies fever, chills, and problems urinating. Patient reports having a poor to fair appetite, is drinking ensure plus and  Darren encourages intake. Patient currently not on oxygen with noted resting SPO2 91% . RN encouraged use, especially during active times and at night when her breathing is likely more shallow. Pt has been wearing oxygen regularly at night for 6-8 hours and some during the day while at home since last visit and feels it helps . Patient states they currently experiences dyspnea  after walking > 100 feet but is noticing she is starting to walk a little further in her activity before getting as fatigued .  They have  no complaints of pain other than her intermittent back pain she has had for some time. They are currently independent with all  activities of daily living with some house cleaning assistance, but can only tolerate them in small increments.     Vitals: BP  120/50. HR  88 RR 18 SPO2  91% RA Temp: 99.4    Encouraged and recommended that patient wears oxygen at 2L , especially while active during the day and at night which in turn may make her better. She has been doing this and feels better she thinks       Weight:Lbs 93 lbs     Psychosocial Concerns/Changes: None.  They are currently independent with all health regiments and state they are compliant with all medications prescribed to them and knows what each medication is prescribed for. Reinforcement teaching on PAH physiology,  Adempas purpose given. They were prepared for CNSS visit. Noted some forgetfulness . Is planning a trip to [place] in a few weeks to see her youngest daughter.     Significant Others/Caregivers Present: , Darren not present     Home Environment Update: Patient currently resides In  a two story home with their spouse . It has  stairs to the second level where the patient s bedroom is locate. Home has electricity, heat, and running water. Their home is clean and  kept.     Plans for d/c from nursing and/or next RN visit and by whom: Follow up appointment by CNSS July 28,2021. This will be prior to her trip.    Have a great day!    Sandra Rosa,JANNET?Clinical Nursing Services Specialist, PAH, [facility]  c [phone]?f [phone]

## 2021-07-22 NOTE — LETTER
7/22/2021      RE: Lucie Stockton  [address] [facility] [id number]       Dear Colleague,    Thank you for the opportunity to participate in the care of your patient, Lucie Stockton, at the [facility] [place] [facility]. Please see a copy of my visit note below.        Date of Service: 07/22/2021       [facility]      Primary PH cardiologist: Dr. Florian      HPI:  Ms. Stockton is a very pleasant 85 year old female with a PMhx including bilateral pulmonary embolism on anticoagulation, peripheral vascular disease, Grave's disease, DVT, and hypertension. She has multiple pulmonary nodules with a suspected metastatic disease with no tissue diagnosis at this time. 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      She was hospitalized in February of this year with lightheadedness/orthostasis in setting of a hemoglobin of 5.6 (from 10.6 in 11/2020) with initial c/f GIB. Does have a known history of GI AVMs but EGD/cscope did not reveal culprit lesions. Her resuscitation was complicated by TACO requiring diuresis, and she was discharged on 2LNC. She then underwent RHC/pulmonary angiogram 2/26/21 which revealed mPA 34, PCW 15, CO 4, PVR 4.8 with significant proximal stenosis in A8 and A10. When she followed up with Dr. Florian last in March, she was back to her baseline and was able to remain on apixaban. At that visit, he discussed the options of medication versus procedural intervention for her CTEPH, and she opted for medical therapy. Thus, Adempas was recommended. In addition, due to age and body size, her apixaban dose was reduced to 2.5mg.     I followed up with Lucie about a month ago; at that time she had started the Adempas about 2 weeks prior, and was on 1mg dose. She was still getting winded easily and was feeling like she had little energy but unsure if  "this was worse. Weight was up 2 lbs but she denied feeling bloated or having more lower extremity edema. At that time, I recommended continuation of Adempas but with close monitoring of her volume status, and I asked her to return to clinic with some labs in a few weeks.    Today, I am seeing Lucie back in clinic. Overall, she is feeling about the same. She still has HERRERA and energy is \"not great.\" Again, she does think this has worsened any, but certainly not better either. She does not think she's having any lower extremity edema, though weight is up 2 more lbs at home (from 94 to 96# home scale). Her sats were 89% on arrival today on room air; she says that she has oxygen at home but has not been using routinely. BP here and at home have been running with systolics mostly in the 130s. She is currently up to 2mg TID dose of Adempas. She reports no significant dizziness or presyncope. She has not chest pain but upon questioning, does have some orthopnea and is now using 2 pillows at night. No PND.       Labs done today were reviewed: Na 147, K 3.7, BUN 17, SCR 0.59. NT-proBNP 710. Hemoglobin 8.0.       CURRENT PULMONARY HYPERTENSION REGIMEN:     PAH Rx: Adempas 2mg TID     Diuretics: hydrochlorothiazide 50mg daily      Oxygen: 2LNC PRN     Anticoagulation: Eliquis 2.5mg BID (age/weight)  Indication: PE/CTEPH        Assessment/Plan:     1. Chronic thromboembolic pulmonary hypertension.              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 RHC in Feb 2021 confirmed PH with a mean PA of 34 and angiogram revealed stenosis of A8 and A10. Dr. Florian discussed procedural intervention vs medication and she has chosen to pursue medical therapy.               --She is now up to 2mg TID dose of Adempas. Thus far, she has noted no significant changes in her breathing. She is still winded with activity. Will continue titration to goal 2.5mg TID.    --On exam she has some mild LE edema, and weight is up " 4-6 lbs since starting Adempas. NT-proBNP remains moderately elevated and she does have orthopnea with mild hypoemia today. Will add Lasix 20mg daily and recheck a BMP in 1 week. Will continue her hydrochlorothiazide as is for now.    --Will ask her to wear her oxygen at home again for now with exertion, given mild hypoxemia today.    --Her hemoglobin has dropped back down to 8 on labs today. She is not hypotensive or have significant dizziness. I will reach out to her PCP who she sees in a few weeks to make her aware. Currently, she remains on anticoagulation with apixaban, at 2.5mg dose given age/body size.      2.  PAD with atherosclerosis of the abdominal aorta.              --Continue monitoring, on statin and apixaban.        Follow up plan: Will reach out to PCP who she sees in ~2 weeks with above changes for ongoing monitoring. I will see her back in ~3-4 weeks as well for continued close follow up.       Orders this Visit:  No orders of the defined types were placed in this encounter.    No orders of the defined types were placed in this encounter.    There are no discontinued medications.        CURRENT MEDICATIONS:  Current Outpatient Medications   Medication Sig Dispense Refill     acetaminophen (TYLENOL) 325 MG tablet Take 2 tablets every 6 to 8 hours as needed for pain (Patient taking differently: Take 650 mg by mouth as needed Take 2 tablets every 6 to 8 hours as needed for pain) 100 tablet 1     apixaban ANTICOAGULANT (ELIQUIS) 2.5 MG tablet Take 1 tablet (2.5 mg) by mouth 2 times daily 180 tablet 3     atorvastatin (LIPITOR) 40 MG tablet Take 2 tablets (80 mg) by mouth daily 180 tablet 3     Calcium Carbonate-Vitamin D (CALCIUM 600 + D OR) Take 1 tablet by mouth every morning        cholecalciferol (VITAMIN D3) 25 mcg (1000 units) capsule Take 1 capsule by mouth daily       citalopram (CELEXA) 20 MG tablet Take 1 tablet (20 mg) by mouth daily 90 tablet 3     clopidogrel (PLAVIX) 75 MG tablet Take 1  "tablet (75 mg) by mouth daily 90 tablet 3     cyanocolbalamin (VITAMIN  B-12) 500 MCG tablet Take 500 mcg by mouth every morning  90 tablet 1     Ferrous Sulfate (IRON PO) Take by mouth daily       gabapentin (NEURONTIN) 100 MG capsule Take 1 capsule (100 mg) in the morning, 1 capsule (100 mg) mid-day, and 4 capsules (400 mg) at bed time 180 capsule 5     hydrochlorothiazide (HYDRODIURIL) 50 MG tablet Take 1 tablet (50 mg) by mouth daily Please keep appt 12/21/20 90 tablet 0     levothyroxine (SYNTHROID/LEVOTHROID) 75 MCG tablet Take 1 tablet (75 mcg) by mouth daily 90 tablet 3     multivitamin (OCUVITE) TABS tablet Take 1 tablet by mouth daily       pantoprazole (PROTONIX) 40 MG EC tablet Take 1 tablet (40 mg) by mouth daily Takes in the morning. 90 tablet 3     Potassium Bicarb-Citric Acid 20 MEQ TBEF Take 20 mg by mouth daily 90 tablet 3     riociguat (ADEMPAS) 0.5 MG tablet Take 3 tablets (1.5 mg) by mouth 3 times daily Goal of 2.5 mg three times a day, with dose increase of 0.5 mg three times a day every 2 weeks (SBP must be >95 mmHg and no signs or symptoms of hypotension to increase)       betamethasone dipropionate (DIPROSONE) 0.05 % external ointment Apply topically 2 times daily (Patient not taking: Reported on 7/22/2021) 15 g 0     latanoprost (XALATAN) 0.005 % ophthalmic solution Place 1 drop into both eyes At Bedtime  (Patient not taking: Reported on 7/22/2021)  1     loperamide (IMODIUM A-D) 2 MG tablet Take 0.5-1 tablets (1-2 mg) by mouth daily as needed for diarrhea (Patient not taking: Reported on 7/22/2021) 30 tablet 0     oxybutynin (DITROPAN) 5 MG tablet Take 1 tablet (5 mg) by mouth At Bedtime For frequent urination (Patient not taking: Reported on 7/22/2021) 90 tablet 0       ALLERGIES   No Known Allergies    PAST MEDICAL HISTORY:  Past Medical History:   Diagnosis Date     Anemia      Aortic stenosis     abdominal     Atherosclerosis of aorta (H)     \"extensive disease with near occlusion " "below level of renal arteries\" on CT     Atherosclerosis of arteries of extremities (H)      Back pain     severe multilevel DJD, moderate spinal canal stenosis L4-5, severe L3-4     Chronic pain     Back, hips, knees, legs     Degenerative joint disease      DVT of lower extremity, bilateral (H)     5/24/10 left distal femoral and great saphenous, 7/7/10 left:  extending to cfv, iliac , pop and post tibial, peronial, right:  distal fem and peroneal      Grave's disease      Hyperlipidaemia LDL goal < 70      Hypertension, essential      Hypothyroidism      Imbalance      Insomnia      Intermittent claudication (H)      Iron deficiency      Knee pain      Lumbar stenosis with neurogenic claudication      Osteoarthritis     ankle,foot, knee     Osteoporosis      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 Pos fatigue.  Resp: Negative for dyspnea at rest, pos dyspnea on exertion, neg cough, known chronic lung disease  HEENT: Negative for new visual changes, frequent headaches  Gastrointestinal: negative for abdominal pain, diarrhea, blood in stool, nausea, vomiting  Hematologic/lymphatic: pos for current systemic anticoagulation, hx of blood clots  Neurological: negative for focal weakness, LOC, seizures, syncope/presyncope       Physical Exam:  Vitals: /56 (BP Location: Right arm, Patient Position: Chair, Cuff Size: Adult Small)   Pulse 81   Wt 45.4 kg (100 lb)   LMP  (LMP Unknown)   SpO2 (!) 89%   BMI 20.20 kg/m     Wt Readings from Last 4 Encounters:   07/22/21 45.4 kg (100 lb)   03/05/21 45.4 kg (100 lb)   02/26/21 45.4 kg (100 " lb)   02/21/21 45.8 kg (100 lb 15.5 oz)       GEN:  In general, this is an elderly  female in no acute distress on room air.  Patient in a wheelchair, unaccompanied.   HEENT:  Pupils grossly equal, sclerae nonicteric.   NECK: Supple, trachea midline. No significant JVD while upright.   C/V:  Regular rate and rhythm, no murmur, rub or gallop. No S3 or RV heave.   RESP: Respirations are unlabored. No use of accessory muscles. Few crackles in the bases posteriorly. No wheezing or rhonchi.   GI: Abdomen soft, nontender, nondistended.   EXTREM: Trace bilateral ankle edema. No cyanosis or clubbing.  NEURO: Alert and oriented, cooperative. Gait not formally assessed. No obvious focal deficits.      Recent Lab Results:    CBC RESULTS:  Lab Results   Component Value Date    WBC 6.2 03/05/2021    RBC 3.62 (L) 03/05/2021    HGB 8.0 (L) 07/22/2021    HGB 9.3 (L) 03/05/2021    HCT 32.8 (L) 03/05/2021    MCV 91 03/05/2021    MCH 25.7 (L) 03/05/2021    MCHC 28.4 (L) 03/05/2021    RDW 25.3 (H) 03/05/2021     03/05/2021       BMP RESULTS:  Lab Results   Component Value Date     (H) 07/22/2021     (H) 03/05/2021    POTASSIUM 3.7 07/22/2021    POTASSIUM 4.4 03/05/2021    CHLORIDE 112 (H) 07/22/2021    CHLORIDE 108 03/05/2021    CO2 30 07/22/2021    CO2 36 (H) 03/05/2021    ANIONGAP 5 07/22/2021    ANIONGAP 2 (L) 03/05/2021     (H) 07/22/2021    GLC 77 03/05/2021    BUN 17 07/22/2021    BUN 17 03/05/2021    CR 0.59 07/22/2021    CR 0.52 03/05/2021    GFRESTIMATED 84 07/22/2021    GFRESTIMATED 87 03/05/2021    GFRESTBLACK >90 03/05/2021    CANDIDA 9.5 07/22/2021    CANDIDA 9.9 03/05/2021          Recent Labs   Lab Test 07/22/21  1420 03/05/21  1209 02/18/21  1715 11/30/20  1442   NTBNPI  --   --  4,173*  --    NTBNP 710* 1,078*  --  536*         Most recent testing:      Echocardiogram  2/19/2021     Interpretation Summary  Global and regional left ventricular function is normal with an EF of 60-65%.  Global  right ventricular function is normal.  IVC diameter and respiratory changes fall into an intermediate range  suggesting an RA pressure of 8 mmHg.  No significant valvular abnormalities were noted.  Trivial pericardial effusion is present.     This study was compared with the study from 1/23/2020 .There has been no  significant change.    [facility]  2/26/2021  Hemodynamics RA --/--/10  RV 65/10  PA 62/22/34  PCWP --/--/15  Víctor CI/CO 2.9/4.0  TD CI/CO 2.9/4.0  PVR 4.8    Pulmonary angiogram    Right Lung  Truncus anterior is patent along with A1-3.  A4 and A5 segments are patent.  Interlobar artery and basal trunk are patent.  A10 and A8 have significant proximal stenoses / defects. A9, A6, and A7 appear patent without defect.  There are associated lower lobe perfusion defects.    Left Lung  Truncus anterior, interlobar artery, and basal trunk along with their associated segments A1-10 are patent without defect. Right sided filling pressures are mildly elevated. Left sided filling pressures are mildly elevated. Moderately elevated pulmonary artery hypertension. Normal cardiac output level. Hemodynamic data has been modified in Epic per physician review.         NYHA Functional Class:  Functional class 3      A total of 35 minutes was spent today performing chart and history review, gathering HPI, physical exam, pre and post visit documentation, and care coordination.    Marcia Griggs PA-C  [facility]  Pager [phone]

## 2021-07-22 NOTE — PROGRESS NOTES
Date of Service: 07/22/2021      [facility]      Primary PH cardiologist: Dr. Florian      HPI:  Ms. Stockton is a very pleasant 85 year old female with a PMhx including bilateral pulmonary embolism on anticoagulation, peripheral vascular disease, Grave's disease, DVT, and hypertension. She has multiple pulmonary nodules with a suspected metastatic disease with no tissue diagnosis at this time. 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      She was hospitalized in February of this year with lightheadedness/orthostasis in setting of a hemoglobin of 5.6 (from 10.6 in 11/2020) with initial c/f GIB. Does have a known history of GI AVMs but EGD/cscope did not reveal culprit lesions. Her resuscitation was complicated by TACO requiring diuresis, and she was discharged on 2LNC. She then underwent RHC/pulmonary angiogram 2/26/21 which revealed mPA 34, PCW 15, CO 4, PVR 4.8 with significant proximal stenosis in A8 and A10. When she followed up with Dr. Florian last in March, she was back to her baseline and was able to remain on apixaban. At that visit, he discussed the options of medication versus procedural intervention for her CTEPH, and she opted for medical therapy. Thus, Adempas was recommended. In addition, due to age and body size, her apixaban dose was reduced to 2.5mg.     I followed up with Lucie about a month ago; at that time she had started the Adempas about 2 weeks prior, and was on 1mg dose. She was still getting winded easily and was feeling like she had little energy but unsure if this was worse. Weight was up 2 lbs but she denied feeling bloated or having more lower extremity edema. At that time, I recommended continuation of Adempas but with close monitoring of her volume status, and I asked her to return to clinic with some labs in a few weeks.    Today, I am seeing Lucie back in clinic. Overall, she is feeling about  "the same. She still has HERRERA and energy is \"not great.\" Again, she does think this has worsened any, but certainly not better either. She does not think she's having any lower extremity edema, though weight is up 2 more lbs at home (from 94 to 96# home scale). Her sats were 89% on arrival today on room air; she says that she has oxygen at home but has not been using routinely. BP here and at home have been running with systolics mostly in the 130s. She is currently up to 2mg TID dose of Adempas. She reports no significant dizziness or presyncope. She has not chest pain but upon questioning, does have some orthopnea and is now using 2 pillows at night. No PND.       Labs done today were reviewed: Na 147, K 3.7, BUN 17, SCR 0.59. NT-proBNP 710. Hemoglobin 8.0.       CURRENT PULMONARY HYPERTENSION REGIMEN:     PAH Rx: Adempas 2mg TID     Diuretics: hydrochlorothiazide 50mg daily      Oxygen: 2LNC PRN     Anticoagulation: Eliquis 2.5mg BID (age/weight)  Indication: PE/CTEPH        Assessment/Plan:     1. Chronic thromboembolic pulmonary hypertension.              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 RHC in Feb 2021 confirmed PH with a mean PA of 34 and angiogram revealed stenosis of A8 and A10. Dr. Florian discussed procedural intervention vs medication and she has chosen to pursue medical therapy.               --She is now up to 2mg TID dose of Adempas. Thus far, she has noted no significant changes in her breathing. She is still winded with activity. Will continue titration to goal 2.5mg TID.    --On exam she has some mild LE edema, and weight is up 4-6 lbs since starting Adempas. NT-proBNP remains moderately elevated and she does have orthopnea with mild hypoemia today. Will add Lasix 20mg daily and recheck a BMP in 1 week. Will continue her hydrochlorothiazide as is for now.    --Will ask her to wear her oxygen at home again for now with exertion, given mild hypoxemia today.    --Her " hemoglobin has dropped back down to 8 on labs today. She is not hypotensive or have significant dizziness. I will reach out to her PCP who she sees in a few weeks to make her aware. Currently, she remains on anticoagulation with apixaban, at 2.5mg dose given age/body size.      2.  PAD with atherosclerosis of the abdominal aorta.              --Continue monitoring, on statin and apixaban.        Follow up plan: Will reach out to PCP who she sees in ~2 weeks with above changes for ongoing monitoring. I will see her back in ~3-4 weeks as well for continued close follow up.       Orders this Visit:  No orders of the defined types were placed in this encounter.    No orders of the defined types were placed in this encounter.    There are no discontinued medications.        CURRENT MEDICATIONS:  Current Outpatient Medications   Medication Sig Dispense Refill     acetaminophen (TYLENOL) 325 MG tablet Take 2 tablets every 6 to 8 hours as needed for pain (Patient taking differently: Take 650 mg by mouth as needed Take 2 tablets every 6 to 8 hours as needed for pain) 100 tablet 1     apixaban ANTICOAGULANT (ELIQUIS) 2.5 MG tablet Take 1 tablet (2.5 mg) by mouth 2 times daily 180 tablet 3     atorvastatin (LIPITOR) 40 MG tablet Take 2 tablets (80 mg) by mouth daily 180 tablet 3     Calcium Carbonate-Vitamin D (CALCIUM 600 + D OR) Take 1 tablet by mouth every morning        cholecalciferol (VITAMIN D3) 25 mcg (1000 units) capsule Take 1 capsule by mouth daily       citalopram (CELEXA) 20 MG tablet Take 1 tablet (20 mg) by mouth daily 90 tablet 3     clopidogrel (PLAVIX) 75 MG tablet Take 1 tablet (75 mg) by mouth daily 90 tablet 3     cyanocolbalamin (VITAMIN  B-12) 500 MCG tablet Take 500 mcg by mouth every morning  90 tablet 1     Ferrous Sulfate (IRON PO) Take by mouth daily       gabapentin (NEURONTIN) 100 MG capsule Take 1 capsule (100 mg) in the morning, 1 capsule (100 mg) mid-day, and 4 capsules (400 mg) at bed time 180  "capsule 5     hydrochlorothiazide (HYDRODIURIL) 50 MG tablet Take 1 tablet (50 mg) by mouth daily Please keep appt 12/21/20 90 tablet 0     levothyroxine (SYNTHROID/LEVOTHROID) 75 MCG tablet Take 1 tablet (75 mcg) by mouth daily 90 tablet 3     multivitamin (OCUVITE) TABS tablet Take 1 tablet by mouth daily       pantoprazole (PROTONIX) 40 MG EC tablet Take 1 tablet (40 mg) by mouth daily Takes in the morning. 90 tablet 3     Potassium Bicarb-Citric Acid 20 MEQ TBEF Take 20 mg by mouth daily 90 tablet 3     riociguat (ADEMPAS) 0.5 MG tablet Take 3 tablets (1.5 mg) by mouth 3 times daily Goal of 2.5 mg three times a day, with dose increase of 0.5 mg three times a day every 2 weeks (SBP must be >95 mmHg and no signs or symptoms of hypotension to increase)       betamethasone dipropionate (DIPROSONE) 0.05 % external ointment Apply topically 2 times daily (Patient not taking: Reported on 7/22/2021) 15 g 0     latanoprost (XALATAN) 0.005 % ophthalmic solution Place 1 drop into both eyes At Bedtime  (Patient not taking: Reported on 7/22/2021)  1     loperamide (IMODIUM A-D) 2 MG tablet Take 0.5-1 tablets (1-2 mg) by mouth daily as needed for diarrhea (Patient not taking: Reported on 7/22/2021) 30 tablet 0     oxybutynin (DITROPAN) 5 MG tablet Take 1 tablet (5 mg) by mouth At Bedtime For frequent urination (Patient not taking: Reported on 7/22/2021) 90 tablet 0       ALLERGIES   No Known Allergies    PAST MEDICAL HISTORY:  Past Medical History:   Diagnosis Date     Anemia      Aortic stenosis     abdominal     Atherosclerosis of aorta (H)     \"extensive disease with near occlusion below level of renal arteries\" on CT     Atherosclerosis of arteries of extremities (H)      Back pain     severe multilevel DJD, moderate spinal canal stenosis L4-5, severe L3-4     Chronic pain     Back, hips, knees, legs     Degenerative joint disease      DVT of lower extremity, bilateral (H)     5/24/10 left distal femoral and great " saphenous, 7/7/10 left:  extending to cfv, iliac , pop and post tibial, peronial, right:  distal fem and peroneal      Grave's disease      Hyperlipidaemia LDL goal < 70      Hypertension, essential      Hypothyroidism      Imbalance      Insomnia      Intermittent claudication (H)      Iron deficiency      Knee pain      Lumbar stenosis with neurogenic claudication      Osteoarthritis     ankle,foot, knee     Osteoporosis      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 Pos fatigue.  Resp: Negative for dyspnea at rest, pos dyspnea on exertion, neg cough, known chronic lung disease  HEENT: Negative for new visual changes, frequent headaches  Gastrointestinal: negative for abdominal pain, diarrhea, blood in stool, nausea, vomiting  Hematologic/lymphatic: pos for current systemic anticoagulation, hx of blood clots  Neurological: negative for focal weakness, LOC, seizures, syncope/presyncope       Physical Exam:  Vitals: /56 (BP Location: Right arm, Patient Position: Chair, Cuff Size: Adult Small)   Pulse 81   Wt 45.4 kg (100 lb)   LMP  (LMP Unknown)   SpO2 (!) 89%   BMI 20.20 kg/m     Wt Readings from Last 4 Encounters:   07/22/21 45.4 kg (100 lb)   03/05/21 45.4 kg (100 lb)   02/26/21 45.4 kg (100 lb)   02/21/21 45.8 kg (100 lb 15.5 oz)       GEN:  In general, this is an elderly  female in no acute distress on room air.  Patient in a wheelchair, unaccompanied.   HEENT:  Pupils grossly equal, sclerae nonicteric.   NECK: Supple, trachea midline. No significant JVD while upright.   C/V:  Regular rate and rhythm, no murmur, rub  or gallop. No S3 or RV heave.   RESP: Respirations are unlabored. No use of accessory muscles. Few crackles in the bases posteriorly. No wheezing or rhonchi.   GI: Abdomen soft, nontender, nondistended.   EXTREM: Trace bilateral ankle edema. No cyanosis or clubbing.  NEURO: Alert and oriented, cooperative. Gait not formally assessed. No obvious focal deficits.      Recent Lab Results:    CBC RESULTS:  Lab Results   Component Value Date    WBC 6.2 03/05/2021    RBC 3.62 (L) 03/05/2021    HGB 8.0 (L) 07/22/2021    HGB 9.3 (L) 03/05/2021    HCT 32.8 (L) 03/05/2021    MCV 91 03/05/2021    MCH 25.7 (L) 03/05/2021    MCHC 28.4 (L) 03/05/2021    RDW 25.3 (H) 03/05/2021     03/05/2021       BMP RESULTS:  Lab Results   Component Value Date     (H) 07/22/2021     (H) 03/05/2021    POTASSIUM 3.7 07/22/2021    POTASSIUM 4.4 03/05/2021    CHLORIDE 112 (H) 07/22/2021    CHLORIDE 108 03/05/2021    CO2 30 07/22/2021    CO2 36 (H) 03/05/2021    ANIONGAP 5 07/22/2021    ANIONGAP 2 (L) 03/05/2021     (H) 07/22/2021    GLC 77 03/05/2021    BUN 17 07/22/2021    BUN 17 03/05/2021    CR 0.59 07/22/2021    CR 0.52 03/05/2021    GFRESTIMATED 84 07/22/2021    GFRESTIMATED 87 03/05/2021    GFRESTBLACK >90 03/05/2021    CANDIDA 9.5 07/22/2021    CANDIDA 9.9 03/05/2021          Recent Labs   Lab Test 07/22/21  1420 03/05/21  1209 02/18/21  1715 11/30/20  1442   NTBNPI  --   --  4,173*  --    NTBNP 710* 1,078*  --  536*         Most recent testing:      Echocardiogram  2/19/2021     Interpretation Summary  Global and regional left ventricular function is normal with an EF of 60-65%.  Global right ventricular function is normal.  IVC diameter and respiratory changes fall into an intermediate range  suggesting an RA pressure of 8 mmHg.  No significant valvular abnormalities were noted.  Trivial pericardial effusion is present.     This study was compared with the study from 1/23/2020 .There has been no  significant  change.    [facility]  2/26/2021  Hemodynamics RA --/--/10  RV 65/10  PA 62/22/34  PCWP --/--/15  Víctor CI/CO 2.9/4.0  TD CI/CO 2.9/4.0  PVR 4.8    Pulmonary angiogram    Right Lung  Truncus anterior is patent along with A1-3.  A4 and A5 segments are patent.  Interlobar artery and basal trunk are patent.  A10 and A8 have significant proximal stenoses / defects. A9, A6, and A7 appear patent without defect.  There are associated lower lobe perfusion defects.    Left Lung  Truncus anterior, interlobar artery, and basal trunk along with their associated segments A1-10 are patent without defect. Right sided filling pressures are mildly elevated. Left sided filling pressures are mildly elevated. Moderately elevated pulmonary artery hypertension. Normal cardiac output level. Hemodynamic data has been modified in Epic per physician review.         NYHA Functional Class:  Functional class 3      A total of 35 minutes was spent today performing chart and history review, gathering HPI, physical exam, pre and post visit documentation, and care coordination.    Marcia Griggs PA-C  [facility]  Pager [phone]

## 2021-07-22 NOTE — PATIENT INSTRUCTIONS
Thank you for visiting the Pulmonary Hypertension Clinic today.      Today we discussed:   We will call you with results of your labs and a plan on any medication changes.       Follow up Appointment Information:  We will arrange for follow up after our plan is in place.         Additional Instructions:    1. Continue staying active and eat a heart healthy, low sodium diet.     2. Please keep current list of medications with you at all times.     3. Remember to weigh yourself daily after voiding and write it down on a log. If you have gained/lost 2 pounds overnight or 5 pounds in a week contact us for medication adjustments or further instructions.    4. Please call us immediately if you have syncope (fainting or passing out), chest pain, worsening edema (swelling or weight gain), or general worsening in how you are feeling.     --------------------------------------------------------------------------------------------------------------    If you have questions or concerns please contact us at:    Allen Treviño RN, BSN   Ariadna Mckeon (Schedule,Prior Auth)  Nurse Coordinator     Clinic   Pulmonary Hypertension   Pulmonary Hypertension  [facility]  [facility]  (Phone)[phone]    (Phone) [phone]        (Fax) [phone]      ** Please note that you will NOT receive a reminder call regarding your scheduled testing, reminder calls are for provider appointments only.  If you are scheduled for testing within the [employer] system you may receive a call regarding pre-registration for billing purposes only.**     --------------------------------------------------------------------------------------------------------------    Interested in joining a support group?    Pulmonary Hypertension Association  [email or web address].phassociation.org/  **Look at the Events Tab** They even have Support Groups that you can call into    [facility] Support  Group  Second Saturday of the Month from 1-3 PM   Location: [address] [place] [id number]  Leader: Taryn Munoz and Rama Sepulveda  Phone: [phone] or [phone]  Email: [email or web address]

## 2021-07-23 NOTE — TELEPHONE ENCOUNTER
Spoke with patient regarding medication changes. Patient advised pharmacy has already reached out and Rx is available; will  later today. She confirmed she has an appt with Dr. Montgomery on August 9; will follow up on low Hgb. Does not notice any blood in stool, unusual bruising, or bleeding at this time. Will attempt to coordinate lab draw at this appt as well.     Patient scheduled for F/U on 08/26. Ceci Treviño RN on 7/23/2021 at 10:08 AM    Patient confirmed ankles are less swollen after starting Lasix. Her breathing is somewhat better as well. Asked patient to make an appt with lab this week; cancelled earlier this week d/t diarrhea. Patient verbalized understanding and did not have any further questions at this time. Ceci Treviño RN on 8/11/2021 at 11:08 AM    ----- Message from Anna Cedeno RN sent at 7/22/2021  4:40 PM CDT -----  Regarding: FW: LABS, CALL PT WITH PLAN    ----- Message -----  From: Marcia Griggs PA  Sent: 7/22/2021   4:07 PM CDT  To: Anna Cedeno RN  Subject: LABS, CALL PT WITH PLAN                          She couldn't stick around for lab results so left without a plan.    Please call her with the following info:  1. I suspect she is holding onto some fluid. I'd like to add Lasix 20mg daily. Please call into her local pharmacy for her. Also, check a BMP in 1 week.     2. Her hemoglobin has dropped back down to 8. Please confirm she is not having any blood in her stool.I already sent a msg to her PCP who has an appt with her in a few weeks to see if she wants to guiac and/or IV iron, though I did not do any iron studies today.     3. Ask her to start wearing her oxygen at 2L with exertion for now, as sats were low in her visit today.     4. We will keep titrating adempas, to goal 2.5mg TID. She said today she's currently on 2mg TID dose.     5. Please arrange for her to see me back in 3-4 weeks in clinic. Since she sees her PCP in between hopefully that will be  ok.    Mikki Kennedy

## 2021-07-23 NOTE — NURSING NOTE
"Orders placed for F/U and patient marked \"ready for checkout.\" Ceci Treviño RN on 7/23/2021 at 7:47 AM    "

## 2021-07-29 NOTE — TELEPHONE ENCOUNTER
Date and Location of Visit: 7/28/21 Patients Home IN PERSON     Patient Name: Lucie Stockton     Date of Birth:[birth year]     Medication/Treatment Ordered:Adempas    Current Dose: 2.0 PO TID (started dose on 7/4/2021) and will continue with due to patient travel planned      Titration Orders:  Titration will be 0.5MG PO TID every two weeks to a goal dose of 2.5MG PO TID or until tolerated. Hold titration if the Patient has any negative/intolerable side effects, hold titration if the Patient has signs/symptoms of hypotension, hold titration if the Patient systolic is <95mmhg.     Side Effects/Complaints: Pt denies any side effects other than a mild headache over left eye . Feeling less fatigued this visit.    Physical Assessment: Alert & oriented x4 but noted forgetfulness  . LUNGS: Clear lung sound, bases are diminished, R>L. to . No edema noted at this time. Denies fever, chills, and problems urinating. Patient reports having a poor to fair appetite, is drinking ensure plus and  Darren encourages intake. Patient currently not on oxygen with noted resting SPO2 88%% . RN encouraged use, especially during active times and at night when her breathing is likely more shallow. Pt reports no longer wearing oxygen regularly at night or during the day because she doesn t feel it necessarily helps . Patient states they currently experiences dyspnea  after walking > 100 feet but is noticing she is starting to walk a little further in her activity before getting as fatigued .  They have  no complaints of pain other than her intermittent back pain she has had for some time. They are currently independent with all activities of daily living with some house cleaning assistance, but can only tolerate them in small increments.     Vitals: BP  110/60. HR 72 RR 18 SPO2  88% RA Temp: 98.8    Encouraged and recommended that patient wears oxygen at 2L , especially while active during the day and at night which in turn may make her  better. She will give it some thought and likely try again she says.       Weight:Lbs 93 lbs     Psychosocial Concerns/Changes: None.  They are currently independent with all health regiments and state they are compliant with all medications prescribed to them and knows what each medication is prescribed for. Reinforcement teaching on PAH physiology, Adempas purpose given. They were prepared for CNSS visit. Noted some forgetfulness . Is planning a trip to see her youngest daughter.     Significant Others/Caregivers Present: , Darren not present     Home Environment Update: Patient currently resides In  a two story home with their spouse . It has  stairs to the second level where the patient s bedroom is locate. Home has electricity, heat, and running water. Their home is clean and  kept.     Plans for d/c from nursing and/or next RN visit and by whom: Follow up appointment by CNSS August 11, 2021.

## 2021-08-11 NOTE — TELEPHONE ENCOUNTER
Hello-    JUANCARLOS had an Adempas follow up visit with Lucie Stockton . She is doing well with minimal side effects. Will increase to 2.5mg PO TID starting on 8/14/2021        Date and Location of Visit: 8/11/21 Patients Home IN PERSON     Patient Name: Lucie Stockton     Date of Birth:[birth year]     Medication/Treatment Ordered:Adempas    Current Dose: 2.0 PO TID (started dose on 7/4/2021) , will increase to 2.5mg PO TID on 8/14/2021     Titration Orders:  Titration will be 0.5MG PO TID every two weeks to a goal dose of 2.5MG PO TID or until tolerated. Hold titration if the Patient has any negative/intolerable side effects, hold titration if the Patient has signs/symptoms of hypotension, hold titration if the Patient systolic is <95mmhg.     Side Effects/Complaints: Pt denies any side effects other than a mild headache over left eye and one episode of diarrhea. Feeling less fatigued this visit.    Physical Assessment: Alert & oriented x4 but noted forgetfulness  . LUNGS: Clear lung sound, diminished . No edema noted at this time. Denies fever, chills, and problems urinating. Patient reports having a poor to fair appetite, is drinking ensure plus and  Darren encourages intake. She reports one episode of diarrhea this week . She has reported no prior episodes . Patient reports that she has been wearing her oxygen at 2L/NC most of the day and at night since my last visit. RN encouraged use, especially during active times and at night when her breathing is likely more shallow. Pt reports she may feel   a bit better . Patient states they currently experiences dyspnea  after walking > 100 feet but is noticing she is starting to walk a little further in her activity before getting as fatigued .  They have  no complaints of pain other than her intermittent back pain she has had for some time. They are currently independent with all activities of daily living with some house cleaning assistance, but can only tolerate them in small  increments.     Vitals: BP  120/56. HR 77 RR 18 SPO2  93% RA Temp: 99.4     Weight:Lbs 93 lbs     Psychosocial Concerns/Changes: None.  They are currently independent with all health regiments and state they are compliant with all medications prescribed to them and knows what each medication is prescribed for. Reinforcement teaching on PAH physiology,  Adempas purpose given. They were prepared for CNSS visit. Noted some forgetfulness . She and her  decided to cancel their trip to [place] to see her daughter and hope to go at a later date.     Significant Others/Caregivers Present: , Darren not present     Home Environment Update: Patient currently resides In  a two story home with their spouse . It has  stairs to the second level where the patient s bedroom is locate. Home has electricity, heat, and running water. Their home is clean and  kept.     Plans for d/c from nursing and/or next RN visit and by whom: Follow up appointment by CNSS August 25, 2021.       Sandra Rosa RN?Clinical Nursing Services Specialist, PAH, [facility]

## 2021-08-13 NOTE — TELEPHONE ENCOUNTER
I called the pt and she stated that she is going to get them done at the [facility].  Today or Monday.  I provided her with the phone number to call and schedule.  Marla Raphael RN on 8/13/2021 at 11:53 AM    ----- Message from Ceci Treviño RN sent at 8/11/2021 12:26 PM CDT -----  Regarding: Lab F/U  Good morning,    Patient was suppose to have labs scheduled this Friday/next Monday to follow up after starting Lasix. Can you please make sure she's scheduled and/or call her again to reinforce she needs to make the appt locally.    Thank you!Allen       Negative testicular ultrasound results given to patient.   He will try anti-inflammatories at home and call Dr. Reyna Rebollar should his pain not dissipate after 48 hours

## 2021-08-25 NOTE — TELEPHONE ENCOUNTER
I have been following Adempas titration for  Lucie Stockton 1935 . She has done well with therapy without much issue and was due to increase to 2.5mg PO on 8/13.     Due to challenging communication from our pharmacy and its difficulty for the patient to fully understand , Lucie has gone more than 3 days without her medication  and will have to restart titration .     Also of note, he  Darren called me today and said she was hospitalized at [facility] for a fall she had on 8/20 but should be discharged soon her reported. I was scheduled to visit today.    I will keep you updated on her Adempas titration once we get going again.    Sandra Rosa,JANNET?Clinical Nursing Services Specialist, PAH, [facility]

## 2021-08-31 NOTE — TELEPHONE ENCOUNTER
Reason for Call:  Other appointment    Detailed comments: Pt would like to be seen this week at [facility] to get 4 stitches behind head removed, it was placed by [facility] last week.     Phone Number Patient can be reached at: Home number on file [phone] (home)    Best Time: anytime    Can we leave a detailed message on this number? YES    Call taken on 8/31/2021 at 1:17 PM by Yoana Yoo

## 2021-08-31 NOTE — TELEPHONE ENCOUNTER
Attempted to call patient- line was busy.  Patient needs an appointment with her Primary Care Clinic or  the Trauma and Acute Care Clinic or schedule an appointment with a doctor here (although not preferable as she has never been seen here)      Your scalp laceration was repaired with staples on 8/20/21. These should be removed in 7-10 days. You may follow up with your primary care provider or the Trauma and Acute Care Surgery Clinic for removal. Please call [phone] if you need to schedule an appointment.         Patient is also on anticoagulants and has a significant history of bleeding and falls.   The RN would not be able to remove her staples, this would require a visit from a Provider.      Verenice Newell RN

## 2021-09-01 NOTE — TELEPHONE ENCOUNTER
Per RN note below, patient will need to be scheduled with a provider to have staples removed.     TC/MA - please assist patient with scheduling visit with a provider. Is not appropriate for RN visit.     Brenda Erwin RN, BSN, PHN  [facility]: [place]

## 2021-09-01 NOTE — TELEPHONE ENCOUNTER
Are any of you willing to work this patient in today.  She got staples put in 8/20/21 and they needed to be removed 7-10 days.  [place] hence why she was referred to us for the removal.  [facility] seems to be the place that put them in, she fell and hit her head.      Herminia Colmenares/  [place]

## 2021-09-01 NOTE — PROGRESS NOTES
"    Assessment & Plan     Encounter for staple removal                   Return in about 8 days (around 9/9/2021) for hospital f/u with PCP.    Jesica Cortes NP  [facility]    Gera Faulkner is a 86 year old who presents for the following health issues     HPI     Patient was added onto provider schedule today for:  Staple removal, 4 placed on 8/20/2021.  She had fallen at home and had a laceration on her scalp.    Review of Systems   Constitutional, HEENT, cardiovascular, pulmonary, gi and skin systems are negative, except as otherwise noted.      Objective    BP (!) 142/86 (BP Location: Right arm)   Pulse 96   Resp 22   Ht 1.499 m (4' 11\")   Wt 42.5 kg (93 lb 12.8 oz)   LMP  (LMP Unknown)   SpO2 93%   BMI 18.95 kg/m    Body mass index is 18.95 kg/m .  Physical Exam   GENERAL: healthy, alert and no distress  SKIN: 4 staples posterior scalp are clean/dry/intact  Some resistance with removal of first staple, the next 3 staples removed without difficulty            "

## 2021-09-07 NOTE — PROGRESS NOTES
CC:  F/u hospitalization for fall, anemia    Other team members:  Cardiology Dr. Florian  Thoracic surgery Dr. Quiñonez  Vascular NP JONO Bueno  Pulmonary Dr. Randall  Pulmonary NP ALEX Enciso  Neurology MONICO Beauchamp  Dermatology LUCY Lu  Dermatology   Pain Management CNP VU Brown  Psychology Anna Clay  Sports Medicine:  Dr. Huynh, Dr. Denney  Ortho:  Dr. Zarco     HPI:  Lucie is a 85-year-old female with a history of   Abdominal aortic stenosis,   Peripheral arterial disease  On anticoagulation  Bilateral DVT and PE in 2010,   Chronic thromboembolic pulmonary hypertension,   COPD,    Hypertension,   Hyperlipidemia,   Hypothyroidism,   Gastric and colonic angioectasia,   Chronic anemia.     Patient is a previous smoker, she quit 1993.  She has approximate 40-pack-year history of cigarette smoking,   Pulmonary nodules  Chronic low back pain, MRI 12/1/16: multilevel degenerative changes, severe spinal canal stenosis at L4-5 at least since 2013, right neural foraminal stenosis at L2-3, additional multilevel moderate neural foraminal stenosis  Chronic hip pain, OA  Left ankle pain  Chronic insomnia, anxiety    Lucie was admitted to [facility] for lightheadedness, dizziness,  Fall, confusion,  anemia, alcohol intoxication from 8/20-8/25/21.  She sustained forehead abrasion and back of head laceratin requiring suturing, removed recently. CT head negative for bleed. Received on unit PRBC's for anemia (baseline 8, 6.2 on admit).  Sodium was 150, K 2.6 on 8/20/21 (resolved). EGD negative for bleeding source. Speech path and video swallow study for dysphagia revealed moderate oropharyngeal dysmotility.  Diet advised to include soft foods and thick liquids.  Incidental mass noted on chest CT.  3 month follow up CT was advised.  SLUMs score was 17/30. Home care orders placed for PT, OT, speech therapy and skilled nursing. Med changes included increase in gabapentin to 400 mg nightly and initiated pantoprazole.    Discharge  recommendations:  Repeat chest CT in 3 months  Check hgb  Monitor swallow function  Review EGD biopsy report  Suture removal  Neurocog testing    Today:  Reviewed all of the above information, including her hospital discharge summary and need for follow up tests/referrals.    Lucie reports that she is doing her PT as instructed.  Reports a new left breast lump  Decreased appetite, drinking Ensure at least 2 times a day, eating out frequently, has lost weight  Gets up frequently to urinate at night  Using [place] oxygen at night x several months, over due for seeing pulmonologist  Has upcoming appt in cardiology  Right LB/hip hurts with prolonged sitting  Rx reconciliation completed   wondering about medical cannabis for her, she is not interested and I let her know that I do not recommend it in her case  Otherwise no acute concerns today.    Current Outpatient Medications   Medication Sig Dispense Refill     gabapentin (NEURONTIN) 400 MG capsule Take 1 capsule (400 mg) by mouth At Bedtime 90 capsule 1     acetaminophen (TYLENOL) 325 MG tablet Take 2 tablets every 6 to 8 hours as needed for pain (Patient taking differently: Take 650 mg by mouth as needed Take 2 tablets every 6 to 8 hours as needed for pain) 100 tablet 1     apixaban ANTICOAGULANT (ELIQUIS) 2.5 MG tablet Take 1 tablet (2.5 mg) by mouth 2 times daily 180 tablet 3     atorvastatin (LIPITOR) 40 MG tablet Take 2 tablets (80 mg) by mouth daily 180 tablet 3     betamethasone dipropionate (DIPROSONE) 0.05 % external ointment Apply topically 2 times daily 15 g 0     Calcium Carbonate-Vitamin D (CALCIUM 600 + D OR) Take 1 tablet by mouth every morning        cholecalciferol (VITAMIN D3) 25 mcg (1000 units) capsule Take 1 capsule by mouth daily       citalopram (CELEXA) 20 MG tablet Take 1 tablet (20 mg) by mouth daily 90 tablet 3     clopidogrel (PLAVIX) 75 MG tablet Take 1 tablet (75 mg) by mouth daily 90 tablet 3     cyanocolbalamin (VITAMIN  B-12) 500 MCG  "tablet Take 500 mcg by mouth every morning  90 tablet 1     Ferrous Sulfate (IRON PO) Take by mouth daily       furosemide (LASIX) 20 MG tablet Take 1 tablet (20 mg) by mouth daily 90 tablet 3     hydrochlorothiazide (HYDRODIURIL) 50 MG tablet Take 1 tablet (50 mg) by mouth daily Please keep appt 12/21/20 90 tablet 0     latanoprost (XALATAN) 0.005 % ophthalmic solution Place 1 drop into both eyes At Bedtime   1     levothyroxine (SYNTHROID/LEVOTHROID) 75 MCG tablet Take 1 tablet (75 mcg) by mouth daily 90 tablet 3     loperamide (IMODIUM A-D) 2 MG tablet Take 1-2 mg by mouth daily as needed for diarrhea  30 tablet 0     multivitamin (OCUVITE) TABS tablet Take 1 tablet by mouth daily       oxybutynin (DITROPAN) 5 MG tablet Take 1 tablet (5 mg) by mouth At Bedtime For frequent urination 90 tablet 0     pantoprazole (PROTONIX) 40 MG EC tablet Take 1 tablet (40 mg) by mouth daily Takes in the morning. 90 tablet 3     Potassium Bicarb-Citric Acid 20 MEQ TBEF Take 20 mg by mouth daily 90 tablet 3     riociguat (ADEMPAS) 2.5 MG tablet Take 1 tablet (2.5 mg) by mouth 3 times daily       No Known Allergies  BP Readings from Last 6 Encounters:   09/01/21 (!) 142/86   07/22/21 137/56   03/05/21 (!) 160/61   02/26/21 139/45   02/22/21 132/55   02/18/21 127/66     Wt Readings from Last 5 Encounters:   09/01/21 42.5 kg (93 lb 12.8 oz)   07/22/21 45.4 kg (100 lb)   03/05/21 45.4 kg (100 lb)   02/26/21 45.4 kg (100 lb)   02/21/21 45.8 kg (100 lb 15.5 oz)     Estimated body mass index is 18.95 kg/m  as calculated from the following:    Height as of 9/1/21: 1.499 m (4' 11\").    Weight as of 9/1/21: 42.5 kg (93 lb 12.8 oz).  /66 (BP Location: Right arm, Patient Position: Sitting, Cuff Size: Adult Regular)   Pulse 73   Resp 18   Ht 1.499 m (4' 11\")   Wt 40.5 kg (89 lb 3.2 oz)   LMP  (LMP Unknown)   SpO2 (!) 89%   Breastfeeding No   BMI 18.02 kg/m      Physical Examination:    General:  Conversant, generally frail " appearing, no acute distress  In wheelchair today, NC oxygen in place.  Head: wounds healing, no signs of cellulitis  Lungs:  Clear to auscultation throughout, no wheezes, rhonchi or rales. Normal respiratory effort and no intercostal retractions.  C/V:  Regular rate and rhythm, no murmurs, rubs or gallops.  No JVD, no carotid bruits. Radial and DP pulses 2+, equal and regular.  Abdomen:  Soft, Not distended.  Bowel sounds active.    Neuro: Alert and oriented, face symmetric. No tremor.   Skin:   Normal temperature., turgor and texture. No rashes, suspicious lesions, or jaundice on exposed skin surfaces.   Extremities:  No peripheral edema, no digital cyanosis  Psych:  Alert and oriented. Appropriate affect.  Not psychomotor slowed.  No signs of anxiety or agitation.  Breast exam:  Left breast, inner lower quadrant, approximately 1.5 cm round, smooth, movable lump is palpable.  Non tender.    The following, pertinent, numerous outside facility and within our health system test results were reviewed in detail today.   8/15/21:  SODIUM 136 - 145 mmol/L 137        POTASSIUM 3.5 - 5.1 mmol/L 3.6        CHLORIDE 98 - 112 mmol/L 101        CARBON DIOXIDE 21 - 32 mmol/L 30        BUN (UREA NITRO) 7 - 24 mg/dL 26High         CREATININE 0.55 - 1.02 mg/dL 0.66        EST GFR (CKD-EPI) >60 mL/min >60        EST GFR IF AFRICAN AM >60 mL/min >60        GLUCOSE 74 - 106 mg/dL 134High         CALCIUM, SERUM 8.5 - 10.1 mg/dL 9.4      WBC 4.3 - 10.8 K/uL 7.1        RBC 4.20 - 5.40 M/uL 4.10Low         HEMOGLOBIN 12.0 - 16.0 gm/dL 8.8Low         HEMATOCRIT 36.0 - 48.0 % 29.8Low         MCV 80 - 100 fl 73Low         MCH 27 - 33 pg 22Low         MCHC 33 - 36 gm/dL 30Low         RDW 11.5 - 14.5 % 22.4High         PLATELET COUNT 150 - 400 K/      TIBC 250 - 450 ug/dL 399        IRON SATURATION 15 - 50 % 12Low         FERRITIN, SERUM 8 - 388 ng/mL 6Low         IRON 50 - 170 ug/dL 48Low         TRANSFERRIN, SERUM 200 - 360 mg/dL 292       8/20/21 CBC:  WBC 4.3 - 10.8 K/uL 5.1        RBC 4.20 - 5.40 M/uL 3.14Low         HEMOGLOBIN 12.0 - 16.0 gm/dL 6.2Low Panic         HEMATOCRIT 36.0 - 48.0 % 22.0Low         MCV 80 - 100 fl 70Low         MCH 27 - 33 pg 20Low         MCHC 33 - 36 gm/dL 28Low         RDW 11.5 - 14.5 % 18.3High         PLATELET COUNT 150 - 400 K/          EGD biopsy results from 8/24/21:  Final Diagnosis     A. Distal esophagus, biopsy - Mildly inflamed reactive squamous mucosa with no significantly increased intraepithelial eosinophils or intestinal metaplasia.   B. Mid esophagus, biopsy - Mildly inflamed reactive squamous mucosa with no significantly increased intraepithelial eosinophils or intestinal metaplasia.          Video Swallow study on 8/25/21:  IMPRESSION:   1.  Laryngeal penetration with thin and mildly thick liquid. No aspiration.   2.  Functional esophageal motility. No significant mucosal finding. There is a small hiatal hernia.     EGD 8/25/21:  Impression:        - Normal esophagus.        - Z-line regular, 36 cm from the incisors.        - Normal esophagus.        - Normal stomach.        - Normal examined duodenum.        - Biopsies were obtained from mid and distal esophageal mucosa to check        for eosinophilic esophagitis.        - No cause seen for dysphagia.     Colonoscopy 2/18/21:  Impression:          - Diverticulosis in the recto-sigmoid colon, in the                        sigmoid colon, in the descending colon and in the                        ascending colon. No bleeding.                        - One 5 mm polyp in the cecum. Resection not attempted.                        This is unlikely to be source of bleeding and unlikely                        to be of concern longterm for our patient.                        - Non-bleeding internal hemorrhoids. Retroflexion not                        performed.                        - A single colonic angioectasia. Treated with argon                         plasma coagulation (APC).                        - No specimens collected.     CT chest/abd/pel w/contrast 8/20/21:  IMPRESSION:   1. Negative CT scan of the chest, abdomen and pelvis. No acute traumatic abnormality.   2. 2 cm right upper lobe mass adjacent to the right hilum concerning for malignancy. PET/CT recommended to better characterize.   3. 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   4. Old fractures of the right pubic rami and several right lower ribs with multilevel degenerative changes throughout the spine.   5. Other incidental findings as described    CT c spine 8/20/21:  IMPRESSION:   1. Negative CT scan of the cervical spine. No fracture identified.   2. Degenerative changes of the cervical spine and right TMJ.   3. Carotid bulb calcifications, left greater than right.     CT head 8/20/21:  IMPRESSION:   1. Negative noncontrast CT scan of the head. No acute intracranial abnormality.   2. Atrophy.     Component      Latest Ref Rng & Units 2/16/2021 2/18/2021   % Retic      0.5 - 2.0 %  3.0 (H)   Absolute Retic      25 - 95 10e9/L  84.9   Ferritin      8 - 252 ng/mL 8    Vitamin B12      193 - 986 pg/mL 479    Folate      >5.4 ng/mL 26.4    TSH      0.40 - 4.00 mU/L  3.74   Haptoglobin      32 - 197 mg/dL  232 (H)       2/18/21 Blood morphology report:     FINAL DIAGNOSIS:   Peripheral Blood Smear:   -Marked normochromic anemia with dimorphic blood picture; occasional to   numerous target cells; rare helmet   cells (see comment)   - No increase in erythrocyte regeneration     COMMENT:   The red blood cell morphology may not be representative for this patient   due to recent red blood cell   transfusion on 2/18/2021. A dimorphic red blood cell picture may be due to    partially treated or transfused   microcytic hypochromic anemia or sideroblastic anemia. There is no   morphologic evidence of dysplasia.     Lucie was seen today for hospital f/u  and lab only.    Diagnoses and all orders for this visit:    Chronic bilateral low back pain without sciatica  -     gabapentin (NEURONTIN) 400 MG capsule; Take 1 capsule (400 mg) by mouth At Bedtime    Personal history of fall    Laceration of scalp, subsequent encounter    Alcoholic intoxication without complication (H)    Hypernatremia, resolved    Hypokalemia, resolved    Oropharyngeal dysphagia    Cognitive changes  -     Neuropsychology Referral; Future    Iron deficiency anemia, unspecified iron deficiency anemia type. Acute anemia requiring transfusion  -     Oncology/Hematology Adult Referral; Future    Right upper lobe pulmonary nodule  -     Adult Pulmonary Medicine Referral; Future    Nocturia  -     UA with Micro reflex to Culture; Future    Breast symptom/left breast lump   -     Mammogram, diagnostic w anya (3D); Future  -     US Breast Bilateral Complete 4 Quadrants; Future      Total time today 60 minutes, including face to face, reviewing and summarizing pertinent notes/results, reconciling medications, ordering tests.  F/U 6 months and as needed.    Marii Montgomery M.D.  Internal Medicine  [facility]     Patients: if you have questions or concerns about this progress note, please discuss them with the provider at a future office visit.

## 2021-09-08 PROBLEM — R41.89 COGNITIVE CHANGES: Status: ACTIVE | Noted: 2021-01-01

## 2021-09-08 PROBLEM — D50.9 IRON DEFICIENCY ANEMIA, UNSPECIFIED IRON DEFICIENCY ANEMIA TYPE: Status: ACTIVE | Noted: 2021-01-01

## 2021-09-08 PROBLEM — R13.12 OROPHARYNGEAL DYSPHAGIA: Status: ACTIVE | Noted: 2018-08-17

## 2021-09-08 NOTE — TELEPHONE ENCOUNTER
I saw Lucie Stockton [birth year] today for her Adempas follow up. She is tolerating 0.5mg TID without side effects. Will plan to increase her to 1.0mg PO TID  on 9/11/21 and do a follow up visit on 9/15/2021. I saw a pharmacy note that you verified a weekly up titration as tolerated. She had done well with titration with minimal to no side effects prior so I m hoping she will do well. I will keep you informed.     Date and Location of Visit: 9/8/21 Patients Home IN PERSON      Patient Name: Lucie Stockton      Date of Birth:[birth year]      Medication/Treatment Ordered:Adempas     Current Dose: 0.5mg PO TID      Titration Orders:  Titration will be 0.5MG PO TID every one to two weeks to a goal dose of 2.5MG PO TID or until tolerated. Hold titration if the Patient has any negative/intolerable side effects, hold titration if the Patient has signs/symptoms of hypotension, hold titration if the Patient systolic is <95mmhg.     Side Effects/Complaints: Pt denies any side effects     Physical Assessment: Alert & oriented x4 but noted forgetfulness  . LUNGS: Clear lung sounds, diminished . No edema noted at this time. Denies fever, chills, and problems urinating. Patient reports having a poor to fair appetite, is drinking ensure plus and  Darren encourages intake. Patient reports that she has been wearing her oxygen at 2L/NC at night since my last visit and prn during the day. RN encouraged use, especially during active times and at night when her breathing is likely more shallow. Pt reports she may feel   a bit better . Patient states they currently experiences dyspnea  after walking > 100 feet but is noticing she is starting to walk a little further in her activity before getting as fatigued .  They have  no complaints of pain other than her intermittent back pain she has had for some time. They are currently independent with all activities of daily living with some house cleaning assistance, but can only tolerate them in  small increments. She had a 5 day hospitalization for a fall, hit her head (no brain bleed) and is still recovering her stamina from that she reports      Vitals: BP  124/60. HR 72 RR 18 SPO2  95% RA Temp: 99.2     Weight:Lbs 94 lbs     Psychosocial Concerns/Changes: None.  They are currently independent with all health regiments and state they are compliant with all medications prescribed to them and knows what each medication is prescribed for.     Significant Others/Caregivers Present: , Darren not present     Home Environment Update: Patient currently resides In  a two story home with their spouse . It has  stairs to the second level where the patient s bedroom is locate. Home has electricity, heat, and running water. Their home is clean and  kept.     Plans for d/c from nursing and/or next RN visit and by whom: Follow up appointment by [facility] September 15, 2021.    Sandra Rosa,JANNET?Clinical Nursing Services Specialist, PAH, [facility]  c [phone]?f [phone]

## 2021-09-09 NOTE — PATIENT INSTRUCTIONS
[facility] Refill Request Information:    Please contact your pharmacy regarding any request for medication refills. The Primary [facility] prescription fax number is [phone]. Please allow three business days for routine medication refills and five business days for controlled substance medication refills.    [facility] Test Result Notification Information:    You will be notified within seven to ten day of your appointment regarding your test results. If you are on MyChart, you will be notified as soon as the provider has reviewed and signed the results.     If you need anything else, feel free to contact the Primary Care Center at [phone].    To schedule a mammogram and ultrasound, please call radiology scheduling at [phone].

## 2021-09-09 NOTE — NURSING NOTE
Lucie Stockton is a 86 year old female patient that presents today in clinic for the following:    Chief Complaint   Patient presents with     Hospital F/U     Lab Only     The patient's allergies and medications were reviewed as noted. A set of vitals were recorded as noted without incident. The patient does not have any other questions for the provider.    Johny Starkey, EMT at 8:26 AM on 9/9/2021

## 2021-09-14 NOTE — TELEPHONE ENCOUNTER
RECORDS STATUS - ALL OTHER DIAGNOSIS      RECORDS RECEIVED FROM: [facility], [facility]   DATE RECEIVED: 9/14   NOTES STATUS DETAILS   OFFICE NOTE from referring provider Marii Wiley M: 9/9/21   OFFICE NOTE from medical oncologist NA    DISCHARGE SUMMARY from hospital [facility] 8/8/21: [facility]    2/26/21, 2/18/21: [facility]   DISCHARGE REPORT from the ER NA    OPERATIVE REPORT [facility] 8/23/21: EGD w. Bx   MEDICATION LIST [facility] 9/9/21   CLINICAL TRIAL TREATMENTS TO DATE     LABS     PATHOLOGY REPORTS     ANYTHING RELATED TO DIAGNOSIS Epic 9/9/21   GENONOMIC TESTING     TYPE:     IMAGING (NEED IMAGES & REPORT)     CT SCANS     MRI     MAMMO     ULTRASOUND     PET

## 2021-09-15 NOTE — TELEPHONE ENCOUNTER
I am having difficulty with Lucie Stockton s weekly up titration of her Adempas due to her difficulty in med reordering and high medication copays. She is only able to receive 30-45 pills with each refill per her insurance. I have reached out to my team for assistance in med refill and co pay assistance for her (more streamlined and one point of contact for her) and also having her  work with the reorder process . A weekly titration will be extremely difficult to accomplish with her . I will do my best to complete biweekly titration in a timely manner.    We were due to increase to 1.0mg last week but her med will not arrive until tomorrow 9/15. I will due an in person visits with her 9/22/21. She reports no side effects on the 0.5mg dose.        Sandra Rosa RN?Clinical Nursing Services Specialist, PAH, [facility]  c [phone]?f [phone]

## 2021-09-22 NOTE — TELEPHONE ENCOUNTER
I had a follow up [facility] visit with Lucie Stockton today. She is doing well and denies all side effects. She reports wearing 2L of oxygen at night, no change to her breathing and no swelling noted.  She will increase to 1.5mg PO TID on 9/28/21. I will follow up with her in person on 10/9/2021. I believe we have helped streamline her med refill process and it will go smoother for her and her  .     /60  HR 74  RR 16  SPO2 95% on RA  T 98.9  Weight 97 lbs      I hope you have a great day!      Sandra Rosa,JANNET?Clinical Nursing Services Specialist, PAH, [facility]  c [phone]?f [phone]

## 2021-09-30 NOTE — NURSING NOTE
Chief Complaint   Patient presents with     Establish Care     discuss some medical history, has an ongoing hip and knee problem     Recheck Medication     discuss medications       Samantha Francis, EMT at 7:58 AM on 9/30/2021

## 2021-09-30 NOTE — PROGRESS NOTES
Assessment & Plan   Onset of anemia appears to be around   Iron deficiency anemia, unspecified iron deficiency anemia type  Abnormal finding of blood chemistry, unspecified   Her hemoglobin has fluctuated considerably since January 2020, ranging from 5.5 to 10.9. She had blood transfusions x 2 during hospitalization in February and August 2021, though no source of active bleeding identified. Last hemoglobin on 9/9/21 - 8.8, noted to be stable compared to levels at time of hospital discharge, 8/25/21 hemoglobin of 8.8. Repeat labs ordered today.   - CBC with platelets differential; Future  - Ferritin; Future  - Vitamin B12; Future  - Folate; Future  - Basic metabolic panel; Future  - Lipid Profile FASTING; Future    Right upper lobe pulmonary nodule  She had a chest CT 8/20/21 at [facility] that demonstrated a mass in upper lobe of her right lung noting concerns for malignancy. Repeat CT at 3 month interval recommended - she will be due to repeat in November. She has an appointment with Dr. Archuleta in Pulmonology on 10/7/21 for follow up on lung nodule.   - CT Chest w/o contrast; Future    Hip pain, right  Chronic pain of right knee  She reports ongoing issues with chronic low back, hip, and knee pain. She was previously seen in the pain clinic, had SI joint injection for low back pain, which she reports was beneficial. We discussed a referral to sports medicine for possible hip and knee injections, and she is agreeable with this plan.   - Orthopedic  Referral; Future          Return in about 2 months (around 11/30/2021) for with me, in person.    I, Ruthy Thomas, am a nurse practitioner student working under the guidance of Dr. Kathy Antonio.     Attending Addendum:  The nurse practitioner student acted as scribe.  The patient was seen and examined with the NP student.  The history and physical were independently verified by myself.  The above documentation represents our joint assessment and plan.  Kathy  MD Marisela  [facility] INTERNAL MEDICINE [place]    >40 minutes spent today performing chart review, history and exam, documentation and further activities as noted above.      Subjective   Lucie is a 86 year old who presents for the following health issues:    HPI     Interval hx since last visit with Dr. Montgomery on 9/9/21  -She feels better overall since last visit  -Occasional dizziness or lightheadedness with position changes, she tries to be careful with position changes, sits on edge of bed in the morning for a minute before getting up to reduce occurrence of dizziness   -Using oxygen overnight, feels less fatigued during daytime  -Denies chest pain, no palpitations, no edema   -Denies falls since last visit   -Wants to make sure her medications will be transferred to Dr. Antonio when she is ready for refills   -She takes Adempas, prescribed by cardiology, not sure if it is making a difference with SOB but it causes GI upset, concerned with high out of pocket cost, plans to follow up with cardiology to address concerns     Hip and knee pain   -right hip and knee painful, chronic pain, no injury, noticing it more starting 1 year ago, has hx of low back and hip pain.   -describes pain as aching, level 5-6/10  -she has been seen for this, was referred to PT, she still does PT exercises at home and finds them somewhat helpful   -she uses heat when sitting, feels better briefly, then pain return when she gets up.   -takes tylenol at home, 1000mg TID, not sure if she appreciates a difference, but she is concerned about stopping in the event that is helping at least a little bit   -last dexa scan 6/15/2018, notes osteoporosis     Social  -Lives at home with her , has two adult children with one [facility] and one in [place], [place].   -She drives herself to medical appointments  -Denies current tobacco use, drinks 1 glass of wine if out to dinner on the weekend 2 x month    Wellness/Preventative    -asking about COVID booster, would like to get that today if possible   - She has flu vaccine 8/28/21    Review of Systems   Constitutional, HEENT, cardiovascular, pulmonary, gi and gu systems are negative, except as otherwise noted      Objective    LMP  (LMP Unknown)   There is no height or weight on file to calculate BMI.  Physical Exam   GENERAL: alert, no distress and frail  EYES: Eyes grossly normal to inspection  RESP: lungs clear to auscultation - no rales, rhonchi or wheezes  CV: regular rate and rhythm, normal S1 S2, no S3 or S4, no murmur, click or rub, no peripheral edema and peripheral pulses strong  MS: extremities normal- no gross deformities noted, generalized weakness    PSYCH: affect normal/bright and appearance well groomed

## 2021-09-30 NOTE — TELEPHONE ENCOUNTER
----- Message from LUCY Anne sent at 9/30/2021  2:13 PM CDT -----  Regarding: follow up appt  Looks like she was admitted not too long ago for dizziness and a fall. Anemic, got transfused and followed up with pcp.  Can we set her up with follow up with either myself or Dr Florian just to check in and see how things are going?   Thx  -------------------------------  Spke with patient who agreed to come to the [facility] for an appt, but she was in a restaurant and didn't have her calendar in front of her.  Agreed to call her tomorrow.  Patient verbalized understanding, agreed with plan and denied any further questions. Anna Cedeno RN on 9/30/2021 at 4:41 PM

## 2021-10-01 NOTE — TELEPHONE ENCOUNTER
DIAGNOSIS: Chronic pain of right knee /Dr Antonio/ no images   APPOINTMENT DATE: 10.12.21   NOTES STATUS DETAILS   OFFICE NOTE from referring provider Internal 9.30.21 Dr Kathy Antonio, [facility]   OFFICE NOTE from other specialist Internal   4.21.16 Dr Matheus Romo, [facility]   DISCHARGE SUMMARY from hospital N/A    DISCHARGE REPORT from the ER N/A    OPERATIVE REPORT N/A    EMG report N/A    MEDICATION LIST Internal    MRI N/A    DEXA (osteoporosis/bone health) Internal    CT SCAN N/A    XRAYS (IMAGES & REPORTS) Internal 1.26.18 R knee  12.23.15 R knee  4.2.12 R knee

## 2021-10-05 NOTE — PROGRESS NOTES
LUNG NODULE & INTERVENTIONAL PULMONARY CLINIC  [facility], [facility]     Lucie Stockton MRN# [id number]   Age: 86 year old YOB: 1935     Reason for Consultation: Followup for pulmonary nodule    Requesting Physician: Peyton Conner MD  No address on file       Assessment and Plan:    1. Growing 2cm RUL nodule  Was previously biopsied in 2019 with negative 11L and 11R. Biopsy of the mass itself showed benign bronchial wall tissue with chronic inflammation. Despite the negative results, there is still concern for malignancy, especially given its growing size. Patient is interested in trying to figure out what the nodule is if possible.   --Will discuss at tumor board the significance of her previous guardant 360 test and if there are any other diagnostic options. She is high risk for complications for invasive procedures due to pulmonary HTN and decreased lung function.       Total time: 36 minutes (incudes prep time, reviewing outside records, visit time, and post visit work on the day of the encounter).        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     Seen in 11/2019 for RUL nodule that was growing in size. PET scan was done what showed multiple enlarging nodules that were PET+. Underwent bronchoscopy 1/2020 with navigation to the RUL lesion. Lesion was confirmed by radial probe. 19g needle and forceps used to biopsy nodule.  Endobronchial biopsies were also taken due to irregular mucosa. Station 11L and 11R were sampled. All samples were negative for malignancy.     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    Seen by   "Dincer 1/6/21, but did not have CT scan done. Was scheduled for appointment later in the month with repeat scan, but that was not done.    CT Chest from 8/20/21 shows 2cm RUL mass adjacent to right hilum after being admitted to [facility] after fall, tripped over a rug.      No difficulty breathing, using 2L at night after discharge from [facility], not using it during the day, half a block then would need a break. Can go up stairs very slowly. Her weight has been stable around  lbs, but continues to have poor appetite.     - Tobacco hx: 40 pack year history, quit in 2015  - My interpretation of the images relevant for this visit includes: CT Chest reviewed and significant for 2cm RUL mass  - My interpretation of the PFT's relevant for this visit includes: 2019: FEV1 0.82L (54% predicted) DLCO 62% predicted         Past Medical History:      Past Medical History:   Diagnosis Date     Anemia      Aortic stenosis     abdominal     Atherosclerosis of aorta (H)     \"extensive disease with near occlusion below level of renal arteries\" on CT     Atherosclerosis of arteries of extremities (H)      Back pain     severe multilevel DJD, moderate spinal canal stenosis L4-5, severe L3-4     Chronic pain     Back, hips, knees, legs     Degenerative joint disease      DVT of lower extremity, bilateral (H)     5/24/10 left distal femoral and great saphenous, 7/7/10 left:  extending to cfv, iliac , pop and post tibial, peronial, right:  distal fem and peroneal      Grave's disease      Hyperlipidaemia LDL goal < 70      Hypertension, essential      Hypothyroidism      Imbalance      Insomnia      Intermittent claudication (H)      Iron deficiency      Knee pain      Lumbar stenosis with neurogenic claudication      Osteoarthritis     ankle,foot, knee     Osteoporosis      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, 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 with adenofibromatous " features     Pulmonary embolism (H)     5/24/10 bilateral     Small bowel obstruction (H) 1/23/17     Varicose veins            Past Surgical History:      Past Surgical History:   Procedure Laterality Date     BUNIONECTOMY       C STOMACH SURGERY PROCEDURE UNLISTED      Please see chart     CAPSULE/PILL CAM ENDOSCOPY N/A 2/20/2021    Procedure: IMAGING PROCEDURE, GI TRACT, INTRALUMINAL, VIA CAPSULE;  Surgeon: Heron Ayala MD;  Location: [facility]     COLONOSCOPY      11/10/10 angioectasia     CV PULMONARY ANGIOGRAM N/A 2/26/2021    Procedure: CV PULMONARY ANGIOGRAM;  Surgeon: Joaquin Fuller MD;  Location[facility]     CV RIGHT HEART CATH MEASUREMENTS RECORDED N/A 2/26/2021    Procedure: CV RIGHT HEART CATH;  Surgeon: Joaquin Fuller MD;  [facility]     ENDOBRONCHIAL ULTRASOUND FLEXIBLE N/A 1/24/2020    Procedure: ENDOBRONCHIAL ULTRASOUND, WITH FLEXIBLE BRONCHOSCOPY;  Surgeon: Gopal Jara MD;  Location: [facility] OR     HYSTERECTOMY TOTAL ABDOMINAL, BILATERAL SALPINGO-OOPHORECTOMY, NODE DISSECTION, COMBINED  2/17/11    SANGEETHA, EMILIA, LN bx, omentectomy, resection of evrian malignancy Dr. JONO Goncalves - Returned BENIGN     INJECT EPIDURAL LUMBAR / SACRAL SINGLE N/A 1/5/2018    Procedure: INJECT EPIDURAL LUMBAR / SACRAL SINGLE;  lumbar interlaminar epidural steroid injection;  Surgeon: Jaylin Valadez MD;  [facility]     INJECT SACROILIAC JOINT Right 3/7/2018    Procedure: INJECT SACROILIAC JOINT;  Right Sacroiliac Joint Injection;  Surgeon: Flavio Harrison MD;  [facility]     INJECT SACROILIAC JOINT Bilateral 12/7/2018    Procedure: Bilateral Sacroiliac Joint Injections;  Surgeon: Faviola Cosby MD;  Location[facility]     OPTICAL TRACKING SYSTEM BRONCHOSCOPY N/A 1/24/2020    Procedure: Super D navigational bronchoscopy;  Surgeon: Gopal Jara MD;  Location: [facility]     UPPER GI ENDOSCOPY      11/10/10 erythematous gastropathy,  angioectasia          Social History:     Social History     Tobacco Use     Smoking status: Former Smoker     Packs/day: 0.50     Years: 15.00     Pack years: 7.50     Quit date: 1993     Years since quittin.0     Smokeless tobacco: Never Used   Substance Use Topics     Alcohol use: Yes     Comment: social     Retired teacher, used to teach in the school of education. Lives with  who is still working.          Family History:     Family History   Problem Relation Age of Onset     Breast Cancer Maternal Aunt 80     C.A.D. Father 54     No Known Problems Mother            Allergies:    No Known Allergies       Medications:     Current Outpatient Medications   Medication Sig     acetaminophen (TYLENOL) 325 MG tablet Take 2 tablets every 6 to 8 hours as needed for pain (Patient taking differently: Take 650 mg by mouth as needed Take 2 tablets every 6 to 8 hours as needed for pain)     apixaban ANTICOAGULANT (ELIQUIS) 2.5 MG tablet Take 1 tablet (2.5 mg) by mouth 2 times daily     atorvastatin (LIPITOR) 40 MG tablet Take 2 tablets (80 mg) by mouth daily     Calcium Carbonate-Vitamin D (CALCIUM 600 + D OR) Take 1 tablet by mouth every morning      cholecalciferol (VITAMIN D3) 25 mcg (1000 units) capsule Take 1 capsule by mouth daily     citalopram (CELEXA) 20 MG tablet Take 1 tablet (20 mg) by mouth daily     clopidogrel (PLAVIX) 75 MG tablet Take 1 tablet (75 mg) by mouth daily     cyanocolbalamin (VITAMIN  B-12) 500 MCG tablet Take 500 mcg by mouth every morning      ferrous sulfate (FEROSUL) 325 (65 Fe) MG tablet Take 1 tablet (325 mg) by mouth daily (with breakfast)     furosemide (LASIX) 20 MG tablet Take 1 tablet (20 mg) by mouth daily     gabapentin (NEURONTIN) 400 MG capsule Take 1 capsule (400 mg) by mouth At Bedtime     hydrochlorothiazide (HYDRODIURIL) 50 MG tablet Take 1 tablet (50 mg) by mouth daily Please keep appt 20     latanoprost (XALATAN) 0.005 % ophthalmic solution Place 1 drop  "into both eyes At Bedtime      levothyroxine (SYNTHROID/LEVOTHROID) 75 MCG tablet Take 1 tablet (75 mcg) by mouth daily     loperamide (IMODIUM A-D) 2 MG tablet Take 1-2 mg by mouth daily as needed for diarrhea      multivitamin (OCUVITE) TABS tablet Take 1 tablet by mouth daily     oxybutynin (DITROPAN) 5 MG tablet Take 1 tablet (5 mg) by mouth At Bedtime For frequent urination     pantoprazole (PROTONIX) 40 MG EC tablet Take 1 tablet (40 mg) by mouth daily Takes in the morning.     Potassium Bicarb-Citric Acid 20 MEQ TBEF Take 20 mg by mouth daily     riociguat (ADEMPAS) 1.5 MG tablet Take 1 tablet (1.5 mg) by mouth 3 times daily     No current facility-administered medications for this visit.            Physical Exam:   BP (!) 176/62   Temp 98.4  F (36.9  C)   Resp 18   Ht 1.499 m (4' 11.02\")   Wt 42.2 kg (93 lb)   LMP  (LMP Unknown)   SpO2 95%   BMI 18.77 kg/m    Wt Readings from Last 4 Encounters:   09/09/21 40.5 kg (89 lb 3.2 oz)   09/01/21 42.5 kg (93 lb 12.8 oz)   07/22/21 45.4 kg (100 lb)   03/05/21 45.4 kg (100 lb)     General: Well appearing, thin.  Lungs: Nonlabored breathing.  Neuro: Answering questions appropriately  Psych: Normal affect       Imaging/Lab Data   All laboratory and imaging data reviewed.    Guardant 360 result 4/2021         "

## 2021-10-07 NOTE — NURSING NOTE
"Oncology Rooming Note    October 7, 2021 9:24 AM   Lucie Stockton is a 86 year old female who presents for:    Chief Complaint   Patient presents with     Oncology Clinic Visit     Pulmonary nodules     Initial Vitals: BP (!) 176/62   Temp 98.4  F (36.9  C)   Resp 18   Ht 1.499 m (4' 11.02\")   Wt 42.2 kg (93 lb)   LMP  (LMP Unknown)   SpO2 95%   BMI 18.77 kg/m   Estimated body mass index is 18.77 kg/m  as calculated from the following:    Height as of this encounter: 1.499 m (4' 11.02\").    Weight as of this encounter: 42.2 kg (93 lb). Body surface area is 1.33 meters squared.  Moderate Pain (5) Comment: Data Unavailable   No LMP recorded (lmp unknown). Patient has had a hysterectomy.  Allergies reviewed: Yes  Medications reviewed: Yes    Medications: Medication refills not needed today.  Pharmacy name entered into [employer]: [id number] [place], [place] - [address] AT [address]    Clinical concerns: No new concerns       Riley Calhoun MA            "

## 2021-10-07 NOTE — LETTER
10/7/2021       RE: Lucie Stockton  [address] [address]     Dear Colleague,    Thank you for referring your patient, Lucie Stockton, [facility]. Please see a copy of my visit note below.    LUNG NODULE & INTERVENTIONAL PULMONARY CLINIC  [facility]     Lucie Stockton MRN# [id number]   Age: 86 year old YOB: 1935     Reason for Consultation: Followup for pulmonary nodule    Requesting Physician: Peyton Conner MD  No address on file       Assessment and Plan:    1. Growing 2cm RUL nodule  Was previously biopsied in 2019 with negative 11L and 11R. Biopsy of the mass itself showed benign bronchial wall tissue with chronic inflammation. Despite the negative results, there is still concern for malignancy, especially given its growing size. Patient is interested in trying to figure out what the nodule is if possible.   --Will discuss at tumor board the significance of her previous guardant 360 test and if there are any other diagnostic options. She is high risk for complications for invasive procedures due to pulmonary HTN and decreased lung function.       Total time: 36 minutes (incudes prep time, reviewing outside records, visit time, and post visit work on the day of the encounter).        Rukhsana Archuleta MD  Interventional Pulmonology  Department of Pulmonary, Allergy, Critical Care and Sleep Medicine   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     Seen in 11/2019 for RUL nodule that was growing in size. PET scan was done what showed multiple enlarging nodules that were PET+. Underwent bronchoscopy 1/2020 with navigation to the RUL lesion. Lesion was confirmed by radial probe. 19g needle and forceps used to  "biopsy nodule.  Endobronchial biopsies were also taken due to irregular mucosa. Station 11L and 11R were sampled. All samples were negative for malignancy.     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    Seen by Dr. Randall 1/6/21, but did not have CT scan done. Was scheduled for appointment later in the month with repeat scan, but that was not done.    CT Chest from 8/20/21 shows 2cm RUL mass adjacent to right hilum after being admitted to [facility] after fall, tripped over a rug.      No difficulty breathing, using 2L at night after discharge from [facility], not using it during the day, half a block then would need a break. Can go up stairs very slowly. Her weight has been stable around  lbs, but continues to have poor appetite.     - Tobacco hx: 40 pack year history, quit in 2015  - My interpretation of the images relevant for this visit includes: CT Chest reviewed and significant for 2cm RUL mass  - My interpretation of the PFT's relevant for this visit includes: 2019: FEV1 0.82L (54% predicted) DLCO 62% predicted         Past Medical History:      Past Medical History:   Diagnosis Date     Anemia      Aortic stenosis     abdominal     Atherosclerosis of aorta (H)     \"extensive disease with near occlusion below level of renal arteries\" on CT     Atherosclerosis of arteries of extremities (H)      Back pain     severe multilevel DJD, moderate spinal canal stenosis L4-5, severe L3-4     Chronic pain     Back, hips, knees, legs     Degenerative joint disease      DVT of lower extremity, bilateral (H)     5/24/10 left distal femoral and great saphenous, 7/7/10 left:  extending to cfv, iliac , pop and post tibial, peronial, right:  distal fem and peroneal      Grave's disease      Hyperlipidaemia LDL goal < 70      Hypertension, essential      Hypothyroidism      Imbalance      Insomnia      " Intermittent claudication (H)      Iron deficiency      Knee pain      Lumbar stenosis with neurogenic claudication      Osteoarthritis     ankle,foot, knee     Osteoporosis      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, 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     Pulmonary embolism (H)     5/24/10 bilateral     Small bowel obstruction (H) 1/23/17     Varicose veins            Past Surgical History:      Past Surgical History:   Procedure Laterality Date     BUNIONECTOMY       C STOMACH SURGERY PROCEDURE UNLISTED      Please see chart     CAPSULE/PILL CAM ENDOSCOPY N/A 2/20/2021    Procedure: IMAGING PROCEDURE, GI TRACT, INTRALUMINAL, VIA CAPSULE;  Surgeon: Heron Ayala MD;  Location:  GI     COLONOSCOPY      11/10/10 angioectasia     CV PULMONARY ANGIOGRAM N/A 2/26/2021    Procedure: CV PULMONARY ANGIOGRAM;  Surgeon: Joaquin Fuller MD;  Location:  HEART CARDIAC CATH LAB     CV RIGHT HEART CATH MEASUREMENTS RECORDED N/A 2/26/2021    Procedure: CV RIGHT HEART CATH;  Surgeon: Joaquin Fuller MD;  Location:  HEART CARDIAC CATH LAB     ENDOBRONCHIAL ULTRASOUND FLEXIBLE N/A 1/24/2020    Procedure: ENDOBRONCHIAL ULTRASOUND, WITH FLEXIBLE BRONCHOSCOPY;  Surgeon: Gopal Jara MD;  Location: [facility] OR     HYSTERECTOMY TOTAL ABDOMINAL, BILATERAL SALPINGO-OOPHORECTOMY, NODE DISSECTION, COMBINED  2/17/11    SANGEETHA, EMILIA, LN bx, omentectomy, resection of evrian malignancy Dr. JONO Goncalves - Returned BENIGN     INJECT EPIDURAL LUMBAR / SACRAL SINGLE N/A 1/5/2018    Procedure: INJECT EPIDURAL LUMBAR / SACRAL SINGLE;  lumbar interlaminar epidural steroid injection;  Surgeon: Jaylin Valadez MD;  Location: [facility] OR     INJECT SACROILIAC JOINT Right 3/7/2018    Procedure: INJECT SACROILIAC JOINT;  Right Sacroiliac Joint Injection;  Surgeon: Flavio Harrison MD;  Location: [facility] OR     INJECT SACROILIAC JOINT Bilateral 12/7/2018     Procedure: Bilateral Sacroiliac Joint Injections;  Surgeon: Faviola Cosby MD;  Location:  OR     OPTICAL TRACKING SYSTEM BRONCHOSCOPY N/A 2020    Procedure: Super D navigational bronchoscopy;  Surgeon: Gopal Jara MD;  Location:  OR     UPPER GI ENDOSCOPY      11/10/10 erythematous gastropathy, angioectasia          Social History:     Social History     Tobacco Use     Smoking status: Former Smoker     Packs/day: 0.50     Years: 15.00     Pack years: 7.50     Quit date: 1993     Years since quittin.0     Smokeless tobacco: Never Used   Substance Use Topics     Alcohol use: Yes     Comment: social     Retired teacher, used to teach in the school of education. Lives with  who is still working.          Family History:     Family History   Problem Relation Age of Onset     Breast Cancer Maternal Aunt 80     C.A.D. Father 54     No Known Problems Mother            Allergies:    No Known Allergies       Medications:     Current Outpatient Medications   Medication Sig     acetaminophen (TYLENOL) 325 MG tablet Take 2 tablets every 6 to 8 hours as needed for pain (Patient taking differently: Take 650 mg by mouth as needed Take 2 tablets every 6 to 8 hours as needed for pain)     apixaban ANTICOAGULANT (ELIQUIS) 2.5 MG tablet Take 1 tablet (2.5 mg) by mouth 2 times daily     atorvastatin (LIPITOR) 40 MG tablet Take 2 tablets (80 mg) by mouth daily     Calcium Carbonate-Vitamin D (CALCIUM 600 + D OR) Take 1 tablet by mouth every morning      cholecalciferol (VITAMIN D3) 25 mcg (1000 units) capsule Take 1 capsule by mouth daily     citalopram (CELEXA) 20 MG tablet Take 1 tablet (20 mg) by mouth daily     clopidogrel (PLAVIX) 75 MG tablet Take 1 tablet (75 mg) by mouth daily     cyanocolbalamin (VITAMIN  B-12) 500 MCG tablet Take 500 mcg by mouth every morning      ferrous sulfate (FEROSUL) 325 (65 Fe) MG tablet Take 1 tablet (325 mg) by mouth daily (with breakfast)     furosemide (LASIX) 20 MG  "tablet Take 1 tablet (20 mg) by mouth daily     gabapentin (NEURONTIN) 400 MG capsule Take 1 capsule (400 mg) by mouth At Bedtime     hydrochlorothiazide (HYDRODIURIL) 50 MG tablet Take 1 tablet (50 mg) by mouth daily Please keep appt 12/21/20     latanoprost (XALATAN) 0.005 % ophthalmic solution Place 1 drop into both eyes At Bedtime      levothyroxine (SYNTHROID/LEVOTHROID) 75 MCG tablet Take 1 tablet (75 mcg) by mouth daily     loperamide (IMODIUM A-D) 2 MG tablet Take 1-2 mg by mouth daily as needed for diarrhea      multivitamin (OCUVITE) TABS tablet Take 1 tablet by mouth daily     oxybutynin (DITROPAN) 5 MG tablet Take 1 tablet (5 mg) by mouth At Bedtime For frequent urination     pantoprazole (PROTONIX) 40 MG EC tablet Take 1 tablet (40 mg) by mouth daily Takes in the morning.     Potassium Bicarb-Citric Acid 20 MEQ TBEF Take 20 mg by mouth daily     riociguat (ADEMPAS) 1.5 MG tablet Take 1 tablet (1.5 mg) by mouth 3 times daily     No current facility-administered medications for this visit.            Physical Exam:   BP (!) 176/62   Temp 98.4  F (36.9  C)   Resp 18   Ht 1.499 m (4' 11.02\")   Wt 42.2 kg (93 lb)   LMP  (LMP Unknown)   SpO2 95%   BMI 18.77 kg/m    Wt Readings from Last 4 Encounters:   09/09/21 40.5 kg (89 lb 3.2 oz)   09/01/21 42.5 kg (93 lb 12.8 oz)   07/22/21 45.4 kg (100 lb)   03/05/21 45.4 kg (100 lb)     General: Well appearing, thin.  Lungs: Nonlabored breathing.  Neuro: Answering questions appropriately  Psych: Normal affect       Imaging/Lab Data   All laboratory and imaging data reviewed.    Guardant 360 result 4/2021             Again, thank you for allowing me to participate in the care of your patient.      Sincerely,    Rukhsana Archuleta MD      "

## 2021-10-08 NOTE — TELEPHONE ENCOUNTER
Thoracic Tumor Conference      Patient Name: Lucie Stockton    Reason for conference discussion (brief overview): 85yo with chronic PEs and pulmonary HTN on anticoagulation and FEV1 0.82L (54% predicted) DLCO 62% predicted back in 2019 who presents for growing RUL nodule. Nodule was previously biopsied 1/2020 by Gopal via navigational bronch which showed benign lung tissue with chronic inflammation, but there was question of whether nodule was reached. 11L and 11R were negative. She subsequently had Guardant 360 done 4/21/20 that was positive for MPL W515L, then is seems like she was lost to followup.     Presents now with growing RUL nodule from 1.2cm in 1/2020 to 2cm 8/2021 after presenting for a fall.       Specific Question:  Is the guardant 360 result significant for cancer? If not, are there any options left for diagnosis? She is high risk given her pulmonary HTN and low FEV1.       Pertinent Histology:  no    Referring Physician: Rukhsana Archuleta MD    The patient's case was presented at the multidisciplinary conference for the above noted reason.  There was a consensus recommendation for the following actions:     Guardant 360 results not significant for cancer.  Group decided on a PET scan.  Dr. Jara will review with Dr. Archuleta.            Case Lead:   Pam Hodge RN

## 2021-10-11 NOTE — TELEPHONE ENCOUNTER
I will no longer be following Lucie Stockton due to a pharmacy transfer to [facility]. She qualified for assistance with copays . Lucie is currently on Adempas 1.0mg PO TID without side effects . She did not receive 1.5mg tabs as I had advised  but has had enough 1.0 mg tabs to continue her dosing.     I advised her  Darren to call the [facility] to get a prescription refill by Tuesday 10/12     Pharmacy phone number [phone]    I am not sure how the titration evaluation  and refil process works with [facility] . I m guessing you may have other patients on assistance who go through [facility] and know more than I do.     Sandra Rosa,JANNET?Clinical Nursing Services Specialist, PAH, [facility]  c [phone]?f [phone]

## 2021-10-11 NOTE — TELEPHONE ENCOUNTER
"Spoke with [facility] representative [place] at (P) [phone] regarding titration orders. Confirmed that patient is enrolled in their nursing program for follow up with Adempas titration follow up visits. Ceci Treviño RN on 10/11/2021 at 12:29 PM    Spoke with JANNET Arredondo regarding titration updates. Provided my direct office number for contact. Ceci Treviño RN on 10/11/2021 at 3:08 PM    ----- Message from Marla Raphael RN sent at 10/11/2021 11:33 AM CDT -----  Regarding: [facility]  It has been a while since I had to work with them.  But you will need to call [facility] to set up titration and them call them every 2 weeks to let them know to increase or not.  I found it best to ask them to call me with the update.  [phone]    Marla Raphael RN BSN PHN  ----- Message -----  From: Ceci Treviño RN  Sent: 10/11/2021   9:07 AM CDT  To: Anna Cedeno RN, Marla Raphael, RN  Subject: [facility] Ademp[facility]                               Good morning ladies,    I have a question regarding RN titration F/U for this patient - according to the [facility] nurse that was previously following her, she was \"transfered to [facility]. She qualified for assistance with copays . Lucie is currently on Adempas 1.0mg PO TID without side effects...I advised her  Darren to call the [facility] to get a prescription refill by Tuesday 10/12.\"    Has anyone had to work with [facility] regarding RN titration follow ups? How do we set that up?    Allen        "

## 2021-10-12 NOTE — NURSING NOTE
[address] [address]  Dept: [phone]  ______________________________________________________________________________    Patient: Lucie Stockton   : 1935   MRN: [id number]   2021    INVASIVE PROCEDURE SAFETY CHECKLIST    Date: 2021   Procedure:R knee CSI   Patient Name: Lucie Stockton  MRN: [id number]  YOB: 1935    Action: Complete sections as appropriate. Any discrepancy results in a HARD COPY until resolved.     PRE PROCEDURE:  Patient ID verified with 2 identifiers (name and  or MRN): Yes  Procedure and site verified with patient/designee (when able): Yes  Accurate consent documentation in medical record: Yes  H&P (or appropriate assessment) documented in medical record: Yes  H&P must be up to 20 days prior to procedure and updates within 24 hours of procedure as applicable: NA  Relevant diagnostic and radiology test results appropriately labeled and displayed as applicable: Yes  Procedure site(s) marked with provider initials: NA    TIMEOUT:  Time-Out performed immediately prior to starting procedure, including verbal and active participation of all team members addressing the following:Yes  * Correct patient identify  * Confirmed that the correct side and site are marked  * An accurate procedure consent form  * Agreement on the procedure to be done  * Correct patient position  * Relevant images and results are properly labeled and appropriately displayed  * The need to administer antibiotics or fluids for irrigation purposes during the procedure as applicable   * Safety precautions based on patient history or medication use    DURING PROCEDURE: Verification of correct person, site, and procedures any time the responsibility for care of the patient is transferred to another member of the care team.       Prior to injection, verified patient identity using patient's name and date of birth.  Due to  injection administration, patient instructed to remain in clinic for 15 minutes  afterwards, and to report any adverse reaction to me immediately.    Joint injection was performed.      Drug Amount Wasted:  Yes: 1 mg/ml   Vial/Syringe: Single dose vial  Expiration Date:  12/1/24      Sugey Riley ATC  October 12, 2021

## 2021-10-12 NOTE — PROGRESS NOTES
[facility]  SPORTS & ORTHOPEDIC CLINIC VISIT     Oct 12, 2021        ASSESSMENT & PLAN    86-year-old with right lateral knee pain that is suspected be due to osteoarthritis.  She is also having some radiation up to the hip and some tenderness in the greater trochanter on exam.  Also considered radicular etiology.    Reviewed imaging and assessment with patient in detail  Patient wished to pursue steroid injection today.  See procedure note for details.  Try using topical Voltaren gel on the knee up to 4 times daily for pain  Use knee sleeve as needed for comfort  Follow-up in clinic in 2 weeks to reevaluate hip and improvement after steroid injection    Octavio Morales MD  [facility]TS MEDICINE [facility]IN[facility] [place]    Face-to-face time:10 minutes  Record review time: 2Minutes  Documentation time: 3Minutes  Total time on the day of visit independent of procedure: 15 Minutes    -----  Chief Complaint   Patient presents with     Right Knee - Pain       SUBJECTIVE  Lucie Stockton is a/an 86 year old female who is seen in consultation at the request of Kathy Antonio MD for evaluation of  Right knee pain.     The patient is seen by themselves.  The patient is Right handed    Onset: 6 week(s) ago. Reports insidious onset without acute precipitating event. Gradually has been getting worse over the last 6 weeks with no injury. If she does too much the pain will radiate to her low back hip and hip.   Location of Pain: right lateral knee   Worsened by: Sitting, standing, walking   Better with: Rest   Treatments tried: rest/activity avoidance and ibuprofen  Associated symptoms: no distal numbness or tingling; denies swelling or warmth    Orthopedic/Surgical history: NO  Social History/Occupation: Retired       REVIEW OF SYSTEMS:    Do you have fever, chills, weight loss? No    Do you have any vision problems? No    Do you have any chest pain or edema? No    Do you have any shortness of breath  or wheezing?  No    Do you have stomach problems? No    Do you have any numbness or focal weakness? No    Do you have diabetes? No    Do you have problems with bleeding or clotting? No    Do you have an rashes or other skin lesions? No    OBJECTIVE:  LMP  (LMP Unknown)      Patient is alert, No acute distress, pleasant and conversational.    right knee:   Skin intact. No erythema or ecchymosis.  No effusion or soft tissue swelling.    AROM: Zero to approximately 135  with pain on terminal flexion    Palpation: No medial or lateral facet joint tenderness.  TTP along the lateral joint line.  TTP about the posterior aspect of the greater trochanter.    Special Tests:    No ligamentous laxity or pain with valgus or varus stress.  Negative Lachman's, Anterior Drawer and Posterior Drawer     Full Isometric quad strength, extensor mechanism in place     Neurovascularly intact in the lower extremity    Hip and Ankle with full AROM and nontender      RADIOLOGY:    3 view xrays of right knee performed and reviewed independently demonstrating tricompartmental DJD with lateral compartment predominance. No acute findings. See EMR for formal radiology report.          Large Joint Injection/Arthocentesis: R knee joint    Date/Time: 10/12/2021 9:33 AM  Performed by: Octavio Morales MD  Authorized by: Octavio Morales MD     Indications:  Pain and osteoarthritis  Needle Size:  22 G  Guidance: landmark guided    Approach:  Anterolateral  Location:  Knee      Medications:  40 mg triamcinolone 40 MG/ML; 4 mL lidocaine (PF) 1 %  Outcome:  Tolerated well, no immediate complications  Procedure discussed: discussed risks, benefits, and alternatives    Consent Given by:  Patient  Timeout: timeout called immediately prior to procedure    Prep: patient was prepped and draped in usual sterile fashion       There were no complications. The patient tolerated the procedure well. There was minimal bleeding.   The patient was  instructed to ice the knee upon leaving clinic and refrain from overuse over the next 2 days.   The patient was instructed to go to the emergency room with any unusual pain, swelling, or redness occurred in the injected area.     Octavio Morales MD

## 2021-10-12 NOTE — TELEPHONE ENCOUNTER
Called patient and advised her that a PET was ordered.  Provided her with the imaging scheduling number.  Also informed her that they are still discussing the possibility of doing another biopsy.

## 2021-10-12 NOTE — PATIENT INSTRUCTIONS
Try using topical Voltaren gel on the knee up to 4 times daily for pain  Use knee sleeve as needed for comfort    Ice to knee today and then daily as needed  May use tylenol or ibuprofen as needed  No pools or hot tubs for 48 hours  Avoid vigorous activity for 48 hours after.  Then advance as tolerated.    Return to clinic with severe pain, warmth from the joint, or redness, as these may be signs of infection after your injection.     Follow-up in clinic in 2 weeks

## 2021-10-12 NOTE — LETTER
10/12/2021      RE: Lucie Stockton  [address] [facility] [address]         [facility]  SPORTS & ORTHOPEDIC CLINIC VISIT     Oct 12, 2021        ASSESSMENT & PLAN    86-year-old with right lateral knee pain that is suspected be due to osteoarthritis.  She is also having some radiation up to the hip and some tenderness in the greater trochanter on exam.  Also considered radicular etiology.    Reviewed imaging and assessment with patient in detail  Patient wished to pursue steroid injection today.  See procedure note for details.  Try using topical Voltaren gel on the knee up to 4 times daily for pain  Use knee sleeve as needed for comfort  Follow-up in clinic in 2 weeks to reevaluate hip and improvement after steroid injection    Octavio Morales MD  [facility] [place]    Face-to-face time:10 minutes  Record review time: 2Minutes  Documentation time: 3Minutes  Total time on the day of visit independent of procedure: 15 Minutes    -----  Chief Complaint   Patient presents with     Right Knee - Pain       SUBJECTIVE  Lucie Stockton is a/an 86 year old female who is seen in consultation at the request of Kathy Antonio MD for evaluation of  Right knee pain.     The patient is seen by themselves.  The patient is Right handed    Onset: 6 week(s) ago. Reports insidious onset without acute precipitating event. Gradually has been getting worse over the last 6 weeks with no injury. If she does too much the pain will radiate to her low back hip and hip.   Location of Pain: right lateral knee   Worsened by: Sitting, standing, walking   Better with: Rest   Treatments tried: rest/activity avoidance and ibuprofen  Associated symptoms: no distal numbness or tingling; denies swelling or warmth    Orthopedic/Surgical history: NO  Social History/Occupation: Retired       REVIEW OF SYSTEMS:    Do you have fever, chills, weight loss? No    Do you have any vision  problems? No    Do you have any chest pain or edema? No    Do you have any shortness of breath or wheezing?  No    Do you have stomach problems? No    Do you have any numbness or focal weakness? No    Do you have diabetes? No    Do you have problems with bleeding or clotting? No    Do you have an rashes or other skin lesions? No    OBJECTIVE:  LMP  (LMP Unknown)      Patient is alert, No acute distress, pleasant and conversational.    right knee:   Skin intact. No erythema or ecchymosis.  No effusion or soft tissue swelling.    AROM: Zero to approximately 135  with pain on terminal flexion    Palpation: No medial or lateral facet joint tenderness.  TTP along the lateral joint line.  TTP about the posterior aspect of the greater trochanter.    Special Tests:    No ligamentous laxity or pain with valgus or varus stress.  Negative Lachman's, Anterior Drawer and Posterior Drawer     Full Isometric quad strength, extensor mechanism in place     Neurovascularly intact in the lower extremity    Hip and Ankle with full AROM and nontender      RADIOLOGY:    3 view xrays of right knee performed and reviewed independently demonstrating tricompartmental DJD with lateral compartment predominance. No acute findings. See EMR for formal radiology report.          Large Joint Injection/Arthocentesis: R knee joint    Date/Time: 10/12/2021 9:33 AM  Performed by: Octavio Morales MD  Authorized by: Octavio Morales MD     Indications:  Pain and osteoarthritis  Needle Size:  22 G  Guidance: landmark guided    Approach:  Anterolateral  Location:  Knee      Medications:  40 mg triamcinolone 40 MG/ML; 4 mL lidocaine (PF) 1 %  Outcome:  Tolerated well, no immediate complications  Procedure discussed: discussed risks, benefits, and alternatives    Consent Given by:  Patient  Timeout: timeout called immediately prior to procedure    Prep: patient was prepped and draped in usual sterile fashion       There were no  complications. The patient tolerated the procedure well. There was minimal bleeding.   The patient was instructed to ice the knee upon leaving clinic and refrain from overuse over the next 2 days.   The patient was instructed to go to the emergency room with any unusual pain, swelling, or redness occurred in the injected area.     Octavio Morales MD

## 2021-10-14 NOTE — PROGRESS NOTES
CARDIOLOGY PH CLINIC TELEPHONE VISIT    Date of telephone visit: 10/14/21      Lucie Stockton is a 86 year old female who is being evaluated via a billable telephone visit.      I have reviewed and updated the patient's Past Medical History, Social History, Family History and Medication List.      MEDICATIONS:  Current Outpatient Medications   Medication Sig Dispense Refill     acetaminophen (TYLENOL) 325 MG tablet Take 2 tablets every 6 to 8 hours as needed for pain (Patient taking differently: Take 650 mg by mouth as needed Take 2 tablets every 6 to 8 hours as needed for pain) 100 tablet 1     apixaban ANTICOAGULANT (ELIQUIS) 2.5 MG tablet Take 1 tablet (2.5 mg) by mouth 2 times daily 180 tablet 3     atorvastatin (LIPITOR) 40 MG tablet Take 2 tablets (80 mg) by mouth daily 180 tablet 3     Calcium Carbonate-Vitamin D (CALCIUM 600 + D OR) Take 1 tablet by mouth every morning        cholecalciferol (VITAMIN D3) 25 mcg (1000 units) capsule Take 1 capsule by mouth daily       citalopram (CELEXA) 20 MG tablet Take 1 tablet (20 mg) by mouth daily 90 tablet 3     clopidogrel (PLAVIX) 75 MG tablet Take 1 tablet (75 mg) by mouth daily 90 tablet 3     cyanocolbalamin (VITAMIN  B-12) 500 MCG tablet Take 500 mcg by mouth every morning  90 tablet 1     ferrous sulfate (FEROSUL) 325 (65 Fe) MG tablet Take 1 tablet (325 mg) by mouth daily (with breakfast) 90 tablet 3     furosemide (LASIX) 20 MG tablet Take 1 tablet (20 mg) by mouth daily 90 tablet 3     gabapentin (NEURONTIN) 400 MG capsule Take 1 capsule (400 mg) by mouth At Bedtime 90 capsule 1     hydrochlorothiazide (HYDRODIURIL) 50 MG tablet Take 1 tablet (50 mg) by mouth daily Please keep appt 12/21/20 90 tablet 0     latanoprost (XALATAN) 0.005 % ophthalmic solution Place 1 drop into both eyes At Bedtime   1     levothyroxine (SYNTHROID/LEVOTHROID) 75 MCG tablet Take 1 tablet (75 mcg) by mouth daily 90 tablet 3     oxybutynin (DITROPAN) 5 MG tablet Take 1 tablet (5  mg) by mouth At Bedtime For frequent urination 90 tablet 0     pantoprazole (PROTONIX) 40 MG EC tablet Take 1 tablet (40 mg) by mouth daily Takes in the morning. 90 tablet 3     Potassium Bicarb-Citric Acid 20 MEQ TBEF Take 20 mg by mouth daily 90 tablet 3     riociguat (ADEMPAS) 1.5 MG tablet Take 1 tablet (1.5 mg) by mouth 3 times daily       loperamide (IMODIUM A-D) 2 MG tablet Take 1-2 mg by mouth daily as needed for diarrhea  30 tablet 0     multivitamin (OCUVITE) TABS tablet Take 1 tablet by mouth daily (Patient not taking: Reported on 10/14/2021)         ALLERGIES  Patient has no known allergies.      Self reported vitals:  Weight: 93#  /61    Primary PH cardiologist: Dr. Florian      HPI:  Ms. Stockton is a very pleasant 86 year old female with a PMhx including bilateral pulmonary embolism on anticoagulation, peripheral vascular disease, Grave's disease, DVT, and hypertension. She has multiple pulmonary nodules with a suspected metastatic disease with no tissue diagnosis at this time. 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 Options of procedural intervention vs medical management were discussed and she opted for the latter. She is currently maintained on monotherapy with Adempas.     I saw Lucie last in clinic in mid July at which time she continued with HERRERA and lack of energy. She was at 2mg TID dose of Adempas a that time, and I recommended continued titration to a goal of 2.5mg. At that visit, due to some weight gain, Lasix was added at 20mg daily.     Today, I am meeting with Lucie via telephone visit for follow up. Since we met last, she was hospitalized at [facility] for a fall in mid August. She had hit her head though CT was negative for a bleed. She required transfusion for anemia and underwent an EGD which did not reveal a source for bleeding. Notably, she was + for ETOH on initial testing. Due to a lapse in treatment for her Adempas, she had to start her titration over.  She doesn't recall details surrounding why it was stopped, though appears perhaps due to some insurance copay issues. She is now actively titrating again, and is currently at 1.5mg TID. She denies headaches but does have intermittent diarrhea. She denies any lower extremity edema and says her home weight is stable. She denies any recent dizziness or presyncope.        Most recent labs from 9/30 were reviewed as below.      CURRENT PULMONARY HYPERTENSION REGIMEN:     PAH Rx: Adempas 1.5mg TID     Diuretics: Lasix 20mg daily, hydrochlorothiazide 50mg daily      Oxygen: 2LNC at night (doesn't use during the day)     Anticoagulation: Eliquis 2.5mg BID (age/weight)  Indication: PE/CTEPH        Assessment/Plan:     1. Chronic thromboembolic pulmonary hypertension.              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 Dr. Florian has previously discussed procedural intervention vs medication and she has chosen to pursue medical therapy.    --She recently had to start titration of her Adempas over, due to a lapse in treatment, I believe due to some communication/insurance issues. She is currently at 1.5mg TID dose; will continue to a goal of 2.5mg TID.    --She reports no LE edema and home weights are stable. Continue Lasix 20mg daily along with hydrochlorothiazide as is. Renal function appears stable.               --She has opted not to wear oxygen during the day, but relays that she wears at 2LNC at night.    --Her hemoglobin has continued to fluctuate and with her recent hospital stay was significantly anemic requiring pRBCs. GI evaluation did not reveal a source for bleeding. This is being monitored by her PCP.Currently, she remains on anticoagulation with apixaban, at 2.5mg dose given age/body size.        2.  PAD with atherosclerosis of the abdominal aorta.              --Continue monitoring, on statin and Plavix.     3. Pulmonary nodules.   --These were previously biopsied in 2019, though have  been growing so concern for malignancy is still present. She was seen in the lung nodule clinic last week and it appears they are discussing further diagnostic options. She is at elevated risk with invasive procedures given her PH.       Follow up plan: Return in 3 months to see Dr. Florian with repeat echocardiogram and labs.       Testing/labs:      Most recent labs:   Results for ISAURO GUZMAN (MRN [id number]) as of 10/14/2021 14:56   Ref. Range 9/30/2021 09:54   Sodium Latest Ref Range: 133 - 144 mmol/L 142   Potassium Latest Ref Range: 3.4 - 5.3 mmol/L 3.7   Chloride Latest Ref Range: 94 - 109 mmol/L 105   Carbon Dioxide Latest Ref Range: 20 - 32 mmol/L 33 (H)   Urea Nitrogen Latest Ref Range: 7 - 30 mg/dL 18   Creatinine Latest Ref Range: 0.52 - 1.04 mg/dL 0.61   GFR Estimate Latest Ref Range: >60 mL/min/1.73m2 83   Calcium Latest Ref Range: 8.5 - 10.1 mg/dL 9.7   Anion Gap Latest Ref Range: 3 - 14 mmol/L 4   Albumin Latest Ref Range: 3.4 - 5.0 g/dL 3.6   Protein Total Latest Ref Range: 6.8 - 8.8 g/dL 6.8   Bilirubin Total Latest Ref Range: 0.2 - 1.3 mg/dL 0.3   Alkaline Phosphatase Latest Ref Range: 40 - 150 U/L 67   ALT Latest Ref Range: 0 - 50 U/L 15   AST Latest Ref Range: 0 - 45 U/L 13   Cholesterol Latest Ref Range: <200 mg/dL 182   Patient Fasting > 8hrs? Unknown Yes   Ferritin Latest Ref Range: 8 - 252 ng/mL 14   Folate Latest Ref Range: >=5.4 ng/mL 18.0   HDL Cholesterol Latest Ref Range: >=50 mg/dL 96   LDL Cholesterol Calculated Latest Ref Range: <=100 mg/dL 66   Non HDL Cholesterol Latest Ref Range: <130 mg/dL 86   N-Terminal Pro Bnp Latest Ref Range: 0 - 450 pg/mL 427   Triglycerides Latest Ref Range: <150 mg/dL 101   Vitamin B12 Latest Ref Range: 193 - 986 pg/mL 522   Glucose Latest Ref Range: 70 - 99 mg/dL 87   WBC Latest Ref Range: 4.0 - 11.0 10e3/uL 6.5   Hemoglobin Latest Ref Range: 11.7 - 15.7 g/dL 8.3 (L)   Hematocrit Latest Ref Range: 35.0 - 47.0 % 29.3 (L)   Platelet Count Latest Ref  Range: 150 - 450 10e3/uL 325         Other recent pertinent testing:    Echo 2/19/2021  Interpretation Summary  Global and regional left ventricular function is normal with an EF of 60-65%.  Global right ventricular function is normal.  IVC diameter and respiratory changes fall into an intermediate range  suggesting an RA pressure of 8 mmHg.  No significant valvular abnormalities were noted.  Trivial pericardial effusion is present.     This study was compared with the study from 1/23/2020 .There has been no  significant change.      RHC, pulm angio: 2/26/2021  Hemodynamics RA --/--/10  RV 65/10  PA 62/22/34  PCWP --/--/15  Víctor CI/CO 2.9/4.0  TD CI/CO 2.9/4.0  PVR 4.8    Pulmonary angiogram    Right Lung  Truncus anterior is patent along with A1-3.  A4 and A5 segments are patent.  Interlobar artery and basal trunk are patent.  A10 and A8 have significant proximal stenoses / defects. A9, A6, and A7 appear patent without defect.  There are associated lower lobe perfusion defects.    Left Lung  Truncus anterior, interlobar artery, and basal trunk along with their associated segments A1-10 are patent without defect. Right sided filling pressures are mildly elevated. Left sided filling pressures are mildly elevated. Moderately elevated pulmonary artery hypertension. Normal cardiac output level. Hemodynamic data has been modified in Epic per physician review.         NYHA Functional Class:  Functional class 3      I have reviewed the note as documented above.  This accurately captures the substance of my conversation with the patient.      Phone call contact time  Call Started at 1448  Call Ended at 1455    An additional 15 minutes was spent today performing chart and history review, pre and post visit documentation, review of recent testing, and care coordination.      Marcia Griggs PA-C  [facility]  Pager [phone]

## 2021-10-14 NOTE — PATIENT INSTRUCTIONS
Thank you for visiting the Pulmonary Hypertension Clinic virtually today.      Today we discussed:     We will continue to titrate your Adempas medication to a goal of 2.5mg three times daily.  Please call with any new concerns. You can take imodium as needed for diarrhea.      Follow up Appointment Information:  Return in 3 months to see Dr. Florian with labs and echocardiogram.       Additional Instructions:    1. Continue staying active and eat a heart healthy, low sodium diet.     2. Please keep current list of medications with you at all times.     3. Remember to weigh yourself daily after voiding and write it down on a log. If you have gained/lost 2 pounds overnight or 5 pounds in a week contact us for medication adjustments or further instructions.    4. Please call us immediately if you have syncope (fainting or passing out), chest pain, worsening edema (swelling or weight gain), or general worsening in how you are feeling.     --------------------------------------------------------------------------------------------------------------    If you have questions or concerns please contact us at:    Allen Treviño RN, BSN   Ariadna Mckeon (Schedule,Prior Auth)  Nurse Coordinator     Clinic   Pulmonary Hypertension   Pulmonary Hypertension  [facility]  [facility]  (Phone)[phone]    (Phone) [phone]        (Fax) [phone]      ** Please note that you will NOT receive a reminder call regarding your scheduled testing, reminder calls are for provider appointments only.  If you are scheduled for testing within the [facility] system you may receive a call regarding pre-registration for billing purposes only.**     --------------------------------------------------------------------------------------------------------------    Interested in joining a support group?    Pulmonary Hypertension Association  [email or web address].phassociation.org/  **Look at  the Events Tab** They even have Support Groups that you can call into    [facility] Support Group  Second Saturday of the Month from 1-3 PM   Location: [address] [place] [id number]  Leader: Taryn Sepulveda  Phone: [phone] or [phone]  Email: [email or web address]

## 2021-10-14 NOTE — NURSING NOTE
"Lucie is a 86 year old who is being evaluated via a billable telephone visit.      What phone number would you like to be contacted at? [phone]  How would you like to obtain your AVS? Mail a copy    Vitals - Patient Reported  Systolic (Patient Reported): (!) 142  Diastolic (Patient Reported): 61  Weight (Patient Reported): 42.2 kg (93 lb)  Height (Patient Reported): 149.9 cm (4' 11\")  BMI (Based on Pt Reported Ht/Wt): 18.78  Pain Score: No Pain (0) (No SOB)    Vitals were taken and medications were reconciled.   Suresh Diaz, EMT  2:38 PM      "

## 2021-10-14 NOTE — LETTER
10/14/2021      RE: Lucie Stockton  [address] [facility] [address]       Dear Colleague,    Thank you for the opportunity to participate in the care of your patient, Lucie Stockton, at the [facility] [place] at [facility]. Please see a copy of my visit note below.        CARDIOLOGY  CLINIC TELEPHONE VISIT    Date of telephone visit: 10/14/21      Lucie tSockton is a 86 year old female who is being evaluated via a billable telephone visit.      I have reviewed and updated the patient's Past Medical History, Social History, Family History and Medication List.      MEDICATIONS:  Current Outpatient Medications   Medication Sig Dispense Refill     acetaminophen (TYLENOL) 325 MG tablet Take 2 tablets every 6 to 8 hours as needed for pain (Patient taking differently: Take 650 mg by mouth as needed Take 2 tablets every 6 to 8 hours as needed for pain) 100 tablet 1     apixaban ANTICOAGULANT (ELIQUIS) 2.5 MG tablet Take 1 tablet (2.5 mg) by mouth 2 times daily 180 tablet 3     atorvastatin (LIPITOR) 40 MG tablet Take 2 tablets (80 mg) by mouth daily 180 tablet 3     Calcium Carbonate-Vitamin D (CALCIUM 600 + D OR) Take 1 tablet by mouth every morning        cholecalciferol (VITAMIN D3) 25 mcg (1000 units) capsule Take 1 capsule by mouth daily       citalopram (CELEXA) 20 MG tablet Take 1 tablet (20 mg) by mouth daily 90 tablet 3     clopidogrel (PLAVIX) 75 MG tablet Take 1 tablet (75 mg) by mouth daily 90 tablet 3     cyanocolbalamin (VITAMIN  B-12) 500 MCG tablet Take 500 mcg by mouth every morning  90 tablet 1     ferrous sulfate (FEROSUL) 325 (65 Fe) MG tablet Take 1 tablet (325 mg) by mouth daily (with breakfast) 90 tablet 3     furosemide (LASIX) 20 MG tablet Take 1 tablet (20 mg) by mouth daily 90 tablet 3     gabapentin (NEURONTIN) 400 MG capsule Take 1 capsule (400 mg) by mouth At Bedtime 90 capsule 1     hydrochlorothiazide (HYDRODIURIL)  50 MG tablet Take 1 tablet (50 mg) by mouth daily Please keep appt 12/21/20 90 tablet 0     latanoprost (XALATAN) 0.005 % ophthalmic solution Place 1 drop into both eyes At Bedtime   1     levothyroxine (SYNTHROID/LEVOTHROID) 75 MCG tablet Take 1 tablet (75 mcg) by mouth daily 90 tablet 3     oxybutynin (DITROPAN) 5 MG tablet Take 1 tablet (5 mg) by mouth At Bedtime For frequent urination 90 tablet 0     pantoprazole (PROTONIX) 40 MG EC tablet Take 1 tablet (40 mg) by mouth daily Takes in the morning. 90 tablet 3     Potassium Bicarb-Citric Acid 20 MEQ TBEF Take 20 mg by mouth daily 90 tablet 3     riociguat (ADEMPAS) 1.5 MG tablet Take 1 tablet (1.5 mg) by mouth 3 times daily       loperamide (IMODIUM A-D) 2 MG tablet Take 1-2 mg by mouth daily as needed for diarrhea  30 tablet 0     multivitamin (OCUVITE) TABS tablet Take 1 tablet by mouth daily (Patient not taking: Reported on 10/14/2021)         ALLERGIES  Patient has no known allergies.      Self reported vitals:  Weight: 93#  /61    Primary PH cardiologist: Dr. Florian      HPI:  Ms. Stockton is a very pleasant 86 year old female with a PMhx including bilateral pulmonary embolism on anticoagulation, peripheral vascular disease, Grave's disease, DVT, and hypertension. She has multiple pulmonary nodules with a suspected metastatic disease with no tissue diagnosis at this time. 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 Options of procedural intervention vs medical management were discussed and she opted for the latter. She is currently maintained on monotherapy with Adempas.     I saw Lucie last in clinic in mid July at which time she continued with HERRERA and lack of energy. She was at 2mg TID dose of Adempas a that time, and I recommended continued titration to a goal of 2.5mg. At that visit, due to some weight gain, Lasix was added at 20mg daily.     Today, I am meeting with Lucie via telephone visit for follow up. Since we met last, she was  hospitalized at [facility] for a fall in mid August. She had hit her head though CT was negative for a bleed. She required transfusion for anemia and underwent an EGD which did not reveal a source for bleeding. Notably, she was + for ETOH on initial testing. Due to a lapse in treatment for her Adempas, she had to start her titration over. She doesn't recall details surrounding why it was stopped, though appears perhaps due to some insurance copay issues. She is now actively titrating again, and is currently at 1.5mg TID. She denies headaches but does have intermittent diarrhea. She denies any lower extremity edema and says her home weight is stable. She denies any recent dizziness or presyncope.        Most recent labs from 9/30 were reviewed as below.      CURRENT PULMONARY HYPERTENSION REGIMEN:     PAH Rx: Adempas 1.5mg TID     Diuretics: Lasix 20mg daily, hydrochlorothiazide 50mg daily      Oxygen: 2LNC at night (doesn't use during the day)     Anticoagulation: Eliquis 2.5mg BID (age/weight)  Indication: PE/CTEPH        Assessment/Plan:     1. Chronic thromboembolic pulmonary hypertension.              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 Dr. Florian has previously discussed procedural intervention vs medication and she has chosen to pursue medical therapy.    --She recently had to start titration of her Adempas over, due to a lapse in treatment, I believe due to some communication/insurance issues. She is currently at 1.5mg TID dose; will continue to a goal of 2.5mg TID.    --She reports no LE edema and home weights are stable. Continue Lasix 20mg daily along with hydrochlorothiazide as is. Renal function appears stable.               --She has opted not to wear oxygen during the day, but relays that she wears at 2LNC at night.    --Her hemoglobin has continued to fluctuate and with her recent hospital stay was significantly anemic requiring pRBCs. GI evaluation did not reveal a  source for bleeding. This is being monitored by her PCP.Currently, she remains on anticoagulation with apixaban, at 2.5mg dose given age/body size.        2.  PAD with atherosclerosis of the abdominal aorta.              --Continue monitoring, on statin and Plavix.     3. Pulmonary nodules.   --These were previously biopsied in 2019, though have been growing so concern for malignancy is still present. She was seen in the lung nodule clinic last week and it appears they are discussing further diagnostic options. She is at elevated risk with invasive procedures given her PH.       Follow up plan: Return in 3 months to see Dr. Florian with repeat echocardiogram and labs.       Testing/labs:      Most recent labs:   Results for ISAURO GUZMAN (MRN [id number]) as of 10/14/2021 14:56   Ref. Range 9/30/2021 09:54   Sodium Latest Ref Range: 133 - 144 mmol/L 142   Potassium Latest Ref Range: 3.4 - 5.3 mmol/L 3.7   Chloride Latest Ref Range: 94 - 109 mmol/L 105   Carbon Dioxide Latest Ref Range: 20 - 32 mmol/L 33 (H)   Urea Nitrogen Latest Ref Range: 7 - 30 mg/dL 18   Creatinine Latest Ref Range: 0.52 - 1.04 mg/dL 0.61   GFR Estimate Latest Ref Range: >60 mL/min/1.73m2 83   Calcium Latest Ref Range: 8.5 - 10.1 mg/dL 9.7   Anion Gap Latest Ref Range: 3 - 14 mmol/L 4   Albumin Latest Ref Range: 3.4 - 5.0 g/dL 3.6   Protein Total Latest Ref Range: 6.8 - 8.8 g/dL 6.8   Bilirubin Total Latest Ref Range: 0.2 - 1.3 mg/dL 0.3   Alkaline Phosphatase Latest Ref Range: 40 - 150 U/L 67   ALT Latest Ref Range: 0 - 50 U/L 15   AST Latest Ref Range: 0 - 45 U/L 13   Cholesterol Latest Ref Range: <200 mg/dL 182   Patient Fasting > 8hrs? Unknown Yes   Ferritin Latest Ref Range: 8 - 252 ng/mL 14   Folate Latest Ref Range: >=5.4 ng/mL 18.0   HDL Cholesterol Latest Ref Range: >=50 mg/dL 96   LDL Cholesterol Calculated Latest Ref Range: <=100 mg/dL 66   Non HDL Cholesterol Latest Ref Range: <130 mg/dL 86   N-Terminal Pro Bnp Latest Ref Range: 0 -  450 pg/mL 427   Triglycerides Latest Ref Range: <150 mg/dL 101   Vitamin B12 Latest Ref Range: 193 - 986 pg/mL 522   Glucose Latest Ref Range: 70 - 99 mg/dL 87   WBC Latest Ref Range: 4.0 - 11.0 10e3/uL 6.5   Hemoglobin Latest Ref Range: 11.7 - 15.7 g/dL 8.3 (L)   Hematocrit Latest Ref Range: 35.0 - 47.0 % 29.3 (L)   Platelet Count Latest Ref Range: 150 - 450 10e3/uL 325         Other recent pertinent testing:    Echo 2/19/2021  Interpretation Summary  Global and regional left ventricular function is normal with an EF of 60-65%.  Global right ventricular function is normal.  IVC diameter and respiratory changes fall into an intermediate range  suggesting an RA pressure of 8 mmHg.  No significant valvular abnormalities were noted.  Trivial pericardial effusion is present.     This study was compared with the study from 1/23/2020 .There has been no  significant change.      RHC, pulm angio: 2/26/2021  Hemodynamics RA --/--/10  RV 65/10  PA 62/22/34  PCWP --/--/15  Víctor CI/CO 2.9/4.0  TD CI/CO 2.9/4.0  PVR 4.8    Pulmonary angiogram    Right Lung  Truncus anterior is patent along with A1-3.  A4 and A5 segments are patent.  Interlobar artery and basal trunk are patent.  A10 and A8 have significant proximal stenoses / defects. A9, A6, and A7 appear patent without defect.  There are associated lower lobe perfusion defects.    Left Lung  Truncus anterior, interlobar artery, and basal trunk along with their associated segments A1-10 are patent without defect. Right sided filling pressures are mildly elevated. Left sided filling pressures are mildly elevated. Moderately elevated pulmonary artery hypertension. Normal cardiac output level. Hemodynamic data has been modified in Epic per physician review.         NYHA Functional Class:  Functional class 3      I have reviewed the note as documented above.  This accurately captures the substance of my conversation with the patient.      Phone call contact time  Call Started at  1448  Call Ended at 1455    An additional 15 minutes was spent today performing chart and history review, pre and post visit documentation, review of recent testing, and care coordination.      LUCY Anne-JEM  [facility] Heart  Pager [phone]        Please do not hesitate to contact me if you have any questions/concerns.     Sincerely,     LUCY Anne

## 2021-10-19 PROBLEM — F32.9 MAJOR DEPRESSION: Status: ACTIVE | Noted: 2017-08-17

## 2021-10-26 NOTE — PROGRESS NOTES
[facility]  SPORTS & ORTHOPEDIC CLINIC VISIT     Oct 26, 2021        ASSESSMENT & PLAN    86-year-old with right knee pain in the anterior knee that is very tender to palpation and seems extra-articular in etiology.  Her lateral knee pain has improved significantly after steroid injection.  Suspect that her current pain is either due to mechanical irritation due to direct contact over the patella.  Radicular etiology is also considered given her concurrent lateral hip pain.  However radiculopathy seems less likely at the current time.    Reviewed imaging and assessment with patient in detail  Recommended application of Voltaren gel 3-4 times daily.  May consider knee sleeve or cut out knee brace to protect the painful area.  Recommended icing the painful knee once or twice daily.  Avoid direct contact to the anterior knee.  Follow-up in clinic in 3 to 4 weeks if it is not improving or sooner if it is worsening.    Octavio Morales MD  [facility] SPORTS MEDICINE CLINIC [place]    -----  Chief Complaint   Patient presents with     RECHECK     right knee FU        SUBJECTIVE  Lucie Stockton is a/an 86 year old female who is seen for follow up of right knee.     The patient is seen by themselves.    Onset: 8 week(s) ago.   Underwent injection 2 weeks ago.  This is led to full resolution of the lateral knee pain.  Now her pain is primarily in the anterior knee worse with walking and direct contact to the front of the knee.  Has no pain at rest or when standing still.  Worsened by: Sitting, standing, walking   Better with: Rest   Treatments tried: rest/activity avoidance and ibuprofen  Associated symptoms: no distal numbness or tingling; denies swelling or warmth, steroid injection       REVIEW OF SYSTEMS:    See HPI     OBJECTIVE:  Resp 16   Wt 42.2 kg (93 lb)   LMP  (LMP Unknown)   BMI 18.77 kg/m       Patient is alert, No acute distress, pleasant and conversational.    right knee:    Skin intact. No erythema or ecchymosis.  No effusion or soft tissue swelling.    AROM: Zero to approximately 135  without restriction or reported pain.    Palpation: No medial or lateral facet joint tenderness.  No posterior medial or posterior lateral joint line tenderness   TTP over anterior patella      Full Isometric quad strength, extensor mechanism in place        RADIOLOGY:    No new imaging

## 2021-10-26 NOTE — PATIENT INSTRUCTIONS
Use diclofenac gel on the knee 3-4 times daily  Try to ice the knee twice daily   Follow up in 3-4 weeks if your pain is not improving or sooner if your pain is worsening     Octavio Morales MD  Sports & Orthopaedic Clinic  Clinics and [facility] Center  [address] [place] [address]    Phone: [phone]  Fax: [phone]

## 2021-10-26 NOTE — LETTER
10/26/2021      RE: Lucie Stockton  [address] [facility] [address]         [facility] SUR[facility]  SPORTS & ORTHOPEDIC CLINIC VISIT     Oct 26, 2021        ASSESSMENT & PLAN    86-year-old with right knee pain in the anterior knee that is very tender to palpation and seems extra-articular in etiology.  Her lateral knee pain has improved significantly after steroid injection.  Suspect that her current pain is either due to mechanical irritation due to direct contact over the patella.  Radicular etiology is also considered given her concurrent lateral hip pain.  However radiculopathy seems less likely at the current time.    Reviewed imaging and assessment with patient in detail  Recommended application of Voltaren gel 3-4 times daily.  May consider knee sleeve or cut out knee brace to protect the painful area.  Recommended icing the painful knee once or twice daily.  Avoid direct contact to the anterior knee.  Follow-up in clinic in 3 to 4 weeks if it is not improving or sooner if it is worsening.    Octavio Morales MD  [facility] SPORTS MEDICINE CLINIC [employer]    -----  Chief Complaint   Patient presents with     RECHECK     right knee FU        SUBJECTIVE  Lucie Stockton is a/an 86 year old female who is seen for follow up of right knee.     The patient is seen by themselves.    Onset: 8 week(s) ago.   Underwent injection 2 weeks ago.  This is led to full resolution of the lateral knee pain.  Now her pain is primarily in the anterior knee worse with walking and direct contact to the front of the knee.  Has no pain at rest or when standing still.  Worsened by: Sitting, standing, walking   Better with: Rest   Treatments tried: rest/activity avoidance and ibuprofen  Associated symptoms: no distal numbness or tingling; denies swelling or warmth, steroid injection       REVIEW OF SYSTEMS:    See HPI     OBJECTIVE:  Resp 16   Wt 42.2 kg (93 lb)   LMP  (LMP Unknown)   BMI 18.77 kg/m        Patient is alert, No acute distress, pleasant and conversational.    right knee:   Skin intact. No erythema or ecchymosis.  No effusion or soft tissue swelling.    AROM: Zero to approximately 135  without restriction or reported pain.    Palpation: No medial or lateral facet joint tenderness.  No posterior medial or posterior lateral joint line tenderness   TTP over anterior patella      Full Isometric quad strength, extensor mechanism in place        RADIOLOGY:    No new imaging      Octavio Morales MD

## 2021-12-07 NOTE — PROGRESS NOTES
Called patient to see why she cancelled her PET scan.  She said she had to go out of town for an emergency.  Gave her the imaging number and advised to get it rescheduled soon.  Patient verbalized understanding.

## 2022-01-01 ENCOUNTER — APPOINTMENT (OUTPATIENT)
Dept: PHYSICAL THERAPY | Facility: CLINIC | Age: 87
DRG: 377 | End: 2022-01-01
Payer: MEDICARE

## 2022-01-01 ENCOUNTER — APPOINTMENT (OUTPATIENT)
Dept: GENERAL RADIOLOGY | Facility: CLINIC | Age: 87
End: 2022-01-01
Attending: INTERNAL MEDICINE
Payer: OTHER MISCELLANEOUS

## 2022-01-01 ENCOUNTER — APPOINTMENT (OUTPATIENT)
Dept: PHYSICAL THERAPY | Facility: SKILLED NURSING FACILITY | Age: 87
DRG: 811 | End: 2022-01-01
Attending: INTERNAL MEDICINE
Payer: MEDICARE

## 2022-01-01 ENCOUNTER — TELEPHONE (OUTPATIENT)
Dept: CARDIOLOGY | Facility: CLINIC | Age: 87
End: 2022-01-01
Payer: MEDICARE

## 2022-01-01 ENCOUNTER — APPOINTMENT (OUTPATIENT)
Dept: OCCUPATIONAL THERAPY | Facility: CLINIC | Age: 87
DRG: 377 | End: 2022-01-01
Payer: MEDICARE

## 2022-01-01 ENCOUNTER — PRE VISIT (OUTPATIENT)
Dept: ONCOLOGY | Facility: CLINIC | Age: 87
End: 2022-01-01

## 2022-01-01 ENCOUNTER — APPOINTMENT (OUTPATIENT)
Dept: GENERAL RADIOLOGY | Facility: CLINIC | Age: 87
DRG: 377 | End: 2022-01-01
Attending: PHYSICIAN ASSISTANT
Payer: MEDICARE

## 2022-01-01 ENCOUNTER — HOSPITAL ENCOUNTER (INPATIENT)
Facility: SKILLED NURSING FACILITY | Age: 87
LOS: 9 days | Discharge: HOSPICE | DRG: 811 | End: 2022-03-26
Attending: INTERNAL MEDICINE | Admitting: INTERNAL MEDICINE
Payer: MEDICARE

## 2022-01-01 ENCOUNTER — APPOINTMENT (OUTPATIENT)
Dept: OCCUPATIONAL THERAPY | Facility: SKILLED NURSING FACILITY | Age: 87
DRG: 811 | End: 2022-01-01
Attending: INTERNAL MEDICINE
Payer: MEDICARE

## 2022-01-01 ENCOUNTER — APPOINTMENT (OUTPATIENT)
Dept: OCCUPATIONAL THERAPY | Facility: CLINIC | Age: 87
DRG: 377 | End: 2022-01-01
Attending: INTERNAL MEDICINE
Payer: MEDICARE

## 2022-01-01 ENCOUNTER — LAB REQUISITION (OUTPATIENT)
Dept: LAB | Facility: CLINIC | Age: 87
End: 2022-01-01
Payer: OTHER MISCELLANEOUS

## 2022-01-01 ENCOUNTER — DOCUMENTATION ONLY (OUTPATIENT)
Dept: ORTHOPEDICS | Facility: CLINIC | Age: 87
End: 2022-01-01
Payer: OTHER MISCELLANEOUS

## 2022-01-01 ENCOUNTER — TELEPHONE (OUTPATIENT)
Dept: NEUROPSYCHOLOGY | Facility: CLINIC | Age: 87
End: 2022-01-01
Payer: MEDICARE

## 2022-01-01 ENCOUNTER — ANCILLARY PROCEDURE (OUTPATIENT)
Dept: GENERAL RADIOLOGY | Facility: CLINIC | Age: 87
End: 2022-01-01
Attending: INTERNAL MEDICINE
Payer: MEDICARE

## 2022-01-01 ENCOUNTER — PRE VISIT (OUTPATIENT)
Dept: ORTHOPEDICS | Facility: CLINIC | Age: 87
End: 2022-01-01

## 2022-01-01 ENCOUNTER — HEALTH MAINTENANCE LETTER (OUTPATIENT)
Age: 87
End: 2022-01-01

## 2022-01-01 ENCOUNTER — TELEPHONE (OUTPATIENT)
Dept: CARDIOLOGY | Facility: CLINIC | Age: 87
End: 2022-01-01
Payer: OTHER MISCELLANEOUS

## 2022-01-01 ENCOUNTER — TELEPHONE (OUTPATIENT)
Dept: ORTHOPEDICS | Facility: CLINIC | Age: 87
End: 2022-01-01
Payer: OTHER MISCELLANEOUS

## 2022-01-01 ENCOUNTER — APPOINTMENT (OUTPATIENT)
Dept: CARDIOLOGY | Facility: CLINIC | Age: 87
DRG: 377 | End: 2022-01-01
Attending: PHYSICIAN ASSISTANT
Payer: MEDICARE

## 2022-01-01 ENCOUNTER — DOCUMENTATION ONLY (OUTPATIENT)
Dept: OTHER | Facility: CLINIC | Age: 87
End: 2022-01-01
Payer: OTHER MISCELLANEOUS

## 2022-01-01 ENCOUNTER — TELEPHONE (OUTPATIENT)
Dept: INTERNAL MEDICINE | Facility: CLINIC | Age: 87
End: 2022-01-01
Payer: MEDICARE

## 2022-01-01 ENCOUNTER — MEDICAL CORRESPONDENCE (OUTPATIENT)
Dept: HEALTH INFORMATION MANAGEMENT | Facility: CLINIC | Age: 87
End: 2022-01-01
Payer: MEDICARE

## 2022-01-01 ENCOUNTER — HOSPITAL ENCOUNTER (EMERGENCY)
Facility: CLINIC | Age: 87
Discharge: HOME | End: 2022-04-17
Attending: INTERNAL MEDICINE | Admitting: INTERNAL MEDICINE
Payer: OTHER MISCELLANEOUS

## 2022-01-01 ENCOUNTER — TEAM CONFERENCE (OUTPATIENT)
Dept: PULMONOLOGY | Facility: CLINIC | Age: 87
End: 2022-01-01

## 2022-01-01 ENCOUNTER — OFFICE VISIT (OUTPATIENT)
Dept: INTERNAL MEDICINE | Facility: CLINIC | Age: 87
End: 2022-01-01
Payer: MEDICARE

## 2022-01-01 ENCOUNTER — APPOINTMENT (OUTPATIENT)
Dept: PHYSICAL THERAPY | Facility: CLINIC | Age: 87
DRG: 377 | End: 2022-01-01
Attending: INTERNAL MEDICINE
Payer: MEDICARE

## 2022-01-01 ENCOUNTER — HOSPITAL ENCOUNTER (OUTPATIENT)
Dept: PET IMAGING | Facility: CLINIC | Age: 87
Discharge: HOME | DRG: 377 | End: 2022-03-08
Attending: STUDENT IN AN ORGANIZED HEALTH CARE EDUCATION/TRAINING PROGRAM | Admitting: STUDENT IN AN ORGANIZED HEALTH CARE EDUCATION/TRAINING PROGRAM
Payer: MEDICARE

## 2022-01-01 ENCOUNTER — HOSPITAL ENCOUNTER (INPATIENT)
Facility: CLINIC | Age: 87
LOS: 7 days | Discharge: NURSING, REHAB OR LONG-TERM CARE | DRG: 377 | End: 2022-03-17
Attending: EMERGENCY MEDICINE | Admitting: INTERNAL MEDICINE
Payer: MEDICARE

## 2022-01-01 ENCOUNTER — LAB (OUTPATIENT)
Dept: LAB | Facility: CLINIC | Age: 87
End: 2022-01-01
Payer: MEDICARE

## 2022-01-01 VITALS
SYSTOLIC BLOOD PRESSURE: 159 MMHG | WEIGHT: 92.8 LBS | BODY MASS INDEX: 18.71 KG/M2 | OXYGEN SATURATION: 80 % | DIASTOLIC BLOOD PRESSURE: 60 MMHG | HEIGHT: 59 IN | HEART RATE: 79 BPM

## 2022-01-01 VITALS
HEART RATE: 72 BPM | WEIGHT: 98 LBS | DIASTOLIC BLOOD PRESSURE: 57 MMHG | OXYGEN SATURATION: 99 % | HEIGHT: 59 IN | BODY MASS INDEX: 19.76 KG/M2 | TEMPERATURE: 98.5 F | RESPIRATION RATE: 18 BRPM | SYSTOLIC BLOOD PRESSURE: 148 MMHG

## 2022-01-01 VITALS
SYSTOLIC BLOOD PRESSURE: 132 MMHG | BODY MASS INDEX: 20.42 KG/M2 | OXYGEN SATURATION: 88 % | WEIGHT: 101.1 LBS | TEMPERATURE: 97.4 F | DIASTOLIC BLOOD PRESSURE: 40 MMHG | HEART RATE: 79 BPM | RESPIRATION RATE: 20 BRPM

## 2022-01-01 VITALS
WEIGHT: 93.2 LBS | RESPIRATION RATE: 18 BRPM | BODY MASS INDEX: 18.82 KG/M2 | HEART RATE: 60 BPM | SYSTOLIC BLOOD PRESSURE: 121 MMHG | TEMPERATURE: 97.6 F | DIASTOLIC BLOOD PRESSURE: 58 MMHG | OXYGEN SATURATION: 92 %

## 2022-01-01 DIAGNOSIS — S52.592A OTHER CLOSED FRACTURE OF DISTAL END OF LEFT RADIUS, INITIAL ENCOUNTER: Primary | ICD-10-CM

## 2022-01-01 DIAGNOSIS — S51.012A SKIN TEAR OF LEFT ELBOW WITHOUT COMPLICATION, INITIAL ENCOUNTER: ICD-10-CM

## 2022-01-01 DIAGNOSIS — R06.02 SHORTNESS OF BREATH: ICD-10-CM

## 2022-01-01 DIAGNOSIS — G47.00 INSOMNIA, UNSPECIFIED TYPE: Primary | ICD-10-CM

## 2022-01-01 DIAGNOSIS — R91.8 PULMONARY NODULES: Primary | ICD-10-CM

## 2022-01-01 DIAGNOSIS — R91.8 PULMONARY NODULES: ICD-10-CM

## 2022-01-01 DIAGNOSIS — R06.02 SOB (SHORTNESS OF BREATH): ICD-10-CM

## 2022-01-01 DIAGNOSIS — D50.0 IRON DEFICIENCY ANEMIA DUE TO CHRONIC BLOOD LOSS: ICD-10-CM

## 2022-01-01 DIAGNOSIS — Z51.5 HOSPICE CARE PATIENT: ICD-10-CM

## 2022-01-01 DIAGNOSIS — I27.20 PULMONARY HYPERTENSION (H): ICD-10-CM

## 2022-01-01 DIAGNOSIS — I27.82 CHRONIC PULMONARY EMBOLISM WITHOUT ACUTE COR PULMONALE, UNSPECIFIED PULMONARY EMBOLISM TYPE (H): ICD-10-CM

## 2022-01-01 DIAGNOSIS — J96.10 CHRONIC RESPIRATORY FAILURE, UNSPECIFIED WHETHER WITH HYPOXIA OR HYPERCAPNIA (H): ICD-10-CM

## 2022-01-01 DIAGNOSIS — R41.89 COGNITIVE CHANGES: ICD-10-CM

## 2022-01-01 DIAGNOSIS — R53.1 WEAKNESS: ICD-10-CM

## 2022-01-01 DIAGNOSIS — D50.9 IRON DEFICIENCY ANEMIA, UNSPECIFIED IRON DEFICIENCY ANEMIA TYPE: ICD-10-CM

## 2022-01-01 DIAGNOSIS — M25.532 LEFT WRIST PAIN: Primary | ICD-10-CM

## 2022-01-01 DIAGNOSIS — Z79.01 ON ANTICOAGULANT THERAPY: ICD-10-CM

## 2022-01-01 DIAGNOSIS — Z20.822 CONTACT WITH AND (SUSPECTED) EXPOSURE TO COVID-19: ICD-10-CM

## 2022-01-01 DIAGNOSIS — D64.9 ANEMIA, UNSPECIFIED TYPE: ICD-10-CM

## 2022-01-01 DIAGNOSIS — I27.24 CTEPH (CHRONIC THROMBOEMBOLIC PULMONARY HYPERTENSION) (H): ICD-10-CM

## 2022-01-01 DIAGNOSIS — I27.82 CHRONIC PULMONARY EMBOLISM WITHOUT ACUTE COR PULMONALE, UNSPECIFIED PULMONARY EMBOLISM TYPE (H): Primary | ICD-10-CM

## 2022-01-01 DIAGNOSIS — I27.20 PULMONARY HYPERTENSION (H): Primary | ICD-10-CM
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
## 2022-01-01 LAB
ABO/RH(D): NORMAL
ABO/RH(D): NORMAL
ALBUMIN SERPL-MCNC: 2.7 G/DL (ref 3.4–5)
ALBUMIN SERPL-MCNC: 2.8 G/DL (ref 3.4–5)
ALBUMIN SERPL-MCNC: 2.8 G/DL (ref 3.4–5)
ALBUMIN SERPL-MCNC: 2.9 G/DL (ref 3.4–5)
ALBUMIN SERPL-MCNC: 2.9 G/DL (ref 3.4–5)
ALBUMIN SERPL-MCNC: 3 G/DL (ref 3.4–5)
ALBUMIN SERPL-MCNC: 3.2 G/DL (ref 3.4–5)
ALBUMIN SERPL-MCNC: 3.7 G/DL (ref 3.4–5)
ALBUMIN UR-MCNC: NEGATIVE MG/DL
ALP SERPL-CCNC: 59 U/L (ref 40–150)
ALP SERPL-CCNC: 59 U/L (ref 40–150)
ALP SERPL-CCNC: 64 U/L (ref 40–150)
ALP SERPL-CCNC: 64 U/L (ref 40–150)
ALP SERPL-CCNC: 66 U/L (ref 40–150)
ALP SERPL-CCNC: 66 U/L (ref 40–150)
ALP SERPL-CCNC: 72 U/L (ref 40–150)
ALP SERPL-CCNC: 75 U/L (ref 40–150)
ALT SERPL W P-5'-P-CCNC: 10 U/L (ref 0–50)
ALT SERPL W P-5'-P-CCNC: 12 U/L (ref 0–50)
ALT SERPL W P-5'-P-CCNC: 12 U/L (ref 0–50)
ALT SERPL W P-5'-P-CCNC: 8 U/L (ref 0–50)
ALT SERPL W P-5'-P-CCNC: 9 U/L (ref 0–50)
AMORPH CRY #/AREA URNS HPF: ABNORMAL /HPF
AMORPH CRY #/AREA URNS HPF: ABNORMAL /HPF
ANION GAP SERPL CALCULATED.3IONS-SCNC: 1 MMOL/L (ref 3–14)
ANION GAP SERPL CALCULATED.3IONS-SCNC: 2 MMOL/L (ref 3–14)
ANION GAP SERPL CALCULATED.3IONS-SCNC: 2 MMOL/L (ref 3–14)
ANION GAP SERPL CALCULATED.3IONS-SCNC: 3 MMOL/L (ref 3–14)
ANION GAP SERPL CALCULATED.3IONS-SCNC: 4 MMOL/L (ref 3–14)
ANION GAP SERPL CALCULATED.3IONS-SCNC: <1 MMOL/L (ref 3–14)
ANTIBODY SCREEN: NEGATIVE
ANTIBODY SCREEN: NEGATIVE
APPEARANCE UR: ABNORMAL
APTT PPP: 32 SECONDS (ref 22–38)
AST SERPL W P-5'-P-CCNC: 13 U/L (ref 0–45)
AST SERPL W P-5'-P-CCNC: 15 U/L (ref 0–45)
AST SERPL W P-5'-P-CCNC: 16 U/L (ref 0–45)
AST SERPL W P-5'-P-CCNC: 18 U/L (ref 0–45)
AST SERPL W P-5'-P-CCNC: 18 U/L (ref 0–45)
ATRIAL RATE - MUSE: 72 BPM
ATRIAL RATE - MUSE: 73 BPM
BACTERIA BLD CULT: NO GROWTH
BASE EXCESS BLDA CALC-SCNC: 15.9 MMOL/L (ref -9–1.8)
BASE EXCESS BLDV CALC-SCNC: 13.8 MMOL/L (ref -7.7–1.9)
BASOPHILS # BLD AUTO: 0 10E3/UL (ref 0–0.2)
BASOPHILS NFR BLD AUTO: 0 %
BILIRUB SERPL-MCNC: 0.3 MG/DL (ref 0.2–1.3)
BILIRUB SERPL-MCNC: 0.4 MG/DL (ref 0.2–1.3)
BILIRUB SERPL-MCNC: 0.5 MG/DL (ref 0.2–1.3)
BILIRUB UR QL STRIP: NEGATIVE
BKR LAB AP TR RXN INTERPRETATION: NORMAL
BKR LAB AP TRAN RXN RECOMMENDATION: NORMAL
BKR LAB AP TRANS SIGNS AND SX: NORMAL
BKR LAB AP TRANSFUSION REACTION: NORMAL
BLD PROD TYP BPU: NORMAL
BLOOD COMPONENT TYPE: NORMAL
BUN SERPL-MCNC: 13 MG/DL (ref 7–30)
BUN SERPL-MCNC: 14 MG/DL (ref 7–30)
BUN SERPL-MCNC: 16 MG/DL (ref 7–30)
BUN SERPL-MCNC: 18 MG/DL (ref 7–30)
BUN SERPL-MCNC: 20 MG/DL (ref 7–30)
BUN SERPL-MCNC: 21 MG/DL (ref 7–30)
BUN SERPL-MCNC: 22 MG/DL (ref 7–30)
BUN SERPL-MCNC: 22 MG/DL (ref 7–30)
BUN SERPL-MCNC: 28 MG/DL (ref 7–30)
CALCIUM SERPL-MCNC: 8.9 MG/DL (ref 8.5–10.1)
CALCIUM SERPL-MCNC: 9.1 MG/DL (ref 8.5–10.1)
CALCIUM SERPL-MCNC: 9.4 MG/DL (ref 8.5–10.1)
CALCIUM SERPL-MCNC: 9.5 MG/DL (ref 8.5–10.1)
CALCIUM SERPL-MCNC: 9.6 MG/DL (ref 8.5–10.1)
CALCIUM SERPL-MCNC: 9.6 MG/DL (ref 8.5–10.1)
CALCIUM SERPL-MCNC: 9.9 MG/DL (ref 8.5–10.1)
CAOX CRY #/AREA URNS HPF: ABNORMAL /HPF
CHLORIDE BLD-SCNC: 100 MMOL/L (ref 94–109)
CHLORIDE BLD-SCNC: 101 MMOL/L (ref 94–109)
CHLORIDE BLD-SCNC: 103 MMOL/L (ref 94–109)
CHLORIDE BLD-SCNC: 97 MMOL/L (ref 94–109)
CHLORIDE BLD-SCNC: 98 MMOL/L (ref 94–109)
CHLORIDE BLD-SCNC: 99 MMOL/L (ref 94–109)
CO COMPONENTS: NORMAL
CO2 SERPL-SCNC: 38 MMOL/L (ref 20–32)
CO2 SERPL-SCNC: 39 MMOL/L (ref 20–32)
CO2 SERPL-SCNC: 39 MMOL/L (ref 20–32)
CO2 SERPL-SCNC: 40 MMOL/L (ref 20–32)
CO2 SERPL-SCNC: 40 MMOL/L (ref 20–32)
CO2 SERPL-SCNC: 41 MMOL/L (ref 20–32)
CO2 SERPL-SCNC: 42 MMOL/L (ref 20–32)
CODING SYSTEM: NORMAL
COLOR UR AUTO: ABNORMAL
COLOR UR AUTO: YELLOW
COLOR UR AUTO: YELLOW
CREAT SERPL-MCNC: 0.53 MG/DL (ref 0.52–1.04)
CREAT SERPL-MCNC: 0.56 MG/DL (ref 0.52–1.04)
CREAT SERPL-MCNC: 0.57 MG/DL (ref 0.52–1.04)
CREAT SERPL-MCNC: 0.6 MG/DL (ref 0.52–1.04)
CREAT SERPL-MCNC: 0.63 MG/DL (ref 0.52–1.04)
CREAT SERPL-MCNC: 0.65 MG/DL (ref 0.52–1.04)
CREAT SERPL-MCNC: 0.7 MG/DL (ref 0.52–1.04)
CROSSMATCH: NORMAL
DIASTOLIC BLOOD PRESSURE - MUSE: NORMAL MMHG
DIASTOLIC BLOOD PRESSURE - MUSE: NORMAL MMHG
EOSINOPHIL # BLD AUTO: 0 10E3/UL (ref 0–0.7)
EOSINOPHIL # BLD AUTO: 0 10E3/UL (ref 0–0.7)
EOSINOPHIL # BLD AUTO: 0.2 10E3/UL (ref 0–0.7)
EOSINOPHIL # BLD AUTO: 0.2 10E3/UL (ref 0–0.7)
EOSINOPHIL # BLD AUTO: 0.3 10E3/UL (ref 0–0.7)
EOSINOPHIL NFR BLD AUTO: 0 %
EOSINOPHIL NFR BLD AUTO: 1 %
EOSINOPHIL NFR BLD AUTO: 2 %
EOSINOPHIL NFR BLD AUTO: 3 %
ERYTHROCYTE [DISTWIDTH] IN BLOOD BY AUTOMATED COUNT: 24 % (ref 10–15)
ERYTHROCYTE [DISTWIDTH] IN BLOOD BY AUTOMATED COUNT: 24.7 % (ref 10–15)
ERYTHROCYTE [DISTWIDTH] IN BLOOD BY AUTOMATED COUNT: 25.2 % (ref 10–15)
ERYTHROCYTE [DISTWIDTH] IN BLOOD BY AUTOMATED COUNT: 25.8 % (ref 10–15)
ERYTHROCYTE [DISTWIDTH] IN BLOOD BY AUTOMATED COUNT: 26.1 % (ref 10–15)
ERYTHROCYTE [DISTWIDTH] IN BLOOD BY AUTOMATED COUNT: 26.8 % (ref 10–15)
ERYTHROCYTE [DISTWIDTH] IN BLOOD BY AUTOMATED COUNT: 27.7 % (ref 10–15)
ERYTHROCYTE [DISTWIDTH] IN BLOOD BY AUTOMATED COUNT: 27.9 % (ref 10–15)
ERYTHROCYTE [DISTWIDTH] IN BLOOD BY AUTOMATED COUNT: 28.2 % (ref 10–15)
ERYTHROCYTE [DISTWIDTH] IN BLOOD BY AUTOMATED COUNT: 28.7 % (ref 10–15)
ERYTHROCYTE [DISTWIDTH] IN BLOOD BY AUTOMATED COUNT: 29 % (ref 10–15)
ERYTHROCYTE [DISTWIDTH] IN BLOOD BY AUTOMATED COUNT: 29.2 % (ref 10–15)
ERYTHROCYTE [DISTWIDTH] IN BLOOD BY AUTOMATED COUNT: 29.8 % (ref 10–15)
ERYTHROCYTE [DISTWIDTH] IN BLOOD BY AUTOMATED COUNT: ABNORMAL %
FERRITIN SERPL-MCNC: 6 NG/ML (ref 8–252)
GFR SERPL CREATININE-BSD FRML MDRD: 84 ML/MIN/1.73M2
GFR SERPL CREATININE-BSD FRML MDRD: 86 ML/MIN/1.73M2
GFR SERPL CREATININE-BSD FRML MDRD: 87 ML/MIN/1.73M2
GFR SERPL CREATININE-BSD FRML MDRD: 89 ML/MIN/1.73M2
GFR SERPL CREATININE-BSD FRML MDRD: 89 ML/MIN/1.73M2
GFR SERPL CREATININE-BSD FRML MDRD: 90 ML/MIN/1.73M2
GLUCOSE BLD-MCNC: 109 MG/DL (ref 70–99)
GLUCOSE BLD-MCNC: 134 MG/DL (ref 70–99)
GLUCOSE BLD-MCNC: 76 MG/DL (ref 70–99)
GLUCOSE BLD-MCNC: 76 MG/DL (ref 70–99)
GLUCOSE BLD-MCNC: 81 MG/DL (ref 70–99)
GLUCOSE BLD-MCNC: 87 MG/DL (ref 70–99)
GLUCOSE BLD-MCNC: 90 MG/DL (ref 70–99)
GLUCOSE BLD-MCNC: 91 MG/DL (ref 70–99)
GLUCOSE BLD-MCNC: 92 MG/DL (ref 70–99)
GLUCOSE UR STRIP-MCNC: NEGATIVE MG/DL
GRAM STAIN RESULT: NORMAL
GRAM/CULTURE INDICATED?: YES
HCO3 BLD-SCNC: 43 MMOL/L (ref 21–28)
HCO3 BLDV-SCNC: 40 MMOL/L (ref 21–28)
HCT VFR BLD AUTO: 21.6 % (ref 35–47)
HCT VFR BLD AUTO: 22.5 % (ref 35–47)
HCT VFR BLD AUTO: 24.5 % (ref 35–47)
HCT VFR BLD AUTO: 24.9 % (ref 35–47)
HCT VFR BLD AUTO: 25.6 % (ref 35–47)
HCT VFR BLD AUTO: 26.2 % (ref 35–47)
HCT VFR BLD AUTO: 26.5 % (ref 35–47)
HCT VFR BLD AUTO: 26.6 % (ref 35–47)
HCT VFR BLD AUTO: 26.8 % (ref 35–47)
HCT VFR BLD AUTO: 26.8 % (ref 35–47)
HCT VFR BLD AUTO: 26.9 % (ref 35–47)
HCT VFR BLD AUTO: 27.5 % (ref 35–47)
HCT VFR BLD AUTO: 27.6 % (ref 35–47)
HCT VFR BLD AUTO: 27.9 % (ref 35–47)
HCT VFR BLD AUTO: 28.2 % (ref 35–47)
HCT VFR BLD AUTO: 28.6 % (ref 35–47)
HCT VFR BLD AUTO: 29.8 % (ref 35–47)
HGB BLD-MCNC: 5.3 G/DL (ref 11.7–15.7)
HGB BLD-MCNC: 6.3 G/DL (ref 11.7–15.7)
HGB BLD-MCNC: 6.6 G/DL (ref 11.7–15.7)
HGB BLD-MCNC: 7.1 G/DL (ref 11.7–15.7)
HGB BLD-MCNC: 7.2 G/DL (ref 11.7–15.7)
HGB BLD-MCNC: 7.2 G/DL (ref 11.7–15.7)
HGB BLD-MCNC: 7.3 G/DL (ref 11.7–15.7)
HGB BLD-MCNC: 7.3 G/DL (ref 11.7–15.7)
HGB BLD-MCNC: 7.4 G/DL (ref 11.7–15.7)
HGB BLD-MCNC: 7.5 G/DL (ref 11.7–15.7)
HGB BLD-MCNC: 7.6 G/DL (ref 11.7–15.7)
HGB BLD-MCNC: 7.6 G/DL (ref 11.7–15.7)
HGB BLD-MCNC: 7.7 G/DL (ref 11.7–15.7)
HGB BLD-MCNC: 7.9 G/DL (ref 11.7–15.7)
HGB BLD-MCNC: 8 G/DL (ref 11.7–15.7)
HGB BLD-MCNC: 8 G/DL (ref 11.7–15.7)
HGB BLD-MCNC: 8.2 G/DL (ref 11.7–15.7)
HGB BLD-MCNC: 8.2 G/DL (ref 11.7–15.7)
HGB UR QL STRIP: NEGATIVE
HOLD SPECIMEN: NORMAL
IMM GRANULOCYTES # BLD: 0 10E3/UL
IMM GRANULOCYTES # BLD: 0.1 10E3/UL
IMM GRANULOCYTES # BLD: 0.1 10E3/UL
IMM GRANULOCYTES NFR BLD: 0 %
IMM GRANULOCYTES NFR BLD: 1 %
IMM GRANULOCYTES NFR BLD: 1 %
INR PPP: 1.02 (ref 0.85–1.15)
INTERPRETATION ECG - MUSE: NORMAL
INTERPRETATION ECG - MUSE: NORMAL
IRON SATN MFR SERPL: 2 % (ref 15–46)
IRON SERPL-MCNC: 9 UG/DL (ref 35–180)
ISSUE DATE AND TIME: NORMAL
KETONES UR STRIP-MCNC: NEGATIVE MG/DL
LEUKOCYTE ESTERASE UR QL STRIP: ABNORMAL
LEUKOCYTE ESTERASE UR QL STRIP: NEGATIVE
LEUKOCYTE ESTERASE UR QL STRIP: NEGATIVE
LVEF ECHO: NORMAL
LYMPHOCYTES # BLD AUTO: 0.4 10E3/UL (ref 0.8–5.3)
LYMPHOCYTES # BLD AUTO: 0.6 10E3/UL (ref 0.8–5.3)
LYMPHOCYTES # BLD AUTO: 0.7 10E3/UL (ref 0.8–5.3)
LYMPHOCYTES # BLD AUTO: 0.8 10E3/UL (ref 0.8–5.3)
LYMPHOCYTES # BLD AUTO: 1 10E3/UL (ref 0.8–5.3)
LYMPHOCYTES # BLD AUTO: 1.1 10E3/UL (ref 0.8–5.3)
LYMPHOCYTES # BLD AUTO: 1.2 10E3/UL (ref 0.8–5.3)
LYMPHOCYTES NFR BLD AUTO: 11 %
LYMPHOCYTES NFR BLD AUTO: 13 %
LYMPHOCYTES NFR BLD AUTO: 15 %
LYMPHOCYTES NFR BLD AUTO: 5 %
LYMPHOCYTES NFR BLD AUTO: 6 %
LYMPHOCYTES NFR BLD AUTO: 7 %
LYMPHOCYTES NFR BLD AUTO: 8 %
MAGNESIUM SERPL-MCNC: 2 MG/DL (ref 1.6–2.3)
MCH RBC QN AUTO: 17.4 PG (ref 26.5–33)
MCH RBC QN AUTO: 20.7 PG (ref 26.5–33)
MCH RBC QN AUTO: 20.8 PG (ref 26.5–33)
MCH RBC QN AUTO: 20.8 PG (ref 26.5–33)
MCH RBC QN AUTO: 21 PG (ref 26.5–33)
MCH RBC QN AUTO: 21.2 PG (ref 26.5–33)
MCH RBC QN AUTO: 21.5 PG (ref 26.5–33)
MCH RBC QN AUTO: 21.8 PG (ref 26.5–33)
MCH RBC QN AUTO: 22 PG (ref 26.5–33)
MCH RBC QN AUTO: 22.1 PG (ref 26.5–33)
MCH RBC QN AUTO: 22.5 PG (ref 26.5–33)
MCH RBC QN AUTO: 24.1 PG (ref 26.5–33)
MCH RBC QN AUTO: 24.3 PG (ref 26.5–33)
MCH RBC QN AUTO: 24.8 PG (ref 26.5–33)
MCHC RBC AUTO-ENTMCNC: 24.5 G/DL (ref 31.5–36.5)
MCHC RBC AUTO-ENTMCNC: 26.8 G/DL (ref 31.5–36.5)
MCHC RBC AUTO-ENTMCNC: 26.9 G/DL (ref 31.5–36.5)
MCHC RBC AUTO-ENTMCNC: 27 G/DL (ref 31.5–36.5)
MCHC RBC AUTO-ENTMCNC: 27.1 G/DL (ref 31.5–36.5)
MCHC RBC AUTO-ENTMCNC: 27.2 G/DL (ref 31.5–36.5)
MCHC RBC AUTO-ENTMCNC: 27.7 G/DL (ref 31.5–36.5)
MCHC RBC AUTO-ENTMCNC: 28 G/DL (ref 31.5–36.5)
MCHC RBC AUTO-ENTMCNC: 28.5 G/DL (ref 31.5–36.5)
MCHC RBC AUTO-ENTMCNC: 28.6 G/DL (ref 31.5–36.5)
MCHC RBC AUTO-ENTMCNC: 29.4 G/DL (ref 31.5–36.5)
MCHC RBC AUTO-ENTMCNC: 29.4 G/DL (ref 31.5–36.5)
MCV RBC AUTO: 71 FL (ref 78–100)
MCV RBC AUTO: 75 FL (ref 78–100)
MCV RBC AUTO: 77 FL (ref 78–100)
MCV RBC AUTO: 78 FL (ref 78–100)
MCV RBC AUTO: 78 FL (ref 78–100)
MCV RBC AUTO: 79 FL (ref 78–100)
MCV RBC AUTO: 80 FL (ref 78–100)
MCV RBC AUTO: 82 FL (ref 78–100)
MCV RBC AUTO: 85 FL (ref 78–100)
MCV RBC AUTO: 85 FL (ref 78–100)
MONOCYTES # BLD AUTO: 0.8 10E3/UL (ref 0–1.3)
MONOCYTES # BLD AUTO: 0.9 10E3/UL (ref 0–1.3)
MONOCYTES # BLD AUTO: 1 10E3/UL (ref 0–1.3)
MONOCYTES # BLD AUTO: 1 10E3/UL (ref 0–1.3)
MONOCYTES # BLD AUTO: 1.1 10E3/UL (ref 0–1.3)
MONOCYTES # BLD AUTO: 1.2 10E3/UL (ref 0–1.3)
MONOCYTES # BLD AUTO: 1.4 10E3/UL (ref 0–1.3)
MONOCYTES NFR BLD AUTO: 11 %
MONOCYTES NFR BLD AUTO: 12 %
MONOCYTES NFR BLD AUTO: 13 %
MONOCYTES NFR BLD AUTO: 13 %
MONOCYTES NFR BLD AUTO: 9 %
MUCOUS THREADS #/AREA URNS LPF: PRESENT /LPF
NEUTROPHILS # BLD AUTO: 5.3 10E3/UL (ref 1.6–8.3)
NEUTROPHILS # BLD AUTO: 5.6 10E3/UL (ref 1.6–8.3)
NEUTROPHILS # BLD AUTO: 6.2 10E3/UL (ref 1.6–8.3)
NEUTROPHILS # BLD AUTO: 6.8 10E3/UL (ref 1.6–8.3)
NEUTROPHILS # BLD AUTO: 7.3 10E3/UL (ref 1.6–8.3)
NEUTROPHILS # BLD AUTO: 7.4 10E3/UL (ref 1.6–8.3)
NEUTROPHILS # BLD AUTO: 9.9 10E3/UL (ref 1.6–8.3)
NEUTROPHILS NFR BLD AUTO: 69 %
NEUTROPHILS NFR BLD AUTO: 71 %
NEUTROPHILS NFR BLD AUTO: 73 %
NEUTROPHILS NFR BLD AUTO: 77 %
NEUTROPHILS NFR BLD AUTO: 81 %
NEUTROPHILS NFR BLD AUTO: 82 %
NEUTROPHILS NFR BLD AUTO: 83 %
NITRATE UR QL: NEGATIVE
NRBC # BLD AUTO: 0 10E3/UL
NRBC BLD AUTO-RTO: 0 /100
NT-PROBNP SERPL-MCNC: 1469 PG/ML (ref 0–450)
NT-PROBNP SERPL-MCNC: 1485 PG/ML (ref 0–1800)
O2/TOTAL GAS SETTING VFR VENT: 4 %
O2/TOTAL GAS SETTING VFR VENT: 5 %
OTHER UNITS TRANSFUSED: NORMAL
P AXIS - MUSE: 67 DEGREES
P AXIS - MUSE: 68 DEGREES
PATH REPORT.COMMENTS IMP SPEC: NORMAL
PATH REPORT.COMMENTS IMP SPEC: NORMAL
PCO2 BLD: 72 MM HG (ref 35–45)
PCO2 BLDV: 62 MM HG (ref 40–50)
PH BLD: 7.38 [PH] (ref 7.35–7.45)
PH BLDV: 7.41 [PH] (ref 7.32–7.43)
PH UR STRIP: 5 [PH] (ref 5–7)
PH UR STRIP: 8 [PH] (ref 5–7)
PH UR STRIP: 9 [PH] (ref 5–7)
PLATELET # BLD AUTO: 242 10E3/UL (ref 150–450)
PLATELET # BLD AUTO: 259 10E3/UL (ref 150–450)
PLATELET # BLD AUTO: 267 10E3/UL (ref 150–450)
PLATELET # BLD AUTO: 281 10E3/UL (ref 150–450)
PLATELET # BLD AUTO: 308 10E3/UL (ref 150–450)
PLATELET # BLD AUTO: 327 10E3/UL (ref 150–450)
PLATELET # BLD AUTO: 329 10E3/UL (ref 150–450)
PLATELET # BLD AUTO: 357 10E3/UL (ref 150–450)
PLATELET # BLD AUTO: 366 10E3/UL (ref 150–450)
PLATELET # BLD AUTO: 407 10E3/UL (ref 150–450)
PLATELET # BLD AUTO: 426 10E3/UL (ref 150–450)
PLATELET # BLD AUTO: 430 10E3/UL (ref 150–450)
PLATELET # BLD AUTO: 445 10E3/UL (ref 150–450)
PLATELET # BLD AUTO: 521 10E3/UL (ref 150–450)
PO2 BLD: 60 MM HG (ref 80–105)
PO2 BLDV: 89 MM HG (ref 25–47)
POST RXN CLERICAL CHECK: NORMAL
POST SPECIMEN APPEARANCE: NORMAL
POST-RXN ABO/RH: NORMAL
POST-RXN POLY DAT: NORMAL
POTASSIUM BLD-SCNC: 3.2 MMOL/L (ref 3.4–5.3)
POTASSIUM BLD-SCNC: 3.2 MMOL/L (ref 3.4–5.3)
POTASSIUM BLD-SCNC: 3.3 MMOL/L (ref 3.4–5.3)
POTASSIUM BLD-SCNC: 3.5 MMOL/L (ref 3.4–5.3)
POTASSIUM BLD-SCNC: 3.7 MMOL/L (ref 3.4–5.3)
POTASSIUM BLD-SCNC: 3.9 MMOL/L (ref 3.4–5.3)
POTASSIUM BLD-SCNC: 4.2 MMOL/L (ref 3.4–5.3)
POTASSIUM BLD-SCNC: 4.4 MMOL/L (ref 3.4–5.3)
POTASSIUM BLD-SCNC: 4.4 MMOL/L (ref 3.4–5.3)
PR INTERVAL - MUSE: 164 MS
PR INTERVAL - MUSE: 166 MS
PROCALCITONIN SERPL-MCNC: 0.07 NG/ML
PRODUCT CODE: NORMAL
PROT SERPL-MCNC: 5.5 G/DL (ref 6.8–8.8)
PROT SERPL-MCNC: 5.6 G/DL (ref 6.8–8.8)
PROT SERPL-MCNC: 5.7 G/DL (ref 6.8–8.8)
PROT SERPL-MCNC: 5.8 G/DL (ref 6.8–8.8)
PROT SERPL-MCNC: 6.2 G/DL (ref 6.8–8.8)
PROT SERPL-MCNC: 6.8 G/DL (ref 6.8–8.8)
QRS DURATION - MUSE: 86 MS
QRS DURATION - MUSE: 88 MS
QT - MUSE: 386 MS
QT - MUSE: 430 MS
QTC - MUSE: 425 MS
QTC - MUSE: 470 MS
R AXIS - MUSE: 46 DEGREES
R AXIS - MUSE: 46 DEGREES
RBC # BLD AUTO: 2.8 10E6/UL (ref 3.8–5.2)
RBC # BLD AUTO: 2.9 10E6/UL (ref 3.8–5.2)
RBC # BLD AUTO: 2.92 10E6/UL (ref 3.8–5.2)
RBC # BLD AUTO: 3.04 10E6/UL (ref 3.8–5.2)
RBC # BLD AUTO: 3.23 10E6/UL (ref 3.8–5.2)
RBC # BLD AUTO: 3.28 10E6/UL (ref 3.8–5.2)
RBC # BLD AUTO: 3.3 10E6/UL (ref 3.8–5.2)
RBC # BLD AUTO: 3.44 10E6/UL (ref 3.8–5.2)
RBC # BLD AUTO: 3.47 10E6/UL (ref 3.8–5.2)
RBC # BLD AUTO: 3.53 10E6/UL (ref 3.8–5.2)
RBC # BLD AUTO: 3.57 10E6/UL (ref 3.8–5.2)
RBC # BLD AUTO: 3.58 10E6/UL (ref 3.8–5.2)
RBC # BLD AUTO: 3.6 10E6/UL (ref 3.8–5.2)
RBC # BLD AUTO: 3.84 10E6/UL (ref 3.8–5.2)
RBC URINE: 0 /HPF
RBC URINE: 1 /HPF
RBC URINE: 2 /HPF
RETICS # AUTO: 0.03 10E6/UL (ref 0.03–0.1)
RETICS/RBC NFR AUTO: 1 % (ref 0.5–2)
SARS-COV-2 RNA RESP QL NAA+PROBE: NEGATIVE
SODIUM SERPL-SCNC: 139 MMOL/L (ref 133–144)
SODIUM SERPL-SCNC: 139 MMOL/L (ref 133–144)
SODIUM SERPL-SCNC: 140 MMOL/L (ref 133–144)
SODIUM SERPL-SCNC: 141 MMOL/L (ref 133–144)
SODIUM SERPL-SCNC: 142 MMOL/L (ref 133–144)
SODIUM SERPL-SCNC: 143 MMOL/L (ref 133–144)
SODIUM SERPL-SCNC: 144 MMOL/L (ref 133–144)
SP GR UR STRIP: 1.01 (ref 1–1.03)
SP GR UR STRIP: 1.01 (ref 1–1.03)
SP GR UR STRIP: 1.02 (ref 1–1.03)
SPECIMEN EXPIRATION DATE: NORMAL
SPECIMEN EXPIRATION DATE: NORMAL
SQUAMOUS EPITHELIAL: <1 /HPF
SYSTOLIC BLOOD PRESSURE - MUSE: NORMAL MMHG
SYSTOLIC BLOOD PRESSURE - MUSE: NORMAL MMHG
T AXIS - MUSE: 101 DEGREES
T AXIS - MUSE: 39 DEGREES
TIBC SERPL-MCNC: 475 UG/DL (ref 240–430)
TROPONIN I SERPL HS-MCNC: 185 NG/L
TROPONIN I SERPL HS-MCNC: 186 NG/L
TROPONIN I SERPL HS-MCNC: 193 NG/L
TROPONIN I SERPL HS-MCNC: 202 NG/L
TSH SERPL DL<=0.005 MIU/L-ACNC: 1.96 MU/L (ref 0.4–4)
UNIT ABO/RH: NORMAL
UNIT NUMBER: NORMAL
UNIT STATUS: NORMAL
UNIT TYPE ISBT: 7300
UROBILINOGEN UR STRIP-MCNC: NORMAL MG/DL
VENTRICULAR RATE- MUSE: 72 BPM
VENTRICULAR RATE- MUSE: 73 BPM
WBC # BLD AUTO: 12.3 10E3/UL (ref 4–11)
WBC # BLD AUTO: 12.3 10E3/UL (ref 4–11)
WBC # BLD AUTO: 5.6 10E3/UL (ref 4–11)
WBC # BLD AUTO: 5.8 10E3/UL (ref 4–11)
WBC # BLD AUTO: 7.6 10E3/UL (ref 4–11)
WBC # BLD AUTO: 7.9 10E3/UL (ref 4–11)
WBC # BLD AUTO: 8.1 10E3/UL (ref 4–11)
WBC # BLD AUTO: 8.2 10E3/UL (ref 4–11)
WBC # BLD AUTO: 8.6 10E3/UL (ref 4–11)
WBC # BLD AUTO: 8.9 10E3/UL (ref 4–11)
WBC # BLD AUTO: 9.3 10E3/UL (ref 4–11)
WBC # BLD AUTO: 9.5 10E3/UL (ref 4–11)
WBC # BLD AUTO: 9.6 10E3/UL (ref 4–11)
WBC # BLD AUTO: 9.8 10E3/UL (ref 4–11)
WBC URINE: 0 /HPF
WBC URINE: 3 /HPF
WBC URINE: 7 /HPF

## 2022-01-01 PROCEDURE — 250N000013 HC RX MED GY IP 250 OP 250 PS 637: Performed by: INTERNAL MEDICINE

## 2022-01-01 PROCEDURE — 94660 CPAP INITIATION&MGMT: CPT

## 2022-01-01 PROCEDURE — 250N000011 HC RX IP 250 OP 636: Performed by: NURSE PRACTITIONER

## 2022-01-01 PROCEDURE — 86923 COMPATIBILITY TEST ELECTRIC: CPT

## 2022-01-01 PROCEDURE — A9552 F18 FDG: HCPCS | Performed by: STUDENT IN AN ORGANIZED HEALTH CARE EDUCATION/TRAINING PROGRAM

## 2022-01-01 PROCEDURE — 93306 TTE W/DOPPLER COMPLETE: CPT

## 2022-01-01 PROCEDURE — 96375 TX/PRO/DX INJ NEW DRUG ADDON: CPT | Performed by: EMERGENCY MEDICINE

## 2022-01-01 PROCEDURE — 99285 EMERGENCY DEPT VISIT HI MDM: CPT | Performed by: EMERGENCY MEDICINE

## 2022-01-01 PROCEDURE — 99207 PR CDG-MDM COMPONENT: MEETS MODERATE - DOWN CODED: CPT | Performed by: INTERNAL MEDICINE

## 2022-01-01 PROCEDURE — 97535 SELF CARE MNGMENT TRAINING: CPT | Mod: GO

## 2022-01-01 PROCEDURE — G1004 CDSM NDSC: HCPCS | Mod: GC | Performed by: RADIOLOGY

## 2022-01-01 PROCEDURE — 85004 AUTOMATED DIFF WBC COUNT: CPT | Performed by: INTERNAL MEDICINE

## 2022-01-01 PROCEDURE — 99316 NF DSCHRG MGMT 30 MIN+: CPT | Performed by: NURSE PRACTITIONER

## 2022-01-01 PROCEDURE — 83880 ASSAY OF NATRIURETIC PEPTIDE: CPT | Performed by: PHYSICIAN ASSISTANT

## 2022-01-01 PROCEDURE — 99305 1ST NF CARE MODERATE MDM 35: CPT | Performed by: INTERNAL MEDICINE

## 2022-01-01 PROCEDURE — 80053 COMPREHEN METABOLIC PANEL: CPT | Performed by: INTERNAL MEDICINE

## 2022-01-01 PROCEDURE — 250N000013 HC RX MED GY IP 250 OP 250 PS 637: Performed by: PHYSICIAN ASSISTANT

## 2022-01-01 PROCEDURE — 80048 BASIC METABOLIC PNL TOTAL CA: CPT | Performed by: PHYSICIAN ASSISTANT

## 2022-01-01 PROCEDURE — 99284 EMERGENCY DEPT VISIT MOD MDM: CPT | Mod: 25 | Performed by: INTERNAL MEDICINE

## 2022-01-01 PROCEDURE — 36600 WITHDRAWAL OF ARTERIAL BLOOD: CPT

## 2022-01-01 PROCEDURE — 36430 TRANSFUSION BLD/BLD COMPNT: CPT | Performed by: EMERGENCY MEDICINE

## 2022-01-01 PROCEDURE — 120N000009 HC R&B SNF

## 2022-01-01 PROCEDURE — P9016 RBC LEUKOCYTES REDUCED: HCPCS | Performed by: EMERGENCY MEDICINE

## 2022-01-01 PROCEDURE — 97530 THERAPEUTIC ACTIVITIES: CPT | Mod: GO

## 2022-01-01 PROCEDURE — 85027 COMPLETE CBC AUTOMATED: CPT | Performed by: NURSE PRACTITIONER

## 2022-01-01 PROCEDURE — 99222 1ST HOSP IP/OBS MODERATE 55: CPT | Performed by: PHYSICIAN ASSISTANT

## 2022-01-01 PROCEDURE — 81001 URINALYSIS AUTO W/SCOPE: CPT | Performed by: NURSE PRACTITIONER

## 2022-01-01 PROCEDURE — 99221 1ST HOSP IP/OBS SF/LOW 40: CPT | Mod: 25 | Performed by: INTERNAL MEDICINE

## 2022-01-01 PROCEDURE — 97116 GAIT TRAINING THERAPY: CPT | Mod: GP | Performed by: REHABILITATION PRACTITIONER

## 2022-01-01 PROCEDURE — 250N000011 HC RX IP 250 OP 636: Performed by: PHYSICIAN ASSISTANT

## 2022-01-01 PROCEDURE — 73110 X-RAY EXAM OF WRIST: CPT | Mod: 26 | Performed by: RADIOLOGY

## 2022-01-01 PROCEDURE — 120N000005 HC R&B MS OVERFLOW UMMC

## 2022-01-01 PROCEDURE — 99232 SBSQ HOSP IP/OBS MODERATE 35: CPT | Performed by: INTERNAL MEDICINE

## 2022-01-01 PROCEDURE — P9016 RBC LEUKOCYTES REDUCED: HCPCS | Performed by: PHYSICIAN ASSISTANT

## 2022-01-01 PROCEDURE — 73070 X-RAY EXAM OF ELBOW: CPT | Mod: 26 | Performed by: RADIOLOGY

## 2022-01-01 PROCEDURE — 36415 COLL VENOUS BLD VENIPUNCTURE: CPT | Performed by: PHYSICIAN ASSISTANT

## 2022-01-01 PROCEDURE — 258N000003 HC RX IP 258 OP 636: Performed by: PHYSICIAN ASSISTANT

## 2022-01-01 PROCEDURE — 85025 COMPLETE CBC W/AUTO DIFF WBC: CPT | Performed by: INTERNAL MEDICINE

## 2022-01-01 PROCEDURE — 97165 OT EVAL LOW COMPLEX 30 MIN: CPT | Mod: GO

## 2022-01-01 PROCEDURE — 84145 PROCALCITONIN (PCT): CPT | Performed by: NURSE PRACTITIONER

## 2022-01-01 PROCEDURE — 97530 THERAPEUTIC ACTIVITIES: CPT | Mod: GP | Performed by: REHABILITATION PRACTITIONER

## 2022-01-01 PROCEDURE — 5A09357 ASSISTANCE WITH RESPIRATORY VENTILATION, LESS THAN 24 CONSECUTIVE HOURS, CONTINUOUS POSITIVE AIRWAY PRESSURE: ICD-10-PCS | Performed by: INTERNAL MEDICINE

## 2022-01-01 PROCEDURE — 250N000011 HC RX IP 250 OP 636: Performed by: INTERNAL MEDICINE

## 2022-01-01 PROCEDURE — 85027 COMPLETE CBC AUTOMATED: CPT | Performed by: PHYSICIAN ASSISTANT

## 2022-01-01 PROCEDURE — 85018 HEMOGLOBIN: CPT | Performed by: PHYSICIAN ASSISTANT

## 2022-01-01 PROCEDURE — U0005 INFEC AGEN DETEC AMPLI PROBE: HCPCS | Performed by: PHYSICIAN ASSISTANT

## 2022-01-01 PROCEDURE — 36415 COLL VENOUS BLD VENIPUNCTURE: CPT | Performed by: NURSE PRACTITIONER

## 2022-01-01 PROCEDURE — 97530 THERAPEUTIC ACTIVITIES: CPT | Mod: GP

## 2022-01-01 PROCEDURE — 250N000013 HC RX MED GY IP 250 OP 250 PS 637: Performed by: NURSE PRACTITIONER

## 2022-01-01 PROCEDURE — U0005 INFEC AGEN DETEC AMPLI PROBE: HCPCS | Performed by: INTERNAL MEDICINE

## 2022-01-01 PROCEDURE — 36415 COLL VENOUS BLD VENIPUNCTURE: CPT | Performed by: INTERNAL MEDICINE

## 2022-01-01 PROCEDURE — 99233 SBSQ HOSP IP/OBS HIGH 50: CPT | Performed by: INTERNAL MEDICINE

## 2022-01-01 PROCEDURE — 94640 AIRWAY INHALATION TREATMENT: CPT

## 2022-01-01 PROCEDURE — 36415 COLL VENOUS BLD VENIPUNCTURE: CPT | Performed by: EMERGENCY MEDICINE

## 2022-01-01 PROCEDURE — 86923 COMPATIBILITY TEST ELECTRIC: CPT | Performed by: PHYSICIAN ASSISTANT

## 2022-01-01 PROCEDURE — 84443 ASSAY THYROID STIM HORMONE: CPT | Performed by: PATHOLOGY

## 2022-01-01 PROCEDURE — 82040 ASSAY OF SERUM ALBUMIN: CPT | Performed by: INTERNAL MEDICINE

## 2022-01-01 PROCEDURE — 86850 RBC ANTIBODY SCREEN: CPT | Performed by: INTERNAL MEDICINE

## 2022-01-01 PROCEDURE — 258N000003 HC RX IP 258 OP 636: Performed by: INTERNAL MEDICINE

## 2022-01-01 PROCEDURE — 99207 PR CDG-CODE INCORRECT PER BILLING BASED ON TIME: CPT | Performed by: INTERNAL MEDICINE

## 2022-01-01 PROCEDURE — 85025 COMPLETE CBC W/AUTO DIFF WBC: CPT | Performed by: PATHOLOGY

## 2022-01-01 PROCEDURE — C9113 INJ PANTOPRAZOLE SODIUM, VIA: HCPCS | Performed by: PHYSICIAN ASSISTANT

## 2022-01-01 PROCEDURE — 99207 PR CDG-MDM COMPONENT: MEETS HIGH - UP CODED: CPT | Performed by: INTERNAL MEDICINE

## 2022-01-01 PROCEDURE — 99306 1ST NF CARE HIGH MDM 50: CPT | Performed by: CLINICAL NURSE SPECIALIST

## 2022-01-01 PROCEDURE — 84484 ASSAY OF TROPONIN QUANT: CPT | Performed by: PHYSICIAN ASSISTANT

## 2022-01-01 PROCEDURE — 80053 COMPREHEN METABOLIC PANEL: CPT | Performed by: PATHOLOGY

## 2022-01-01 PROCEDURE — 93306 TTE W/DOPPLER COMPLETE: CPT | Mod: 26 | Performed by: INTERNAL MEDICINE

## 2022-01-01 PROCEDURE — 999N000157 HC STATISTIC RCP TIME EA 10 MIN

## 2022-01-01 PROCEDURE — 82728 ASSAY OF FERRITIN: CPT | Performed by: PATHOLOGY

## 2022-01-01 PROCEDURE — 99207 PR APP CREDIT; MD BILLING SHARED VISIT: CPT | Performed by: PHYSICIAN ASSISTANT

## 2022-01-01 PROCEDURE — 86078 PHYS BLOOD BANK SERV REACTJ: CPT | Mod: GC | Performed by: PATHOLOGY

## 2022-01-01 PROCEDURE — 86850 RBC ANTIBODY SCREEN: CPT | Performed by: EMERGENCY MEDICINE

## 2022-01-01 PROCEDURE — 99215 OFFICE O/P EST HI 40 MIN: CPT | Performed by: INTERNAL MEDICINE

## 2022-01-01 PROCEDURE — 97110 THERAPEUTIC EXERCISES: CPT | Mod: GP | Performed by: PHYSICAL THERAPIST

## 2022-01-01 PROCEDURE — 25600 CLTX DST RDL FX/EPHYS SEP WO: CPT | Mod: 54 | Performed by: INTERNAL MEDICINE

## 2022-01-01 PROCEDURE — 022N000001 HC SNF RUG CODE OPNP

## 2022-01-01 PROCEDURE — 99238 HOSP IP/OBS DSCHRG MGMT 30/<: CPT | Performed by: INTERNAL MEDICINE

## 2022-01-01 PROCEDURE — 73110 X-RAY EXAM OF WRIST: CPT | Mod: LT

## 2022-01-01 PROCEDURE — 250N000009 HC RX 250: Performed by: PHYSICIAN ASSISTANT

## 2022-01-01 PROCEDURE — 82803 BLOOD GASES ANY COMBINATION: CPT | Performed by: PHYSICIAN ASSISTANT

## 2022-01-01 PROCEDURE — 84132 ASSAY OF SERUM POTASSIUM: CPT | Performed by: PHYSICIAN ASSISTANT

## 2022-01-01 PROCEDURE — 94640 AIRWAY INHALATION TREATMENT: CPT | Mod: 76

## 2022-01-01 PROCEDURE — 85027 COMPLETE CBC AUTOMATED: CPT | Performed by: INTERNAL MEDICINE

## 2022-01-01 PROCEDURE — 97110 THERAPEUTIC EXERCISES: CPT | Mod: GP

## 2022-01-01 PROCEDURE — 86923 COMPATIBILITY TEST ELECTRIC: CPT | Performed by: EMERGENCY MEDICINE

## 2022-01-01 PROCEDURE — 99207 PR CDG-CODE CATEGORY CHANGED: CPT | Performed by: NURSE PRACTITIONER

## 2022-01-01 PROCEDURE — 96374 THER/PROPH/DIAG INJ IV PUSH: CPT | Performed by: EMERGENCY MEDICINE

## 2022-01-01 PROCEDURE — 99309 SBSQ NF CARE MODERATE MDM 30: CPT | Performed by: PHYSICIAN ASSISTANT

## 2022-01-01 PROCEDURE — 86901 BLOOD TYPING SEROLOGIC RH(D): CPT | Performed by: EMERGENCY MEDICINE

## 2022-01-01 PROCEDURE — 81001 URINALYSIS AUTO W/SCOPE: CPT | Mod: ORL | Performed by: INTERNAL MEDICINE

## 2022-01-01 PROCEDURE — 74177 CT ABD & PELVIS W/CONTRAST: CPT | Mod: 26 | Performed by: RADIOLOGY

## 2022-01-01 PROCEDURE — 250N000009 HC RX 250: Performed by: INTERNAL MEDICINE

## 2022-01-01 PROCEDURE — 25600 CLTX DST RDL FX/EPHYS SEP WO: CPT | Performed by: INTERNAL MEDICINE

## 2022-01-01 PROCEDURE — 83880 ASSAY OF NATRIURETIC PEPTIDE: CPT | Performed by: PATHOLOGY

## 2022-01-01 PROCEDURE — 85014 HEMATOCRIT: CPT | Performed by: INTERNAL MEDICINE

## 2022-01-01 PROCEDURE — P9016 RBC LEUKOCYTES REDUCED: HCPCS

## 2022-01-01 PROCEDURE — 83735 ASSAY OF MAGNESIUM: CPT | Performed by: PHYSICIAN ASSISTANT

## 2022-01-01 PROCEDURE — 343N000001 HC RX 343: Performed by: STUDENT IN AN ORGANIZED HEALTH CARE EDUCATION/TRAINING PROGRAM

## 2022-01-01 PROCEDURE — 97162 PT EVAL MOD COMPLEX 30 MIN: CPT | Mod: GP | Performed by: REHABILITATION PRACTITIONER

## 2022-01-01 PROCEDURE — 81001 URINALYSIS AUTO W/SCOPE: CPT | Mod: ORL | Performed by: FAMILY MEDICINE

## 2022-01-01 PROCEDURE — 97110 THERAPEUTIC EXERCISES: CPT | Mod: GO

## 2022-01-01 PROCEDURE — 85045 AUTOMATED RETICULOCYTE COUNT: CPT | Performed by: PHYSICIAN ASSISTANT

## 2022-01-01 PROCEDURE — 71046 X-RAY EXAM CHEST 2 VIEWS: CPT | Performed by: RADIOLOGY

## 2022-01-01 PROCEDURE — 250N000011 HC RX IP 250 OP 636: Performed by: STUDENT IN AN ORGANIZED HEALTH CARE EDUCATION/TRAINING PROGRAM

## 2022-01-01 PROCEDURE — 258N000003 HC RX IP 258 OP 636

## 2022-01-01 PROCEDURE — 85018 HEMOGLOBIN: CPT | Performed by: INTERNAL MEDICINE

## 2022-01-01 PROCEDURE — 78816 PET IMAGE W/CT FULL BODY: CPT | Mod: 26 | Performed by: RADIOLOGY

## 2022-01-01 PROCEDURE — 97162 PT EVAL MOD COMPLEX 30 MIN: CPT | Mod: GP | Performed by: PHYSICAL THERAPIST

## 2022-01-01 PROCEDURE — 74177 CT ABD & PELVIS W/CONTRAST: CPT | Mod: 59,ME,PS

## 2022-01-01 PROCEDURE — 99285 EMERGENCY DEPT VISIT HI MDM: CPT | Mod: 25 | Performed by: EMERGENCY MEDICINE

## 2022-01-01 PROCEDURE — 86901 BLOOD TYPING SEROLOGIC RH(D): CPT | Performed by: INTERNAL MEDICINE

## 2022-01-01 PROCEDURE — 82803 BLOOD GASES ANY COMBINATION: CPT | Performed by: INTERNAL MEDICINE

## 2022-01-01 PROCEDURE — 99223 1ST HOSP IP/OBS HIGH 75: CPT | Mod: AI | Performed by: INTERNAL MEDICINE

## 2022-01-01 PROCEDURE — 36415 COLL VENOUS BLD VENIPUNCTURE: CPT | Performed by: PATHOLOGY

## 2022-01-01 PROCEDURE — 97530 THERAPEUTIC ACTIVITIES: CPT | Mod: GP | Performed by: PHYSICAL THERAPIST

## 2022-01-01 PROCEDURE — 83550 IRON BINDING TEST: CPT | Performed by: PATHOLOGY

## 2022-01-01 PROCEDURE — 85610 PROTHROMBIN TIME: CPT | Performed by: INTERNAL MEDICINE

## 2022-01-01 PROCEDURE — U0003 INFECTIOUS AGENT DETECTION BY NUCLEIC ACID (DNA OR RNA); SEVERE ACUTE RESPIRATORY SYNDROME CORONAVIRUS 2 (SARS-COV-2) (CORONAVIRUS DISEASE [COVID-19]), AMPLIFIED PROBE TECHNIQUE, MAKING USE OF HIGH THROUGHPUT TECHNOLOGIES AS DESCRIBED BY CMS-2020-01-R: HCPCS | Performed by: EMERGENCY MEDICINE

## 2022-01-01 PROCEDURE — 97116 GAIT TRAINING THERAPY: CPT | Mod: GP

## 2022-01-01 PROCEDURE — 71260 CT THORAX DX C+: CPT | Mod: 26 | Performed by: RADIOLOGY

## 2022-01-01 PROCEDURE — 250N000009 HC RX 250: Performed by: NURSE PRACTITIONER

## 2022-01-01 PROCEDURE — 85730 THROMBOPLASTIN TIME PARTIAL: CPT | Performed by: INTERNAL MEDICINE

## 2022-01-01 PROCEDURE — C9803 HOPD COVID-19 SPEC COLLECT: HCPCS | Performed by: EMERGENCY MEDICINE

## 2022-01-01 PROCEDURE — 71045 X-RAY EXAM CHEST 1 VIEW: CPT | Mod: 26 | Performed by: STUDENT IN AN ORGANIZED HEALTH CARE EDUCATION/TRAINING PROGRAM

## 2022-01-01 PROCEDURE — 84132 ASSAY OF SERUM POTASSIUM: CPT | Performed by: INTERNAL MEDICINE

## 2022-01-01 PROCEDURE — 73070 X-RAY EXAM OF ELBOW: CPT | Mod: LT

## 2022-01-01 PROCEDURE — 71045 X-RAY EXAM CHEST 1 VIEW: CPT

## 2022-01-01 RX ORDER — ONDANSETRON 4 MG/1
4 TABLET, ORALLY DISINTEGRATING ORAL
DISCHARGE
Start: 2022-01-01

## 2022-01-01 RX ORDER — CITALOPRAM HYDROBROMIDE 20 MG/1
20 TABLET ORAL DAILY
Status: DISCONTINUED | OUTPATIENT
Start: 2022-01-01 | End: 2022-01-01 | Stop reason: HOSPADM

## 2022-01-01 RX ORDER — ACETAMINOPHEN 325 MG/1
650 TABLET ORAL EVERY 4 HOURS PRN
Status: DISCONTINUED | OUTPATIENT
Start: 2022-01-01 | End: 2022-01-01

## 2022-01-01 RX ORDER — POLYETHYLENE GLYCOL 3350 17 G/17G
17 POWDER, FOR SOLUTION ORAL 2 TIMES DAILY
DISCHARGE
Start: 2022-01-01

## 2022-01-01 RX ORDER — POTASSIUM CHLORIDE 750 MG/1
40 TABLET, EXTENDED RELEASE ORAL ONCE
Status: COMPLETED | OUTPATIENT
Start: 2022-01-01 | End: 2022-01-01

## 2022-01-01 RX ORDER — LANOLIN ALCOHOL/MO/W.PET/CERES
6 CREAM (GRAM) TOPICAL
Status: DISCONTINUED | OUTPATIENT
Start: 2022-01-01 | End: 2022-01-01

## 2022-01-01 RX ORDER — LEVOTHYROXINE SODIUM 75 UG/1
75 TABLET ORAL DAILY
Status: DISCONTINUED | OUTPATIENT
Start: 2022-01-01 | End: 2022-01-01 | Stop reason: HOSPADM

## 2022-01-01 RX ORDER — LANOLIN ALCOHOL/MO/W.PET/CERES
6 CREAM (GRAM) TOPICAL AT BEDTIME
Status: DISCONTINUED | OUTPATIENT
Start: 2022-01-01 | End: 2022-01-01 | Stop reason: HOSPADM

## 2022-01-01 RX ORDER — QUETIAPINE FUMARATE 25 MG/1
25 TABLET, FILM COATED ORAL
Status: COMPLETED | OUTPATIENT
Start: 2022-01-01 | End: 2022-01-01

## 2022-01-01 RX ORDER — QUETIAPINE FUMARATE 25 MG/1
25 TABLET, FILM COATED ORAL AT BEDTIME
Qty: 30 TABLET | Refills: 1 | Status: SHIPPED | OUTPATIENT
Start: 2022-01-01

## 2022-01-01 RX ORDER — IPRATROPIUM BROMIDE AND ALBUTEROL SULFATE 2.5; .5 MG/3ML; MG/3ML
3 SOLUTION RESPIRATORY (INHALATION) EVERY 6 HOURS PRN
Status: DISCONTINUED | OUTPATIENT
Start: 2022-01-01 | End: 2022-01-01 | Stop reason: HOSPADM

## 2022-01-01 RX ORDER — QUETIAPINE FUMARATE 25 MG/1
25 TABLET, FILM COATED ORAL
Status: DISCONTINUED | OUTPATIENT
Start: 2022-01-01 | End: 2022-01-01

## 2022-01-01 RX ORDER — QUETIAPINE FUMARATE 25 MG/1
25 TABLET, FILM COATED ORAL ONCE
Status: COMPLETED | OUTPATIENT
Start: 2022-01-01 | End: 2022-01-01

## 2022-01-01 RX ORDER — ATORVASTATIN CALCIUM 40 MG/1
80 TABLET, FILM COATED ORAL DAILY
Status: DISCONTINUED | OUTPATIENT
Start: 2022-01-01 | End: 2022-01-01

## 2022-01-01 RX ORDER — POLYETHYLENE GLYCOL 3350 17 G/17G
17 POWDER, FOR SOLUTION ORAL 2 TIMES DAILY
Status: DISCONTINUED | OUTPATIENT
Start: 2022-01-01 | End: 2022-01-01 | Stop reason: HOSPADM

## 2022-01-01 RX ORDER — PANTOPRAZOLE SODIUM 40 MG/1
40 TABLET, DELAYED RELEASE ORAL
Status: DISCONTINUED | OUTPATIENT
Start: 2022-01-01 | End: 2022-01-01 | Stop reason: HOSPADM

## 2022-01-01 RX ORDER — ACETAMINOPHEN 650 MG/1
650 SUPPOSITORY RECTAL EVERY 4 HOURS PRN
Status: DISCONTINUED | OUTPATIENT
Start: 2022-01-01 | End: 2022-01-01 | Stop reason: HOSPADM

## 2022-01-01 RX ORDER — IPRATROPIUM BROMIDE AND ALBUTEROL SULFATE 2.5; .5 MG/3ML; MG/3ML
3 SOLUTION RESPIRATORY (INHALATION) EVERY 4 HOURS PRN
Status: DISCONTINUED | OUTPATIENT
Start: 2022-01-01 | End: 2022-01-01

## 2022-01-01 RX ORDER — ACETAMINOPHEN 325 MG/1
650 TABLET ORAL EVERY 6 HOURS PRN
Status: DISCONTINUED | OUTPATIENT
Start: 2022-01-01 | End: 2022-01-01 | Stop reason: HOSPADM

## 2022-01-01 RX ORDER — OXYBUTYNIN CHLORIDE 5 MG/1
5 TABLET ORAL AT BEDTIME
Status: DISCONTINUED | OUTPATIENT
Start: 2022-01-01 | End: 2022-01-01 | Stop reason: HOSPADM

## 2022-01-01 RX ORDER — QUETIAPINE FUMARATE 25 MG/1
25 TABLET, FILM COATED ORAL AT BEDTIME
Status: DISCONTINUED | OUTPATIENT
Start: 2022-01-01 | End: 2022-01-01 | Stop reason: HOSPADM

## 2022-01-01 RX ORDER — AMOXICILLIN 250 MG
1 CAPSULE ORAL 2 TIMES DAILY PRN
Status: DISCONTINUED | OUTPATIENT
Start: 2022-01-01 | End: 2022-01-01 | Stop reason: HOSPADM

## 2022-01-01 RX ORDER — IPRATROPIUM BROMIDE AND ALBUTEROL SULFATE 2.5; .5 MG/3ML; MG/3ML
3 SOLUTION RESPIRATORY (INHALATION) EVERY 6 HOURS PRN
Qty: 90 ML | Refills: 0 | DISCHARGE
Start: 2022-01-01

## 2022-01-01 RX ORDER — CLOPIDOGREL BISULFATE 75 MG/1
75 TABLET ORAL DAILY
Status: DISCONTINUED | OUTPATIENT
Start: 2022-01-01 | End: 2022-01-01 | Stop reason: HOSPADM

## 2022-01-01 RX ORDER — FUROSEMIDE 10 MG/ML
20 INJECTION INTRAMUSCULAR; INTRAVENOUS ONCE
Status: COMPLETED | OUTPATIENT
Start: 2022-01-01 | End: 2022-01-01

## 2022-01-01 RX ORDER — ATORVASTATIN CALCIUM 20 MG/1
80 TABLET, FILM COATED ORAL DAILY
Status: DISCONTINUED | OUTPATIENT
Start: 2022-01-01 | End: 2022-01-01 | Stop reason: HOSPADM

## 2022-01-01 RX ORDER — POTASSIUM CHLORIDE 7.45 MG/ML
10 INJECTION INTRAVENOUS
Status: DISCONTINUED | OUTPATIENT
Start: 2022-01-01 | End: 2022-01-01

## 2022-01-01 RX ORDER — HYDROCHLOROTHIAZIDE 50 MG/1
50 TABLET ORAL DAILY
Status: DISCONTINUED | OUTPATIENT
Start: 2022-01-01 | End: 2022-01-01 | Stop reason: HOSPADM

## 2022-01-01 RX ORDER — LIDOCAINE 40 MG/G
CREAM TOPICAL
Status: DISCONTINUED | OUTPATIENT
Start: 2022-01-01 | End: 2022-01-01 | Stop reason: HOSPADM

## 2022-01-01 RX ORDER — FUROSEMIDE 20 MG
20 TABLET ORAL DAILY
Status: DISCONTINUED | OUTPATIENT
Start: 2022-01-01 | End: 2022-01-01 | Stop reason: HOSPADM

## 2022-01-01 RX ORDER — MULTIPLE VITAMINS W/ MINERALS TAB 9MG-400MCG
1 TAB ORAL
Status: DISCONTINUED | OUTPATIENT
Start: 2022-01-01 | End: 2022-01-01 | Stop reason: HOSPADM

## 2022-01-01 RX ORDER — LANOLIN ALCOHOL/MO/W.PET/CERES
6 CREAM (GRAM) TOPICAL AT BEDTIME
Qty: 60 TABLET | Refills: 2 | Status: SHIPPED | OUTPATIENT
Start: 2022-01-01

## 2022-01-01 RX ORDER — BISACODYL 5 MG
5 TABLET, DELAYED RELEASE (ENTERIC COATED) ORAL DAILY PRN
Status: DISCONTINUED | OUTPATIENT
Start: 2022-01-01 | End: 2022-01-01 | Stop reason: HOSPADM

## 2022-01-01 RX ORDER — LATANOPROST 50 UG/ML
1 SOLUTION/ DROPS OPHTHALMIC AT BEDTIME
Status: DISCONTINUED | OUTPATIENT
Start: 2022-01-01 | End: 2022-01-01 | Stop reason: HOSPADM

## 2022-01-01 RX ORDER — POTASSIUM CHLORIDE 750 MG/1
20 TABLET, EXTENDED RELEASE ORAL ONCE
Status: COMPLETED | OUTPATIENT
Start: 2022-01-01 | End: 2022-01-01

## 2022-01-01 RX ORDER — SODIUM CHLORIDE 9 MG/ML
INJECTION, SOLUTION INTRAVENOUS
Status: COMPLETED
Start: 2022-01-01 | End: 2022-01-01

## 2022-01-01 RX ORDER — LOPERAMIDE HCL 2 MG
1-2 CAPSULE ORAL DAILY PRN
Status: DISCONTINUED | OUTPATIENT
Start: 2022-01-01 | End: 2022-01-01 | Stop reason: HOSPADM

## 2022-01-01 RX ORDER — AMOXICILLIN 250 MG
2 CAPSULE ORAL 2 TIMES DAILY PRN
Status: DISCONTINUED | OUTPATIENT
Start: 2022-01-01 | End: 2022-01-01 | Stop reason: HOSPADM

## 2022-01-01 RX ORDER — BACITRACIN ZINC 500 [USP'U]/G
OINTMENT TOPICAL ONCE
Status: COMPLETED | OUTPATIENT
Start: 2022-01-01 | End: 2022-01-01

## 2022-01-01 RX ORDER — HYDROCHLOROTHIAZIDE 25 MG/1
50 TABLET ORAL DAILY
Status: DISCONTINUED | OUTPATIENT
Start: 2022-01-01 | End: 2022-01-01 | Stop reason: HOSPADM

## 2022-01-01 RX ORDER — ONDANSETRON 4 MG/1
4 TABLET, ORALLY DISINTEGRATING ORAL
Status: DISCONTINUED | OUTPATIENT
Start: 2022-01-01 | End: 2022-01-01 | Stop reason: HOSPADM

## 2022-01-01 RX ORDER — FERROUS SULFATE 325(65) MG
325 TABLET ORAL
Status: DISCONTINUED | OUTPATIENT
Start: 2022-01-01 | End: 2022-01-01 | Stop reason: HOSPADM

## 2022-01-01 RX ORDER — IPRATROPIUM BROMIDE AND ALBUTEROL SULFATE 2.5; .5 MG/3ML; MG/3ML
3 SOLUTION RESPIRATORY (INHALATION)
Status: DISCONTINUED | OUTPATIENT
Start: 2022-01-01 | End: 2022-01-01

## 2022-01-01 RX ORDER — ONDANSETRON 4 MG/1
4 TABLET, ORALLY DISINTEGRATING ORAL EVERY 6 HOURS PRN
Status: DISCONTINUED | OUTPATIENT
Start: 2022-01-01 | End: 2022-01-01

## 2022-01-01 RX ORDER — TRAZODONE HYDROCHLORIDE 50 MG/1
25 TABLET, FILM COATED ORAL
Qty: 30 TABLET | Refills: 0 | DISCHARGE
Start: 2022-01-01

## 2022-01-01 RX ORDER — DOCUSATE SODIUM 100 MG/1
100 CAPSULE, LIQUID FILLED ORAL 2 TIMES DAILY
Status: DISCONTINUED | OUTPATIENT
Start: 2022-01-01 | End: 2022-01-01 | Stop reason: HOSPADM

## 2022-01-01 RX ORDER — IOPAMIDOL 755 MG/ML
58 INJECTION, SOLUTION INTRAVASCULAR ONCE
Status: COMPLETED | OUTPATIENT
Start: 2022-01-01 | End: 2022-01-01

## 2022-01-01 RX ORDER — ATORVASTATIN CALCIUM 40 MG/1
80 TABLET, FILM COATED ORAL DAILY
Status: DISCONTINUED | OUTPATIENT
Start: 2022-01-01 | End: 2022-01-01 | Stop reason: HOSPADM

## 2022-01-01 RX ORDER — ONDANSETRON 2 MG/ML
4 INJECTION INTRAMUSCULAR; INTRAVENOUS EVERY 6 HOURS PRN
Status: DISCONTINUED | OUTPATIENT
Start: 2022-01-01 | End: 2022-01-01

## 2022-01-01 RX ADMIN — ACETAMINOPHEN 650 MG: 325 TABLET, FILM COATED ORAL at 12:09

## 2022-01-01 RX ADMIN — Medication 6 MG: at 21:06

## 2022-01-01 RX ADMIN — LEVOTHYROXINE SODIUM 75 MCG: 75 TABLET ORAL at 09:40

## 2022-01-01 RX ADMIN — Medication 25 MG: at 20:19

## 2022-01-01 RX ADMIN — FUROSEMIDE 20 MG: 20 TABLET ORAL at 07:54

## 2022-01-01 RX ADMIN — ONDANSETRON 4 MG: 4 TABLET, ORALLY DISINTEGRATING ORAL at 16:02

## 2022-01-01 RX ADMIN — CITALOPRAM HYDROBROMIDE 20 MG: 20 TABLET ORAL at 08:47

## 2022-01-01 RX ADMIN — PANTOPRAZOLE SODIUM 40 MG: 40 TABLET, DELAYED RELEASE ORAL at 09:32

## 2022-01-01 RX ADMIN — LEVOTHYROXINE SODIUM 75 MCG: 75 TABLET ORAL at 08:53

## 2022-01-01 RX ADMIN — OXYBUTYNIN CHLORIDE 5 MG: 5 TABLET ORAL at 21:46

## 2022-01-01 RX ADMIN — ACETAMINOPHEN 650 MG: 325 TABLET ORAL at 11:37

## 2022-01-01 RX ADMIN — CLOPIDOGREL BISULFATE 75 MG: 75 TABLET, FILM COATED ORAL at 08:27

## 2022-01-01 RX ADMIN — FUROSEMIDE 20 MG: 20 TABLET ORAL at 08:19

## 2022-01-01 RX ADMIN — APIXABAN 2.5 MG: 2.5 TABLET, FILM COATED ORAL at 19:48

## 2022-01-01 RX ADMIN — CITALOPRAM HYDROBROMIDE 20 MG: 20 TABLET ORAL at 07:55

## 2022-01-01 RX ADMIN — FUROSEMIDE 20 MG: 20 TABLET ORAL at 08:53

## 2022-01-01 RX ADMIN — CITALOPRAM HYDROBROMIDE 20 MG: 20 TABLET ORAL at 09:41

## 2022-01-01 RX ADMIN — CLOPIDOGREL BISULFATE 75 MG: 75 TABLET, FILM COATED ORAL at 08:41

## 2022-01-01 RX ADMIN — DOCUSATE SODIUM 100 MG: 100 CAPSULE, LIQUID FILLED ORAL at 08:19

## 2022-01-01 RX ADMIN — SODIUM CHLORIDE, PRESERVATIVE FREE 30 ML: 5 INJECTION INTRAVENOUS at 11:41

## 2022-01-01 RX ADMIN — CLOPIDOGREL BISULFATE 75 MG: 75 TABLET ORAL at 08:51

## 2022-01-01 RX ADMIN — QUETIAPINE 25 MG: 25 TABLET ORAL at 21:31

## 2022-01-01 RX ADMIN — LEVOTHYROXINE SODIUM 75 MCG: 75 TABLET ORAL at 08:08

## 2022-01-01 RX ADMIN — GABAPENTIN 400 MG: 300 CAPSULE ORAL at 21:16

## 2022-01-01 RX ADMIN — DOCUSATE SODIUM 100 MG: 100 CAPSULE, LIQUID FILLED ORAL at 08:12

## 2022-01-01 RX ADMIN — LATANOPROST 1 DROP: 50 SOLUTION OPHTHALMIC at 22:02

## 2022-01-01 RX ADMIN — ACETAMINOPHEN 650 MG: 325 TABLET, FILM COATED ORAL at 00:02

## 2022-01-01 RX ADMIN — PANTOPRAZOLE SODIUM 40 MG: 40 TABLET, DELAYED RELEASE ORAL at 16:15

## 2022-01-01 RX ADMIN — PANTOPRAZOLE SODIUM 40 MG: 40 TABLET, DELAYED RELEASE ORAL at 06:33

## 2022-01-01 RX ADMIN — ONDANSETRON 4 MG: 4 TABLET, ORALLY DISINTEGRATING ORAL at 17:09

## 2022-01-01 RX ADMIN — CITALOPRAM HYDROBROMIDE 20 MG: 20 TABLET ORAL at 08:08

## 2022-01-01 RX ADMIN — PANTOPRAZOLE SODIUM 40 MG: 40 TABLET, DELAYED RELEASE ORAL at 16:35

## 2022-01-01 RX ADMIN — POLYETHYLENE GLYCOL 3350 17 G: 17 POWDER, FOR SOLUTION ORAL at 11:33

## 2022-01-01 RX ADMIN — RIOCIGUAT 1.5 MG: 0.5 TABLET, FILM COATED ORAL at 08:54

## 2022-01-01 RX ADMIN — FUROSEMIDE 20 MG: 20 TABLET ORAL at 08:54

## 2022-01-01 RX ADMIN — LATANOPROST 1 DROP: 50 SOLUTION/ DROPS OPHTHALMIC at 20:20

## 2022-01-01 RX ADMIN — ONDANSETRON 4 MG: 4 TABLET, ORALLY DISINTEGRATING ORAL at 07:45

## 2022-01-01 RX ADMIN — PANTOPRAZOLE SODIUM 40 MG: 40 TABLET, DELAYED RELEASE ORAL at 08:12

## 2022-01-01 RX ADMIN — OXYBUTYNIN CHLORIDE 5 MG: 5 TABLET ORAL at 20:19

## 2022-01-01 RX ADMIN — HYDROCHLOROTHIAZIDE 50 MG: 25 TABLET ORAL at 08:38

## 2022-01-01 RX ADMIN — OXYBUTYNIN CHLORIDE 5 MG: 5 TABLET ORAL at 20:04

## 2022-01-01 RX ADMIN — LATANOPROST 1 DROP: 50 SOLUTION/ DROPS OPHTHALMIC at 20:25

## 2022-01-01 RX ADMIN — Medication 5 UNITS: at 09:06

## 2022-01-01 RX ADMIN — Medication 6 MG: at 02:07

## 2022-01-01 RX ADMIN — Medication 1 TABLET: at 09:37

## 2022-01-01 RX ADMIN — LEVOTHYROXINE SODIUM 75 MCG: 75 TABLET ORAL at 07:54

## 2022-01-01 RX ADMIN — GABAPENTIN 400 MG: 300 CAPSULE ORAL at 21:02

## 2022-01-01 RX ADMIN — Medication 25 MG: at 22:29

## 2022-01-01 RX ADMIN — OXYBUTYNIN CHLORIDE 5 MG: 5 TABLET ORAL at 21:27

## 2022-01-01 RX ADMIN — Medication 25 MG: at 00:00

## 2022-01-01 RX ADMIN — ONDANSETRON 4 MG: 4 TABLET, ORALLY DISINTEGRATING ORAL at 16:09

## 2022-01-01 RX ADMIN — PANTOPRAZOLE SODIUM 40 MG: 40 TABLET, DELAYED RELEASE ORAL at 16:02

## 2022-01-01 RX ADMIN — ONDANSETRON 4 MG: 4 TABLET, ORALLY DISINTEGRATING ORAL at 16:15

## 2022-01-01 RX ADMIN — PANTOPRAZOLE SODIUM 40 MG: 40 TABLET, DELAYED RELEASE ORAL at 17:09

## 2022-01-01 RX ADMIN — FUROSEMIDE 20 MG: 20 TABLET ORAL at 08:16

## 2022-01-01 RX ADMIN — CITALOPRAM HYDROBROMIDE 20 MG: 20 TABLET ORAL at 08:19

## 2022-01-01 RX ADMIN — DOCUSATE SODIUM 50 MG AND SENNOSIDES 8.6 MG 1 TABLET: 8.6; 5 TABLET, FILM COATED ORAL at 13:29

## 2022-01-01 RX ADMIN — FUROSEMIDE 20 MG: 20 TABLET ORAL at 08:38

## 2022-01-01 RX ADMIN — GABAPENTIN 400 MG: 300 CAPSULE ORAL at 21:07

## 2022-01-01 RX ADMIN — LEVOTHYROXINE SODIUM 75 MCG: 75 TABLET ORAL at 08:38

## 2022-01-01 RX ADMIN — ATORVASTATIN CALCIUM 80 MG: 20 TABLET, FILM COATED ORAL at 16:27

## 2022-01-01 RX ADMIN — FUROSEMIDE 20 MG: 20 TABLET ORAL at 08:47

## 2022-01-01 RX ADMIN — HYDROCHLOROTHIAZIDE 50 MG: 50 TABLET ORAL at 14:17

## 2022-01-01 RX ADMIN — FUROSEMIDE 20 MG: 20 TABLET ORAL at 08:40

## 2022-01-01 RX ADMIN — OXYBUTYNIN CHLORIDE 5 MG: 5 TABLET ORAL at 22:50

## 2022-01-01 RX ADMIN — PANTOPRAZOLE SODIUM 40 MG: 40 INJECTION, POWDER, FOR SOLUTION INTRAVENOUS at 19:39

## 2022-01-01 RX ADMIN — ONDANSETRON 4 MG: 4 TABLET, ORALLY DISINTEGRATING ORAL at 06:50

## 2022-01-01 RX ADMIN — DOCUSATE SODIUM 50 MG AND SENNOSIDES 8.6 MG 2 TABLET: 8.6; 5 TABLET, FILM COATED ORAL at 03:34

## 2022-01-01 RX ADMIN — PANTOPRAZOLE SODIUM 40 MG: 40 TABLET, DELAYED RELEASE ORAL at 06:50

## 2022-01-01 RX ADMIN — Medication 1 TABLET: at 10:37

## 2022-01-01 RX ADMIN — OXYBUTYNIN CHLORIDE 5 MG: 5 TABLET ORAL at 21:02

## 2022-01-01 RX ADMIN — GABAPENTIN 400 MG: 300 CAPSULE ORAL at 21:46

## 2022-01-01 RX ADMIN — PANTOPRAZOLE SODIUM 40 MG: 40 TABLET, DELAYED RELEASE ORAL at 16:08

## 2022-01-01 RX ADMIN — HYDROCHLOROTHIAZIDE 50 MG: 25 TABLET ORAL at 08:52

## 2022-01-01 RX ADMIN — CLOPIDOGREL BISULFATE 75 MG: 75 TABLET, FILM COATED ORAL at 09:08

## 2022-01-01 RX ADMIN — CITALOPRAM HYDROBROMIDE 20 MG: 20 TABLET ORAL at 08:27

## 2022-01-01 RX ADMIN — APIXABAN 2.5 MG: 2.5 TABLET, FILM COATED ORAL at 08:27

## 2022-01-01 RX ADMIN — ACETAMINOPHEN 650 MG: 325 TABLET, FILM COATED ORAL at 09:43

## 2022-01-01 RX ADMIN — GABAPENTIN 400 MG: 300 CAPSULE ORAL at 22:50

## 2022-01-01 RX ADMIN — ATORVASTATIN CALCIUM 80 MG: 40 TABLET, FILM COATED ORAL at 09:41

## 2022-01-01 RX ADMIN — Medication 25 MG: at 22:54

## 2022-01-01 RX ADMIN — PANTOPRAZOLE SODIUM 40 MG: 40 TABLET, DELAYED RELEASE ORAL at 09:37

## 2022-01-01 RX ADMIN — Medication 6 MG: at 21:02

## 2022-01-01 RX ADMIN — OXYBUTYNIN CHLORIDE 5 MG: 5 TABLET ORAL at 21:17

## 2022-01-01 RX ADMIN — LEVOTHYROXINE SODIUM 75 MCG: 75 TABLET ORAL at 08:39

## 2022-01-01 RX ADMIN — LEVOTHYROXINE SODIUM 75 MCG: 75 TABLET ORAL at 08:19

## 2022-01-01 RX ADMIN — QUETIAPINE FUMARATE 25 MG: 25 TABLET ORAL at 09:38

## 2022-01-01 RX ADMIN — LATANOPROST 1 DROP: 50 SOLUTION/ DROPS OPHTHALMIC at 21:18

## 2022-01-01 RX ADMIN — ATORVASTATIN CALCIUM 80 MG: 40 TABLET, FILM COATED ORAL at 08:07

## 2022-01-01 RX ADMIN — ATORVASTATIN CALCIUM 80 MG: 20 TABLET, FILM COATED ORAL at 07:55

## 2022-01-01 RX ADMIN — POLYETHYLENE GLYCOL 3350 17 G: 17 POWDER, FOR SOLUTION ORAL at 09:08

## 2022-01-01 RX ADMIN — LEVOTHYROXINE SODIUM 75 MCG: 75 TABLET ORAL at 09:31

## 2022-01-01 RX ADMIN — FERROUS SULFATE TAB 325 MG (65 MG ELEMENTAL FE) 325 MG: 325 (65 FE) TAB at 08:53

## 2022-01-01 RX ADMIN — FLUDEOXYGLUCOSE F-18 10.1 MCI.: 500 INJECTION, SOLUTION INTRAVENOUS at 08:52

## 2022-01-01 RX ADMIN — FERROUS SULFATE TAB 325 MG (65 MG ELEMENTAL FE) 325 MG: 325 (65 FE) TAB at 08:51

## 2022-01-01 RX ADMIN — PANTOPRAZOLE SODIUM 40 MG: 40 TABLET, DELAYED RELEASE ORAL at 17:06

## 2022-01-01 RX ADMIN — CITALOPRAM HYDROBROMIDE 20 MG: 20 TABLET ORAL at 08:40

## 2022-01-01 RX ADMIN — LATANOPROST 1 DROP: 50 SOLUTION/ DROPS OPHTHALMIC at 20:12

## 2022-01-01 RX ADMIN — FUROSEMIDE 20 MG: 10 INJECTION, SOLUTION INTRAVENOUS at 19:35

## 2022-01-01 RX ADMIN — ACETAMINOPHEN 650 MG: 325 TABLET, FILM COATED ORAL at 09:41

## 2022-01-01 RX ADMIN — POLYETHYLENE GLYCOL 3350 17 G: 17 POWDER, FOR SOLUTION ORAL at 21:01

## 2022-01-01 RX ADMIN — BACITRACIN ZINC: 500 OINTMENT TOPICAL at 21:47

## 2022-01-01 RX ADMIN — HYDROCHLOROTHIAZIDE 50 MG: 25 TABLET ORAL at 08:08

## 2022-01-01 RX ADMIN — HYDROCHLOROTHIAZIDE 50 MG: 25 TABLET ORAL at 08:41

## 2022-01-01 RX ADMIN — POLYETHYLENE GLYCOL 3350 17 G: 17 POWDER, FOR SOLUTION ORAL at 08:26

## 2022-01-01 RX ADMIN — PANTOPRAZOLE SODIUM 40 MG: 40 TABLET, DELAYED RELEASE ORAL at 16:03

## 2022-01-01 RX ADMIN — Medication 1 TABLET: at 09:31

## 2022-01-01 RX ADMIN — GABAPENTIN 400 MG: 300 CAPSULE ORAL at 20:18

## 2022-01-01 RX ADMIN — GABAPENTIN 400 MG: 300 CAPSULE ORAL at 20:28

## 2022-01-01 RX ADMIN — Medication 25 MG: at 01:56

## 2022-01-01 RX ADMIN — LATANOPROST 1 DROP: 50 SOLUTION/ DROPS OPHTHALMIC at 21:47

## 2022-01-01 RX ADMIN — PANTOPRAZOLE SODIUM 40 MG: 40 INJECTION, POWDER, FOR SOLUTION INTRAVENOUS at 08:54

## 2022-01-01 RX ADMIN — Medication 25 MG: at 00:15

## 2022-01-01 RX ADMIN — LATANOPROST 1 DROP: 50 SOLUTION/ DROPS OPHTHALMIC at 21:02

## 2022-01-01 RX ADMIN — OXYBUTYNIN CHLORIDE 5 MG: 5 TABLET ORAL at 21:14

## 2022-01-01 RX ADMIN — POLYETHYLENE GLYCOL 3350 17 G: 17 POWDER, FOR SOLUTION ORAL at 21:46

## 2022-01-01 RX ADMIN — LATANOPROST 1 DROP: 50 SOLUTION/ DROPS OPHTHALMIC at 21:10

## 2022-01-01 RX ADMIN — POTASSIUM CHLORIDE 10 MEQ: 750 TABLET, EXTENDED RELEASE ORAL at 18:59

## 2022-01-01 RX ADMIN — DOCUSATE SODIUM 100 MG: 100 CAPSULE, LIQUID FILLED ORAL at 11:52

## 2022-01-01 RX ADMIN — FERROUS SULFATE TAB 325 MG (65 MG ELEMENTAL FE) 325 MG: 325 (65 FE) TAB at 09:37

## 2022-01-01 RX ADMIN — FERROUS SULFATE TAB 325 MG (65 MG ELEMENTAL FE) 325 MG: 325 (65 FE) TAB at 08:07

## 2022-01-01 RX ADMIN — ATORVASTATIN CALCIUM 80 MG: 20 TABLET, FILM COATED ORAL at 08:39

## 2022-01-01 RX ADMIN — DOCUSATE SODIUM 100 MG: 100 CAPSULE, LIQUID FILLED ORAL at 20:16

## 2022-01-01 RX ADMIN — POLYETHYLENE GLYCOL 3350 17 G: 17 POWDER, FOR SOLUTION ORAL at 09:00

## 2022-01-01 RX ADMIN — CLOPIDOGREL BISULFATE 75 MG: 75 TABLET, FILM COATED ORAL at 08:38

## 2022-01-01 RX ADMIN — GABAPENTIN 400 MG: 300 CAPSULE ORAL at 20:41

## 2022-01-01 RX ADMIN — FUROSEMIDE 20 MG: 20 TABLET ORAL at 08:39

## 2022-01-01 RX ADMIN — FUROSEMIDE 20 MG: 20 TABLET ORAL at 08:27

## 2022-01-01 RX ADMIN — FUROSEMIDE 20 MG: 20 TABLET ORAL at 09:05

## 2022-01-01 RX ADMIN — APIXABAN 2.5 MG: 2.5 TABLET, FILM COATED ORAL at 21:02

## 2022-01-01 RX ADMIN — LEVOTHYROXINE SODIUM 75 MCG: 75 TABLET ORAL at 08:54

## 2022-01-01 RX ADMIN — Medication 1 TABLET: at 10:28

## 2022-01-01 RX ADMIN — ACETAMINOPHEN 650 MG: 325 TABLET ORAL at 21:07

## 2022-01-01 RX ADMIN — PANTOPRAZOLE SODIUM 40 MG: 40 TABLET, DELAYED RELEASE ORAL at 07:13

## 2022-01-01 RX ADMIN — SODIUM CHLORIDE 500 ML: 9 INJECTION, SOLUTION INTRAVENOUS at 12:26

## 2022-01-01 RX ADMIN — ATORVASTATIN CALCIUM 80 MG: 40 TABLET, FILM COATED ORAL at 09:05

## 2022-01-01 RX ADMIN — QUETIAPINE 25 MG: 25 TABLET ORAL at 21:03

## 2022-01-01 RX ADMIN — ACETAMINOPHEN 650 MG: 325 TABLET, FILM COATED ORAL at 12:38

## 2022-01-01 RX ADMIN — CLOPIDOGREL BISULFATE 75 MG: 75 TABLET, FILM COATED ORAL at 09:40

## 2022-01-01 RX ADMIN — IRON SUCROSE 200 MG: 20 INJECTION, SOLUTION INTRAVENOUS at 15:39

## 2022-01-01 RX ADMIN — FERROUS SULFATE TAB 325 MG (65 MG ELEMENTAL FE) 325 MG: 325 (65 FE) TAB at 09:39

## 2022-01-01 RX ADMIN — MELATONIN TAB 3 MG 6 MG: 3 TAB at 23:32

## 2022-01-01 RX ADMIN — APIXABAN 2.5 MG: 2.5 TABLET, FILM COATED ORAL at 09:00

## 2022-01-01 RX ADMIN — PANTOPRAZOLE SODIUM 40 MG: 40 TABLET, DELAYED RELEASE ORAL at 16:28

## 2022-01-01 RX ADMIN — ATORVASTATIN CALCIUM 80 MG: 40 TABLET, FILM COATED ORAL at 09:00

## 2022-01-01 RX ADMIN — FERROUS SULFATE TAB 325 MG (65 MG ELEMENTAL FE) 325 MG: 325 (65 FE) TAB at 08:26

## 2022-01-01 RX ADMIN — PANTOPRAZOLE SODIUM 40 MG: 40 INJECTION, POWDER, FOR SOLUTION INTRAVENOUS at 20:27

## 2022-01-01 RX ADMIN — ACETAMINOPHEN 650 MG: 325 TABLET, FILM COATED ORAL at 00:06

## 2022-01-01 RX ADMIN — CITALOPRAM HYDROBROMIDE 20 MG: 20 TABLET ORAL at 08:54

## 2022-01-01 RX ADMIN — Medication 25 MG: at 01:33

## 2022-01-01 RX ADMIN — FUROSEMIDE 20 MG: 20 TABLET ORAL at 08:12

## 2022-01-01 RX ADMIN — ONDANSETRON 4 MG: 4 TABLET, ORALLY DISINTEGRATING ORAL at 05:41

## 2022-01-01 RX ADMIN — DOCUSATE SODIUM 100 MG: 100 CAPSULE, LIQUID FILLED ORAL at 21:07

## 2022-01-01 RX ADMIN — PANTOPRAZOLE SODIUM 40 MG: 40 TABLET, DELAYED RELEASE ORAL at 16:27

## 2022-01-01 RX ADMIN — Medication 25 MG: at 23:32

## 2022-01-01 RX ADMIN — POLYETHYLENE GLYCOL 3350 17 G: 17 POWDER, FOR SOLUTION ORAL at 08:16

## 2022-01-01 RX ADMIN — APIXABAN 2.5 MG: 2.5 TABLET, FILM COATED ORAL at 20:19

## 2022-01-01 RX ADMIN — PANTOPRAZOLE SODIUM 40 MG: 40 TABLET, DELAYED RELEASE ORAL at 07:44

## 2022-01-01 RX ADMIN — Medication 1 TABLET: at 09:45

## 2022-01-01 RX ADMIN — FUROSEMIDE 20 MG: 20 TABLET ORAL at 09:42

## 2022-01-01 RX ADMIN — PANTOPRAZOLE SODIUM 40 MG: 40 TABLET, DELAYED RELEASE ORAL at 16:39

## 2022-01-01 RX ADMIN — OXYBUTYNIN CHLORIDE 5 MG: 5 TABLET ORAL at 21:51

## 2022-01-01 RX ADMIN — DOCUSATE SODIUM 100 MG: 100 CAPSULE, LIQUID FILLED ORAL at 20:27

## 2022-01-01 RX ADMIN — ACETAMINOPHEN 650 MG: 325 TABLET ORAL at 10:08

## 2022-01-01 RX ADMIN — LEVOTHYROXINE SODIUM 75 MCG: 75 TABLET ORAL at 08:40

## 2022-01-01 RX ADMIN — MELATONIN TAB 3 MG 6 MG: 3 TAB at 21:17

## 2022-01-01 RX ADMIN — HYDROCHLOROTHIAZIDE 50 MG: 25 TABLET ORAL at 09:00

## 2022-01-01 RX ADMIN — APIXABAN 2.5 MG: 2.5 TABLET, FILM COATED ORAL at 21:03

## 2022-01-01 RX ADMIN — MELATONIN TAB 3 MG 6 MG: 3 TAB at 20:19

## 2022-01-01 RX ADMIN — ONDANSETRON 4 MG: 4 TABLET, ORALLY DISINTEGRATING ORAL at 16:35

## 2022-01-01 RX ADMIN — Medication 6 MG: at 22:46

## 2022-01-01 RX ADMIN — ATORVASTATIN CALCIUM 80 MG: 40 TABLET, FILM COATED ORAL at 08:26

## 2022-01-01 RX ADMIN — ACETAMINOPHEN 650 MG: 325 TABLET, FILM COATED ORAL at 14:42

## 2022-01-01 RX ADMIN — LEVOTHYROXINE SODIUM 75 MCG: 75 TABLET ORAL at 08:27

## 2022-01-01 RX ADMIN — ACETAMINOPHEN 650 MG: 325 TABLET, FILM COATED ORAL at 18:05

## 2022-01-01 RX ADMIN — Medication 25 MG: at 00:22

## 2022-01-01 RX ADMIN — HYDROCHLOROTHIAZIDE 50 MG: 25 TABLET ORAL at 08:26

## 2022-01-01 RX ADMIN — PANTOPRAZOLE SODIUM 40 MG: 40 TABLET, DELAYED RELEASE ORAL at 16:14

## 2022-01-01 RX ADMIN — PANTOPRAZOLE SODIUM 40 MG: 40 TABLET, DELAYED RELEASE ORAL at 06:20

## 2022-01-01 RX ADMIN — OXYBUTYNIN CHLORIDE 5 MG: 5 TABLET ORAL at 20:29

## 2022-01-01 RX ADMIN — Medication 25 MG: at 21:06

## 2022-01-01 RX ADMIN — HYDROCHLOROTHIAZIDE 50 MG: 25 TABLET ORAL at 09:05

## 2022-01-01 RX ADMIN — APIXABAN 2.5 MG: 2.5 TABLET, FILM COATED ORAL at 09:08

## 2022-01-01 RX ADMIN — ONDANSETRON 4 MG: 4 TABLET, ORALLY DISINTEGRATING ORAL at 12:46

## 2022-01-01 RX ADMIN — ACETAMINOPHEN 650 MG: 325 TABLET, FILM COATED ORAL at 23:32

## 2022-01-01 RX ADMIN — ONDANSETRON 4 MG: 4 TABLET, ORALLY DISINTEGRATING ORAL at 06:33

## 2022-01-01 RX ADMIN — ACETAMINOPHEN 650 MG: 325 TABLET, FILM COATED ORAL at 18:35

## 2022-01-01 RX ADMIN — ACETAMINOPHEN 650 MG: 325 TABLET, FILM COATED ORAL at 21:17

## 2022-01-01 RX ADMIN — GABAPENTIN 400 MG: 300 CAPSULE ORAL at 21:03

## 2022-01-01 RX ADMIN — PANTOPRAZOLE SODIUM 40 MG: 40 TABLET, DELAYED RELEASE ORAL at 08:16

## 2022-01-01 RX ADMIN — Medication 25 MG: at 00:02

## 2022-01-01 RX ADMIN — LEVOTHYROXINE SODIUM 75 MCG: 75 TABLET ORAL at 08:47

## 2022-01-01 RX ADMIN — PANTOPRAZOLE SODIUM 40 MG: 40 TABLET, DELAYED RELEASE ORAL at 08:18

## 2022-01-01 RX ADMIN — IOPAMIDOL 58 ML: 755 INJECTION, SOLUTION INTRAVENOUS at 09:31

## 2022-01-01 RX ADMIN — LATANOPROST 1 DROP: 50 SOLUTION OPHTHALMIC at 21:08

## 2022-01-01 RX ADMIN — Medication 25 MG: at 23:39

## 2022-01-01 RX ADMIN — QUETIAPINE FUMARATE 25 MG: 25 TABLET ORAL at 21:27

## 2022-01-01 RX ADMIN — PANTOPRAZOLE SODIUM 40 MG: 40 TABLET, DELAYED RELEASE ORAL at 08:01

## 2022-01-01 RX ADMIN — GABAPENTIN 400 MG: 300 CAPSULE ORAL at 21:51

## 2022-01-01 RX ADMIN — GABAPENTIN 400 MG: 300 CAPSULE ORAL at 21:27

## 2022-01-01 RX ADMIN — HYDROCHLOROTHIAZIDE 50 MG: 25 TABLET ORAL at 08:18

## 2022-01-01 RX ADMIN — OXYBUTYNIN CHLORIDE 5 MG: 5 TABLET ORAL at 23:07

## 2022-01-01 RX ADMIN — POTASSIUM CHLORIDE 40 MEQ: 750 TABLET, EXTENDED RELEASE ORAL at 12:24

## 2022-01-01 RX ADMIN — APIXABAN 2.5 MG: 2.5 TABLET, FILM COATED ORAL at 21:31

## 2022-01-01 RX ADMIN — DOCUSATE SODIUM 100 MG: 100 CAPSULE, LIQUID FILLED ORAL at 08:38

## 2022-01-01 RX ADMIN — DOCUSATE SODIUM 50 MG AND SENNOSIDES 8.6 MG 2 TABLET: 8.6; 5 TABLET, FILM COATED ORAL at 21:06

## 2022-01-01 RX ADMIN — ATORVASTATIN CALCIUM 80 MG: 20 TABLET, FILM COATED ORAL at 08:11

## 2022-01-01 RX ADMIN — CLOPIDOGREL BISULFATE 75 MG: 75 TABLET ORAL at 07:54

## 2022-01-01 RX ADMIN — CLOPIDOGREL BISULFATE 75 MG: 75 TABLET, FILM COATED ORAL at 09:00

## 2022-01-01 RX ADMIN — LEVOTHYROXINE SODIUM 75 MCG: 75 TABLET ORAL at 08:59

## 2022-01-01 RX ADMIN — ONDANSETRON 4 MG: 4 TABLET, ORALLY DISINTEGRATING ORAL at 06:19

## 2022-01-01 RX ADMIN — IPRATROPIUM BROMIDE AND ALBUTEROL SULFATE 3 ML: 2.5; .5 SOLUTION RESPIRATORY (INHALATION) at 19:28

## 2022-01-01 RX ADMIN — APIXABAN 2.5 MG: 2.5 TABLET, FILM COATED ORAL at 08:38

## 2022-01-01 RX ADMIN — ACETAMINOPHEN 650 MG: 325 TABLET ORAL at 12:55

## 2022-01-01 RX ADMIN — OXYBUTYNIN CHLORIDE 5 MG: 5 TABLET ORAL at 21:06

## 2022-01-01 RX ADMIN — POLYETHYLENE GLYCOL 3350 17 G: 17 POWDER, FOR SOLUTION ORAL at 21:02

## 2022-01-01 RX ADMIN — MELATONIN TAB 3 MG 6 MG: 3 TAB at 20:06

## 2022-01-01 RX ADMIN — POLYETHYLENE GLYCOL 3350 17 G: 17 POWDER, FOR SOLUTION ORAL at 09:39

## 2022-01-01 RX ADMIN — CITALOPRAM HYDROBROMIDE 20 MG: 20 TABLET ORAL at 08:53

## 2022-01-01 RX ADMIN — PANTOPRAZOLE SODIUM 40 MG: 40 TABLET, DELAYED RELEASE ORAL at 08:39

## 2022-01-01 RX ADMIN — PANTOPRAZOLE SODIUM 40 MG: 40 TABLET, DELAYED RELEASE ORAL at 05:42

## 2022-01-01 RX ADMIN — ATORVASTATIN CALCIUM 80 MG: 40 TABLET, FILM COATED ORAL at 08:40

## 2022-01-01 RX ADMIN — OXYBUTYNIN CHLORIDE 5 MG: 5 TABLET ORAL at 21:31

## 2022-01-01 RX ADMIN — DOCUSATE SODIUM 100 MG: 100 CAPSULE, LIQUID FILLED ORAL at 08:01

## 2022-01-01 RX ADMIN — APIXABAN 2.5 MG: 2.5 TABLET, FILM COATED ORAL at 20:04

## 2022-01-01 RX ADMIN — LATANOPROST 1 DROP: 50 SOLUTION OPHTHALMIC at 23:07

## 2022-01-01 RX ADMIN — POLYETHYLENE GLYCOL 3350 17 G: 17 POWDER, FOR SOLUTION ORAL at 08:41

## 2022-01-01 RX ADMIN — ONDANSETRON 4 MG: 4 TABLET, ORALLY DISINTEGRATING ORAL at 09:59

## 2022-01-01 RX ADMIN — HYDROCHLOROTHIAZIDE 50 MG: 50 TABLET ORAL at 08:19

## 2022-01-01 RX ADMIN — FERROUS SULFATE TAB 325 MG (65 MG ELEMENTAL FE) 325 MG: 325 (65 FE) TAB at 09:00

## 2022-01-01 RX ADMIN — ATORVASTATIN CALCIUM 80 MG: 20 TABLET, FILM COATED ORAL at 08:18

## 2022-01-01 RX ADMIN — OXYBUTYNIN CHLORIDE 5 MG: 5 TABLET ORAL at 21:03

## 2022-01-01 RX ADMIN — ONDANSETRON 4 MG: 4 TABLET, ORALLY DISINTEGRATING ORAL at 12:37

## 2022-01-01 RX ADMIN — ONDANSETRON 4 MG: 4 TABLET, ORALLY DISINTEGRATING ORAL at 07:13

## 2022-01-01 RX ADMIN — HYDROCHLOROTHIAZIDE 50 MG: 25 TABLET ORAL at 09:42

## 2022-01-01 RX ADMIN — MELATONIN TAB 3 MG 6 MG: 3 TAB at 21:02

## 2022-01-01 RX ADMIN — RIOCIGUAT 1.5 MG: 0.5 TABLET, FILM COATED ORAL at 22:49

## 2022-01-01 RX ADMIN — POLYETHYLENE GLYCOL 3350 17 G: 17 POWDER, FOR SOLUTION ORAL at 08:07

## 2022-01-01 RX ADMIN — ACETAMINOPHEN 650 MG: 325 TABLET ORAL at 09:45

## 2022-01-01 RX ADMIN — Medication 6 MG: at 21:31

## 2022-01-01 RX ADMIN — Medication 6 MG: at 21:14

## 2022-01-01 RX ADMIN — IPRATROPIUM BROMIDE AND ALBUTEROL SULFATE 3 ML: 2.5; .5 SOLUTION RESPIRATORY (INHALATION) at 13:33

## 2022-01-01 RX ADMIN — CITALOPRAM HYDROBROMIDE 20 MG: 20 TABLET ORAL at 09:00

## 2022-01-01 RX ADMIN — QUETIAPINE FUMARATE 25 MG: 25 TABLET ORAL at 21:14

## 2022-01-01 RX ADMIN — Medication 1 TABLET: at 09:53

## 2022-01-01 RX ADMIN — APIXABAN 2.5 MG: 2.5 TABLET, FILM COATED ORAL at 08:40

## 2022-01-01 RX ADMIN — ATORVASTATIN CALCIUM 80 MG: 40 TABLET, FILM COATED ORAL at 08:17

## 2022-01-01 RX ADMIN — APIXABAN 2.5 MG: 2.5 TABLET, FILM COATED ORAL at 08:16

## 2022-01-01 RX ADMIN — GABAPENTIN 400 MG: 300 CAPSULE ORAL at 21:14

## 2022-01-01 RX ADMIN — MELATONIN TAB 3 MG 6 MG: 3 TAB at 23:28

## 2022-01-01 RX ADMIN — ONDANSETRON 4 MG: 4 TABLET, ORALLY DISINTEGRATING ORAL at 16:38

## 2022-01-01 RX ADMIN — ACETAMINOPHEN 650 MG: 325 TABLET ORAL at 16:28

## 2022-01-01 RX ADMIN — LATANOPROST 1 DROP: 50 SOLUTION/ DROPS OPHTHALMIC at 21:32

## 2022-01-01 RX ADMIN — CITALOPRAM HYDROBROMIDE 20 MG: 20 TABLET ORAL at 09:31

## 2022-01-01 RX ADMIN — CITALOPRAM HYDROBROMIDE 20 MG: 20 TABLET ORAL at 08:37

## 2022-01-01 RX ADMIN — APIXABAN 2.5 MG: 2.5 TABLET, FILM COATED ORAL at 08:41

## 2022-01-01 RX ADMIN — GABAPENTIN 400 MG: 300 CAPSULE ORAL at 20:04

## 2022-01-01 RX ADMIN — ONDANSETRON 4 MG: 4 TABLET, ORALLY DISINTEGRATING ORAL at 08:17

## 2022-01-01 RX ADMIN — CLOPIDOGREL BISULFATE 75 MG: 75 TABLET, FILM COATED ORAL at 08:07

## 2022-01-01 RX ADMIN — POLYETHYLENE GLYCOL 3350 17 G: 17 POWDER, FOR SOLUTION ORAL at 20:27

## 2022-01-01 RX ADMIN — CLOPIDOGREL BISULFATE 75 MG: 75 TABLET, FILM COATED ORAL at 08:22

## 2022-01-01 RX ADMIN — ACETAMINOPHEN 650 MG: 325 TABLET ORAL at 15:37

## 2022-01-01 RX ADMIN — LEVOTHYROXINE SODIUM 75 MCG: 75 TABLET ORAL at 08:18

## 2022-01-01 RX ADMIN — APIXABAN 2.5 MG: 2.5 TABLET, FILM COATED ORAL at 09:41

## 2022-01-01 RX ADMIN — ATORVASTATIN CALCIUM 80 MG: 40 TABLET, FILM COATED ORAL at 08:38

## 2022-01-01 RX ADMIN — LEVOTHYROXINE SODIUM 75 MCG: 75 TABLET ORAL at 09:08

## 2022-01-01 RX ADMIN — ACETAMINOPHEN 650 MG: 325 TABLET, FILM COATED ORAL at 03:16

## 2022-01-01 RX ADMIN — LATANOPROST 1 DROP: 50 SOLUTION OPHTHALMIC at 21:15

## 2022-01-01 RX ADMIN — FUROSEMIDE 20 MG: 20 TABLET ORAL at 09:31

## 2022-01-01 RX ADMIN — ACETAMINOPHEN 650 MG: 325 TABLET, FILM COATED ORAL at 12:56

## 2022-01-01 RX ADMIN — LATANOPROST 1 DROP: 50 SOLUTION OPHTHALMIC at 22:19

## 2022-01-01 RX ADMIN — APIXABAN 2.5 MG: 2.5 TABLET, FILM COATED ORAL at 21:07

## 2022-01-01 RX ADMIN — OXYBUTYNIN CHLORIDE 5 MG: 5 TABLET ORAL at 20:41

## 2022-01-01 RX ADMIN — APIXABAN 2.5 MG: 2.5 TABLET, FILM COATED ORAL at 08:07

## 2022-01-01 RX ADMIN — PANTOPRAZOLE SODIUM 40 MG: 40 TABLET, DELAYED RELEASE ORAL at 16:37

## 2022-01-01 RX ADMIN — LATANOPROST 1 DROP: 50 SOLUTION OPHTHALMIC at 22:48

## 2022-01-01 RX ADMIN — CITALOPRAM HYDROBROMIDE 20 MG: 20 TABLET ORAL at 08:18

## 2022-01-01 RX ADMIN — ONDANSETRON 4 MG: 4 TABLET, ORALLY DISINTEGRATING ORAL at 16:13

## 2022-01-01 RX ADMIN — IPRATROPIUM BROMIDE AND ALBUTEROL SULFATE 3 ML: 2.5; .5 SOLUTION RESPIRATORY (INHALATION) at 19:30

## 2022-01-01 RX ADMIN — FUROSEMIDE 20 MG: 20 TABLET ORAL at 08:07

## 2022-01-01 RX ADMIN — CITALOPRAM HYDROBROMIDE 20 MG: 20 TABLET ORAL at 08:12

## 2022-01-01 RX ADMIN — APIXABAN 2.5 MG: 2.5 TABLET, FILM COATED ORAL at 21:17

## 2022-01-01 RX ADMIN — ONDANSETRON 4 MG: 4 TABLET, ORALLY DISINTEGRATING ORAL at 16:39

## 2022-01-01 RX ADMIN — APIXABAN 2.5 MG: 2.5 TABLET, FILM COATED ORAL at 08:18

## 2022-01-01 RX ADMIN — ONDANSETRON 4 MG: 4 TABLET, ORALLY DISINTEGRATING ORAL at 11:20

## 2022-01-01 RX ADMIN — APIXABAN 2.5 MG: 2.5 TABLET, FILM COATED ORAL at 07:54

## 2022-01-01 RX ADMIN — POLYETHYLENE GLYCOL 3350 17 G: 17 POWDER, FOR SOLUTION ORAL at 21:31

## 2022-01-01 RX ADMIN — LATANOPROST 1 DROP: 50 SOLUTION/ DROPS OPHTHALMIC at 21:07

## 2022-01-01 RX ADMIN — IPRATROPIUM BROMIDE AND ALBUTEROL SULFATE 3 ML: 2.5; .5 SOLUTION RESPIRATORY (INHALATION) at 07:03

## 2022-01-01 RX ADMIN — ACETAMINOPHEN 650 MG: 325 TABLET, FILM COATED ORAL at 23:28

## 2022-01-01 RX ADMIN — GABAPENTIN 400 MG: 300 CAPSULE ORAL at 23:07

## 2022-01-01 RX ADMIN — PANTOPRAZOLE SODIUM 40 MG: 40 TABLET, DELAYED RELEASE ORAL at 17:22

## 2022-01-01 RX ADMIN — HYDROCHLOROTHIAZIDE 50 MG: 50 TABLET ORAL at 07:54

## 2022-01-01 RX ADMIN — LATANOPROST 1 DROP: 50 SOLUTION OPHTHALMIC at 20:41

## 2022-01-01 RX ADMIN — CITALOPRAM HYDROBROMIDE 20 MG: 20 TABLET ORAL at 08:39

## 2022-01-01 RX ADMIN — POLYETHYLENE GLYCOL 3350 17 G: 17 POWDER, FOR SOLUTION ORAL at 08:53

## 2022-01-01 RX ADMIN — POLYETHYLENE GLYCOL 3350 17 G: 17 POWDER, FOR SOLUTION ORAL at 21:18

## 2022-01-01 RX ADMIN — ONDANSETRON 4 MG: 4 TABLET, ORALLY DISINTEGRATING ORAL at 05:48

## 2022-01-01 RX ADMIN — QUETIAPINE FUMARATE 25 MG: 25 TABLET ORAL at 20:41

## 2022-01-01 RX ADMIN — QUETIAPINE FUMARATE 25 MG: 25 TABLET ORAL at 23:07

## 2022-01-01 RX ADMIN — ATORVASTATIN CALCIUM 80 MG: 20 TABLET, FILM COATED ORAL at 09:31

## 2022-01-01 RX ADMIN — POLYETHYLENE GLYCOL 3350 17 G: 17 POWDER, FOR SOLUTION ORAL at 21:09

## 2022-01-01 RX ADMIN — PANTOPRAZOLE SODIUM 40 MG: 40 TABLET, DELAYED RELEASE ORAL at 05:48

## 2022-01-01 RX ADMIN — ATORVASTATIN CALCIUM 80 MG: 40 TABLET, FILM COATED ORAL at 08:53

## 2022-01-01 RX ADMIN — ATORVASTATIN CALCIUM 80 MG: 20 TABLET, FILM COATED ORAL at 08:54

## 2022-01-01 RX ADMIN — GABAPENTIN 400 MG: 300 CAPSULE ORAL at 21:52

## 2022-01-01 RX ADMIN — CITALOPRAM HYDROBROMIDE 20 MG: 20 TABLET ORAL at 09:08

## 2022-01-01 RX ADMIN — FUROSEMIDE 20 MG: 20 TABLET ORAL at 08:59

## 2022-01-01 RX ADMIN — DOCUSATE SODIUM 100 MG: 100 CAPSULE, LIQUID FILLED ORAL at 19:48

## 2022-01-01 RX ADMIN — LEVOTHYROXINE SODIUM 75 MCG: 75 TABLET ORAL at 08:12

## 2022-01-01 ASSESSMENT — ACTIVITIES OF DAILY LIVING (ADL)
ADLS_ACUITY_SCORE: 18
ADLS_ACUITY_SCORE: 18
ADLS_ACUITY_SCORE: 21
ADLS_ACUITY_SCORE: 21
ADLS_ACUITY_SCORE: 16
ADLS_ACUITY_SCORE: 21
ADLS_ACUITY_SCORE: 20
ADLS_ACUITY_SCORE: 18
ADLS_ACUITY_SCORE: 21
ADLS_ACUITY_SCORE: 18
ADLS_ACUITY_SCORE: 18
ADLS_ACUITY_SCORE: 21
ADLS_ACUITY_SCORE: 18
ADLS_ACUITY_SCORE: 21
ADLS_ACUITY_SCORE: 21
ADLS_ACUITY_SCORE: 20
ADLS_ACUITY_SCORE: 21
ADLS_ACUITY_SCORE: 18
ADLS_ACUITY_SCORE: 21
ADLS_ACUITY_SCORE: 12
ADLS_ACUITY_SCORE: 18
ADLS_ACUITY_SCORE: 21
ADLS_ACUITY_SCORE: 18
ADLS_ACUITY_SCORE: 21
ADLS_ACUITY_SCORE: 18
ADLS_ACUITY_SCORE: 21
ADLS_ACUITY_SCORE: 21
ADLS_ACUITY_SCORE: 18
ADLS_ACUITY_SCORE: 20
ADLS_ACUITY_SCORE: 20
ADLS_ACUITY_SCORE: 21
ADLS_ACUITY_SCORE: 21
ADLS_ACUITY_SCORE: 16
ADLS_ACUITY_SCORE: 20
ADLS_ACUITY_SCORE: 20
ADLS_ACUITY_SCORE: 21
ADLS_ACUITY_SCORE: 12
ADLS_ACUITY_SCORE: 18
ADLS_ACUITY_SCORE: 16
ADLS_ACUITY_SCORE: 18
ADLS_ACUITY_SCORE: 18
ADLS_ACUITY_SCORE: 21
ADLS_ACUITY_SCORE: 18
ADLS_ACUITY_SCORE: 18
ADLS_ACUITY_SCORE: 16
ADLS_ACUITY_SCORE: 21
ADLS_ACUITY_SCORE: 20
ADLS_ACUITY_SCORE: 18
ADLS_ACUITY_SCORE: 21
ADLS_ACUITY_SCORE: 12
ADLS_ACUITY_SCORE: 21
ADLS_ACUITY_SCORE: 18
ADLS_ACUITY_SCORE: 18
ADLS_ACUITY_SCORE: 21
ADLS_ACUITY_SCORE: 18
ADLS_ACUITY_SCORE: 20
ADLS_ACUITY_SCORE: 16
ADLS_ACUITY_SCORE: 21
ADLS_ACUITY_SCORE: 18
ADLS_ACUITY_SCORE: 21
ADLS_ACUITY_SCORE: 21
ADLS_ACUITY_SCORE: 18
ADLS_ACUITY_SCORE: 12
ADLS_ACUITY_SCORE: 18
ADLS_ACUITY_SCORE: 21
ADLS_ACUITY_SCORE: 16
ADLS_ACUITY_SCORE: 20
ADLS_ACUITY_SCORE: 21
ADLS_ACUITY_SCORE: 18
ADLS_ACUITY_SCORE: 21
ADLS_ACUITY_SCORE: 18
ADLS_ACUITY_SCORE: 21
ADLS_ACUITY_SCORE: 18
ADLS_ACUITY_SCORE: 21
ADLS_ACUITY_SCORE: 18
ADLS_ACUITY_SCORE: 20
ADLS_ACUITY_SCORE: 20
ADLS_ACUITY_SCORE: 14
ADLS_ACUITY_SCORE: 18
ADLS_ACUITY_SCORE: 21
ADLS_ACUITY_SCORE: 20
ADLS_ACUITY_SCORE: 18
ADLS_ACUITY_SCORE: 16
ADLS_ACUITY_SCORE: 20
ADLS_ACUITY_SCORE: 21
ADLS_ACUITY_SCORE: 18
ADLS_ACUITY_SCORE: 21
ADLS_ACUITY_SCORE: 18
ADLS_ACUITY_SCORE: 18
ADLS_ACUITY_SCORE: 21
ADLS_ACUITY_SCORE: 19
ADLS_ACUITY_SCORE: 21
ADLS_ACUITY_SCORE: 21
ADLS_ACUITY_SCORE: 18
ADLS_ACUITY_SCORE: 18
ADLS_ACUITY_SCORE: 21
ADLS_ACUITY_SCORE: 12
ADLS_ACUITY_SCORE: 20
ADLS_ACUITY_SCORE: 18
ADLS_ACUITY_SCORE: 20
ADLS_ACUITY_SCORE: 21
ADLS_ACUITY_SCORE: 20
ADLS_ACUITY_SCORE: 18
ADLS_ACUITY_SCORE: 21
ADLS_ACUITY_SCORE: 20
ADLS_ACUITY_SCORE: 21
ADLS_ACUITY_SCORE: 21
ADLS_ACUITY_SCORE: 20
ADLS_ACUITY_SCORE: 21
ADLS_ACUITY_SCORE: 16
ADLS_ACUITY_SCORE: 21
ADLS_ACUITY_SCORE: 18
ADLS_ACUITY_SCORE: 18
ADLS_ACUITY_SCORE: 21
ADLS_ACUITY_SCORE: 18
ADLS_ACUITY_SCORE: 18
ADLS_ACUITY_SCORE: 21
ADLS_ACUITY_SCORE: 18
ADLS_ACUITY_SCORE: 16
ADLS_ACUITY_SCORE: 21
ADLS_ACUITY_SCORE: 18
ADLS_ACUITY_SCORE: 21
ADLS_ACUITY_SCORE: 12
ADLS_ACUITY_SCORE: 18
ADLS_ACUITY_SCORE: 21
ADLS_ACUITY_SCORE: 18
ADLS_ACUITY_SCORE: 21
ADLS_ACUITY_SCORE: 18
ADLS_ACUITY_SCORE: 19
ADLS_ACUITY_SCORE: 18
ADLS_ACUITY_SCORE: 21
ADLS_ACUITY_SCORE: 18
ADLS_ACUITY_SCORE: 21
ADLS_ACUITY_SCORE: 12
ADLS_ACUITY_SCORE: 20
ADLS_ACUITY_SCORE: 18
ADLS_ACUITY_SCORE: 21
ADLS_ACUITY_SCORE: 21
ADLS_ACUITY_SCORE: 19
ADLS_ACUITY_SCORE: 16
ADLS_ACUITY_SCORE: 21
ADLS_ACUITY_SCORE: 18
ADLS_ACUITY_SCORE: 18
ADLS_ACUITY_SCORE: 21
ADLS_ACUITY_SCORE: 18
ADLS_ACUITY_SCORE: 21
ADLS_ACUITY_SCORE: 18
ADLS_ACUITY_SCORE: 18
ADLS_ACUITY_SCORE: 20
ADLS_ACUITY_SCORE: 21
ADLS_ACUITY_SCORE: 18
ADLS_ACUITY_SCORE: 21
ADLS_ACUITY_SCORE: 18
ADLS_ACUITY_SCORE: 21
ADLS_ACUITY_SCORE: 16
ADLS_ACUITY_SCORE: 18
ADLS_ACUITY_SCORE: 20
ADLS_ACUITY_SCORE: 21
ADLS_ACUITY_SCORE: 21
ADLS_ACUITY_SCORE: 18
ADLS_ACUITY_SCORE: 18
ADLS_ACUITY_SCORE: 19
ADLS_ACUITY_SCORE: 18
ADLS_ACUITY_SCORE: 21
ADLS_ACUITY_SCORE: 21
ADLS_ACUITY_SCORE: 18
ADLS_ACUITY_SCORE: 21
ADLS_ACUITY_SCORE: 18
ADLS_ACUITY_SCORE: 21
ADLS_ACUITY_SCORE: 18
ADLS_ACUITY_SCORE: 12
ADLS_ACUITY_SCORE: 20
ADLS_ACUITY_SCORE: 21
ADLS_ACUITY_SCORE: 18
ADLS_ACUITY_SCORE: 18
ADLS_ACUITY_SCORE: 21
ADLS_ACUITY_SCORE: 21
ADLS_ACUITY_SCORE: 16
ADLS_ACUITY_SCORE: 21
ADLS_ACUITY_SCORE: 16
ADLS_ACUITY_SCORE: 20
ADLS_ACUITY_SCORE: 18
ADLS_ACUITY_SCORE: 18
ADLS_ACUITY_SCORE: 21
ADLS_ACUITY_SCORE: 21
ADLS_ACUITY_SCORE: 16
ADLS_ACUITY_SCORE: 21
ADLS_ACUITY_SCORE: 20
ADLS_ACUITY_SCORE: 19
ADLS_ACUITY_SCORE: 21
ADLS_ACUITY_SCORE: 18
ADLS_ACUITY_SCORE: 12
ADLS_ACUITY_SCORE: 18
ADLS_ACUITY_SCORE: 21
ADLS_ACUITY_SCORE: 18
ADLS_ACUITY_SCORE: 21
ADLS_ACUITY_SCORE: 21
ADLS_ACUITY_SCORE: 20
ADLS_ACUITY_SCORE: 21
ADLS_ACUITY_SCORE: 18
ADLS_ACUITY_SCORE: 21
ADLS_ACUITY_SCORE: 18
ADLS_ACUITY_SCORE: 18
ADLS_ACUITY_SCORE: 14
ADLS_ACUITY_SCORE: 18
ADLS_ACUITY_SCORE: 20
ADLS_ACUITY_SCORE: 21
ADLS_ACUITY_SCORE: 18
ADLS_ACUITY_SCORE: 19
ADLS_ACUITY_SCORE: 18
ADLS_ACUITY_SCORE: 21
ADLS_ACUITY_SCORE: 21
DEPENDENT_IADLS:: INDEPENDENT
ADLS_ACUITY_SCORE: 20
ADLS_ACUITY_SCORE: 19
ADLS_ACUITY_SCORE: 21
ADLS_ACUITY_SCORE: 20
ADLS_ACUITY_SCORE: 21
ADLS_ACUITY_SCORE: 21
ADLS_ACUITY_SCORE: 20
ADLS_ACUITY_SCORE: 18
ADLS_ACUITY_SCORE: 21
ADLS_ACUITY_SCORE: 21
ADLS_ACUITY_SCORE: 20
ADLS_ACUITY_SCORE: 18
ADLS_ACUITY_SCORE: 21
ADLS_ACUITY_SCORE: 18
PREVIOUS_RESPONSIBILITIES: MEDICATION MANAGEMENT
ADLS_ACUITY_SCORE: 21
ADLS_ACUITY_SCORE: 21
ADLS_ACUITY_SCORE: 14
ADLS_ACUITY_SCORE: 21
ADLS_ACUITY_SCORE: 21
ADLS_ACUITY_SCORE: 18
ADLS_ACUITY_SCORE: 21
ADLS_ACUITY_SCORE: 18
ADLS_ACUITY_SCORE: 20
ADLS_ACUITY_SCORE: 21
ADLS_ACUITY_SCORE: 20
ADLS_ACUITY_SCORE: 21
ADLS_ACUITY_SCORE: 18
ADLS_ACUITY_SCORE: 20
ADLS_ACUITY_SCORE: 20
ADLS_ACUITY_SCORE: 16
ADLS_ACUITY_SCORE: 21
ADLS_ACUITY_SCORE: 18
ADLS_ACUITY_SCORE: 21
ADLS_ACUITY_SCORE: 21
ADLS_ACUITY_SCORE: 20
ADLS_ACUITY_SCORE: 14
ADLS_ACUITY_SCORE: 20
ADLS_ACUITY_SCORE: 14
ADLS_ACUITY_SCORE: 16
ADLS_ACUITY_SCORE: 19
ADLS_ACUITY_SCORE: 21
ADLS_ACUITY_SCORE: 21
ADLS_ACUITY_SCORE: 20
ADLS_ACUITY_SCORE: 18
ADLS_ACUITY_SCORE: 21
ADLS_ACUITY_SCORE: 20
ADLS_ACUITY_SCORE: 21
ADLS_ACUITY_SCORE: 21
ADLS_ACUITY_SCORE: 18
ADLS_ACUITY_SCORE: 21
ADLS_ACUITY_SCORE: 21
ADLS_ACUITY_SCORE: 18
ADLS_ACUITY_SCORE: 21
ADLS_ACUITY_SCORE: 18
ADLS_ACUITY_SCORE: 20
ADLS_ACUITY_SCORE: 21
ADLS_ACUITY_SCORE: 12
ADLS_ACUITY_SCORE: 18
ADLS_ACUITY_SCORE: 18
ADLS_ACUITY_SCORE: 16
ADLS_ACUITY_SCORE: 21
ADLS_ACUITY_SCORE: 21
ADLS_ACUITY_SCORE: 12
ADLS_ACUITY_SCORE: 18
ADLS_ACUITY_SCORE: 18
ADLS_ACUITY_SCORE: 21
ADLS_ACUITY_SCORE: 18
ADLS_ACUITY_SCORE: 21
ADLS_ACUITY_SCORE: 19
ADLS_ACUITY_SCORE: 16
ADLS_ACUITY_SCORE: 18
ADLS_ACUITY_SCORE: 21
ADLS_ACUITY_SCORE: 21
ADLS_ACUITY_SCORE: 18
ADLS_ACUITY_SCORE: 21
ADLS_ACUITY_SCORE: 21
ADLS_ACUITY_SCORE: 16
ADLS_ACUITY_SCORE: 21
ADLS_ACUITY_SCORE: 20
ADLS_ACUITY_SCORE: 21
ADLS_ACUITY_SCORE: 16
ADLS_ACUITY_SCORE: 20
ADLS_ACUITY_SCORE: 21
ADLS_ACUITY_SCORE: 18
ADLS_ACUITY_SCORE: 21
ADLS_ACUITY_SCORE: 20
ADLS_ACUITY_SCORE: 12
ADLS_ACUITY_SCORE: 21
ADLS_ACUITY_SCORE: 20
ADLS_ACUITY_SCORE: 18
ADLS_ACUITY_SCORE: 18
ADLS_ACUITY_SCORE: 20
ADLS_ACUITY_SCORE: 21
ADLS_ACUITY_SCORE: 20
ADLS_ACUITY_SCORE: 18
ADLS_ACUITY_SCORE: 21
ADLS_ACUITY_SCORE: 18
ADLS_ACUITY_SCORE: 12
ADLS_ACUITY_SCORE: 21
ADLS_ACUITY_SCORE: 14
ADLS_ACUITY_SCORE: 18

## 2022-01-01 ASSESSMENT — ENCOUNTER SYMPTOMS
NUMBNESS: 0
ARTHRALGIAS: 1
FEVER: 0
JOINT SWELLING: 1
NECK PAIN: 0
ABDOMINAL PAIN: 0
SHORTNESS OF BREATH: 0
WEAKNESS: 0

## 2022-01-01 NOTE — Clinical Note
injected utilizing power injector system.
 injected utilizing power injector system.
Catheter removed over wire.  
Contrast risk dose (3.7 * GFR)    299 mL for 100%, 224 mL for 75%. 
Lab results reviewed and abnormals discussed with physician.  
Nupur ABAD
Oxygen saturations documented through [facility].  
Patient education provided.   
Potential access sites were evaluated for patency using ultrasound.   The right jugular vein was selected. Access was obtained under with Sonosite guidance using a standard 18 guage needle with direct visualization of needle entry.     
Removed intact  
Removed intact  Manual pressure held
Sheath exchanged in the right internal jugular vein. 
Sheath inserted in the right internal jugular vein. 
The ECG shows a sinus rhythm. 
There were no immediate complications during the procedure. 
Total contrast volume (ml) 90  
Statement Selected

## 2022-03-04 NOTE — CONFIDENTIAL NOTE
Please call patient:    I received paperwork for home oxygen orders.    I didn't order this and don't have sufficient information to complete this.    I advise she schedule a visit with me or perhaps this is better completed by the pulmonary or cardiology teams.    Thanks,  Kathy Antonio MD  Internal Medicine

## 2022-03-07 NOTE — TELEPHONE ENCOUNTER
RN called patient, and she relayed since she did see Dr. Antonio for an office visit on 9/30/21, she would like to see if Dr. Rukhsana Archuleta with pulmonology clinic can sign the oxygen orders.      Yenny Hayden RN on 3/7/2022 at 8:54 AM

## 2022-03-10 NOTE — ED PROVIDER NOTES
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  The patient was seen in clinic earlier today and found to have a hemoglobin of 5.  She was sent to the emergency department for admission for further work-up and management, blood transfusion.  The patient reports having felt very fatigued recently and occasional worsening of her shortness of breath.  She states typically she does not need to use her oxygen 24/7, but lately she has had to use it all the time in order to alleviate her shortness of breath.  The patient denies any chest pain.  She has not noted any significant bright red rectal bleeding or melena.  She denies any abdominal pain, nausea, or vomiting.  The patient arrives to the emergency department accompanied by her daughter, who reports that the patient has barely been getting up to do anything around the house.  She also notes decreased p.o. intake, which the patient reports is due to decreased appetite.  She denies any nausea or abdominal pain.    I have reviewed the Medications, Allergies, Past Medical and Surgical History, and Social History in the [employer] system.    Past Medical History:   Diagnosis Date     Anemia      Aortic stenosis     abdominal     Atherosclerosis of aorta (H)     \"extensive disease with near occlusion below level of renal arteries\" on CT     Atherosclerosis of arteries of extremities (H)      Back pain     severe multilevel DJD, moderate spinal canal stenosis L4-5, severe L3-4     Chronic pain     Back, hips, knees, " legs     Degenerative joint disease      DVT of lower extremity, bilateral (H)     5/24/10 left distal femoral and great saphenous, 7/7/10 left:  extending to cfv, iliac , pop and post tibial, peronial, right:  distal fem and peroneal      Grave's disease      Hyperlipidaemia LDL goal < 70      Hypertension, essential      Hypothyroidism      Imbalance      Insomnia      Intermittent claudication (H)      Iron deficiency      Knee pain      Lumbar stenosis with neurogenic claudication      Osteoarthritis     ankle,foot, knee     Osteoporosis      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, 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     Pulmonary embolism (H)     5/24/10 bilateral     Small bowel obstruction (H) 1/23/17     Varicose veins      Past Surgical History:   Procedure Laterality Date     BUNIONECTOMY       CAPSULE/PILL CAM ENDOSCOPY N/A 2/20/2021    Procedure: IMAGING PROCEDURE, GI TRACT, INTRALUMINAL, VIA CAPSULE;  Surgeon: Heron Ayala MD;  [facility]: [facility]     COLONOSCOPY      11/10/10 angioectasia     CV PULMONARY ANGIOGRAM N/A 2/26/2021    Procedure: CV PULMONARY ANGIOGRAM;  Surgeon: Joaquin Fuller MD;  Location:  [facility]     CV RIGHT HEART CATH MEASUREMENTS RECORDED N/A 2/26/2021    Procedure: CV RIGHT HEART CATH;  Surgeon: Joaquin Fuller MD;  Location:  [facility]ATH [facility]     ENDOBRONCHIAL ULTRASOUND FLEXIBLE N/A 1/24/2020    Procedure: ENDOBRONCHIAL ULTRASOUND, WITH FLEXIBLE BRONCHOSCOPY;  Surgeon: Gopal Jara MD;  [facility]     HYSTERECTOMY TOTAL ABDOMINAL, BILATERAL SALPINGO-OOPHORECTOMY, NODE DISSECTION, COMBINED  2/17/11    SANGEETHA, EMILIA, LN bx, omentectomy, resection of evrian malignancy Dr. JONO Goncalves - Returned BENIGN     INJECT EPIDURAL LUMBAR / SACRAL SINGLE N/A 1/5/2018    Procedure: INJECT EPIDURAL LUMBAR / SACRAL SINGLE;  lumbar interlaminar epidural steroid  injection;  Surgeon: Jaylin Valadez MD;  Location: [facility]     INJECT SACROILIAC JOINT Right 3/7/2018    Procedure: INJECT SACROILIAC JOINT;  Right Sacroiliac Joint Injection;  Surgeon: Flavio Harrison MD;  [facility]     INJECT SACROILIAC JOINT Bilateral 12/7/2018    Procedure: Bilateral Sacroiliac Joint Injections;  Surgeon: Faviola Cosby MD;  Location[facility]     OPTICAL TRACKING SYSTEM BRONCHOSCOPY N/A 1/24/2020    Procedure: Super D navigational bronchoscopy;  Surgeon: Gopal Jara MD;  Location: [facility]     UPPER GI ENDOSCOPY      11/10/10 erythematous gastropathy, angioectasia     [facility] STOMACH SURGERY PROCEDURE UNLISTED      Please see chart     Current Facility-Administered Medications   Medication     lidocaine (PF) (XYLOCAINE) 1 % injection 4 mL     triamcinolone (KENALOG-40) injection 40 mg     Current Outpatient Medications   Medication     acetaminophen (TYLENOL) 325 MG tablet     apixaban ANTICOAGULANT (ELIQUIS) 2.5 MG tablet     atorvastatin (LIPITOR) 40 MG tablet     Calcium Carbonate-Vitamin D (CALCIUM 600 + D OR)     cholecalciferol (VITAMIN D3) 25 mcg (1000 units) capsule     citalopram (CELEXA) 20 MG tablet     clopidogrel (PLAVIX) 75 MG tablet     cyanocolbalamin (VITAMIN  B-12) 500 MCG tablet     ferrous sulfate (FEROSUL) 325 (65 Fe) MG tablet     furosemide (LASIX) 20 MG tablet     gabapentin (NEURONTIN) 400 MG capsule     hydrochlorothiazide (HYDRODIURIL) 50 MG tablet     latanoprost (XALATAN) 0.005 % ophthalmic solution     levothyroxine (SYNTHROID/LEVOTHROID) 75 MCG tablet     loperamide (IMODIUM A-D) 2 MG tablet     oxybutynin (DITROPAN) 5 MG tablet     pantoprazole (PROTONIX) 40 MG EC tablet     Potassium Bicarb-Citric Acid 20 MEQ TBEF     riociguat (ADEMPAS) 1.5 MG tablet     No Known Allergies  Past medical history, past surgical history, medications, and allergies were reviewed with the patient. Additional pertinent items: None    Social History     Socioeconomic  History     Marital status:      Spouse name: Not on file     Number of children: Not on file     Years of education: Not on file     Highest education level: Not on file   Occupational History     Not on file   Tobacco Use     Smoking status: Former Smoker     Packs/day: 0.50     Years: 15.00     Pack years: 7.50     Quit date: 1993     Years since quittin.5     Smokeless tobacco: Never Used   Substance and Sexual Activity     Alcohol use: Yes     Comment: social     Drug use: No     Sexual activity: Not Currently     Partners: Male   Other Topics Concern     Parent/sibling w/ CABG, MI or angioplasty before 65F 55M? Not Asked   Social History Narrative     for 52 years. Retired from [employer]. Frequent world travel.     Social Determinants of Health     Financial Resource Strain: Not on file   Food Insecurity: Not on file   Transportation Needs: Not on file   Physical Activity: Not on file   Stress: Not on file   Social Connections: Not on file   Intimate Partner Violence: Not on file   Housing Stability: Not on file     Social history was reviewed with the patient. Additional pertinent items: None    Review of Systems  General: No fevers or chills, positive for fatigue and decreased appetite  Skin: No rash or diaphoresis  Eyes: No eye redness or discharge  Ears/Nose/Throat: No rhinorrhea or nasal congestion  Respiratory: No cough, positive for SOB  Cardiovascular: No chest pain or palpitations  Gastrointestinal: See HPI  Genitourinary: No urinary frequency, hematuria, or dysuria  Musculoskeletal: No arthralgias or myalgias  Neurologic: No numbness or weakness  Hematologic/Lymphatic/Immunologic: No leg swelling, positive for anticoagulation on Eliquis  Endocrine: No polyuria/polydypsia    A complete review of systems was performed with pertinent positives and negatives noted in the HPI, and all other systems negative.    Physical Exam   BP: 132/46  Pulse: 78  Temp: 98  F (36.7   C)  Resp: 18  SpO2: 98 %      General: Well nourished, well developed, NAD  HEENT: EOMI, anicteric. NCAT, MMM  Neck: no jugular venous distension, supple, nl ROM  Cardiac: Regular rate and rhythm. No murmurs, rubs, or gallops. Normal S1, S2.  Intact peripheral pulses  Pulm: CTAB, no stridor, wheezes, rales, rhonchi  Abd: Soft, nontender, nondistended.  No masses palpated.    Skin: Warm and dry to the touch.  No rash  Extremities: No LE edema, no cyanosis, w/w/p  Neuro: A&Ox3, no gross focal deficits    ED Course        Procedures                          Labs Ordered and Resulted from Time of ED Arrival to Time of ED Departure   COVID-19 VIRUS (CORONAVIRUS) BY PCR   MAGNESIUM   TYPE AND SCREEN, ADULT       Result Value    ABO/RH(D) B POS      Antibody Screen Negative      SPECIMEN EXPIRATION DATE [id number]     PREPARE RED BLOOD CELLS (UNIT)    CROSSMATCH Compatible      UNIT ABO/RH B Pos      Unit Number [id number]      Unit Status Ready      Blood Component Type Red Blood Cells      Product Code L4116O33      CODING SYSTEM [id number]      UNIT TYPE ISBT 7300     PREPARE RED BLOOD CELLS (UNIT)    CROSSMATCH Compatible      UNIT ABO/RH B Pos      Unit Number [id number]      Unit Status Issued      Blood Component Type Red Blood Cells      Product Code K5212Q03      CODING SYSTEM [id number]      UNIT TYPE ISBT 7300      ISSUE DATE AND TIME 20220310175100     PREPARE RED BLOOD CELLS (UNIT)   TRANSFUSE RED BLOOD CELLS (UNIT)   ABO/RH TYPE AND SCREEN            Results for orders placed or performed in visit on 03/10/22 (from the past 24 hour(s))   Comprehensive metabolic panel   Result Value Ref Range    Sodium 144 133 - 144 mmol/L    Potassium 3.2 (L) 3.4 - 5.3 mmol/L    Chloride 100 94 - 109 mmol/L    Carbon Dioxide (CO2) 40 (H) 20 - 32 mmol/L    Anion Gap 4 3 - 14 mmol/L    Urea Nitrogen 18 7 - 30 mg/dL    Creatinine 0.57 0.52 - 1.04 mg/dL    Calcium 9.4 8.5 - 10.1 mg/dL    Glucose 92 70 - 99 mg/dL    Alkaline  Phosphatase 75 40 - 150 U/L    AST 18 0 - 45 U/L    ALT 12 0 - 50 U/L    Protein Total 6.8 6.8 - 8.8 g/dL    Albumin 3.7 3.4 - 5.0 g/dL    Bilirubin Total 0.4 0.2 - 1.3 mg/dL    GFR Estimate 89 >60 mL/min/1.73m2   N terminal pro BNP outpatient   Result Value Ref Range    N Terminal Pro BNP Outpatient 1,469 (H) 0 - 450 pg/mL   CBC with platelets differential    Narrative    The following orders were created for panel order CBC with platelets differential.  Procedure                               Abnormality         Status                     ---------                               -----------         ------                     CBC with platelets and d...[[id number]]  Abnormal            Final result                 Please view results for these tests on the individual orders.   Ferritin   Result Value Ref Range    Ferritin 6 (L) 8 - 252 ng/mL   Iron and iron binding capacity   Result Value Ref Range    Iron 9 (L) 35 - 180 ug/dL    Iron Binding Capacity 475 (H) 240 - 430 ug/dL    Iron Sat Index 2 (L) 15 - 46 %   TSH with free T4 reflex   Result Value Ref Range    TSH 1.96 0.40 - 4.00 mU/L   CBC with platelets and differential   Result Value Ref Range    WBC Count 8.9 4.0 - 11.0 10e3/uL    RBC Count 3.04 (L) 3.80 - 5.20 10e6/uL    Hemoglobin 5.3 (LL) 11.7 - 15.7 g/dL    Hematocrit 21.6 (L) 35.0 - 47.0 %    MCV 71 (L) 78 - 100 fL    MCH 17.4 (L) 26.5 - 33.0 pg    MCHC 24.5 (L) 31.5 - 36.5 g/dL    RDW 24.0 (H) 10.0 - 15.0 %    Platelet Count 521 (H) 150 - 450 10e3/uL    % Neutrophils 83 %    % Lymphocytes 7 %    % Monocytes 9 %    % Eosinophils 0 %    % Basophils 0 %    % Immature Granulocytes 1 %    NRBCs per 100 WBC 0 <1 /100    Absolute Neutrophils 7.4 1.6 - 8.3 10e3/uL    Absolute Lymphocytes 0.6 (L) 0.8 - 5.3 10e3/uL    Absolute Monocytes 0.8 0.0 - 1.3 10e3/uL    Absolute Eosinophils 0.0 0.0 - 0.7 10e3/uL    Absolute Basophils 0.0 0.0 - 0.2 10e3/uL    Absolute Immature Granulocytes 0.1 <=0.4 10e3/uL    Absolute NRBCs  "0.0 10e3/uL     *Note: Due to a large number of results and/or encounters for the requested time period, some results have not been displayed. A complete set of results can be found in Results Review.     Chest 2 views     INDICATION: Chronic pulmonary embolism without acute cor pulmonale.  Pulmonary hypertension. Iron deficiency anemia. Pulmonary nodules.     COMPARISON: 2/21/2021     FINDINGS: Heart size appears normal. There is dextrocurvature of the  thoracic spine. There is calcification of the aortic knob. Bones  appear osteopenic. Areas of scarring are noted in the right mid lung  and there is a increased AP diameter of the chest on the lateral view  with almost \"barrel chest \"appearance.                                                                      IMPRESSION: Chronic hyperinflation of the lungs. Atherosclerosis.  Osteopenia. Facet dextrocurvature.     ALLYN PARSONS MD             Labs, vital signs, and imaging studies were reviewed by me.    Medications   acetaminophen (TYLENOL) tablet 650 mg (has no administration in time range)   atorvastatin (LIPITOR) tablet 80 mg (has no administration in time range)   citalopram (celeXA) tablet 20 mg (has no administration in time range)   furosemide (LASIX) tablet 20 mg (has no administration in time range)   gabapentin (NEURONTIN) capsule 400 mg (has no administration in time range)   latanoprost (XALATAN) 0.005 % ophthalmic solution 1 drop (has no administration in time range)   levothyroxine (SYNTHROID/LEVOTHROID) tablet 75 mcg (has no administration in time range)   oxybutynin (DITROPAN) tablet 5 mg (has no administration in time range)   pantoprazole (PROTONIX) IV push injection 40 mg (has no administration in time range)   lidocaine 1 % 0.1-1 mL (has no administration in time range)   lidocaine (LMX4) cream (has no administration in time range)   sodium chloride (PF) 0.9% PF flush 3 mL (has no administration in time range)   sodium chloride (PF) " 0.9% PF flush 3 mL (has no administration in time range)   melatonin tablet 6 mg (has no administration in time range)   senna-docusate (SENOKOT-S/PERICOLACE) 8.6-50 MG per tablet 1 tablet (has no administration in time range)     Or   senna-docusate (SENOKOT-S/PERICOLACE) 8.6-50 MG per tablet 2 tablet (has no administration in time range)   ondansetron (ZOFRAN-ODT) ODT tab 4 mg (has no administration in time range)     Or   ondansetron (ZOFRAN) injection 4 mg (has no administration in time range)   potassium chloride ER (KLOR-CON M) CR tablet 20 mEq (has no administration in time range)       Assessments & Plan (with Medical Decision Making)   Lucie Stockton is a 86 year old female who presents to the emergency department with low hemoglobin.  Likely due to known bleeding AVMs. This is likely the cause of the pt's SOB as well. Ddx would also include pneumonia, bronchitis, influenza, covid-19 infection, other viral syndrome. Pt had CXR earlier today. Type and screen and 2 U PRBCs ordered for the patient on arrival to the ER. Consent was signed.     Patient had laboratory work-up earlier today which revealed hemoglobin of 5.3    Chest x-ray from earlier today showed no acute disease process.    I have reviewed the nursing notes.    I have reviewed the findings, diagnosis, plan and need for follow up with the patient.    Patient discussed with internal medicine, to be admitted to their service for further management. Plan was discussed with patient who understands and agrees with plan.     New Prescriptions    No medications on file       Final diagnoses:   Anemia, unspecified type     Holley Anderson MD  3/10/2022   [facility] EMERGENCY DEPARTMENT     Holley Anderson MD  03/10/22 [id number]

## 2022-03-10 NOTE — PROGRESS NOTES
"History of Present Illness:  Ms. 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    Patient presents with  today. They need new certification for oxygen.    Last Feb, did O2 test at [facility], then hospitalized again over summer and sent out with oxygen.  Was previously using 2L only at night, now using 3.5 L-4.5 L. They have portable O2. Today 100%, reports 80% without oxygen.     reports Lucie has \"no pep\".  She isn't eating much, not able to do activities around house. She is weak, weight is down.  They asked about the results of the imaging this week.  Darren, patient's , was unaware of what this test was for.    They have a daughter Dawn in the area as well.      Review of external notes as documented above                   A detailed Review of Systems was performed, verified and is negative except as documented in the HPI.  All health questionnaires were reviewed, verified and relevant information documented above.    Past Medical History:  Past Medical History:   Diagnosis Date     Anemia      Aortic stenosis     abdominal     Atherosclerosis of aorta (H)     \"extensive disease with near occlusion below level of renal arteries\" on CT     Atherosclerosis of arteries of extremities (H)      Back pain     severe multilevel DJD, moderate spinal canal stenosis L4-5, severe L3-4     Chronic pain     Back, hips, knees, legs     Degenerative joint disease      DVT of lower extremity, bilateral (H)     5/24/10 left distal femoral and great saphenous, 7/7/10 left:  " extending to cfv, iliac , pop and post tibial, peronial, right:  distal fem and peroneal      Grave's disease      Hyperlipidaemia LDL goal < 70      Hypertension, essential      Hypothyroidism      Imbalance      Insomnia      Intermittent claudication (H)      Iron deficiency      Knee pain      Lumbar stenosis with neurogenic claudication      Osteoarthritis     ankle,foot, knee     Osteoporosis      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, 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     Pulmonary embolism (H)     5/24/10 bilateral     Small bowel obstruction (H) 1/23/17     Varicose veins        Past Surgical History:  Past Surgical History:   Procedure Laterality Date     BUNIONECTOMY       CAPSULE/PILL CAM ENDOSCOPY N/A 2/20/2021    Procedure: IMAGING PROCEDURE, GI TRACT, INTRALUMINAL, VIA CAPSULE;  Surgeon: Heron Ayala MD;  Location: [facility]     COLONOSCOPY      11/10/10 angioectasia     CV PULMONARY ANGIOGRAM N/A 2/26/2021    Procedure: CV PULMONARY ANGIOGRAM;  Surgeon: Joaquin Fuller MD;  Location: [facility]     CV RIGHT HEART CATH MEASUREMENTS RECORDED N/A 2/26/2021    Procedure: CV RIGHT HEART CATH;  Surgeon: Joaquin Fuller MD;  Location: [facility]     ENDOBRONCHIAL ULTRASOUND FLEXIBLE N/A 1/24/2020    Procedure: ENDOBRONCHIAL ULTRASOUND, WITH FLEXIBLE BRONCHOSCOPY;  Surgeon: Gopal Jara MD;  Location: [facility]     HYSTERECTOMY TOTAL ABDOMINAL, BILATERAL SALPINGO-OOPHORECTOMY, NODE DISSECTION, COMBINED  2/17/11    SANGEETHA, EMILIA, LN bx, omentectomy, resection of evrian malignancy Dr. JONO Goncalves - Returned BENIGN     INJECT EPIDURAL LUMBAR / SACRAL SINGLE N/A 1/5/2018    Procedure: INJECT EPIDURAL LUMBAR / SACRAL SINGLE;  lumbar interlaminar epidural steroid injection;  Surgeon: Jaylin Valadez MD;  Location: [facility]     INJECT SACROILIAC JOINT Right 3/7/2018    Procedure:  INJECT SACROILIAC JOINT;  Right Sacroiliac Joint Injection;  Surgeon: Flavio Harrison MD;  Location: [facility]     INJECT SACROILIAC JOINT Bilateral 12/7/2018    Procedure: Bilateral Sacroiliac Joint Injections;  Surgeon: Faviola Cosby MD;  Location: [facility]     OPTICAL TRACKING SYSTEM BRONCHOSCOPY N/A 1/24/2020    Procedure: Super D navigational bronchoscopy;  Surgeon: Gopal Jara MD;  Location: [facility]     UPPER GI ENDOSCOPY      11/10/10 erythematous gastropathy, angioectasia     [facility] STOMACH SURGERY PROCEDURE UNLISTED      Please see chart       Active Meds:  Current Outpatient Medications   Medication     acetaminophen (TYLENOL) 325 MG tablet     apixaban ANTICOAGULANT (ELIQUIS) 2.5 MG tablet     atorvastatin (LIPITOR) 40 MG tablet     Calcium Carbonate-Vitamin D (CALCIUM 600 + D OR)     cholecalciferol (VITAMIN D3) 25 mcg (1000 units) capsule     citalopram (CELEXA) 20 MG tablet     clopidogrel (PLAVIX) 75 MG tablet     cyanocolbalamin (VITAMIN  B-12) 500 MCG tablet     ferrous sulfate (FEROSUL) 325 (65 Fe) MG tablet     furosemide (LASIX) 20 MG tablet     gabapentin (NEURONTIN) 400 MG capsule     hydrochlorothiazide (HYDRODIURIL) 50 MG tablet     latanoprost (XALATAN) 0.005 % ophthalmic solution     levothyroxine (SYNTHROID/LEVOTHROID) 75 MCG tablet     loperamide (IMODIUM A-D) 2 MG tablet     oxybutynin (DITROPAN) 5 MG tablet     pantoprazole (PROTONIX) 40 MG EC tablet     Potassium Bicarb-Citric Acid 20 MEQ TBEF     riociguat (ADEMPAS) 1.5 MG tablet     Current Facility-Administered Medications   Medication     lidocaine (PF) (XYLOCAINE) 1 % injection 4 mL     triamcinolone (KENALOG-40) injection 40 mg        Allergies:  Patient has no known allergies.    Family History:  family history includes Breast Cancer (age of onset: 80) in her maternal aunt; C.A.D. (age of onset: 54) in her father; No Known Problems in her mother.    Social History:  Social History     Tobacco Use     Smoking status: Former Smoker  "    Packs/day: 0.50     Years: 15.00     Pack years: 7.50     Quit date: 1993     Years since quittin.5     Smokeless tobacco: Never Used   Substance Use Topics     Alcohol use: Yes     Comment: social     Drug use: No       Physical Exam:  Vitals: BP (!) 159/60 (BP Location: Right arm, Patient Position: Sitting, Cuff Size: Adult Regular)   Pulse 79   Ht 1.499 m (4' 11\")   Wt 42.1 kg (92 lb 12.8 oz)   LMP  (LMP Unknown)   SpO2 100%   BMI 18.74 kg/m    Constitutional: Alert, oriented, fatigued appearing  Head: Normocephalic, atraumatic  Eyes: Extra-ocular movements intact, pupils equally round and reactive bilaterally, no scleral icterus  ENT: Masked  Neck: Supple, no lymphadenopathy  Cardiovascular: Regular rate and rhythm, no murmurs  Respiratory: Fair air movement bilaterally, no wheezes/rales/rhonchi  GI: Abdomen soft, bowel sounds present, nondistended,  Mildly tender diffusely no rebound/guarding  Musculoskeletal: No edema, normal muscle tone, normal gait  Neurologic: Drowsy, mildly confused chronically    Diagnostics:  Labs reviewed in Epic          Assessment and Plan:  Lucie was seen today for recheck. Vitals are relatively stable, however,  reports concerns about patient being weak and lethargic at home for several months.  She has increased her home O2 use and is now dependent on 3-5 L.  I don't see evidence of CHFtoday but with history of bleeding AVMs and on chronic AC, concern for worsening anemia.  Advised patient and  of several chronic and serious medical conditions, including work up for possible lung cancer-- does not seem fully aware of diagnoses.  Advised if labs look poor, may need admission. Otherwise, will need to coordinate expedient cares with [facility].  They verbalized understanding and agreement with plan.      Diagnoses and all orders for this visit:    Chronic pulmonary embolism without acute cor pulmonale, unspecified pulmonary embolism type " (H)  -     Comprehensive metabolic panel; Future  -     CBC with platelets differential; Future  -     XR Chest 2 Views; Future  -     Ferritin; Future  -     Iron and iron binding capacity; Future    Weakness  -     Comprehensive metabolic panel; Future  -     CBC with platelets differential; Future  -     XR Chest 2 Views; Future  -     Ferritin; Future  -     Iron and iron binding capacity; Future  -     TSH with free T4 reflex; Future    Pulmonary hypertension (H)  -     Comprehensive metabolic panel; Future  -     CBC with platelets differential; Future  -     XR Chest 2 Views; Future  -     Ferritin; Future  -     Iron and iron binding capacity; Future    On anticoagulant therapy    Iron deficiency anemia due to chronic blood loss  -     Comprehensive metabolic panel; Future  -     CBC with platelets differential; Future  -     XR Chest 2 Views; Future  -     Ferritin; Future  -     Iron and iron binding capacity; Future  -     TSH with free T4 reflex; Future    Pulmonary nodules  -     Comprehensive metabolic panel; Future  -     CBC with platelets differential; Future  -     XR Chest 2 Views; Future  -     Ferritin; Future  -     Iron and iron binding capacity; Future      Addendum: Critical Hgb of 5, low K 3.2.  Advised they go to ER immediately. Signout given to ER physician.      Kathy Antonio MD  Internal Medicine

## 2022-03-10 NOTE — NURSING NOTE
Lucie Stockton is a 86 year old female patient that presents today in clinic for the following:    Chief Complaint   Patient presents with     RECHECK     NEEDS LETTER     The patient's allergies and medications were reviewed as noted. A set of vitals were recorded as noted without incident. The patient does not have any other questions for the provider.    Ko Campos, EMT at 11:33 AM on 3/10/2022

## 2022-03-10 NOTE — H&P
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     # Acute on chronic anemia, suspect slow GIB   # Iron deficiency anemia   Baseline hgb 8.0s. MCV microcytic at 71. Hgb dropped to 5.3, compared to last labs 6 months ago. Iron studies with profound iron deficiency with low iron, ferritin and high TIBC. Hx of GIB with AVMs, last episode 9/2021 at [facility].  Last EGD and colonoscopy in 2/18/2021 with 2 AVMs in duodenum and colonic AVM both s/p APCs. Patient doesn't look at her stool, so does not know if having any melena. BUN is normal. Suspect possible slow oozing GIB with prior AVMs and on chronic anticoagulation, as patient is currently hemodynamically stable.  Also concern for malnutrition with poor PO intake and new suspected lung malignancy contributing.   - Transfuse 2 Units pRBCs now   - H/H q6H   - 2 large bore IVs, monitor on telemetry   - INR, PTT, reticulocyte count, stool occult  - PPI IV BID   - GI consult, NPO after midnight if needs endoscopic evaluation   - Would likely benefit from iron infusions    ADDENDUM: patient developed increased tachypnea and O2 needs. Repeat CXR with possible mild pulmonary edema on wet read, waiting for formal read. Transfusion stopped and transfusion reaction assessment ordered. If worsening respiratory distress will repeat imaging again for possible TROLI/TACO. If transfusion reaction  work up negative, will resume transfusion.     # Acute on chronic hypoxic respiratory failure - Progressive over the last 6 months, initially on used at night, now requiring 3L consistently, now with tachypnea and drop in O2 after starting transfusion. CXR on admission prior to transfusion clear. No tachycardia, fevers, or leukocytosis. NT -proBNP elevated from prior levels. Possibly 2/2 fluids, hypercapnea vs progression of underlying pulmonary HTN. No signs of COPD exacerbation as no bronchospasms on exam.   - CXR without any acute focal opacities. VBG pH 7.41, pCO2 62, Bicarb 40 , troponin, EKG, TTE as noted below  - Lasix 20 mg IV x1   - O2 supplementation 88-94% with hx of COPD and mild hypercapnia     ADDENDUM: Troponin elevated at 183 --> 193. Denies any CP but does feel dyspnea. EKG without any ST elevations or depression. Possibly demand 2/2 acute on chronic anemia. TTE ordered stat to assess for any WMA. Will continue to cycle troponins until peak. Telemetry.       # Thrombocytosis - New. Platelets 521. Suspect reactive from underlying malignancy. Trend CBC    # Pulmonary nodules, likely malignancy - 2cm RUL nodule, Previously biopsied in 2019, though growing on repeat imaging. PET scan on 3/8/22 with increased uptake concerning for malignant disease. Hx of tobacco use.   - Outpatient follow up with pulmonology     # Pericardial effusion- new small pericardial effusion noted on PET several days ago. TTE as noted above.     # Weight loss, concern for malnutrition - Nutrition consult     # Hypoklaemia - K 3.2 on admission. Replete per sliding scale.     # Pulmonary HTN, CTED, HTN - Follows with Cardiology, last seen on 10/2021. PTA Adempas 1.5 mg TID, lasix 20 mg daily and hydrochlorothiazide 50 mg daily. Baseline O2 2L NC at night, but recently using 3L O2 daily. RHC last performed 2/26/2021 with PCWP 15.   - Hold PTA hydrochlorothiazide. Continue PTA lasix 20 mg daily   - Continue Adempas 1.5 mg TID  -  Daily weights, I&O   - TTE as note above  - Cardiology consult   - Hold PTA potassium bicarb-citric acid especially with rising bicarb 40 on CMP. VBG as noted above.     # Hx of DVT/PE - last PE in 2010, provoked. Hold PTA eliquis 2.5 mg BID with concern for GIB.     # PAD - Aorto-biiliac occlusive disease. Follows with vascular surgery last seen in 2019 who recommended repeat SUMAYA yearly. Last SUMAYA in 2019 with Right leg 0.51, Left leg 0.59.  No stenting per patient or chart review.   - Continue PTA lipitor 80 mg daily   - Hold PTA plavix 75 mg daily with concern for GIB     ------ Chronic stable conditions -----    # COPD - Not on any inhalers. No wheezing on exam.   # Overactive bladder - continue PTA oxybutynin 5 mg QHS  # Hypothyroidism 2/2 graves disease - TSH 1.96 on 3/10/22. Continue PTA levothyroxine 75 mcg daily.   # Depression - Continue PTA citalopram 20 mg daily   # Glaucoma- Continue PTA latanoprost 0.005%, one drop in both eyes QHS       Diet:  Regular diet, NPO after midnight   DVT Prophylaxis: Pneumatic Compression Devices  Saha Catheter: Not present  Central Lines: None  Cardiac Monitoring: None  Code Status:   FULL CODE. Patient to discuss further with family     Clinically Significant Risk Factors Present on Admission              # Coagulation Defect: home medication list includes an anticoagulant medication  # Platelet Defect: home medication list includes an antiplatelet medication       Disposition Plan   Expected Discharge:  TBD  Anticipated discharge location:  Awaiting care coordination huddle  Delays:           The patient's care was discussed with the Attending Physician, Dr. Jamison Tamayo , Bedside Nurse, Patient and Patient's Family.    Tiffanie Swan PA-C  Hospitalist Service, [facility]  Securely message with the Vocera Web Console (learn more here)  Text page via [employer] Paging/Directory   Please see signed in provider for up  "to date coverage information      ______________________________________________________________________    Chief Complaint    Fatigue, HERRERA     History is obtained from the patient, chart review, and from patient's daughter at bedside.     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     Per daughter, patient has been progressively declining over the last 6 to 9 months. Patient was placed on O2 approximately 6 months ago, using only 2L at night, but then over the last month started using it all the time, 3L. Patient also reports increase in dyspnea only with exertion. Denies any orthopnea, LE edema, cough, F/C, chest pain, palpitations, or syncope. Then over the last 2 weeks patient developed significant fatigue, unable to perform normal activities at home, just lying on the cough. She did report one episode of lightheadedness with standing. With the increasing fatigue, she followed up with her PCP, who performed labs today, found to have hgb of 5.8, and instructed to come to the ED.     Patient reports poor PO intake with 15 lb weight loss over the last several months. She is unsure if she has any bloody or melanous stools, as she \"doesn't look\". Denies any blood on toilet paper after wiping. Denies any abdominal pain, N/V/D, dysuria, hematuria. Endorses chronic unchanged back and hip pain. Denies any recent falls other than slipping on the ice 3 weeks ago and landing on her knee. Denies any easy bruising or bleeding. Denies any NSAID use. She does take her eliquis and plavix. " "States she also has been taking her Adempas three times daily and not missed any doses.     Patient lives with her  alone at home. They are quite independent with house hold chores. They do not cook, but go out to eat. Patient ambulates independently.     At the end of the interview, patient reports intermittent L toe pain that just started, unsure if its worse with elevating her leg. Denies any calf or foot pain with walking.     Review of Systems    The 10 point Review of Systems is negative other than noted in the HPI or here.     Past Medical History    I have reviewed this patient's medical history and updated it with pertinent information if needed.   Past Medical History:   Diagnosis Date     Anemia      Aortic stenosis     abdominal     Atherosclerosis of aorta (H)     \"extensive disease with near occlusion below level of renal arteries\" on CT     Atherosclerosis of arteries of extremities (H)      Back pain     severe multilevel DJD, moderate spinal canal stenosis L4-5, severe L3-4     Chronic pain     Back, hips, knees, legs     Degenerative joint disease      DVT of lower extremity, bilateral (H)     5/24/10 left distal femoral and great saphenous, 7/7/10 left:  extending to cfv, iliac , pop and post tibial, peronial, right:  distal fem and peroneal      Grave's disease      Hyperlipidaemia LDL goal < 70      Hypertension, essential      Hypothyroidism      Imbalance      Insomnia      Intermittent claudication (H)      Iron deficiency      Knee pain      Lumbar stenosis with neurogenic claudication      Osteoarthritis     ankle,foot, knee     Osteoporosis      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  Past Surgical History:   Procedure Laterality Date     BUNIONECTOMY       CAPSULE/PILL CAM ENDOSCOPY N/A 2/20/2021    Procedure: IMAGING PROCEDURE, GI TRACT, INTRALUMINAL, VIA CAPSULE;  Surgeon: Heron Ayala MD;  Location: [facility]     COLONOSCOPY      11/10/10 angioectasia     CV PULMONARY ANGIOGRAM N/A 2/26/2021    Procedure: CV PULMONARY ANGIOGRAM;  Surgeon: Joaquin Fuller MD;  Location: [facility]     CV RIGHT HEART CATH MEASUREMENTS RECORDED N/A 2/26/2021    Procedure: CV RIGHT HEART CATH;  Surgeon: Joaquin Fuller MD;  Location: [facility]     ENDOBRONCHIAL ULTRASOUND FLEXIBLE N/A 1/24/2020    Procedure: ENDOBRONCHIAL ULTRASOUND, WITH FLEXIBLE BRONCHOSCOPY;  Surgeon: Gopal Jara MD;  Location: [facility] OR     HYSTERECTOMY TOTAL ABDOMINAL, BILATERAL SALPINGO-OOPHORECTOMY, NODE DISSECTION, COMBINED  2/17/11    SANGEETHA, EMILIA, LN bx, omentectomy, resection of evrian malignancy Dr. JONO Goncalves - Returned BENIGN     INJECT EPIDURAL LUMBAR / SACRAL SINGLE N/A 1/5/2018    Procedure: INJECT EPIDURAL LUMBAR / SACRAL SINGLE;  lumbar interlaminar epidural steroid injection;  Surgeon: Jaylin Valadez MD;  Location: [facility]     INJECT SACROILIAC JOINT Right 3/7/2018    Procedure: INJECT SACROILIAC JOINT;  Right Sacroiliac Joint Injection;  Surgeon: Flavio Harrison MD;  Location: [facility]     INJECT SACROILIAC JOINT Bilateral 12/7/2018    Procedure: Bilateral Sacroiliac Joint Injections;  Surgeon: Faviola Cosby MD;  Location: [facility]     OPTICAL TRACKING SYSTEM BRONCHOSCOPY N/A 1/24/2020    Procedure: Super D navigational bronchoscopy;  Surgeon: Gopal Jara MD;  Location: [facility] OR     UPPER GI ENDOSCOPY      11/10/10 erythematous gastropathy, angioectasia     Z STOMACH SURGERY PROCEDURE UNLISTED      Please see chart       Social History   I have reviewed this patient's social history and updated it with  pertinent information if needed.  Social History     Tobacco Use     Smoking status: Former Smoker     Packs/day: 0.50     Years: 15.00     Pack years: 7.50     Quit date: 1993     Years since quittin.5     Smokeless tobacco: Never Used   Substance Use Topics     Alcohol use: Yes     Comment: social     Drug use: No       Family History   I have reviewed this patient's family history and updated it with pertinent information if needed.  Family History   Problem Relation Age of Onset     Breast Cancer Maternal Aunt 80     C.A.D. Father 54     No Known Problems Mother        Prior to Admission Medications   Prior to Admission Medications   Prescriptions Last Dose Informant Patient Reported? Taking?   Calcium Carbonate-Vitamin D (CALCIUM 600 + D OR)  Self Yes No   Sig: Take 1 tablet by mouth every morning    Potassium Bicarb-Citric Acid 20 MEQ TBEF  Self No No   Sig: Take 20 mg by mouth daily   acetaminophen (TYLENOL) 325 MG tablet  Self No No   Sig: Take 2 tablets every 6 to 8 hours as needed for pain   Patient taking differently: Take 650 mg by mouth as needed Take 2 tablets every 6 to 8 hours as needed for pain   apixaban ANTICOAGULANT (ELIQUIS) 2.5 MG tablet   No No   Sig: Take 1 tablet (2.5 mg) by mouth 2 times daily   atorvastatin (LIPITOR) 40 MG tablet  Self No No   Sig: Take 2 tablets (80 mg) by mouth daily   cholecalciferol (VITAMIN D3) 25 mcg (1000 units) capsule  Self Yes No   Sig: Take 1 capsule by mouth daily   citalopram (CELEXA) 20 MG tablet  Self No No   Sig: Take 1 tablet (20 mg) by mouth daily   clopidogrel (PLAVIX) 75 MG tablet   No No   Sig: Take 1 tablet (75 mg) by mouth daily   cyanocolbalamin (VITAMIN  B-12) 500 MCG tablet  Self Yes No   Sig: Take 500 mcg by mouth every morning    ferrous sulfate (FEROSUL) 325 (65 Fe) MG tablet   Yes No   Sig: Take 1 tablet (325 mg) by mouth daily (with breakfast)   furosemide (LASIX) 20 MG tablet   No No   Sig: Take 1 tablet (20 mg) by mouth daily    gabapentin (NEURONTIN) 400 MG capsule   No No   Sig: Take 1 capsule (400 mg) by mouth At Bedtime   hydrochlorothiazide (HYDRODIURIL) 50 MG tablet  Self No No   Sig: Take 1 tablet (50 mg) by mouth daily Please keep appt 12/21/20   latanoprost (XALATAN) 0.005 % ophthalmic solution  Self Yes No   Sig: Place 1 drop into both eyes At Bedtime    levothyroxine (SYNTHROID/LEVOTHROID) 75 MCG tablet   No No   Sig: Take 1 tablet (75 mcg) by mouth daily   loperamide (IMODIUM A-D) 2 MG tablet  Self Yes No   Sig: Take 1-2 mg by mouth daily as needed for diarrhea    oxybutynin (DITROPAN) 5 MG tablet  Self No No   Sig: Take 1 tablet (5 mg) by mouth At Bedtime For frequent urination   pantoprazole (PROTONIX) 40 MG EC tablet  Self No No   Sig: Take 1 tablet (40 mg) by mouth daily Takes in the morning.   riociguat (ADEMPAS) 1.5 MG tablet   Yes No   Sig: Take 1 tablet (1.5 mg) by mouth 3 times daily      Facility-Administered Medications Last Administration Doses Remaining   lidocaine (PF) (XYLOCAINE) 1 % injection 4 mL 10/12/2021  9:33 AM    triamcinolone (KENALOG-40) injection 40 mg 10/12/2021  9:33 AM         Allergies   No Known Allergies    Physical Exam   Vital Signs: Temp: 98  F (36.7  C) Temp src: Oral BP: 138/59 Pulse: 75   Resp: 29 SpO2: 97 % O2 Device: None (Room air) Oxygen Delivery: 3 LPM  Weight: 92 lbs 13.02 oz  GENERAL: Alert and awake. Answering questions appropriately. NAD. Pleasant and conversational   HEENT: AT/NC. Anicteric sclera. Mucous membranes moist   CARDIOVASCULAR: RRR. S1, S2. No murmurs, rubs, or gallops.   RESPIRATORY: Effort mildly labored on 3L O2 NC. Diminished breath sounds bilaterally. No wheezing, rales, or rhonchi.   GI: Abdomen soft, non-tender abdomen without rebound or guarding, normoactive bowel sounds present  MUSCULOSKELETAL: No joint swelling or tenderness. Moves all extremities.   EXTREMITIES: No peripheral edema. No calf asymmetry, erythema, or tenderness. Unable to palpate pedal pulses  "but doppler + for DP and PTs bilaterally.   NEUROLOGICAL:  CN II-XII grossly intact. Moving all extremities symmetrically.   SKIN: Intact. Warm and dry.  No jaundice. Ecchymosis on L hand.     Data   Data reviewed today: I reviewed all medications, new labs and imaging results over the last 24 hours.     Recent Labs   Lab 03/10/22  1254   WBC 8.9   HGB 5.3*   MCV 71*   *      POTASSIUM 3.2*   CHLORIDE 100   CO2 40*   BUN 18   CR 0.57   ANIONGAP 4   CANDIDA 9.4   GLC 92   ALBUMIN 3.7   PROTTOTAL 6.8   BILITOTAL 0.4   ALKPHOS 75   ALT 12   AST 18     Recent Results (from the past 24 hour(s))   XR Chest 2 Views    Narrative    Chest 2 views    INDICATION: Chronic pulmonary embolism without acute cor pulmonale.  Pulmonary hypertension. Iron deficiency anemia. Pulmonary nodules.    COMPARISON: 2/21/2021    FINDINGS: Heart size appears normal. There is dextrocurvature of the  thoracic spine. There is calcification of the aortic knob. Bones  appear osteopenic. Areas of scarring are noted in the right mid lung  and there is a increased AP diameter of the chest on the lateral view  with almost \"barrel chest \"appearance.      Impression    IMPRESSION: Chronic hyperinflation of the lungs. Atherosclerosis.  Osteopenia. Facet dextrocurvature.    ALLYN PARSONS MD         SYSTEM ID:  [id number]     "

## 2022-03-10 NOTE — ED TRIAGE NOTES
Pt presents to triage from clinic. Poor historian. Please see referral note.    Priyanka Romero RN on 3/10/2022 at 2:22 PM

## 2022-03-10 NOTE — LETTER
[facility] [address]  [address] [address]  [phone]    FACSIMILE TRANSMITTAL SHEET        NOTES/COMMENTS: Thank you for reviewing! Facility preference is [facility]. Medically ready today or tomorrow.     FRED Quezada, AURY  [facility] Medicine   Ph: [phone]  Pager: [phone]                                        IF YOU DID NOT RECEIVE THE CORRECT NUMBER OF PAGES OR THE FAX DID NOT COME THROUGH CLEARLY, PLEASE CALL THE SENDER     CONFIDENTIALITY STATEMENT: Confidential information that may accompany this transmission contains protected health information under state and federal law and is legally privileged. This information is intended only for the use of the individual or entity named above and may be used only for carrying out treatment, payment or other healthcare operations. The recipient or person responsible for delivering this information is prohibited by law from disclosing this information without proper authorization to any other party, unless required to do so by law or regulation. If you are not the intended recipient, you are hereby notified that any review, dissemination, distribution, or copying of this message is strictly prohibited. If you have received this communication in error, please destroy the materials and contact us immediately by calling the number listed above. No response indicates that the information was received by the appropriate authorized party

## 2022-03-11 NOTE — PROGRESS NOTES
Patient admitted to: Station 5C  Admitted from: ED  Arrived by: Cart  Reason for admission: Anemia  Patient accompanied by: 1 staff   Belongings: With pt as listed   Teaching: Call light system, Bed operation, room service and unit routine  Skin double check completed by: Vanesa Flores RN and Cristin Whitney RN

## 2022-03-11 NOTE — PROGRESS NOTES
2051 Blood transfusion stopped, vitals completed, as writer received call from Tiffanie AYALA about transfusion reaction.   Entire blood unit and urine to lab.  collected blood specimens.     Manual bp 155/55 on left arm.

## 2022-03-11 NOTE — PROGRESS NOTES
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      #Acute-on-chronic anemia, suspect slow GIB   #Iron deficiency anemia   Baseline hgb 8.0s. MCV microcytic at 71. Hgb dropped to 5.3, compared to last labs 6 months ago. Iron studies with profound iron deficiency with low iron, ferritin and high TIBC. Hx of GIB with AVMs, last episode 9/2021 at [facility].  Last EGD and colonoscopy in 2/18/2021 with 2 AVMs in duodenum and colonic AVM both s/p APCs. Patient doesn't look at her stool, so does not know if having any melena. BUN is normal. Suspect possible slow oozing GIB with prior AVMs and on chronic anticoagulation, as patient is currently hemodynamically stable.  Also concern for malnutrition with poor PO intake and new suspected lung malignancy contributing.   -Transfuse 2 Units pRBCs now   -H/H q6H   -2 large bore IVs, monitor on telemetry   -INR, PTT, reticulocyte count, stool occult  -PPI IV BID   -No plans for endoscopy via GI; continue to monitor H&H  -Venofer 200 mg IV x once per gastroenterology  -Patient will requiring outpatient oral iron supplementation     ADDENDUM: patient developed increased tachypnea and O2 needs. Repeat CXR with possible mild pulmonary edema on wet read, waiting for formal read. Transfusion stopped and transfusion reaction assessment ordered. If worsening respiratory distress will repeat imaging  again for possible TROLI/TACO. If transfusion reaction work up negative, will resume transfusion.      #Acute-on-chronic hypoxic respiratory failure  Progressive over the last 6 months, initially on used at night, now requiring 3L consistently, now with tachypnea and drop in O2 after starting transfusion. CXR on admission prior to transfusion clear. No tachycardia, fevers, or leukocytosis. NT -proBNP elevated from prior levels. Possibly 2/2 fluids, hypercapnea vs progression of underlying pulmonary HTN. No signs of COPD exacerbation as no bronchospasms on exam.   -CXR without any acute focal opacities. VBG pH 7.41, pCO2 62, Bicarb 40 , troponin, EKG, TTE as noted below  -Lasix 20 mg IV x1   -O2 supplementation 88-94% with hx of COPD and mild hypercapnia      ADDENDUM: Troponin elevated at 183 --> 193. Denies any CP but does feel dyspnea. EKG without any ST elevations or depression. Possibly demand 2/2 acute on chronic anemia. TTE ordered stat to assess for any WMA. Will continue to cycle troponins until peak. Telemetry.        #Thrombocytosis  -New. Platelets 521. Suspect reactive from underlying malignancy. Trend CBC     #Pulmonary nodules, likely malignancy   2cm RUL nodule, Previously biopsied in 2019, though growing on repeat imaging. PET scan on 3/8/22 with increased uptake concerning for malignant disease. Hx of tobacco use.   -Outpatient follow up with pulmonology      #Pericardial effusion  -New small pericardial effusion noted on PET several days ago. TTE as noted above.     #Weight loss, concern for malnutrition  -Nutrition consult      #Hypoklaemia  -Technical issue with nursing potassium replacement protocol  -Happy to manage electrolyte     #Pulmonary HTN  #CTED  #HTN  Follows with Cardiology, last seen on 10/2021. PTA Adempas 1.5 mg TID, lasix 20 mg daily and hydrochlorothiazide 50 mg daily. Baseline O2 2L NC at night, but recently using 3L O2 daily. RHC last performed 2/26/2021 with PCWP 15.   -Hold  PTA hydrochlorothiazide. Continue PTA lasix 20 mg daily   -Continue Adempas 1.5 mg TID  -Daily weights, I&O   -TTE as note above  -Cardiology consult   -Hold PTA potassium bicarb-citric acid especially with rising bicarb 40 on CMP. VBG as noted above.      #Hx DVT/PE  Last PE in 2010, provoked. Hold PTA eliquis 2.5 mg BID with concern for GIB.      #PAD  Aorto-biiliac occlusive disease. Follows with vascular surgery last seen in 2019 who recommended repeat SUMAYA yearly. Last SUMAYA in 2019 with Right leg 0.51, Left leg 0.59.  No stenting per patient or chart review.   -Continue PTA lipitor 80 mg daily   -Hold PTA plavix 75 mg daily with concern for GIB      ------ Chronic stable conditions -----     #COPD - Not on any inhalers. No wheezing on exam.   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 Continue PTA levothyroxine 75 mcg daily.   #Depression - Continue PTA citalopram 20 mg daily   #Glaucoma- Continue PTA latanoprost 0.005%, one drop in both eyes QHS       Diet: Advance Diet as Tolerated: Clear Liquid Diet  Snacks/Supplements Adult: Ensure Enlive; With Meals    DVT Prophylaxis: Pneumatic Compression Devices  Saha Catheter: Not present  Central Lines: None  Cardiac Monitoring: None  Code Status: Full Code      Disposition Plan   Expected Discharge: 03/14/2022     Anticipated discharge location: home    Delays: None anticipated at present       The patient's care was discussed with the Bedside Nurse, Care Coordinator/ and Patient.    Doe Verma DO  Hospitalist Service, GOLD TEAM [address] [place] [address]  Securely message with the Vocera Web Console (learn more here)  Text page via [employer] Paging/Directory   Please see signed in provider for up to date coverage information  ______________________________________________________________________    Interval History   Patient reports feeling much  improved.  Patient is in good spirits.  Patient denies chest pain, SOB.  Patient denies abdominal pain, nausea, vomiting.    Data reviewed today: I reviewed all medications, new labs and imaging results over the last 24 hours. I personally reviewed no images or EKG's today.    Physical Exam   Vital Signs: Temp: 97.6  F (36.4  C) Temp src: Axillary BP: 111/62 Pulse: 73   Resp: 18 SpO2: (!) 89 % O2 Device: None (Room air) Oxygen Delivery: 6 LPM  Weight: 89 lbs 11.64 oz     Physical Exam  Constitutional:       Appearance: Normal appearance.   HENT:      Head: Normocephalic.      Mouth/Throat:      Mouth: Mucous membranes are dry.      Pharynx: Oropharynx is clear.   Eyes:      Extraocular Movements: Extraocular movements intact.      Conjunctiva/sclera: Conjunctivae normal.      Pupils: Pupils are equal, round, and reactive to light.   Cardiovascular:      Rate and Rhythm: Normal rate and regular rhythm.      Heart sounds: Murmur heard.   Pulmonary:      Effort: Pulmonary effort is normal.      Breath sounds: Normal breath sounds.   Abdominal:      General: Abdomen is flat. Bowel sounds are normal.      Palpations: Abdomen is soft.   Musculoskeletal:      Cervical back: Normal range of motion and neck supple.   Skin:     General: Skin is warm and dry.   Neurological:      General: No focal deficit present.      Mental Status: She is alert.   Psychiatric:         Mood and Affect: Mood normal.         Behavior: Behavior normal.           Data   Recent Labs   Lab 03/11/22  1402 03/11/22  1150 03/11/22  0613 03/11/22  0314 03/10/22  2240 03/10/22  2109 03/10/22  1254   WBC  --   --   --  8.1  --   --  8.9   HGB  --  8.2* 8.0* 8.0*  --    < > 5.3*   MCV  --   --   --  75*  --   --  71*   PLT  --   --   --  445  --   --  521*   INR  --   --   --  1.02  --   --   --    NA  --   --   --  143  --   --  144   POTASSIUM 3.5  --   --  3.2* 3.3*  --  3.2*   CHLORIDE  --   --   --  103  --   --  100   CO2  --   --   --  41*  --   --   40*   BUN  --   --   --  14  --   --  18   CR  --   --   --  0.53  --   --  0.57   ANIONGAP  --   --   --  <1*  --   --  4   CANDIDA  --   --   --  9.1  --   --  9.4   GLC  --   --   --  91  --   --  92   ALBUMIN  --   --   --  3.2*  --   --  3.7   PROTTOTAL  --   --   --  6.2*  --   --  6.8   BILITOTAL  --   --   --  0.5  --   --  0.4   ALKPHOS  --   --   --  72  --   --  75   ALT  --   --   --  10  --   --  12   AST  --   --   --  16  --   --  18    < > = values in this interval not displayed.     Recent Results (from the past 24 hour(s))   XR Chest Port 1 View    Narrative    EXAM: XR CHEST PORT 1 VIEW  3/10/2022 8:14 PM     HISTORY:  Dyspnea after starting transfusions       COMPARISON:  Radiographs 3/10/2022, 2021    TECHNIQUE: Portable 45 degree upright AP radiograph of the chest.    FINDINGS: The trachea is midline. The lungs are hyperinflated. No  focal airspace opacity. No pleural effusion or pneumothorax. Aortic  arch calcifications. Osteopenic bones without acute abnormality.      Impression    IMPRESSION:   1. No focal airspace opacity.  2. Persistent hyperinflation of the lungs.     I have personally reviewed the examination and initial interpretation  and I agree with the findings.    VANESSA MCCLELLAND MD         SYSTEM ID:  [id number]   Echocardiogram Complete   Result Value    LVEF  55-60%    Narrative    [id number]  [id number]  [id number]  [id number]^MEREDITH^JOE     [facility]  Echocardiography Laboratory  [address] [place] [id number]     Name: ISAURO GUZMAN  MRN: [id number]  : 1935  Study Date: 2022 09:51 AM  Age: 86 yrs  Gender: Female  Patient Location: [facility]  Reason For Study: Dyspnea, Pulmonary Hypertension  Ordering Physician: JOE HARPER  Referring Physician: VANESA ZAPATA  Performed By: Kimi Hernández RDCS     BSA: 1.3 m2  Height: 59 in  Weight: 92 lb  HR: 70  BP: 138/74  mmHg  ______________________________________________________________________________  Procedure  Complete Portable Echo Adult.  ______________________________________________________________________________  Interpretation Summary  Global and regional left ventricular function is normal with an EF of 55-60%.  Grade II or moderate diastolic dysfunction. Diastolic Doppler findings (E/E'  ratio and/or other parameters) suggest left ventricular filling pressures are  increased.  Global right ventricular function is normal.  The inferior vena cava was normal in size with preserved respiratory  variability.  Pulmonary artery systolic pressure cannot be assessed.     This study was compared with the study from 2/2021 .  No significant changes noted.     ______________________________________________________________________________  Left Ventricle  Left ventricular size is normal. Global and regional left ventricular function  is normal with an EF of 55-60%. Mild concentric wall thickening consistent  with left ventricular hypertrophy is present. Grade II or moderate diastolic  dysfunction. Diastolic Doppler findings (E/E' ratio and/or other parameters)  suggest left ventricular filling pressures are increased.     Right Ventricle  The right ventricle is normal size. Global right ventricular function is  normal.     Atria  The right atria appears normal. Moderate left atrial enlargement is present.     Mitral Valve  Mild mitral annular calcification is present.     Aortic Valve  The valve leaflets are not well visualized. On Doppler interrogation, there is  no significant stenosis or regurgitation.     Tricuspid Valve  The tricuspid valve is normal. The peak velocity of the tricuspid regurgitant  jet is not obtainable. Pulmonary artery systolic pressure cannot be assessed.     Pulmonic Valve  The pulmonic valve is normal.     Vessels  The aorta root is normal. The inferior vena cava was normal in size with  preserved  respiratory variability.     Pericardium  No pericardial effusion is present.     Compared to Previous Study  This study was compared with the study from 2/2021 . No significant changes  noted.  ______________________________________________________________________________  MMode/2D Measurements & Calculations  IVSd: 1.1 cm     LVIDd: 4.9 cm  LVIDs: 4.2 cm  LVPWd: 1.1 cm  FS: 14.0 %  LV mass(C)d: 198.7 grams  LV mass(C)dI: 149.9 grams/m2  Ao root diam: 3.0 cm  asc Aorta Diam: 3.3 cm  LVOT diam: 2.1 cm  LVOT area: 3.5 cm2  LA Volume (BP): 61.9 ml  LA Volume Index (BP): 46.5 ml/m2  RWT: 0.44     Doppler Measurements & Calculations  MV E max jese: 90.6 cm/sec  MV A max jese: 141.0 cm/sec  MV E/A: 0.64  MV dec slope: 498.0 cm/sec2  PA acc time: 0.04 sec  E/E' avg: 15.2  Lateral E/e': 15.2  Medial E/e': 15.2     ______________________________________________________________________________  Report approved by: Anders GUAJARDO 03/11/2022 10:46 AM           Medications       atorvastatin  80 mg Oral Daily     citalopram  20 mg Oral Daily     furosemide  20 mg Oral Daily     gabapentin  400 mg Oral At Bedtime     latanoprost  1 drop Both Eyes At Bedtime     levothyroxine  75 mcg Oral Daily     [START ON 3/12/2022] multivitamin w/minerals  1 tablet Oral Daily at 10 am     oxybutynin  5 mg Oral At Bedtime     pantoprazole (PROTONIX) IV  40 mg Intravenous BID     sodium chloride (PF)  3 mL Intracatheter Q8H

## 2022-03-11 NOTE — PROGRESS NOTES
"CLINICAL NUTRITION SERVICES - ASSESSMENT NOTE     Nutrition Prescription    RECOMMENDATIONS FOR MDs/PROVIDERS TO ORDER:  Advance diet as tolerated to regular diet    Malnutrition Status:    Severe malnutrition in the context of chronic illness    Recommendations already ordered by Registered Dietitian (RD):  -Supplement: Chocolate Ensure Enlive @ 10am & 2pm (once diet advanced)  -Thera-vit-M to support micronutrient needs    Future/Additional Recommendations:  Monitor wt trends. Monitor GOC.     REASON FOR ASSESSMENT  Lucie Stockton is a/an 86 year old female assessed by the dietitian for Provider Order - Weight loss, poor PO intake. Concern for malnutrition    NUTRITION HISTORY  Pt known to Clinical Nutrition during previous admission for severe protein-calorie malnutrition. Pt will eat small items like toast, cereal, or crackers at breakfast/lunch. In the afternoon, she and her  will go out to a restaurant for a full meal. She gets too tired to cook all the time so the majority of their food is eaten out. She does take 2 Ensure daily to help improve her PO (she thinks it's the Max variety, which provide 30 g protein, 150 kcal each). She has not been able to walk much lately d/t weakness.    CURRENT NUTRITION ORDERS  Diet: NPO  Intake/Tolerance: Pt reports she's hungry and wants to eat.     LABS  Labs reviewed  -K+ 3.2 (L)     MEDICATIONS  Medications reviewed  -Lasix  -Thera-vit-M  -KCl  -Venofer infusion (iron sucrose)    ANTHROPOMETRICS  Height:   Ht Readings from Last 1 Encounters:   03/10/22 1.499 m (4' 11\")   Most Recent Weight: 40.7 kg (89 lb 11.6 oz)    IBW: 44 kg (adjusted for <5 ft)  BMI: 18.12 kg/m2; Underweight BMI <18.5  Weight History: ~12.7% wt loss over past ~1 year.   Wt Readings from Last 20 Encounters:   03/11/22 40.7 kg (89 lb 11.6 oz)   03/10/22 42.1 kg (92 lb 12.8 oz)   10/26/21 42.2 kg (93 lb)   10/07/21 42.2 kg (93 lb)   09/09/21 40.5 kg (89 lb 3.2 oz)   09/01/21 42.5 kg (93 lb 12.8 " oz)   07/22/21 45.4 kg (100 lb)   03/05/21 45.4 kg (100 lb)   02/26/21 45.4 kg (100 lb)   02/21/21 45.8 kg (100 lb 15.5 oz)   02/18/21 44.5 kg (98 lb)   02/16/21 44.5 kg (98 lb)   01/30/20 44.6 kg (98 lb 6.4 oz)   01/24/20 46.6 kg (102 lb 11.8 oz)   01/14/20 47.6 kg (105 lb)   01/14/20 47.2 kg (104 lb 1.6 oz)   01/07/20 49 kg (108 lb)   12/05/19 47.6 kg (105 lb)   11/20/19 48 kg (105 lb 12.8 oz)   11/13/19 47.9 kg (105 lb 9.6 oz)   Dosing Weight: 41 kg (actual, based on admit wt of 40.7 kg on 3/11)    ASSESSED NUTRITION NEEDS  Estimated Energy Needs: 5259-3317 kcals/day (35 - 40+ kcals/kg)  Justification: Repletion (pt underweight, cachectic)  Estimated Protein Needs: 62 - 82 grams protein/day (1.5 - 2 grams of pro/kg)  Justification: Hypercatabolism with acute illness, repletion  Estimated Fluid Needs: 1 mL/kcal  Justification: Maintenance or Per provider pending fluid status    PHYSICAL FINDINGS  See malnutrition section below.  -Cachectic  -Thin skin, but intact from areas visible    MALNUTRITION  % Intake: Unable to fully assess - suspect meeting <75% assessed needs for months   % Weight Loss: Weight loss does not meet criteria  Subcutaneous Fat Loss: Facial region:  Severe, Upper arm:  Severe, Lower arm:  Severe and Thoracic/intercostal:  Severe  Muscle Loss: Temporal:  Severe, Facial & jaw region:  Severe, Scapular bone:  Severe, Thoracic region (clavicle, acromium bone, deltoid, trapezius, pectoral):  Severe, Upper arm (bicep, tricep):  Severe, Lower arm  (forearm):  Severe, Dorsal hand:  Severe, Upper leg (quadricep, hamstring):  Severe, Patellar region:  Severe and Posterior calf:  Severe  Fluid Accumulation/Edema: None noted  Malnutrition Diagnosis: Severe malnutrition in the context of chronic illness    NUTRITION DIAGNOSIS  Malnutrition related to suspected hypermetabolism in setting of suspected malignancy and baseline small appetite as evidenced by pt report of taking 2 Ensure drinks daily, snacking on  small items in morning and afternoon and going out for a meal at a restaurant in the afternoon, severe global subcutaneous adipose tissue and muscle wasting on physical exam c/w chronic malnutrition, BMI of 18.12 kg/m2, and assessed kcal needs of 5684-3741 kcals/day (35 - 40+ kcals/kg) and assessed protein needs of 62 - 82 grams protein/day (1.5 - 2 grams of pro/kg).       INTERVENTIONS  Implementation  -Nutrition Education: Provided education on RD role, role of NFPE (nutrition-focused physical exam). Encouraged continuation of Ensure drinks to support nutrition status, but to purchase the higher kcal versions (she thinks she takes Ensure Max, which are high protein, but low kcal). Encouraged her to purchase Ensure Enlive and that this is the variety that will be sent to her during her hospital stay.   -Medical food supplement therapy: Ordered as above    Goals  Diet adv v nutrition support within 2-3 days.     Monitoring/Evaluation  Progress toward goals will be monitored and evaluated per protocol.    Zofia Elizabeth RD, LD  Pager: [phone]

## 2022-03-11 NOTE — PROGRESS NOTES
Call received from blood bank, patient cleared to receive more blood, paged Tiffanie Swan about this.

## 2022-03-11 NOTE — PROGRESS NOTES
VS baseline with O2 6 l/min NC, this morning patient stated she is hungry and wants to eat, however administrated apple source, she could not take half of cup and after that expressed nausea, diet was advanced to clear liquid however poor oral intake, voided well with lasix PO, she stated she is too weak and used pwicks however x 1 used commode and voided well, very weak, Hg stable around 8.0, continue to monitor, IV iron souce was administrated and tolerated well

## 2022-03-11 NOTE — PHARMACY-ADMISSION MEDICATION HISTORY
Admission Medication History Completed by Pharmacy    See [facility] Admission Navigator for allergy information, preferred outpatient pharmacy, prior to admission medications and immunization status.     Medication History Sources:     Patient's daughter    Dispense report    Changes made to PTA medication list (reason):    Added: None    Deleted: None    Changed: None    Additional Information:    Patient's daughter has general knowledge regarding patient's medications.    Patient's daughter reports patient has no known drug allergies.    Unable to confirm strength or dosing regimen of Apixaban (Eliquis). Per dispense report, Apixaban 5 mg tablets filled 10/20/21 30 day supply.    Unable to confirm if patient is taking Hydrochlorothiazide 50 mg tablets.     Unable to confirm if patient is taking Latanoprost 0.005% ophthalmic solution.    Unable to confirm if patient is taking Oxybutynin 5 mg tablets.    Unable to confirm if patient is taking Riociguat 1.5 mg tablets. Per dispense report, Riociguat 1 mg tablet filled 9/29/21 15 day supply.    Patient's daughter reports patient is not currently administering any injections, such as the lidocaine injection or triamcinolone injection.    Prior to Admission medications    Medication Sig Last Dose Taking? Auth Provider   acetaminophen (TYLENOL) 325 MG tablet Take 2 tablets every 6 to 8 hours as needed for pain  Patient taking differently: Take 650 mg by mouth as needed Take 2 tablets every 6 to 8 hours as needed for pain Past Week at Unknown time Yes Marii Montgomery MD   apixaban ANTICOAGULANT (ELIQUIS) 2.5 MG tablet Take 1 tablet (2.5 mg) by mouth 2 times daily 3/10/2022 at AM Yes Seven Florian MD   atorvastatin (LIPITOR) 40 MG tablet Take 2 tablets (80 mg) by mouth daily Unknown at Unknown time Yes Marii Montgomery MD   Calcium Carbonate-Vitamin D (CALCIUM 600 + D OR) Take 1 tablet by mouth every morning  3/10/2022 at AM Yes Reported, Patient   cholecalciferol  (VITAMIN D3) 25 mcg (1000 units) capsule Take 1 capsule by mouth daily 3/10/2022 at AM Yes Reported, Patient   citalopram (CELEXA) 20 MG tablet Take 1 tablet (20 mg) by mouth daily 3/10/2022 at AM Yes Marii Montgomery MD   clopidogrel (PLAVIX) 75 MG tablet Take 1 tablet (75 mg) by mouth daily Unknown at Unknown time Yes Marii Montgomery MD   cyanocolbalamin (VITAMIN  B-12) 500 MCG tablet Take 500 mcg by mouth every morning  3/10/2022 at AM Yes Marii Montgomery MD   ferrous sulfate (FEROSUL) 325 (65 Fe) MG tablet Take 1 tablet (325 mg) by mouth daily (with breakfast) 3/10/2022 at AM Yes Marii Montgomery MD   furosemide (LASIX) 20 MG tablet Take 1 tablet (20 mg) by mouth daily 3/10/2022 at Unknown time Yes Marcia Griggs PA   gabapentin (NEURONTIN) 400 MG capsule Take 1 capsule (400 mg) by mouth At Bedtime 3/9/2022 at Unknown time Yes Marii Montgomery MD   levothyroxine (SYNTHROID/LEVOTHROID) 75 MCG tablet Take 1 tablet (75 mcg) by mouth daily Unknown at Unknown time Yes Marii Montgomery MD   loperamide (IMODIUM A-D) 2 MG tablet Take 1-2 mg by mouth daily as needed for diarrhea  Unknown at PRN Yes Marii Montgomery MD   pantoprazole (PROTONIX) 40 MG EC tablet Take 1 tablet (40 mg) by mouth daily Takes in the morning. 3/10/2022 at AM Yes Marii Montgomery MD   Potassium Bicarb-Citric Acid 20 MEQ TBEF Take 20 mg by mouth daily Unknown at Unknown time Yes Marii Montgomery MD   hydrochlorothiazide (HYDRODIURIL) 50 MG tablet Take 1 tablet (50 mg) by mouth daily Please keep appt 12/21/20   Marii Montgomery MD   latanoprost (XALATAN) 0.005 % ophthalmic solution Place 1 drop into both eyes At Bedtime    Reported, Patient   oxybutynin (DITROPAN) 5 MG tablet Take 1 tablet (5 mg) by mouth At Bedtime For frequent urination   Marii Montgomery MD   riociguat (ADEMPAS) 1.5 MG tablet Take 1 tablet (1.5 mg) by mouth 3 times daily   Seven Florian MD       Date completed: 03/10/22    Medication history  completed by: Dalila Moore, PD2 Pharmacy Intern

## 2022-03-11 NOTE — PROGRESS NOTES
Report called to Vanesa DEL RIO RN. Blood still transfusing, hgb postponed. Pt continues on 4 liters of oxygen. VSS.

## 2022-03-11 NOTE — CONSULTS
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 GI has been consulted for acute on chronic anemia with history of GIB secondary to AVMs.     # Acute on chronic anemia  # Iron deficiency anemia    Patient presents with hgb 5.3 (baselined 8.0s), MCV 71. Iron studies 3/10 with iron deficiency with low iron (9), ferritin (6), and high TIBC (475). Not taking iron supplements outpatient. Last EGD and colonoscopy 2/2021 with AVMs in duodenum and colon, treated with APC. In the procedure note, it mentions the procedure was very difficult to perform as patient became hypoxic with small amount of sedation. Per Dr. Reddy in the procedure note \"The entire time, patient was trying to get out of bed and agitated and moaning and saying that she wanted to go home, I would not puruse any additional endoscopies unless patient is having active GI bleeding that requires urgent or semi-urgent endosocpy. She likely has numerous AVMs throughout her small bowel that we can not effectively identify and treat. Do not recommend video capsule endoscopy. Recommend treating her symptomatic anemia with iron and prn transfusions.\"      Transfused 2 units of PRBC with hgb 8.0 from 6.6. She denies symptoms of GIB including black/bloody stools, hematemesis. On chronic anticoagulation with Eliquis for hx of DVT/PE. Without evidence of active GIB, " no plans to scope currently. Recommend IV iron while inpatient and PO iron supplements every other day outpatient.      Recommendations:  - IV iron while inpatient and PO iron supplements every other day outpatient.  - Monitor hgb, transfuse for hgb <7  - Monitor for evidence of GIB, appreciate nursing documentation of stools  - Ok for regular diet from our standpoint    COVID status negative  Analgesia/Antiemetics per primary team    Gastroenterology follow up recommendations: None    GI will sign off. If patient has evidence of GIB please reach back out. Thank you for involving us in this patient's care. Please do not hesitate to contact the GI service with any questions or concerns.     Pt seen and care plan discussed with Dr. Ledezma, GI staff physician.    Karly Lombardi PA-C  GI Service  [facility]  Text Page  -------------------------------------------------------------------------------------------------------------------       Reason for Consultation:   We were asked by Shun Tamayo MD of medicine to evaluate this patient with     History is obtained from the patient and the medical record.            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     She was sent in from her PCP clinic after having fatigue and increased dyspnea that worsened over the last week. She also reports a fall. She was noted to have a hgb of 5.3 and was sent to the hospital for further workup. She denies any black or bloody stools, diarrhea,  "abdominal pain, nausea, vomiting, hematemesis, fevers, chills. Her last bowel movement was 2 days ago, brown in color. She denies any dysphagia/odynophagia, loss of appetite. She reports 10 pound weight loss in the last 6 months. She does not take NSAIDs. She reports that she does not take iron supplements at home. She takes PPI daily. She does not drink alcohol regularly and is a former smoker.     Previous Procedures:  EGD on 2/20/21  Impression:          - Complete visualization was precluded by patient                        movement and agitation.                        - Normal esophagus.                        - Nodular mucosa in the stomach.                        - Two non-bleeding angioectasias in the duodenum.                        Treated with argon plasma coagulation (APC).                        - Erythematous duodenopathy.                        - No specimens collected.     Colonoscopy 2/20/21  Impression:          - Diverticulosis in the recto-sigmoid colon, in the                        sigmoid colon, in the descending colon and in the                        ascending colon. No bleeding.                        - One 5 mm polyp in the cecum. Resection not attempted.                        This is unlikely to be source of bleeding and unlikely                        to be of concern longterm for our patient.                        - Non-bleeding internal hemorrhoids. Retroflexion not                        performed.                        - A single colonic angioectasia. Treated with argon                        plasma coagulation (APC).                        - No specimens collected.             Past Medical History:   Reviewed and edited as appropriate  Past Medical History:   Diagnosis Date     Anemia      Aortic stenosis     abdominal     Atherosclerosis of aorta (H)     \"extensive disease with near occlusion below level of renal arteries\" on CT     Atherosclerosis of arteries of extremities " (H)      Back pain     severe multilevel DJD, moderate spinal canal stenosis L4-5, severe L3-4     Chronic pain     Back, hips, knees, legs     Degenerative joint disease      DVT of lower extremity, bilateral (H)     5/24/10 left distal femoral and great saphenous, 7/7/10 left:  extending to cfv, iliac , pop and post tibial, peronial, right:  distal fem and peroneal      Grave's disease      Hyperlipidaemia LDL goal < 70      Hypertension, essential      Hypothyroidism      Imbalance      Insomnia      Intermittent claudication (H)      Iron deficiency      Knee pain      Lumbar stenosis with neurogenic claudication      Osteoarthritis     ankle,foot, knee     Osteoporosis      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, 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     Pulmonary embolism (H)     5/24/10 bilateral     Small bowel obstruction (H) 1/23/17     Varicose veins             Past Surgical History:   Reviewed and edited as appropriate   Past Surgical History:   Procedure Laterality Date     BUNIONECTOMY       CAPSULE/PILL CAM ENDOSCOPY N/A 2/20/2021    Procedure: IMAGING PROCEDURE, GI TRACT, INTRALUMINAL, VIA CAPSULE;  Surgeon: Heron Ayala MD;  Location: [facility]     COLONOSCOPY      11/10/10 angioectasia     CV PULMONARY ANGIOGRAM N/A 2/26/2021    Procedure: CV PULMONARY ANGIOGRAM;  Surgeon: Joaquin Fuller MD;  Location:  [facility]     CV RIGHT HEART CATH MEASUREMENTS RECORDED N/A 2/26/2021    Procedure: CV RIGHT HEART CATH;  Surgeon: Joaquin Fuller MD;  Location:  [facility] CATH [facility]     ENDOBRONCHIAL ULTRASOUND FLEXIBLE N/A 1/24/2020    Procedure: ENDOBRONCHIAL ULTRASOUND, WITH FLEXIBLE BRONCHOSCOPY;  Surgeon: Gopal Jara MD;  Location: [facility]     HYSTERECTOMY TOTAL ABDOMINAL, BILATERAL SALPINGO-OOPHORECTOMY, NODE DISSECTION, COMBINED  2/17/11    SANGEETHA, EMILIA, LN bx, omentectomy, resection of  evrian malignancy Dr. JONO Goncalves - Returned [facility]IGN     INJECT EPIDURAL LUMBAR / SACRAL SINGLE N/A 2018    Procedure: INJECT EPIDURAL LUMBAR / SACRAL SINGLE;  lumbar interlaminar epidural steroid injection;  Surgeon: Jaylin Valadez MD;  Location: [facility]     INJECT SACROILIAC JOINT Right 3/7/2018    Procedure: INJECT SACROILIAC JOINT;  Right Sacroiliac Joint Injection;  Surgeon: Flavio Harrison MD;  Location: [facility]     INJECT SACROILIAC JOINT Bilateral 2018    Procedure: Bilateral Sacroiliac Joint Injections;  Surgeon: Faviola Cosby MD;  Location: [facility]     OPTICAL TRACKING SYSTEM BRONCHOSCOPY N/A 2020    Procedure: Super D navigational bronchoscopy;  Surgeon: Gopal Jara MD;  Location: [facility]     UPPER GI ENDOSCOPY      11/10/10 erythematous gastropathy, angioectasia     ZZC STOMACH SURGERY PROCEDURE UNLISTED      Please see chart              Social History:   Reviewed and edited as appropriate  Social History     Socioeconomic History     Marital status:      Spouse name: Not on file     Number of children: Not on file     Years of education: Not on file     Highest education level: Not on file   Occupational History     Not on file   Tobacco Use     Smoking status: Former Smoker     Packs/day: 0.50     Years: 15.00     Pack years: 7.50     Quit date: 1993     Years since quittin.5     Smokeless tobacco: Never Used   Substance and Sexual Activity     Alcohol use: Yes     Comment: social     Drug use: No     Sexual activity: Not Currently     Partners: Male   Other Topics Concern     Parent/sibling w/ CABG, MI or angioplasty before 65F 55M? Not Asked   Social History Narrative     for 52 years. Retired from [employer]. Frequent world travel.     Social Determinants of Health     Financial Resource Strain: Not on file   Food Insecurity: Not on file   Transportation Needs: Not on file   Physical Activity: Not on file   Stress: Not on file   Social  Connections: Not on file   Intimate Partner Violence: Not on file   Housing Stability: Not on file              Family History:   Patient's family history is reviewed today and is non-contributory  Family History   Problem Relation Age of Onset     Breast Cancer Maternal Aunt 80     C.A.D. Father 54     No Known Problems Mother         No known history of colorectal cancer, liver disease, or inflammatory bowel disease         Allergies:   Reviewed and edited as appropriate   No Known Allergies         Medications:     Facility-Administered Medications Prior to Admission   Medication Dose Route Frequency Provider Last Rate Last Admin     lidocaine (PF) (XYLOCAINE) 1 % injection 4 mL  4 mL   Octavio Morales MD   4 mL at 10/12/21 0933     triamcinolone (KENALOG-40) injection 40 mg  40 mg   Octavio Morales MD   40 mg at 10/12/21 0933     Medications Prior to Admission   Medication Sig Dispense Refill Last Dose     acetaminophen (TYLENOL) 325 MG tablet Take 2 tablets every 6 to 8 hours as needed for pain (Patient taking differently: Take 650 mg by mouth as needed Take 2 tablets every 6 to 8 hours as needed for pain) 100 tablet 1 Past Week at Unknown time     apixaban ANTICOAGULANT (ELIQUIS) 2.5 MG tablet Take 1 tablet (2.5 mg) by mouth 2 times daily 180 tablet 3 3/10/2022 at AM     atorvastatin (LIPITOR) 40 MG tablet Take 2 tablets (80 mg) by mouth daily 180 tablet 3 Unknown at Unknown time     Calcium Carbonate-Vitamin D (CALCIUM 600 + D OR) Take 1 tablet by mouth every morning    3/10/2022 at AM     cholecalciferol (VITAMIN D3) 25 mcg (1000 units) capsule Take 1 capsule by mouth daily   3/10/2022 at AM     citalopram (CELEXA) 20 MG tablet Take 1 tablet (20 mg) by mouth daily 90 tablet 3 3/10/2022 at AM     clopidogrel (PLAVIX) 75 MG tablet Take 1 tablet (75 mg) by mouth daily 90 tablet 3 Unknown at Unknown time     cyanocolbalamin (VITAMIN  B-12) 500 MCG tablet Take 500 mcg by mouth every morning  90  tablet 1 3/10/2022 at AM     ferrous sulfate (FEROSUL) 325 (65 Fe) MG tablet Take 1 tablet (325 mg) by mouth daily (with breakfast) 90 tablet 3 3/10/2022 at AM     furosemide (LASIX) 20 MG tablet Take 1 tablet (20 mg) by mouth daily 90 tablet 3 3/10/2022 at Unknown time     gabapentin (NEURONTIN) 400 MG capsule Take 1 capsule (400 mg) by mouth At Bedtime 90 capsule 1 3/9/2022 at Unknown time     levothyroxine (SYNTHROID/LEVOTHROID) 75 MCG tablet Take 1 tablet (75 mcg) by mouth daily 90 tablet 3 Unknown at Unknown time     loperamide (IMODIUM A-D) 2 MG tablet Take 1-2 mg by mouth daily as needed for diarrhea  30 tablet 0 Unknown at PRN     pantoprazole (PROTONIX) 40 MG EC tablet Take 1 tablet (40 mg) by mouth daily Takes in the morning. 90 tablet 3 3/10/2022 at AM     Potassium Bicarb-Citric Acid 20 MEQ TBEF Take 20 mg by mouth daily 90 tablet 3 Unknown at Unknown time     hydrochlorothiazide (HYDRODIURIL) 50 MG tablet Take 1 tablet (50 mg) by mouth daily Please keep appt 12/21/20 90 tablet 0      latanoprost (XALATAN) 0.005 % ophthalmic solution Place 1 drop into both eyes At Bedtime   1      oxybutynin (DITROPAN) 5 MG tablet Take 1 tablet (5 mg) by mouth At Bedtime For frequent urination 90 tablet 0      riociguat (ADEMPAS) 1.5 MG tablet Take 1 tablet (1.5 mg) by mouth 3 times daily                Review of Systems:     A complete review of systems was performed and is negative except as noted in the HPI             Physical Exam:   Temp: 97.9  F (36.6  C) Temp src: Axillary BP: 131/60 Pulse: 74   Resp: 27 SpO2: 92 % O2 Device: Nasal cannula Oxygen Delivery: 4 LPM  Wt:   Wt Readings from Last 2 Encounters:   03/11/22 40.7 kg (89 lb 11.6 oz)   03/10/22 42.1 kg (92 lb 12.8 oz)        General: 86 year old female in NAD.  Answers appropriately.    HEENT: Head is AT/NC. Sclera anicteric. No conjunctival injection.  Oropharynx is clear, moist and w/o exudate or lesions.  Neck: Supple  Lungs: Clear to auscultation  bilaterally.  No wheezes, rhonchi or crackles.    Heart: Regular rate and rhythm.  No murmurs, gallops or rubs.  Abdomen: Soft, non-tender, non-distended.  BS +.  No rebound or peritoneal signs  Extremities: No pedal edema.  Heme/Lymph: No cervical adenopathy.   Skin: No jaundice or rash  Neurologic: Grossly non-focal           Data:   Labs and imaging below were independently reviewed and interpreted    LAB WORK:    BMP  Recent Labs   Lab 03/11/22  0314 03/10/22  2240 03/10/22  1254     --  144   POTASSIUM 3.2* 3.3* 3.2*   CHLORIDE 103  --  100   CANDIDA 9.1  --  9.4   CO2 41*  --  40*   BUN 14  --  18   CR 0.53  --  0.57   GLC 91  --  92     CBC  Recent Labs   Lab 03/11/22  0613 03/11/22  0314 03/10/22  2109 03/10/22  1254   WBC  --  8.1  --  8.9   RBC  --  3.84  --  3.04*   HGB 8.0* 8.0*   < > 5.3*   HCT  --  28.6*  --  21.6*   MCV  --  75*  --  71*   MCH  --  20.8*  --  17.4*   MCHC  --  28.0*  --  24.5*   RDW  --  24.7*  --  24.0*   PLT  --  445  --  521*    < > = values in this interval not displayed.     INR  Recent Labs   Lab 03/11/22 0314   INR 1.02     LFTs  Recent Labs   Lab 03/11/22  0314 03/10/22  1254   ALKPHOS 72 75   AST 16 18   ALT 10 12   BILITOTAL 0.5 0.4   PROTTOTAL 6.2* 6.8   ALBUMIN 3.2* 3.7      PANCNo lab results found in last 7 days.    IMAGING:  None      =======================================================================

## 2022-03-11 NOTE — CONSULTS
Cardiology Consult Note    Assessment and Plan:   Lucie Stockton is a 86 y.o. female with PMH of pulmonary HTN 2/2 CTED on chronic oxygen therapy, aortic stenosis, HLD, HTN, hypothyroidism, PAD, COPD, hx of DVT/PE on eliquis, probably lung cancer (abnormal PET for lung nodule work up), and chronic anemia 2/2 GIB who was admitted for acute for chronic anemia and increasing dyspnea. Cardiology was consulted for recommendations regarding pulmonary hypertension and troponin elevation.     Elevated troponin of 202 with repeat of 186. Patient denies any chest pain/pressure and EKG shows no signs of ST segment abnormalities. Given reassuring EKG with no classic ACS symptoms, ACS is less likely. Patient presentation suggests troponin elevated in the setting of type II MI caused by supply demand mismatch  2/2 profound blood loss anemia (hemoglobin of 5.3 on presentation). Patient also has calcifications present on aorta and PAD. Give risk factors for CAD, may consider evaluating for CAD in outpatient setting. Patient last underwent a stress test in 2011 with no significant findings.     Patient has pulmonary hypertension 2/2 CTEPH and follows with Dr. Florian. Her RHC in 2/26/21 showed RVSP 65, PA pressure mean of 34, and PCWP of 15. Echo 3/11/2022 showed EF 55-60%, moderate diastolic dysfunction, and signs of LVH. Global right ventricular function was normal. Patient has a blood loss anemia as well as probable lung cancer seen on PET scan 3/8/2022. Anemia/cancer could be contributing to her increasing dyspnea with exertion. Will continue PTA Adempas and have patient follow with Dr. Florian for her PH.     # Pulmonary HTN 2/2 CTEPH   - Continue PTA PO adempas 1.5 mg TID   - Outpatient follow up with Dr. Florian    # Elevated Troponin, downtrending (202 --> 186)  - Outpatient cardiology follow up for CAD evaluation     # HLD   - Continue PTA atorvastatin 80 mg       Kelly Veliz, MS4  Cardiology Consult Team     Patient was  "seen by medical student and Dr. Reyes Castro (CV fellow) and the above plan discussed with Dr. Fields.  Subjective:   Lucie is resting comfortably on interview. She had a hemoglobin count of 5.3 in clinic and was told to present to the ED and was subsequently admitted. Lucie follows with Dr. Florian for management of her pulmonary hypertension and takes riociguat orally. Per Lucie, she has had worsening shortness of breath over the last few months but her oxygen baseline has stayed stable at 4L. She notes dyspnea with exertion and some dizziness when standing from laying down. She has fallen twice in the last month but denies head trauma. Lucie cannot say if her pulmonary htn medicine is helping her SOB or not.     Lucie has lost 10 lbs over a \"short\" period of time. She denies cough, hemoptysis.orthopnea, chest pain, SOB, palpitations, leg swelling. She endorses some dizziness when standing from lying down and a headache. No melana, visible bleeding, or stool color changes. Denies history of HF, CAD, MI, diabetes, asthma, or arrhythmias. Lucie reports she does not take diuretics and never has.        Review of Systems:   A comprehensive review of systems was performed and found to be negative except as described in this note     Medications:     Facility-Administered Medications Prior to Admission   Medication Dose Route Frequency Provider Last Rate Last Admin     lidocaine (PF) (XYLOCAINE) 1 % injection 4 mL  4 mL   Octavio Morales MD   4 mL at 10/12/21 0933     triamcinolone (KENALOG-40) injection 40 mg  40 mg   Octavio Morales MD   40 mg at 10/12/21 0933     Medications Prior to Admission   Medication Sig Dispense Refill Last Dose     acetaminophen (TYLENOL) 325 MG tablet Take 2 tablets every 6 to 8 hours as needed for pain (Patient taking differently: Take 650 mg by mouth as needed Take 2 tablets every 6 to 8 hours as needed for pain) 100 tablet 1 Past Week at Unknown time     apixaban " ANTICOAGULANT (ELIQUIS) 2.5 MG tablet Take 1 tablet (2.5 mg) by mouth 2 times daily 180 tablet 3 3/10/2022 at AM     atorvastatin (LIPITOR) 40 MG tablet Take 2 tablets (80 mg) by mouth daily 180 tablet 3 Unknown at Unknown time     Calcium Carbonate-Vitamin D (CALCIUM 600 + D OR) Take 1 tablet by mouth every morning    3/10/2022 at AM     cholecalciferol (VITAMIN D3) 25 mcg (1000 units) capsule Take 1 capsule by mouth daily   3/10/2022 at AM     citalopram (CELEXA) 20 MG tablet Take 1 tablet (20 mg) by mouth daily 90 tablet 3 3/10/2022 at AM     clopidogrel (PLAVIX) 75 MG tablet Take 1 tablet (75 mg) by mouth daily 90 tablet 3 Unknown at Unknown time     cyanocolbalamin (VITAMIN  B-12) 500 MCG tablet Take 500 mcg by mouth every morning  90 tablet 1 3/10/2022 at AM     ferrous sulfate (FEROSUL) 325 (65 Fe) MG tablet Take 1 tablet (325 mg) by mouth daily (with breakfast) 90 tablet 3 3/10/2022 at AM     furosemide (LASIX) 20 MG tablet Take 1 tablet (20 mg) by mouth daily 90 tablet 3 3/10/2022 at Unknown time     gabapentin (NEURONTIN) 400 MG capsule Take 1 capsule (400 mg) by mouth At Bedtime 90 capsule 1 3/9/2022 at Unknown time     levothyroxine (SYNTHROID/LEVOTHROID) 75 MCG tablet Take 1 tablet (75 mcg) by mouth daily 90 tablet 3 Unknown at Unknown time     loperamide (IMODIUM A-D) 2 MG tablet Take 1-2 mg by mouth daily as needed for diarrhea  30 tablet 0 Unknown at PRN     pantoprazole (PROTONIX) 40 MG EC tablet Take 1 tablet (40 mg) by mouth daily Takes in the morning. 90 tablet 3 3/10/2022 at AM     Potassium Bicarb-Citric Acid 20 MEQ TBEF Take 20 mg by mouth daily 90 tablet 3 Unknown at Unknown time     hydrochlorothiazide (HYDRODIURIL) 50 MG tablet Take 1 tablet (50 mg) by mouth daily Please keep appt 12/21/20 90 tablet 0      latanoprost (XALATAN) 0.005 % ophthalmic solution Place 1 drop into both eyes At Bedtime   1      oxybutynin (DITROPAN) 5 MG tablet Take 1 tablet (5 mg) by mouth At Bedtime For frequent  "urination 90 tablet 0      riociguat (ADEMPAS) 1.5 MG tablet Take 1 tablet (1.5 mg) by mouth 3 times daily             Other History:     Past Medical History:   Diagnosis Date     Anemia      Aortic stenosis     abdominal     Atherosclerosis of aorta (H)     \"extensive disease with near occlusion below level of renal arteries\" on CT     Atherosclerosis of arteries of extremities (H)      Back pain     severe multilevel DJD, moderate spinal canal stenosis L4-5, severe L3-4     Chronic pain     Back, hips, knees, legs     Degenerative joint disease      DVT of lower extremity, bilateral (H)     5/24/10 left distal femoral and great saphenous, 7/7/10 left:  extending to cfv, iliac , pop and post tibial, peronial, right:  distal fem and peroneal      Grave's disease      Hyperlipidaemia LDL goal < 70      Hypertension, essential      Hypothyroidism      Imbalance      Insomnia      Intermittent claudication (H)      Iron deficiency      Knee pain      Lumbar stenosis with neurogenic claudication      Osteoarthritis     ankle,foot, knee     Osteoporosis      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 Father 54     No Known Problems Mother      Social History     Socioeconomic History     Marital status:      Spouse name: Not on file     Number of children: Not on file     Years of education: Not on file     Highest education level: Not on file   Occupational History     Not on file   Tobacco Use     Smoking status: Former Smoker     Packs/day: 0.50     Years: 15.00     Pack years: 7.50     Quit date: 1993     Years since quittin.5     Smokeless tobacco: Never Used   Substance and " Sexual Activity     Alcohol use: Yes     Comment: social     Drug use: No     Sexual activity: Not Currently     Partners: Male   Other Topics Concern     Parent/sibling w/ CABG, MI or angioplasty before 65F 55M? Not Asked   Social History Narrative     for 52 years. Retired from [employer]. Frequent world travel.     Social Determinants of Health     Financial Resource Strain: Not on file   Food Insecurity: Not on file   Transportation Needs: Not on file   Physical Activity: Not on file   Stress: Not on file   Social Connections: Not on file   Intimate Partner Violence: Not on file   Housing Stability: Not on file     Past Surgical History:   Procedure Laterality Date     BUNIONECTOMY       CAPSULE/PILL CAM ENDOSCOPY N/A 2/20/2021    Procedure: IMAGING PROCEDURE, GI TRACT, INTRALUMINAL, VIA CAPSULE;  Surgeon: Heron Ayala MD;  Location:  GI     COLONOSCOPY      11/10/10 angioectasia     CV PULMONARY ANGIOGRAM N/A 2/26/2021    Procedure: CV PULMONARY ANGIOGRAM;  Surgeon: Joaquin Fuller MD;  Location:  HEART CARDIAC CATH LAB     CV RIGHT HEART CATH MEASUREMENTS RECORDED N/A 2/26/2021    Procedure: CV RIGHT HEART CATH;  Surgeon: Joaquin Fuller MD;  Location:  HEART CARDIAC CATH LAB     ENDOBRONCHIAL ULTRASOUND FLEXIBLE N/A 1/24/2020    Procedure: ENDOBRONCHIAL ULTRASOUND, WITH FLEXIBLE BRONCHOSCOPY;  Surgeon: Gopal Jara MD;  Location: [facility] OR     HYSTERECTOMY TOTAL ABDOMINAL, BILATERAL SALPINGO-OOPHORECTOMY, NODE DISSECTION, COMBINED  2/17/11    SANGEETHA, EMILIA, LN bx, omentectomy, resection of evrian malignancy Dr. JONO Goncalves - Returned BENIGN     INJECT EPIDURAL LUMBAR / SACRAL SINGLE N/A 1/5/2018    Procedure: INJECT EPIDURAL LUMBAR / SACRAL SINGLE;  lumbar interlaminar epidural steroid injection;  Surgeon: Jaylin Valadez MD;  Location: [facility] OR     INJECT SACROILIAC JOINT Right 3/7/2018    Procedure: INJECT SACROILIAC JOINT;  Right Sacroiliac Joint  Injection;  Surgeon: Flavio Harrison MD;  Location:  OR     INJECT SACROILIAC JOINT Bilateral 12/7/2018    Procedure: Bilateral Sacroiliac Joint Injections;  Surgeon: Faviola Cosby MD;  Location:  OR     OPTICAL TRACKING SYSTEM BRONCHOSCOPY N/A 1/24/2020    Procedure: Super D navigational bronchoscopy;  Surgeon: Gopal Jara MD;  Location: [facility] OR     UPPER GI ENDOSCOPY      11/10/10 erythematous gastropathy, angioectasia     [facility] STOMACH SURGERY PROCEDURE UNLISTED      Please see chart       Objective:   Blood pressure 131/60, pulse 74, temperature 97.9  F (36.6  C), temperature source Axillary, resp. rate 27, weight 40.7 kg (89 lb 11.6 oz), SpO2 92 %, not currently breastfeeding.  General Appearance: Thin, pleasant woman, lying in bed, no acute distress  Respiratory: clear bilaterally, good air movement, no distress or accessory muscle use. On 4L NC.   Cardiovascular: regular rate, no murmurs, no rub, no JVD  GI: soft, non tender to palpation, non distended.   Skin: no rash on exposed skin   Extr: no LE edema, skin warm and well perfused  Neuro: alert and oriented, mentating appropriately.     Data:   - reviewed all labs and imaging     EKG (3/10/2022): Sinus rhythm with rate 72. Normal axis.       TTE (3/10/2022)   Interpretation Summary  Global and regional left ventricular function is normal with an EF of 55-60%.  Grade II or moderate diastolic dysfunction. Diastolic Doppler findings (E/E'  ratio and/or other parameters) suggest left ventricular filling pressures are  increased.  Global right ventricular function is normal.  The inferior vena cava was normal in size with preserved respiratory  variability.  Pulmonary artery systolic pressure cannot be assessed.     This study was compared with the study from 2/2021 .  No significant changes noted.

## 2022-03-11 NOTE — PLAN OF CARE
Goal Outcome Evaluation:        Blood pressure 131/60, pulse 79, temperature 97.9  F (36.6  C), temperature source Axillary, resp. rate 20, weight 40.7 kg (89 lb 11.6 oz), SpO2 99 %, not currently breastfeeding.    Pt is A/O x 4. AVSS. She on continuous Telemetry with trend in Sinus Rythym with no ectopy noted. Pt is NPO for medical reasons and she is taking ice chips for comfort. Skin double checked and intact. Pt said she fell twice at home in the past week. Pt is placed on bed alarm and she is calling appropriately and making needs known. Bedside commode in room and she is SBA.. Pt is c/o feeling hungry and wanted to eat and she was reminded that she is NPO. Bilateral PIVs Left and right dressings C/D/I and both are NSL'ed

## 2022-03-11 NOTE — PROVIDER NOTIFICATION
Provider on call notified via pager # [phone] with low potassium level of 3.2 and to write conditional order for electrolyte for pt.

## 2022-03-12 NOTE — PLAN OF CARE
VSS on 4-6L O2. Pt needs reminders to keep oxygen tubing in nose. A1 with GB to the BSC. BA on for safety and pt set off a few times overnight, reminded to call before getting up. Voiding spontaneously and no BM this shift, still need stool sample. Denies pain or nausea. Reported feeling very hungry and was able to drink water and chicken broth. Advanced to full diet. Ate apple sauce and saltine crackers without issue. A&Ox4 at start of shift but forgetful. Appeared more confused early in the morning. Pt confused on time, thought she was in a hotel, and asking where her  was. Slept poorly overnight. RPIV and LPIV SL. On tele monitoring, NSR. Will continue POC.     Problem: Bleeding (Gastrointestinal Bleeding)  Goal: Hemostasis  Outcome: Ongoing, Progressing    Goal Outcome Evaluation:    Plan of Care Reviewed With: patient     Overall Patient Progress: no change

## 2022-03-12 NOTE — PROGRESS NOTES
VS baseline with O2 administrated with 4 L/min, very forgetful, slept short time them woke up, get out of bed, stated she needs to go out, bed alarm set and teaching was done however patient forget about call nurse, very unsteady gait,, voided with using commode however low urine out put, each time about 200 ml, had good oral breakfast intake however after that not much appetite still no BM, this morning patient pulled out IV accessed and asked cut off all arm band, continue to monitor mental status

## 2022-03-12 NOTE — PROVIDER NOTIFICATION
5C. 5417. N.H. FYI: Pt presenting as slightly more confused. Reversing days and nights, thought she was in hotel. Thanks, Yady #[phone]

## 2022-03-12 NOTE — PROGRESS NOTES
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      #Acute-on-chronic anemia, suspect slow GIB   #Iron deficiency anemia   Baseline hgb 8.0s. MCV microcytic at 71. Hgb dropped to 5.3, compared to last labs 6 months ago. Iron studies with profound iron deficiency with low iron, ferritin and high TIBC. Hx of GIB with AVMs, last episode 9/2021 at [facility].  Last EGD and colonoscopy in 2/18/2021 with 2 AVMs in duodenum and colonic AVM both s/p APCs. Patient doesn't look at her stool, so does not know if having any melena. BUN is normal. Suspect possible slow oozing GIB with prior AVMs and on chronic anticoagulation, as patient is currently hemodynamically stable.  Also concern for malnutrition with poor PO intake and new suspected lung malignancy contributing.   -Transfuse 2 Units pRBCs now   -H/H q6H   -2 large bore IVs, monitor on telemetry   -INR, PTT, reticulocyte count, stool occult  -PPI IV BID   -No plans for endoscopy via GI; continue to monitor H&H  -Venofer 200 mg IV x once per gastroenterology  -Patient will require outpatient oral iron supplementation     #Acute-on-chronic hypoxic respiratory failure  Progressive over the last 6 months, initially on used at night, now requiring 3L consistently, now with tachypnea and drop in O2 after starting transfusion. CXR on admission prior to transfusion clear. No tachycardia,  fevers, or leukocytosis. NT -proBNP elevated from prior levels. Possibly 2/2 fluids, hypercapnea vs progression of underlying pulmonary HTN. No signs of COPD exacerbation as no bronchospasms on exam.   -CXR without any acute focal opacities. VBG pH 7.41, pCO2 62, Bicarb 40 , troponin, EKG, TTE as noted below  -Lasix 20 mg IV x1   -O2 supplementation 88-94% with hx of COPD and mild hypercapnia      ADDENDUM: Troponin elevated at 183 --> 193. Denies any CP but does feel dyspnea. EKG without any ST elevations or depression. Possibly demand 2/2 acute on chronic anemia. TTE ordered stat to assess for any WMA. Will continue to cycle troponins until peak. Telemetry.        #Thrombocytosis  -New. Platelets 521. Suspect reactive from underlying malignancy. Trend CBC     #Pulmonary nodules, likely malignancy   2cm RUL nodule, Previously biopsied in 2019, though growing on repeat imaging. PET scan on 3/8/22 with increased uptake concerning for malignant disease. Hx of tobacco use.   -Outpatient follow up with pulmonology      #Pericardial effusion  -New small pericardial effusion noted on PET several days ago. TTE as noted above.     #Weight loss, concern for malnutrition  -Nutrition consult      #Hypoklaemia  -Technical issue with nursing potassium replacement protocol  -Happy to manage electrolyte     #Pulmonary HTN  #CTED  #HTN  Follows with Cardiology, last seen on 10/2021. PTA Adempas 1.5 mg TID, lasix 20 mg daily and hydrochlorothiazide 50 mg daily. Baseline O2 2L NC at night, but recently using 3L O2 daily. RHC last performed 2/26/2021 with PCWP 15.   -Hold PTA hydrochlorothiazide. Continue PTA lasix 20 mg daily   -Continue Adempas 1.5 mg TID  -Daily weights, I&O   -TTE as note above  -Cardiology consult   -Hold PTA potassium bicarb-citric acid especially with rising bicarb 40 on CMP. VBG as noted above.      #Hx DVT/PE  Last PE in 2010, provoked. Hold PTA eliquis 2.5 mg BID with concern for GIB.       #PAD  Aorto-biiliac occlusive disease. Follows with vascular surgery last seen in 2019 who recommended repeat SUMAYA yearly. Last SUMAYA in 2019 with Right leg 0.51, Left leg 0.59.  No stenting per patient or chart review.   -Continue PTA lipitor 80 mg daily   -Hold PTA plavix 75 mg daily with concern for GIB      ------ Chronic stable conditions -----     #COPD - Not on any inhalers. No wheezing on exam.   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 Continue PTA levothyroxine 75 mcg daily.   #Depression - Continue PTA citalopram 20 mg daily   #Glaucoma- Continue PTA latanoprost 0.005%, one drop in both eyes QHS       Diet: Snacks/Supplements Adult: Ensure Enlive; With Meals  Advance Diet as Tolerated: Regular Diet Adult    DVT Prophylaxis: Pneumatic Compression Devices  Saha Catheter: Not present  Central Lines: None  Cardiac Monitoring: None  Code Status: Full Code      Disposition Plan   Expected Discharge: 03/14/2022     Anticipated discharge location: home    Delays: None anticipated at present     The patient's care was discussed with the Bedside Nurse, Care Coordinator/ and Patient.    Doe Verma DO  Hospitalist Service, GOLD TEAM [address] [place] [address]  Securely message with the Vocera Web Console (learn more here)  Text page via [facility] Paging/Directory   Please see signed in provider for up to date coverage information      Clinically Significant Risk Factors Present on Admission             # Severe Malnutrition: based on nutrition assessment     ______________________________________________________________________    Interval History   Patient feels well today.  Patient is hungry for breakfast.  Patient denies chest pain, SOB.  Patient denies abdominal pain, nausea, vomiting.  Per nursing staff, concern for balance and ambulating.  Per nursing staff, patient's mentation is altered at  times.    Data reviewed today: I reviewed all medications, new labs and imaging results over the last 24 hours. I personally reviewed no images or EKG's today.    Physical Exam   Vital Signs: Temp: 97.9  F (36.6  C) Temp src: Axillary BP: 106/45 Pulse: 66   Resp: 18 SpO2: 96 % O2 Device: Nasal cannula Oxygen Delivery: 3 LPM  Weight: 89 lbs 11.64 oz     Physical Exam  HENT:      Head: Normocephalic.      Mouth/Throat:      Mouth: Mucous membranes are moist.      Pharynx: Oropharynx is clear.   Eyes:      Extraocular Movements: Extraocular movements intact.      Conjunctiva/sclera: Conjunctivae normal.      Pupils: Pupils are equal, round, and reactive to light.   Cardiovascular:      Rate and Rhythm: Normal rate and regular rhythm.      Pulses: Normal pulses.      Heart sounds: Normal heart sounds.   Pulmonary:      Effort: Pulmonary effort is normal.      Breath sounds: Normal breath sounds.   Abdominal:      General: Abdomen is flat. Bowel sounds are normal.      Palpations: Abdomen is soft.   Musculoskeletal:      Cervical back: Normal range of motion and neck supple.   Skin:     General: Skin is warm and dry.   Neurological:      General: No focal deficit present.      Mental Status: She is alert.   Psychiatric:         Mood and Affect: Mood normal.         Behavior: Behavior normal.         Data   Recent Labs   Lab 03/12/22  0412 03/11/22  2109 03/11/22  1402 03/11/22  1150 03/11/22  0613 03/11/22  0314 03/10/22  2109 03/10/22  1254   WBC 8.2  --   --   --   --  8.1  --  8.9   HGB 7.3* 8.2*  --  8.2*   < > 8.0*   < > 5.3*   MCV 77*  --   --   --   --  75*  --  71*     --   --   --   --  445  --  521*   INR  --   --   --   --   --  1.02  --   --      --   --   --   --  143  --  144   POTASSIUM 3.7  --  3.5  --   --  3.2*   < > 3.2*   CHLORIDE 100  --   --   --   --  103  --  100   CO2 38*  --   --   --   --  41*  --  40*   BUN 13  --   --   --   --  14  --  18   CR 0.65  --   --   --   --  0.53  --   0.57   ANIONGAP 2*  --   --   --   --  <1*  --  4   CANDIDA 8.9  --   --   --   --  9.1  --  9.4   *  --   --   --   --  91  --  92   ALBUMIN 3.0*  --   --   --   --  3.2*  --  3.7   PROTTOTAL 5.7*  --   --   --   --  6.2*  --  6.8   BILITOTAL 0.4  --   --   --   --  0.5  --  0.4   ALKPHOS 66  --   --   --   --  72  --  75   ALT 10  --   --   --   --  10  --  12   AST 13  --   --   --   --  16  --  18    < > = values in this interval not displayed.     No results found for this or any previous visit (from the past 24 hour(s)).  Medications       atorvastatin  80 mg Oral Daily     citalopram  20 mg Oral Daily     ferrous sulfate  325 mg Oral Q48H     furosemide  20 mg Oral Daily     gabapentin  400 mg Oral At Bedtime     latanoprost  1 drop Both Eyes At Bedtime     levothyroxine  75 mcg Oral Daily     multivitamin w/minerals  1 tablet Oral Daily at 10 am     oxybutynin  5 mg Oral At Bedtime     pantoprazole  40 mg Oral BID AC     sodium chloride (PF)  3 mL Intracatheter Q8H

## 2022-03-12 NOTE — PHARMACY-CONSULT NOTE
Pharmacy was consulted to verify REMS requirements for riociguat (Adempas). Patient's REMS certification  in 2022. Provider notified. Patient cannot receive this medication without reapplying.    Sher Gomes, PharmD

## 2022-03-13 NOTE — PROGRESS NOTES
03/13/22 1245   Quick Adds   Type of Visit Initial Occupational Therapy Evaluation   Living Environment   People in Home spouse   Current Living Arrangements house   Home Accessibility stairs within home   Number of Stairs, Within Home, Primary   (12)   Stair Railings, Within Home, Primary railings on both sides of stairs   Transportation Anticipated car, drives self;family or friend will provide   Living Environment Comments bed/bath on second level. drives. IND with ADL's. has a walker/cane but does not use. walk in shower. spouse in good health to A at d/c. on 3-3.5 L O2 at baseline.    Self-Care   Usual Activity Tolerance fair   Current Activity Tolerance poor   Regular Exercise No   Equipment Currently Used at Home cane, straight;walker, standard   Fall history within last six months yes   Number of times patient has fallen within last six months 1   Activity/Exercise/Self-Care Comment has cane and walker at home. one fall getting out of car one month ago.    Instrumental Activities of Daily Living (IADL)   Previous Responsibilities medication management   IADL Comments spouse A with IADLs.    General Information   Onset of Illness/Injury or Date of Surgery 03/10/22   Referring Physician Belkis   Patient/Family Therapy Goal Statement (OT) return home    Additional Occupational Profile Info/Pertinent History of Current Problem presents from PCP clinic after presenting for fatigue, and increased dyspnea admitted on 3/10/2022 for acute on chronic anemia likely 2/2 GIB.    Existing Precautions/Restrictions fall;oxygen therapy device and L/min   General Observations and Info on 3 L O2 at rest    Cognitive Status Examination   Orientation Status orientation to person, place and time   Affect/Mental Status (Cognitive) WNL;low arousal/lethargic   Follows Commands WNL   Visual Perception   Visual Impairment/Limitations WNL   Sensory   Sensory Quick Adds No deficits were identified   Pain Assessment   Patient  Currently in Pain Yes, see Vital Sign flowsheet   Integumentary/Edema   Integumentary/Edema no deficits were identifed   Posture   Posture kyphosis   Range of Motion Comprehensive   Comment, General Range of Motion not assessed due to fatigue   Strength Comprehensive (MMT)   Comment, General Manual Muscle Testing (MMT) Assessment generalized weakness   Muscle Tone Assessment   Muscle Tone Quick Adds No deficits were identified   Coordination   Upper Extremity Coordination No deficits were identified   Bed Mobility   Comment (Bed Mobility) A x 1    Transfers   Transfer Comments A x 1 with fWW    Clinical Impression   Criteria for Skilled Therapeutic Interventions Met (OT) Yes, treatment indicated   OT Diagnosis dec IND with ADLs and transfers    OT Problem List-Impairments impacting ADL activity tolerance impaired;balance;strength;pain;range of motion (ROM)   Assessment of Occupational Performance 5 or more Performance Deficits   Identified Performance Deficits all ADLs and transfers   Planned Therapy Interventions (OT) ADL retraining;ROM;strengthening;transfer training   Clinical Decision Making Complexity (OT) low complexity   Risk & Benefits of therapy have been explained evaluation/treatment results reviewed;risks/benefits reviewed;care plan/treatment goals reviewed;current/potential barriers reviewed;participants voiced agreement with care plan;patient   OT Discharge Planning   OT Discharge Recommendation (DC Rec) home with assist;home with home care occupational therapy;Transitional Care Facility   OT Rationale for DC Rec anticipate pt will be safe to d/c to home with spouse A for ADL/IADL tasks however very limited participation this date and only able to amb x 10 ft with CGA and FWW. Limited by fatigue this date. May need TCU pending LOS and progress with therapy   OT Brief overview of current status  A x 1   Total Evaluation Time (Minutes)   Total Evaluation Time (Minutes) 4   OT Goals   Therapy Frequency  (OT) 5 times/wk   OT Predicated Duration/Target Date for Goal Attainment 03/23/22   OT Goals Hygiene/Grooming;Upper Body Dressing;Lower Body Dressing;Lower Body Bathing;Upper Body Bathing;Toilet Transfer/Toileting   OT: Hygiene/Grooming modified independent;using adaptive equipment   OT: Upper Body Dressing Modified independent;using adaptive equipment   OT: Lower Body Dressing Modified independent;using adaptive equipment   OT: Upper Body Bathing Modified independent;using adaptive equipment   OT: Lower Body Bathing Modified independent;using adaptive equipment   OT: Toilet Transfer/Toileting Modified independent;toilet transfer;cleaning and garment management;using adaptive equipment

## 2022-03-13 NOTE — PROGRESS NOTES
VS baseline with O2 4 L / min NC sat was when took off NC down to 82 to 84 % then up to 92 to 93 % however patient is confused about situation, and very restless, ABG was done PO2 60, PCO2 72 PH 7.38, RT was contacted and plan is to use BIPAP during night to improve PO2 and PCO2 status, very poor oral food intake and patient coughing and thick sputum was noted educate oral suction, voided small amount of urine every 2 to 3 hrs, no stool was noted, monitor closely

## 2022-03-13 NOTE — PLAN OF CARE
VSS on 3-4L O2. A1 with GB to the BSC. BA on for safety. Lethargic, sleepy and slept for majority of shift. Pt arouses to voice and answers questions but returns to sleep quickly after. Voiding spontaneously and no BM this shift. Denies pain or nausea. Did not eat dinner. Will continue POC.     Problem: Adjustment to Illness (Gastrointestinal Bleeding)  Goal: Optimal Coping with Acute Illness  Outcome: Ongoing, Progressing    Goal Outcome Evaluation:    Plan of Care Reviewed With: patient     Overall Patient Progress: no change

## 2022-03-13 NOTE — PROGRESS NOTES
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      #Acute-on-chronic anemia, suspect slow GIB   #Iron deficiency anemia   Baseline hgb 8.0s. MCV microcytic at 71. Hgb dropped to 5.3, compared to last labs 6 months ago. Iron studies with profound iron deficiency with low iron, ferritin and high TIBC. Hx of GIB with AVMs, last episode 9/2021 at [facility].  Last EGD and colonoscopy in 2/18/2021 with 2 AVMs in duodenum and colonic AVM both s/p APCs. Patient doesn't look at her stool, so does not know if having any melena. BUN is normal. Suspect possible slow oozing GIB with prior AVMs and on chronic anticoagulation, as patient is currently hemodynamically stable.  Also concern for malnutrition with poor PO intake and new suspected lung malignancy contributing.   -Transfuse 2 Units pRBCs now   -H/H q6H   -2 large bore IVs, monitor on telemetry   -INR, PTT, reticulocyte count, stool occult  -PPI IV BID   -No plans for endoscopy via GI; continue to monitor H&H  -Venofer 200 mg IV x once per gastroenterology  -Patient will require outpatient oral iron supplementation     #Acute-on-chronic hypoxic respiratory failure  Progressive over the last 6 months, initially on used at night, now requiring 3L consistently, now with tachypnea and drop in O2 after starting transfusion. CXR on admission prior to transfusion clear. No tachycardia,  fevers, or leukocytosis. NT -proBNP elevated from prior levels. Possibly 2/2 fluids, hypercapnea vs progression of underlying pulmonary HTN. No signs of COPD exacerbation as no bronchospasms on exam.   -CXR without any acute focal opacities. VBG pH 7.41, pCO2 62, Bicarb 40 , troponin, EKG, TTE as noted below  -Lasix 20 mg IV x1   -O2 supplementation 88-94% with hx of COPD and mild hypercapnia      ADDENDUM: Troponin elevated at 183 --> 193. Denies any CP but does feel dyspnea. EKG without any ST elevations or depression. Possibly demand 2/2 acute on chronic anemia. TTE ordered stat to assess for any WMA. Will continue to cycle troponins until peak. Telemetry.        #Thrombocytosis  -New. Platelets 521. Suspect reactive from underlying malignancy. Trend CBC     #Pulmonary nodules, likely malignancy   2cm RUL nodule, Previously biopsied in 2019, though growing on repeat imaging. PET scan on 3/8/22 with increased uptake concerning for malignant disease. Hx of tobacco use.   -Outpatient follow up with pulmonology      #Pericardial effusion  -New small pericardial effusion noted on PET several days ago. TTE as noted above.     #Weight loss, concern for malnutrition  -Nutrition consult      #Hypoklaemia  -Technical issue with nursing potassium replacement protocol  -Happy to manage electrolyte     #Pulmonary HTN  #CTED  #HTN  Follows with Cardiology, last seen on 10/2021. PTA Adempas 1.5 mg TID, lasix 20 mg daily and hydrochlorothiazide 50 mg daily. Baseline O2 2L NC at night, but recently using 3L O2 daily. RHC last performed 2/26/2021 with PCWP 15.   -Hold PTA hydrochlorothiazide. Continue PTA lasix 20 mg daily   -Adempas held; patient fell off REMS program in January per clinical pharmacy  -Daily weights, I&O   -TTE as note above  -Cardiology consult   -Hold PTA potassium bicarb-citric acid especially with rising bicarb 40 on CMP. VBG as noted above.      #Hx DVT/PE  Last PE in 2010, provoked. Hold PTA eliquis 2.5 mg  BID with concern for GIB.      #PAD  Aorto-biiliac occlusive disease. Follows with vascular surgery last seen in 2019 who recommended repeat SUMAYA yearly. Last SUMAYA in 2019 with Right leg 0.51, Left leg 0.59.  No stenting per patient or chart review.   -Continue PTA lipitor 80 mg daily   -Hold PTA plavix 75 mg daily with concern for GIB      ------ Chronic stable conditions -----     #COPD - Not on any inhalers. No wheezing on exam.   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 Continue PTA levothyroxine 75 mcg daily.   #Depression - Continue PTA citalopram 20 mg daily   #Glaucoma- Continue PTA latanoprost 0.005%, one drop in both eyes QHS       Diet: Snacks/Supplements Adult: Ensure Enlive; With Meals  Advance Diet as Tolerated: Regular Diet Adult    DVT Prophylaxis: Pneumatic Compression Devices  Saha Catheter: Not present  Central Lines: None  Cardiac Monitoring: None  Code Status: Full Code      Disposition Plan   Expected Discharge: 03/14/2022     Anticipated discharge location: home     Delays: None anticipated at present       The patient's care was discussed with the Bedside Nurse, Patient and Patient's Family.    Doe Verma DO  Hospitalist Service, GOLD TEAM [address] [place] [address]  Securely message with the Vocera Web Console (learn more here)  Text page via [facility] Paging/Directory   Please see signed in provider for up to date coverage information      Clinically Significant Risk Factors Present on Admission             # Severe Malnutrition: based on nutrition assessment     ______________________________________________________________________    Interval History   Patient very anxious and wants to go home.  Per nursing staff, concern remains re: strength & stability.  Constipation per nursing staff.  Discussed case with patient's .  Discussed TCU planning.    Data reviewed today: I reviewed  all medications, new labs and imaging results over the last 24 hours. I personally reviewed no images or EKG's today.    Physical Exam   Vital Signs: Temp: 97.8  F (36.6  C) Temp src: Axillary BP: (!) 111/37 Pulse: 73   Resp: 16 SpO2: 99 % O2 Device: Nasal cannula Oxygen Delivery: 4 LPM  Weight: 91 lbs 11.38 oz     Physical Exam  HENT:      Head: Normocephalic.      Mouth/Throat:      Mouth: Mucous membranes are moist.   Eyes:      Extraocular Movements: Extraocular movements intact.      Conjunctiva/sclera: Conjunctivae normal.      Pupils: Pupils are equal, round, and reactive to light.   Cardiovascular:      Rate and Rhythm: Normal rate and regular rhythm.      Pulses: Normal pulses.      Heart sounds: Normal heart sounds.   Pulmonary:      Effort: Pulmonary effort is normal.      Breath sounds: Normal breath sounds.   Abdominal:      General: Abdomen is flat. Bowel sounds are normal.      Palpations: Abdomen is soft.   Musculoskeletal:      Cervical back: Normal range of motion and neck supple.   Skin:     General: Skin is warm and dry.   Neurological:      General: No focal deficit present.      Mental Status: She is alert.   Psychiatric:      Comments: Confused           Data   Recent Labs   Lab 03/13/22  0449 03/12/22  1951 03/12/22  1433 03/12/22  0412 03/11/22  2109 03/11/22  1402 03/11/22  0613 03/11/22  0314   WBC 7.6  --   --  8.2  --   --   --  8.1   HGB 7.4* 7.3* 7.2* 7.3*   < >  --    < > 8.0*   MCV 77*  --   --  77*  --   --   --  75*     --   --  430  --   --   --  445   INR  --   --   --   --   --   --   --  1.02     --   --  140  --   --   --  143   POTASSIUM 3.9  --   --  3.7  --  3.5  --  3.2*   CHLORIDE 101  --   --  100  --   --   --  103   CO2 40*  --   --  38*  --   --   --  41*   BUN 16  --   --  13  --   --   --  14   CR 0.63  --   --  0.65  --   --   --  0.53   ANIONGAP <1*  --   --  2*  --   --   --  <1*   CANDIDA 9.6  --   --  8.9  --   --   --  9.1   GLC 76  --   --  134*  --    --   --  91   ALBUMIN 2.8*  --   --  3.0*  --   --   --  3.2*   PROTTOTAL 5.5*  --   --  5.7*  --   --   --  6.2*   BILITOTAL 0.4  --   --  0.4  --   --   --  0.5   ALKPHOS 64  --   --  66  --   --   --  72   ALT 10  --   --  10  --   --   --  10   AST 15  --   --  13  --   --   --  16    < > = values in this interval not displayed.     No results found for this or any previous visit (from the past 24 hour(s)).  Medications       atorvastatin  80 mg Oral Daily     citalopram  20 mg Oral Daily     docusate sodium  100 mg Oral BID     ferrous sulfate  325 mg Oral Q48H     furosemide  20 mg Oral Daily     gabapentin  400 mg Oral At Bedtime     latanoprost  1 drop Both Eyes At Bedtime     levothyroxine  75 mcg Oral Daily     multivitamin w/minerals  1 tablet Oral Daily at 10 am     oxybutynin  5 mg Oral At Bedtime     pantoprazole  40 mg Oral BID AC     sodium chloride (PF)  3 mL Intracatheter Q8H

## 2022-03-14 NOTE — PROGRESS NOTES
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     Updates:  - Resume Eliquis for DVT/PE ppx  - Resume hydrochlorothiazide  - Trend Hgb and monitor for recurrent bleeding  - Plan regarding home vs. TCU discharge discussed with patient and daughter, who are both agreeable     #Acute-on-chronic anemia, suspect slow GIB   # Hx of small bowel AVMs  #Iron deficiency anemia   Baseline hgb ~8, presented w/ Hgb 5.3. Iron studies with profound iron deficiency. Hx of GIB with AVMs, last episode 9/2021 at [facility].  Last EGD and colonoscopy in 2/18/2021 with 2 AVMs in duodenum and colonic AVM both s/p APCs. Procedure was difficult to perform due to agitation and hypoxia with small amounts of sedation - per proceduralist, any further endoscopic procedures should be done with extreme caution. Pt was transfused 2U PRBCs on admission and Hgb has remained stable in the 7s since that time.  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  While admitted has required between 3-6L/O2. On admission, concern for TACO with transfusion and  required IV diuresis, but no evidence for this moving forward. Also with demand-mediated ischemia likely 2/2 hypoxia and anemia which improved spontaneously and TTE showed no focal deficits. She does have a chronic respiratory acidosis likely 2/2 air trapping and hx of COPD.  - O2 to keep sats >88%   - Continue PO Lasix 20 mg daily for volume  - Duonebs QID PRN  - Started use of BiPAP at bedtime to help with work of breathing - limited by patient compliance and agitation, unlikely she will go home with this therapy    #Pulmonary HTN  #CTED  #HTN  Follows with Cardiology, last seen on 10/2021. PTA Adempas 1.5 mg TID, lasix 20 mg daily and hydrochlorothiazide 50 mg daily, though per Pharmacy she had not taken her Adempas recently. RHC last performed 2/26/2021 with PCWP 15.   - PTA hydrochlorothiazide and lasix  - Adempas held; patient fell off REMS program in January per clinical pharmacy, will need follow-up with Cardiology as outpatient prior to resuming  -Daily weights, I&O   -Cardiology was consulted on 3/10 and signed off     #Hx DVT/PE  Last PE in 2010, provoked. PTA eliquis 2.5 mg BID was held on admission with concern for GIB.   - Resume Eliquis on 3/15 and monitor for response     #PAD  Aorto-biiliac occlusive disease. Follows with vascular surgery last seen in 2019 who recommended repeat SUMAYA yearly. Last SUMAYA in 2019 with Right leg 0.51, Left leg 0.59.  No stenting per patient or chart review.   - Continue PTA lipitor 80 mg daily   - Resume PTA plavix 75 mg on 3/16 if tolerates Eliquis     #Pulmonary nodules, likely malignancy   2cm RUL nodule, Previously biopsied in 2019, though growing on repeat imaging. PET scan on 3/8/22 with increased uptake concerning for malignant disease. Hx of tobacco use.   - Outpatient follow up with pulmonology      #Pericardial effusion  -TTE on 3/10 shows intact cardiac function with small effusion, not hemodynamically significant  - monitor for symptoms.     #Weight loss,  concern for malnutrition  -Nutrition consult      #Hypoklaemia  -Replete PRN    ------ Chronic stable conditions -----     #COPD - Not on any inhalers. No wheezing on exam.   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 Continue PTA levothyroxine 75 mcg daily.   #Depression - Continue PTA citalopram 20 mg daily   #Glaucoma- Continue PTA latanoprost 0.005%, one drop in both eyes QHS     Diet: Snacks/Supplements Adult: Ensure Enlive; With Meals  Advance Diet as Tolerated: Regular Diet Adult    DVT Prophylaxis: Eliquis  Saha Catheter: Not present  Central Lines: None  Cardiac Monitoring: None  Code Status: Full Code      Disposition Plan   Expected Discharge: 03/16/2022     Anticipated discharge location: home    Delays:            The patient's care was discussed with the Bedside Nurse and Patient.    Silvia Chicas MD  Hospitalist Service, GOLD TEAM 9  [facility]  Securely message with the Vocera Web Console (learn more here)  Text page via [facility] Paging/Directory   Please see signed in provider for up to date coverage information      Clinically Significant Risk Factors Present on Admission                    ______________________________________________________________________    Interval History   Doing well today. States her breathing feels improved. Per daughter, eating more over the past day than she has previously. Both patient and daughter report she has poor mobility at home. Both express some degree of concern regarding how she will do upon discharge. Patient otherwise denies any nausea/vomiting/diarrhea or abdominal pain. No fevers/chills overnight. Doesn't think the BiPAP helps particularly, still struggles to sleep. No further concerns at this time.    Data reviewed today: I reviewed all medications, new labs and imaging results over the last 24 hours. I personally reviewed no images  or EKG's today.    Physical Exam   Vital Signs: Temp: 97.9  F (36.6  C) Temp src: Axillary BP: 127/48 Pulse: 76   Resp: 16 SpO2: 93 % O2 Device: Nasal cannula Oxygen Delivery: 5 LPM  Weight: 91 lbs 11.38 oz  General Appearance: Pleasant elderly female, no acute distress  Respiratory: Breathing comfortably via nasal cannula. Lungs sounds diminished at bilateral lung bases.  Cardiovascular: RRR, no m/r/g.  GI: soft, non-distended, non-tender to palpation  Skin: warm, well perfused extremites  Other: Trace LE edema bilaterally.     Data   Recent Labs   Lab 03/15/22  0444 03/14/22  0434 03/13/22  0449 03/11/22  0613 03/11/22  0314   WBC 12.3* 7.9 7.6   < > 8.1   HGB 7.5* 7.6* 7.4*   < > 8.0*   MCV 78 79 77*   < > 75*    366 426   < > 445   INR  --   --   --   --  1.02    142 141   < > 143   POTASSIUM 4.2 4.4 3.9   < > 3.2*   CHLORIDE 98 100 101   < > 103   CO2 39* 39* 40*   < > 41*   BUN 21 22 16   < > 14   CR 0.65 0.70 0.63   < > 0.53   ANIONGAP 2* 3 <1*   < > <1*   CANDIDA 9.6 9.5 9.6   < > 9.1   GLC 81 76 76   < > 91   ALBUMIN 2.9* 2.9* 2.8*   < > 3.2*   PROTTOTAL 5.8* 5.7* 5.5*   < > 6.2*   BILITOTAL 0.4 0.4 0.4   < > 0.5   ALKPHOS 66 64 64   < > 72   ALT 9 8 10   < > 10   AST 16 16 15   < > 16    < > = values in this interval not displayed.     No results found for this or any previous visit (from the past 24 hour(s)).

## 2022-03-14 NOTE — PROGRESS NOTES
Care from 15:00 - 19:00     /48 (BP Location: Left arm)   Pulse 76   Temp 97.9  F (36.6  C) (Axillary)   Resp 16   Wt 41.6 kg (91 lb 11.4 oz)   LMP  (LMP Unknown)   SpO2 93%   BMI 18.52 kg/m      Pt alert, mostly oriented x 4 though forgetful and inconsistent. Bed alarm on for safety. Family present at bedside this afternoon/evening. VSS with SpO2 ranging from 88-93% on 4-5 LPM O2 via NC. Pt occasionally desaturates. Incentive spirometer training provided. Pt ate about 75% of her dinner. No bowel movement yet  - pt still needs stool sample sent to lab. Spouse Darren would like a full update from team tomorrow concerning discharge planning.

## 2022-03-14 NOTE — PLAN OF CARE
Shift:   VS: Temp: 97.9  F (36.6  C) Temp src: Axillary BP: 107/44 Pulse: 75   Resp: 16 SpO2: 91 % O2 Device: Nasal cannula Oxygen Delivery: 4 LPM  Pain: Denies  Neuro: A&OX4, calls appropriately  Cardiac:   WNL  Respiratory: RA  GI/Diet/Appetite: Good oral intake, given zofran x1 for nausea  :  Adequate UO  LDA's: No IV access  Skin: NO new deficit noted  Activity: Up to bathroom with Ax1  Tests/Procedures:   Pertinent Labs/Lab Collection:      Plan: Continue with cares and update team with any changes.

## 2022-03-14 NOTE — PLAN OF CARE
"Goal Outcome Evaluation:  Afebrile. VSS. Disoriented to situation and sometimes place; pt was redirectable. No BM overnight; pt up to the commode x1. Start of shift, 4L NC; sating mid 90's. ABG results: pH 7.38, pCO2 72, pO2 60. BiPAP placed overnight at 40%; pt sating mid 90's. Seroquel x1 given for agitation with BiPAP. RT and provider notified when pt took off mask; no repeat ABG's ordered. NC placed back on around 0600 at 4LPM; sats 94-95. Pt slept for most of the night once comfortable with BiPAP.     Problem: Plan of Care - These are the overarching goals to be used throughout the patient stay.    Goal: Plan of Care Review/Shift Note  Description: The Plan of Care Review/Shift note should be completed every shift.  The Outcome Evaluation is a brief statement about your assessment that the patient is improving, declining, or no change.  This information will be displayed automatically on your shift note.  Outcome: Ongoing, Not Progressing  Goal: Patient-Specific Goal (Individualized)  Description: You can add care plan individualizations to a care plan. Examples of Individualization might be:  \"Parent requests to be called daily at 9am for status\", \"I have a hard time hearing out of my right ear\", or \"Do not touch me to wake me up as it startles me\".  Outcome: Ongoing, Not Progressing  Goal: Absence of Hospital-Acquired Illness or Injury  Outcome: Ongoing, Not Progressing  Intervention: Identify and Manage Fall Risk  Recent Flowsheet Documentation  Taken 3/13/2022 2100 by Christelle Kate, RN  Safety Promotion/Fall Prevention:    assistive device/personal items within reach    bed alarm on    clutter free environment maintained    fall prevention program maintained    increased rounding and observation    increase visualization of patient    nonskid shoes/slippers when out of bed    patient and family education  Intervention: Prevent Skin Injury  Recent Flowsheet Documentation  Taken 3/13/2022 2100 by " Christelle Kate, RN  Body Position: position changed independently  Intervention: Prevent and Manage VTE (Venous Thromboembolism) Risk  Recent Flowsheet Documentation  Taken 3/13/2022 2100 by Christelle Kate RN  VTE Prevention/Management: compression stockings off  Activity Management:    activity adjusted per tolerance    up to bedside commode  Intervention: Prevent Infection  Recent Flowsheet Documentation  Taken 3/13/2022 2100 by Christelle Kate RN  Infection Prevention:    cohorting utilized    hand hygiene promoted    personal protective equipment utilized    rest/sleep promoted    single patient room provided  Goal: Optimal Comfort and Wellbeing  Outcome: Ongoing, Not Progressing  Goal: Readiness for Transition of Care  Outcome: Ongoing, Not Progressing     Problem: Adjustment to Illness (Gastrointestinal Bleeding)  Goal: Optimal Coping with Acute Illness  Outcome: Ongoing, Not Progressing     Problem: Bleeding (Gastrointestinal Bleeding)  Goal: Hemostasis  Outcome: Ongoing, Not Progressing

## 2022-03-14 NOTE — PROGRESS NOTES
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      #Acute-on-chronic anemia, suspect slow GIB   #Iron deficiency anemia   Baseline hgb 8.0s. MCV microcytic at 71. Hgb dropped to 5.3, compared to last labs 6 months ago. Iron studies with profound iron deficiency with low iron, ferritin and high TIBC. Hx of GIB with AVMs, last episode 9/2021 at [facility].  Last EGD and colonoscopy in 2/18/2021 with 2 AVMs in duodenum and colonic AVM both s/p APCs. Patient doesn't look at her stool, so does not know if having any melena. BUN is normal. Suspect possible slow oozing GIB with prior AVMs and on chronic anticoagulation, as patient is currently hemodynamically stable.  Also concern for malnutrition with poor PO intake and new suspected lung malignancy contributing.   -Transfuse 2 Units pRBCs now   -H/H q6H   -2 large bore IVs, monitor on telemetry   -INR, PTT, reticulocyte count, stool occult  -PPI IV BID   -No plans for endoscopy via GI; continue to monitor H&H  -Venofer 200 mg IV x once per gastroenterology  -Patient will require outpatient oral iron supplementation     #Acute-on-chronic hypoxic respiratory failure  Progressive over the last 6 months, initially on used at night, now requiring 3L consistently, now with tachypnea and drop in O2 after starting transfusion. CXR on admission prior to transfusion clear. No tachycardia,  fevers, or leukocytosis. NT -proBNP elevated from prior levels. Possibly 2/2 fluids, hypercapnea vs progression of underlying pulmonary HTN. No signs of COPD exacerbation as no bronchospasms on exam.   -CXR without any acute focal opacities. VBG pH 7.41, pCO2 62, Bicarb 40 , troponin, EKG, TTE as noted below  -Lasix 20 mg IV x1   -O2 supplementation 88-94% with hx of COPD and mild hypercapnia      ADDENDUM: Troponin elevated at 183 --> 193. Denies any CP but does feel dyspnea. EKG without any ST elevations or depression. Possibly demand 2/2 acute on chronic anemia. TTE ordered stat to assess for any WMA. Will continue to cycle troponins until peak. Telemetry.        #Thrombocytosis  -New. Platelets 521. Suspect reactive from underlying malignancy. Trend CBC     #Pulmonary nodules, likely malignancy   2cm RUL nodule, Previously biopsied in 2019, though growing on repeat imaging. PET scan on 3/8/22 with increased uptake concerning for malignant disease. Hx of tobacco use.   -Outpatient follow up with pulmonology      #Pericardial effusion  -New small pericardial effusion noted on PET several days ago. TTE as noted above.     #Weight loss, concern for malnutrition  -Nutrition consult      #Hypoklaemia  -Technical issue with nursing potassium replacement protocol  -Happy to manage electrolyte     #Pulmonary HTN  #CTED  #HTN  Follows with Cardiology, last seen on 10/2021. PTA Adempas 1.5 mg TID, lasix 20 mg daily and hydrochlorothiazide 50 mg daily. Baseline O2 2L NC at night, but recently using 3L O2 daily. RHC last performed 2/26/2021 with PCWP 15.   -Hold PTA hydrochlorothiazide. Continue PTA lasix 20 mg daily   -Adempas held; patient fell off REMS program in January per clinical pharmacy  -Daily weights, I&O   -TTE as note above  -Cardiology consult   -Hold PTA potassium bicarb-citric acid especially with rising bicarb 40 on CMP. VBG as noted above.      #Hx DVT/PE  Last PE in 2010, provoked. Hold PTA eliquis 2.5 mg  BID with concern for GIB.      #PAD  Aorto-biiliac occlusive disease. Follows with vascular surgery last seen in 2019 who recommended repeat SUMAYA yearly. Last SUMAYA in 2019 with Right leg 0.51, Left leg 0.59.  No stenting per patient or chart review.   -Continue PTA lipitor 80 mg daily   -Hold PTA plavix 75 mg daily with concern for GIB      ------ Chronic stable conditions -----     #COPD - Not on any inhalers. No wheezing on exam.   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 Continue PTA levothyroxine 75 mcg daily.   #Depression - Continue PTA citalopram 20 mg daily   #Glaucoma- Continue PTA latanoprost 0.005%, one drop in both eyes QHS         Diet: Snacks/Supplements Adult: Ensure Enlive; With Meals  Advance Diet as Tolerated: Regular Diet Adult    DVT Prophylaxis: Pneumatic Compression Devices  Saha Catheter: Not present  Central Lines: None  Cardiac Monitoring: None  Code Status: Full Code      Disposition Plan   Expected Discharge: 03/16/2022     Anticipated discharge location: home     Delays: None anticipated at present       The patient's care was discussed with the Bedside Nurse, Care Coordinator/ and Patient.    Doe Verma DO  Hospitalist Service, [address]  Securely message with the Vocera Web Console (learn more here)  Text page via [facility] Paging/Directory   Please see signed in provider for up to date coverage information      Clinically Significant Risk Factors Present on Admission             # Severe Malnutrition: based on nutrition assessment     ______________________________________________________________________    Interval History   Patient reports feeling well today.  Per nursing staff, patient tolerated BiPAP overPM.  Patient's mentation appears improved.  Patient's anxiety appears improved.  Patient denies chest pain, SOB.  Patient denies abdominal  pain, nausea, vomiting.    Data reviewed today: I reviewed all medications, new labs and imaging results over the last 24 hours. I personally reviewed no images or EKG's today.    Physical Exam   Vital Signs: Temp: 97.9  F (36.6  C) Temp src: Axillary BP: 107/44 Pulse: 75   Resp: 16 SpO2: 93 % O2 Device: Nasal cannula Oxygen Delivery: 4 LPM  Weight: 91 lbs 11.38 oz     Physical Exam  HENT:      Head: Normocephalic.      Mouth/Throat:      Mouth: Mucous membranes are moist.      Pharynx: Oropharynx is clear.   Eyes:      Extraocular Movements: Extraocular movements intact.      Conjunctiva/sclera: Conjunctivae normal.      Pupils: Pupils are equal, round, and reactive to light.   Cardiovascular:      Rate and Rhythm: Normal rate and regular rhythm.      Pulses: Normal pulses.      Heart sounds: Normal heart sounds.   Pulmonary:      Effort: Pulmonary effort is normal.      Breath sounds: Normal breath sounds.   Abdominal:      General: Abdomen is flat. Bowel sounds are normal.      Palpations: Abdomen is soft.   Musculoskeletal:         General: Normal range of motion.      Cervical back: Normal range of motion and neck supple.   Skin:     General: Skin is warm and dry.   Neurological:      General: No focal deficit present.      Mental Status: She is alert.   Psychiatric:         Mood and Affect: Mood normal.         Behavior: Behavior normal.           Data   Recent Labs   Lab 03/14/22  0434 03/13/22  0449 03/12/22  1951 03/12/22  1433 03/12/22  0412 03/11/22  0613 03/11/22  0314   WBC 7.9 7.6  --   --  8.2  --  8.1   HGB 7.6* 7.4* 7.3*   < > 7.3*   < > 8.0*   MCV 79 77*  --   --  77*  --  75*    426  --   --  430  --  445   INR  --   --   --   --   --   --  1.02    141  --   --  140  --  143   POTASSIUM 4.4 3.9  --   --  3.7   < > 3.2*   CHLORIDE 100 101  --   --  100  --  103   CO2 39* 40*  --   --  38*  --  41*   BUN 22 16  --   --  13  --  14   CR 0.70 0.63  --   --  0.65  --  0.53   ANIONGAP 3 <1*   --   --  2*  --  <1*   CANDIDA 9.5 9.6  --   --  8.9  --  9.1   GLC 76 76  --   --  134*  --  91   ALBUMIN 2.9* 2.8*  --   --  3.0*  --  3.2*   PROTTOTAL 5.7* 5.5*  --   --  5.7*  --  6.2*   BILITOTAL 0.4 0.4  --   --  0.4  --  0.5   ALKPHOS 64 64  --   --  66  --  72   ALT 8 10  --   --  10  --  10   AST 16 15  --   --  13  --  16    < > = values in this interval not displayed.     No results found for this or any previous visit (from the past 24 hour(s)).  Medications       atorvastatin  80 mg Oral Daily     citalopram  20 mg Oral Daily     docusate sodium  100 mg Oral BID     ferrous sulfate  325 mg Oral Q48H     furosemide  20 mg Oral Daily     gabapentin  400 mg Oral At Bedtime     ipratropium - albuterol 0.5 mg/2.5 mg/3 mL  3 mL Nebulization 3 times daily     latanoprost  1 drop Both Eyes At Bedtime     levothyroxine  75 mcg Oral Daily     multivitamin w/minerals  1 tablet Oral Daily at 10 am     oxybutynin  5 mg Oral At Bedtime     pantoprazole  40 mg Oral BID AC     sodium chloride (PF)  3 mL Intracatheter Q8H

## 2022-03-14 NOTE — PROVIDER NOTIFICATION
Provider paged: Pt wore BiPap most of the night; just removed. Pt is still sleeping with NC on. Do you want another set of ABG's ordered? RT suggested VBG's d/t pt being a difficult poke.

## 2022-03-15 NOTE — PROGRESS NOTES
RT attempted to place PT on BIPAP machine. PT continuously complained about the fitting of the mask, even after multiple manipulations and mask switching by RT. PT then stated that she would like to be left on her NC for the night. BiPAP is now on SB.

## 2022-03-15 NOTE — TELEPHONE ENCOUNTER
Attempted to reach patient to schedule her cardiology follow up. Please schedule with Marcia Griggs with an echocardiogram prior.   Cardio Team

## 2022-03-15 NOTE — PLAN OF CARE
"/47 (BP Location: Left arm)   Pulse 78   Temp 97.9  F (36.6  C) (Axillary)   Resp 16   Wt 41.6 kg (91 lb 11.4 oz)   LMP  (LMP Unknown)   SpO2 96%   BMI 18.52 kg/m      VSS. BP slightly high but w/in order parameters. Refused BiPAP overnight and remained on 5L NC, O2 remained 90-97% w/ occasional desaturation d/t pt removing NC. Denied any n/v/d/pain overnight. Seroquel given x1 with little success. Pt seemed more disoriented to time and situation overnight and was hard to redirect. Melatonin given x1 to promote sleep, pt slept from about 1339-1504. Voiding well, no BM overnight. No replacements. Ax1 w walker, continue plan of care.     Pt O2 de-sat to 83-85% most of morning, switched to oxymask @ 4L, pt now sating @ 94%.     Problem: Plan of Care - These are the overarching goals to be used throughout the patient stay.    Goal: Readiness for Transition of Care  Outcome: Ongoing, Not Progressing     Problem: Adjustment to Illness (Gastrointestinal Bleeding)  Goal: Optimal Coping with Acute Illness  Outcome: Ongoing, Not Progressing   Goal Outcome Evaluation:    Problem: Plan of Care - These are the overarching goals to be used throughout the patient stay.    Goal: Plan of Care Review/Shift Note  Description: The Plan of Care Review/Shift note should be completed every shift.  The Outcome Evaluation is a brief statement about your assessment that the patient is improving, declining, or no change.  This information will be displayed automatically on your shift note.  Outcome: Ongoing, Progressing  Flowsheets (Taken 3/15/2022 0219)  Plan of Care Reviewed With: patient  Overall Patient Progress: no change  Goal: Patient-Specific Goal (Individualized)  Description: You can add care plan individualizations to a care plan. Examples of Individualization might be:  \"Parent requests to be called daily at 9am for status\", \"I have a hard time hearing out of my right ear\", or \"Do not touch me to wake me up as it " "startles me\".  Outcome: Ongoing, Progressing  Goal: Absence of Hospital-Acquired Illness or Injury  Outcome: Ongoing, Progressing  Intervention: Identify and Manage Fall Risk  Recent Flowsheet Documentation  Taken 3/14/2022 2000 by Nohemi Sandhu, RN  Safety Promotion/Fall Prevention:    assistive device/personal items within reach    bed alarm on    mobility aid in reach    fall prevention program maintained    clutter free environment maintained    increased rounding and observation  Intervention: Prevent Skin Injury  Recent Flowsheet Documentation  Taken 3/14/2022 2000 by Nohemi Sandhu, RN  Body Position: position changed independently  Intervention: Prevent and Manage VTE (Venous Thromboembolism) Risk  Recent Flowsheet Documentation  Taken 3/14/2022 2000 by Nohemi Sandhu, RN  VTE Prevention/Management: compression stockings off  Activity Management: activity adjusted per tolerance  Intervention: Prevent Infection  Recent Flowsheet Documentation  Taken 3/14/2022 2000 by Nohemi Sandhu, RN  Infection Prevention:    cohorting utilized    environmental surveillance performed    hand hygiene promoted    rest/sleep promoted  Goal: Optimal Comfort and Wellbeing  Outcome: Ongoing, Progressing     Problem: Bleeding (Gastrointestinal Bleeding)  Goal: Hemostasis  Outcome: Ongoing, Progressing     Plan of Care Reviewed With: patient     Overall Patient Progress: no change           "

## 2022-03-15 NOTE — PROGRESS NOTES
03/15/22 0911   Quick Adds   Type of Visit Initial PT Evaluation   Living Environment   People in Home spouse   Current Living Arrangements house   Home Accessibility stairs within home;stairs to enter home   Number of Stairs, Main Entrance 1   Stair Railings, Main Entrance none   Number of Stairs, Within Home, Primary   (12)   Stair Railings, Within Home, Primary railings on both sides of stairs;railing on left side (ascending)   Transportation Anticipated car, drives self;family or friend will provide   Self-Care   Usual Activity Tolerance fair   Current Activity Tolerance poor   Regular Exercise No   Equipment Currently Used at Home cane, straight;walker, rolling   Fall history within last six months yes   Number of times patient has fallen within last six months 1   General Information   Onset of Illness/Injury or Date of Surgery 03/10/22   Referring Physician Doe Verma,    Patient/Family Therapy Goals Statement (PT) return home directly   Pertinent History of Current Problem (include personal factors and/or comorbidities that impact the POC) Lucie Stockton is a 86 year old female with PMHx of 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   Existing Precautions/Restrictions fall   Cognition   Orientation Status (Cognition) disoriented to;time  (guessed 3/25/22)   Pain Assessment   Patient Currently in Pain No   Integumentary/Edema   Integumentary/Edema Comments no edema in extremities   Posture    Posture Kyphosis;Protracted shoulders;Forward head position  (severe kyphosis in sitting)   Range of Motion (ROM)   Range of Motion ROM is WFL   ROM Comment BLE, spends limited time in extension    Strength (Manual Muscle Testing)   Strength Comments BLE ~ 3+/5 with mobility   Bed Mobility   Comment, (Bed Mobility) CGA to min A for sup to sit with HOB flat, no rail, but then mod A for initial sitting balance due to posterior LOB   Transfers   Comment, (Transfers) Sit <> stand with FWW with poor hand placement and min A from raised height   Gait/Stairs (Locomotion)   Comment, (Gait/Stairs) Pt ambulated 3 ft with BUE support on staff with min A, unsteady   Balance   Balance Comments Min to mod A for dynamic sitting and standing balance when limited UE support   Sensory Examination   Sensory Perception patient reports no sensory changes   Muscle Tone   Muscle Tone no deficits were identified   Muscle Tone Comments BLE   Clinical Impression   Criteria for Skilled Therapeutic Intervention Yes, treatment indicated   PT Diagnosis (PT) impaired functional mobililty   Influenced by the following impairments decreased strength, balance, endurance, posture, respiration/ventillation   Functional limitations due to impairments CGA to intermittent Ian for transfers and gait with FWW   Clinical Presentation (PT Evaluation Complexity) Evolving/Changing   Clinical Presentation Rationale PMH and clinical judgment   Clinical Decision Making (Complexity) moderate complexity   Planned Therapy Interventions (PT) balance training;bed mobility training;gait training;home exercise program;neuromuscular re-education;motor coordination training;patient/family education;postural re-education;stair training;strengthening;stretching;ROM (range of motion);transfer training   Risk & Benefits of therapy have been explained evaluation/treatment results reviewed;care plan/treatment goals reviewed;current/potential barriers reviewed;risks/benefits reviewed;participants voiced agreement with care plan;participants included;patient   PT Discharge Planning   PT Discharge Recommendation (DC Rec) Transitional Care Facility;home with home care  physical therapy;home with assist   PT Rationale for DC Rec Pt firmly declines TCU, However, she is home alone ~ 6 hrs/day with decreased safety awareness and habits, high fall risk, needs caregiver training and increased support at  home before home can be a safe option   PT Brief overview of current status CGA and FWW for gait in gong with reminders to walk inside (Not behind) FWW   Plan of Care Review   Plan of Care Reviewed With patient   Total Evaluation Time   Total Evaluation Time (Minutes) 7   Physical Therapy Goals   PT Frequency 5x/week   PT Predicated Duration/Target Date for Goal Attainment 03/30/22   PT Goals Bed Mobility;Transfers;Gait;Stairs   PT: Bed Mobility Supervision/stand-by assist;Supine to/from sit   PT: Transfers Supervision/stand-by assist;Sit to/from stand;Assistive device   PT: Gait Supervision/stand-by assist;Rolling walker;150 feet   PT: Stairs Minimal assist;Greater than 10 stairs;Rail on left

## 2022-03-15 NOTE — PLAN OF CARE
"/52 (BP Location: Left arm)   Pulse 68   Temp 97.8  F (36.6  C) (Oral)   Resp 16   Wt 42.3 kg (93 lb 3.2 oz)   LMP  (LMP Unknown)   SpO2 90%   BMI 18.82 kg/m       AVSS on 3-4L nasal cannula. Abdominal pain managed with tylenol. Has good urine output and did not have a bowel movement. Denies n/v and has a good appetite. No skin concerns and is up with a SBA to assist of one with a walker.  Worked with physical therapy and declined occupational therapy. No replacements.      Problem: Plan of Care - These are the overarching goals to be used throughout the patient stay.    Goal: Plan of Care Review/Shift Note  Description: The Plan of Care Review/Shift note should be completed every shift.  The Outcome Evaluation is a brief statement about your assessment that the patient is improving, declining, or no change.  This information will be displayed automatically on your shift note.  Outcome: Ongoing, Progressing  Goal: Patient-Specific Goal (Individualized)  Description: You can add care plan individualizations to a care plan. Examples of Individualization might be:  \"Parent requests to be called daily at 9am for status\", \"I have a hard time hearing out of my right ear\", or \"Do not touch me to wake me up as it startles me\".  Outcome: Ongoing, Progressing  Goal: Absence of Hospital-Acquired Illness or Injury  Outcome: Ongoing, Progressing  Intervention: Identify and Manage Fall Risk  Recent Flowsheet Documentation  Taken 3/15/2022 0823 by Maria Elena Alejandro, RN  Safety Promotion/Fall Prevention:   assistive device/personal items within reach   bed alarm on   mobility aid in reach   fall prevention program maintained   clutter free environment maintained   increased rounding and observation  Intervention: Prevent Skin Injury  Recent Flowsheet Documentation  Taken 3/15/2022 0823 by Maria Elena Alejandro, RN  Body Position: position changed independently  Intervention: Prevent and Manage VTE (Venous Thromboembolism) " Risk  Recent Flowsheet Documentation  Taken 3/15/2022 0823 by Maria Elena Alejandro, RN  VTE Prevention/Management: compression stockings off  Activity Management: activity adjusted per tolerance  Intervention: Prevent Infection  Recent Flowsheet Documentation  Taken 3/15/2022 0823 by Maria Elena Alejandro, RN  Infection Prevention:   cohorting utilized   environmental surveillance performed   hand hygiene promoted   rest/sleep promoted  Goal: Optimal Comfort and Wellbeing  Outcome: Ongoing, Progressing  Goal: Readiness for Transition of Care  Outcome: Ongoing, Progressing

## 2022-03-16 NOTE — PLAN OF CARE
/48   Pulse 65   Temp 98.1  F (36.7  C) (Oral)   Resp 20   Wt 42.3 kg (93 lb 3.2 oz)   LMP  (LMP Unknown)   SpO2 98%   BMI 18.82 kg/m      Afebrile. DBP softer in the 40s this AM, MAP 75, pt asymptomatic. OVSS on 4LPM oxymask. Pt initially placed on BiPAP, but pt became very tearful and agitated. Begging RN to take mask off. RT called to adjust mask to maximize comfort. Over the course of an 1hr, pt called multiple times and asked to take mask off. Pt switched to oxymask, O2 stable 92-98%. Pt remains disoriented to time, situation and occasionally place. Bed alarm on for impulsivity. Given melatonin and seroquel at bedtime. Voiding spontaneously. No BM, senna given x1. Denied pain/nausea. Labs stable. Continue POC.      Problem: Plan of Care - These are the overarching goals to be used throughout the patient stay.    Goal: Optimal Comfort and Wellbeing  Outcome: Ongoing, Progressing     Problem: Bleeding (Gastrointestinal Bleeding)  Goal: Hemostasis  Outcome: Ongoing, Progressing

## 2022-03-16 NOTE — CONSULTS
Care Management Initial Consult    General Information  Assessment completed with: Patient, Spouse or significant other,    Type of CM/SW Visit: Initial Assessment    Primary Care Provider verified and updated as needed: Yes   Readmission within the last 30 days: no previous admission in last 30 days      Reason for Consult: discharge planning  Advance Care Planning: Advance Care Planning Reviewed: no concerns identified  Legal NOK would be her        Communication Assessment  Patient's communication style: spoken language (English or Bilingual)    Hearing Difficulty or Deaf: no   Wear Glasses or Blind: yes    Cognitive  Cognitive/Neuro/Behavioral: .WDL except  Level of Consciousness: confused  Arousal Level: opens eyes spontaneously  Orientation: disoriented to, time, situation  Mood/Behavior: calm, cooperative  Best Language: 0 - No aphasia  Speech: clear, spontaneous    Living Environment:   People in home: spouse  Pradeep  Current living Arrangements: house      Able to return to prior arrangements: yes       Family/Social Support:  Care provided by: self, spouse/significant other, child(yissel)  Provides care for: no one, unable/limited ability to care for self  Marital Status:   , Children          Description of Support System: Supportive    Support Assessment: Adequate family and caregiver support    Current Resources:   Patient receiving home care services: No     Community Resources: None  Equipment currently used at home: cane, straight, walker, rolling  Supplies currently used at home: None    Employment/Financial:  Employment Status: retired        Financial Concerns: No concerns identified   Referral to Financial Worker: No       Lifestyle & Psychosocial Needs:  Social Determinants of Health     Tobacco Use: Medium Risk     Smoking Tobacco Use: Former Smoker     Smokeless Tobacco Use: Never Used   Alcohol Use: Not on file   Financial Resource Strain: Not on file   Food Insecurity: Not  on file   Transportation Needs: Not on file   Physical Activity: Not on file   Stress: Not on file   Social Connections: Not on file   Intimate Partner Violence: Not on file   Depression: Not at risk     PHQ-2 Score: 0   Housing Stability: Not on file       Functional Status:  Prior to admission patient needed assistance:   Dependent ADLs:: Independent  Dependent IADLs:: Independent  Assesssment of Functional Status: Needs placement in a SNF/TCF for rehabilitation    Mental Health Status:  Mental Health Status: No Current Concerns       Chemical Dependency Status:  Chemical Dependency Status: No Current Concerns             Values/Beliefs:  Spiritual, Cultural Beliefs, Faith Practices, Values that affect care: no               Additional Information:  Patient is a 86 year old female with PMHx of 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     SW met with patient at bedside to complete assessment. Patient appeared to be in significant pain and the RN was attending to her. She did say she wanted to talk with SW and did not want SW to leave her alone. Patient did have a hard time focusing but was able to be directed back to assessment. Patient lives with her  Darren in [place], [place]. She gets support from him and her daughter Dawn. Patient reports her  Darren is also sick, so Dawn has been having to care for both of them. SW asked if patient were willing to go to a TCU at discharge. Patient said she was not surprised she was recommended but did say if she would go or not due to her significant pain. SW left a list of TCU's near where she lives. Patient would like SW to call her family.  JIMMY called Darren ([phone]) and spoke with him. He is in agreement with patient going to TCU. SW emailed him ([email or web address]) a list of TCU's near where they live. He did say his [facility] choice [facility] is [facility]. SW asked her provide 5-6 other choices.     JIMMY will continue to follow for discharge planning needs.    Addendum: Choices from Darren. [facility], [facility], [facility], [facility], [facility], and [facility].     FRED Quezada, AURY  7A/5C Medicine   Ph: [phone]  Pager: [phone]

## 2022-03-16 NOTE — PROGRESS NOTES
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     Updates:  - Resume Plavix for PAD history  - Anticipate stable for discharge tomorrow 3/17 to either home vs. TCU  - Stop BiPAP at bedtime as patient not tolerating consistently  - Start medications for delirium/sleep wake cycling     #Acute-on-chronic anemia, suspect slow GIB   # Hx of small bowel AVMs  #Iron deficiency anemia   Baseline hgb ~8, presented w/ Hgb 5.3. Iron studies with profound iron deficiency. Hx of GIB with AVMs, last episode 9/2021 at [facility].  Last EGD and colonoscopy in 2/18/2021 with 2 AVMs in duodenum and colonic AVM both s/p APCs. Procedure was difficult to perform due to agitation and hypoxia with small amounts of sedation - per proceduralist, any further endoscopic procedures should be done with extreme caution. Pt was transfused 2U PRBCs on admission and Hgb has remained stable in the 7s since that time.  - GI consulted on admission - no plans for endoscopic procedures given prior history of difficult procedures and no signs of active GIB, recommended PRBC and iron supplementation PRN  - Follow Hgb daily and PRN  - PO PPI BID   - PO Iron supplementation  - Resumed Eliquis for DVT/PE ppx on 3/15  - Resumed PTA Plavix on 3/16     #Acute-on-chronic hypoxic respiratory failure  #pHTN 2/2 CTEPH  #Hx COPD  Baseline O2 requirement 3-4L/O2.  While admitted has required between 3-6L/O2. On  admission, concern for TACO with transfusion and required IV diuresis, but no evidence for this moving forward. Also with demand-mediated ischemia likely 2/2 hypoxia and anemia which improved spontaneously and TTE showed no focal deficits. She does have a chronic respiratory acidosis likely 2/2 air trapping and hx of COPD.  - O2 to keep sats >88%   - Continue PO Lasix 20 mg daily for volume  - Duonebs QID PRN  - Started use of BiPAP at bedtime to help with work of breathing - limited by patient compliance and agitation, unlikely she will go home or be compliant with this therapy, so will discontinue on 3/16 and can resume PRN for lethargy    #Delirium  Suspect hospital-acquired. Waxing and waning mental status during hospitalization which was previously attributed to hypercapnia, though no improvement with BiPAP and she didn't tolerate mask effectively to continue it.  - Start scheduled Melatonin and Trazodone  - OOB during day, sleep/wake cycling  - Delirium precautions    #Pulmonary HTN  #CTED  #HTN  Follows with Cardiology, last seen on 10/2021. PTA Adempas 1.5 mg TID, lasix 20 mg daily and hydrochlorothiazide 50 mg daily, though per Pharmacy she had not taken her Adempas recently. RHC last performed 2/26/2021 with PCWP 15.   - PTA hydrochlorothiazide and lasix  - Adempas held; patient fell off REMS program in January per clinical pharmacy, will need follow-up with Cardiology as outpatient prior to resuming to titrate up dose safely  -Daily weights, I&O   -Cardiology was consulted on 3/10 and signed off     #Hx DVT/PE  Last PE in 2010, provoked. PTA eliquis 2.5 mg BID was held on admission with concern for GIB.   - Resume Eliquis on 3/15 and monitor for response     #PAD  Aorto-biiliac occlusive disease. Follows with vascular surgery last seen in 2019 who recommended repeat SUMAYA yearly. Last SUMAYA in 2019 with Right leg 0.51, Left leg 0.59.  No stenting per patient or chart review.   - Continue PTA lipitor 80 mg  daily   - Resume PTA plavix 75 mg on 3/16      #Pulmonary nodules, likely malignancy   2cm RUL nodule, Previously biopsied in 2019, though growing on repeat imaging. PET scan on 3/8/22 with increased uptake concerning for malignant disease. Hx of tobacco use.   - Outpatient follow up with pulmonology      #Pericardial effusion  -TTE on 3/10 shows intact cardiac function with small effusion, not hemodynamically significant  - monitor for symptoms.     #Weight loss, concern for malnutrition  -Nutrition consult      #Hypoklaemia  -Replete PRN    ------ Chronic stable conditions -----     #COPD - Not on any inhalers. No wheezing on exam.   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 Continue PTA levothyroxine 75 mcg daily.   #Depression - Continue PTA citalopram 20 mg daily   #Glaucoma- Continue PTA latanoprost 0.005%, one drop in both eyes QHS       Diet: Snacks/Supplements Adult: Ensure Enlive; With Meals  Advance Diet as Tolerated: Regular Diet Adult    DVT Prophylaxis: DOAC  Saha Catheter: Not present  Central Lines: None  Cardiac Monitoring: None  Code Status: Full Code      Disposition Plan   Expected Discharge: 03/18/2022     Anticipated discharge location: home    Delays:            The patient's care was discussed with the Bedside Nurse, Care Coordinator/ and Patient's Family.    Silvia Chicas MD  Hospitalist Service, GOLD TEAM [address] [place] [address]  Securely message with the Vocera Web Console (learn more here)  Text page via [facility] Paging/Directory   Please see signed in provider for up to date coverage information      Clinically Significant Risk Factors Present on Admission                    ______________________________________________________________________    Interval History   Doing well today. Breathing stable, denies any cough, sputum production, or shortness of breath.  "States she \"hates\" using the bipap at night and would rather not use it as it doesn't help. Otherwise, intake improving as well and she denies any nausea/vomiting or abdominal pain. Discussed plans for discharge as early as tomorrow - told her we would need to talk amongst family members to determine a safe discharge plan. No further concerns at this time.    Data reviewed today: I reviewed all medications, new labs and imaging results over the last 24 hours. I personally reviewed no images or EKG's today.    Physical Exam   Vital Signs: Temp: 98.1  F (36.7  C) Temp src: Oral BP: 124/48 Pulse: 65   Resp: 20 SpO2: 98 % O2 Device: Nasal cannula Oxygen Delivery: 4 LPM  Weight: 93 lbs 3.2 oz  General Appearance: Pleasant, conversant elderly female, lying in bed and eating breakfast, no distress  Respiratory: Breathing comfortably on room air, lungs grossly CTAB  Cardiovascular: RRR, no m/r/g.  GI: soft, non-distended, non-tender to palpation, BS+  Skin: warm dry, pallor present, no skin rash  Other: Trace pitting edema bilaterally     Data   Recent Labs   Lab 03/16/22  0358 03/15/22  0444 03/14/22  0434 03/11/22  0613 03/11/22  0314   WBC 8.6 12.3* 7.9   < > 8.1   HGB 7.7* 7.5* 7.6*   < > 8.0*   MCV 78 78 79   < > 75*    407 366   < > 445   INR  --   --   --   --  1.02    139 142   < > 143   POTASSIUM 3.9 4.2 4.4   < > 3.2*   CHLORIDE 99 98 100   < > 103   CO2 41* 39* 39*   < > 41*   BUN 20 21 22   < > 14   CR 0.56 0.65 0.70   < > 0.53   ANIONGAP 1* 2* 3   < > <1*   CANDIDA 9.9 9.6 9.5   < > 9.1   GLC 90 81 76   < > 91   ALBUMIN 2.8* 2.9* 2.9*   < > 3.2*   PROTTOTAL 5.7* 5.8* 5.7*   < > 6.2*   BILITOTAL 0.4 0.4 0.4   < > 0.5   ALKPHOS 59 66 64   < > 72   ALT 10 9 8   < > 10   AST 18 16 16   < > 16    < > = values in this interval not displayed.     No results found for this or any previous visit (from the past 24 hour(s)).  "

## 2022-03-16 NOTE — PROVIDER NOTIFICATION
MD paged: Pt [phone] NH refusing to wear BiPAP. Pt given seroquel prior to take edge off without success. Pt placed on oxymask. Thanks!

## 2022-03-16 NOTE — PLAN OF CARE
Assumed Cares [id number]    /53   Pulse 76   Temp 98.5  F (36.9  C) (Oral)   Resp 18   Wt 42.3 kg (93 lb 3.2 oz)   LMP  (LMP Unknown)   SpO2 97%   BMI 18.82 kg/m      Ad hx: Admitted 3/10 w/ increasing fatigue and dyspnea, acute on chronic anemia, pulmonary hypertension, and probable lung cancer d/t abnormal PET scan 3/8.   Med hx: Anemia, chronic pain, DVT, osteoarthritis, osteoporosis, iron deficiency, hypothyroidism, hypertension, graves disease  Pain: Experienced some pain when swallowing meds this AM, also complained of back pain rated 7/10. Pain was relieved w/ PRN tylenol. Has denied since this AM.   Vitals: VSS on 4L NC  Neuro: AOx3, disoriented to time. Intermittently disoriented to situation. Forgetful. However speech meaningful and can make her needs known.   Respiratory: HERRERA, LS diminished at the bases and middle lobe, clear in the upper lobes.   Cardiac/Neurovascular: Rate and rhythm WDL  GI/: Pt is constipated and hasn't had a bowel movement since 3/9, given scheduled Colace. Experienced nausea this AM with emesis present, given oral PRN Zofran which managed N/V.   Nutrition: Regular diet, appetite is fair, eating about 50-75% of meals.   Activity: Assist x1 w/ GB and walker, worked with PT this AM  Skin: Some redness on her buttocks, she can change position independently in bed, no changes needed  Lines: None  Events this shift:  Pt was a little anxious and not feeling the best this AM, but this improved as the day progressed. N/V controlled w/ zofran. Stayed in bed majority of shift, assist to bathroom or commode.     Plan: Continue to monitor and notify MD of any changes in pt status.    Arnie Dye on 3/16/2022 7:00 PM

## 2022-03-17 PROBLEM — R53.81 PHYSICAL DECONDITIONING: Status: ACTIVE | Noted: 2022-01-01

## 2022-03-17 NOTE — TELEPHONE ENCOUNTER
I was scheduled to see Ms. Stockton for a neuropsychology evaluation this afternoon. I see that she is currently admitted to the hospital. I called and spoke with her  this morning. We have cancelled her appointment. Her  thought it would be best to not reschedule the appointment at this time. If there is interest in completing a neuropsychology exam in the future, I would be pleased to see the patient.    Eric Sanchez, Ph.D., L.P., ABPP  Board Certified in Clinical Neuropsychology   / Licensed Psychologist [id number]

## 2022-03-17 NOTE — PROGRESS NOTES
Care Management Follow Up    Length of Stay (days): 7    Expected Discharge Date: 3/18/2022     Concerns to be Addressed: discharge planning     Patient plan of care discussed at interdisciplinary rounds: Yes    Anticipated Discharge Disposition: Transitional Care     Anticipated Discharge Services: IMM, PAS, Transportation  Anticipated Discharge DME: None    Patient/family educated on Medicare website which has current facility and service quality ratings: yes  Education Provided on the Discharge Plan:  yes  Patient/Family in Agreement with the Plan: yes    Referrals Placed by CM/SW: External Care Coordination, Post Acute Facilities  Private pay costs discussed: TBD    Additional Information:  SW spoke with MD who said that patient is ready for discharge today or tomorrow.     [facility] ([phone]): Referral sent via [employer].     [facility] ([phone]): Referral sent via [employer].     [facility] ([place], [phone]): SW sent referral to liaison.     [facility]: Referral sent to  TCU liaison. Accepted today at 1300 pending final medical clearance and stat covid test.     [facility] ([phone]): Referral sent via [employer].     [facility] ([phone]): Referral sent via Epic.     FRED Quezada, ROSYSW  7A/5C Medicine   Ph: [phone]  Pager: [phone]

## 2022-03-17 NOTE — PLAN OF CARE
Patient is a 86 year old female  admitted to room 432 via wheelchair.  Patient is alert and oriented X 3. See Epic for VS and assessment.  Patient is able to transfer A-1 using walker. Patient was settled into their room, shown call light, tv, mealtimes etc. Oriented to unit. Will continue monitoring pain level and VS. Notifying MD with any concerns.  Follow MD orders for cares and medications.    Level of Schooling:college  Ethnicity:  Marital Status:  Dentures: Yes  Hearing Aid: No  Smoker:  No  Glasses: Yes  Occupation: retired  Falls 0-1 mo: 0 2-6 mo: 1  Stairs prior function: Needed some help  Prior device use: Walker   Advanced Care Directive Referral to Social Work?Yes

## 2022-03-17 NOTE — PROGRESS NOTES
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  She received 2 units PRBCs on admission and Hgb has remained stable since that time with resumption of anticoagulation and antiplatelet agents.  She is admitted to TCU for physical and occupational therapies on 3/17/2022.      # Deconditioning - Admitted with fatigue and weakness in setting of anemia.  Below baseline level mobility.      - PT, OT consults   - Fall precautions    # Leukocytosis - WBC 12.3 this AM, prev wnl but did have isolated rise to 12.3 on 3/15.  Currently afebrile.  Reports vague urinary symptoms but otherwise no focal complaints.  Procal 0.07.    - Check UA/UC   - Trend WBC in AM   - Monitor for fevers    # Acute on chronic anemia likely 2/2 slow GIB   # Hx small bowel AVMs  # Iron deficiency anemia   Baseline Hgb ~ 8.  Presented with Hgb 5.3 and symptomatic with fatigue and increased dyspnea.  Responded well to PRBC transfusion x 2 with Hgb remaining stable in 7's.  Iron studies c/w profound iron deficiency. Hx notable for GIB with AVMs last in 9/2021 at [facility].  Last EGD and colonoscopy in 2/18/2021 with 2 AVMs in duodenum and colonic AVM, both s/p APCs.  Per chart review, procedure c/b agitation and hypoxia with small amounts of sedation, and proceduralist felt that further endoscopic interventions should be done with extreme caution.  GI consulted on admission, deferred intervention d/t hx of complications and recommended symptomatic treatment with PRBCs and  iron, would reserve EGD only if pt w/ hemodynamic instability.  Hgb remains stable in 7s.   - If recurrent dizziness, fatigue, lightheadedness, or dyspnea, would check Hgb as these were her presenting symptoms - has not had any hematochezia, hematemesis, or melena  - Monitor CBC q Mon, Thur to monitor Hgb   - Transfuse for Hgb < 7   - Continue iron supplementation Q48H   - Continue PPI BID   - Follow up with Dr. Cottrell in clinic on 4/21 as scheduled     # Acute on chronic hypoxic respiratory failure   # CTEPH   # Hx COPD   Typically requires about 3L supplemental O2 at baseline, has been requiring closer to 4-5L during hospital admission and since arrival to rehab unit.  Has chronic respiratory acidosis felt to be 2/2 air trapping and hx COPD.    - Continue supplemental O2, please try to wean to baseline 3L if able   - Continue Lasix 20mg daily   - Continue hydrochlorothiazide 50mg daily   - Daily weights   - I/Os   - Follow up with Cardiology - Pulmonary HTN clinic after discharge     # Hospital acquired delirium - Occurred during hospitalization, pattern of waxing and waning.  Initially felt to be 2/2 hypercapnia, though did not improve with BIPAP and unable to tolerate mask.  Mentation improved throughout hospital course.  Had period of confusion overnight 3/17 and asking to go home.  Resolved this AM and easily redirectable. Moved to room closer to nurse's desk.  - Delirium precautions   - Continue Trazodone and Melatonin     # Hx DVT/PE - Had provoked DVT in 2010.  PTA Eliquis held briefly during hospitalization.  Resumed prior to transfer.   - Continue Eliquis 2.5mg BID     # PAD - Hx aorto-biiliac occlusive disease.  Follows with Vascular Surgery, last seen in 2019 and recommended to have yearly SUMAYA.  Last one in 2019 with Right leg 0.51, Left leg 0.59.  No stenting per patient or chart review.    - Continue on Plavix and Atorvastatin   - Continue Gabapentin 400mg HS for neuropathy   - Follow up with Vascular  Surgery as needed     # Pulmonary nodules suspicious for malignancy - Found to have 2cm RUL nodule, biopsied in 2019, and noted to be increasing on repeat imaging.  Last PET scan on 3/8/22 with increased uptake c/f malignancy.  Known tobacco smoking history, hx COPD as well.  Supplemental O2 requirements increased slightly here and during hospitalization, as noted above.    - Per chart review, IP/IR to reach out to patient after discharge to facilitate scheduling of lung biopsy in the future.    # Pericardial effusion - TTE on 3/10 shows intact cardiac function with small effusion, not hemodynamically significant.  Currently no acute issues.     # Constipation - Occurred during hospital admission.   - Miralax BID   - Dulcolax PRN   - Senna BID     # Malnutrition - Reports decreased appetite prior to hospital admission.  Feels that she isn't eating or drinking as much as usual.    - Nutrition consult   - Continue PTA supplements per patient request   - Continue scheduled Zofran BID before meals     Chronic/stable issues:   # Hypokalemia - Resolved.  K wnl.   # OAB - Continue PTA Oxybutynin 5mg HS.   # Hypothyroidism 2/2 Graves disease - TSH 1.96 on 3/10/22. Continue PTA levothyroxine 75 mcg daily.  # Depression - Continue PTA citalopram 20 mg daily   # Glaucoma - Continue PTA latanoprost 0.005%, one drop in both eyes QHS        Discussed with Dr. Kai Aguilar.     Diet and/or tube feedings: Regular   DVT/GI prophylaxis: On Apixaban, PPI BID  Indications for psychotropic medications: Continue Celexa 20mg for depression, trazodone and melatonin at lowest effective doses for sleep   Pneumococcal Vaccination Status: up to date  Code status discussed on admission: Full Code, though pt expresses that she would not want measures that did not result in meaningful recovery.  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  She received 2 units PRBCs on admission and Hgb has remained stable since that time with resumption of anticoagulation and antiplatelet agents.  She is admitted to TCU for physical and occupational therapies on 3/17/2022.      Currently, Lucie is resting in bed.  She still doesn't feel quite back to her baseline, says that she was \"hoping [her] symptoms would be better\".  Initially thinks she's in the hospital but easily redirectable.  Recalls that she was asking to go home last night, but does not feel like that today.  She reports wearing oxygen \"most of the time\".  Denies productive cough.  Does not feel short of breath.  No fevers, chills, chest pain, or abdominal pain.  Reports poor appetite prior to admission, doesn't feel that she's eating or drinking very much.  Denies hematemesis and hematochezia, says she \"tries to watch for that\".  She alludes to mild dysuria, but no abdominal pain.           Physical Exam:   Blood pressure 131/41, pulse 78, temperature (!) 96.5  F (35.8  C), temperature source Oral, resp. rate 16, SpO2 91 %, not currently breastfeeding.    GENERAL: Alert and awake. Well nourished, well developed.  Thin body habitus.  No acute distress.    HEENT: Normocephalic, atraumatic. Anicteric sclera. Mucous membranes moist.   CV: RRR. S1, S2. No murmurs appreciated.   RESPIRATORY: Effort normal on 4L NC. Lungs diminished slightly at bases.   GI: Abdomen soft and non distended, bowel sounds present x " "all 4 quadrants. No tenderness, rebound, or guarding.   NEUROLOGICAL: No focal deficits. Follows commands.  Strength equal in upper and lower extremities.   MUSCULOSKELETAL: No joint swelling or tenderness. Moves all extremities.   EXTREMITIES: No gross deformities. No peripheral edema.   SKIN: Grossly warm, dry, and intact. No jaundice. No rashes.            Past Medical History:     Past Medical History:   Diagnosis Date     Anemia      Aortic stenosis     abdominal     Atherosclerosis of aorta (H)     \"extensive disease with near occlusion below level of renal arteries\" on CT     Atherosclerosis of arteries of extremities (H)      Back pain     severe multilevel DJD, moderate spinal canal stenosis L4-5, severe L3-4     Chronic pain     Back, hips, knees, legs     Degenerative joint disease      DVT of lower extremity, bilateral (H)     5/24/10 left distal femoral and great saphenous, 7/7/10 left:  extending to cfv, iliac , pop and post tibial, peronial, right:  distal fem and peroneal      Grave's disease      Hyperlipidaemia LDL goal < 70      Hypertension, essential      Hypothyroidism      Imbalance      Insomnia      Intermittent claudication (H)      Iron deficiency      Knee pain      Lumbar stenosis with neurogenic claudication      Osteoarthritis     ankle,foot, knee     Osteoporosis      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, 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     Pulmonary embolism (H)     5/24/10 bilateral     Small bowel obstruction (H) 1/23/17     Varicose veins              Past Surgical History:      Past Surgical History:   Procedure Laterality Date     BUNIONECTOMY       CAPSULE/PILL CAM ENDOSCOPY N/A 2/20/2021    Procedure: IMAGING PROCEDURE, GI TRACT, INTRALUMINAL, VIA CAPSULE;  Surgeon: Heron Ayala MD;  Location: [facility]     COLONOSCOPY      11/10/10 angioectasia     CV PULMONARY ANGIOGRAM N/A 2/26/2021    Procedure: " CV PULMONARY ANGIOGRAM;  Surgeon: Joaquin Fuller MD;  Location:  [facility]ARD[facility] CATH LAB     CV RIGHT HEART CATH MEASUREMENTS RECORDED N/A 2021    Procedure: CV RIGHT HEART CATH;  Surgeon: Joaquin Fuller MD;  Location:  HEART CARDIAC CATH LAB     ENDOBRONCHIAL ULTRASOUND FLEXIBLE N/A 2020    Procedure: ENDOBRONCHIAL ULTRASOUND, WITH FLEXIBLE BRONCHOSCOPY;  Surgeon: Gopal Jara MD;  Location: [facility] OR     HYSTERECTOMY TOTAL ABDOMINAL, BILATERAL SALPINGO-OOPHORECTOMY, NODE DISSECTION, COMBINED  11    SANGEETHA, EMILIA, LN bx, omentectomy, resection of evrian malignancy Dr. JONO Goncalves - Returned BENIGN     INJECT EPIDURAL LUMBAR / SACRAL SINGLE N/A 2018    Procedure: INJECT EPIDURAL LUMBAR / SACRAL SINGLE;  lumbar interlaminar epidural steroid injection;  Surgeon: Jaylin Valadez MD;  Location: [facility]     INJECT SACROILIAC JOINT Right 3/7/2018    Procedure: INJECT SACROILIAC JOINT;  Right Sacroiliac Joint Injection;  Surgeon: Flavio Harrison MD;  Location: [facility]     INJECT SACROILIAC JOINT Bilateral 2018    Procedure: Bilateral Sacroiliac Joint Injections;  Surgeon: Faviola Cosby MD;  Location: [facility] OR     OPTICAL TRACKING SYSTEM BRONCHOSCOPY N/A 2020    Procedure: Super D navigational bronchoscopy;  Surgeon: Gopal Jara MD;  Location:  OR     UPPER GI ENDOSCOPY      11/10/10 erythematous gastropathy, angioectasia     ZZC STOMACH SURGERY PROCEDURE UNLISTED      Please see chart             Family History:     Family History   Problem Relation Age of Onset     Breast Cancer Maternal Aunt 80     C.A.D. Father 54     No Known Problems Mother              Social History:     Social History     Tobacco Use     Smoking status: Former Smoker     Packs/day: 0.50     Years: 15.00     Pack years: 7.50     Quit date: 1993     Years since quittin.5     Smokeless tobacco: Never Used   Substance Use Topics     Alcohol use: Yes     Comment: social              Medications:   acetaminophen (TYLENOL) tablet 650 mg  apixaban ANTICOAGULANT (ELIQUIS) tablet 2.5 mg  atorvastatin (LIPITOR) tablet 80 mg  bisacodyl (DULCOLAX) EC tablet 5 mg  citalopram (celeXA) tablet 20 mg  clopidogrel (PLAVIX) tablet 75 mg  docusate sodium (COLACE) capsule 100 mg  ferrous sulfate (FEROSUL) tablet 325 mg  furosemide (LASIX) tablet 20 mg  gabapentin (NEURONTIN) capsule 400 mg  hydrochlorothiazide (HYDRODIURIL) tablet 50 mg  ipratropium - albuterol 0.5 mg/2.5 mg/3 mL (DUONEB) neb solution 3 mL  latanoprost (XALATAN) 0.005 % ophthalmic solution 1 drop  levothyroxine (SYNTHROID/LEVOTHROID) tablet 75 mcg  lidocaine (LMX4) cream  lidocaine 1 % 0.1-1 mL  melatonin tablet 5 mg  melatonin tablet 6 mg  multivitamin w/minerals (THERA-VIT-M) tablet 1 tablet  ondansetron (ZOFRAN-ODT) ODT tab 4 mg  oxybutynin (DITROPAN) tablet 5 mg  pantoprazole (PROTONIX) EC tablet 40 mg  polyethylene glycol (MIRALAX) Packet 17 g  senna-docusate (SENOKOT-S/PERICOLACE) 8.6-50 MG per tablet 1 tablet   Or  senna-docusate (SENOKOT-S/PERICOLACE) 8.6-50 MG per tablet 2 tablet  sodium chloride (PF) 0.9% PF flush 3 mL  sodium chloride (PF) 0.9% PF flush 3 mL  traZODone (DESYREL) half-tab 25 mg    acetaminophen (TYLENOL) 325 MG tablet, Take 2 tablets every 6 to 8 hours as needed for pain (Patient taking differently: Take 650 mg by mouth as needed Take 2 tablets every 6 to 8 hours as needed for pain)  apixaban ANTICOAGULANT (ELIQUIS) 2.5 MG tablet, Take 1 tablet (2.5 mg) by mouth 2 times daily  atorvastatin (LIPITOR) 40 MG tablet, Take 2 tablets (80 mg) by mouth daily  Calcium Carbonate-Vitamin D (CALCIUM 600 + D OR), Take 1 tablet by mouth every morning   cholecalciferol (VITAMIN D3) 25 mcg (1000 units) capsule, Take 1 capsule by mouth daily  citalopram (CELEXA) 20 MG tablet, Take 1 tablet (20 mg) by mouth daily  clopidogrel (PLAVIX) 75 MG tablet, Take 1 tablet (75 mg) by mouth daily  cyanocolbalamin (VITAMIN  B-12) 500 MCG  tablet, Take 500 mcg by mouth every morning   ferrous sulfate (FEROSUL) 325 (65 Fe) MG tablet, Take 1 tablet (325 mg) by mouth daily (with breakfast)  furosemide (LASIX) 20 MG tablet, Take 1 tablet (20 mg) by mouth daily  gabapentin (NEURONTIN) 400 MG capsule, Take 1 capsule (400 mg) by mouth At Bedtime  hydrochlorothiazide (HYDRODIURIL) 50 MG tablet, Take 1 tablet (50 mg) by mouth daily Please keep appt 12/21/20  ipratropium - albuterol 0.5 mg/2.5 mg/3 mL (DUONEB) 0.5-2.5 (3) MG/3ML neb solution, Take 1 vial (3 mLs) by nebulization every 6 hours as needed for wheezing  latanoprost (XALATAN) 0.005 % ophthalmic solution, Place 1 drop into both eyes At Bedtime   levothyroxine (SYNTHROID/LEVOTHROID) 75 MCG tablet, Take 1 tablet (75 mcg) by mouth daily  loperamide (IMODIUM A-D) 2 MG tablet, Take 1-2 mg by mouth daily as needed for diarrhea   ondansetron (ZOFRAN-ODT) 4 MG ODT tab, Take 1 tablet (4 mg) by mouth 2 times daily (before meals)  oxybutynin (DITROPAN) 5 MG tablet, Take 1 tablet (5 mg) by mouth At Bedtime For frequent urination  pantoprazole (PROTONIX) 40 MG EC tablet, Take 1 tablet (40 mg) by mouth daily Takes in the morning.  polyethylene glycol (MIRALAX) 17 g packet, Take 17 g by mouth 2 times daily  traZODone (DESYREL) 50 MG tablet, Take 0.5 tablets (25 mg) by mouth at bedtime as needed, may repeat once for sleep             Allergies:   No Known Allergies          Labs:     ROUTINE IP LABS (Last four results)  CMP Recent Labs   Lab 03/17/22  0424 03/16/22  0358 03/15/22  0444 03/14/22  0434 03/10/22  2240 03/10/22  1509    141 139 142   < >  --    POTASSIUM 3.9 3.9 4.2 4.4   < >  --    CHLORIDE 97 99 98 100   < >  --    CO2 42* 41* 39* 39*   < >  --    ANIONGAP <1* 1* 2* 3   < >  --    * 90 81 76   < >  --    BUN 22 20 21 22   < >  --    CR 0.60 0.56 0.65 0.70   < >  --    CANDIDA 9.4 9.9 9.6 9.5   < >  --    MAG  --   --   --   --   --  2.0   PROTTOTAL 5.6* 5.7* 5.8* 5.7*   < >  --    ALBUMIN 2.7*  2.8* 2.9* 2.9*   < >  --    BILITOTAL 0.3 0.4 0.4 0.4   < >  --    ALKPHOS 59 59 66 64   < >  --    AST 16 18 16 16   < >  --    ALT 12 10 9 8   < >  --     < > = values in this interval not displayed.     CBC   Recent Labs   Lab 03/17/22  0424 03/16/22  0358 03/15/22  0444 03/14/22  0434   WBC 9.6 8.6 12.3* 7.9   RBC 3.30* 3.53* 3.60* 3.58*   HGB 7.1* 7.7* 7.5* 7.6*   HCT 26.2* 27.5* 27.9* 28.2*   MCV 79 78 78 79   MCH 21.5* 21.8* 20.8* 21.2*   MCHC 27.1* 28.0* 26.9* 27.0*   RDW 28.2* 27.7* 26.8* 26.1*    327 407 366     INR   Recent Labs   Lab 03/11/22  0314   INR 1.02

## 2022-03-17 NOTE — PLAN OF CARE
"AVSS. Pt on 4-5L nasal cannula. Constipated, miralax given. Was able to have a bowel movement. Transferred to [facility]. Report given to nurse.   Problem: Plan of Care - These are the overarching goals to be used throughout the patient stay.    Goal: Plan of Care Review/Shift Note  Description: The Plan of Care Review/Shift note should be completed every shift.  The Outcome Evaluation is a brief statement about your assessment that the patient is improving, declining, or no change.  This information will be displayed automatically on your shift note.  Outcome: Ongoing, Progressing  Goal: Patient-Specific Goal (Individualized)  Description: You can add care plan individualizations to a care plan. Examples of Individualization might be:  \"Parent requests to be called daily at 9am for status\", \"I have a hard time hearing out of my right ear\", or \"Do not touch me to wake me up as it startles me\".  Outcome: Ongoing, Progressing  Goal: Absence of Hospital-Acquired Illness or Injury  Outcome: Ongoing, Progressing  Intervention: Identify and Manage Fall Risk  Recent Flowsheet Documentation  Taken 3/17/2022 0800 by Janie Beauchamp RN  Safety Promotion/Fall Prevention: assistive device/personal items within reach  Intervention: Prevent Skin Injury  Recent Flowsheet Documentation  Taken 3/17/2022 0800 by Janie Beauchamp RN  Body Position: position changed independently  Intervention: Prevent and Manage VTE (Venous Thromboembolism) Risk  Recent Flowsheet Documentation  Taken 3/17/2022 0800 by Janie Beauchamp, RN  Activity Management: activity adjusted per tolerance  Goal: Optimal Comfort and Wellbeing  Outcome: Ongoing, Progressing  Goal: Readiness for Transition of Care  Outcome: Ongoing, Progressing     Problem: Adjustment to Illness (Gastrointestinal Bleeding)  Goal: Optimal Coping with Acute Illness  Outcome: Ongoing, Progressing     Problem: Bleeding (Gastrointestinal Bleeding)  Goal: Hemostasis  Outcome: Ongoing, " Progressing   Goal Outcome Evaluation:

## 2022-03-17 NOTE — PROGRESS NOTES
Care Management Discharge Note    Discharge Date: 03/17/2022 at 1300 via [facility]     Discharge Disposition: [facility], 4th Floor    Discharge Services: IMM, PAS, Transportation    Discharge DME: O2    Discharge Transportation: [facility] [facility] [employer] ([phone])    Private pay costs discussed: Not applicable    PAS Confirmation Code: [id number]  Patient/family educated on Medicare website which has current facility and service quality ratings: yes    Education Provided on the Discharge Plan: yes  Persons Notified of Discharge Plans: Patient,  MD Darren, Nursing Staff, [facility], Transportation  Patient/Family in Agreement with the Plan: yes     Handoff Referral Completed: Yes    Additional Information:  Staff: Please complete nurse to nurse.     SW: SW reviewed IMM with daughter Dawn via phone at 10:58 AM. IMM faxed to HIM.     FRED Quezada, AURY  [facility] Medicine   Ph: [phone]  Pager: [phone]

## 2022-03-17 NOTE — PLAN OF CARE
Occupational Therapy Discharge Summary    Reason for therapy discharge:    Discharged to transitional care facility.    Progress towards therapy goal(s). See goals on Care Plan in [facility] electronic health record for goal details.  Goals partially met.  Barriers to achieving goals:   discharge from facility.    Therapy recommendation(s):    Continued therapy is recommended.  Rationale/Recommendations:  maximize ADL/mobility IND prior to return to living arrangement.

## 2022-03-17 NOTE — DISCHARGE SUMMARY
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  The following labs/tests are   recommended: CBC.    Follow-up with cardiology team for resumption of pulmonary HTN medication  Follow-up with Oncology for management of right lung nodule             Unresulted Labs Ordered in the Past 30 Days of this Admission     Date and Time Order Name Status Description    3/17/2022 10:44 AM Asymptomatic COVID-19 Virus (Coronavirus) by PCR Nose In process     3/10/2022 10:15 PM Transfusion Reaction Culture and Stain Preliminary     3/10/2022 10:15 PM Transfusion Reaction Pathology Evaluation In process     3/10/2022  3:09 PM Prepare red blood cells (unit) Preliminary       These results will be followed up by PCP    Discharge Disposition   Discharged to rehabilitation facility  Condition at discharge: Stable    Hospital Course   Lucie Stockton is a 86 year old female with PMHx of 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  She received 2U PRBCs on  admission and Hgb has remained stable since that time with resumption of anticoagulation and antiplatelet agents. She will be discharged to  TCU for further rehab on 3/17.    #Acute-on-chronic anemia, suspect slow GIB   # Hx of small bowel AVMs  #Iron deficiency anemia   Baseline hgb ~8, presented w/ Hgb 5.3. Iron studies with profound iron deficiency. Hx of GIB with AVMs, last episode 9/2021 at [facility].  Last EGD and colonoscopy in 2/18/2021 with 2 AVMs in duodenum and colonic AVM both s/p APCs. Procedure was difficult to perform due to agitation and hypoxia with small amounts of sedation - per proceduralist, any further endoscopic procedures should be done with extreme caution. Pt was transfused 2U PRBCs on admission and Hgb has remained stable in the 7s since that time.    GI was consulted on admission and given her prior history of very difficult to perform EGDs, repeat procedure was deferred. Moving forward, GI would recommend symptomatic treatment of anemia with PRBC transfusions and iron if needed, with EGD reserved only for cases associated with hemodynamic instability. She will continue PO PPI BID, PO Iron therapy moving forward. Would recommend repeat Hgb in 24 hours to ensure stability/no need for repeat transfusions. If patient develops new or concerning signs of fatigue or lethargy, would be concerned for acute anemia.      #Acute-on-chronic hypoxic respiratory failure  #pHTN 2/2 CTEPH  #Hx COPD  Baseline O2 requirement 3-4L/O2.  While admitted has required between 3-6L/O2. On admission, concern for TACO with transfusion and required IV diuresis, but no evidence persisting during her admission. Also with demand-mediated ischemia likely 2/2 hypoxia and anemia which improved spontaneously - furthermore, TTE showed no focal deficits. She does have a chronic respiratory acidosis likely 2/2 air trapping and hx of COPD.    During her admission, she was continued on her PO diuretic therapy of Lasix  and HCTZ with stability in her volume status. Nebs were also made available given her history of COPD, though she was not using consistently. Of note, BiPAP at bedtime was started to help with work of breathing/delirium symptoms at night, though she did not tolerate consistently nor did this seem to help her breathing, so this was not continued.     She will require follow-up with Cardiology (Pulm HTN clinic) after discharge for ongoing management. Per inpatient pharmacy, patient was NOT taking her Adempas consistently for management of pulmonary HTN, so this medication was not continued during her hospitalization. She will require outpatient follow-up with Cardiology to resume titrating up this medication.    #Hospital acquired delirium  Waxing and waning mental status during hospitalization which was previously attributed to hypercapnia, though no improvement with BiPAP and she didn't tolerate mask effectively to continue it. Her mental status stabilized as her course continued. Would recommend sleep/wake cycling with Melatonin and Trazodone and delirium precautions during the day. Suspect this will also improve with more consistent activity.     #Hx DVT/PE  Last PE in 2010, provoked. PTA eliquis 2.5 mg BID was held on admission with concern for GIB. Resumed on 3/15 given stability in her H/H.     #PAD  Aorto-biiliac occlusive disease. Follows with vascular surgery last seen in 2019 who recommended repeat SUMAYA yearly. Last SUMAYA in 2019 with Right leg 0.51, Left leg 0.59.  No stenting per patient or chart review.  Continued on Plavix and Atorvastatin during admission.     #Pulmonary nodules, likely malignancy   2cm RUL nodule which was previously biopsied in 2019, though growing on repeat imaging. PET scan on 3/8/22 with increased uptake concerning for malignant disease. Hx of tobacco use.  Per chart review, IP/IR to reach out to patient after discharge to facilitate scheduling of lung biopsy in the  future.     #Pericardial effusion  TTE on 3/10 shows intact cardiac function with small effusion, not hemodynamically significant    #Constipation:  Hx of constipation in the past, with constipation during admission as well. On exam, abdomen non-tender without signs of ileus/nausea/vomiting.  - Miralax BID  - Dulcolax PRN  - Senna BID    #Hypoklaemia  -Replete PRN    # Malnutrition:    - Started scheduled Zofran BID AC on 3/17 as patient reports this helps but is often administered after meals  - Encourage PO intake, use of supplements    ------ Chronic stable conditions -----     #COPD - Not on any inhalers. No wheezing on exam.   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 Continue PTA levothyroxine 75 mcg daily.   #Depression - Continue PTA citalopram 20 mg daily   #Glaucoma- Continue PTA latanoprost 0.005%, one drop in both eyes QHS      Consultations This Hospital Stay   GI LUMINAL ADULT IP CONSULT  PHARMACY IP CONSULT  NUTRITION SERVICES ADULT IP CONSULT  CARDIOLOGY GENERAL ADULT IP CONSULT  PHYSICAL THERAPY ADULT IP CONSULT  OCCUPATIONAL THERAPY ADULT IP CONSULT  RESPIRATORY CARE IP CONSULT  CARE MANAGEMENT / SOCIAL WORK IP CONSULT  CARE MANAGEMENT / SOCIAL WORK IP CONSULT  PHYSICAL THERAPY ADULT IP CONSULT  OCCUPATIONAL THERAPY ADULT IP CONSULT    Code Status   Full Code    Time Spent on this Encounter   I, Silvia Chicas MD, personally saw the patient today and spent less than or equal to 30 minutes discharging this patient.       Silvia Chicas MD  [facility] [address] [place] [id number]  Phone: [phone]  Fax: [phone]  ______________________________________________________________________    Physical Exam   Vital Signs: Temp: 97.6  F (36.4  C) Temp src: Oral BP: 121/58 Pulse: 60   Resp: 18 SpO2: 94 % O2 Device: Nasal cannula Oxygen Delivery: 4 LPM  Weight: 93 lbs 3.2  oz  General Appearance: Pleasant elderly female, lying in bed, nasal cannula in place, no acute distress  Respiratory: Breathing comfortably on nasal cannula. No tachypnea. Lungs grossly clear to auscultation bilaterally  Cardiovascular: RRR, no m/r/g. Radial pulses 2+ and symmetric.  GI: soft, non-distended, non-tender to palpation.  Skin: pallor present, no skin rash  Other: Trace LE edema bilaterally        Primary Care Physician   Kathy Antonio    Discharge Orders      Adult Cardiology Toney Vaughan Referral      General info for SNF    Length of Stay Estimate: Short Term Care: Estimated # of Days <30  Condition at Discharge: Stable  Level of care:skilled   Rehabilitation Potential: Good  Admission H&P remains valid and up-to-date: Yes  Recent Chemotherapy: N/A  Use Nursing Home Standing Orders: Yes     Mantoux instructions    Give two-step Mantoux (PPD) Per Facility Policy Yes     Follow Up and recommended labs and tests    Follow up with [facility] physician.  The following labs/tests are recommended: CBC.    Follow-up with cardiology team for resumption of pulmonary HTN medication  Follow-up with Oncology for management of right lung nodule     Reason for your hospital stay    You were admitted to the hospital with low blood counts which were likely from a slow ooze in the GI tract related to your known aterio-venous malformations. You were given blood and iron products and your hemoglobin stabilized since that time. You had no signs of bleeding otherwise noted. We resumed your medications for treatment for blood clots and for peripheral arterial disease and there was no evidence of bleeding with either of these medications. Moving forward, we would recommend follow-up with your PCP Dr. Antonio for further follow-up along with the Oncology and Cardiology teams.     Daily weights    Call Provider for weight gain of more than 2 pounds per day or 5 pounds per week.     Activity - Up with nursing assistance      Physical Therapy Adult Consult    Evaluate and treat as clinically indicated.    Reason:  deconditioning, pulmonary HTN     Occupational Therapy Adult Consult    Evaluate and treat as clinically indicated.    Reason:  deconditioning, pulmonary HTN     Fall precautions     Oxygen Adult/Peds    Oxygen Documentation:   I certify that this patient, Lucie Stockton has been under my care (or a nurse practitioner or physican's assistant working with me). This is the face-to-face encounter for oxygen medical necessity.      Lucie Stockton is now in a chronic stable state and continues to require supplemental oxygen. Patient has continued oxygen desaturation due to Pulmonary Hypertension I27.20.    Alternative treatment(s) tried or considered and deemed clinically infective for treatment of Pulmonary Hypertension I27.20 include nebulizers, inhalers, steroids, and pulmonary toileting.  If portability is ordered, is the patient mobile within the home? yes    **Patients who qualify for home O2 coverage under the CMS guidelines require ABG tests or O2 sat readings obtained closest to, but no earlier than 2 days prior to the discharge, as evidence of the need for home oxygen therapy. Testing must be performed while patient is in the chronic stable state. See notes for O2 sats.**     Diet    Follow this diet upon discharge: Orders Placed This Encounter      Snacks/Supplements Adult: Ensure Enlive; With Meals      Advance Diet as Tolerated: Regular Diet Adult       Significant Results and Procedures   Results for orders placed or performed during the hospital encounter of 03/10/22   XR Chest Port 1 View    Narrative    EXAM: XR CHEST PORT 1 VIEW  3/10/2022 8:14 PM     HISTORY:  Dyspnea after starting transfusions       COMPARISON:  Radiographs 3/10/2022, 2/21/2021    TECHNIQUE: Portable 45 degree upright AP radiograph of the chest.    FINDINGS: The trachea is midline. The lungs are hyperinflated. No  focal airspace opacity. No  pleural effusion or pneumothorax. Aortic  arch calcifications. Osteopenic bones without acute abnormality.      Impression    IMPRESSION:   1. No focal airspace opacity.  2. Persistent hyperinflation of the lungs.     I have personally reviewed the examination and initial interpretation  and I agree with the findings.    VANESSA MCCLELLAND MD         SYSTEM ID:  [id number]   Echocardiogram Complete     Value    LVEF  55-60%    Narrative    [id number]  [id number]  [id number]  [id number]^MEREDITH^JOE     [facility],[place]  Echocardiography Laboratory  [address] [place] [id number]     Name: ISAURO GUZMAN  MRN: [id number]  : 1935  Study Date: 2022 09:51 AM  Age: 86 yrs  Gender: Female  Patient Location: [facility]  Reason For Study: Dyspnea, Pulmonary Hypertension  Ordering Physician: JOE HARPER  Referring Physician: VANESA ZAPATA  Performed By: Kimi Hernández RDCS     BSA: 1.3 m2  Height: 59 in  Weight: 92 lb  HR: 70  BP: 138/74 mmHg  ______________________________________________________________________________  Procedure  Complete Portable Echo Adult.  ______________________________________________________________________________  Interpretation Summary  Global and regional left ventricular function is normal with an EF of 55-60%.  Grade II or moderate diastolic dysfunction. Diastolic Doppler findings (E/E'  ratio and/or other parameters) suggest left ventricular filling pressures are  increased.  Global right ventricular function is normal.  The inferior vena cava was normal in size with preserved respiratory  variability.  Pulmonary artery systolic pressure cannot be assessed.     This study was compared with the study from 2021 .  No significant changes noted.     ______________________________________________________________________________  Left Ventricle  Left ventricular size is normal. Global and regional left ventricular function  is normal with an EF  of 55-60%. Mild concentric wall thickening consistent  with left ventricular hypertrophy is present. Grade II or moderate diastolic  dysfunction. Diastolic Doppler findings (E/E' ratio and/or other parameters)  suggest left ventricular filling pressures are increased.     Right Ventricle  The right ventricle is normal size. Global right ventricular function is  normal.     Atria  The right atria appears normal. Moderate left atrial enlargement is present.     Mitral Valve  Mild mitral annular calcification is present.     Aortic Valve  The valve leaflets are not well visualized. On Doppler interrogation, there is  no significant stenosis or regurgitation.     Tricuspid Valve  The tricuspid valve is normal. The peak velocity of the tricuspid regurgitant  jet is not obtainable. Pulmonary artery systolic pressure cannot be assessed.     Pulmonic Valve  The pulmonic valve is normal.     Vessels  The aorta root is normal. The inferior vena cava was normal in size with  preserved respiratory variability.     Pericardium  No pericardial effusion is present.     Compared to Previous Study  This study was compared with the study from 2/2021 . No significant changes  noted.  ______________________________________________________________________________  MMode/2D Measurements & Calculations  IVSd: 1.1 cm     LVIDd: 4.9 cm  LVIDs: 4.2 cm  LVPWd: 1.1 cm  FS: 14.0 %  LV mass(C)d: 198.7 grams  LV mass(C)dI: 149.9 grams/m2  Ao root diam: 3.0 cm  asc Aorta Diam: 3.3 cm  LVOT diam: 2.1 cm  LVOT area: 3.5 cm2  LA Volume (BP): 61.9 ml  LA Volume Index (BP): 46.5 ml/m2  RWT: 0.44     Doppler Measurements & Calculations  MV E max jese: 90.6 cm/sec  MV A max jese: 141.0 cm/sec  MV E/A: 0.64  MV dec slope: 498.0 cm/sec2  PA acc time: 0.04 sec  E/E' avg: 15.2  Lateral E/e': 15.2  Medial E/e': 15.2     ______________________________________________________________________________  Report approved by: Anders GUAJARDO 03/11/2022 10:46 AM            *Note: Due to a large number of results and/or encounters for the requested time period, some results have not been displayed. A complete set of results can be found in Results Review.       Discharge Medications   Current Discharge Medication List      START taking these medications    Details   ipratropium - albuterol 0.5 mg/2.5 mg/3 mL (DUONEB) 0.5-2.5 (3) MG/3ML neb solution Take 1 vial (3 mLs) by nebulization every 6 hours as needed for wheezing  Qty: 90 mL, Refills: 0    Associated Diagnoses: Pulmonary hypertension (H)      ondansetron (ZOFRAN-ODT) 4 MG ODT tab Take 1 tablet (4 mg) by mouth 2 times daily (before meals)    Associated Diagnoses: Iron deficiency anemia, unspecified iron deficiency anemia type      polyethylene glycol (MIRALAX) 17 g packet Take 17 g by mouth 2 times daily    Associated Diagnoses: Iron deficiency anemia, unspecified iron deficiency anemia type      traZODone (DESYREL) 50 MG tablet Take 0.5 tablets (25 mg) by mouth at bedtime as needed, may repeat once for sleep  Qty: 30 tablet, Refills: 0    Associated Diagnoses: Cognitive changes         CONTINUE these medications which have NOT CHANGED    Details   acetaminophen (TYLENOL) 325 MG tablet Take 2 tablets every 6 to 8 hours as needed for pain  Qty: 100 tablet, Refills: 1    Associated Diagnoses: Chronic low back pain, unspecified back pain laterality, with sciatica presence unspecified; Contusion of right hip, initial encounter; Contusion of right knee, initial encounter      apixaban ANTICOAGULANT (ELIQUIS) 2.5 MG tablet Take 1 tablet (2.5 mg) by mouth 2 times daily  Qty: 180 tablet, Refills: 3    Associated Diagnoses: Pulmonary hypertension (H); CTEPH (chronic thromboembolic pulmonary hypertension) (H)      atorvastatin (LIPITOR) 40 MG tablet Take 2 tablets (80 mg) by mouth daily  Qty: 180 tablet, Refills: 3    Associated Diagnoses: Hyperlipidemia, unspecified hyperlipidemia type      Calcium Carbonate-Vitamin D (CALCIUM 600 + D  OR) Take 1 tablet by mouth every morning       cholecalciferol (VITAMIN D3) 25 mcg (1000 units) capsule Take 1 capsule by mouth daily      citalopram (CELEXA) 20 MG tablet Take 1 tablet (20 mg) by mouth daily  Qty: 90 tablet, Refills: 3    Associated Diagnoses: Anxiety      clopidogrel (PLAVIX) 75 MG tablet Take 1 tablet (75 mg) by mouth daily  Qty: 90 tablet, Refills: 3    Associated Diagnoses: Chronic pulmonary embolism without acute cor pulmonale, unspecified pulmonary embolism type (H); Aortic stenosis; Atherosclerosis of arteries of extremities (H)      cyanocolbalamin (VITAMIN  B-12) 500 MCG tablet Take 500 mcg by mouth every morning   Qty: 90 tablet, Refills: 1    Associated Diagnoses: Vitamin B12 deficiency (non anemic)      ferrous sulfate (FEROSUL) 325 (65 Fe) MG tablet Take 1 tablet (325 mg) by mouth daily (with breakfast)  Qty: 90 tablet, Refills: 3    Associated Diagnoses: Anemia in other chronic diseases classified elsewhere      furosemide (LASIX) 20 MG tablet Take 1 tablet (20 mg) by mouth daily  Qty: 90 tablet, Refills: 3    Associated Diagnoses: Pulmonary hypertension (H); SOB (shortness of breath); Hypervolemia      gabapentin (NEURONTIN) 400 MG capsule Take 1 capsule (400 mg) by mouth At Bedtime  Qty: 90 capsule, Refills: 1    Associated Diagnoses: Chronic bilateral low back pain without sciatica      levothyroxine (SYNTHROID/LEVOTHROID) 75 MCG tablet Take 1 tablet (75 mcg) by mouth daily  Qty: 90 tablet, Refills: 3    Associated Diagnoses: Hypothyroidism, unspecified type      loperamide (IMODIUM A-D) 2 MG tablet Take 1-2 mg by mouth daily as needed for diarrhea   Qty: 30 tablet, Refills: 0    Associated Diagnoses: Loose stools      pantoprazole (PROTONIX) 40 MG EC tablet Take 1 tablet (40 mg) by mouth daily Takes in the morning.  Qty: 90 tablet, Refills: 3    Associated Diagnoses: Gastroesophageal reflux disease without esophagitis      hydrochlorothiazide (HYDRODIURIL) 50 MG tablet Take 1  tablet (50 mg) by mouth daily Please keep appt 12/21/20  Qty: 90 tablet, Refills: 0    Associated Diagnoses: Benign essential hypertension      latanoprost (XALATAN) 0.005 % ophthalmic solution Place 1 drop into both eyes At Bedtime   Refills: 1      oxybutynin (DITROPAN) 5 MG tablet Take 1 tablet (5 mg) by mouth At Bedtime For frequent urination  Qty: 90 tablet, Refills: 0    Associated Diagnoses: Urinary frequency         STOP taking these medications       Potassium Bicarb-Citric Acid 20 MEQ TBEF Comments:   Reason for Stopping:         riociguat (ADEMPAS) 1.5 MG tablet Comments:   Reason for Stopping:             Allergies   No Known Allergies

## 2022-03-17 NOTE — PLAN OF CARE
Goal Outcome Evaluation:  VSS. Alert, disoriented to time and situation. Forgetful, bed alarm on for safety. Able to verbalized needs. On regular diet, appetite fair, no swallowing difficulty. Takes pills whole. Last BM today. Continent of B/B. Declined miralax. A-1 person for transfer and toileting using gb/walker. Bruises to BUE's. Blanchable redness noted to coccyx, no sting applied, then sacral mepilex. Shifted weight. Blanchable redness behind ears from nasal cannula tubing, foam applied. With HERRERA, O2 at 4lpm via nasal cannula (on chronic O2 used). Denies chest pain, N/V, dizziness. Headache, abdominal discomfort.  Bed alarm went offx3- Educated pt to used call light before getting out of bed. Chair alarm also provided.  More confused in the evening/night. Pt would like to go home. Reassured pt. Encourage to stay.   Will continue plan of care.      Patient's most recent vital signs are:     Vital signs:  BP: 131/48  Temp: 96.8  HR: 78  RR: 20  SpO2: 94 %     Patient does not have new respiratory symptoms.  Patient does not have new sore throat.  Patient does not have a fever greater than 99.5.

## 2022-03-17 NOTE — PLAN OF CARE
/50 (BP Location: Right arm)   Pulse 60   Temp 98  F (36.7  C) (Axillary)   Resp 18   Wt 42.3 kg (93 lb 3.2 oz)   LMP  (LMP Unknown)   SpO2 100%   BMI 18.82 kg/m      Soft BP, other VSS. Having good urine output. Senna given, no BM during the night. Trazodone given, but did not work well. C/o knee pain, tylenol given. Pt was stable with nasal cannual 3-4L overnight. Planning to discharge today. Will continue with POC.     Goal Outcome Evaluation:    Plan of Care Reviewed With: patient     Overall Patient Progress: no change

## 2022-03-18 NOTE — PROGRESS NOTES
"   03/18/22 1125   Quick Adds   Quick Adds Certification   Type of Visit Initial Occupational Therapy Evaluation   Living Environment   People in Home spouse   Current Living Arrangements house   Home Accessibility stairs to enter home;stairs within home   Number of Stairs, Main Entrance 1   Stair Railings, Main Entrance none   Number of Stairs, Within Home, Primary   (walk out, main level and upper level)   Transportation Anticipated family or friend will provide   Living Environment Comments Pt sleeps and takes a tub bath on the upper level but has a bathroom on all levels per her report.  She also states there are grab bars by the tub.  No grab bars by the toilets but she puts a hand on the built-in ceramic toilet paper holders.    Self-Care   Usual Activity Tolerance fair   Current Activity Tolerance poor   Equipment Currently Used at Home walker, standard   Fall history within last six months yes   Number of times patient has fallen within last six months 1   Activity/Exercise/Self-Care Comment Pt has cane and walker at home and reports one fall getting out of the car one month ago per chart review. Pt reports she was IND with self care ADLs and bathing PTA.   Instrumental Activities of Daily Living (IADL)   IADL Comments Pt states she has cleaning help \"a woman comes in to do the vacuuming\".  She reports she sometimes cooks and had been driving.  H does the money management and per chart and pt, he is an  and goes into the office 5 days a week, 6 hour days.    General Information   Onset of Illness/Injury or Date of Surgery 03/10/22   Referring Physician Kai Aguilar MD   Patient/Family Therapy Goal Statement (OT) to go home   Additional Occupational Profile Info/Pertinent History of Current Problem Presented to acute care from PCP clinic after presenting for fatigue, and increased dyspnea admitted on 3/10/2022 for acute on chronic anemia likely 2/2 GIB.   Existing Precautions/Restrictions fall;oxygen " therapy device and L/min  (3 to 3.5 liters O2 per NC PTA)   Limitations/Impairments other (see comments)  (cognition)   Cognitive Status Examination   Orientation Status orientation to person, place and time  (cues for day but knew mo/date/year and place)   Cognitive Status Comments Th noted to pt staff had seen confusion from pt, she endorsed this and indicates this is new.    Visual Perception   Visual Impairment/Limitations WNL   Pain Assessment   Patient Currently in Pain   (pt c/o all over back pain- not new, kyphotic posture)   Posture   Posture kyphosis   Range of Motion Comprehensive   General Range of Motion no range of motion deficits identified   Strength Comprehensive (MMT)   Comment, General Manual Muscle Testing (MMT) Assessment Generally weak but UEs functional for ADLs.   Bed Mobility   Comment (Bed Mobility) SBA  (flat bed, no rail, also able to scoot to head of flat bed)   Transfers   Transfer Comments Pt reports no SOB and declines O2.   imposed O2 when pt at EOB and with standing O2 SATs dropped to 68%.  Pt on 4 liters with 2 minutes to recover to 90%.   added length to O2 cord so pt could get to and from the BR. Pt declines the 2WW next to her but reaches out for th hand and is able to do STS from bed and toilet with min A for balance and safety.   Balance   Balance Comments Pt unsteady at times but walks slowly with HHA.    Activities of Daily Living   BADL Assessment/Intervention upper body dressing;lower body dressing;grooming   Upper Body Dressing Assessment/Training   Comment, (Upper Body Dressing) set-up   Lower Body Dressing Assessment/Training   Comment, (Lower Body Dressing) CGA   Grooming Assessment/Training   Comment, (Grooming) CGA standing at the sink   Clinical Impression   Criteria for Skilled Therapeutic Interventions Met (OT) Yes, treatment indicated   OT Diagnosis Decrease ADL, Decrease transfers, Decrease cognition   OT Problem List-Impairments impacting ADL problems  related to;activity tolerance impaired;balance;cognition;mobility;strength;pain;postural control   Assessment of Occupational Performance 3-5 Performance Deficits   Identified Performance Deficits gr/hyg, bathing, dressing, transfers   Planned Therapy Interventions (OT) ADL retraining;balance training;strengthening;transfer training;cognition   Clinical Decision Making Complexity (OT) low complexity   Anticipated Equipment Needs Upon Discharge (OT)   (TBD)   Risk & Benefits of therapy have been explained evaluation/treatment results reviewed;care plan/treatment goals reviewed;risks/benefits reviewed;current/potential barriers reviewed;participants voiced agreement with care plan;participants included;patient  (anticipate FT will be helpful as will recommend inc assist)   Clinical Impression Comments Pt seated in bed with O2 cord at her side, SATs 88%.  Th imposed O2 and SATs WNLs at 4 liters (3-3.5 liters at baseline).  Pt demo mild confusion but is directable.  Per chart review and pt, her baseline is IND with ADLs, transfers and driving.  S/p OT eval today, 24 hour supervision recommended for mobility.  Pt is a good candidate for cog assessment and to work on ADL/mobility to increase safety and IND.  Anticipate pt will benefit from FT.  Estimate 1 week LOS.    OT Discharge Planning   OT Discharge Recommendation (DC Rec) home;home with home care occupational therapy   Therapy Certification   Start of Care Date 03/18/22   Certification date from 03/18/22   Certification date to 04/16/22   Total Evaluation Time (Minutes)   Total Evaluation Time (Minutes) 30   OT Goals   Therapy Frequency (OT) 6 times/wk   OT Predicated Duration/Target Date for Goal Attainment 03/25/22   OT Goals Hygiene/Grooming;Upper Body Dressing;Lower Body Dressing;Upper Body Bathing;Lower Body Bathing;Transfers;Toilet Transfer/Toileting;Cognition   OT: Hygiene/Grooming supervision/stand-by assist;while standing   OT: Upper Body Dressing  Supervision/stand-by assist;including set-up/clothing retrieval   OT: Lower Body Dressing Supervision/stand-by assist;including set-up/clothing retrieval   OT: Upper Body Bathing Supervision/stand-by assist  (seated vs sponge bath)   OT: Lower Body Bathing Supervision/stand-by assist  (seated vs sponge bath)   OT: Transfer Minimal assist  (tub transfer with grab bar)   OT: Toilet Transfer/Toileting Supervision/stand-by assist  (distant supervision as able)   OT: Cognitive Patient/caregiver will verbalize understanding of cognitive assessment results/recommendations as needed for safe discharge planning

## 2022-03-18 NOTE — H&P
[facility] Transitional Care    History and Physical - Hospitalist Service       Date of Admission:  3/17/2022    Assessment & Plan      A: Patient is an 85 y/o woman who has multiple medical problems including chronic thromboembolic pulmonary hypertension, chronic hypoxemic respiratory failure, past DVT and peripheral arterial disease. Patient was hospitalized at [facility] from 10-Mar-2022 to 17-Mar-2022 for acute on chronic anemia suspected to be from slow gastrointestinal bleeding. Patient did not undergo endoscopic evaluation during hospital stay due to safety concerns - patient had agitation and hypoxemia with small amounts of sedation in Feb-2021. Patient received 2 units of pRBCs and hemoglobin stabilized during hospital stay.    During hospital stay, patient also had acute on chronic hypoxemic respiratory failure possibly from volume overload. Patient also had an elevated troponin from demand ischemia. Patient also had delirium during hospital stay. Patient was discharged to TCU on 17-Mar-2022.    P:  1.) Acute on chronic anemia, patient has iron deficiency anemia from chronic blood loss at baseline; patient has history of small bowel AVMs: Monitoring for gross gastrointestinal bleeding.   2.) Acute on chronic hypoxemic respiratory failure, oxygen requirements now at baseline: Continue oxygen support. Monitoring respiratory status.   3.) Chronic thromboembolic pulmonary hypertension: Patient on lasix and hydrochlorothiazide for diuretics. Patient's adempas was stopped during hospital stay as patient had not been taking it consistently - patient to f/u with Cardiology as outpatient for possible resumption of adempas as outpatient.  4.) Personal history of DVT and PE: Patient on eliquis 2.5 mg twice daily.  5.) Peripheral arterial disease: Patient on plavix and lipitor. Monitoring for symptoms.  6.) Right upper lobe pulmonary nodule concerning for malignancy: Patient to f/u as outpatient for further  "workup.  7.) Severe protein-calorie malnutrition: Supplementing.  8.) Hypothyroidism secondary to Graves disease: Patient on levothyroxine.  9.) COPD, compensated: Monitoring for changes.  10.) Hospital-acquired delirium, improved: Monitoring for recurrence. Monitoring for safety.  11.) Overactive bladder: Patient on oxybutynin.       Kai Aguilar MD  Hospitalist Service  [facility]  Securely message with the Vocera Web Console (learn more here)  Text page via [facility] Paging/Directory     ______________________________________________________________________    Chief Complaint     Generalized weakness    History of Present Illness     Patient is an 85 y/o woman who has multiple medical problems including chronic thromboembolic pulmonary hypertension, chronic hypoxemic respiratory failure, past DVT and peripheral arterial disease. Patient was hospitalized at [facility] from 10-Mar-2022 to 17-Mar-2022 for acute on chronic anemia suspected to be from slow gastrointestinal bleeding. Patient was discharged to TCU on 17-Mar-2022.    Patient currently reports feeling well. Patient notes no pain, no dyspnea, no lightheadedness, no cough and no new problems.     Patient reportedly was agitated overnight.     Review of Systems    - 10 point review of systems unremarkable aside from what was mentioned in HPI    Past Medical History    I have reviewed this patient's medical history and updated it with pertinent information if needed.   Past Medical History:   Diagnosis Date     Anemia      Aortic stenosis     abdominal     Atherosclerosis of aorta (H)     \"extensive disease with near occlusion below level of renal arteries\" on CT     Atherosclerosis of arteries of extremities (H)      Back pain     severe multilevel DJD, moderate spinal canal stenosis L4-5, severe L3-4     Chronic pain     Back, hips, knees, legs     Degenerative joint disease      DVT of lower extremity, bilateral (H)     5/24/10 left distal " femoral and great saphenous, 7/7/10 left:  extending to cfv, iliac , pop and post tibial, peronial, right:  distal fem and peroneal      Grave's disease      Hyperlipidaemia LDL goal < 70      Hypertension, essential      Hypothyroidism      Imbalance      Insomnia      Intermittent claudication (H)      Iron deficiency      Knee pain      Lumbar stenosis with neurogenic claudication      Osteoarthritis     ankle,foot, knee     Osteoporosis      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  Past Surgical History:   Procedure Laterality Date     BUNIONECTOMY       CAPSULE/PILL CAM ENDOSCOPY N/A 2/20/2021    Procedure: IMAGING PROCEDURE, GI TRACT, INTRALUMINAL, VIA CAPSULE;  Surgeon: Heron Ayala MD;  [facility]: [facility]     COLONOSCOPY      11/10/10 angioectasia     CV PULMONARY ANGIOGRAM N/A 2/26/2021    Procedure: CV PULMONARY ANGIOGRAM;  Surgeon: Joaquin Fuller MD;  Location:  HEART CARDIAC CATH LAB     CV RIGHT HEART CATH MEASUREMENTS RECORDED N/A 2/26/2021    Procedure: CV RIGHT HEART CATH;  Surgeon: Joaquin Fuller MD;  Location:  HEART CARDIAC CATH LAB     ENDOBRONCHIAL ULTRASOUND FLEXIBLE N/A 1/24/2020    Procedure: ENDOBRONCHIAL ULTRASOUND, WITH FLEXIBLE BRONCHOSCOPY;  Surgeon: Gopal Jara MD;  [facility]     HYSTERECTOMY TOTAL ABDOMINAL, BILATERAL SALPINGO-OOPHORECTOMY, NODE DISSECTION, COMBINED  2/17/11    SANGEETHA, EMILIA, LN bx, omentectomy, resection of evrian malignancy Dr. JONO Goncalves - Returned BENIGN     INJECT EPIDURAL LUMBAR /  SACRAL SINGLE N/A 2018    Procedure: INJECT EPIDURAL LUMBAR / SACRAL SINGLE;  lumbar interlaminar epidural steroid injection;  Surgeon: Jaylin Valadez MD;  [facility]     INJECT SACROILIAC JOINT Right 3/7/2018    Procedure: INJECT SACROILIAC JOINT;  Right Sacroiliac Joint Injection;  Surgeon: Flavio Harrison MD;  [facility]     INJECT SACROILIAC JOINT Bilateral 2018    Procedure: Bilateral Sacroiliac Joint Injections;  Surgeon: Faviola Cosby MD;  [facility]     OPTICAL TRACKING SYSTEM BRONCHOSCOPY N/A 2020    Procedure: Super D navigational bronchoscopy;  Surgeon: Gopal Jara MD;  [facility]     UPPER GI ENDOSCOPY      11/10/10 erythematous gastropathy, angioectasia     [facility] STOMACH SURGERY PROCEDURE UNLISTED      Please see chart       Social History   I have reviewed this patient's social history and updated it with pertinent information if needed.  Social History     Tobacco Use     Smoking status: Former Smoker     Packs/day: 0.50     Years: 15.00     Pack years: 7.50     Quit date: 1993     Years since quittin.5     Smokeless tobacco: Never Used   Substance Use Topics     Alcohol use: Yes     Comment: social     Drug use: No       Family History   I have reviewed this patient's family history and updated it with pertinent information if needed.  Family History   Problem Relation Age of Onset     Breast Cancer Maternal Aunt 80     C.A.D. Father 54     No Known Problems Mother        Prior to Admission Medications   Prior to Admission Medications   Prescriptions Last Dose Informant Patient Reported? Taking?   Calcium Carbonate-Vitamin D (CALCIUM 600 + D OR)  Self Yes No   Sig: Take 1 tablet by mouth every morning    acetaminophen (TYLENOL) 325 MG tablet  Self No No   Sig: Take 2 tablets every 6 to 8 hours as needed for pain   Patient taking differently: Take 650 mg by mouth as needed Take 2 tablets every 6 to 8 hours as needed for pain   apixaban  ANTICOAGULANT (ELIQUIS) 2.5 MG tablet   No No   Sig: Take 1 tablet (2.5 mg) by mouth 2 times daily   atorvastatin (LIPITOR) 40 MG tablet  Self No No   Sig: Take 2 tablets (80 mg) by mouth daily   cholecalciferol (VITAMIN D3) 25 mcg (1000 units) capsule  Self Yes No   Sig: Take 1 capsule by mouth daily   citalopram (CELEXA) 20 MG tablet  Self No No   Sig: Take 1 tablet (20 mg) by mouth daily   clopidogrel (PLAVIX) 75 MG tablet   No No   Sig: Take 1 tablet (75 mg) by mouth daily   cyanocolbalamin (VITAMIN  B-12) 500 MCG tablet  Self Yes No   Sig: Take 500 mcg by mouth every morning    ferrous sulfate (FEROSUL) 325 (65 Fe) MG tablet   Yes No   Sig: Take 1 tablet (325 mg) by mouth daily (with breakfast)   furosemide (LASIX) 20 MG tablet   No No   Sig: Take 1 tablet (20 mg) by mouth daily   gabapentin (NEURONTIN) 400 MG capsule   No No   Sig: Take 1 capsule (400 mg) by mouth At Bedtime   hydrochlorothiazide (HYDRODIURIL) 50 MG tablet  Self No No   Sig: Take 1 tablet (50 mg) by mouth daily Please keep appt 12/21/20   ipratropium - albuterol 0.5 mg/2.5 mg/3 mL (DUONEB) 0.5-2.5 (3) MG/3ML neb solution   No No   Sig: Take 1 vial (3 mLs) by nebulization every 6 hours as needed for wheezing   latanoprost (XALATAN) 0.005 % ophthalmic solution  Self Yes No   Sig: Place 1 drop into both eyes At Bedtime    levothyroxine (SYNTHROID/LEVOTHROID) 75 MCG tablet   No No   Sig: Take 1 tablet (75 mcg) by mouth daily   loperamide (IMODIUM A-D) 2 MG tablet  Self Yes No   Sig: Take 1-2 mg by mouth daily as needed for diarrhea    ondansetron (ZOFRAN-ODT) 4 MG ODT tab   No No   Sig: Take 1 tablet (4 mg) by mouth 2 times daily (before meals)   oxybutynin (DITROPAN) 5 MG tablet  Self No No   Sig: Take 1 tablet (5 mg) by mouth At Bedtime For frequent urination   pantoprazole (PROTONIX) 40 MG EC tablet  Self No No   Sig: Take 1 tablet (40 mg) by mouth daily Takes in the morning.   polyethylene glycol (MIRALAX) 17 g packet   No No   Sig: Take 17 g  by mouth 2 times daily   traZODone (DESYREL) 50 MG tablet   No No   Sig: Take 0.5 tablets (25 mg) by mouth at bedtime as needed, may repeat once for sleep      Facility-Administered Medications: None     Allergies   No Known Allergies    Physical Exam   Vital Signs: Temp: (!) 96.5  F (35.8  C) Temp src: Oral BP: 131/41 Pulse: 78   Resp: 16 SpO2: 91 % O2 Device: Nasal cannula Oxygen Delivery: 5 LPM    General: Patient comfortable, NAD.  Eyes: No scleral icterus or conjunctival injection.  Cardiovascular: Heart RRR, S1 S2 w/o murmurs. Legs nonswollen.  Lungs: Breath sounds present. No crackles/wheezes heard.  Gastrointestinal: Abdomen soft, nontender.  Musculoskeletal: No visible joint swelling or erythema.  Skin: Warm, dry.  Neuro: Cranial nerves II-XII grossly intact.  Psych: Affect pleasant.     Data   Labs noted. WBC 12.3; Hb 7.6; Platelets 357

## 2022-03-18 NOTE — PROGRESS NOTES
"Social Work: Initial Assessment with Discharge Plan    Patient Name: Lucie Stockton  : 1935  Age: 86 year old  MRN: [id number]  Completed assessment with: Chart review and interview with pt.  Admitted to TCU: 3/17/2022    Presenting Information   Date of SW assessment: 2022  Health Care Directive: Health Care Directive Agent (if patient not able to make decisions) SW left  for pt's spouse, Darren, inquiring if pt has a HCD and if he would be able/willing to bring a copy in. Pt confirmed with SW who her HCA are:  Primary Health Care Agent: Pradeep Villatoro  Secondary Health Care Agent: DaughterDawn  Living Situation: Lives with spousePradeep, in a multi-level house in [place]; 1STE. Living room, dining room, kitchen, bedroom, and bathroom on 2nd level.  Previous Functional Status: Pt reports she was IND with ADLs, drove self. 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 Pt and spouse \"eat out a lot.\" Pt manages own medications; spouse manages finances. DaughterDawn, does laundry for pt and spouse.  DME available: straight cane, FWW, home O2  Patient and family understanding of hospitalization: Pleasant and appropriate  Cultural/Language/Spiritual Considerations: Pt is an 86 y.o.  female, , English-speaking, devout Restorationist.   Abuse concerns: None reported  BIMS: Pt scored 15/15 on BIMS indicating cognition intact.  PHQ-9: Pt scored 6/27 on PHQ-9 indicating mild depressive symptoms. Pt reports that symptoms are related to current health status.  PAS: confirmation number- [id number]  Has there been a level II screen?  No  Were there any recommendations in the screen? N/A  If yes, will the recommendations we incorporated into the Plan of Care?  N/A  Physical Health  Reason for admission:   Lucie Stockton is a 86 year old female admitted on 3/17/2022 to TCU. 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 Presented to hospital 3/10/22 after presenting to PCP with fatigue and increased dyspnea for chronic anemia likely secondary to GIB from AVMs.    Provider Information   Primary Care Physician:[facility] [address] [facility] [address]  PH: [phone]  : None reported     Mental Health:   Diagnosis: Pt denied current or hx of MH issues/concerns.  Current Support/Services: None reported  Previous Services: None reported  Services Needed/Recommended: Not discussed.    Substance Use:  Diagnosis: Denied alcohol, tobacco, and illicit drug use.  Current Support/Services: Not discussed  Previous Services: Not discussed  Services Needed/Recommended: Not discussed.    Support System  Marital Status:   Family support:   DaughterDawn, available to assist prn.  Daughter, Yesenia, lives in [place]  Pt has one brother who lives in [place]  Other support available: Jehovah's witness community and close group of friends  Gaps in support system: None identified.    Community Resources  Current in home services:  comes every 3 weeks; hired yard help.  Previous services: Pt reports she has not had HH services in the past.    Financial/Employment/Education  Employment Status: Retired teacher  Income Source: skilled nursing savings, inheritance, and SSI  Education: Graduate degree; pt stated she did most of the coursework for her PHD, but did not finish d/t having kids.  Financial Concerns:  None reported  Insurance: Medicare & BCBS supplement      Discharge Plan   Patient and family discharge goal: Return home with recommended services.  Provided Education on discharge plan: YES  Patient agreeable to discharge plan:  YES  A list of Medicare Certified Facilities was provided to the patient and/or family to encourage patient choice. Based on location and rating, patient would like referrals made to:  N/A  General information regarding anticipated insurance coverage and possible out of pocket cost was discussed. Patient and patient's family are aware patient may incur the cost of transportation to the facility, pending insurance payment: YES  Barriers to discharge: Stairs, medical stability.    Discharge Recommendations   Disposition: Home  Transportation Needs: family or friens will provide  Name of Transportation Company and Phone: N/A    Additional comments   Pt reports that her daughter, Yesenia, and her family will be coming up to visit from [place] end of March/beginning of April.      SHIMA Edmondson, LSW  [facility]   Pager: [phone]

## 2022-03-18 NOTE — PLAN OF CARE
Discharge Planner Post-Acute Rehab OT:     Discharge Plan: home with family support, home OT, home care.     Precautions: Falls, 3- 3.5 liters O2 per NC at baseline, decrease cognition    Current Status:  ADLs:    Mobility: CGA to min A with transfers.  Cues for putting O2 on.  Pt declined the 2WW but reached out for th hand to steady self with amb.    Grooming: CGA standing at the sink.    Dressing: Set-up for UB, CGA LB for standing safety.    Bathing: TBD.  Pt is used to a sponge bath and has a grab bar in her tub per her report.     Toileting: CGA to min A for transfer, distant supervision for vinh cares.  IADLs: Pt reports she does some cooking and cleaning but describes assist of dtr and a .  Vision/Cognition: No c/o re: vision.  Pt demo decrease cognition at eval.  She is directable and pleasant but wants to go home.  Pt used the call light and called the nurse several times though therapist instructing pt to use the TV remote to change channels.  Recommend cognitive testing to establish recommended degree of supervision.     Assessment: Pt seated in bed with O2 cord at her side, SATs 88%.  Th imposed O2 and SATs WNLs at 4 liters (3-3.5 liters at baseline).  Pt demo mild confusion but is directable.  Per chart review and pt, her baseline is IND with ADLs, transfers and driving.  S/p OT eval today, 24 hour supervision recommended for mobility.  Pt is a good candidate for cog assessment and to work on ADL/mobility to increase safety and IND.  Anticipate pt will benefit from FT.  Estimate 1 week LOS.     Other Barriers to Discharge (DME, Family Training, etc):   Pt takes a tub bath with grab bar per pt.  Pt uses O2 3-3.5 liters per NC at baseline.  Recommend family training.  Consider grab bars at the toilets.

## 2022-03-18 NOTE — PROGRESS NOTES
SPIRITUAL HEALTH SERVICES  SPIRITUAL ASSESSMENT Progress Note  [facility] ([facility] [place]) TCU R 432 3/18/22          Pt discussed her life of having rell in [place]. She stated her family experiencing some challenges now due to illnesses. Pt did not desire prayer for anything but needed to talk to someone. Provided affirmation to Pt based on her life and beliefs. Will visit Pt again soon.      Sangeeta Tong  Associate    Pager: [phone]

## 2022-03-18 NOTE — PROGRESS NOTES
03/18/22 1159   Quick Adds   Quick Adds Certification   Type of Visit Initial PT Evaluation   Living Environment   People in Home spouse   Current Living Arrangements house   Home Accessibility stairs to enter home;stairs within home   Number of Stairs, Main Entrance 1   Stair Railings, Main Entrance none   Number of Stairs, Within Home, Primary greater than 10 stairs  (bedroom on second floor)   Stair Railings, Within Home, Primary railings on both sides of stairs  (railings on one side but changes side part way up)   Transportation Anticipated family or friend will provide   Living Environment Comments Pt sleeps on second floor. States there is no way she could live on main level   Self-Care   Usual Activity Tolerance fair   Current Activity Tolerance poor   Regular Exercise No   Equipment Currently Used at Home walker, standard;wheelchair, manual;cane, straight  (front wheeled walker)   Fall history within last six months yes   Number of times patient has fallen within last six months 1   Activity/Exercise/Self-Care Comment pt fell about a month ago getting out of the car and slipped on ice, hurt both knees, right elbow and right, right hip   Post-Acute Assessment Only   Post-Acute Functional Assessment See IP Rehab Daily Documentation Flowsheet for Functional Mobility/ADL Assessment   Previous Level of Function/Home Environm   Bed Mobility, Premorbid Functional Level independent   Transfers, Premorbid Functional Level uses device or equipment   Household Ambulation, Premorbid Functional Level uses device or equipment   Stairs, Premorbid Functional Level partial assistance   Community Ambulation, Premorbid Functional Level uses wheelchair;partial assistance  (if the walk is long got a w/c ride (like from [employer] parking)   Previous Level of Function, Premorbid States  leaves about 8:30am and comes home around 4:00 or later. daugther stops over several times per week. States she was on O2 at home prior to  "this hospitlization.   General Information   Onset of Illness/Injury or Date of Surgery 03/10/22   Referring Physician Dr. Kai Aguilar MD   Patient/Family Therapy Goals Statement (PT) would like to walk without a lot of pain in R knee.   Pertinent History of Current Problem (include personal factors and/or comorbidities that impact the POC) Per chart review pt has a complicated medical history, likely lung cancer and on who presented from PCP clinic after presenting for fatigue, and  was admitted on 3/10/2022 for acute on chronic anemia. See med chart for full history.   Existing Precautions/Restrictions other (see comments);oxygen therapy device and L/min  (4L/ min, authorized for up to 5L/min)   General Observations Pt is lying in bed, agreeable to therapy. Demonstrates some confusion - wants therapies back to back for convience \"after coming all this way\", wants to wrap up flowers to take them home. She is tearful stating this is hard. Therapeutic listening and offering pt encouragement about her status. Pt seems mildly confused at times but is compliant and pleasant, Requires close watch d/t impulsivity with activity.   Cognition   Orientation Status (Cognition) oriented to;person;situation   Cognitive Status Comments Defert to OT and SLP for formal cognition   Pain Assessment   Patient Currently in Pain   (5-6/10 in R knee, hurts more when she moves it)   Posture    Posture Kyphosis;Forward head position   Range of Motion (ROM)   Range of Motion ROM is WFL   ROM Comment LE WFL   Strength Comprehensive (MMT)   Comment, General Manual Muscle Testing (MMT) Assessment Generally strength in LE is at least 4/5 with resistive testing   Balance   Balance Comments Pt able to sit EOB several minutes with supervision, pt requires close sup with standing at Front WW, d/t impulsivity   Coordination   Coordination no deficits were identified   Muscle Tone   Muscle Tone no deficits were identified   Clinical Impression "   Criteria for Skilled Therapeutic Intervention Yes, treatment indicated   PT Diagnosis (PT) decreased endurance, strength and tolerance for functional activity secondary to recent hospitilization   Influenced by the following impairments medical status   Functional limitations due to impairments transfers, ambulation, stairs, dynamic balance   Clinical Presentation (PT Evaluation Complexity) Evolving/Changing   Clinical Presentation Rationale age, recent hospitilization, oxygen needs, cognition   Clinical Decision Making (Complexity) moderate complexity   Planned Therapy Interventions (PT) balance training;bed mobility training;cryotherapy;E-stim;gait training;groups;home exercise program;joint mobilization;manual therapy techniques;lumbar stabilization;motor coordination training;neuromuscular re-education;orthotic fitting/training;patient/family education;postural re-education;prosthetic fitting/training;ROM (range of motion);stair training;strengthening;stretching;swiss ball techniques;TENS;thermotherapy;transfer training;wheelchair management/propulsion training   Risk & Benefits of therapy have been explained evaluation/treatment results reviewed;care plan/treatment goals reviewed;risks/benefits reviewed;current/potential barriers reviewed;participants voiced agreement with care plan;participants included;patient   Clinical Impression Comments pt is a 86 year old female who present following hospitilization. Pt has significant medical history and is chronically on oxygen. PT eval revealed decreased endurance and decreased tolerance for functional activity compared to baseline. Pt will benefit from skilled physical therapy to address these deficits reduce fall risk and reduce risk of rehospitilization.   Therapy Certification   Start of care date 03/18/22   Certification date from 03/18/22   Certification date to 04/16/22   Medical Diagnosis physical deconditioning   Total Evaluation Time   Total Evaluation  Time (Minutes) 20   Physical Therapy Goals   PT Frequency 6x/week   PT Predicated Duration/Target Date for Goal Attainment 03/25/22   PT Goals Transfers;Gait;Stairs;PT Goal 1;PT Goal 2   PT: Transfers Supervision/stand-by assist;Assistive device;Sit to/from stand;Bed to/from chair  (front WW)   PT: Gait Supervision/stand-by assist;Minimal assist;Rolling walker;50 feet  (front WW)   PT: Stairs Minimal assist;Greater than 10 stairs;Rail on left;Rail on right  (12 stairs)   PT: Goal 1 supervision getting in and out of car for safe transfer home   PT: Goal 2 continuous nustep 7 min for CV endurance

## 2022-03-18 NOTE — CARE PLAN
RN: Pt room change to 401 from room 432 to have pt closer to nursing station for closer monitoring due to setting alarms off night shift frequently.  Staff to anticipate and meet needs. Therapy evaluating today. Pt takes oxygen off herself and leaves it off, not knowingly and sats drop into the 50's on room air. Denies pain. HERRERA.    Patient's most recent vital signs are:     Vital signs:  BP: 131/41  Temp: 96.5  HR: 78  RR: 16  SpO2: 91 %     Patient does not have new respiratory symptoms.  Patient does not have new sore throat.  Patient does not have a fever greater than 99.5.

## 2022-03-18 NOTE — PROGRESS NOTES
TCU ADMISSION COVID STATUS        Patient recently admitted to TCU.     Per Immunization record, patient has the following for COVID-19 prevention: Pfizer and vaccinated plus Booster.    No need to meet with patient to discuss vaccine.    Patient is up to date and does not require further or discussion     Janneth Onofre RN, BSN  MDS Quality and    .

## 2022-03-18 NOTE — PLAN OF CARE
Discharge Planner Post-Acute Rehab PT:     Recert: 4/16/2022    Discharge Plan: home with 24 hour supervision    Precautions: fall risk, pt on 4-5 L of oxgyen, impulsive    Current Status:  Bed Mobility: ind with rolling, supervision for supine <> sit  Transfer: SBA - CGA for sit to stand, chair to bed depending on impulsivity  Gait: ~115' with front WW, w/c follow, PT manages O2 tank, CGA  Stairs: PT: ascend/descend 3 steps x 2 with nikos HR, CGA, reciprocal pattern  Balance: sits EOB for several minutes with supervision, close SBA in standing due to impulsivity    Assessment:  Patient is a 86 year old female who present following hospitilization. Pt has significant medical history and is chronically on oxygen. PT eval revealed decreased endurance and decreased tolerance for functional activity compared to baseline. Based on conversation with other providers and therapist interaction with patient, patient demonstrates variable cognition about her independence at home, and confusion at times about her surroundings. PT plan to work on ambulation, stairs and balance to reduce fall risk and allow patient to return home with 24/7 supervision.    Other Barriers to Discharge (DME, Family Training, etc): cognition, family training,

## 2022-03-18 NOTE — PROGRESS NOTES
03/18/22 1400   Name of Certified Therapeutic Rec Specialist   Name of Certified Therapeutic Rec Specialist   (IRMA Hernandez)   Appointment Type   Type of Therapeutic Rec Session Therapeutic Rec Assessment   General Information   Patient Profile Review See Profile for full history and prior level of function   Daily Contact with Relatives or Friends Phone call;Visit   Community Involvement   Community Involvement Retired   Spiritual Practice [facility] ? Yes   Outings Dinner   Hobbies/Interests   Craft/Art Knitting   Music   Music Preferences Classical   Listens to Recorded Music Yes   Brought Own Equipment Yes   Media   Newspapers Daily   Computer Has own at home   TV / Movies TV   Reading Books   Reading Preferences Fiction;History   Impression   Open to Socializing with Others Independent   Barriers to Leisure No barriers / independent   Patient, family and / or staff in agreement with Plan of Care Yes   Treatment Plan   Interested in Unit [facility]? No   Type of Intervention Independent with activity   Equipment and Supplies While on Unit None needed   Assessment Assessment completed, pt stated she was discouraged with her cancer recurrence. Pt was oriented to leisure materials available, pt declined at this time. Pt's  visits regularly. Will provide check in for materials as needed.

## 2022-03-18 NOTE — PROGRESS NOTES
CLINICAL NUTRITION SERVICES - ASSESSMENT NOTE     Nutrition Prescription    RECOMMENDATIONS FOR MDs/PROVIDERS TO ORDER:  None today    Malnutrition Status:    Severe malnutrition in the context of chronic illness     Recommendations already ordered by Registered Dietitian (RD):  Adjusted supplement order: chocolate ensure 10am and 2pm snack   Room service with assist     Future/Additional Recommendations:  Monitor intakes/wt trend  Adjust supplements per pt preference      REASON FOR ASSESSMENT  Lucie Stockton is an 86 year old female assessed by the dietitian for MST score of 5: positive  for weight loss of 34 lbs + and positive  for poor oral intake related to decreased appetite    PMH significant for 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 Presented to hospital 3/10/22 after presenting to PCP with fatigue and increased dyspnea for chronic anemia likely secondary to GIB from AVMs.  NUTRITION HISTORY  Attempted to visit pt x2 however she has been out of the room. Information obtained from chart review.   While on EB pt was eating % of most meals.   Per 3/11 RD note:  Pt will eat small items like toast, cereal, or crackers at breakfast/lunch. In the afternoon, she and her  will go out to a restaurant for a full meal. She gets too tired to cook all the time so the majority of their food is eaten out. She does take 2 Ensure daily to help improve her PO (she thinks it's the Max variety, which provide 30 g protein, 150 kcal each). She has not been able to walk much lately d/t weakness.  CURRENT NUTRITION ORDERS  Diet: Regular  Supplements:   Intake/Tolerance: % of meals per flowsheet.    Per provider note: She reports she has a poor appetite for the last 10 days, and one day she threw up twice, otherwise no other N/V events  LABS  Labs  "reviewed    MEDICATIONS  Medications reviewed:  Ferrous sulfate  lasix  Hydrochlorothiazide  zofran  protonix  miralax  PRN: imodium    ANTHROPOMETRICS  Ht Readings from Last 1 Encounters:   03/10/22 1.499 m (4' 11\")     Most Recent Weight: 42.3 kg (93 lb 3.2 oz)-from 3/15   IBW: 44.5 kg (95% IBW)  BMI: 18.82 kg/m^2, Normal BMI  Weight History: Patient has overall gained 4 lbs over the last 6 months.  Lost 7# (7%) over the past 12 months. No weight obtained since admit. Has daily wts ordered     03/15/22 42.3 kg (93 lb 3.2 oz)   03/11/22 40.7 kg (89 lb 11.6 oz)   03/10/22 42.1 kg (92 lb 12.8 oz)   10/26/21 42.2 kg (93 lb)   10/07/21 42.2 kg (93 lb)   09/09/21 40.5 kg (89 lb 3.2 oz)   09/01/21 42.5 kg (93 lb 12.8 oz)   07/22/21 45.4 kg (100 lb)   03/05/21 45.4 kg (100 lb)   02/26/21 45.4 kg (100 lb)   02/21/21 45.8 kg (100 lb 15.5 oz)   02/18/21 44.5 kg (98 lb)   02/16/21 44.5 kg (98 lb)   01/30/20 44.6 kg (98 lb 6.4 oz)   01/24/20 46.6 kg (102 lb 11.8 oz)   01/14/20 47.6 kg (105 lb)   01/14/20 47.2 kg (104 lb 1.6 oz)   01/07/20 49 kg (108 lb)   12/05/19 47.6 kg (105 lb)   11/20/19 48 kg (105 lb 12.8 oz)   11/13/19 47.9 kg (105 lb 9.6 oz)     Dosing Weight: 42.3 kg (current)    ASSESSED NUTRITION NEEDS  Estimated Energy Needs: 5418-2328+ kcals/day (30 - 35+ kcals/kg )  Justification: Repletion  Estimated Protein Needs: 51-63 grams protein/day (1.2 - 1.5 grams of pro/kg)  Justification: Repletion and increased needs   Estimated Fluid Needs: 1 mL/kcal  Justification: Maintenance or Per provider pending fluid status    PHYSICAL FINDINGS  See malnutrition section below.    MALNUTRITION  % Intake: Unable to assess  % Weight Loss: Weight loss does not meet criteria  Subcutaneous Fat Loss: Unable to assess, per 3/11 RD note Facial region:  Severe, Upper arm:  Severe, Lower arm:  Severe and Thoracic/intercostal:  Severe  Muscle Loss: Unable to assess, per 3/11 RD note Temporal:  Severe, Facial & jaw region:  Severe, Scapular " bone:  Severe, Thoracic region (clavicle, acromium bone, deltoid, trapezius, pectoral):  Severe, Upper arm (bicep, tricep):  Severe, Lower arm  (forearm):  Severe, Dorsal hand:  Severe, Upper leg (quadricep, hamstring):  Severe, Patellar  Fluid Accumulation/Edema: None noted  Malnutrition Diagnosis: Severe malnutrition in the context of chronic illness     NUTRITION DIAGNOSIS  Inadequate oral intake related to decreased appetite as evidenced by variable PO and pt report (per provider)      INTERVENTIONS  Implementation  Nutrition Education: Unable to complete, pt unavailable   Adjusted supplement order: chocolate ensure 10am and 2pm snack   Room service with assist     Goals  Patient to consume % of nutritionally adequate meal trays TID, or the equivalent with supplements/snacks.     Monitoring/Evaluation  Progress toward goals will be monitored and evaluated per protocol.    Jesenia Benítez MS, RD, LDN  Unit Pager [phone]

## 2022-03-19 NOTE — PLAN OF CARE
Goal Outcome Evaluation:  Pt is alert and oriented to time and situation but sometimes forgetful.  Did not complain of  Pain or SOB.Able to make needs known. Was unable to fall asleep tonight. Requested TraZoDone . Continue with POC.

## 2022-03-19 NOTE — CARE PLAN
RN: continues with confusion, attempts OOB without staff assist, sets alarm off, but less often day shift than pm and night shift. Anticipate and needs. Night shift reports pt had trazadone 2400 and slept good afterwards.  Continue to monitor. Pt room by nursing station for closer monitoring. Alarms on at all times. Denies pain.     Using prn tylenol for lower back pain and effective.  Dtr Dawn here and pt requesting to go home after dtr left, saying she needs a cab, call light on repeatedly,  Alarms on at all ti    Patient's most recent vital signs are:     Vital signs:  BP: 125/46  Temp: 97.4  HR: 65  RR: 18  SpO2: 93 %     Patient does not have new respiratory symptoms.  Patient does not have new sore throat.  Patient does not have a fever greater than 99.5.       mes.

## 2022-03-19 NOTE — PHARMACY-MEDICATION REGIMEN REVIEW
Pharmacy Medication Regimen Review  Lucie Stockton is a 86 year old female who is currently in the Transitional Care Unit.    Assessment: All medications have an appropriate indications, durations and no unnecessary use was found    Plan:   Continue current medications regimen.   Attending provider will be sent this note for review.  If there are any emergent issues noted above, pharmacist will contact provider directly by phone.      Pharmacy will periodically review the resident's medication regimen for any PRN medications not administered in > 72 hours and discontinue them. The pharmacist will discuss gradual dose reductions of psychopharmacologic medications with interdisciplinary team on a regular basis.    Please contact pharmacy if the above does not answer specific medication questions/concerns.    Background:  A pharmacist has reviewed all medications and pertinent medical history today.  Medications were reviewed for appropriate use and any irregularities found are listed with recommendations.      Current Facility-Administered Medications:      [START ON 3/20/2022] - Skin Test Reading -, , Does not apply, Q21 Days, Rita Harris CNP     acetaminophen (TYLENOL) Suppository 650 mg, 650 mg, Rectal, Q4H PRN, Rita Harris CNP     acetaminophen (TYLENOL) tablet 650 mg, 650 mg, Oral, Q6H PRN, Rita Harris CNP, 650 mg at 03/18/22 2117     apixaban ANTICOAGULANT (ELIQUIS) tablet 2.5 mg, 2.5 mg, Oral, BID, Rita Harris CNP, 2.5 mg at 03/19/22 0841     [START ON 3/20/2022] atorvastatin (LIPITOR) tablet 80 mg, 80 mg, Oral, Daily, Kai Aguilar MD     citalopram (celeXA) tablet 20 mg, 20 mg, Oral, Daily, Rita Harris CNP, 20 mg at 03/19/22 0840     clopidogrel (PLAVIX) tablet 75 mg, 75 mg, Oral, Daily, Rita Harris CNP, 75 mg at 03/19/22 0841     ferrous sulfate (FEROSUL) tablet 325 mg, 325 mg, Oral, Q48H, Rita Harris  Nito, CNP, 325 mg at 03/18/22 0807     furosemide (LASIX) tablet 20 mg, 20 mg, Oral, Daily, Rita Harris CNP, 20 mg at 03/19/22 0840     gabapentin (NEURONTIN) capsule 400 mg, 400 mg, Oral, At Bedtime, Rita Harris CNP, 400 mg at 03/18/22 2116     hydrochlorothiazide (HYDRODIURIL) tablet 50 mg, 50 mg, Oral, Daily, Rita Harris CNP, 50 mg at 03/19/22 0841     ipratropium - albuterol 0.5 mg/2.5 mg/3 mL (DUONEB) neb solution 3 mL, 3 mL, Nebulization, Q6H PRN, Rita Harris CNP     latanoprost (XALATAN) 0.005 % ophthalmic solution 1 drop, 1 drop, Both Eyes, At Bedtime, Rita Harris CNP, 1 drop at 03/18/22 2118     levothyroxine (SYNTHROID/LEVOTHROID) tablet 75 mcg, 75 mcg, Oral, Daily, Rita Harris CNP, 75 mcg at 03/19/22 0840     loperamide (IMODIUM A-D) half-tab 1-2 mg, 1-2 mg, Oral, Daily PRN, Rita Harris CNP     melatonin tablet 6 mg, 6 mg, Oral, At Bedtime PRN, Rita Harris CNP, 6 mg at 03/18/22 2117     Nurse may request from Pharmacy a change of form of medication (e.g. Liquid to tablet)., , Does not apply, Continuous PRN, Rita Harris CNP     ondansetron (ZOFRAN-ODT) ODT tab 4 mg, 4 mg, Oral, BID AC, Rita Harris CNP, 4 mg at 03/18/22 1639     oxybutynin (DITROPAN) tablet 5 mg, 5 mg, Oral, At Bedtime, Rita Harris CNP, 5 mg at 03/18/22 2117     pantoprazole (PROTONIX) EC tablet 40 mg, 40 mg, Oral, BID AC, Rita Harris CNP, 40 mg at 03/18/22 1639     Patient is already receiving anticoagulation with heparin, enoxaparin (LOVENOX), warfarin (COUMADIN)  or other anticoagulant medication, , Does not apply, Continuous PRN, Rita Harris CNP     polyethylene glycol (MIRALAX) Packet 17 g, 17 g, Oral, BID, Rita Harris CNP, 17 g at 03/19/22 0841     traZODone (DESYREL) half-tab 25 mg, 25 mg, Oral, Bedtime PRN may  repeat x1, Rita Harris CNP, 25 mg at 03/19/22 0000     tuberculin injection 5 Units, 5 Units, Intradermal, Q21 Days, Rita Harris CNP, 5 Units at 03/18/22 0906  ClaritzaSofía [email or web address].D

## 2022-03-19 NOTE — PLAN OF CARE
Discharge Planner Post-Acute Rehab OT:     Discharge Plan: home with family support, home OT, home care.     Precautions: Falls, 3- 3.5 liters O2 per NC at baseline, decrease cognition    Current Status:  ADLs:    Mobility: CGA to min A with transfers.  Cues for putting O2 on.  Pt declined the 2WW but reached out for th hand to steady self with amb.    Grooming: CGA standing at the sink.    Dressing: Set-up for UB, CGA LB for standing safety.    Bathing: set up and supervision for UB bathing, pt declined LB bathing.     Toileting: CGA to min A for transfer, distant supervision for vinh cares.  IADLs: Pt reports she does some cooking and cleaning but describes assist of dtr and a .  Vision/Cognition: No c/o re: vision.  Pt demo decrease cognition at eval.  She is directable and pleasant but wants to go home.  Pt used the call light and called the nurse several times though therapist instructing pt to use the TV remote to change channels.  Recommend cognitive testing to establish recommended degree of supervision.     Assessment:  pt tearful, distracted and anxious re: attempting to get in touch with her daughter Dawn. Pt declined all tx tasks intially due to concern with missing her call. Therapist w/patient permission assisted pt to navigate her cell phone. It was noticed pts dtrs phone number was incorrectly entered in contacts. Assisted pt to correct this and assist with how to call her on her phone.  Pt able to call and leave a voice mail, and was agreeable at this point to work on portion of ADLs. ADLs updated. Pt endorsed sadness and low motivation today. Pts dtr returned call near end of tx, pt able to chat briefly and presented with much improved mood after. Also noted assisted pt 2x in session to put 02 back on. Pt currently on 3L 02, sats 95% and HR 61.  Cell phone left with pt and all items requested, alarms on.    Other Barriers to Discharge (DME, Family Training, etc):   Pt takes a tub bath  with grab bar per pt.  Pt uses O2 3-3.5 liters per NC at baseline.  Recommend family training.  Consider grab bars at the toilets.

## 2022-03-19 NOTE — PLAN OF CARE
"Alert, oriented to self, disoriented to time, place, situation, confused, anxious but pleasant. was at bedside at the beginning of the shift, assisting with patient's needs. When patient was asked if she knows why she is at TCU Rehab , said because he wants me to be here ( referring to ) \", and was laughing. Forgetful,believes that she's been here for 8 days already and that her kids and family should come up with a plan for her to go home tomorrow.  Has been calling her daughter and  repeatedly, forgets a lot that she spoke with them already. Impulsive with transfers, gets up by her self, gripper socks on/ bed alarm on, would not use walker. On 02 at 3LPM (95%), appears comfortable. With complaint of back pain, Tylenol PRN given and Melatonin given at 9:17PM. Poor appetite, had few bites of her salad, pudding ,soup and a cup of milk and some cookies . Continent of bowel/ bladder, denied any pain and discomfort with urination but appears to have urgency ( has been going to the bathroom frequently),on Oxybutynin at bedtime.  Urine appears slightly cloudy/yellow,UA/UC sample sent , UA result in, UC pending. Afebrile,to continue to monitor for signs and symptoms of infection.     Patient's most recent vital signs are:     Vital signs:  BP: 132/45  Temp: 98.7  HR: 67  RR: 18  SpO2: 98 %     Patient does not have new respiratory symptoms.  Patient does not have new sore throat.  Patient does not have a fever greater than 99.5.                               "

## 2022-03-19 NOTE — H&P
H&P by Kai Aguilar MD was reviewed. I agree with assessment and plan as documented.       Rita Harris CNP, APRN  Internal Medicine [facility]  Pager [phone]

## 2022-03-20 NOTE — PLAN OF CARE
Discharge Planner Post-Acute Rehab PT:     Recert: 4/16/2022    Discharge Plan: home with 24 hour supervision    Precautions: fall risk, pt on 4-5 L of oxgyen, impulsive    Current Status:  Bed Mobility: ind with rolling, supervision for supine <> sit  Transfer: SBA - CGA for sit to stand, chair to bed depending on impulsivity  Gait: ~115' with front WW, w/c follow, PT manages O2 tank, CGA  Stairs: PT: ascend/descend 3 steps x 2 with nikos HR, CGA, reciprocal pattern  Balance: sits EOB for several minutes with supervision, close SBA in standing due to impulsivity    Assessment: pt states she doesn't want to leave room, she's waiting to hear from family. Told her we could bring her phone, she wants to stay in room. Offer exercise and she agrees, after putting her socks on she states she really does not feel up for therapy, she does not want to participate.    Other Barriers to Discharge (DME, Family Training, etc): cognition, family training,

## 2022-03-20 NOTE — PROGRESS NOTES
Brief Medicine Note   20 March 2022       Pt's  Darren requested to speak with this writer outside of the room.  He wanted to express interest in what kind of home care services Lucie may qualify for - he would like to be able to bring her back home, but notes that it would be helpful if home care was an option.  He also inquired about help with finding long term care or nursing home if home care was unavailable.  Advised that we have social workers and care coordinators available and that they would be back tomorrow to discuss further with him.  He also asked from a medical standpoint if she is someone appropriate for hospice.  I told him that at this time, I did not think she had an acute life limiting illness but did recommend appropriate follow up with her Cardiology team as related to her CTEPH prognosis and how her trajectory with this illness may be impacted by other medical problems.  She does have lung nodules that were noted on a PET CT earlier this month, and they have not yet followed up with IR for biopsy.  He mentioned that she has declined over the last 18 months and lost interest in activities around the home, particularly making her own meals.  Eats breakfast, but that's typically her only meal at home, seems to prefer going out.  Darren also said that she has lost about 30-35 pounds over the last 18 months as well.  He would appreciate a phone call from JIMMY/RNAILEEN tomorrow after rounds.        Rita Harris, CNP, APRN  Internal Medicine [facility]  Pager [phone]

## 2022-03-20 NOTE — CARE PLAN
RN: Tylenol prn this morning for back pain.  Blanchable redness buttocks area and pt turned and repositioned every 2 hours in bed with pillows behind her to relieve pressure off of buttocks. Pt declines to sit up in chair.  Oxygen on at all times, desats quickly on room air and has been known to take her own oxygen off and forget to put it back on.  Frequent monitoring. Alarms on at all times. Staff to anticipate and meet needs. Sets alarms off frequently, forgets to use call light. AO1 with belt and walker.     Patient's most recent vital signs are:     Vital signs:  BP: 144/46  Temp: 96.7  HR: 71  RR: 20  SpO2: 93 %     Patient does not have new respiratory symptoms.  Patient does not have new sore throat.  Patient does not have a fever greater than 99.5.
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
## 2022-03-21 NOTE — PLAN OF CARE
Discharge Planner Post-Acute Rehab PT:     Recert: 4/16/2022    Discharge Plan: home with 24 hour supervision    Precautions: fall risk, pt on 4-5 L of oxgyen, impulsive    Current Status:  Bed Mobility: ind with rolling, supervision for supine <> sit  Transfer: SBA - CGA for sit to stand, chair to bed depending on impulsivity  Gait: ~115' with front WW, w/c follow, PT manages O2 tank, CGA  Stairs: PT: ascend/descend 3 steps x 2 with nikos HR, CGA, reciprocal pattern  Balance: sits EOB for several minutes with supervision, close SBA in standing due to impulsivity    Assessment: Pt has low Hgb today, per RN bed exercise only until it is addressed. Pt performs supine bed ex, see flow sheet for details. Pt states she really wants to go home and will participate in therapy so that can happen as quickly as possible.    Other Barriers to Discharge (DME, Family Training, etc): cognition, family training,

## 2022-03-21 NOTE — PROGRESS NOTES
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 Transferred to TCU on 3/17/22 for therapies.     Physical deconditioning:  PT/OT consulted, awaiting dispo recommendations. Anticipate will need home care services going forward.  - Continue therapies    Acute on chronic anemia d/t slow GIB from AVMs:  Hx GIB d/t duodenal and colonic AVMs treated with APC in 9/2021. Baseline Hgb ~8.0. Presented w/ Hgb 5.3 and symptoms of fatigue and worsening dyspnea. No overt bleeding noted. GI consulted, suspected to have slow GIB d/t AVMs in setting of chronic AC. Endoscopy deferred d/t hx complications from sedation. GI would pursue endoscopy only if hemodynamically unstable. Clinically improved w/ blood transfusion.   Hgb dropped to 6.3 today (prev 7.1 on 3/19). Asymptomatic. Vitals stable. No evidence of acute bleeding.   - Transfuse 1 unit of PRBC today  - Hold Eliquis and plavix  - Monitor vitals closely  - Repeat Hgb in AM    Iron deficiency anemia:  Labs from 3/10 with classic pattern for JUDY (Fe 9, TIBC 475, saturation 2%, ferritin 6). Received venofer on 3/11.   - Continue ferrous sulfate 325mg q 48h    Chronic hypoxic and hypercarbic respiratory failure:  Secondary to COPD and CTEPH, see below. O2 sats stable on baseline 3L O2. Hypercarbic respiratory failure noted in hospital, likely chronic. Trial of BIPAP in setting of delirium but did not tolerate. CO2 stable on BMP.   - Continue supplemental O2 to maintain SpO2 88-92%    CTEPH:   On chronic anticoagulation. Previously on Adempas but not taking consistently therefore was discontinued while in hospital.   - Eliquis on hold, see above  - Adempas on hold until seen in clinic  - Continue lasix and HCTZ   - Daily weights  - Follow up with cardiology in Pulm HTN clinic as directed     COPD:  No wheezing on exam. Duonebs PRN.     Hx DVT/PE:  On chronic eliquis, held on hospital admission d/t concern for GIB.   - Hold eliquis as above    HTN:  Stable. Continue PTA lasix 20mg daily and HCTZ 50mg daily.     PAD:  Follows with vascular surgery for annual SUMAYA d/t hx aorto-biiliac disease. Last SUMAYA in 2019 was 0.5 bilaterally.   - Hold plavix for now, see above  - Continue statin  - Follow up with vascular surgery as directed     Hypothyroidism:  Stable. Continue levothyroxine 75mcg daily.     Pulmonary nodules, concern for lung cancer:  2 cm RUL nodule biopsied in 2019 noted to be increasing in size on recent imaging. PET on 3/8/22 noted increased uptake c/f malignancy.   - Follow up with IP/IR as directed for lung biopsy               Diet: Regular Diet Adult  Snacks/Supplements Adult: Ensure Enlive; With Meals  Room Service    DVT Prophylaxis: DOAC  Saha Catheter: Not present  Central Lines: None  Cardiac Monitoring: None  Code Status: Full Code      Disposition Plan   Expected Discharge:  TBD   Anticipated discharge location:  Awaiting care coordination huddle  Delays:     Home care services         The patient's care was discussed with the Attending Physician, Dr. Aguilar.    Pollo Posey PA-C  Hospitalist Service  [facility]  Securely message with the Vocera Web Console (learn more here)  Text page via [employer] Paging/Directory         Clinically Significant Risk Factors Present on Admission                 ______________________________________________________________________    Interval History   No acute events overnight. Notified by RN that AM labs showed drop in Hgb to  6.3 (previously 7.1).     Patient just finished breakfast. Reports feeling well. Wanting to discharge home today. Denies any complaints.     ROS:  Negative for fevers, chills, chest pain, dyspnea, fatigue, dizziness, N/V, abdominal pain, melena, hematochezia.     Data reviewed today: I reviewed all medications, new labs and imaging results over the last 24 hours.     Physical Exam   Vital Signs: Temp: 97.9  F (36.6  C) Temp src: Oral BP: (Abnormal) 121/38 Pulse: 67   Resp: 18 SpO2: 96 % O2 Device: Nasal cannula Oxygen Delivery: 3 LPM  Weight: 98 lbs 1.6 oz  General Appearance: Awake. Alert. NAD.   Respiratory:  Normal effort. Slightly diminished throughout, otherwise clear. No wheezing, rhonchi, or rales.   Cardiovascular:  RRR. S1, S2. No murmur. No peripheral edema.   GI:  Soft but slightly distended. Non-tender. No guarding or rigidity. Active BS. No mass.  Skin:  No visible rash or lesions.  Ext:  No pitting edema.   Neuro:  Grossly non-focal.  Psych:  Appropriate mood. Full affect.      Data   Recent Labs   Lab 03/17/22  0424 03/16/22  0358 03/15/22  0444    141 139   POTASSIUM 3.9 3.9 4.2   CHLORIDE 97 99 98   CO2 42* 41* 39*   ANIONGAP <1* 1* 2*   BUN 22 20 21   CR 0.60 0.56 0.65   CANDIDA 9.4 9.9 9.6   PROTTOTAL 5.6* 5.7* 5.8*   ALBUMIN 2.7* 2.8* 2.9*   BILITOTAL 0.3 0.4 0.4   ALKPHOS 59 59 66   AST 16 18 16   ALT 12 10 9     Recent Labs   Lab 03/21/22  0725 03/19/22  0928 03/18/22  0623 03/17/22  0424   WBC 9.5 9.3 12.3* 9.6   RBC 2.80* 3.23* 3.44* 3.30*   HGB 6.3* 7.1* 7.6* 7.1*   HCT 22.5* 25.6* 26.6* 26.2*   MCV 80 79 77* 79   MCH 22.5* 22.0* 22.1* 21.5*   MCHC 28.0* 27.7* 28.6* 27.1*   RDW 29.8* 29.2* 28.7* 28.2*    308 357 329     Recent Labs   Lab 03/17/22  0424 03/16/22  0358 03/15/22  0444   * 90 81        All labs personally reviewed in [facility].  See A&P for additional results.     Unresulted Labs Ordered in the Past 30 Days of this Admission     Date and Time Order Name Status  Description    3/10/2022 10:15 PM Transfusion Reaction Culture and Stain Preliminary     3/10/2022  3:09 PM Prepare red blood cells (unit) Preliminary

## 2022-03-21 NOTE — PLAN OF CARE
VS: See below   O2: 3-L oxygen N/C per Adrien AYALA ok to keep sats at 88% and above. I did instruct the patient on the use of the IS and I reminder her to do it whenever I was in the room.    Output: See flow sheets   Last BM: Small 3/21   Activity: Up with one with transfer belt and walker   Skin: Intact, bruises on both arms   Pain: Yes c/o of back pain and medicated x 1 with Tyenol   CMS: intact   Dressing: Yes to PIV site Left hand   Diet: regular   LDA: PIV left hand    Equipment: Bed alarm, walker, IV pole and pump   Plan: Patient is a fall risk, keep bed alarm on. Patient has multiple requests and she is forgetful.   Additional Info: HGb 6.3 this am and received one unit of PRBC, they were started on days and it finished on 3-11.            Patient's most recent vital signs are:     Vital signs:  BP: 134/43  Temp: 97.5  HR: 67  RR: 24  SpO2: (!) 89 %     Patient does not have new respiratory symptoms.  Patient does not have new sore throat.  Patient does not have a fever greater than 99.5.

## 2022-03-21 NOTE — PLAN OF CARE
Discharge Planner Post-Acute Rehab OT:     Discharge Plan: home with family support, home OT, home care.     Precautions: Falls, 3- 3.5 liters O2 per NC at baseline, decrease cognition    Current Status:  ADLs:    Mobility: sba to cga with transfers.  Cues for putting O2 on.  CGA w/ FWW and th managing oxygen tubing w/ transfers and ambulation in room    Grooming: CGA standing at the sink or sba in sitting    Dressing: Set-up for UB, CGA LB for standing safety.cues to initiate each task    Bathing: set up and supervision for UB bathing, LB bathing TBA    Toileting: SBA-cga for transfer, set up  for vinh cares.  IADLs: Pt reports she does some cooking and cleaning but describes assist of dtr and a .  Vision/Cognition: No c/o re: vision.  Pt demo decrease cognition at eval.  She is directable and pleasant but wants to go home.  Pt used the call light and called the nurse several times though therapist instructing pt to use the TV remote to change channels.  Recommend cognitive testing to establish recommended degree of supervision.     Assessment:   per nsg, pt's Hb was low this morning to RN requested therapy remain bedside as much as possible and limit exersional activty but Ok to focus on adls, pt pleasant and denied feeling ill or lightheaded. required reminders to keep NC oxygen in nose, cue to intiate each adl task, impaired memory and SOB quickly , reviewed PLB w/ pt but required cues to implement each time.  Overall pt improving w/ mobiity and adls but due to cognitive deficits pt needs assist for initiation and safety.cont w/ POC    Other Barriers to Discharge (DME, Family Training, etc):   Pt takes a tub bath with grab bar per pt.  Pt uses O2 3-3.5 liters per NC at baseline.  Recommend family training.  Consider grab bars at the toilets.

## 2022-03-21 NOTE — PLAN OF CARE
"Alert, confused ,forgetful, wants to go home again, impulsive with transfers (would not use her walker), gets up by herself to bathroom (bed alarm on). No complaint of pain, said\" I'm feeling perfectly fine and strong so I can go home.\" Assisted with calling her family ( daughter and ), hard to redirect at evening but tends to settle down a bit late in the night, likes to chat with staff. Melatonin given at 9:02PM, Trazodone at 10:5PM.Afebrile, no SOB ( 02 by NC at 3 lpm / 02 SAT 93-97%), fair appetite, snacks offered in between cares.     Patient's most recent vital signs are:         Patient's most recent vital signs are:     Vital signs:  BP: 144/46  Temp: 96.7  HR: 71  RR: 20  SpO2: 97 %     Patient does not have new respiratory symptoms.  Patient does not have new sore throat.  Patient does not have a fever greater than 99.5.                                   "

## 2022-03-21 NOTE — CARE PLAN
Patient is alert and oriented, confused at times. On 3 L O2 via nasal cannula. Bed alarms for safety. Pills whole with thin liquids. Patient had small BM today. Assist of 1 with walker and gait belt for transfers. L PIV clean, dry, and intact. Infused PRBC today without issues. Bed and chair alarm on for safety. Calls but sometimes forget why she is calling. Will continue with plan of care.    Patient's most recent vital signs are:     Vital signs:  BP: 155/56  Temp: 97.6  HR: 68  RR: 22  SpO2: 90 %     Patient does not have new respiratory symptoms.  Patient does not have new sore throat.  Patient does not have a fever greater than 99.5.

## 2022-03-21 NOTE — PLAN OF CARE
"Goal Outcome Evaluation:  Pt.alert w/intermittent confusion - forgetful. Set off bed alarm @ shift start, getting up to BR. Unsteady with LOB x3, needed steadying assist from staff though pt.stated \"I really pay attention to my balance\" acknowledged unsteadiness during activity and stated maybe not quite ready to go home yet. Reported dtr visiting today and hoping she can sleep well. Requested and given Tylenol / Trazodone @ 2400. Slept well and then called appropriately ~0345 for assist to BR.         Patient's most recent vital signs are:     Vital signs:  BP: 144/46  Temp: 96.7  HR: 71  RR: 20  SpO2: 97 %     Patient does not have new respiratory symptoms.  Patient does not have new sore throat.  Patient does not have a fever greater than 99.5.                             "

## 2022-03-21 NOTE — PROVIDER NOTIFICATION
Lab called with critical HGB 6.3 this am, last HGB was on 3/19/22 was 7.1. I notified Pollo Posey of the result will await further orders.

## 2022-03-22 NOTE — PLAN OF CARE
Care Coordination:    Writer attempted to call patient's spouse, Pradeep, to discuss discharge plans. Writer did have to leave voicemail for a return phone call. Writer left contact information for Pradeep. Will anticipate a return phone call and continue to follow up.    1512: Writer was notified that patient has been accepted by [facility] for home care PT/OT/RN.     Jesenia Rdz RN, BSN, CRRN  Patient Care Management Coordinator  Acute Rehabilitation Unit/Transitional Care Unit  PH: [phone]  Pager: [phone]

## 2022-03-22 NOTE — PLAN OF CARE
Discharge Planner Post-Acute Rehab PT:     Recert: 4/16/2022    Discharge Plan: home with 24 hour supervision    Precautions: fall risk, pt on 4-5 L of oxgyen, impulsive    Current Status:  Bed Mobility: ind with rolling, supervision for supine <> sit  Transfer: SBA - CGA for sit to stand, chair to bed depending on impulsivity  Gait: ~115' with front WW, w/c follow, PT manages O2 tank, CGA  Stairs: PT: ascend/descend 3 steps x 2 with nikos HR, CGA, reciprocal pattern  Balance: sits EOB for several minutes with supervision, close SBA in standing due to impulsivity    Assessment: pt is lying in bed and states that her stomach is bugging her. Daughter is present at pt bedside. Pt is given encouragement to walk down the gong, with w/c along if she needs to sit. Pt nicely but adamently refused any therapy today.    Other Barriers to Discharge (DME, Family Training, etc): cognition, family training,

## 2022-03-22 NOTE — TELEPHONE ENCOUNTER
Per the discharge notes, patient has been off Adempas and needs F/U with cardiology before resuming. Ceci Treviño RN on 3/22/2022 at 8:58 AM    Attempted to reach patient but am unable to LM d/t full mailbox. Ceci Treviño RN on 3/22/2022 at 9:05 AM    Unable to reach spouse on home phone; LM asking for call back. Ceci Treviño RN on 3/22/2022 at 9:11 AM    Unable to reach patient; staff message sent to clinic coordinators to call patient for F/U with Dr. Florian. Ceci Treviño RN on 3/29/2022 at 8:03 AM

## 2022-03-22 NOTE — PLAN OF CARE
Discharge Planner Post-Acute Rehab OT:     Discharge Plan: home with family support, home OT, home care.     Precautions: Falls, 3- 3.5 liters O2 per NC at baseline, decrease cognition    Current Status:  ADLs:    Mobility: sba to cga with transfers.  Cues for putting O2 on.  CGA w/ FWW and th managing oxygen tubing w/ transfers and ambulation in room    Grooming: CGA standing at the sink or sba in sitting    Dressing: Set-up for UB, CGA LB for standing safety.cues to initiate each task    Bathing: set up and supervision for UB bathing, LB bathing TBA    Toileting: SBA-cga for transfer, set up  for vinh cares.  IADLs: Pt reports she does some cooking and cleaning but describes assist of dtr and a .  Vision/Cognition: No c/o re: vision.  Pt demo decrease cognition at eval.  She is directable and pleasant but wants to go home.  Pt used the call light and called the nurse several times though therapist instructing pt to use the TV remote to change channels.  Recommend cognitive testing to establish recommended degree of supervision.     Assessment: Focus today on functional mobility with FWW and toileting. Pt seated EOB engaged in tabletop puzzle upon arrival. Instructed pt in task of collecting cones from closet including opening/closing drawers and reaching high/low to promote increased safety with I/ADL performance. Pt provided CGA for safety, although no LOB or concerns noted today. Pt facilitated in toileting task with overall SBA using FWW. Family from out of town arrived, pt requested to end session early. Overall pt improving w/ mobiity and adls but due to cognitive deficits pt needs assist for initiation and safety.cont w/ POC    Other Barriers to Discharge (DME, Family Training, etc):   Pt takes a tub bath with grab bar per pt.  Pt uses O2 3-3.5 liters per NC at baseline.  Recommend family training.  Consider grab bars at the toilets.

## 2022-03-22 NOTE — PROGRESS NOTES
JIMMY spoke to pt's spouse, Pradeep, re: pt's needs upon discharge. Based on cognitive assessments by OT, it is recommended that pt have 24 hr supervision and no longer drive. Pt is still independent with her ADLs, but needing 8 hours of supervision while her  is at work (Mon-Fri from 8:30 a.m. to 4:00 p.m.). JIMMY discussed with Pradeep that [facility] or a SNF may be needed down the road and that the Senior Linkage Line could also provide resources and assist with finding placement in the future. JIMMY made referral to [facility], with permission from Pradeep, requesting information/resources for private pay  agencies that could provide companionship for pt while spouse is away from home. JIMMY emailed Pradeep the Senior [facility] Line phone number, and phone numbers for [facility] and [facility], per his request.      SHIMA Edmondson, W  [facility]   Pager: [phone]

## 2022-03-22 NOTE — CARE PLAN
Hbg-7.9 today. Using O2 at 3L cont via NC. Up to BR with walker, T-belt, and assist of one. Has been alert and oriented today. Daughter in visiting. Tends to get confused in the evening and at night-1:1 attendant for 3-11 and 11-7 shifts. Appetite good-eats independently. Continent of bowel and bladder. No complaints. Pleasant.        Patient's most recent vital signs are:     Vital signs:  BP: 136/51  Temp: 95.3  HR: 71  RR: 18  SpO2: 91 %     Patient does not have new respiratory symptoms.  Patient does not have new sore throat.  Patient does not have a fever greater than 99.5.

## 2022-03-22 NOTE — PLAN OF CARE
Goal Outcome Evaluation:  Pt is alert with intermittent confusion and forgetful. Pt Set off the alert x3, getting to the the bathroom. Has unsteady gait needing assistance from staff although the patient states that she can do it independently. PRN trazadone given at 0022 with good effect. On oxygen therapy at 3L/MIN  - sat at 90%.Lt. PIV patent, Pt requires constant supervision, risk for falls, bed alarm on.      Patient's most recent vital signs are:     Vital signs:  BP: 155/56  Temp: 97.6  HR: 68  RR: 22  SpO2: 90 %     Patient does not have new respiratory symptoms.  Patient does not have new sore throat.  Patient does not have a fever greater than 99.5.

## 2022-03-23 NOTE — PROGRESS NOTES
TCU Notice of Medicare Non-Coverage Note:     Attempted to contact patients  by phone. He was not in the patient room and patient currently has a 1:1 staff.     Attempt to discuss Notice of Medicare Non-Coverage as per routine for patients being discharged from TCU (or SNF) as required by Medicare.     Left voicemail to call back or ask desk staff during the hours of 8-4:30 pm M-F and ask for the MDS nurse for more information regarding the Medicare discharge.     Will attempt to contact again on 3/24/22.    Janneth Onofre RN, ROBERTON  MDS Quality and      Addendum:   See additional note 3/24 where discussed with patient and daughter. Also discussed by phone with patients  on 3/24/22 in evening with SW on phone.     Janneth Onofre RN, ROBERTON  MDS Quality and

## 2022-03-23 NOTE — PLAN OF CARE
Discharge Planner Post-Acute Rehab PT:     Recert: 4/16/2022    Discharge Plan: home with 24 hour supervision    Precautions: fall risk, pt on 4-5 L of oxgyen, impulsive    Current Status:  Bed Mobility: ind with rolling, supervision for supine <> sit  Transfer: SBA - CGA for sit to stand, chair to bed depending on impulsivity  Gait: ~115' - 330 ft  with front WW, w/c follow, PT manages O2 tank, CGA  Stairs: PT: ascend/descend 3 steps x 2 with nikos HR, CGA, reciprocal pattern  Balance: sits EOB for several minutes with supervision, close SBA in standing due to impulsivity    Assessment: Pt initially saying she did not want to take time for therapy because family here, but pt's family encouraged her to get up, and so pt did amb 330 ft with fww, cga to sba, A for O2 (on 4 L O2 with amb )  and wc follow, sats 96%, HR 85 bpm with amb.  Pt did not want to do more once she was back in her room because family here, so missed part of scheduled session.       Other Barriers to Discharge (DME, Family Training, etc): cognition, family training,

## 2022-03-23 NOTE — PLAN OF CARE
Goal Outcome Evaluation:  Pt.alert with intermittent confusion. Awake first half shift - frequent call light use with simple requests. Up to BR with CGA - GB/walker - gait steady this night. Denies pain, CP and SOB. Uses O2 @ 3L via NC. Medicated with Tyl./Trazodone and Melatonin @ 2330. Appears ineffective and was then given repeat dose Trazodone 25mg po @ 0200. Able to fall asleep ~0300. Bed/chair alarms. Pt.has used call light appropriately when needing to use BR.    0630 - Pt.has been sleeping well since 0300, only called x1 for warm blanket.        Patient's most recent vital signs are:     Vital signs:  BP: 145/48  Temp: 97.2  HR: 65  RR: 16  SpO2: 93 %     Patient does not have new respiratory symptoms.  Patient does not have new sore throat.  Patient does not have a fever greater than 99.5.

## 2022-03-23 NOTE — CARE PLAN
Alert and oriented x 4 today. Verbally expresses her needs. Tylenol given this morning for back pain with relief.  in visiting this afternoon. Ambulates to BR with walker, T-belt, and one assist. Continent of bowel and bladder. Uses O2 cont/NC/3L. Fair appetite. Planned discharge for 3/26/22. Pleasant.        Patient's most recent vital signs are:     Vital signs:  BP: 148/57  Temp: 97.1  HR: 67  RR: 16  SpO2: 95 %     Patient does not have new respiratory symptoms.  Patient does not have new sore throat.  Patient does not have a fever greater than 99.5.

## 2022-03-23 NOTE — PLAN OF CARE
"Discharge Planner Post-Acute Rehab OT:     Discharge Plan: home with family support, home OT, home care.     Precautions: Falls, 3- 3.5 liters O2 per NC at baseline, decrease cognition    Current Status:  ADLs:    Mobility: sba to cga with transfers.  Cues for putting O2 on.  CGA w/ FWW and th managing oxygen tubing w/ transfers and ambulation in room    Grooming: CGA standing at the sink or sba in sitting    Dressing: Set-up for UB, CGA LB for standing safety.cues to initiate each task    Bathing: set up and supervision for UB bathing, LB bathing TBA    Toileting: SBA-cga for transfer, set up  for vinh cares.  IADLs: Pt reports she does some cooking and cleaning but describes assist of dtr and a .  Vision/Cognition: No c/o re: vision.  Pt demo decrease cognition at eval.  She is directable and pleasant but wants to go home.  Pt used the call light and called the nurse several times though therapist instructing pt to use the TV remote to change channels.  Recommend cognitive testing to establish recommended degree of supervision.     Assessment: Session limited today d/t family from out of town present, pt perseverating on them leaving if she does therapy. Writer accommodated pt and brought items to room for UB strengthening. Family highly distracting during session, difficult to redirect pt to session. Pt continuously asking to be done throughout session, \"I want to be done. I want to spend time with my family\". Cont w/ POC    Other Barriers to Discharge (DME, Family Training, etc):   Pt takes a tub bath with grab bar per pt.  Pt uses O2 3-3.5 liters per NC at baseline.  Recommend family training.  Consider grab bars at the toilets.       "

## 2022-03-23 NOTE — PLAN OF CARE
Care Coordination:    Patient's spouse present in the room this afternoon, so writer checked in with him about home care. Pradeep states that he did receive a call from [facility] and they have staff available to provide supervision for the hours he is requesting, which are [phone]. He states he did not set up an assessment with them, because he wants to check in with some of patient's friends first to inquire if they are able to provide this supervision during the day.     Patient asked if she really required this supervision during the day since Pradeep only works 10 minutes away from home. Writer reiterated that due to cognition and fall risk that the IDT was recommending supervision. Writer encouraged Pradeep to call writer or social work if he needed any assistance or had any questions. Patient and spouse both aware of discharge date on 3/26.     Writer did inquire if caregiver training was required for Pradeep and therapy did see the value in Pradeep coming for caregiver training prior to discharge.     Jesenia Rdz, RN, BSN, CRRN  Patient Care Management Coordinator  Acute Rehabilitation Unit/Transitional Care Unit  PH: [phone]  Pager: [phone]

## 2022-03-23 NOTE — PLAN OF CARE
Goal Outcome Evaluation:      Pt is alert and oriented with some confusions. Able to make needs known. Pt was on 1:1 assist. Continent of B&B. Pt is assist x1 with a walker and a gait belt. Pt on 3L of O2 via nasal cannula. Continue with POC.      Patient's most recent vital signs are:     Vital signs:  BP: 145/48  Temp: 97.2  HR: 65  RR: 16  SpO2: 93 %     Patient does not have new respiratory symptoms.  Patient does not have new sore throat.  Patient does not have a fever greater than 99.5.

## 2022-03-23 NOTE — PROGRESS NOTES
MDS Pain Assessment    The following is the pain interview as conducted by the TCU RN caring for the patient on March / 21 / 2022. This assessment is required by the [facility] for all patients in [place] SNF (Skilled Nursing Facilities).       1. Have you had pain or hurting at any time in the last 5 days? Yes    2. How much of the time have you experienced pain or hurting over the last 5 days? occasionally    3. Over the past 5 days, has pain made it hard for you to sleep at night? Yes    4. Over the past 5 days, have you limited your day-to-day activities because of pain? Yes    5. Pain intensity (Numeric scale used first-if patient unable to answer, verbal scale to be used.)    Numeric Scale: Please rate your worst pain over the last 5 days on a zero to ten scale, with zero being no pain and ten being the worst pain you can imagine.     7    Verbal Scale: USED ONLY if unable to give numeric, otherwise N/A  Please rate the intensity of your worst pain over the last 5 days.     moderate-severe     Janneth Onofre RN, BSN  MDS Quality and

## 2022-03-23 NOTE — PLAN OF CARE
Care Coordination:    Writer spoke with patient's spouse, Pradeep, this morning about discharge plans. Writer informed Pradeep that [facility] has accepted patient for home care PT/OT/RN services. Pradeep is agreeable to this home care plan. He states that they already have oxygen at home and do obtain that through [facility].     Pradeep asked writer if she could assist with any caregivers that could stay with patient from [phone] Monday through Friday. Writer referenced resources that were provided to him from hospitals, but he would like assistance with calling those agencies. Writer will call [facility] to inquire about private pay caregivers and possibly set up assessment for patient. hospitals will call [facility] as well. Pradeep did mention that someone told him to call [facility], but he would like these other agencies called first.     Pradeep would prefer to be called at his office at [phone]. He does continue to work during the week and states he can provide supervision on the evenings and weekends.     Jesenia Rdz, RN, BSN, CRRN  Patient Care Management Coordinator  Acute Rehabilitation Unit/Transitional Care Unit  PH: [phone]  Pager: [phone]

## 2022-03-23 NOTE — PLAN OF CARE
Care Coordination:     called [facility] and spoke with Chari about private pay caregivers.  provided patient demographics and provided Pradeep's office number for Chari to contact him. Chari stated she would call Pradeep and discuss cost and an assessment plan. She states that a RN would need to come and perform an assessment prior to them starting services.  provided contact information, as Chari states she will call  back with an update after she speaks with Pradeep.     Jesenia Rdz RN, BSN, CRRN  Patient Care Management Coordinator  Acute Rehabilitation Unit/Transitional Care Unit  PH: [phone]  Pager: [phone]

## 2022-03-24 NOTE — CARE PLAN
Some confusion this morning. Was awoken earlier this morning for blood work and became confused and anxious after that. Does not know where she is and wonders what she is doing here. Fell back to sleep for awhile and when woke up again, seems calmer and easily redirected. Enjoys having people in her room, does not like to be left alone. Ate a fair breakfast and also had some crackers and extra cup of coffee.  Hgb-7.1. New orders for seroquel at . Palliative consult ordered. Assist of one, walker and T-belt for ambulation.         Patient's most recent vital signs are:     Vital signs:  BP: 128/45  Temp: 97.3  HR: 62  RR: 16  SpO2: 94 %     Patient does not have new respiratory symptoms.  Patient does not have new sore throat.  Patient does not have a fever greater than 99.5.

## 2022-03-24 NOTE — PROGRESS NOTES
"Yesterday JIMMY reached out to [facility] ([phone]) and spoke to Pam to set-up nursing assessment and consult, provided pt's discharge needs and spouse's request for supervision/companionship while he is at work (Mon-Fri from 8:30 a.m. to 4:00 p.m.). Pam said she would call Pradeep to schedule a consult. Pam called JIMMY back after talking to Pradeep, who told her that he doesn't want to a consult right now, thanked Pam for the information and said he'd reach out if he decides to set something up.    Today, MDS nurse shared with JIMMY the conversation she had with Pradeep, where he stated that HC was too expensive; MDS nurse brought up Adult Day Services, and Pradeep expressed interest in that option.    JIMMY spoke to Ines at [facility], who has been in communication with Pradeep about community resources. JIMMY requested that Ines provide Adult Day Program options to Pradeep as well. Ines stated that she had previously discussed Adult Day Services to Pradeep yesterday and had emailed him  Care resources, but Pradeep did not answer call when she reached out to confirm with Pradeep that he received her email. Ines told JIMMY that she will reach out to Pradeep this afternoon and bring-up Adult Day Program options again.    1500: Palliative Physician f/u with JIMMY after meeting with pt, stated that pt would qualify for Hospice. JIMMY met with pt and daughter, Yesenia, at pt's bedside. JIMMY provided education on hospice benefit and services. JIMMY inquired with Yesenia if there has been any progress made with securing 24 hr supervision for pt at home. Yesenia stated that Pradeep is still looking. JIMMY discussed Adult Day Services with pt as well. Pt stated that she would prefer to stay at home during the day. Pt also requested that SW to talk to Pradeep about decisions, stated, \"I will do what Pradeep wants to do.\"    JIMMY called Pradeep, updated him of pt qualifying for hospice, and inquired if he had any plan yet for pt's supervision at " "home while he's at work. Pradeep stated that he's still looking. Pradeep asked if discharge date is still set for 3/26 because he thought Medicare has 100-day benefit. JIMMY explained how Medicare benefit works based on medical/functional qualifications. Pradeep expressed understanding that pt is at functional baseline and, therefore, able to return home with recommended services (Home Health or Hospice and 24 hr supervision). Pradeep stated, \"I don't know what I'll do because I can't find anyone to stay with her.\" SW brought up that [facility] reached out and offered to meet with him,, but he declined. Pradeep said that he was quoted $44/hr, so he wasn't interested and thought that maybe a nursing home is a better option. JIMMY explained that average cost of SNF is $10,000 - $12,000/month. Pradeep said he might reach out to [facility] to inquire about 'community outreach'. Pradeep gave SW permission to reach out to [facility] to inquire about the hourly rate.    JIMMY spoke to a representative at [facility], as Pam was not available at that time. SW was told that their hourly rate for HHA is $38 - $42/hr, and that it's an all-inclusive service. Representative stated that she would have Pam reach back out to Pradeep to discuss cost of service for pt's HC needs.      SHIMA Edmondson, LSW  [facility] Adult Acute Care   Pager: [phone]  "

## 2022-03-24 NOTE — PROGRESS NOTES
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 Transferred to TCU on 3/17/22 for therapies.     # Physical deconditioning:  Progressing with therapies. Will likely require home care services for 24/7 support.   - Continue PT/OT  - SW/RNCC following for possible dispo needs    # Acute on chronic anemia d/t GI blood loss:  # Chronic GI bleed d/t AVMs:    Hx GIB d/t duodenal and colonic AVMs treated with APC in 9/2021. Baseline Hgb ~8.0. Presented w/ Hgb 5.3 and symptoms of fatigue and worsening dyspnea. No overt bleeding noted. GI consulted, suspected to have slow GIB d/t AVMs in setting of chronic AC. Endoscopy deferred d/t hx complications from sedation, would reserve endoscopy only if hemodynamically unstable.  Overall clinically stable at TCU. Suspect ongoing bleed d/t dropping Hgb. Vitals remains stable. Received PRBC transfusion on 3/21. Repeat Hgb 7.1 today. Discussed with patient that this will likely continue in setting of chronic anticoagulation, which is necessary from a cardiopulmonary status.   - Would encourage ongoing goals of care discussion given ongoing need for transfusions. Will consult palliative care.  - Monitor for evidence of hemodynamically significant bleed  - Repeat Hgb in AM  - Transfuse for Hgb <7.0    # Iron deficiency anemia:  Labs from 3/10 with classic pattern for JUDY (Fe 9, TIBC 475, saturation 2%, ferritin 6). Received venofer on 3/11 and multiple transfusions.   - Continue ferrous sulfate  325mg q 48h  - Follow up with PCP for repeat iron studies    # Chronic hypoxic and hypercarbic respiratory failure:  Secondary to COPD and CTEPH, see below. O2 sats stable on baseline 3L. Hypercarbic respiratory failure noted in hospital, likely chronic. Trial of BIPAP in setting of delirium but did not tolerate. CO2 remains stable on BMP.   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 Previously on Adempas but not taking consistently therefore was discontinued while in hospital.   - Eliquis on hold, see above  - Adempas on hold until seen in clinic  - Continue lasix and HCTZ   - Daily weights  - Follow up with Chiqui as directed     # COPD:  No wheezing on exam. Duonebs PRN.     # Hx DVT/PE:  On chronic eliquis, held on hospital admission d/t concern for GIB.   - Continue Apixaban    # HTN:  Stable. Continue PTA lasix 20mg daily and HCTZ 50mg daily.     # PAD:  Follows with vascular surgery for annual SUMAYA d/t hx aorto-biiliac disease. Last SUMAYA in 2019 was 0.5 bilaterally.   - Continue plavix  - Continue statin  - Follow up with vascular surgery as directed     # Hypothyroidism:  Stable. Continue levothyroxine 75mcg daily.     # Pulmonary nodules, concern for lung cancer:  2 cm RUL nodule biopsied in 2019 noted to be increasing in size on recent imaging. PET on 3/8/22 noted increased uptake c/f malignancy.   - Palliative care consulted re: goals of care  - Follow up with IP/IR as directed for lung biopsy if pursuing aggressive mgmt    # Acute delirium:  Initially noted in hospital and felt 2/2 hypercapnia. No improvement w/ bipap. Suspect cognitive dysfunction at baseline with hospital acquired delirium. Improved prior to transfer. Sundowning noted at TCU with increased confusion, anxiety, and agitation at night. Does not fall asleep until 0300 despite melatonin and trazodone.   - Stop trazodone  - Start seroquel 25mg at bedtime for insomnia/anxiety  - Schedule melatonin  at 8pm            Diet: Regular Diet Adult  Snacks/Supplements Adult: Ensure Enlive; With Meals  Room Service    DVT Prophylaxis: DOAC  Saha Catheter: Not present  Central Lines: None  Cardiac Monitoring: None  Code Status: Full Code      Disposition Plan   Expected Discharge: 03/26/2022 TBD   Anticipated discharge location:  Awaiting care coordination huddle  Delays:     Home care services         The patient's care was discussed with the Attending Physician, Dr. Aguilar.    Pollo Posey PA-C  Hospitalist Service  [facility] Transitional Care  Securely message with the Vocera Web Console (learn more here)  Text page via [employer] Paging/Directory         Clinically Significant Risk Factors Present on Admission                    ______________________________________________________________________    Interval History   Patient reports feeling well today. She is very anxious to go home, asking when that will be. Denies any complaints. Breathing is at baseline. No cough, chest pain, fevers or chills. No GI or  complaints. No melena or hematochezia noted. Patient does not feel disoriented, but continues to have confusion overnight. Per nursing documentation, usually falls asleep around 0300 despite trazodone.      ROS:  Negative for fevers, chills, chest pain, dyspnea, fatigue, dizziness, N/V, abdominal pain, melena, hematochezia.     Data reviewed today: I reviewed all medications, new labs and imaging results over the last 24 hours.     Physical Exam   Vital Signs: Temp: 97.3  F (36.3  C) Temp src: Oral BP: 128/45 Pulse: 62   Resp: 16 SpO2: 94 % O2 Device: Nasal cannula Oxygen Delivery: 3 LPM  Weight: 101 lbs 1.6 oz  General Appearance: Awake. Alert. NAD.   Respiratory:  Normal effort. CTAB.   Cardiovascular:  RRR. S1, S2. No murmur. No peripheral edema.   GI:  Soft. Non-tender. No guarding or rigidity. Active BS. No mass.  Skin:  No visible rash or lesions.  Ext:  No pitting edema.   Neuro:  Grossly  non-focal.  Psych:  Appropriate mood. Full affect.      Data   Recent Labs   Lab 03/24/22  0630      POTASSIUM 4.4   CHLORIDE 100   CO2 41*   ANIONGAP <1*   BUN 28   CR 0.65   CANDIDA 9.4     Recent Labs   Lab 03/24/22  0630 03/22/22  1008 03/21/22  0725 03/19/22  0928   WBC 5.6 9.8 9.5 9.3   RBC 2.92* 3.28* 2.80* 3.23*   HGB 7.1* 7.9* 6.3* 7.1*   HCT 24.9* 26.9* 22.5* 25.6*   MCV 85 82 80 79   MCH 24.3* 24.1* 22.5* 22.0*   MCHC 28.5* 29.4* 28.0* 27.7*   RDW 29.0* 27.9* 29.8* 29.2*    281 267 308     Recent Labs   Lab 03/24/22  0630   GLC 87        All labs personally reviewed in [facility].  See A&P for additional results.     Unresulted Labs Ordered in the Past 30 Days of this Admission     Date and Time Order Name Status Description    3/10/2022 10:15 PM Transfusion Reaction Culture and Stain Preliminary     3/10/2022  3:09 PM Prepare red blood cells (unit) Preliminary

## 2022-03-24 NOTE — PLAN OF CARE
Goal Outcome Evaluation:  Pt.alert w/intermittent confusion. Awake first half of shift - per usual; on cellphone wanting to call family / reorientation given and accepted but repeatedly. Calls frequently with simple requests and uses BR x4-5 - nocturia/oliguria. Denies urinary sxs - urine clear, yellow. Medicated with PRN Tylenol / Melatonin and repeat dose of Trazodone. Fell asleep approx.0300. Bed/chair alarms @ all times. Uses O2 @ 3L via NC - denies dyspnea/SOB. Pleasant and cooperative.        Patient's most recent vital signs are:     Vital signs:  BP: 140/43  Temp: 97.9  HR: 78  RR: 18  SpO2: 95 %     Patient does not have new respiratory symptoms.  Patient does not have new sore throat.  Patient does not have a fever greater than 99.5.

## 2022-03-24 NOTE — PLAN OF CARE
"Discharge Planner Post-Acute Rehab PT:     Recert: 4/16/2022    Discharge Plan: home with 24 hour supervision    Precautions: fall risk, pt on 4-5 L of oxgyen, impulsive    Current Status:  Bed Mobility: ind with rolling, supervision for supine <> sit  Transfer: SBA - CGA for sit to stand, chair to bed depending on impulsivity  Gait: ~115' - 330 ft  with front WW, w/c follow, PT manages O2 tank, CGA  Stairs: PT: ascend/descend 3 steps x 2 with nikos HR, CGA, reciprocal pattern  Balance: sits EOB for several minutes with supervision, close SBA in standing due to impulsivity    Assessment: Pt initially states she does not want to participate with therapy. States she is waiting for , with extended time to convince her she agrees to walk. Pt amb ~210 feet with Ax 2 for w/c follow and managing O2 tank. SBA and front WW. Attempt to take pt to gym to perform stairs, she gets teary and says she just wants to go back to room. Extended conversation about need to perform stairs to go home, pt states over and over \"I know I can do them\". Agreement made that she needs to perform stairs at next session. Pt does agree to seated exercise EOB but cuts session short refusing more therapy. Pt scheduled to discharge in 2 days, needs to perform stairs.      Other Barriers to Discharge (DME, Family Training, etc): cognition, family training,                        "

## 2022-03-24 NOTE — PLAN OF CARE
"Discharge Planner Post-Acute Rehab OT:     Discharge Plan: home with family support, home OT, home care.     Precautions: Falls, 3- 3.5 liters O2 per NC at baseline, decrease cognition    Current Status:  ADLs:    Mobility: sba to cga with transfers.  Cues for putting O2 on.  CGA w/ FWW and th managing oxygen tubing w/ transfers and ambulation in room.  When pt forgets to use the walker she is mildly unsteady and furniture walks.    Grooming: CGA standing at the sink or sba in sitting    Dressing: Set-up for UB, SBA to CGA LB for standing safety.cues to initiate each task    Bathing: set up and supervision for UB bathing, LB bathing TBA    Toileting: SBA-cga for transfer, set up for vinh cares.  IADLs: Pt reports she does some cooking and cleaning but describes assist of dtr and a .  Vision/Cognition: No c/o re: vision.  Pt demo decrease cognition at eval.  She is directable and pleasant but wants to go home.  Pt used the call light and called the nurse several times though therapist instructing pt to use the TV remote to change channels.  Recommend cognitive testing to establish recommended degree of supervision.     Assessment: Pts dtr Leola present to see pt in action.  Th provides SBA and set-up for pt to cleanse self thoroughly after toilet use.  Cues to stand at the sink to wash up.  Cues to amb to and from the counter top/window sill and manage items.  Pt doffed/donned slipper socks seated EOB.  Pt had seated rest between tasks, uses 2WW and O2 at 2 liters.  Th had to cue pt when she walked away from her walker x1.  Sats 92% with activity.  Leola states, \"sometimes pt is very with it and sometimes she is not.  Th con't to recommend 24 hour supervision.  With encouragement, pt participates in all requested tasks and demo need for SBA to CGA due to falls risk and decrease cognition. Dtr present for family training.  Pt has indicated she is used to a tub bath at home, she declines the shower th offers " for tomorrows session but agrees to a TB sponge bath.    Other Barriers to Discharge (DME, Family Training, etc):   Pt takes a tub bath with grab bar per pt.  Pt uses O2 3-3.5 liters per NC at baseline.  Recommend family training - th able to have pt demo her ADL and mobility skills and th instructed dtr in why 24 hour supervision recommended.  Dtr Dev verbalizes understanding 3/24/22.  Consider grab bars at the toilets.

## 2022-03-24 NOTE — PLAN OF CARE
Goal Outcome Evaluation:       Pt is alert and oriented with some intermittent confusions. Able to make needs known. Pt was on 1:1 assist. Continent of B&B. Pt is assist x1 with a walker and a gait belt. Pt on 3L of O2 via nasal cannula. Pt had bed bath today. Prn Tylenol given for pain at 1835 with effective results. PRN Trazodone given at 2106. Continue with POC.      Patient's most recent vital signs are:     Vital signs:  BP: 140/43  Temp: 97.9  HR: 78  RR: 18  SpO2: 95 %     Patient does not have new respiratory symptoms.  Patient does not have new sore throat.  Patient does not have a fever greater than 99.5.

## 2022-03-24 NOTE — CONSULTS
[facility]  Palliative Care Consultation Note    Patient: Lucie Stockton  Date of Admission:  3/17/2022    Requesting Clinician / Team: Pollo Posey PA-C  Reason for consult: Goals of care  Patient and family support    Recommendations:    DNR/DNI, family discussing enrollment in hospice    Appreciate ongoing management from TCU providers    Appreciate unit SW support for disposition planning    Will follow peripherally for now, please page if needs arise to address in the coming day(s)    These recommendations have been discussed with primary team.      Thank you for the opportunity to participate in the care of this patient and family. Our team: will continue to follow.     During regular M-F work hours -- if you are not sure who specifically to contact -- please contact us by sending a text page to our team consult pager at [phone].    After regular work hours and on weekends/holidays, you can call our answering service at [phone]. Also, who's on call for us is available in [employer].       Assessments:  Lucie Stockton is a 86 year old female with a past medical history of chronic hypoxic respiratory failure, COPD, CTEPH, DVT/PE on chronic anticoagulation, HTN, PAD, probable lung cancer, hypothyroidism, iron deficiency anemia with prior GIB from duodenal and colonic AVM who was admitted to [facility] on 3/10 for acute on chronic anemia due to probably GIB and AVMs. She was transferred to TCU on 3/17 for further therapies and assistance with deconditioning.    Today, the patient was seen for:  Chronic hypoxic respiratory failure  CTEPH  Acute on chronic anemia  Deconditioning  Fatigue    Prognosis, Goals, & Planning:      Functional Status just prior to hospitalization: 2 (Ambulatory and capable of all selfcare but unable to carry out any work activities; may need help with IADLs up and about > 50% of waking hours)    3 (Capable of only limited  self-care; needs help with ADLs; in bed/chair >50% of waking hours)      Prognosis, Goals, and/or Advance Care Planning were addressed today: Yes        Summary/Comments: met wit patient and daughter today in person, discussed her understanding and seems that patient and family agree that there has been a decline. Patient is hopeful that she can continue to improve however discussed the reality of her chronic illness and possible lung cancer probably limiting things and causing her health to decline. Discussed her wishes which were to be at home and to make sure her family is taken care of. Discussed hospice services and philosophy of care. Seems that daughter feels that she just wants her mother to be comfortable and sees how much she is suffering. Seems that patient in the moment did not acknowledge the suffering she is experiencing. Discussed all her medical issues and what to expect moving forward is likely a continued decline, discussed prognosis being more in the measure of months time rather then days to weeks but also could be shorter or longer.     Also spoke with the spouse. Discussed hospice services and philosophy of care. Discussed that spouse and daughter feel hospice is the right thing for her. Patient and family wish to discuss amongst themselves and will let the medical team know if they do decide to pursue hospice services when going home.     Reviewed with patient, daughter and spouse code status today. Discussed recommendation for DNR/DNI given overall goals seem to be headed toward hospice. Patient very much wants spouse to be involved in decision making and wants his help. Discussed changing to DNR/DNI which spouse agrees with, discussed with daughter who preferred that we not burden her with the information today so did not discuss with patient given her wish for family to assist with decision making.       Patient's decision making preferences: shared with support from loved ones       "    Patient has decision-making capacity today for complex decisions: Yes            I have concerns about the patient/family's health literacy today: No           Patient has a completed Health Care Directive: No.       Code status: Full Code    Coping, Meaning, & Spirituality:   Mood, coping, and/or meaning in the context of serious illness were addressed today: Yes  Summary/Comments: patient overwhelmed, daughter appropriately tearful.     Social:     Key family / caregivers: spouse, two children    Occupational history: worked as a teacher at the [employer]    History of Present Illness:  History gathered today from: medical chart    Lucie Stockton is a 86 year old female with a past medical history of chronic hypoxic respiratory failure, COPD, CTEPH, DVT/PE on chronic anticoagulation, HTN, PAD, probable lung cancer, hypothyroidism, iron deficiency anemia with prior GIB from duodenal and colonic AVM who was admitted to [facility] on 3/10 for acute on chronic anemia due to probably GIB and AVMs. She was transferred to TCU on 3/17 for further therapies and assistance with deconditioning.    Palliative care consulted 3/24 for goals of care    Key Palliative Symptom Data:  We are not helping to manage these symptoms currently in this patient.        ROS:  Comprehensive ROS is reviewed and is negative except as here & per HPI:      Past Medical History:  Past Medical History:   Diagnosis Date     Anemia      Aortic stenosis     abdominal     Atherosclerosis of aorta (H)     \"extensive disease with near occlusion below level of renal arteries\" on CT     Atherosclerosis of arteries of extremities (H)      Back pain     severe multilevel DJD, moderate spinal canal stenosis L4-5, severe L3-4     Chronic pain     Back, hips, knees, legs     Degenerative joint disease      DVT of lower extremity, bilateral (H)     5/24/10 left distal femoral and great saphenous, 7/7/10 left:  extending to cfv, iliac , pop and post tibial, peronial, right: "  distal fem and peroneal      Grave's disease      Hyperlipidaemia LDL goal < 70      Hypertension, essential      Hypothyroidism      Imbalance      Insomnia      Intermittent claudication (H)      Iron deficiency      Knee pain      Lumbar stenosis with neurogenic claudication      Osteoarthritis     ankle,foot, knee     Osteoporosis      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, 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     Pulmonary embolism (H)     5/24/10 bilateral     Small bowel obstruction (H) 1/23/17     Varicose veins         Past Surgical History:  Past Surgical History:   Procedure Laterality Date     BUNIONECTOMY       CAPSULE/PILL CAM ENDOSCOPY N/A 2/20/2021    Procedure: IMAGING PROCEDURE, GI TRACT, INTRALUMINAL, VIA CAPSULE;  Surgeon: Heron Ayala MD;  Location: [facility] GI     COLONOSCOPY      11/10/10 angioectasia     CV PULMONARY ANGIOGRAM N/A 2/26/2021    Procedure: CV PULMONARY ANGIOGRAM;  Surgeon: Joaquin Fuller MD;  Location: [facility] HEART CARDIAC CATH LAB     CV RIGHT HEART CATH MEASUREMENTS RECORDED N/A 2/26/2021    Procedure: CV RIGHT HEART CATH;  Surgeon: Joaquin Fuller MD;  Location:  HEART CARDIAC CATH LAB     ENDOBRONCHIAL ULTRASOUND FLEXIBLE N/A 1/24/2020    Procedure: ENDOBRONCHIAL ULTRASOUND, WITH FLEXIBLE BRONCHOSCOPY;  Surgeon: Gopal Jara MD;  Location: [facility] OR     HYSTERECTOMY TOTAL ABDOMINAL, BILATERAL SALPINGO-OOPHORECTOMY, NODE DISSECTION, COMBINED  2/17/11    SANGEETHA, EMILIA, LN bx, omentectomy, resection of evrian malignancy Dr. JONO Goncalves - Returned BENIGN     INJECT EPIDURAL LUMBAR / SACRAL SINGLE N/A 1/5/2018    Procedure: INJECT EPIDURAL LUMBAR / SACRAL SINGLE;  lumbar interlaminar epidural steroid injection;  Surgeon: Jaylin Valadez MD;  Location: [facility] OR     INJECT SACROILIAC JOINT Right 3/7/2018    Procedure: INJECT SACROILIAC JOINT;  Right Sacroiliac Joint Injection;   Surgeon: Flavio Harrison MD;  Location:  OR     INJECT SACROILIAC JOINT Bilateral 12/7/2018    Procedure: Bilateral Sacroiliac Joint Injections;  Surgeon: Faviola Cosby MD;  Location:  OR     OPTICAL TRACKING SYSTEM BRONCHOSCOPY N/A 1/24/2020    Procedure: Super D navigational bronchoscopy;  Surgeon: Gopal Jara MD;  Location: [facility] OR     UPPER GI ENDOSCOPY      11/10/10 erythematous gastropathy, angioectasia     ZZC STOMACH SURGERY PROCEDURE UNLISTED      Please see chart         Family History:  Family History   Problem Relation Age of Onset     Breast Cancer Maternal Aunt 80     C.A.D. Father 54     No Known Problems Mother         Allergies:  No Known Allergies     Medications:  I have reviewed this patient's medication profile and medications from this hospitalization.   Noted:  celexa 20 mg daily  Gabapentin 400 mg at bedtime  Melatonin 6 mg daily at bedtime  Ondansetron 4 mg BID  Oxybutynin 5 mg at bedtime  protonix 40 mg BID  seroquel 25 mg at bedtime  acetaminophen PRN- x2  Trazodone DC'd    Physical Exam:  Vital Signs: Temp: 97.3  F (36.3  C) Temp src: Oral BP: 128/45 Pulse: 62   Resp: 16 SpO2: 94 % O2 Device: Nasal cannula Oxygen Delivery: 3 LPM  Weight: 101 lbs 1.6 oz     Constitutional: Awake, alert, cooperative, elderly appearing, no apparent distress  ENT: Normocephalic, without obvious abnormality, atramatic  Lungs: No increased work of breathing, good air exchange  Musculoskeletal: No redness, warmth, or swelling of the joints.    Neurologic: Awake, alert, oriented to name, place and time.  Neuropsychiatric: Normal affect, mood, orientation  Skin: No rashes, erythema    Data reviewed:  Recent imaging reviewed, my comments on pertinents:   No recent imaging results    IMPRESSION:   1. Increased size and FDG uptake of the 1.4 cm right upper lobe  nodule, highly concerning for malignant disease.   1a. Decreased size and FDG uptake a few pulmonary nodules.  1b. No evidence of local or remote  metastatic disease.  2. New small pericardial effusion.   3. Unchanged right kidney rotation with retention of FDG within the  pelvis calyces with calcium deposition, suggestive of possible  continued stenosis/stagnation.  4. Stable non-FDG avid 1.2 cm cystic lesion in the pancreatic head.  5. Unchanged occlusion of the infrarenal aorta.     I have personally reviewed the examination and initial interpretation  and I agree with the findings.    Recent lab data reviewed, my comments on pertinents:   Sodium 141  Potassium 4.4  Creatinine 0.65  GFR 86  WBC 5.6  Hemoglobin 7.1  Platelets 242    ALEKSEY Colmenares CNS  Palliative Care Consult Team  Pager: [phone]    75 minutes spent. This includes 40 minutes face to face with patient and daughter discussing current complex health conditions and prognosis, goals of care, symptom management, end of life decisions and care with hospice philosophy and services. Coordination of care with the primary team, unit SW, and RN regarding goals of care, decisional support, psychosocial support needs.

## 2022-03-25 NOTE — PLAN OF CARE
Goal Outcome Evaluation:       Pt is alert with some intermittent confusion. Daughter visited today and provided companionship for pt. Pt noted to nap through cares. Appears to be in pleasant mood and cooperative with cares. Provided 1:1 this shift. New orders for Seroquel and melatonin. Code status changed, see orders.       Patient's most recent vital signs are:     Vital signs:  BP: 128/45  Temp: 97.3  HR: 62  RR: 16  SpO2: 94 %     Patient does not have new respiratory symptoms.  Patient does not have new sore throat.  Patient does not have a fever greater than 99.5.

## 2022-03-25 NOTE — PLAN OF CARE
Discharge Planner Post-Acute Rehab OT:     Discharge Plan: home with family support, home OT, home care.     Precautions: Falls, 3- 3.5 liters O2 per NC at baseline, decrease cognition    Current Status:  ADLs:    Mobility: sba to cga with transfers.  Cues for putting O2 on.  CGA w/ FWW and th managing oxygen tubing w/ transfers and ambulation in room.  When pt forgets to use the walker she is mildly unsteady and furniture walks.    Grooming: CGA standing at the sink or sba in sitting    Dressing: Set-up for UB, SBA to CGA LB for standing safety.cues to initiate each task    Bathing: set up and supervision for UB bathing, LB bathing TBA    Toileting: SBA-cga for transfer, set up for vinh cares.  IADLs: Pt reports she does some cooking and cleaning but describes assist of dtr and a .  Vision/Cognition: No c/o re: vision.  Pt demo decrease cognition at eval.  She is directable and pleasant but wants to go home.  Pt used the call light and called the nurse several times though therapist instructing pt to use the TV remote to change channels.  Recommend cognitive testing to establish recommended degree of supervision.     Assessment: Pts dtrs present including dtr who is local to pt and helps pt out on occasion.  With encouragement pt completes hair washing (max A to do her hair but CGA standing at the sink leaning forward), UB bathing, oral cares, toilet use and cleanup/managing brief afterwards with set-up and SBA.  Use of 2WW, cues to manage O2 cord.  Dtrs instructed re: pt performance and recommendations for support and supervision provided.  No questions from family.   Pt may discharge tomorrow, if her LOS extends, OT can con't to see for building activity tolerance and safety during ADL/mobility.    Other Barriers to Discharge (DME, Family Training, etc):   Pt takes a tub bath with grab bar per pt.  Pt uses O2 3-3.5 liters per NC at baseline.  Recommend family training - th able to have pt demo her  ADL and mobility skills and th instructed dtr in why 24 hour supervision recommended.  Dtr Dev verbalizes understanding 3/24/22.  Consider grab bars at the toilets.

## 2022-03-25 NOTE — PROGRESS NOTES
"Late entry.    Spoke with SW regarding patient discharge plans after Palliative consult. Per SW, daughter is present with patient and  still unsure if he can agree with discharge date and plans.   Met with patient and her daughter to explain the days events and how the palliative consult came to be after her  asked about \"the hospital doctor\" telling him about hospice.   Informed patient that during the earlier discussion with her  that he expressed concern for her ongoing pain and he did not want to \"make decisions for her\" and that he felt she needed to tell him what to decide. She then stated that she was worried about him and that she wanted to know what he wanted to do.   This author shared many examples of Hospice situations that allowed patient to realize that no one is telling her to decide on hospice and that talking with hospice does not mean that she is getting ready to die right away. Explained the services they can provide including  assistance, social work, planning, pain management, hospital bed, and quality of end of life. Explained to patient that since she is still alert and able to voice her wishes, she should and that if she does not, everyone will do it their way. 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 She agreed  Both patient and daughter were holding hands and teary, but the patient stated she wants her  to come tomorrow and be with her to meet with a Hospice to talk about it together.   Informed patient that Medicare will not pay for her to stay after Saturday 3/26/22 since she will no longer be a skilled need requiring TCU. Informed about Medicare paying for Hospice at home if decided.   She then looked over at there daughter next to her, held her hand and very clearly and seriously stated, \"Namrata's ashes are in the top drawer.\" When her daughter asked for " "clarification, \"I want my ashes mixed with Namrata's\".We all laughed.Namrata was her beloved cat.     JIMMY and this author called Pradeep her  and informed of the need for discharge Saturday, but that his wife wants him to be present with hospice for discussion. JIMMY stated she will call Hospice in the am Friday.     NOMNC not signed, but discussed and understood.     Janneth Onofre RN, BSN  MDS Quality and      "

## 2022-03-25 NOTE — PLAN OF CARE
Care Coordination:    Writer was notified by [facility] that patient will discharge tomorrow with hospice services. Writer called hospice liaison, Karen, to inquire if they needed anything specific ordered for discharge. Karen stated that this team just needed to order her scheduled medications and they would take care of any PRN pain medications.     Writer canceled home care services through [facility]. Writer updated PA and HUC that patient will be discharging tomorrow.    Jesenia Rdz RN, BSN, CRRN  Patient Care Management Coordinator  Acute Rehabilitation Unit/Transitional Care Unit  PH: [phone]  Pager: [phone]

## 2022-03-25 NOTE — PROGRESS NOTES
"CLINICAL NUTRITION SERVICES - REASSESSMENT NOTE     Nutrition Prescription      Malnutrition Status:    Moderate malnutrition in the context of chronic illness     Recommendations already ordered by Registered Dietitian (RD):  Continue current supplements    Future/Additional Recommendations:  Monitor intakes/wt trend  Adjust supplements per pt preference      EVALUATION OF THE PROGRESS TOWARD GOALS   Diet: Regular  Supplements: chocolate ensure at 10am and 2pm   Intake: 25-75% of meals. Ordering (on average) 2327 kcal and 89g protein daily      NEW FINDINGS   Pt reported a \"medium\" appetite but stated she at pretty well during breakfast this morning. She is currently receiving ensure BID which she stated she is drinking (drinks 2 daily at baseline). Denied any GI issues or chewing/swallowing difficulty. Planned discharge tomorrow   12# wt gain in 6 months   03/22/22 45.9 kg (101 lb 1.6 oz)   03/20/22 44.5 kg (98 lb 1.6 oz)-admit wt   03/15/22 42.3 kg (93 lb 3.2 oz)   03/11/22 40.7 kg (89 lb 11.6 oz)   03/10/22 42.1 kg (92 lb 12.8 oz)   10/26/21 42.2 kg (93 lb)   10/07/21 42.2 kg (93 lb)   09/09/21 40.5 kg (89 lb 3.2 oz)   09/01/21 42.5 kg (93 lb 12.8 oz)   07/22/21 45.4 kg (100 lb)   03/05/21 45.4 kg (100 lb)   02/26/21 45.4 kg (100 lb)   02/21/21 45.8 kg (100 lb 15.5 oz)   02/18/21 44.5 kg (98 lb)   02/16/21 44.5 kg (98 lb)   01/30/20 44.6 kg (98 lb 6.4 oz)   01/24/20 46.6 kg (102 lb 11.8 oz)   01/14/20 47.6 kg (105 lb)   01/14/20 47.2 kg (104 lb 1.6 oz)   01/07/20 49 kg (108 lb)   12/05/19 47.6 kg (105 lb)   11/20/19 48 kg (105 lb 12.8 oz)   11/13/19 47.9 kg (105 lb 9.6 oz)     MALNUTRITION  % Intake: Decreased intake does not meet criteria  % Weight Loss: None noted  Subcutaneous Fat Loss: Facial region:  severe and Upper arm:  moderate  Muscle Loss: Temporal:  moderte, Facial & jaw region:  mild, Scapular bone:  moderate, Thoracic region (clavicle, acromium bone, deltoid, trapezius, pectoral):  moderate, " upper/lower arm: moderate and Dorsal hand:  Moderate-likely some age related   Fluid Accumulation/Edema: None noted  Malnutrition Diagnosis: Moderate malnutrition in the context of chronic illness     Previous Goals   Patient to consume % of nutritionally adequate meal trays TID, or the equivalent with supplements/snacks.  Evaluation: Not met    Previous Nutrition Diagnosis  Inadequate oral intake related to decreased appetite as evidenced by variable PO and pt report (per provider)    Evaluation: Improving    CURRENT NUTRITION DIAGNOSIS  Inadequate oral intake related to decreased appetite as evidenced by variable PO     INTERVENTIONS  Implementation  Continue current supplements     Goals  Patient to consume % of nutritionally adequate meal trays TID, or the equivalent with supplements/snacks.    Monitoring/Evaluation  Progress toward goals will be monitored and evaluated per protocol.    Jesenia Benítez MS, RD, LDN  Unit Pager [phone]

## 2022-03-25 NOTE — PLAN OF CARE
Goal Outcome Evaluation:  VSS. Alert. Forgetful, intermittently disoriented to time/ situation. Intermittently confused. Verbalized mild pain to lower back, tylenol given with some relief. PIV to L hand patent, no signs of infiltration. A-1 CGA with transfer and toileting. Ambulated to/from the bathroom with gb and walker. Appetite fair. Likes meds whole with applesauce. Last Bm yesterday. Continent of B/B.  Will continue plan of care.         Patient's most recent vital signs are:     Vital signs:  BP: 119/40  Temp: 98  HR: 74  RR: 18  SpO2: 94 %     Patient does not have new respiratory symptoms.  Patient does not have new sore throat.  Patient does not have a fever greater than 99.5.

## 2022-03-25 NOTE — PLAN OF CARE
Goal Outcome Evaluation:      Pt appears to be comfortably asleep most of the night. Stable condition this shift. Bathroom assistance provided. No s/sx of distress noted. Continue with poc.      Patient's most recent vital signs are:     Vital signs:  BP: 128/45  Temp: 97.3  HR: 62  RR: 16  SpO2: 94 %     Patient does not have new respiratory symptoms.  Patient does not have new sore throat.  Patient does not have a fever greater than 99.5.

## 2022-03-25 NOTE — PLAN OF CARE
Discharge Planner Post-Acute Rehab PT:     Recert: 4/16/2022    Discharge Plan: home with 24 hour supervision    Precautions: fall risk, pt on 4-5 L of oxgyen, impulsive    Current Status:  Bed Mobility: ind with rolling, supervision for supine <> sit  Transfer: SBA - CGA for sit to stand, chair to bed depending on impulsivity  Gait: ~115' - 330 ft  with front WW, w/c follow, PT manages O2 tank, CGA  Stairs: PT: ascend/descend 3 steps x 2 with nikos HR, CGA, reciprocal pattern  Balance: sits EOB for several minutes with supervision, close SBA in standing due to impulsivity    Assessment: Pt requires encouragement to participation, willing to ambulate only with FWW in hallway before returning to room, anticipating family's arrival. Family does arrive as therapy is ending, anticipating appropriate discharge decisions to inform end date following conversation with family and social work.      Other Barriers to Discharge (DME, Family Training, etc): cognition, family training,

## 2022-03-25 NOTE — PROGRESS NOTES
JIMMY sent referral to [facility] last night; spoke to liaison who stated that they will reach out to SW to set-up consult for Friday 3/25/2022.    Today SW called [facility], inquiring about the consult that was requested for pt and family today. Liaison stated that she will pass message along and Karen will be following-up with SW.    1000: Karen from [facility] has been in communication with JIMMY. Karen will be doing a phone consult with pt, Pradeep, Yesenia, and Dawn in pt's room at 1:00 p.m. today. Karen will be calling Dawn's cell phone for hospice consult. Pradeep and daughters, Dawn and Yesenia, have expressed understanding that a discharge plan needs to be decided upon by family today.     1500: Discharge plan solidified with pt's family and hospice. Pt will discharge home tomorrow (Saturday 3/26/2022); spouse, Pradeep, will p/u pt at 10:00 a.m. and bring pt's portable O2 from home. [facility] RN will meet pt and family at their home at 12:00 p.m. same day. Daughter, Dawn, will be at pt's home as well (Yesenia is flying home tomorrow morning). Family has arranged for a family friend to stay with pt during the hours that Pradeep is at work.      SHIMA Edmondson, [facility]   Pager: [phone]

## 2022-03-26 NOTE — PLAN OF CARE
Goal Outcome Evaluation:     Patient is alert and intermittently disoriented to time & situation. Forgetful & impulsive, remains on 1:1 bedside atendant .VSS on 2.5 LPM viaNC.  Assist of 1 with  walker for transfer and ambulation.  A1  with bed mobility.  Continent of bowel & bladder.  PIV line L wrist intact. Denies any cough, chest pain, SOB, dificulty breathing, lightheadedness or dizziness. Discharging tomorrow with hospice services    Patient's most recent vital signs are:     Vital signs:  BP: 119/40  Temp: 98  HR: 74  RR: 18  SpO2: 94 %     Patient does not have new respiratory symptoms.  Patient does not have new sore throat.  Patient does not have a fever greater than 99.5.

## 2022-03-26 NOTE — PLAN OF CARE
Patient was discharged at 1035.  here to  patient.    read the discharge instructions and had no questions.  Patient was wheeled down to 's car and helped safely into the car with the CNA assistance.  Patient very anxious all morning calling her  and daughter frequently. I had to remind the patient that she would be leaving round 1000.  brought in oxygen from home for the ride home. I faxed the discharge paperwork to [facility].    Patient left her cell phone  behind in her room. I put her name on it and will keep at the desk and if  doesn't call about it I will call security tomorrow.

## 2022-03-26 NOTE — PLAN OF CARE
Goal Outcome Evaluation:    Patient awake all shift, restless and getting out of bed frequently with frequent urination. PRN Tylenol given for complained of aches when walking to bathroom. Patient unstable with ambulation and requires one assist using gait belt and walker. Plan for patient to discharge home with Hospice today at 10 AM. Continue plan of care.      Patient's most recent vital signs are:     Vital signs:  BP: 119/40  Temp: 98  HR: 74  RR: 18  SpO2: 94 %     Patient does not have new respiratory symptoms.  Patient does not have new sore throat.  Patient does not have a fever greater than 99.5.

## 2022-03-26 NOTE — DISCHARGE SUMMARY
Occupational Therapy Discharge Summary    Reason for therapy discharge:    Pt discharged to home today with support of family and friends for 24 hour supervision.      Progress towards therapy goal(s). See goals on Care Plan in [facility] electronic health record for goal details.  Goals met, recommend pt has 24 hour supervision for intermittent decrease cognition (eg taking O2 off = needs 3 - 3.5 liters per NC, or walking away from her walker)  Family training completed with a dtr who is local to patient and helps out.     Therapy recommendation(s):    Pt can benefit from OT eval to maximize safe IND with ADLs in home environment.  Pt declined shower or tub transfer on TCU, she like a tub bath at home.

## 2022-03-28 NOTE — DISCHARGE SUMMARY
[facility]  Internal Medicine Discharge Summary    Date of Admission: 3/17/2022  Date of Discharge: 3/26/2022  Discharging Provider: Rita Harris CNP     Follow-ups Needed After Discharge   - Follow up with Cardiology as needed  - Follow up with Vascular Surgery as needed     Reason for Admission  Please see the detailed H & P dated 3/17/22 by Dr. Kai Aguilar. 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 Transferred to TCU on 3/17/22 for therapies.     Discharge Diagnoses   # Physical deconditioning  # Acute on chronic anemia d/t slow GIB from AVMs  # Iron deficiency anemia   # Chronic hypoxic and hypercarbic respiratory failure   # CTEPH  # COPD   # Hx DVT/PE  # HTN   # PAD   # Hypothyroidism   # Pulmonary nodules c/f lung cancer   # Delirium    Rehab Course   # Physical deconditioning - Progressing with therapies.  Discharging home with hospice.    # Acute on chronic anemia d/t slow GIB from AVMs   # Iron deficiency anemia   Hx GIB d/t duodenal and colonic AVMs treated with APC in 9/2021. Baseline Hgb ~8.0. Presented w/ Hgb 5.3 and symptoms of fatigue and worsening dyspnea. No overt bleeding noted. GI consulted, suspected to have slow GIB d/t AVMs in setting of chronic AC. Endoscopy deferred d/t hx complications from sedation, would reserve endoscopy only if hemodynamically unstable.  Overall clinically stable at TCU. Suspect ongoing bleed d/t dropping Hgb. Vitals remains stable. Received PRBC transfusion on 3/21. Repeat Hgb 7.1 today. Discussed with patient that this will likely continue in setting of chronic  anticoagulation, which is necessary from a cardiopulmonary status.  Discharged on PTA meds, but given that she is returning home with hospice, may need to discuss further about when to stop these meds.    # Chronic hypoxic and hypercarbic respiratory failure - Secondary to COPD and CTEPH, see below. O2 sats stable on baseline 3L. Hypercarbic respiratory failure noted in hospital, likely chronic. Trial of BIPAP in setting of delirium but did not tolerate. CO2 remains stable on BMP.   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 Previously on Adempas but not taking consistently therefore was discontinued while in hospital.  Continued PTA Eliquis, Lasix, and hydrochlorothiazide for now, but discussed with pt and family that anticoagulation may contribute to recurrent GIBs in the future.  Discussed that stopping anticoagulation could lead to breathing complications and blood clots.  Advised that they discuss further with their hospice providers about continuing vs stopping these medications.  Follow up with Cardiology as needed.     # COPD - Continued PRN Duonebs at discharge.    # Hx DVT/PE - On chronic eliquis, held on hospital admission d/t concern for GIB. Now resumed, see discussion above.     # HTN - Stable. Continue PTA lasix 20mg daily and HCTZ 50mg daily.   # PAD - Follows with vascular surgery for annual SUMAYA d/t hx aorto-biiliac disease. Last SUMAYA in 2019 was 0.5 bilaterally.  Continued PTA plavix and statin at discharge.  Follow up with Vascular Surgery as needed.   # Hypothyroidism - Stable. Continue levothyroxine 75mcg daily.   # Pulmonary nodules c/f lung cancer - 2 cm RUL nodule biopsied in 2019 noted to be increasing in size on recent imaging. PET on 3/8/22 noted increased uptake c/f malignancy.  Could follow up with IP/IR as directed for lung biopsy if goals of care change, however patient is currently choosing to discharge home with hospice.    # Delirium - Initially noted in hospital and felt 2/2 hypercapnia.  No improvement w/ bipap. Suspect cognitive dysfunction at baseline with hospital acquired delirium. Improved prior to transfer. Sundowning noted at TCU with increased confusion, anxiety, and agitation at night.  Started on Trazodone during hospitalization.  Stopped at TCU and started Seroquel at bedtime for insomnia and anxiety.  Discharged with Seroquel and Melatonin at bedtime.  Mentation improved with family visits.        Consultations This Stay   PHYSICAL THERAPY ADULT IP CONSULT  OCCUPATIONAL THERAPY ADULT IP CONSULT  PALLIATIVE CARE ADULT IP CONSULT    Physical Exam   Blood pressure 132/40, pulse 79, temperature 97.4  F (36.3  C), temperature source Oral, resp. rate 20, weight 45.9 kg (101 lb 1.6 oz), SpO2 (!) 88 %, not currently breastfeeding.  GENERAL: Alert and awake.  Petite body habitus.  NAD.   HEENT: Anicteric sclera. Mucous membranes moist.   CV: RRR. S1, S2. No murmurs appreciated.   RESPIRATORY: Effort normal on nasal cannula. Lungs CTAB with no wheezing, rales, rhonchi.   GI: Abdomen soft and non distended, bowel sounds present. No tenderness, rebound, guarding.   NEUROLOGICAL: No focal deficits. Moves all extremities.   EXTREMITIES: No peripheral edema. Intact bilateral pedal pulses.   SKIN: No jaundice. No rashes.      Significant Results and Procedures   Most Recent 3 CBC's:Recent Labs   Lab Test 03/25/22  0558 03/24/22  0630 03/22/22  1008   WBC 5.8 5.6 9.8   HGB 7.2* 7.1* 7.9*   MCV 85 85 82    242 281     Most Recent 3 BMP's:Recent Labs   Lab Test 03/24/22  0630 03/17/22  0424 03/16/22  0358    139 141   POTASSIUM 4.4 3.9 3.9   CHLORIDE 100 97 99   CO2 41* 42* 41*   BUN 28 22 20   CR 0.65 0.60 0.56   ANIONGAP <1* <1* 1*   CANDIDA 9.4 9.4 9.9   GLC 87 109* 90     Most Recent 2 LFT's:Recent Labs   Lab Test 03/17/22  0424 03/16/22  0358   AST 16 18   ALT 12 10   ALKPHOS 59 59   BILITOTAL 0.3 0.4     Most Recent 3 INR's:Recent Labs   Lab Test 03/11/22  0314 02/18/21  1715 01/24/20  0806    INR 1.02 1.11 0.98   ,   Results for orders placed or performed during the hospital encounter of 03/10/22   XR Chest Port 1 View    Narrative    EXAM: XR CHEST PORT 1 VIEW  3/10/2022 8:14 PM     HISTORY:  Dyspnea after starting transfusions       COMPARISON:  Radiographs 3/10/2022, 2021    TECHNIQUE: Portable 45 degree upright AP radiograph of the chest.    FINDINGS: The trachea is midline. The lungs are hyperinflated. No  focal airspace opacity. No pleural effusion or pneumothorax. Aortic  arch calcifications. Osteopenic bones without acute abnormality.      Impression    IMPRESSION:   1. No focal airspace opacity.  2. Persistent hyperinflation of the lungs.     I have personally reviewed the examination and initial interpretation  and I agree with the findings.    VANESSA MCCLELLAND MD         SYSTEM ID:  [id number]   Echocardiogram Complete     Value    LVEF  55-60%    Narrative    [id number]  [id number]  [id number]  [id number]^[email or web address]^JOE     [facility],[place]  Echocardiography Laboratory  [address] [place] [address]     Name: ISAURO GUZMAN  MRN: [id number]  : 1935  Study Date: 2022 09:51 AM  Age: 86 yrs  Gender: Female  Patient Location: [facility]  Reason For Study: Dyspnea, Pulmonary Hypertension  Ordering Physician: JOE HARPER  Referring Physician: VANESA ZAPATA  Performed By: Kimi Hernández RDCS     BSA: 1.3 m2  Height: 59 in  Weight: 92 lb  HR: 70  BP: 138/74 mmHg  ______________________________________________________________________________  Procedure  Complete Portable Echo Adult.  ______________________________________________________________________________  Interpretation Summary  Global and regional left ventricular function is normal with an EF of 55-60%.  Grade II or moderate diastolic dysfunction. Diastolic Doppler findings (E/E'  ratio and/or other parameters) suggest left ventricular filling pressures are  increased.  Global right  ventricular function is normal.  The inferior vena cava was normal in size with preserved respiratory  variability.  Pulmonary artery systolic pressure cannot be assessed.     This study was compared with the study from 2/2021 .  No significant changes noted.     ______________________________________________________________________________  Left Ventricle  Left ventricular size is normal. Global and regional left ventricular function  is normal with an EF of 55-60%. Mild concentric wall thickening consistent  with left ventricular hypertrophy is present. Grade II or moderate diastolic  dysfunction. Diastolic Doppler findings (E/E' ratio and/or other parameters)  suggest left ventricular filling pressures are increased.     Right Ventricle  The right ventricle is normal size. Global right ventricular function is  normal.     Atria  The right atria appears normal. Moderate left atrial enlargement is present.     Mitral Valve  Mild mitral annular calcification is present.     Aortic Valve  The valve leaflets are not well visualized. On Doppler interrogation, there is  no significant stenosis or regurgitation.     Tricuspid Valve  The tricuspid valve is normal. The peak velocity of the tricuspid regurgitant  jet is not obtainable. Pulmonary artery systolic pressure cannot be assessed.     Pulmonic Valve  The pulmonic valve is normal.     Vessels  The aorta root is normal. The inferior vena cava was normal in size with  preserved respiratory variability.     Pericardium  No pericardial effusion is present.     Compared to Previous Study  This study was compared with the study from 2/2021 . No significant changes  noted.  ______________________________________________________________________________  MMode/2D Measurements & Calculations  IVSd: 1.1 cm     LVIDd: 4.9 cm  LVIDs: 4.2 cm  LVPWd: 1.1 cm  FS: 14.0 %  LV mass(C)d: 198.7 grams  LV mass(C)dI: 149.9 grams/m2  Ao root diam: 3.0 cm  asc Aorta Diam: 3.3 cm  LVOT diam:  2.1 cm  LVOT area: 3.5 cm2  LA Volume (BP): 61.9 ml  LA Volume Index (BP): 46.5 ml/m2  RWT: 0.44     Doppler Measurements & Calculations  MV E max jese: 90.6 cm/sec  MV A max jese: 141.0 cm/sec  MV E/A: 0.64  MV dec slope: 498.0 cm/sec2  PA acc time: 0.04 sec  E/E' avg: 15.2  Lateral E/e': 15.2  Medial E/e': 15.2     ______________________________________________________________________________  Report approved by: Anders GUAJARDO 03/11/2022 10:46 AM           *Note: Due to a large number of results and/or encounters for the requested time period, some results have not been displayed. A complete set of results can be found in Results Review.       Pending Results   Unresulted Labs Ordered in the Past 30 Days of this Admission     Date and Time Order Name Status Description    3/10/2022  3:09 PM Prepare red blood cells (unit) Preliminary           Primary Care Physician   Kathy Antonio    Discharge Disposition   Discharged to home with hospice services   Condition at discharge: Stable    Code Status   No CPR- Do NOT Intubate    Discharge Procedure Orders   Home Care Referral   Referral Priority: Routine: Next available opening Referral Type: Home Health Therapies & Aides   Requested Specialty: HOSPICE   Number of Visits Requested: 1     Reason for your hospital stay   Order Comments: Dear Lucie,     You were admitted to [facility] from 3/17 to 3/26 for physical and occupational therapies following hospitalization 3/10-3/17 for acute anemia concerning for GI bleed.        We discussed with your family that you will be discharging home with hospice services.  For now, you may continue taking your home medications as you are if they bring you comfort.  You may continue your blood thinner but this may eventually cause bleeding in light of your known gastric issues.  Alternatively, stopping them may increase risk for blood clots or pulmonary embolism.  This can always be revisited with your hospice services or Palliative  Care providers after you leave TCU and your family may help you make decisions about continuing versus stopping these medications.      Please follow up with Palliative Care outpatient as needed.        It was a pleasure to care for you during your rehab stay at our TCU.  Please let us know if there is anything else we can do for you so that we can be sure you are leaving completely satisfied with your care experience.    If you have any questions, please call the hospital at [phone] and ask to talk to a nurse at TCU - 4th floor.    Take care,    Rita Harris CNP  [facility] - Internal Medicine   Hospitalist Service     Activity   Order Comments: Your activity upon discharge: activity as tolerated     Order Specific Question Answer Comments   Is discharge order? Yes      Adult [facility] Follow-up and recommended labs and tests   Order Comments: Follow up with primary care provider, Kathy Antonio, as needed.     Hospice care and home services through [facility].      Appointments on [address] and/or [facility] (with [facility] or [facility] provider or service). Call [phone] if you haven't heard regarding these appointments within 7 days of discharge.     No CPR- Do NOT Intubate     Order Specific Question Answer Comments   Code status determined by: Discussion with patient/ legal decision maker      Diet   Order Comments: Follow this diet upon discharge: Regular Diet Adult     Order Specific Question Answer Comments   Is discharge order? Yes         Discharge Medications   Discharge Medication List as of 3/26/2022 10:22 AM      START taking these medications    Details   QUEtiapine (SEROQUEL) 25 MG tablet Take 1 tablet (25 mg) by mouth At Bedtime, Disp-30 tablet, R-1, E-Prescribe         CONTINUE these medications which have NOT CHANGED    Details   melatonin 3 MG tablet Take 2 tablets (6 mg) by mouth At Bedtime, Disp-60 tablet, R-2, E-Prescribe      acetaminophen (TYLENOL) 325 MG tablet Take 2  tablets every 6 to 8 hours as needed for pain, Disp-100 tablet, R-1, E-Prescribe      apixaban ANTICOAGULANT (ELIQUIS) 2.5 MG tablet Take 1 tablet (2.5 mg) by mouth 2 times daily, Disp-180 tablet, R-3, E-Prescribe      atorvastatin (LIPITOR) 40 MG tablet Take 2 tablets (80 mg) by mouth daily, Disp-180 tablet, R-3, E-Prescribe      Calcium Carbonate-Vitamin D (CALCIUM 600 + D OR) Take 1 tablet by mouth every morning , Historical      cholecalciferol (VITAMIN D3) 25 mcg (1000 units) capsule Take 1 capsule by mouth daily, Historical      citalopram (CELEXA) 20 MG tablet Take 1 tablet (20 mg) by mouth daily, Disp-90 tablet, R-3, E-Prescribe      clopidogrel (PLAVIX) 75 MG tablet Take 1 tablet (75 mg) by mouth daily, Disp-90 tablet, R-3, E-Prescribe      cyanocolbalamin (VITAMIN  B-12) 500 MCG tablet Take 500 mcg by mouth every morning , Disp-90 tablet, R-1, Historical      ferrous sulfate (FEROSUL) 325 (65 Fe) MG tablet Take 1 tablet (325 mg) by mouth daily (with breakfast), Disp-90 tablet, R-3, Historical      furosemide (LASIX) 20 MG tablet Take 1 tablet (20 mg) by mouth daily, Disp-90 tablet, R-3, E-Prescribe      gabapentin (NEURONTIN) 400 MG capsule Take 1 capsule (400 mg) by mouth At Bedtime, Disp-90 capsule, R-1, E-Prescribe      hydrochlorothiazide (HYDRODIURIL) 50 MG tablet Take 1 tablet (50 mg) by mouth daily Please keep appt 12/21/20, Disp-90 tablet, R-0, E-Prescribe      ipratropium - albuterol 0.5 mg/2.5 mg/3 mL (DUONEB) 0.5-2.5 (3) MG/3ML neb solution Take 1 vial (3 mLs) by nebulization every 6 hours as needed for wheezing, Disp-90 mL, R-0, Transitional      latanoprost (XALATAN) 0.005 % ophthalmic solution Place 1 drop into both eyes At Bedtime , R-1, Historical      levothyroxine (SYNTHROID/LEVOTHROID) 75 MCG tablet Take 1 tablet (75 mcg) by mouth daily, Disp-90 tablet, R-3, E-Prescribe      loperamide (IMODIUM A-D) 2 MG tablet Take 1-2 mg by mouth daily as needed for diarrhea , Disp-30 tablet, R-0,  Historical      ondansetron (ZOFRAN-ODT) 4 MG ODT tab Take 1 tablet (4 mg) by mouth 2 times daily (before meals), Transitional      oxybutynin (DITROPAN) 5 MG tablet Take 1 tablet (5 mg) by mouth At Bedtime For frequent urination, Disp-90 tablet, R-0, E-Prescribe      pantoprazole (PROTONIX) 40 MG EC tablet Take 1 tablet (40 mg) by mouth daily Takes in the morning., Disp-90 tablet, R-3, E-Prescribe      polyethylene glycol (MIRALAX) 17 g packet Take 17 g by mouth 2 times daily, Transitional      traZODone (DESYREL) 50 MG tablet Take 0.5 tablets (25 mg) by mouth at bedtime as needed, may repeat once for sleep, Disp-30 tablet, R-0, Transitional                 I, Rita Harris CNP, personally saw the patient today and spent greater than 30 minutes discharging this patient.    Rita Harris CNP  Hospitalist Service   [facility]  Pager: [phone]

## 2022-04-18 NOTE — PROGRESS NOTES
Orthopedic/Sports Medicine Fracture Triage    Incoming call/or message from orders pager.    Fracture type: Wrist.    The patient is in a  off the shelf brace.    Date of injury 4/17/22.    Triaged by: Dr. Morales .    Determined to be managed Non operatively.    Needs to be seen within 1 week.    Additional Comments/information: May need new imaging.     Jose G Ladd, ATC

## 2022-04-18 NOTE — DISCHARGE INSTRUCTIONS
Titan wrist splint.  Bacitracin/gauze dressing to the left elbow.  Follow up orthopedics clinic in 7-10 days.

## 2022-04-18 NOTE — ED TRIAGE NOTES
Pt arrives f/ home after a fall while trying to change into her robe. Fell onto the ground and believes she landed on her L hand. Now having wrist and hand pain. Is on home O2 and hospice. A&O, VSS.

## 2022-04-18 NOTE — ED PROVIDER NOTES
"ED Provider Note  [facility]      History     Chief Complaint   Patient presents with     Hand Pain     HPI  Lucie Stockton is a 86 year old female who presents with left wrist and elbow pain after a fall this evening. She landed on her outstretched hand. She has pain primarily in the olecranon and proximal forearm. There is milder pain in the wrist. She has a history of severe vascular disease, chronic back issues, chronic balance issues, ASCVD, grave's disease, HTN, hypothyroidism, probable lung cancer and chronic hypoxemic respiratory failure on home oxygen.    Past Medical History:   Diagnosis Date     Anemia      Aortic stenosis     abdominal     Atherosclerosis of aorta (H)     \"extensive disease with near occlusion below level of renal arteries\" on CT     Atherosclerosis of arteries of extremities (H)      Back pain     severe multilevel DJD, moderate spinal canal stenosis L4-5, severe L3-4     Chronic pain     Back, hips, knees, legs     Degenerative joint disease      DVT of lower extremity, bilateral (H)     5/24/10 left distal femoral and great saphenous, 7/7/10 left:  extending to cfv, iliac , pop and post tibial, peronial, right:  distal fem and peroneal      Grave's disease      Hyperlipidaemia LDL goal < 70      Hypertension, essential      Hypothyroidism      Imbalance      Insomnia      Intermittent claudication (H)      Iron deficiency      Knee pain      Lumbar stenosis with neurogenic claudication      Osteoarthritis     ankle,foot, knee     Osteoporosis      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, 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     Pulmonary embolism (H)     5/24/10 bilateral     Small bowel obstruction (H) 1/23/17     Varicose veins        Past Surgical History:   Procedure Laterality Date     BUNIONECTOMY       CAPSULE/PILL CAM ENDOSCOPY N/A 2/20/2021    Procedure: IMAGING PROCEDURE, GI TRACT, " INTRALUMINAL, VIA CAPSULE;  Surgeon: Heron Ayala MD;  [facility]: [facility]     COLONOSCOPY      11/10/10 angioectasia     CV PULMONARY ANGIOGRAM N/A 2/26/2021    Procedure: CV PULMONARY ANGIOGRAM;  Surgeon: Joaquin Fuller MD;  Location:  HEART CARDIAC CATH LAB     CV RIGHT HEART CATH MEASUREMENTS RECORDED N/A 2/26/2021    Procedure: CV RIGHT HEART CATH;  Surgeon: Joaquin Fuller MD;  Location:  HEART CARDIAC CATH LAB     ENDOBRONCHIAL ULTRASOUND FLEXIBLE N/A 1/24/2020    Procedure: ENDOBRONCHIAL ULTRASOUND, WITH FLEXIBLE BRONCHOSCOPY;  Surgeon: Gopal Jara MD;  [facility]     HYSTERECTOMY TOTAL ABDOMINAL, BILATERAL SALPINGO-OOPHORECTOMY, NODE DISSECTION, COMBINED  2/17/11    SANGEETHA, EMILIA, LN bx, omentectomy, resection of evrian malignancy Dr. JONO Goncalves - Returned BENIGN     INJECT EPIDURAL LUMBAR / SACRAL SINGLE N/A 1/5/2018    Procedure: INJECT EPIDURAL LUMBAR / SACRAL SINGLE;  lumbar interlaminar epidural steroid injection;  Surgeon: Jaylin Valadez MD;  [facility]     INJECT SACROILIAC JOINT Right 3/7/2018    Procedure: INJECT SACROILIAC JOINT;  Right Sacroiliac Joint Injection;  Surgeon: Flavio Harrison MD;  [facility]     INJECT SACROILIAC JOINT Bilateral 12/7/2018    Procedure: Bilateral Sacroiliac Joint Injections;  Surgeon: Faviola Cosby MD;  [facility]     OPTICAL TRACKING SYSTEM BRONCHOSCOPY N/A 1/24/2020    Procedure: Super D navigational bronchoscopy;  Surgeon: Gopal Jara MD;  Location:  OR     UPPER GI ENDOSCOPY      11/10/10 erythematous gastropathy, angioectasia     Z STOMACH SURGERY PROCEDURE UNLISTED      Please see chart       Family History   Problem Relation Age of Onset     Breast Cancer Maternal Aunt 80     C.A.D. Father 54     No Known Problems Mother        Social History     Tobacco Use     Smoking status: Former Smoker     Packs/day: 0.50     Years: 15.00     Pack years: 7.50     Quit date: 9/13/1993     Years  "since quittin.6     Smokeless tobacco: Never Used   Substance Use Topics     Alcohol use: Yes     Comment: social          Review of Systems   Constitutional: Negative for fever.   Respiratory: Negative for shortness of breath.    Cardiovascular: Negative for chest pain.   Gastrointestinal: Negative for abdominal pain.   Musculoskeletal: Positive for arthralgias and joint swelling. Negative for neck pain.   Neurological: Negative for weakness and numbness.   All other systems reviewed and are negative.        Physical Exam   BP: (!) 166/71  Pulse: 68  Temp: 98.5  F (36.9  C)  Resp: 18  Height: 149.9 cm (4' 11\")  Weight: 44.5 kg (98 lb)  SpO2: 100 %  Physical Exam  Vitals and nursing note reviewed.   Constitutional:       Appearance: Normal appearance.   HENT:      Head: Normocephalic.      Right Ear: External ear normal.      Left Ear: External ear normal.      Nose: Nose normal.   Eyes:      Extraocular Movements: Extraocular movements intact.      Pupils: Pupils are equal, round, and reactive to light.   Cardiovascular:      Rate and Rhythm: Normal rate.      Heart sounds: No murmur heard.  Pulmonary:      Effort: Pulmonary effort is normal.   Musculoskeletal:      Right shoulder: Normal. No tenderness.      Left shoulder: Normal. No tenderness.      Right upper arm: No tenderness.      Left upper arm: No tenderness.      Right elbow: No tenderness.      Left elbow: Laceration present. Tenderness present in radial head and olecranon process.      Left forearm: Tenderness present.      Right wrist: Deformity (arthritic deformity) present.      Left wrist: Tenderness present. No effusion.        Arms:       Cervical back: Normal. No tenderness.      Thoracic back: Normal. No tenderness.      Lumbar back: Normal. No tenderness.   Skin:     General: Skin is warm.   Neurological:      General: No focal deficit present.      Mental Status: She is alert and oriented to person, place, and time.   Psychiatric:         " Mood and Affect: Mood normal.         ED Course      Procedures              Labs/Imaging    Results for orders placed or performed during the hospital encounter of 04/17/22 (from the past 24 hour(s))   XR Elbow Left 2 Views    Narrative    EXAM: XR ELBOW LT 2 VW  LOCATION: [facility]  DATE/TIME: 4/17/2022 9:03 PM    INDICATION: fall pain  COMPARISON: None.      Impression    IMPRESSION: No definite fracture is identified. Normal joint spaces and alignment. No elbow joint effusion although evaluation is limited due to obliquity on lateral view.   XR Wrist Left 3 Views    Narrative    EXAM: XR WRIST LEFT G/E 3 VIEWS  LOCATION: [facility]  DATE/TIME: 4/17/2022 9:03 PM    INDICATION: fall, pain  COMPARISON: None.      Impression    IMPRESSION: Acute nondisplaced distal radial impaction fracture dorsally with intra-articular extension. There is normal joint alignment. Severe first CMC joint degenerative changes with bone-on-bone articulation. Sclerosis in the proximal ulna.   Osteopenia.     *Note: Due to a large number of results and/or encounters for the requested time period, some results have not been displayed. A complete set of results can be found in Results Review.         Medications   bacitracin ointment (has no administration in time range)        Assessments & Plan (with Medical Decision Making)   Impression:  Elderly woman with advance vascular and pulmonary disease presents with left wrist pain and olecranon skin tear after a fall from standing at home this evening while putting on a robe. She has a non displaced, mildly impacted fracture of the distal radius. She has severe degenerative arthritic change at the base of the thumb. She has superficial skin tear over the olecranon. There are no identified fractures in the elbow or radial head and no elbow effusions. Given her overall situation mobility restrictions and  general debility, I think a minimally confining, minimally restricting stabilization of the fracture site would be preferable to formal sugar tongs splinting therefore will opt for removable thumb spica splint. She can follow up with non operative orthopedics if desired in 1-2 weeks.    I have reviewed the nursing notes. I have reviewed the findings, diagnosis, plan and need for follow up with the patient.    New Prescriptions    No medications on file       Final diagnoses:   Other closed fracture of distal end of left radius, initial encounter   Skin tear of left elbow without complication, initial encounter   Fall from standing, initial encounter       --  Farhat Boyer  [facility] EMERGENCY DEPARTMENT  4/17/2022     Farhat Boyer MD  04/17/22 [id number]

## 2022-04-25 NOTE — TELEPHONE ENCOUNTER
DIAGNOSIS: distal radius fracture   APPOINTMENT DATE: 4.27.22   NOTES STATUS DETAILS   OFFICE NOTE from referring provider     OFFICE NOTE from other specialist     DISCHARGE SUMMARY from hospital     DISCHARGE REPORT from the ER Internal 4.17.22 LARRY Boyer [facility]   OPERATIVE REPORT     EMG report     MEDICATION LIST Internal    MRI     DEXA (osteoporosis/bone health)     CT SCAN     XRAYS (IMAGES & REPORTS) Internal 4.17.22 L wrist  4.17.22 L elbow

## 2022-05-04 NOTE — TELEPHONE ENCOUNTER
Unable to LM on cell d/t full mailbox. Home phone number goes to someone unfamiliar. Will try again later. Ceci Treviño RN on 5/4/2022 at 9:32 AM       back from mri

## 2023-05-18 NOTE — PLAN OF CARE
"Observation Goals:     -diagnostic tests and consults completed and resulted: not met  -vital signs normal or at patient baseline: /52 (BP Location: Left arm)   Pulse 69   Temp 98.6  F (37  C) (Oral)   Resp 16   Ht 1.473 m (4' 10\")   Wt 46.6 kg (102 lb 11.8 oz)   LMP  (LMP Unknown)   SpO2 98%   BMI 21.47 kg/m     -tolerating oral intake to maintain hydration: met  -dyspnea improved and O2 sats greater than 88% on room air or prior home oxygen levels: not met; pt is on 4L O2 and capnography monitoring is in place   -safe disposition plan has been identified: not met   " Wound consult for denuded area to the LT side of the outer side of the Vagina. Area presents with a moist open area with a small amount of serous drainage noted. New order for magic barrier cream BID and z-guard in between treatments

## 2023-08-02 NOTE — PROGRESS NOTES
[facility] Dermatology Note      Dermatology Problem List:  1. 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 Neurofibroma(s)    Encounter Date: Nov 16, 2018    CC:  Chief Complaint   Patient presents with     Skin Check     Lucie is here today to have a spot on her chest looked at.          History of Present Illness:  Ms. Lucie Stockton is a 83 year old female who presents as a follow-up for a lesion of concern. The patient was last seen by Dr. Pena for a full body skin examination on 07/31/18. She has a hx of pigmented BCC on the left abdomen which was removed via ED&C on 1/16/18. Today the patient states that her PCP, Dr. Marii Montgomery,  pointed out a spot on the chest and she would like to have it checked to make sure that it is benign. She has no other concerns today. The patient denies painful, itching, tingling or bleeding lesions unless otherwise noted.      Past Medical History:   Patient Active Problem List   Diagnosis     Benign ovarian tumor     Hypothyroidism     Peripheral vascular disease (H)     Knee pain     Osteoarthritis     Cervicalgia     Hyperlipidemia with target LDL less than 70     Pulmonary embolism (H)     Anemia     Aortic stenosis     Atherosclerosis of aorta (H)     Atherosclerosis of arteries of extremities (H)     Intermittent claudication (H)     Iron deficiency     Osteoporosis     DVT of lower extremity, bilateral (H)     Varicose veins     Gluteal pain     Insomnia     Back pain     Vitamin D deficiency     Tobacco use disorder     Personal history of other drug therapy     Right knee pain     Venous thrombosis of extremity     Benign essential hypertension     Gastritis     Cataract     Acute left-sided low back pain with left-sided sciatica     Lumbar stenosis with neurogenic claudication     SBO (small bowel obstruction) (H)     Small bowel obstruction (H)     Long term current use of anticoagulant therapy      "Left-sided low back pain with left-sided sciatica     Moderate major depression (H)     Anxiety     Neoplasm of uncertain behavior of skin     Cherry angioma     BCC (basal cell carcinoma), trunk     Weakness     Imbalance     Chronic pain     History of nonmelanoma skin cancer     Multiple melanocytic nevi     Age-related osteoporosis without current pathological fracture     Past Medical History:   Diagnosis Date     Anemia      Aortic stenosis     abdominal     Atherosclerosis of aorta (H)     \"extensive disease with near occlusion below level of renal arteries\" on CT     Atherosclerosis of arteries of extremities (H)      Back pain     severe multilevel DJD, moderate spinal canal stenosis L4-5, severe L3-4     Chronic pain     Back, hips, knees, legs     Degenerative joint disease      DVT of lower extremity, bilateral (H)     5/24/10 left distal femoral and great saphenous, 7/7/10 left:  extending to cfv, iliac , pop and post tibial, peronial, right:  distal fem and peroneal      Grave's disease      Hyperlipidaemia LDL goal < 70      Hypertension, essential      Hypothyroidism      Imbalance      Insomnia      Intermittent claudication (H)      Iron deficiency      Knee pain      Lumbar stenosis with neurogenic claudication      Osteoarthritis     ankle,foot, knee     Osteoporosis      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 JONO Goncalves - Returned BENIGN     " INJECT EPIDURAL LUMBAR / SACRAL SINGLE N/A 1/5/2018    Procedure: INJECT EPIDURAL LUMBAR / SACRAL SINGLE;  lumbar interlaminar epidural steroid injection;  Surgeon: Jaylin Valadez MD;  [facility]     INJECT SACROILIAC JOINT Right 3/7/2018    Procedure: INJECT SACROILIAC JOINT;  Right Sacroiliac Joint Injection;  Surgeon: Flavio Harrison MD;  [facility]     UPPER GI ENDOSCOPY      11/10/10 erythematous gastropathy, angioectasia       Social History:   reports that she quit smoking about 25 years ago. She has a 7.50 pack-year smoking history. She has never used smokeless tobacco. She reports that she drinks alcohol. She reports that she does not use illicit drugs.    Family History:  Family History   Problem Relation Age of Onset     Breast Cancer Maternal Aunt 80     C.A.D. Father 54       Medications:  Current Outpatient Prescriptions   Medication Sig Dispense Refill     acetaminophen (TYLENOL) 325 MG tablet Take 2 tablets every 6 to 8 hours as needed for pain 100 tablet 1     amLODIPine (NORVASC) 10 MG tablet Take 1 tablet (10 mg) by mouth daily For blood pressure 90 tablet 3     amoxicillin-clavulanate (AUGMENTIN) 500-125 MG per tablet Take 1 tablet by mouth 2 times daily for 10 days 20 tablet 0     atorvastatin (LIPITOR) 40 MG tablet Take 2 tablets (80 mg) by mouth daily 90 tablet 3     benzonatate (TESSALON) 100 MG capsule Take 1 capsule (100 mg) by mouth 3 times daily as needed for cough 15 capsule 0     bisacodyl (DULCOLAX) 10 MG Suppository Place 1 suppository (10 mg) rectally daily as needed for constipation 90 suppository 3     Calcium Carbonate-Vitamin D (CALCIUM 600 + D OR) Take 1 tablet by mouth daily.       Cholecalciferol (VITAMIN D3 PO) Take by mouth daily       citalopram (CELEXA) 20 MG tablet Take 1 tablet (20 mg) by mouth daily 90 tablet 3     clopidogrel (PLAVIX) 75 MG tablet Take 1 tablet (75 mg) by mouth daily 90 tablet 3     cyanocolbalamin (VITAMIN  B-12) 500 MCG tablet  Take 1 tablet (500 mcg) by mouth daily 90 tablet 1     ferrous sulfate (IRON) 325 (65 FE) MG tablet Take 1 tablet (325 mg) by mouth daily (with breakfast) 90 tablet 3     fluticasone (FLONASE) 50 MCG/ACT spray Spray 2 sprays into both nostrils daily 1 Bottle 1     gabapentin (NEURONTIN) 100 MG capsule   1     hydrochlorothiazide (HYDRODIURIL) 50 MG tablet Take 1 tablet (50 mg) by mouth daily 90 tablet 3     levothyroxine (SYNTHROID/LEVOTHROID) 75 MCG tablet Take 1 tablet (75 mcg) by mouth daily 90 tablet 3     lisinopril (PRINIVIL/ZESTRIL) 40 MG tablet Take 1 tablet (40 mg) by mouth daily For blood pressure 90 tablet 3     multivitamin (OCUVITE) TABS tablet Take 1 tablet by mouth daily       naproxen sodium 220 MG capsule Take 220 mg by mouth 2 times daily (with meals) 60 capsule 2     pantoprazole (PROTONIX) 40 MG EC tablet Take 1 tablet (40 mg) by mouth daily 90 tablet 3     potassium chloride SA (K-DUR/KLOR-CON M) 20 MEQ CR tablet Take 1 tablet (20 mEq) by mouth daily 90 tablet 3     [DISCONTINUED] tolterodine (DETROL) 1 MG tablet Take 1-2 tablets by mouth At Bedtime. 180 tablet 3       No Known Allergies    Review of Systems:  -Constitutional: The patient denies fatigue, fevers, chills, unintended weight loss, and night sweats.  -Skin: As above in HPI. No additional skin concerns.    Physical exam:  Vitals: There were no vitals taken for this visit.  GEN: This is a well developed, well-nourished female in no acute distress, in a pleasant mood.    SKIN: Focused examination of the face, neck and chest  was performed.  - 6 mm fleshy pink papule on the left mid chest, consistent with a neurofibiroma   -No other lesions of concern on areas examined.       Impression/Plan:  1. History of NMSC - pigmented BCC on abdomen    Discussed having a full body skin check in the next 2 months as her last one was in July 2018    2. Neurofibroma - chest    Reassured the patient that this is benign and harmless. May be removed if  becomes symptomatic.    No further intervention required. Patient to report changes.     CC Dr. Marii Montgomery and Dr. Webb on close of this encounter.  Follow-up in 2 months, earlier for new or changing lesions.       Staff Involved:    Scribe Disclosure  I, Donna Bailey, am serving as a scribe to document services personally performed by Debby Lu PA-C, based on data collection and the provider's statements to me.     Provider Disclosure:   The documentation recorded by the scribe accurately reflects the services I personally performed and the decisions made by me.    All risks, benefits and alternatives were discussed with patient.  Patient is in agreement and understands the assessment and plan.  All questions were answered.    Debby Lu PA-C  [facility]: Phone: [phone], Fax: [phone]                   No

## 2024-05-16 NOTE — PROGRESS NOTES
Health Maintenance       Hepatitis B Vaccine (1 of 3 - 19+ 3-dose series)  Order placed this encounter    COVID-19 Vaccine (5 - 2023-24 season)  Order placed this encounter    Depression Screening (Yearly)  Due soon on 11/16/2024           Following review of the above:  Patient is not proceeding with: COVID-19 and Hep B    Note: Refer to final orders and clinician documentation.       "  Care Coordinator Progress Note    Admission Date/Time:  1/24/2020  Attending MD:  Zafar Ko MD    Data  Chart reviewed, discussed with interdisciplinary team.   Patient was admitted for:    Lung nodules  Lung nodules  Cough  Chronic obstructive pulmonary disease, unspecified COPD type (H).    Concerns with insurance coverage for discharge needs: None.  Current Living Situation: Patient lives with spouse.  Support System: Supportive and Involved  Services Involved: None  Transportation at Discharge: Car and Family or friend will provide  Transportation to Medical Appointments:   - Name of caregiver: Pradeep, spouse  Barriers to Discharge: Medical Stability    Coordination of Care and Referrals: Provided patient/family with options for None.     Patient presented to [facility] for scheduled lung biopsy, became hypoxic post procedure and is being admitted for monitoring and suspected COPD exacerbation. Per chart review, PT is recommending home with assist. Patient is currently on 4L oxygen and team is weaning this.     Patient was flagged for the \"MOON\" document as patient has Medicare.     Writer met with the patient to introduce the role of the care coordinator and assess discharge needs. Patient currently not wearing oxygen when writer was present. Patient reported that she does not wear oxygen at baseline and the plan is to wean off of it at discharge. Patient reported that she does not currently receive any in-home supports or services and could not anticipate any discharge needs at this time. Writer presented patient with the \"MOON\" document, obtained patient's signature and placed a copy in patient's chart. Patient had no questions or concerns for writer at this time.         Plan  Anticipated Discharge Date:  1/25/20  Anticipated Discharge Plan:  Home with clinic follow-up as recommended.     Kiana Sharma RN, BSN, PHN  Care Coordinator   P: [phone], [facility]             "

## 2024-09-23 NOTE — TELEPHONE ENCOUNTER
I called and spoke with Lucie, I got her scheduled with Sarah on May 3rd @ 3:45   [Cardiac Failure] : cardiac failure [Hyperlipidemia] : hyperlipidemia [Hypertension] : hypertension [Coronary Artery Disease] : coronary artery disease

## 2025-05-27 NOTE — TELEPHONE ENCOUNTER
Call complete for pre procedure reminder, travel screen and updated visitor policy.  COVID Negative     DEXA and Mammogram end of Sept at [facility]

## (undated) DEVICE — PREP CHLORAPREP W/ORANGE TINT 10.5ML 260715

## (undated) DEVICE — KIT SUPERDIMENSION PROC EDGE 90 OLYMPUS FIRM TIP SDK4900-FT

## (undated) DEVICE — MANIFOLD KIT ANGIO AUTOMATED 014613

## (undated) DEVICE — NDL COUNTER 20CT 31142493

## (undated) DEVICE — LABEL MEDICATION SYSTEM 3303-P

## (undated) DEVICE — NDL EPIDURAL TUOHY 20GA 3.5" 405028

## (undated) DEVICE — PACK HEART RIGHT CUSTOM SAN32RHF18

## (undated) DEVICE — Device

## (undated) DEVICE — INTRO SHEATH 7FRX10CM PINNACLE RSS702

## (undated) DEVICE — CATH ANGIO INFINITI PIGTAIL 145 6 SH 6FRX110CM  534-652S

## (undated) DEVICE — INTRO SHEATH MICRO PLATINUM TIP 4FRX40CM 7274

## (undated) DEVICE — ENDO VALVE BX EVIS MAJ-210

## (undated) DEVICE — KIT PROCEDURE SPY-PHI SGL HH9001

## (undated) DEVICE — DRAPE BACK TABLE  44X90" 8377

## (undated) DEVICE — TUBING PRESSURE 30"

## (undated) DEVICE — GLOVE PROTEXIS POWDER FREE SMT 7.0  2D72PT70X

## (undated) DEVICE — TRAY SINGLE DOSE EPIDURAL ANESTHESIA 332254

## (undated) DEVICE — PREP SKIN SCRUB TRAY 4461A

## (undated) DEVICE — NDL 15GA 1.5" 8881200029

## (undated) DEVICE — TUBING SUCTION 10'X3/16" N510

## (undated) DEVICE — SYR 10ML LL W/O NDL

## (undated) DEVICE — ENDO ADPT BRONCH SWIVEL Y A1002

## (undated) DEVICE — LUBRICANT INST KIT ENDO-LUBE 220-90

## (undated) DEVICE — ANTIFOG SOLUTION W/FOAM PAD 31142527

## (undated) DEVICE — SYR 05ML LL W/O NDL

## (undated) DEVICE — KIT HAND CONTROL ACIST 014644 AR-P54

## (undated) DEVICE — LINEN TOWEL PACK X5 5464

## (undated) DEVICE — GLOVE ESTEEM POWDER FREE SMT 7.5  2D72PT75

## (undated) DEVICE — DRSG GAUZE 4X4" 2187

## (undated) DEVICE — SYR 30ML SLIP TIP W/O NDL 302833

## (undated) DEVICE — PREP POVIDONE IODINE SCRUB 7.5% 120ML

## (undated) DEVICE — ENDO VALVE SUCTION BRONCH EVIS MAJ-209

## (undated) DEVICE — ENDO BRUSH CYTOLOGY TRIPLE NDL 10MM SUPERD SDTNB1000

## (undated) DEVICE — NDL 18GAX1.5" 305185

## (undated) DEVICE — TUBING IV EXT SET MICRO VOLUME MALE LL ADAPTER 36" 2N3345

## (undated) DEVICE — SYR 10ML PERFIX LL 332152

## (undated) DEVICE — DRSG TELFA 3X8" 1238

## (undated) DEVICE — WIRE GUIDE 0.025"X150CM EMERALD J TIP 502524

## (undated) DEVICE — KIT ENDO FIRST STEP DISINFECTANT 200ML W/POUCH EP-4

## (undated) DEVICE — SOL NACL 0.9% 10ML VIAL 0409-4888-02

## (undated) DEVICE — SYR 03ML LL W/O NDL 309657

## (undated) DEVICE — PAIN PACK

## (undated) DEVICE — DRSG PRIMAPORE 02X3" 7133

## (undated) DEVICE — ENDO VALVE SUCTION ULTRASOUND BRONCH MAJ-1414

## (undated) DEVICE — NDL 27GA 1.25" 305136

## (undated) DEVICE — TRAY PAIN INJECTION 97A 640

## (undated) DEVICE — ENDO FORCEP SUPERTRAX BIOPSY SUPERDIMENSION 1.7MMX113CM

## (undated) DEVICE — PITCHER STERILE 1000ML  SSK9004A

## (undated) DEVICE — PREP POVIDONE IODINE SOLUTION 10% 120ML

## (undated) DEVICE — NDL SPINAL 22GA 3.5" QUINCKE 405181

## (undated) DEVICE — ENDO FORCEP ALLIGATOR JAW BIOPSY 2MMX100CM FB-211D

## (undated) DEVICE — SOL NACL 0.9% IRRIG 1000ML BOTTLE 2F7124

## (undated) DEVICE — ENDO VALVE SYR NDL KIT ULTRASOUND BRONCH NA-201SX-4022-A

## (undated) DEVICE — NDL PERIVIEW FLEX TBNA 21G NA-403D-2021

## (undated) DEVICE — GLOVE ESTEEM POWDER FREE SMT 8.0  2D72PT80

## (undated) RX ORDER — LIDOCAINE HYDROCHLORIDE 20 MG/ML
INJECTION, SOLUTION EPIDURAL; INFILTRATION; INTRACAUDAL; PERINEURAL
Status: DISPENSED
Start: 2020-01-24

## (undated) RX ORDER — PROPOFOL 10 MG/ML
INJECTION, EMULSION INTRAVENOUS
Status: DISPENSED
Start: 2020-01-24

## (undated) RX ORDER — ALBUTEROL SULFATE 90 UG/1
AEROSOL, METERED RESPIRATORY (INHALATION)
Status: DISPENSED
Start: 2020-01-24

## (undated) RX ORDER — KETAMINE HCL IN 0.9 % NACL 50 MG/5 ML
SYRINGE (ML) INTRAVENOUS
Status: DISPENSED
Start: 2020-01-24

## (undated) RX ORDER — IPRATROPIUM BROMIDE AND ALBUTEROL SULFATE 2.5; .5 MG/3ML; MG/3ML
SOLUTION RESPIRATORY (INHALATION)
Status: DISPENSED
Start: 2020-01-24

## (undated) RX ORDER — DEXAMETHASONE SODIUM PHOSPHATE 4 MG/ML
INJECTION, SOLUTION INTRA-ARTICULAR; INTRALESIONAL; INTRAMUSCULAR; INTRAVENOUS; SOFT TISSUE
Status: DISPENSED
Start: 2020-01-24

## (undated) RX ORDER — FENTANYL CITRATE 50 UG/ML
INJECTION, SOLUTION INTRAMUSCULAR; INTRAVENOUS
Status: DISPENSED
Start: 2020-01-24

## (undated) RX ORDER — FENTANYL CITRATE 50 UG/ML
INJECTION, SOLUTION INTRAMUSCULAR; INTRAVENOUS
Status: DISPENSED
Start: 2021-02-20

## (undated) RX ORDER — LIDOCAINE HYDROCHLORIDE 10 MG/ML
INJECTION, SOLUTION EPIDURAL; INFILTRATION; INTRACAUDAL; PERINEURAL
Status: DISPENSED
Start: 2021-01-01

## (undated) RX ORDER — TRIAMCINOLONE ACETONIDE 40 MG/ML
INJECTION, SUSPENSION INTRA-ARTICULAR; INTRAMUSCULAR
Status: DISPENSED
Start: 2017-03-10

## (undated) RX ORDER — TRIAMCINOLONE ACETONIDE 40 MG/ML
INJECTION, SUSPENSION INTRA-ARTICULAR; INTRAMUSCULAR
Status: DISPENSED
Start: 2021-01-01

## (undated) RX ORDER — ONDANSETRON 2 MG/ML
INJECTION INTRAMUSCULAR; INTRAVENOUS
Status: DISPENSED
Start: 2020-01-24

## (undated) RX ORDER — LIDOCAINE HYDROCHLORIDE 10 MG/ML
INJECTION, SOLUTION INFILTRATION; PERINEURAL
Status: DISPENSED
Start: 2017-08-10

## (undated) RX ORDER — LIDOCAINE HYDROCHLORIDE 10 MG/ML
INJECTION, SOLUTION EPIDURAL; INFILTRATION; INTRACAUDAL; PERINEURAL
Status: DISPENSED
Start: 2017-03-10

## (undated) RX ORDER — LEVOFLOXACIN 5 MG/ML
INJECTION, SOLUTION INTRAVENOUS
Status: DISPENSED
Start: 2020-01-24

## (undated) RX ORDER — SIMETHICONE 40MG/0.6ML
SUSPENSION, DROPS(FINAL DOSAGE FORM)(ML) ORAL
Status: DISPENSED
Start: 2021-02-20

## (undated) RX ORDER — LIDOCAINE 40 MG/G
CREAM TOPICAL
Status: DISPENSED
Start: 2021-02-26

## (undated) RX ORDER — ALBUTEROL SULFATE 0.83 MG/ML
SOLUTION RESPIRATORY (INHALATION)
Status: DISPENSED
Start: 2018-11-16

## (undated) RX ORDER — ALBUTEROL SULFATE 0.83 MG/ML
SOLUTION RESPIRATORY (INHALATION)
Status: DISPENSED
Start: 2020-01-24

## (undated) RX ORDER — TRIAMCINOLONE ACETONIDE 40 MG/ML
INJECTION, SUSPENSION INTRA-ARTICULAR; INTRAMUSCULAR
Status: DISPENSED
Start: 2017-08-10